# Patient Record
Sex: FEMALE | Race: WHITE | NOT HISPANIC OR LATINO | Employment: OTHER | URBAN - METROPOLITAN AREA
[De-identification: names, ages, dates, MRNs, and addresses within clinical notes are randomized per-mention and may not be internally consistent; named-entity substitution may affect disease eponyms.]

---

## 2017-08-03 ENCOUNTER — LAB REQUISITION (OUTPATIENT)
Dept: LAB | Facility: HOSPITAL | Age: 77
End: 2017-08-03
Payer: MEDICARE

## 2017-08-03 DIAGNOSIS — N39.0 URINARY TRACT INFECTION: ICD-10-CM

## 2017-08-03 PROCEDURE — 87086 URINE CULTURE/COLONY COUNT: CPT | Performed by: UROLOGY

## 2017-08-03 PROCEDURE — 87077 CULTURE AEROBIC IDENTIFY: CPT | Performed by: UROLOGY

## 2017-08-03 PROCEDURE — 87186 SC STD MICRODIL/AGAR DIL: CPT | Performed by: UROLOGY

## 2017-08-05 LAB — BACTERIA UR CULT: NORMAL

## 2019-03-07 ENCOUNTER — LAB REQUISITION (OUTPATIENT)
Dept: LAB | Facility: HOSPITAL | Age: 79
End: 2019-03-07
Payer: MEDICARE

## 2019-03-07 DIAGNOSIS — N39.0 URINARY TRACT INFECTION: ICD-10-CM

## 2019-03-07 PROCEDURE — 87086 URINE CULTURE/COLONY COUNT: CPT | Performed by: UROLOGY

## 2019-03-08 LAB — BACTERIA UR CULT: ABNORMAL

## 2020-11-05 ENCOUNTER — HOSPITAL ENCOUNTER (INPATIENT)
Facility: HOSPITAL | Age: 80
LOS: 3 days | Discharge: HOME | DRG: 291 | End: 2020-11-08
Attending: EMERGENCY MEDICINE | Admitting: INTERNAL MEDICINE
Payer: MEDICARE

## 2020-11-05 ENCOUNTER — APPOINTMENT (EMERGENCY)
Dept: RADIOLOGY | Facility: HOSPITAL | Age: 80
DRG: 291 | End: 2020-11-05
Payer: MEDICARE

## 2020-11-05 DIAGNOSIS — I10 ESSENTIAL HYPERTENSION: ICD-10-CM

## 2020-11-05 DIAGNOSIS — R07.9 CHEST PAIN: ICD-10-CM

## 2020-11-05 DIAGNOSIS — R60.9 SWELLING: ICD-10-CM

## 2020-11-05 DIAGNOSIS — R93.89 ABNORMAL CT SCAN: ICD-10-CM

## 2020-11-05 DIAGNOSIS — I50.9 ACUTE CHF (CONGESTIVE HEART FAILURE) (HCC): Primary | ICD-10-CM

## 2020-11-05 PROBLEM — M19.90 OA (OSTEOARTHRITIS): Status: ACTIVE | Noted: 2020-11-05

## 2020-11-05 PROBLEM — R06.02 SOB (SHORTNESS OF BREATH): Status: ACTIVE | Noted: 2020-11-05

## 2020-11-05 PROBLEM — E78.5 HLD (HYPERLIPIDEMIA): Status: ACTIVE | Noted: 2020-11-05

## 2020-11-05 PROBLEM — N18.31 TYPE 2 DIABETES MELLITUS WITH STAGE 3A CHRONIC KIDNEY DISEASE, WITH LONG-TERM CURRENT USE OF INSULIN (HCC): Status: ACTIVE | Noted: 2020-11-05

## 2020-11-05 PROBLEM — E11.22 TYPE 2 DIABETES MELLITUS WITH STAGE 3A CHRONIC KIDNEY DISEASE, WITH LONG-TERM CURRENT USE OF INSULIN (HCC): Status: ACTIVE | Noted: 2020-11-05

## 2020-11-05 PROBLEM — I50.22 CHRONIC SYSTOLIC HEART FAILURE (HCC): Status: ACTIVE | Noted: 2020-11-05

## 2020-11-05 PROBLEM — Z79.4 TYPE 2 DIABETES MELLITUS WITH STAGE 3A CHRONIC KIDNEY DISEASE, WITH LONG-TERM CURRENT USE OF INSULIN (HCC): Status: ACTIVE | Noted: 2020-11-05

## 2020-11-05 PROBLEM — N18.30 CKD (CHRONIC KIDNEY DISEASE) STAGE 3, GFR 30-59 ML/MIN (HCC): Status: ACTIVE | Noted: 2020-11-05

## 2020-11-05 LAB
ALBUMIN SERPL BCP-MCNC: 3.7 G/DL (ref 3.5–5)
ALP SERPL-CCNC: 126 U/L (ref 46–116)
ALT SERPL W P-5'-P-CCNC: 136 U/L (ref 12–78)
ANION GAP SERPL CALCULATED.3IONS-SCNC: 5 MMOL/L (ref 4–13)
AST SERPL W P-5'-P-CCNC: 148 U/L (ref 5–45)
BACTERIA UR QL AUTO: NORMAL /HPF
BASOPHILS # BLD AUTO: 0.06 THOUSANDS/ΜL (ref 0–0.1)
BASOPHILS NFR BLD AUTO: 1 % (ref 0–1)
BILIRUB SERPL-MCNC: 0.91 MG/DL (ref 0.2–1)
BILIRUB UR QL STRIP: NEGATIVE
BUN SERPL-MCNC: 29 MG/DL (ref 5–25)
CALCIUM SERPL-MCNC: 9.7 MG/DL (ref 8.3–10.1)
CHLORIDE SERPL-SCNC: 112 MMOL/L (ref 100–108)
CLARITY UR: CLEAR
CO2 SERPL-SCNC: 25 MMOL/L (ref 21–32)
COLOR UR: YELLOW
CREAT SERPL-MCNC: 1.24 MG/DL (ref 0.6–1.3)
D DIMER PPP FEU-MCNC: 0.8 UG/ML FEU
EOSINOPHIL # BLD AUTO: 0.16 THOUSAND/ΜL (ref 0–0.61)
EOSINOPHIL NFR BLD AUTO: 2 % (ref 0–6)
ERYTHROCYTE [DISTWIDTH] IN BLOOD BY AUTOMATED COUNT: 17.2 % (ref 11.6–15.1)
GFR SERPL CREATININE-BSD FRML MDRD: 41 ML/MIN/1.73SQ M
GLUCOSE SERPL-MCNC: 123 MG/DL (ref 65–140)
GLUCOSE UR STRIP-MCNC: ABNORMAL MG/DL
HCT VFR BLD AUTO: 40.7 % (ref 34.8–46.1)
HGB BLD-MCNC: 12.2 G/DL (ref 11.5–15.4)
HGB UR QL STRIP.AUTO: ABNORMAL
HYALINE CASTS #/AREA URNS LPF: NORMAL /LPF
IMM GRANULOCYTES # BLD AUTO: 0.03 THOUSAND/UL (ref 0–0.2)
IMM GRANULOCYTES NFR BLD AUTO: 0 % (ref 0–2)
KETONES UR STRIP-MCNC: NEGATIVE MG/DL
LEUKOCYTE ESTERASE UR QL STRIP: NEGATIVE
LYMPHOCYTES # BLD AUTO: 2.71 THOUSANDS/ΜL (ref 0.6–4.47)
LYMPHOCYTES NFR BLD AUTO: 26 % (ref 14–44)
MCH RBC QN AUTO: 30.7 PG (ref 26.8–34.3)
MCHC RBC AUTO-ENTMCNC: 30 G/DL (ref 31.4–37.4)
MCV RBC AUTO: 102 FL (ref 82–98)
MONOCYTES # BLD AUTO: 0.43 THOUSAND/ΜL (ref 0.17–1.22)
MONOCYTES NFR BLD AUTO: 4 % (ref 4–12)
NEUTROPHILS # BLD AUTO: 7.05 THOUSANDS/ΜL (ref 1.85–7.62)
NEUTS SEG NFR BLD AUTO: 67 % (ref 43–75)
NITRITE UR QL STRIP: NEGATIVE
NON-SQ EPI CELLS URNS QL MICRO: NORMAL /HPF
NRBC BLD AUTO-RTO: 0 /100 WBCS
NT-PROBNP SERPL-MCNC: ABNORMAL PG/ML
PH UR STRIP.AUTO: 5 [PH] (ref 4.5–8)
PLATELET # BLD AUTO: 234 THOUSANDS/UL (ref 149–390)
PMV BLD AUTO: 11.7 FL (ref 8.9–12.7)
POTASSIUM SERPL-SCNC: 5.1 MMOL/L (ref 3.5–5.3)
PROT SERPL-MCNC: 7.1 G/DL (ref 6.4–8.2)
PROT UR STRIP-MCNC: ABNORMAL MG/DL
RBC # BLD AUTO: 3.98 MILLION/UL (ref 3.81–5.12)
RBC #/AREA URNS AUTO: NORMAL /HPF
SARS-COV-2 RNA RESP QL NAA+PROBE: NEGATIVE
SODIUM SERPL-SCNC: 142 MMOL/L (ref 136–145)
SP GR UR STRIP.AUTO: 1.01 (ref 1–1.03)
TROPONIN I SERPL-MCNC: <0.02 NG/ML
TSH SERPL DL<=0.05 MIU/L-ACNC: 1.94 UIU/ML (ref 0.36–3.74)
UROBILINOGEN UR QL STRIP.AUTO: 0.2 E.U./DL
WBC # BLD AUTO: 10.44 THOUSAND/UL (ref 4.31–10.16)
WBC #/AREA URNS AUTO: NORMAL /HPF

## 2020-11-05 PROCEDURE — 83880 ASSAY OF NATRIURETIC PEPTIDE: CPT | Performed by: EMERGENCY MEDICINE

## 2020-11-05 PROCEDURE — 99285 EMERGENCY DEPT VISIT HI MDM: CPT

## 2020-11-05 PROCEDURE — 84484 ASSAY OF TROPONIN QUANT: CPT | Performed by: EMERGENCY MEDICINE

## 2020-11-05 PROCEDURE — G1004 CDSM NDSC: HCPCS

## 2020-11-05 PROCEDURE — 1124F ACP DISCUSS-NO DSCNMKR DOCD: CPT | Performed by: INTERNAL MEDICINE

## 2020-11-05 PROCEDURE — U0003 INFECTIOUS AGENT DETECTION BY NUCLEIC ACID (DNA OR RNA); SEVERE ACUTE RESPIRATORY SYNDROME CORONAVIRUS 2 (SARS-COV-2) (CORONAVIRUS DISEASE [COVID-19]), AMPLIFIED PROBE TECHNIQUE, MAKING USE OF HIGH THROUGHPUT TECHNOLOGIES AS DESCRIBED BY CMS-2020-01-R: HCPCS | Performed by: EMERGENCY MEDICINE

## 2020-11-05 PROCEDURE — 96374 THER/PROPH/DIAG INJ IV PUSH: CPT

## 2020-11-05 PROCEDURE — 74176 CT ABD & PELVIS W/O CONTRAST: CPT

## 2020-11-05 PROCEDURE — NC001 PR NO CHARGE: Performed by: INTERNAL MEDICINE

## 2020-11-05 PROCEDURE — 84443 ASSAY THYROID STIM HORMONE: CPT | Performed by: EMERGENCY MEDICINE

## 2020-11-05 PROCEDURE — 71250 CT THORAX DX C-: CPT

## 2020-11-05 PROCEDURE — 81001 URINALYSIS AUTO W/SCOPE: CPT

## 2020-11-05 PROCEDURE — 80053 COMPREHEN METABOLIC PANEL: CPT | Performed by: EMERGENCY MEDICINE

## 2020-11-05 PROCEDURE — 93005 ELECTROCARDIOGRAM TRACING: CPT

## 2020-11-05 PROCEDURE — 36415 COLL VENOUS BLD VENIPUNCTURE: CPT | Performed by: EMERGENCY MEDICINE

## 2020-11-05 PROCEDURE — 99285 EMERGENCY DEPT VISIT HI MDM: CPT | Performed by: EMERGENCY MEDICINE

## 2020-11-05 PROCEDURE — 85379 FIBRIN DEGRADATION QUANT: CPT | Performed by: EMERGENCY MEDICINE

## 2020-11-05 PROCEDURE — 71045 X-RAY EXAM CHEST 1 VIEW: CPT

## 2020-11-05 PROCEDURE — 85025 COMPLETE CBC W/AUTO DIFF WBC: CPT | Performed by: EMERGENCY MEDICINE

## 2020-11-05 RX ORDER — ATORVASTATIN CALCIUM 20 MG/1
20 TABLET, FILM COATED ORAL DAILY
COMMUNITY

## 2020-11-05 RX ORDER — CALCIUM CARBONATE 500(1250)
1 TABLET ORAL
Status: DISCONTINUED | OUTPATIENT
Start: 2020-11-06 | End: 2020-11-08 | Stop reason: HOSPADM

## 2020-11-05 RX ORDER — ACETAMINOPHEN 325 MG/1
650 TABLET ORAL EVERY 6 HOURS PRN
Status: DISCONTINUED | OUTPATIENT
Start: 2020-11-05 | End: 2020-11-08 | Stop reason: HOSPADM

## 2020-11-05 RX ORDER — POTASSIUM CHLORIDE 20 MEQ/1
20 TABLET, EXTENDED RELEASE ORAL 2 TIMES DAILY
COMMUNITY

## 2020-11-05 RX ORDER — FUROSEMIDE 10 MG/ML
20 INJECTION INTRAMUSCULAR; INTRAVENOUS ONCE
Status: COMPLETED | OUTPATIENT
Start: 2020-11-05 | End: 2020-11-05

## 2020-11-05 RX ORDER — FLUTICASONE PROPIONATE 50 MCG
1 SPRAY, SUSPENSION (ML) NASAL DAILY
COMMUNITY

## 2020-11-05 RX ORDER — ATORVASTATIN CALCIUM 20 MG/1
20 TABLET, FILM COATED ORAL DAILY
Status: DISCONTINUED | OUTPATIENT
Start: 2020-11-06 | End: 2020-11-06

## 2020-11-05 RX ORDER — FUROSEMIDE 10 MG/ML
20 INJECTION INTRAMUSCULAR; INTRAVENOUS DAILY
Status: DISCONTINUED | OUTPATIENT
Start: 2020-11-06 | End: 2020-11-08 | Stop reason: HOSPADM

## 2020-11-05 RX ORDER — MELATONIN
1000 DAILY
Status: DISCONTINUED | OUTPATIENT
Start: 2020-11-06 | End: 2020-11-08 | Stop reason: HOSPADM

## 2020-11-05 RX ORDER — ATENOLOL 25 MG/1
25 TABLET ORAL DAILY
COMMUNITY
End: 2020-11-08 | Stop reason: HOSPADM

## 2020-11-05 RX ORDER — BLOOD-GLUCOSE METER
1 KIT MISCELLANEOUS AS NEEDED
COMMUNITY

## 2020-11-05 RX ORDER — PHENOL 1.4 %
600 AEROSOL, SPRAY (ML) MUCOUS MEMBRANE 2 TIMES DAILY WITH MEALS
COMMUNITY

## 2020-11-05 RX ORDER — HEPARIN SODIUM 5000 [USP'U]/ML
5000 INJECTION, SOLUTION INTRAVENOUS; SUBCUTANEOUS EVERY 8 HOURS SCHEDULED
Status: DISCONTINUED | OUTPATIENT
Start: 2020-11-06 | End: 2020-11-06

## 2020-11-05 RX ORDER — ATENOLOL 25 MG/1
25 TABLET ORAL DAILY
Status: DISCONTINUED | OUTPATIENT
Start: 2020-11-06 | End: 2020-11-06

## 2020-11-05 RX ORDER — LANOLIN ALCOHOL/MO/W.PET/CERES
CREAM (GRAM) TOPICAL DAILY
COMMUNITY

## 2020-11-05 RX ORDER — ASPIRIN 81 MG/1
81 TABLET ORAL DAILY
Status: DISCONTINUED | OUTPATIENT
Start: 2020-11-06 | End: 2020-11-08 | Stop reason: HOSPADM

## 2020-11-05 RX ORDER — ASPIRIN 81 MG/1
81 TABLET ORAL DAILY
COMMUNITY

## 2020-11-05 RX ORDER — MELATONIN
1000 DAILY
COMMUNITY

## 2020-11-05 RX ORDER — POTASSIUM CHLORIDE 20 MEQ/1
20 TABLET, EXTENDED RELEASE ORAL 2 TIMES DAILY
Status: DISCONTINUED | OUTPATIENT
Start: 2020-11-06 | End: 2020-11-06

## 2020-11-05 RX ADMIN — FUROSEMIDE 20 MG: 10 INJECTION, SOLUTION INTRAMUSCULAR; INTRAVENOUS at 20:24

## 2020-11-06 ENCOUNTER — APPOINTMENT (INPATIENT)
Dept: RADIOLOGY | Facility: HOSPITAL | Age: 80
DRG: 291 | End: 2020-11-06
Payer: MEDICARE

## 2020-11-06 ENCOUNTER — APPOINTMENT (INPATIENT)
Dept: NON INVASIVE DIAGNOSTICS | Facility: HOSPITAL | Age: 80
DRG: 291 | End: 2020-11-06
Payer: MEDICARE

## 2020-11-06 PROBLEM — R59.1 LYMPHADENOPATHY: Status: ACTIVE | Noted: 2020-11-06

## 2020-11-06 PROBLEM — R74.8 ELEVATED LIVER ENZYMES: Status: ACTIVE | Noted: 2020-11-06

## 2020-11-06 LAB
ALBUMIN SERPL BCP-MCNC: 3.8 G/DL (ref 3.5–5)
ALP SERPL-CCNC: 123 U/L (ref 46–116)
ALT SERPL W P-5'-P-CCNC: 122 U/L (ref 12–78)
ANION GAP SERPL CALCULATED.3IONS-SCNC: 4 MMOL/L (ref 4–13)
AST SERPL W P-5'-P-CCNC: 69 U/L (ref 5–45)
ATRIAL RATE: 76 BPM
BACTERIA UR QL AUTO: ABNORMAL /HPF
BASOPHILS # BLD AUTO: 0.05 THOUSANDS/ΜL (ref 0–0.1)
BASOPHILS NFR BLD AUTO: 1 % (ref 0–1)
BILIRUB SERPL-MCNC: 1.19 MG/DL (ref 0.2–1)
BILIRUB UR QL STRIP: NEGATIVE
BUN SERPL-MCNC: 23 MG/DL (ref 5–25)
CALCIUM SERPL-MCNC: 9.6 MG/DL (ref 8.3–10.1)
CHLORIDE SERPL-SCNC: 108 MMOL/L (ref 100–108)
CLARITY UR: CLEAR
CO2 SERPL-SCNC: 29 MMOL/L (ref 21–32)
COLOR UR: YELLOW
COLOR, POC: NORMAL
CREAT SERPL-MCNC: 0.99 MG/DL (ref 0.6–1.3)
EOSINOPHIL # BLD AUTO: 0.18 THOUSAND/ΜL (ref 0–0.61)
EOSINOPHIL NFR BLD AUTO: 2 % (ref 0–6)
ERYTHROCYTE [DISTWIDTH] IN BLOOD BY AUTOMATED COUNT: 17.2 % (ref 11.6–15.1)
GFR SERPL CREATININE-BSD FRML MDRD: 54 ML/MIN/1.73SQ M
GLUCOSE SERPL-MCNC: 101 MG/DL (ref 65–140)
GLUCOSE SERPL-MCNC: 104 MG/DL (ref 65–140)
GLUCOSE SERPL-MCNC: 110 MG/DL (ref 65–140)
GLUCOSE SERPL-MCNC: 154 MG/DL (ref 65–140)
GLUCOSE SERPL-MCNC: 172 MG/DL (ref 65–140)
GLUCOSE SERPL-MCNC: 287 MG/DL (ref 65–140)
GLUCOSE UR STRIP-MCNC: NEGATIVE MG/DL
HBV CORE AB SER QL: NORMAL
HBV CORE IGM SER QL: NORMAL
HBV SURFACE AG SER QL: NORMAL
HCT VFR BLD AUTO: 39.2 % (ref 34.8–46.1)
HCV AB SER QL: NORMAL
HGB BLD-MCNC: 12.2 G/DL (ref 11.5–15.4)
HGB UR QL STRIP.AUTO: ABNORMAL
HYALINE CASTS #/AREA URNS LPF: ABNORMAL /LPF
IMM GRANULOCYTES # BLD AUTO: 0.02 THOUSAND/UL (ref 0–0.2)
IMM GRANULOCYTES NFR BLD AUTO: 0 % (ref 0–2)
KETONES UR STRIP-MCNC: NEGATIVE MG/DL
LEUKOCYTE ESTERASE UR QL STRIP: NEGATIVE
LYMPHOCYTES # BLD AUTO: 3.7 THOUSANDS/ΜL (ref 0.6–4.47)
LYMPHOCYTES NFR BLD AUTO: 37 % (ref 14–44)
MAGNESIUM SERPL-MCNC: 2.2 MG/DL (ref 1.6–2.6)
MCH RBC QN AUTO: 31.2 PG (ref 26.8–34.3)
MCHC RBC AUTO-ENTMCNC: 31.1 G/DL (ref 31.4–37.4)
MCV RBC AUTO: 100 FL (ref 82–98)
MONOCYTES # BLD AUTO: 0.39 THOUSAND/ΜL (ref 0.17–1.22)
MONOCYTES NFR BLD AUTO: 4 % (ref 4–12)
NEUTROPHILS # BLD AUTO: 5.6 THOUSANDS/ΜL (ref 1.85–7.62)
NEUTS SEG NFR BLD AUTO: 56 % (ref 43–75)
NITRITE UR QL STRIP: NEGATIVE
NON-SQ EPI CELLS URNS QL MICRO: ABNORMAL /HPF
NRBC BLD AUTO-RTO: 0 /100 WBCS
P AXIS: 82 DEGREES
PH UR STRIP.AUTO: 5.5 [PH] (ref 4.5–8)
PLATELET # BLD AUTO: 220 THOUSANDS/UL (ref 149–390)
PMV BLD AUTO: 11.4 FL (ref 8.9–12.7)
POTASSIUM SERPL-SCNC: 3.5 MMOL/L (ref 3.5–5.3)
PR INTERVAL: 202 MS
PROT SERPL-MCNC: 7.1 G/DL (ref 6.4–8.2)
PROT UR STRIP-MCNC: ABNORMAL MG/DL
QRS AXIS: 89 DEGREES
QRSD INTERVAL: 72 MS
QT INTERVAL: 376 MS
QTC INTERVAL: 423 MS
RBC # BLD AUTO: 3.91 MILLION/UL (ref 3.81–5.12)
RBC #/AREA URNS AUTO: ABNORMAL /HPF
SODIUM SERPL-SCNC: 141 MMOL/L (ref 136–145)
SP GR UR STRIP.AUTO: 1.02 (ref 1–1.03)
T WAVE AXIS: 114 DEGREES
UROBILINOGEN UR QL STRIP.AUTO: 0.2 E.U./DL
VENTRICULAR RATE: 76 BPM
WBC # BLD AUTO: 9.94 THOUSAND/UL (ref 4.31–10.16)
WBC #/AREA URNS AUTO: ABNORMAL /HPF

## 2020-11-06 PROCEDURE — 93970 EXTREMITY STUDY: CPT | Performed by: SURGERY

## 2020-11-06 PROCEDURE — 80053 COMPREHEN METABOLIC PANEL: CPT | Performed by: INTERNAL MEDICINE

## 2020-11-06 PROCEDURE — 99223 1ST HOSP IP/OBS HIGH 75: CPT | Performed by: INTERNAL MEDICINE

## 2020-11-06 PROCEDURE — 93010 ELECTROCARDIOGRAM REPORT: CPT | Performed by: INTERNAL MEDICINE

## 2020-11-06 PROCEDURE — 81001 URINALYSIS AUTO W/SCOPE: CPT

## 2020-11-06 PROCEDURE — 86705 HEP B CORE ANTIBODY IGM: CPT | Performed by: INTERNAL MEDICINE

## 2020-11-06 PROCEDURE — 82948 REAGENT STRIP/BLOOD GLUCOSE: CPT

## 2020-11-06 PROCEDURE — 83735 ASSAY OF MAGNESIUM: CPT | Performed by: STUDENT IN AN ORGANIZED HEALTH CARE EDUCATION/TRAINING PROGRAM

## 2020-11-06 PROCEDURE — 85025 COMPLETE CBC W/AUTO DIFF WBC: CPT | Performed by: STUDENT IN AN ORGANIZED HEALTH CARE EDUCATION/TRAINING PROGRAM

## 2020-11-06 PROCEDURE — 36415 COLL VENOUS BLD VENIPUNCTURE: CPT | Performed by: INTERNAL MEDICINE

## 2020-11-06 PROCEDURE — 86803 HEPATITIS C AB TEST: CPT | Performed by: INTERNAL MEDICINE

## 2020-11-06 PROCEDURE — 93306 TTE W/DOPPLER COMPLETE: CPT

## 2020-11-06 PROCEDURE — 87340 HEPATITIS B SURFACE AG IA: CPT | Performed by: INTERNAL MEDICINE

## 2020-11-06 PROCEDURE — 86704 HEP B CORE ANTIBODY TOTAL: CPT | Performed by: INTERNAL MEDICINE

## 2020-11-06 PROCEDURE — 93306 TTE W/DOPPLER COMPLETE: CPT | Performed by: INTERNAL MEDICINE

## 2020-11-06 PROCEDURE — 73030 X-RAY EXAM OF SHOULDER: CPT

## 2020-11-06 PROCEDURE — 93970 EXTREMITY STUDY: CPT

## 2020-11-06 PROCEDURE — NC001 PR NO CHARGE: Performed by: INTERNAL MEDICINE

## 2020-11-06 RX ORDER — POTASSIUM CHLORIDE 20 MEQ/1
40 TABLET, EXTENDED RELEASE ORAL ONCE
Status: COMPLETED | OUTPATIENT
Start: 2020-11-06 | End: 2020-11-06

## 2020-11-06 RX ORDER — ATORVASTATIN CALCIUM 40 MG/1
40 TABLET, FILM COATED ORAL
Status: DISCONTINUED | OUTPATIENT
Start: 2020-11-06 | End: 2020-11-08 | Stop reason: HOSPADM

## 2020-11-06 RX ORDER — HEPARIN SODIUM 5000 [USP'U]/ML
5000 INJECTION, SOLUTION INTRAVENOUS; SUBCUTANEOUS EVERY 8 HOURS SCHEDULED
Status: DISCONTINUED | OUTPATIENT
Start: 2020-11-06 | End: 2020-11-08 | Stop reason: HOSPADM

## 2020-11-06 RX ORDER — LISINOPRIL 5 MG/1
5 TABLET ORAL DAILY
Status: DISCONTINUED | OUTPATIENT
Start: 2020-11-06 | End: 2020-11-08 | Stop reason: HOSPADM

## 2020-11-06 RX ADMIN — ACETAMINOPHEN 650 MG: 325 TABLET, FILM COATED ORAL at 08:20

## 2020-11-06 RX ADMIN — CALCIUM 1 TABLET: 500 TABLET ORAL at 08:27

## 2020-11-06 RX ADMIN — ACETAMINOPHEN 650 MG: 325 TABLET, FILM COATED ORAL at 23:09

## 2020-11-06 RX ADMIN — Medication 1000 UNITS: at 08:27

## 2020-11-06 RX ADMIN — LISINOPRIL 5 MG: 5 TABLET ORAL at 15:31

## 2020-11-06 RX ADMIN — HEPARIN SODIUM 5000 UNITS: 5000 INJECTION INTRAVENOUS; SUBCUTANEOUS at 21:58

## 2020-11-06 RX ADMIN — POTASSIUM CHLORIDE 40 MEQ: 1500 TABLET, EXTENDED RELEASE ORAL at 08:28

## 2020-11-06 RX ADMIN — HEPARIN SODIUM 5000 UNITS: 5000 INJECTION INTRAVENOUS; SUBCUTANEOUS at 04:40

## 2020-11-06 RX ADMIN — CYANOCOBALAMIN TAB 500 MCG 1000 MCG: 500 TAB at 08:27

## 2020-11-06 RX ADMIN — ASPIRIN 81 MG: 81 TABLET ORAL at 08:20

## 2020-11-06 RX ADMIN — INSULIN LISPRO 3 UNITS: 100 INJECTION, SOLUTION INTRAVENOUS; SUBCUTANEOUS at 21:59

## 2020-11-06 RX ADMIN — HEPARIN SODIUM 5000 UNITS: 5000 INJECTION INTRAVENOUS; SUBCUTANEOUS at 14:36

## 2020-11-06 RX ADMIN — FUROSEMIDE 20 MG: 10 INJECTION, SOLUTION INTRAMUSCULAR; INTRAVENOUS at 08:23

## 2020-11-06 RX ADMIN — INSULIN LISPRO 1 UNITS: 100 INJECTION, SOLUTION INTRAVENOUS; SUBCUTANEOUS at 14:35

## 2020-11-06 RX ADMIN — ATORVASTATIN CALCIUM 40 MG: 40 TABLET, FILM COATED ORAL at 17:23

## 2020-11-07 LAB
ALBUMIN SERPL BCP-MCNC: 3.5 G/DL (ref 3.5–5)
ALP SERPL-CCNC: 108 U/L (ref 46–116)
ALT SERPL W P-5'-P-CCNC: 84 U/L (ref 12–78)
ANION GAP SERPL CALCULATED.3IONS-SCNC: 4 MMOL/L (ref 4–13)
AST SERPL W P-5'-P-CCNC: 30 U/L (ref 5–45)
BILIRUB SERPL-MCNC: 1.57 MG/DL (ref 0.2–1)
BUN SERPL-MCNC: 17 MG/DL (ref 5–25)
CALCIUM SERPL-MCNC: 9.3 MG/DL (ref 8.3–10.1)
CHLORIDE SERPL-SCNC: 107 MMOL/L (ref 100–108)
CO2 SERPL-SCNC: 29 MMOL/L (ref 21–32)
CREAT SERPL-MCNC: 0.99 MG/DL (ref 0.6–1.3)
GFR SERPL CREATININE-BSD FRML MDRD: 54 ML/MIN/1.73SQ M
GLUCOSE SERPL-MCNC: 184 MG/DL (ref 65–140)
GLUCOSE SERPL-MCNC: 185 MG/DL (ref 65–140)
GLUCOSE SERPL-MCNC: 203 MG/DL (ref 65–140)
GLUCOSE SERPL-MCNC: 251 MG/DL (ref 65–140)
GLUCOSE SERPL-MCNC: 274 MG/DL (ref 65–140)
GLUCOSE SERPL-MCNC: 310 MG/DL (ref 65–140)
MAGNESIUM SERPL-MCNC: 2.2 MG/DL (ref 1.6–2.6)
POTASSIUM SERPL-SCNC: 3.6 MMOL/L (ref 3.5–5.3)
PROT SERPL-MCNC: 6.6 G/DL (ref 6.4–8.2)
SODIUM SERPL-SCNC: 140 MMOL/L (ref 136–145)

## 2020-11-07 PROCEDURE — 82948 REAGENT STRIP/BLOOD GLUCOSE: CPT

## 2020-11-07 PROCEDURE — 83735 ASSAY OF MAGNESIUM: CPT | Performed by: INTERNAL MEDICINE

## 2020-11-07 PROCEDURE — 99233 SBSQ HOSP IP/OBS HIGH 50: CPT | Performed by: INTERNAL MEDICINE

## 2020-11-07 PROCEDURE — 80053 COMPREHEN METABOLIC PANEL: CPT | Performed by: STUDENT IN AN ORGANIZED HEALTH CARE EDUCATION/TRAINING PROGRAM

## 2020-11-07 RX ORDER — FUROSEMIDE 10 MG/ML
20 INJECTION INTRAMUSCULAR; INTRAVENOUS ONCE
Status: COMPLETED | OUTPATIENT
Start: 2020-11-07 | End: 2020-11-07

## 2020-11-07 RX ORDER — POTASSIUM CHLORIDE 20 MEQ/1
40 TABLET, EXTENDED RELEASE ORAL 2 TIMES DAILY
Status: DISCONTINUED | OUTPATIENT
Start: 2020-11-07 | End: 2020-11-08 | Stop reason: HOSPADM

## 2020-11-07 RX ORDER — MAGNESIUM SULFATE HEPTAHYDRATE 40 MG/ML
2 INJECTION, SOLUTION INTRAVENOUS ONCE
Status: COMPLETED | OUTPATIENT
Start: 2020-11-07 | End: 2020-11-08

## 2020-11-07 RX ORDER — DOXYCYCLINE HYCLATE 50 MG/1
324 CAPSULE, GELATIN COATED ORAL
Status: DISCONTINUED | OUTPATIENT
Start: 2020-11-07 | End: 2020-11-08 | Stop reason: HOSPADM

## 2020-11-07 RX ADMIN — INSULIN LISPRO 2 UNITS: 100 INJECTION, SOLUTION INTRAVENOUS; SUBCUTANEOUS at 09:02

## 2020-11-07 RX ADMIN — Medication 1000 UNITS: at 08:58

## 2020-11-07 RX ADMIN — METOPROLOL TARTRATE 25 MG: 25 TABLET, FILM COATED ORAL at 08:56

## 2020-11-07 RX ADMIN — ATORVASTATIN CALCIUM 40 MG: 40 TABLET, FILM COATED ORAL at 18:10

## 2020-11-07 RX ADMIN — MAGNESIUM SULFATE IN WATER 2 G: 40 INJECTION, SOLUTION INTRAVENOUS at 12:24

## 2020-11-07 RX ADMIN — CYANOCOBALAMIN TAB 500 MCG 1000 MCG: 500 TAB at 08:58

## 2020-11-07 RX ADMIN — FERROUS GLUCONATE 324 MG: 324 TABLET ORAL at 18:11

## 2020-11-07 RX ADMIN — INSULIN LISPRO 3 UNITS: 100 INJECTION, SOLUTION INTRAVENOUS; SUBCUTANEOUS at 21:41

## 2020-11-07 RX ADMIN — INSULIN LISPRO 4 UNITS: 100 INJECTION, SOLUTION INTRAVENOUS; SUBCUTANEOUS at 18:56

## 2020-11-07 RX ADMIN — FUROSEMIDE 20 MG: 10 INJECTION, SOLUTION INTRAMUSCULAR; INTRAVENOUS at 08:59

## 2020-11-07 RX ADMIN — FUROSEMIDE 20 MG: 10 INJECTION, SOLUTION INTRAMUSCULAR; INTRAVENOUS at 12:25

## 2020-11-07 RX ADMIN — INSULIN LISPRO 1 UNITS: 100 INJECTION, SOLUTION INTRAVENOUS; SUBCUTANEOUS at 13:46

## 2020-11-07 RX ADMIN — ASPIRIN 81 MG: 81 TABLET ORAL at 08:59

## 2020-11-07 RX ADMIN — HEPARIN SODIUM 5000 UNITS: 5000 INJECTION INTRAVENOUS; SUBCUTANEOUS at 05:30

## 2020-11-07 RX ADMIN — CALCIUM 1 TABLET: 500 TABLET ORAL at 08:59

## 2020-11-07 RX ADMIN — LISINOPRIL 5 MG: 5 TABLET ORAL at 08:59

## 2020-11-07 RX ADMIN — POTASSIUM CHLORIDE 40 MEQ: 1500 TABLET, EXTENDED RELEASE ORAL at 12:24

## 2020-11-07 RX ADMIN — ACETAMINOPHEN 650 MG: 325 TABLET, FILM COATED ORAL at 08:59

## 2020-11-07 RX ADMIN — HEPARIN SODIUM 5000 UNITS: 5000 INJECTION INTRAVENOUS; SUBCUTANEOUS at 21:42

## 2020-11-07 RX ADMIN — METOPROLOL TARTRATE 25 MG: 25 TABLET, FILM COATED ORAL at 21:40

## 2020-11-07 RX ADMIN — HEPARIN SODIUM 5000 UNITS: 5000 INJECTION INTRAVENOUS; SUBCUTANEOUS at 13:46

## 2020-11-07 RX ADMIN — FERROUS GLUCONATE 324 MG: 324 TABLET ORAL at 12:25

## 2020-11-07 RX ADMIN — INSULIN LISPRO 3 UNITS: 100 INJECTION, SOLUTION INTRAVENOUS; SUBCUTANEOUS at 10:06

## 2020-11-07 RX ADMIN — POTASSIUM CHLORIDE 40 MEQ: 1500 TABLET, EXTENDED RELEASE ORAL at 18:11

## 2020-11-08 VITALS
DIASTOLIC BLOOD PRESSURE: 62 MMHG | BODY MASS INDEX: 26.29 KG/M2 | TEMPERATURE: 97.8 F | WEIGHT: 163.58 LBS | SYSTOLIC BLOOD PRESSURE: 143 MMHG | HEIGHT: 66 IN | OXYGEN SATURATION: 93 % | HEART RATE: 73 BPM | RESPIRATION RATE: 18 BRPM

## 2020-11-08 PROBLEM — R06.02 SOB (SHORTNESS OF BREATH): Status: RESOLVED | Noted: 2020-11-05 | Resolved: 2020-11-08

## 2020-11-08 LAB
ALBUMIN SERPL BCP-MCNC: 3.7 G/DL (ref 3.5–5)
ALP SERPL-CCNC: 113 U/L (ref 46–116)
ALT SERPL W P-5'-P-CCNC: 70 U/L (ref 12–78)
ANION GAP SERPL CALCULATED.3IONS-SCNC: 7 MMOL/L (ref 4–13)
AST SERPL W P-5'-P-CCNC: 17 U/L (ref 5–45)
BILIRUB SERPL-MCNC: 1.58 MG/DL (ref 0.2–1)
BUN SERPL-MCNC: 20 MG/DL (ref 5–25)
CALCIUM SERPL-MCNC: 9.4 MG/DL (ref 8.3–10.1)
CHLORIDE SERPL-SCNC: 107 MMOL/L (ref 100–108)
CO2 SERPL-SCNC: 24 MMOL/L (ref 21–32)
CREAT SERPL-MCNC: 1.08 MG/DL (ref 0.6–1.3)
ERYTHROCYTE [DISTWIDTH] IN BLOOD BY AUTOMATED COUNT: 17.2 % (ref 11.6–15.1)
GFR SERPL CREATININE-BSD FRML MDRD: 48 ML/MIN/1.73SQ M
GLUCOSE SERPL-MCNC: 195 MG/DL (ref 65–140)
GLUCOSE SERPL-MCNC: 213 MG/DL (ref 65–140)
GLUCOSE SERPL-MCNC: 218 MG/DL (ref 65–140)
GLUCOSE SERPL-MCNC: 291 MG/DL (ref 65–140)
GLUCOSE SERPL-MCNC: 346 MG/DL (ref 65–140)
HCT VFR BLD AUTO: 37.7 % (ref 34.8–46.1)
HGB BLD-MCNC: 12 G/DL (ref 11.5–15.4)
MCH RBC QN AUTO: 31.7 PG (ref 26.8–34.3)
MCHC RBC AUTO-ENTMCNC: 31.8 G/DL (ref 31.4–37.4)
MCV RBC AUTO: 100 FL (ref 82–98)
PLATELET # BLD AUTO: 210 THOUSANDS/UL (ref 149–390)
PMV BLD AUTO: 11.6 FL (ref 8.9–12.7)
POTASSIUM SERPL-SCNC: 4.1 MMOL/L (ref 3.5–5.3)
PROT SERPL-MCNC: 6.7 G/DL (ref 6.4–8.2)
RBC # BLD AUTO: 3.79 MILLION/UL (ref 3.81–5.12)
SODIUM SERPL-SCNC: 138 MMOL/L (ref 136–145)
WBC # BLD AUTO: 10.24 THOUSAND/UL (ref 4.31–10.16)

## 2020-11-08 PROCEDURE — 80053 COMPREHEN METABOLIC PANEL: CPT | Performed by: INTERNAL MEDICINE

## 2020-11-08 PROCEDURE — 82948 REAGENT STRIP/BLOOD GLUCOSE: CPT

## 2020-11-08 PROCEDURE — 99233 SBSQ HOSP IP/OBS HIGH 50: CPT | Performed by: INTERNAL MEDICINE

## 2020-11-08 PROCEDURE — 85027 COMPLETE CBC AUTOMATED: CPT | Performed by: INTERNAL MEDICINE

## 2020-11-08 RX ORDER — LISINOPRIL 5 MG/1
5 TABLET ORAL DAILY
Qty: 30 TABLET | Refills: 0 | Status: SHIPPED | OUTPATIENT
Start: 2020-11-09 | End: 2021-08-13 | Stop reason: HOSPADM

## 2020-11-08 RX ORDER — FUROSEMIDE 40 MG/1
40 TABLET ORAL DAILY
Qty: 30 TABLET | Refills: 0 | Status: SHIPPED | OUTPATIENT
Start: 2020-11-08 | End: 2020-12-08

## 2020-11-08 RX ADMIN — POTASSIUM CHLORIDE 40 MEQ: 1500 TABLET, EXTENDED RELEASE ORAL at 08:05

## 2020-11-08 RX ADMIN — ACETAMINOPHEN 650 MG: 325 TABLET, FILM COATED ORAL at 08:05

## 2020-11-08 RX ADMIN — INSULIN LISPRO 5 UNITS: 100 INJECTION, SOLUTION INTRAVENOUS; SUBCUTANEOUS at 18:04

## 2020-11-08 RX ADMIN — METOPROLOL TARTRATE 25 MG: 25 TABLET, FILM COATED ORAL at 08:04

## 2020-11-08 RX ADMIN — HEPARIN SODIUM 5000 UNITS: 5000 INJECTION INTRAVENOUS; SUBCUTANEOUS at 05:55

## 2020-11-08 RX ADMIN — INSULIN LISPRO 4 UNITS: 100 INJECTION, SOLUTION INTRAVENOUS; SUBCUTANEOUS at 14:59

## 2020-11-08 RX ADMIN — POTASSIUM CHLORIDE 40 MEQ: 1500 TABLET, EXTENDED RELEASE ORAL at 18:03

## 2020-11-08 RX ADMIN — FUROSEMIDE 20 MG: 10 INJECTION, SOLUTION INTRAMUSCULAR; INTRAVENOUS at 08:05

## 2020-11-08 RX ADMIN — ATORVASTATIN CALCIUM 40 MG: 40 TABLET, FILM COATED ORAL at 17:19

## 2020-11-08 RX ADMIN — CYANOCOBALAMIN TAB 500 MCG 1000 MCG: 500 TAB at 08:04

## 2020-11-08 RX ADMIN — FERROUS GLUCONATE 324 MG: 324 TABLET ORAL at 05:54

## 2020-11-08 RX ADMIN — INSULIN LISPRO 2 UNITS: 100 INJECTION, SOLUTION INTRAVENOUS; SUBCUTANEOUS at 08:02

## 2020-11-08 RX ADMIN — Medication 1000 UNITS: at 08:04

## 2020-11-08 RX ADMIN — LISINOPRIL 5 MG: 5 TABLET ORAL at 08:04

## 2020-11-08 RX ADMIN — ASPIRIN 81 MG: 81 TABLET ORAL at 08:05

## 2020-11-08 RX ADMIN — FERROUS GLUCONATE 324 MG: 324 TABLET ORAL at 11:59

## 2020-11-08 RX ADMIN — INSULIN LISPRO 2 UNITS: 100 INJECTION, SOLUTION INTRAVENOUS; SUBCUTANEOUS at 09:54

## 2020-11-08 RX ADMIN — FERROUS GLUCONATE 324 MG: 324 TABLET ORAL at 17:19

## 2020-11-08 RX ADMIN — CALCIUM 1 TABLET: 500 TABLET ORAL at 08:05

## 2020-11-10 ENCOUNTER — PATIENT OUTREACH (OUTPATIENT)
Dept: CASE MANAGEMENT | Facility: OTHER | Age: 80
End: 2020-11-10

## 2020-11-11 ENCOUNTER — PATIENT OUTREACH (OUTPATIENT)
Dept: CASE MANAGEMENT | Facility: OTHER | Age: 80
End: 2020-11-11

## 2020-11-25 ENCOUNTER — PATIENT OUTREACH (OUTPATIENT)
Dept: CASE MANAGEMENT | Facility: OTHER | Age: 80
End: 2020-11-25

## 2020-12-10 ENCOUNTER — PATIENT OUTREACH (OUTPATIENT)
Dept: CASE MANAGEMENT | Facility: OTHER | Age: 80
End: 2020-12-10

## 2020-12-30 ENCOUNTER — PATIENT OUTREACH (OUTPATIENT)
Dept: CASE MANAGEMENT | Facility: OTHER | Age: 80
End: 2020-12-30

## 2021-01-27 ENCOUNTER — PATIENT OUTREACH (OUTPATIENT)
Dept: CASE MANAGEMENT | Facility: OTHER | Age: 81
End: 2021-01-27

## 2021-01-27 NOTE — PROGRESS NOTES
I spoke with patient who is feeling well she reports  She denies shortness of breath or weight gain  Her blood sugars are "up and down" since hospital discharge  She did talk to her PCP and has adjusted her dose  She did not do a blood sugar today because she slept in  She reports her nose is sore and crusted since having her "virus test"  I advised her to report this issue to her PCP since the test was done in November  She understands  She is wearing compression stockings and denies swelling in the legs  She is seeing her podiatrist today  She has a follow up appointment with urology tomorrow  Her niece will drive her

## 2021-02-05 ENCOUNTER — EPISODE CHANGES (OUTPATIENT)
Dept: CASE MANAGEMENT | Facility: OTHER | Age: 81
End: 2021-02-05

## 2021-03-08 ENCOUNTER — TRANSCRIBE ORDERS (OUTPATIENT)
Dept: ADMINISTRATIVE | Facility: HOSPITAL | Age: 81
End: 2021-03-08

## 2021-03-08 DIAGNOSIS — I34.0 NONRHEUMATIC MITRAL (VALVE) INSUFFICIENCY: Primary | ICD-10-CM

## 2021-05-07 ENCOUNTER — TRANSCRIBE ORDERS (OUTPATIENT)
Dept: ADMINISTRATIVE | Facility: HOSPITAL | Age: 81
End: 2021-05-07

## 2021-05-10 ENCOUNTER — HOSPITAL ENCOUNTER (OUTPATIENT)
Dept: NON INVASIVE DIAGNOSTICS | Facility: HOSPITAL | Age: 81
Discharge: HOME | End: 2021-05-10
Attending: INTERNAL MEDICINE
Payer: MEDICARE

## 2021-05-10 DIAGNOSIS — I34.0 NONRHEUMATIC MITRAL (VALVE) INSUFFICIENCY: ICD-10-CM

## 2021-05-10 PROCEDURE — 93306 TTE W/DOPPLER COMPLETE: CPT

## 2021-05-11 PROCEDURE — 93306 TTE W/DOPPLER COMPLETE: CPT | Performed by: INTERNAL MEDICINE

## 2021-08-05 ENCOUNTER — APPOINTMENT (EMERGENCY)
Dept: RADIOLOGY | Facility: HOSPITAL | Age: 81
DRG: 602 | End: 2021-08-05
Payer: MEDICARE

## 2021-08-05 ENCOUNTER — HOSPITAL ENCOUNTER (INPATIENT)
Facility: HOSPITAL | Age: 81
LOS: 8 days | DRG: 602 | End: 2021-08-13
Attending: EMERGENCY MEDICINE | Admitting: INTERNAL MEDICINE
Payer: MEDICARE

## 2021-08-05 DIAGNOSIS — E11.22 TYPE 2 DIABETES MELLITUS WITH STAGE 3A CHRONIC KIDNEY DISEASE, WITH LONG-TERM CURRENT USE OF INSULIN (HCC): ICD-10-CM

## 2021-08-05 DIAGNOSIS — E16.2 HYPOGLYCEMIA: ICD-10-CM

## 2021-08-05 DIAGNOSIS — N18.31 TYPE 2 DIABETES MELLITUS WITH STAGE 3A CHRONIC KIDNEY DISEASE, WITH LONG-TERM CURRENT USE OF INSULIN (HCC): ICD-10-CM

## 2021-08-05 DIAGNOSIS — I50.22 CHRONIC SYSTOLIC HEART FAILURE (HCC): ICD-10-CM

## 2021-08-05 DIAGNOSIS — R26.2 AMBULATORY DYSFUNCTION: ICD-10-CM

## 2021-08-05 DIAGNOSIS — E16.2 ACUTE METABOLIC ENCEPHALOPATHY DUE TO HYPOGLYCEMIA: ICD-10-CM

## 2021-08-05 DIAGNOSIS — R94.31 ST SEGMENT DEPRESSION: Primary | ICD-10-CM

## 2021-08-05 DIAGNOSIS — N18.30 CKD (CHRONIC KIDNEY DISEASE) STAGE 3, GFR 30-59 ML/MIN (HCC): ICD-10-CM

## 2021-08-05 DIAGNOSIS — G93.41 ACUTE METABOLIC ENCEPHALOPATHY DUE TO HYPOGLYCEMIA: ICD-10-CM

## 2021-08-05 DIAGNOSIS — I10 HYPERTENSION, UNSPECIFIED TYPE: ICD-10-CM

## 2021-08-05 DIAGNOSIS — Z79.4 TYPE 2 DIABETES MELLITUS WITH STAGE 3A CHRONIC KIDNEY DISEASE, WITH LONG-TERM CURRENT USE OF INSULIN (HCC): ICD-10-CM

## 2021-08-05 DIAGNOSIS — Z91.81 AT RISK FOR FALLS: ICD-10-CM

## 2021-08-05 DIAGNOSIS — R41.82 ALTERED MENTAL STATUS: ICD-10-CM

## 2021-08-05 PROBLEM — S80.819A: Status: ACTIVE | Noted: 2021-08-05

## 2021-08-05 PROBLEM — L08.9: Status: ACTIVE | Noted: 2021-08-05

## 2021-08-05 PROBLEM — G31.84 MILD COGNITIVE IMPAIRMENT: Status: ACTIVE | Noted: 2021-08-05

## 2021-08-05 LAB
ALBUMIN SERPL BCP-MCNC: 3.7 G/DL (ref 3.5–5)
ALP SERPL-CCNC: 123 U/L (ref 46–116)
ALT SERPL W P-5'-P-CCNC: 42 U/L (ref 12–78)
ANION GAP SERPL CALCULATED.3IONS-SCNC: 9 MMOL/L (ref 4–13)
AST SERPL W P-5'-P-CCNC: 33 U/L (ref 5–45)
BACTERIA UR QL AUTO: NORMAL /HPF
BASOPHILS # BLD MANUAL: 0 THOUSAND/UL (ref 0–0.1)
BASOPHILS NFR MAR MANUAL: 0 % (ref 0–1)
BILIRUB SERPL-MCNC: 0.51 MG/DL (ref 0.2–1)
BILIRUB UR QL STRIP: NEGATIVE
BUN SERPL-MCNC: 26 MG/DL (ref 5–25)
CALCIUM SERPL-MCNC: 10 MG/DL (ref 8.3–10.1)
CHLORIDE SERPL-SCNC: 105 MMOL/L (ref 100–108)
CLARITY UR: CLEAR
CO2 SERPL-SCNC: 27 MMOL/L (ref 21–32)
COLOR UR: YELLOW
CREAT SERPL-MCNC: 1.08 MG/DL (ref 0.6–1.3)
EOSINOPHIL # BLD MANUAL: 0.15 THOUSAND/UL (ref 0–0.4)
EOSINOPHIL NFR BLD MANUAL: 1 % (ref 0–6)
ERYTHROCYTE [DISTWIDTH] IN BLOOD BY AUTOMATED COUNT: 13.9 % (ref 11.6–15.1)
GFR SERPL CREATININE-BSD FRML MDRD: 48 ML/MIN/1.73SQ M
GIANT PLATELETS BLD QL SMEAR: PRESENT
GLUCOSE SERPL-MCNC: 170 MG/DL (ref 65–140)
GLUCOSE SERPL-MCNC: 40 MG/DL (ref 65–140)
GLUCOSE UR STRIP-MCNC: NEGATIVE MG/DL
HCT VFR BLD AUTO: 41.1 % (ref 34.8–46.1)
HGB BLD-MCNC: 13.7 G/DL (ref 11.5–15.4)
HGB UR QL STRIP.AUTO: ABNORMAL
HYALINE CASTS #/AREA URNS LPF: NORMAL /LPF
KETONES UR STRIP-MCNC: ABNORMAL MG/DL
LEUKOCYTE ESTERASE UR QL STRIP: ABNORMAL
LYMPHOCYTES # BLD AUTO: 1.93 THOUSAND/UL (ref 0.6–4.47)
LYMPHOCYTES # BLD AUTO: 13 % (ref 14–44)
MCH RBC QN AUTO: 33.1 PG (ref 26.8–34.3)
MCHC RBC AUTO-ENTMCNC: 33.3 G/DL (ref 31.4–37.4)
MCV RBC AUTO: 99 FL (ref 82–98)
MONOCYTES # BLD AUTO: 0.74 THOUSAND/UL (ref 0–1.22)
MONOCYTES NFR BLD: 5 % (ref 4–12)
NEUTROPHILS # BLD MANUAL: 8.63 THOUSAND/UL (ref 1.85–7.62)
NEUTS BAND NFR BLD MANUAL: 1 % (ref 0–8)
NEUTS SEG NFR BLD AUTO: 57 % (ref 43–75)
NITRITE UR QL STRIP: NEGATIVE
NON-SQ EPI CELLS URNS QL MICRO: NORMAL /HPF
NRBC BLD AUTO-RTO: 0 /100 WBCS
PH UR STRIP.AUTO: 5 [PH] (ref 4.5–8)
PLATELET # BLD AUTO: 232 THOUSANDS/UL (ref 149–390)
PLATELET BLD QL SMEAR: ADEQUATE
PMV BLD AUTO: 11.6 FL (ref 8.9–12.7)
POLYCHROMASIA BLD QL SMEAR: PRESENT
POTASSIUM SERPL-SCNC: 3.3 MMOL/L (ref 3.5–5.3)
PROT SERPL-MCNC: 7.5 G/DL (ref 6.4–8.2)
PROT UR STRIP-MCNC: NEGATIVE MG/DL
RBC # BLD AUTO: 4.14 MILLION/UL (ref 3.81–5.12)
RBC #/AREA URNS AUTO: NORMAL /HPF
RBC MORPH BLD: PRESENT
SARS-COV-2 RNA RESP QL NAA+PROBE: NEGATIVE
SMUDGE CELLS BLD QL SMEAR: PRESENT
SODIUM SERPL-SCNC: 141 MMOL/L (ref 136–145)
SP GR UR STRIP.AUTO: 1.02 (ref 1–1.03)
TROPONIN I SERPL-MCNC: <0.02 NG/ML
TSH SERPL DL<=0.05 MIU/L-ACNC: 0.67 UIU/ML (ref 0.36–3.74)
UROBILINOGEN UR QL STRIP.AUTO: 0.2 E.U./DL
VARIANT LYMPHS # BLD AUTO: 23 %
WBC # BLD AUTO: 14.88 THOUSAND/UL (ref 4.31–10.16)
WBC #/AREA URNS AUTO: NORMAL /HPF

## 2021-08-05 PROCEDURE — 84443 ASSAY THYROID STIM HORMONE: CPT | Performed by: STUDENT IN AN ORGANIZED HEALTH CARE EDUCATION/TRAINING PROGRAM

## 2021-08-05 PROCEDURE — 99285 EMERGENCY DEPT VISIT HI MDM: CPT | Performed by: EMERGENCY MEDICINE

## 2021-08-05 PROCEDURE — 1124F ACP DISCUSS-NO DSCNMKR DOCD: CPT | Performed by: EMERGENCY MEDICINE

## 2021-08-05 PROCEDURE — 70450 CT HEAD/BRAIN W/O DYE: CPT

## 2021-08-05 PROCEDURE — 71045 X-RAY EXAM CHEST 1 VIEW: CPT

## 2021-08-05 PROCEDURE — 36415 COLL VENOUS BLD VENIPUNCTURE: CPT

## 2021-08-05 PROCEDURE — 99285 EMERGENCY DEPT VISIT HI MDM: CPT

## 2021-08-05 PROCEDURE — 85007 BL SMEAR W/DIFF WBC COUNT: CPT

## 2021-08-05 PROCEDURE — 81001 URINALYSIS AUTO W/SCOPE: CPT

## 2021-08-05 PROCEDURE — G1004 CDSM NDSC: HCPCS

## 2021-08-05 PROCEDURE — U0005 INFEC AGEN DETEC AMPLI PROBE: HCPCS | Performed by: EMERGENCY MEDICINE

## 2021-08-05 PROCEDURE — 85027 COMPLETE CBC AUTOMATED: CPT

## 2021-08-05 PROCEDURE — 82948 REAGENT STRIP/BLOOD GLUCOSE: CPT

## 2021-08-05 PROCEDURE — U0003 INFECTIOUS AGENT DETECTION BY NUCLEIC ACID (DNA OR RNA); SEVERE ACUTE RESPIRATORY SYNDROME CORONAVIRUS 2 (SARS-COV-2) (CORONAVIRUS DISEASE [COVID-19]), AMPLIFIED PROBE TECHNIQUE, MAKING USE OF HIGH THROUGHPUT TECHNOLOGIES AS DESCRIBED BY CMS-2020-01-R: HCPCS | Performed by: EMERGENCY MEDICINE

## 2021-08-05 PROCEDURE — 73610 X-RAY EXAM OF ANKLE: CPT

## 2021-08-05 PROCEDURE — 80053 COMPREHEN METABOLIC PANEL: CPT

## 2021-08-05 PROCEDURE — 84484 ASSAY OF TROPONIN QUANT: CPT | Performed by: EMERGENCY MEDICINE

## 2021-08-05 PROCEDURE — 93005 ELECTROCARDIOGRAM TRACING: CPT

## 2021-08-05 RX ORDER — ASPIRIN 81 MG/1
81 TABLET ORAL DAILY
Status: DISCONTINUED | OUTPATIENT
Start: 2021-08-06 | End: 2021-08-13 | Stop reason: HOSPADM

## 2021-08-05 RX ORDER — ATORVASTATIN CALCIUM 20 MG/1
20 TABLET, FILM COATED ORAL DAILY
Status: DISCONTINUED | OUTPATIENT
Start: 2021-08-06 | End: 2021-08-13 | Stop reason: HOSPADM

## 2021-08-05 RX ORDER — ASPIRIN 325 MG
325 TABLET ORAL ONCE
Status: COMPLETED | OUTPATIENT
Start: 2021-08-05 | End: 2021-08-05

## 2021-08-05 RX ORDER — POTASSIUM CHLORIDE 20 MEQ/1
40 TABLET, EXTENDED RELEASE ORAL ONCE
Status: COMPLETED | OUTPATIENT
Start: 2021-08-05 | End: 2021-08-05

## 2021-08-05 RX ORDER — CEPHALEXIN 250 MG/1
500 CAPSULE ORAL EVERY 6 HOURS SCHEDULED
Status: DISCONTINUED | OUTPATIENT
Start: 2021-08-06 | End: 2021-08-06

## 2021-08-05 RX ORDER — HEPARIN SODIUM 5000 [USP'U]/ML
5000 INJECTION, SOLUTION INTRAVENOUS; SUBCUTANEOUS EVERY 8 HOURS SCHEDULED
Status: DISCONTINUED | OUTPATIENT
Start: 2021-08-05 | End: 2021-08-13 | Stop reason: HOSPADM

## 2021-08-05 RX ORDER — CALCIUM CARBONATE 500(1250)
1 TABLET ORAL
Status: DISCONTINUED | OUTPATIENT
Start: 2021-08-06 | End: 2021-08-13 | Stop reason: HOSPADM

## 2021-08-05 RX ORDER — MELATONIN
1000 DAILY
Status: DISCONTINUED | OUTPATIENT
Start: 2021-08-06 | End: 2021-08-13 | Stop reason: HOSPADM

## 2021-08-05 RX ADMIN — ASPIRIN 325 MG ORAL TABLET 325 MG: 325 PILL ORAL at 21:12

## 2021-08-05 RX ADMIN — POTASSIUM CHLORIDE 40 MEQ: 1500 TABLET, EXTENDED RELEASE ORAL at 20:23

## 2021-08-05 NOTE — ED ATTENDING ATTESTATION
8/5/2021  IHarrison DO, saw and evaluated the patient  I have discussed the patient with the resident/non-physician practitioner and agree with the resident's/non-physician practitioner's findings, Plan of Care, and MDM as documented in the resident's/non-physician practitioner's note, except where noted  All available labs and Radiology studies were reviewed  I was present for key portions of any procedure(s) performed by the resident/non-physician practitioner and I was immediately available to provide assistance  At this point I agree with the current assessment done in the Emergency Department  I have conducted an independent evaluation of this patient a history and physical is as follows:    69-year-old female presents for wound check  Patient has an open wound on right anterior shin that she is unsure how she got it  Noted yesterday  Niece at bedside is concerned because she received a call from another family member yesterday who told her that patient seemed confused  Patient was apparently talking about seeing her dog in the house when the dog passed away  Also believes that there is a group of people at her house last night having a dinner party but niece denies this  He is also reports patient had a fall about a week ago when she hit her head, no loss of consciousness and was not evaluated for it  Patient denies fever, chills, chest pain, shortness of breath  No headache  Patient does live alone  On exam-no acute distress, heart regular, no respiratory distress, abrasion noted on right pretibial area with surrounding ecchymosis, trace pitting edema bilateral lower extremities, slightly worse around wound on right lower extremity  Plan-x-ray right tibia, CT head, check cardiac labs, check urine  Concern regarding confusion at home and since patient lives alone concern for safety    Will plan for admission    ED Course         Critical Care Time  Procedures

## 2021-08-05 NOTE — ED PROVIDER NOTES
History  Chief Complaint   Patient presents with    Wound Check     Pt states noticed a wound on right lower leg  Pt unsure how it happened and not sure how it happened     Patient is an 79year old female with hx of CHF, DM, HTN, and hyperlipidemia, presenting to the ED today with niece at bedside for evaluation after patient noticed that she had an open wound of her R anterior shin, with pain and swelling to the R ankle  At first, patient states she noticed this today, but then notes she saw it yesterday  Patient cannot recall any trauma yesterday or today that may have caused the wound  Patient does state that she takes Lasix and consequently, is usually up using the bathroom several times per night  Niece at bedside is concerned because she received a call from another family member yesterday, who told her that the patient seemed confused  Patient was apparently talking about seeing her dog in the house, when actually her dog had passed away several years ago  Patient also believes that there was a group of people at her house last night having a dinner party, but niece denies this  Niece states that the patient's apparent confusion is very transient  Patient does not feel that she is confused at this time; she is able to recall the month, year, and the name of the current president  Patient is mostly concerned about her ankle  However, patient does add that she lives at home alone and ambulates with a walker  Last week, patient states that she was doing some gardening in the yard and sustained a mechanical fall with her walker, noting that she hit her head on the walker  There was no LOC and niece states that witnesses helped the patient ambulate back inside without issue  Otherwise patient denies fever, chills, chest pain, shortness of breath, visual changes, dizziness, lightheadedness, headache, congestion, cough, urinary issues, focal weakness, numbness and tingling   Niece is concerned about patient's discharge as she lives at home alone  Prior to Admission Medications   Prescriptions Last Dose Informant Patient Reported?  Taking?   aspirin (ECOTRIN LOW STRENGTH) 81 mg EC tablet 2021 at Unknown time Self Yes Yes   Sig: Take 81 mg by mouth daily   atorvastatin (LIPITOR) 20 mg tablet Unknown at Unknown time Self Yes No   Sig: Take 20 mg by mouth daily   calcium carbonate (OS-JUAN RAMON) 600 MG tablet 2021 at Unknown time Self Yes Yes   Sig: Take 600 mg by mouth 2 (two) times a day with meals   cholecalciferol (VITAMIN D3) 1,000 units tablet 2021 at Unknown time  Yes Yes   Sig: Take 1,000 Units by mouth daily   ferrous fumarate-iron polysaccharide complex (TANDEM) 162-115 2 MG CAPS 2021 at Unknown time Self Yes Yes   Sig: Take 1 capsule by mouth daily with breakfast   fluticasone (FLONASE) 50 mcg/act nasal spray  Self Yes No   Si spray into each nostril daily   furosemide (LASIX) 40 mg tablet   No No   Sig: Take 1 tablet (40 mg total) by mouth daily   glucose blood test strip  Self Yes No   Si each by Other route daily as needed Use as instructed   glucose monitoring kit (FREESTYLE) monitoring kit  Self Yes No   Si each by Does not apply route as needed   insulin aspart (NovoLOG) 100 units/mL injection 2021 at Unknown time Self Yes Yes   Sig: Inject under the skin 3 (three) times a day before meals 10U with breakfast and lunch, 5 units with dinnner   insulin detemir (LEVEMIR) 100 units/mL subcutaneous injection 2021 at Unknown time Self Yes Yes   Sig: Inject 20 Units under the skin daily at bedtime   lisinopril (ZESTRIL) 5 mg tablet   No No   Sig: Take 1 tablet (5 mg total) by mouth daily   metoprolol tartrate (LOPRESSOR) 25 mg tablet   No No   Sig: Take 1 tablet (25 mg total) by mouth every 12 (twelve) hours   potassium chloride (K-DUR,KLOR-CON) 20 mEq tablet  Self Yes No   Sig: Take 20 mEq by mouth 2 (two) times a day   vitamin B-12 (VITAMIN B-12) 1,000 mcg tablet 2021 at Unknown time Self Yes Yes   Sig: Take by mouth daily      Facility-Administered Medications: None       Past Medical History:   Diagnosis Date    Benign adenomatous polyp of large intestine     Diabetes mellitus ([address] )     Hyperlipemia     Hypertension     Osteoarthritis     TIA (transient ischemic attack)        Past Surgical History:   Procedure Laterality Date    APPENDECTOMY      CARPAL TUNNEL RELEASE Right     HYSTERECTOMY W/ SALPINGO-OOPHERECTOMY         Family History   Problem Relation Age of Onset    Heart disease Mother     Heart disease Father     Hypertension Father     Stomach cancer Sister     Ovarian cancer Sister     Hypertension Sister      I have reviewed and agree with the history as documented  E-Cigarette/Vaping    E-Cigarette Use Never User      E-Cigarette/Vaping Substances     Social History     Tobacco Use    Smoking status: Never Smoker    Smokeless tobacco: Never Used   Vaping Use    Vaping Use: Never used   Substance Use Topics    Alcohol use: Not Currently     Comment: very seldom    Drug use: Never        Review of Systems   Constitutional: Negative for appetite change, chills, fever and unexpected weight change  HENT: Negative for congestion, ear pain, postnasal drip, rhinorrhea, sore throat and trouble swallowing  Eyes: Negative for pain and visual disturbance  Respiratory: Negative for cough, chest tightness and shortness of breath  Cardiovascular: Negative for chest pain, palpitations and leg swelling  Gastrointestinal: Positive for diarrhea (chronic according to patient)  Negative for abdominal pain, nausea and vomiting  Genitourinary: Negative for difficulty urinating, dysuria, frequency, hematuria and urgency  Musculoskeletal: Positive for arthralgias and gait problem (patient ambulates with walker; did sustain mechanical fall last week)  Negative for back pain, joint swelling and neck pain     Skin: Positive for wound (wound of the R anterior shin without evidence of cellulitis; it is partially scabbed over but does have bruising around it)  Negative for color change, pallor and rash  Neurological: Negative for dizziness, seizures, syncope, facial asymmetry, speech difficulty, weakness, light-headedness, numbness and headaches  All other systems reviewed and are negative  Physical Exam  ED Triage Vitals [08/05/21 1758]   Temperature Pulse Respirations Blood Pressure SpO2   98 2 °F (36 8 °C) 79 17 (!) 159/109 96 %      Temp Source Heart Rate Source Patient Position - Orthostatic VS BP Location FiO2 (%)   Oral -- Sitting Right arm --      Pain Score       7             Orthostatic Vital Signs  Vitals:    08/05/21 1758 08/05/21 2200   BP: (!) 159/109 138/65   Pulse: 79 76   Patient Position - Orthostatic VS: Sitting        Physical Exam  Vitals and nursing note reviewed  Constitutional:       General: She is not in acute distress  Appearance: Normal appearance  She is well-developed and normal weight  She is not toxic-appearing  HENT:      Head: Normocephalic and atraumatic  Right Ear: External ear normal       Left Ear: External ear normal       Nose: Nose normal  No congestion  Mouth/Throat:      Mouth: Mucous membranes are moist       Pharynx: No posterior oropharyngeal erythema  Eyes:      Extraocular Movements: Extraocular movements intact  Conjunctiva/sclera: Conjunctivae normal       Pupils: Pupils are equal, round, and reactive to light  Cardiovascular:      Rate and Rhythm: Normal rate and regular rhythm  Pulses: Normal pulses  Heart sounds: Normal heart sounds  No murmur heard  Pulmonary:      Effort: Pulmonary effort is normal  No respiratory distress  Breath sounds: Normal breath sounds  Abdominal:      General: Bowel sounds are normal       Palpations: Abdomen is soft  Tenderness: There is no abdominal tenderness     Musculoskeletal:         General: Signs of injury (There is a scabbed skin tear of the RLE at the distal anterior shin  There is no purulent drainage from the wound, no cellulitic changes  There is tenderness of the distal R ankle anteriorly and laterally and there is an area of ecchymosis laterally) present  No swelling or tenderness  Normal range of motion  Cervical back: Normal range of motion and neck supple  No tenderness  Left lower leg: No edema  Skin:     General: Skin is warm and dry  Capillary Refill: Capillary refill takes less than 2 seconds  Coloration: Skin is not jaundiced or pale  Findings: No erythema  Neurological:      General: No focal deficit present  Mental Status: She is alert and oriented to person, place, and time  Cranial Nerves: No cranial nerve deficit  Sensory: No sensory deficit  Motor: No weakness     Psychiatric:         Mood and Affect: Mood normal          ED Medications  Medications   potassium chloride (K-DUR,KLOR-CON) CR tablet 40 mEq (40 mEq Oral Given 8/5/21 2023)   aspirin tablet 325 mg (325 mg Oral Given 8/5/21 2112)       Diagnostic Studies  Results Reviewed     Procedure Component Value Units Date/Time    Novel Coronavirus (Covid-19),PCR [facility] - 2 Hour Stat [[id number]]  (Normal) Collected: 08/05/21 1924    Lab Status: Final result Specimen: Nares from Nose Updated: 08/05/21 2031     SARS-CoV-2 Negative    Narrative:           Urine Microscopic [[id number]]  (Normal) Collected: 08/05/21 2010    Lab Status: Final result Specimen: Urine, Other Updated: 08/05/21 2025     RBC, UA None Seen /hpf      WBC, UA 2-4 /hpf      Epithelial Cells None Seen /hpf      Bacteria, UA None Seen /hpf      Hyaline Casts, UA None Seen /lpf     Urine Macroscopic, POC [[id number]]  (Abnormal) Collected: 08/05/21 2010    Lab Status: Final result Specimen: Urine Updated: 08/05/21 2011     Color, UA Yellow     Clarity, UA Clear     pH, UA 5 0     Leukocytes, UA Trace     Nitrite, UA Negative     Protein, UA Negative mg/dl      Glucose, UA Negative mg/dl      Ketones, UA Trace mg/dl      Urobilinogen, UA 0 2 E U /dl      Bilirubin, UA Negative     Blood, UA Trace     Specific [place], UA 1 020    Narrative:      CLINITEK RESULT    Troponin I [[id number]]  (Normal) Collected: 08/05/21 1924    Lab Status: Final result Specimen: Blood from Arm, Right Updated: 08/05/21 1950     Troponin I <0 02 ng/mL     CBC and differential [[id number]]  (Abnormal) Collected: 08/05/21 1900    Lab Status: Final result Specimen: Blood from Arm, Right Updated: 08/05/21 1947     WBC 14 88 Thousand/uL      RBC 4 14 Million/uL      Hemoglobin 13 7 g/dL      Hematocrit 41 1 %      MCV 99 fL      MCH 33 1 pg      MCHC 33 3 g/dL      RDW 13 9 %      MPV 11 6 fL      Platelets 870 Thousands/uL      nRBC 0 /100 WBCs     Narrative: This is an appended report  These results have been appended to a previously verified report      Manual Differential(PHLEBS Do Not Order) [[id number]]  (Abnormal) Collected: 08/05/21 1900    Lab Status: Final result Specimen: Blood from Arm, Right Updated: 08/05/21 1947     Segmented % 57 %      Bands % 1 %      Lymphocytes % 13 %      Monocytes % 5 %      Eosinophils, % 1 %      Basophils % 0 %      Atypical Lymphocytes % 23 %      Absolute Neutrophils 8 63 Thousand/uL      Lymphocytes Absolute 1 93 Thousand/uL      Monocytes Absolute 0 74 Thousand/uL      Eosinophils Absolute 0 15 Thousand/uL      Basophils Absolute 0 00 Thousand/uL      Total Counted --     Smudge Cells Present     RBC Morphology Present     Polychromasia Present     Platelet Estimate Adequate     Giant PLTs Present    Comprehensive metabolic panel [[id number]]  (Abnormal) Collected: 08/05/21 1900    Lab Status: Final result Specimen: Blood from Arm, Right Updated: 08/05/21 1935     Sodium 141 mmol/L      Potassium 3 3 mmol/L      Chloride 105 mmol/L      CO2 27 mmol/L      ANION GAP 9 mmol/L      BUN 26 mg/dL      Creatinine 1 08 mg/dL      Glucose 40 mg/dL      Calcium 10 0 mg/dL      AST 33 U/L      ALT 42 U/L      Alkaline Phosphatase 123 U/L      Total Protein 7 5 g/dL      Albumin 3 7 g/dL      Total Bilirubin 0 51 mg/dL      eGFR 48 ml/min/1 73sq m     Narrative:      Kassandra guidelines for Chronic Kidney Disease (CKD):     Stage 1 with normal or high GFR (GFR > 90 mL/min/1 73 square meters)    Stage 2 Mild CKD (GFR = 60-89 mL/min/1 73 square meters)    Stage 3A Moderate CKD (GFR = 45-59 mL/min/1 73 square meters)    Stage 3B Moderate CKD (GFR = 30-44 mL/min/1 73 square meters)    Stage 4 Severe CKD (GFR = 15-29 mL/min/1 73 square meters)    Stage 5 End Stage CKD (GFR <15 mL/min/1 73 square meters)  Note: GFR calculation is accurate only with a steady state creatinine                 XR ankle 3+ views RIGHT   ED Interpretation by Lori Miranda DO (08/05 2110)   No fracture, no dislocation as read by me      XR chest 1 view portable   ED Interpretation by Lori Miranda DO (08/05 2110)   Increased lung markings, no significant change from prior CXR      CT head without contrast   Final Result by Hedy Kyle DO (08/05 2007)      No acute intracranial abnormality  Microangiopathic changes  Workstation performed: [id number]               Procedures  Procedures      ED Course  ED Course as of Aug 05 2212   Thu Aug 05, 2021   5167 Patient's niece at bedside is concerned about her recent mental status change  Because patient lives at home alone in the setting of recent fall from walker, will draw labs and urine to detect any acute abnormality  Because patient sustained a fall last week, will order CT head to r/o intracranial pathology  1915 On re-evaluation patient noticed to be hypoxic in the high 80s  No respiratory distress noted and patient does not feel dyspneic  Patient denies chest pain as well  CXR and COVID19 test ordered       EKG interpretation: NSR at 68 bpm, normal axis, normal intervals, ST depressions in II, III, aVF, V5, V6 not seen on previous EKG 11/5/20 as interpreted by me  ASA deferred at this time; patient is not symptomatic  2010 CT findings reviewed  No evidence of intracranial pathology  Patient given oral potassium 40 mg for mild hypokalemia  Patient does have leukocytosis  Urine pending  2107 Patient given PBJ sandwich for hypoglycemia noted on CMP         2112 On re-evaluation, patient is alert, awake, conversant, in no acute distress at this time  She denies complaints of chest pain  Patient is agreeable with plan for admission  Case discussed with SOD service; admitted to Dr Viry Sandra  Identification of Seniors at Risk      Most Recent Value   (ISAR) Identification of Seniors at Risk   Before the illness or injury that brought you to the Emergency, did you need someone to help you on a regular basis? 0 Filed at: 08/05/2021 1801   In the last 24 hours, have you needed more help than usual?  0 Filed at: 08/05/2021 1801   Have you been hospitalized for one or more nights during the past 6 months? 0 Filed at: 08/05/2021 1801   In general, do you see well?  0 Filed at: 08/05/2021 1801   In general, do you have serious problems with your memory? 1 Filed at: 08/05/2021 1801   Do you take more than three different medications every day?   1 Filed at: 08/05/2021 1801   ISAR Score  2 Filed at: 08/05/2021 1801                              MDM    Disposition  Final diagnoses:   ST segment depression   Hypoglycemia   At risk for falls     Time reflects when diagnosis was documented in both MDM as applicable and the Disposition within this note     Time User Action Codes Description Comment    8/5/2021  9:06 PM Carter Reese [Z91 81] At standard risk for fall     8/5/2021  9:06 PM Millie Merritt [Z91 81] At standard risk for fall     8/5/2021  9:06 PM Mac Aguilar Add [R94 31] ST segment depression     8/5/2021  9:06 PM Mac Aguilar Add [E16 2] Hypoglycemia 8/5/2021  9:06 PM Tin Lombardo Add [Z91 81] At risk for falls       ED Disposition     ED Disposition Condition Date/Time Comment    Admit Stable Thu Aug 5, 2021  9:11 PM Case discussed with Dr Trevor Maier and arrangements made for inpatient admission under the service of Dr Noe Payment  Follow-up Information    None         Patient's Medications   Discharge Prescriptions    No medications on file     No discharge procedures on file  PDMP Review     None           ED Provider  Attending physically available and evaluated Chayo Llamas I managed the patient along with the ED Attending      Electronically Signed by         Yumiko Palomino DO  08/05/21 1915

## 2021-08-06 ENCOUNTER — APPOINTMENT (INPATIENT)
Dept: RADIOLOGY | Facility: HOSPITAL | Age: 81
DRG: 602 | End: 2021-08-06
Payer: MEDICARE

## 2021-08-06 PROBLEM — E16.2 ACUTE METABOLIC ENCEPHALOPATHY DUE TO HYPOGLYCEMIA: Status: ACTIVE | Noted: 2021-08-06

## 2021-08-06 PROBLEM — G93.41 ACUTE METABOLIC ENCEPHALOPATHY DUE TO HYPOGLYCEMIA: Status: ACTIVE | Noted: 2021-08-06

## 2021-08-06 LAB
ANION GAP SERPL CALCULATED.3IONS-SCNC: 7 MMOL/L (ref 4–13)
ATRIAL RATE: 68 BPM
BUN SERPL-MCNC: 26 MG/DL (ref 5–25)
CALCIUM SERPL-MCNC: 9.5 MG/DL (ref 8.3–10.1)
CHLORIDE SERPL-SCNC: 107 MMOL/L (ref 100–108)
CO2 SERPL-SCNC: 27 MMOL/L (ref 21–32)
CREAT SERPL-MCNC: 1.05 MG/DL (ref 0.6–1.3)
ERYTHROCYTE [DISTWIDTH] IN BLOOD BY AUTOMATED COUNT: 13.7 % (ref 11.6–15.1)
GFR SERPL CREATININE-BSD FRML MDRD: 50 ML/MIN/1.73SQ M
GLUCOSE SERPL-MCNC: 204 MG/DL (ref 65–140)
GLUCOSE SERPL-MCNC: 209 MG/DL (ref 65–140)
GLUCOSE SERPL-MCNC: 250 MG/DL (ref 65–140)
GLUCOSE SERPL-MCNC: 285 MG/DL (ref 65–140)
GLUCOSE SERPL-MCNC: 376 MG/DL (ref 65–140)
HCT VFR BLD AUTO: 40.8 % (ref 34.8–46.1)
HGB BLD-MCNC: 13.4 G/DL (ref 11.5–15.4)
MCH RBC QN AUTO: 32.9 PG (ref 26.8–34.3)
MCHC RBC AUTO-ENTMCNC: 32.8 G/DL (ref 31.4–37.4)
MCV RBC AUTO: 100 FL (ref 82–98)
P AXIS: 84 DEGREES
PLATELET # BLD AUTO: 217 THOUSANDS/UL (ref 149–390)
PLATELET # BLD AUTO: 228 THOUSANDS/UL (ref 149–390)
PMV BLD AUTO: 11.7 FL (ref 8.9–12.7)
PMV BLD AUTO: 11.8 FL (ref 8.9–12.7)
POTASSIUM SERPL-SCNC: 3.8 MMOL/L (ref 3.5–5.3)
PR INTERVAL: 172 MS
QRS AXIS: 81 DEGREES
QRSD INTERVAL: 82 MS
QT INTERVAL: 414 MS
QTC INTERVAL: 440 MS
RBC # BLD AUTO: 4.07 MILLION/UL (ref 3.81–5.12)
SODIUM SERPL-SCNC: 141 MMOL/L (ref 136–145)
T WAVE AXIS: 108 DEGREES
TROPONIN I SERPL-MCNC: <0.02 NG/ML
VENTRICULAR RATE: 68 BPM
WBC # BLD AUTO: 13.6 THOUSAND/UL (ref 4.31–10.16)

## 2021-08-06 PROCEDURE — 36415 COLL VENOUS BLD VENIPUNCTURE: CPT | Performed by: STUDENT IN AN ORGANIZED HEALTH CARE EDUCATION/TRAINING PROGRAM

## 2021-08-06 PROCEDURE — 80048 BASIC METABOLIC PNL TOTAL CA: CPT | Performed by: STUDENT IN AN ORGANIZED HEALTH CARE EDUCATION/TRAINING PROGRAM

## 2021-08-06 PROCEDURE — 99223 1ST HOSP IP/OBS HIGH 75: CPT | Performed by: INTERNAL MEDICINE

## 2021-08-06 PROCEDURE — 93010 ELECTROCARDIOGRAM REPORT: CPT | Performed by: INTERNAL MEDICINE

## 2021-08-06 PROCEDURE — 99222 1ST HOSP IP/OBS MODERATE 55: CPT | Performed by: INTERNAL MEDICINE

## 2021-08-06 PROCEDURE — 84484 ASSAY OF TROPONIN QUANT: CPT | Performed by: STUDENT IN AN ORGANIZED HEALTH CARE EDUCATION/TRAINING PROGRAM

## 2021-08-06 PROCEDURE — 82948 REAGENT STRIP/BLOOD GLUCOSE: CPT

## 2021-08-06 PROCEDURE — 85027 COMPLETE CBC AUTOMATED: CPT | Performed by: STUDENT IN AN ORGANIZED HEALTH CARE EDUCATION/TRAINING PROGRAM

## 2021-08-06 PROCEDURE — 73502 X-RAY EXAM HIP UNI 2-3 VIEWS: CPT

## 2021-08-06 PROCEDURE — 85049 AUTOMATED PLATELET COUNT: CPT | Performed by: STUDENT IN AN ORGANIZED HEALTH CARE EDUCATION/TRAINING PROGRAM

## 2021-08-06 RX ORDER — POTASSIUM CHLORIDE 20 MEQ/1
40 TABLET, EXTENDED RELEASE ORAL ONCE
Status: COMPLETED | OUTPATIENT
Start: 2021-08-06 | End: 2021-08-06

## 2021-08-06 RX ORDER — SODIUM CHLORIDE 9 MG/ML
125 INJECTION, SOLUTION INTRAVENOUS CONTINUOUS
Status: DISPENSED | OUTPATIENT
Start: 2021-08-06 | End: 2021-08-07

## 2021-08-06 RX ORDER — CEFAZOLIN SODIUM 2 G/50ML
2000 SOLUTION INTRAVENOUS EVERY 8 HOURS
Status: DISCONTINUED | OUTPATIENT
Start: 2021-08-06 | End: 2021-08-08

## 2021-08-06 RX ORDER — LISINOPRIL 5 MG/1
5 TABLET ORAL DAILY
Status: DISCONTINUED | OUTPATIENT
Start: 2021-08-06 | End: 2021-08-08

## 2021-08-06 RX ADMIN — LISINOPRIL 5 MG: 5 TABLET ORAL at 10:55

## 2021-08-06 RX ADMIN — INSULIN LISPRO 3 UNITS: 100 INJECTION, SOLUTION INTRAVENOUS; SUBCUTANEOUS at 23:25

## 2021-08-06 RX ADMIN — CALCIUM 1 TABLET: 500 TABLET ORAL at 07:47

## 2021-08-06 RX ADMIN — INSULIN DETEMIR 10 UNITS: 100 INJECTION, SOLUTION SUBCUTANEOUS at 23:25

## 2021-08-06 RX ADMIN — CEPHALEXIN 500 MG: 250 CAPSULE ORAL at 07:49

## 2021-08-06 RX ADMIN — Medication 12.5 MG: at 23:31

## 2021-08-06 RX ADMIN — Medication 12.5 MG: at 10:55

## 2021-08-06 RX ADMIN — INSULIN LISPRO 1 UNITS: 100 INJECTION, SOLUTION INTRAVENOUS; SUBCUTANEOUS at 07:45

## 2021-08-06 RX ADMIN — CEFAZOLIN SODIUM 2000 MG: 2 SOLUTION INTRAVENOUS at 23:16

## 2021-08-06 RX ADMIN — INSULIN LISPRO 2 UNITS: 100 INJECTION, SOLUTION INTRAVENOUS; SUBCUTANEOUS at 11:36

## 2021-08-06 RX ADMIN — CEPHALEXIN 500 MG: 250 CAPSULE ORAL at 02:10

## 2021-08-06 RX ADMIN — CEFAZOLIN SODIUM 2000 MG: 2 SOLUTION INTRAVENOUS at 13:24

## 2021-08-06 RX ADMIN — CYANOCOBALAMIN TAB 500 MCG 1000 MCG: 500 TAB at 10:55

## 2021-08-06 RX ADMIN — HEPARIN SODIUM 5000 UNITS: 5000 INJECTION INTRAVENOUS; SUBCUTANEOUS at 02:09

## 2021-08-06 RX ADMIN — HEPARIN SODIUM 5000 UNITS: 5000 INJECTION INTRAVENOUS; SUBCUTANEOUS at 10:55

## 2021-08-06 RX ADMIN — INSULIN DETEMIR 15 UNITS: 100 INJECTION, SOLUTION SUBCUTANEOUS at 02:15

## 2021-08-06 RX ADMIN — INSULIN LISPRO 7 UNITS: 100 INJECTION, SOLUTION INTRAVENOUS; SUBCUTANEOUS at 07:45

## 2021-08-06 RX ADMIN — ASPIRIN 81 MG: 81 TABLET, COATED ORAL at 10:55

## 2021-08-06 RX ADMIN — POTASSIUM CHLORIDE 40 MEQ: 1500 TABLET, EXTENDED RELEASE ORAL at 13:24

## 2021-08-06 RX ADMIN — INSULIN LISPRO 7 UNITS: 100 INJECTION, SOLUTION INTRAVENOUS; SUBCUTANEOUS at 11:35

## 2021-08-06 RX ADMIN — INSULIN LISPRO 5 UNITS: 100 INJECTION, SOLUTION INTRAVENOUS; SUBCUTANEOUS at 18:34

## 2021-08-06 RX ADMIN — INSULIN LISPRO 3 UNITS: 100 INJECTION, SOLUTION INTRAVENOUS; SUBCUTANEOUS at 18:34

## 2021-08-06 RX ADMIN — ATORVASTATIN CALCIUM 20 MG: 20 TABLET, FILM COATED ORAL at 10:55

## 2021-08-06 RX ADMIN — SODIUM CHLORIDE 125 ML/HR: 0.9 INJECTION, SOLUTION INTRAVENOUS at 13:21

## 2021-08-06 RX ADMIN — Medication 1000 UNITS: at 10:55

## 2021-08-06 RX ADMIN — HEPARIN SODIUM 5000 UNITS: 5000 INJECTION INTRAVENOUS; SUBCUTANEOUS at 18:34

## 2021-08-06 RX ADMIN — INSULIN LISPRO 6 UNITS: 100 INJECTION, SOLUTION INTRAVENOUS; SUBCUTANEOUS at 02:12
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
## 2021-08-06 NOTE — ASSESSMENT & PLAN NOTE
Noted on arrival to ED  Resolved by time of encounter  Suspect this was related to hypoglycemia as it resolve after she ingested crackers and peanut butter  Noted be alert oriented x4, however suspect underlying mild cognitive impairment  · at risk of in-hospital delirium, monitor and orient daily  · Zyprexa 2 5 mg q h s

## 2021-08-06 NOTE — WOUND OSTOMY CARE
Consult Note - Wound   Abraham Brantingham 79 y o  female MRN: [id number]  [facility] Encounter: [id number]      History and Present Illness:  Patient is an 79year old female who presented to ED with her niece who brought her in with altered mental status as well as wound check on her R shin  Assessment Findings:   1-R anterior shin/leg with present on admission venous partial thickness wound  Wound not seen by wound care nurse however photographed by ED on arrival  When wound care nurse arrived to see patient on P8, patient was off the floor getting x-ray done  Spoke with admitting primary RN on P8, per RN she assessed patient's skin once she arrived to unit and no other skin issues were seen  All patient has is the wound to her leg which was noted dry, non draining  Discussed with primary RN wound care recommendations, we are placing our recommendations as nursing orders  Skin care plans:  1-Hydraguard to bilateral sacrum, buttock and heels BID and PRN  2-Elevate heels to offload pressure  3-Ehob cushion in chair when out of bed  4-Moisturize skin daily with skin nourishing cream   5-Turn/reposition q2h or when medically stable for pressure re-distribution on skin  6-Paint R shin with betadine daily  Vitals: Blood pressure 145/73, pulse 90, temperature 98 2 °F (36 8 °C), temperature source Oral, resp  rate 16, height 5' 6" (1 676 m), weight 63 5 kg (140 lb), SpO2 96 %  ,Body mass index is 22 6 kg/m²  Wound 08/06/21 Venous Ulcer Leg Right; Anterior (Active)   Wound Image   08/06/21 1500   Wound Description Dry;Black 08/06/21 1500   Pressure Injury Stage O 08/06/21 1500   Nicole-wound Assessment Dry;Erythema 08/06/21 1500   Wound Length (cm) 3 cm 08/06/21 1500   Wound Width (cm) 3 2 cm 08/06/21 1500   Wound Depth (cm) 0 1 cm 08/06/21 1500   Wound Surface Area (cm^2) 9 6 cm^2 08/06/21 1500   Wound Volume (cm^3) 0 96 cm^3 08/06/21 1500   Calculated Wound Volume (cm^3) 0 96 cm^3 08/06/21 1500   Drainage Amount None 08/06/21 1500   Non-staged Wound Description Partial thickness 08/06/21 1500           Our wound/skin care recommendations are placed as nursing orders, wound care will evaluate patient in person next rounding day          Darek Carlos, RN, BSN, [facility]

## 2021-08-06 NOTE — H&P
INTERNAL MEDICINE RESIDENCY ADMISSION H&P     Name: Brandon Toth   Age & Sex: 79 y o  female   MRN: [id number]  Unit/Bed#: QCF   Encounter: [id number]  Primary Care Provider: Arian Roa MD    Code Status: Level 3 - DNAR and DNI  Admission Status: INPATIENT   Disposition: Patient requires Med/Surg    ASSESSMENT/PLAN     Principal Problem:    Ambulatory dysfunction  Active Problems:    Altered mental status    Infected abrasion of leg    Essential hypertension    Type 2 diabetes mellitus with stage 3a chronic kidney disease, with long-term current use of insulin ([facility])    CKD (chronic kidney disease) stage 3, GFR 30-59 ml/min ([facility])    HLD (hyperlipidemia)    OA (osteoarthritis)    Chronic systolic heart failure ([facility] [address] )    Suspected Mild cognitive impairment      Ambulatory dysfunction  Reported multiple falls at home in the last 2 weeks  One fall with positive head strike but no loss of consciousness  Not formally evaluated at that time   - OT PT evaluation for short-term rehab    Altered mental status  Noted on arrival to ED  Resolved by time of encounter  Suspect this was related to hypoglycemia as it resolve after she ingested crackers and peanut butter  Noted be alert oriented x4, however suspect underlying mild cognitive impairment  - at risk of in-hospital delirium  - monitor and orient daily    Suspected Mild cognitive impairment  Noted decreased memory and concentration during encounter  - consider evaluation by Geriatrics    Infected abrasion of leg  Noted on admission on anterior aspect of right lower leg with surrounding erythema, edema, without calor  - Will start keflex 500 mg q6h (CrCl 41) x 5 days   - Monitor     Essential hypertension  Blood pressure initially noted to be elevated however normotensive at time of evaluation   - med rec shows lisinopril and metoprolol however patient denies taking this  Pharmacy contacted but close  Needs to be verified tomorrow    - monitor blood pressure and consider p r n  meds for SBP >180      Type 2 diabetes mellitus with stage 3a chronic kidney disease, with long-term current use of insulin ([facility])  Lab Results   Component Value Date    HGBA1C 8 7 (H) 05/08/2021   Blood glucose 40 on arrival   H  - Hypoglycemia resolved with crackers and peanut butter  Repeat POC blood glucose 170  - home regimen with Levemir 20 units HS and NovoLog 10 units with breakfast and lunch and 5 units with dinner  - will restart insulin at reduce doses: Levemir at 15 units HS and NovoLog 7 units with breakfast and lunch and 3 units with dinner  - will start correctional scale      CKD (chronic kidney disease) stage 3, GFR 30-59 ml/min ([facility])  Creatinine within normal limits on arrival  Creatinine clearance calculated at 41 with CG equation  Avoid nephrotoxin medications    HLD (hyperlipidemia)  Continue home atorvastatin 40 mg     Chronic systolic heart failure (HCC)  No evidence of exacerbation on admission, appears euvolemic  Med rec shows Lasix 40 mg p r n , patient states she does not take this  Needs to be verified with pharmacy      VTE Pharmacologic Prophylaxis: Heparin  VTE Mechanical Prophylaxis: sequential compression device    CHIEF COMPLAINT     Chief Complaint   Patient presents with    Wound Check     Pt states noticed a wound on right lower leg  Pt unsure how it happened and not sure how it happened      HISTORY OF PRESENT ILLNESS     Patient is a 26-year-old female past medical history significant for type 2 diabetes, hypertension, CKD, hyperlipidemia, systolic heart failure EF of 45% on 05/10/2021, CKD 3  Patient presented to ED accompanied by her niece to evaluation of altered mental status and for wound check of an open wound on the right anterior lower leg of unknown etiology, but suspect to be related to falls that she experienced about 2 weeks ago  Reportedly 1 with head strike but no loss of consciousness  No evaluation was performed at that time    Niece reports patient lives alone and has been managing to leave independent however she receive a call from a neighbor was concerned about the well-being of the patient  Knees did notice that patient was confused and describing a group of people that have been staying in her house the left 2 days ago  To the best of the knowledge of the knees, no such grew people have state in the patient's home, has she believed this is a hallucination/delusion  On arrival to ED, patient was noted to be encephalopathic and hypoglycemic in the 40s both of which resolved after she ingested some crackers and peanut butter  Blood pressure initially noted to be elevated 150s/109, however normotensive at time of encounter  Afebrile  At room air  Laboratory evaluation revealed mild leukocytosis of 14 8, hypokalemia 3 3 replenish with 40 mEq of K Dur p o , elevated BUN of 26 and normal creatinine of 1 08  GFR 48  At time my encounter, patient was noted be alert oriented x4  REVIEW OF SYSTEMS     Review of Systems   Constitutional: Negative for activity change, chills, diaphoresis, fatigue and fever  HENT: Negative  Negative for rhinorrhea, sinus pain, sneezing and sore throat  Eyes: Negative  Negative for visual disturbance  Respiratory: Negative for cough, choking, chest tightness, shortness of breath and wheezing  Cardiovascular: Negative for chest pain, palpitations and leg swelling  Gastrointestinal: Negative for abdominal distention, abdominal pain, constipation, diarrhea, nausea and vomiting  Endocrine: Negative  Genitourinary: Negative  Negative for difficulty urinating, dysuria and urgency  Musculoskeletal: Negative  Skin: Positive for wound (Anterior aspect of right lower leg)  Negative for rash  Neurological: Negative for dizziness, tremors, weakness, light-headedness, numbness and headaches  Psychiatric/Behavioral: Negative for confusion and sleep disturbance       OBJECTIVE     Vitals: 21   BP: (!) 159/109   BP Location: Right arm   Pulse: 79   Resp: 17   Temp: 98 2 °F (36 8 °C)   TempSrc: Oral   SpO2: 96%   Weight: 63 5 kg (140 lb)   Height: 5' 6" (1 676 m)      Temperature:   Temp (24hrs), Av 2 °F (36 8 °C), Min:98 2 °F (36 8 °C), Max:98 2 °F (36 8 °C)    Temperature: 98 2 °F (36 8 °C)  Intake & Output:  I/O     None        Weights:   IBW (Ideal Body Weight): 59 3 kg    Body mass index is 22 6 kg/m²  Weight (last 2 days)     Date/Time   Weight    21 175   63 5 (140)            Physical Exam   Physical Exam:   GENERAL: NAD, Normal appearance  Non diaphoretic, non-toxic, not ill-appearing, well-developed, well-nourished  Thin, pleasant  NEUROLOGIC:  Alert/oriented x3  No motor or sensory deficits  HEENT:  NC/AT, PERRL, EOMI, MMM, no scleral icterus  CARDIAC:  RRR, +S1/S2, no S3/S4 heard, no m/g/r  PULMONARY:  non-labored breathing, CTA B/L, no wheezing/rales/rhonci appreciated at time of encounter  ABDOMEN:  Soft, NT/ND, +BS, no rebound/guarding/rigidity  Extremities:  2+ Pulses in DP/PT  Warm skin on palpation  No edema, cyanosis, or clubbing  SKIN:  Wound of anterior aspect of right lower leg with surrounding erythema and edema but without calor      PAST MEDICAL HISTORY     Past Medical History:   Diagnosis Date    Benign adenomatous polyp of large intestine     Diabetes mellitus ([facility] [address] )     Hyperlipemia     Hypertension     Osteoarthritis     TIA (transient ischemic attack)      PAST SURGICAL HISTORY     Past Surgical History:   Procedure Laterality Date    APPENDECTOMY      CARPAL TUNNEL RELEASE Right     HYSTERECTOMY W/ SALPINGO-OOPHERECTOMY       SOCIAL & FAMILY HISTORY     Social History     Substance and Sexual Activity   Alcohol Use Not Currently    Comment: very seldom     Social History     Substance and Sexual Activity   Drug Use Never     Social History     Tobacco Use   Smoking Status Never Smoker   Smokeless Tobacco Never Used     Family History Problem Relation Age of Onset    Heart disease Mother     Heart disease Father     Hypertension Father     Stomach cancer Sister     Ovarian cancer Sister     Hypertension Sister      LABORATORY DATA     Labs: I have personally reviewed pertinent reports  Results from last 7 days   Lab Units 08/05/21  1900   WBC Thousand/uL 14 88*   HEMOGLOBIN g/dL 13 7   HEMATOCRIT % 41 1   PLATELETS Thousands/uL 232   MONO PCT % 5      Results from last 7 days   Lab Units 08/05/21  1900   POTASSIUM mmol/L 3 3*   CHLORIDE mmol/L 105   CO2 mmol/L 27   BUN mg/dL 26*   CREATININE mg/dL 1 08   CALCIUM mg/dL 10 0   ALK PHOS U/L 123*   ALT U/L 42   AST U/L 33                      Results from last 7 days   Lab Units 08/05/21  1924   TROPONIN I ng/mL <0 02     Micro:  Lab Results   Component Value Date    URINECX 8648-2964 cfu/ml Gram Positive Cocci (A) 03/07/2019    URINECX >100,000 cfu/ml Escherichia coli 08/03/2017     IMAGING & DIAGNOSTIC TESTS     Imaging: I have personally reviewed pertinent reports  CT head without contrast    Result Date: 8/5/2021  Impression: No acute intracranial abnormality  Microangiopathic changes  Workstation performed: [id number]     EKG, Pathology, and Other Studies: I have personally reviewed pertinent reports  ALLERGIES     Allergies   Allergen Reactions    Amlodipine     Ceftin [Cefuroxime]     Ciprofloxacin     Citalopram     Dye [Iodinated Diagnostic Agents]     Glucophage [Metformin]     Januvia [Sitagliptin]     Neomycin-Polymyxin-Dexameth     Ondansetron     Prilosec [Omeprazole]     Vibramycin [Doxycycline]      MEDICATIONS PRIOR TO ARRIVAL     Prior to Admission medications    Medication Sig Start Date End Date Taking?  Authorizing Provider   aspirin (ECOTRIN LOW STRENGTH) 81 mg EC tablet Take 81 mg by mouth daily    Historical Provider, MD   atorvastatin (LIPITOR) 20 mg tablet Take 20 mg by mouth daily    Historical Provider, MD   calcium carbonate (OS-JUAN RAMON) 600 MG tablet Take 600 mg by mouth 2 (two) times a day with meals    Historical Provider, MD   cholecalciferol (VITAMIN D3) 1,000 units tablet Take 1,000 Units by mouth daily    Historical Provider, MD   ferrous fumarate-iron polysaccharide complex (TANDEM) 162-115 2 MG CAPS Take 1 capsule by mouth daily with breakfast    Historical Provider, MD   fluticasone (FLONASE) 50 mcg/act nasal spray 1 spray into each nostril daily    Historical Provider, MD   furosemide (LASIX) 40 mg tablet Take 1 tablet (40 mg total) by mouth daily 11/8/20 12/8/20  Vania Deal MD   glucose blood test strip 1 each by Other route daily as needed Use as instructed    Historical Provider, MD   glucose monitoring kit (FREESTYLE) monitoring kit 1 each by Does not apply route as needed    Historical Provider, MD   insulin aspart (NovoLOG) 100 units/mL injection Inject under the skin 3 (three) times a day before meals 10U with breakfast and lunch, 5 units with dinnner    Historical Provider, MD   insulin detemir (LEVEMIR) 100 units/mL subcutaneous injection Inject 20 Units under the skin daily at bedtime    Historical Provider, MD   lisinopril (ZESTRIL) 5 mg tablet Take 1 tablet (5 mg total) by mouth daily 11/9/20 12/9/20  Vania Deal MD   metoprolol tartrate (LOPRESSOR) 25 mg tablet Take 1 tablet (25 mg total) by mouth every 12 (twelve) hours 11/8/20 12/8/20  Vania Deal MD   potassium chloride (K-DUR,KLOR-CON) 20 mEq tablet Take 20 mEq by mouth 2 (two) times a day    Historical Provider, MD   vitamin B-12 (VITAMIN B-12) 1,000 mcg tablet Take by mouth daily    Historical Provider, MD     MEDICATIONS ADMINISTERED IN LAST 24 HOURS     Medication Administration - last 24 hours from 08/04/2021 2144 to 08/05/2021 2144       Date/Time Order Dose Route Action Action by     08/05/2021 2023 potassium chloride (K-DUR,KLOR-CON) CR tablet 40 mEq 40 mEq Oral Given Kayla Boston     08/05/2021 2112 aspirin tablet 325 mg 325 mg Oral Given Leni Avilez Roberto Novak RN        CURRENT MEDICATIONS     No current facility-administered medications for this encounter  Admission Time  I spent 1 hour admitting the patient    This involved direct patient contact where I performed a full history and physical, reviewing previous records, and reviewing laboratory and other diagnostic studies     ==  Osmani De Jesus MD  Internal Medicine Residency, PGY-3  [address] [facility]

## 2021-08-06 NOTE — ED NOTES
Pt in hallway with personal walker - requesting to speak to her , Bruce Puckett  Pt states she does not know his phone number - no number listed in demographics  Pt updated that there is no number on file for requested contact and pt states she will try to remember the number  Pt returned to bed at this time, instructed to call using call light for assistance, verbalized understanding       Adeline Duncan RN  08/06/21 [id number]

## 2021-08-06 NOTE — ED NOTES
Pt sitting on her walker in the doorway  Pt wanting to be at home with her , unable to reach on the phone  Pt states she does not want to be here, she needs to be at home with her  and dog  Requested pt go back to bed and have heart monitor placed back on, pt declined and continued to sit at doorway on walker       Alli Rivera RN  08/06/21 [id number]

## 2021-08-07 LAB
ANION GAP SERPL CALCULATED.3IONS-SCNC: 7 MMOL/L (ref 4–13)
BUN SERPL-MCNC: 13 MG/DL (ref 5–25)
CALCIUM SERPL-MCNC: 9 MG/DL (ref 8.3–10.1)
CHLORIDE SERPL-SCNC: 108 MMOL/L (ref 100–108)
CO2 SERPL-SCNC: 24 MMOL/L (ref 21–32)
CREAT SERPL-MCNC: 0.95 MG/DL (ref 0.6–1.3)
ERYTHROCYTE [DISTWIDTH] IN BLOOD BY AUTOMATED COUNT: 13.7 % (ref 11.6–15.1)
GFR SERPL CREATININE-BSD FRML MDRD: 56 ML/MIN/1.73SQ M
GLUCOSE SERPL-MCNC: 106 MG/DL (ref 65–140)
GLUCOSE SERPL-MCNC: 107 MG/DL (ref 65–140)
GLUCOSE SERPL-MCNC: 181 MG/DL (ref 65–140)
GLUCOSE SERPL-MCNC: 228 MG/DL (ref 65–140)
GLUCOSE SERPL-MCNC: 268 MG/DL (ref 65–140)
HCT VFR BLD AUTO: 41.1 % (ref 34.8–46.1)
HGB BLD-MCNC: 13.2 G/DL (ref 11.5–15.4)
MCH RBC QN AUTO: 32.9 PG (ref 26.8–34.3)
MCHC RBC AUTO-ENTMCNC: 32.1 G/DL (ref 31.4–37.4)
MCV RBC AUTO: 103 FL (ref 82–98)
NRBC BLD AUTO-RTO: 0 /100 WBCS
PLATELET # BLD AUTO: 211 THOUSANDS/UL (ref 149–390)
PMV BLD AUTO: 12.1 FL (ref 8.9–12.7)
POTASSIUM SERPL-SCNC: 3.9 MMOL/L (ref 3.5–5.3)
RBC # BLD AUTO: 4.01 MILLION/UL (ref 3.81–5.12)
SODIUM SERPL-SCNC: 139 MMOL/L (ref 136–145)
WBC # BLD AUTO: 16.1 THOUSAND/UL (ref 4.31–10.16)

## 2021-08-07 PROCEDURE — 97167 OT EVAL HIGH COMPLEX 60 MIN: CPT

## 2021-08-07 PROCEDURE — 97163 PT EVAL HIGH COMPLEX 45 MIN: CPT

## 2021-08-07 PROCEDURE — 99232 SBSQ HOSP IP/OBS MODERATE 35: CPT | Performed by: INTERNAL MEDICINE

## 2021-08-07 PROCEDURE — 82948 REAGENT STRIP/BLOOD GLUCOSE: CPT

## 2021-08-07 PROCEDURE — 85027 COMPLETE CBC AUTOMATED: CPT | Performed by: STUDENT IN AN ORGANIZED HEALTH CARE EDUCATION/TRAINING PROGRAM

## 2021-08-07 PROCEDURE — 80048 BASIC METABOLIC PNL TOTAL CA: CPT | Performed by: STUDENT IN AN ORGANIZED HEALTH CARE EDUCATION/TRAINING PROGRAM

## 2021-08-07 RX ORDER — OLANZAPINE 5 MG/1
5 TABLET ORAL ONCE
Status: COMPLETED | OUTPATIENT
Start: 2021-08-07 | End: 2021-08-07

## 2021-08-07 RX ORDER — OLANZAPINE 2.5 MG/1
2.5 TABLET ORAL
Status: DISCONTINUED | OUTPATIENT
Start: 2021-08-07 | End: 2021-08-11

## 2021-08-07 RX ADMIN — HEPARIN SODIUM 5000 UNITS: 5000 INJECTION INTRAVENOUS; SUBCUTANEOUS at 23:28

## 2021-08-07 RX ADMIN — LISINOPRIL 5 MG: 5 TABLET ORAL at 09:23

## 2021-08-07 RX ADMIN — ATORVASTATIN CALCIUM 20 MG: 20 TABLET, FILM COATED ORAL at 09:23

## 2021-08-07 RX ADMIN — ASPIRIN 81 MG: 81 TABLET, COATED ORAL at 09:23

## 2021-08-07 RX ADMIN — INSULIN LISPRO 5 UNITS: 100 INJECTION, SOLUTION INTRAVENOUS; SUBCUTANEOUS at 09:19

## 2021-08-07 RX ADMIN — CEFAZOLIN SODIUM 2000 MG: 2 SOLUTION INTRAVENOUS at 14:27

## 2021-08-07 RX ADMIN — INSULIN LISPRO 1 UNITS: 100 INJECTION, SOLUTION INTRAVENOUS; SUBCUTANEOUS at 12:12

## 2021-08-07 RX ADMIN — INSULIN LISPRO 2 UNITS: 100 INJECTION, SOLUTION INTRAVENOUS; SUBCUTANEOUS at 23:29

## 2021-08-07 RX ADMIN — Medication 12.5 MG: at 23:28

## 2021-08-07 RX ADMIN — INSULIN LISPRO 5 UNITS: 100 INJECTION, SOLUTION INTRAVENOUS; SUBCUTANEOUS at 17:43

## 2021-08-07 RX ADMIN — CALCIUM 1 TABLET: 500 TABLET ORAL at 09:23

## 2021-08-07 RX ADMIN — OLANZAPINE 5 MG: 5 TABLET, FILM COATED ORAL at 02:57

## 2021-08-07 RX ADMIN — CEFAZOLIN SODIUM 2000 MG: 2 SOLUTION INTRAVENOUS at 05:34

## 2021-08-07 RX ADMIN — INSULIN LISPRO 2 UNITS: 100 INJECTION, SOLUTION INTRAVENOUS; SUBCUTANEOUS at 17:43

## 2021-08-07 RX ADMIN — Medication 1000 UNITS: at 09:23

## 2021-08-07 RX ADMIN — CEFAZOLIN SODIUM 2000 MG: 2 SOLUTION INTRAVENOUS at 23:29

## 2021-08-07 RX ADMIN — OLANZAPINE 2.5 MG: 2.5 TABLET, FILM COATED ORAL at 23:30

## 2021-08-07 RX ADMIN — HEPARIN SODIUM 5000 UNITS: 5000 INJECTION INTRAVENOUS; SUBCUTANEOUS at 09:20

## 2021-08-07 RX ADMIN — INSULIN DETEMIR 10 UNITS: 100 INJECTION, SOLUTION SUBCUTANEOUS at 23:28

## 2021-08-07 RX ADMIN — Medication 12.5 MG: at 09:23

## 2021-08-07 RX ADMIN — CYANOCOBALAMIN TAB 500 MCG 1000 MCG: 500 TAB at 09:22

## 2021-08-07 RX ADMIN — HEPARIN SODIUM 5000 UNITS: 5000 INJECTION INTRAVENOUS; SUBCUTANEOUS at 17:43

## 2021-08-07 RX ADMIN — HEPARIN SODIUM 5000 UNITS: 5000 INJECTION INTRAVENOUS; SUBCUTANEOUS at 05:38

## 2021-08-07 RX ADMIN — INSULIN LISPRO 5 UNITS: 100 INJECTION, SOLUTION INTRAVENOUS; SUBCUTANEOUS at 12:12

## 2021-08-07 NOTE — PROGRESS NOTES
INTERNAL MEDICINE RESIDENCY PROGRESS NOTE     Name: Savanna Navarro   Age & Sex: 79 y o  female   MRN: [id number]  [facility]   Encounter: [id number]  Team: SOD Team C     PATIENT INFORMATION     Name: Savanna Navarro   Age & Sex: 79 y o  female   MRN: [id number]  Hospital Stay Days: 2    ASSESSMENT/PLAN     Principal Problem:    Acute metabolic encephalopathy due to hypoglycemia  Active Problems:    Essential hypertension    Type 2 diabetes mellitus with stage 3a chronic kidney disease, with long-term current use of insulin ([facility])    CKD (chronic kidney disease) stage 3, GFR 30-59 ml/min ([facility])    HLD (hyperlipidemia)    OA (osteoarthritis)    Chronic systolic heart failure ([facility])    Altered mental status    Infected abrasion of leg    Ambulatory dysfunction    Suspected Mild cognitive impairment      Suspected Mild cognitive impairment  Assessment & Plan  Noted decreased memory and concentration during encounter  · geriatric consult appreciate recommendation  · Per note patient is likely cognitive peritoneal require higher level of care upon discharge    Ambulatory dysfunction  Assessment & Plan  Reported multiple falls at home in the last 2 weeks  One fall with positive head strike but no loss of consciousness  Not formally evaluated at that time  · OT PT evaluation for short-term rehab       Infected abrasion of leg  Assessment & Plan  Noted on admission on anterior aspect of right lower leg with surrounding erythema, edema, without calor  Erythema improving  Image in media tab  · Started on keflex 500 mg q6h (CrCl 41) x 5 days transition to IV cefazolin while inpatient  · Monitor fever curve and WBC (WBC climbing slightly 16 from 30 on 8/7)   · Continue monitor cellulitis    Altered mental status  Assessment & Plan  Noted on arrival to ED  Resolved by time of encounter  Suspect this was related to hypoglycemia as it resolve after she ingested crackers and peanut butter   Noted be alert oriented x4, however suspect underlying mild cognitive impairment as she continues to discuss her  who per chart review is no longer alive  · at risk of in-hospital delirium will schedule Zyprexa 2 5 mg q h s   · monitor and orient daily    Chronic systolic heart failure (HCC)  Assessment & Plan  Wt Readings from Last 3 Encounters:   08/05/21 63 5 kg (140 lb)   11/08/20 74 2 kg (163 lb 9 3 oz)     No evidence of exacerbation on admission, appears euvolemic  Med rec shows Lasix 40 mg p r n , patient states she does not take this  Needs to be verified with pharmacy      HLD (hyperlipidemia)  Assessment & Plan  Continue home atorvastatin 40 mg     CKD (chronic kidney disease) stage 3, GFR 30-59 ml/min [facility])  Assessment & Plan  Lab Results   Component Value Date    EGFR 50 08/06/2021    EGFR 48 08/05/2021    EGFR 48 11/08/2020    CREATININE 1 05 08/06/2021    CREATININE 1 08 08/05/2021    CREATININE 1 08 11/08/2020     Creatinine within normal limits on arrival  Creatinine clearance calculated at 41 with CG equation  Avoid nephrotoxin medications  Type 2 diabetes mellitus with stage 3a chronic kidney disease, with long-term current use of insulin [facility])  Assessment & Plan  Lab Results   Component Value Date    HGBA1C 8 7 (H) 05/08/2021       Recent Labs     08/06/21  0207 08/06/21  0712 08/06/21  1604 08/06/21  2101   POCGLU 376* 209* 285* 250*       Blood Sugar Average: Last 72 hrs:  (P) 258     Blood glucose 40 on arrival  Hypoglycemia resolved with crackers and peanut butter  Repeat POC blood glucose 170  Home regimen with Levemir 20 units HS and NovoLog 10 units with breakfast and lunch and 5 units with dinner  · Restart insulin at reduce doses: Levemir at 15 units HS and NovoLog 5 units with breakfast and lunch and 5 units with dinner  · Correction scale     Essential hypertension  Assessment & Plan  Reported multiple falls at home in the last 2 weeks    One fall with positive head strike but no loss of consciousness  Not formally evaluated at that time  · OT PT evaluation for short-term rehab    * Acute metabolic encephalopathy due to hypoglycemia  Assessment & Plan  Noted on arrival to ED  Resolved by time of encounter  Suspect this was related to hypoglycemia as it resolve after she ingested crackers and peanut butter  Noted be alert oriented x4, however suspect underlying mild cognitive impairment  · at risk of in-hospital delirium, monitor and orient daily  · Consider scheduled Zyprexa 5 mg q h s  Disposition:  Inpatient for a glucose optimization and IV antibiotics     SUBJECTIVE     Patient seen and examined  Patient episode of agitation overnight where she was attempting to pull out IVs   She was given 5 mg of Zyprexa and her agitation resolved  This morning she says she feels well  Her right leg is a little sore but otherwise no concerns or complaints  OBJECTIVE     Vitals:    21 1903 21 2229 21 2331 21 0714   BP: 123/69 149/68 (!) 175/75 (!) 175/83   Pulse:  85     Resp: 18 18  20   Temp: 98 1 °F (36 7 °C) 98 6 °F (37 °C)  98 2 °F (36 8 °C)   TempSrc:       SpO2:       Weight:       Height:          Temperature:   Temp (24hrs), Av 3 °F (36 8 °C), Min:98 1 °F (36 7 °C), Max:98 6 °F (37 °C)    Temperature: 98 2 °F (36 8 °C)  Intake & Output:  I/O        07 -  0700  07 -  0700  07 -  0700    P  O   180     Total Intake(mL/kg)  180 (2 8)     Urine (mL/kg/hr)  750 (0 5)     Total Output  750     Net  -570            Unmeasured Urine Occurrence  3 x         Weights:   IBW (Ideal Body Weight): 59 3 kg    Body mass index is 22 6 kg/m²  Weight (last 2 days)     Date/Time   Weight    21 1758   63 5 (140)            Physical Exam  HENT:      Head: Normocephalic and atraumatic  Eyes:      Conjunctiva/sclera: Conjunctivae normal    Cardiovascular:      Rate and Rhythm: Normal rate     Pulmonary:      Effort: Pulmonary effort is normal    Abdominal:      Palpations: Abdomen is soft  Skin:     Findings: Erythema, lesion and rash present  Comments: Right lower extremity lesion with surrounding erythema  Image in media tab of chart   Neurological:      General: No focal deficit present  Mental Status: She is alert and oriented to person, place, and time  Comments: A and O x3, but does appear to still be hallucinating or confused       LABORATORY DATA     Labs: I have personally reviewed pertinent reports  Results from last 7 days   Lab Units 08/07/21  0716 08/06/21 0455 08/05/21  1900   WBC Thousand/uL 16 10* 13 60* 14 88*   HEMOGLOBIN g/dL 13 2 13 4 13 7   HEMATOCRIT % 41 1 40 8 41 1   PLATELETS Thousands/uL 211 228  217 232   MONO PCT %  --   --  5      Results from last 7 days   Lab Units 08/07/21  0716 08/06/21 0455 08/05/21  1900   POTASSIUM mmol/L 3 9 3 8 3 3*   CHLORIDE mmol/L 108 107 105   CO2 mmol/L 24 27 27   BUN mg/dL 13 26* 26*   CREATININE mg/dL 0 95 1 05 1 08   CALCIUM mg/dL 9 0 9 5 10 0   ALK PHOS U/L  --   --  123*   ALT U/L  --   --  42   AST U/L  --   --  33                      Results from last 7 days   Lab Units 08/06/21 0455 08/05/21  1924   TROPONIN I ng/mL <0 02 <0 02       IMAGING & DIAGNOSTIC TESTING     Radiology Results: I have personally reviewed pertinent reports  XR chest 1 view portable    Result Date: 8/6/2021  Impression: No acute cardiopulmonary disease  Workstation performed: [id number]     XR ankle 3+ views RIGHT    Result Date: 8/6/2021  Impression: No acute osseous abnormality  Mild soft tissue swelling distal lower extremity  Workstation performed: [id number]     CT head without contrast    Result Date: 8/5/2021  Impression: No acute intracranial abnormality  Microangiopathic changes  Workstation performed: [id number]     Other Diagnostic Testing: I have personally reviewed pertinent reports      ACTIVE MEDICATIONS     Current Facility-Administered Medications   Medication Dose Route Frequency    aspirin (ECOTRIN LOW STRENGTH) EC tablet 81 mg  81 mg Oral Daily    atorvastatin (LIPITOR) tablet 20 mg  20 mg Oral Daily    calcium carbonate (OYSTER SHELL,OSCAL) 500 mg tablet 1 tablet  1 tablet Oral Daily With Breakfast    ceFAZolin (ANCEF) IVPB (premix in dextrose) 2,000 mg 50 mL  2,000 mg Intravenous Q8H    cholecalciferol (VITAMIN D3) tablet 1,000 Units  1,000 Units Oral Daily    cyanocobalamin (VITAMIN B-12) tablet 1,000 mcg  1,000 mcg Oral Daily    heparin (porcine) subcutaneous injection 5,000 Units  5,000 Units Subcutaneous Q8H [facility]    insulin detemir (LEVEMIR) subcutaneous injection 10 Units  10 Units Subcutaneous HS    insulin lispro (HumaLOG) 100 units/mL subcutaneous injection 1-5 Units  1-5 Units Subcutaneous TID AC    insulin lispro (HumaLOG) 100 units/mL subcutaneous injection 1-6 Units  1-6 Units Subcutaneous HS    insulin lispro (HumaLOG) 100 units/mL subcutaneous injection 5 Units  5 Units Subcutaneous Daily With Dinner    insulin lispro (HumaLOG) 100 units/mL subcutaneous injection 5 Units  5 Units Subcutaneous BID before breakfast/lunch    lisinopril (ZESTRIL) tablet 5 mg  5 mg Oral Daily    metoprolol tartrate (LOPRESSOR) partial tablet 12 5 mg  12 5 mg Oral Q12H [facility]       VTE Pharmacologic Prophylaxis: Heparin  VTE Mechanical Prophylaxis: sequential compression device    Portions of the record may have been created with voice recognition software  Occasional wrong word or "sound a like" substitutions may have occurred due to the inherent limitations of voice recognition software    Read the chart carefully and recognize, using context, where substitutions have occurred   ==  Barrett Campbell MD  [address]  Internal Medicine Residency PGY-1

## 2021-08-07 NOTE — ASSESSMENT & PLAN NOTE
Wt Readings from Last 3 Encounters:   08/05/21 63 5 kg (140 lb)   11/08/20 74 2 kg (163 lb 9 3 oz)     · No evidence of exacerbation on admission, appears euvolemic  · Med rec shows Lasix 40 mg p r n , patient states she does not take this    Needs to be verified with pharmacy  · Not in an exercebation today

## 2021-08-07 NOTE — ASSESSMENT & PLAN NOTE
Family reports multiple falls at home in the last 2 weeks  One fall with positive head strike but no loss of consciousness  Not formally evaluated at that time  Patient reports bruising her right lower extremity at that time, likely source for infection     · CT head on 08/05 unremarkable  · Right hip x-ray 8/5 unremarkable  · OT / PT consulted recommendations appreciated  · Recommend acute rehab

## 2021-08-07 NOTE — PLAN OF CARE
Problem: PHYSICAL THERAPY ADULT  Goal: Performs mobility at highest level of function for planned discharge setting  See evaluation for individualized goals  Description: Treatment/Interventions: Functional transfer training, LE strengthening/ROM, Therapeutic exercise, Endurance training, Patient/family training, Equipment eval/education, Bed mobility, Gait training, Spoke to nursing, Spoke to case management  Equipment Recommended: Harpreet Mu       See flowsheet documentation for full assessment, interventions and recommendations  Outcome: Progressing  Note: Prognosis: Fair  Problem List: Decreased strength, Decreased range of motion, Decreased endurance, Impaired balance, Decreased mobility, Decreased safety awareness, Pain  Assessment: Pt  is an 78 yo F who presents with acute metabolic encephalopathy due to hypoglycemia  order placed for PT eval and tx, w/ activity order of up and OOB as tolerated  pt presents w/ comorbidities of CKD3, DM2, Chronic Heart Failure, OA, Altered mental status, ambulatory dysfunction, and personal factors of living in 2 story house, mobilizing w/ assistive device, inability to ambulate household distances, inability to navigate community distances, inability to navigate level surfaces w/o external assistance, unable to perform dynamic tasks in community, decreased cognition, limited home support, positive fall history, impulsivity, inability to perform IADLs, inability to perform ADLs and inability to live alone  pt presents w/ weakness, decreased ROM, decreased endurance, impaired cognition, impaired balance, gait deviations, impaired coordination, decreased safety awareness, impaired judgment, fall risk, LE edema and impaired skin integrity   these impairments are evident in findings from physical examination (weakness, decreased ROM, impaired coordination, impaired skin integrity and edema of extremities), mobility assessment (need for Min assist w/ all phases of mobility when usually mobilizing independently, tolerance to only 50 feet of ambulation and need for cueing for mobility technique), and AM-PAC 6-Clicks: 94/37  pt needed input for task focus and mobility technique  pt is at risk for falls due to physical and safety awareness deficits  pt's clinical presentation is unstable/unpredictable (evident in need for assist w/ all phases of mobility when usually mobilizing independently, tolerance to only 50 feet of ambulation, need for input for mobility technique, need for input for task focus and mobility technique and need for input for mobility technique/safety)  pt needs inpatient PT tx to improve mobility deficits  discharge recommendation is for inpatient rehab to reduce fall risk and maximize level of functional independence  PT Discharge Recommendation: Post acute rehabilitation services     PT - OK to Discharge: Yes    See flowsheet documentation for full assessment

## 2021-08-07 NOTE — PHYSICAL THERAPY NOTE
PHYSICAL THERAPY Evaluation NOTE    Patient Name: Vaishali GARVEY Date: 8/7/2021     AGE:   79 y o  Mrn:   [id number]  ADMIT DX:  Altered mental status [R41 82]  Hypoglycemia [E16 2]  Infected abrasion of leg [S80 819A, L08 9]  At risk for falls [Z91 81]  Ambulatory dysfunction [R26 2]  ST segment depression [K88 19]  Acute metabolic encephalopathy due to hypoglycemia [G93 41, E16 2]    Past Medical History:   Diagnosis Date    Benign adenomatous polyp of large intestine     Diabetes mellitus ([facility] [address] )     Hyperlipemia     Hypertension     Osteoarthritis     TIA (transient ischemic attack)      Length Of Stay: 2  PHYSICAL THERAPY EVALUATION :    08/07/21 0905   PT Last Visit   PT Visit Date 08/07/21   Note Type   Note type Evaluation   Pain Assessment   Pain Assessment Tool 0-10   Pain Score 8   Pain Location/Orientation Orientation: Right;Location: Leg   Hospital Pain Intervention(s) Repositioned; Ambulation/increased activity; Environmental changes   Home Living   Type of Home House  (2 SH, with FFSU, OSTE)   Home Layout Two level; Able to live on main level with bedroom/bathroom   Home Equipment Walker  (rollator)   Additional Comments Pt  lives alone in a 2 SH, 0STE, First floor setup   Prior Function   Level of [place] Independent with ADLs and functional mobility   Lives With Alone   ADL Assistance Independent   IADLs Independent   Falls in the last 6 months 5 to 10   Comments Poor historian however reports INDEP with all ADLs, IADLs and functional mobility with Rollator   Restrictions/Precautions   Other Precautions Cognitive; Chair Alarm; Bed Alarm; Fall Risk;Pain   General   Additional Pertinent History Pt  is an 80 yo F who presents with acute metabolic encephalopathy due to hypoglycemia      Family/Caregiver Present No   Cognition   Overall Cognitive Status Impaired   Arousal/Participation Cooperative   Orientation Level Oriented to time;Disoriented to place; Disoriented to situation;Oriented to person   Memory Within functional limits   Following Commands Follows multistep commands with increased time or repetition   Comments Pt  was identified with full name and birthdate   RLE Assessment   RLE Assessment   (grossly 4-/5)   LLE Assessment   LLE Assessment   (grossly 4-/5)   Coordination   Movements are Fluid and Coordinated 1   Bed Mobility   Sit to Supine 5  Supervision   Additional items Increased time required;Verbal cues;HOB elevated; Bedrails   Transfers   Sit to Stand 4  Minimal assistance   Additional items Assist x 1   Stand to Sit 4  Minimal assistance   Additional items Assist x 1   Ambulation/Elevation   Gait pattern Improper Weight shift;Narrow WOOD; Forward Flexion;Decreased foot clearance;Shuffling; Inconsistent marina; Redundant gait at times; Short stride; Step to;Excessively slow   Gait Assistance 4  Minimal assist   Additional items Assist x 2  (chair follow for safety )   Assistive Device Rolling walker   Distance 50'x1   Balance   Static Sitting Good   Dynamic Sitting Fair +   Static Standing Fair   Dynamic Standing Fair -   Ambulatory Poor +   Endurance Deficit   Endurance Deficit Yes   Endurance Deficit Description Postural and gait degradation noted with fatigue   Activity Tolerance   Activity Tolerance Patient limited by fatigue   Medical Staff Made Aware Spoke to Jevon Macias OT   Nurse Made Aware Spoke to RN, Cleared for PT evaluation   Assessment   Prognosis Fair   Problem List Decreased strength;Decreased range of motion;Decreased endurance; Impaired balance;Decreased mobility; Decreased safety awareness;Pain   Assessment Pt  is an 80 yo F who presents with acute metabolic encephalopathy due to hypoglycemia  order placed for PT eval and tx, w/ activity order of up and OOB as tolerated   pt presents w/ comorbidities of CKD3, DM2, Chronic Heart Failure, OA, Altered mental status, ambulatory dysfunction, and personal factors of living in 2 story house, mobilizing w/ assistive device, inability to ambulate household distances, inability to navigate community distances, inability to navigate level surfaces w/o external assistance, unable to perform dynamic tasks in community, decreased cognition, limited home support, positive fall history, impulsivity, inability to perform IADLs, inability to perform ADLs and inability to live alone  pt presents w/ weakness, decreased ROM, decreased endurance, impaired cognition, impaired balance, gait deviations, impaired coordination, decreased safety awareness, impaired judgment, fall risk, LE edema and impaired skin integrity  these impairments are evident in findings from physical examination (weakness, decreased ROM, impaired coordination, impaired skin integrity and edema of extremities), mobility assessment (need for Min assist w/ all phases of mobility when usually mobilizing independently, tolerance to only 50 feet of ambulation and need for cueing for mobility technique), and AM-PAC 6-Clicks: 30/47  pt needed input for task focus and mobility technique  pt is at risk for falls due to physical and safety awareness deficits  pt's clinical presentation is unstable/unpredictable (evident in need for assist w/ all phases of mobility when usually mobilizing independently, tolerance to only 50 feet of ambulation, need for input for mobility technique, need for input for task focus and mobility technique and need for input for mobility technique/safety)  pt needs inpatient PT tx to improve mobility deficits  discharge recommendation is for inpatient rehab to reduce fall risk and maximize level of functional independence  Goals   Patient Goals to get home   STG Expiration Date 08/17/21   Short Term Goal #1 pt will:  Increase bilateral LE strength 1/2 grade to facilitate independent mobility, Perform all bed mobility tasks independently to decrease fall risk factors, Perform all transfers w/ supervision to improve independence, Ambulate 250 ft  with least restrictive assistive device w/ supervision w/o LOB, Navigate 5 stairs w/ supervision with unilateral handrail to facilitate return to previous living environment and Increase all balance 1/2 grade to decrease risk for falls   PT Treatment Day 0   Plan   Treatment/Interventions Functional transfer training;LE strengthening/ROM; Therapeutic exercise; Endurance training;Patient/family training;Equipment eval/education; Bed mobility;Gait training;Spoke to nursing;Spoke to case management   PT Frequency   (3-5x/wk)   Recommendation   PT Discharge Recommendation Post acute rehabilitation services   Equipment Recommended Walker   PT - OK to Discharge Yes   Additional Comments to rehab when medically appropriate   AM-PAC Basic Mobility Inpatient   Turning in Bed Without Bedrails 3   Lying on Back to Sitting on Edge of Flat Bed 3   Moving Bed to Chair 3   Standing Up From Chair 3   Walk in Room 3   Climb 3-5 Stairs 2   Basic Mobility Inpatient Raw Score 17   Basic Mobility Standardized Score 39 67     Skilled PT recommended while in hospital and upon DC to progress pt toward treatment goals  The patient's AM-PAC Basic Mobility Inpatient Short Form Raw Score is 17, Standardized Score is 39 67  A standardized score less than 42 9 suggests the patient may benefit from discharge to post-acute rehabilitation services  Please also refer to the recommendation of the Physical Therapist for safe discharge planning      Clara Strickland PT, DPT 8/7/2021

## 2021-08-07 NOTE — PLAN OF CARE
Problem: OCCUPATIONAL THERAPY ADULT  Goal: Performs self-care activities at highest level of function for planned discharge setting  See evaluation for individualized goals  Description: Treatment Interventions: ADL retraining, Functional transfer training, UE strengthening/ROM, Endurance training, Cognitive reorientation, Patient/family training, Compensatory technique education, Equipment evaluation/education, Continued evaluation, Energy conservation, Activityengagement          See flowsheet documentation for full assessment, interventions and recommendations  Note: Limitation: Decreased ADL status, Decreased UE strength, Decreased Safe judgement during ADL, Decreased cognition, Decreased endurance, Decreased self-care trans, Decreased high-level ADLs  Prognosis: Fair  Assessment: Pt is a 79 y o  female admitted to [facility] on 1/4/3315 w/ Acute metabolic encephalopathy due to hypoglycemia  has a past medical history of Benign adenomatous polyp of large intestine, Diabetes mellitus ([facility] [address] ), Hyperlipemia, Hypertension, Osteoarthritis, and TIA (transient ischemic attack)  Pt with active OT orders and up and OOB as tolerated orders  Pt seen as a co-evaluation with PT due to the patient's co-morbidities, clinically unstable presentation/clinical complexity, and present impairments  Pt is poor historian, information obtained from chart  PTA, resides in a 1ST, 1STE, alone  Pt was I w/  ADLS and IADLS  Upon evaluation, pt currently requires MIN A with RW for transfers and MIN A x 1 + SBA of another for mobility  Exhibits decreased strength to B/L UE's limiting performance in ADLs/transfers  Pt currently requires S eating and grooming, MIN A UB ADLs, MIN A LB ADLs, and MIN A toileting   Pt is limited at this time 2*: pain, endurance, activity tolerance, functional mobility, balance, functional standing tolerance, unsupportive home environment, decreased I w/ ADLS/IADLS, strength, cognitive impairments, decreased safety awareness and decreased insight into deficits  The following Occupational Performance Areas to address include: eating, grooming, bathing/shower, toilet hygiene, dressing, health maintenance, functional mobility, community mobility and clothing management  Pt to benefit from immediate acute skilled OT to address above deficits, improve overall functional independence, maximize fnxl mobility and reduce caregiver burden  From OT standpoint, recommendation at time of d/c would be STR  Pt was left in bed with alarm on after session with all current needs met  The patient's raw score on the AM-PAC Daily Activity inpatient short form is 18, standardized score is 38 66, less than 39 4  Patients at this level are likely to benefit from discharge to post-acute rehabilitation services  Please refer to the recommendation of the Occupational Therapist for safe discharge planning       OT Discharge Recommendation: Post acute rehabilitation services  OT - OK to Discharge: Yes (to rehab when medically stable )

## 2021-08-07 NOTE — ASSESSMENT & PLAN NOTE
Noted on admission on anterior aspect of right lower leg with surrounding erythema, edema, without calor  Erythema improving  Image in media tab  WBC elevated at 14, but stable since admission  · Patient transitioned from Ancef day 3 to Keflex p o  500 mg t i d   Patient will complete a total of 7 days antibiotics today 8/12   · Monitor fever curve and WBC

## 2021-08-07 NOTE — ASSESSMENT & PLAN NOTE
Noted on arrival to ED  Resolved by time of encounter  Suspect this was related to hypoglycemia as it resolve after she ingested crackers and peanut butter  Noted be alert oriented x4, however suspect underlying mild cognitive impairment as she continues to discuss her  who per discussions with family is no longer life  CT head unremarkable  · At risk of in-hospital delirium will schedule Zyprexa 2 5 mg q h s    · Geriatric consult, appreciate recommendation  · monitor and orient daily

## 2021-08-07 NOTE — OCCUPATIONAL THERAPY NOTE
Occupational Therapy Evaluation     Patient Name: Savanna Navarro  PTWEV'S Date: 8/7/2021  Problem List  Principal Problem:    Acute metabolic encephalopathy due to hypoglycemia  Active Problems:    Essential hypertension    Type 2 diabetes mellitus with stage 3a chronic kidney disease, with long-term current use of insulin ([facility])    CKD (chronic kidney disease) stage 3, GFR 30-59 ml/min (HCC)    HLD (hyperlipidemia)    OA (osteoarthritis)    Chronic systolic heart failure (HCC)    Altered mental status    Infected abrasion of leg    Ambulatory dysfunction    Suspected Mild cognitive impairment    Past Medical History  Past Medical History:   Diagnosis Date    Benign adenomatous polyp of large intestine     Diabetes mellitus ([facility] [address] )     Hyperlipemia     Hypertension     Osteoarthritis     TIA (transient ischemic attack)      Past Surgical History  Past Surgical History:   Procedure Laterality Date    APPENDECTOMY      CARPAL TUNNEL RELEASE Right     HYSTERECTOMY W/ SALPINGO-OOPHERECTOMY             08/07/21 0904   OT Last Visit   OT Visit Date 08/07/21   Note Type   Note type Evaluation   Restrictions/Precautions   Weight Bearing Precautions Per Order No   Other Precautions Cognitive; Chair Alarm; Bed Alarm; Fall Risk;Pain   Pain Assessment   Pain Assessment Tool 0-10   Pain Score 8   Pain Location/Orientation Location: Back;Orientation: Right;Location: Leg   Hospital Pain Intervention(s) Repositioned; Ambulation/increased activity   Home Living   Type of [address] Two level  (FFSU, 0STE)   Home Equipment   (rollator)   Additional Comments Pt poort historian  Information obtained from chart    Prior Function   Level of [place] Independent with ADLs and functional mobility   Lives With Alone   Receives Help From Family   ADL Assistance Independent   IADLs Independent   Vocational Retired   Comments Pt poort historian   Information obtained from chart    Lifestyle   Autonomy Per chart, PTA, pt was I with ADLs/IADLs   Reciprocal Relationships Neice, friends   Service to Others Retired - worked as a     Intrinsic Gratification Playing games   Psychosocial   Psychosocial ([address]) [address] "This is a big house"   ADL   Where [address] 5  [address] [facility] [address]  [address]  4  E[address] to Supine 5  Supervision   Additional items Assist x 1; Increased time required   Transfers   Sit to Stand 4  Minimal assistance   Additional items Assist x 1   Stand to Sit 4  Minimal assistance   Additional items Assist x 1; Increased time required   Additional Comments Transfers with RW    Functional Mobility   Functional Mobility 4  Minimal assistance   Additional Comments Ax1+ SBA of another for safety and chair follow   Additional items Rolling walker   Balance   Static Sitting Fair +   Dynamic Sitting Fair   Static Standing Fair   Dynamic Standing Fair -   Ambulatory Poor +   Activity Tolerance   Activity Tolerance Patient limited by fatigue;Patient limited by pain  (+ cognition)   Medical Staff Made Aware PT Gloria Segal    Nurse Made Aware RN clearance for session    RUE Assessment   RUE Assessment   (3+/5)   LUE Assessment   LUE Assessment   (3+/5)   Hand Function   Gross Motor Coordination Functional   Fine Motor Coordination Functional   Sensation   Light Touch No apparent deficits   Cognition   Overall Cognitive Status Impaired   Arousal/Participation Responsive; Cooperative   Attention Attends with cues to redirect   Orientation Level Oriented to time;Oriented to person;Disoriented to place; Disoriented to situation   Memory Decreased recall of recent events   Following Commands Follows one step commands with increased time or repetition   Comments Pt pleasant and cooperative t/o session  Confused/disoriented, pt often states that she is at her house  Poor safety awareness   Assessment   Limitation Decreased ADL status; Decreased UE strength;Decreased Safe judgement during ADL;Decreased cognition;Decreased endurance;Decreased self-care trans;Decreased high-level ADLs   Prognosis Fair   Assessment Pt is a 79 y o  female admitted to \[facility]"" on 9/7/4696 w/ Acute metabolic encephalopathy due to hypoglycemia  has a past medical history of Benign adenomatous polyp of large intestine, Diabetes mellitus ([facility] [address] ), Hyperlipemia, Hypertension, Osteoarthritis, and TIA (transient ischemic attack)  Pt with active OT orders and up and OOB as tolerated orders  Pt seen as a co-evaluation with PT due to the patient's co-morbidities, clinically unstable presentation/clinical complexity, and present impairments  Pt is poor historian, information obtained from chart  PTA, resides in a [facility], 1STE, alone  Pt was I w/  ADLS and IADLS  Upon evaluation, pt currently requires MIN A with RW for transfers and MIN A x 1 + SBA of another for mobility  Exhibits decreased strength to B/L UE's limiting performance in ADLs/transfers  Pt currently requires S eating and grooming, MIN A UB ADLs, MIN A LB ADLs, and MIN A toileting  Pt is limited at this time 2*: pain, endurance, activity tolerance, functional mobility, balance, functional standing tolerance, unsupportive home environment, decreased I w/ ADLS/IADLS, strength, cognitive impairments, decreased safety awareness and decreased insight into deficits  The following Occupational Performance Areas to address include: eating, grooming, bathing/shower, toilet hygiene, dressing, health maintenance, functional mobility, community mobility and clothing management   Pt to benefit from immediate acute skilled OT to address above deficits, improve overall functional independence, maximize fnxl mobility and reduce caregiver burden  From OT standpoint, recommendation at time of d/c would be STR  Pt was left in bed with alarm on after session with all current needs met  The patient's raw score on the AM-PAC Daily Activity inpatient short form is 18, standardized score is 38 66, less than 39 4  Patients at this level are likely to benefit from discharge to post-acute rehabilitation services  Please refer to the recommendation of the Occupational Therapist for safe discharge planning  Goals   LTG Time Frame 10-14   Plan   Treatment Interventions ADL retraining;Functional transfer training;UE strengthening/ROM; Endurance training;Cognitive reorientation;Patient/family training; Compensatory technique education;Equipment evaluation/education;Continued evaluation; Energy conservation; Activityengagement   Goal Expiration Date 08/21/21   OT Frequency 2-3x/wk   Recommendation   OT Discharge Recommendation Post acute rehabilitation services   OT - OK to Discharge Yes  (to rehab when medically stable )   [facility] Daily Activity Inpatient   Lower Body Dressing 3   Bathing 3   Toileting 3   Upper Body Dressing 3   Grooming 3   Eating 3   Daily Activity Raw Score 18   Daily Activity Standardized Score (Calc for Raw Score >=11) 38 66   [facility] Applied Cognition Inpatient   Following a Speech/Presentation 2   Understanding Ordinary Conversation 4   Taking Medications 3   Remembering Where Things Are Placed or Put Away 2   Remembering List of 4-5 Errands 2   Taking Care of Complicated Tasks 2   Applied Cognition Raw Score 15   Applied Cognition Standardized Score 33 54        GOALS    1) Pt will increase activity tolerance to G for 30 min txment sessions    2) Pt will complete UB dressing/self care/bathing w/ mod I using adaptive device and DME as needed    3) Pt will complete LB dressing/self care/bathing w/ mod I using adaptive device and DME as needed    4) Pt will complete toileting w/ mod I w/ G hygiene/thoroughness using DME as needed    5) Pt will improve functional transfers to Mod I on/off all surfaces using DME as needed w/ G balance/safety     6) Pt will improve functional mobility during ADL/IADL/leisure tasks to Mod I using DME as needed w/ G balance/safety     7) Pt will demonstrate G carryover of pt/caregiver education and training as appropriate      8) Pt will demonstrate 100% carryover of energy conservation techniques t/o functional I/ADL/leisure tasks w/o cues s/p skilled education    9) Pt will engage in ongoing cognitive assessment w/ G participation to assist w/ safe d/c planning/recommendations    10) Pt will increase static and dynamic standing/sitting balance to F+ using AD/DME as needed to increase safety and I during fnxl transfers and ADLs    11) Pt will maintain upright sitting position for at least 20 min during dynamic fnxl activity with G balance and endurance to improve ADL performance and independence, as well as reduce risk of falls    12) Pt will participate in simulated IADL management task with DME as needed to increase independence to  w/ G safety and endurance    Dominga Wing MS, OTR/L

## 2021-08-07 NOTE — ASSESSMENT & PLAN NOTE
Reported multiple falls at home in the last 2 weeks  One fall with positive head strike but no loss of consciousness  Not formally evaluated at that time    · OT PT consulted recommendation appreciated  · PT recommend post-acute Rehab Services, will discuss with case management as patient will likely require higher level of care upon discharge

## 2021-08-07 NOTE — ASSESSMENT & PLAN NOTE
Lab Results   Component Value Date    EGFR 63 08/09/2021    EGFR 56 08/07/2021    EGFR 50 08/06/2021    CREATININE 0 87 08/09/2021    CREATININE 0 95 08/07/2021    CREATININE 1 05 08/06/2021     Stable  Avoid nephrotoxin medications

## 2021-08-07 NOTE — ASSESSMENT & PLAN NOTE
She lives at home alone  Per family, patient had acute change in mental status with increased confusion  Since hospital, patient is alert and oriented x3 but continues to having endorsing her      · Geriatric consult appreciate recommendation  · Per discussion with nephew who is patient's POA, family would prefer to keep the patient home with home nursing assistance after acute rehab, [facility] is accepted [facility]  11am

## 2021-08-07 NOTE — CASE MANAGEMENT
CM obtained all the following info about the pt  HOME: Pt resides in a 1-story house w/ 13 steps to attic and no steps to enter  LIVES W/: Herself only  : Pt's niece Darlene Ramos (c: [phone])  INDEPENDENCE: Pt uses a rollator at baseline to ambulate far distances, but is independent at baseline w/ performing her ADLS  DME: Rollator  HHC: No hx of services reported  I/P REHAB: No hx of placements reported  MENTAL HEALTH: No hx of issues reported  D&A: No hx of issues reported  PCP: Dr Felice Douglass, an independent practitioner located in [facility]  PHARMACY: [facility] located on [address] ([address]) in [facility]  INCOME SOURCE: Pension and Social Security benefits  INSURANCE: Medicare for primary healthcare coverage, [employer] supplemental plan for secondary healthcare coverage, and [employer] plan for Rx coverage  MEDICAL POA: No one is pre-designated  TRANSPORTATION AT D/C: Pt's family will provide transport  CM reviewed d/c planning process including the following: identifying help at home, patient preference for d/c planning needs, Discharge Lounge, [facility] to [facility] program, availability of treatment team to discuss questions or concerns patient and/or family may have regarding understanding medications and recognizing signs and symptoms once discharged  CM also encouraged patient to follow up with all recommended appointments after discharge  Patient advised of importance for patient and family to participate in managing patients medical well being  Patient/caregiver received discharge checklist  Content reviewed  Patient/caregiver encouraged to participate in discharge plan of care prior to discharge home

## 2021-08-07 NOTE — ASSESSMENT & PLAN NOTE
Lab Results   Component Value Date    HGBA1C 8 7 (H) 05/08/2021       Recent Labs     08/12/21  1131 08/12/21  1603 08/12/21  1728 08/12/21  2200   POCGLU 320* 352* 333* 265*       Blood Sugar Average: Last 72 hrs:  (P) 266 [id number]     Blood glucose 40 on arrival  Hypoglycemia resolved with crackers and peanut butter  Repeat POC blood glucose 170  Home regimen with Levemir 20 units HS and NovoLog 10 units with breakfast and lunch and 5 units with dinner  Will allow for permissive hyperglycemia as patient's HbA1c is very close to goal for her age      · Will continue current dose of Levemir 10 units q h s   · Increase lispro to 10 units daily with dinner, conitnue 5 units with breakfast/lunch  · Continue to monitor blood sugar and adjust insulin as needed

## 2021-08-08 PROBLEM — E16.2 ACUTE METABOLIC ENCEPHALOPATHY DUE TO HYPOGLYCEMIA: Status: RESOLVED | Noted: 2021-08-06 | Resolved: 2021-08-08

## 2021-08-08 PROBLEM — G93.41 ACUTE METABOLIC ENCEPHALOPATHY DUE TO HYPOGLYCEMIA: Status: RESOLVED | Noted: 2021-08-06 | Resolved: 2021-08-08

## 2021-08-08 PROBLEM — I10 HYPERTENSION: Status: ACTIVE | Noted: 2021-08-08
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
## 2021-08-08 LAB
ERYTHROCYTE [DISTWIDTH] IN BLOOD BY AUTOMATED COUNT: 13.9 % (ref 11.6–15.1)
GLUCOSE SERPL-MCNC: 143 MG/DL (ref 65–140)
GLUCOSE SERPL-MCNC: 172 MG/DL (ref 65–140)
GLUCOSE SERPL-MCNC: 248 MG/DL (ref 65–140)
GLUCOSE SERPL-MCNC: 318 MG/DL (ref 65–140)
HCT VFR BLD AUTO: 36.4 % (ref 34.8–46.1)
HGB BLD-MCNC: 12 G/DL (ref 11.5–15.4)
MCH RBC QN AUTO: 33.3 PG (ref 26.8–34.3)
MCHC RBC AUTO-ENTMCNC: 33 G/DL (ref 31.4–37.4)
MCV RBC AUTO: 101 FL (ref 82–98)
NRBC BLD AUTO-RTO: 0 /100 WBCS
PLATELET # BLD AUTO: 193 THOUSANDS/UL (ref 149–390)
PMV BLD AUTO: 12.3 FL (ref 8.9–12.7)
RBC # BLD AUTO: 3.6 MILLION/UL (ref 3.81–5.12)
WBC # BLD AUTO: 13.63 THOUSAND/UL (ref 4.31–10.16)

## 2021-08-08 PROCEDURE — 85027 COMPLETE CBC AUTOMATED: CPT | Performed by: STUDENT IN AN ORGANIZED HEALTH CARE EDUCATION/TRAINING PROGRAM

## 2021-08-08 PROCEDURE — 82948 REAGENT STRIP/BLOOD GLUCOSE: CPT

## 2021-08-08 PROCEDURE — 99232 SBSQ HOSP IP/OBS MODERATE 35: CPT | Performed by: INTERNAL MEDICINE

## 2021-08-08 RX ORDER — CEPHALEXIN 500 MG/1
500 CAPSULE ORAL EVERY 8 HOURS SCHEDULED
Status: COMPLETED | OUTPATIENT
Start: 2021-08-08 | End: 2021-08-13

## 2021-08-08 RX ORDER — LISINOPRIL 10 MG/1
10 TABLET ORAL DAILY
Status: DISCONTINUED | OUTPATIENT
Start: 2021-08-09 | End: 2021-08-09

## 2021-08-08 RX ADMIN — INSULIN DETEMIR 10 UNITS: 100 INJECTION, SOLUTION SUBCUTANEOUS at 21:42

## 2021-08-08 RX ADMIN — HEPARIN SODIUM 5000 UNITS: 5000 INJECTION INTRAVENOUS; SUBCUTANEOUS at 21:43

## 2021-08-08 RX ADMIN — ATORVASTATIN CALCIUM 20 MG: 20 TABLET, FILM COATED ORAL at 08:27

## 2021-08-08 RX ADMIN — CEFAZOLIN SODIUM 2000 MG: 2 SOLUTION INTRAVENOUS at 06:54

## 2021-08-08 RX ADMIN — HEPARIN SODIUM 5000 UNITS: 5000 INJECTION INTRAVENOUS; SUBCUTANEOUS at 06:54

## 2021-08-08 RX ADMIN — INSULIN LISPRO 5 UNITS: 100 INJECTION, SOLUTION INTRAVENOUS; SUBCUTANEOUS at 08:28

## 2021-08-08 RX ADMIN — INSULIN LISPRO 3 UNITS: 100 INJECTION, SOLUTION INTRAVENOUS; SUBCUTANEOUS at 21:43

## 2021-08-08 RX ADMIN — LISINOPRIL 5 MG: 5 TABLET ORAL at 08:27

## 2021-08-08 RX ADMIN — CEFAZOLIN SODIUM 2000 MG: 2 SOLUTION INTRAVENOUS at 13:26

## 2021-08-08 RX ADMIN — INSULIN LISPRO 3 UNITS: 100 INJECTION, SOLUTION INTRAVENOUS; SUBCUTANEOUS at 12:06

## 2021-08-08 RX ADMIN — CYANOCOBALAMIN TAB 500 MCG 1000 MCG: 500 TAB at 08:27

## 2021-08-08 RX ADMIN — Medication 12.5 MG: at 21:43

## 2021-08-08 RX ADMIN — CEPHALEXIN 500 MG: 500 CAPSULE ORAL at 21:43

## 2021-08-08 RX ADMIN — ASPIRIN 81 MG: 81 TABLET, COATED ORAL at 08:27

## 2021-08-08 RX ADMIN — Medication 1000 UNITS: at 08:27

## 2021-08-08 RX ADMIN — OLANZAPINE 2.5 MG: 2.5 TABLET, FILM COATED ORAL at 21:47

## 2021-08-08 RX ADMIN — INSULIN LISPRO 5 UNITS: 100 INJECTION, SOLUTION INTRAVENOUS; SUBCUTANEOUS at 12:06

## 2021-08-08 RX ADMIN — INSULIN LISPRO 1 UNITS: 100 INJECTION, SOLUTION INTRAVENOUS; SUBCUTANEOUS at 17:41

## 2021-08-08 RX ADMIN — Medication 12.5 MG: at 08:27

## 2021-08-08 RX ADMIN — CALCIUM 1 TABLET: 500 TABLET ORAL at 08:27

## 2021-08-08 RX ADMIN — Medication 12.5 MG: at 21:48

## 2021-08-08 RX ADMIN — HEPARIN SODIUM 5000 UNITS: 5000 INJECTION INTRAVENOUS; SUBCUTANEOUS at 13:29

## 2021-08-08 RX ADMIN — CEPHALEXIN 500 MG: 500 CAPSULE ORAL at 17:43

## 2021-08-08 NOTE — PROGRESS NOTES
INTERNAL MEDICINE RESIDENCY PROGRESS NOTE     Name: Mark Sanchez   Age & Sex: 79 y o  female   MRN: [id number]  Unit/Bed#: [facility] 834-01   Encounter: [id number]  Team: SOD Team C     PATIENT INFORMATION     Name: Mark Sanchez   Age & Sex: 79 y o  female   MRN: [id number]  Hospital Stay Days: 3    ASSESSMENT/PLAN     Active Problems:    Infected abrasion of leg    Fall    Type 2 diabetes mellitus with stage 3a chronic kidney disease, with long-term current use of insulin ([facility])    CKD (chronic kidney disease) stage 3, GFR 30-59 ml/min ([facility])    HLD (hyperlipidemia)    OA (osteoarthritis)    Chronic systolic heart failure ([facility])    Altered mental status    Ambulatory dysfunction    Suspected Mild cognitive impairment    Hypertension      Infected abrasion of leg  Assessment & Plan  Noted on admission on anterior aspect of right lower leg with surrounding erythema, edema, without calor  Erythema improving  Image in media tab  · Patient transitioned from Ancef day 3 to Keflex p o  500 mg t i d  Patient will complete a total of 7 days antibiotics  · Monitor fever curve and WBC       * Acute metabolic encephalopathy due to hypoglycemia-resolved as of 8/8/2021  Assessment & Plan  Noted on arrival to ED  Resolved by time of encounter  Suspect this was related to hypoglycemia as it resolve after she ingested crackers and peanut butter  Noted be alert oriented x4, however suspect underlying mild cognitive impairment  · at risk of in-hospital delirium, monitor and orient daily  · Consider scheduled Zyprexa 5 mg q h s      Hypertension  Assessment & Plan  Blood pressure persistently elevated with most recent 153/67    · Will increase lisinopril from 5 mg to 10 mg daily  · Continue metoprolol 12 5 mg q 12      Suspected Mild cognitive impairment  Assessment & Plan  Noted decreased memory and concentration during encounter  · geriatric consult appreciate recommendation    Ambulatory dysfunction  Assessment & Plan  Reported multiple falls at home in the last 2 weeks  One fall with positive head strike but no loss of consciousness  Not formally evaluated at that time  · OT PT consulted recommendation appreciated       Altered mental status  Assessment & Plan  Noted on arrival to ED  Resolved by time of encounter  Suspect this was related to hypoglycemia as it resolve after she ingested crackers and peanut butter  Noted be alert oriented x4, however suspect underlying mild cognitive impairment as she continues to discuss her  who per chart review is no longer alive  · at risk of in-hospital delirium will schedule Zyprexa 2 5 mg q h s  · CT head unremarkable  · monitor and orient daily    Chronic systolic heart failure (HCC)  Assessment & Plan  Wt Readings from Last 3 Encounters:   08/05/21 63 5 kg (140 lb)   11/08/20 74 2 kg (163 lb 9 3 oz)     · No evidence of exacerbation on admission, appears euvolemic  · Med rec shows Lasix 40 mg p r n , patient states she does not take this  Needs to be verified with pharmacy  · Not in an exercebation today      HLD (hyperlipidemia)  Assessment & Plan  Continue home atorvastatin 40 mg     CKD (chronic kidney disease) stage 3, GFR 30-59 ml/min [facility])  Assessment & Plan  Lab Results   Component Value Date    EGFR 56 08/07/2021    EGFR 50 08/06/2021    EGFR 48 08/05/2021    CREATININE 0 95 08/07/2021    CREATININE 1 05 08/06/2021    CREATININE 1 08 08/05/2021     Stable  Avoid nephrotoxin medications  Type 2 diabetes mellitus with stage 3a chronic kidney disease, with long-term current use of insulin [facility])  Assessment & Plan  Lab Results   Component Value Date    HGBA1C 8 7 (H) 05/08/2021       Recent Labs     08/07/21  2113 08/08/21  0801 08/08/21  1123 08/08/21  1615   POCGLU 228* 143* 318* 172*       Blood Sugar Average: Last 72 hrs:  (P) 185 [id number]     Blood glucose 40 on arrival  Hypoglycemia resolved with crackers and peanut butter    Repeat POC blood glucose 170  Home regimen with Levemir 20 units HS and NovoLog 10 units with breakfast and lunch and 5 units with dinner      · Will continue current dose of Levemir 10 units and will increase  lispro to 10 units from 5 units, continue Correction scale   · Continue to monitor blood sugar and adjust insulin as needed    Fall  Assessment & Plan  Reported multiple falls at home in the last 2 weeks  One fall with positive head strike but no loss of consciousness  Not formally evaluated at that time  · CT head on  unremarkable  · OT / PT consulted recommendations appreciated      Disposition: Pt  Transitioned to Keflex 500 mg t i d  Today  Possible discharge tomorrow    SUBJECTIVE     Patient seen and examined by bedside, she is on room air not in any obvious distress  AAO x3  No acute events overnight  OBJECTIVE     Vitals:    21 1459 21 2310 21 0719 21 1414   BP: 117/57 113/80 153/67 152/66   BP Location:   Right arm    Pulse: 74  77 65   Resp: 20  18 18   Temp: 98 2 °F (36 8 °C) 99 1 °F (37 3 °C) 98 6 °F (37 °C) 98 1 °F (36 7 °C)   TempSrc: Oral  Oral    SpO2: 97%  91% 94%   Weight:       Height:          Temperature:   Temp (24hrs), Av 6 °F (37 °C), Min:98 1 °F (36 7 °C), Max:99 1 °F (37 3 °C)    Temperature: 98 1 °F (36 7 °C)  Intake & Output:  I/O       701 -  07 -  07 -  07    P  O  180 480 900    Total Intake(mL/kg) 180 (2 8) 480 (7 6) 900 (14 2)    Urine (mL/kg/hr) 750 (0 5) 950 (0 6) 500 (0 8)    Total Output 750 950 500    Net -570 -470 +400           Unmeasured Urine Occurrence 3 x 1 x         Weights:   IBW (Ideal Body Weight): 59 3 kg    Body mass index is 22 6 kg/m²  Weight (last 2 days)     None        Physical Exam  Vitals and nursing note reviewed  Constitutional:       General: She is not in acute distress  Appearance: She is well-developed  HENT:      Head: Normocephalic and atraumatic  Mouth/Throat:      Mouth: Mucous membranes are moist    Eyes:      Extraocular Movements: Extraocular movements intact  Conjunctiva/sclera: Conjunctivae normal    Cardiovascular:      Rate and Rhythm: Normal rate and regular rhythm  Heart sounds: No murmur heard  Pulmonary:      Effort: Pulmonary effort is normal  No respiratory distress  Breath sounds: Normal breath sounds  Abdominal:      General: Bowel sounds are normal       Palpations: Abdomen is soft  Tenderness: There is no abdominal tenderness  Musculoskeletal:      Cervical back: Neck supple  Comments: Lesion in right shin  No erethema noted  Improved from prior   Skin:     General: Skin is warm and dry  Capillary Refill: Capillary refill takes less than 2 seconds  Neurological:      Mental Status: She is alert and oriented to person, place, and time  LABORATORY DATA     Labs: I have personally reviewed pertinent reports  Results from last 7 days   Lab Units 08/08/21  0629 08/07/21  0716 08/06/21 0455 08/05/21  1900   WBC Thousand/uL 13 63* 16 10* 13 60* 14 88*   HEMOGLOBIN g/dL 12 0 13 2 13 4 13 7   HEMATOCRIT % 36 4 41 1 40 8 41 1   PLATELETS Thousands/uL 193 211 228  217 232   MONO PCT %  --   --   --  5      Results from last 7 days   Lab Units 08/07/21  0716 08/06/21  0455 08/05/21  1900   POTASSIUM mmol/L 3 9 3 8 3 3*   CHLORIDE mmol/L 108 107 105   CO2 mmol/L 24 27 27   BUN mg/dL 13 26* 26*   CREATININE mg/dL 0 95 1 05 1 08   CALCIUM mg/dL 9 0 9 5 10 0   ALK PHOS U/L  --   --  123*   ALT U/L  --   --  42   AST U/L  --   --  33                      Results from last 7 days   Lab Units 08/06/21 0455 08/05/21  1924   TROPONIN I ng/mL <0 02 <0 02       IMAGING & DIAGNOSTIC TESTING     Radiology Results: I have personally reviewed pertinent reports  XR chest 1 view portable    Result Date: 8/6/2021  Impression: No acute cardiopulmonary disease   Workstation performed: [id number]     XR hip/pelv 2-3 vws right if performed    Result Date: 8/7/2021  Impression: No acute osseous abnormality  Degenerative changes as described  Workstation performed: [id number]     XR ankle 3+ views RIGHT    Result Date: 8/6/2021  Impression: No acute osseous abnormality  Mild soft tissue swelling distal lower extremity  Workstation performed: [id number]     CT head without contrast    Result Date: 8/5/2021  Impression: No acute intracranial abnormality  Microangiopathic changes  Workstation performed: [id number]     Other Diagnostic Testing: I have personally reviewed pertinent reports      ACTIVE MEDICATIONS     Current Facility-Administered Medications   Medication Dose Route Frequency    aspirin (ECOTRIN LOW STRENGTH) EC tablet 81 mg  81 mg Oral Daily    atorvastatin (LIPITOR) tablet 20 mg  20 mg Oral Daily    calcium carbonate (OYSTER SHELL,OSCAL) 500 mg tablet 1 tablet  1 tablet Oral Daily With Breakfast    cephalexin (KEFLEX) capsule 500 mg  500 mg Oral Q8H [facility]    cholecalciferol (VITAMIN D3) tablet 1,000 Units  1,000 Units Oral Daily    cyanocobalamin (VITAMIN B-12) tablet 1,000 mcg  1,000 mcg Oral Daily    heparin (porcine) subcutaneous injection 5,000 Units  5,000 Units Subcutaneous Q8H [facility]    insulin detemir (LEVEMIR) subcutaneous injection 10 Units  10 Units Subcutaneous HS    insulin lispro (HumaLOG) 100 units/mL subcutaneous injection 1-5 Units  1-5 Units Subcutaneous TID AC    insulin lispro (HumaLOG) 100 units/mL subcutaneous injection 1-6 Units  1-6 Units Subcutaneous HS    [START ON 8/9/2021] insulin lispro (HumaLOG) 100 units/mL subcutaneous injection 10 Units  10 Units Subcutaneous Daily With Dinner    insulin lispro (HumaLOG) 100 units/mL subcutaneous injection 5 Units  5 Units Subcutaneous BID before breakfast/lunch    [START ON 8/9/2021] lisinopril (ZESTRIL) tablet 10 mg  10 mg Oral Daily    metoprolol tartrate (LOPRESSOR) partial tablet 12 5 mg  12 5 mg Oral Q12H [facility]    OLANZapine (ZyPREXA) tablet 2 5 mg  2 5 mg Oral HS       VTE Pharmacologic Prophylaxis: Heparin  VTE Mechanical Prophylaxis: sequential compression device    Portions of the record may have been created with voice recognition software  Occasional wrong word or "sound a like" substitutions may have occurred due to the inherent limitations of voice recognition software    Read the chart carefully and recognize, using context, where substitutions have occurred   ==  Rita, [address] [facility]  Internal Medicine Residency

## 2021-08-08 NOTE — ASSESSMENT & PLAN NOTE
Blood pressure is elevated, overnight on 08/09 SBP greater than 196  Since then, BP stable his -130  · Continue Hyzaar (losartan/hydrochlorothiazide 50/12  5)  · Continue metoprolol 12 5 mg q 12

## 2021-08-09 LAB
ANION GAP SERPL CALCULATED.3IONS-SCNC: 6 MMOL/L (ref 4–13)
ANISOCYTOSIS BLD QL SMEAR: PRESENT
BASOPHILS # BLD MANUAL: 0 THOUSAND/UL (ref 0–0.1)
BASOPHILS NFR MAR MANUAL: 0 % (ref 0–1)
BUN SERPL-MCNC: 16 MG/DL (ref 5–25)
CALCIUM SERPL-MCNC: 9 MG/DL (ref 8.3–10.1)
CHLORIDE SERPL-SCNC: 110 MMOL/L (ref 100–108)
CO2 SERPL-SCNC: 26 MMOL/L (ref 21–32)
CREAT SERPL-MCNC: 0.87 MG/DL (ref 0.6–1.3)
EOSINOPHIL # BLD MANUAL: 0.15 THOUSAND/UL (ref 0–0.4)
EOSINOPHIL NFR BLD MANUAL: 1 % (ref 0–6)
ERYTHROCYTE [DISTWIDTH] IN BLOOD BY AUTOMATED COUNT: 14 % (ref 11.6–15.1)
FOLATE SERPL-MCNC: 16.9 NG/ML (ref 3.1–17.5)
GFR SERPL CREATININE-BSD FRML MDRD: 63 ML/MIN/1.73SQ M
GLUCOSE SERPL-MCNC: 145 MG/DL (ref 65–140)
GLUCOSE SERPL-MCNC: 150 MG/DL (ref 65–140)
GLUCOSE SERPL-MCNC: 230 MG/DL (ref 65–140)
GLUCOSE SERPL-MCNC: 258 MG/DL (ref 65–140)
HCT VFR BLD AUTO: 37.3 % (ref 34.8–46.1)
HGB BLD-MCNC: 11.9 G/DL (ref 11.5–15.4)
LYMPHOCYTES # BLD AUTO: 51 % (ref 14–44)
LYMPHOCYTES # BLD AUTO: 7.44 THOUSAND/UL (ref 0.6–4.47)
MACROCYTES BLD QL AUTO: PRESENT
MCH RBC QN AUTO: 32.7 PG (ref 26.8–34.3)
MCHC RBC AUTO-ENTMCNC: 31.9 G/DL (ref 31.4–37.4)
MCV RBC AUTO: 103 FL (ref 82–98)
MONOCYTES # BLD AUTO: 0.73 THOUSAND/UL (ref 0–1.22)
MONOCYTES NFR BLD: 5 % (ref 4–12)
NEUTROPHILS # BLD MANUAL: 6.27 THOUSAND/UL (ref 1.85–7.62)
NEUTS SEG NFR BLD AUTO: 43 % (ref 43–75)
NRBC BLD AUTO-RTO: 0 /100 WBCS
PLATELET # BLD AUTO: 177 THOUSANDS/UL (ref 149–390)
PLATELET BLD QL SMEAR: ADEQUATE
PMV BLD AUTO: 12 FL (ref 8.9–12.7)
POTASSIUM SERPL-SCNC: 3.6 MMOL/L (ref 3.5–5.3)
RBC # BLD AUTO: 3.64 MILLION/UL (ref 3.81–5.12)
SODIUM SERPL-SCNC: 142 MMOL/L (ref 136–145)
TOTAL CELLS COUNTED SPEC: 100
WBC # BLD AUTO: 14.58 THOUSAND/UL (ref 4.31–10.16)

## 2021-08-09 PROCEDURE — 80048 BASIC METABOLIC PNL TOTAL CA: CPT | Performed by: STUDENT IN AN ORGANIZED HEALTH CARE EDUCATION/TRAINING PROGRAM

## 2021-08-09 PROCEDURE — 99232 SBSQ HOSP IP/OBS MODERATE 35: CPT | Performed by: INTERNAL MEDICINE

## 2021-08-09 PROCEDURE — 82948 REAGENT STRIP/BLOOD GLUCOSE: CPT

## 2021-08-09 PROCEDURE — 85027 COMPLETE CBC AUTOMATED: CPT | Performed by: STUDENT IN AN ORGANIZED HEALTH CARE EDUCATION/TRAINING PROGRAM

## 2021-08-09 PROCEDURE — 85007 BL SMEAR W/DIFF WBC COUNT: CPT | Performed by: STUDENT IN AN ORGANIZED HEALTH CARE EDUCATION/TRAINING PROGRAM

## 2021-08-09 PROCEDURE — 82746 ASSAY OF FOLIC ACID SERUM: CPT | Performed by: STUDENT IN AN ORGANIZED HEALTH CARE EDUCATION/TRAINING PROGRAM

## 2021-08-09 RX ORDER — LABETALOL 20 MG/4 ML (5 MG/ML) INTRAVENOUS SYRINGE
10 EVERY 6 HOURS PRN
Status: DISCONTINUED | OUTPATIENT
Start: 2021-08-09 | End: 2021-08-13 | Stop reason: HOSPADM

## 2021-08-09 RX ORDER — POTASSIUM CHLORIDE 20 MEQ/1
20 TABLET, EXTENDED RELEASE ORAL ONCE
Status: COMPLETED | OUTPATIENT
Start: 2021-08-09 | End: 2021-08-09

## 2021-08-09 RX ORDER — THIAMINE HYDROCHLORIDE 100 MG/ML
100 INJECTION, SOLUTION INTRAMUSCULAR; INTRAVENOUS DAILY
Status: CANCELLED | OUTPATIENT
Start: 2021-08-09

## 2021-08-09 RX ORDER — ACETAMINOPHEN 325 MG/1
650 TABLET ORAL EVERY 6 HOURS PRN
Status: DISCONTINUED | OUTPATIENT
Start: 2021-08-09 | End: 2021-08-13 | Stop reason: HOSPADM

## 2021-08-09 RX ORDER — HYDRALAZINE HYDROCHLORIDE 20 MG/ML
5 INJECTION INTRAMUSCULAR; INTRAVENOUS EVERY 6 HOURS PRN
Status: DISCONTINUED | OUTPATIENT
Start: 2021-08-09 | End: 2021-08-13 | Stop reason: HOSPADM

## 2021-08-09 RX ORDER — LANOLIN ALCOHOL/MO/W.PET/CERES
100 CREAM (GRAM) TOPICAL DAILY
Status: CANCELLED | OUTPATIENT
Start: 2021-08-09

## 2021-08-09 RX ADMIN — HEPARIN SODIUM 5000 UNITS: 5000 INJECTION INTRAVENOUS; SUBCUTANEOUS at 13:12

## 2021-08-09 RX ADMIN — INSULIN DETEMIR 10 UNITS: 100 INJECTION, SOLUTION SUBCUTANEOUS at 22:16

## 2021-08-09 RX ADMIN — INSULIN LISPRO 2 UNITS: 100 INJECTION, SOLUTION INTRAVENOUS; SUBCUTANEOUS at 17:23

## 2021-08-09 RX ADMIN — INSULIN LISPRO 5 UNITS: 100 INJECTION, SOLUTION INTRAVENOUS; SUBCUTANEOUS at 07:52

## 2021-08-09 RX ADMIN — HEPARIN SODIUM 5000 UNITS: 5000 INJECTION INTRAVENOUS; SUBCUTANEOUS at 06:43

## 2021-08-09 RX ADMIN — CEPHALEXIN 500 MG: 500 CAPSULE ORAL at 06:43

## 2021-08-09 RX ADMIN — HEPARIN SODIUM 5000 UNITS: 5000 INJECTION INTRAVENOUS; SUBCUTANEOUS at 22:16

## 2021-08-09 RX ADMIN — Medication 12.5 MG: at 08:00

## 2021-08-09 RX ADMIN — Medication 12.5 MG: at 22:15

## 2021-08-09 RX ADMIN — INSULIN LISPRO 5 UNITS: 100 INJECTION, SOLUTION INTRAVENOUS; SUBCUTANEOUS at 11:31

## 2021-08-09 RX ADMIN — CEPHALEXIN 500 MG: 500 CAPSULE ORAL at 13:11

## 2021-08-09 RX ADMIN — CEPHALEXIN 500 MG: 500 CAPSULE ORAL at 22:15

## 2021-08-09 RX ADMIN — OLANZAPINE 2.5 MG: 2.5 TABLET, FILM COATED ORAL at 22:15

## 2021-08-09 RX ADMIN — CALCIUM 1 TABLET: 500 TABLET ORAL at 08:00

## 2021-08-09 RX ADMIN — CYANOCOBALAMIN TAB 500 MCG 1000 MCG: 500 TAB at 08:00

## 2021-08-09 RX ADMIN — ATORVASTATIN CALCIUM 20 MG: 20 TABLET, FILM COATED ORAL at 08:00

## 2021-08-09 RX ADMIN — Medication 1000 UNITS: at 08:00

## 2021-08-09 RX ADMIN — INSULIN LISPRO 3 UNITS: 100 INJECTION, SOLUTION INTRAVENOUS; SUBCUTANEOUS at 22:16

## 2021-08-09 RX ADMIN — POTASSIUM CHLORIDE 20 MEQ: 1500 TABLET, EXTENDED RELEASE ORAL at 08:00

## 2021-08-09 RX ADMIN — LOSARTAN POTASSIUM: 50 TABLET, FILM COATED ORAL at 08:00

## 2021-08-09 RX ADMIN — INSULIN LISPRO 2 UNITS: 100 INJECTION, SOLUTION INTRAVENOUS; SUBCUTANEOUS at 11:31

## 2021-08-09 RX ADMIN — ASPIRIN 81 MG: 81 TABLET, COATED ORAL at 08:00

## 2021-08-09 NOTE — QUICK NOTE
Patient's niece Primitivo Rust, and Sunita Huitron all called and updated on the status with the patient  Discussed if versus home rehab options  Sunita Huitron is patient's POA and would prefer acute rehab with home nursing help per patient's wishes  All questions and concerns answered to family's satisfaction

## 2021-08-09 NOTE — PROGRESS NOTES
INTERNAL MEDICINE RESIDENCY PROGRESS NOTE     Name: Randi Gongora   Age & Sex: 79 y o  female   MRN: [id number]  Unit/Bed#: [facility] 834-01   Encounter: [id number]  Team: SOD Team C     PATIENT INFORMATION     Name: Randi Gongora   Age & Sex: 79 y o  female   MRN: [id number]  Hospital Stay Days: 4    ASSESSMENT/PLAN     Active Problems:    Fall    Type 2 diabetes mellitus with stage 3a chronic kidney disease, with long-term current use of insulin ([facility])    CKD (chronic kidney disease) stage 3, GFR 30-59 ml/min ([facility])    HLD (hyperlipidemia)    OA (osteoarthritis)    Chronic systolic heart failure ([facility])    Altered mental status    Infected abrasion of leg    Ambulatory dysfunction    Suspected Mild cognitive impairment    Hypertension      Hypertension  Assessment & Plan  Blood pressure persistently elevated with most recent 153/67    · Switch to Hyzaar (losartan/hydrochlorothiazide 50/12  5)  · Continue metoprolol 12 5 mg q 12      Suspected Mild cognitive impairment  Assessment & Plan  Noted decreased memory and concentration during encounter  · geriatric consult appreciate recommendation  · Suspect patient will need higher level of care upon discharge    Ambulatory dysfunction  Assessment & Plan  Reported multiple falls at home in the last 2 weeks  One fall with positive head strike but no loss of consciousness  Not formally evaluated at that time  · OT PT consulted recommendation appreciated  · PT recommend post-acute Rehab Services, will discuss with case management as patient will likely require higher level of care upon discharge       Infected abrasion of leg  Assessment & Plan  Noted on admission on anterior aspect of right lower leg with surrounding erythema, edema, without calor  Erythema improving  Image in media tab  · Patient transitioned from Ancef day 3 to Keflex p o  500 mg t i d   Patient will complete a total of 7 days antibiotics through 8/12  · Monitor fever curve and WBC       Altered mental status  Assessment & Plan  Noted on arrival to ED  Resolved by time of encounter  Suspect this was related to hypoglycemia as it resolve after she ingested crackers and peanut butter  Noted be alert oriented x4, however suspect underlying mild cognitive impairment as she continues to discuss her  who per chart review is no longer alive  · at risk of in-hospital delirium will schedule Zyprexa 2 5 mg q h s  · CT head unremarkable  · monitor and orient daily    Chronic systolic heart failure (HCC)  Assessment & Plan  Wt Readings from Last 3 Encounters:   08/05/21 63 5 kg (140 lb)   11/08/20 74 2 kg (163 lb 9 3 oz)     · No evidence of exacerbation on admission, appears euvolemic  · Med rec shows Lasix 40 mg p r n , patient states she does not take this  Needs to be verified with pharmacy  · Not in an exercebation today      HLD (hyperlipidemia)  Assessment & Plan  Continue home atorvastatin 40 mg     CKD (chronic kidney disease) stage 3, GFR 30-59 ml/min [facility])  Assessment & Plan  Lab Results   Component Value Date    EGFR 56 08/07/2021    EGFR 50 08/06/2021    EGFR 48 08/05/2021    CREATININE 0 95 08/07/2021    CREATININE 1 05 08/06/2021    CREATININE 1 08 08/05/2021     Stable  Avoid nephrotoxin medications  Type 2 diabetes mellitus with stage 3a chronic kidney disease, with long-term current use of insulin [facility])  Assessment & Plan  Lab Results   Component Value Date    HGBA1C 8 7 (H) 05/08/2021       Recent Labs     08/08/21  0801 08/08/21  1123 08/08/21  1615 08/08/21 2019   POCGLU 143* 318* 172* 248*       Blood Sugar Average: Last 72 hrs:  (P) 750 [id number]     Blood glucose 40 on arrival  Hypoglycemia resolved with crackers and peanut butter  Repeat POC blood glucose 170   Home regimen with Levemir 20 units HS and NovoLog 10 units with breakfast and lunch and 5 units with dinner      · Will continue current dose of Levemir 10 units q h s   · Increase lispro to 10 units daily with dinner, conitnue 5 units with breakfast/lunch  · Continue to monitor blood sugar and adjust insulin as needed    [address]  Reported multiple falls at home in the last 2 weeks  One fall with positive head strike but no loss of consciousness  Not formally evaluated at that time  · CT head on  unremarkable  · OT / PT consulted recommendations appreciated    * Acute metabolic encephalopathy due to hypoglycemia-resolved as of 2021  Assessment & Plan  Noted on arrival to ED  Resolved by time of encounter  Suspect this was related to hypoglycemia as it resolve after she ingested crackers and peanut butter  Noted be alert oriented x4, however suspect underlying mild cognitive impairment  · at risk of in-hospital delirium, monitor and orient daily  · Zyprexa 2 5 mg q h s  Disposition:  Inpatient, pending rehab placement, glucose immunization, blood pressure management     SUBJECTIVE     Patient seen and examined  Patient had few episodes of asymptomatic hypertension overnight  Says that she feels tired but otherwise feels well  OBJECTIVE     Vitals:    21 1414 21 2147 21 2149 21 0756   BP: 152/66 (!) 196/83 (!) 196/83 (!) 189/85   BP Location:       Pulse: 65   63   Resp: 18   20   Temp: 98 1 °F (36 7 °C)  98 5 °F (36 9 °C) 98 5 °F (36 9 °C)   TempSrc:       SpO2: 94%   93%   Weight:       Height:          Temperature:   Temp (24hrs), Av 4 °F (36 9 °C), Min:98 1 °F (36 7 °C), Max:98 5 °F (36 9 °C)    Temperature: 98 5 °F (36 9 °C)  Intake & Output:  I/O        07 -  07 -  0700    P  O  480 900    Total Intake(mL/kg) 480 (7 6) 900 (14 2)    Urine (mL/kg/hr) 950 (0 6) 900 (0 6)    Total Output 950 900    Net -470 0          Unmeasured Urine Occurrence 1 x 1 x        Weights:   IBW (Ideal Body Weight): 59 3 kg    Body mass index is 22 6 kg/m²    Weight (last 2 days)     None        Physical Exam  HENT:      Head: Normocephalic and atraumatic  Mouth/Throat:      Mouth: Mucous membranes are moist    Eyes:      Conjunctiva/sclera: Conjunctivae normal    Cardiovascular:      Rate and Rhythm: Normal rate  Pulmonary:      Effort: Pulmonary effort is normal    Abdominal:      Palpations: Abdomen is soft  Musculoskeletal:      Right lower leg: No edema  Left lower leg: No edema  Skin:     Comments: Right lower extremity erythema, scarred over wound, nontender, non purulent   Neurological:      Mental Status: She is alert and oriented to person, place, and time  LABORATORY DATA     Labs: I have personally reviewed pertinent reports  Results from last 7 days   Lab Units 08/09/21  0626 08/08/21  0629 08/07/21  0716 08/05/21  1900   WBC Thousand/uL 14 58* 13 63* 16 10* 14 88*   HEMOGLOBIN g/dL 11 9 12 0 13 2 13 7   HEMATOCRIT % 37 3 36 4 41 1 41 1   PLATELETS Thousands/uL 177 193 211 232   MONO PCT % 5  --   --  5      Results from last 7 days   Lab Units 08/09/21  0626 08/07/21  0716 08/06/21  0455 08/05/21  1900   POTASSIUM mmol/L 3 6 3 9 3 8 3 3*   CHLORIDE mmol/L 110* 108 107 105   CO2 mmol/L 26 24 27 27   BUN mg/dL 16 13 26* 26*   CREATININE mg/dL 0 87 0 95 1 05 1 08   CALCIUM mg/dL 9 0 9 0 9 5 10 0   ALK PHOS U/L  --   --   --  123*   ALT U/L  --   --   --  42   AST U/L  --   --   --  33                      Results from last 7 days   Lab Units 08/06/21 0455 08/05/21  1924   TROPONIN I ng/mL <0 02 <0 02       IMAGING & DIAGNOSTIC TESTING     Radiology Results: I have personally reviewed pertinent reports  XR chest 1 view portable    Result Date: 8/6/2021  Impression: No acute cardiopulmonary disease  Workstation performed: [id number]     XR hip/pelv 2-3 vws right if performed    Result Date: 8/7/2021  Impression: No acute osseous abnormality  Degenerative changes as described  Workstation performed: [id number]     XR ankle 3+ views RIGHT    Result Date: 8/6/2021  Impression: No acute osseous abnormality   Mild soft tissue swelling distal lower extremity  Workstation performed: [id number]     CT head without contrast    Result Date: 8/5/2021  Impression: No acute intracranial abnormality  Microangiopathic changes  Workstation performed: [id number]     Other Diagnostic Testing: I have personally reviewed pertinent reports  ACTIVE MEDICATIONS     Current Facility-Administered Medications   Medication Dose Route Frequency    aspirin (ECOTRIN LOW STRENGTH) EC tablet 81 mg  81 mg Oral Daily    atorvastatin (LIPITOR) tablet 20 mg  20 mg Oral Daily    calcium carbonate (OYSTER SHELL,OSCAL) 500 mg tablet 1 tablet  1 tablet Oral Daily With Breakfast    cephalexin (KEFLEX) capsule 500 mg  500 mg Oral Q8H [address]    cholecalciferol (VITAMIN D3) tablet 1,000 Units  1,000 Units Oral Daily    cyanocobalamin (VITAMIN B-12) tablet 1,000 mcg  1,000 mcg Oral Daily    heparin (porcine) subcutaneous injection 5,000 Units  5,000 Units Subcutaneous Q8H [address]    hydrALAZINE (APRESOLINE) injection 5 mg  5 mg Intravenous Q6H PRN    insulin detemir (LEVEMIR) subcutaneous injection 10 Units  10 Units Subcutaneous HS    insulin lispro (HumaLOG) 100 units/mL subcutaneous injection 1-5 Units  1-5 Units Subcutaneous TID AC    insulin lispro (HumaLOG) 100 units/mL subcutaneous injection 1-6 Units  1-6 Units Subcutaneous HS    insulin lispro (HumaLOG) 100 units/mL subcutaneous injection 10 Units  10 Units Subcutaneous Daily With Dinner    insulin lispro (HumaLOG) 100 units/mL subcutaneous injection 5 Units  5 Units Subcutaneous BID before breakfast/lunch    Labetalol HCl (NORMODYNE) injection 10 mg  10 mg Intravenous Q6H PRN    losartan potassium-hydrochlorothiazide (HYZAAR 50/12  5) combination   Oral Daily    metoprolol tartrate (LOPRESSOR) partial tablet 12 5 mg  12 5 mg Oral Q12H TESSA    OLANZapine (ZyPREXA) tablet 2 5 mg  2 5 mg Oral HS       VTE Pharmacologic Prophylaxis: Heparin  VTE Mechanical Prophylaxis: sequential compression device    Portions of the record may have been created with voice recognition software  Occasional wrong word or "sound a like" substitutions may have occurred due to the inherent limitations of voice recognition software    Read the chart carefully and recognize, using context, where substitutions have occurred   ==  Colby Baker MD  [address]  Internal Medicine Residency PGY-1

## 2021-08-09 NOTE — PROGRESS NOTES
Progress Note - Geriatric Medicine   Samantha Wilfred 79 y o  female MRN: [id number]  Unit/Bed#: [facility] 834-01 Encounter: [id number]      Assessment/Plan:    Acute metabolic encephalopathy  -mentation continues to wax and wane, patient more confused overnight and very irritable overnight which is not baseline  -suspect multifactorial including acute right lower extremity cellulitis and hospitalization in frail elderly individual with likely baseline cognitive impairment  -goal glucose 140-180 during hospitalization to reduce risk hyper/hypoglycemia   -maintain normal circadian rhythm  -monitor for fecal and urinary retention  -given age,co-morbidities and side effect profile minimize use of/avoid use if possible of antipsychotics such as zyprexa  -monitor electrolytes (especially potassium given is borderline at 3 6 and is on insulin) monitor QTc   -continue to monitor for additional metabolic derangements and correct as arise     Right lower extremity cellulitis   -RLE lesion appears to be worsening from initial presentation with expanded year of erythema and swelling as well as slightly increased WBC count from yesterday at 14 58 from 13 63  -currently on Keflex, dependent on clinical response may require broadening of antibiotic regimen, consider ID consult if not improving     DM-II with long term use of insulin  -home regimen includes Levemir 20 mg daily without short-acting insulin per outpatient pharmacy  -given hyperglycemia in current IP setting continue titration, goal glu during hospitalization 140-180 to reduce risk hypoglycemia    Cognitive screening  -no prior cognitive testing on record for review however suspect at least mild cognitive impairment baseline  -may benefit from comprehensive geriatric assessment/cognitive testing following resolution of acute metabolic derangements/acute illness  -given home safety concerns raised by outpatient pharmacy strongly encourage consideration of Neuropsych evaluation prior to discharge if returning home and not care facility    Ambulatory dysfunction with fall  -requires use of walker for ambulation at baseline, encourage use of appropriate times  -continue fall precautions assist with transfers  -maintain environment free of fall hazards  -agree with PT and OT recommendation of short-term rehab on discharge    Impaired vision  -continue to encourage use of corrective lenses at all appropriate times  -consider large font for any printed materials provided to patient    Frailty in geriatric patient  -continue treatment acute infectious etiology and metabolic derangements  -continue optimization nutritional intake and chronic medical conditions   -underlying frailty may make patient more susceptible to even mild metabolic derangements and thus close monitoring remains appropriate    Care coordination:  Rounded with Landen Diaz (RN) at bedside     Subjective:   Patient seen examined bedside where she is lying resting she is sleeping entry to room but awakens to voice  Patient reports that she is tired this morning but denies additional complaints, she is pleasantly confused stating that we are home in her house, when reoriented to place she is unable to state why she is here  Nursing reports that she was confused and agitated overnight yelling out frequently irritating other confused patients  Review of Systems   Constitutional: Positive for appetite change (Not hungry) and fatigue  Negative for chills and fever  HENT: Negative  Eyes: Negative  Respiratory: Negative  Cardiovascular: Negative  Gastrointestinal: Negative  Endocrine: Negative  Genitourinary: Negative  Musculoskeletal: Negative  Skin: Negative  Allergic/Immunologic: Negative  Neurological: Negative  Hematological: Negative  Psychiatric/Behavioral: Negative  All other systems reviewed and are negative      Objective:     Vitals: Blood pressure (!) 196/83, pulse 65, temperature 98 5 °F (36 9 °C), resp  rate 18, height 5' 6" (1 676 m), weight 63 5 kg (140 lb), SpO2 94 %  ,Body mass index is 22 6 kg/m²  Intake/Output Summary (Last 24 hours) at 8/9/2021 0713  Last data filed at 8/8/2021 2352  Gross per 24 hour   Intake 900 ml   Output 900 ml   Net 0 ml     Current Medications: Reviewed    Physical Exam:   Physical Exam  Vitals and nursing note reviewed  Constitutional:       General: She is not in acute distress  Comments: Sleeping but awakens to voice   HENT:      Head: Normocephalic and atraumatic  Nose: Nose normal       Mouth/Throat:      Mouth: Mucous membranes are dry  Eyes:      General:         Right eye: No discharge  Left eye: No discharge  Conjunctiva/sclera: Conjunctivae normal       Comments: Glasses not in place at time of evaluation, were found wrapped in sheets next to patient, placed on bedside table    Neck:      Comments: Phonation normal  Cardiovascular:      Rate and Rhythm: Normal rate  Pulses: Normal pulses  Pulmonary:      Effort: No respiratory distress  Breath sounds: No wheezing  Abdominal:      Palpations: Abdomen is soft  Tenderness: There is no abdominal tenderness  Musculoskeletal:      Cervical back: Neck supple  Right lower leg: No edema  Left lower leg: No edema  Skin:     General: Skin is warm and dry  Comments: Lower extremity shin wound swelling and erythema worsened from initial evaluation, skin marker now noted on skin around site of lesion   Neurological:      Comments: Oriented to self   Psychiatric:      Comments: Pleasant and cooperative        Invasive Devices     Peripheral Intravenous Line            Peripheral IV 08/07/21 Right Forearm 2 days              Lab, Imaging and other studies: I have personally reviewed pertinent reports

## 2021-08-09 NOTE — CASE MANAGEMENT
Called left  for [facility] [phone] to discuss STR options  CM received TT from SOD C resident that Abimael Argueta only wants STR not LTC

## 2021-08-09 NOTE — CASE MANAGEMENT
Emailed STR list to patients nephew  He wants to review and discuss with family   Cm will follow up in am

## 2021-08-10 LAB
ANION GAP SERPL CALCULATED.3IONS-SCNC: 4 MMOL/L (ref 4–13)
BASOPHILS # BLD AUTO: 0.06 THOUSANDS/ΜL (ref 0–0.1)
BASOPHILS NFR BLD AUTO: 0 % (ref 0–1)
BUN SERPL-MCNC: 17 MG/DL (ref 5–25)
CALCIUM SERPL-MCNC: 9.2 MG/DL (ref 8.3–10.1)
CHLORIDE SERPL-SCNC: 106 MMOL/L (ref 100–108)
CO2 SERPL-SCNC: 27 MMOL/L (ref 21–32)
CREAT SERPL-MCNC: 0.9 MG/DL (ref 0.6–1.3)
EOSINOPHIL # BLD AUTO: 0.17 THOUSAND/ΜL (ref 0–0.61)
EOSINOPHIL NFR BLD AUTO: 1 % (ref 0–6)
ERYTHROCYTE [DISTWIDTH] IN BLOOD BY AUTOMATED COUNT: 13.9 % (ref 11.6–15.1)
GFR SERPL CREATININE-BSD FRML MDRD: 60 ML/MIN/1.73SQ M
GLUCOSE SERPL-MCNC: 155 MG/DL (ref 65–140)
GLUCOSE SERPL-MCNC: 163 MG/DL (ref 65–140)
GLUCOSE SERPL-MCNC: 233 MG/DL (ref 65–140)
GLUCOSE SERPL-MCNC: 309 MG/DL (ref 65–140)
GLUCOSE SERPL-MCNC: 311 MG/DL (ref 65–140)
GLUCOSE SERPL-MCNC: 333 MG/DL (ref 65–140)
HCT VFR BLD AUTO: 39.8 % (ref 34.8–46.1)
HGB BLD-MCNC: 12.9 G/DL (ref 11.5–15.4)
IMM GRANULOCYTES # BLD AUTO: 0.03 THOUSAND/UL (ref 0–0.2)
IMM GRANULOCYTES NFR BLD AUTO: 0 % (ref 0–2)
LYMPHOCYTES # BLD AUTO: 7.15 THOUSANDS/ΜL (ref 0.6–4.47)
LYMPHOCYTES NFR BLD AUTO: 51 % (ref 14–44)
MCH RBC QN AUTO: 33.2 PG (ref 26.8–34.3)
MCHC RBC AUTO-ENTMCNC: 32.4 G/DL (ref 31.4–37.4)
MCV RBC AUTO: 103 FL (ref 82–98)
MONOCYTES # BLD AUTO: 0.66 THOUSAND/ΜL (ref 0.17–1.22)
MONOCYTES NFR BLD AUTO: 5 % (ref 4–12)
NEUTROPHILS # BLD AUTO: 5.96 THOUSANDS/ΜL (ref 1.85–7.62)
NEUTS SEG NFR BLD AUTO: 43 % (ref 43–75)
NRBC BLD AUTO-RTO: 0 /100 WBCS
PLATELET # BLD AUTO: 211 THOUSANDS/UL (ref 149–390)
PMV BLD AUTO: 13 FL (ref 8.9–12.7)
POTASSIUM SERPL-SCNC: 3.8 MMOL/L (ref 3.5–5.3)
RBC # BLD AUTO: 3.88 MILLION/UL (ref 3.81–5.12)
SODIUM SERPL-SCNC: 137 MMOL/L (ref 136–145)
WBC # BLD AUTO: 14.03 THOUSAND/UL (ref 4.31–10.16)

## 2021-08-10 PROCEDURE — 84425 ASSAY OF VITAMIN B-1: CPT | Performed by: STUDENT IN AN ORGANIZED HEALTH CARE EDUCATION/TRAINING PROGRAM

## 2021-08-10 PROCEDURE — 99232 SBSQ HOSP IP/OBS MODERATE 35: CPT | Performed by: INTERNAL MEDICINE

## 2021-08-10 PROCEDURE — 85025 COMPLETE CBC W/AUTO DIFF WBC: CPT | Performed by: STUDENT IN AN ORGANIZED HEALTH CARE EDUCATION/TRAINING PROGRAM

## 2021-08-10 PROCEDURE — 80048 BASIC METABOLIC PNL TOTAL CA: CPT | Performed by: STUDENT IN AN ORGANIZED HEALTH CARE EDUCATION/TRAINING PROGRAM

## 2021-08-10 PROCEDURE — 82948 REAGENT STRIP/BLOOD GLUCOSE: CPT

## 2021-08-10 RX ADMIN — INSULIN LISPRO 4 UNITS: 100 INJECTION, SOLUTION INTRAVENOUS; SUBCUTANEOUS at 17:29

## 2021-08-10 RX ADMIN — CEPHALEXIN 500 MG: 500 CAPSULE ORAL at 21:17

## 2021-08-10 RX ADMIN — CALCIUM 1 TABLET: 500 TABLET ORAL at 09:10

## 2021-08-10 RX ADMIN — INSULIN LISPRO 3 UNITS: 100 INJECTION, SOLUTION INTRAVENOUS; SUBCUTANEOUS at 11:30

## 2021-08-10 RX ADMIN — INSULIN LISPRO 4 UNITS: 100 INJECTION, SOLUTION INTRAVENOUS; SUBCUTANEOUS at 21:18

## 2021-08-10 RX ADMIN — HEPARIN SODIUM 5000 UNITS: 5000 INJECTION INTRAVENOUS; SUBCUTANEOUS at 21:18

## 2021-08-10 RX ADMIN — INSULIN LISPRO 5 UNITS: 100 INJECTION, SOLUTION INTRAVENOUS; SUBCUTANEOUS at 11:30

## 2021-08-10 RX ADMIN — Medication 12.5 MG: at 09:10

## 2021-08-10 RX ADMIN — INSULIN LISPRO 5 UNITS: 100 INJECTION, SOLUTION INTRAVENOUS; SUBCUTANEOUS at 09:11

## 2021-08-10 RX ADMIN — INSULIN DETEMIR 10 UNITS: 100 INJECTION, SOLUTION SUBCUTANEOUS at 21:18

## 2021-08-10 RX ADMIN — ACETAMINOPHEN 650 MG: 325 TABLET, FILM COATED ORAL at 23:31

## 2021-08-10 RX ADMIN — Medication 12.5 MG: at 21:18

## 2021-08-10 RX ADMIN — HEPARIN SODIUM 5000 UNITS: 5000 INJECTION INTRAVENOUS; SUBCUTANEOUS at 14:30

## 2021-08-10 RX ADMIN — HEPARIN SODIUM 5000 UNITS: 5000 INJECTION INTRAVENOUS; SUBCUTANEOUS at 05:43

## 2021-08-10 RX ADMIN — LOSARTAN POTASSIUM: 50 TABLET, FILM COATED ORAL at 09:10

## 2021-08-10 RX ADMIN — Medication 1000 UNITS: at 09:10

## 2021-08-10 RX ADMIN — OLANZAPINE 2.5 MG: 2.5 TABLET, FILM COATED ORAL at 21:18

## 2021-08-10 RX ADMIN — CEPHALEXIN 500 MG: 500 CAPSULE ORAL at 05:43

## 2021-08-10 RX ADMIN — ATORVASTATIN CALCIUM 20 MG: 20 TABLET, FILM COATED ORAL at 09:10

## 2021-08-10 RX ADMIN — INSULIN LISPRO 1 UNITS: 100 INJECTION, SOLUTION INTRAVENOUS; SUBCUTANEOUS at 09:11

## 2021-08-10 RX ADMIN — CEPHALEXIN 500 MG: 500 CAPSULE ORAL at 14:30

## 2021-08-10 RX ADMIN — CYANOCOBALAMIN TAB 500 MCG 1000 MCG: 500 TAB at 09:09

## 2021-08-10 RX ADMIN — ASPIRIN 81 MG: 81 TABLET, COATED ORAL at 09:10

## 2021-08-10 NOTE — PROGRESS NOTES
Progress Note - Geriatric Medicine   Chayo Llamas 79 y o  female MRN: [id number]  [facility] Encounter: [id number]      Assessment/Plan:    Acute metabolic encephalopathy  -mentation markedly improved from initial evaluation, continues to have some hallucinations/delusions however they are not distressing to her- continue to monitor at this time, likely related to underlying infection and encephalopathy superimposed frail elderly individual with underlying cognitive impairment  -would consider treatment of hallucinations if or distressing to patient or commanding her to act, however given risk versus benefit of medication regimen used in treatment of such symptoms monitoring off pharmacologic therapy is reasonable at this time, currently on zyprexa HS initiated by primary team, does not appear to be affecting hallucions and given risk of side effects consider d/c if no improvement of symptoms noted with use  -continue tx underlying cause including cellulitis, hyperglycemia, and unfamiliar environment  -continue monitor and correct metabolic derangements as arise  -monitor for fecal and urinary retention, last BM recorded 8/9/21  -consider scheduling low-dose melatonin at bedtime    Right lower extremity cellulitis  -WBC count not downtrending as rapidly as would expect with antibiotic coverage, although remains afebrile may be obscured due to daily aspirin use, consider expanding antibiotic spectrum if patient condition worsens or fails to improve  -continue local wound care    DM-II with long-term use of insulin  -currently on detemir 10U (ten) daily at bedtime, reduced from 20U reported as outpatient  -blood sugars continue to fluctuate however are more consistently in target range 140-180   -concern patient does not have insight to manage her own regimen as o/p and anticipate need for higher level of care on d/c especially given patient profoundly hypoglycemic with blood sugar 40 on initial presentation  -continue routine diabetic screenings and cares and close outpatient follow-up with PCP    Cognitive screening  -although no formal cognitive testing on record for review history obtained from chart review and pharmacy consistent with at least mild cognitive impairment  -at time of evaluation patient does not have insight to underlying illness/deficits and likely to require 24/7 supervision and care on discharge however to be re-evaluated following full recovery from acute illness    Ambulatory dysfunction with fall  -requires use of Rollator walker for ambulation at baseline, continue to encourage use at all appropriate times  -remains high fall risk due to impulsivity, impaired vision, history of falls and unfamiliar environment  -continue good body mechanics, assist with transfers, fall precautions and maintain environment free of fall hazards  -PT OT following, appreciate recommendations-anticipate STR on DC    Impaired vision  -continue to encourage use of corrective lenses appropriate times  -maintain well it environment free of fall hazards/wires/cords    Frailty in geriatric patient  -continue to encourage well-balanced nutrition  -continue optimization chronic medical conditions    Care coordination: Rounded at bedside with Hardy Hernandez (RN)    Subjective:     Patient seen and examined bedside where she sitting resting comfortably having finished her breakfast   She tells me she does sleep well overnight due to ladies arguing outside and states that instead of arguing they should have just the ladys stuff out the window  Although she is oriented she continues to be forgetful and has ongoing hallucinations/delusions, tells me that her  (who is ) has been here visiting with her at times  Nursing reports no acute events overnight  Review of Systems   Constitutional: Positive for fatigue (not good sleep last night )  Negative for chills and fever  HENT: Negative  Eyes: Negative  Respiratory: Negative  Cardiovascular: Negative  Gastrointestinal: Negative  Endocrine: Negative  Genitourinary: Negative  Musculoskeletal: Positive for gait problem (uses rollator walker)  Feel cold  Right foot and ant shin pain, worse with palpation    Skin: Negative  Neurological: Positive for dizziness (at times with rapid body position changes)  Negative for numbness  Psychiatric/Behavioral: Positive for sleep disturbance (pt reports people arguing overnight  kept her up (pt confused, cannot r/o as hallucination))  All other systems reviewed and are negative  Objective:     Vitals: Blood pressure 137/67, pulse 74, temperature 98 3 °F (36 8 °C), temperature source Oral, resp  rate 16, height 5' 6" (1 676 m), weight 63 5 kg (140 lb), SpO2 94 %  ,Body mass index is 22 6 kg/m²  Intake/Output Summary (Last 24 hours) at 8/10/2021 0908  Last data filed at 8/10/2021 1793  Gross per 24 hour   Intake 360 ml   Output 1400 ml   Net -1040 ml     Current Medications: Reviewed    Physical Exam:   Physical Exam  Vitals and nursing note reviewed  Constitutional:       General: She is not in acute distress  Comments: Thin, frail, elderly female in chair aside bed resting comfort   HENT:      Head: Normocephalic  Nose: Nose normal       Mouth/Throat:      Mouth: Mucous membranes are moist       Comments: Dentition in tact  Eyes:      General: No scleral icterus  Right eye: No discharge  Left eye: No discharge  Conjunctiva/sclera: Conjunctivae normal       Comments: Pupils appear equal and round   Neck:      Comments: Trachea appears midline, phonation normal  Cardiovascular:      Rate and Rhythm: Normal rate  Pulses: Normal pulses  Pulmonary:      Effort: Pulmonary effort is normal  No respiratory distress  Breath sounds: No wheezing  Abdominal:      Palpations: Abdomen is soft  Tenderness: There is no abdominal tenderness     Musculoskeletal: Cervical back: Neck supple  Right lower leg: No edema  Left lower leg: No edema  Comments: Right anterior shin erythema/bruising with scab over area of injury, no active bleeding or drainage noted  Difficult to discern ecchymosis from erythema at borders of discoloration    Skin:     General: Skin is dry  Coloration: Skin is pale  Comments: Skin cool and dry (shin injury POA)   Neurological:      Mental Status: She is alert  Comments: A/O to person, place, year, month, reason for admission not to day  Describes hallucinations/delusions overnight and this morning which patient does not have insight or recognize as hallucinations   Psychiatric:      Comments: Pleasant and cooperative        Invasive Devices     Peripheral Intravenous Line            Peripheral IV 08/07/21 Right Forearm 3 days              Lab, Imaging and other studies: I have personally reviewed pertinent reports

## 2021-08-10 NOTE — PLAN OF CARE
Problem: Potential for Falls  Goal: Patient will remain free of falls  Description: INTERVENTIONS:  - Educate patient/family on patient safety including physical limitations  - Instruct patient to call for assistance with activity   - Consult OT/PT to assist with strengthening/mobility   - Keep Call bell within reach  - Keep bed low and locked with side rails adjusted as appropriate  - Keep care items and personal belongings within reach  - Initiate and maintain comfort rounds  - Make Fall Risk Sign visible to staff  - Offer Toileting every 2 Hours, in advance of need  - Initiate/Maintain bed and chair alarms  - Apply yellow socks and bracelet for high fall risk patients  - Consider moving patient to room near nurses station  Outcome: Progressing     Problem: MOBILITY - ADULT  Goal: Maintain or return to baseline ADL function  Description: INTERVENTIONS:  -  Assess patient's ability to carry out ADLs; assess patient's baseline for ADL function and identify physical deficits which impact ability to perform ADLs (bathing, care of mouth/teeth, toileting, grooming, dressing, etc )  - Assess/evaluate cause of self-care deficits   - Assess range of motion  - Assess patient's mobility; develop plan if impaired  - Assess patient's need for assistive devices and provide as appropriate  - Encourage maximum independence but intervene and supervise when necessary  - Involve family in performance of ADLs  - Assess for home care needs following discharge   - Consider OT consult to assist with ADL evaluation and planning for discharge  - Provide patient education as appropriate  Outcome: Progressing  Goal: Maintains/Returns to pre admission functional level  Description: INTERVENTIONS:  - Perform BMAT or MOVE assessment daily    - Set and communicate daily mobility goal to care team and patient/family/caregiver     - Collaborate with rehabilitation services on mobility goals if consulted  - Perform Range of Motion 4 times a day   - Reposition patient every 2 hours    - Dangle patient 3 times a day  - Stand patient 3 times a day  - Ambulate patient 3 times a day  - Out of bed to chair 3 times a day   - Out of bed for meals 3 times a day  - Out of bed for toileting  - Record patient progress and toleration of activity level   Outcome: Progressing     Problem: Prexisting or High Potential for Compromised Skin Integrity  Goal: Skin integrity is maintained or improved  Description: INTERVENTIONS:  - Identify patients at risk for skin breakdown  - Assess and monitor skin integrity  - Assess and monitor nutrition and hydration status  - Monitor labs   - Assess for incontinence   - Turn and reposition patient  - Assist with mobility/ambulation  - Relieve pressure over bony prominences  - Avoid friction and shearing  - Provide appropriate hygiene as needed including keeping skin clean and dry  - Evaluate need for skin moisturizer/barrier cream  - Collaborate with interdisciplinary team   - Patient/family teaching  - Consider wound care consult   Outcome: Progressing

## 2021-08-10 NOTE — PROGRESS NOTES
INTERNAL MEDICINE RESIDENCY PROGRESS NOTE     Name: Randi Gongora   Age & Sex: 79 y o  female   MRN: [id number]  Unit/Bed#: [facility] 834-01   Encounter: [id number]  Team: SOD Team C     PATIENT INFORMATION     Name: Randi Gongora   Age & Sex: 79 y o  female   MRN: [id number]  Hospital Stay Days: 5    ASSESSMENT/PLAN     Active Problems:    Infected abrasion of leg    Suspected Mild cognitive impairment    Hypertension    Fall    Type 2 diabetes mellitus with stage 3a chronic kidney disease, with long-term current use of insulin ([facility])    CKD (chronic kidney disease) stage 3, GFR 30-59 ml/min ([facility])    HLD (hyperlipidemia)    Chronic systolic heart failure ([facility])    Altered mental status    Ambulatory dysfunction      Infected abrasion of leg  Assessment & Plan  Noted on admission on anterior aspect of right lower leg with surrounding erythema, edema, without calor  Erythema improving  Image in media tab  · Patient transitioned from Ancef day 3 to Keflex p o  500 mg t i d  Patient will complete a total of 7 days antibiotics through   · Monitor fever curve and WBC       Suspected Mild cognitive impairment  Assessment & Plan  She lives at home alone  Per family, patient had acute change in mental status with increased confusion  Since hospital, patient is alert and oriented x3 but continues to having endorsing her    · Geriatric consult appreciate recommendation  · Per discussion with nephew who is patient's POA, family would prefer to keep the patient home with home nursing assistance after acute rehab  · Case management aware    Hypertension  Assessment & Plan  Blood pressure is elevated, overnight on  SBP greater than 196  Since then, BP stable his -130  · Continue Hyzaar (losartan/hydrochlorothiazide 50/12  5)  · Continue metoprolol 12 5 mg q 12      Ambulatory dysfunction  Assessment & Plan  Reported multiple falls at home in the last 2 weeks    One fall with positive head strike but no loss of consciousness  Not formally evaluated at that time  · OT PT consulted recommendation appreciated  · PT recommend post-acute Rehab Services, will discuss with case management as patient will likely require higher level of care upon discharge       Altered mental status  Assessment & Plan  Noted on arrival to ED  Resolved by time of encounter  Suspect this was related to hypoglycemia as it resolve after she ingested crackers and peanut butter  Noted be alert oriented x4, however suspect underlying mild cognitive impairment as she continues to discuss her  who per chart review is no longer alive  · at risk of in-hospital delirium will schedule Zyprexa 2 5 mg q h s  · CT head unremarkable  · monitor and orient daily    Chronic systolic heart failure (HCC)  Assessment & Plan  Wt Readings from Last 3 Encounters:   08/05/21 63 5 kg (140 lb)   11/08/20 74 2 kg (163 lb 9 3 oz)     · No evidence of exacerbation on admission, appears euvolemic  · Med rec shows Lasix 40 mg p r n , patient states she does not take this  Needs to be verified with pharmacy  · Not in an exercebation today      HLD (hyperlipidemia)  Assessment & Plan  Continue home atorvastatin 40 mg     CKD (chronic kidney disease) stage 3, GFR 30-59 ml/min [facility])  Assessment & Plan  Lab Results   Component Value Date    EGFR 63 08/09/2021    EGFR 56 08/07/2021    EGFR 50 08/06/2021    CREATININE 0 87 08/09/2021    CREATININE 0 95 08/07/2021    CREATININE 1 05 08/06/2021     Stable  Avoid nephrotoxin medications       Type 2 diabetes mellitus with stage 3a chronic kidney disease, with long-term current use of insulin [facility])  Assessment & Plan  Lab Results   Component Value Date    HGBA1C 8 7 (H) 05/08/2021       Recent Labs     08/09/21  1131 08/09/21  1608 08/09/21  2055 08/10/21  0713   POCGLU 230* 258* 233* 163*       Blood Sugar Average: Last 72 hrs:  (P) 207 [id number]     Blood glucose 40 on arrival  Hypoglycemia resolved with crackers and peanut butter  Repeat POC blood glucose 170  Home regimen with Levemir 20 units HS and NovoLog 10 units with breakfast and lunch and 5 units with dinner    · Will continue current dose of Levemir 10 units q h s   · Increase lispro to 10 units daily with dinner, conitnue 5 units with breakfast/lunch  · Continue to monitor blood sugar and adjust insulin as needed    Fall  Assessment & Plan  Family reports multiple falls at home in the last 2 weeks  One fall with positive head strike but no loss of consciousness  Not formally evaluated at that time  · CT head on  unremarkable  · Right hip x-ray  unremarkable  · OT / PT consulted recommendations appreciated  · Recommend acute rehab    * Acute metabolic encephalopathy due to hypoglycemia-resolved as of 2021  Assessment & Plan  Noted on arrival to ED  Resolved by time of encounter  Suspect this was related to hypoglycemia as it resolve after she ingested crackers and peanut butter  Noted be alert oriented x4, however suspect underlying mild cognitive impairment  · at risk of in-hospital delirium, monitor and orient daily  · Zyprexa 2 5 mg q h s  Disposition:  Inpatient for UTI treatment, blood pressure management , but glucose control acute rehab placement     SUBJECTIVE     Patient seen and examined  No acute events overnight  Overall patient seems to be improving, she is still alert oriented x4 but continues to tell stories that do not entirely make sense    From medical standpoint she is improving    OBJECTIVE     Vitals:    21 1030 21 1408 21 2058 08/10/21 0709   BP: 119/63 97/64 133/69 137/67   BP Location: Right arm      Pulse: 75 98  74   Resp:  18 16 16   Temp:  98 4 °F (36 9 °C) 98 4 °F (36 9 °C) 98 3 °F (36 8 °C)   TempSrc:  Oral  Oral   SpO2:  94%     Weight:       Height:          Temperature:   Temp (24hrs), Av 4 °F (36 9 °C), Min:98 3 °F (36 8 °C), Max:98 4 °F (36 9 °C)    Temperature: 98 3 °F (36 8 °C)  Intake & Output:  I/O       08/08 0701 - 08/09 0700 08/09 0701 - 08/10 0700    P  O  900 360    Total Intake(mL/kg) 900 (14 2) 360 (5 7)    Urine (mL/kg/hr) 900 (0 6) 1700 (1 1)    Total Output 900 1700    Net 0 -1340          Unmeasured Urine Occurrence 1 x         Weights:   IBW (Ideal Body Weight): 59 3 kg    Body mass index is 22 6 kg/m²  Weight (last 2 days)     None        Physical Exam  HENT:      Head: Normocephalic and atraumatic  Mouth/Throat:      Mouth: Mucous membranes are moist    Cardiovascular:      Rate and Rhythm: Normal rate  Heart sounds: Normal heart sounds  Pulmonary:      Effort: Pulmonary effort is normal    Abdominal:      Palpations: Abdomen is soft  Musculoskeletal:      Right lower leg: No edema  Left lower leg: No edema  Comments: Lower extremity erythema, 3 cm crusted over wound  Tender to palpation   Neurological:      Mental Status: She is alert and oriented to person, place, and time  Psychiatric:         Mood and Affect: Mood normal        LABORATORY DATA     Labs: I have personally reviewed pertinent reports    Results from last 7 days   Lab Units 08/10/21  0449 08/09/21  0626 08/08/21  0629 08/05/21  1900   WBC Thousand/uL 14 03* 14 58* 13 63* 14 88*   HEMOGLOBIN g/dL 12 9 11 9 12 0 13 7   HEMATOCRIT % 39 8 37 3 36 4 41 1   PLATELETS Thousands/uL 211 177 193 232   NEUTROS PCT % 43  --   --   --    MONOS PCT % 5  --   --   --    MONO PCT %  --  5  --  5      Results from last 7 days   Lab Units 08/10/21  0449 08/09/21  0626 08/07/21  0716 08/05/21  1900   POTASSIUM mmol/L 3 8 3 6 3 9 3 3*   CHLORIDE mmol/L 106 110* 108 105   CO2 mmol/L 27 26 24 27   BUN mg/dL 17 16 13 26*   CREATININE mg/dL 0 90 0 87 0 95 1 08   CALCIUM mg/dL 9 2 9 0 9 0 10 0   ALK PHOS U/L  --   --   --  123*   ALT U/L  --   --   --  42   AST U/L  --   --   --  33                      Results from last 7 days   Lab Units 08/06/21  0455 08/05/21  1924   TROPONIN I ng/mL <0 02 <0 02       IMAGING & DIAGNOSTIC TESTING     Radiology Results: I have personally reviewed pertinent reports  XR chest 1 view portable    Result Date: 8/6/2021  Impression: No acute cardiopulmonary disease  Workstation performed: [id number]     XR hip/pelv 2-3 vws right if performed    Result Date: 8/7/2021  Impression: No acute osseous abnormality  Degenerative changes as described  Workstation performed: [id number]     XR ankle 3+ views RIGHT    Result Date: 8/6/2021  Impression: No acute osseous abnormality  Mild soft tissue swelling distal lower extremity  Workstation performed: [id number]     CT head without contrast    Result Date: 8/5/2021  Impression: No acute intracranial abnormality  Microangiopathic changes  Workstation performed: [id number]     Other Diagnostic Testing: I have personally reviewed pertinent reports      ACTIVE MEDICATIONS     Current Facility-Administered Medications   Medication Dose Route Frequency    acetaminophen (TYLENOL) tablet 650 mg  650 mg Oral Q6H PRN    aspirin (ECOTRIN LOW STRENGTH) EC tablet 81 mg  81 mg Oral Daily    atorvastatin (LIPITOR) tablet 20 mg  20 mg Oral Daily    calcium carbonate (OYSTER SHELL,OSCAL) 500 mg tablet 1 tablet  1 tablet Oral Daily With Breakfast    cephalexin (KEFLEX) capsule 500 mg  500 mg Oral Q8H [facility]    cholecalciferol (VITAMIN D3) tablet 1,000 Units  1,000 Units Oral Daily    cyanocobalamin (VITAMIN B-12) tablet 1,000 mcg  1,000 mcg Oral Daily    heparin (porcine) subcutaneous injection 5,000 Units  5,000 Units Subcutaneous Q8H [facility]    hydrALAZINE (APRESOLINE) injection 5 mg  5 mg Intravenous Q6H PRN    insulin detemir (LEVEMIR) subcutaneous injection 10 Units  10 Units Subcutaneous HS    insulin lispro (HumaLOG) 100 units/mL subcutaneous injection 1-5 Units  1-5 Units Subcutaneous TID AC    insulin lispro (HumaLOG) 100 units/mL subcutaneous injection 1-6 Units  1-6 Units Subcutaneous HS    insulin lispro (HumaLOG) 100 units/mL subcutaneous injection 10 Units  10 Units Subcutaneous Daily With Dinner    insulin lispro (HumaLOG) 100 units/mL subcutaneous injection 5 Units  5 Units Subcutaneous BID before breakfast/lunch    Labetalol HCl (NORMODYNE) injection 10 mg  10 mg Intravenous Q6H PRN    losartan potassium-hydrochlorothiazide (HYZAAR 50/12  5) combination   Oral Daily    metoprolol tartrate (LOPRESSOR) partial tablet 12 5 mg  12 5 mg Oral Q12H [address]    OLANZapine (ZyPREXA) tablet 2 5 mg  2 5 mg Oral HS       VTE Pharmacologic Prophylaxis: Heparin  VTE Mechanical Prophylaxis: sequential compression device    Portions of the record may have been created with voice recognition software  Occasional wrong word or "sound a like" substitutions may have occurred due to the inherent limitations of voice recognition software    Read the chart carefully and recognize, using context, where substitutions have occurred   ==  Yared Prather MD  [facility]  Internal Medicine Residency PGY-1

## 2021-08-11 LAB
ANION GAP SERPL CALCULATED.3IONS-SCNC: 3 MMOL/L (ref 4–13)
BASOPHILS # BLD AUTO: 0.05 THOUSANDS/ΜL (ref 0–0.1)
BASOPHILS NFR BLD AUTO: 0 % (ref 0–1)
BUN SERPL-MCNC: 20 MG/DL (ref 5–25)
CALCIUM SERPL-MCNC: 9.2 MG/DL (ref 8.3–10.1)
CHLORIDE SERPL-SCNC: 108 MMOL/L (ref 100–108)
CO2 SERPL-SCNC: 27 MMOL/L (ref 21–32)
CREAT SERPL-MCNC: 0.92 MG/DL (ref 0.6–1.3)
EOSINOPHIL # BLD AUTO: 0.18 THOUSAND/ΜL (ref 0–0.61)
EOSINOPHIL NFR BLD AUTO: 1 % (ref 0–6)
ERYTHROCYTE [DISTWIDTH] IN BLOOD BY AUTOMATED COUNT: 14.1 % (ref 11.6–15.1)
GFR SERPL CREATININE-BSD FRML MDRD: 59 ML/MIN/1.73SQ M
GLUCOSE SERPL-MCNC: 160 MG/DL (ref 65–140)
GLUCOSE SERPL-MCNC: 171 MG/DL (ref 65–140)
GLUCOSE SERPL-MCNC: 230 MG/DL (ref 65–140)
GLUCOSE SERPL-MCNC: 236 MG/DL (ref 65–140)
GLUCOSE SERPL-MCNC: 282 MG/DL (ref 65–140)
HCT VFR BLD AUTO: 38 % (ref 34.8–46.1)
HGB BLD-MCNC: 12.4 G/DL (ref 11.5–15.4)
IMM GRANULOCYTES # BLD AUTO: 0.05 THOUSAND/UL (ref 0–0.2)
IMM GRANULOCYTES NFR BLD AUTO: 0 % (ref 0–2)
LYMPHOCYTES # BLD AUTO: 7.53 THOUSANDS/ΜL (ref 0.6–4.47)
LYMPHOCYTES NFR BLD AUTO: 53 % (ref 14–44)
MCH RBC QN AUTO: 33.2 PG (ref 26.8–34.3)
MCHC RBC AUTO-ENTMCNC: 32.6 G/DL (ref 31.4–37.4)
MCV RBC AUTO: 102 FL (ref 82–98)
MONOCYTES # BLD AUTO: 1.36 THOUSAND/ΜL (ref 0.17–1.22)
MONOCYTES NFR BLD AUTO: 10 % (ref 4–12)
NEUTROPHILS # BLD AUTO: 5.19 THOUSANDS/ΜL (ref 1.85–7.62)
NEUTS SEG NFR BLD AUTO: 36 % (ref 43–75)
NRBC BLD AUTO-RTO: 0 /100 WBCS
PLATELET # BLD AUTO: 193 THOUSANDS/UL (ref 149–390)
PMV BLD AUTO: 12.4 FL (ref 8.9–12.7)
POTASSIUM SERPL-SCNC: 3.7 MMOL/L (ref 3.5–5.3)
RBC # BLD AUTO: 3.73 MILLION/UL (ref 3.81–5.12)
SODIUM SERPL-SCNC: 138 MMOL/L (ref 136–145)
WBC # BLD AUTO: 14.36 THOUSAND/UL (ref 4.31–10.16)

## 2021-08-11 PROCEDURE — 97110 THERAPEUTIC EXERCISES: CPT

## 2021-08-11 PROCEDURE — 82948 REAGENT STRIP/BLOOD GLUCOSE: CPT

## 2021-08-11 PROCEDURE — 99232 SBSQ HOSP IP/OBS MODERATE 35: CPT | Performed by: INTERNAL MEDICINE

## 2021-08-11 PROCEDURE — 80048 BASIC METABOLIC PNL TOTAL CA: CPT | Performed by: STUDENT IN AN ORGANIZED HEALTH CARE EDUCATION/TRAINING PROGRAM

## 2021-08-11 PROCEDURE — 97116 GAIT TRAINING THERAPY: CPT

## 2021-08-11 PROCEDURE — 85027 COMPLETE CBC AUTOMATED: CPT | Performed by: STUDENT IN AN ORGANIZED HEALTH CARE EDUCATION/TRAINING PROGRAM

## 2021-08-11 RX ORDER — OLANZAPINE 2.5 MG/1
2.5 TABLET ORAL
Status: DISCONTINUED | OUTPATIENT
Start: 2021-08-11 | End: 2021-08-13 | Stop reason: HOSPADM

## 2021-08-11 RX ORDER — LANOLIN ALCOHOL/MO/W.PET/CERES
3 CREAM (GRAM) TOPICAL
Status: DISCONTINUED | OUTPATIENT
Start: 2021-08-11 | End: 2021-08-13 | Stop reason: HOSPADM

## 2021-08-11 RX ADMIN — HEPARIN SODIUM 5000 UNITS: 5000 INJECTION INTRAVENOUS; SUBCUTANEOUS at 13:41

## 2021-08-11 RX ADMIN — CALCIUM 1 TABLET: 500 TABLET ORAL at 08:58

## 2021-08-11 RX ADMIN — CEPHALEXIN 500 MG: 500 CAPSULE ORAL at 13:41

## 2021-08-11 RX ADMIN — INSULIN LISPRO 5 UNITS: 100 INJECTION, SOLUTION INTRAVENOUS; SUBCUTANEOUS at 11:49

## 2021-08-11 RX ADMIN — LOSARTAN POTASSIUM: 50 TABLET, FILM COATED ORAL at 08:58

## 2021-08-11 RX ADMIN — ASPIRIN 81 MG: 81 TABLET, COATED ORAL at 08:58

## 2021-08-11 RX ADMIN — ACETAMINOPHEN 650 MG: 325 TABLET, FILM COATED ORAL at 23:25

## 2021-08-11 RX ADMIN — INSULIN LISPRO 5 UNITS: 100 INJECTION, SOLUTION INTRAVENOUS; SUBCUTANEOUS at 08:56

## 2021-08-11 RX ADMIN — CYANOCOBALAMIN TAB 500 MCG 1000 MCG: 500 TAB at 08:58

## 2021-08-11 RX ADMIN — Medication 1000 UNITS: at 08:58

## 2021-08-11 RX ADMIN — INSULIN LISPRO 3 UNITS: 100 INJECTION, SOLUTION INTRAVENOUS; SUBCUTANEOUS at 21:45

## 2021-08-11 RX ADMIN — HEPARIN SODIUM 5000 UNITS: 5000 INJECTION INTRAVENOUS; SUBCUTANEOUS at 06:03

## 2021-08-11 RX ADMIN — INSULIN LISPRO 3 UNITS: 100 INJECTION, SOLUTION INTRAVENOUS; SUBCUTANEOUS at 11:48

## 2021-08-11 RX ADMIN — CEPHALEXIN 500 MG: 500 CAPSULE ORAL at 21:45

## 2021-08-11 RX ADMIN — INSULIN LISPRO 2 UNITS: 100 INJECTION, SOLUTION INTRAVENOUS; SUBCUTANEOUS at 17:11

## 2021-08-11 RX ADMIN — HEPARIN SODIUM 5000 UNITS: 5000 INJECTION INTRAVENOUS; SUBCUTANEOUS at 21:44

## 2021-08-11 RX ADMIN — Medication 3 MG: at 21:45

## 2021-08-11 RX ADMIN — INSULIN LISPRO 1 UNITS: 100 INJECTION, SOLUTION INTRAVENOUS; SUBCUTANEOUS at 08:56

## 2021-08-11 RX ADMIN — ATORVASTATIN CALCIUM 20 MG: 20 TABLET, FILM COATED ORAL at 08:58

## 2021-08-11 RX ADMIN — Medication 12.5 MG: at 08:58

## 2021-08-11 RX ADMIN — CEPHALEXIN 500 MG: 500 CAPSULE ORAL at 06:03

## 2021-08-11 RX ADMIN — Medication 12.5 MG: at 21:46

## 2021-08-11 RX ADMIN — INSULIN DETEMIR 10 UNITS: 100 INJECTION, SOLUTION SUBCUTANEOUS at 21:44

## 2021-08-11 NOTE — CASE MANAGEMENT
Per judson [facility] is an acceptable rehab  Brain plans on hiring home health aides once the patient returns home after STR

## 2021-08-11 NOTE — PLAN OF CARE
Problem: PHYSICAL THERAPY ADULT  Goal: Performs mobility at highest level of function for planned discharge setting  See evaluation for individualized goals  Description: Treatment/Interventions: Functional transfer training, LE strengthening/ROM, Therapeutic exercise, Endurance training, Patient/family training, Equipment eval/education, Bed mobility, Gait training, Spoke to nursing, Spoke to case management  Equipment Recommended: Vi Babb       See flowsheet documentation for full assessment, interventions and recommendations  Outcome: Progressing  Note: Prognosis: Fair  Problem List: Decreased strength, Decreased endurance, Impaired balance, Decreased mobility, Decreased safety awareness, Decreased cognition, Pain  Assessment: Pt with improved activity tolerance this session, required Mary throughout ambulation due to poor balance  Pt with limited insight to impairments, requires frequent cues to attend to task  Pt motivated to perform LE exercise and engaged throughout  Pt will benefit from continued skilled PT intervention during course of hospital stay to improve strength, endurance and balance to improve safety with functional mobility  Recommend IP rehab upon hospital D/C  PT Discharge Recommendation: Post acute rehabilitation services     PT - OK to Discharge: Yes (to IP rehab)    See flowsheet documentation for full assessment

## 2021-08-11 NOTE — PROGRESS NOTES
Progress Note - Geriatric Medicine   Chayo Llamas 79 y o  female MRN: [id number]  Unit/Bed#: [facility] 834-01 Encounter: [id number]      Assessment/Plan:    Acute metabolic encephalopathy  -mentation continues to wax and wane notably worse in evenings and overnight  -continues to have hallucinations and delusions however they are not distressing to patient and she is easily redirected/reassured-  -currently on low-dose Zyprexa as initiated earlier in admission by medical team, continue to evaluate risk as well as relief of symptom burden, consider discontinuation if patient has paradoxic reaction and worsening irritability/confusion or if not helpful for symptoms  -given underlying cognitive impairment reserve antipsychotic medications for only when absolutely necessary-lowest dose for shortness duration of time with ongoing re-evaluations for possible deep prescribing/discontinuation  -continue to encourage calm quiet environment reduce risk overstimulation  -maintain normal circadian rhythm, start Melatonin nightly at 8pm to allow time to onset before attempting to initiate sleep  -reorient and redirect unwanted behaviors as first line    RLE cellulitis  -patient continues to appear clinically well with decreasing area of erythema and swelling of right shin around affected area  -continue abx as managed by primary medical team  -given lesion appears to continue to improve and leukocytosis stable/worsening, cellulitis/wound may not be major contributing factor to leukocytosis and recommend investigation for alternative source, CT chest abdomen pelvis November 2020 revealed multiple enlarged lymph nodes concerning for lymphoproliferative disease and close follow-up was recommended however on extensive chart review does not appear to have been completed, recommend repeating CT chest abdomen pelvis for further evaluation of these nodes    Ambulatory dysfunction with fall  -requires use of Rollator walker for ambulation to put in the park to donate to the workers there  She points to the closet in the corner of her room and tells me that the gentleman has been sleeping in there every night to keep an eye on her but that she is not bothered by anymore  Nursing reports confusion consistently worse overnight but patient has been pleasant and cooperative and is easily redirected, no agitation or outbursts  Review of Systems   Constitutional: Negative for appetite change, chills and fever  HENT: Negative  Eyes: Positive for visual disturbance (Wears glasses)  Respiratory: Negative for cough and shortness of breath  Cardiovascular: Negative for chest pain and palpitations  Gastrointestinal: Negative for abdominal pain, constipation, nausea and vomiting  Endocrine: Negative  Genitourinary: Negative for difficulty urinating  Musculoskeletal: Positive for gait problem ( Uses Rollator walker)  Skin:        Right shin wound   Allergic/Immunologic: Negative  Neurological: Negative for weakness  Hematological: Negative  Psychiatric/Behavioral: Positive for sleep disturbance ( patient states nobody gets any sleep in the hospital)  All other systems reviewed and are negative  Objective:     Vitals: Blood pressure 113/68, pulse 78, temperature 97 7 °F (36 5 °C), resp  rate 18, height 5' 6" (1 676 m), weight 63 5 kg (140 lb), SpO2 94 %  ,Body mass index is 22 6 kg/m²  Intake/Output Summary (Last 24 hours) at 8/11/2021 0956  Last data filed at 8/11/2021 0900  Gross per 24 hour   Intake 400 ml   Output 1100 ml   Net -700 ml     Current Medications: Reviewed    Physical Exam:   Physical Exam  Vitals and nursing note reviewed  Constitutional:       Comments: Thin, frail, elderly female sitting in chair aside bed resting comfortably   HENT:      Head: Normocephalic and atraumatic  Nose: Nose normal       Mouth/Throat:      Mouth: Mucous membranes are dry        Comments: Dentition is poor, with visible tooth decay  Eyes:      General:         Right eye: No discharge  Left eye: No discharge  Conjunctiva/sclera: Conjunctivae normal       Comments: Corrective lenses in place   Neck:      Comments: Trachea appears midline, phonation normal  Cardiovascular:      Rate and Rhythm: Normal rate  Pulses: Normal pulses  Pulmonary:      Effort: Pulmonary effort is normal  No respiratory distress  Breath sounds: No wheezing  Abdominal:      Palpations: Abdomen is soft  Tenderness: There is no abdominal tenderness  Musculoskeletal:      Cervical back: Neck supple  Right lower leg: No edema  Left lower leg: No edema  Comments: Right shin hematoma/wound/lesion, no active drainage, surrounding erythema improving   Skin:     General: Skin is warm and dry  Neurological:      Mental Status: She is alert  Comments: Awake and alert, oriented to self   Psychiatric:      Comments: Pleasantly confused, cooperative, hallucinations ongoing        Invasive Devices     Peripheral Intravenous Line            Peripheral IV 08/11/21 Left;Ventral (anterior) Forearm <1 day              Lab, Imaging and other studies: I have personally reviewed pertinent reports

## 2021-08-11 NOTE — PROGRESS NOTES
INTERNAL MEDICINE RESIDENCY PROGRESS NOTE     Name: Sea Johsnton   Age & Sex: 79 y o  female   MRN: [id number]  Unit/Bed#: [facility] 834-01   Encounter: [id number]  Team: SOD Team C     PATIENT INFORMATION     Name: Sea Johnston   Age & Sex: 79 y o  female   MRN: [id number]  Hospital Stay Days: 6    ASSESSMENT/PLAN     Active Problems:    Infected abrasion of leg    Suspected Mild cognitive impairment    Hypertension    Altered mental status    Fall    Type 2 diabetes mellitus with stage 3a chronic kidney disease, with long-term current use of insulin ([facility])    CKD (chronic kidney disease) stage 3, GFR 30-59 ml/min ([facility])    HLD (hyperlipidemia)    Chronic systolic heart failure ([facility])    Ambulatory dysfunction      Infected abrasion of leg  Assessment & Plan  Noted on admission on anterior aspect of right lower leg with surrounding erythema, edema, without calor  Erythema improving  Image in media tab  WBC elevated at 14, but stable since admission  · Patient transitioned from Ancef day 3 to Keflex p o  500 mg t i d  Patient will complete a total of 7 days antibiotics through   · Monitor fever curve and WBC       Suspected Mild cognitive impairment  Assessment & Plan  She lives at home alone  Per family, patient had acute change in mental status with increased confusion  Since hospital, patient is alert and oriented x3 but continues to having endorsing her    · Geriatric consult appreciate recommendation  · Per discussion with nephew who is patient's POA, family would prefer to keep the patient home with home nursing assistance after acute rehab, patient is pending placement per Case Management    Hypertension  Assessment & Plan  Blood pressure is elevated, overnight on  SBP greater than 196  Since then, BP stable his -130  · Continue Hyzaar (losartan/hydrochlorothiazide 50/12  5)  · Continue metoprolol 12 5 mg q 12      Altered mental status  Assessment & Plan  Noted on arrival to ED  Resolved by time of encounter  Suspect this was related to hypoglycemia as it resolve after she ingested crackers and peanut butter  Noted be alert oriented x4, however suspect underlying mild cognitive impairment as she continues to discuss her  who per discussions with family is no longer life  CT head unremarkable  · At risk of in-hospital delirium will schedule Zyprexa 2 5 mg q h s  · Geriatric consult, appreciate recommendation  · monitor and orient daily    Ambulatory dysfunction  Assessment & Plan  Reported multiple falls at home in the last 2 weeks  One fall with positive head strike but no loss of consciousness  Not formally evaluated at that time  · OT PT consulted recommendation appreciated  · PT recommend post-acute Rehab Services, will discuss with case management as patient will likely require higher level of care upon discharge       Chronic systolic heart failure ([address] )  Assessment & Plan  Wt Readings from Last 3 Encounters:   08/05/21 63 5 kg (140 lb)   11/08/20 74 2 kg (163 lb 9 3 oz)     · No evidence of exacerbation on admission, appears euvolemic  · Med rec shows Lasix 40 mg p r n , patient states she does not take this  Needs to be verified with pharmacy  · Not in an exercebation today      HLD (hyperlipidemia)  Assessment & Plan  Continue home atorvastatin 40 mg     CKD (chronic kidney disease) stage 3, GFR 30-59 ml/min [facility])  Assessment & Plan  Lab Results   Component Value Date    EGFR 63 08/09/2021    EGFR 56 08/07/2021    EGFR 50 08/06/2021    CREATININE 0 87 08/09/2021    CREATININE 0 95 08/07/2021    CREATININE 1 05 08/06/2021     Stable  Avoid nephrotoxin medications       Type 2 diabetes mellitus with stage 3a chronic kidney disease, with long-term current use of insulin [facility])  Assessment & Plan  Lab Results   Component Value Date    HGBA1C 8 7 (H) 05/08/2021       Recent Labs     08/10/21  0713 08/10/21  1111 08/10/21  1608 08/10/21  2048   POCGLU 163* 311* 333* 309*       Blood Sugar Average: Last 72 hrs:  (P) 186 [id number]     Blood glucose 40 on arrival  Hypoglycemia resolved with crackers and peanut butter  Repeat POC blood glucose 170  Home regimen with Levemir 20 units HS and NovoLog 10 units with breakfast and lunch and 5 units with dinner  Will allow for permissive hyperglycemia as patient's HbA1c is very close to goal for her age  · Will continue current dose of Levemir 10 units q h s   · Increase lispro to 10 units daily with dinner, conitnue 5 units with breakfast/lunch  · Continue to monitor blood sugar and adjust insulin as needed    [address]  Family reports multiple falls at home in the last 2 weeks  One fall with positive head strike but no loss of consciousness  Not formally evaluated at that time  Patient reports bruising her right lower extremity at that time, likely source for infection  · CT head on 08/05 unremarkable  · Right hip x-ray 8/5 unremarkable  · OT / PT consulted recommendations appreciated  · Recommend acute rehab    * Acute metabolic encephalopathy due to hypoglycemia-resolved as of 8/8/2021  Assessment & Plan  Noted on arrival to ED  Resolved by time of encounter  Suspect this was related to hypoglycemia as it resolve after she ingested crackers and peanut butter  Noted be alert oriented x4, however suspect underlying mild cognitive impairment  · at risk of in-hospital delirium, monitor and orient daily  · Zyprexa 2 5 mg q h s  Disposition:  Inpatient pending placement to acute rehab     SUBJECTIVE     Patient seen and examined  No acute events overnight  Patient says that she feels achy today, but otherwise has no questions or concerns  She is still alert oriented x3, but continues to talk about her  as if he is still alive  Otherwise feels well  Per Case Management she is awaiting placement      OBJECTIVE     Vitals:    08/10/21 2323 08/11/21 0750 08/11/21 0857 08/11/21 0858   BP: 152/70 110/73 113/68    BP Location: Right arm      Pulse:    78   Resp:  18     Temp:  97 7 °F (36 5 °C)     TempSrc:       SpO2:       Weight:       Height:          Temperature:   Temp (24hrs), Av 2 °F (36 8 °C), Min:97 7 °F (36 5 °C), Max:98 8 °F (37 1 °C)    Temperature: 97 7 °F (36 5 °C)  Intake & Output:  I/O       701 - 08/10 0700 08/10 07 -  0700    P  O  360 640    Total Intake(mL/kg) 360 (5 7) 640 (10 1)    Urine (mL/kg/hr) 1700 (1 1) 550 (0 4)    Stool  0    Total Output 1700 550    Net -1340 +90          Unmeasured Urine Occurrence  1 x    Unmeasured Stool Occurrence  1 x        Weights:   IBW (Ideal Body Weight): 59 3 kg    Body mass index is 22 6 kg/m²  Weight (last 2 days)     None        Physical Exam  HENT:      Head: Normocephalic  Mouth/Throat:      Mouth: Mucous membranes are dry  Eyes:      Conjunctiva/sclera: Conjunctivae normal    Cardiovascular:      Rate and Rhythm: Normal rate and regular rhythm  Pulmonary:      Effort: Pulmonary effort is normal       Breath sounds: Normal breath sounds  Abdominal:      Palpations: Abdomen is soft  Musculoskeletal:      Right lower leg: No edema  Left lower leg: No edema  Skin:     Comments: Right lower extremity erythema, bruising, crusted over scar   Neurological:      Mental Status: She is alert and oriented to person, place, and time  Psychiatric:         Mood and Affect: Mood normal        LABORATORY DATA     Labs: I have personally reviewed pertinent reports    Results from last 7 days   Lab Units 08/11/21  0559 08/10/21  0449 08/09/21  0626   WBC Thousand/uL 14 36* 14 03* 14 58*   HEMOGLOBIN g/dL 12 4 12 9 11 9   HEMATOCRIT % 38 0 39 8 37 3   PLATELETS Thousands/uL 193 211 177   NEUTROS PCT % 36* 43  --    MONOS PCT % 10 5  --    MONO PCT %  --   --  5      Results from last 7 days   Lab Units 21  0559 08/10/21  0449 21  0626 21  1900   POTASSIUM mmol/L 3 7 3 8 3 6 3 3*   CHLORIDE mmol/L 108 106 110* 105   CO2 mmol/L 27 27 26 27   BUN mg/dL 20 17 16 26*   CREATININE mg/dL 0 92 0 90 0 87 1 08   CALCIUM mg/dL 9 2 9 2 9 0 10 0   ALK PHOS U/L  --   --   --  123*   ALT U/L  --   --   --  42   AST U/L  --   --   --  33                      Results from last 7 days   Lab Units 08/06/21  0455 08/05/21  1924   TROPONIN I ng/mL <0 02 <0 02       IMAGING & DIAGNOSTIC TESTING     Radiology Results: I have personally reviewed pertinent reports  XR chest 1 view portable    Result Date: 8/6/2021  Impression: No acute cardiopulmonary disease  Workstation performed: [id number]     XR hip/pelv 2-3 vws right if performed    Result Date: 8/7/2021  Impression: No acute osseous abnormality  Degenerative changes as described  Workstation performed: [id number]     XR ankle 3+ views RIGHT    Result Date: 8/6/2021  Impression: No acute osseous abnormality  Mild soft tissue swelling distal lower extremity  Workstation performed: [id number]     CT head without contrast    Result Date: 8/5/2021  Impression: No acute intracranial abnormality  Microangiopathic changes  Workstation performed: [id number]     Other Diagnostic Testing: I have personally reviewed pertinent reports      ACTIVE MEDICATIONS     Current Facility-Administered Medications   Medication Dose Route Frequency    acetaminophen (TYLENOL) tablet 650 mg  650 mg Oral Q6H PRN    aspirin (ECOTRIN LOW STRENGTH) EC tablet 81 mg  81 mg Oral Daily    atorvastatin (LIPITOR) tablet 20 mg  20 mg Oral Daily    calcium carbonate (OYSTER SHELL,OSCAL) 500 mg tablet 1 tablet  1 tablet Oral Daily With Breakfast    cephalexin (KEFLEX) capsule 500 mg  500 mg Oral Q8H [facility]    cholecalciferol (VITAMIN D3) tablet 1,000 Units  1,000 Units Oral Daily    cyanocobalamin (VITAMIN B-12) tablet 1,000 mcg  1,000 mcg Oral Daily    heparin (porcine) subcutaneous injection 5,000 Units  5,000 Units Subcutaneous Q8H [facility]    hydrALAZINE (APRESOLINE) injection 5 mg  5 mg Intravenous Q6H PRN  insulin detemir (LEVEMIR) subcutaneous injection 10 Units  10 Units Subcutaneous HS    insulin lispro (HumaLOG) 100 units/mL subcutaneous injection 1-5 Units  1-5 Units Subcutaneous TID AC    insulin lispro (HumaLOG) 100 units/mL subcutaneous injection 1-6 Units  1-6 Units Subcutaneous HS    insulin lispro (HumaLOG) 100 units/mL subcutaneous injection 10 Units  10 Units Subcutaneous Daily With Dinner    insulin lispro (HumaLOG) 100 units/mL subcutaneous injection 5 Units  5 Units Subcutaneous BID before breakfast/lunch    Labetalol HCl (NORMODYNE) injection 10 mg  10 mg Intravenous Q6H PRN    losartan potassium-hydrochlorothiazide (HYZAAR 50/12  5) combination   Oral Daily    metoprolol tartrate (LOPRESSOR) partial tablet 12 5 mg  12 5 mg Oral Q12H [facility]    OLANZapine (ZyPREXA) tablet 2 5 mg  2 5 mg Oral HS PRN       VTE Pharmacologic Prophylaxis: Enoxaparin (Lovenox)  VTE Mechanical Prophylaxis: sequential compression device    Portions of the record may have been created with voice recognition software  Occasional wrong word or "sound a like" substitutions may have occurred due to the inherent limitations of voice recognition software    Read the chart carefully and recognize, using context, where substitutions have occurred   ==  Faye Simon MD  [facility]  Internal Medicine Residency PGY-1

## 2021-08-11 NOTE — CASE MANAGEMENT
Multiple conversations with [facility] today [phone]  [address] [place] [address] unable to accept  [facility] unable to accept patients with wounds  [facility] No beds  [facility] family has nothing good to say about it    [place] "NO"  [facility] and [facility] Too far away    [facility] awaiting if they can accept    [facility] unable to accept due to closed to admissions

## 2021-08-11 NOTE — PHYSICAL THERAPY NOTE
Physical Therapy Treatment Note    Patient's Name: Candance Brownie  : 1940       08/11/21 1108   PT Last Visit   PT Visit Date 21   Note Type   Note Type Treatment   Pain Assessment   Pain Assessment Tool 0-10   Pain Score 8   Pain Location/Orientation Orientation: Right;Location: Leg  (with ambulation only)   Hospital Pain Intervention(s) Repositioned; Ambulation/increased activity; Elevated   Restrictions/Precautions   Weight Bearing Precautions Per Order No   Other Precautions Cognitive; Chair Alarm; Fall Risk;Pain   General   Chart Reviewed Yes   Family/Caregiver Present No   Cognition   Overall Cognitive Status Impaired   Attention Attends with cues to redirect   Orientation Level Oriented to person;Disoriented to place; Disoriented to time;Disoriented to situation   Following Commands Follows one step commands with increased time or repetition   Comments Pt pleasant and cooperative, easily distracted requires simple cues to attend to tasks   Bed Mobility   Additional Comments Pt sitting OOB in chair upon PT arrival   Transfers   Sit to Stand 4  Minimal assistance   Additional items Assist x 1; Increased time required;Verbal cues   Stand to Sit 4  Minimal assistance   Additional items Assist x 1; Increased time required;Verbal cues   Additional Comments with RW, VC's for hand placement, positioning prior to sitting   Ambulation/Elevation   Gait pattern Improper Weight shift;Decreased R stance;Decreased foot clearance; Forward Flexion; Excessively slow   Gait Assistance 4  Minimal assist   Additional items Assist x 1   Assistive Device Rolling walker   Distance 50 ft x2 trials, seated rest break between trials    VC's for proximity to RW, forward eye gaze, safety with rotational turns   Balance   Static Sitting Good   Dynamic Sitting Fair   Static Standing Fair -   Dynamic Standing Poor +   Ambulatory Poor +   Activity Tolerance   Activity Tolerance Patient limited by fatigue;Patient limited by pain   Nurse Made Aware RN updated  Chair alarm engaged at end of session   Exercises   Hip Abduction Sitting;10 reps;AROM; Bilateral   Knee AROM Long Arc Quad Sitting;10 reps;AROM; Bilateral  (x2 sets)   Ankle Pumps Sitting;15 reps;AROM; Bilateral   Marching Sitting;10 reps;AROM; Bilateral   Assessment   Prognosis Fair   Problem List Decreased strength;Decreased endurance; Impaired balance;Decreased mobility; Decreased safety awareness;Decreased cognition;Pain   Assessment Pt with improved activity tolerance this session, required Mary throughout ambulation due to poor balance  Pt with limited insight to impairments, requires frequent cues to attend to task  Pt motivated to perform LE exercise and engaged throughout  Pt will benefit from continued skilled PT intervention during course of hospital stay to improve strength, endurance and balance to improve safety with functional mobility  Recommend IP rehab upon hospital D/C  Goals   Patient Goals to see my    STG Expiration Date 08/17/21   PT Treatment Day 1   Plan   Treatment/Interventions Functional transfer training;LE strengthening/ROM; Therapeutic exercise; Endurance training;Cognitive reorientation;Patient/family training;Equipment eval/education; Bed mobility;Gait training;Elevations   Progress Progressing toward goals   PT Frequency Other (Comment)  (3-5x/week)   Recommendation   PT Discharge Recommendation Post acute rehabilitation services   PT - OK to Discharge Yes  (to IP rehab)   [address] 8 in Bed Without Bedrails 3   Lying on Back to Sitting on Edge of Flat Bed 3   Moving Bed to Chair 3   Standing Up From Chair 3   Walk in Room 3   Climb 3-5 Stairs 2   Basic Mobility Inpatient Raw Score 17   Basic Mobility Standardized Score 39 67       Marisela Jensen, PT, DPT

## 2021-08-11 NOTE — DISCHARGE INSTRUCTIONS
Recommend outpatient follow-up with PCP for the following findings in the context of persistently elevated WBC with improving infection  CT chest abdomen pelvis performed in November 2020: Increased number of small and borderline sized mediastinal and retroperitoneal lymph nodes, nonspecific  Possibility of lymphoproliferative disorder cannot be excluded    Short interval follow-up CT with contrast would be helpful to assess stability

## 2021-08-11 NOTE — DISCHARGE SUMMARY
INTERNAL MEDICINE RESIDENCY DISCHARGE SUMMARY     Abraham Mane   79 y o  female  MRN: [id number]  Room/Bed: [place] [address]/[facility] [address] [place] [address]   Encounter: [id number]    Active Problems:    Suspected Mild cognitive impairment    Hypertension    Fall    Type 2 diabetes mellitus with stage 3a chronic kidney disease, with long-term current use of insulin (HCC)    CKD (chronic kidney disease) stage 3, GFR 30-59 ml/min ([facility])    HLD (hyperlipidemia)    Chronic systolic heart failure ([facility])    Ambulatory dysfunction      Infected abrasion of leg-resolved as of 2021  Assessment & Plan  Noted on admission on anterior aspect of right lower leg with surrounding erythema, edema, without calor  Erythema improving  Image in media tab  WBC elevated at 14, but stable since admission  · Patient transitioned from Ancef day 3 to Keflex p o  500 mg t i d  Patient will complete a total of 7 days antibiotics today    · Monitor fever curve and WBC       Suspected Mild cognitive impairment  Assessment & Plan  She lives at home alone  Per family, patient had acute change in mental status with increased confusion  Since hospital, patient is alert and oriented x3 but continues to having endorsing her    · Geriatric consult appreciate recommendation  · Per discussion with nephew who is patient's POA, family would prefer to keep the patient home with home nursing assistance after acute rehab, patient is accepted at [facility] up 11am     Hypertension  Assessment & Plan  Blood pressure is elevated, overnight on  SBP greater than 196  Since then, BP stable his -130  · Continue Hyzaar (losartan/hydrochlorothiazide 50/12  5)  · Continue metoprolol 12 5 mg q 12      Altered mental status-resolved as of 2021  Assessment & Plan  Noted on arrival to ED  Resolved by time of encounter   Suspect this was related to hypoglycemia as it resolve after she ingested crackers and peanut butter  Noted be alert oriented x4, however suspect underlying mild cognitive impairment as she continues to discuss her  who per discussions with family is no longer life  CT head unremarkable  · At risk of in-hospital delirium will schedule Zyprexa 2 5 mg q h s  · Geriatric consult, appreciate recommendation  · monitor and orient daily    Ambulatory dysfunction  Assessment & Plan  Reported multiple falls at home in the last 2 weeks  One fall with positive head strike but no loss of consciousness  Not formally evaluated at that time  · OT PT consulted recommendation appreciated  · PT recommend post-acute Rehab Services, will discuss with case management as patient will likely require higher level of care upon discharge       Chronic systolic heart failure ([facility] [address] )  Assessment & Plan  Wt Readings from Last 3 Encounters:   08/05/21 63 5 kg (140 lb)   11/08/20 74 2 kg (163 lb 9 3 oz)     · No evidence of exacerbation on admission, appears euvolemic  · Med rec shows Lasix 40 mg p r n , patient states she does not take this  Needs to be verified with pharmacy  · Not in an exercebation today      HLD (hyperlipidemia)  Assessment & Plan  Continue home atorvastatin 40 mg     CKD (chronic kidney disease) stage 3, GFR 30-59 ml/min [facility])  Assessment & Plan  Lab Results   Component Value Date    EGFR 63 08/09/2021    EGFR 56 08/07/2021    EGFR 50 08/06/2021    CREATININE 0 87 08/09/2021    CREATININE 0 95 08/07/2021    CREATININE 1 05 08/06/2021     Stable  Avoid nephrotoxin medications       Type 2 diabetes mellitus with stage 3a chronic kidney disease, with long-term current use of insulin [facility])  Assessment & Plan  Lab Results   Component Value Date    HGBA1C 8 7 (H) 05/08/2021       Recent Labs     08/12/21  1131 08/12/21  1603 08/12/21  1728 08/12/21  2200   POCGLU 320* 352* 333* 265*       Blood Sugar Average: Last 72 hrs:  (P) 266 [id number]     Blood glucose 40 on arrival  Hypoglycemia resolved with crackers and peanut butter  Repeat POC blood glucose 170  Home regimen with Levemir 20 units HS and NovoLog 10 units with breakfast and lunch and 5 units with dinner  Will allow for permissive hyperglycemia as patient's HbA1c is very close to goal for her age  · Will continue current dose of Levemir 10 units q h s   · Increase lispro to 10 units daily with dinner, conitnue 5 units with breakfast/lunch  · Continue to monitor blood sugar and adjust insulin as needed    [address]  Family reports multiple falls at home in the last 2 weeks  One fall with positive head strike but no loss of consciousness  Not formally evaluated at that time  Patient reports bruising her right lower extremity at that time, likely source for infection  · CT head on 08/05 unremarkable  · Right hip x-ray 8/5 unremarkable  · OT / PT consulted recommendations appreciated  · Recommend acute rehab    * Acute metabolic encephalopathy due to hypoglycemia-resolved as of 8/8/2021  Assessment & Plan  Noted on arrival to ED  Resolved by time of encounter  Suspect this was related to hypoglycemia as it resolve after she ingested crackers and peanut butter  Noted be alert oriented x4, however suspect underlying mild cognitive impairment  · at risk of in-hospital delirium, monitor and orient daily  · Zyprexa 2 5 mg q h s  Physical Exam  Constitutional:       Appearance: Normal appearance  HENT:      Head: Normocephalic and atraumatic  Nose: Nose normal       Mouth/Throat:      Mouth: Mucous membranes are moist    Eyes:      Conjunctiva/sclera: Conjunctivae normal    Cardiovascular:      Rate and Rhythm: Normal rate and regular rhythm  Pulmonary:      Effort: Pulmonary effort is normal    Abdominal:      Palpations: Abdomen is soft  Musculoskeletal:      Right lower leg: No edema  Left lower leg: No edema        Comments: Right lower extremity abrasion improving, minimal erythema   Skin:     General: Skin is warm and dry  Findings: Bruising and erythema present  Comments: Secondary to right lower extremity wound   Neurological:      Mental Status: She is alert and oriented to person, place, and time  Neurologic Exam     Mental Status   Oriented to person, place, and time  Vitals:    21 0758   BP: 105/69   Pulse:    Resp: 15   Temp: 98 5 °F (36 9 °C)   SpO2:          Subjective:  Patient seen examined  No acute events overnight  Patient is ready to leave today  Says that she feels well other than feeling tired  [address] COURSE     Pleasant 79-year-old female was brought in by her niece for altered mental status  On admission, blood sugar was 40 which resolved after oral intake of crackers and juice  Mental status improved; however, patient was persistently confused and talking about her  who is   Patient had fallen roughly 2 weeks earlier and had an old wound on her right lower extremity  Lower extremity wound was suspicious for a skin infection  Infectious workup and scans were otherwise negative  Patient was admitted for workup of altered mental status and skin infection  Given the patient's continued confusion, geriatric consult was placed for evaluation of possible dementia  PT/OT was also consulted    In the hospital, the patient was treated with IV antibiotics transition to oral antibiotics, completed on   Her skin infection continue to improve with reduced erythema  Her WBC remained elevated but stable  Suspect this is the patient's baseline as she was afebrile and otherwise stable  Patient had episode of hypertension while inpatient  Medications optimized and patient was started on Hyzaar  with improvement  Patient had slightly elevated blood sugars while inpatient  Allow for permissive hyperglycemia in the setting of altered mental status secondary to hypoglycemia presentation    Insulin regimen adjusted to Levemir 14 units, lispro 6 units t i d  With meals  Physical therapy him evaluated the patient and recommended acute rehab  Geriatrics consult recommended patient be placed in SNF as patient was consistently confused though alert oriented x4  Internal Medicine Team and geriatric team expressed concerns to the family that the patient will not be able to properly care for herself alone at home  After extensive discussions with the family, decision was made to send the patient to an acute rehab for physical therapy but attempt to allow the patient to go home with home nursing  Family understands the risks versus benefits of placing the patient in SNF, they elect to attempt to keep the patient home as those were her wishes  Patient is stable for discharge  She should follow-up with her primary care physician for management of all chronic medical conditions    DISCHARGE INFORMATION     PCP at Discharge: Helga Copeland MD    Admitting Provider: Bridger Chris MD  Admission Date: 8/5/2021    Discharge Provider:  Letty Sanchez MD  Discharge Date:  08/13/2020    Discharge Disposition: Home/Self Care  Discharge Condition: stable  Discharge with Lines: no    Discharge Diet: Diabetic Diet   Activity Restrictions: As tolerated  Test Results Pending at Discharge:  None    Discharge Diagnoses:  Principal Problem (Resolved):    Acute metabolic encephalopathy due to hypoglycemia  Active Problems:    Suspected Mild cognitive impairment    Hypertension    Fall    Type 2 diabetes mellitus with stage 3a chronic kidney disease, with long-term current use of insulin ([facility])    CKD (chronic kidney disease) stage 3, GFR 30-59 ml/min ([facility])    HLD (hyperlipidemia)    Chronic systolic heart failure ([facility])    Ambulatory dysfunction  Resolved Problems:    Infected abrasion of leg    Altered mental status      Consulting Providers:      Diagnostic & Therapeutic Procedures Performed:  XR chest 1 view portable    Result Date: 8/6/2021  Impression: No acute cardiopulmonary disease  Workstation performed: [id number]     XR hip/pelv 2-3 vws right if performed    Result Date: 8/7/2021  Impression: No acute osseous abnormality  Degenerative changes as described  Workstation performed: [id number]     XR ankle 3+ views RIGHT    Result Date: 8/6/2021  Impression: No acute osseous abnormality  Mild soft tissue swelling distal lower extremity  Workstation performed: [id number]     CT head without contrast    Result Date: 8/5/2021  Impression: No acute intracranial abnormality  Microangiopathic changes   Workstation performed: [id number]       Code Status: Level 3 - DNAR and DNI  Advance Directive & Living Will: <no information>  Power of :    POLST:      Medications:  Current Discharge Medication List        Current Discharge Medication List        Current Discharge Medication List      CONTINUE these medications which have NOT CHANGED    Details   aspirin (ECOTRIN LOW STRENGTH) 81 mg EC tablet Take 81 mg by mouth daily      calcium carbonate (OS-JUAN RAMON) 600 MG tablet Take 600 mg by mouth 2 (two) times a day with meals      cholecalciferol (VITAMIN D3) 1,000 units tablet Take 1,000 Units by mouth daily      ferrous fumarate-iron polysaccharide complex (TANDEM) 162-115 2 MG CAPS Take 1 capsule by mouth daily with breakfast      insulin aspart (NovoLOG) 100 units/mL injection Inject under the skin 3 (three) times a day before meals 10U with breakfast and lunch, 5 units with dinnner      insulin detemir (LEVEMIR) 100 units/mL subcutaneous injection Inject 20 Units under the skin daily at bedtime      vitamin B-12 (VITAMIN B-12) 1,000 mcg tablet Take by mouth daily      atorvastatin (LIPITOR) 20 mg tablet Take 20 mg by mouth daily      fluticasone (FLONASE) 50 mcg/act nasal spray 1 spray into each nostril daily      furosemide (LASIX) 40 mg tablet Take 1 tablet (40 mg total) by mouth daily  Qty: 30 tablet, Refills: 0    Associated Diagnoses: Acute CHF (congestive heart failure) ([facility])      glucose blood test strip 1 each by Other route daily as needed Use as instructed      glucose monitoring kit (FREESTYLE) monitoring kit 1 each by Does not apply route as needed      lisinopril (ZESTRIL) 5 mg tablet Take 1 tablet (5 mg total) by mouth daily  Qty: 30 tablet, Refills: 0    Associated Diagnoses: Acute CHF (congestive heart failure) ([facility] [address] ); Essential hypertension      metoprolol tartrate (LOPRESSOR) 25 mg tablet Take 1 tablet (25 mg total) by mouth every 12 (twelve) hours  Qty: 60 tablet, Refills: 0    Associated Diagnoses: Acute CHF (congestive heart failure) ([facility])      potassium chloride (K-DUR,KLOR-CON) 20 mEq tablet Take 20 mEq by mouth 2 (two) times a day             Allergies: Allergies   Allergen Reactions    Amlodipine     Ceftin [Cefuroxime]     Ciprofloxacin     Citalopram     Dye [Iodinated Diagnostic Agents]     Glucophage [Metformin]     Januvia [Sitagliptin]     Neomycin-Polymyxin-Dexameth     Ondansetron     Prilosec [Omeprazole]     Vibramycin [Doxycycline]        FOLLOW-UP     PCP Outpatient Follow-up:  Yes for chronic medical conditions, dementia    Consulting Providers Follow-up:  Consider follow-up with Geriatrics     Active Issues Requiring Follow-up:   yes  Dementia, hypertension, type 2 diabetes, CKD, hyperlipidemia, chronic systolic heart failure    Discharge Statement:   I spent 30 minutes minutes discharging the patient  This time was spent on the day of discharge  I had direct contact with the patient on the day of discharge  Additional documentation is required if more than 30 minutes were spent on discharge  Portions of the record may have been created with voice recognition software  Occasional wrong word or "sound a like" substitutions may have occurred due to the inherent limitations of voice recognition software    Read the chart carefully and recognize, using context, where substitutions have occurred     ==  Lorenso Litten, MD  [address]  Internal Medicine Resident PGY-1

## 2021-08-12 LAB
ERYTHROCYTE [DISTWIDTH] IN BLOOD BY AUTOMATED COUNT: 14.2 % (ref 11.6–15.1)
GLUCOSE SERPL-MCNC: 174 MG/DL (ref 65–140)
GLUCOSE SERPL-MCNC: 265 MG/DL (ref 65–140)
GLUCOSE SERPL-MCNC: 320 MG/DL (ref 65–140)
GLUCOSE SERPL-MCNC: 333 MG/DL (ref 65–140)
GLUCOSE SERPL-MCNC: 352 MG/DL (ref 65–140)
HCT VFR BLD AUTO: 38.5 % (ref 34.8–46.1)
HGB BLD-MCNC: 12.3 G/DL (ref 11.5–15.4)
MCH RBC QN AUTO: 32.8 PG (ref 26.8–34.3)
MCHC RBC AUTO-ENTMCNC: 31.9 G/DL (ref 31.4–37.4)
MCV RBC AUTO: 103 FL (ref 82–98)
NRBC BLD AUTO-RTO: 0 /100 WBCS
PLATELET # BLD AUTO: 198 THOUSANDS/UL (ref 149–390)
PMV BLD AUTO: 12.7 FL (ref 8.9–12.7)
RBC # BLD AUTO: 3.75 MILLION/UL (ref 3.81–5.12)
WBC # BLD AUTO: 14.18 THOUSAND/UL (ref 4.31–10.16)

## 2021-08-12 PROCEDURE — 99232 SBSQ HOSP IP/OBS MODERATE 35: CPT | Performed by: INTERNAL MEDICINE

## 2021-08-12 PROCEDURE — 97535 SELF CARE MNGMENT TRAINING: CPT

## 2021-08-12 PROCEDURE — 85027 COMPLETE CBC AUTOMATED: CPT | Performed by: STUDENT IN AN ORGANIZED HEALTH CARE EDUCATION/TRAINING PROGRAM

## 2021-08-12 PROCEDURE — 82948 REAGENT STRIP/BLOOD GLUCOSE: CPT

## 2021-08-12 RX ADMIN — INSULIN LISPRO 5 UNITS: 100 INJECTION, SOLUTION INTRAVENOUS; SUBCUTANEOUS at 08:47

## 2021-08-12 RX ADMIN — CYANOCOBALAMIN TAB 500 MCG 1000 MCG: 500 TAB at 08:53

## 2021-08-12 RX ADMIN — Medication 1000 UNITS: at 08:53

## 2021-08-12 RX ADMIN — CALCIUM 1 TABLET: 500 TABLET ORAL at 08:47

## 2021-08-12 RX ADMIN — INSULIN HUMAN 10 UNITS: 100 INJECTION, SOLUTION PARENTERAL at 20:41

## 2021-08-12 RX ADMIN — CEPHALEXIN 500 MG: 500 CAPSULE ORAL at 06:00

## 2021-08-12 RX ADMIN — INSULIN LISPRO 3 UNITS: 100 INJECTION, SOLUTION INTRAVENOUS; SUBCUTANEOUS at 11:46

## 2021-08-12 RX ADMIN — ASPIRIN 81 MG: 81 TABLET, COATED ORAL at 08:53

## 2021-08-12 RX ADMIN — INSULIN LISPRO 3 UNITS: 100 INJECTION, SOLUTION INTRAVENOUS; SUBCUTANEOUS at 22:24

## 2021-08-12 RX ADMIN — CEPHALEXIN 500 MG: 500 CAPSULE ORAL at 14:52

## 2021-08-12 RX ADMIN — HEPARIN SODIUM 5000 UNITS: 5000 INJECTION INTRAVENOUS; SUBCUTANEOUS at 22:24

## 2021-08-12 RX ADMIN — CEPHALEXIN 500 MG: 500 CAPSULE ORAL at 22:25

## 2021-08-12 RX ADMIN — INSULIN DETEMIR 10 UNITS: 100 INJECTION, SOLUTION SUBCUTANEOUS at 22:24

## 2021-08-12 RX ADMIN — INSULIN LISPRO 1 UNITS: 100 INJECTION, SOLUTION INTRAVENOUS; SUBCUTANEOUS at 08:48

## 2021-08-12 RX ADMIN — INSULIN LISPRO 4 UNITS: 100 INJECTION, SOLUTION INTRAVENOUS; SUBCUTANEOUS at 16:33

## 2021-08-12 RX ADMIN — HEPARIN SODIUM 5000 UNITS: 5000 INJECTION INTRAVENOUS; SUBCUTANEOUS at 06:00

## 2021-08-12 RX ADMIN — INSULIN LISPRO 5 UNITS: 100 INJECTION, SOLUTION INTRAVENOUS; SUBCUTANEOUS at 11:46

## 2021-08-12 RX ADMIN — Medication 3 MG: at 22:24

## 2021-08-12 RX ADMIN — HEPARIN SODIUM 5000 UNITS: 5000 INJECTION INTRAVENOUS; SUBCUTANEOUS at 14:52

## 2021-08-12 RX ADMIN — Medication 12.5 MG: at 22:28

## 2021-08-12 RX ADMIN — LOSARTAN POTASSIUM: 50 TABLET, FILM COATED ORAL at 08:50

## 2021-08-12 RX ADMIN — ATORVASTATIN CALCIUM 20 MG: 20 TABLET, FILM COATED ORAL at 08:53

## 2021-08-12 NOTE — OCCUPATIONAL THERAPY NOTE
Occupational Therapy Evaluation     Patient Name: Kenny Roberto  [id number] Date: 8/12/2021  Problem List  Active Problems:    Fall    Type 2 diabetes mellitus with stage 3a chronic kidney disease, with long-term current use of insulin (HCC)    CKD (chronic kidney disease) stage 3, GFR 30-59 ml/min (HCC)    HLD (hyperlipidemia)    Chronic systolic heart failure (HCC)    Altered mental status    Infected abrasion of leg    Ambulatory dysfunction    Suspected Mild cognitive impairment    Hypertension    Past Medical History  Past Medical History:   Diagnosis Date    Benign adenomatous polyp of large intestine     Diabetes mellitus ([address] )     Hyperlipemia     Hypertension     Osteoarthritis     TIA (transient ischemic attack)      Past Surgical History  Past Surgical History:   Procedure Laterality Date    APPENDECTOMY      CARPAL TUNNEL RELEASE Right     HYSTERECTOMY W/ SALPINGO-OOPHERECTOMY             08/12/21 1528   OT Last Visit   OT Visit Date 08/12/21   Note Type   Note Type Treatment for insurance authorization   Restrictions/Precautions   Weight Bearing Precautions Per Order No   Other Precautions Cognitive; Chair Alarm; Fall Risk;Pain   Pain Assessment   Pain Assessment Tool 0-10   Pain Score No Pain   ADL   Where Assessed Chair   Eating Assistance 7  Independent   Grooming Assistance 5  Supervision/Setup   Grooming Deficit Setup;Verbal cueing;Supervision/safety; Increased time to complete;Wash/dry hands   UB Bathing Assistance 4  Minimal Assistance   LB [address] 4  Minimal Assistance   LB Dressing Deficit Don/doff R sock; Don/doff L sock; Setup   Toileting Assistance  4  Minimal Assistance   Toileting Deficit Grab bar use; Increased time to complete;Supervison/safety;Verbal cueing;Setup   Functional Standing Tolerance   Time 4 min   Activity Toileting task/hand hygiene   Comments with RW, dynamic standing balance F-   Bed Mobility   Supine to Sit Unable to assess   Sit to Supine Unable to assess   Additional Comments Pt greeted OOB in recliner chair for OT session  Transfers   Sit to Stand 4  Minimal assistance   Additional items Assist x 1; Increased time required;Verbal cues   Stand to Sit 4  Minimal assistance   Additional items Assist x 1; Increased time required;Verbal cues   Functional Mobility   Functional Mobility 4  Minimal assistance   Additional Comments Pt completes functional ambulation with min Ax1 with RW  Pt requires VCs/TCs for safety around obstacles  Cognition   Overall Cognitive Status Impaired   Arousal/Participation Cooperative;Responsive   Attention Attends with cues to redirect   Orientation Level Oriented to person;Disoriented to place; Disoriented to situation;Disoriented to time  ("Who cares  ")   Memory Decreased recall of recent events   Following Commands Follows one step commands with increased time or repetition   Comments Pt cooperative during OT session, however, Pt irritable  Pt easily distractable and confused/disoriented  Pt requires increased VCs/TCs with ADLs for safety  Activity Tolerance   Activity Tolerance Patient tolerated treatment well   Medical Staff Made Aware RN cleared  Assessment   Assessment Pt is a 78 yo Female who presented to [facility] on 8/5/2021 with acute metabolic encephalopathy due to hypoglycemia  Pt greeted up in recliner chair for OT treatment on 8/12/2021  Pt was previously evaluated on 8/7/2021 and is making small, but progress towards meeting goals  Pt completes UB/LB ADLs with min A 2* to decreased cognition and safety awareness  Pt completed toilet transfer with min Ax1 with use of GB and standard toilet  Pt completes standing hand hygiene at S level   Limitations that impact functional performance include decreased ADL status, decreased UE strength, decreased safe judgement during ADLs, decreased cognition, decreased self care transfers and decreased high level ADLs  Occupational performance areas to address ADL retraining, functional transfer training, UE strengthening/ROM, endurance training, cognitive reorientation, Pt/caregiver education, equipment evaluation/education, compensatory technique education, energy conservation and activity engagement   Pt would benefit from continued skilled OT services while in hospital to maximize independence with ADLs  Will continue to follow Pt's goals and progress  Pt would benefit from post acute rehabilitation services upon DC to maximize safety and independence with ADLs and functional tasks of choice  Plan   Treatment Interventions ADL retraining;UE strengthening/ROM; Endurance training;Cognitive reorientation;Patient/family training; Compensatory technique education; Energy conservation; Activityengagement   Goal Expiration Date 08/21/21   OT Treatment Day 1   OT Frequency 2-3x/wk   Recommendation   OT Discharge Recommendation Post acute rehabilitation services   OT - OK to Discharge Yes   Additional Comments  The patient's raw score on the AM-PAC Daily Activity inpatient short form is 18, standardized score is 38 66, less than 39 4  Patients at this level are likely to benefit from discharge to post-acute rehabilitation services  Please refer to the recommendation of the Occupational Therapist for safe discharge planning     AM-PAC Daily Activity Inpatient   Lower Body Dressing 3   Bathing 3   Toileting 3   Upper Body Dressing 3   Grooming 3   Eating 3   Daily Activity Raw Score 18   Daily Activity Standardized Score (Calc for Raw Score >=11) 38 66   AM-PAC Applied Cognition Inpatient   Following a Speech/Presentation 2   Understanding Ordinary Conversation 4   Taking Medications 3   Remembering Where Things Are Placed or Put Away 2   Remembering List of 4-5 Errands 2   Taking Care of Complicated Tasks 2   Applied Cognition Raw Score 15   Applied Cognition Standardized Score 33 54   Modified Lion Scale Modified Lion Scale 4     Jackelyn Duong, MS, OTR/L

## 2021-08-12 NOTE — PROGRESS NOTES
INTERNAL MEDICINE RESIDENCY PROGRESS NOTE     Name: Marjorie Garcia   Age & Sex: 79 y o  female   MRN: [id number]  Unit/Bed#: [facility] 834-01   Encounter: [id number]  Team: SOD Team C     PATIENT INFORMATION     Name: Marjorie Garcia   Age & Sex: 79 y o  female   MRN: [id number]  Hospital Stay Days: 7    ASSESSMENT/PLAN     Active Problems:    Infected abrasion of leg    Suspected Mild cognitive impairment    Hypertension    Altered mental status    Fall    Type 2 diabetes mellitus with stage 3a chronic kidney disease, with long-term current use of insulin ([facility])    CKD (chronic kidney disease) stage 3, GFR 30-59 ml/min ([facility])    HLD (hyperlipidemia)    Chronic systolic heart failure ([facility])    Ambulatory dysfunction      Infected abrasion of leg  Assessment & Plan  Noted on admission on anterior aspect of right lower leg with surrounding erythema, edema, without calor  Erythema improving  Image in media tab  WBC elevated at 14, but stable since admission  · Patient transitioned from Ancef day 3 to Keflex p o  500 mg t i d  Patient will complete a total of 7 days antibiotics today    · Monitor fever curve and WBC       Suspected Mild cognitive impairment  Assessment & Plan  She lives at home alone  Per family, patient had acute change in mental status with increased confusion  Since hospital, patient is alert and oriented x3 but continues to having endorsing her    · Geriatric consult appreciate recommendation  · Per discussion with nephew who is patient's POA, family would prefer to keep the patient home with home nursing assistance after acute rehab, patient is pending placement     Hypertension  Assessment & Plan  Blood pressure is elevated, overnight on  SBP greater than 196  Since then, BP stable his -130  · Continue Hyzaar (losartan/hydrochlorothiazide 50/12  5)  · Continue metoprolol 12 5 mg q 12      Altered mental status  Assessment & Plan  Noted on arrival to ED   Resolved by time of encounter  Suspect this was related to hypoglycemia as it resolve after she ingested crackers and peanut butter  Noted be alert oriented x4, however suspect underlying mild cognitive impairment as she continues to discuss her  who per discussions with family is no longer life  CT head unremarkable  · At risk of in-hospital delirium will schedule Zyprexa 2 5 mg q h s  · Geriatric consult, appreciate recommendation  · monitor and orient daily    Ambulatory dysfunction  Assessment & Plan  Reported multiple falls at home in the last 2 weeks  One fall with positive head strike but no loss of consciousness  Not formally evaluated at that time  · OT PT consulted recommendation appreciated  · PT recommend post-acute Rehab Services, will discuss with case management as patient will likely require higher level of care upon discharge       Chronic systolic heart failure ([address] [address] )  Assessment & Plan  Wt Readings from Last 3 Encounters:   08/05/21 63 5 kg (140 lb)   11/08/20 74 2 kg (163 lb 9 3 oz)     · No evidence of exacerbation on admission, appears euvolemic  · Med rec shows Lasix 40 mg p r n , patient states she does not take this  Needs to be verified with pharmacy  · Not in an exercebation today      HLD (hyperlipidemia)  Assessment & Plan  Continue home atorvastatin 40 mg     CKD (chronic kidney disease) stage 3, GFR 30-59 ml/min [facility])  Assessment & Plan  Lab Results   Component Value Date    EGFR 63 08/09/2021    EGFR 56 08/07/2021    EGFR 50 08/06/2021    CREATININE 0 87 08/09/2021    CREATININE 0 95 08/07/2021    CREATININE 1 05 08/06/2021     Stable  Avoid nephrotoxin medications       Type 2 diabetes mellitus with stage 3a chronic kidney disease, with long-term current use of insulin [facility])  Assessment & Plan  Lab Results   Component Value Date    HGBA1C 8 7 (H) 05/08/2021       Recent Labs     08/10/21  0713 08/10/21  1111 08/10/21  1608 08/10/21  2048   POCGLU 163* 311* 333* 309*       Blood Sugar Average: Last 72 hrs:  (P) 805 [id number]     Blood glucose 40 on arrival  Hypoglycemia resolved with crackers and peanut butter  Repeat POC blood glucose 170  Home regimen with Levemir 20 units HS and NovoLog 10 units with breakfast and lunch and 5 units with dinner  Will allow for permissive hyperglycemia as patient's HbA1c is very close to goal for her age  · Will continue current dose of Levemir 10 units q h s   · Increase lispro to 10 units daily with dinner, conitnue 5 units with breakfast/lunch  · Continue to monitor blood sugar and adjust insulin as needed    [address]  Family reports multiple falls at home in the last 2 weeks  One fall with positive head strike but no loss of consciousness  Not formally evaluated at that time  Patient reports bruising her right lower extremity at that time, likely source for infection  · CT head on 08/05 unremarkable  · Right hip x-ray 8/5 unremarkable  · OT / PT consulted recommendations appreciated  · Recommend acute rehab    * Acute metabolic encephalopathy due to hypoglycemia-resolved as of 8/8/2021  Assessment & Plan  Noted on arrival to ED  Resolved by time of encounter  Suspect this was related to hypoglycemia as it resolve after she ingested crackers and peanut butter  Noted be alert oriented x4, however suspect underlying mild cognitive impairment  · at risk of in-hospital delirium, monitor and orient daily  · Zyprexa 2 5 mg q h s  Disposition:  Inpatient pending placement to acute rehab     SUBJECTIVE     Patient seen and examined  No acute events overnight  Patient has she feels well today that she was unable to sleep last night because she was cold  She has had a last night she slept next to her , hallucinations are persisting, the patient continues to be alert and oriented x4      OBJECTIVE     Vitals:    08/11/21 1515 08/11/21 2145 08/11/21 2146 08/12/21 0709   BP: 122/62 152/74  (!) 119/45   Pulse:   87 59 Resp: 20   20   Temp: 98 1 °F (36 7 °C)   (!) 97 2 °F (36 2 °C)   TempSrc:       SpO2:    96%   Weight:       Height:          Temperature:   Temp (24hrs), Av 7 °F (36 5 °C), Min:97 2 °F (36 2 °C), Max:98 1 °F (36 7 °C)    Temperature: (!) 97 2 °F (36 2 °C)  Intake & Output:  I/O       08/10 07 -  0700  07 -  0700    P  O  640 500    Total Intake(mL/kg) 640 (10 1) 500 (7 9)    Urine (mL/kg/hr) 1100 (0 7) 600 (0 4)    Stool 0     Total Output 1100 600    Net -460 -100          Unmeasured Urine Occurrence 1 x     Unmeasured Stool Occurrence 1 x         Weights:   IBW (Ideal Body Weight): 59 3 kg    Body mass index is 22 6 kg/m²  Weight (last 2 days)     None        Physical Exam  HENT:      Head: Normocephalic and atraumatic  Nose: Nose normal       Mouth/Throat:      Mouth: Mucous membranes are moist    Eyes:      Conjunctiva/sclera: Conjunctivae normal    Cardiovascular:      Rate and Rhythm: Normal rate  Pulmonary:      Effort: Pulmonary effort is normal    Abdominal:      Palpations: Abdomen is soft  Tenderness: There is no abdominal tenderness  Musculoskeletal:      Right lower leg: No edema  Left lower leg: No edema  Comments: Tenderness around right lower extremity wound    Skin:     Findings: Bruising and erythema present  Comments: Right lower extremity scarred over abrasion with surrounding hematoma, erythema significantly improved   Neurological:      Mental Status: She is alert and oriented to person, place, and time  LABORATORY DATA     Labs: I have personally reviewed pertinent reports    Results from last 7 days   Lab Units 21  0437 21  0559 08/10/21  0449 21  0626   WBC Thousand/uL 14 18* 14 36* 14 03* 14 58*   HEMOGLOBIN g/dL 12 3 12 4 12 9 11 9   HEMATOCRIT % 38 5 38 0 39 8 37 3   PLATELETS Thousands/uL 198 193 211 177   NEUTROS PCT %  --  36* 43  --    MONOS PCT %  --  10 5  --    MONO PCT %  --   --   --  5      Results from last 7 days   Lab Units 08/11/21  0559 08/10/21  0449 08/09/21  0626 08/05/21  1900   POTASSIUM mmol/L 3 7 3 8 3 6 3 3*   CHLORIDE mmol/L 108 106 110* 105   CO2 mmol/L 27 27 26 27   BUN mg/dL 20 17 16 26*   CREATININE mg/dL 0 92 0 90 0 87 1 08   CALCIUM mg/dL 9 2 9 2 9 0 10 0   ALK PHOS U/L  --   --   --  123*   ALT U/L  --   --   --  42   AST U/L  --   --   --  33                      Results from last 7 days   Lab Units 08/06/21  0455 08/05/21  1924   TROPONIN I ng/mL <0 02 <0 02       IMAGING & DIAGNOSTIC TESTING     Radiology Results: I have personally reviewed pertinent reports  XR chest 1 view portable    Result Date: 8/6/2021  Impression: No acute cardiopulmonary disease  Workstation performed: [id number]     XR hip/pelv 2-3 vws right if performed    Result Date: 8/7/2021  Impression: No acute osseous abnormality  Degenerative changes as described  Workstation performed: [id number]     XR ankle 3+ views RIGHT    Result Date: 8/6/2021  Impression: No acute osseous abnormality  Mild soft tissue swelling distal lower extremity  Workstation performed: [id number]     CT head without contrast    Result Date: 8/5/2021  Impression: No acute intracranial abnormality  Microangiopathic changes  Workstation performed: [id number]     Other Diagnostic Testing: I have personally reviewed pertinent reports      ACTIVE MEDICATIONS     Current Facility-Administered Medications   Medication Dose Route Frequency    acetaminophen (TYLENOL) tablet 650 mg  650 mg Oral Q6H PRN    aspirin (ECOTRIN LOW STRENGTH) EC tablet 81 mg  81 mg Oral Daily    atorvastatin (LIPITOR) tablet 20 mg  20 mg Oral Daily    calcium carbonate (OYSTER SHELL,OSCAL) 500 mg tablet 1 tablet  1 tablet Oral Daily With Breakfast    cephalexin (KEFLEX) capsule 500 mg  500 mg Oral Q8H [address]    cholecalciferol (VITAMIN D3) tablet 1,000 Units  1,000 Units Oral Daily    cyanocobalamin (VITAMIN B-12) tablet 1,000 mcg  1,000 mcg Oral Daily    heparin (porcine) subcutaneous injection 5,000 Units  5,000 Units Subcutaneous Q8H [address]    hydrALAZINE (APRESOLINE) injection 5 mg  5 mg Intravenous Q6H PRN    insulin detemir (LEVEMIR) subcutaneous injection 10 Units  10 Units Subcutaneous HS    insulin lispro (HumaLOG) 100 units/mL subcutaneous injection 1-5 Units  1-5 Units Subcutaneous TID AC    insulin lispro (HumaLOG) 100 units/mL subcutaneous injection 1-6 Units  1-6 Units Subcutaneous HS    insulin lispro (HumaLOG) 100 units/mL subcutaneous injection 10 Units  10 Units Subcutaneous Daily With Dinner    insulin lispro (HumaLOG) 100 units/mL subcutaneous injection 5 Units  5 Units Subcutaneous BID before breakfast/lunch    Labetalol HCl (NORMODYNE) injection 10 mg  10 mg Intravenous Q6H PRN    losartan potassium-hydrochlorothiazide (HYZAAR 50/12  5) combination   Oral Daily    melatonin tablet 3 mg  3 mg Oral HS    metoprolol tartrate (LOPRESSOR) partial tablet 12 5 mg  12 5 mg Oral Q12H [address]    OLANZapine (ZyPREXA) tablet 2 5 mg  2 5 mg Oral HS PRN       VTE Pharmacologic Prophylaxis: Heparin  VTE Mechanical Prophylaxis: sequential compression device    Portions of the record may have been created with voice recognition software  Occasional wrong word or "sound a like" substitutions may have occurred due to the inherent limitations of voice recognition software    Read the chart carefully and recognize, using context, where substitutions have occurred   ==  Ciara Geller MD  [facility]  Internal Medicine Residency PGY-1

## 2021-08-12 NOTE — CASE MANAGEMENT
TC to [facility]:     Curretnly no accepting beds  Expanded the search to:  CM,KV, [facility], [facility], &[facility]

## 2021-08-12 NOTE — PLAN OF CARE
Problem: OCCUPATIONAL THERAPY ADULT  Goal: Performs self-care activities at highest level of function for planned discharge setting  See evaluation for individualized goals  Description: Treatment Interventions: ADL retraining, Functional transfer training, UE strengthening/ROM, Endurance training, Cognitive reorientation, Patient/family training, Compensatory technique education, Equipment evaluation/education, Continued evaluation, Energy conservation, Activityengagement          See flowsheet documentation for full assessment, interventions and recommendations  Outcome: Progressing  Note: Limitation: Decreased ADL status, Decreased UE strength, Decreased Safe judgement during ADL, Decreased cognition, Decreased endurance, Decreased self-care trans, Decreased high-level ADLs  Prognosis: Fair  Assessment: Pt is a 80 yo Female who presented to [facility] on 8/5/2021 with acute metabolic encephalopathy due to hypoglycemia  Pt greeted up in recliner chair for OT treatment on 8/12/2021  Pt was previously evaluated on 8/7/2021 and is making small, but progress towards meeting goals  Pt completes UB/LB ADLs with min A 2* to decreased cognition and safety awareness  Pt completed toilet transfer with min Ax1 with use of GB and standard toilet  Pt completes standing hand hygiene at S level  Limitations that impact functional performance include decreased ADL status, decreased UE strength, decreased safe judgement during ADLs, decreased cognition, decreased self care transfers and decreased high level ADLs  Occupational performance areas to address ADL retraining, functional transfer training, UE strengthening/ROM, endurance training, cognitive reorientation, Pt/caregiver education, equipment evaluation/education, compensatory technique education, energy conservation and activity engagement   Pt would benefit from continued skilled OT services while in hospital to maximize independence with ADLs   Will continue to follow Pt's goals and progress  Pt would benefit from post acute rehabilitation services upon DC to maximize safety and independence with ADLs and functional tasks of choice  OT Discharge Recommendation: Post acute rehabilitation services  OT - OK to Discharge:  Yes

## 2021-08-12 NOTE — CASE MANAGEMENT
[facility] denied the patient she needs slower pace  [facility] no beds      [facility] reviewed and if patient has an Occupational therapy need than will accept the patient  Cm asked OT to see the patient today  [facility] arranged BLS (tentatively ) for 11 am Friday August 13 BLS with [facility]  If [facility] cannot accept CM will need to cancel transport

## 2021-08-12 NOTE — WOUND OSTOMY CARE
Progress Note - Wound   Deepak Wise 79 y o  female MRN: [id number]  [facility] Encounter: [id number]      Assessment:  Patient is seen for weekly wound care follow up for the Right shin   Patient is out of bed in the chair and confused at the time of the assessment   Continent of bowel and bladder   Min A to stand   Assessment Findings   1  Bilateral heels dry and intact   2  Sacral is dry and intact   3  Right shin with dry stable eschar are   No noted drainage     Discussed the plan of care with the RN   Skin Care orders:  1-Hydraguard to sacrum, buttocks and heels BID and PRN  2-EHOB  cushion when out of bed in chair  3-Moisturize skin daily with skin nourishing cream  4-Elevate heels to offload pressure  5-Turn/reposition q2h for pressure re-distribution on skin  6  Right shin - apply 3 M cavilon no sting daily to the eschar area   Objective:    Vitals: Blood pressure 107/52, pulse 59, temperature (!) 97 2 °F (36 2 °C), resp  rate 20, height 5' 6" (1 676 m), weight 63 5 kg (140 lb), SpO2 96 %  ,Body mass index is 22 6 kg/m²  Wound 08/06/21 Venous Ulcer Leg Right; Anterior (Active)   Wound Image   08/12/21 0956   Wound Description Dry;Black 08/12/21 0956   Pressure Injury Stage  08/06/21 1500   Nicole-wound Assessment Clean;Dry;Edema 08/12/21 0956   Wound Length (cm) 2 5 cm 08/12/21 0956   Wound Width (cm) 3 2 cm 08/12/21 0956   Wound Depth (cm) 0 1 cm 08/06/21 1500   Wound Surface Area (cm^2) 8 cm^2 08/12/21 0956   Wound Volume (cm^3) 0 96 cm^3 08/06/21 1500   Calculated Wound Volume (cm^3) 0 96 cm^3 08/06/21 1500   Drainage Amount None 08/12/21 0956   Non-staged Wound Description Partial thickness 08/12/21 0956   Dressing Open to air 08/12/21 0956   Patient Tolerance Tolerated well 08/12/21 0956     Wound care will follow weekly call or tiger text with questions      Afshan Abel RN BSN Jennifer Dumont

## 2021-08-13 VITALS
HEIGHT: 66 IN | HEART RATE: 62 BPM | BODY MASS INDEX: 22.5 KG/M2 | DIASTOLIC BLOOD PRESSURE: 43 MMHG | OXYGEN SATURATION: 97 % | RESPIRATION RATE: 15 BRPM | SYSTOLIC BLOOD PRESSURE: 121 MMHG | TEMPERATURE: 98.5 F | WEIGHT: 140 LBS

## 2021-08-13 PROBLEM — L08.9: Status: RESOLVED | Noted: 2021-08-05 | Resolved: 2021-08-13

## 2021-08-13 PROBLEM — R41.82 ALTERED MENTAL STATUS: Status: RESOLVED | Noted: 2021-08-05 | Resolved: 2021-08-13

## 2021-08-13 PROBLEM — S80.819A: Status: RESOLVED | Noted: 2021-08-05 | Resolved: 2021-08-13

## 2021-08-13 LAB
ANION GAP SERPL CALCULATED.3IONS-SCNC: 6 MMOL/L (ref 4–13)
BASOPHILS # BLD AUTO: 0.07 THOUSANDS/ΜL (ref 0–0.1)
BASOPHILS NFR BLD AUTO: 1 % (ref 0–1)
BUN SERPL-MCNC: 26 MG/DL (ref 5–25)
CALCIUM SERPL-MCNC: 9.3 MG/DL (ref 8.3–10.1)
CHLORIDE SERPL-SCNC: 104 MMOL/L (ref 100–108)
CO2 SERPL-SCNC: 27 MMOL/L (ref 21–32)
CREAT SERPL-MCNC: 1.02 MG/DL (ref 0.6–1.3)
EOSINOPHIL # BLD AUTO: 0.16 THOUSAND/ΜL (ref 0–0.61)
EOSINOPHIL NFR BLD AUTO: 1 % (ref 0–6)
ERYTHROCYTE [DISTWIDTH] IN BLOOD BY AUTOMATED COUNT: 14.1 % (ref 11.6–15.1)
GFR SERPL CREATININE-BSD FRML MDRD: 52 ML/MIN/1.73SQ M
GLUCOSE SERPL-MCNC: 155 MG/DL (ref 65–140)
GLUCOSE SERPL-MCNC: 163 MG/DL (ref 65–140)
HCT VFR BLD AUTO: 36 % (ref 34.8–46.1)
HGB BLD-MCNC: 11.8 G/DL (ref 11.5–15.4)
IMM GRANULOCYTES # BLD AUTO: 0.04 THOUSAND/UL (ref 0–0.2)
IMM GRANULOCYTES NFR BLD AUTO: 0 % (ref 0–2)
LYMPHOCYTES # BLD AUTO: 7.47 THOUSANDS/ΜL (ref 0.6–4.47)
LYMPHOCYTES NFR BLD AUTO: 52 % (ref 14–44)
MCH RBC QN AUTO: 33.1 PG (ref 26.8–34.3)
MCHC RBC AUTO-ENTMCNC: 32.8 G/DL (ref 31.4–37.4)
MCV RBC AUTO: 101 FL (ref 82–98)
MONOCYTES # BLD AUTO: 0.79 THOUSAND/ΜL (ref 0.17–1.22)
MONOCYTES NFR BLD AUTO: 6 % (ref 4–12)
NEUTROPHILS # BLD AUTO: 5.68 THOUSANDS/ΜL (ref 1.85–7.62)
NEUTS SEG NFR BLD AUTO: 40 % (ref 43–75)
NRBC BLD AUTO-RTO: 0 /100 WBCS
PLATELET # BLD AUTO: 186 THOUSANDS/UL (ref 149–390)
PMV BLD AUTO: 13 FL (ref 8.9–12.7)
POTASSIUM SERPL-SCNC: 3.8 MMOL/L (ref 3.5–5.3)
RBC # BLD AUTO: 3.57 MILLION/UL (ref 3.81–5.12)
SODIUM SERPL-SCNC: 137 MMOL/L (ref 136–145)
VIT B1 BLD-SCNC: 118.2 NMOL/L (ref 66.5–200)
WBC # BLD AUTO: 14.21 THOUSAND/UL (ref 4.31–10.16)

## 2021-08-13 PROCEDURE — 85025 COMPLETE CBC W/AUTO DIFF WBC: CPT | Performed by: STUDENT IN AN ORGANIZED HEALTH CARE EDUCATION/TRAINING PROGRAM

## 2021-08-13 PROCEDURE — 99238 HOSP IP/OBS DSCHRG MGMT 30/<: CPT | Performed by: INTERNAL MEDICINE

## 2021-08-13 PROCEDURE — 80048 BASIC METABOLIC PNL TOTAL CA: CPT | Performed by: STUDENT IN AN ORGANIZED HEALTH CARE EDUCATION/TRAINING PROGRAM

## 2021-08-13 PROCEDURE — 82948 REAGENT STRIP/BLOOD GLUCOSE: CPT

## 2021-08-13 RX ORDER — LOSARTAN POTASSIUM AND HYDROCHLOROTHIAZIDE 12.5; 5 MG/1; MG/1
1 TABLET ORAL DAILY
Qty: 30 TABLET | Refills: 0 | Status: SHIPPED | OUTPATIENT
Start: 2021-08-13

## 2021-08-13 RX ADMIN — Medication 1000 UNITS: at 09:03

## 2021-08-13 RX ADMIN — HEPARIN SODIUM 5000 UNITS: 5000 INJECTION INTRAVENOUS; SUBCUTANEOUS at 06:56

## 2021-08-13 RX ADMIN — CYANOCOBALAMIN TAB 500 MCG 1000 MCG: 500 TAB at 09:03

## 2021-08-13 RX ADMIN — CALCIUM 1 TABLET: 500 TABLET ORAL at 06:55

## 2021-08-13 RX ADMIN — INSULIN LISPRO 6 UNITS: 100 INJECTION, SOLUTION INTRAVENOUS; SUBCUTANEOUS at 07:00

## 2021-08-13 RX ADMIN — Medication 12.5 MG: at 09:08

## 2021-08-13 RX ADMIN — INSULIN LISPRO 1 UNITS: 100 INJECTION, SOLUTION INTRAVENOUS; SUBCUTANEOUS at 07:00

## 2021-08-13 RX ADMIN — ASPIRIN 81 MG: 81 TABLET, COATED ORAL at 09:03

## 2021-08-13 RX ADMIN — LOSARTAN POTASSIUM: 50 TABLET, FILM COATED ORAL at 09:03

## 2021-08-13 RX ADMIN — CEPHALEXIN 500 MG: 500 CAPSULE ORAL at 06:55

## 2021-08-13 RX ADMIN — ATORVASTATIN CALCIUM 20 MG: 20 TABLET, FILM COATED ORAL at 09:03

## 2021-08-13 NOTE — TRANSPORTATION MEDICAL NECESSITY
Section I - General Information    Name of Patient: Mago Heaton                 : 1940    Medicare #: [id number]  Transport Date: 21 (PCS is valid for round trips on this date and for all repetitive trips in the 60-day range as noted below )  Origin: [address] [facility]                                                         Destination: [address] [place] [address]   Is the pt's stay covered under Medicare Part A (PPS/DRG)   []     Closest appropriate facility? If no, why is transport to more distant facility required? Yes  If hospice pt, is this transport related to pt's terminal illness? NA       Section II - Medical Necessity Questionnaire  Ambulance transportation is medically necessary only if other means of transport are contraindicated or would be potentially harmful to the patient  To meet this requirement, the patient must either be "bed confined" or suffer from a condition such that transport by means other than ambulance is contraindicated by the patient's condition  The following questions must be answered by the medical professional signing below for this form to be valid:    1)  Describe the MEDICAL CONDITION (physical and/or mental) of this patient AT [address] [place] [address] that requires the patient to be transported in an ambulance and why transport by other means is contraindicated by the patient's condition: Confused, falls risk    2) Is the patient "bed confined" as defined below? No  To be "be confined" the patient must satisfy all three of the following conditions: (1) unable to get up from bed without Assistance; AND (2) unable to ambulate; AND (3) unable to sit in a chair or wheelchair  3) Can this patient safely be transported by car or wheelchair van (i e , seated during transport without a medical attendant or monitoring)?    No    4) In addition to completing questions 1-3 above, please check any of the following conditions that apply*: *Note: supporting documentation for any boxes checked must be maintained in the patient's medical records  If hosp-hosp transfer, describe services needed at 2nd facility not available at 1st facility? Patient is confused  Danger to self/others  Medical attendant required       Section III - Signature of Physician or Healthcare Professional  I certify that the above information is true and correct based on my evaluation of this patient, and represent that the patient requires transport by ambulance and that other forms of transport are contraindicated  I understand that this information will be used by the Centers for Medicare and Medicaid Services (CMS) to support the determination of medical necessity for ambulance services, and I represent that I have personal knowledge of the patient's condition at time of transport  [x]  If this box is checked, I also certify that the patient is physically or mentally incapable of signing the ambulance service's claim and that the institution with which I am affiliated has furnished care, services, or assistance to the patient  My signature below is made on behalf of the patient pursuant to 42 CFR §424 36(b)(4)  In accordance with 42 CFR §424 37, the specific reason(s) that the patient is physically or mentally incapable of signing the claim form is as follows: Confused  Signature of Physician* or Healthcare Professional______________________________________________________________  Signature Date 08/13/21 (For scheduled repetitive transports, this form is not valid for transports performed more than 60 days after this date)    Printed Name & Credentials of Physician or Healthcare Professional (MD, DO, RN, etc )_____Rajwinder Palumbo RN BSN  ___________________________  *Form must be signed by patient's attending physician for scheduled, repetitive transports   For non-repetitive, unscheduled ambulance transports, if unable to obtain the signature of the attending physician, any of the following may sign (choose appropriate option below)  [] Physician Assistant [x]  Clinical Nurse Specialist [x]  Registered Nurse  []  Nurse Practitioner  [] Discharge Planner

## 2021-08-13 NOTE — PROGRESS NOTES
Pt discharged to [place] [address] via ambulance drivers  All paperwork faxed and sent with drivers    D/trey saline lock with cath intact

## 2021-12-21 ENCOUNTER — HOSPITAL ENCOUNTER (OUTPATIENT)
Dept: INFUSION CENTER | Facility: HOSPITAL | Age: 81
Discharge: HOME | End: 2021-12-21
Payer: MEDICARE

## 2021-12-21 VITALS
HEART RATE: 74 BPM | OXYGEN SATURATION: 98 % | RESPIRATION RATE: 18 BRPM | TEMPERATURE: 97.7 F | SYSTOLIC BLOOD PRESSURE: 170 MMHG | DIASTOLIC BLOOD PRESSURE: 74 MMHG

## 2021-12-21 PROCEDURE — M0245 HB BAMLAN AND ETESEV INF ADMIN: HCPCS | Performed by: INTERNAL MEDICINE

## 2021-12-21 RX ORDER — ONDANSETRON 2 MG/ML
4 INJECTION INTRAMUSCULAR; INTRAVENOUS ONCE AS NEEDED
Status: DISCONTINUED | OUTPATIENT
Start: 2021-12-21 | End: 2021-12-24 | Stop reason: HOSPADM

## 2021-12-21 RX ORDER — ALBUTEROL SULFATE 90 UG/1
3 AEROSOL, METERED RESPIRATORY (INHALATION) ONCE AS NEEDED
Status: DISCONTINUED | OUTPATIENT
Start: 2021-12-21 | End: 2021-12-24 | Stop reason: HOSPADM

## 2021-12-21 RX ORDER — ACETAMINOPHEN 325 MG/1
650 TABLET ORAL ONCE AS NEEDED
Status: DISCONTINUED | OUTPATIENT
Start: 2021-12-21 | End: 2021-12-24 | Stop reason: HOSPADM

## 2021-12-21 RX ORDER — SODIUM CHLORIDE 9 MG/ML
20 INJECTION, SOLUTION INTRAVENOUS CONTINUOUS
Status: DISCONTINUED | OUTPATIENT
Start: 2021-12-21 | End: 2021-12-24 | Stop reason: HOSPADM

## 2021-12-21 RX ADMIN — SODIUM CHLORIDE 2100 MG COMBINED: 9 INJECTION, SOLUTION INTRAVENOUS at 18:05

## 2021-12-21 RX ADMIN — SODIUM CHLORIDE 20 ML/HR: 0.9 INJECTION, SOLUTION INTRAVENOUS at 18:03

## 2022-10-11 ENCOUNTER — APPOINTMENT (OUTPATIENT)
Dept: RADIOLOGY | Facility: HOSPITAL | Age: 82
DRG: 481 | End: 2022-10-11
Payer: MEDICARE

## 2022-10-11 ENCOUNTER — HOSPITAL ENCOUNTER (INPATIENT)
Facility: HOSPITAL | Age: 82
LOS: 6 days | DRG: 481 | End: 2022-10-17
Attending: EMERGENCY MEDICINE | Admitting: STUDENT IN AN ORGANIZED HEALTH CARE EDUCATION/TRAINING PROGRAM
Payer: MEDICARE
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
## 2022-10-11 DIAGNOSIS — S62.609A FINGER FRACTURE, LEFT: ICD-10-CM

## 2022-10-11 DIAGNOSIS — S72.143A INTERTROCHANTERIC FRACTURE (HCC): Primary | ICD-10-CM

## 2022-10-11 DIAGNOSIS — N18.31 TYPE 2 DIABETES MELLITUS WITH STAGE 3A CHRONIC KIDNEY DISEASE, WITH LONG-TERM CURRENT USE OF INSULIN (HCC): ICD-10-CM

## 2022-10-11 DIAGNOSIS — S62.625A DISPLACED FRACTURE OF MIDDLE PHALANX OF LEFT RING FINGER, INITIAL ENCOUNTER FOR CLOSED FRACTURE: ICD-10-CM

## 2022-10-11 DIAGNOSIS — Z79.4 TYPE 2 DIABETES MELLITUS WITH STAGE 3A CHRONIC KIDNEY DISEASE, WITH LONG-TERM CURRENT USE OF INSULIN (HCC): ICD-10-CM

## 2022-10-11 DIAGNOSIS — C91.10 CLL (CHRONIC LYMPHOCYTIC LEUKEMIA) (HCC): ICD-10-CM

## 2022-10-11 DIAGNOSIS — E11.22 TYPE 2 DIABETES MELLITUS WITH STAGE 3A CHRONIC KIDNEY DISEASE, WITH LONG-TERM CURRENT USE OF INSULIN (HCC): ICD-10-CM

## 2022-10-11 PROBLEM — S72.101D CLOSED FRACTURE OF TROCHANTER OF RIGHT FEMUR WITH ROUTINE HEALING: Status: ACTIVE | Noted: 2022-10-11

## 2022-10-11 LAB
2HR DELTA HS TROPONIN: 5 NG/L
ALBUMIN SERPL BCP-MCNC: 4.6 G/DL (ref 3.5–5)
ALP SERPL-CCNC: 86 U/L (ref 34–104)
ALT SERPL W P-5'-P-CCNC: 28 U/L (ref 7–52)
ANION GAP SERPL CALCULATED.3IONS-SCNC: 10 MMOL/L (ref 4–13)
APTT PPP: 31 SECONDS (ref 23–37)
AST SERPL W P-5'-P-CCNC: 21 U/L (ref 13–39)
BASOPHILS # BLD MANUAL: 0 THOUSAND/UL (ref 0–0.1)
BASOPHILS NFR MAR MANUAL: 0 % (ref 0–1)
BILIRUB SERPL-MCNC: 0.5 MG/DL (ref 0.2–1)
BUN SERPL-MCNC: 23 MG/DL (ref 5–25)
CALCIUM SERPL-MCNC: 10 MG/DL (ref 8.4–10.2)
CARDIAC TROPONIN I PNL SERPL HS: 14 NG/L
CARDIAC TROPONIN I PNL SERPL HS: 19 NG/L
CHLORIDE SERPL-SCNC: 100 MMOL/L (ref 96–108)
CO2 SERPL-SCNC: 28 MMOL/L (ref 21–32)
CREAT SERPL-MCNC: 1.07 MG/DL (ref 0.6–1.3)
EOSINOPHIL # BLD MANUAL: 0.22 THOUSAND/UL (ref 0–0.4)
EOSINOPHIL NFR BLD MANUAL: 1 % (ref 0–6)
ERYTHROCYTE [DISTWIDTH] IN BLOOD BY AUTOMATED COUNT: 13.5 % (ref 11.6–15.1)
FLUAV RNA RESP QL NAA+PROBE: NEGATIVE
FLUBV RNA RESP QL NAA+PROBE: NEGATIVE
GFR SERPL CREATININE-BSD FRML MDRD: 48 ML/MIN/1.73SQ M
GLUCOSE SERPL-MCNC: 181 MG/DL (ref 65–140)
HCT VFR BLD AUTO: 44.6 % (ref 34.8–46.1)
HGB BLD-MCNC: 14.4 G/DL (ref 11.5–15.4)
INR PPP: 0.89 (ref 0.84–1.19)
LYMPHOCYTES # BLD AUTO: 15.39 THOUSAND/UL (ref 0.6–4.47)
LYMPHOCYTES # BLD AUTO: 69 % (ref 14–44)
MCH RBC QN AUTO: 33.1 PG (ref 26.8–34.3)
MCHC RBC AUTO-ENTMCNC: 32.3 G/DL (ref 31.4–37.4)
MCV RBC AUTO: 103 FL (ref 82–98)
MONOCYTES # BLD AUTO: 0.67 THOUSAND/UL (ref 0–1.22)
MONOCYTES NFR BLD: 3 % (ref 4–12)
NEUTROPHILS # BLD MANUAL: 6.02 THOUSAND/UL (ref 1.85–7.62)
NEUTS SEG NFR BLD AUTO: 27 % (ref 43–75)
PLATELET # BLD AUTO: 210 THOUSANDS/UL (ref 149–390)
PLATELET # BLD AUTO: 237 THOUSANDS/UL (ref 149–390)
PLATELET BLD QL SMEAR: ADEQUATE
PMV BLD AUTO: 11.6 FL (ref 8.9–12.7)
PMV BLD AUTO: 11.8 FL (ref 8.9–12.7)
POTASSIUM SERPL-SCNC: 4 MMOL/L (ref 3.5–5.3)
PROT SERPL-MCNC: 7.7 G/DL (ref 6.4–8.4)
PROTHROMBIN TIME: 12.3 SECONDS (ref 11.6–14.5)
RBC # BLD AUTO: 4.35 MILLION/UL (ref 3.81–5.12)
RSV RNA RESP QL NAA+PROBE: NEGATIVE
SARS-COV-2 RNA RESP QL NAA+PROBE: NEGATIVE
SODIUM SERPL-SCNC: 138 MMOL/L (ref 135–147)
WBC # BLD AUTO: 22.3 THOUSAND/UL (ref 4.31–10.16)

## 2022-10-11 PROCEDURE — 86900 BLOOD TYPING SEROLOGIC ABO: CPT

## 2022-10-11 PROCEDURE — 73552 X-RAY EXAM OF FEMUR 2/>: CPT

## 2022-10-11 PROCEDURE — 99222 1ST HOSP IP/OBS MODERATE 55: CPT | Performed by: STUDENT IN AN ORGANIZED HEALTH CARE EDUCATION/TRAINING PROGRAM

## 2022-10-11 PROCEDURE — 96374 THER/PROPH/DIAG INJ IV PUSH: CPT

## 2022-10-11 PROCEDURE — 0241U HB NFCT DS VIR RESP RNA 4 TRGT: CPT | Performed by: EMERGENCY MEDICINE

## 2022-10-11 PROCEDURE — 85049 AUTOMATED PLATELET COUNT: CPT

## 2022-10-11 PROCEDURE — 72170 X-RAY EXAM OF PELVIS: CPT

## 2022-10-11 PROCEDURE — 85610 PROTHROMBIN TIME: CPT | Performed by: EMERGENCY MEDICINE

## 2022-10-11 PROCEDURE — 99282 EMERGENCY DEPT VISIT SF MDM: CPT | Performed by: EMERGENCY MEDICINE

## 2022-10-11 PROCEDURE — 99285 EMERGENCY DEPT VISIT HI MDM: CPT

## 2022-10-11 PROCEDURE — 80053 COMPREHEN METABOLIC PANEL: CPT | Performed by: EMERGENCY MEDICINE

## 2022-10-11 PROCEDURE — 85007 BL SMEAR W/DIFF WBC COUNT: CPT | Performed by: EMERGENCY MEDICINE

## 2022-10-11 PROCEDURE — 85730 THROMBOPLASTIN TIME PARTIAL: CPT | Performed by: EMERGENCY MEDICINE

## 2022-10-11 PROCEDURE — 36415 COLL VENOUS BLD VENIPUNCTURE: CPT | Performed by: EMERGENCY MEDICINE

## 2022-10-11 PROCEDURE — 86850 RBC ANTIBODY SCREEN: CPT

## 2022-10-11 PROCEDURE — 86901 BLOOD TYPING SEROLOGIC RH(D): CPT

## 2022-10-11 PROCEDURE — 84484 ASSAY OF TROPONIN QUANT: CPT | Performed by: EMERGENCY MEDICINE

## 2022-10-11 PROCEDURE — 85027 COMPLETE CBC AUTOMATED: CPT | Performed by: EMERGENCY MEDICINE

## 2022-10-11 PROCEDURE — 93005 ELECTROCARDIOGRAM TRACING: CPT

## 2022-10-11 PROCEDURE — 73130 X-RAY EXAM OF HAND: CPT

## 2022-10-11 RX ORDER — GABAPENTIN 100 MG/1
100 CAPSULE ORAL
Status: DISCONTINUED | OUTPATIENT
Start: 2022-10-11 | End: 2022-10-17 | Stop reason: HOSPADM

## 2022-10-11 RX ORDER — DOCUSATE SODIUM 100 MG/1
100 CAPSULE, LIQUID FILLED ORAL 2 TIMES DAILY
Status: DISCONTINUED | OUTPATIENT
Start: 2022-10-11 | End: 2022-10-17 | Stop reason: HOSPADM

## 2022-10-11 RX ORDER — OXYCODONE HYDROCHLORIDE 5 MG/1
5 TABLET ORAL EVERY 4 HOURS PRN
Status: DISCONTINUED | OUTPATIENT
Start: 2022-10-11 | End: 2022-10-17 | Stop reason: HOSPADM

## 2022-10-11 RX ORDER — ENOXAPARIN SODIUM 100 MG/ML
30 INJECTION SUBCUTANEOUS EVERY 12 HOURS
Status: DISCONTINUED | OUTPATIENT
Start: 2022-10-11 | End: 2022-10-17 | Stop reason: HOSPADM

## 2022-10-11 RX ORDER — OXYCODONE HYDROCHLORIDE 5 MG/1
2.5 TABLET ORAL EVERY 4 HOURS PRN
Status: DISCONTINUED | OUTPATIENT
Start: 2022-10-11 | End: 2022-10-17 | Stop reason: HOSPADM

## 2022-10-11 RX ORDER — ACETAMINOPHEN 325 MG/1
975 TABLET ORAL EVERY 8 HOURS SCHEDULED
Status: DISCONTINUED | OUTPATIENT
Start: 2022-10-11 | End: 2022-10-17 | Stop reason: HOSPADM

## 2022-10-11 RX ORDER — ACETAMINOPHEN 325 MG/1
975 TABLET ORAL ONCE
Status: COMPLETED | OUTPATIENT
Start: 2022-10-11 | End: 2022-10-11

## 2022-10-11 RX ORDER — LIDOCAINE 50 MG/G
1 PATCH TOPICAL DAILY
Status: DISCONTINUED | OUTPATIENT
Start: 2022-10-12 | End: 2022-10-17 | Stop reason: HOSPADM

## 2022-10-11 RX ORDER — AMOXICILLIN 250 MG
1 CAPSULE ORAL
Status: DISCONTINUED | OUTPATIENT
Start: 2022-10-11 | End: 2022-10-17 | Stop reason: HOSPADM

## 2022-10-11 RX ADMIN — SENNOSIDES AND DOCUSATE SODIUM 1 TABLET: 8.6; 5 TABLET ORAL at 22:22

## 2022-10-11 RX ADMIN — ACETAMINOPHEN 975 MG: 325 TABLET ORAL at 19:05

## 2022-10-11 RX ADMIN — ENOXAPARIN SODIUM 30 MG: 30 INJECTION SUBCUTANEOUS at 22:21

## 2022-10-11 RX ADMIN — GABAPENTIN 100 MG: 100 CAPSULE ORAL at 22:22

## 2022-10-11 RX ADMIN — OXYCODONE HYDROCHLORIDE 5 MG: 5 TABLET ORAL at 22:24

## 2022-10-11 RX ADMIN — MORPHINE SULFATE 2 MG: 2 INJECTION, SOLUTION INTRAMUSCULAR; INTRAVENOUS at 19:05

## 2022-10-11 RX ADMIN — DOCUSATE SODIUM 100 MG: 100 CAPSULE, LIQUID FILLED ORAL at 22:24

## 2022-10-11 NOTE — ED PROVIDER NOTES
History  Chief Complaint   Patient presents with   • Fall     Pt fell outside at home, twisted leg and fell her R hip  C/o R hip and femur pain  Unable to straighten R leg during triage & externally rotated     This is an 60-year-old female with a relevant past medical history of diabetes hypertension, presented to the ED today for complaint of a fall  Patient states that she had a mechanical fall, lost her balance and tripped over something at the house, she fell onto her right hip, and immediately had pain she was unable to get up  She denies losing consciousness, denies hitting her head, and states that she also has pain in her left 4th finger  She denies any fever, chills, sweats, chest pain, shortness of breath, dysuria frequency hesitancy, focal weakness, focal numbness or tingling, or any other significantly related symptoms  Her pain is a 4-5 in intensity, increasing to a 9/10 intensity, with any type of movement  She denies any alleviating factors  Prior to Admission Medications   Prescriptions Last Dose Informant Patient Reported?  Taking?   aspirin (ECOTRIN LOW STRENGTH) 81 mg EC tablet  Self Yes No   Sig: Take 81 mg by mouth daily   atorvastatin (LIPITOR) 20 mg tablet  Self Yes No   Sig: Take 20 mg by mouth daily   calcium carbonate (OS-JUAN RAMON) 600 MG tablet  Self Yes No   Sig: Take 600 mg by mouth 2 (two) times a day with meals   cholecalciferol (VITAMIN D3) 1,000 units tablet   Yes No   Sig: Take 1,000 Units by mouth daily   fluticasone (FLONASE) 50 mcg/act nasal spray  Self Yes No   Si spray into each nostril daily   furosemide (LASIX) 40 mg tablet   No No   Sig: Take 1 tablet (40 mg total) by mouth daily   glucose blood test strip  Self Yes No   Si each by Other route daily as needed Use as instructed   glucose monitoring kit (FREESTYLE) monitoring kit  Self Yes No   Si each by Does not apply route as needed   insulin detemir (LEVEMIR) 100 units/mL subcutaneous injection   No No Sig: Inject 14 Units under the skin daily at bedtime   insulin lispro (HumaLOG) 100 units/mL injection   No No   Sig: Inject 6 Units under the skin 3 (three) times a day with meals   losartan-hydrochlorothiazide (HYZAAR) 50-12 5 mg per tablet   No No   Sig: Take 1 tablet by mouth daily   potassium chloride (K-DUR,KLOR-CON) 20 mEq tablet  Self Yes No   Sig: Take 20 mEq by mouth 2 (two) times a day   vitamin B-12 (VITAMIN B-12) 1,000 mcg tablet  Self Yes No   Sig: Take by mouth daily      Facility-Administered Medications: None       Past Medical History:   Diagnosis Date   • Benign adenomatous polyp of large intestine    • Diabetes mellitus ([facility] [address] )    • Hyperlipemia    • Hypertension    • Osteoarthritis    • TIA (transient ischemic attack)        Past Surgical History:   Procedure Laterality Date   • APPENDECTOMY     • CARPAL TUNNEL RELEASE Right    • HYSTERECTOMY W/ SALPINGO-OOPHERECTOMY         Family History   Problem Relation Age of Onset   • Heart disease Mother    • Heart disease Father    • Hypertension Father    • Stomach cancer Sister    • Ovarian cancer Sister    • Hypertension Sister      I have reviewed and agree with the history as documented  E-Cigarette/Vaping   • E-Cigarette Use Never User      E-Cigarette/Vaping Substances     Social History     Tobacco Use   • Smoking status: Never Smoker   • Smokeless tobacco: Never Used   Vaping Use   • Vaping Use: Never used   Substance Use Topics   • Alcohol use: Not Currently     Comment: very seldom   • Drug use: Never       Review of Systems   Constitutional: Negative for activity change, chills and fever  HENT: Negative for congestion and rhinorrhea  Eyes: Negative for photophobia and visual disturbance  Respiratory: Negative for cough, chest tightness and shortness of breath  Cardiovascular: Negative for chest pain and leg swelling  Gastrointestinal: Negative for abdominal distention, nausea and vomiting     Genitourinary: Negative for dysuria and frequency  Musculoskeletal: Negative for back pain and neck stiffness  Skin: Negative for rash and wound  Neurological: Negative for dizziness and weakness  Psychiatric/Behavioral: Negative for agitation and suicidal ideas  Physical Exam  Physical Exam  Vitals and nursing note reviewed  Constitutional:       General: She is not in acute distress  Appearance: Normal appearance  She is obese  She is not ill-appearing  HENT:      Head: Normocephalic and atraumatic  Right Ear: External ear normal       Left Ear: External ear normal       Nose: Nose normal  No congestion or rhinorrhea  Mouth/Throat:      Mouth: Mucous membranes are moist       Pharynx: Oropharynx is clear  No oropharyngeal exudate  Eyes:      General: No scleral icterus  Conjunctiva/sclera: Conjunctivae normal    Cardiovascular:      Rate and Rhythm: Normal rate and regular rhythm  Pulses: Normal pulses  Heart sounds: Normal heart sounds  No murmur heard  No gallop  Pulmonary:      Effort: Pulmonary effort is normal  No respiratory distress  Breath sounds: Normal breath sounds  No stridor  No wheezing or rhonchi  Abdominal:      General: Abdomen is flat  Bowel sounds are normal  There is no distension  Palpations: Abdomen is soft  There is no mass  Tenderness: There is no abdominal tenderness  Musculoskeletal:         General: Tenderness (In the right hip, left 4th finger ) present  No swelling  Cervical back: Normal range of motion and neck supple  No tenderness  Right lower leg: No edema  Left lower leg: No edema  Comments: Right hip shortened, externally rotated  Significant tenderness to palpation in the right lateral hip  Tenderness to palpation, swelling of the left 4th proximal phalanx  Skin:     General: Skin is warm and dry  Capillary Refill: Capillary refill takes less than 2 seconds  Coloration: Skin is not jaundiced        Findings: No bruising  Neurological:      General: No focal deficit present  Mental Status: She is alert and oriented to person, place, and time  Mental status is at baseline  Sensory: No sensory deficit  Motor: No weakness  Psychiatric:         Mood and Affect: Mood normal          Behavior: Behavior normal          Thought Content: Thought content normal          Judgment: Judgment normal          Vital Signs  ED Triage Vitals   Temperature Pulse Respirations Blood Pressure SpO2   10/11/22 1808 10/11/22 1754 10/11/22 1754 10/11/22 1754 10/11/22 1754   98 2 °F (36 8 °C) 77 22 (!) 204/84 95 %      Temp Source Heart Rate Source Patient Position - Orthostatic VS BP Location FiO2 (%)   10/11/22 1808 10/11/22 1754 10/11/22 1754 10/11/22 1754 --   Oral Monitor Sitting Right arm       Pain Score       10/11/22 1808       10 - Worst Possible Pain           Vitals:    10/11/22 1754   BP: (!) 204/84   Pulse: 77   Patient Position - Orthostatic VS: Sitting         Visual Acuity      ED Medications  Medications - No data to display    Diagnostic Studies  Results Reviewed     None                 No orders to display              Procedures  Procedures         ED Course                               SBIRT 22yo+    Flowsheet Row Most Recent Value   SBIRT (23 yo +)    In order to provide better care to our patients, we are screening all of our patients for alcohol and drug use  Would it be okay to ask you these screening questions? Unable to answer at this time Filed at: 10/11/2022 1809                    MDM  Number of Diagnoses or Management Options  Fall, initial encounter  Finger fracture, left  Intertrochanteric fracture [facility])  Diagnosis management comments: This is an 79-year-old female presented to the ED for a fall  Patient fell onto her right hip, and was unable to get up  She did not lose consciousness, did not sustain any head trauma    She complains of significant pain in her right hip, and her right leg is shortened and externally rotated  She also has tenderness to palpation of the proximal 4th phalanx  The rest of her physical exam for the most part is unremarkable  Her differential diagnosis includes:  Hip fracture versus phalanx fracture versus contusion versus other  Patient had x-ray showing a right intertrochanteric fracture  She also had proximal 4th phalanx fracture  Patient was evaluated by Trauma, and was taken onto their service for further management  Disposition  Final diagnoses:   None     ED Disposition     None      Follow-up Information    None         Patient's Medications   Discharge Prescriptions    No medications on file       No discharge procedures on file      PDMP Review     None          ED Provider  Electronically Signed by           Jessica Briseno MD  10/12/22 3282

## 2022-10-12 ENCOUNTER — ANESTHESIA EVENT (INPATIENT)
Dept: PERIOP | Facility: HOSPITAL | Age: 82
DRG: 481 | End: 2022-10-12
Payer: MEDICARE

## 2022-10-12 ENCOUNTER — TELEPHONE (OUTPATIENT)
Dept: CT IMAGING | Facility: HOSPITAL | Age: 82
End: 2022-10-12

## 2022-10-12 ENCOUNTER — ANESTHESIA (INPATIENT)
Dept: PERIOP | Facility: HOSPITAL | Age: 82
DRG: 481 | End: 2022-10-12
Payer: MEDICARE

## 2022-10-12 ENCOUNTER — EPISODE CHANGES (OUTPATIENT)
Dept: CASE MANAGEMENT | Facility: OTHER | Age: 82
End: 2022-10-12

## 2022-10-12 ENCOUNTER — APPOINTMENT (INPATIENT)
Dept: CT IMAGING | Facility: HOSPITAL | Age: 82
DRG: 481 | End: 2022-10-12
Payer: MEDICARE

## 2022-10-12 ENCOUNTER — APPOINTMENT (OUTPATIENT)
Dept: RADIOLOGY | Facility: HOSPITAL | Age: 82
DRG: 481 | End: 2022-10-12
Payer: MEDICARE

## 2022-10-12 LAB
4HR DELTA HS TROPONIN: 4 NG/L
ABO GROUP BLD: NORMAL
ABO GROUP BLD: NORMAL
ANION GAP SERPL CALCULATED.3IONS-SCNC: 10 MMOL/L (ref 4–13)
BLD GP AB SCN SERPL QL: NEGATIVE
BUN SERPL-MCNC: 22 MG/DL (ref 5–25)
CALCIUM SERPL-MCNC: 9.2 MG/DL (ref 8.4–10.2)
CARDIAC TROPONIN I PNL SERPL HS: 18 NG/L
CHLORIDE SERPL-SCNC: 100 MMOL/L (ref 96–108)
CO2 SERPL-SCNC: 25 MMOL/L (ref 21–32)
CREAT SERPL-MCNC: 0.94 MG/DL (ref 0.6–1.3)
ERYTHROCYTE [DISTWIDTH] IN BLOOD BY AUTOMATED COUNT: 13.5 % (ref 11.6–15.1)
EST. AVERAGE GLUCOSE BLD GHB EST-MCNC: 171 MG/DL
GFR SERPL CREATININE-BSD FRML MDRD: 56 ML/MIN/1.73SQ M
GLUCOSE SERPL-MCNC: 274 MG/DL (ref 65–140)
GLUCOSE SERPL-MCNC: 276 MG/DL (ref 65–140)
GLUCOSE SERPL-MCNC: 299 MG/DL (ref 65–140)
GLUCOSE SERPL-MCNC: 303 MG/DL (ref 65–140)
GLUCOSE SERPL-MCNC: 322 MG/DL (ref 65–140)
GLUCOSE SERPL-MCNC: 338 MG/DL (ref 65–140)
GLUCOSE SERPL-MCNC: 354 MG/DL (ref 65–140)
HBA1C MFR BLD: 7.6 %
HCT VFR BLD AUTO: 39.1 % (ref 34.8–46.1)
HGB BLD-MCNC: 13 G/DL (ref 11.5–15.4)
MCH RBC QN AUTO: 33.2 PG (ref 26.8–34.3)
MCHC RBC AUTO-ENTMCNC: 33.2 G/DL (ref 31.4–37.4)
MCV RBC AUTO: 100 FL (ref 82–98)
PLATELET # BLD AUTO: 206 THOUSANDS/UL (ref 149–390)
PMV BLD AUTO: 11.3 FL (ref 8.9–12.7)
POTASSIUM SERPL-SCNC: 4.1 MMOL/L (ref 3.5–5.3)
RBC # BLD AUTO: 3.92 MILLION/UL (ref 3.81–5.12)
RH BLD: NEGATIVE
RH BLD: NEGATIVE
SODIUM SERPL-SCNC: 135 MMOL/L (ref 135–147)
SPECIMEN EXPIRATION DATE: NORMAL
WBC # BLD AUTO: 15.73 THOUSAND/UL (ref 4.31–10.16)

## 2022-10-12 PROCEDURE — 80048 BASIC METABOLIC PNL TOTAL CA: CPT | Performed by: PHYSICIAN ASSISTANT

## 2022-10-12 PROCEDURE — 99222 1ST HOSP IP/OBS MODERATE 55: CPT | Performed by: INTERNAL MEDICINE

## 2022-10-12 PROCEDURE — 83036 HEMOGLOBIN GLYCOSYLATED A1C: CPT | Performed by: STUDENT IN AN ORGANIZED HEALTH CARE EDUCATION/TRAINING PROGRAM

## 2022-10-12 PROCEDURE — G1004 CDSM NDSC: HCPCS

## 2022-10-12 PROCEDURE — 72192 CT PELVIS W/O DYE: CPT

## 2022-10-12 PROCEDURE — C1713 ANCHOR/SCREW BN/BN,TIS/BN: HCPCS | Performed by: STUDENT IN AN ORGANIZED HEALTH CARE EDUCATION/TRAINING PROGRAM

## 2022-10-12 PROCEDURE — 84484 ASSAY OF TROPONIN QUANT: CPT | Performed by: EMERGENCY MEDICINE

## 2022-10-12 PROCEDURE — 85027 COMPLETE CBC AUTOMATED: CPT | Performed by: PHYSICIAN ASSISTANT

## 2022-10-12 PROCEDURE — 36415 COLL VENOUS BLD VENIPUNCTURE: CPT | Performed by: EMERGENCY MEDICINE

## 2022-10-12 PROCEDURE — 99223 1ST HOSP IP/OBS HIGH 75: CPT | Performed by: STUDENT IN AN ORGANIZED HEALTH CARE EDUCATION/TRAINING PROGRAM

## 2022-10-12 PROCEDURE — 27245 TREAT THIGH FRACTURE: CPT | Performed by: PHYSICIAN ASSISTANT

## 2022-10-12 PROCEDURE — 27245 TREAT THIGH FRACTURE: CPT | Performed by: STUDENT IN AN ORGANIZED HEALTH CARE EDUCATION/TRAINING PROGRAM

## 2022-10-12 PROCEDURE — 0QS836Z REPOSITION RIGHT FEMORAL SHAFT WITH INTRAMEDULLARY INTERNAL FIXATION DEVICE, PERCUTANEOUS APPROACH: ICD-10-PCS | Performed by: STUDENT IN AN ORGANIZED HEALTH CARE EDUCATION/TRAINING PROGRAM

## 2022-10-12 PROCEDURE — 73552 X-RAY EXAM OF FEMUR 2/>: CPT

## 2022-10-12 PROCEDURE — C1769 GUIDE WIRE: HCPCS | Performed by: STUDENT IN AN ORGANIZED HEALTH CARE EDUCATION/TRAINING PROGRAM

## 2022-10-12 PROCEDURE — 99232 SBSQ HOSP IP/OBS MODERATE 35: CPT | Performed by: EMERGENCY MEDICINE

## 2022-10-12 PROCEDURE — 82948 REAGENT STRIP/BLOOD GLUCOSE: CPT

## 2022-10-12 PROCEDURE — NC001 PR NO CHARGE: Performed by: INTERNAL MEDICINE

## 2022-10-12 DEVICE — 10MM/130 DEG TI CANN TFNA 380MM/RIGHT - STERILE
Type: IMPLANTABLE DEVICE | Site: LEG | Status: FUNCTIONAL
Brand: TFN-ADVANCE

## 2022-10-12 DEVICE — 5.0MM TI LOCKING SCREW W/T25 STARDRIVE 42MM F/IM NAIL-STER: Type: IMPLANTABLE DEVICE | Site: LEG | Status: FUNCTIONAL

## 2022-10-12 DEVICE — TFNA FENESTRATED HELICAL BLADE 95MM - STERILE
Type: IMPLANTABLE DEVICE | Site: LEG | Status: FUNCTIONAL
Brand: TFN-ADVANCE

## 2022-10-12 DEVICE — 5.0MM TI LOCKING SCREW W/T25 STARDRIVE 38MM F/IM NAIL-STER: Type: IMPLANTABLE DEVICE | Site: LEG | Status: FUNCTIONAL

## 2022-10-12 RX ORDER — CEFAZOLIN SODIUM 2 G/50ML
2000 SOLUTION INTRAVENOUS
Status: DISCONTINUED | OUTPATIENT
Start: 2022-10-12 | End: 2022-10-13

## 2022-10-12 RX ORDER — SODIUM CHLORIDE 9 MG/ML
INJECTION, SOLUTION INTRAVENOUS CONTINUOUS PRN
Status: DISCONTINUED | OUTPATIENT
Start: 2022-10-12 | End: 2022-10-12

## 2022-10-12 RX ORDER — SODIUM CHLORIDE, SODIUM GLUCONATE, SODIUM ACETATE, POTASSIUM CHLORIDE, MAGNESIUM CHLORIDE, SODIUM PHOSPHATE, DIBASIC, AND POTASSIUM PHOSPHATE .53; .5; .37; .037; .03; .012; .00082 G/100ML; G/100ML; G/100ML; G/100ML; G/100ML; G/100ML; G/100ML
50 INJECTION, SOLUTION INTRAVENOUS CONTINUOUS
Status: DISPENSED | OUTPATIENT
Start: 2022-10-12 | End: 2022-10-13

## 2022-10-12 RX ORDER — FENTANYL CITRATE/PF 50 MCG/ML
25 SYRINGE (ML) INJECTION
Status: DISCONTINUED | OUTPATIENT
Start: 2022-10-12 | End: 2022-10-12 | Stop reason: HOSPADM

## 2022-10-12 RX ORDER — HYDROMORPHONE HCL/PF 1 MG/ML
0.5 SYRINGE (ML) INJECTION
Status: DISCONTINUED | OUTPATIENT
Start: 2022-10-12 | End: 2022-10-12

## 2022-10-12 RX ORDER — LABETALOL HYDROCHLORIDE 5 MG/ML
10 INJECTION, SOLUTION INTRAVENOUS ONCE
Status: COMPLETED | OUTPATIENT
Start: 2022-10-12 | End: 2022-10-12

## 2022-10-12 RX ORDER — FENTANYL CITRATE/PF 50 MCG/ML
50 SYRINGE (ML) INJECTION
Status: DISCONTINUED | OUTPATIENT
Start: 2022-10-12 | End: 2022-10-12

## 2022-10-12 RX ORDER — ROCURONIUM BROMIDE 10 MG/ML
INJECTION, SOLUTION INTRAVENOUS AS NEEDED
Status: DISCONTINUED | OUTPATIENT
Start: 2022-10-12 | End: 2022-10-12

## 2022-10-12 RX ORDER — HYDROMORPHONE HCL IN WATER/PF 6 MG/30 ML
0.2 PATIENT CONTROLLED ANALGESIA SYRINGE INTRAVENOUS
Status: DISCONTINUED | OUTPATIENT
Start: 2022-10-12 | End: 2022-10-12 | Stop reason: HOSPADM

## 2022-10-12 RX ORDER — PROPOFOL 10 MG/ML
INJECTION, EMULSION INTRAVENOUS AS NEEDED
Status: DISCONTINUED | OUTPATIENT
Start: 2022-10-12 | End: 2022-10-12

## 2022-10-12 RX ORDER — INSULIN LISPRO 100 [IU]/ML
1-5 INJECTION, SOLUTION INTRAVENOUS; SUBCUTANEOUS EVERY 6 HOURS SCHEDULED
Status: DISCONTINUED | OUTPATIENT
Start: 2022-10-12 | End: 2022-10-13

## 2022-10-12 RX ORDER — MAGNESIUM HYDROXIDE 1200 MG/15ML
LIQUID ORAL AS NEEDED
Status: DISCONTINUED | OUTPATIENT
Start: 2022-10-12 | End: 2022-10-12 | Stop reason: HOSPADM

## 2022-10-12 RX ORDER — LIDOCAINE HYDROCHLORIDE 10 MG/ML
INJECTION, SOLUTION EPIDURAL; INFILTRATION; INTRACAUDAL; PERINEURAL AS NEEDED
Status: DISCONTINUED | OUTPATIENT
Start: 2022-10-12 | End: 2022-10-12

## 2022-10-12 RX ORDER — HYDROMORPHONE HCL/PF 1 MG/ML
SYRINGE (ML) INJECTION AS NEEDED
Status: DISCONTINUED | OUTPATIENT
Start: 2022-10-12 | End: 2022-10-12

## 2022-10-12 RX ORDER — CEFAZOLIN SODIUM 1 G/50ML
1000 SOLUTION INTRAVENOUS EVERY 8 HOURS
Status: COMPLETED | OUTPATIENT
Start: 2022-10-12 | End: 2022-10-13

## 2022-10-12 RX ORDER — FENTANYL CITRATE 50 UG/ML
INJECTION, SOLUTION INTRAMUSCULAR; INTRAVENOUS AS NEEDED
Status: DISCONTINUED | OUTPATIENT
Start: 2022-10-12 | End: 2022-10-12

## 2022-10-12 RX ORDER — SODIUM CHLORIDE, SODIUM LACTATE, POTASSIUM CHLORIDE, CALCIUM CHLORIDE 600; 310; 30; 20 MG/100ML; MG/100ML; MG/100ML; MG/100ML
INJECTION, SOLUTION INTRAVENOUS CONTINUOUS PRN
Status: DISCONTINUED | OUTPATIENT
Start: 2022-10-12 | End: 2022-10-12

## 2022-10-12 RX ORDER — METOCLOPRAMIDE HYDROCHLORIDE 5 MG/ML
10 INJECTION INTRAMUSCULAR; INTRAVENOUS ONCE AS NEEDED
Status: COMPLETED | OUTPATIENT
Start: 2022-10-12 | End: 2022-10-12

## 2022-10-12 RX ADMIN — ROCURONIUM BROMIDE 30 MG: 10 INJECTION, SOLUTION INTRAVENOUS at 15:23

## 2022-10-12 RX ADMIN — INSULIN LISPRO 3 UNITS: 100 INJECTION, SOLUTION INTRAVENOUS; SUBCUTANEOUS at 08:42

## 2022-10-12 RX ADMIN — CEFAZOLIN SODIUM 2000 MG: 2 SOLUTION INTRAVENOUS at 15:30

## 2022-10-12 RX ADMIN — METOCLOPRAMIDE 10 MG: 5 INJECTION, SOLUTION INTRAMUSCULAR; INTRAVENOUS at 17:10

## 2022-10-12 RX ADMIN — CARBAMIDE PEROXIDE 6.5% 5 DROP: 6.5 LIQUID AURICULAR (OTIC) at 18:16

## 2022-10-12 RX ADMIN — LABETALOL HYDROCHLORIDE 10 MG: 5 INJECTION, SOLUTION INTRAVENOUS at 17:17

## 2022-10-12 RX ADMIN — LIDOCAINE HYDROCHLORIDE 50 MG: 10 INJECTION, SOLUTION EPIDURAL; INFILTRATION; INTRACAUDAL; PERINEURAL at 15:22

## 2022-10-12 RX ADMIN — GABAPENTIN 100 MG: 100 CAPSULE ORAL at 23:19

## 2022-10-12 RX ADMIN — PROPOFOL 100 MG: 10 INJECTION, EMULSION INTRAVENOUS at 15:23

## 2022-10-12 RX ADMIN — INSULIN LISPRO 3 UNITS: 100 INJECTION, SOLUTION INTRAVENOUS; SUBCUTANEOUS at 11:54

## 2022-10-12 RX ADMIN — SUGAMMADEX 200 MG: 100 INJECTION, SOLUTION INTRAVENOUS at 16:42

## 2022-10-12 RX ADMIN — INSULIN LISPRO 4 UNITS: 100 INJECTION, SOLUTION INTRAVENOUS; SUBCUTANEOUS at 17:04

## 2022-10-12 RX ADMIN — CEFAZOLIN SODIUM 1000 MG: 1 SOLUTION INTRAVENOUS at 23:23

## 2022-10-12 RX ADMIN — ACETAMINOPHEN 975 MG: 325 TABLET ORAL at 23:18

## 2022-10-12 RX ADMIN — OXYCODONE HYDROCHLORIDE 5 MG: 5 TABLET ORAL at 23:20

## 2022-10-12 RX ADMIN — INSULIN LISPRO 3 UNITS: 100 INJECTION, SOLUTION INTRAVENOUS; SUBCUTANEOUS at 18:21

## 2022-10-12 RX ADMIN — FENTANYL CITRATE 50 MCG: 50 INJECTION INTRAMUSCULAR; INTRAVENOUS at 15:22

## 2022-10-12 RX ADMIN — LIDOCAINE 5% 1 PATCH: 700 PATCH TOPICAL at 08:43

## 2022-10-12 RX ADMIN — PHENYLEPHRINE HYDROCHLORIDE 40 MCG/MIN: 10 INJECTION INTRAVENOUS at 15:50

## 2022-10-12 RX ADMIN — ENOXAPARIN SODIUM 30 MG: 30 INJECTION SUBCUTANEOUS at 23:18

## 2022-10-12 RX ADMIN — SODIUM CHLORIDE: 9 INJECTION, SOLUTION INTRAVENOUS at 15:30

## 2022-10-12 RX ADMIN — INSULIN LISPRO 3 UNITS: 100 INJECTION, SOLUTION INTRAVENOUS; SUBCUTANEOUS at 23:24

## 2022-10-12 RX ADMIN — DOCUSATE SODIUM 100 MG: 100 CAPSULE, LIQUID FILLED ORAL at 08:43

## 2022-10-12 RX ADMIN — ENOXAPARIN SODIUM 30 MG: 30 INJECTION SUBCUTANEOUS at 08:43

## 2022-10-12 RX ADMIN — SENNOSIDES AND DOCUSATE SODIUM 1 TABLET: 8.6; 5 TABLET ORAL at 23:19

## 2022-10-12 RX ADMIN — OXYCODONE HYDROCHLORIDE 5 MG: 5 TABLET ORAL at 08:43

## 2022-10-12 RX ADMIN — CARBAMIDE PEROXIDE 6.5% 5 DROP: 6.5 LIQUID AURICULAR (OTIC) at 10:18

## 2022-10-12 RX ADMIN — FENTANYL CITRATE 50 MCG: 50 INJECTION INTRAMUSCULAR; INTRAVENOUS at 15:50

## 2022-10-12 RX ADMIN — SODIUM CHLORIDE, SODIUM GLUCONATE, SODIUM ACETATE, POTASSIUM CHLORIDE, MAGNESIUM CHLORIDE, SODIUM PHOSPHATE, DIBASIC, AND POTASSIUM PHOSPHATE 50 ML/HR: .53; .5; .37; .037; .03; .012; .00082 INJECTION, SOLUTION INTRAVENOUS at 20:30

## 2022-10-12 RX ADMIN — SODIUM CHLORIDE, SODIUM LACTATE, POTASSIUM CHLORIDE, AND CALCIUM CHLORIDE: .6; .31; .03; .02 INJECTION, SOLUTION INTRAVENOUS at 15:15

## 2022-10-12 RX ADMIN — ACETAMINOPHEN 975 MG: 325 TABLET ORAL at 06:16

## 2022-10-12 RX ADMIN — HYDROMORPHONE HYDROCHLORIDE 0.25 MG: 1 INJECTION, SOLUTION INTRAMUSCULAR; INTRAVENOUS; SUBCUTANEOUS at 16:53

## 2022-10-12 NOTE — ASSESSMENT & PLAN NOTE
Takes home medication of Metoprolol tartrate 25 mg BID, Losartan-HCTZ 50-12 5 mg  - Continue home medication

## 2022-10-12 NOTE — TELEPHONE ENCOUNTER
Called both RN assigned to patient and Charge RN phone numbers with no answer  RN assigned to patients room number does not have a name  TT charge RN but have not gotten response back yet

## 2022-10-12 NOTE — ASSESSMENT & PLAN NOTE
Sustained in a fall from standing position  - Orthopedic consult  - Geriatric Pain Management  - Monitor blood pressure  - Monitor CBC  - PT/OT eval

## 2022-10-12 NOTE — ANESTHESIA POSTPROCEDURE EVALUATION
Post-Op Assessment Note    CV Status:  Stable  Pain Score: 0    Pain management: adequate     Mental Status:  Sleepy and arousable   Hydration Status:  Euvolemic   PONV Controlled:  Controlled   Airway Patency:  Patent      Post Op Vitals Reviewed: Yes      Staff: CRNA   Comments: rosa rm - BS= 692        No complications documented      BP   173/74   Temp   97 0   Pulse  81   Resp   13   SpO2   100% 4L

## 2022-10-12 NOTE — ASSESSMENT & PLAN NOTE
Alistair Chito from a standing position after tripping  - Orthopedics consulted for injuries mentioned above  - Geriatric Pain Management protocol in place  - Geriatric Consult placed  - Case Management on board  - PT/OT henok

## 2022-10-12 NOTE — CONSULTS
Consultation - Carlos A Hargrove 79 y o  female MRN: [id number]  Unit/Bed#: W -01 Encounter: [id number]        Consults      Assessment/Plan   1 -closed fracture of trochanter of right femur -patient has been evaluated by Orthopedics they plan to fixate on October 12th, would recommend pain management utilizing geriatric pain protocol   -continue pain control per Geriatric pain protocol:  Tylenol 975mg Q8H scheduled  Roxicodone 2 5mg Q4H PRN moderate pain  Roxicodone 5mg Q4H PRN severe pain  Dilaudid 0 2mg Q2H PRN  -continue adjuncts such as Gabapentin 100mg HS and lidocaine patch topically  -encourage addition of non-pharmacologic pain treatment including ice and frequent repositioning  -recommend  bowel regimen to prevent and treat constipation due to increased risk with acute pain and opiate pain medications    2  -displaced fracture of the middle phalanx of the left finger -patient's fracture has been splinted  3  -essential hypertension  -patient currently on metoprolol tartrate 25 mg twice daily and losartan hydrochlorothiazide 50-12 5 mg  Systolic blood pressure today is 136     4  -diabetes mellitus type 2 -currently insulin requiring will need to monitor hemoglobin A1c every 3 months  Her hemoglobin A1c has ranged from 6 7 back on September 1, 2020 to 8 7 on May 8, 2021 last 1 performed was December 2, 2021 that was 8 0  Should try to maintain blood sugars between 140-180 to reduce risk of hypoglycemia  5 -chronic renal insufficiency stage 3A  -on reviewing her records from [facility] her GFR usually runs in the 52 range  Will need to avoid NSAIDs or any other medication that can affect her renal clearance  6 -abnormal electrocardiogram -patient noted to have poor R-wave progression on the precordial leads  Suspect previous anterior wall MI  Would recommend echocardiogram and Cardiology clearance prior to surgery  7 -chronic lymphocytic leukemia  -apparently patient has recently been diagnosed with this disorder  I see that in the past the patient has had evidence of leukocytosis with a predominance of lymphocytes white counts as high as 22,000 in the past   This will need to be monitored  8 -cognitive impairment -on a previous admission on August 6, 2021 patient had developed acute metabolic encephalopathy had evidence of confusion and restlessness with active hallucinations  At that time precipitating factors were felt to be her hypoglycemia and her skin infection  Patient will need to be placed on delirium precautions per geriatric protocol  Patient had previous CT scan of the head on August 5, 2021 revealing moderate chronic microangiopathic changes  Delirium precautions  -Patient is high risk of delirium due to cognitive decline  -Initiate delirium precautions  -maintain normal sleep/wake cycle  -minimize overnight interruptions, group overnight vitals/labs/nursing checks as possible  -dim lights, close blinds and turn off tv to minimize stimulation and encourage sleep environment in evenings  -ensure that pain is well controlled  consider Tylenol 975mg Q8H scheduled if not already ordered   -monitor for fecal and urinary retention which may precipitate delirium  -encourage early mobilization and ambulation  -provide frequent reorientation and redirection  -encourage family and friends at the bedside to help help calm patient if anxious  -avoid medications which may precipitate or worsen delirium such as tramadol, benzodiazepine, anticholinergics, and benadryl  -encourage hydration and nutrition   -redirect unwanted behaviors as first line, avoid physical restraints, use chemical restraint only if all other attempts have been unsuccessful, would consider Zyprexa 2 5 mg, monitor for orthostatic hypotension and QTc prolongation with repeat dosing, recommend lowest dose possible for shortest duration possible     9 -impaired vision  -patient may use corrective lenses    10 -ambulatory dysfunction with fall -patient to get physical and occupational therapy postop  11 -medication review  -patient currently on Levemir insulin 14 units subQ daily, patient also takes lispro he 6 units under the skin 3 times a day with meals, patient on losartan hydrochlorothiazide 50/12 5 once daily, aspirin 81 mg orally daily, atorvastatin 20 mg orally daily, vitamin D3 1000 International Units daily, Lasix 40 mg daily, K-Dur 20 mEq orally daily, and vitamin B12 1000 mcg orally daily  12 -bilateral cerumen impaction  -will place patient on Debrox        Recommendations    1  -recommend cardiac clearance prior to surgery    2  -hemoglobin A1c    3  -echocardiogram    4  -will do [place] evaluation     5  -Debrox drops to both ears           History of Present Illness   Physician Requesting Consult: Ernesto Murrell DO  Reason for Consult / Principal Problem:  Fall with fracture  Hx and PE limited by:  No limitations  Additional history obtained from:  Patient was able to give me an excellent history      HPI: Sangeeta Soares is a 79y o  year old female who presents to [facility] emergency room after sustaining a mechanical fall  The patient was attempting to getting to her home on the day of admission and tripped falling forward on her walker  She injured her right hip, patient denied any head strike and currently is not on anticoagulation  Patient had increased discomfort with pain ranging 7/10 intermittent in nature and nonradiating  BMP revealed a blood sugar of 322 initial troponin level at 4:00 a m  Was 18 without delta level of 4  At 2:00 a m  Her HS troponin level was 19 with a delta level of 5  X-ray imaging revealed a displaced fracture of the middle phalanx of the left ring finger, also closed fracture of trochanter of the right femur with routine healing    Twelve lead electrocardiogram revealed a normal sinus rhythm suspect first-degree heart block evidence of poor R-wave progression or the precordial leads consistent with possible anterior wall myocardial infarction age undetermined  There is a difference when compared to previous electrocardiogram performed on August 5, 2021 with a was evidence of ST depression in inferior leads with also evidence of T-wave inversion in inferior leads and also in the lateral leads  There was also evidence of LVH with repolarization abnormalities  Patient's other comorbidities include a history of hypertension, hyperlipidemia, diabetes mellitus type 2, chronic renal insufficiency stage IIIA  Patient with history of ambulatory dysfunction requiring assistive devices to ambulate  Patient's last hemoglobin A1c was performed on December 2, 2021 revealing a value of 8  Patient lives by herself in a 2 floor home but she only uses the bottom floor  The patient is  and uses her 's 4 wheeled walker to get around  Memory/Cognitive screening:  Patient gets forgetful her nephew does her shopping and is going to be helping her pay her bills  She does have a good set up to take her medications  Mobility:  Has  ambulatory dysfunction uses a walker to navigate  Falls:  No history of recurrent falls  Assistive Devices:  Utilizes a 4 wheeled walker  Fraility:  No evidence of frailty  Nutrition/weight loss/grocery shopping/meal preparation:  Good nutritional status  Vision impairment:  Some visual impairment uses glasses has decreased vision in her left eye that is currently being followed  Hearing impairment:  No evidence of hearing impairment  Incontinence:  Patient with history of stress incontinence wears pads  Delirium:  Patient with episode of delirium on a previous admission to [place]  Polypharmacy:   No current facility-administered medications on file prior to encounter       Current Outpatient Medications on File Prior to Encounter   Medication Sig Dispense Refill   • calcium carbonate (OS-JUAN RAMON) 600 MG tablet Take 600 mg by mouth 2 (two) times a day with meals     • fluticasone (FLONASE) 50 mcg/act nasal spray 1 spray into each nostril daily     • glucose blood test strip 1 each by Other route daily as needed Use as instructed     • glucose monitoring kit (FREESTYLE) monitoring kit 1 each by Does not apply route as needed     • insulin detemir (LEVEMIR) 100 units/mL subcutaneous injection Inject 14 Units under the skin daily at bedtime (Patient taking differently: Inject 20 Units under the skin daily at bedtime) 10 mL 0   • insulin lispro (HumaLOG) 100 units/mL injection Inject 6 Units under the skin 3 (three) times a day with meals 10 mL 2   • losartan-hydrochlorothiazide (HYZAAR) 50-12 5 mg per tablet Take 1 tablet by mouth daily 30 tablet 0   • metoprolol tartrate (LOPRESSOR) 25 mg tablet Take 10 mg by mouth every 12 (twelve) hours     • aspirin (ECOTRIN LOW STRENGTH) 81 mg EC tablet Take 81 mg by mouth daily     • atorvastatin (LIPITOR) 20 mg tablet Take 20 mg by mouth daily     • cholecalciferol (VITAMIN D3) 1,000 units tablet Take 1,000 Units by mouth daily     • furosemide (LASIX) 40 mg tablet Take 1 tablet (40 mg total) by mouth daily 30 tablet 0   • potassium chloride (K-DUR,KLOR-CON) 20 mEq tablet Take 20 mEq by mouth 2 (two) times a day     • vitamin B-12 (VITAMIN B-12) 1,000 mcg tablet Take by mouth daily       Patients primary residence:  Lives in her own 2 floor home mainly utilizes the bottom floor Lives with:  Patient lives by herself  iADL's:  Patient no longer drives she had been paying her bills  Her nephew will be taken over paying her bills    ADL's:  Patient enjoys her basic activities of daily living    Historical Information   Past medical history:   Past Medical History:   Diagnosis Date   • Benign adenomatous polyp of large intestine    • CHF (congestive heart failure) ([facility] [address] )    • Diabetes mellitus ([facility] )    • Hyperlipemia    • Hypertension    • Osteoarthritis    • TIA (transient ischemic attack)      Past surgical history:   Past Surgical History:   Procedure Laterality Date   • APPENDECTOMY     • CARPAL TUNNEL RELEASE Right    • HYSTERECTOMY W/ SALPINGO-OOPHERECTOMY       Social history:  Social History     Socioeconomic History   • Marital status:      Spouse name: Not on file   • Number of children: Not on file   • Years of education: Not on file   • Highest education level: Not on file   Occupational History   • Not on file   Tobacco Use   • Smoking status: Never Smoker   • Smokeless tobacco: Never Used   Vaping Use   • Vaping Use: Never used   Substance and Sexual Activity   • Alcohol use: Not Currently     Comment: very seldom   • Drug use: Never   • Sexual activity: Not on file   Other Topics Concern   • Not on file   Social History Narrative   • Not on file     Social Determinants of Health     Financial Resource Strain: Not on file   Food Insecurity: Not on file   Transportation Needs: No Transportation Needs   • Lack of Transportation (Medical): No   • Lack of Transportation (Non-Medical): No   Physical Activity: Not on file   Stress: Not on file   Social Connections: Not on file   Intimate Partner Violence: Not on file   Housing Stability: Not on file     Family history:   Family History   Problem Relation Age of Onset   • Heart disease Mother    • Heart disease Father    • Hypertension Father    • Stomach cancer Sister    • Ovarian cancer Sister    • Hypertension Sister        Meds/Allergies   All current active meds have been reviewed       Allergies   Allergen Reactions   • Amlodipine    • Ceftin [Cefuroxime]    • Ciprofloxacin    • Citalopram    • Dye [Iodinated Diagnostic Agents]    • Glucophage [Metformin]    • Januvia [Sitagliptin]    • Neomycin-Polymyxin-Dexameth    • Ondansetron    • Prilosec [Omeprazole]    • Vibramycin [Doxycycline]        Objective   Vitals:    10/12/22 0727   BP: 136/62   Pulse: 95   Resp: 19   Temp: 98 4 °F (36 9 °C)   SpO2: 95%       Intake/Output Summary (Last 24 hours) at 10/12/2022 0802  Last data filed at 10/12/2022 0747  Gross per 24 hour   Intake 0 ml   Output 1150 ml   Net -1150 ml     Invasive Devices  Report    Peripheral Intravenous Line  Duration           Peripheral IV 10/11/22 Right Antecubital <1 day                Physical Exam    Lab Results:   I have personally reviewed pertinent lab and imaging results  VTE Prophylaxis:  Patient currently on Lovenox 30 mg subQ every 12 hours    Code Status: Level 1 - Full Code  Advance Directive and Living Will:      Power of :    POLST:      Family and Social Support:  Patient's nephew Mahin Beard phone #[phone] is her main caregiver  Living Arrangements: Lives Alone  Support Systems: Friends/neighbors;  Family members  Assistance Needed: complete at this time  Type of Current Residence: Private residence  [address]: No

## 2022-10-12 NOTE — PHYSICAL THERAPY NOTE
PHYSICAL THERAPY CANCELLATION NOTE    Patient Name: Moustapha Hudson  RHYUW'U Date: 10/12/2022       10/12/22 0802   PT Last Visit   PT Visit Date 10/12/22   Note Type   Note type Cancelled Session   Cancel Reasons Patient to operating room   Additional Comments PT orders received, chart review performed   Pt tentative to OR today for operative fixation of R femur fracture, will f/u post-op to perform PT henok Chan, PT, DPT

## 2022-10-12 NOTE — CONSULTS
Consultation - Padmini Kyle 79 y o  female MRN: [id number]  Unit/Bed#: W -01 Encounter: [id number]        Inpatient consult to Gerontology  Consult performed by: Josy Tan MD  Consult ordered by: Josy Tan MD            Assessment/Plan   1 -closed fracture of trochanter of right femur -patient has been evaluated by Orthopedics they plan to fixate on October 12th, would recommend pain management utilizing geriatric pain protocol   -continue pain control per Geriatric pain protocol:  Tylenol 975mg Q8H scheduled  Roxicodone 2 5mg Q4H PRN moderate pain  Roxicodone 5mg Q4H PRN severe pain  Dilaudid 0 2mg Q2H PRN  -continue adjuncts such as Gabapentin 100mg HS and lidocaine patch topically  -encourage addition of non-pharmacologic pain treatment including ice and frequent repositioning  -recommend  bowel regimen to prevent and treat constipation due to increased risk with acute pain and opiate pain medications     2  -displaced fracture of the middle phalanx of the left finger -patient's fracture has been splinted      3  -essential hypertension  -patient currently on metoprolol tartrate 25 mg twice daily and losartan hydrochlorothiazide 50-12 5 mg  Systolic blood pressure today is 136      4  -diabetes mellitus type 2 -currently insulin requiring will need to monitor hemoglobin A1c every 3 months  Her hemoglobin A1c has ranged from 6 7 back on September 1, 2020 to 8 7 on May 8, 2021 last 1 performed was December 2, 2021 that was 8 0  Should try to maintain blood sugars between 140-180 to reduce risk of hypoglycemia      5 -chronic renal insufficiency stage 3A  -on reviewing her records from [facility] her GFR usually runs in the 52 range  Will need to avoid NSAIDs or any other medication that can affect her renal clearance        6 -abnormal electrocardiogram -patient noted to have poor R-wave progression on the precordial leads  Suspect previous anterior wall MI    Would recommend echocardiogram and Cardiology clearance prior to surgery      7 -chronic lymphocytic leukemia  -apparently patient has recently been diagnosed with this disorder  I see that in the past the patient has had evidence of leukocytosis with a predominance of lymphocytes white counts as high as 22,000 in the past   This will need to be monitored      8 -cognitive impairment -on a previous admission on August 6, 2021 patient had developed acute metabolic encephalopathy had evidence of confusion and restlessness with active hallucinations  At that time precipitating factors were felt to be her hypoglycemia and her skin infection  Patient will need to be placed on delirium precautions per geriatric protocol  Patient had previous CT scan of the head on August 5, 2021 revealing moderate chronic microangiopathic changes     Delirium precautions  -Patient is high risk of delirium due to cognitive decline  -Initiate delirium precautions  -maintain normal sleep/wake cycle  -minimize overnight interruptions, group overnight vitals/labs/nursing checks as possible  -dim lights, close blinds and turn off tv to minimize stimulation and encourage sleep environment in evenings  -ensure that pain is well controlled  consider Tylenol 975mg Q8H scheduled if not already ordered   -monitor for fecal and urinary retention which may precipitate delirium  -encourage early mobilization and ambulation  -provide frequent reorientation and redirection  -encourage family and friends at the bedside to help help calm patient if anxious  -avoid medications which may precipitate or worsen delirium such as tramadol, benzodiazepine, anticholinergics, and benadryl  -encourage hydration and nutrition   -redirect unwanted behaviors as first line, avoid physical restraints, use chemical restraint only if all other attempts have been unsuccessful, would consider Zyprexa 2 5 mg, monitor for orthostatic hypotension and QTc prolongation with repeat dosing, recommend lowest dose possible for shortest duration possible      9 -impaired vision  -patient may use corrective lenses     10 -ambulatory dysfunction with fall -patient to get physical and occupational therapy postop      11 -medication review  -patient currently on Levemir insulin 14 units subQ daily, patient also takes lispro he 6 units under the skin 3 times a day with meals, patient on losartan hydrochlorothiazide 50/12 5 once daily, aspirin 81 mg orally daily, atorvastatin 20 mg orally daily, vitamin D3 1000 International Units daily, Lasix 40 mg daily, K-Dur 20 mEq orally daily, and vitamin B12 1000 mcg orally daily  Patient does use the [employer] monitor      12 -bilateral cerumen impaction  -will place patient on Debrox    13 -onychomycosis  -patient does follow with Podiatry on a regular basis    14 -decreased visual acuity -specially with her left eye apparently she has follow with Ophthalmology regularly for a dark spot that she has  She does wear glasses    15 -stress incontinence -patient wears pads regularly           Recommendations     1  -recommend cardiac clearance prior to surgery     2  -hemoglobin A1c     3  -echocardiogram     4  -will do [place] evaluation      5  -Debrox drops to both ears             History of Present Illness   Physician Requesting Consult: Adrián Hsu DO  Reason for Consult / Principal Problem:  Fall with fracture  Hx and PE limited by:  No limitations  Additional history obtained from:  Patient was able to give me an excellent history      HPI: Pedro Dyson is a 79y o  year old female who presents to [address] [place] [address] emergency room after sustaining a mechanical fall  The patient was attempting to getting to her home on the day of admission and tripped falling forward on her walker  She injured her right hip, patient denied any head strike and currently is not on anticoagulation    Patient had increased discomfort with pain ranging 7/10 intermittent in nature and nonradiating  BMP revealed a blood sugar of 322 initial troponin level at 4:00 a m  Was 18 without delta level of 4  At 2:00 a m  Her HS troponin level was 19 with a delta level of 5  X-ray imaging revealed a displaced fracture of the middle phalanx of the left ring finger, also closed fracture of trochanter of the right femur with routine healing  Twelve lead electrocardiogram revealed a normal sinus rhythm suspect first-degree heart block evidence of poor R-wave progression or the precordial leads consistent with possible anterior wall myocardial infarction age undetermined  There is a difference when compared to previous electrocardiogram performed on August 5, 2021 with a was evidence of ST depression in inferior leads with also evidence of T-wave inversion in inferior leads and also in the lateral leads  There was also evidence of LVH with repolarization abnormalities  Patient's other comorbidities include a history of hypertension, hyperlipidemia, diabetes mellitus type 2, chronic renal insufficiency stage IIIA  Patient with history of ambulatory dysfunction requiring assistive devices to ambulate  Patient's last hemoglobin A1c was performed on December 2, 2021 revealing a value of 8  Patient lives by herself in a 2 floor home but she only uses the bottom floor  The patient is  and uses her 's 4 wheeled walker to get around  Review of Systems   Constitutional: Negative  HENT:        Patient has partial dentures up and down   Eyes:        Patient wears glasses has decreased visual acuity in her left eye   Respiratory:        No respiratory problems at present   Cardiovascular: Negative  Gastrointestinal: Negative  Endocrine:        Patient's family would like her to follow with endocrinologist she is a brittle diabetic her sugars go up and down  Genitourinary:        Patient with history of stress incontinence   Musculoskeletal: Positive for gait problem     Skin: Negative  Allergic/Immunologic: Negative  Psychiatric/Behavioral: Negative  Memory/Cognitive screening:  Patient gets forgetful her nephew does her shopping and is going to be helping her pay her bills  She does have a good set up to take her medications  Mobility:  Has  ambulatory dysfunction uses a walker to navigate  Falls:  No history of recurrent falls  Assistive Devices:  Utilizes a 4 wheeled walker  Fraility:  No evidence of frailty  Nutrition/weight loss/grocery shopping/meal preparation:  Good nutritional status  Vision impairment:  Some visual impairment uses glasses has decreased vision in her left eye that is currently being followed  Hearing impairment:  No evidence of hearing impairment  Incontinence:  Patient with history of stress incontinence wears pads  Delirium:  Patient with episode of delirium on a previous admission to [place]  Polypharmacy:   No current facility-administered medications on file prior to encounter  No current facility-administered medications on file prior to encounter       Current Outpatient Medications on File Prior to Encounter   Medication Sig Dispense Refill   • calcium carbonate (OS-JUAN RAMON) 600 MG tablet Take 600 mg by mouth 2 (two) times a day with meals     • fluticasone (FLONASE) 50 mcg/act nasal spray 1 spray into each nostril daily     • glucose blood test strip 1 each by Other route daily as needed Use as instructed     • glucose monitoring kit (FREESTYLE) monitoring kit 1 each by Does not apply route as needed     • insulin detemir (LEVEMIR) 100 units/mL subcutaneous injection Inject 14 Units under the skin daily at bedtime (Patient taking differently: Inject 20 Units under the skin daily at bedtime) 10 mL 0   • insulin lispro (HumaLOG) 100 units/mL injection Inject 6 Units under the skin 3 (three) times a day with meals 10 mL 2   • losartan-hydrochlorothiazide (HYZAAR) 50-12 5 mg per tablet Take 1 tablet by mouth daily 30 tablet 0   • metoprolol tartrate (LOPRESSOR) 25 mg tablet Take 10 mg by mouth every 12 (twelve) hours     • aspirin (ECOTRIN LOW STRENGTH) 81 mg EC tablet Take 81 mg by mouth daily     • atorvastatin (LIPITOR) 20 mg tablet Take 20 mg by mouth daily     • cholecalciferol (VITAMIN D3) 1,000 units tablet Take 1,000 Units by mouth daily     • furosemide (LASIX) 40 mg tablet Take 1 tablet (40 mg total) by mouth daily 30 tablet 0   • potassium chloride (K-DUR,KLOR-CON) 20 mEq tablet Take 20 mEq by mouth 2 (two) times a day     • vitamin B-12 (VITAMIN B-12) 1,000 mcg tablet Take by mouth daily       Patients primary residence:  Lives in her own 2 floor home mainly utilizes the bottom floor    Lives with:  Patient lives by herself  iADL's:  Patient no longer drives she had been paying her bills  Her nephew will be taken over paying her bills  ADL's:  Patient enjoys her basic activities of daily living    Historical Information   Past medical history:   Past Medical History:   Diagnosis Date   • Benign adenomatous polyp of large intestine    • CHF (congestive heart failure) ([facility] )    • Diabetes mellitus ([facility] )    • Hyperlipemia    • Hypertension    • Osteoarthritis    • TIA (transient ischemic attack)      Past surgical history:   Past Surgical History:   Procedure Laterality Date   • APPENDECTOMY     • CARPAL TUNNEL RELEASE Right    • HYSTERECTOMY W/ SALPINGO-OOPHERECTOMY       Social history:  Social History     Socioeconomic History   • Marital status:       Spouse name: Not on file   • Number of children: Not on file   • Years of education: Not on file   • Highest education level: Not on file   Occupational History   • Not on file   Tobacco Use   • Smoking status: Never Smoker   • Smokeless tobacco: Never Used   Vaping Use   • Vaping Use: Never used   Substance and Sexual Activity   • Alcohol use: Not Currently     Comment: very seldom   • Drug use: Never   • Sexual activity: Not on file   Other Topics Concern   • Not on file Social History Narrative   • Not on file     Social Determinants of Health     Financial Resource Strain: Not on file   Food Insecurity: Not on file   Transportation Needs: No Transportation Needs   • Lack of Transportation (Medical): No   • Lack of Transportation (Non-Medical): No   Physical Activity: Not on file   Stress: Not on file   Social Connections: Not on file   Intimate Partner Violence: Not on file   Housing Stability: Not on file      Family history:   Family History   Problem Relation Age of Onset   • Heart disease Mother    • Heart disease Father    • Hypertension Father    • Stomach cancer Sister    • Ovarian cancer Sister    • Hypertension Sister        Meds/Allergies   All current active meds have been reviewed  Allergies   Allergen Reactions   • Amlodipine    • Ceftin [Cefuroxime]    • Ciprofloxacin    • Citalopram    • Dye [Iodinated Diagnostic Agents]    • Glucophage [Metformin]    • Januvia [Sitagliptin]    • Neomycin-Polymyxin-Dexameth    • Ondansetron    • Prilosec [Omeprazole]    • Vibramycin [Doxycycline]        Objective   Vitals:    10/12/22 0727   BP: 136/62   Pulse: 95   Resp: 19   Temp: 98 4 °F (36 9 °C)   SpO2: 95%       Intake/Output Summary (Last 24 hours) at 10/12/2022 0932  Last data filed at 10/12/2022 0747  Gross per 24 hour   Intake 0 ml   Output 1150 ml   Net -1150 ml     Invasive Devices  Report    Peripheral Intravenous Line  Duration           Peripheral IV 10/11/22 Right Antecubital <1 day                Physical Exam  Constitutional:       Appearance: Normal appearance  HENT:      Head: Normocephalic        Right Ear: Ear canal and external ear normal       Left Ear: Ear canal and external ear normal       Ears:      Comments: Patient with evidence of cerumen impaction bilaterally     Mouth/Throat:      Mouth: Mucous membranes are moist       Comments: Has partial dentures up and down  Eyes:      Conjunctiva/sclera: Conjunctivae normal       Pupils: Pupils are equal, round, and reactive to light  Cardiovascular:      Rate and Rhythm: Normal rate and regular rhythm  Pulses: Normal pulses  Heart sounds: Normal heart sounds  Pulmonary:      Effort: Pulmonary effort is normal       Breath sounds: Normal breath sounds  Abdominal:      General: Bowel sounds are normal       Palpations: Abdomen is soft  Musculoskeletal:      Cervical back: Normal range of motion  Skin:     General: Skin is warm  Neurological:      Mental Status: She is alert and oriented to person, place, and time  Mental status is at baseline  Psychiatric:         Behavior: Behavior normal          Thought Content: Thought content normal          Lab Results:   I have personally reviewed pertinent lab and imaging results  VTE Prophylaxis:     Code Status: Level 1 - Full Code  Advance Directive and Living Will:      Power of :    POLST:      Family and Social Support:  Patient's nephew Paul Haney is his power of   Living Arrangements: Lives Alone  Support Systems: Friends/neighbors;  Family members  Assistance Needed: complete at this time  Type of Current Residence: Private residence  [address]: No

## 2022-10-12 NOTE — H&P
Jcarlos Mendez trauma   Trauma Alert: Other Trauma consult for ED patient   Model of Arrival: Ambulance    Trauma Team: Attending Dr My Garzon and Residents Dr Ashok Landau  Consultants: Orthopedics, Geriatrics, Case Management    History of Present Illness     Chief Complaint: Right Intertrochanteric Fracture  Mechanism:Fall     HPI:    Talita Ness is a 79 y o  female who presents with a complaint of right-sided hip pain  Emergency department team worked her up and x-rays indicated an intertrochanteric fracture on the right as well as a fracture of the 4th phalanx on the left  The patient reports that she was attempting to go into her house earlier today while using her walker  She tripped and fell forward landing on her right hip  She denies any head strike, loss of consciousness or anticoagulation therapy  She currently complains of right-sided thigh pain that she rates a 7/10 and intermittent that is nonradiating  The patient reports that she is unable to walk due to pain  Review of Systems   Constitutional: Negative for chills, fatigue and fever  HENT: Negative for congestion, rhinorrhea and sore throat  Eyes: Negative for pain, redness and visual disturbance  Respiratory: Negative for cough, shortness of breath, wheezing and stridor  Cardiovascular: Negative for chest pain, palpitations and leg swelling  Gastrointestinal: Negative for abdominal distention, abdominal pain, diarrhea, nausea and vomiting  Endocrine: Negative  Genitourinary: Negative for dysuria and hematuria  Musculoskeletal: Positive for arthralgias and back pain  Negative for neck pain and neck stiffness  Right sided hip pain     Skin: Negative for color change and wound  Allergic/Immunologic: Negative  Neurological: Negative for dizziness, seizures, syncope, weakness, light-headedness, numbness and headaches  Hematological: Negative  Psychiatric/Behavioral: Negative        12-point, complete review of systems was reviewed and negative except as stated above  Historical Information     Past Medical History:   Diagnosis Date   • Benign adenomatous polyp of large intestine    • CHF (congestive heart failure) ([facility] )    • Diabetes mellitus ([facility] )    • Hyperlipemia    • Hypertension    • Osteoarthritis    • TIA (transient ischemic attack)      Past Surgical History:   Procedure Laterality Date   • APPENDECTOMY     • CARPAL TUNNEL RELEASE Right    • HYSTERECTOMY W/ SALPINGO-OOPHERECTOMY          Social History     Tobacco Use   • Smoking status: Never Smoker   • Smokeless tobacco: Never Used   Vaping Use   • Vaping Use: Never used   Substance Use Topics   • Alcohol use: Not Currently     Comment: very seldom   • Drug use: Never     Immunization History   Administered Date(s) Administered   • COVID-19 PFIZER VACCINE 0 3 ML IM 05/06/2021, 05/27/2021     Last Tetanus: Unknown  Family History: Non-contributory    1  Before the illness or injury that brought you to the Emergency, did you need someone to help you on a regular basis? 0=No   2  Since the illness or injury that brought you to the Emergency, have you needed more help than usual to take care of yourself? 1=Yes   3  Have you been hospitalized for one or more nights during the past 6 months (excluding a stay in the Emergency Department)? 0=No   4  In general, do you see well? 0=Yes   5  In general, do you have serious problems with your memory? 0=No   6  Do you take more than three different medications everyday?  1=Yes   TOTAL   2     Did you order a geriatric consult if the score was 2 or greater?: yes     Meds/Allergies   all current active meds have been reviewed     Allergies   Allergen Reactions   • Amlodipine    • Ceftin [Cefuroxime]    • Ciprofloxacin    • Citalopram    • Dye [Iodinated Diagnostic Agents]    • Glucophage [Metformin]    • Januvia [Sitagliptin]    • Neomycin-Polymyxin-Dexameth    • Ondansetron    • Prilosec [Omeprazole]    • Vibramycin [Doxycycline] Objective   Initial Vitals:   Temperature: 98 2 °F (36 8 °C) (10/11/22 1808)  Pulse: 77 (10/11/22 1754)  Respirations: 22 (10/11/22 1754)  Blood Pressure: (!) 204/84 (10/11/22 1754)    Primary Survey:   Airway:        Status: patent;        Pre-hospital Interventions: none        Hospital Interventions: none  Breathing:        Pre-hospital Interventions: none       Effort: normal       Right breath sounds: normal       Left breath sounds: normal  Circulation:        Rhythm: regular       Rate: regular   Right Pulses Left Pulses    R radial: 2+    R pedal: 2+     L radial: 2+    L pedal: 2+       Disability:        GCS: Eye: 4; Verbal: 5 Motor: 6 Total: 15       Right Pupil: 4 mm;  round;  reactive         Left Pupil:  4 mm;  round;  reactive      R Motor Strength L Motor Strength    R : 5/5  R dorsiflex: 5/5  R plantarflex: 5/5 L : 5/5  L dorsiflex: 5/5  L plantarflex: 5/5        Sensory:  No sensory deficit  Exposure:       Completed: Yes      Secondary Survey:  Physical Exam  Constitutional:       General: She is not in acute distress  Appearance: Normal appearance  She is normal weight  She is not ill-appearing  HENT:      Head: Normocephalic and atraumatic  Nose: Nose normal       Mouth/Throat:      Mouth: Mucous membranes are moist       Pharynx: Oropharynx is clear  Eyes:      Extraocular Movements: Extraocular movements intact  Conjunctiva/sclera: Conjunctivae normal       Pupils: Pupils are equal, round, and reactive to light  Cardiovascular:      Rate and Rhythm: Normal rate and regular rhythm  Pulses: Normal pulses  Heart sounds: Normal heart sounds  No murmur heard  No friction rub  Pulmonary:      Effort: Pulmonary effort is normal  No respiratory distress  Breath sounds: Normal breath sounds  No stridor  No wheezing, rhonchi or rales  Abdominal:      General: Abdomen is flat  Bowel sounds are normal  There is no distension        Palpations: Abdomen is soft  Tenderness: There is no abdominal tenderness  There is no guarding or rebound  Musculoskeletal:         General: Swelling, tenderness and signs of injury present  Cervical back: Normal range of motion and neck supple  No rigidity or tenderness  Comments: Tenderness to palpation of the right lateral and anterior thigh  Mild tissue edema without ecchymosis  No deformity  Skin:     General: Skin is warm and dry  Capillary Refill: Capillary refill takes less than 2 seconds  Findings: No bruising, erythema or rash  Neurological:      General: No focal deficit present  Mental Status: She is alert and oriented to person, place, and time  Mental status is at baseline  Motor: Weakness present  Comments: 0/5 weakness in the right lower extremity secondary to pain  Invasive Devices  Report    Peripheral Intravenous Line  Duration           Peripheral IV 10/11/22 Right Antecubital <1 day              Lab Results:   BMP/CMP:   Lab Results   Component Value Date    SODIUM 138 10/11/2022    K 4 0 10/11/2022     10/11/2022    CO2 28 10/11/2022    BUN 23 10/11/2022    CREATININE 1 07 10/11/2022    CALCIUM 10 0 10/11/2022    AST 21 10/11/2022    ALT 28 10/11/2022    ALKPHOS 86 10/11/2022    EGFR 48 10/11/2022   , CBC:   Lab Results   Component Value Date    WBC 22 30 (H) 10/11/2022    HGB 14 4 10/11/2022    HCT 44 6 10/11/2022     (H) 10/11/2022     10/11/2022    MCH 33 1 10/11/2022    MCHC 32 3 10/11/2022    RDW 13 5 10/11/2022    MPV 11 8 10/11/2022   , Coagulation:   Lab Results   Component Value Date    INR 0 89 10/11/2022   , Urinalysis: No results found for: Verta Ramirez, SPECGRAV, PHUR, LEUKOCYTESUR, NITRITE, PROTEINUA, GLUCOSEU, KETONESU, BILIRUBINUR, BLOODU and Troponin: No results found for: TROPONINI    Imaging Results: I have personally reviewed pertinent reports      Chest Xray(s): N/A   FAST exam(s): N/A   CT Scan(s): N/A Additional Xray(s): positive for acute findings: Right Intertrochanteric Fracture on the right, 4th phalanx fracture on the left hand  Other Studies: None    Code Status: Prior  Advance Directive and Living Will:      Power of :    POLST:    I have spent 30 minutes with Patient and family today in which greater than 50% of this time was spent in counseling/coordination of care regarding Diagnostic results, Prognosis, Patient and family education, Importance of tx compliance and Impressions

## 2022-10-12 NOTE — ASSESSMENT & PLAN NOTE
Wt Readings from Last 3 Encounters:   08/05/21 63 5 kg (140 lb)   11/08/20 74 2 kg (163 lb 9 3 oz)       - Patient takes 40 mg Lasix at home  - Continue taking home medication   - Monitor I/O

## 2022-10-12 NOTE — ASSESSMENT & PLAN NOTE
Wt Readings from Last 3 Encounters:   10/11/22 65 8 kg (145 lb)   08/05/21 63 5 kg (140 lb)   11/08/20 74 2 kg (163 lb 9 3 oz)       - Patient takes 40 mg Lasix at home  - Continue taking home medication   - Monitor I/O

## 2022-10-12 NOTE — H&P (VIEW-ONLY)
Orthopedics   Pierre Suarez 79 y o  female MRN: [id number]  Unit/Bed#: W -01      Chief Complaint:   Right hip and left ring finger pain    HPI:  79 y o  female presents S/p fall at home yesterday evening  She states that she was walking into her house with her walker and tripped, falling directly onto her right side  Denies LOC, syncope, head injury  She reports immediate pain and inability to bear weight on her right side and left ring finger pain  She was transported to the ED via EMS  Xray in ED demonstrated right intertrochanteric fracture and fracture of middle phalanx of left ring finger  She was placed in a finger splint and instructed to be NWB RLE  She reports 10/10 pain when turning her body in bed or moving her right leg  Denies pain at rest this AM  She takes baby aspirin at home  Denies numbness, tingling, no open wounds noted  No other complaints at this time  Review Of Systems:   · Skin: Normal  · Neuro: See HPI  · Musculoskeletal: See HPI  · 14 point review of systems negative except as stated above     Past Medical History:   Past Medical History:   Diagnosis Date   • Benign adenomatous polyp of large intestine    • CHF (congestive heart failure) ([facility] [address] )    • Diabetes mellitus ([facility] [address] )    • Hyperlipemia    • Hypertension    • Osteoarthritis    • TIA (transient ischemic attack)        Past Surgical History:   Past Surgical History:   Procedure Laterality Date   • APPENDECTOMY     • CARPAL TUNNEL RELEASE Right    • HYSTERECTOMY W/ SALPINGO-OOPHERECTOMY         Family History:  Family history reviewed and non-contributory  Family History   Problem Relation Age of Onset   • Heart disease Mother    • Heart disease Father    • Hypertension Father    • Stomach cancer Sister    • Ovarian cancer Sister    • Hypertension Sister        Social History:  Social History     Socioeconomic History   • Marital status:       Spouse name: None   • Number of children: None   • Years of education: None   • Highest education level: None   Occupational History   • None   Tobacco Use   • Smoking status: Never Smoker   • Smokeless tobacco: Never Used   Vaping Use   • Vaping Use: Never used   Substance and Sexual Activity   • Alcohol use: Not Currently     Comment: very seldom   • Drug use: Never   • Sexual activity: None   Other Topics Concern   • None   Social History Narrative   • None     Social Determinants of Health     Financial Resource Strain: Not on file   Food Insecurity: Not on file   Transportation Needs: No Transportation Needs   • Lack of Transportation (Medical): No   • Lack of Transportation (Non-Medical): No   Physical Activity: Not on file   Stress: Not on file   Social Connections: Not on file   Intimate Partner Violence: Not on file   Housing Stability: Not on file       Allergies:    Allergies   Allergen Reactions   • Amlodipine    • Ceftin [Cefuroxime]    • Ciprofloxacin    • Citalopram    • Dye [Iodinated Diagnostic Agents]    • Glucophage [Metformin]    • Januvia [Sitagliptin]    • Neomycin-Polymyxin-Dexameth    • Ondansetron    • Prilosec [Omeprazole]    • Vibramycin [Doxycycline]            Labs:  0   Lab Value Date/Time    HCT 44 6 10/11/2022 1900    HCT 36 0 08/13/2021 0530    HCT 38 5 08/12/2021 0437    HGB 14 4 10/11/2022 1900    HGB 11 8 08/13/2021 0530    HGB 12 3 08/12/2021 0437    INR 0 89 10/11/2022 1900    WBC 22 30 (H) 10/11/2022 1900    WBC 14 21 (H) 08/13/2021 0530    WBC 14 18 (H) 08/12/2021 0437       Meds:    Current Facility-Administered Medications:   •  acetaminophen (TYLENOL) tablet 975 mg, 975 mg, Oral, Q8H [facility], Leonides DO Kate, 975 mg at 10/12/22 5445  •  ceFAZolin (ANCEF) IVPB (premix in dextrose) 2,000 mg 50 mL, 2,000 mg, Intravenous, 60 Min Pre-Op, Carlos Alberto Mack PA-C  •  docusate sodium (COLACE) capsule 100 mg, 100 mg, Oral, BID, Leonides Love DO, 100 mg at 10/11/22 8632  •  enoxaparin (LOVENOX) subcutaneous injection 30 mg, 30 mg, Subcutaneous, Q12H, Eliazar Sanchez DO, 30 mg at 10/11/22 2221  •  gabapentin (NEURONTIN) capsule 100 mg, 100 mg, Oral, HS, Eliazar Sanchez DO, 100 mg at 10/11/22 2222  •  insulin lispro (HumaLOG) 100 units/mL subcutaneous injection 1-5 Units, 1-5 Units, Subcutaneous, Q6H [facility] **AND** Fingerstick Glucose (POCT), , , Q6H, Pasha Putnam PA-C  •  lidocaine (LIDODERM) 5 % patch 1 patch, 1 patch, Topical, Daily, Eliazar Sanchez DO  •  naloxone (NARCAN) 0 04 mg/mL syringe 0 04 mg, 0 04 mg, Intravenous, Q1MIN PRN, Eliazar Sanchez DO  •  oxyCODONE (ROXICODONE) IR tablet 2 5 mg, 2 5 mg, Oral, Q4H PRN, Eliazar Sanchez DO  •  oxyCODONE (ROXICODONE) IR tablet 5 mg, 5 mg, Oral, Q4H PRN, Eliazar Sanchez DO, 5 mg at 10/11/22 2224  •  pneumococcal 13-valent conjugate vaccine (PREVNAR-13) IM injection 0 5 mL, 0 5 mL, Intramuscular, Prior to discharge, Dhaval Sosa DO  •  senna-docusate sodium (SENOKOT S) 8 6-50 mg per tablet 1 tablet, 1 tablet, Oral, HS, Eliazar Sanchez DO, 1 tablet at 10/11/22 2222    Blood Culture:   No results found for: BLOODCX    Wound Culture:   No results found for: WOUNDCULT    Ins and Outs:  I/O last 24 hours: In: 0   Out: 1150 [Urine:1150]          Physical Exam:   /62   Pulse 95   Temp 98 4 °F (36 9 °C)   Resp 19   Ht 5' 6" (1 676 m)   Wt 65 8 kg (145 lb)   SpO2 95%   BMI 23 40 kg/m²   Gen: No acute distress, resting comfortably in bed  HEENT: Eyes clear, moist mucus membranes, hearing intact  Respiratory: No audible wheezing or stridor  Cardiovascular: Well Perfused peripherally, 2+ distal pulse  Abdomen: nondistended, no peritoneal signs  Musculoskeletal: left ring finger  · Skin: mild swelling, ecchymosis  No erythema, drainage  · TTP: along middle phalanx  · ROM: full finger flexion, extension   Finger splint in place   · SILT m/r/u    · Motor intact ain/pin/m/r/u  · 2+ radial and ulnar pulse  · Musculature is soft and compressible, no pain with passive stretch    Musculoskeletal: right lower extremity  · Skin: No swelling, ecchymosis, erythema  · TTP: at fx site and lateral thigh  · SILT s/s/sp/dp/t  · Motor intact 5/5 strength with hip flexion/extension, knee flexion/extension, ankle dorsi/plantar flexion, EHL/FHL  · 2+ DP/PT pulse  · Musculature is soft and compressible, no pain with passive stretch  · Leg lengths equal  Right leg with pillow underneath knee    Tertiary: no tenderness over all other joints/long bones as except already stated  Radiology:   I personally reviewed the films  Demonstrates right intertrochanteric fracture of right hip  Left 4th phalanx fracture of left hand     _*_*_*_*_*_*_*_*_*_*_*_*_*_*_*_*_*_*_*_*_*_*_*_*_*_*_*_*_*_*_*_*_*_*_*_*_*_*_*_*_*    Assessment:  79 y  o female presents S/p fall with right intertrochanteric hip fracture and left 4th phalanx fracture of left hand    Plan:   · NWB RLE  · OR for today with Dr Sharon Bingham for Right IM nail  Informed consent obtained  · CT pelvis pending  · NPO   · PreOp clearance per trauma  · Discuss possible cardiology eval for abnormal EKG finding with trauma  · Stat cbc, bmp, pt/inr, aptt, cxr, ekg, type and screen in results  · Ancef on hold for OR   · Post op PT/OT eval   · Pain control per primary team  · L ring finger fx: keep In finger splint  NWB to left hand  · Body mass index is 23 4 kg/m²  mildly obese  Recommend behavior modifications    · Dispo: Ortho will follow      Richy Armando PA-C

## 2022-10-12 NOTE — CONSULTS
Orthopedics   Zack Lewis 79 y o  female MRN: [id number]  Unit/Bed#: W -01      Chief Complaint:   Right hip and left ring finger pain    HPI:  79 y o  female presents S/p fall at home yesterday evening  She states that she was walking into her house with her walker and tripped, falling directly onto her right side  Denies LOC, syncope, head injury  She reports immediate pain and inability to bear weight on her right side and left ring finger pain  She was transported to the ED via EMS  Xray in ED demonstrated right intertrochanteric fracture and fracture of middle phalanx of left ring finger  She was placed in a finger splint and instructed to be NWB RLE  She reports 10/10 pain when turning her body in bed or moving her right leg  Denies pain at rest this AM  She takes baby aspirin at home  Denies numbness, tingling, no open wounds noted  No other complaints at this time  Review Of Systems:   · Skin: Normal  · Neuro: See HPI  · Musculoskeletal: See HPI  · 14 point review of systems negative except as stated above     Past Medical History:   Past Medical History:   Diagnosis Date   • Benign adenomatous polyp of large intestine    • CHF (congestive heart failure) ([facility] [address] )    • Diabetes mellitus ([facility] [address] )    • Hyperlipemia    • Hypertension    • Osteoarthritis    • TIA (transient ischemic attack)        Past Surgical History:   Past Surgical History:   Procedure Laterality Date   • APPENDECTOMY     • CARPAL TUNNEL RELEASE Right    • HYSTERECTOMY W/ SALPINGO-OOPHERECTOMY         Family History:  Family history reviewed and non-contributory  Family History   Problem Relation Age of Onset   • Heart disease Mother    • Heart disease Father    • Hypertension Father    • Stomach cancer Sister    • Ovarian cancer Sister    • Hypertension Sister        Social History:  Social History     Socioeconomic History   • Marital status:       Spouse name: None   • Number of children: None   • Years of education: None   • Highest education level: None   Occupational History   • None   Tobacco Use   • Smoking status: Never Smoker   • Smokeless tobacco: Never Used   Vaping Use   • Vaping Use: Never used   Substance and Sexual Activity   • Alcohol use: Not Currently     Comment: very seldom   • Drug use: Never   • Sexual activity: None   Other Topics Concern   • None   Social History Narrative   • None     Social Determinants of Health     Financial Resource Strain: Not on file   Food Insecurity: Not on file   Transportation Needs: No Transportation Needs   • Lack of Transportation (Medical): No   • Lack of Transportation (Non-Medical): No   Physical Activity: Not on file   Stress: Not on file   Social Connections: Not on file   Intimate Partner Violence: Not on file   Housing Stability: Not on file       Allergies:    Allergies   Allergen Reactions   • Amlodipine    • Ceftin [Cefuroxime]    • Ciprofloxacin    • Citalopram    • Dye [Iodinated Diagnostic Agents]    • Glucophage [Metformin]    • Januvia [Sitagliptin]    • Neomycin-Polymyxin-Dexameth    • Ondansetron    • Prilosec [Omeprazole]    • Vibramycin [Doxycycline]            Labs:  0   Lab Value Date/Time    HCT 44 6 10/11/2022 1900    HCT 36 0 08/13/2021 0530    HCT 38 5 08/12/2021 0437    HGB 14 4 10/11/2022 1900    HGB 11 8 08/13/2021 0530    HGB 12 3 08/12/2021 0437    INR 0 89 10/11/2022 1900    WBC 22 30 (H) 10/11/2022 1900    WBC 14 21 (H) 08/13/2021 0530    WBC 14 18 (H) 08/12/2021 0437       Meds:    Current Facility-Administered Medications:   •  acetaminophen (TYLENOL) tablet 975 mg, 975 mg, Oral, Q8H [facility], Karen Slaughter DO, 975 mg at 10/12/22 2120  •  ceFAZolin (ANCEF) IVPB (premix in dextrose) 2,000 mg 50 mL, 2,000 mg, Intravenous, 60 Min Pre-Op, Ngoc Kim PA-C  •  docusate sodium (COLACE) capsule 100 mg, 100 mg, Oral, BID, Karen Slaughter DO, 100 mg at 10/11/22 5014  •  enoxaparin (LOVENOX) subcutaneous injection 30 mg, 30 mg, Subcutaneous, Q12H, Kacy Hauser DO, 30 mg at 10/11/22 2221  •  gabapentin (NEURONTIN) capsule 100 mg, 100 mg, Oral, HS, Kacy Hauser, DO, 100 mg at 10/11/22 2222  •  insulin lispro (HumaLOG) 100 units/mL subcutaneous injection 1-5 Units, 1-5 Units, Subcutaneous, Q6H [facility] **AND** Fingerstick Glucose (POCT), , , Q6H, Pasha Putnam PA-C  •  lidocaine (LIDODERM) 5 % patch 1 patch, 1 patch, Topical, Daily, Kacy Hauser DO  •  naloxone (NARCAN) 0 04 mg/mL syringe 0 04 mg, 0 04 mg, Intravenous, Q1MIN PRN, Kacy Hauser DO  •  oxyCODONE (ROXICODONE) IR tablet 2 5 mg, 2 5 mg, Oral, Q4H PRN, Kacy Hauser DO  •  oxyCODONE (ROXICODONE) IR tablet 5 mg, 5 mg, Oral, Q4H PRN, Kacy Hauser, , 5 mg at 10/11/22 2224  •  pneumococcal 13-valent conjugate vaccine (PREVNAR-13) IM injection 0 5 mL, 0 5 mL, Intramuscular, Prior to discharge, Krista Tompkins DO  •  senna-docusate sodium (SENOKOT S) 8 6-50 mg per tablet 1 tablet, 1 tablet, Oral, HS, Kacy Hauser, DO, 1 tablet at 10/11/22 2222    Blood Culture:   No results found for: BLOODCX    Wound Culture:   No results found for: WOUNDCULT    Ins and Outs:  I/O last 24 hours: In: 0   Out: 1150 [Urine:1150]          Physical Exam:   /62   Pulse 95   Temp 98 4 °F (36 9 °C)   Resp 19   Ht 5' 6" (1 676 m)   Wt 65 8 kg (145 lb)   SpO2 95%   BMI 23 40 kg/m²   Gen: No acute distress, resting comfortably in bed  HEENT: Eyes clear, moist mucus membranes, hearing intact  Respiratory: No audible wheezing or stridor  Cardiovascular: Well Perfused peripherally, 2+ distal pulse  Abdomen: nondistended, no peritoneal signs  Musculoskeletal: left ring finger  · Skin: mild swelling, ecchymosis  No erythema, drainage  · TTP: along middle phalanx  · ROM: full finger flexion, extension   Finger splint in place   · SILT m/r/u    · Motor intact ain/pin/m/r/u  · 2+ radial and ulnar pulse  · Musculature is soft and compressible, no pain with passive stretch    Musculoskeletal: right lower extremity  · Skin: No swelling, ecchymosis, erythema  · TTP: at fx site and lateral thigh  · SILT s/s/sp/dp/t  · Motor intact 5/5 strength with hip flexion/extension, knee flexion/extension, ankle dorsi/plantar flexion, EHL/FHL  · 2+ DP/PT pulse  · Musculature is soft and compressible, no pain with passive stretch  · Leg lengths equal  Right leg with pillow underneath knee    Tertiary: no tenderness over all other joints/long bones as except already stated  Radiology:   I personally reviewed the films  Demonstrates right intertrochanteric fracture of right hip  Left 4th phalanx fracture of left hand     _*_*_*_*_*_*_*_*_*_*_*_*_*_*_*_*_*_*_*_*_*_*_*_*_*_*_*_*_*_*_*_*_*_*_*_*_*_*_*_*_*    Assessment:  79 y  o female presents S/p fall with right intertrochanteric hip fracture and left 4th phalanx fracture of left hand    Plan:   · NWB RLE  · OR for today with Dr Sharon Bingham for Right IM nail  Informed consent obtained  · CT pelvis pending  · NPO   · PreOp clearance per trauma  · Discuss possible cardiology eval for abnormal EKG finding with trauma  · Stat cbc, bmp, pt/inr, aptt, cxr, ekg, type and screen in results  · Ancef on hold for OR   · Post op PT/OT eval   · Pain control per primary team  · L ring finger fx: keep In finger splint  NWB to left hand  · Body mass index is 23 4 kg/m²  mildly obese  Recommend behavior modifications    · Dispo: Ortho will follow      Richy Armando PA-C

## 2022-10-12 NOTE — OP NOTE
OPERATIVE REPORT  PATIENT NAME: Bora Engel    :  1940  MRN: [id number]  Pt Location: AN OR ROOM 01    SURGERY DATE:  10/12/2022    Surgeon(s) and Role:     * Nikos Hickman DO - Primary     * Carlos Larios PA-C - Assisting   A qualified resident physician was not available and A physician assistant was required during the procedure for retraction tissue handling,dissection and suturing    Preop Diagnosis:  1  Right pertrochanteric femur fracture with extension into the femoral neck and subtrochanteric region    Post-Op Diagnosis:    1  Right peritrochanteric femur fracture with extension into the femoral neck and subtrochanteric region    Procedures:  1  Surgical fixation of right pertrochanteric femur fracture with long intramedullary nail      Specimen(s):  None    Estimated Blood Loss:   200 cc    Drains:  None    Anesthesia Type:   General endotracheal    Operative Indications:  Patient is a 79-year-old female who normally ambulates with a walker that had a fall while at home onto her right side  She sustained right pertrochanteric femur fracture  In order to regain weight-bearing status and mobilization patient was consented for the above procedure Risks, benefits, alternatives were discussed, risks including but not limited to deep vein thrombosis, pulmonary embolism, malunion, nonunion, damage to neurovascular structures both near and distant, infection both deep and superficial, symptomatic hardware, suad implant fracture, hardware failure, metal sensitivity, chronic pain, postoperative stiffness, avascular necrosis, extremity weakness, decreased range of motion, need for subsequent repeat procedures, as well as the risks associated with general endotracheal anesthesia which include but not limited to death  Patient agreed informed consent was obtained          Implants:   Synthes 10 mm x 380 mm TFNA, 95 mm helical blade    Tourniquet time:   None      Complications:   No acute complications were encountered  Patient was transferred to PACU in stable condition    Operative findings:  Right pertrochanteric femur fracture with extension to the femoral neck and subtrochanteric region  Patient was able to be reduced using the fracture top table without incident  Stable fixation was obtained    Procedure and Technique:  Patient is an 70-year-old female that sustained a fall and a right pertrochanteric femur fracture as described above  Patient was seen in the preoperative holding area where the extremity was marked  Past patient's questions were answered  Patient was then transferred the operating room where general endotracheal anesthesia was administered by Department of Anesthesia  Patient was then transferred over the operating table using CTLS spine precautions  All bony prominences well padded  Patient was put UE placed into the fracture top  Time-out performed to confirm correct site, correct patient, correct procedure  All were in agreement the procedure was started using the fracture top table closed reduction of the proximal femur was obtained  This was locked in the place and confirmed under biplanar fluoroscopic imaging  Patient was then prepped and draped in a standard sterile orthopedic fashion  Once again a hard stop was performed to confirm correct site, correct patient, correct procedure  All in agreement the procedure was started an approximately 3 cm incision was made proximal to the greater trochanter in line with the femoral shaft  Blunt dissection was carried down to the level of the trochanter  A 3 2 mm guidewire was then inserted into the appropriate entry angle and starting point in the proximal femur to gain access to the medullary canal   A trephined Reamer was then used to gain access to the proximal femur  A 3 2 mm guide wire was then inserted center center down the length of the femoral shaft to the superior aspect of the patella    Sequential reaming was then performed from 8 5 mm to 11 5 mm to obtain good chatter  A Synthes 10 mm x 380 mm TFNA was selected attached to the jig and slid into the femoral canal to the appropriate depth  It was then secured using a 95 mm helical blade  It was then secured with 2 distal interlocking screws 1 in the proximal static hole and 1 into the proximal aspect of the dynamic hole  The guide was then removed and all implant position confirmed  Reduction was confirmed under biplanar fluoroscopic imaging  The wounds were then copiously irrigated with sterile normal saline  The deep tissues were repaired using 0 PDS suture  The subcutaneous tissues repaired with a 2-0 Monocryl suture  The skin was then closed with staples and dressed with Mepilex  Patient was then transferred off the operating table using CTLS spine precautions extubated and transferred to PACU in stable condition        Postoperative plan:  Patient will be weight-bearing as tolerated to the right lower extremity  She should ambulate with physical therapy postop day 1  Patient will receive 24 hours of perioperative antibiotics for infection prophylaxis  Patient will need DVT prophylaxis for 4 weeks in the form of Lovenox    Patient will need to return to clinic in 2 weeks time for removal of staples and repeat x-ray evaluation    SIGNATURE: Danilo Campos, DO  DATE: October 12, 2022  TIME: 5:07 PM

## 2022-10-12 NOTE — ASSESSMENT & PLAN NOTE
Lab Results   Component Value Date    HGBA1C 8 0 (H) 12/02/2021       No results for input(s): POCGLU in the last 72 hours      Blood Sugar Average: Last 72 hrs:     - Hold home medication of Humalog  - Sliding Scale insulin  - Glucose checks before breakfast and 2 hours after meals

## 2022-10-12 NOTE — OCCUPATIONAL THERAPY NOTE
Occupational Therapy Cancelled Evaluation       10/12/22 0807   OT Last Visit   OT Visit Date 10/12/22   Note Type   Note type Evaluation; Cancelled Session   Cancel Reasons Patient to operating room   Additional Comments OT orders received and chart review performed  Pt tentative to OR today for operative fixation of R femur fracture  Will hold OT eval at this time and f/u post-op for OT eval  Appreciate WBS and activity orders       Belgica Aviles

## 2022-10-12 NOTE — ASSESSMENT & PLAN NOTE
Celia Rubi from a standing position after tripping  - Orthopedics consulted for injuries mentioned above  - Geriatric Pain Management protocol in place  - Geriatric Consult placed  - Case Management on board  - PT/OT henok

## 2022-10-12 NOTE — PLAN OF CARE
Problem: Potential for Falls  Goal: Patient will remain free of falls  Description: INTERVENTIONS:  - Educate patient/family on patient safety including physical limitations  - Instruct patient to call for assistance with activity   - Consult OT/PT to assist with strengthening/mobility   - Keep Call bell within reach  - Keep bed low and locked with side rails adjusted as appropriate  - Keep care items and personal belongings within reach  - Initiate and maintain comfort rounds  - Make Fall Risk Sign visible to staff  - Apply yellow socks and bracelet for high fall risk patients  - Consider moving patient to room near nurses station  Outcome: Progressing     Problem: PAIN - ADULT  Goal: Verbalizes/displays adequate comfort level or baseline comfort level  Description: Interventions:  - Encourage patient to monitor pain and request assistance  - Assess pain using appropriate pain scale  - Administer analgesics based on type and severity of pain and evaluate response  - Implement non-pharmacological measures as appropriate and evaluate response  - Consider cultural and social influences on pain and pain management  - Notify physician/advanced practitioner if interventions unsuccessful or patient reports new pain  Outcome: Progressing     Problem: SAFETY ADULT  Goal: Maintain or return to baseline ADL function  Description: INTERVENTIONS:  -  Assess patient's ability to carry out ADLs; assess patient's baseline for ADL function and identify physical deficits which impact ability to perform ADLs (bathing, care of mouth/teeth, toileting, grooming, dressing, etc )  - Assess/evaluate cause of self-care deficits   - Assess range of motion  - Assess patient's mobility; develop plan if impaired  - Assess patient's need for assistive devices and provide as appropriate  - Encourage maximum independence but intervene and supervise when necessary  - Involve family in performance of ADLs  - Assess for home care needs following discharge   - Consider OT consult to assist with ADL evaluation and planning for discharge  - Provide patient education as appropriate  Outcome: Not Progressing  Goal: Maintains/Returns to pre admission functional level  Description: INTERVENTIONS:  - Perform BMAT or MOVE assessment daily    - Set and communicate daily mobility goal to care team and patient/family/caregiver  - Collaborate with rehabilitation services on mobility goals if consulted  - Out of bed for toileting  - Record patient progress and toleration of activity level   Outcome: Not Progressing     Problem: Knowledge Deficit  Goal: Patient/family/caregiver demonstrates understanding of disease process, treatment plan, medications, and discharge instructions  Description: Complete learning assessment and assess knowledge base    Interventions:  - Provide teaching at level of understanding  - Provide teaching via preferred learning methods  Outcome: Progressing     Problem: MUSCULOSKELETAL - ADULT  Goal: Maintain or return mobility to safest level of function  Description: INTERVENTIONS:  - Assess patient's ability to carry out ADLs; assess patient's baseline for ADL function and identify physical deficits which impact ability to perform ADLs (bathing, care of mouth/teeth, toileting, grooming, dressing, etc )  - Assess/evaluate cause of self-care deficits   - Assess range of motion  - Assess patient's mobility  - Assess patient's need for assistive devices and provide as appropriate  - Encourage maximum independence but intervene and supervise when necessary  - Involve family in performance of ADLs  - Assess for home care needs following discharge   - Consider OT consult to assist with ADL evaluation and planning for discharge  - Provide patient education as appropriate  Outcome: Progressing  Goal: Maintain proper alignment of affected body part  Description: INTERVENTIONS:  - Support, maintain and protect limb and body alignment  - Provide patient/ family with appropriate education  Outcome: Progressing     Problem: MOBILITY - ADULT  Goal: Maintain or return to baseline ADL function  Description: INTERVENTIONS:  -  Assess patient's ability to carry out ADLs; assess patient's baseline for ADL function and identify physical deficits which impact ability to perform ADLs (bathing, care of mouth/teeth, toileting, grooming, dressing, etc )  - Assess/evaluate cause of self-care deficits   - Assess range of motion  - Assess patient's mobility; develop plan if impaired  - Assess patient's need for assistive devices and provide as appropriate  - Encourage maximum independence but intervene and supervise when necessary  - Involve family in performance of ADLs  - Assess for home care needs following discharge   - Consider OT consult to assist with ADL evaluation and planning for discharge  - Provide patient education as appropriate  Outcome: Not Progressing  Goal: Maintains/Returns to pre admission functional level  Description: INTERVENTIONS:  - Perform BMAT or MOVE assessment daily    - Set and communicate daily mobility goal to care team and patient/family/caregiver     - Collaborate with rehabilitation services on mobility goals if consulted  - Out of bed for toileting  - Record patient progress and toleration of activity level   Outcome: Not Progressing     Problem: Prexisting or High Potential for Compromised Skin Integrity  Goal: Skin integrity is maintained or improved  Description: INTERVENTIONS:  - Identify patients at risk for skin breakdown  - Assess and monitor skin integrity  - Assess and monitor nutrition and hydration status  - Monitor labs   - Assess for incontinence   - Turn and reposition patient  - Assist with mobility/ambulation  - Relieve pressure over bony prominences  - Avoid friction and shearing  - Provide appropriate hygiene as needed including keeping skin clean and dry  - Evaluate need for skin moisturizer/barrier cream  - Collaborate with interdisciplinary team   - Patient/family teaching  - Consider wound care consult   Outcome: Progressing

## 2022-10-12 NOTE — DISCHARGE INSTRUCTIONS
Discharge Instructions - Clarissa Stephen 79 y o  female MRN: [id number]  Unit/Bed#: PACU 3    Weight Bearing Status:                                           Weight-bearing as tolerated right lower extremity    DVT prophylaxis  Recommend Lovenox for 30 days postop    Pain:  Continue analgesics as directed    Dressing Instructions:   Please keep clean dry dressing over the wounds until follow-up may change daily as needed after 1st 5-7 days postoperatively  May shower if no drainage from incision please clean incisions rubbing alcohol after showers    Appt Instructions: If you do not have your appointment, please call the clinic at [phone] t  Otherwise followup as scheduled     Contact the office sooner if you experience any increased numbness/tingling in the extremities

## 2022-10-12 NOTE — ANESTHESIA PREPROCEDURE EVALUATION
Procedure:  INSERTION NAIL IM FEMUR ANTEGRADE (TROCHANTERIC) (Right Leg Upper)    Relevant Problems   CARDIO   (+) HLD (hyperlipidemia)   (+) Hypertension      ENDO   (+) Type 2 diabetes mellitus with stage 3a chronic kidney disease, with long-term current use of insulin (HCC)      /RENAL   (+) CKD (chronic kidney disease) stage 3, GFR 30-59 ml/min (HCC)      MUSCULOSKELETAL   (+) OA (osteoarthritis)        Physical Exam    Airway    Mallampati score: I  TM Distance: >3 FB  Neck ROM: full     Dental   upper dentures and lower dentures,     Cardiovascular      Pulmonary      Other Findings        Anesthesia Plan  ASA Score- 3     Anesthesia Type- general with ASA Monitors  Additional Monitors:   Airway Plan: LMA  Plan Factors-Exercise tolerance (METS): >4 METS  Chart reviewed  EKG reviewed  Existing labs reviewed  Patient summary reviewed  Patient is not a current smoker  There is medical exclusion for perioperative obstructive sleep apnea risk education  Induction- intravenous  Postoperative Plan- Plan for postoperative opioid use  Informed Consent- Anesthetic plan and risks discussed with patient  I personally reviewed this patient with the CRNA  Discussed and agreed on the Anesthesia Plan with the CRNA  Carlos Mishra

## 2022-10-12 NOTE — ANESTHESIA PREPROCEDURE EVALUATION
Procedure:  INSERTION NAIL IM FEMUR ANTEGRADE (TROCHANTERIC) (Right Leg Upper)    Relevant Problems   CARDIO   (+) HLD (hyperlipidemia)   (+) Hypertension      ENDO   (+) Type 2 diabetes mellitus with stage 3a chronic kidney disease, with long-term current use of insulin ([facility])      /RENAL   (+) CKD (chronic kidney disease) stage 3, GFR 30-59 ml/min ([facility])      MUSCULOSKELETAL   (+) OA (osteoarthritis)       7/73/28  Systolic function was mildly reduced  Ejection fraction was estimated to be 45 %  There was mild diffuse hypokinesis  Features were consistent with a pseudonormal left ventricular filling pattern, with concomitant abnormal relaxation and increased filling pressure (grade 2 diastolic dysfunction)  Doppler parameters were consistent with high ventricular filling pressure  Anesthesia Plan  ASA Score- 3     Anesthesia Type- general with ASA Monitors  Additional Monitors:   Airway Plan: ETT            Plan Factors-    Induction-     Postoperative Plan-     Informed Consent-

## 2022-10-12 NOTE — PROGRESS NOTES
[facility]  Progress Note - Florentin Ro 1940, 79 y o  female MRN: [id number]  Unit/Bed#: W -01 Encounter: [id number]  Primary Care Provider: Darrion Caldwell MD   Date and time admitted to hospital: 10/11/2022  5:47 PM    Displaced fracture of middle phalanx of left ring finger, initial encounter for closed fracture  Assessment & Plan  Sustained during a fall from standing position  - Orthopedic consult  - Geriatric Pain Management  - Splint in place      Closed fracture of trochanter of right femur with routine healing  Assessment & Plan  Sustained in a fall from standing position  - Orthopedic consult - Plan for OR on 10/12 for fixation  - NPO after midnight  - Geriatric Pain Management  - Monitor blood pressure  - Monitor CBC  - PT/OT eval    Hypertension  Assessment & Plan  Takes home medication of Metoprolol tartrate 25 mg BID, Losartan-HCTZ 50-12 5 mg  - Continue home medication    Chronic systolic heart failure (HCC)  Assessment & Plan  Wt Readings from Last 3 Encounters:   10/11/22 65 8 kg (145 lb)   08/05/21 63 5 kg (140 lb)   11/08/20 74 2 kg (163 lb 9 3 oz)       - Patient takes 40 mg Lasix at home  - Continue taking home medication   - Monitor I/O      HLD (hyperlipidemia)  Assessment & Plan  Atorvastatin 20 mg home medication   - Continue with home medication    Type 2 diabetes mellitus with stage 3a chronic kidney disease, with long-term current use of insulin (HCC)  Assessment & Plan  Lab Results   Component Value Date    HGBA1C 8 0 (H) 12/02/2021       No results for input(s): POCGLU in the last 72 hours      Blood Sugar Average: Last 72 hrs:     - Hold home medication of Humalog  - Sliding Scale insulin  - Glucose checks before breakfast and 2 hours after meals        [facility] from a standing position after tripping  - Orthopedics consulted for injuries mentioned above  - Geriatric Pain Management protocol in place  - Geriatric Consult placed  - Case Management on board  - PT/OT eval        TERTIARY TRAUMA SURVEY NOTE    Prophylaxis: Sequential compression device (Venodyne)  and Enoxaparin (Lovenox)    Disposition: Plan for OR to repair fracture today  Continue with level of care  Code status:  Level 1 - Full Code    Consultants: Orthopedics, Geriatrics      SUBJECTIVE:     Transfer from: ED  Mechanism of Injury:Fall    Chief Complaint: right hip pain and left ring finger pain    HPI/Last 24 hour events: presents with a complaint of right-sided hip pain  Emergency department team worked her up and x-rays indicated an intertrochanteric fracture on the right as well as a fracture of the 4th phalanx on the left  The patient reports that she was attempting to go into her house earlier today while using her walker  Will go to OR for repair  Patient denies pain at this time       Active medications:           Current Facility-Administered Medications:   •  acetaminophen (TYLENOL) tablet 975 mg, 975 mg, Oral, Q8H [address]  •  docusate sodium (COLACE) capsule 100 mg, 100 mg, Oral, BID, 100 mg at 10/11/22 2224  •  enoxaparin (LOVENOX) subcutaneous injection 30 mg, 30 mg, Subcutaneous, Q12H, 30 mg at 10/11/22 2221  •  gabapentin (NEURONTIN) capsule 100 mg, 100 mg, Oral, HS, 100 mg at 10/11/22 2222  •  lidocaine (LIDODERM) 5 % patch 1 patch, 1 patch, Topical, Daily  •  naloxone (NARCAN) 0 04 mg/mL syringe 0 04 mg, 0 04 mg, Intravenous, Q1MIN PRN  •  oxyCODONE (ROXICODONE) IR tablet 2 5 mg, 2 5 mg, Oral, Q4H PRN  •  oxyCODONE (ROXICODONE) IR tablet 5 mg, 5 mg, Oral, Q4H PRN, 5 mg at 10/11/22 2224  •  pneumococcal 13-valent conjugate vaccine (PREVNAR-13) IM injection 0 5 mL, 0 5 mL, Intramuscular, Prior to discharge  •  senna-docusate sodium (SENOKOT S) 8 6-50 mg per tablet 1 tablet, 1 tablet, Oral, HS, 1 tablet at 10/11/22 2222      OBJECTIVE:     Vitals:   Vitals:    10/11/22 2209   BP: 149/53   Pulse: 84   Resp: 18   Temp: 98 2 °F (36 8 °C)   SpO2: 98% Physical Exam:   GENERAL APPEARANCE: No acute distress, resting comfortably  NEURO: GCS 15, CN 2-12 grossly intact, no focal or sensational deficits  HEENT: PERRLA, EOMI, NCAT  CV: RRR, no murmurs or rubs appreciated  LUNGS: Clear to auscultation with normal effort and no wheezing  GI: soft, nontender, nondistended  : voiding indpendently  MSK: 5/5 strength, decreased range of motion in the right lower extremity, nonedematous  SKIN: warm, pink and dry      1  Before the illness or injury that brought you to the Emergency, did you need someone to help you on a regular basis? 0=No   2  Since the illness or injury that brought you to the Emergency, have you needed more help than usual to take care of yourself? 1=Yes   3  Have you been hospitalized for one or more nights during the past 6 months (excluding a stay in the Emergency Department)? 0=No   4  In general, do you see well? 0=Yes   5  In general, do you have serious problems with your memory? 0=No   6  Do you take more than three different medications everyday? 1=Yes   TOTAL   2     Did you order a geriatric consult if the score was 2 or greater?: yes                I/O:   I/O       10/10 0701  10/11 0700 10/11 0701  10/12 0700    Urine (mL/kg/hr)  300    Total Output  300    Net  -300                Invasive Devices: Invasive Devices  Report    Peripheral Intravenous Line  Duration           Peripheral IV 10/11/22 Right Antecubital <1 day                  Imaging:   No results found      Labs:   CBC:   Lab Results   Component Value Date    WBC 22 30 (H) 10/11/2022    HGB 14 4 10/11/2022    HCT 44 6 10/11/2022     (H) 10/11/2022     10/11/2022    MCH 33 1 10/11/2022    MCHC 32 3 10/11/2022    RDW 13 5 10/11/2022    MPV 11 6 10/11/2022     CMP:   Lab Results   Component Value Date     10/11/2022    CO2 28 10/11/2022    BUN 23 10/11/2022    CREATININE 1 07 10/11/2022    CALCIUM 10 0 10/11/2022    AST 21 10/11/2022    ALT 28 10/11/2022 ALKPHOS 86 10/11/2022    EGFR 48 10/11/2022     Coagulation:   Lab Results   Component Value Date    INR 0 89 10/11/2022

## 2022-10-12 NOTE — H&P
[address] 1940, 79 y o  female MRN: [id number]  Unit/Bed#: W -01 Encounter: [id number]  Primary Care Provider: Torres Cuevas MD   Date and time admitted to hospital: 10/11/2022  5:47 PM    Displaced fracture of middle phalanx of left ring finger, initial encounter for closed fracture  Assessment & Plan  Sustained during a fall from standing position  - Orthopedic consult  - Geriatric Pain Management  - Splint in place      Closed fracture of trochanter of right femur with routine healing  Assessment & Plan  Sustained in a fall from standing position  - Orthopedic consult  - Geriatric Pain Management  - Monitor blood pressure  - Monitor CBC  - PT/OT eval    Hypertension  Assessment & Plan  Takes home medication of Metoprolol tartrate 25 mg BID, Losartan-HCTZ 50-12 5 mg  - Continue home medication    Chronic systolic heart failure (HCC)  Assessment & Plan  Wt Readings from Last 3 Encounters:   08/05/21 63 5 kg (140 lb)   11/08/20 74 2 kg (163 lb 9 3 oz)       - Patient takes 40 mg Lasix at home  - Continue taking home medication   - Monitor I/O      HLD (hyperlipidemia)  Assessment & Plan  Atorvastatin 20 mg home medication   - Continue with home medication    Type 2 diabetes mellitus with stage 3a chronic kidney disease, with long-term current use of insulin (HCC)  Assessment & Plan  Lab Results   Component Value Date    HGBA1C 8 0 (H) 12/02/2021       No results for input(s): POCGLU in the last 72 hours      Blood Sugar Average: Last 72 hrs:     - Hold home medication of Humalog  - Sliding Scale insulin  - Glucose checks before breakfast and 2 hours after meals        [address] [facility] [address] from a standing position after tripping  - Orthopedics consulted for injuries mentioned above  - Geriatric Pain Management protocol in place  - Geriatric Consult placed  - Case Management on board  - PT/OT eval        Trauma Alert:Trauma consult for ED patient Model of Arrival: Ambulance    Trauma Team: Attending Dr Sotero Abel and Residents Dr Jeanna Huerta  Consultants: Orthopedics, Geriatrics, Case Management    History of Present Illness     Chief Complaint: Right Intertrochanteric Fracture  Mechanism:Fall     HPI:    Sangeeta Soares is a 79 y o  female who presents with a complaint of right-sided hip pain  Emergency department team worked her up and x-rays indicated an intertrochanteric fracture on the right as well as a fracture of the 4th phalanx on the left  The patient reports that she was attempting to go into her house earlier today while using her walker  She tripped and fell forward landing on her right hip  She denies any head strike, loss of consciousness or anticoagulation therapy  She currently complains of right-sided thigh pain that she rates a 7/10 and intermittent that is nonradiating  The patient reports that she is unable to walk due to pain  Review of Systems   Constitutional: Negative for chills, fatigue and fever  HENT: Negative for congestion, rhinorrhea and sore throat  Eyes: Negative for pain, redness and visual disturbance  Respiratory: Negative for cough, shortness of breath, wheezing and stridor  Cardiovascular: Negative for chest pain, palpitations and leg swelling  Gastrointestinal: Negative for abdominal distention, abdominal pain, diarrhea, nausea and vomiting  Endocrine: Negative  Genitourinary: Negative for dysuria and hematuria  Musculoskeletal: Positive for arthralgias and back pain  Negative for neck pain and neck stiffness  Right sided hip pain     Skin: Negative for color change and wound  Allergic/Immunologic: Negative  Neurological: Negative for dizziness, seizures, syncope, weakness, light-headedness, numbness and headaches  Hematological: Negative  Psychiatric/Behavioral: Negative  12-point, complete review of systems was reviewed and negative except as stated above       Historical Information     Past Medical History:   Diagnosis Date   • Benign adenomatous polyp of large intestine    • CHF (congestive heart failure) ([facility] [address] )    • Diabetes mellitus [facility] )    • Hyperlipemia    • Hypertension    • Osteoarthritis    • TIA (transient ischemic attack)      Past Surgical History:   Procedure Laterality Date   • APPENDECTOMY     • CARPAL TUNNEL RELEASE Right    • HYSTERECTOMY W/ SALPINGO-OOPHERECTOMY          Social History     Tobacco Use   • Smoking status: Never Smoker   • Smokeless tobacco: Never Used   Vaping Use   • Vaping Use: Never used   Substance Use Topics   • Alcohol use: Not Currently     Comment: very seldom   • Drug use: Never     Immunization History   Administered Date(s) Administered   • COVID-19 PFIZER VACCINE 0 3 ML IM 05/06/2021, 05/27/2021     Last Tetanus: Unknown  Family History: Non-contributory    1  Before the illness or injury that brought you to the Emergency, did you need someone to help you on a regular basis? 0=No   2  Since the illness or injury that brought you to the Emergency, have you needed more help than usual to take care of yourself? 1=Yes   3  Have you been hospitalized for one or more nights during the past 6 months (excluding a stay in the Emergency Department)? 0=No   4  In general, do you see well? 0=Yes   5  In general, do you have serious problems with your memory? 0=No   6  Do you take more than three different medications everyday?  1=Yes   TOTAL   2     Did you order a geriatric consult if the score was 2 or greater?: yes     Meds/Allergies   all current active meds have been reviewed     Allergies   Allergen Reactions   • Amlodipine    • Ceftin [Cefuroxime]    • Ciprofloxacin    • Citalopram    • Dye [Iodinated Diagnostic Agents]    • Glucophage [Metformin]    • Januvia [Sitagliptin]    • Neomycin-Polymyxin-Dexameth    • Ondansetron    • Prilosec [Omeprazole]    • Vibramycin [Doxycycline]        Objective   Initial Vitals:   Temperature: 98 2 °F (36 8 °C) (10/11/22 1808)  Pulse: 77 (10/11/22 1754)  Respirations: 22 (10/11/22 1754)  Blood Pressure: (!) 204/84 (10/11/22 1754)    Primary Survey:   Airway:        Status: patent;        Pre-hospital Interventions: none        Hospital Interventions: none  Breathing:        Pre-hospital Interventions: none       Effort: normal       Right breath sounds: normal       Left breath sounds: normal  Circulation:        Rhythm: regular       Rate: regular   Right Pulses Left Pulses    R radial: 2+    R pedal: 2+     L radial: 2+    L pedal: 2+       Disability:        GCS: Eye: 4; Verbal: 5 Motor: 6 Total: 15       Right Pupil: 4 mm;  round;  reactive         Left Pupil:  4 mm;  round;  reactive      R Motor Strength L Motor Strength    R : 5/5  R dorsiflex: 5/5  R plantarflex: 5/5 L : 5/5  L dorsiflex: 5/5  L plantarflex: 5/5        Sensory:  No sensory deficit  Exposure:       Completed: Yes      Secondary Survey:  Physical Exam  Constitutional:       General: She is not in acute distress  Appearance: Normal appearance  She is normal weight  She is not ill-appearing  HENT:      Head: Normocephalic and atraumatic  Nose: Nose normal       Mouth/Throat:      Mouth: Mucous membranes are moist       Pharynx: Oropharynx is clear  Eyes:      Extraocular Movements: Extraocular movements intact  Conjunctiva/sclera: Conjunctivae normal       Pupils: Pupils are equal, round, and reactive to light  Cardiovascular:      Rate and Rhythm: Normal rate and regular rhythm  Pulses: Normal pulses  Heart sounds: Normal heart sounds  No murmur heard  No friction rub  Pulmonary:      Effort: Pulmonary effort is normal  No respiratory distress  Breath sounds: Normal breath sounds  No stridor  No wheezing, rhonchi or rales  Abdominal:      General: Abdomen is flat  Bowel sounds are normal  There is no distension  Palpations: Abdomen is soft  Tenderness: There is no abdominal tenderness  There is no guarding or rebound  Musculoskeletal:         General: Swelling, tenderness and signs of injury present  Cervical back: Normal range of motion and neck supple  No rigidity or tenderness  Comments: Tenderness to palpation of the right lateral and anterior thigh  Mild tissue edema without ecchymosis  No deformity  Skin:     General: Skin is warm and dry  Capillary Refill: Capillary refill takes less than 2 seconds  Findings: No bruising, erythema or rash  Neurological:      General: No focal deficit present  Mental Status: She is alert and oriented to person, place, and time  Mental status is at baseline  Motor: Weakness present  Comments: 0/5 weakness in the right lower extremity secondary to pain  Invasive Devices  Report    Peripheral Intravenous Line  Duration           Peripheral IV 10/11/22 Right Antecubital <1 day              Lab Results:   BMP/CMP:   Lab Results   Component Value Date    SODIUM 138 10/11/2022    K 4 0 10/11/2022     10/11/2022    CO2 28 10/11/2022    BUN 23 10/11/2022    CREATININE 1 07 10/11/2022    CALCIUM 10 0 10/11/2022    AST 21 10/11/2022    ALT 28 10/11/2022    ALKPHOS 86 10/11/2022    EGFR 48 10/11/2022   , CBC:   Lab Results   Component Value Date    WBC 22 30 (H) 10/11/2022    HGB 14 4 10/11/2022    HCT 44 6 10/11/2022     (H) 10/11/2022     10/11/2022    MCH 33 1 10/11/2022    MCHC 32 3 10/11/2022    RDW 13 5 10/11/2022    MPV 11 8 10/11/2022   , Coagulation:   Lab Results   Component Value Date    INR 0 89 10/11/2022   , Urinalysis: No results found for: Jennifer Moulder, SPECGRAV, PHUR, LEUKOCYTESUR, NITRITE, PROTEINUA, GLUCOSEU, KETONESU, BILIRUBINUR, BLOODU and Troponin: No results found for: TROPONINI    Imaging Results: I have personally reviewed pertinent reports      Chest Xray(s): N/A   FAST exam(s): N/A   CT Scan(s): N/A   Additional Xray(s): positive for acute findings: Right Intertrochanteric Fracture on the right, 4th phalanx fracture on the left hand  Other Studies: None    Code Status: Prior  Advance Directive and Living Will:      Power of :    POLST:    I have spent 30 minutes with Patient and family today in which greater than 50% of this time was spent in counseling/coordination of care regarding Diagnostic results, Prognosis, Patient and family education, Importance of tx compliance and Impressions

## 2022-10-12 NOTE — ASSESSMENT & PLAN NOTE
Sustained in a fall from standing position  - Orthopedic consult - Plan for OR on 10/12 for fixation  - NPO after midnight  - Geriatric Pain Management  - Monitor blood pressure  - Monitor CBC  - PT/OT eval

## 2022-10-12 NOTE — QUICK NOTE
I was contacted by Gia Ash's PCP, who informed me that she has been diagnosed with CLL  We appreciate him bringing this to our attention

## 2022-10-12 NOTE — INTERVAL H&P NOTE
H&P reviewed  After examining the patient I find no changes in the patients condition since the H&P had been written      Vitals:    10/12/22 1329   BP: 151/65   Pulse: 99   Resp: 18   Temp:    SpO2: 99%

## 2022-10-12 NOTE — ASSESSMENT & PLAN NOTE
Sustained during a fall from standing position  - Orthopedic consult  - Geriatric Pain Management  - Splint in place

## 2022-10-12 NOTE — QUICK NOTE
Orthopedics   Moustapha Hudson 79 y o  female MRN: [id number]  Unit/Bed#: W -01      Diagnosis: Right intertrochanteric hip fracture    Procedure: Right Intramedullary nail     Labs:  0   Lab Value Date/Time    HCT 44 6 10/11/2022 1900    HCT 36 0 08/13/2021 0530    HCT 38 5 08/12/2021 0437    HGB 14 4 10/11/2022 1900    HGB 11 8 08/13/2021 0530    HGB 12 3 08/12/2021 0437    INR 0 89 10/11/2022 1900    WBC 22 30 (H) 10/11/2022 1900    WBC 14 21 (H) 08/13/2021 0530    WBC 14 18 (H) 08/12/2021 0437       Meds:    Current Facility-Administered Medications:   •  acetaminophen (TYLENOL) tablet 975 mg, 975 mg, Oral, Q8H [address], Hussein Carlson, DO  •  docusate sodium (COLACE) capsule 100 mg, 100 mg, Oral, BID, Hussein Carlson, DO  •  enoxaparin (LOVENOX) subcutaneous injection 30 mg, 30 mg, Subcutaneous, Q12H, Hussein Carlson, DO  •  gabapentin (NEURONTIN) capsule 100 mg, 100 mg, Oral, HS, Hussein Carlson, DO  •  [START ON 10/12/2022] lidocaine (LIDODERM) 5 % patch 1 patch, 1 patch, Topical, Daily, Hussein Carlson, DO  •  naloxone (NARCAN) 0 04 mg/mL syringe 0 04 mg, 0 04 mg, Intravenous, Q1MIN PRN, Hussein Carlson, DO  •  oxyCODONE (ROXICODONE) IR tablet 2 5 mg, 2 5 mg, Oral, Q4H PRN, Hussein Carlson, DO  •  oxyCODONE (ROXICODONE) IR tablet 5 mg, 5 mg, Oral, Q4H PRN, Hussein Carlson, DO  •  senna-docusate sodium (SENOKOT S) 8 6-50 mg per tablet 1 tablet, 1 tablet, Oral, HS, Hussein Carlson, DO    Blood Culture:   No results found for: BLOODCX    Wound Culture:   No results found for: WOUNDCULT    Ins and Outs:  No intake/output data recorded            CXR: on chart    EKG: on chart    Blood: type & screen results pending    Abx: will order in AM to be on call for OR    NPO: at midnight    Anticoag: Lovenox per trauma    Consent: pending    Clearance: pending

## 2022-10-13 ENCOUNTER — TELEPHONE (OUTPATIENT)
Dept: HEMATOLOGY ONCOLOGY | Facility: CLINIC | Age: 82
End: 2022-10-13

## 2022-10-13 PROBLEM — C91.10 CLL (CHRONIC LYMPHOCYTIC LEUKEMIA) (HCC): Status: ACTIVE | Noted: 2022-10-13

## 2022-10-13 LAB
ANION GAP SERPL CALCULATED.3IONS-SCNC: 10 MMOL/L (ref 4–13)
ANION GAP SERPL CALCULATED.3IONS-SCNC: 8 MMOL/L (ref 4–13)
BETA-HYDROXYBUTYRATE: 0 MMOL/L
BUN SERPL-MCNC: 26 MG/DL (ref 5–25)
BUN SERPL-MCNC: 37 MG/DL (ref 5–25)
CALCIUM SERPL-MCNC: 8.6 MG/DL (ref 8.4–10.2)
CALCIUM SERPL-MCNC: 8.9 MG/DL (ref 8.4–10.2)
CHLORIDE SERPL-SCNC: 102 MMOL/L (ref 96–108)
CHLORIDE SERPL-SCNC: 98 MMOL/L (ref 96–108)
CO2 SERPL-SCNC: 25 MMOL/L (ref 21–32)
CO2 SERPL-SCNC: 26 MMOL/L (ref 21–32)
CREAT SERPL-MCNC: 0.94 MG/DL (ref 0.6–1.3)
CREAT SERPL-MCNC: 1.23 MG/DL (ref 0.6–1.3)
ERYTHROCYTE [DISTWIDTH] IN BLOOD BY AUTOMATED COUNT: 14 % (ref 11.6–15.1)
GFR SERPL CREATININE-BSD FRML MDRD: 40 ML/MIN/1.73SQ M
GFR SERPL CREATININE-BSD FRML MDRD: 56 ML/MIN/1.73SQ M
GLUCOSE SERPL-MCNC: 166 MG/DL (ref 65–140)
GLUCOSE SERPL-MCNC: 262 MG/DL (ref 65–140)
GLUCOSE SERPL-MCNC: 305 MG/DL (ref 65–140)
GLUCOSE SERPL-MCNC: 309 MG/DL (ref 65–140)
GLUCOSE SERPL-MCNC: 324 MG/DL (ref 65–140)
GLUCOSE SERPL-MCNC: 338 MG/DL (ref 65–140)
GLUCOSE SERPL-MCNC: 360 MG/DL (ref 65–140)
GLUCOSE SERPL-MCNC: 403 MG/DL (ref 65–140)
GLUCOSE SERPL-MCNC: 467 MG/DL (ref 65–140)
GLUCOSE SERPL-MCNC: 482 MG/DL (ref 65–140)
GLUCOSE SERPL-MCNC: >500 MG/DL (ref 65–140)
HCT VFR BLD AUTO: 37.4 % (ref 34.8–46.1)
HGB BLD-MCNC: 11.7 G/DL (ref 11.5–15.4)
MCH RBC QN AUTO: 33 PG (ref 26.8–34.3)
MCHC RBC AUTO-ENTMCNC: 31.3 G/DL (ref 31.4–37.4)
MCV RBC AUTO: 105 FL (ref 82–98)
PLATELET # BLD AUTO: 182 THOUSANDS/UL (ref 149–390)
PMV BLD AUTO: 12.4 FL (ref 8.9–12.7)
POTASSIUM SERPL-SCNC: 3.9 MMOL/L (ref 3.5–5.3)
POTASSIUM SERPL-SCNC: 4.1 MMOL/L (ref 3.5–5.3)
RBC # BLD AUTO: 3.55 MILLION/UL (ref 3.81–5.12)
SODIUM SERPL-SCNC: 132 MMOL/L (ref 135–147)
SODIUM SERPL-SCNC: 137 MMOL/L (ref 135–147)
WBC # BLD AUTO: 16.25 THOUSAND/UL (ref 4.31–10.16)

## 2022-10-13 PROCEDURE — 97167 OT EVAL HIGH COMPLEX 60 MIN: CPT

## 2022-10-13 PROCEDURE — 85027 COMPLETE CBC AUTOMATED: CPT | Performed by: PHYSICIAN ASSISTANT

## 2022-10-13 PROCEDURE — 99232 SBSQ HOSP IP/OBS MODERATE 35: CPT | Performed by: PHYSICIAN ASSISTANT

## 2022-10-13 PROCEDURE — 82010 KETONE BODYS QUAN: CPT

## 2022-10-13 PROCEDURE — 97163 PT EVAL HIGH COMPLEX 45 MIN: CPT

## 2022-10-13 PROCEDURE — 80048 BASIC METABOLIC PNL TOTAL CA: CPT | Performed by: PHYSICIAN ASSISTANT

## 2022-10-13 PROCEDURE — 99024 POSTOP FOLLOW-UP VISIT: CPT | Performed by: PHYSICIAN ASSISTANT

## 2022-10-13 PROCEDURE — 80048 BASIC METABOLIC PNL TOTAL CA: CPT

## 2022-10-13 PROCEDURE — 97110 THERAPEUTIC EXERCISES: CPT

## 2022-10-13 PROCEDURE — 82948 REAGENT STRIP/BLOOD GLUCOSE: CPT

## 2022-10-13 RX ORDER — INSULIN LISPRO 100 [IU]/ML
1-5 INJECTION, SOLUTION INTRAVENOUS; SUBCUTANEOUS
Status: DISCONTINUED | OUTPATIENT
Start: 2022-10-13 | End: 2022-10-13

## 2022-10-13 RX ORDER — DOCUSATE SODIUM 100 MG/1
100 CAPSULE, LIQUID FILLED ORAL 2 TIMES DAILY
Refills: 0 | Status: ON HOLD
Start: 2022-10-13

## 2022-10-13 RX ORDER — AMOXICILLIN 250 MG
1 CAPSULE ORAL
Refills: 0 | Status: ON HOLD
Start: 2022-10-13

## 2022-10-13 RX ORDER — GABAPENTIN 100 MG/1
100 CAPSULE ORAL
Refills: 0 | Status: ON HOLD
Start: 2022-10-13

## 2022-10-13 RX ORDER — ACETAMINOPHEN 325 MG/1
650 TABLET ORAL EVERY 4 HOURS PRN
Refills: 0 | Status: ON HOLD
Start: 2022-10-13

## 2022-10-13 RX ORDER — INSULIN LISPRO 100 [IU]/ML
6 INJECTION, SOLUTION INTRAVENOUS; SUBCUTANEOUS
Status: DISCONTINUED | OUTPATIENT
Start: 2022-10-14 | End: 2022-10-13

## 2022-10-13 RX ORDER — INSULIN LISPRO 100 [IU]/ML
4 INJECTION, SOLUTION INTRAVENOUS; SUBCUTANEOUS
Status: DISCONTINUED | OUTPATIENT
Start: 2022-10-13 | End: 2022-10-13

## 2022-10-13 RX ORDER — INSULIN LISPRO 100 [IU]/ML
6 INJECTION, SOLUTION INTRAVENOUS; SUBCUTANEOUS
Status: DISCONTINUED | OUTPATIENT
Start: 2022-10-13 | End: 2022-10-13

## 2022-10-13 RX ORDER — LIDOCAINE 50 MG/G
1 PATCH TOPICAL DAILY
Refills: 0
Start: 2022-10-14 | End: 2022-10-16 | Stop reason: SDUPTHER

## 2022-10-13 RX ORDER — INSULIN GLARGINE 100 [IU]/ML
12 INJECTION, SOLUTION SUBCUTANEOUS
Status: DISCONTINUED | OUTPATIENT
Start: 2022-10-13 | End: 2022-10-13

## 2022-10-13 RX ORDER — OXYCODONE HYDROCHLORIDE 5 MG/1
2.5-5 TABLET ORAL EVERY 4 HOURS PRN
Qty: 18 TABLET | Refills: 0 | Status: ON HOLD | OUTPATIENT
Start: 2022-10-13 | End: 2022-10-16

## 2022-10-13 RX ADMIN — INSULIN LISPRO 4 UNITS: 100 INJECTION, SOLUTION INTRAVENOUS; SUBCUTANEOUS at 16:28

## 2022-10-13 RX ADMIN — ACETAMINOPHEN 975 MG: 325 TABLET ORAL at 05:45

## 2022-10-13 RX ADMIN — SENNOSIDES AND DOCUSATE SODIUM 1 TABLET: 8.6; 5 TABLET ORAL at 21:12

## 2022-10-13 RX ADMIN — INSULIN LISPRO 3 UNITS: 100 INJECTION, SOLUTION INTRAVENOUS; SUBCUTANEOUS at 05:17

## 2022-10-13 RX ADMIN — ENOXAPARIN SODIUM 30 MG: 30 INJECTION SUBCUTANEOUS at 21:11

## 2022-10-13 RX ADMIN — OXYCODONE HYDROCHLORIDE 5 MG: 5 TABLET ORAL at 14:53

## 2022-10-13 RX ADMIN — CARBAMIDE PEROXIDE 6.5% 5 DROP: 6.5 LIQUID AURICULAR (OTIC) at 17:46

## 2022-10-13 RX ADMIN — INSULIN LISPRO 4 UNITS: 100 INJECTION, SOLUTION INTRAVENOUS; SUBCUTANEOUS at 08:43

## 2022-10-13 RX ADMIN — CARBAMIDE PEROXIDE 6.5% 5 DROP: 6.5 LIQUID AURICULAR (OTIC) at 08:45

## 2022-10-13 RX ADMIN — SODIUM CHLORIDE 9 UNITS/HR: 9 INJECTION, SOLUTION INTRAVENOUS at 19:46

## 2022-10-13 RX ADMIN — DOCUSATE SODIUM 100 MG: 100 CAPSULE, LIQUID FILLED ORAL at 17:46

## 2022-10-13 RX ADMIN — SODIUM CHLORIDE 1 UNITS/HR: 9 INJECTION, SOLUTION INTRAVENOUS at 23:53

## 2022-10-13 RX ADMIN — CEFAZOLIN SODIUM 1000 MG: 1 SOLUTION INTRAVENOUS at 07:38

## 2022-10-13 RX ADMIN — LIDOCAINE 5% 1 PATCH: 700 PATCH TOPICAL at 08:45

## 2022-10-13 RX ADMIN — INSULIN LISPRO 4 UNITS: 100 INJECTION, SOLUTION INTRAVENOUS; SUBCUTANEOUS at 11:28

## 2022-10-13 RX ADMIN — GABAPENTIN 100 MG: 100 CAPSULE ORAL at 21:12

## 2022-10-13 RX ADMIN — ACETAMINOPHEN 975 MG: 325 TABLET ORAL at 14:42

## 2022-10-13 RX ADMIN — DOCUSATE SODIUM 100 MG: 100 CAPSULE, LIQUID FILLED ORAL at 08:45

## 2022-10-13 RX ADMIN — ENOXAPARIN SODIUM 30 MG: 30 INJECTION SUBCUTANEOUS at 08:45

## 2022-10-13 RX ADMIN — INSULIN LISPRO 2 UNITS: 100 INJECTION, SOLUTION INTRAVENOUS; SUBCUTANEOUS at 16:47

## 2022-10-13 RX ADMIN — INSULIN LISPRO 3 UNITS: 100 INJECTION, SOLUTION INTRAVENOUS; SUBCUTANEOUS at 08:43

## 2022-10-13 RX ADMIN — ACETAMINOPHEN 975 MG: 325 TABLET ORAL at 21:11

## 2022-10-13 RX ADMIN — INSULIN LISPRO 5 UNITS: 100 INJECTION, SOLUTION INTRAVENOUS; SUBCUTANEOUS at 16:27

## 2022-10-13 NOTE — QUICK NOTE
I contacted Hematology/Oncology via tiger connect regarding the patient's recent diagnosis of CLL to determine if inpatient consultation was necessary  After chart review and further discussion, patient will have arrangements made for outpatient follow-up and no inpatient consult or intervention necessary at this time  PT and OT have evaluated the patient and her recommending post acute care facility placement for rehab  I spoke with the patient's nephew, Giorgio Lopez, via the phone to provide him an update and discuss potential disposition when appropriate placement is available      Barbara Benson PA-C  10/13/2022 11:13 AM

## 2022-10-13 NOTE — CASE MANAGEMENT
Case Management Assessment & Discharge Planning Note    Patient name Lexii Silva  Location W /W -64 MRN [id number]  : 1940 Date 10/13/2022       Current Admission Date: 10/11/2022  Current Admission Diagnosis:Closed fracture of trochanter of right femur with routine healing   Patient Active Problem List    Diagnosis Date Noted   • CLL (chronic lymphocytic leukemia) ([facility] [address] ) 10/13/2022   • Closed fracture of trochanter of right femur with routine healing 10/11/2022   • Displaced fracture of middle phalanx of left ring finger, initial encounter for closed fracture 10/11/2022   • Hypertension 2021   • Ambulatory dysfunction 2021   • Suspected Mild cognitive impairment 2021   • Lymphadenopathy 2020   • Elevated liver enzymes 2020   • Fall 2020   • Type 2 diabetes mellitus with stage 3a chronic kidney disease, with long-term current use of insulin ([facility] [address] ) 2020   • CKD (chronic kidney disease) stage 3, GFR 30-59 ml/min ([facility] [address] ) 2020   • HLD (hyperlipidemia) 2020   • OA (osteoarthritis) 2020   • Chronic systolic heart failure ([facility] [address] ) 2020      LOS (days): 2  Geometric Mean LOS (GMLOS) (days): 2 70  Days to GMLOS:0 9     OBJECTIVE:    Risk of Unplanned Readmission Score: 15 31         Current admission status: Inpatient       Preferred Pharmacy:   Georgi Jacome TO E-PRESCRIBE  No address on file      [facility] [id number]  Phone: [phone] Fax: [phone]    Primary Care Provider: Jia Flores MD    Primary Insurance: MEDICARE  Secondary Insurance: [address]:  [place] [address] Proxies    There are no active Health Care Proxies on file         Advance Directives  Does patient have a Health Care POA?: Yes  Does patient have Advance Directives?: Yes  Advance Directives: Living will  Primary Contact: Asad Owusu Horace              Patient Information  Admitted from[de-identified] Home  Mental Status: Alert  During Assessment patient was accompanied by: Not accompanied during assessment  Assessment information provided by[de-identified] Patient  Primary Caregiver: Self  Support Systems: Friends/neighbors, Family members  [place] of Residence: [address] [place] [address] do you live in?: [address] [facility] entry access options   Select all that apply : No steps to enter home  Type of Current Residence: 2 story home  Upon entering residence, is there a bedroom on the main floor (no further steps)?: Yes  Upon entering residence, is there a bathroom on the main floor (no further steps)?: Yes  In the last 12 months, was there a time when you were not able to pay the mortgage or rent on time?: No  Living Arrangements: Lives Alone    Activities of Daily Living Prior to Admission  Functional Status: Independent  Completes ADLs independently?: Yes  Ambulates independently?: Yes  Does patient use assisted devices?: Yes  Assisted Devices (DME) used: Rollator  Does patient currently own DME?: Yes  What DME does the patient currently own?: Rollator  Does patient have a history of Outpatient Therapy (PT/OT)?: No  Does the patient have a history of Short-Term Rehab?: No  Does patient have a history of HHC?: No  Does patient currently have [address]?: No         Patient Information Continued  Income Source: SSI/SSD  Within the past 12 months, you worried that your food would run out before you got the money to buy more : Never true  Within the past 12 months, the food you bought just didn't last and you didn't have money to get more : Never true         Means of Transportation  Means of Transport to Appts[de-identified] Family transport  In the past 12 months, has lack of transportation kept you from medical appointments or from getting medications?: No  In the past 12 months, has lack of transportation kept you from meetings, work, or from getting things needed for daily living?: No        DISCHARGE DETAILS:    Discharge planning discussed with[de-identified] Pt  Freedom of Choice: Yes  Comments - Freedom of Choice: Pt wishes to go to a rehab facility close to her and her nephew that lives in [place]  CM contacted family/caregiver?: Yes  Were Treatment Team discharge recommendations reviewed with patient/caregiver?: Yes  Did patient/caregiver verbalize understanding of patient care needs?: N/A- going to facility  Were patient/caregiver advised of the risks associated with not following Treatment Team discharge recommendations?: Yes    Contacts  Patient Contacts: Hugo Corral  Relationship to Patient[de-identified] Family  Contact Method: Phone  Phone Number: [phone]  Reason/Outcome: Discharge Planning, Referral, Emergency Contact, Continuity of [address]         Is the patient interested in [facility] at discharge?: No    DME Referral Provided  Referral made for DME?: No (Pt will need a RW, she only has a rollator at home)    Other Referral/Resources/Interventions Provided:  Interventions: Short Term Rehab  Referral Comments: CM spoke with pt at bedside and discussed the treatment team's recommendations for short-term rehabilitation  Pt has never been to a facility and agrees she may need to go to a facility prior to returning to her prior living situation, as she lives alone  The pt's nephew is her POA, and pt relayed he is a constable, [place] , Religious leader, and takes care of the pt's sister (his mom) and her  Pt relayed her nephew has been asking her to sell her house recently and move in with him      Would you like to participate in our [address] service program?  : Yes

## 2022-10-13 NOTE — PLAN OF CARE
Problem: OCCUPATIONAL THERAPY ADULT  Goal: Performs self-care activities at highest level of function for planned discharge setting  See evaluation for individualized goals  Description: Treatment Interventions: ADL retraining, Functional transfer training, Endurance training, Patient/family training, Equipment evaluation/education, Compensatory technique education, Activityengagement          See flowsheet documentation for full assessment, interventions and recommendations  Note: Limitation: Decreased ADL status, Decreased UE strength, Decreased Safe judgement during ADL, Decreased endurance, Decreased self-care trans, Decreased high-level ADLs  Prognosis: Good  Assessment: Pt is a 79 y o  female seen for OT evaluation s/p admission to [address] [place] [address] on 10/11/2022 due to fall at home  Pt diagnosed with Closed fracture of trochanter of right femur with routine healing  Pt has a significant PMH impacting occupational performance including: DM II, HLD, HTN, displaced fx of middle phalanx of L ring finger  Pt with no recent admissions in the last 2 months  Pt with active OT evaluation and treatment orders and activity orders for Activity as tolerated  PTA pt living alone in [facility] with [address], pt (I) with ADLs and IADLs, (-)falls, (-)drives, use of rollator at baseline  Pt is motivated to return to home  Personal and environmental factors supporting pt at time of IE include attitude towards recovery and accessible home environment  Personal and environmental factors inhibiting engagement in occupations include advanced age, limited social support, difficulty completing ADLs and difficulty completing IADLs  During evaluation pt performed as is outlined above in flowsheet  Pt required VC for safety and VC for attention to task  Standardized assessments used to assist in identifying performance deficits include AMPAC 6-Clicks and Barthel ADL Index   Performance deficits that affect the pt’s occupational performance during the initial evaluation include impaired balance, functional mobility, endurance, activity tolerance, functional standing tolerance and overall strength, direction following, safety awareness and insight into deficits  Based on pt’s functional performance and deficits the following occupations will be addressed in OT treatments in order to maximize pt’s independence and overall occupational performance: grooming, bathing/showering, toileting and toilet hygiene, dressing and functional mobility  Goals are listed below  Upon discharge from acute care setting recommend d/c to [address] [place] [address]  This evaluation required an extensive review of medical and/or therapy records and additional review of physical, cognitive and psychosocial history related to functional performance  Based upon functional performance deficits and assessments, this evaluation has been identified as a high complexity evaluation       OT Discharge Recommendation: Post acute rehabilitation services

## 2022-10-13 NOTE — PLAN OF CARE
Problem: PHYSICAL THERAPY ADULT  Goal: Performs mobility at highest level of function for planned discharge setting  See evaluation for individualized goals  Description: Treatment/Interventions: Functional transfer training, LE strengthening/ROM, Therapeutic exercise, Endurance training, Cognitive reorientation, Patient/family training, Equipment eval/education, Bed mobility, Gait training  Equipment Recommended: Jordy Smart       See flowsheet documentation for full assessment, interventions and recommendations  10/13/2022 1612 by Ashli Monet PT  Note:    Problem List: Decreased strength, Decreased endurance, Impaired balance, Decreased mobility, Decreased safety awareness, Orthopedic restrictions, Pain  Assessment: Florentin Ro is a 79 y o  Female who [facility] on 10/11/2022 from home w/ c/o fall outside home and diagnosis of closed fracture of trochanter of R femur  Pt is POD1 s/p R femur IM nail  Orders for PT eval and treat received, w/ activity orders of WBAT R LE and fall precautions  Pt presents w/ comorbidities of DMII, HLD, CHF, CLL, OA  At baseline, pt mobilizes independently w/ RW, and reports 1 falls in the last 6 months  Upon evaluation, pt presents w/ the following deficits: weakness, decreased ROM, impaired balance, decreased endurance and pain limiting functional mobility  Upon eval, pt requires max A x 1 for bed mobility, max A x 2 for transfers  Pt's clinical presentation is unstable/unpredictable due to need for assist w/ all phases of mobility when usually mobilizing independently, pain impacting overall mobility status, need for input for task focus and mobility technique, recent drastic decline in mobility compared to baseline and recent history of falls  Patient is at an increased risk of falls due to physical deficits  Given the above findings, discharge recommendation is for Post-acute inpatient rehabilitation   During this admission, pt would benefit from continued skilled inpatient PT in the acute care setting in order to address the abovementioned deficits to maximize function and mobility before DC from acute care  PT Discharge Recommendation: Post acute rehabilitation services    See flowsheet documentation for full assessment

## 2022-10-13 NOTE — PROGRESS NOTES
[facility]  Progress Note - Cresencio Harrison 1940, 79 y o  female MRN: [id number]  Unit/Bed#: W -01 Encounter: [id number]  Primary Care Provider: Rubina Singh MD   Date and time admitted to hospital: 10/11/2022  5:47 PM    Fall  Assessment & Plan  - Status post fall from standing position after tripping with the below noted injuries  - Fall precautions  - Geriatric Medicine consultation for evaluation, medication review and recommendations   - PT and OT evaluation and treatment as indicated  - Case Management consultation for disposition planning  * Closed fracture of trochanter of right femur with routine healing  Assessment & Plan  - Right femur fracture, present on admission   - Status post ORIF of right femur fracture with long IM nail on 10/12/2022  - Appreciate Orthopedic surgery evaluation, recommendations and interventions as noted  - May be weightbearing as tolerated on the right lower extremity postoperatively per Orthopedic surgery  - Monitor right lower extremity neurovascular exam   - Continue multimodal analgesic regimen   - Continue DVT prophylaxis  - PT and OT evaluation and treatment as indicated  - Outpatient follow up with Orthopedic surgery for re-evaluation  Displaced fracture of middle phalanx of left ring finger, initial encounter for closed fracture  Assessment & Plan  - Fracture of the middle phalanx of the left ring finger/4th finger, present on presentation   - Appreciate Orthopedic surgery evaluation and recommendations  Non operative management recommended  - Maintain splint and avoid weight-bearing through finger   - Continue multimodal analgesic regimen   - PT and OT evaluation and treatment as indicated  - Outpatient follow-up with Orthopedic surgery      Chronic systolic heart failure [facility])  Assessment & Plan  Wt Readings from Last 3 Encounters:   10/11/22 65 8 kg (145 lb)   08/05/21 63 5 kg (140 lb)   11/08/20 74 2 kg (163 lb 9 3 oz)     - Patient with history of chronic systolic CHF without evidence of acute exacerbation   - Continue current medication regimen  Plan to resume Lasix 40 mg daily when appropriate  - Monitor volume status   - Outpatient follow-up per routine  Type 2 diabetes mellitus with stage 3a chronic kidney disease, with long-term current use of insulin [facility])  Assessment & Plan  Lab Results   Component Value Date    HGBA1C 7 6 (H) 10/12/2022       Recent Labs     10/12/22  1700 10/12/22  1820 10/12/22  2311 10/13/22  0517   POCGLU 354* 276* 303* 324*       Blood Sugar Average: Last 72 hrs:     - Patient with history of type 2 diabetes mellitus with associated CKD stage 3   - Patient with hyperglycemia perioperatively  Goal blood glucose level between 140 and 180   - Adjust subcutaneous insulin regimen as follows: Initiate basal insulin as well as mealtime insulin with adjustment in sliding scale insulin coverage   - Resume home medication therapy on discharge  - Outpatient follow-up per routine  Hypertension  Assessment & Plan  - Patient with chronic history of hypertension   - Continue current medication regimen and resume home medication therapy as appropriate  - Outpatient follow-up per routine  HLD (hyperlipidemia)  Assessment & Plan  - Patient with chronic history of hyperlipidemia   - Continue current medication regimen and resume home medication therapy as appropriate  - Outpatient follow-up per routine  CLL (chronic lymphocytic leukemia) ([facility] [address] )  Assessment & Plan  - Patient with reported recent diagnosis of CLL with elevated white blood cell count this presentation   - Continue to monitor CBC   - Consider oncology consult  Will discuss with team today  - Recommend short-term outpatient follow-up with PCP for further workup and management  Disposition:  Continue current level of care  Await therapy evaluation recommendations postoperatively    Case Management following for disposition planning  SUBJECTIVE:  Chief Complaint:  “I feel pretty good this morning ”    Subjective:  Patient is overall doing well this morning  She notes she slept well overnight  At this time, she denies having any pain  She notes minimal pain in her right hip with movement  She denies any shortness of breath or difficulty breathing  She is tolerating her diet without nausea or vomiting  OBJECTIVE:   Vitals:   Temp:  [97 °F (36 1 °C)-98 5 °F (36 9 °C)] 98 1 °F (36 7 °C)  HR:  [] 107  Resp:  [14-20] 18  BP: (111-188)/(42-82) 130/69    Intake/Output:  I/O       10/11 0701  10/12 0700 10/12 0701  10/13 0700 10/13 0701  10/14 0700    P  O   240     I V  (mL/kg)  1231 7 (18 7)     IV Piggyback  100     Total Intake(mL/kg)  1571 7 (23 9)     Urine (mL/kg/hr) 700 1375 (0 9)     Stool  0     Blood  150     Total Output 700 1525     Net -700 +46 7            Unmeasured Stool Occurrence  0 x          Nutrition: Diet Forrest/CHO Controlled; Consistent Carbohydrate Diet Level 2 (5 carb servings/75 grams CHO/meal)  GI Proph/Bowel Reg:  On Colace and Senokot S for bowel regimen  VTE Prophylaxis:Sequential compression device (Venodyne)  and Enoxaparin (Lovenox)     Physical Exam:   GENERAL APPEARANCE: Patient in no acute distress  HEENT: NCAT; EOMs intact; Mucous membranes moist  CV: Regular rate and rhythm; no murmur/gallops/rubs appreciated  CHEST / LUNGS: Clear to auscultation; no wheezes/rales/rhonci  ABD: NABS; soft; non-distended; non-tender  :  Urinary catheter in place draining clear yellow urine  EXT: +2 pulses bilaterally upper & lower extremities  Mild tenderness around the right hip and extending down the right lateral thigh with associated swelling; right upper leg lateral incisions with clean/dry/intact overlying dressings  Right lower extremity neurovascular exam intact  NEURO: GCS 15; no focal neurologic deficits; neurovascularly intact    SKIN: Warm, dry and well perfused; no rash; no jaundice  Invasive Devices  Report    Peripheral Intravenous Line  Duration           Peripheral IV 10/11/22 Right Antecubital 1 day    Peripheral IV 10/12/22 Left Forearm <1 day          Drain  Duration           Urethral Catheter Double-lumen; Latex 16 Fr  <1 day                      Lab Results:   Results: I have personally reviewed all pertinent laboratory/tests results, BMP/CMP:   Lab Results   Component Value Date    SODIUM 137 10/13/2022    K 4 1 10/13/2022     10/13/2022    CO2 25 10/13/2022    BUN 26 (H) 10/13/2022    CREATININE 0 94 10/13/2022    CALCIUM 8 9 10/13/2022    EGFR 56 10/13/2022    and CBC:   Lab Results   Component Value Date    WBC 16 25 (H) 10/13/2022    HGB 11 7 10/13/2022    HCT 37 4 10/13/2022     (H) 10/13/2022     10/13/2022    MCH 33 0 10/13/2022    MCHC 31 3 (L) 10/13/2022    RDW 14 0 10/13/2022    MPV 12 4 10/13/2022     Imaging/EKG Studies: I have personally reviewed pertinent reports       Other Studies: N/A    Osiel De Oliveira PA-C  10/13/2022 07:37 AM

## 2022-10-13 NOTE — ASSESSMENT & PLAN NOTE
- Patient with chronic history of hypertension   - Continue current medication regimen and resume home medication therapy as appropriate  - Outpatient follow-up per routine

## 2022-10-13 NOTE — ASSESSMENT & PLAN NOTE
Lab Results   Component Value Date    HGBA1C 7 6 (H) 10/12/2022       Recent Labs     10/12/22  1700 10/12/22  1820 10/12/22  2311 10/13/22  0517   POCGLU 354* 276* 303* 324*       Blood Sugar Average: Last 72 hrs:     - Patient with history of type 2 diabetes mellitus with associated CKD stage 3   - Patient with hyperglycemia perioperatively  Goal blood glucose level between 140 and 180   - Adjust subcutaneous insulin regimen as follows: Initiate basal insulin as well as mealtime insulin with adjustment in sliding scale insulin coverage   - Resume home medication therapy on discharge  - Outpatient follow-up per routine

## 2022-10-13 NOTE — PHYSICAL THERAPY NOTE
PHYSICAL THERAPY EVALUATION NOTE    Patient Name: Katja Skinner  [id number] Date: 10/13/2022    AGE:   79 y o  Mrn:   [id number]  ADMIT DX:  Multiple injuries [T07  XXXA]  Intertrochanteric fracture ([facility] [address] ) Dahlia Quivers  Finger fracture, left [E95 [id number]]    Past Medical History:   Diagnosis Date    Benign adenomatous polyp of large intestine     CHF (congestive heart failure) (HCC)     Diabetes mellitus ([facility] )     Hyperlipemia     Hypertension     Osteoarthritis     TIA (transient ischemic attack)      Length Of Stay: 2  PHYSICAL THERAPY EVALUATION :   Patient's identity confirmed via 2 patient identifiers (full name and ) at start of session       10/13/22 0805   PT Last Visit   PT Visit Date 10/13/22   Note Type   Note type Evaluation   Pain Assessment   Pain Assessment Tool FLACC   Pain Score No Pain  (at rest)   Pain Location/Orientation Orientation: Right;Location: Leg   Pain Rating: FLACC (Rest) - Face 0   Pain Rating: FLACC (Rest) - Legs 0   Pain Rating: FLACC (Rest) - Activity 0   Pain Rating: FLACC (Rest) - Cry 0   Pain Rating: FLACC (Rest) - Consolability 0   Score: FLACC (Rest) 0   Pain Rating: FLACC (Activity) - Face 1   Pain Rating: FLACC (Activity) - Legs 1   Pain Rating: FLACC (Activity) - Activity 1   Pain Rating: FLACC (Activity) - Cry 1   Pain Rating: FLACC (Activity) - Consolability 1   Score: FLACC (Activity) 5   Restrictions/Precautions   Weight Bearing Precautions Per Order Yes   RLE Weight Bearing Per Order WBAT   Other Precautions Chair Alarm; Bed Alarm;WBS;Fall Risk;Pain   Home Living   Type of [address] [place] [address] Two level; Able to live on main level with bedroom/bathroom  (0 FEI, FFSU)   Bathroom Toilet Standard   Home Equipment Walker;Cane   Additional Comments Pt reports using rollator PTA   Prior Function   Level of [place] Independent with ADLs; Independent with functional mobility   Lives With Alone Receives Help From Family   IADLs Independent with medication management; Independent with meal prep; Family/Friend/Other provides transportation   Falls in the last 6 months 1 to 4   Vocational Retired   [employer]   Additional Pertinent History POD1 s/p R long IM nail   Family/Caregiver Present No   Cognition   Overall Cognitive Status WFL   Arousal/Participation Alert   Attention Attends with cues to redirect   Orientation Level Oriented X4   Memory Decreased recall of recent events   Following Commands Follows one step commands with increased time or repetition   Comments Pt pleasant and cooperative, agreeable to participate in PT eval   RLE Assessment   RLE Assessment WFL   Strength RLE   RLE Overall Strength 3/5   LLE Assessment   LLE Assessment WFL   Strength LLE   LLE Overall Strength 3+/5   Light Touch   RLE Light Touch Grossly intact   LLE Light Touch Grossly intact   Bed Mobility   Supine to Sit 2  Maximal assistance   Additional items Assist x 1; Increased time required   Sit to Supine Unable to assess   Transfers   Sit to Stand 2  Maximal assistance   Additional items Assist x 2; Increased time required   Stand to Sit 2  Maximal assistance   Additional items Assist x 2; Increased time required   Ambulation/Elevation   Gait pattern Decreased foot clearance; Short stride; Excessively slow   Gait Assistance 2  Maximal assist   Additional items Assist x 2;Verbal cues   Assistive Device Rolling walker   Distance 2 ft  (additional deferred due to patient reporting dizziness)   Balance   Static Sitting Fair   Static Standing Poor -   Ambulatory Zero   Activity Tolerance   Activity Tolerance Patient limited by fatigue   Medical Staff Made Aware VIANEY terry   Nurse Made Aware Spoke to ALIYA Bravo   Assessment   Problem List Decreased strength;Decreased endurance; Impaired balance;Decreased mobility; Decreased safety awareness;Orthopedic restrictions;Pain   Assessment Zack Lewis is a 79 y o   Female who [facility] on 10/11/2022 from home w/ c/o fall outside home and diagnosis of closed fracture of trochanter of R femur  Pt is POD1 s/p R femur IM nail  Orders for PT eval and treat received, w/ activity orders of WBAT R LE and fall precautions  Pt presents w/ comorbidities of DMII, HLD, CHF, CLL, OA  At baseline, pt mobilizes independently w/ RW, and reports 1 falls in the last 6 months  Upon evaluation, pt presents w/ the following deficits: weakness, decreased ROM, impaired balance, decreased endurance and pain limiting functional mobility  Upon eval, pt requires max A x 1 for bed mobility, max A x 2 for transfers  Pt's clinical presentation is unstable/unpredictable due to need for assist w/ all phases of mobility when usually mobilizing independently, pain impacting overall mobility status, need for input for task focus and mobility technique, recent drastic decline in mobility compared to baseline and recent history of falls  Patient is at an increased risk of falls due to physical deficits  Given the above findings, discharge recommendation is for Post-acute inpatient rehabilitation  During this admission, pt would benefit from continued skilled inpatient PT in the acute care setting in order to address the abovementioned deficits to maximize function and mobility before DC from acute care  Goals   Patient Goals to go home   STG Expiration Date 10/23/22   Short Term Goal #1 Patient will: Increase bilateral LE strength 1/2 grade to facilitate independent mobility, Perform all bed mobility tasks w/ mod A x1 to decrease fall risk factors, Perform all transfers w/ mod A x1 to improve independence, Ambulate at least 50 ft  with roller walker w/ mod A x1 w/o LOB, Increase all balance 1/2 grade to decrease risk for falls, Complete exercise program independently and Tolerate 3 hr OOB to faciliate upright tolerance   PT Treatment Day 0   Plan   Treatment/Interventions Functional transfer training;LE strengthening/ROM; Therapeutic exercise; Endurance training;Cognitive reorientation;Patient/family training;Equipment eval/education; Bed mobility;Gait training   PT Frequency 3-5x/wk   Recommendation   PT Discharge Recommendation Post acute rehabilitation services   Equipment Recommended Walker   AM-PAC Basic Mobility Inpatient   Turning in Bed Without Bedrails 2   Lying on Back to Sitting on Edge of Flat Bed 1   Moving Bed to Chair 1   Standing Up From Chair 1   Walk in Room 1   Climb 3-5 Stairs 1   Basic Mobility Inpatient Raw Score 7   Turning Head Towards Sound 4   Follow Simple Instructions 4   Low Function Basic Mobility Raw Score 15   Low Function Basic Mobility Standardized Score 23 9   Highest Level Of Mobility   [facility] Goal 2: Bed activities/Dependent transfer   Barthel Index   Feeding 5   Bathing 0   Grooming Score 5   Dressing Score 5   Bladder Score 0   Bowels Score 10   Toilet Use Score 5   Transfers (Bed/Chair) Score 5   Mobility (Level Surface) Score 0   Stairs Score 0   Barthel Index Score 35   Additional Treatment Session   Start Time 3088   End Time 0830   Treatment Assessment Pt seated OOB in recliner chair  Focus of intervention spent on HEP/exercises to assist w/ pt's c/o RLE tightness and soreness  Pt performed glute sets, educated on performing knee AROM, ankle pumps, and hip abd/adduction to assist w/ pain/soreness management to improve tolerance in WB for standing positions in upright sessions  Pt verbalizes understanding, is agreeable   Additional Treatment Day 1   End of Consult   Patient Position at End of Consult Bedside chair;Bed/Chair alarm activated; All needs within reach       The patient's AM-PAC Basic Mobility Inpatient Short Form Raw Score is 7, Standardized Score is    A standardized score less than 38 32 (raw score of 16) suggests the patient may benefit from discharge to post-acute rehabilitation services which may not coincide with above PT recommendations   However please refer to therapist recommendation for discharge planning given other factors that may influence destination      Pt would benefit from skilled inpatient PT during this admission in order to facilitate progress towards goals to maximize functional independence    Sabiha Butcher, PT

## 2022-10-13 NOTE — ASSESSMENT & PLAN NOTE
- Right femur fracture, present on admission   - Status post ORIF of right femur fracture with long IM nail on 10/12/2022  - Appreciate Orthopedic surgery evaluation, recommendations and interventions as noted  - May be weightbearing as tolerated on the right lower extremity postoperatively per Orthopedic surgery  - Monitor right lower extremity neurovascular exam   - Continue multimodal analgesic regimen   - Continue DVT prophylaxis  - PT and OT evaluation and treatment as indicated  - Outpatient follow up with Orthopedic surgery for re-evaluation

## 2022-10-13 NOTE — CONSULTS
Consultation - Yovana Louise 79 y o  female MRN: [id number]  Unit/Bed#: W -01 Encounter: [id number]        Inpatient consult to Gerontology  Consult performed by: Yamini Adair MD  Consult ordered by: BRINDA Solorzano          Assessment/Plan   1 -closed fracture of trochanter of right femur -patient has been evaluated by Orthopedics they plan to fixate on October 12th, would recommend pain management utilizing geriatric pain protocol   -continue pain control per Geriatric pain protocol:  Tylenol 975mg Q8H scheduled  Roxicodone 2 5mg Q4H PRN moderate pain  Roxicodone 5mg Q4H PRN severe pain  Dilaudid 0 2mg Q2H PRN  -continue adjuncts such as Gabapentin 100mg HS and lidocaine patch topically  -encourage addition of non-pharmacologic pain treatment including ice and frequent repositioning  -recommend  bowel regimen to prevent and treat constipation due to increased risk with acute pain and opiate pain medications     2  -displaced fracture of the middle phalanx of the left finger -patient's fracture has been splinted      3  -essential hypertension  -patient currently on metoprolol tartrate 25 mg twice daily and losartan hydrochlorothiazide 50-12 5 mg  Systolic blood pressure today is 136      4  -diabetes mellitus type 2 -currently insulin requiring will need to monitor hemoglobin A1c every 3 months   Her hemoglobin A1c has ranged from 6 7 back on September 1, 2020 to 8 7 on May 8, 2021 last 1 performed was December 2, 2021 that was 8 0   Should try to maintain blood sugars between 140-180 to reduce risk of hypoglycemia      5 -chronic renal insufficiency stage 3A  -on reviewing her records from [facility] her GFR usually runs in the 52 range   Will need to avoid NSAIDs or any other medication that can affect her renal clearance        6 -abnormal electrocardiogram -patient noted to have poor R-wave progression on the precordial leads   Suspect previous anterior wall MI   Would recommend echocardiogram and Cardiology clearance prior to surgery      7 -chronic lymphocytic leukemia  -apparently patient has recently been diagnosed with this disorder   I see that in the past the patient has had evidence of leukocytosis with a predominance of lymphocytes white counts as high as 22,000 in the past   This will need to be monitored      8 -cognitive impairment -on a previous admission on August 6, 2021 patient had developed acute metabolic encephalopathy had evidence of confusion and restlessness with active hallucinations   At that time precipitating factors were felt to be her hypoglycemia and her skin infection   Patient will need to be placed on delirium precautions per geriatric protocol   Patient had previous CT scan of the head on August 5, 2021 revealing moderate chronic microangiopathic changes    Delirium precautions  -Patient is high risk of delirium due to cognitive decline  -Initiate delirium precautions  -maintain normal sleep/wake cycle  -minimize overnight interruptions, group overnight vitals/labs/nursing checks as possible  -dim lights, close blinds and turn off tv to minimize stimulation and encourage sleep environment in evenings  -ensure that pain is well controlled  consider Tylenol 975mg Q8H scheduled if not already ordered   -monitor for fecal and urinary retention which may precipitate delirium  -encourage early mobilization and ambulation  -provide frequent reorientation and redirection  -encourage family and friends at the bedside to help help calm patient if anxious  -avoid medications which may precipitate or worsen delirium such as tramadol, benzodiazepine, anticholinergics, and benadryl  -encourage hydration and nutrition   -redirect unwanted behaviors as first line, avoid physical restraints, use chemical restraint only if all other attempts have been unsuccessful, would consider Zyprexa 2 5 mg, monitor for orthostatic hypotension and QTc prolongation with repeat dosing, recommend lowest dose possible for shortest duration possible      9 -impaired vision  -patient may use corrective lenses     10 -ambulatory dysfunction with fall -patient to get physical and occupational therapy postop      11 -medication review  -patient currently on Levemir insulin 14 units subQ daily, patient also takes lispro he 6 units under the skin 3 times a day with meals, patient on losartan hydrochlorothiazide 50/12 5 once daily, aspirin 81 mg orally daily, atorvastatin 20 mg orally daily, vitamin D3 1000 International Units daily, Lasix 40 mg daily, K-Dur 20 mEq orally daily, and vitamin B12 1000 mcg orally daily  [facility] monitor      12 -bilateral cerumen impaction  -will place patient on Debrox     13 -onychomycosis  -patient does follow with Podiatry on a regular basis     14  -decreased visual acuity -specially with her left eye apparently she has follow with Ophthalmology regularly for a dark spot that she has  She does wear glasses     15 -stress incontinence -patient wears pads regularly           Recommendations     1  -recommend cardiac clearance prior to surgery     2  -hemoglobin A1c     3  -echocardiogram     4  -will do Christian evaluation      5  -Debrox drops to both ears        History of Present Illness  Physician Requesting Jeannie oCy DO  Reason for Consult / Principal Problem:  Fall with fracture  Hx and PE limited by:  No limitations  Additional history obtained from:  Patient was able to give me an excellent history      HPI:Gia Vaughan is a 79 y  o  year old female who presents to [facility] emergency room after sustaining a mechanical fall   The patient was attempting to getting to her home on the day of admission and tripped falling forward on her walker   She injured her right hip, patient denied any head strike and currently is not on anticoagulation   Patient had increased discomfort with pain ranging 7/10 intermittent in nature and nonradiating   BMP revealed a blood sugar of 322 initial troponin level at 4:00 a m  Was 18 without delta level of 4  At 2:00 a m  Her HS troponin level was 19 with a delta level of 5  X-ray imaging revealed a displaced fracture of the middle phalanx of the left ring finger, also closed fracture of trochanter of the right femur with routine healing   Twelve lead electrocardiogram revealed a normal sinus rhythm suspect first-degree heart block evidence of poor R-wave progression or the precordial leads consistent with possible anterior wall myocardial infarction age undetermined  Evaristo Cheek is a difference when compared to previous electrocardiogram performed on August 5, 2021 with a was evidence of ST depression in inferior leads with also evidence of T-wave inversion in inferior leads and also in the lateral leads  Evaristo Cheek was also evidence of LVH with repolarization abnormalities   Patient's other comorbidities include a history of hypertension, hyperlipidemia, diabetes mellitus type 2, chronic renal insufficiency stage IIIA   Patient with history of ambulatory dysfunction requiring assistive devices to ambulate   Patient's last hemoglobin A1c was performed on December 2, 2021 revealing a value of 8  Patient lives by herself in a 2 floor home but she only uses the bottom floor   The patient is  and uses her 's 4 wheeled walker to get around  Review of Systems   Constitutional: Negative  HENT:        Dentures top and bottom   Eyes:        Wears glasses at present; has decreased visual acuity in her left eye   Respiratory: Negative  Cardiovascular: Negative  Gastrointestinal: Negative  Endocrine:        Patient's family would like her to follow with endocrinologist she is a brittle diabetic her sugars go up and down  Genitourinary:        History of stress incontinence   Musculoskeletal: Positive for gait problem  Allergic/Immunologic: Negative      Neurological: Negative for dizziness and headaches  Hematological: Negative  Psychiatric/Behavioral: Negative  Memory/Cognitive screening:  Patient gets forgetful her nephew does her shopping and is going to be helping her pay her bills  Lynne Sharma does have a good set up to take her medications  Mobility:  Has  ambulatory dysfunction uses a walker to navigate  Falls:  No history of recurrent falls  Assistive Devices:  Utilizes a 4 wheeled walker  Fraility:  No evidence of frailty  Nutrition/weight loss/grocery shopping/meal preparation:  Good nutritional status  Vision impairment:  Some visual impairment uses glasses has decreased vision in her left eye that is currently being followed  Hearing impairment:  No evidence of hearing impairment  Incontinence:  Patient with history of stress incontinence wears pads  Delirium:  Patient with episode of delirium on a previous admission to [facility]  Polypharmacy:   No current facility-administered medications on file prior to encounter  No current facility-administered medications on file prior to encounter       Current Outpatient Medications on File Prior to Encounter   Medication Sig Dispense Refill   • calcium carbonate (OS-JUAN RAMON) 600 MG tablet Take 600 mg by mouth 2 (two) times a day with meals     • fluticasone (FLONASE) 50 mcg/act nasal spray 1 spray into each nostril daily     • glucose blood test strip 1 each by Other route daily as needed Use as instructed     • glucose monitoring kit (FREESTYLE) monitoring kit 1 each by Does not apply route as needed     • insulin detemir (LEVEMIR) 100 units/mL subcutaneous injection Inject 14 Units under the skin daily at bedtime (Patient taking differently: Inject 20 Units under the skin daily at bedtime) 10 mL 0   • insulin lispro (HumaLOG) 100 units/mL injection Inject 6 Units under the skin 3 (three) times a day with meals 10 mL 2   • losartan-hydrochlorothiazide (HYZAAR) 50-12 5 mg per tablet Take 1 tablet by mouth daily 30 tablet 0   • metoprolol tartrate (LOPRESSOR) 25 mg tablet Take 10 mg by mouth every 12 (twelve) hours     • aspirin (ECOTRIN LOW STRENGTH) 81 mg EC tablet Take 81 mg by mouth daily     • atorvastatin (LIPITOR) 20 mg tablet Take 20 mg by mouth daily     • cholecalciferol (VITAMIN D3) 1,000 units tablet Take 1,000 Units by mouth daily     • furosemide (LASIX) 40 mg tablet Take 1 tablet (40 mg total) by mouth daily 30 tablet 0   • potassium chloride (K-DUR,KLOR-CON) 20 mEq tablet Take 20 mEq by mouth 2 (two) times a day     • vitamin B-12 (VITAMIN B-12) 1,000 mcg tablet Take by mouth daily       Patients primary residence:  Lives in her own 2 floor home mainly utilizes the bottom floor    Lives with:  Patient lives by herself  iADL's:  Patient no longer drives she had been paying her bills  Elio Azevedo nephew will be taken over paying her bills  ADL's:  Patient enjoys her basic activities of daily living     Historical Information   Past medical history:   Past Medical History:   Diagnosis Date   • Benign adenomatous polyp of large intestine    • CHF (congestive heart failure) ([facility] [address] )    • [facility]    • Hyperlipemia    • Hypertension    • Osteoarthritis    • TIA (transient ischemic attack)      Past surgical history:   Past Surgical History:   Procedure Laterality Date   • APPENDECTOMY     • CARPAL TUNNEL RELEASE Right    • HYSTERECTOMY W/ SALPINGO-OOPHERECTOMY       Social history:   Social History     Socioeconomic History   • Marital status:       Spouse name: Not on file   • Number of children: Not on file   • Years of education: Not on file   • Highest education level: Not on file   Occupational History   • Not on file   Tobacco Use   • Smoking status: Never Smoker   • Smokeless tobacco: Never Used   Vaping Use   • Vaping Use: Never used   Substance and Sexual Activity   • Alcohol use: Not Currently     Comment: very seldom   • Drug use: Never   • Sexual activity: Not on file   Other Topics Concern   • Not on file Social History Narrative   • Not on file     Social Determinants of Health     Financial Resource Strain: Not on file   Food Insecurity: Not on file   Transportation Needs: No Transportation Needs   • Lack of Transportation (Medical): No   • Lack of Transportation (Non-Medical): No   Physical Activity: Not on file   Stress: Not on file   Social Connections: Not on file   Intimate Partner Violence: Not on file   Housing Stability: Not on file     Family history:   Family History   Problem Relation Age of Onset   • Heart disease Mother    • Heart disease Father    • Hypertension Father    • Stomach cancer Sister    • Ovarian cancer Sister    • Hypertension Sister        Meds/Allergies   All current active meds have been reviewed  Allergies   Allergen Reactions   • Amlodipine    • Ceftin [Cefuroxime]    • Ciprofloxacin    • Citalopram    • Dye [Iodinated Diagnostic Agents]    • Glucophage [Metformin]    • Januvia [Sitagliptin]    • Neomycin-Polymyxin-Dexameth    • Ondansetron    • Prilosec [Omeprazole]    • Vibramycin [Doxycycline]        Objective   Vitals:    10/13/22 0829   BP: 133/54   Pulse: 99   Resp:    Temp:    SpO2: 95%       Intake/Output Summary (Last 24 hours) at 10/13/2022 0834  Last data filed at 10/13/2022 3367  Gross per 24 hour   Intake 1571 67 ml   Output 1075 ml   Net 496 67 ml     Invasive Devices  Report    Peripheral Intravenous Line  Duration           Peripheral IV 10/11/22 Right Antecubital 1 day    Peripheral IV 10/12/22 Left Forearm <1 day          Drain  Duration           Urethral Catheter Double-lumen; Latex 16 Fr  <1 day                Physical Exam  Constitutional:       General: She is not in acute distress  Appearance: Normal appearance  HENT:      Head:      Comments: Partial dentures top and bottom     Right Ear: External ear normal  There is impacted cerumen  Left Ear: External ear normal  There is impacted cerumen        Nose: Nose normal       Mouth/Throat: Mouth: Mucous membranes are moist    Eyes:      Conjunctiva/sclera: Conjunctivae normal       Pupils: Pupils are equal, round, and reactive to light  Cardiovascular:      Rate and Rhythm: Normal rate and regular rhythm  Heart sounds: Normal heart sounds  No murmur heard  No friction rub  No gallop  Pulmonary:      Effort: Pulmonary effort is normal    Abdominal:      General: Abdomen is flat  Bowel sounds are normal  There is no distension  Palpations: Abdomen is soft  There is no mass  Tenderness: There is no abdominal tenderness  Musculoskeletal:         General: Normal range of motion  Cervical back: Normal range of motion and neck supple  Skin:     General: Skin is warm and dry  Neurological:      General: No focal deficit present  Mental Status: She is alert and oriented to person, place, and time  Psychiatric:         Mood and Affect: Mood normal          Behavior: Behavior normal          Thought Content: Thought content normal          Lab Results:   I have personally reviewed pertinent lab and imaging results  VTE Prophylaxis: Patient currently on Lovenox 30mg subQ every 12 hours    Code Status: Level 1 - Full Code  Advance Directive and Living Will:      Power of :    POLST:      Family and Social Support:  Patient's nephew Myrtle Riggins is his power of   Living Arrangements: Lives Alone  Support Systems: Friends/neighbors;  Family members  Assistance Needed: complete at this time  Type of Current Residence: Private residence  [address]: No

## 2022-10-13 NOTE — QUICK NOTE
Postop check    Subjective:  “I hurt”  Patient complains of pain all over  More particularly she notes her right hip  ROS is limited due to current mental status  Objective:  General:  Sleeping with her eyes closed  No acute distress noted  Neuro:  GCS is 13 (E 3, V 4, M 6)  Moving all extremities  Equal strength in  and dorsi/plantar flexion  HEENT:  Normocephalic, atraumatic  Neck supple  Cardiac:  Regular rate and rhythm  +2 radial dorsalis pedis pulses, bilaterally  Lungs:  Clear to auscultation, bilaterally  Patient is on 2 L nasal cannula  No orthopnea  No tachypnea  Lungs are clear bilaterally  Abdomen:  Soft, nontender  Pelvis:  Stable  MSK:  Right hip displays some tenderness on palpation of lateral aspect  Mild swelling lateral right hip  All other extremities display no deformities or tenderness on palpation  Skin:  Right hip surgical dressing is clean, dry, intact  Otherwise skin is warm and dry, capillary refills less than 2 seconds in all extremities  Assessment    This is an 44-year-old female who suffered a fall resulting in a right femoral fracture that extends into the femoral neck s/p IM nail placement  Patient appears to be recovering appropriately though continues to have a depressed GCS pressumably associated with general anesthesia    Plan  · Due to patient being arousable and vital signs being stable will continue to monitor on Med/surge floor  Discussed with nursing staff that providers to be notified if patient becomes difficult to arouse were vital signs decline  · Once patient is more alert can trial oral intake  Until patient is tolerating adequate oral intake that time will continue IVF  · Wean oxygen as able to maintain oxygen saturation greater than 93%    · Multimodal pain regimen  · Weight-bearing status per Orthopedics  · DVT prophylaxis:  Lovenox  · PT/OT

## 2022-10-13 NOTE — ASSESSMENT & PLAN NOTE
- Status post fall from standing position after tripping with the below noted injuries  - Fall precautions  - Geriatric Medicine consultation for evaluation, medication review and recommendations   - PT and OT evaluation and treatment as indicated  - Case Management consultation for disposition planning

## 2022-10-13 NOTE — ASSESSMENT & PLAN NOTE
- Fracture of the middle phalanx of the left ring finger/4th finger, present on presentation   - Appreciate Orthopedic surgery evaluation and recommendations  Non operative management recommended  - Maintain splint and avoid weight-bearing through finger   - Continue multimodal analgesic regimen   - PT and OT evaluation and treatment as indicated  - Outpatient follow-up with Orthopedic surgery

## 2022-10-13 NOTE — ASSESSMENT & PLAN NOTE
- Patient with chronic history of hyperlipidemia   - Continue current medication regimen and resume home medication therapy as appropriate  - Outpatient follow-up per routine

## 2022-10-13 NOTE — OCCUPATIONAL THERAPY NOTE
Occupational Therapy Evaluation     Patient Name: Alicia Cavazos  MRDLU'U Date: 10/13/2022  Problem List  Principal Problem:    Closed fracture of trochanter of right femur with routine healing  Active Problems:    Fall    Type 2 diabetes mellitus with stage 3a chronic kidney disease, with long-term current use of insulin (HCC)    HLD (hyperlipidemia)    Chronic systolic heart failure ([facility] )    Hypertension    Displaced fracture of middle phalanx of left ring finger, initial encounter for closed fracture    CLL (chronic lymphocytic leukemia) ([facility] )    Past Medical History  Past Medical History:   Diagnosis Date    Benign adenomatous polyp of large intestine     CHF (congestive heart failure) (HCC)     Diabetes mellitus ([facility] )     Hyperlipemia     Hypertension     Osteoarthritis     TIA (transient ischemic attack)      Past Surgical History  Past Surgical History:   Procedure Laterality Date    APPENDECTOMY      CARPAL TUNNEL RELEASE Right     HYSTERECTOMY W/ SALPINGO-OOPHERECTOMY      OK OPEN RX FEMUR FX+INTRAMED CARIDAD Right 10/12/2022    Procedure: INSERTION NAIL IM FEMUR ANTEGRADE (TROCHANTERIC); Surgeon: Betsy Escalante DO;  Location: [facility];  Service: Orthopedics             10/13/22 0804   OT Last Visit   OT Visit Date 10/13/22   Note Type   Note type Evaluation   Additional Comments Pt is currently POD #1 s/p IM nail of R femur   Pain Assessment   Pain Assessment Tool 0-10   Pain Score No Pain   Pain Location/Orientation Orientation: Right;Location: Leg   Restrictions/Precautions   Weight Bearing Precautions Per Order Yes   RLE Weight Bearing Per Order WBAT   Other Precautions Chair Alarm; Bed Alarm; Fall Risk;Pain   Home Living   Type of [address] [place] [address] Two level; Able to live on main level with bedroom/bathroom  (0 FEI)   Bathroom Toilet Standard   Bathroom Equipment Grab bars around toilet   Home Equipment Walker;Cane   Additional Comments use of rollator at baseline   Prior Function   Level of [place] Independent with ADLs; Independent with functional mobility   Lives With Alone   Receives Help From Family   IADLs Independent with meal prep; Independent with medication management; Family/Friend/Other provides transportation  (nephew drives)   Falls in the last 6 months 1 to 4   Vocational Retired   Lifestyle   Autonomy PTA pt living alone in [facility] with [address], pt (I) with ADLs and IADLs, (-)falls, (-)drives, use of rollator at baseline   Reciprocal Relationships supportive nephew   Service to Others retired   Intrinsic Gratification enjoys sewing   Subjective   Subjective "I like to pile my juan pablo stuff on the stairs so I don't go up there"   ADL   Eating Assistance 5  Supervision/Setup   Eating Deficit Setup   Grooming Assistance 5  Supervision/Setup   UB [address] 4  Minimal Assistance   LB [address] 2  Maximal Assistance   [address] 1  Total Assistance   LB Dressing Deficit Don/doff R sock; Don/doff L sock   Toileting Assistance  2  Maximal Assistance   Bed Mobility   Supine to Sit 2  Maximal assistance   Additional items Assist x 1; Increased time required;Verbal cues;LE management   Transfers   Sit to Stand 2  Maximal assistance   Additional items Assist x 2; Increased time required;Verbal cues   Stand to Sit 2  Maximal assistance   Additional items Assist x 2; Increased time required;Verbal cues   Additional Comments use of RW   Functional Mobility   Functional Mobility 2  Maximal assistance   Additional Comments Ax2, limited distance bed > recliner, A with steering RW   Additional items Rolling walker   Balance   Static Sitting Fair   Dynamic Sitting Fair -   Static Standing Poor -   Ambulatory Zero   Activity Tolerance   Activity Tolerance Patient limited by fatigue;Treatment limited secondary to medical complications (Comment)  (pt with reports of dizziness, RN aware)   Medical Staff Made Aware PT Shelly COLEMAN Assessment RUE Assessment WFL   LUE Assessment   LUE Assessment WFL   Hand Function   Gross Motor Coordination Functional   Fine Motor Coordination Functional   Cognition   Overall Cognitive Status WFL   Arousal/Participation Alert; Cooperative   Attention Attends with cues to redirect   Orientation Level Oriented X4  (grossly to month and year, not to date)   Memory Decreased recall of recent events   Following Commands Follows one step commands with increased time or repetition   Comments pleasant and cooeprative, whifty at times   Assessment   Limitation Decreased ADL status; Decreased UE strength;Decreased Safe judgement during ADL;Decreased endurance;Decreased self-care trans;Decreased high-level ADLs   Prognosis Good   Assessment Pt is a 79 y o  female seen for OT evaluation s/p admission to [address] [place] [address] on 10/11/2022 due to fall at home  Pt diagnosed with Closed fracture of trochanter of right femur with routine healing  Pt has a significant PMH impacting occupational performance including: DM II, HLD, HTN, displaced fx of middle phalanx of L ring finger  Pt with no recent admissions in the last 2 months  Pt with active OT evaluation and treatment orders and activity orders for Activity as tolerated  PTA pt living alone in [facility] with [address], pt (I) with ADLs and IADLs, (-)falls, (-)drives, use of rollator at baseline  Pt is motivated to return to home  Personal and environmental factors supporting pt at time of IE include attitude towards recovery and accessible home environment  Personal and environmental factors inhibiting engagement in occupations include advanced age, limited social support, difficulty completing ADLs and difficulty completing IADLs  During evaluation pt performed as is outlined above in flowsheet  Pt required VC for safety and VC for attention to task  Standardized assessments used to assist in identifying performance deficits include AMPAC 6-Clicks and Barthel ADL Index   Performance deficits that affect the pt’s occupational performance during the initial evaluation include impaired balance, functional mobility, endurance, activity tolerance, functional standing tolerance and overall strength, direction following, safety awareness and insight into deficits  Based on pt’s functional performance and deficits the following occupations will be addressed in OT treatments in order to maximize pt’s independence and overall occupational performance: grooming, bathing/showering, toileting and toilet hygiene, dressing and functional mobility  Goals are listed below  Upon discharge from acute care setting recommend d/c to [address] [place] [address]  This evaluation required an extensive review of medical and/or therapy records and additional review of physical, cognitive and psychosocial history related to functional performance  Based upon functional performance deficits and assessments, this evaluation has been identified as a high complexity evaluation  Goals   Patient Goals to go home   LTG Time Frame 10-14   Long Term Goal see goals listed below   Plan   Treatment Interventions ADL retraining;Functional transfer training; Endurance training;Patient/family training;Equipment evaluation/education; Compensatory technique education; Activityengagement   Goal Expiration Date 10/23/22   OT Treatment Day 0   OT Frequency 3-5x/wk   Recommendation   OT Discharge Recommendation Post acute rehabilitation services   AM-PAC Daily Activity Inpatient   Lower Body Dressing 2   Bathing 2   Toileting 2   Upper Body Dressing 3   Grooming 3   Eating 3   Daily Activity Raw Score 15   Daily Activity Standardized Score (Calc for Raw Score >=11) 34 69   AM-PAC Applied Cognition Inpatient   Following a Speech/Presentation 3   Understanding Ordinary Conversation 4   Taking Medications 3   Remembering Where Things Are Placed or Put Away 3   Remembering List of 4-5 Errands 3   Taking Care of Complicated Tasks 3   Applied Cognition Raw Score 19   Applied Cognition Standardized Score 39 77   Barthel Index   Feeding 5   Bathing 0   Grooming Score 5   Dressing Score 5   Bladder Score 0   Bowels Score 10   Toilet Use Score 5   Transfers (Bed/Chair) Score 5   Mobility (Level Surface) Score 0   Stairs Score 0   Barthel Index Score 35   End of Consult   Patient Position at End of Consult Bedside chair;Bed/Chair alarm activated; All needs within reach        GOALS:   Goals established in order to promote pt's established goal of going home     -Patient will perform grooming tasks standing at sink with overall Mod I in order to increase overall independence    -Patient will be Mod I with UB ADLs using AE and AD as needed in order to increase (I) with ADLs    -Patient will be Mod A  with LB ADLs with use of AE and AD as needed in order to increase (I) with ADLs    -Patient will complete toileting w/ Mod A  w/ G hygiene/thoroughness in order to reduce caregiver burden    -Patient will demonstrate Mod A x 1 with bed mobility for ability to manage own comfort and initiate OOB tasks      -Patient will perform functional transfers with Mod A x 1 to/from all surfaces using DME as needed in order to increase (I) with functional tasks    -Patient will be Mod A x 1 with functional mobility to/from bathroom for increased independence with toileting tasks    -Patient will tolerate therapeutic activities for greater than 30 min, in order to increase tolerance for functional activities      -Patient will verbalize 3 safety awareness/ principles to prevent falls in the home setting      -Patient will demonstrate standing for 3 min with Min A  in order to increase active participation in functional activities      The patient's raw score on the -PAC Daily Activity inpatient short form is 15, standardized score is 34 69, less than 39 4  Patients at this level are likely to benefit from discharge to post-acute rehabilitation services   Please refer to the recommendation of the Occupational Therapist for safe discharge planning  This session, pt required and most appropriately benefited from skilled OT/PT co-eval due to extensive physical assistance of SKILLED therapists, significant regression from functional baseline, and decreased activity tolerance  OT and PT goals were addressed separately as seen in documentation           [facility], OTR/L

## 2022-10-13 NOTE — ASSESSMENT & PLAN NOTE
- Patient with reported recent diagnosis of CLL with elevated white blood cell count this presentation   - Continue to monitor CBC   - Consider oncology consult  Will discuss with team today  - Recommend short-term outpatient follow-up with PCP for further workup and management

## 2022-10-13 NOTE — TELEPHONE ENCOUNTER
Soft Intake Form   Patient Details   Lindsay Tim     1940     Reason For Appointment   Who is Calling? Iram Shelton   If not patient, Name? NA    DID YOU CONFIRM INSURANCE WITH PATIENT? Routed to finance   Who is the Referring Doctor? Pedro Lombardo PA-C   What is the diagnosis? CLL   Has this diagnosis been confirmed by a biopsy/surgery? If yes, what is the date it was done? NA   Biopsy done at [place] [address]? If not, where was it done? NA   Was imaging done, and was it done at [facility]? If not, where was it done? Yes-[facility]   Have you been seen by another Oncologist?  If so, who and where (name of facility, city and state) The patient's PCP Dr Abdullahi Abbott informed trauma resident of CLL dx   For 2nd Opinions Only: Are you currently undergoing treatment, or are you scheduled to start treatment? If yes, name of facility, city and state Unknown    For "History Of" only: Have you completed treatment? Unknown   Have you had Genetic Testing done in the past?  If so, advise to bring test results to their visit Unknown   Record Gathering Information   Did you advise to have records faxed to [phone]? NA-patient is inpatient   Did you advise to have disks sent to the proper address with imaging? ("History of" Patients)  5 years of imaging for breast patients-Mammos, US etc NA   Scheduling Information   Did you send new patient paperwork? Email or mail? NA   What is the best call back number? (If the [facility] is calling, please use their number) NA   Miscellaneous Information      The patient has been scheduled for a consult with Dr Jessica Nolan in [facility] on 10/26 at [address] [place] [address]

## 2022-10-13 NOTE — ASSESSMENT & PLAN NOTE
Wt Readings from Last 3 Encounters:   10/11/22 65 8 kg (145 lb)   08/05/21 63 5 kg (140 lb)   11/08/20 74 2 kg (163 lb 9 3 oz)     - Patient with history of chronic systolic CHF without evidence of acute exacerbation   - Continue current medication regimen  Plan to resume Lasix 40 mg daily when appropriate  - Monitor volume status   - Outpatient follow-up per routine

## 2022-10-14 LAB
ATRIAL RATE: 73 BPM
GLUCOSE SERPL-MCNC: 134 MG/DL (ref 65–140)
GLUCOSE SERPL-MCNC: 135 MG/DL (ref 65–140)
GLUCOSE SERPL-MCNC: 136 MG/DL (ref 65–140)
GLUCOSE SERPL-MCNC: 149 MG/DL (ref 65–140)
GLUCOSE SERPL-MCNC: 165 MG/DL (ref 65–140)
GLUCOSE SERPL-MCNC: 168 MG/DL (ref 65–140)
GLUCOSE SERPL-MCNC: 173 MG/DL (ref 65–140)
GLUCOSE SERPL-MCNC: 181 MG/DL (ref 65–140)
GLUCOSE SERPL-MCNC: 185 MG/DL (ref 65–140)
GLUCOSE SERPL-MCNC: 198 MG/DL (ref 65–140)
GLUCOSE SERPL-MCNC: 246 MG/DL (ref 65–140)
GLUCOSE SERPL-MCNC: 258 MG/DL (ref 65–140)
GLUCOSE SERPL-MCNC: 285 MG/DL (ref 65–140)
P AXIS: 73 DEGREES
PR INTERVAL: 196 MS
QRS AXIS: 36 DEGREES
QRSD INTERVAL: 78 MS
QT INTERVAL: 416 MS
QTC INTERVAL: 458 MS
T WAVE AXIS: 57 DEGREES
VENTRICULAR RATE: 73 BPM

## 2022-10-14 PROCEDURE — 99232 SBSQ HOSP IP/OBS MODERATE 35: CPT | Performed by: NURSE PRACTITIONER

## 2022-10-14 PROCEDURE — 93010 ELECTROCARDIOGRAM REPORT: CPT | Performed by: INTERNAL MEDICINE

## 2022-10-14 PROCEDURE — 99024 POSTOP FOLLOW-UP VISIT: CPT

## 2022-10-14 PROCEDURE — 99222 1ST HOSP IP/OBS MODERATE 55: CPT | Performed by: INTERNAL MEDICINE

## 2022-10-14 PROCEDURE — 99232 SBSQ HOSP IP/OBS MODERATE 35: CPT

## 2022-10-14 PROCEDURE — 82948 REAGENT STRIP/BLOOD GLUCOSE: CPT

## 2022-10-14 RX ORDER — INSULIN LISPRO 100 [IU]/ML
1-5 INJECTION, SOLUTION INTRAVENOUS; SUBCUTANEOUS
Status: DISCONTINUED | OUTPATIENT
Start: 2022-10-15 | End: 2022-10-17 | Stop reason: HOSPADM

## 2022-10-14 RX ORDER — INSULIN LISPRO 100 [IU]/ML
1-6 INJECTION, SOLUTION INTRAVENOUS; SUBCUTANEOUS
Status: DISCONTINUED | OUTPATIENT
Start: 2022-10-15 | End: 2022-10-17 | Stop reason: HOSPADM

## 2022-10-14 RX ORDER — INSULIN GLARGINE 100 [IU]/ML
25 INJECTION, SOLUTION SUBCUTANEOUS
Status: DISCONTINUED | OUTPATIENT
Start: 2022-10-14 | End: 2022-10-15

## 2022-10-14 RX ORDER — INSULIN LISPRO 100 [IU]/ML
10 INJECTION, SOLUTION INTRAVENOUS; SUBCUTANEOUS
Status: DISCONTINUED | OUTPATIENT
Start: 2022-10-15 | End: 2022-10-15

## 2022-10-14 RX ADMIN — INSULIN GLARGINE 25 UNITS: 100 INJECTION, SOLUTION SUBCUTANEOUS at 21:15

## 2022-10-14 RX ADMIN — OXYCODONE HYDROCHLORIDE 5 MG: 5 TABLET ORAL at 09:52

## 2022-10-14 RX ADMIN — ACETAMINOPHEN 975 MG: 325 TABLET ORAL at 21:15

## 2022-10-14 RX ADMIN — ACETAMINOPHEN 975 MG: 325 TABLET ORAL at 05:23

## 2022-10-14 RX ADMIN — OXYCODONE HYDROCHLORIDE 5 MG: 5 TABLET ORAL at 22:51

## 2022-10-14 RX ADMIN — LIDOCAINE 5% 1 PATCH: 700 PATCH TOPICAL at 08:56

## 2022-10-14 RX ADMIN — DOCUSATE SODIUM 100 MG: 100 CAPSULE, LIQUID FILLED ORAL at 08:56

## 2022-10-14 RX ADMIN — ENOXAPARIN SODIUM 30 MG: 30 INJECTION SUBCUTANEOUS at 21:15

## 2022-10-14 RX ADMIN — ENOXAPARIN SODIUM 30 MG: 30 INJECTION SUBCUTANEOUS at 08:56

## 2022-10-14 RX ADMIN — SODIUM CHLORIDE 4 UNITS/HR: 9 INJECTION, SOLUTION INTRAVENOUS at 18:32

## 2022-10-14 RX ADMIN — CARBAMIDE PEROXIDE 6.5% 5 DROP: 6.5 LIQUID AURICULAR (OTIC) at 18:25

## 2022-10-14 RX ADMIN — SODIUM CHLORIDE 2 UNITS/HR: 9 INJECTION, SOLUTION INTRAVENOUS at 04:13

## 2022-10-14 RX ADMIN — GABAPENTIN 100 MG: 100 CAPSULE ORAL at 21:15

## 2022-10-14 RX ADMIN — SODIUM CHLORIDE 2 UNITS/HR: 9 INJECTION, SOLUTION INTRAVENOUS at 02:10

## 2022-10-14 RX ADMIN — SENNOSIDES AND DOCUSATE SODIUM 1 TABLET: 8.6; 5 TABLET ORAL at 21:15

## 2022-10-14 RX ADMIN — CARBAMIDE PEROXIDE 6.5% 5 DROP: 6.5 LIQUID AURICULAR (OTIC) at 09:06

## 2022-10-14 RX ADMIN — SODIUM CHLORIDE 4 UNITS/HR: 9 INJECTION, SOLUTION INTRAVENOUS at 05:55

## 2022-10-14 RX ADMIN — DOCUSATE SODIUM 100 MG: 100 CAPSULE, LIQUID FILLED ORAL at 18:23

## 2022-10-14 RX ADMIN — ACETAMINOPHEN 975 MG: 325 TABLET ORAL at 13:38

## 2022-10-14 NOTE — PROGRESS NOTES
Progress Note - Geriatric Medicine   Katerine Goodson 79 y o  female MRN: [id number]  Unit/Bed#:  W -01 Encounter: [id number]      Assessment/Plan:    Cognitive Impairment  · Patient admitted to the hospital on 8/6/2021 with acute metabolic encephalopathy   · Was experiencing confusion, restlessness, and hallucinations  · Noted to be forgetful at baseline  · Most recent TSH on 8/25/2021 noted to be 0 666  · No vitamin B 12 level charted in epic  · Recommend checking these levels  · CT of the head on 8/5/2021 revealed moderate microangiopathic changes  · No MOCA noted in epic  · Recommend OT perform MOCA when patient is medically stable   · Keep mentally, physically, and socially active     Delirium  Currently alert and oriented x 3 on exam today but is forgetful  Patient is at high risk of delirium secondary to age, fall, traumatic injuries, anesthesia, acute pain, piror history of delirium, and hospitalization  Maintain delirium precautions   Provide redirection, reorientation, and distraction techniques  Maintain fall and safety precautions   Assist with ADLs/IADLs  Avoid deliriogenic medications such as tramadol, benzodiazepines, anticholinergics, benadryl  Treat pain using geriatric pain protocol   Encourage oral hydration and nutrition   Monitor for constipation and urinary retention   Implement sleep hygiene and limit night time interuptions   Maintain sleep-wake cycle   Encourage early and frequent mobilization   Encourage participation in group activities    Deconditioning  • Baseline function: independent with ADLs and IADLs  · Notes that her nephew does her shopping   • Patient is at increased risk for deconditioning secondary to fall, right hip fracture, weakness, and ambulatory dysfunction   • Continue to optimize diet, hydration, and mobility for healing   · Most recent GFR on 10/13 noted to be 40  · Avoid nephrotoxic medication and renal dose medication as needed  · Congestive Heart Failure  · Monitor weights and I&O  · Maintain cardiac diet   · Diabetes   · Monitor blood sugars   · Adjust insulin as needed   · Encourage diabetic diet   · Monitor for signs and symptoms of infection, dehydration, DVT, and skin breakdown    Frailty   Clinical Frail Scale: 6- Moderately Frail (borderline 7 secondary to injuries)  · Need help with all outside activities  · Need help with stairs and bathing  · May need assistance with dressing  · Most recent albumin on 10/11 noted to be 4 6  · Encourage protein supplementation     Fall  · Patient presented status post fall   · Sustained right femur fracture   · Underwent ORIF with orthopedics on 10/12  · WBAT to the RLE as per orthopedics   · Remains on Lovenox for DVT prophylaxis   · Pain control   · PT and OT      Acute Pain Due to Trauma  · Patient status post fall with right hip fracture  · Underwent ORIF of the right femur on 10/12  · Recommend treating pain using geriatric pain protocol   · Scheduled acetaminophen 975 mg po Q8 hours ATC   · Oxycodone 2 5 mg po Q 4 prn moderate pain  · Oxycodone 5 mg po Q 4 prn severe pain   · Lidocaine patch  · Patient notes adequate pain control on current regimen   · Nursing notes patients pain level this morning went from a 10 to a 5 after receiving oxycodone   · Continue current medication regimen   · Monitor for constipation     Congestive Heart Failure  · Patient with known history   · Most recent echo on 5/10/2021 revealed EF of 45%  · Patient not on daily weights   · Appears to be clinically euvolemic   · Not currently on a fluid restriction   · Continue with cardiac diet   · Remains on Lasix  · Monitor I&O  · Management per primary team    Diabetes   · Patient noted to be hyperglycemic post-operatively   · Currently on an insulin drip   · Most recent hemoglobin A1C noted to be 7 6 which is acceptable for the patients age  · Avoid tight control of blood sugar secondary to age and co-morbidities   · Avoid hypoglycemia · Awaiting endocrine consult     Chronic Lymphocytic Anemia   · Patient with new diagnosis with elevated WBC count this admission   · Trauma discussed with hematology/oncology over the phone and outpatient follow-up is acceptable at this time   · Recommend outpatient follow-up     Cerumen Impaction   · Patient with bilateral cerumen impaction on 10/12  · Debrox ordered x 4 days  · Patient notes improvement in her hearing on exam today   · Continue course of Debrox    Elimination   · Patient has history of stress incontinence   · Has been voiding without difficulty   · Currently on a purwick  · Monitor for urinary retention   · Last documented bowel movement on 10/13  · Monitor for constipation     Insomnia  · Patient notes that she is sleeping well when she is not being woken up by staff   · Avoid sedative hypnotics including benzodiazepines and benadryl  · Encourage staying awake during the day   · Encourage daytime activities and morning exercise   · Decrease or eliminate daytime naps   · Avoid caffeine especially during late afternoon and evening hours  · Establish a nighttime routine  · Implement sleep hygiene and limit nighttime interruptions    Ambulatory Dysfunction   • PT/OT following  • Maintain fall and safety precautions   • Encourage adequate oral hydration and nutrition   • Out of bed as tolerated       Subjective: The patient was seen and evaluated today at the bedside for geriatric follow-up  She is noted to be lying comfortably in bed in no acute distress  She is alert and oriented x 3 but is forgetful  She is able to make her needs known  She is currently rating her pain a 7/10  Nursing is at the bedside administering Tylenol  She denies numbness or tingling of her extremities  She notes some dizziness and headaches with positional changes  She denies vision changes  She notes improvement in her hearing since being started on the Debrox ear drops  She notes no issues with her breathing   She denies chest pain, shortness of breath, and cough  She is noted to be on 1 L of supplemental oxygen on exam  It is unclear if she wears this at home  She denies nausea, vomiting, constipation, and diarrhea  She notes no issues with her bowels or bladder  Her last documented bowel movement was on 10/13  She states that she has a good appetite and has been staying hydrated  She states that she has been sleeping well when she is not being woken up by staff  Review of Systems   Constitutional: Positive for activity change  Negative for appetite change, chills, fatigue and fever  Respiratory: Negative for cough, chest tightness and shortness of breath  Currently on 1 L of supplemental oxygen   Cardiovascular: Negative for chest pain and palpitations  Gastrointestinal: Negative for abdominal distention, abdominal pain, constipation, diarrhea, nausea and vomiting  Genitourinary: Negative for difficulty urinating and dysuria  Musculoskeletal: Positive for arthralgias (right hip) and gait problem  Negative for myalgias  Skin: Negative for color change and pallor  Right hip surgical incision  Splint noted on left 4th digit   Neurological: Positive for dizziness (with positional changes), weakness and headaches (occasionally with positional changes)  Negative for light-headedness and numbness  Psychiatric/Behavioral: Positive for confusion (at times)  Negative for behavioral problems and sleep disturbance  The patient is not nervous/anxious  Objective:     Vitals: Blood pressure 122/62, pulse 89, temperature 98 °F (36 7 °C), temperature source Oral, resp  rate 16, height 5' 6" (1 676 m), weight 65 8 kg (145 lb), SpO2 93 %  ,Body mass index is 23 4 kg/m²        Intake/Output Summary (Last 24 hours) at 10/14/2022 1158  Last data filed at 10/14/2022 0873  Gross per 24 hour   Intake 477 ml   Output 800 ml   Net -323 ml       Current Medications: Reviewed    Physical Exam:   Physical Exam  Constitutional:       General: She is not in acute distress  Appearance: Normal appearance  She is not ill-appearing  HENT:      Head: Normocephalic  Mouth/Throat:      Mouth: Mucous membranes are dry  Eyes:      General: No scleral icterus  Conjunctiva/sclera: Conjunctivae normal    Cardiovascular:      Rate and Rhythm: Regular rhythm  Tachycardia present  Heart sounds: No murmur heard  Pulmonary:      Effort: Pulmonary effort is normal  No respiratory distress  Breath sounds: Normal breath sounds  No wheezing, rhonchi or rales  Abdominal:      General: Bowel sounds are normal  There is no distension  Palpations: Abdomen is soft  Tenderness: There is no abdominal tenderness  Skin:     General: Skin is warm and dry  Neurological:      General: No focal deficit present  Mental Status: She is alert and oriented to person, place, and time  Mental status is at baseline  Motor: Weakness present  Gait: Gait abnormal    Psychiatric:         Mood and Affect: Mood normal          Behavior: Behavior normal  Behavior is cooperative  Invasive Devices  Report    Peripheral Intravenous Line  Duration           Peripheral IV 10/11/22 Right Antecubital 2 days    Peripheral IV 10/12/22 Left Forearm 1 day                Lab, Imaging and other studies: I have personally reviewed pertinent reports

## 2022-10-14 NOTE — ASSESSMENT & PLAN NOTE
Lab Results   Component Value Date    HGBA1C 7 6 (H) 10/12/2022       Recent Labs     10/14/22  1605 10/14/22  1811 10/14/22  1946 10/14/22  2029   POCGLU 258* 168* 165* 149*     Awaiting Endocrine consult to be done  Currently on Insulin drip secondary to helevated blood sugars  Patient a GCS - 15    Blood Sugar Average: Last 72 hrs:     - Patient with history of type 2 diabetes mellitus with associated CKD stage 3   - Patient with hyperglycemia perioperatively  Goal blood glucose level between 140 and 180   - Adjust subcutaneous insulin regimen as follows: Initiate basal insulin as well as mealtime insulin with adjustment in sliding scale insulin coverage   - Resume home medication therapy on discharge  - Outpatient follow-up per routine  - calorie restrictive diet level two  - DC Lantus insulin drip after IV Lantus p m   Dose per Endocrinology; 10/14/2022

## 2022-10-14 NOTE — ASSESSMENT & PLAN NOTE
Lab Results   Component Value Date    HGBA1C 7 6 (H) 10/12/2022       Recent Labs     10/14/22  0552 10/14/22  0747 10/14/22  0939 10/14/22  1104   POCGLU 185* 136 134 173*     Awaiting Endocrine consult to be done  Currently on Insulin drip secondary to helevated blood sugars  Patient a GCS - 15    Blood Sugar Average: Last 72 hrs:     - Patient with history of type 2 diabetes mellitus with associated CKD stage 3   - Patient with hyperglycemia perioperatively  Goal blood glucose level between 140 and 180   - Adjust subcutaneous insulin regimen as follows: Initiate basal insulin as well as mealtime insulin with adjustment in sliding scale insulin coverage   - Resume home medication therapy on discharge  - Outpatient follow-up per routine

## 2022-10-14 NOTE — PROGRESS NOTES
[facility]  Progress Note - Katja Span 1940, 79 y o  female MRN: [id number]  Unit/Bed#: W -01 Encounter: [id number]  Primary Care Provider: Tammy Augustin MD   Date and time admitted to hospital: 10/11/2022  5:47 PM    CLL (chronic lymphocytic leukemia) ([facility] [address] )  Assessment & Plan  - Patient with reported recent diagnosis of CLL with elevated white blood cell count this presentation   - Continue to monitor CBC   - Consider oncology consult  Will discuss with team today  - Recommend short-term outpatient follow-up with PCP for further workup and management  Displaced fracture of middle phalanx of left ring finger, initial encounter for closed fracture  Assessment & Plan  - Fracture of the middle phalanx of the left ring finger/4th finger, present on presentation   - Appreciate Orthopedic surgery evaluation and recommendations  Non operative management recommended  - Maintain splint and avoid weight-bearing through finger   - Continue multimodal analgesic regimen   - PT and OT evaluation and treatment as indicated  - Outpatient follow-up with Orthopedic surgery  Hypertension  Assessment & Plan  - Patient with chronic history of hypertension   - Continue current medication regimen and resume home medication therapy as appropriate  - Outpatient follow-up per routine  Chronic systolic heart failure (HCC)  Assessment & Plan  Wt Readings from Last 3 Encounters:   10/11/22 65 8 kg (145 lb)   08/05/21 63 5 kg (140 lb)   11/08/20 74 2 kg (163 lb 9 3 oz)     - Patient with history of chronic systolic CHF without evidence of acute exacerbation   - Continue current medication regimen  Plan to resume Lasix 40 mg daily when appropriate  - Monitor volume status   - Outpatient follow-up per routine      HLD (hyperlipidemia)  Assessment & Plan  - Patient with chronic history of hyperlipidemia   - Continue current medication regimen and resume home medication therapy as appropriate  - Outpatient follow-up per routine  Type 2 diabetes mellitus with stage 3a chronic kidney disease, with long-term current use of insulin [facility])  Assessment & Plan  Lab Results   Component Value Date    HGBA1C 7 6 (H) 10/12/2022       Recent Labs     10/14/22  0552 10/14/22  0747 10/14/22  0939 10/14/22  1104   POCGLU 185* 136 134 173*     Awaiting Endocrine consult to be done  Currently on Insulin drip secondary to helevated blood sugars  Patient a GCS - 15    Blood Sugar Average: Last 72 hrs:     - Patient with history of type 2 diabetes mellitus with associated CKD stage 3   - Patient with hyperglycemia perioperatively  Goal blood glucose level between 140 and 180   - Adjust subcutaneous insulin regimen as follows: Initiate basal insulin as well as mealtime insulin with adjustment in sliding scale insulin coverage   - Resume home medication therapy on discharge  - Outpatient follow-up per routine  Fall  Assessment & Plan  - Status post fall from standing position after tripping with the below noted injuries  - Fall precautions  - Geriatric Medicine consultation for evaluation, medication review and recommendations   - PT and OT evaluation and treatment as indicated  - Case Management consultation for disposition planning  * Closed fracture of trochanter of right femur with routine healing  Assessment & Plan  - Right femur fracture, present on admission   - Status post ORIF of right femur fracture with long IM nail on 10/12/2022  - Appreciate Orthopedic surgery evaluation, recommendations and interventions as noted  - May be weightbearing as tolerated on the right lower extremity postoperatively per Orthopedic surgery  - Monitor right lower extremity neurovascular exam   - Continue multimodal analgesic regimen   - Continue DVT prophylaxis  - PT and OT evaluation and treatment as indicated  - Outpatient follow up with Orthopedic surgery for re-evaluation            Disposition: Rehab    SUBJECTIVE:  Chief Complaint: Feels very tired    Subjective: " I'm sleepy"    OBJECTIVE:   Vitals:   Temp:  [98 °F (36 7 °C)-98 9 °F (37 2 °C)] 98 °F (36 7 °C)  HR:  [] 89  Resp:  [16] 16  BP: (110-149)/(56-62) 122/62    Intake/Output:  I/O       10/12 0701  10/13 0700 10/13 0701  10/14 0700 10/14 0701  10/15 0700    P  O  240 340 360    I V  (mL/kg) 1231 7 (18 7) 17 (0 3)     IV Piggyback 100      Total Intake(mL/kg) 1571 7 (23 9) 357 (5 4) 360 (5 5)    Urine (mL/kg/hr) 1375 (0 9) 800 (0 5)     Stool 0      Blood 150      Total Output 1525 800     Net +46 7 -443 +360           Unmeasured Stool Occurrence 0 x           Nutrition: Diet Forrest/CHO Controlled; Consistent Carbohydrate Diet Level 2 (5 carb servings/75 grams CHO/meal)  GI Proph/Bowel Reg: Colace and Senna  VTE Prophylaxis:Sequential compression device (Venodyne)  and Enoxaparin (Lovenox)     Physical Exam:   GENERAL APPEARANCE: very comfortable  NEURO: intact, GCS - 15  HEENT: EOm's intact  CV: RRR< no complaint of chest pain  LUNGS: CTA bilaterally, no shortness of breath  GI: tolerating a diet  : voiding  MSK: able to move extremities, neuro intact  SKIN: warm and dry    Invasive Devices  Report    Peripheral Intravenous Line  Duration           Peripheral IV 10/11/22 Right Antecubital 2 days    Peripheral IV 10/12/22 Left Forearm 1 day                      Lab Results: GLE - 173  Imaging/EKG Studies: none  Other Studies: none

## 2022-10-14 NOTE — PROGRESS NOTES
[facility]  Progress Note - Marlen Patton 1940, 79 y o  female MRN: [id number]  Unit/Bed#: W -01 Encounter: [id number]  Primary Care Provider: Shamir Miranda MD   Date and time admitted to hospital: 10/11/2022  5:47 PM    CLL (chronic lymphocytic leukemia) ([facility] [address] )  Assessment & Plan  - Patient with reported recent diagnosis of CLL with elevated white blood cell count this presentation   - Continue to monitor CBC   - Consider oncology consult  Will discuss with team today  - Recommend short-term outpatient follow-up with PCP for further workup and management  Displaced fracture of middle phalanx of left ring finger, initial encounter for closed fracture  Assessment & Plan  - Fracture of the middle phalanx of the left ring finger/4th finger, present on presentation   - Appreciate Orthopedic surgery evaluation and recommendations  Non operative management recommended  - Maintain splint and avoid weight-bearing through finger   - Continue multimodal analgesic regimen   - PT and OT evaluation and treatment as indicated  - Outpatient follow-up with Orthopedic surgery  Hypertension  Assessment & Plan  - Patient with chronic history of hypertension   - Continue current medication regimen and resume home medication therapy as appropriate  - Outpatient follow-up per routine  Chronic systolic heart failure (HCC)  Assessment & Plan  Wt Readings from Last 3 Encounters:   10/11/22 65 8 kg (145 lb)   08/05/21 63 5 kg (140 lb)   11/08/20 74 2 kg (163 lb 9 3 oz)     - Patient with history of chronic systolic CHF without evidence of acute exacerbation   - Continue current medication regimen  Plan to resume Lasix 40 mg daily when appropriate  - Monitor volume status   - Outpatient follow-up per routine      HLD (hyperlipidemia)  Assessment & Plan  - Patient with chronic history of hyperlipidemia   - Continue current medication regimen and resume home medication therapy as appropriate  - Outpatient follow-up per routine  Type 2 diabetes mellitus with stage 3a chronic kidney disease, with long-term current use of insulin [facility])  Assessment & Plan  Lab Results   Component Value Date    HGBA1C 7 6 (H) 10/12/2022       Recent Labs     10/14/22  1605 10/14/22  1811 10/14/22  1946 10/14/22  2029   POCGLU 258* 168* 165* 149*     Awaiting Endocrine consult to be done  Currently on Insulin drip secondary to helevated blood sugars  Patient a GCS - 15    Blood Sugar Average: Last 72 hrs:     - Patient with history of type 2 diabetes mellitus with associated CKD stage 3   - Patient with hyperglycemia perioperatively  Goal blood glucose level between 140 and 180   - Adjust subcutaneous insulin regimen as follows: Initiate basal insulin as well as mealtime insulin with adjustment in sliding scale insulin coverage   - Resume home medication therapy on discharge  - Outpatient follow-up per routine  - calorie restrictive diet level two  - DC Lantus insulin drip after IV Lantus p m  Dose per Endocrinology; 10/14/2022    Fall  Assessment & Plan  - Status post fall from standing position after tripping with the below noted injuries  - Fall precautions  - Geriatric Medicine consultation for evaluation, medication review and recommendations   - PT and OT evaluation and treatment as indicated  - Case Management consultation for disposition planning  * Closed fracture of trochanter of right femur with routine healing  Assessment & Plan  - Right femur fracture, present on admission   - Status post ORIF of right femur fracture with long IM nail on 10/12/2022  - Appreciate Orthopedic surgery evaluation, recommendations and interventions as noted  - May be weightbearing as tolerated on the right lower extremity postoperatively per Orthopedic surgery  - Monitor right lower extremity neurovascular exam   - Continue multimodal analgesic regimen   - Continue DVT prophylaxis    - PT and OT evaluation and treatment as indicated  - Outpatient follow up with Orthopedic surgery for re-evaluation  Disposition: stable    SUBJECTIVE:  Chief Complaint: right sided hip pain    Subjective: "I can't hear you "    OBJECTIVE:   Vitals:   Temp:  [97 9 °F (36 6 °C)-98 5 °F (36 9 °C)] 97 9 °F (36 6 °C)  HR:  [] 104  Resp:  [16-18] 18  BP: (122-174)/(62-84) 148/84    Intake/Output:  I/O       10/13 0701  10/14 0700 10/14 0701  10/15 0700    P  O  340 540    I V  (mL/kg) 17 (0 3)     IV Piggyback      Total Intake(mL/kg) 357 (5 4) 540 (8 2)    Urine (mL/kg/hr) 800 (0 5) 800 (0 8)    Stool      Blood      Total Output 800 800    Net -786 -562               Nutrition: Diet Forrest/CHO Controlled; Consistent Carbohydrate Diet Level 2 (5 carb servings/75 grams CHO/meal)  GI Proph/Bowel Reg: colase  VTE Prophylaxis:Enoxaparin (Lovenox)     Physical Exam:   GENERAL APPEARANCE:  Comfortable  NEURO:  No new focal deficits  HEENT:  EOM intact no pain on EOM, oropharynx clear and patent  CV:  Regular rate and rhythm  LUNGS:  Clear to auscultation bilaterally  GI:  Soft, nontender, no distension, normal bowel sounds  :  Voiding  MSK:  Moves all extremities, neurovascularly intact  SKIN:  Warm well perfused    Invasive Devices  Report    Peripheral Intravenous Line  Duration           Peripheral IV 10/11/22 Right Antecubital 3 days    Peripheral IV 10/12/22 Left Forearm 2 days                 Lab Results: no labs in 24 hours  Imaging/EKG Studies: I have personally reviewed pertinent reports       Other Studies: none

## 2022-10-14 NOTE — CASE MANAGEMENT
Case Management Assessment & Discharge Planning Note    Patient name Marlen Patton  Location W /W -31 MRN [id number]  : 1940 Date 10/14/2022       Current Admission Date: 10/11/2022  Current Admission Diagnosis:Closed fracture of trochanter of right femur with routine healing   Patient Active Problem List    Diagnosis Date Noted   • CLL (chronic lymphocytic leukemia) ([facility] [address] ) 10/13/2022   • Closed fracture of trochanter of right femur with routine healing 10/11/2022   • Displaced fracture of middle phalanx of left ring finger, initial encounter for closed fracture 10/11/2022   • Hypertension 2021   • Ambulatory dysfunction 2021   • Suspected Mild cognitive impairment 2021   • Lymphadenopathy 2020   • Elevated liver enzymes 2020   • Fall 2020   • Type 2 diabetes mellitus with stage 3a chronic kidney disease, with long-term current use of insulin [facility] 2020   • CKD (chronic kidney disease) stage 3, GFR 30-59 ml/min ([facility] [address] ) 2020   • HLD (hyperlipidemia) 2020   • OA (osteoarthritis) 2020   • Chronic systolic heart failure ([facility] [address] ) 2020      LOS (days): 3  Geometric Mean LOS (GMLOS) (days): 4 40  Days to GMLOS:1 8     OBJECTIVE:    Risk of Unplanned Readmission Score: 14 08         Current admission status: Inpatient       Preferred Pharmacy:   [facility]i [facility]ome TO E-PRESCRIBE  No address on file      [facility] [id number] [place] [address] [address]  Phone: [phone] Fax: [phone]    Primary Care Provider: Shamir Miranda MD    Primary Insurance: MEDICARE  Secondary Insurance: [address]:  [place] [address] Proxies    There are no active Health Care Proxies on file                                                          DISCHARGE DETAILS:                                          Other Referral/Resources/Interventions Provided:  Interventions: Acute Rehab, Short Term Rehab  Referral Comments: CM spoke with SARTHAK Dykes by phone to discuss the pt's requested facilities and if there are any other facilities the CM can place for the referral to rehab  Maria Teresa Dykes would like for CM to send for [facility] and  referrals  Guieron Dykes has not had good experiences with SNF rehabs and would prefer if his aunt could to go an acute rehab instead  He understands they are further away, but he feels this would give his aunt the best chance of returning to her baseline  Maria Teresa Dykes did relay to CM that he will continue to discuss having the pt move into the [address] to be closer to him and have more available assistance

## 2022-10-14 NOTE — CONSULTS
Consultation - aMrie Cruz 79 y o  female MRN: [id number]    Unit/Bed#: W -01 Encounter: [id number]    Assessment/Plan     Assessment: This is a 79y o -year-old female with type 2 DM admitted with R femur fracture  Plan:  Type 2 DM  Most recent a1c 7 6  Home regimen: levemir 20u at bedtime, 10 units novolog with meals, uses Дмитрий CGM at home  Inpt: insulin infusion  Transition to 25 units lantus at bedtime and 10 units humalog with meals and correctional coverage  Discontinue insulin drip 1-2 hours after lantus is administered  Osteoporosis  As suspected by fragility fracture  She reports taking calcium as outpatient  Will order vitamin D with next set of labs tomorrow AM   Recommend outpatient DXA scan and followup with PCP or endocrinology to discuss osteoporosis medications  CC: Diabetes Consult    History of Present Illness     HPI: Marie Cruz is a 79y o  year old female with type 2 diabetes for 20 years admitted with R femur trochanteric fracture s/p ORIF with orthopedic surgery  Endocrinology is consulted for type 2 DM  DM is complicated by CKD3, she denies other complications of DM like CAD, CVA, neuropathy, stroke, retinopathy  She reports that she lives alone and takes care of herself, administering all of her own medications  She does report she can be forgetful, for example taking bedtime basal insulin and then realizing she still had another meal to eat  She does report hypoglycemia about 2-3x per week  She thinks that this has occurred in the setting of not eating enough for a meal but is not able to give a good history of the other episodes of hypoglycemia  She reports using a дмитрий CGM at home  She reports good appetite today but reports continued R hip pain  She denies prior DXA scan, per chart review there are no prior DXA scans on file      Inpatient consult to Endocrinology  Consult performed by: Anisa Harvey DO  Consult ordered by: Petty Osuna MD          Review of Systems   Constitutional: Negative for chills, fatigue and fever  HENT: Negative for rhinorrhea and sore throat  Respiratory: Negative for choking, chest tightness, shortness of breath, wheezing and stridor  Cardiovascular: Negative for chest pain, palpitations and leg swelling  Gastrointestinal: Negative for abdominal pain, blood in stool, constipation, diarrhea, nausea and vomiting  Genitourinary: Negative for hematuria  Neurological: Negative for dizziness and light-headedness  All other systems reviewed and are negative  Historical Information   Past Medical History:   Diagnosis Date   • Benign adenomatous polyp of large intestine    • CHF (congestive heart failure) ([facility] [address] )    • [facility]    • Hyperlipemia    • Hypertension    • Osteoarthritis    • TIA (transient ischemic attack)      Past Surgical History:   Procedure Laterality Date   • APPENDECTOMY     • CARPAL TUNNEL RELEASE Right    • HYSTERECTOMY W/ SALPINGO-OOPHERECTOMY     • DC OPEN RX FEMUR FX+INTRAMED CARIDAD Right 10/12/2022    Procedure: INSERTION NAIL IM FEMUR ANTEGRADE (TROCHANTERIC);   Surgeon: Mike Encinas DO;  Location: [facility];  Service: Orthopedics     Social History   Social History     Substance and Sexual Activity   Alcohol Use Not Currently    Comment: very seldom     Social History     Substance and Sexual Activity   Drug Use Never     Social History     Tobacco Use   Smoking Status Never Smoker   Smokeless Tobacco Never Used     Family History:   Family History   Problem Relation Age of Onset   • Heart disease Mother    • Heart disease Father    • Hypertension Father    • Stomach cancer Sister    • Ovarian cancer Sister    • Hypertension Sister        Meds/Allergies   Current Facility-Administered Medications   Medication Dose Route Frequency Provider Last Rate Last Admin   • acetaminophen (TYLENOL) tablet 975 mg  975 mg Oral Q8H [address] Javier Galvan PA-C   975 mg at 10/14/22 1338 • carbamide peroxide (DEBROX) 6 5 % otic solution 5 drop  5 drop Both Ears BID Danelle Mcclelland PA-C   5 drop at 10/14/22 7836   • docusate sodium (COLACE) capsule 100 mg  100 mg Oral BID Danelle Mcclelland PA-C   100 mg at 10/14/22 9848   • enoxaparin (LOVENOX) subcutaneous injection 30 mg  30 mg Subcutaneous Q12H Danelle Mcclelland PA-C   30 mg at 10/14/22 1275   • gabapentin (NEURONTIN) capsule 100 mg  100 mg Oral HS Danelle Mcclelland PA-C   100 mg at 10/13/22 2112   • insulin regular (HumuLIN R,NovoLIN R) 1 Units/mL in sodium chloride 0 9 % 100 mL infusion  0 3-21 Units/hr Intravenous Titrated Becky Parr MD 6 mL/hr at 10/14/22 1350 6 Units/hr at 10/14/22 1350   • lidocaine (LIDODERM) 5 % patch 1 patch  1 patch Topical Daily Danelle Mcclelland PA-C   1 patch at 10/14/22 0330   • naloxone (NARCAN) 0 04 mg/mL syringe 0 04 mg  0 04 mg Intravenous Q1MIN PRN Danelle Mcclelland PA-C       • oxyCODONE (ROXICODONE) IR tablet 2 5 mg  2 5 mg Oral Q4H PRN Danelle Mcclelland PA-C       • oxyCODONE (ROXICODONE) IR tablet 5 mg  5 mg Oral Q4H PRN Danelle Mcclelland PA-C   5 mg at 10/14/22 6196   • pneumococcal 13-valent conjugate vaccine (PREVNAR-13) IM injection 0 5 mL  0 5 mL Intramuscular Prior to discharge Danelle Mcclelland PA-C       • senna-docusate sodium (SENOKOT S) 8 6-50 mg per tablet 1 tablet  1 tablet Oral HS Danelle Mcclelland PA-C   1 tablet at 10/13/22 2112     Allergies   Allergen Reactions   • Amlodipine    • Ceftin [Cefuroxime]    • Ciprofloxacin    • Citalopram    • Dye [Iodinated Diagnostic Agents]    • Glucophage [Metformin]    • Januvia [Sitagliptin]    • Neomycin-Polymyxin-Dexameth    • Ondansetron    • Prilosec [Omeprazole]    • Vibramycin [Doxycycline]        Objective   Vitals: Blood pressure 122/62, pulse 89, temperature 98 °F (36 7 °C), temperature source Oral, resp  rate 16, height 5' 6" (1 676 m), weight 65 8 kg (145 lb), SpO2 93 %      Intake/Output Summary (Last 24 hours) at 10/14/2022 1356  Last data filed at 10/14/2022 7281  Gross per 24 hour   Intake 477 ml   Output 800 ml   Net -323 ml     Invasive Devices  Report    Peripheral Intravenous Line  Duration           Peripheral IV 10/11/22 Right Antecubital 2 days    Peripheral IV 10/12/22 Left Forearm 1 day                Physical Exam  Vitals reviewed  Constitutional:       Appearance: She is well-developed  HENT:      Head: Normocephalic and atraumatic  Eyes:      General:         Right eye: No discharge  Left eye: No discharge  Cardiovascular:      Rate and Rhythm: Normal rate and regular rhythm  Heart sounds: Normal heart sounds  No murmur heard  No friction rub  No gallop  Pulmonary:      Effort: Pulmonary effort is normal  No respiratory distress  Breath sounds: Normal breath sounds  No wheezing or rales  Chest:      Chest wall: No tenderness  Abdominal:      General: Bowel sounds are normal  There is no distension  Palpations: Abdomen is soft  There is no mass  Tenderness: There is no abdominal tenderness  Musculoskeletal:         General: Normal range of motion  Cervical back: Normal range of motion and neck supple  Skin:     General: Skin is warm and dry  Neurological:      Mental Status: She is alert and oriented to person, place, and time  Psychiatric:         Behavior: Behavior normal        The history was obtained from the review of the chart, patient      Lab Results:   Results from last 7 days   Lab Units 10/12/22  0820   HEMOGLOBIN A1C % 7 6*     Lab Results   Component Value Date    WBC 16 25 (H) 10/13/2022    HGB 11 7 10/13/2022    HCT 37 4 10/13/2022     (H) 10/13/2022     10/13/2022     Lab Results   Component Value Date/Time    BUN 37 (H) 10/13/2022 07:29 PM    K 3 9 10/13/2022 07:29 PM    CL 98 10/13/2022 07:29 PM    CO2 26 10/13/2022 07:29 PM    CREATININE 1 23 10/13/2022 07:29 PM    AST 21 10/11/2022 07:00 PM    ALT 28 10/11/2022 07:00 PM ALB 4 6 10/11/2022 07:00 PM     No results for input(s): CHOL, HDL, LDL, TRIG, VLDL in the last 72 hours  No results found for: Tom Kaiser  POC Glucose (mg/dl)   Date Value   10/14/2022 246 (H)   10/14/2022 173 (H)   10/14/2022 134   10/14/2022 136   10/14/2022 185 (H)   10/14/2022 198 (H)   10/14/2022 181 (H)   10/13/2022 166 (H)   10/13/2022 262 (H)   10/13/2022 403 (H)       Imaging Studies: I have personally reviewed pertinent reports  Portions of the record may have been created with voice recognition software

## 2022-10-14 NOTE — CASE MANAGEMENT
Case Management Assessment & Discharge Planning Note    Patient name Sis Pagan  Location W /W -98 MRN [id number]  : 1940 Date 10/14/2022       Current Admission Date: 10/11/2022  Current Admission Diagnosis:Closed fracture of trochanter of right femur with routine healing   Patient Active Problem List    Diagnosis Date Noted   • CLL (chronic lymphocytic leukemia) ([facility] ) 10/13/2022   • Closed fracture of trochanter of right femur with routine healing 10/11/2022   • Displaced fracture of middle phalanx of left ring finger, initial encounter for closed fracture 10/11/2022   • Hypertension 2021   • Ambulatory dysfunction 2021   • Suspected Mild cognitive impairment 2021   • Lymphadenopathy 2020   • Elevated liver enzymes 2020   • Fall 2020   • Type 2 diabetes mellitus with stage 3a chronic kidney disease, with long-term current use of insulin ([facility] ) 2020   • CKD (chronic kidney disease) stage 3, GFR 30-59 ml/min ([facility] ) 2020   • HLD (hyperlipidemia) 2020   • OA (osteoarthritis) 2020   • Chronic systolic heart failure ([facility] ) 2020      LOS (days): 3  Geometric Mean LOS (GMLOS) (days): 4 40  Days to GMLOS:1 8     OBJECTIVE:    Risk of Unplanned Readmission Score: 14 08         Current admission status: Inpatient       Preferred Pharmacy:   Georgi Jacome TO E-PRESCRIBE  No address on file      [facility] [address]  Phone: [phone] Fax: [phone]    Primary Care Provider: Claudy Mays MD    Primary Insurance: MEDICARE  Secondary Insurance: [address]:  [place] [address] Proxies    There are no active Health Care Proxies on file                                                          DISCHARGE DETAILS:    Discharge planning discussed with[de-identified] [address] [place], [place] [address]way of Choice: Yes  Comments - [place] of Choice: Kenyatta Oropeza wishes for his aunt to go to acute rehabilitation at [facility]  CM contacted family/caregiver?: Yes  Were Treatment Team discharge recommendations reviewed with patient/caregiver?: Yes  Did patient/caregiver verbalize understanding of patient care needs?: N/A- going to facility  Were patient/caregiver advised of the risks associated with not following Treatment Team discharge recommendations?: Yes    Contacts  Patient Contacts: Kenyatta Oropeza  Relationship to Patient[de-identified] Family  Contact Method: Phone  Phone Number: [phone]  Reason/Outcome: Discharge Planning              Other Referral/Resources/Interventions Provided:  Interventions: Acute Rehab  Referral Comments: CM accepted bed availability for ARC via [place]  Pending pt's d/c insulin gtt, endocrinology consult, stability, and no IV pain medication  Pt will need to have updated PT notes over the weekend and OT either during the weekend or early Monday

## 2022-10-14 NOTE — ARC ADMISSION
ARC admissions team  received referral on patient for possible ARC placement  Upon review of patient’s case with [facility] physician , patient is deemed SLB / [address] [facility] appropriate at this time pending medical readiness ( insulin IV must be D/C'd and patient's BS stable for 24 hrs ) / bed availability  CM made aware

## 2022-10-14 NOTE — PROGRESS NOTES
Progress Note - Orthopedics   [facility] 79 y o  female MRN: [id number]  Unit/Bed#: W -01      Subjective:    79 y  o female seen and examined at bedside on postop day 2 from N  No complaints  Pain well controlled  Otherwise feeling well       Labs:  0   Lab Value Date/Time    HCT 37 4 10/13/2022 0549    HCT 39 1 10/12/2022 0820    HCT 44 6 10/11/2022 1900    HGB 11 7 10/13/2022 0549    HGB 13 0 10/12/2022 0820    HGB 14 4 10/11/2022 1900    INR 0 89 10/11/2022 1900    WBC 16 25 (H) 10/13/2022 0549    WBC 15 73 (H) 10/12/2022 0820    WBC 22 30 (H) 10/11/2022 1900       Meds:    Current Facility-Administered Medications:   •  acetaminophen (TYLENOL) tablet 975 mg, 975 mg, Oral, Q8H [facility], Sandra Haynes PA-C, 975 mg at 10/14/22 0523  •  carbamide peroxide (DEBROX) 6 5 % otic solution 5 drop, 5 drop, Both Ears, BID, Sandra Haynes PA-C, 5 drop at 10/14/22 0798  •  docusate sodium (COLACE) capsule 100 mg, 100 mg, Oral, BID, Sandra Haynes PA-C, 100 mg at 10/14/22 5688  •  enoxaparin (LOVENOX) subcutaneous injection 30 mg, 30 mg, Subcutaneous, Q12H, Sandra Haynes PA-C, 30 mg at 10/14/22 4682  •  gabapentin (NEURONTIN) capsule 100 mg, 100 mg, Oral, HS, KATE Franco-C, 100 mg at 10/13/22 2112  •  insulin regular (HumuLIN R,NovoLIN R) 1 Units/mL in sodium chloride 0 9 % 100 mL infusion, 0 3-21 Units/hr, Intravenous, Titrated, Trent MD Carlotta, Last Rate: 1 5 mL/hr at 10/14/22 0939, 1 5 Units/hr at 10/14/22 0939  •  lidocaine (LIDODERM) 5 % patch 1 patch, 1 patch, Topical, Daily, Sandra Haynes PA-C, 1 patch at 10/14/22 0856  •  naloxone (NARCAN) 0 04 mg/mL syringe 0 04 mg, 0 04 mg, Intravenous, Q1MIN PRN, Sandra Haynes PA-C  •  oxyCODONE (ROXICODONE) IR tablet 2 5 mg, 2 5 mg, Oral, Q4H PRN, Sandra Haynes PA-C  •  oxyCODONE (ROXICODONE) IR tablet 5 mg, 5 mg, Oral, Q4H PRN, Sandra Haynes PA-C, 5 mg at 10/13/22 1453  •  pneumococcal 13-valent conjugate vaccine (PREVNAR-13) IM injection 0 5 mL, 0 5 mL, Intramuscular, Prior to discharge, Juliane Vicente PA-C  •  senna-docusate sodium (SENOKOT S) 8 6-50 mg per tablet 1 tablet, 1 tablet, Oral, HS, Juliane Vicente PA-C, 1 tablet at 10/13/22 2112    Blood Culture:   No results found for: BLOODCX    Wound Culture:   No results found for: WOUNDCULT    Ins and Outs:  I/O last 24 hours: In: 200 [P O :700; I V :17]  Out: 800 [Urine:800]          Physical:  Vitals:    10/14/22 0400   BP: 122/62   Pulse: 89   Resp: 16   Temp: 98 °F (36 7 °C)   SpO2: 93%     Musculoskeletal: right Lower Extremity  · Surgical dressings C/D/I, without strike through  · Tender to palpation at the surgical incision sites  · Thigh and calf soft and compressible  · Sensation intact to saphenous, sural, tibial, superficial peroneal nerve, and deep peroneal  · Motor intact to +FHL/EHL, +ankle dorsi/plantar flexion  · 2+ DP pulse, symmetric bilaterally  · Digits warm and well perfused  · Capillary refill < 2 seconds    Assessment:    79 y  o female POD 2 surgical fixation of right peritrochanteric femur fracture with long intramedullary nail  Patient doing well, pain well managed  Plan:  · WBAT to the right lower extremity  · Hgb 11 7 yesterday  No new lab results today  · PT/OT  · Pain control per primary team    · DVT ppx: lovenox 30 days post operatively  · Medical management per primary team    · Dispo: Ortho will follow   · Will need follow up with Dr Chuyita Moore post operatively       Fabián Monae PA-C

## 2022-10-15 LAB
25(OH)D3 SERPL-MCNC: 30.3 NG/ML (ref 30–100)
ANION GAP SERPL CALCULATED.3IONS-SCNC: 5 MMOL/L (ref 4–13)
BASOPHILS # BLD MANUAL: 0 THOUSAND/UL (ref 0–0.1)
BASOPHILS NFR MAR MANUAL: 0 % (ref 0–1)
BUN SERPL-MCNC: 28 MG/DL (ref 5–25)
CALCIUM SERPL-MCNC: 8.9 MG/DL (ref 8.4–10.2)
CHLORIDE SERPL-SCNC: 101 MMOL/L (ref 96–108)
CO2 SERPL-SCNC: 29 MMOL/L (ref 21–32)
CREAT SERPL-MCNC: 0.86 MG/DL (ref 0.6–1.3)
EOSINOPHIL # BLD MANUAL: 0.19 THOUSAND/UL (ref 0–0.4)
EOSINOPHIL NFR BLD MANUAL: 1 % (ref 0–6)
ERYTHROCYTE [DISTWIDTH] IN BLOOD BY AUTOMATED COUNT: 13.9 % (ref 11.6–15.1)
GFR SERPL CREATININE-BSD FRML MDRD: 63 ML/MIN/1.73SQ M
GLUCOSE SERPL-MCNC: 201 MG/DL (ref 65–140)
GLUCOSE SERPL-MCNC: 223 MG/DL (ref 65–140)
GLUCOSE SERPL-MCNC: 240 MG/DL (ref 65–140)
GLUCOSE SERPL-MCNC: 245 MG/DL (ref 65–140)
GLUCOSE SERPL-MCNC: 279 MG/DL (ref 65–140)
HCT VFR BLD AUTO: 32.4 % (ref 34.8–46.1)
HGB BLD-MCNC: 10.3 G/DL (ref 11.5–15.4)
LYMPHOCYTES # BLD AUTO: 13.01 THOUSAND/UL (ref 0.6–4.47)
LYMPHOCYTES # BLD AUTO: 67 % (ref 14–44)
MACROCYTES BLD QL AUTO: PRESENT
MCH RBC QN AUTO: 33.1 PG (ref 26.8–34.3)
MCHC RBC AUTO-ENTMCNC: 31.8 G/DL (ref 31.4–37.4)
MCV RBC AUTO: 104 FL (ref 82–98)
MONOCYTES # BLD AUTO: 0.58 THOUSAND/UL (ref 0–1.22)
MONOCYTES NFR BLD: 3 % (ref 4–12)
NEUTROPHILS # BLD MANUAL: 5.63 THOUSAND/UL (ref 1.85–7.62)
NEUTS SEG NFR BLD AUTO: 29 % (ref 43–75)
PLATELET # BLD AUTO: 150 THOUSANDS/UL (ref 149–390)
PLATELET BLD QL SMEAR: ABNORMAL
PMV BLD AUTO: 11.7 FL (ref 8.9–12.7)
POLYCHROMASIA BLD QL SMEAR: PRESENT
POTASSIUM SERPL-SCNC: 3.9 MMOL/L (ref 3.5–5.3)
RBC # BLD AUTO: 3.11 MILLION/UL (ref 3.81–5.12)
SODIUM SERPL-SCNC: 135 MMOL/L (ref 135–147)
WBC # BLD AUTO: 19.42 THOUSAND/UL (ref 4.31–10.16)

## 2022-10-15 PROCEDURE — 82948 REAGENT STRIP/BLOOD GLUCOSE: CPT

## 2022-10-15 PROCEDURE — 85027 COMPLETE CBC AUTOMATED: CPT | Performed by: NURSE PRACTITIONER

## 2022-10-15 PROCEDURE — 85007 BL SMEAR W/DIFF WBC COUNT: CPT | Performed by: NURSE PRACTITIONER

## 2022-10-15 PROCEDURE — 80048 BASIC METABOLIC PNL TOTAL CA: CPT | Performed by: NURSE PRACTITIONER

## 2022-10-15 PROCEDURE — 82306 VITAMIN D 25 HYDROXY: CPT

## 2022-10-15 PROCEDURE — 99024 POSTOP FOLLOW-UP VISIT: CPT | Performed by: PHYSICIAN ASSISTANT

## 2022-10-15 PROCEDURE — 99232 SBSQ HOSP IP/OBS MODERATE 35: CPT | Performed by: SURGERY

## 2022-10-15 RX ORDER — INSULIN LISPRO 100 [IU]/ML
11 INJECTION, SOLUTION INTRAVENOUS; SUBCUTANEOUS
Status: DISCONTINUED | OUTPATIENT
Start: 2022-10-16 | End: 2022-10-16

## 2022-10-15 RX ORDER — INSULIN GLARGINE 100 [IU]/ML
28 INJECTION, SOLUTION SUBCUTANEOUS
Status: DISCONTINUED | OUTPATIENT
Start: 2022-10-15 | End: 2022-10-17 | Stop reason: HOSPADM

## 2022-10-15 RX ADMIN — ENOXAPARIN SODIUM 30 MG: 30 INJECTION SUBCUTANEOUS at 21:12

## 2022-10-15 RX ADMIN — INSULIN LISPRO 2 UNITS: 100 INJECTION, SOLUTION INTRAVENOUS; SUBCUTANEOUS at 21:15

## 2022-10-15 RX ADMIN — ACETAMINOPHEN 975 MG: 325 TABLET ORAL at 13:46

## 2022-10-15 RX ADMIN — SENNOSIDES AND DOCUSATE SODIUM 1 TABLET: 8.6; 5 TABLET ORAL at 21:12

## 2022-10-15 RX ADMIN — CARBAMIDE PEROXIDE 6.5% 5 DROP: 6.5 LIQUID AURICULAR (OTIC) at 08:45

## 2022-10-15 RX ADMIN — DOCUSATE SODIUM 100 MG: 100 CAPSULE, LIQUID FILLED ORAL at 08:42

## 2022-10-15 RX ADMIN — INSULIN LISPRO 10 UNITS: 100 INJECTION, SOLUTION INTRAVENOUS; SUBCUTANEOUS at 17:41

## 2022-10-15 RX ADMIN — INSULIN LISPRO 3 UNITS: 100 INJECTION, SOLUTION INTRAVENOUS; SUBCUTANEOUS at 17:41

## 2022-10-15 RX ADMIN — ACETAMINOPHEN 975 MG: 325 TABLET ORAL at 05:13

## 2022-10-15 RX ADMIN — INSULIN LISPRO 10 UNITS: 100 INJECTION, SOLUTION INTRAVENOUS; SUBCUTANEOUS at 12:16

## 2022-10-15 RX ADMIN — INSULIN LISPRO 4 UNITS: 100 INJECTION, SOLUTION INTRAVENOUS; SUBCUTANEOUS at 12:16

## 2022-10-15 RX ADMIN — INSULIN GLARGINE 28 UNITS: 100 INJECTION, SOLUTION SUBCUTANEOUS at 21:15

## 2022-10-15 RX ADMIN — INSULIN LISPRO 2 UNITS: 100 INJECTION, SOLUTION INTRAVENOUS; SUBCUTANEOUS at 08:34

## 2022-10-15 RX ADMIN — CARBAMIDE PEROXIDE 6.5% 5 DROP: 6.5 LIQUID AURICULAR (OTIC) at 17:42

## 2022-10-15 RX ADMIN — OXYCODONE HYDROCHLORIDE 5 MG: 5 TABLET ORAL at 21:12

## 2022-10-15 RX ADMIN — LIDOCAINE 5% 1 PATCH: 700 PATCH TOPICAL at 08:44

## 2022-10-15 RX ADMIN — ACETAMINOPHEN 975 MG: 325 TABLET ORAL at 21:12

## 2022-10-15 RX ADMIN — ENOXAPARIN SODIUM 30 MG: 30 INJECTION SUBCUTANEOUS at 08:43

## 2022-10-15 RX ADMIN — INSULIN LISPRO 10 UNITS: 100 INJECTION, SOLUTION INTRAVENOUS; SUBCUTANEOUS at 08:35

## 2022-10-15 RX ADMIN — GABAPENTIN 100 MG: 100 CAPSULE ORAL at 21:12

## 2022-10-15 NOTE — ARC ADMISSION
Patient is deemed SLB / [address] [facility] appropriate at this time pending medical readiness   ARC continues to follow for medical stability with BS and we will need updated PT/OT notes   CM made aware through [address]

## 2022-10-15 NOTE — QUICK NOTE
Reviewed blood sugars since transition off of insulin drip  Glucose high this AM in BMP  Will increase Lantus to 28 units qhs  Assess mealtime regimen later today  Continue scale for correction  Endocrinology will continue to follow      POC Glucose (mg/dl)   Date Value   10/14/2022 135   10/14/2022 149 (H)   10/14/2022 165 (H)   10/14/2022 168 (H)   10/14/2022 258 (H)   10/14/2022 285 (H)   10/14/2022 246 (H)   10/14/2022 173 (H)   10/14/2022 134   10/14/2022 136     Component      Latest Ref Rng & Units 10/15/2022   Sodium      135 - 147 mmol/L 135   Potassium      3 5 - 5 3 mmol/L 3 9   Chloride      96 - 108 mmol/L 101   CO2      21 - 32 mmol/L 29   Anion Gap      4 - 13 mmol/L 5   BUN      5 - 25 mg/dL 28 (H)   Creatinine      0 60 - 1 30 mg/dL 0 86   Glucose, Random      65 - 140 mg/dL 201 (H)   Calcium      8 4 - 10 2 mg/dL 8 9   eGFR      ml/min/1 73sq m 63

## 2022-10-15 NOTE — PROGRESS NOTES
Orthopedics   Maia Gallagher 79 y o  female MRN: [id number]  Unit/Bed#: W -01      Subjective:  79 y  o female seen and evaluated this morning  She notes soreness in the right lower extremity worse with being moved in bed  Denies any numbness or tingling in the right foot      Labs:  0   Lab Value Date/Time    HCT 32 4 (L) 10/15/2022 0515    HCT 37 4 10/13/2022 0549    HCT 39 1 10/12/2022 0820    HGB 10 3 (L) 10/15/2022 0515    HGB 11 7 10/13/2022 0549    HGB 13 0 10/12/2022 0820    INR 0 89 10/11/2022 1900    WBC 19 42 (H) 10/15/2022 0515    WBC 16 25 (H) 10/13/2022 0549    WBC 15 73 (H) 10/12/2022 0820       Meds:    Current Facility-Administered Medications:   •  acetaminophen (TYLENOL) tablet 975 mg, 975 mg, Oral, Q8H [address], Caroline Almanzar, PA-C, 975 mg at 10/15/22 0513  •  carbamide peroxide (DEBROX) 6 5 % otic solution 5 drop, 5 drop, Both Ears, BID, Caroline Almanzar, PA-C, 5 drop at 10/15/22 0845  •  docusate sodium (COLACE) capsule 100 mg, 100 mg, Oral, BID, Caroline Almanzar, PA-C, 100 mg at 10/15/22 8427  •  enoxaparin (LOVENOX) subcutaneous injection 30 mg, 30 mg, Subcutaneous, Q12H, Caroline Almanzar, PA-C, 30 mg at 10/15/22 3853  •  gabapentin (NEURONTIN) capsule 100 mg, 100 mg, Oral, HS, Caroline Almanzar, PA-C, 100 mg at 10/14/22 2115  •  insulin glargine (LANTUS) subcutaneous injection 28 Units 0 28 mL, 28 Units, Subcutaneous, HS, Kandi Hughes DO  •  insulin lispro (HumaLOG) 100 units/mL subcutaneous injection 1-5 Units, 1-5 Units, Subcutaneous, HS, Jen Hubbard MD  •  insulin lispro (HumaLOG) 100 units/mL subcutaneous injection 1-6 Units, 1-6 Units, Subcutaneous, TID AC, Jen Hubbard MD, 2 Units at 10/15/22 0834  •  insulin lispro (HumaLOG) 100 units/mL subcutaneous injection 10 Units, 10 Units, Subcutaneous, TID With Meals, Jen Hubbard MD, 10 Units at 10/15/22 0835  •  lidocaine (LIDODERM) 5 % patch 1 patch, 1 patch, Topical, Daily, Caroline Almanzar PA-C, 1 patch at 10/15/22 0510  •  naloxone (NARCAN) 0 04 mg/mL syringe 0 04 mg, 0 04 mg, Intravenous, Q1MIN PRN, Harrison Peters PA-C  •  oxyCODONE (ROXICODONE) IR tablet 2 5 mg, 2 5 mg, Oral, Q4H PRN, Harrison Peters PA-C  •  oxyCODONE (ROXICODONE) IR tablet 5 mg, 5 mg, Oral, Q4H PRN, Harrison Peters PA-C, 5 mg at 10/14/22 2251  •  pneumococcal 13-valent conjugate vaccine (PREVNAR-13) IM injection 0 5 mL, 0 5 mL, Intramuscular, Prior to discharge, Harrison Peters PA-C  •  senna-docusate sodium (SENOKOT S) 8 6-50 mg per tablet 1 tablet, 1 tablet, Oral, HS, Harrison Peters PA-C, 1 tablet at 10/14/22 2115    Blood Culture:   No results found for: BLOODCX    Wound Culture:   No results found for: WOUNDCULT    Ins and Outs:  I/O last 24 hours: In: 540 [P O :540]  Out: 800 [Urine:800]          Physical exam:  Vitals:    10/15/22 0730   BP: 159/60   Pulse: 97   Resp: 16   Temp: 97 8 °F (36 6 °C)   SpO2: 98%     right lower extremity  · All 3 Mepilex Dressing clean dry intact  · No excessive soft tissue swelling in the lateral thigh or hip, no ecchymosis or erythema  · Sensation intact to light touch L2-S1  · Motor intact to knee flexion/extension, EHL/FHL  · 2+ DP pulse    Assessment: 79 y  o female post operative day 3 right femur long TFN for suad trochanteric fracture     Plan:  · Up and out of bed  · Weightbearing as tolerated right lower extremity  · PT/OT  · DVT prophylaxis Lovenox recommended 30 days postop  · Analgesics per primary team  · Dispo: [id number] for discharge from ortho perspective  · Patient noted to have acute blood loss anemia due to a drop in Hbg of > 2 0g from preop levels, will monitor vital signs and resuscitate with IV fluids as needed   · Follow up 2 weeks post op with Dr Sera Boogie PA-C

## 2022-10-16 LAB
ANION GAP SERPL CALCULATED.3IONS-SCNC: 6 MMOL/L (ref 4–13)
BASOPHILS # BLD MANUAL: 0 THOUSAND/UL (ref 0–0.1)
BASOPHILS NFR MAR MANUAL: 0 % (ref 0–1)
BUN SERPL-MCNC: 26 MG/DL (ref 5–25)
CALCIUM SERPL-MCNC: 8.6 MG/DL (ref 8.4–10.2)
CHLORIDE SERPL-SCNC: 102 MMOL/L (ref 96–108)
CO2 SERPL-SCNC: 27 MMOL/L (ref 21–32)
CREAT SERPL-MCNC: 0.89 MG/DL (ref 0.6–1.3)
EOSINOPHIL # BLD MANUAL: 0 THOUSAND/UL (ref 0–0.4)
EOSINOPHIL NFR BLD MANUAL: 0 % (ref 0–6)
ERYTHROCYTE [DISTWIDTH] IN BLOOD BY AUTOMATED COUNT: 13.9 % (ref 11.6–15.1)
FLUAV RNA RESP QL NAA+PROBE: NEGATIVE
FLUBV RNA RESP QL NAA+PROBE: NEGATIVE
GFR SERPL CREATININE-BSD FRML MDRD: 60 ML/MIN/1.73SQ M
GLUCOSE SERPL-MCNC: 110 MG/DL (ref 65–140)
GLUCOSE SERPL-MCNC: 173 MG/DL (ref 65–140)
GLUCOSE SERPL-MCNC: 178 MG/DL (ref 65–140)
GLUCOSE SERPL-MCNC: 186 MG/DL (ref 65–140)
GLUCOSE SERPL-MCNC: 203 MG/DL (ref 65–140)
HCT VFR BLD AUTO: 27.3 % (ref 34.8–46.1)
HGB BLD-MCNC: 8.7 G/DL (ref 11.5–15.4)
LYMPHOCYTES # BLD AUTO: 16 % (ref 14–44)
LYMPHOCYTES # BLD AUTO: 3.87 THOUSAND/UL (ref 0.6–4.47)
MACROCYTES BLD QL AUTO: PRESENT
MCH RBC QN AUTO: 33.3 PG (ref 26.8–34.3)
MCHC RBC AUTO-ENTMCNC: 31.9 G/DL (ref 31.4–37.4)
MCV RBC AUTO: 105 FL (ref 82–98)
MONOCYTES # BLD AUTO: 0.73 THOUSAND/UL (ref 0–1.22)
MONOCYTES NFR BLD: 3 % (ref 4–12)
NEUTROPHILS # BLD MANUAL: 5.57 THOUSAND/UL (ref 1.85–7.62)
NEUTS SEG NFR BLD AUTO: 23 % (ref 43–75)
PLATELET # BLD AUTO: 181 THOUSANDS/UL (ref 149–390)
PLATELET BLD QL SMEAR: ABNORMAL
PMV BLD AUTO: 11.7 FL (ref 8.9–12.7)
POLYCHROMASIA BLD QL SMEAR: PRESENT
POTASSIUM SERPL-SCNC: 4.3 MMOL/L (ref 3.5–5.3)
RBC # BLD AUTO: 2.61 MILLION/UL (ref 3.81–5.12)
RBC MORPH BLD: PRESENT
RSV RNA RESP QL NAA+PROBE: NEGATIVE
SARS-COV-2 RNA RESP QL NAA+PROBE: NEGATIVE
SODIUM SERPL-SCNC: 135 MMOL/L (ref 135–147)
VARIANT LYMPHS # BLD AUTO: 58 %
WBC # BLD AUTO: 24.2 THOUSAND/UL (ref 4.31–10.16)

## 2022-10-16 PROCEDURE — 80048 BASIC METABOLIC PNL TOTAL CA: CPT | Performed by: STUDENT IN AN ORGANIZED HEALTH CARE EDUCATION/TRAINING PROGRAM

## 2022-10-16 PROCEDURE — 0241U HB NFCT DS VIR RESP RNA 4 TRGT: CPT | Performed by: PHYSICIAN ASSISTANT

## 2022-10-16 PROCEDURE — 99232 SBSQ HOSP IP/OBS MODERATE 35: CPT | Performed by: SURGERY

## 2022-10-16 PROCEDURE — 85027 COMPLETE CBC AUTOMATED: CPT | Performed by: STUDENT IN AN ORGANIZED HEALTH CARE EDUCATION/TRAINING PROGRAM

## 2022-10-16 PROCEDURE — 85007 BL SMEAR W/DIFF WBC COUNT: CPT | Performed by: STUDENT IN AN ORGANIZED HEALTH CARE EDUCATION/TRAINING PROGRAM

## 2022-10-16 PROCEDURE — 82948 REAGENT STRIP/BLOOD GLUCOSE: CPT

## 2022-10-16 RX ORDER — INSULIN LISPRO 100 [IU]/ML
12 INJECTION, SOLUTION INTRAVENOUS; SUBCUTANEOUS
Status: DISCONTINUED | OUTPATIENT
Start: 2022-10-16 | End: 2022-10-16

## 2022-10-16 RX ORDER — MECLIZINE HCL 12.5 MG/1
12.5 TABLET ORAL EVERY 8 HOURS PRN
Status: DISCONTINUED | OUTPATIENT
Start: 2022-10-16 | End: 2022-10-17 | Stop reason: HOSPADM

## 2022-10-16 RX ORDER — INSULIN LISPRO 100 [IU]/ML
12 INJECTION, SOLUTION INTRAVENOUS; SUBCUTANEOUS
Status: DISCONTINUED | OUTPATIENT
Start: 2022-10-16 | End: 2022-10-17 | Stop reason: HOSPADM

## 2022-10-16 RX ADMIN — OXYCODONE HYDROCHLORIDE 5 MG: 5 TABLET ORAL at 23:27

## 2022-10-16 RX ADMIN — INSULIN LISPRO 12 UNITS: 100 INJECTION, SOLUTION INTRAVENOUS; SUBCUTANEOUS at 08:06

## 2022-10-16 RX ADMIN — ACETAMINOPHEN 975 MG: 325 TABLET ORAL at 05:27

## 2022-10-16 RX ADMIN — INSULIN LISPRO 1 UNITS: 100 INJECTION, SOLUTION INTRAVENOUS; SUBCUTANEOUS at 11:50

## 2022-10-16 RX ADMIN — GABAPENTIN 100 MG: 100 CAPSULE ORAL at 21:03

## 2022-10-16 RX ADMIN — DOCUSATE SODIUM 100 MG: 100 CAPSULE, LIQUID FILLED ORAL at 17:29

## 2022-10-16 RX ADMIN — INSULIN LISPRO 1 UNITS: 100 INJECTION, SOLUTION INTRAVENOUS; SUBCUTANEOUS at 17:28

## 2022-10-16 RX ADMIN — ACETAMINOPHEN 975 MG: 325 TABLET ORAL at 13:20

## 2022-10-16 RX ADMIN — OXYCODONE HYDROCHLORIDE 2.5 MG: 5 TABLET ORAL at 11:46

## 2022-10-16 RX ADMIN — ACETAMINOPHEN 975 MG: 325 TABLET ORAL at 21:03

## 2022-10-16 RX ADMIN — SENNOSIDES AND DOCUSATE SODIUM 1 TABLET: 8.6; 5 TABLET ORAL at 21:03

## 2022-10-16 RX ADMIN — INSULIN LISPRO 12 UNITS: 100 INJECTION, SOLUTION INTRAVENOUS; SUBCUTANEOUS at 11:50

## 2022-10-16 RX ADMIN — INSULIN GLARGINE 28 UNITS: 100 INJECTION, SOLUTION SUBCUTANEOUS at 21:03

## 2022-10-16 RX ADMIN — ENOXAPARIN SODIUM 30 MG: 30 INJECTION SUBCUTANEOUS at 08:30

## 2022-10-16 RX ADMIN — INSULIN LISPRO 2 UNITS: 100 INJECTION, SOLUTION INTRAVENOUS; SUBCUTANEOUS at 08:05

## 2022-10-16 RX ADMIN — ENOXAPARIN SODIUM 30 MG: 30 INJECTION SUBCUTANEOUS at 21:03

## 2022-10-16 RX ADMIN — DOCUSATE SODIUM 100 MG: 100 CAPSULE, LIQUID FILLED ORAL at 08:26

## 2022-10-16 RX ADMIN — LIDOCAINE 5% 1 PATCH: 700 PATCH TOPICAL at 08:26

## 2022-10-16 RX ADMIN — INSULIN LISPRO 12 UNITS: 100 INJECTION, SOLUTION INTRAVENOUS; SUBCUTANEOUS at 17:28

## 2022-10-16 NOTE — PLAN OF CARE
Problem: Potential for Falls  Goal: Patient will remain free of falls  Description: INTERVENTIONS:  - Educate patient/family on patient safety including physical limitations  - Instruct patient to call for assistance with activity   - Consult OT/PT to assist with strengthening/mobility   - Keep Call bell within reach  - Keep bed low and locked with side rails adjusted as appropriate  - Keep care items and personal belongings within reach  - Initiate and maintain comfort rounds  - Make Fall Risk Sign visible to staff  - Offer Toileting every  Hours, in advance of need  - Initiate/Maintain alarm  - Obtain necessary fall risk management equipment:   - Apply yellow socks and bracelet for high fall risk patients  - Consider moving patient to room near nurses station  Outcome: Progressing     Problem: PAIN - ADULT  Goal: Verbalizes/displays adequate comfort level or baseline comfort level  Description: Interventions:  - Encourage patient to monitor pain and request assistance  - Assess pain using appropriate pain scale  - Administer analgesics based on type and severity of pain and evaluate response  - Implement non-pharmacological measures as appropriate and evaluate response  - Consider cultural and social influences on pain and pain management  - Notify physician/advanced practitioner if interventions unsuccessful or patient reports new pain  Outcome: Progressing     Problem: INFECTION - ADULT  Goal: Absence or prevention of progression during hospitalization  Description: INTERVENTIONS:  - Assess and monitor for signs and symptoms of infection  - Monitor lab/diagnostic results  - Monitor all insertion sites, i e  indwelling lines, tubes, and drains  - Monitor endotracheal if appropriate and nasal secretions for changes in amount and color  - [place] appropriate cooling/warming therapies per order  - Administer medications as ordered  - Instruct and encourage patient and family to use good hand hygiene technique  - Identify and instruct in appropriate isolation precautions for identified infection/condition  Outcome: Progressing  Goal: Absence of fever/infection during neutropenic period  Description: INTERVENTIONS:  - Monitor WBC    Outcome: Progressing     Problem: SAFETY ADULT  Goal: Maintain or return to baseline ADL function  Description: INTERVENTIONS:  -  Assess patient's ability to carry out ADLs; assess patient's baseline for ADL function and identify physical deficits which impact ability to perform ADLs (bathing, care of mouth/teeth, toileting, grooming, dressing, etc )  - Assess/evaluate cause of self-care deficits   - Assess range of motion  - Assess patient's mobility; develop plan if impaired  - Assess patient's need for assistive devices and provide as appropriate  - Encourage maximum independence but intervene and supervise when necessary  - Involve family in performance of ADLs  - Assess for home care needs following discharge   - Consider OT consult to assist with ADL evaluation and planning for discharge  - Provide patient education as appropriate  Outcome: Progressing  Goal: Maintains/Returns to pre admission functional level  Description: INTERVENTIONS:  - Perform BMAT or MOVE assessment daily    - Set and communicate daily mobility goal to care team and patient/family/caregiver  - Collaborate with rehabilitation services on mobility goals if consulted  - Perform Range of Motion  times a day  - Reposition patient every  hours    - Dangle patient  times a day  - Stand patient  times a day  - Ambulate patient  times a day  - Out of bed to chair  times a day   - Out of bed for meals  times a day  - Out of bed for toileting  - Record patient progress and toleration of activity level   Outcome: Progressing     Problem: DISCHARGE PLANNING  Goal: Discharge to home or other facility with appropriate resources  Description: INTERVENTIONS:  - Identify barriers to discharge w/patient and caregiver  - Arrange for needed discharge resources and transportation as appropriate  - Identify discharge learning needs (meds, wound care, etc )  - Arrange for interpretive services to assist at discharge as needed  - Refer to Case Management Department for coordinating discharge planning if the patient needs post-hospital services based on physician/advanced practitioner order or complex needs related to functional status, cognitive ability, or social support system  Outcome: Progressing     Problem: Knowledge Deficit  Goal: Patient/family/caregiver demonstrates understanding of disease process, treatment plan, medications, and discharge instructions  Description: Complete learning assessment and assess knowledge base    Interventions:  - Provide teaching at level of understanding  - Provide teaching via preferred learning methods  Outcome: Progressing     Problem: MUSCULOSKELETAL - ADULT  Goal: Maintain or return mobility to safest level of function  Description: INTERVENTIONS:  - Assess patient's ability to carry out ADLs; assess patient's baseline for ADL function and identify physical deficits which impact ability to perform ADLs (bathing, care of mouth/teeth, toileting, grooming, dressing, etc )  - Assess/evaluate cause of self-care deficits   - Assess range of motion  - Assess patient's mobility  - Assess patient's need for assistive devices and provide as appropriate  - Encourage maximum independence but intervene and supervise when necessary  - Involve family in performance of ADLs  - Assess for home care needs following discharge   - Consider OT consult to assist with ADL evaluation and planning for discharge  - Provide patient education as appropriate  Outcome: Progressing  Goal: Maintain proper alignment of affected body part  Description: INTERVENTIONS:  - Support, maintain and protect limb and body alignment  - Provide patient/ family with appropriate education  Outcome: Progressing     Problem: MOBILITY - ADULT  Goal: Maintain or return to baseline ADL function  Description: INTERVENTIONS:  -  Assess patient's ability to carry out ADLs; assess patient's baseline for ADL function and identify physical deficits which impact ability to perform ADLs (bathing, care of mouth/teeth, toileting, grooming, dressing, etc )  - Assess/evaluate cause of self-care deficits   - Assess range of motion  - Assess patient's mobility; develop plan if impaired  - Assess patient's need for assistive devices and provide as appropriate  - Encourage maximum independence but intervene and supervise when necessary  - Involve family in performance of ADLs  - Assess for home care needs following discharge   - Consider OT consult to assist with ADL evaluation and planning for discharge  - Provide patient education as appropriate  Outcome: Progressing  Goal: Maintains/Returns to pre admission functional level  Description: INTERVENTIONS:  - Perform BMAT or MOVE assessment daily    - Set and communicate daily mobility goal to care team and patient/family/caregiver  - Collaborate with rehabilitation services on mobility goals if consulted  - Perform Range of Motion  times a day  - Reposition patient every  hours    - Dangle patient  times a day  - Stand patient  times a day  - Ambulate patient  times a day  - Out of bed to chair  times a day   - Out of bed for meal times a day  - Out of bed for toileting  - Record patient progress and toleration of activity level   Outcome: Progressing     Problem: Prexisting or High Potential for Compromised Skin Integrity  Goal: Skin integrity is maintained or improved  Description: INTERVENTIONS:  - Identify patients at risk for skin breakdown  - Assess and monitor skin integrity  - Assess and monitor nutrition and hydration status  - Monitor labs   - Assess for incontinence   - Turn and reposition patient  - Assist with mobility/ambulation  - Relieve pressure over bony prominences  - Avoid friction and shearing  - Provide appropriate hygiene as needed including keeping skin clean and dry  - Evaluate need for skin moisturizer/barrier cream  - Collaborate with interdisciplinary team   - Patient/family teaching  - Consider wound care consult   Outcome: Progressing     Problem: Nutrition/Hydration-ADULT  Goal: Nutrient/Hydration intake appropriate for improving, restoring or maintaining nutritional needs  Description: Monitor and assess patient's nutrition/hydration status for malnutrition  Collaborate with interdisciplinary team and initiate plan and interventions as ordered  Monitor patient's weight and dietary intake as ordered or per policy  Utilize nutrition screening tool and intervene as necessary  Determine patient's food preferences and provide high-protein, high-caloric foods as appropriate       INTERVENTIONS:  - Monitor oral intake, urinary output, labs, and treatment plans  - Assess nutrition and hydration status and recommend course of action  - Evaluate amount of meals eaten  - Assist patient with eating if necessary   - Allow adequate time for meals  - Recommend/ encourage appropriate diets, oral nutritional supplements, and vitamin/mineral supplements  - Order, calculate, and assess calorie counts as needed  - Assess need for intravenous fluids  - Provide nutrition/hydration education as appropriate  - Include patient/family/caregiver in decisions related to nutrition  Outcome: Progressing

## 2022-10-16 NOTE — DISCHARGE SUMMARY
[facility]  Discharge- Pierre Suarez 1940, 79 y o  female MRN: [id number]  Unit/Bed#: W -01 Encounter: [id number]  Primary Care Provider: Torres Cuevas MD   Date and time admitted to hospital: 10/11/2022  5:47 PM    Fall  Assessment & Plan  - Status post fall from standing position after tripping with the below noted injuries  - Fall precautions  - Geriatric Medicine consultation for evaluation, medication review and recommendations   - PT and OT evaluation and treatment as indicated  - Case Management consultation for disposition planning  * Closed fracture of trochanter of right femur with routine healing  Assessment & Plan  - Right femur fracture, present on admission   - Status post ORIF of right femur fracture with long IM nail on 10/12/2022  - Appreciate Orthopedic surgery evaluation, recommendations and interventions as noted  - May be weightbearing as tolerated on the right lower extremity postoperatively per Orthopedic surgery  - Monitor right lower extremity neurovascular exam   - Continue multimodal analgesic regimen   - Continue DVT prophylaxis  - PT and OT evaluation and treatment as indicated  - Outpatient follow up with Orthopedic surgery for re-evaluation  Displaced fracture of middle phalanx of left ring finger, initial encounter for closed fracture  Assessment & Plan  - Fracture of the middle phalanx of the left ring finger/4th finger, present on presentation   - Appreciate Orthopedic surgery evaluation and recommendations  Non operative management recommended  - Maintain splint and avoid weight-bearing through finger   - Continue multimodal analgesic regimen   - PT and OT evaluation and treatment as indicated  - Outpatient follow-up with Orthopedic surgery      Chronic systolic heart failure [facility])  Assessment & Plan  Wt Readings from Last 3 Encounters:   10/11/22 65 8 kg (145 lb)   08/05/21 63 5 kg (140 lb)   11/08/20 74 2 kg (163 lb 9 3 oz) - Patient with history of chronic systolic CHF without evidence of acute exacerbation   - Continue current medication regimen  Plan to resume Lasix 40 mg daily when appropriate  - Monitor volume status   - Outpatient follow-up per routine  Type 2 diabetes mellitus with stage 3a chronic kidney disease, with long-term current use of insulin [facility])  Assessment & Plan  Lab Results   Component Value Date    HGBA1C 7 6 (H) 10/12/2022       Recent Labs     10/15/22  1636 10/15/22  2041 10/16/22  0753 10/16/22  1142   POCGLU 240* 245* 203* 173*     Awaiting Endocrine consult to be done  Currently on Insulin drip secondary to helevated blood sugars  Patient a GCS - 15    Blood Sugar Average: Last 72 hrs:     - Patient with history of type 2 diabetes mellitus with associated CKD stage 3   - Patient with hyperglycemia perioperatively  Goal blood glucose level between 140 and 180  - Appreciate Endocrinology evaluation and recommendations as well as assistance with management during hospital encounter   - Continue current subcutaneous insulin regimen on discharge  - Continue a level 2 constant carbohydrate restricted diet  - Outpatient follow-up with PCP and Endocrinology  Hypertension  Assessment & Plan  - Patient with chronic history of hypertension   - Continue current medication regimen and resume home medication therapy as appropriate  - Outpatient follow-up per routine  HLD (hyperlipidemia)  Assessment & Plan  - Patient with chronic history of hyperlipidemia   - Continue current medication regimen and resume home medication therapy as appropriate  - Outpatient follow-up per routine  CLL (chronic lymphocytic leukemia) ([facility] [address] )  Assessment & Plan  - Patient with reported recent diagnosis of CLL with elevated white blood cell count this presentation   - Continue to monitor CBC    - The patient's case was discussed with Hematology / Medical Oncology during this hospital encounter and no inpatient consult was indicated at this time  Patient is instructed to follow up with Hematology / Medical Oncology as an outpatient for further evaluation and discussion regarding treatment options   - Recommend short-term outpatient follow-up with PCP for further workup and management  CKD (chronic kidney disease) stage 3, GFR 30-59 ml/min [facility])  Assessment & Plan  Lab Results   Component Value Date    EGFR 60 10/16/2022    EGFR 63 10/15/2022    EGFR 40 10/13/2022    CREATININE 0 89 10/16/2022    CREATININE 0 86 10/15/2022    CREATININE 1 23 10/13/2022     - Patient with chronic history of CKD stage 3 without evidence of acute kidney injury  - Avoid nephrotoxic medications  - Continue to monitor serum creatinine levels and electrolytes while admitted  - Outpatient follow-up per routine  Discharge Summary - Trauma Service   Andrea Reynoso 79 y o  female MRN: [id number]  Unit/Bed#: W -01 Encounter: [id number]    Admission Date: 10/11/2022     Discharge Date: 10/17/2022    Admitting Diagnosis: Multiple injuries [T07  XXXA]  Intertrochanteric fracture ([address] ) Eluterio Kinds  Finger fracture, left [S61 404F]    Discharge Diagnosis: See above  Attending and Service: Dr Tiffany Castrejon, Acute Care Surgical Services  Consulting Physician(s):     1  Orthopedic Surgery  2  Geriatric Medicine  3  Endocrinology  Imaging and Procedures Performed:     XR hand 3+ views LEFT    Result Date: 10/12/2022  Impression: Severe arthritic changes  Healing fracture proximal phalanx of 4th finger  Age-indeterminate fracture of proximal phalanx of 4th finger in alignment Workstation performed: [id number]     XR femur 2 vw right    Result Date: 10/12/2022  Impression: Fluoroscopic guidance provided for procedure guidance  Please refer to the separate procedure notes for additional details  Workstation performed: [id number]     XR femur 2 views RIGHT    Result Date: 10/12/2022  Impression: Right proximal femoral fracture as above   Findings concur with the referring clinician's preliminary interpretation already in the patient's electronic health record  Workstation performed: [id number]     CT pelvis wo contrast    Result Date: 10/12/2022  Impression: Impacted, comminuted right femoral neck fracture into the intertrochanteric region as above  The acetabulum appears intact  Erosive changes of the endplates of N7-R0  This may be related to severe degenerative change however inflammatory process is difficult to exclude  Correlation with erythrocyte sedimentation rate is advised  MRI follow-up could also be considered especially if there is any significant back pain  Mesenteric adenopathy is present and consistent with patient's history of CLL  Results were discussed with Dr Shonda Moreland on 10/12/2022 Follow-up was marked in the epic system  Workstation performed: [id number]     XR pelvis ap only 1 or 2 vw    Result Date: 10/12/2022  Impression: Fracture of right proximal femur  Findings concur with the referring clinician's preliminary interpretation already in the patient's electronic health record  Workstation performed: [id number]     ORIF of right femur fracture with long IM nail on 10/12/2022  Hospital Course: Katerine Goodson is a 58-year-old female who presented to the emergency department complaining of right hip pain following a fall while attempting to ambulate into her house using her roller walker earlier in the day  She denied head strike or loss of consciousness  She is unable to walk due to her pain  On her initial trauma evaluation, her primary survey was unremarkable  On secondary survey, she was hypertensive with normal vital signs otherwise; she was noted to have swelling, tenderness and pain with range of motion in her right hip without deformity; she had weakness in her right lower extremity secondary to pain; the remainder of her exam was unremarkable    Her initial workup included labs in the above-noted imaging studies    She was admitted to the trauma service following fall with a right hip femur fracture, a displaced fracture of the middle phalanx of the left ring finger and chronic medical comorbidities including hypertension, hyperlipidemia, CHF and type 2 diabetes mellitus with stage III CKD  Orthopedic surgery evaluated the patient and recommended operative intervention for the right femur with non operative/conservative management for her ring finger in a splint  The patient agreed to surgery and underwent ORIF of right femur fracture with long IM nail on 10/12/2022  A splint was placed for the left ring finger injury  Patient was placed on a multimodal analgesic regimen and this was adjusted as indicated during her hospital encounter  She did have significant hyperglycemia with poor control of her type 2 diabetes during her hospital encounter requiring initiation of insulin drip during her hospitalization postoperatively with endocrinology consult  She was able to be transition back to a subcutaneous insulin regimen  PT and OT evaluated the patient and recommended rehab  Case Management assisted with disposition planning, and the patient was deemed stable for discharge to an appropriate post acute care facility for rehab on 10/17/2022  For further details of her hospital encounter, please see her complete medical records  On discharge, the patient is instructed to follow-up with the patient's primary care provider to review the events of the patient's recent hospitalization  The patient is instructed to follow-up in the Trauma Clinic as needed  The patient is instructed to follow up with Orthopedic surgery in 2 weeks for postoperative re-evaluation  The patient is instructed to follow up with Hematology/Oncology on 10/26/2022 at 9:40 AM for further evaluation of her recently diagnosed CLL  The patient should follow the provided discharge instructions      Condition at Discharge: stable     Discharge instructions/Information to patient and family:   See after visit summary for information provided to patient and family  Provisions for Follow-Up Care:  See after visit summary for information related to follow-up care and any pertinent home health orders  Disposition: See After Visit Summary for discharge disposition information  Planned Readmission: No    Discharge Statement   I spent 30 minutes discharging the patient  This time was spent on the day of discharge  I had direct contact with the patient on the day of discharge  Additional documentation is required if more than 30 minutes were spent on discharge  Discharge Medications:  See after visit summary for reconciled discharge medications provided to patient and family        Cory Cedillo PA-C  10/17/2022   10:19 AM

## 2022-10-16 NOTE — PROGRESS NOTES
[facility]  Progress Note - Navarro Goodman 1940, 79 y o  female MRN: [id number]  Unit/Bed#: W -01 Encounter: [id number]  Primary Care Provider: Sandeep Ramon MD   Date and time admitted to hospital: 10/11/2022  5:47 PM    CLL (chronic lymphocytic leukemia) ([facility] [address] )  Assessment & Plan  - Patient with reported recent diagnosis of CLL with elevated white blood cell count this presentation   - Continue to monitor CBC   - Consider oncology consult  Will discuss with team today  - Recommend short-term outpatient follow-up with PCP for further workup and management  Displaced fracture of middle phalanx of left ring finger, initial encounter for closed fracture  Assessment & Plan  - Fracture of the middle phalanx of the left ring finger/4th finger, present on presentation   - Appreciate Orthopedic surgery evaluation and recommendations  Non operative management recommended  - Maintain splint and avoid weight-bearing through finger   - Continue multimodal analgesic regimen   - PT and OT evaluation and treatment as indicated  - Outpatient follow-up with Orthopedic surgery  Hypertension  Assessment & Plan  - Patient with chronic history of hypertension   - Continue current medication regimen and resume home medication therapy as appropriate  - Outpatient follow-up per routine  Chronic systolic heart failure (HCC)  Assessment & Plan  Wt Readings from Last 3 Encounters:   10/11/22 65 8 kg (145 lb)   08/05/21 63 5 kg (140 lb)   11/08/20 74 2 kg (163 lb 9 3 oz)     - Patient with history of chronic systolic CHF without evidence of acute exacerbation   - Continue current medication regimen  Plan to resume Lasix 40 mg daily when appropriate  - Monitor volume status   - Outpatient follow-up per routine      HLD (hyperlipidemia)  Assessment & Plan  - Patient with chronic history of hyperlipidemia   - Continue current medication regimen and resume home medication therapy as appropriate  - Outpatient follow-up per routine  CKD (chronic kidney disease) stage 3, GFR 30-59 ml/min [facility])  Assessment & Plan  Lab Results   Component Value Date    EGFR 63 10/15/2022    EGFR 40 10/13/2022    EGFR 56 10/13/2022    CREATININE 0 86 10/15/2022    CREATININE 1 23 10/13/2022    CREATININE 0 94 10/13/2022   Avoid nephrotoxic medications  Continue to monitor serum creatinine levels and electrolytes  Continue to monitor input output as appropriate    Type 2 diabetes mellitus with stage 3a chronic kidney disease, with long-term current use of insulin [facility])  Assessment & Plan  Lab Results   Component Value Date    HGBA1C 7 6 (H) 10/12/2022       Recent Labs     10/15/22  0732 10/15/22  1113 10/15/22  1636 10/15/22  2041   POCGLU 223* 279* 240* 245*     Awaiting Endocrine consult to be done  Currently on Insulin drip secondary to helevated blood sugars  Patient a GCS - 15    Blood Sugar Average: Last 72 hrs:     - Patient with history of type 2 diabetes mellitus with associated CKD stage 3   - Patient with hyperglycemia perioperatively  Goal blood glucose level between 140 and 180   - Adjust subcutaneous insulin regimen as follows: Initiate basal insulin as well as mealtime insulin with adjustment in sliding scale insulin coverage   - Resume home medication therapy on discharge  - Outpatient follow-up per routine  - calorie restrictive diet level two  - DC Lantus insulin drip after IV Lantus p m  Dose per Endocrinology; 10/14/2022    Fall  Assessment & Plan  - Status post fall from standing position after tripping with the below noted injuries  - Fall precautions  - Geriatric Medicine consultation for evaluation, medication review and recommendations   - PT and OT evaluation and treatment as indicated  - Case Management consultation for disposition planning      * Closed fracture of trochanter of right femur with routine healing  Assessment & Plan  - Right femur fracture, present on admission   - Status post ORIF of right femur fracture with long IM nail on 10/12/2022  - Appreciate Orthopedic surgery evaluation, recommendations and interventions as noted  - May be weightbearing as tolerated on the right lower extremity postoperatively per Orthopedic surgery  - Monitor right lower extremity neurovascular exam   - Continue multimodal analgesic regimen   - Continue DVT prophylaxis  - PT and OT evaluation and treatment as indicated  - Outpatient follow up with Orthopedic surgery for re-evaluation  Disposition:  Stable    SUBJECTIVE:  Chief Complaint:  Right leg pain    Subjective: "I am ready to go home "    OBJECTIVE:   Vitals:   Temp:  [97 8 °F (36 6 °C)-99 °F (37 2 °C)] 99 °F (37 2 °C)  HR:  [] 117  Resp:  [14-18] 14  BP: (142-159)/(57-78) 151/78    Intake/Output:  I/O       10/14 0701  10/15 0700 10/15 0701  10/16 0700    P  O  540 200    I V  (mL/kg)      Total Intake(mL/kg) 540 (8 2) 200 (3)    Urine (mL/kg/hr) 800 (0 5) 1100 (1)    Total Output 800 1100    Net -260 -900               Nutrition: Diet Forrest/CHO Controlled; Consistent Carbohydrate Diet Level 2 (5 carb servings/75 grams CHO/meal)  GI Proph/Bowel Reg:  Colace  VTE Prophylaxis:Enoxaparin (Lovenox)     Physical Exam:   GENERAL APPEARANCE:  Comfortable  NEURO:  No new focal deficits  HEENT:  EOM intact no pain on EOM, oropharynx clear and patent  CV:  Regular rate and rhythm  LUNGS:  Clear to auscultation bilaterally  GI:  Soft, no distention, normal bowel sounds, nontender  :  Voiding  MSK:  Moves all extremities; neurovascularly intact  SKIN:  Warm well perfused; right lower extremity bandages dry and intact, no strike through    Invasive Devices  Report    Peripheral Intravenous Line  Duration           Peripheral IV 10/12/22 Left Forearm 3 days               Lab Results:   BMP/CMP:   Lab Results   Component Value Date    SODIUM 135 10/15/2022    K 3 9 10/15/2022     10/15/2022    CO2 29 10/15/2022    BUN 28 (H) 10/15/2022 CREATININE 0 86 10/15/2022    CALCIUM 8 9 10/15/2022    EGFR 63 10/15/2022    and CBC:   Lab Results   Component Value Date    WBC 19 42 (H) 10/15/2022    HGB 10 3 (L) 10/15/2022    HCT 32 4 (L) 10/15/2022     (H) 10/15/2022     10/15/2022    MCH 33 1 10/15/2022    MCHC 31 8 10/15/2022    RDW 13 9 10/15/2022    MPV 11 7 10/15/2022     Imaging/EKG Studies: I have personally reviewed pertinent reports       Other Studies: none

## 2022-10-16 NOTE — ASSESSMENT & PLAN NOTE
Lab Results   Component Value Date    EGFR 60 10/16/2022    EGFR 63 10/15/2022    EGFR 40 10/13/2022    CREATININE 0 89 10/16/2022    CREATININE 0 86 10/15/2022    CREATININE 1 23 10/13/2022     - Patient with chronic history of CKD stage 3 without evidence of acute kidney injury  - Avoid nephrotoxic medications  - Continue to monitor serum creatinine levels and electrolytes while admitted  - Outpatient follow-up per routine

## 2022-10-16 NOTE — CASE MANAGEMENT
Case Management Discharge Planning Note    Patient name Andrea Reynoso  Location W /W -69 MRN [id number]  : 1940 Date 10/16/2022       Current Admission Date: 10/11/2022  Current Admission Diagnosis:Closed fracture of trochanter of right femur with routine healing   Patient Active Problem List    Diagnosis Date Noted   • CLL (chronic lymphocytic leukemia) ([facility] [address] ) 10/13/2022   • Closed fracture of trochanter of right femur with routine healing 10/11/2022   • Displaced fracture of middle phalanx of left ring finger, initial encounter for closed fracture 10/11/2022   • Hypertension 2021   • Ambulatory dysfunction 2021   • Suspected Mild cognitive impairment 2021   • Lymphadenopathy 2020   • Elevated liver enzymes 2020   • Fall 2020   • Type 2 diabetes mellitus with stage 3a chronic kidney disease, with long-term current use of insulin [facility] ) 2020   • CKD (chronic kidney disease) stage 3, GFR 30-59 ml/min ([facility] [address] ) 2020   • HLD (hyperlipidemia) 2020   • OA (osteoarthritis) 2020   • Chronic systolic heart failure ([facility] [address] ) 2020      LOS (days): 5  Geometric Mean LOS (GMLOS) (days): 4 40  Days to GMLOS:-0 3     OBJECTIVE:  Risk of Unplanned Readmission Score: 16 46      Current admission status: Inpatient   Preferred Pharmacy:   [facility] TO E-PRESCRIBE  No address on file      [facility] [address] [place] [address]  Phone: [phone] Fax: [phone]    Primary Care Provider: Mike Batres MD    Primary Insurance: MEDICARE  Secondary Insurance: [employer]    DISCHARGE DETAILS:  CM received call from patient's nephew Mary Perez  CM introduced self and role  Patient's nephew updated tentative d/c tomorrow to [facility] pending PT/OT updated notes, Covid swab and blood sugar control  All questions/concerns answered at this time

## 2022-10-16 NOTE — ASSESSMENT & PLAN NOTE
Lab Results   Component Value Date    HGBA1C 7 6 (H) 10/12/2022       Recent Labs     10/15/22  1636 10/15/22  2041 10/16/22  0753 10/16/22  1142   POCGLU 240* 245* 203* 173*     Awaiting Endocrine consult to be done  Currently on Insulin drip secondary to helevated blood sugars  Patient a GCS - 15    Blood Sugar Average: Last 72 hrs:     - Patient with history of type 2 diabetes mellitus with associated CKD stage 3   - Patient with hyperglycemia perioperatively  Goal blood glucose level between 140 and 180  Patient with improved blood glucose control over the last 24 hours  - Appreciate Endocrinology evaluation and recommendations as well as assistance with management during hospital encounter   - Continue current subcutaneous insulin regimen on discharge  - Continue a level 2 constant carbohydrate restricted diet  - Outpatient follow-up with PCP and Endocrinology

## 2022-10-16 NOTE — CASE MANAGEMENT
Case Management Discharge Planning Note    Patient name Katja Skinner  Location W /W -30 MRN [id number]  : 1940 Date 10/16/2022       Current Admission Date: 10/11/2022  Current Admission Diagnosis:Closed fracture of trochanter of right femur with routine healing   Patient Active Problem List    Diagnosis Date Noted   • CLL (chronic lymphocytic leukemia) ([facility] [address] ) 10/13/2022   • Closed fracture of trochanter of right femur with routine healing 10/11/2022   • Displaced fracture of middle phalanx of left ring finger, initial encounter for closed fracture 10/11/2022   • Hypertension 2021   • Ambulatory dysfunction 2021   • Suspected Mild cognitive impairment 2021   • Lymphadenopathy 2020   • Elevated liver enzymes 2020   • Fall 2020   • Type 2 diabetes mellitus with stage 3a chronic kidney disease, with long-term current use of insulin [facility] 2020   • CKD (chronic kidney disease) stage 3, GFR 30-59 ml/min ([facility] [address] ) 2020   • HLD (hyperlipidemia) 2020   • OA (osteoarthritis) 2020   • Chronic systolic heart failure ([facility] [address] ) 2020      LOS (days): 5  Geometric Mean LOS (GMLOS) (days): 4 40  Days to GMLOS:-0 2     OBJECTIVE:  Risk of Unplanned Readmission Score: 16 25      Current admission status: Inpatient   Preferred Pharmacy:   [facility] TO E-PRESCRIBE  No address on file      [facility] [id number]  Phone: [phone] Fax: [phone]    Primary Care Provider: Tammy Augustin MD    Primary Insurance: MEDICARE  Secondary Insurance: [id number] DETAILS:    Discharge planning discussed with[de-identified] patient, left voice message for Favian Morales of Choice: Yes     CM contacted family/caregiver?: Yes  Were Treatment Team discharge recommendations reviewed with patient/caregiver?: Yes  Did patient/caregiver verbalize understanding of patient care needs?: N/A- going to facility  Were patient/caregiver advised of the risks associated with not following Treatment Team discharge recommendations?: Yes    Contacts  Patient Contacts: Gianfranco CAMPBELL  Relationship to Patient[de-identified] Family  Contact Method: Phone  Phone Number: [phone]  Reason/Outcome: Continuity of Care, Emergency Contact, Discharge Planning, Referral    Requested [address]         [facility] the [facility] No    DME Referral Provided  Referral made for DME?: No    Other Referral/Resources/Interventions Provided:  Interventions: Acute Rehab  Referral Comments: Patient approved [address] pending updated PT/OT notes and BS control/stable    Treatment Team Recommendation: Acute Rehab  Discharge Destination Plan[de-identified] Acute Rehab  Transport at Discharge : BLS Ambulance      CM reached out to [address] admission liaison  Patient needs updated PT/OT notes, Blood sugar controlled and Covid swab prior to discharge to facility  Trauma and PT updated  CM spoke with patient at the bedside  CM introduced self and role  Patient updated she is approved for [address] pending updated PT/OT notes, Covid swab and BS controlled  Patient requesting CM update her nephew/POA Asad Raw at [phone]  Patient updated tentative discharge tomorrow to rehab facility  CM left voice message for Jsohuany Raw at [phone]  Waiting for callback to discuss plan of care

## 2022-10-16 NOTE — QUICK NOTE
Sugar in BMP earlier this morning improved  Still with postprandial hyperglycemia  Increase Humalog to 12 units ac  Continue Lantus 28 units qhs  Continue scale for correction      POC Glucose (mg/dl)   Date Value   10/15/2022 245 (H)   10/15/2022 240 (H)   10/15/2022 279 (H)   10/15/2022 223 (H)   10/14/2022 135   10/14/2022 149 (H)   10/14/2022 165 (H)   10/14/2022 168 (H)   10/14/2022 258 (H)   10/14/2022 285 (H)

## 2022-10-16 NOTE — ASSESSMENT & PLAN NOTE
Lab Results   Component Value Date    EGFR 63 10/15/2022    EGFR 40 10/13/2022    EGFR 56 10/13/2022    CREATININE 0 86 10/15/2022    CREATININE 1 23 10/13/2022    CREATININE 0 94 10/13/2022   Avoid nephrotoxic medications  Continue to monitor serum creatinine levels and electrolytes  Continue to monitor input output as appropriate

## 2022-10-16 NOTE — ASSESSMENT & PLAN NOTE
Lab Results   Component Value Date    HGBA1C 7 6 (H) 10/12/2022       Recent Labs     10/15/22  0732 10/15/22  1113 10/15/22  1636 10/15/22  2041   POCGLU 223* 279* 240* 245*     Awaiting Endocrine consult to be done  Currently on Insulin drip secondary to helevated blood sugars  Patient a GCS - 15    Blood Sugar Average: Last 72 hrs:     - Patient with history of type 2 diabetes mellitus with associated CKD stage 3   - Patient with hyperglycemia perioperatively  Goal blood glucose level between 140 and 180   - Adjust subcutaneous insulin regimen as follows: Initiate basal insulin as well as mealtime insulin with adjustment in sliding scale insulin coverage   - Resume home medication therapy on discharge  - Outpatient follow-up per routine  - calorie restrictive diet level two  - DC Lantus insulin drip after IV Lantus p m   Dose per Endocrinology; 10/14/2022

## 2022-10-16 NOTE — ASSESSMENT & PLAN NOTE
- Patient with reported recent diagnosis of CLL with elevated white blood cell count this presentation   - Continue to monitor CBC  - The patient's case was discussed with Hematology / Medical Oncology during this hospital encounter and no inpatient consult was indicated at this time  Patient is instructed to follow up with Hematology / Medical Oncology as an outpatient for further evaluation and discussion regarding treatment options   - Recommend short-term outpatient follow-up with PCP for further workup and management

## 2022-10-17 ENCOUNTER — PATIENT OUTREACH (OUTPATIENT)
Dept: CASE MANAGEMENT | Facility: OTHER | Age: 82
End: 2022-10-17

## 2022-10-17 ENCOUNTER — HOSPITAL ENCOUNTER (INPATIENT)
Facility: HOSPITAL | Age: 82
LOS: 22 days | Discharge: NURSING, REHAB OR LONG-TERM CARE | End: 2022-11-08
Attending: PHYSICAL MEDICINE & REHABILITATION | Admitting: PHYSICAL MEDICINE & REHABILITATION

## 2022-10-17 VITALS
WEIGHT: 145 LBS | DIASTOLIC BLOOD PRESSURE: 66 MMHG | HEIGHT: 66 IN | TEMPERATURE: 97.9 F | OXYGEN SATURATION: 96 % | BODY MASS INDEX: 23.3 KG/M2 | SYSTOLIC BLOOD PRESSURE: 120 MMHG | RESPIRATION RATE: 16 BRPM | HEART RATE: 113 BPM

## 2022-10-17 DIAGNOSIS — N18.31 TYPE 2 DIABETES MELLITUS WITH STAGE 3A CHRONIC KIDNEY DISEASE, WITH LONG-TERM CURRENT USE OF INSULIN (HCC): ICD-10-CM

## 2022-10-17 DIAGNOSIS — E11.22 TYPE 2 DIABETES MELLITUS WITH STAGE 3A CHRONIC KIDNEY DISEASE, WITH LONG-TERM CURRENT USE OF INSULIN (HCC): ICD-10-CM

## 2022-10-17 DIAGNOSIS — S72.141D CLOSED DISPLACED INTERTROCHANTERIC FRACTURE OF RIGHT FEMUR WITH ROUTINE HEALING, SUBSEQUENT ENCOUNTER: Primary | ICD-10-CM

## 2022-10-17 DIAGNOSIS — L60.9 NAIL ABNORMALITIES: ICD-10-CM

## 2022-10-17 DIAGNOSIS — G93.40 ENCEPHALOPATHY: ICD-10-CM

## 2022-10-17 DIAGNOSIS — G89.18 ACUTE POSTOPERATIVE PAIN OF RIGHT KNEE: ICD-10-CM

## 2022-10-17 DIAGNOSIS — Z91.89 AT RISK FOR VENOUS THROMBOEMBOLISM (VTE): ICD-10-CM

## 2022-10-17 DIAGNOSIS — S62.625A DISPLACED FRACTURE OF MIDDLE PHALANX OF LEFT RING FINGER, INITIAL ENCOUNTER FOR CLOSED FRACTURE: ICD-10-CM

## 2022-10-17 DIAGNOSIS — M25.561 ACUTE POSTOPERATIVE PAIN OF RIGHT KNEE: ICD-10-CM

## 2022-10-17 DIAGNOSIS — N18.30 CKD (CHRONIC KIDNEY DISEASE) STAGE 3, GFR 30-59 ML/MIN (HCC): ICD-10-CM

## 2022-10-17 DIAGNOSIS — I50.22 CHRONIC SYSTOLIC HEART FAILURE (HCC): ICD-10-CM

## 2022-10-17 DIAGNOSIS — Z79.4 TYPE 2 DIABETES MELLITUS WITH STAGE 3A CHRONIC KIDNEY DISEASE, WITH LONG-TERM CURRENT USE OF INSULIN (HCC): ICD-10-CM

## 2022-10-17 DIAGNOSIS — I10 HYPERTENSION: ICD-10-CM

## 2022-10-17 DIAGNOSIS — L60.2 OVERGROWN TOENAILS: ICD-10-CM

## 2022-10-17 PROBLEM — E11.9 TYPE 2 DIABETES MELLITUS (HCC): Status: ACTIVE | Noted: 2020-11-05

## 2022-10-17 PROBLEM — S72.141A INTERTROCHANTERIC FRACTURE OF RIGHT FEMUR (HCC): Status: ACTIVE | Noted: 2022-10-11

## 2022-10-17 PROBLEM — R52 ACUTE PAIN: Status: ACTIVE | Noted: 2022-10-17

## 2022-10-17 LAB
GLUCOSE SERPL-MCNC: 159 MG/DL (ref 65–140)
GLUCOSE SERPL-MCNC: 160 MG/DL (ref 65–140)
GLUCOSE SERPL-MCNC: 243 MG/DL (ref 65–140)
GLUCOSE SERPL-MCNC: 77 MG/DL (ref 65–140)

## 2022-10-17 PROCEDURE — 97530 THERAPEUTIC ACTIVITIES: CPT

## 2022-10-17 PROCEDURE — 82948 REAGENT STRIP/BLOOD GLUCOSE: CPT

## 2022-10-17 PROCEDURE — NC001 PR NO CHARGE: Performed by: SURGERY

## 2022-10-17 PROCEDURE — 99024 POSTOP FOLLOW-UP VISIT: CPT | Performed by: PHYSICIAN ASSISTANT

## 2022-10-17 PROCEDURE — NC001 PR NO CHARGE

## 2022-10-17 PROCEDURE — 90670 PCV13 VACCINE IM: CPT | Performed by: PHYSICIAN ASSISTANT

## 2022-10-17 PROCEDURE — 97535 SELF CARE MNGMENT TRAINING: CPT

## 2022-10-17 PROCEDURE — 97110 THERAPEUTIC EXERCISES: CPT

## 2022-10-17 PROCEDURE — 99238 HOSP IP/OBS DSCHRG MGMT 30/<: CPT | Performed by: SURGERY

## 2022-10-17 PROCEDURE — G0009 ADMIN PNEUMOCOCCAL VACCINE: HCPCS | Performed by: PHYSICIAN ASSISTANT

## 2022-10-17 RX ORDER — LIDOCAINE 50 MG/G
1 PATCH TOPICAL DAILY
Status: CANCELLED | OUTPATIENT
Start: 2022-10-18

## 2022-10-17 RX ORDER — INSULIN LISPRO 100 [IU]/ML
12 INJECTION, SOLUTION INTRAVENOUS; SUBCUTANEOUS
Refills: 0 | Status: ON HOLD
Start: 2022-10-17

## 2022-10-17 RX ORDER — OXYCODONE HYDROCHLORIDE 5 MG/1
5 TABLET ORAL EVERY 4 HOURS PRN
Status: DISCONTINUED | OUTPATIENT
Start: 2022-10-17 | End: 2022-10-18

## 2022-10-17 RX ORDER — MECLIZINE HCL 12.5 MG/1
12.5 TABLET ORAL EVERY 8 HOURS PRN
Status: DISCONTINUED | OUTPATIENT
Start: 2022-10-17 | End: 2022-11-08 | Stop reason: HOSPADM

## 2022-10-17 RX ORDER — AMOXICILLIN 250 MG
1 CAPSULE ORAL
Status: CANCELLED | OUTPATIENT
Start: 2022-10-17

## 2022-10-17 RX ORDER — OXYCODONE HYDROCHLORIDE 5 MG/1
2.5 TABLET ORAL EVERY 4 HOURS PRN
Status: DISCONTINUED | OUTPATIENT
Start: 2022-10-17 | End: 2022-10-18

## 2022-10-17 RX ORDER — ENOXAPARIN SODIUM 100 MG/ML
30 INJECTION SUBCUTANEOUS EVERY 12 HOURS
Status: CANCELLED | OUTPATIENT
Start: 2022-10-17

## 2022-10-17 RX ORDER — ATORVASTATIN CALCIUM 20 MG/1
20 TABLET, FILM COATED ORAL
Status: DISCONTINUED | OUTPATIENT
Start: 2022-10-17 | End: 2022-11-08 | Stop reason: HOSPADM

## 2022-10-17 RX ORDER — INSULIN LISPRO 100 [IU]/ML
1-5 INJECTION, SOLUTION INTRAVENOUS; SUBCUTANEOUS
Refills: 0 | Status: ON HOLD
Start: 2022-10-17

## 2022-10-17 RX ORDER — BISACODYL 10 MG
10 SUPPOSITORY, RECTAL RECTAL DAILY PRN
Status: DISCONTINUED | OUTPATIENT
Start: 2022-10-17 | End: 2022-10-18

## 2022-10-17 RX ORDER — LIDOCAINE 50 MG/G
1 PATCH TOPICAL DAILY
Refills: 0 | Status: ON HOLD
Start: 2022-10-17

## 2022-10-17 RX ORDER — ENOXAPARIN SODIUM 100 MG/ML
40 INJECTION SUBCUTANEOUS
Status: DISCONTINUED | OUTPATIENT
Start: 2022-10-18 | End: 2022-11-08 | Stop reason: HOSPADM

## 2022-10-17 RX ORDER — INSULIN LISPRO 100 [IU]/ML
1-5 INJECTION, SOLUTION INTRAVENOUS; SUBCUTANEOUS
Status: CANCELLED | OUTPATIENT
Start: 2022-10-17

## 2022-10-17 RX ORDER — ACETAMINOPHEN 325 MG/1
975 TABLET ORAL EVERY 8 HOURS SCHEDULED
Status: CANCELLED | OUTPATIENT
Start: 2022-10-17

## 2022-10-17 RX ORDER — OXYCODONE HYDROCHLORIDE 5 MG/1
2.5 TABLET ORAL EVERY 4 HOURS PRN
Status: CANCELLED | OUTPATIENT
Start: 2022-10-17

## 2022-10-17 RX ORDER — MECLIZINE HCL 12.5 MG/1
12.5 TABLET ORAL EVERY 8 HOURS PRN
Qty: 30 TABLET | Refills: 0 | Status: ON HOLD
Start: 2022-10-17

## 2022-10-17 RX ORDER — GABAPENTIN 100 MG/1
100 CAPSULE ORAL
Status: CANCELLED | OUTPATIENT
Start: 2022-10-17

## 2022-10-17 RX ORDER — ACETAMINOPHEN 325 MG/1
325 TABLET ORAL EVERY 6 HOURS PRN
Status: DISCONTINUED | OUTPATIENT
Start: 2022-10-17 | End: 2022-10-18

## 2022-10-17 RX ORDER — INSULIN LISPRO 100 [IU]/ML
12 INJECTION, SOLUTION INTRAVENOUS; SUBCUTANEOUS
Status: CANCELLED | OUTPATIENT
Start: 2022-10-17

## 2022-10-17 RX ORDER — DOCUSATE SODIUM 100 MG/1
100 CAPSULE, LIQUID FILLED ORAL 2 TIMES DAILY
Status: CANCELLED | OUTPATIENT
Start: 2022-10-17

## 2022-10-17 RX ORDER — GABAPENTIN 100 MG/1
100 CAPSULE ORAL
Status: DISCONTINUED | OUTPATIENT
Start: 2022-10-17 | End: 2022-10-20

## 2022-10-17 RX ORDER — DOCUSATE SODIUM 100 MG/1
100 CAPSULE, LIQUID FILLED ORAL 2 TIMES DAILY
Status: DISCONTINUED | OUTPATIENT
Start: 2022-10-17 | End: 2022-11-08 | Stop reason: HOSPADM

## 2022-10-17 RX ORDER — INSULIN LISPRO 100 [IU]/ML
1-6 INJECTION, SOLUTION INTRAVENOUS; SUBCUTANEOUS
Refills: 0 | Status: ON HOLD
Start: 2022-10-17

## 2022-10-17 RX ORDER — ENOXAPARIN SODIUM 100 MG/ML
30 INJECTION SUBCUTANEOUS EVERY 12 HOURS
Status: DISCONTINUED | OUTPATIENT
Start: 2022-10-17 | End: 2022-10-17

## 2022-10-17 RX ORDER — INSULIN GLARGINE 100 [IU]/ML
28 INJECTION, SOLUTION SUBCUTANEOUS
Qty: 10 ML | Refills: 0 | Status: ON HOLD
Start: 2022-10-17

## 2022-10-17 RX ORDER — ACETAMINOPHEN 325 MG/1
975 TABLET ORAL EVERY 8 HOURS SCHEDULED
Status: DISCONTINUED | OUTPATIENT
Start: 2022-10-17 | End: 2022-10-18

## 2022-10-17 RX ORDER — LIDOCAINE 50 MG/G
1 PATCH TOPICAL DAILY
Status: DISCONTINUED | OUTPATIENT
Start: 2022-10-18 | End: 2022-10-18

## 2022-10-17 RX ORDER — INSULIN GLARGINE 100 [IU]/ML
28 INJECTION, SOLUTION SUBCUTANEOUS
Status: DISCONTINUED | OUTPATIENT
Start: 2022-10-17 | End: 2022-10-18

## 2022-10-17 RX ORDER — INSULIN LISPRO 100 [IU]/ML
1-6 INJECTION, SOLUTION INTRAVENOUS; SUBCUTANEOUS
Status: CANCELLED | OUTPATIENT
Start: 2022-10-17

## 2022-10-17 RX ORDER — OXYCODONE HYDROCHLORIDE 5 MG/1
5 TABLET ORAL EVERY 4 HOURS PRN
Status: CANCELLED | OUTPATIENT
Start: 2022-10-17

## 2022-10-17 RX ORDER — INSULIN LISPRO 100 [IU]/ML
1-5 INJECTION, SOLUTION INTRAVENOUS; SUBCUTANEOUS
Status: DISCONTINUED | OUTPATIENT
Start: 2022-10-17 | End: 2022-11-08 | Stop reason: HOSPADM

## 2022-10-17 RX ORDER — AMOXICILLIN 250 MG
1 CAPSULE ORAL
Status: DISCONTINUED | OUTPATIENT
Start: 2022-10-17 | End: 2022-10-29

## 2022-10-17 RX ORDER — INSULIN LISPRO 100 [IU]/ML
12 INJECTION, SOLUTION INTRAVENOUS; SUBCUTANEOUS
Status: DISCONTINUED | OUTPATIENT
Start: 2022-10-17 | End: 2022-10-21

## 2022-10-17 RX ORDER — INSULIN GLARGINE 100 [IU]/ML
28 INJECTION, SOLUTION SUBCUTANEOUS
Status: CANCELLED | OUTPATIENT
Start: 2022-10-17

## 2022-10-17 RX ORDER — MECLIZINE HCL 12.5 MG/1
12.5 TABLET ORAL EVERY 8 HOURS PRN
Status: CANCELLED | OUTPATIENT
Start: 2022-10-17

## 2022-10-17 RX ORDER — INSULIN LISPRO 100 [IU]/ML
1-6 INJECTION, SOLUTION INTRAVENOUS; SUBCUTANEOUS
Status: DISCONTINUED | OUTPATIENT
Start: 2022-10-17 | End: 2022-11-08 | Stop reason: HOSPADM

## 2022-10-17 RX ORDER — POLYETHYLENE GLYCOL 3350 17 G/17G
17 POWDER, FOR SOLUTION ORAL DAILY PRN
Status: DISCONTINUED | OUTPATIENT
Start: 2022-10-17 | End: 2022-11-08 | Stop reason: HOSPADM

## 2022-10-17 RX ADMIN — ACETAMINOPHEN 975 MG: 325 TABLET, FILM COATED ORAL at 22:11

## 2022-10-17 RX ADMIN — SENNOSIDES AND DOCUSATE SODIUM 1 TABLET: 8.6; 5 TABLET ORAL at 22:11

## 2022-10-17 RX ADMIN — DOCUSATE SODIUM 100 MG: 100 CAPSULE, LIQUID FILLED ORAL at 17:53

## 2022-10-17 RX ADMIN — ACETAMINOPHEN 975 MG: 325 TABLET ORAL at 13:17

## 2022-10-17 RX ADMIN — OXYCODONE HYDROCHLORIDE 5 MG: 5 TABLET ORAL at 04:11

## 2022-10-17 RX ADMIN — GABAPENTIN 100 MG: 100 CAPSULE ORAL at 22:11

## 2022-10-17 RX ADMIN — Medication 12.5 MG: at 16:52

## 2022-10-17 RX ADMIN — OXYCODONE HYDROCHLORIDE 5 MG: 5 TABLET ORAL at 22:14

## 2022-10-17 RX ADMIN — ENOXAPARIN SODIUM 30 MG: 30 INJECTION SUBCUTANEOUS at 08:44

## 2022-10-17 RX ADMIN — INSULIN LISPRO 12 UNITS: 100 INJECTION, SOLUTION INTRAVENOUS; SUBCUTANEOUS at 12:29

## 2022-10-17 RX ADMIN — LIDOCAINE 5% 1 PATCH: 700 PATCH TOPICAL at 08:44

## 2022-10-17 RX ADMIN — INSULIN LISPRO 12 UNITS: 100 INJECTION, SOLUTION INTRAVENOUS; SUBCUTANEOUS at 08:43

## 2022-10-17 RX ADMIN — ATORVASTATIN CALCIUM 20 MG: 20 TABLET, FILM COATED ORAL at 17:53

## 2022-10-17 RX ADMIN — INSULIN GLARGINE 28 UNITS: 100 INJECTION, SOLUTION SUBCUTANEOUS at 22:10

## 2022-10-17 RX ADMIN — INSULIN LISPRO 1 UNITS: 100 INJECTION, SOLUTION INTRAVENOUS; SUBCUTANEOUS at 08:43

## 2022-10-17 RX ADMIN — PNEUMOCOCCAL 13-VALENT CONJUGATE VACCINE 0.5 ML: 2.2; 2.2; 2.2; 2.2; 2.2; 4.4; 2.2; 2.2; 2.2; 2.2; 2.2; 2.2; 2.2 INJECTION, SUSPENSION INTRAMUSCULAR at 13:03

## 2022-10-17 RX ADMIN — INSULIN LISPRO 12 UNITS: 100 INJECTION, SOLUTION INTRAVENOUS; SUBCUTANEOUS at 18:06

## 2022-10-17 RX ADMIN — DOCUSATE SODIUM 100 MG: 100 CAPSULE, LIQUID FILLED ORAL at 08:44

## 2022-10-17 RX ADMIN — INSULIN LISPRO 1 UNITS: 100 INJECTION, SOLUTION INTRAVENOUS; SUBCUTANEOUS at 18:05

## 2022-10-17 RX ADMIN — ACETAMINOPHEN 325 MG: 325 TABLET, FILM COATED ORAL at 23:41

## 2022-10-17 RX ADMIN — ACETAMINOPHEN 325 MG: 325 TABLET, FILM COATED ORAL at 18:18

## 2022-10-17 RX ADMIN — ACETAMINOPHEN 975 MG: 325 TABLET ORAL at 05:30

## 2022-10-17 RX ADMIN — MECLIZINE 12.5 MG: 12.5 TABLET ORAL at 11:05

## 2022-10-17 RX ADMIN — INSULIN LISPRO 3 UNITS: 100 INJECTION, SOLUTION INTRAVENOUS; SUBCUTANEOUS at 12:28

## 2022-10-17 NOTE — CASE MANAGEMENT
Case Management Assessment & Discharge Planning Note    Patient name Cresencio Harrison  Location W /W -01 MRN [id number]  : 1940 Date 10/17/2022       Current Admission Date: 10/11/2022  Current Admission Diagnosis:Closed fracture of trochanter of right femur with routine healing   Patient Active Problem List    Diagnosis Date Noted   • CLL (chronic lymphocytic leukemia) ([facility] [address] ) 10/13/2022   • Closed fracture of trochanter of right femur with routine healing 10/11/2022   • Displaced fracture of middle phalanx of left ring finger, initial encounter for closed fracture 10/11/2022   • Hypertension 2021   • Ambulatory dysfunction 2021   • Suspected Mild cognitive impairment 2021   • Lymphadenopathy 2020   • Elevated liver enzymes 2020   • Fall 2020   • Type 2 diabetes mellitus with stage 3a chronic kidney disease, with long-term current use of insulin [facility] 2020   • CKD (chronic kidney disease) stage 3, GFR 30-59 ml/min ([facility] [address] [facility] ) 2020   • HLD (hyperlipidemia) 2020   • OA (osteoarthritis) 2020   • Chronic systolic heart failure ([facility] [address] ) 2020      LOS (days): 6  Geometric Mean LOS (GMLOS) (days): 4 40  Days to GMLOS:-1 3     OBJECTIVE:    Risk of Unplanned Readmission Score: 17 23      BPCI Notification Given?: Yes  Current admission status: Inpatient       Preferred Pharmacy:   Georgi Jacome TO E-PRESCRIBE  No address on file      [facility] [id number]  Phone: [phone] Fax: [phone]    Primary Care Provider: Rubina Singh MD    Primary Insurance: MEDICARE  Secondary Insurance: [address] [place] [address] Drive:  [place] [address] Proxies    There are no active Health Care Proxies on file                                                          DISCHARGE DETAILS: IMM Given (Date):: 10/17/22  IMM Given to[de-identified] Family  Family notified[de-identified] CM reviewed IMM notice with nephherson Elliott, and the discharge plan, as well as the Bundle Notice for Medicare being given to the pt  Sara Elliott agrees with discharge plan and has no questions or concerns at this time

## 2022-10-17 NOTE — ASSESSMENT & PLAN NOTE
Traumatic fracture  4th left finger   Treated non-operatively by Orthopedics  Continue supportive splint - she is not always compliant despite cuing     ICE for swelling  NWB L hand

## 2022-10-17 NOTE — PROGRESS NOTES
[facility]  Progress Note - Doraine Meigs 1940, 79 y o  female MRN: [id number]  Unit/Bed#: W -01 Encounter: [id number]  Primary Care Provider: Jillian Escobar MD   Date and time admitted to hospital: 10/11/2022  5:47 PM    Fall  Assessment & Plan  - Status post fall from standing position after tripping with the below noted injuries  - Fall precautions  - Geriatric Medicine consultation for evaluation, medication review and recommendations   - PT and OT evaluation and treatment as indicated  - Case Management consultation for disposition planning  * Closed fracture of trochanter of right femur with routine healing  Assessment & Plan  - Right femur fracture, present on admission   - Status post ORIF of right femur fracture with long IM nail on 10/12/2022  - Appreciate Orthopedic surgery evaluation, recommendations and interventions as noted  - May be weightbearing as tolerated on the right lower extremity postoperatively per Orthopedic surgery  - Monitor right lower extremity neurovascular exam   - Continue multimodal analgesic regimen   - Continue DVT prophylaxis  - PT and OT evaluation and treatment as indicated  - Outpatient follow up with Orthopedic surgery for re-evaluation  Displaced fracture of middle phalanx of left ring finger, initial encounter for closed fracture  Assessment & Plan  - Fracture of the middle phalanx of the left ring finger/4th finger, present on presentation   - Appreciate Orthopedic surgery evaluation and recommendations  Non operative management recommended  - Maintain splint and avoid weight-bearing through finger   - Continue multimodal analgesic regimen   - PT and OT evaluation and treatment as indicated  - Outpatient follow-up with Orthopedic surgery      Chronic systolic heart failure [facility])  Assessment & Plan  Wt Readings from Last 3 Encounters:   10/11/22 65 8 kg (145 lb)   08/05/21 63 5 kg (140 lb)   11/08/20 74 2 kg (163 lb 9 3 oz)     - Patient with history of chronic systolic CHF without evidence of acute exacerbation   - Continue current medication regimen  Plan to resume Lasix 40 mg daily when appropriate  - Monitor volume status   - Outpatient follow-up per routine  Type 2 diabetes mellitus with stage 3a chronic kidney disease, with long-term current use of insulin [facility])  Assessment & Plan  Lab Results   Component Value Date    HGBA1C 7 6 (H) 10/12/2022       Recent Labs     10/15/22  1636 10/15/22  2041 10/16/22  0753 10/16/22  1142   POCGLU 240* 245* 203* 173*     Awaiting Endocrine consult to be done  Currently on Insulin drip secondary to helevated blood sugars  Patient a GCS - 15    Blood Sugar Average: Last 72 hrs:     - Patient with history of type 2 diabetes mellitus with associated CKD stage 3   - Patient with hyperglycemia perioperatively  Goal blood glucose level between 140 and 180  Patient with improved blood glucose control over the last 24 hours  - Appreciate Endocrinology evaluation and recommendations as well as assistance with management during hospital encounter   - Continue current subcutaneous insulin regimen on discharge  - Continue a level 2 constant carbohydrate restricted diet  - Outpatient follow-up with PCP and Endocrinology  Hypertension  Assessment & Plan  - Patient with chronic history of hypertension   - Continue current medication regimen and resume home medication therapy as appropriate  - Outpatient follow-up per routine  HLD (hyperlipidemia)  Assessment & Plan  - Patient with chronic history of hyperlipidemia   - Continue current medication regimen and resume home medication therapy as appropriate  - Outpatient follow-up per routine  CLL (chronic lymphocytic leukemia) ([facility] [address] )  Assessment & Plan  - Patient with reported recent diagnosis of CLL with elevated white blood cell count this presentation   - Continue to monitor CBC    - The patient's case was discussed with Hematology / Medical Oncology during this hospital encounter and no inpatient consult was indicated at this time  Patient is instructed to follow up with Hematology / Medical Oncology as an outpatient for further evaluation and discussion regarding treatment options   - Recommend short-term outpatient follow-up with PCP for further workup and management  CKD (chronic kidney disease) stage 3, GFR 30-59 ml/min [facility])  Assessment & Plan  Lab Results   Component Value Date    EGFR 60 10/16/2022    EGFR 63 10/15/2022    EGFR 40 10/13/2022    CREATININE 0 89 10/16/2022    CREATININE 0 86 10/15/2022    CREATININE 1 23 10/13/2022     - Patient with chronic history of CKD stage 3 without evidence of acute kidney injury  - Avoid nephrotoxic medications  - Continue to monitor serum creatinine levels and electrolytes while admitted  - Outpatient follow-up per routine  Disposition:  Continue current level of care while awaiting disposition to post acute care facility  Patient medically appropriate for discharge to [facility] today  Case management working on disposition planning  SUBJECTIVE:  Chief Complaint: "I'm okay "    Subjective:  Patient reports she is doing okay this morning she was mildly confused when initially waking up, but this is improved  There were no significant events overnight per nursing staff  She notes that her dizziness is better this morning so far  She notes mild pain in her right hip and thigh, but it is controlled with current medication regimen  She is tolerating her diet without nausea or vomiting  She has no new complaints at this time  OBJECTIVE:   Vitals:   Temp:  [97 5 °F (36 4 °C)-100 5 °F (38 1 °C)] 100 5 °F (38 1 °C)  HR:  [] 118  Resp:  [16-19] 16  BP: (160-176)/(76-84) 176/84    Intake/Output:  I/O       10/15 0701  10/16 0700 10/16 0701  10/17 0700 10/17 0701  10/18 0700    P  O  200 900     I V  (mL/kg)  10 (0 2)     Total Intake(mL/kg) 200 (3) 910 (13 8)     Urine (mL/kg/hr) 1650 (1) 3000 (1 9)     Stool  0     Total Output 1650 3000     Net -1450 -2090            Unmeasured Stool Occurrence  0 x          Nutrition: Diet Forrest/CHO Controlled; Consistent Carbohydrate Diet Level 2 (5 carb servings/75 grams CHO/meal)  GI Proph/Bowel Reg:  On Colace and Senokot S for bowel regimen  VTE Prophylaxis:Sequential compression device (Venodyne)  and Enoxaparin (Lovenox)     Physical Exam:   GENERAL APPEARANCE: Patient in no acute distress  HEENT: NCAT; EOMs intact; Mucous membranes moist  CV: Regular rate and rhythm; no murmur/gallops/rubs appreciated  CHEST / LUNGS: Clear to auscultation; no wheezes/rales/rhonci  ABD: NABS; soft; non-distended; non-tender  :  Voiding spontaneously  EXT: +2 pulses bilaterally upper & lower extremities  Mild right hip and thigh tenderness with clean/dry/intact postoperative dressings, mild swelling in the right thigh and intact neurovascular exam throughout the right lower extremity  NEURO: GCS 15 (patient was mildly confused at the beginning of my evaluation, but this improved as she became more awake), patient was alert oriented x4; no focal neurologic deficits; neurovascularly intact  SKIN: Warm, dry and well perfused; no rash; no jaundice  Invasive Devices  Report    Peripheral Intravenous Line  Duration           Peripheral IV 10/16/22 Proximal;Right;Ventral (anterior) Forearm 1 day                      Lab Results: Results: I have personally reviewed all pertinent laboratory/tests results, BMP/CMP: No results found for: SODIUM, K, CL, CO2, ANIONGAP, BUN, CREATININE, GLUCOSE, CALCIUM, AST, ALT, ALKPHOS, PROT, BILITOT, EGFR and CBC: No results found for: WBC, HGB, HCT, MCV, PLT, ADJUSTEDWBC, MCH, MCHC, RDW, MPV, NRBC  Imaging/EKG Studies: I have personally reviewed pertinent reports       Other Studies: N/A    Yohannes Mazariegos PA-C  10/17/2022 07:54 AM

## 2022-10-17 NOTE — PLAN OF CARE
Problem: OCCUPATIONAL THERAPY ADULT  Goal: Performs self-care activities at highest level of function for planned discharge setting  See evaluation for individualized goals  Description: Treatment Interventions: ADL retraining, Functional transfer training, Endurance training, Patient/family training, Equipment evaluation/education, Compensatory technique education, Activityengagement          See flowsheet documentation for full assessment, interventions and recommendations  Outcome: Progressing  Note: Limitation: Decreased ADL status, Decreased UE strength, Decreased Safe judgement during ADL, Decreased endurance, Decreased self-care trans, Decreased high-level ADLs  Prognosis: Good  Assessment: Pt seen on this date for skilled OT treatment session  At start of session pt supine in bed  Pt agreeable and motivated to participate in session stating " I need to use the bathroom so badly"  Pt required max A x1 for bed mobility, pt requiring assist with RLE and A with hips to come towards EOB  Pt educated on B feet on the ground prior to standing  Pt impulsive with RW and requiring VC for safety with transfer and functional mobility to commode  Pt completing ADL tasks sitting on commode  Pt requiring VC and edu on importance of hygiene  Pt edu on LB dressing techniques, limited by impaired functional reach, would benefit from [facility] AE education  Pt completing UB ADLs with assist with buttons due to limited finger movement on LUE  Edu on stabilizing with LUE with improved performance  Pt limited overall by pain in RLE and dizziness  Pt with (+) orthostatic hypotension during session, RN aware  Pt positioned in recliner chair with BLE elevated  Pt would continue to benefit from skilled OT treatment sessions in order to address remaining deficits and continue to recommend d/c to rehab when medically stable       OT Discharge Recommendation: Post acute rehabilitation services

## 2022-10-17 NOTE — ASSESSMENT & PLAN NOTE
Chronic leukocytosis (17K) - stable  Trauma discussed with heme-onc  Ok to f/u as outpatient    Monitor   Follow-up with heme onc as outpatient

## 2022-10-17 NOTE — PROGRESS NOTES
Progress Note - Orthopedics   Florentin Ro 79 y o  female MRN: [id number]  Unit/Bed#: W -01      Subjective:    79 y  o female seen and examined  Feels forgetful this morning  No complaints of pain  Asks when she is being discharged  No acute events overnight  Eating breakfast at time of consultation       Labs:  0   Lab Value Date/Time    HCT 27 3 (L) 10/16/2022 0349    HCT 32 4 (L) 10/15/2022 0515    HCT 37 4 10/13/2022 0549    HGB 8 7 (L) 10/16/2022 0349    HGB 10 3 (L) 10/15/2022 0515    HGB 11 7 10/13/2022 0549    INR 0 89 10/11/2022 1900    WBC 24 20 (H) 10/16/2022 0349    WBC 19 42 (H) 10/15/2022 0515    WBC 16 25 (H) 10/13/2022 0549       Meds:    Current Facility-Administered Medications:   •  acetaminophen (TYLENOL) tablet 975 mg, 975 mg, Oral, Q8H [address], [facility], PA-C, 975 mg at 10/17/22 0530  •  docusate sodium (COLACE) capsule 100 mg, 100 mg, Oral, BID, [facility], 100 mg at 10/17/22 0844  •  enoxaparin (LOVENOX) subcutaneous injection 30 mg, 30 mg, Subcutaneous, Q12H, [facility], 30 mg at 10/17/22 3378  •  gabapentin (NEURONTIN) capsule 100 mg, 100 mg, Oral, HS, [employer], PA-C, 100 mg at 10/16/22 2103  •  insulin glargine (LANTUS) subcutaneous injection 28 Units 0 28 mL, 28 Units, Subcutaneous, HSJimena DO, 28 Units at 10/16/22 2103  •  insulin lispro (HumaLOG) 100 units/mL subcutaneous injection 1-5 Units, 1-5 Units, Subcutaneous, HSMaurilio MD, 2 Units at 10/15/22 2115  •  insulin lispro (HumaLOG) 100 units/mL subcutaneous injection 1-6 Units, 1-6 Units, Subcutaneous, TID ACMaurilio MD, 1 Units at 10/17/22 0843  •  insulin lispro (HumaLOG) 100 units/mL subcutaneous injection 12 Units, 12 Units, Subcutaneous, TID With Meals, Jimena Goodwin DO, 12 Units at 10/17/22 0843  •  lidocaine (LIDODERM) 5 % patch 1 patch, 1 patch, Topical, Daily, Lesly Tucker PA-C, 1 patch at 10/17/22 0844  •  meclizine (ANTIVERT) tablet 12 5 mg, 12 5 mg, Oral, Q8H PRN, [facility], SARAH  •  naloxone (NARCAN) 0 04 mg/mL syringe 0 04 mg, 0 04 mg, Intravenous, Q1MIN PRN, Pierce Johnson PA-C  •  oxyCODONE (ROXICODONE) IR tablet 2 5 mg, 2 5 mg, Oral, Q4H PRN, Pierce Johnson PA-C, 2 5 mg at 10/16/22 1146  •  oxyCODONE (ROXICODONE) IR tablet 5 mg, 5 mg, Oral, Q4H PRN, Pierce Johnson PA-C, 5 mg at 10/17/22 0411  •  pneumococcal 13-valent conjugate vaccine (PREVNAR-13) IM injection 0 5 mL, 0 5 mL, Intramuscular, Prior to discharge, Pierce Johnson PA-C  •  senna-docusate sodium (SENOKOT S) 8 6-50 mg per tablet 1 tablet, 1 tablet, Oral, HS, Pierce Johnson PA-C, 1 tablet at 10/16/22 2103    Blood Culture:   No results found for: BLOODCX    Wound Culture:   No results found for: WOUNDCULT    Ins and Outs:  I/O last 24 hours: In: 1280 [P O :1260; I V :20]  Out: 3000 [Urine:3000]          Physical:  Vitals:    10/17/22 0921   BP: 120/66   Pulse: (!) 109   Resp:    Temp:    SpO2: 95%     Musculoskeletal: right Lower Extremity  · Dressings C/D/I, without strike through  · Mild suad incisional ttp  · Sensation intact to saphenous, sural, tibial, superficial peroneal nerve, and deep peroneal  · Motor intact to +FHL/EHL, +ankle dorsi/plantar flexion  · 2+ DP pulse, symmetric bilaterally  · Digits warm and well perfused  · Capillary refill < 2 seconds    Assessment:    79 y  o female s/p right femur long TFN for suad trochanteric fracture   Patient doing well  Plan:  · WBAT to the right lower extremity  · Up and out of bed  · Hgb 8 7 yesterday  Patient noted to have acute blood loss anemia due to a drop in Hbg of > 2 0g from preop levels, will monitor vital signs and resuscitate with IV fluids as needed   · PT/OT  · Pain control per primary team    · DVT ppx x 30 days  · Medical management per primary team    · Dispo: [id number] [address]john for discharge from ortho perspective   · Patient will need outpatient follow up with Dr David Messer Rettaliata, PA-C

## 2022-10-17 NOTE — TREATMENT PLAN
Individualized Plan of Edy[address] [age] y o  female MRN: [id number]  Unit/Bed#: -01 Encounter: [id number]     PATIENT INFORMATION  ADMISSION DATE: 10/17/2022  2:18 PM DILLAN CATEGORY:R femur fracture   ADMISSION DIAGNOSIS: Closed fracture of greater trochanter of right femur ([facility] ) [S72 111A]  EXPECTED LOS: 3 weeks     MEDICAL/FUNCTIONAL PROGNOSIS  Based on my assessment of the patient's medical conditions and current functional status, the prognosis for attaining medical and functional goals or the IRF stay is:  Fair    Medical Goals: Patient will be medically stable [facility] completion of [facility]hab program and Patient will be able to manage medical conditions and comorbid conditions with medications and follow up upon completion of rehab program    [address]: Home - alone    ANTICIPATED FOLLOW-UP SERVICE:   Home Health Services: PT, OT and Nursing      DISCIPLINE SPECIFIC PLANS:  Required Disciplines & Services: Rehabillitation Nursing, Case Management and Diabetes Education    REQUIRED THERAPY:  Therapy Hours per Day Days per Week Total Days   Physical Therapy 1 5 5-6 7   Occupational Therapy 1 5 5-6 7   Speech/Language Therapy 0 0 0       ANTICIPATED FUNCTIONAL OUTCOMES:  ADL:  Supervision - Mod I with LRAD   Bladder/Bowel: Mod I with LRAD   Transfers:    Mod I with LRAD   Locomotion:   Mod I with LRAD   Cognitive:       DISCHARGE PLANNING NEEDS  Equipment needs: Discharge needs to be reviewed with team      REHAB ANTICIPATED PARTICIPATION RESTRICTIONS:  None

## 2022-10-17 NOTE — CASE MANAGEMENT
Case Management Assessment & Discharge Planning Note    Patient name Tamica Shape  Location W /W -39 MRN [id number]  : 1940 Date 10/17/2022       Current Admission Date: 10/11/2022  Current Admission Diagnosis:Closed fracture of trochanter of right femur with routine healing   Patient Active Problem List    Diagnosis Date Noted   • CLL (chronic lymphocytic leukemia) ([facility] ) 10/13/2022   • Closed fracture of trochanter of right femur with routine healing 10/11/2022   • Displaced fracture of middle phalanx of left ring finger, initial encounter for closed fracture 10/11/2022   • Hypertension 2021   • Ambulatory dysfunction 2021   • Suspected Mild cognitive impairment 2021   • Lymphadenopathy 2020   • Elevated liver enzymes 2020   • Fall 2020   • Type 2 diabetes mellitus with stage 3a chronic kidney disease, with long-term current use of insulin [facility] 2020   • CKD (chronic kidney disease) stage 3, GFR 30-59 ml/min ([facility] ) 2020   • HLD (hyperlipidemia) 2020   • OA (osteoarthritis) 2020   • Chronic systolic heart failure ([facility] [address] ) 2020      LOS (days): 6  Geometric Mean LOS (GMLOS) (days): 4 40  Days to GMLOS:-1 2     OBJECTIVE:    Risk of Unplanned Readmission Score: 16 32         Current admission status: Inpatient       Preferred Pharmacy:   [facility]i [facility] TO E-PRESCRIBE  No address on file      [facility] [id number] [place] [address]  Phone: [phone] Fax: [phone]    Primary Care Provider: Shayna Rowe MD    Primary Insurance: MEDICARE  Secondary Insurance: [address]:  [place] [address] Proxies    There are no active Health Care Proxies on file                                                          DISCHARGE DETAILS:                                                          Transport at Discharge [facility]  Dispatcher Contacted: Yes  Number/Name of Dispatcher: SCOT     ETA of Transport (Date): 10/17/22  ETA of Transport (Time): [address] Name, [address] : [address], [place]  Receiving Facility/Agency Phone Number: [phone]            RN, MD, and facility aware of pt's transportation time  CM called M for nephew with transportation time

## 2022-10-17 NOTE — ASSESSMENT & PLAN NOTE
Lab Results   Component Value Date    HGBA1C 7 6 (H) 10/12/2022     At home: Levemir 20 units QHS, Humalog 10 units with meals  Here: Levemir 20 units, Lispro 5 units TID with meals, continue sliding scale insulin  In acute care required an insulin drip transiently  Accuchecks  Diabetic Diet  Education  IM followed and assisted with management during stay

## 2022-10-17 NOTE — PROGRESS NOTES
PHYSICAL MEDICINE AND REHABILITATION   PREADMISSION ASSESSMENT     Projected [facility] Diagnoses:  Impairment of mobility, safety and Activities of Daily Living (ADLs) due to Orthopedic Disorders:  08 11  Unilateral Hip Fracture  Etiologic: s/p fall; Closed fracture of trochanter of right femur   Date of Onset: 10/11/22     Date of surgery: 10/12/22 > Surgical fixation of right pertrochanteric femur fracture with long intramedullary nail    PATIENT INFORMATION  Name: Little Bustillo Phone #: [phone] (home)   Address: [address] [place] [address]  YOB: 1940 Age: 79 y o  SS#   Marital Status:   Ethnicity: White ; Not  or    Employment Status: retired  Extended Emergency Contact Information  Primary Emergency Contact: JimSnehla  Mobile Phone: [phone]  Relation: Oscar  Secondary Emergency Contact: Bette Benitez Phone: [phone]  Relation: Unknown  Advance Directive: Level 1 - Full Code ( no advance directive on file )    INSURANCE/COVERAGE:     Primary Payor: MEDICARE / Plan: MEDICARE A AND B / Product Type: Medicare A & B Fee for Service /   Secondary Payer:SELF PAY    Payer Contact: n/a Payer Contact:n/a   Contact Phone: n/a Contact Phone:n/a        MEDICARE #:[id number]   Medicare Days: 60/30/60  Medical Record #: [id number]    REFERRAL SOURCE:   Referring provider: Alisa Campa DO  Referring facility: [address] [place] [address]   Room:  [place] [address]/[place] [address]  PCP: Deedee Moody MD PCP phone number: [phone] is a 65-year-old female with a medical history including but not limited to -  hypertension, hyperlipidemia, CHF and type 2 diabetes mellitus with stage III CKD, chronic lymphocytic leukemia - who presented to [address] [place] [address] ED complaining of right hip pain following a fall while attempting to ambulate into her house using her roller walker earlier in the day   She denied head strike or loss of consciousness  She was unable to walk due to her pain  On her initial trauma evaluation, her primary survey was unremarkable  On secondary survey, she was hypertensive with normal vital signs otherwise; she was noted to have swelling, tenderness and pain with range of motion in her right hip without deformity; she had weakness in her right lower extremity secondary to pain; the remainder of her exam was unremarkable  Imagery showed - Impacted, comminuted right femoral neck fracture into the intertrochanteric region ; Severe arthritic changes  Healing fracture proximal phalanx of 4th finger  Age-indeterminate fracture of proximal phalanx of 4th finger in alignment; She was admitted to the trauma service for further eval / assessment  Orthopedic surgery evaluated the patient and recommended operative intervention for the right femur with non operative/conservative management for her ring finger in a splint  She underwent ORIF of right femur fracture with long IM nail on 10/12/2022  A splint was placed for the left ring finger injury  - She is WBAT to RLE / L 4th finger  Patient was placed on a multimodal analgesic regimen and this was adjusted as indicated during her hospital encounter  She did have significant hyperglycemia with poor control of her type 2 diabetes during her hospital encounter requiring initiation of insulin drip during her hospitalization postoperatively with endocrinology consult  She was able to be transition back to a subcutaneous insulin regimen  Her pain has been managed with IV PRN analgesics transitioned to PO PRN analgesics at this time  Vidya Lott is deemed hemodynamically stable at this time  PT/OT therapies were consulted and continue to follow patient at this time and are recommending inpatient acute rehab when medically stable  All involved medical disciplines feel/agree patient is medically ready for discharge at this time   Inpatient acute rehabilitation physician was consulted  Upon review of patient’s case and correspondence with PT/OT therapy services, [facility] physician feels Carolyn Braga will benefit and is a good candidate / appropriate for inpatient acute rehab at this time  She has demonstrated the willingness / desire and tolerance to participate in the required 3 hours or more of therapies per day  COVID(-) 10/11/22 & 10/16/22 ; vaccinated Berry Oglesby 5/6/21 & 5/27/21   Past Medical History:   Past Surgical History: Allergies:     Past Medical History:   Diagnosis Date   • Benign adenomatous polyp of large intestine    • CHF (congestive heart failure) ([facility] [address] )    • Diabetes mellitus ([facility] [address] )    • Hyperlipemia    • Hypertension    • Osteoarthritis    • TIA (transient ischemic attack)     Past Surgical History:   Procedure Laterality Date   • APPENDECTOMY     • CARPAL TUNNEL RELEASE Right    • HYSTERECTOMY W/ SALPINGO-OOPHERECTOMY     • LA OPEN RX FEMUR FX+INTRAMED CARIDAD Right 10/12/2022    Procedure: INSERTION NAIL IM FEMUR ANTEGRADE (TROCHANTERIC);   Surgeon: Beba Crenshaw DO;  Location: [facility];  Service: Orthopedics     Allergies   Allergen Reactions   • Amlodipine    • Ceftin [Cefuroxime]    • Ciprofloxacin    • Citalopram    • Dye [Iodinated Diagnostic Agents]    • Glucophage [Metformin]    • Januvia [Sitagliptin]    • Neomycin-Polymyxin-Dexameth    • Ondansetron    • Prilosec [Omeprazole]    • Vibramycin [Doxycycline]          Medical/functional conditions requiring inpatient rehabilitation: impaired mobility / self care ; impaired balance / ambulation     Risk for medical/clinical complications: Risk for falls ; risk for pain ; risk for skin breakdown ; risk for infection     Comorbidities/Surgeries in the last 100 days:   Right Hip Fx with repair   Displaced fracture of middle phalanx of left ring finger  Chronic CHF   DM(type2)  HTN  HLD  CKD  CLL (chronic lymphocytic leukemia)    CURRENT VITAL SIGNS:   Temp:  [97 8 °F (36 6 °C)-100 5 °F (38 1 °C)] 97 8 °F (36 6 °C)  HR:  [100-118] 109  Resp:  [16] 16  BP: ()/(51-84) 120/66   Intake/Output Summary (Last 24 hours) at 10/17/2022 1254  Last data filed at 10/17/2022 0853  Gross per 24 hour   Intake 790 ml   Output 2350 ml   Net -1560 ml        LABORATORY RESULTS:      Lab Results   Component Value Date    HGB 8 7 (L) 10/16/2022    HCT 27 3 (L) 10/16/2022    WBC 24 20 (H) 10/16/2022     Lab Results   Component Value Date    BUN 26 (H) 10/16/2022    K 4 3 10/16/2022     10/16/2022    CREATININE 0 89 10/16/2022     Lab Results   Component Value Date    PROTIME 12 3 10/11/2022    INR 0 89 10/11/2022        DIAGNOSTIC STUDIES:  XR hand 3+ views LEFT    Result Date: 10/12/2022  Impression: Severe arthritic changes  Healing fracture proximal phalanx of 4th finger  Age-indeterminate fracture of proximal phalanx of 4th finger in alignment Workstation performed: [id number]     XR femur 2 vw right    Result Date: 10/12/2022  Impression: Fluoroscopic guidance provided for procedure guidance  Please refer to the separate procedure notes for additional details  Workstation performed: [id number]     XR femur 2 views RIGHT    Result Date: 10/12/2022  Impression: Right proximal femoral fracture as above  Findings concur with the referring clinician's preliminary interpretation already in the patient's electronic health record  Workstation performed: [id number]     CT pelvis wo contrast    Result Date: 10/12/2022  Impression: Impacted, comminuted right femoral neck fracture into the intertrochanteric region as above  The acetabulum appears intact  Erosive changes of the endplates of X4-N9  This may be related to severe degenerative change however inflammatory process is difficult to exclude  Correlation with erythrocyte sedimentation rate is advised  MRI follow-up could also be considered especially if there is any significant back pain   Mesenteric adenopathy is present and consistent with patient's history of CLL  Results were discussed with Dr Azeem Shelley on 10/12/2022 Follow-up was marked in the epic system  Workstation performed: [id number]     XR pelvis ap only 1 or 2 vw    Result Date: 10/12/2022  Impression: Fracture of right proximal femur  Findings concur with the referring clinician's preliminary interpretation already in the patient's electronic health record    Workstation performed: [id number]       PRECAUTIONS/SPECIAL NEEDS:  Tobacco:   Social History     Tobacco Use   Smoking Status Never Smoker   Smokeless Tobacco Never Used   , Alcohol:    Social History     Substance and Sexual Activity   Alcohol Use Not Currently    Comment: very seldom   , Splints/Braces: Left 4th finger splint, Anticoagulation: Lovenox SQ as ordered by MD, Blood Sugar Management:as ordered by MD, Edema Management, Safety Concerns, Pain Management, Dietary Restrictions: consistent carb, Language Preference: English  and Fall precautions    MEDICATIONS:     Current Facility-Administered Medications:   •  acetaminophen (TYLENOL) tablet 975 mg, 975 mg, Oral, Q8H [address], Caroline Almanzar, PA-C, 975 mg at 10/17/22 0530  •  docusate sodium (COLACE) capsule 100 mg, 100 mg, Oral, BID, Caroline Almanzar, PA-C, 100 mg at 10/17/22 0844  •  enoxaparin (LOVENOX) subcutaneous injection 30 mg, 30 mg, Subcutaneous, Q12H, Caroline Almanzar, PA-C, 30 mg at 10/17/22 9718  •  gabapentin (NEURONTIN) capsule 100 mg, 100 mg, Oral, HS, Caroline Almanzar, PA-C, 100 mg at 10/16/22 2103  •  insulin glargine (LANTUS) subcutaneous injection 28 Units 0 28 mL, 28 Units, Subcutaneous, HS, Kandi Hughes DO, 28 Units at 10/16/22 2103  •  insulin lispro (HumaLOG) 100 units/mL subcutaneous injection 1-5 Units, 1-5 Units, Subcutaneous, HS, Jen Hubbard MD, 2 Units at 10/15/22 2115  •  insulin lispro (HumaLOG) 100 units/mL subcutaneous injection 1-6 Units, 1-6 Units, Subcutaneous, TID ACJen MD, 3 Units at 10/17/22 1228  •  insulin lispro (HumaLOG) 100 units/mL subcutaneous injection 12 Units, 12 Units, Subcutaneous, TID With Meals, Steven Gaitan, , 12 Units at 10/17/22 1229  •  lidocaine (LIDODERM) 5 % patch 1 patch, 1 patch, Topical, Daily, Carlos Larios PA-C, 1 patch at 10/17/22 8639  •  meclizine (ANTIVERT) tablet 12 5 mg, 12 5 mg, Oral, Q8H PRN, Suhas Dunk, PA-C, 12 5 mg at 10/17/22 1105  •  naloxone (NARCAN) 0 04 mg/mL syringe 0 04 mg, 0 04 mg, Intravenous, Q1MIN PRN, Carlos Larios, PA-C  •  oxyCODONE (ROXICODONE) IR tablet 2 5 mg, 2 5 mg, Oral, Q4H PRN, Carlos Ode, PA-C, 2 5 mg at 10/16/22 1146  •  oxyCODONE (ROXICODONE) IR tablet 5 mg, 5 mg, Oral, Q4H PRN, Gaynelle Ode, PA-C, 5 mg at 10/17/22 0411  •  pneumococcal 13-valent conjugate vaccine (PREVNAR-13) IM injection 0 5 mL, 0 5 mL, Intramuscular, Prior to discharge, KATE Torres-C  •  senna-docusate sodium (SENOKOT S) 8 6-50 mg per tablet 1 tablet, 1 tablet, Oral, HS, Calistae Ode, PA-C, 1 tablet at 10/16/22 2103    SKIN INTEGRITY:   RLE s/p surgical site/ incision line - treatment as ordered    PRIOR LEVEL OF FUNCTION:  She lives in a(n) single family home  Bora Engel is not  and lives alone  Self Care: Independent, Indoor Mobility: Independent, Stairs (in/outdoor): Independent and Cognition: Independent    FALLS IN THE LAST 6 MONTHS: 1    HOME ENVIRONMENT:  The living area: can live on one level  There are No steps to enter the home  The patient will not have 24 hour supervision/physical assistance available upon discharge      PREVIOUS DME:  Equipment in home (previous DME): Rolling Walker and Single [employer]    FUNCTIONAL STATUS:  Physical Therapy Occupational Therapy Speech Therapy   As per PT:     PT Visit Date 10/17/22   Note Type   Note Type Treatment   Pain Assessment   Pain Assessment Tool 0-10   Pain Score 9   Pain Location/Orientation Orientation: Right;Location: Hip   Pain Onset/Description Onset: Ongoing   Patient's Stated Pain Goal No pain   Hospital Pain Intervention(s) Repositioned; Emotional support;Rest;Ambulation/increased activity   Multiple Pain Sites No   Pain Rating: FLACC (Rest) - Face 0   Pain Rating: FLACC (Rest) - Legs 0   Pain Rating: FLACC (Rest) - Activity 0   Pain Rating: FLACC (Rest) - Cry 0   Pain Rating: FLACC (Rest) - Consolability 0   Score: FLACC (Rest) 0   Pain Rating: FLACC (Activity) - Face 1   Pain Rating: FLACC (Activity) - Legs 1   Pain Rating: FLACC (Activity) - Activity 1   Pain Rating: FLACC (Activity) - Cry 1   Pain Rating: FLACC (Activity) - Consolability 1   Score: FLACC (Activity) 5   Restrictions/Precautions   Weight Bearing Precautions Per Order Yes   RLE Weight Bearing Per Order WBAT   Other Precautions Cognitive; Chair Alarm; Bed Alarm; Fall Risk;Pain   General   Chart Reviewed Yes   Response to Previous Treatment Patient with no complaints from previous session  Family/Caregiver Present No   Cognition   Overall Cognitive Status Impaired   Arousal/Participation Alert; Responsive; Cooperative   Attention Attends with cues to redirect   Orientation Level Oriented X4   Memory Decreased short term memory;Decreased recall of recent events   Following Commands Follows one step commands with increased time or repetition   Comments pt required several VC's for hand placement in order to complete STS anmd SPT from commode to recliner   Subjective   Subjective pt was agreeable to participate in PT intervention   Bed Mobility   Rolling R Unable to assess   Rolling L Unable to assess   Supine to Sit Unable to assess   Sit to Supine Unable to assess   Additional Comments Upon arrival to pt room pt on commode with VIANEY Kwok present   Transfers   Sit to Stand 3  Moderate assistance   Additional items Assist x 2;Armrests; Increased time required;Verbal cues   Stand to Sit 3  Moderate assistance   Additional items Assist x 2;Armrests; Increased time required;Verbal cues   Stand pivot 2  Maximal assistance Additional items Assist x 2;Armrests; Increased time required;Verbal cues   Toilet transfer 3  Moderate assistance   Additional items Assist x 2;Armrests; Increased time required;Commode   Additional Comments pt require RW, VC's and mod Ax2 to complete STS fucntional transfers and max Ax2 to complete a SPT from commode to recliner with RW managenment   Ambulation/Elevation   Gait pattern Not appropriate   Ambulation/Elevation Additional Comments pt required max Ax2 to complete a SPT as pt was limited with standing tolerance and funcitonal mobility  pt also was orthostatic and required to be leaned back in recliner   Balance   Static Sitting Fair -   Dynamic Sitting Poor +   Static Standing Poor +   Ambulatory Zero   Activity Tolerance   Activity Tolerance Patient limited by fatigue; Other (Comment)  (dizziness)   Nurse Made Aware Spoke to RN and VIANEY Kwok   Exercises   Heelslides Sitting;10 reps;AROM; Bilateral  (long sit position)   Knee AROM Long Arc Quad Sitting;10 reps;AROM; Bilateral   Ankle Pumps Sitting;10 reps;AROM; Bilateral   Assessment   Problem List Decreased strength;Decreased endurance; Impaired balance;Decreased mobility; Decreased safety awareness;Orthopedic restrictions;Pain   Assessment pt lenora tx session seated on commode and was agreeable to participate in PT intervention  Care coordination with OT Rissa due to pt anticipated level of assistance required to complete functional transfers  pt continues to require a significant amount of assistance in order to compklete fucntional transfers such as STS and SDPT as pt required mod Ax2 for all STS to and from RW and max Ax2 to complete a SPT with RW and pt required VC's and RW management in order to complete a SPT  pt was limited with standing tolerance as pt required min to mod Ax1 in order to maintain standing balance and tolerance while OT assisted with pulling up pants as pt was unable to complete task   Once in recliner pt was able to participate in TE activities w/o increase pain and good form throughout TE activies  pt would benefit from continues skilled PT intervention as pt continues to be l;imited with functional mobility, activity tolerance and ambulation distance  Continue to recommend post acute rehab services at the time of D/C in order to [facility] pt functional independence and safety with all OOB mobility when appropriate  post tx pt in recliner with call bell and all pt needs met      As per OT:    OT Visit Date 10/17/22   Note Type   Note Type Treatment for insurance authorization   Pain Assessment   Pain Assessment Tool 0-10   Pain Score 9   Pain Location/Orientation Orientation: Right;Location: Hip   Restrictions/Precautions   Weight Bearing Precautions Per Order Yes   RLE Weight Bearing Per Order WBAT   Other Precautions Cognitive; Bed Alarm; Chair Alarm; Fall Risk;Pain   ADL   Eating Assistance 5  Supervision/Setup   Eating Deficit Increased time to complete;Supervision/safety   Eating Comments eating breakfast upon arrival   Grooming Assistance 5  Supervision/Setup   Grooming Deficit Increased time to complete;Supervision/safety;Verbal cueing   Grooming Comments washing face and hands   UB Bathing Assistance 4  Minimal Assistance   UB Bathing Deficit Increased time to complete;Supervision/safety;Verbal cueing   UB Bathing Comments washing chest and abdomen  A with R arm   LB Bathing Assistance 3  Moderate Assistance   LB Bathing Deficit Increased time to complete;Supervision/safety;Verbal cueing;Buttocks;Right lower leg including foot; Left lower leg including foot   LB Bathing Comments able to wash upper thighs and suad-area, A with LE and buttocks   UB Dressing Assistance 3  Moderate Assistance   UB Dressing Deficit Increased time to complete;Supervision/safety;Verbal cueing   UB Dressing Comments A with bringing shirt around back, A with initiating buttons   LB Dressing Assistance 2  Maximal Assistance   LB Dressing Deficit Increased time to complete;Supervision/safety;Verbal cueing; Thread LLE into underwear; Thread RLE into underwear; Thread LLE into pants; Thread RLE into pants;Pull up over hips   LB Dressing Comments donning underwear and pants, A with threading and pulling over hips, able to attempt to pull up in standing with mod A x1 for standing balance, unable to achieve pulling up   Toileting Assistance  2  Maximal Assistance   Toileting Deficit Increased time to complete;Clothing management down;Perineal hygiene;Clothing management up; Bedside commode   Functional Standing Tolerance   Time 2 min   Activity suad-hygiene and clothing management   Comments pt with reports of dizziness and pain and requesting to return to sitting   Bed Mobility   Supine to Sit 3  Moderate assistance   Additional items Assist x 1; Increased time required;Verbal cues;LE management   Transfers   Sit to Stand 3  Moderate assistance   Additional items Assist x 2; Increased time required;Verbal cues   Stand to Sit 3  Moderate assistance   Additional items Assist x 2; Increased time required;Verbal cues   Toilet transfer 3  Moderate assistance   Additional items Assist x 2; Increased time required;Standard toilet   Functional Mobility   Functional Mobility 2  Maximal assistance   Additional Comments Ax2, limited bed > commode and commode > recliner chair   Additional items Rolling walker   Subjective   Subjective "I am so dizzy, I can't do anything"   Cognition   Overall Cognitive Status Impaired   Arousal/Participation Alert; Cooperative   Attention Attends with cues to redirect   Orientation Level Oriented to person;Oriented to place;Oriented to time;Oriented to situation   Memory Decreased short term memory;Decreased recall of recent events   Following Commands Follows one step commands with increased time or repetition   Comments Pt with poor STM and impaired safety awareness, may benefit from formal cognitive evaluation   Activity Tolerance   Activity Tolerance Patient limited by fatigue;Patient limited by pain   Medical Staff Made Aware LEONOR Jimenez, [facility]   Assessment   Assessment Pt seen on this date for skilled OT treatment session  At start of session pt supine in bed  Pt agreeable and motivated to participate in session stating " I need to use the bathroom so badly"  Pt required max A x1 for bed mobility, pt requiring assist with RLE and A with hips to come towards EOB  Pt educated on B feet on the ground prior to standing  Pt impulsive with RW and requiring VC for safety with transfer and functional mobility to commode  Pt completing ADL tasks sitting on commode  Pt requiring VC and edu on importance of hygiene  Pt edu on LB dressing techniques, limited by impaired functional reach, would benefit from [facility] AE education  Pt completing UB ADLs with assist with buttons due to limited finger movement on LUE  Edu on stabilizing with LUE with improved performance  Pt limited overall by pain in RLE and dizziness  Pt with (+) orthostatic hypotension during session, RN aware  Pt positioned in recliner chair with BLE elevated  Pt would continue to benefit from skilled OT treatment sessions in order to address remaining deficits and continue to recommend d/c to rehab when medically stable  n/a     CARE SCORES:  Self Care:  Eatin: Setup or clean-up assistance  Oral hygiene: 04: Supervision or touching  assistance  Toilet hygiene: 03: Partial/moderate assistance  Shower/bathing self: 02: Substantial/maximal assistance  Upper body dressin: Partial/moderate assistance  Lower body dressin: Substantial/maximal assistance  Putting on/taking off footwear: 02: Substantial/maximal assistance  Transfers:  Roll left and right: 03: Partial/moderate assistance  Sit to lyin: Partial/moderate assistance  Lying to sitting on side of bed: 02: Substantial/maximal assistance  Sit to stand: 02:  Substantial/maximal assistance  Chair/bed to chair transfer: 02: Substantial/maximal assistance  Toilet transfer: 02: Substantial/maximal assistance  Mobility:  Walk 10 ft: 09: Not applicable  Walk 50 ft with two turns: 09: Not applicable  Walk 272AH: 09: Not applicable    CURRENT GAP IN FUNCTION  Prior to Admission: Functional Status: Patient was independent with mobility/ambulation, transfers, ADL's, IADL's  Estimated length of stay: 10 to 14 days    Anticipated Post-Discharge Disposition/Treatment  Disposition: Return to previous home/apartment  Outpatient Services: Physical Therapy (PT) and Occupational Therapy (OT)    BARRIERS TO DISCHARGE  Lovenox, Weakness, Pain, Balance Difficulty, Fatigue, Home Accessibility, Caregiver Accessibility, Equipment Needs and Lives Alone    INTERVENTIONS FOR DISCHARGE  Patient/Family/Caregiver Education, Arrange DME needs, Medication Changes per MD, Therapy exercises and Energy conservation education     REQUIRED THERAPY:  Patient will require PT and OT 90 minutes each per day, five days per week to achieve rehab goals  REQUIRED FUNCTIONAL AND MEDICAL MANAGEMENT FOR INPATIENT REHABILITATION:  Pain Management: Overall pain is well controlled, Deep Vein Thrombosis (DVT) Prophylaxis:  low molecular weight heparin, SCD's while in bed and Nursing education and bowel/bladder management, internal medicine to manage/monitor medical conditions, PM&R to maximize function and provide medical oversight, PT/OT intervention, patient/family education/training, and any consults as needed     RECOMMENDED LEVEL OF CARE:   Roberto Medellin is a 26-year-old female with a medical history including but not limited to -  hypertension, hyperlipidemia, CHF and type 2 diabetes mellitus with stage III CKD, chronic lymphocytic leukemia - who presented to [address] [place] [address] 4 ED complaining of right hip pain following a fall while attempting to ambulate into her house using her roller walker earlier in the day  She denied head strike or loss of consciousness    She was unable to walk due to her pain  On her initial trauma evaluation, her primary survey was unremarkable  On secondary survey, she was hypertensive with normal vital signs otherwise; she was noted to have swelling, tenderness and pain with range of motion in her right hip without deformity; she had weakness in her right lower extremity secondary to pain; the remainder of her exam was unremarkable  Imagery showed - Impacted, comminuted right femoral neck fracture into the intertrochanteric region ; Severe arthritic changes  Healing fracture proximal phalanx of 4th finger  Age-indeterminate fracture of proximal phalanx of 4th finger in alignment; She was admitted to the trauma service for further eval / assessment  Orthopedic surgery evaluated the patient and recommended operative intervention for the right femur with non operative/conservative management for her ring finger in a splint  She underwent ORIF of right femur fracture with long IM nail on 10/12/2022  A splint was placed for the left ring finger injury  - She is WBAT to RLE / L 4th finger  Patient was placed on a multimodal analgesic regimen and this was adjusted as indicated during her hospital encounter  She did have significant hyperglycemia with poor control of her type 2 diabetes during her hospital encounter requiring initiation of insulin drip during her hospitalization postoperatively with endocrinology consult  She was able to be transition back to a subcutaneous insulin regimen  Her pain has been managed with IV PRN analgesics transitioned to PO PRN analgesics at this time  Lilian Myers is deemed hemodynamically stable at this time  PT/OT therapies were consulted and continue to follow patient at this time and are recommending inpatient acute rehab when medically stable  All involved medical disciplines feel/agree patient is medically ready for discharge at this time  Inpatient acute rehabilitation physician was consulted   Upon review of patient’s case and correspondence with PT/OT therapy services, [facility] physician feels [facility] will benefit and is a good candidate / appropriate for inpatient acute rehab at this time  She has demonstrated the willingness / desire and tolerance to participate in the required 3 hours or more of therapies per day  Prior to admission / hospital stay [facility] reports she was independent with all ADLs /functional mobility / iADLs  Currently with PT therapies : bed mobility -Mod A ; transfers - ModAx2-Max A ; ambulation - Max AX2 with RW   Currently with OT therapies : upper body ADLs - supervision-mod assist ; lower body ADLs - mod-max A Nursing is being recommended for medication distribution / management , bowel / bladder management and educational purposes , internal medicine to continue to monitor and manage medical conditions ,PM&R to maximize  function and provide medical oversight, and inpatient rehab to maximize self care ,mobility ,strength , and endurance upon discharge to home

## 2022-10-17 NOTE — ASSESSMENT & PLAN NOTE
Grade 2 DD  At home: Lasix 40 mg daily, BB  Here:  HOLD Lasix  Encourage fluids by mouth  Continue BB, ARB    Restart Lasix when able with [place] supplement  IM consulted to assist with management

## 2022-10-17 NOTE — PLAN OF CARE
Problem: Potential for Falls  Goal: Patient will remain free of falls  Description: INTERVENTIONS:  - Educate patient/family on patient safety including physical limitations  - Instruct patient to call for assistance with activity   - Consult OT/PT to assist with strengthening/mobility   - Keep Call bell within reach  - Keep bed low and locked with side rails adjusted as appropriate  - Keep care items and personal belongings within reach  - Initiate and maintain comfort rounds  - Make Fall Risk Sign visible to staff  - Offer Toileting every 2 Hours, in advance of need  - Initiate/Maintain alarm  - Obtain necessary fall risk management equipment:   - Apply yellow socks and bracelet for high fall risk patients  - Consider moving patient to room near nurses station  Outcome: Progressing     Problem: PAIN - ADULT  Goal: Verbalizes/displays adequate comfort level or baseline comfort level  Description: Interventions:  - Encourage patient to monitor pain and request assistance  - Assess pain using appropriate pain scale  - Administer analgesics based on type and severity of pain and evaluate response  - Implement non-pharmacological measures as appropriate and evaluate response  - Consider cultural and social influences on pain and pain management  - Notify physician/advanced practitioner if interventions unsuccessful or patient reports new pain  Outcome: Progressing     Problem: INFECTION - ADULT  Goal: Absence or prevention of progression during hospitalization  Description: INTERVENTIONS:  - Assess and monitor for signs and symptoms of infection  - Monitor lab/diagnostic results  - Monitor all insertion sites, i e  indwelling lines, tubes, and drains  - Monitor endotracheal if appropriate and nasal secretions for changes in amount and color  - [place] appropriate cooling/warming therapies per order  - Administer medications as ordered  - Instruct and encourage patient and family to use good hand hygiene technique  - Identify and instruct in appropriate isolation precautions for identified infection/condition  Outcome: Progressing  Goal: Absence of fever/infection during neutropenic period  Description: INTERVENTIONS:  - Monitor WBC    Outcome: Progressing     Problem: SAFETY ADULT  Goal: Maintain or return to baseline ADL function  Description: INTERVENTIONS:  -  Assess patient's ability to carry out ADLs; assess patient's baseline for ADL function and identify physical deficits which impact ability to perform ADLs (bathing, care of mouth/teeth, toileting, grooming, dressing, etc )  - Assess/evaluate cause of self-care deficits   - Assess range of motion  - Assess patient's mobility; develop plan if impaired  - Assess patient's need for assistive devices and provide as appropriate  - Encourage maximum independence but intervene and supervise when necessary  - Involve family in performance of ADLs  - Assess for home care needs following discharge   - Consider OT consult to assist with ADL evaluation and planning for discharge  - Provide patient education as appropriate  Outcome: Progressing  Goal: Maintains/Returns to pre admission functional level  Description: INTERVENTIONS:  - Perform BMAT or MOVE assessment daily    - Set and communicate daily mobility goal to care team and patient/family/caregiver  - Collaborate with rehabilitation services on mobility goals if consulted  - Perform Range of Motion 3 times a day  - Reposition patient every 2 hours    - Dangle patient 3 times a day  - Stand patient 3 times a day  - Ambulate patient 3 times a day  - Out of bed to chair 3 times a day   - Out of bed for meals 3 times a day  - Out of bed for toileting  - Record patient progress and toleration of activity level   Outcome: Progressing     Problem: DISCHARGE PLANNING  Goal: Discharge to home or other facility with appropriate resources  Description: INTERVENTIONS:  - Identify barriers to discharge w/patient and caregiver  - Arrange for needed discharge resources and transportation as appropriate  - Identify discharge learning needs (meds, wound care, etc )  - Arrange for interpretive services to assist at discharge as needed  - Refer to Case Management Department for coordinating discharge planning if the patient needs post-hospital services based on physician/advanced practitioner order or complex needs related to functional status, cognitive ability, or social support system  Outcome: Progressing     Problem: Knowledge Deficit  Goal: Patient/family/caregiver demonstrates understanding of disease process, treatment plan, medications, and discharge instructions  Description: Complete learning assessment and assess knowledge base    Interventions:  - Provide teaching at level of understanding  - Provide teaching via preferred learning methods  Outcome: Progressing     Problem: MUSCULOSKELETAL - ADULT  Goal: Maintain or return mobility to safest level of function  Description: INTERVENTIONS:  - Assess patient's ability to carry out ADLs; assess patient's baseline for ADL function and identify physical deficits which impact ability to perform ADLs (bathing, care of mouth/teeth, toileting, grooming, dressing, etc )  - Assess/evaluate cause of self-care deficits   - Assess range of motion  - Assess patient's mobility  - Assess patient's need for assistive devices and provide as appropriate  - Encourage maximum independence but intervene and supervise when necessary  - Involve family in performance of ADLs  - Assess for home care needs following discharge   - Consider OT consult to assist with ADL evaluation and planning for discharge  - Provide patient education as appropriate  Outcome: Progressing  Goal: Maintain proper alignment of affected body part  Description: INTERVENTIONS:  - Support, maintain and protect limb and body alignment  - Provide patient/ family with appropriate education  Outcome: Progressing     Problem: MOBILITY - ADULT  Goal: Maintain or return to baseline ADL function  Description: INTERVENTIONS:  -  Assess patient's ability to carry out ADLs; assess patient's baseline for ADL function and identify physical deficits which impact ability to perform ADLs (bathing, care of mouth/teeth, toileting, grooming, dressing, etc )  - Assess/evaluate cause of self-care deficits   - Assess range of motion  - Assess patient's mobility; develop plan if impaired  - Assess patient's need for assistive devices and provide as appropriate  - Encourage maximum independence but intervene and supervise when necessary  - Involve family in performance of ADLs  - Assess for home care needs following discharge   - Consider OT consult to assist with ADL evaluation and planning for discharge  - Provide patient education as appropriate  Outcome: Progressing  Goal: Maintains/Returns to pre admission functional level  Description: INTERVENTIONS:  - Perform BMAT or MOVE assessment daily    - Set and communicate daily mobility goal to care team and patient/family/caregiver  - Collaborate with rehabilitation services on mobility goals if consulted  - Perform Range of Motion 3 times a day  - Reposition patient every 2 hours    - Dangle patient 3 times a day  - Stand patient 3 times a day  - Ambulate patient 3 times a day  - Out of bed to chair 3 times a day   - Out of bed for meals 3 times a day  - Out of bed for toileting  - Record patient progress and toleration of activity level   Outcome: Progressing     Problem: Prexisting or High Potential for Compromised Skin Integrity  Goal: Skin integrity is maintained or improved  Description: INTERVENTIONS:  - Identify patients at risk for skin breakdown  - Assess and monitor skin integrity  - Assess and monitor nutrition and hydration status  - Monitor labs   - Assess for incontinence   - Turn and reposition patient  - Assist with mobility/ambulation  - Relieve pressure over bony prominences  - Avoid friction and shearing  - Provide appropriate hygiene as needed including keeping skin clean and dry  - Evaluate need for skin moisturizer/barrier cream  - Collaborate with interdisciplinary team   - Patient/family teaching  - Consider wound care consult   Outcome: Progressing     Problem: Nutrition/Hydration-ADULT  Goal: Nutrient/Hydration intake appropriate for improving, restoring or maintaining nutritional needs  Description: Monitor and assess patient's nutrition/hydration status for malnutrition  Collaborate with interdisciplinary team and initiate plan and interventions as ordered  Monitor patient's weight and dietary intake as ordered or per policy  Utilize nutrition screening tool and intervene as necessary  Determine patient's food preferences and provide high-protein, high-caloric foods as appropriate       INTERVENTIONS:  - Monitor oral intake, urinary output, labs, and treatment plans  - Assess nutrition and hydration status and recommend course of action  - Evaluate amount of meals eaten  - Assist patient with eating if necessary   - Allow adequate time for meals  - Recommend/ encourage appropriate diets, oral nutritional supplements, and vitamin/mineral supplements  - Order, calculate, and assess calorie counts as needed  - Assess need for intravenous fluids  - Provide nutrition/hydration education as appropriate  - Include patient/family/caregiver in decisions related to nutrition  Outcome: Progressing

## 2022-10-17 NOTE — PLAN OF CARE
Problem: PHYSICAL THERAPY ADULT  Goal: Performs mobility at highest level of function for planned discharge setting  See evaluation for individualized goals  Description: Treatment/Interventions: Functional transfer training, LE strengthening/ROM, Therapeutic exercise, Endurance training, Cognitive reorientation, Patient/family training, Equipment eval/education, Bed mobility, Gait training  Equipment Recommended: Ruth Ann Socks       See flowsheet documentation for full assessment, interventions and recommendations  Outcome: Not Progressing  Note:    Problem List: Decreased strength, Decreased endurance, Impaired balance, Decreased mobility, Decreased safety awareness, Orthopedic restrictions, Pain  Assessment: pt lenora tx session seated on commode and was agreeable to participate in PT intervention  Care coordination with OT Rissa due to pt anticipated level of assistance required to complete functional transfers  pt continues to require a significant amount of assistance in order to compklete fucntional transfers such as STS and SDPT as pt required mod Ax2 for all STS to and from RW and max Ax2 to complete a SPT with RW and pt required VC's and RW management in order to complete a SPT  pt was limited with standing tolerance as pt required min to mod Ax1 in order to maintain standing balance and tolerance while OT assisted with pulling up pants as pt was unable to complete task  Once in recliner pt was able to participate in TE activities w/o increase pain and good form throughout TE activies  pt would benefit from continues skilled PT intervention as pt continues to be l;imited with functional mobility, activity tolerance and ambulation distance  Continue to recommend post acute rehab services at the time of D/C in order to [facility] pt functional independence and safety with all OOB mobility when appropriate   post tx pt in recliner with call bell and all pt needs met        PT Discharge Recommendation: Post acute rehabilitation services    See flowsheet documentation for full assessment

## 2022-10-17 NOTE — ASSESSMENT & PLAN NOTE
At home: Lopressor 25 mg Q 12 hours, Hyzaar (Losartan/HCTZ) 50mg/12 5mg daily  Here: Lopressor 25 mg Q12hr, Losartan 100mg daily  Monitor BP closely  Adjust medications accordingly

## 2022-10-17 NOTE — ASSESSMENT & PLAN NOTE
Nociceptive/neuropathic post op pain in RLE managed on multimodal regimen:   · Patient is intolerant of oxycodone and Norco   Patient excessively drowsy on oxycodone and with mild hallucinations on Norco   · Schedule Tylenol 650 mg t i d  and 325 PRN  · Scheduled gabapentin 100 mg bid  · Topical Lidoderm patches, aqua K pad, Diclofenac gel specifically for knee  Ice for knee  · Robaxin 250 mg q 6 hours p r n   For muscle spasm

## 2022-10-17 NOTE — PHYSICAL THERAPY NOTE
PHYSICAL THERAPY NOTE          Patient Name: Zack Lewis  JDKHG'T Date: 10/17/2022           10/17/22 0929   PT Last Visit   PT Visit Date 10/17/22   Note Type   Note Type Treatment   Pain Assessment   Pain Assessment Tool 0-10   Pain Score 9   Pain Location/Orientation Orientation: Right;Location: Hip   Pain Onset/Description Onset: Ongoing   Patient's Stated Pain Goal No pain   Hospital Pain Intervention(s) Repositioned; Emotional support;Rest;Ambulation/increased activity   Multiple Pain Sites No   Pain Rating: FLACC (Rest) - Face 0   Pain Rating: FLACC (Rest) - Legs 0   Pain Rating: FLACC (Rest) - Activity 0   Pain Rating: FLACC (Rest) - Cry 0   Pain Rating: FLACC (Rest) - Consolability 0   Score: FLACC (Rest) 0   Pain Rating: FLACC (Activity) - Face 1   Pain Rating: FLACC (Activity) - Legs 1   Pain Rating: FLACC (Activity) - Activity 1   Pain Rating: FLACC (Activity) - Cry 1   Pain Rating: FLACC (Activity) - Consolability 1   Score: FLACC (Activity) 5   Restrictions/Precautions   Weight Bearing Precautions Per Order Yes   RLE Weight Bearing Per Order WBAT   Other Precautions Cognitive; Chair Alarm; Bed Alarm; Fall Risk;Pain   General   Chart Reviewed Yes   Response to Previous Treatment Patient with no complaints from previous session  Family/Caregiver Present No   Cognition   Overall Cognitive Status Impaired   Arousal/Participation Alert; Responsive; Cooperative   Attention Attends with cues to redirect   Orientation Level Oriented X4   Memory Decreased short term memory;Decreased recall of recent events   Following Commands Follows one step commands with increased time or repetition   Comments pt required several VC's for hand placement in order to complete STS anmd SPT from commode to recliner   Subjective   Subjective pt was agreeable to participate in PT intervention   Bed Mobility   Rolling R Unable to assess   Rolling L Unable to assess   Supine to Sit Unable to assess   Sit to Supine Unable to assess   Additional Comments Upon arrival to pt room pt on commode with VIANEY Kwok present   Transfers   Sit to Stand 3  Moderate assistance   Additional items Assist x 2;Armrests; Increased time required;Verbal cues   Stand to Sit 3  Moderate assistance   Additional items Assist x 2;Armrests; Increased time required;Verbal cues   Stand pivot 2  Maximal assistance   Additional items Assist x 2;Armrests; Increased time required;Verbal cues   Toilet transfer 3  Moderate assistance   Additional items Assist x 2;Armrests; Increased time required;Commode   Additional Comments pt require RW, VC's and mod Ax2 to complete STS fucntional transfers and max Ax2 to complete a SPT from commode to recliner with RW managenment   Ambulation/Elevation   Gait pattern Not appropriate   Ambulation/Elevation Additional Comments pt required max Ax2 to complete a SPT as pt was limited with standing tolerance and funcitonal mobility  pt also was orthostatic and required to be leaned back in recliner   Balance   Static Sitting Fair -   Dynamic Sitting Poor +   Static Standing Poor +   Ambulatory Zero   Activity Tolerance   Activity Tolerance Patient limited by fatigue; Other (Comment)  (dizziness)   Nurse Made Aware Spoke to RN and VIANEY Kwok   Exercises   Heelslides Sitting;10 reps;AROM; Bilateral  (long sit position)   Knee AROM Long Arc Quad Sitting;10 reps;AROM; Bilateral   Ankle Pumps Sitting;10 reps;AROM; Bilateral   Assessment   Problem List Decreased strength;Decreased endurance; Impaired balance;Decreased mobility; Decreased safety awareness;Orthopedic restrictions;Pain   Assessment pt lenora tx session seated on commode and was agreeable to participate in PT intervention  Care coordination with VIANEY Kwok due to pt anticipated level of assistance required to complete functional transfers   pt continues to require a significant amount of assistance in order to compklete fucntional transfers such as STS and SDPT as pt required mod Ax2 for all STS to and from RW and max Ax2 to complete a SPT with RW and pt required VC's and RW management in order to complete a SPT  pt was limited with standing tolerance as pt required min to mod Ax1 in order to maintain standing balance and tolerance while OT assisted with pulling up pants as pt was unable to complete task  Once in recliner pt was able to participate in TE activities w/o increase pain and good form throughout TE activies  pt would benefit from continues skilled PT intervention as pt continues to be l;imited with functional mobility, activity tolerance and ambulation distance  Continue to recommend post acute rehab services at the time of D/C in order to [facility] pt functional independence and safety with all OOB mobility when appropriate  post tx pt in recliner with call bell and all pt needs met   Goals   Patient Goals to relax in recliner and drink her milk   STG Expiration Date 10/23/22   PT Treatment Day 1   Plan   Treatment/Interventions Functional transfer training;LE strengthening/ROM; Therapeutic exercise; Endurance training;Cognitive reorientation;Patient/family training;Equipment eval/education; Bed mobility;Gait training;Spoke to nursing   Progress Slow progress, decreased activity tolerance   PT Frequency 3-5x/wk   Recommendation   PT Discharge Recommendation Post acute rehabilitation services   Equipment Recommended [address] Mobility Inpatient   Turning in Bed Without Bedrails 2   Lying on Back to Sitting on Edge of Flat Bed 1   Moving Bed to Chair 1   Standing Up From Chair 2   Walk in Room 1   Climb 3-5 Stairs 1   Basic Mobility Inpatient Raw Score 8   Turning Head Towards Sound 4   Follow Simple Instructions 4   Low Function Basic Mobility Raw Score 16   Low Function Basic Mobility Standardized Score 25 72   Highest Level Of Mobility   JH-HLM Goal 3: Sit at edge of bed   JH-HLM Achieved 4: Move to chair/commode   Education   Education Provided   (functional transfers)   Patient Reinforcement needed   End of Consult   Patient Position at End of Consult Bedside chair;Bed/Chair alarm activated; All needs within reach   The patient's AM-PAC Basic Mobility Inpatient Short Form Raw Score is 8  A Raw score of less than or equal to 16 suggests the patient may benefit from discharge to post-acute rehabilitation services  Please also refer to the recommendation of the Physical Therapist for safe discharge planning        Vitals:    10/17/22 0742 10/17/22 0914 10/17/22 0918 10/17/22 0921   BP: (!) 177/82 (!) 89/51 117/67 120/66   Pulse: (!) 109 (!) 110 (!) 117 (!) 109   Patient Position - Orthostatic VS:   Sitting Sitting       Jad Bond

## 2022-10-17 NOTE — OCCUPATIONAL THERAPY NOTE
Occupational Therapy Progress Note     Patient Name: Marlen Patton  [id number] Date: 10/17/2022  Problem List  Principal Problem:    Closed fracture of trochanter of right femur with routine healing  Active Problems:    Fall    Type 2 diabetes mellitus with stage 3a chronic kidney disease, with long-term current use of insulin ([facility])    CKD (chronic kidney disease) stage 3, GFR 30-59 ml/min ([facility])    HLD (hyperlipidemia)    Chronic systolic heart failure ([facility] )    Hypertension    Displaced fracture of middle phalanx of left ring finger, initial encounter for closed fracture    CLL (chronic lymphocytic leukemia) ([facility] )        10/17/22 0914 10/17/22 0918 10/17/22 0921   Vital Signs   Pulse (!) 110 (!) 117 (!) 109   Blood Pressure (!) 89/51 117/67 120/66   MAP (mmHg) (!) 64 84 84   BP Location Left arm Left arm Left arm   BP Method Automatic Automatic Automatic   Patient Position - Orthostatic VS   (sitting on commode s/p standing) Sitting  (in chair s/p transfer) Sitting  (s/p sitting with legs elevated x3 min)          10/17/22 0937   OT Last Visit   OT Visit Date 10/17/22   Note Type   Note Type Treatment for insurance authorization   Pain Assessment   Pain Assessment Tool 0-10   Pain Score 9   Pain Location/Orientation Orientation: Right;Location: Hip   Restrictions/Precautions   Weight Bearing Precautions Per Order Yes   RLE Weight Bearing Per Order WBAT   Other Precautions Cognitive; Bed Alarm; Chair Alarm; Fall Risk;Pain   ADL   Eating Assistance 5  Supervision/Setup   Eating Deficit Increased time to complete;Supervision/safety   Eating Comments eating breakfast upon arrival   Grooming Assistance 5  Supervision/Setup   Grooming Deficit Increased time to complete;Supervision/safety;Verbal cueing   Grooming Comments washing face and hands   UB Bathing Assistance 4  Minimal Assistance   UB Bathing Deficit Increased time to complete;Supervision/safety;Verbal cueing   UB Bathing Comments washing chest and abdomen   A with R arm   LB Bathing Assistance 3  Moderate Assistance   LB Bathing Deficit Increased time to complete;Supervision/safety;Verbal cueing;Buttocks;Right lower leg including foot; Left lower leg including foot   LB Bathing Comments able to wash upper thighs and suad-area, A with LE and buttocks   UB Dressing Assistance 3  Moderate Assistance   UB Dressing Deficit Increased time to complete;Supervision/safety;Verbal cueing   UB Dressing Comments A with bringing shirt around back, A with initiating buttons   LB Dressing Assistance 2  Maximal Assistance   LB Dressing Deficit Increased time to complete;Supervision/safety;Verbal cueing; Thread LLE into underwear; Thread RLE into underwear; Thread LLE into pants; Thread RLE into pants;Pull up over hips   LB Dressing Comments donning underwear and pants, A with threading and pulling over hips, able to attempt to pull up in standing with mod A x1 for standing balance, unable to achieve pulling up   Toileting Assistance  2  Maximal Assistance   Toileting Deficit Increased time to complete;Clothing management down;Perineal hygiene;Clothing management up; Bedside commode   Functional Standing Tolerance   Time 2 min   Activity suad-hygiene and clothing management   Comments pt with reports of dizziness and pain and requesting to return to sitting   Bed Mobility   Supine to Sit 3  Moderate assistance   Additional items Assist x 1; Increased time required;Verbal cues;LE management   Transfers   Sit to Stand 3  Moderate assistance   Additional items Assist x 2; Increased time required;Verbal cues   Stand to Sit 3  Moderate assistance   Additional items Assist x 2; Increased time required;Verbal cues   Toilet transfer 3  Moderate assistance   Additional items Assist x 2; Increased time required;Standard toilet   Functional Mobility   Functional Mobility 2  Maximal assistance   Additional Comments Ax2, limited bed > commode and commode > recliner chair   Additional items Rolling walker   Subjective Subjective "I am so dizzy, I can't do anything"   Cognition   Overall Cognitive Status Impaired   Arousal/Participation Alert; Cooperative   Attention Attends with cues to redirect   Orientation Level Oriented to person;Oriented to place;Oriented to time;Oriented to situation   Memory Decreased short term memory;Decreased recall of recent events   Following Commands Follows one step commands with increased time or repetition   Comments Pt with poor STM and impaired safety awareness, may benefit from formal cognitive evaluation   Activity Tolerance   Activity Tolerance Patient limited by fatigue;Patient limited by pain   Medical Staff Made Aware LIANE Armando   Assessment   Assessment Pt seen on this date for skilled OT treatment session  At start of session pt supine in bed  Pt agreeable and motivated to participate in session stating " I need to use the bathroom so badly"  Pt required max A x1 for bed mobility, pt requiring assist with RLE and A with hips to come towards EOB  Pt educated on B feet on the ground prior to standing  Pt impulsive with RW and requiring VC for safety with transfer and functional mobility to commode  Pt completing ADL tasks sitting on commode  Pt requiring VC and edu on importance of hygiene  Pt edu on LB dressing techniques, limited by impaired functional reach, would benefit from [facility] AE education  Pt completing UB ADLs with assist with buttons due to limited finger movement on LUE  Edu on stabilizing with LUE with improved performance  Pt limited overall by pain in RLE and dizziness  Pt with (+) orthostatic hypotension during session, RN aware  Pt positioned in recliner chair with BLE elevated  Pt would continue to benefit from skilled OT treatment sessions in order to address remaining deficits and continue to recommend d/c to rehab when medically stable     Plan   Goal Expiration Date 10/23/22   OT Treatment Day 1   OT Frequency 3-5x/wk   Recommendation   OT Discharge Recommendation Post acute rehabilitation services   -[facility] Daily Activity Inpatient   Lower Body Dressing 2   Bathing 2   Toileting 2   Upper Body Dressing 2   Grooming 3   Eating 3   Daily Activity Raw Score 14   Daily Activity Standardized Score (Calc for Raw Score >=11) 33 39   AM-[facility] Applied Cognition Inpatient   Following a Speech/Presentation 3   Understanding Ordinary Conversation 4   Taking Medications 3   Remembering Where Things Are Placed or Put Away 2   Remembering List of 4-5 Errands 2   Taking Care of Complicated Tasks 2   Applied Cognition Raw Score 16   Applied Cognition Standardized Score 35 03   End of Consult   Patient Position at End of Consult Bedside chair;Bed/Chair alarm activated; All needs within reach  (LE elevated)         The patient's raw score on the AM-P[facility] Daily Activity inpatient short form is 15, standardized score is 34 69, less than 39 4  Patients at this level are likely to benefit from discharge to post-acute rehabilitation services  Please refer to the recommendation of the Occupational Therapist for safe discharge planning  This session, pt required and most appropriately benefited from skilled OT/PT co-treat due to extensive physical assistance of SKILLED therapists, significant regression from functional baseline, and decreased activity tolerance  OT and PT goals were addressed separately as seen in documentation         Marino Squires OTR/L

## 2022-10-17 NOTE — CONSULTS
Internal Medicine Consultation Note    Patient: Darlene Santos  Age/sex: 79 y o  female  Medical Record #: [id number]      ASSESSMENT PLAN    S/p ORIF with IM nailing of the Rt femur  · Pain control and therapy per primary service  · Monitor incision  · Renal dosed Lovenox for DVT prophylaxis  · Follow-up with Ortho 2 weeks post-op  Left 4th phalanx fracture  · Splint and NWB   · Pain control per PMR  HTN  · Home medications: Hyzaar 50/12 5mg daily/Lopressor 25mg every 12 hours/Lasix 40mg daily - for heart failure  · Here: None  · BP has been widely variable during her acute hospital stay  Pt currently mildly tachycardic and   Will resume lopressor but at 12 5mg every 12 hours  · Will monitor and adjust medications as needed  DM type 2  · Home: Levemir 20U at bedtime/Humalog 10U with meals  · Here: Lantus 28U at bedtime/Humalog 12U with meals  · Continue accuchecks and SSI  · Adjust insulin as needed  Chronic heart failure  · Pt has been off diuretics  · Currently appear euvolemic  · Will resume when necessary  · ECHO with an EF of 45%  Grade 2 diastolic dysfunction  CLL  · Recent diagnosis  · Outpt follow-up with Hematology/Oncology  · Baseline WBCs 14-16K      Subjective/ HPI: Patient seen and examined  She is an 80yo female, with HTN, DM type 2, hyperlipidemia, CLL, CKD stage 3a and chronic systolic heart failure, who presented 10/11/22 after a fall from standing  She had Rt hip pain after the falls  She was found to have a Rt intertrochanteric femur fx and Lt 4th phalanx fracture  The phalanx fracture was splinted  She underwent ORIF with IM nailing of the Rt femur fx by Dr Magdalena Starr 10/12/22  She did have acute blood loss anemia post-op but did not require transfusion  She was followed by Endocrinology post op when on an insulin drip  Insulin was changed to SQ  She was determined to be stable for transfer to the [facility] 10/17 22  IM consulted for medical management      ROS:   A 10 point ROS was performed; negative except as noted above       Social History:    Substance Use History:   Social History     Substance and Sexual Activity   Alcohol Use Not Currently    Comment: very seldom     Social History     Tobacco Use   Smoking Status Never Smoker   Smokeless Tobacco Never Used     Social History     Substance and Sexual Activity   Drug Use Never       Family History:    Family History   Problem Relation Age of Onset   • Heart disease Mother    • Heart disease Father    • Hypertension Father    • Stomach cancer Sister    • Ovarian cancer Sister    • Hypertension Sister          Review of Scheduled Meds:      Labs:     Results from last 7 days   Lab Units 10/16/22  0349 10/15/22  0515   WBC Thousand/uL 24 20* 19 42*   HEMOGLOBIN g/dL 8 7* 10 3*   HEMATOCRIT % 27 3* 32 4*   PLATELETS Thousands/uL 181 150     Results from last 7 days   Lab Units 10/16/22  0349 10/15/22  0515   SODIUM mmol/L 135 135   POTASSIUM mmol/L 4 3 3 9   CHLORIDE mmol/L 102 101   CO2 mmol/L 27 29   BUN mg/dL 26* 28*   CREATININE mg/dL 0 89 0 86   CALCIUM mg/dL 8 6 8 9     Results from last 7 days   Lab Units 10/12/22  0820   HEMOGLOBIN A1C % 7 6*     Results from last 7 days   Lab Units 10/11/22  1900   INR  0 89        Results from last 7 days   Lab Units 10/17/22  1100 10/17/22  0739 10/16/22  2103   POC GLUCOSE mg/dl 243* 159* 110       Lab Results   Component Value Date    URINECX 4111-2237 cfu/ml Gram Positive Cocci (A) 2019    URINECX >100,000 cfu/ml Escherichia coli 2017       Input and Output Summary (last 24 hours):     No intake or output data in the 24 hours ending 10/17/22 1438    Imaging:     No orders to display       *Labs /Radiology studiesLabs reviewed  *Medications reviewed and reconciled as needed  *Please refer to order section for additional ordered labs studies  *Case discussed with primary attending during morning huddle case rounds    Vitals:   Temp (24hrs), Av 1 °F (37 3 °C), Min:97 8 °F (36 6 °C), Max:100 5 °F (38 1 °C)    Temp:  [97 8 °F (36 6 °C)-100 5 °F (38 1 °C)] 97 9 °F (36 6 °C)  HR:  [100-118] 113  Resp:  [16] 16  BP: ()/(51-84) 120/66  SpO2:  [91 %-98 %] 96 %  There is no height or weight on file to calculate BMI  Physical Exam:   HEENT:  Head: Normocephalic, no lesions, without obvious abnormality  CARDIAC:  Regular but tachycardic  LUNGS:  normal air entry, lungs clear to auscultation  ABDOMEN:  soft, non-tender  Bowel sounds normal  No masses, no organomegaly  EXTREMITIES:  extremities normal, warm and well-perfused; no cyanosis, clubbing, or edema  NEURO:   mental status, speech normal, alert and oriented x3  PSYCH:  Alert and oriented, appropriate affect  INCISION:  dressings present, staples and serosanguinous drainage      Invasive Devices  Report    None                  VTE Pharmacologic Prophylaxis: Enoxaparin (Lovenox)  Code Status: Prior  Current Length of Stay: 0 day(s)    Total floor / unit time spent today 1 hour with more than 50% spent counseling/coordinating care  Counseling includes discussion with patient re: progress  and discussion with patient of his/her current medical state/information  Coordination of patient's care was performed in conjunction with primary service  Time invested included review of patient's labs, vitals, and management of their comorbidities with continued monitoring  In addition, this patient was discussed with medical team including physician and advanced extenders  The care of the patient was extensively discussed and appropriate treatment plan was formulated unique for this patient  ** Please Note: Fluency Direct voice to text software may have been used in the creation of this document   Audio transcription errors may occur**

## 2022-10-17 NOTE — H&P
PHYSICAL MEDICINE AND REHABILITATION H&P/ADMISSION NOTE  Mishel Baumann 79 y o  female MRN: [id number]  Unit/Bed#: [facility] 459-01 Encounter: [id number]     Rehab Diagnosis: Impairment of mobility, safety and Activities of Daily Living (ADLs) due to Orthopedic Disorders:  08 11  Unilateral Hip Fracture    History of Present Illness:   Mishel Baumann is a 79 y o  female with CLL, CHF, HLD, HTN, DM2, CKD 3, history of cognitive impairment, osteoporosis, dizziness who presented to the [facility] [address] [address] on 10/11/22 with fall in her driveway while using her rollator walker  Imaging revealed a right intertrochanteric femur fracture with extension towards the femoral neck  Imaging also revealed a left displaced 4th phalanx  Patient seen by Orthopedics and deemed an operative candidate  Patient taken to the OR on 10/12/22 by Dr Wanda Calderon for a ORIF with IM nailing  Deemed WBAT RLE  Placed on Lovenox for DVT ppx  Medically, patient required to be followed closely by Endocrinology for diabetes, initially was on a insulin drip then transitioned back to her home insulin regimen equivalents  Post-operatively developed acute blood loss anemia  Started on pain regimen for acute pain  After medical stabilization, patient was found to have acute functional deficits from baseline in mobility and self care, therefore admitted to [facility] for acute inpatient rehabilitation  Plan:     Rehabilitation  • Functional deficits: impaired mobility, self care, WBAT RLE, NWB L HAND  • Begin PT/OT  Rehabilitation goals are to achieve a Mod I level with mobility and self care  Prognosis is fair  ELOS is 2-3 weeks  Estimated discharge is home       DVT prophylaxis  • Managed on SQ Lovenox 40 mg daily - will need education for home    Bladder plan  • Continent    Bowel plan  • Continent, +BM today    Code Status  • FULL CODE      * Intertrochanteric fracture of right femur [facility])  Assessment & Plan  Traumatic fall  Sustained a right intertrochanteric fracture  Taken to OR by Dr Yuan Roberts on 10/12/22 for ORIF and IM nailing  WBAT RLE  DVT ppx with SQ Lovenox  Monitor 3 incisions  Continue acute rehabilitation program  POD 14 = 10/26/22 - x-rays and ortho follow-up due  Follow-up with Dr Yuan Roberts as outpatient       Acute pain  Assessment & Plan  Nociceptive/neuropathic post op pain in RLE managed on multimodal regimen:   · Tylenol 975 mg Q8 hours scheduled  · Gabapentin 100 mg QHS  · Topical Lidoderm patch  · Topical ICE  · Oxycodone IR 2 5-5 mg [id number] PRN for moderate - severe pain    CLL (chronic lymphocytic leukemia) ([facility])  Assessment & Plan  Chronic leukocytosis  Monitor   Follow-up with heme onc as outpatient     Displaced fracture of middle phalanx of left ring finger, initial encounter for closed fracture  Assessment & Plan  Traumatic fracture  4th left finger   Treated non-operatively by Orthopedics  Continue supportive splint  ICE for swelling  NWB L hand    Hypertension  Assessment & Plan  At home: Lopressor 25 mg Q 12 hours, Hyzaar (Losartan/HCTZ) 50mg/12 5mg daily  Here: Lopressor 12 5 mg daily  Will monitor BP closely  Check orthostatics  Adjust medications accordingly  TEDs daily /Abdominal binder PRN    Chronic systolic heart failure (HCC)  Assessment & Plan  Grade 2 DD  At home: Lasix 40 mg daily  Here: Slightly hypovolemic - HOLD Lasix  Encourage fluids by mouth    Restart Lasix when able with K Dur supplement        HLD (hyperlipidemia)  Assessment & Plan  Restarted on home Lipitor 20 mg QPM    CKD (chronic kidney disease) stage 3, GFR 30-59 ml/min ([facility])  Assessment & Plan  Stable  Avoid nephrotoxic agents   Monitor 2x/week    Type 2 diabetes mellitus (HCC)  Assessment & Plan  Lab Results   Component Value Date    HGBA1C 7 6 (H) 10/12/2022     At home: Levemir 20 units QHS, Humalog 10 units with meals  Here: Lantus 28 units QHS, Humalog 12 units with meals  In acute care required an insulin drip transiently  Continue CCD      Subjective:   Patient seen face to face upon arrival   Reports she is tired, but her pain is much better at rest and with ice on her right leg  +BM today  Urinating and starting to gain an appetite again, states the food at [facility] was not to her liking  Mood is stable  Recalls events of her fall and hospitalization  Her finger is stable in splint  Review of Systems   Constitutional: Negative  HENT: Negative  Eyes: Negative  Respiratory: Negative  Cardiovascular: Negative  Gastrointestinal: Negative  Endocrine: Negative  Genitourinary: Negative  Musculoskeletal: Negative  Skin: Negative  Allergic/Immunologic: Negative  Neurological: Negative  Hematological: Negative  Psychiatric/Behavioral: Negative  Function:  Prior level of function and living situation:  Patient resides in [place] by herself in a home with 0 FEI  Her nieces and nephew can assist sometimes, but not consistently  PLOF: Mod I level with her rollator    Current level of function:  Physical Therapy: Mod-Max A with transfers, Max A for toilet transfers, Total assist for   Occupational Therapy: Mod A - Max A     Physical Exam:  /62 (BP Location: Right arm)   Pulse 98   Temp 98 4 °F (36 9 °C) (Oral)   Resp 18   Ht 5' 6" (1 676 m)   BMI 23 40 kg/m²      No intake or output data in the 24 hours ending 10/17/22 1735    Body mass index is 23 4 kg/m²  Physical Exam  Vitals and nursing note reviewed  Constitutional:       General: She is not in acute distress  Comments: Thin appearing female   HENT:      Head: Normocephalic and atraumatic  Nose: Nose normal       Mouth/Throat:      Mouth: Mucous membranes are moist    Eyes:      Conjunctiva/sclera: Conjunctivae normal    Cardiovascular:      Rate and Rhythm: Normal rate and regular rhythm  Pulses: Normal pulses     Pulmonary:      Effort: Pulmonary effort is normal       Breath sounds: Normal breath sounds  No wheezing or rales  Abdominal:      General: Bowel sounds are normal  There is no distension  Palpations: Abdomen is soft  Tenderness: There is no abdominal tenderness  Musculoskeletal:         General: No swelling  Cervical back: Neck supple  Skin:     General: Skin is warm  Comments: 3 incisions with staples and scant drainage   Neurological:      Mental Status: She is alert and oriented to person, place, and time  Sensory: No sensory deficit  Motor: Weakness (Left HF 1/5, KE 2/5, KF 2-/5, DF, EHL, PF 4/5 ) present  Comments: Motor:   BUE: 4/5  RLE: 4-/5 HF, otherwise 4/5 throughout   Psychiatric:         Mood and Affect: Mood normal             Labs, medications, and imaging personally reviewed      Laboratory:    Lab Results   Component Value Date    SODIUM 135 10/16/2022    K 4 3 10/16/2022     10/16/2022    CO2 27 10/16/2022    BUN 26 (H) 10/16/2022    CREATININE 0 89 10/16/2022    GLUC 186 (H) 10/16/2022    CALCIUM 8 6 10/16/2022     Lab Results   Component Value Date    WBC 24 20 (H) 10/16/2022    HGB 8 7 (L) 10/16/2022    HCT 27 3 (L) 10/16/2022     (H) 10/16/2022     10/16/2022     Lab Results   Component Value Date    INR 0 89 10/11/2022    PROTIME 12 3 10/11/2022         Current Facility-Administered Medications:   •  acetaminophen (TYLENOL) tablet 325 mg, 325 mg, Oral, Q6H PRN, Agustina Nelson MD  •  acetaminophen (TYLENOL) tablet 975 mg, 975 mg, Oral, Q8H [facility], Yanely Holden MD  •  atorvastatin (LIPITOR) tablet 20 mg, 20 mg, Oral, Daily With Tiffanie Hidalgo MD  •  bisacodyl (DULCOLAX) rectal suppository 10 mg, 10 mg, Rectal, Daily PRN, Agustina Nelson MD  •  docusate sodium (COLACE) capsule 100 mg, 100 mg, Oral, BID, Agustina Nelson MD  •  [START ON 10/18/2022] enoxaparin (LOVENOX) subcutaneous injection 40 mg, 40 mg, Subcutaneous, Q24H [facility], Yanelyjordy Holden MD  •  gabapentin (NEURONTIN) capsule 100 mg, 100 mg, Oral, HS, Claudell Erb, MD  •  insulin glargine (LANTUS) subcutaneous injection 28 Units 0 28 mL, 28 Units, Subcutaneous, HS, Yanely Gamez MD  •  insulin lispro (HumaLOG) 100 units/mL subcutaneous injection 1-5 Units, 1-5 Units, Subcutaneous, HS, Yanely Gamez MD  •  insulin lispro (HumaLOG) 100 units/mL subcutaneous injection 1-6 Units, 1-6 Units, Subcutaneous, TID AC, Yanely Gamez MD  •  insulin lispro (HumaLOG) 100 units/mL subcutaneous injection 12 Units, 12 Units, Subcutaneous, TID With Meals, Claudell Erb, MD  •  [START ON 10/18/2022] lidocaine (LIDODERM) 5 % patch 1 patch, 1 patch, Topical, Daily, Claudell Erb, MD  •  meclizine (ANTIVERT) tablet 12 5 mg, 12 5 mg, Oral, Q8H PRN, Claudell Erb, MD  •  metoprolol tartrate (LOPRESSOR) partial tablet 12 5 mg, 12 5 mg, Oral, Q12H [address], Claudell Erb, MD, 12 5 mg at 10/17/22 1652  •  naloxone (NARCAN) 0 04 mg/mL syringe 0 04 mg, 0 04 mg, Intravenous, Q1MIN PRN, Claudell Erb, MD  •  oxyCODONE (ROXICODONE) IR tablet 2 5 mg, 2 5 mg, Oral, Q4H PRN, Claudell Erb, MD  •  oxyCODONE (ROXICODONE) IR tablet 5 mg, 5 mg, Oral, Q4H PRN, Claudell Erb, MD  •  polyethylene glycol (MIRALAX) packet 17 g, 17 g, Oral, Daily PRN, Claudell Erb, MD  •  senna-docusate sodium (SENOKOT S) 8 6-50 mg per tablet 1 tablet, 1 tablet, Oral, HS, Claudell Erb, MD     Allergies   Allergen Reactions   • Amlodipine    • Ceftin [Cefuroxime]    • Ciprofloxacin    • Citalopram    • Dye [Iodinated Diagnostic Agents]    • Glucophage [Metformin]    • Januvia [Sitagliptin]    • Neomycin-Polymyxin-Dexameth    • Ondansetron    • Prilosec [Omeprazole]    • Vibramycin [Doxycycline]       Patient Active Problem List    Diagnosis Date Noted   • Intertrochanteric fracture of right femur ([facility] ) 10/11/2022   • Acute pain 10/17/2022   • CLL (chronic lymphocytic leukemia) ([facility] ) 10/13/2022   • Displaced fracture of middle phalanx of left ring finger, initial encounter for closed fracture 10/11/2022   • Hypertension 08/08/2021   • Ambulatory dysfunction 08/05/2021   • Suspected Mild cognitive impairment 08/05/2021   • Lymphadenopathy 11/06/2020   • Elevated liver enzymes 11/06/2020   • Fall 11/05/2020   • Type 2 diabetes mellitus ([facility] ) 11/05/2020   • CKD (chronic kidney disease) stage 3, GFR 30-59 ml/min ([facility] ) 11/05/2020   • HLD (hyperlipidemia) 11/05/2020   • OA (osteoarthritis) 11/05/2020   • Chronic systolic heart failure ([facility] ) 11/05/2020     Past Medical History:   Diagnosis Date   • Benign adenomatous polyp of large intestine    • CHF (congestive heart failure) ([facility] )    • Diabetes mellitus ([facility] )    • Hyperlipemia    • Hypertension    • Osteoarthritis    • TIA (transient ischemic attack)      Past Surgical History:   Procedure Laterality Date   • APPENDECTOMY     • CARPAL TUNNEL RELEASE Right    • HYSTERECTOMY W/ SALPINGO-OOPHERECTOMY     • FL OPEN RX FEMUR FX+INTRAMED CARIDAD Right 10/12/2022    Procedure: INSERTION NAIL IM FEMUR ANTEGRADE (TROCHANTERIC); Surgeon: Babita Kumar DO;  Location: [facility];  Service: Orthopedics     Social History     Socioeconomic History   • Marital status:       Spouse name: Not on file   • Number of children: Not on file   • Years of education: Not on file   • Highest education level: Not on file   Occupational History   • Not on file   Tobacco Use   • Smoking status: Never Smoker   • Smokeless tobacco: Never Used   Vaping Use   • Vaping Use: Never used   Substance and Sexual Activity   • Alcohol use: Not Currently     Comment: very seldom   • Drug use: Never   • Sexual activity: Not on file   Other Topics Concern   • Not on file   Social History Narrative   • Not on file     Social Determinants of Health     Financial Resource Strain: Not on file   Food Insecurity: No Food Insecurity   • Worried About Running Out of Food in the Last Year: Never true   • [address] in the Last Year: Never true   Transportation Needs: No Transportation Needs   • Lack of Transportation (Medical): No   • Lack of Transportation (Non-Medical): No   Physical Activity: Not on file   Stress: Not on file   Social Connections: Not on file   Intimate Partner Violence: Not on file   Housing Stability: Unknown   • Unable to Pay for Housing in the Last Year: No   • Number of Places Lived in the Last Year: Not on file   • Unstable Housing in the Last Year: Not on file     Social History     Tobacco Use   Smoking Status Never Smoker   Smokeless Tobacco Never Used     Social History     Substance and Sexual Activity   Alcohol Use Not Currently    Comment: very seldom     Family History   Problem Relation Age of Onset   • Heart disease Mother    • Heart disease Father    • Hypertension Father    • Stomach cancer Sister    • Ovarian cancer Sister    • Hypertension Sister          Medical Necessity Criteria for [facility] Admission: Acute Kidney Injury, Anemia, with the following plan: monitor H/H, Hypertension, Bowel/Bladder Management, Diabetes requiring close blood glucose monitoring, Incision/Wound care, Leukocystosis and acute pain, anxiety  In addition, the preadmission screen, post-admission physical evaluation, overall plan of care and admissions order demonstrate a reasonable expectation that the following criteria were met at the time of admission to the [facility]  1  The patient requires active and ongoing therapeutic intervention of multiple therapy disciplines (physical therapy, occupational therapy, speech-language pathology, or prosthetics/orthotics), one of which is physical or occupational therapy      2  Patient requires an intensive rehabilitation therapy program, as defined in Chapter 1, section 110 2 2 of the CMS Medicare Policy Manual  This intensive rehabilitation therapy program will consist of at least 3 hours of therapy per day at least 5 days per week or at least 15 hours of intensive rehabilitation therapy within a 7 consecutive day period, beginning with the date of admission to the [facility]  3  The patient is reasonably expected to actively participate in, and benefit significantly from, the intensive rehabilitation therapy program as defined in Chapter 1, section 110 2 2 of the CMS Medicare Policy Manual at this time of admission to the [facility]  She can reasonably be expected to make measurable improvement (that will be of practical value to improve the patient’s functional capacity or adaptation to impairments) as a result of the rehabilitation treatment, as defined in section 110 3, and such improvement can be expected to be made within the prescribed period of time  As noted in the CMS Medicare Policy Manual, the patient need not be expected to achieve complete independence in the domain of self-care nor be expected to return to his or her prior level of functioning in order to meet this standard  4  The patient must require physician supervision by a rehabilitation physician  As such, a rehabilitation physician will conduct face-to-face visits with the patient at least 3 days per week throughout the patient’s stay in the [facility] to assess the patient both medically and functionally, as well as to modify the course of treatment as needed to maximize the patient’s capacity to benefit from the rehabilitation process  5  The patient requires an intensive and coordinated interdisciplinary approach to providing rehabilitation, as defined in Chapter 1, section 110 2 5 of the CMS Medicare Policy Manual  This will be achieved through periodic team conferences, conducted at least once in a 7-day period, and comprising of an interdisciplinary team of medical professionals consisting of: a rehabilitation physician, registered nurse,  and/or , and a licensed/certified therapist from each therapy discipline involved in treating the patient       Changes Since Pre-admission Assessment: None -This patient's participation in rehab continues to be reasonable, necessary and appropriate  CMS Required Post-Admission Physician Evaluation Elements  History and Physical, including medical history, functional history and active comorbidities as in above text  Post-Admission Physician Evaluation:  The patient has the potential to make improvement and is in need of physical, occupational, and/or therapy services  The patient may also need nutritional services  Given the patient's complex medical condition and risk of further medical complications, rehabilitative services cannot be safely provided at a lower level of care, such as a skilled nursing facility  I have reviewed the patient's functional and medical status at the time of the preadmission screening and they are the same as on the day of this admission  I acknowledge that I have personally performed a full physical examination on this patient within 24 hours of admission  The patient and/or family demonstrated understanding the rehabilitation program and the discharge process after we discussed them  Agree in entirety: yes  Minor adaptions: none    Major changes: none    Yanely Simpson    ** Please Note: Fluency Direct voice to text software may have been used in the creation of this document  **      Total time spent:  90 minutes with more than 50% spent counseling/coordinating care  Counseling includes extended discussion with patient (+/- family/relevant historian)  re: history, symptoms, medications, functional issues, mood, medical and rehabilitation management plan, and disposition  Additional time spent with thorough chart review in EMR, reviewing recent medications, labs, imaging, and management plan  This was followed by formulating a comprehensive inpatient medical/rehabilitation management plan, and coordinating with pertinent specialists as indicated

## 2022-10-17 NOTE — ASSESSMENT & PLAN NOTE
Traumatic fall  Sustained a right intertrochanteric fracture  10/15 Vitamin D level 30 3  Can restart supplementation  Taken to OR by Dr Florence Yang on 10/12/22 for ORIF and IM nailing  WBAT RLE  DVT ppx with SQ Lovenox for 4 weeks - last dose 11/9  Monitor 3 incisions - well healed  Compressive knee brace only for comfort  Can transition to lower profile brace if available  Continue acute rehabilitation program  POD 14 = 10/26/22 - appreciate ortho seeing patient and removing staples today 10/27/22    Xray completed 10/22 and stable  Follow-up with Dr Florence Yang as outpatient in 4 weeks (~11/23)

## 2022-10-18 LAB
GLUCOSE SERPL-MCNC: 113 MG/DL (ref 65–140)
GLUCOSE SERPL-MCNC: 137 MG/DL (ref 65–140)
GLUCOSE SERPL-MCNC: 195 MG/DL (ref 65–140)
GLUCOSE SERPL-MCNC: 262 MG/DL (ref 65–140)
GLUCOSE SERPL-MCNC: 284 MG/DL (ref 65–140)

## 2022-10-18 RX ORDER — BISACODYL 10 MG
10 SUPPOSITORY, RECTAL RECTAL DAILY PRN
Status: DISCONTINUED | OUTPATIENT
Start: 2022-10-18 | End: 2022-11-08 | Stop reason: HOSPADM

## 2022-10-18 RX ORDER — INSULIN GLARGINE 100 [IU]/ML
30 INJECTION, SOLUTION SUBCUTANEOUS
Status: DISCONTINUED | OUTPATIENT
Start: 2022-10-18 | End: 2022-10-20

## 2022-10-18 RX ORDER — ACETAMINOPHEN 325 MG/1
650 TABLET ORAL EVERY 6 HOURS PRN
Status: DISCONTINUED | OUTPATIENT
Start: 2022-10-18 | End: 2022-10-20

## 2022-10-18 RX ORDER — HYDROCODONE BITARTRATE AND ACETAMINOPHEN 5; 325 MG/1; MG/1
1 TABLET ORAL
Status: DISCONTINUED | OUTPATIENT
Start: 2022-10-18 | End: 2022-10-20

## 2022-10-18 RX ORDER — LIDOCAINE 50 MG/G
2 PATCH TOPICAL DAILY
Status: DISCONTINUED | OUTPATIENT
Start: 2022-10-18 | End: 2022-11-08 | Stop reason: HOSPADM

## 2022-10-18 RX ORDER — HYDROCODONE BITARTRATE AND ACETAMINOPHEN 5; 325 MG/1; MG/1
1 TABLET ORAL EVERY 4 HOURS PRN
Status: DISCONTINUED | OUTPATIENT
Start: 2022-10-18 | End: 2022-10-20

## 2022-10-18 RX ADMIN — GABAPENTIN 100 MG: 100 CAPSULE ORAL at 21:23

## 2022-10-18 RX ADMIN — OXYCODONE HYDROCHLORIDE 5 MG: 5 TABLET ORAL at 05:57

## 2022-10-18 RX ADMIN — ENOXAPARIN SODIUM 40 MG: 40 INJECTION SUBCUTANEOUS at 08:14

## 2022-10-18 RX ADMIN — LIDOCAINE 5% 1 PATCH: 700 PATCH TOPICAL at 08:14

## 2022-10-18 RX ADMIN — DOCUSATE SODIUM 100 MG: 100 CAPSULE, LIQUID FILLED ORAL at 08:14

## 2022-10-18 RX ADMIN — Medication 12.5 MG: at 08:13

## 2022-10-18 RX ADMIN — HYDROCODONE BITARTRATE AND ACETAMINOPHEN 1 TABLET: 5; 325 TABLET ORAL at 16:22

## 2022-10-18 RX ADMIN — DOCUSATE SODIUM 100 MG: 100 CAPSULE, LIQUID FILLED ORAL at 17:24

## 2022-10-18 RX ADMIN — INSULIN LISPRO 12 UNITS: 100 INJECTION, SOLUTION INTRAVENOUS; SUBCUTANEOUS at 08:59

## 2022-10-18 RX ADMIN — LIDOCAINE 5% 2 PATCH: 700 PATCH TOPICAL at 16:25

## 2022-10-18 RX ADMIN — ACETAMINOPHEN 975 MG: 325 TABLET, FILM COATED ORAL at 05:57

## 2022-10-18 RX ADMIN — INSULIN GLARGINE 30 UNITS: 100 INJECTION, SOLUTION SUBCUTANEOUS at 21:24

## 2022-10-18 RX ADMIN — ATORVASTATIN CALCIUM 20 MG: 20 TABLET, FILM COATED ORAL at 16:18

## 2022-10-18 RX ADMIN — METOPROLOL TARTRATE 25 MG: 25 TABLET, FILM COATED ORAL at 21:23

## 2022-10-18 RX ADMIN — HYDROCODONE BITARTRATE AND ACETAMINOPHEN 1 TABLET: 5; 325 TABLET ORAL at 21:23

## 2022-10-18 RX ADMIN — INSULIN LISPRO 12 UNITS: 100 INJECTION, SOLUTION INTRAVENOUS; SUBCUTANEOUS at 17:25

## 2022-10-18 RX ADMIN — INSULIN LISPRO 3 UNITS: 100 INJECTION, SOLUTION INTRAVENOUS; SUBCUTANEOUS at 12:47

## 2022-10-18 NOTE — TEAM CONFERENCE
Acute RehabilitationTeam Conference Note  Date: 10/19/2022   Time: 8:54 AM       Patient Name:  Sarah Beth Merlos       Medical Record Number: [id number]   YOB: 1940   Sex: Female          Room/Bed:  /[facility] 459-01  Payor Info:  Payor: Ruben Ruiz / Plan: MEDICARE A AND B / Product Type: Medicare A & B Fee for Service /      Admitting Diagnosis: Closed fracture of greater trochanter of right femur ([facility] ) [S72 111A]   Admit Date/Time:  10/17/2022  2:18 PM  Admission Comments: No comment available     Primary Diagnosis:  Intertrochanteric fracture of right femur ([facility] )  Principal Problem: Intertrochanteric fracture of right femur [facility])    Patient Active Problem List    Diagnosis Date Noted   • Acute pain 10/17/2022   • CLL (chronic lymphocytic leukemia) ([facility] ) 10/13/2022   • Intertrochanteric fracture of right femur ([facility] ) 10/11/2022   • Displaced fracture of middle phalanx of left ring finger, initial encounter for closed fracture 10/11/2022   • Hypertension 08/08/2021   • Ambulatory dysfunction 08/05/2021   • Suspected Mild cognitive impairment 08/05/2021   • Lymphadenopathy 11/06/2020   • Elevated liver enzymes 11/06/2020   • Fall 11/05/2020   • Type 2 diabetes mellitus ([facility] ) 11/05/2020   • CKD (chronic kidney disease) stage 3, GFR 30-59 ml/min ([facility] ) 11/05/2020   • HLD (hyperlipidemia) 11/05/2020   • OA (osteoarthritis) 11/05/2020   • Chronic systolic heart failure ([facility] ) 11/05/2020       Physical Therapy:    Weight Bearing Status: Non-weight bearing (L hand, WBAT R LE)  Transfers: Assist of 2  Bed Mobility: Assist of 2  Amulation Distance (ft): 15 feet  Ambulation: Assist of 2  Assistive Device for Ambulation: Roller Walker (L PFRW)  Assistive Device for Stairs:  (unsafe to perform)  Discharge Recommendations:  (TBD pending progress)    10/18/22  Pt is a 79year old female s/p Surgical fixation of right intertrochanteric femur fracture with long intramedullary nail due a fall at home while ambulating with rollator walker  Pt is seen for moderate complexity eval with medical co-morbidities affecting functional progress include chronic CHF, DM type 2, HTN, HLD, CKD, CLL  Please see medical chart for more comprehensive list  Personal factors affecting pt at time of IE include limited family availability to provide physical assistance at d/c, inability to complete functional household and community distance mobilities, Hx of personal falls  Pt lives at home alone and was fully indep at baseline with AD on even and uneven surfaces including negotiating FEI  On eval pt presentation impacting functional performance include R LE pain with WBAT precaution, non painful displaced fracture of middle phalanx of left ring finger with dec compliance of NWB precaution even using sling, impaired strength, impaired standing balance/tolerance, poor righting reaction impaired coordination, impaired cognition, partial impaired R LE sensation,  dec endurance, gait dysfunction, dec cognition with STM impairment, and ortho BP with reported lightheadedness so encourage fluids this session  Due to above mentioned medical co-morbidities and impairments/deficits pt is at high risk for falls, inc risk for hypo/hypertensive episode, at risk for dec skin integrity, at risk for infection, at risk for PE & DVT and increase caregiver burden so unsafe to return home at this time  Pt requires 2 person assist to complete functional activities safely requiring use of AD, see above info for details of PT evaluation  Pt is a good rehab candidate with anticipated mod indep-S goals using Least restrictive AD with ELOS of 2 weeks  Skilled PT will work on therapeutic exercises, therapeutic activities, NMR and gait training to improve overall functional indep in order for pt to return home safely with reduce risk for falls  Occupational Therapy:  Eating: Independent  Grooming: Minimal Assistance  Bathing:  Total Assistance, Assist of 2  Bathing: Total Assistance, Assist of 2  Upper Body Dressing: Minimal Assistance  Lower Body Dressing: Total Assistance, Assist of 2  Toileting: Total Assistance, Assist of 2  Toilet Transfer: Total Assistance, Assist of 2  Cognition: Exceptions to WNL  Cognition: Decreased Memory, Decreased Executive Functions, Decreased Attention, Decreased Safety  Orientation: Person, Place, Time, Situation  Discharge Recommendations: Home with:  [address] with[de-identified] First Floor Setup       OT eval completed on 10/18, functioning at overall Ax2 for ADLs and fxnl xfers  Limited by significant RLE pain, impaired balance, endurance, fatigue, L hand NWB status, impaired STM, dec attention to task, impaired insight, impaired safety awareness, limited social support, unsafe home set-up, and ADL dysfunction  Pt lives in [facility] w/ 0E, first-floor set-up  Local family able to A w/ light IADL management and transportation  Will benefit from skilled OT services to maximize fxnl indep to achieve set-up to independent ADL goals, 3 week ELOS  Speech Therapy:           No notes on file    Nursing Notes:  Appetite: Good  Diet Type: Diabetic                      Diet Patient/Family Education Complete: Yes                         Type of Wound Patient/Family Education: Yes  Bladder: Incontinent     Bladder Patient/Family Education: Yes  Bowel: Continent     Bowel Patient/Family Education: Yes  Pain Location/Orientation: Orientation: Right, Location: Hip  Pain Score: 8                       Hospital Pain Intervention(s): Medication (See MAR), Cold applied, Repositioned, Rest  Pain Patient/Family Education: Yes  Medication Management/Safety  Injectable: Lovenox (insulin)  Safe Administration: Yes (by staff)  Medication Patient/Family Education Complete: No (on going)    S/p ORIF with IM nailing of the Rt femur - Pain control and therapy per primary service, Monitor incision, Renal dosed Lovenox for DVT prophylaxis, Follow-up with Ortho 2 weeks post-op   Left 4th phalanx fracture - Splint and NWB, Pain control per PMR  HTN - Home medications: Hyzaar 50/12 5mg daily/Lopressor 25mg every 12 hours/Lasix 40mg daily - for heart failure, Here: None , BP has been widely variable during her acute hospital stay  Pt currently mildly tachycardic and   Will resume lopressor but at 12 5mg every 12 hours, Will monitor and adjust medications as needed  DM type 2 - Home: Levemir 20U at bedtime/Humalog 10U with meals, Here: Lantus 28U at bedtime/Humalog 12U with meals, Continue accuchecks and SSI, Adjust insulin as needed  Chronic heart failure - Pt has been off diuretics, Currently appear euvolemic, Will resume when necessary, ECHO with an EF of 45%  Grade 2 diastolic dysfunction  CLL - Recent diagnosis, Outpt follow-up with Hematology/Oncology, Baseline WBCs 14-16K  Pt requires alarms for safety  This week we will monitor vital signs and lab values  We will educate on the importance of turning and offloading in order to prevent skin breakdown and preform routine skin checks  We will encourage independence with ADLs  We will monitor for constipation and medicate per bowel protocol  We will preform safe transfers and keep the patient free from falls  Case Management:     Discharge Planning  Living Arrangements: Lives Alone  Support Systems: Friends/neighbors, Family members  Assistance Needed: unknown  Type of Current Residence: Other (Comment) (Two story home)  Current Home Care Services: No  10/19/2022  Met with the patient 10/18/2022 to complete CM open  Patient lives alone in a 2 story home, she reports she has a first floor set up and does not utilize 2nd floor much  Patient reports prior to admission she was independent with ADL/IADLs, she drove to get groceries, to doctor's appointments etc  Patient reports having r/w at home  Patient  Reports no hx of HHC or STR but does report she has used OP PT in this past but does not recall where that was   Patient reports she has very supportive neighbors that assist her when she needs it  Preferred pharmacy is [facility] in [place], PCP is Dr Rocky Marvin  The patient was educated on the rehabilitation process including therapy program, the interdisciplinary team, and weekly team meeting schedule  Estimated length of stay of 10-14 days was reviewed with the patient as well as expectations of discussions of discharge planning  The role of the  was reviewed including providing care coordination, discharge planning and discharge facilitation  IMM was reviewed with the patient and a copy was provided for their reference  The patient verbalized understanding of the information provided and denied any further questions at this time  CM will continue to follow and assist the patient throughout their rehabilitation stay  Is the patient actively participating in therapies? yes  List any modifications to the treatment plan: None    Barriers Interventions   Pain in RLE Adjusting medications   Limited support at home Will maximize functional independence, provide education to available family members on recommended assistance needed at home   Blood pressures fluctuating, tachycardic TEDs, binder, will assess labs and need for fluids   Diabetes Monitoring blood sugars, adjusting meds as needed, providing education, nutrition consult   NWB on L hand Assess best adaptive equipment options and training, compensatory strategies   Decreased LE strength and ROM There-ex, manual therapies   Decreased memory (baseline) High repetition education, written instructions   FEI Stair training with patient and family     Is the patient making expected progress toward goals?  yes  List any update or changes to goals: None    Medical Goals: Patient will be medically stable for discharge to [facility] upon completion of rehab program and Patient will be able to manage medical conditions and comorbid conditions with medications and follow up upon completion of rehab program    Weekly Team Goals:   Rehab Team Goals  ADL Team Goal: Patient will be independent with ADLs with least restrictive device upon completion of rehab program  Transfer Team Goal: Patient will be independent with transfers with least restrictive device upon completion of rehab program  Locomotion Team Goal: Patient will be independent with locomotion with least restrictive device upon completion of rehab program    Discussion: Patient is new to the rehab program, with initial evaluations and assessments completed  She is currently functioning at a total assistance level for mobility and self care tasks  The team is expecting to be able to progress her to an independent level for most of her mobility and self care tasks and supervision for some  Overall the team anticipates she will need about 3 weeks to reach goals  The team will be reaching out to family members to begin providing recommendations over the next week  Anticipated Discharge Date:  [facility] Team Members Present: The following team members are supervising care for this patient and were present during this Weekly Team Conference      Physician: Dr Etheleen Holter, MD  : BATSHEVA Hernandez and Collins Francois DPT  Registered Nurse: Leni Briones RN  Physical Therapist: Lala Strickland DPT  Occupational Therapist: Amol Cesar MS, OTR/L

## 2022-10-18 NOTE — PROGRESS NOTES
Occupational Therapy Initial Evaluation     10/18/22 0700   Patient Data   Rehab Impairment Impairment of mobility, safety and Activities of Daily Living (ADLs) due to Orthopedic Disorders:  08 11  Unilateral Hip Fracture   Etiologic Diagnosis Closed fracture of trochanter of right femur   Date of Onset 10/11/22   Support System   Name Pt w/ local supportive nephew, Maritza Bro, who A w/ partial IADLs and transportation  Add'lly w/ supportive niece, Nik Ibrahim, who lives in [place]  Able to provide 24 hour supervision No   Able to provide physical help? No   Home Setup   Type of Home Multi Level   Number of Stairs 0   Number of Stairs in Home 12   In Home Hand Rail Bilateral   First Floor Bathroom Full;Tub;Combo;Grab Bars  (does not use shower, sponge bathes at baseline)   First Floor Bathroom Accessibility Grab bars by toilet;Grab bars in tub/shower; Shower chair   Second Floor Bathroom Full  (does not access)   First Floor Setup Available Yes   Home Modifications Necessary?   (TBD)   Available Equipment Roller Walker;Rollator;Single Point [place]; Shower Chair   Baseline Information   Transportation Family/friends drive  (nephew)   Prior Device(s) Used Rollator   Prior IADL Participation   Money Management Identify Money;Estimate Costs;Estimate Change;Combine Bills   Meal Preparation Full Participation   Laundry Full Participation   Home Cleaning Full Participation   Prior Level of Function   Self-Care 3  Independent - Patient completed the activities by him/herself, with or without an assistive device, with no assistance from a helper  Indoor-Mobility (Ambulation) 3  Independent - Patient completed the activities by him/herself, with or without an assistive device, with no assistance from a helper  Functional Cognition 3  Independent - Patient completed the activities by him/herself, with or without an assistive device, with no assistance from a helper  Prior Assistance Needed for Driving; Shopping   Prior Device Used Z   None of the above   Falls in the Last Year   Number of falls in the past 12 months 2   Type of Injury Associated with Fall Major injury  (causing admission)   Psychosocial   Psychosocial (WDL) X   Patient Behaviors/Mood Anxious; Appropriate for situation;Brightens with approach;Calm; Cooperative;Pleasant   Restrictions/Precautions   Precautions Bed/chair alarms;Cognitive; Fall Risk;Pain;Supervision on toilet/commode;Visual deficit   Weight Bearing Restrictions Yes   LUE Weight Bearing Per Order NWB  (L hand, okay through elbow)   RLE Weight Bearing Per Order WBAT   Braces or Orthoses Splint  (L 4th digit)   Pain Assessment   Pain Assessment Tool 0-10   Pain Score 9   Pain Location/Orientation Orientation: Right;Location: Leg;Location: Hip   Eating Assessment   Type of Assistance Needed Set-up / clean-up   Physical Assistance Level No physical assistance   Eating CARE Score 5   Oral Hygiene   Type of Assistance Needed Physical assistance   Physical Assistance Level 25% or less   Comment bed level, chair position  A to open containers   Oral Hygiene CARE Score 3   Tub/Shower Transfer   Reason Not Assessed Sponge Bath;Medical;Safety; Endurance;Balance   Shower/Bathe Self   Type of Assistance Needed Physical assistance   Physical Assistance Level Total assistance   Comment LB at bed level w/ pt able to bathe upper legs and suad, Mod Ax1 to roll and 2nd person to bathe buttocks  A to bathe lower legs  While seated EOB, Min A to steady trunk while pt able to bathe BUE and chest    Shower/Bathe Self CARE Score 1   Dressing/Undressing Clothing   Type of Assistance Needed Physical assistance   Physical Assistance Level 26%-50%   Comment Min A for trunk management, A for fasteners/buttons  Upper Body Dressing CARE Score 3   Type of Assistance Needed Physical assistance;Verbal cues   Physical Assistance Level Total assistance   Comment bed level  A to thread BLE, Mod A to roll w/ 2nd person for clothing management     Lower Body Dressing CARE Score 1   Putting On/Taking Off Footwear   Type of Assistance Needed Physical assistance   Physical Assistance Level Total assistance   Comment A to don/doff socks  Obtained knee high TEDS, however did not don 2* inc SBP  Putting On/Taking Off Footwear CARE Score 1   Toileting Hygiene   Type of Assistance Needed Physical assistance   Physical Assistance Level Total assistance   Comment bed level, Mod Ax1 for rolling w/ 2nd person for hygiene and clothing management  Toileting Hygiene CARE Score 1   Toilet Transfer   Comment unable to assess 2* safety and time constraints, plan to assess next session  Reason if not Attempted Safety concerns   Toilet Transfer CARE Score 88   Transfer Bed/Chair/Wheelchair   Comment Plan to assess next session   Reason if not Attempted Safety concerns   Chair/Bed-to-Chair Transfer CARE Score 88   Sit to Lying   Type of Assistance Needed Physical assistance   Physical Assistance Level Total assistance   Comment Ax1 trunk, Ax1 RLE management  Mod cues for technique  w/ bed rail and HOB slightly elevated   Sit to Lying CARE Score 1   Lying to Sitting on Side of Bed   Type of Assistance Needed Physical assistance   Physical Assistance Level Total assistance   Comment Ax1 trunk, Ax1 RLE management  Mod cues for technique  Lying to Sitting on Side of Bed CARE Score 1   Sit to Stand   Reason if not Attempted Safety concerns   Sit to Stand CARE Score 88   Comprehension   QI: Comprehension 3  Usually Understands: Understands most conversations, but misses some part/intent of message  Requires cues at times to understand  Expression   QI: Expression 4   Express complex messages without difficulty and with speech that is clear and easy to [place]   RUE Assessment   RUE Assessment WFL  (R-hand dominant)   Strength - RUE   R Gross Arm Strength 4/5   LUE Assessment   LUE Assessment X  (NWB to L hand, splint to 4th digit)   Strength - LUE   L Gross Arm Strength 4/5   Sensation Light Touch Partial deficits in the RLE;Partial deficits in the LLE   Additional Comments Reports dec sensation to b/l feet  Cognition   Overall Cognitive Status Impaired   Arousal/Participation Alert; Cooperative   Attention Attends with cues to redirect   Orientation Level Oriented to person;Oriented to situation;Oriented to time;Disoriented to place  ((+) month and year, (-) TIM, date)   Memory Decreased short term memory;Decreased recall of recent events;Decreased recall of precautions   Following Commands Follows one step commands with increased time or repetition   Comments Pt aware of STM impairment PTA  During eval noted w/ impaired STM, impaired insight, impaired safety awareness, and impaired problem-solving  Vision   Vision Comments Pt reports wearing glasses at baseline  L central vision loss, denies diplopia, blurriness, or field cut of R eye  Would benefit from enlarged therapy schedule as pt has difficulty w/ fine print  Objective Measure   OT Measure(s) SBP ranged 149-158, HR ranged 101-113 w/ light act  Pt reporting feelings of dizziness, add'lly limited by fatigue and significant RLE pain  RN present during session to provide medication and aware of elevated SBP  BP in ARC vitals  Discharge Information   Vocational Plan Retired/not working   Patient's Discharge Plan home w/ family support for IADL management   Patient's Rehab Expectations "To get rid of some of this pain so I can get better "   Barriers to Discharge Home Limited Family Support; Unsafe Home Setup; Decreased Cognitive Function;Decreased Strength;Decreased Endurance;Pain; Safety Considerations; Other  (ortho restrictions)   Impressions Pt is an 80 y/o female [facility] s/p mechanical fall sustaining R femur fx, req ORIF w/ IMN on 10/12 and now WBAT  Add'lly found to have L displaced 4th phalanx, now splinted and NWB to L hand   PMH includes CLL, CHF, HLD, HTN, DM-2, CKD-3, history of cognitive impairment, and osteoporosis  Pt reports completing ADLs, partial IADLs, and fxnl mobility at Independently w/ rollator  Pt's local nephew A w/ partial IADLs and transportation  Pt is currently functioning at overall TA for ADLs and TA for fxnl xfers, unable to fully assess xfer  Pt is limited by dec strength, dec endurance, impaired balance, significant RLE pain, impaired safety awareness, dec problem-solving, L hand NWB status, limited social support, unsafe home set-up, impaired STM, and significant ADL dysfunction  Pt will benefit from skilled OT services w/ focus on above barriers, assess need for DME, provide pt/family edu, and maximize fxnl indep to return to PLOF in 3 week ELOS to meet primarily set-up to independent ADL goals     OT Therapy Minutes   OT Time In 0700   OT Time Out 0840   OT Total Time (minutes) 100   OT Mode of treatment - Individual (minutes) 100   OT Mode of treatment - Concurrent (minutes) 0   OT Mode of treatment - Group (minutes) 0   OT Mode of treatment - Co-treat (minutes) 0   OT Mode of Treatment - Total time(minutes) 100 minutes   OT Cumulative Minutes 100   Cumulative Minutes   Cumulative therapy minutes 100     Tigist Starr MS, OTR/L

## 2022-10-18 NOTE — CASE MANAGEMENT
Met with the patient today to complete CM open  Patient lives alone in a 2 story home, she reports she has a first floor set up and does not utilize 2nd floor much  Patient reports prior to admission she was independent with ADL/IADLs, she drove to get groceries, to doctor's appointments etc  Patient reports having r/w at home  Patient  Reports no hx of HHC or STR but does report she has used OP PT in this past but does not recall where that was  Patient reports she has very supportive neighbors that assist her when she needs it  Preferred pharmacy is [facility] in [place], PCP is Dr Frnak Matias  The patient was educated on the rehabilitation process including therapy program, the interdisciplinary team, and weekly team meeting schedule  Estimated length of stay of 10-14 days was reviewed with the patient as well as expectations of discussions of discharge planning  The role of the  was reviewed including providing care coordination, discharge planning and discharge facilitation  IMM was reviewed with the patient and a copy was provided for their reference  The patient verbalized understanding of the information provided and denied any further questions at this time  CM will continue to follow and assist the patient throughout their rehabilitation stay

## 2022-10-18 NOTE — PROGRESS NOTES
Internal Medicine Progress Note  Patient: Valeria Espinoza  Age/sex: 79 y o  female  Medical Record #: [id number]      ASSESSMENT/PLAN: (Interval History)  Valeria Espinoza is seen and examined and management for following issues:    S/p ORIF with IM nailing of the Rt femur  · Pain control and therapy per primary service  · Monitor incision  · Renal dosed Lovenox for DVT prophylaxis  · Follow-up with Ortho 2 weeks post-op      Left 4th phalanx fracture  · Splint and NWB   · Pain control per PMR      HTN  · Home medications: Hyzaar 50/12 5mg daily/Lopressor 25mg every 12 hours/Lasix 40mg daily - for heart failure  · Here: metoprolol 25mg bid (increased 10/18)  · Will monitor and adjust medications as needed    Anemia  · Secondary to trauma  · Repeat cbc thursday     DM type 2  · Home: Levemir 20U at bedtime/Humalog 10U with meals  · Here: Lantus 30U at bedtime/Humalog 12U with meals  · Continue accuchecks and SSI  · Adjust insulin as needed      Chronic heart failure  · Pt has been off diuretics  · Currently appear euvolemic  · Will resume when necessary  · ECHO with an EF of 45%  Grade 2 diastolic dysfunction      CLL  · Recent diagnosis  · Outpt follow-up with Hematology/Oncology  · Baseline WBCs 14-16K  · Had mild bump likely reactive secondary to surgery  · Repeat cbc Thursday am     DC planning: TBD  The above assessment and plan was reviewed and updated as determined by my evaluation of the patient on 10/18/2022      Labs:   Results from last 7 days   Lab Units 10/16/22  0349 10/15/22  0515   WBC Thousand/uL 24 20* 19 42*   HEMOGLOBIN g/dL 8 7* 10 3*   HEMATOCRIT % 27 3* 32 4*   PLATELETS Thousands/uL 181 150     Results from last 7 days   Lab Units 10/16/22  0349 10/15/22  0515   SODIUM mmol/L 135 135   POTASSIUM mmol/L 4 3 3 9   CHLORIDE mmol/L 102 101   CO2 mmol/L 27 29   BUN mg/dL 26* 28*   CREATININE mg/dL 0 89 0 86   CALCIUM mg/dL 8 6 8 9     Results from last 7 days   Lab Units 10/12/22  0820 HEMOGLOBIN A1C % 7 6*     Results from last 7 days   Lab Units 10/11/22  1900   INR  0 89     Results from last 7 days   Lab Units 10/18/22  0856 10/18/22  0657 10/17/22  2050   POC GLUCOSE mg/dl 284* 195* 77       Review of Scheduled Meds:  Current Facility-Administered Medications   Medication Dose Route Frequency Provider Last Rate   • acetaminophen  325 mg Oral Q6H PRN Agustina Nelson MD     • acetaminophen  975 mg Oral [facility] Agustina Nelson MD     • atorvastatin  20 mg Oral Daily With Genia Juarez MD     • bisacodyl  10 mg Rectal Daily PRN Agustina Nelson MD     • docusate sodium  100 mg Oral BID Agustina Nelson MD     • enoxaparin  40 mg Subcutaneous Q24H [facility] Yanely Holden MD     • gabapentin  100 mg Oral HS Agustina Nelson MD     • insulin glargine  28 Units Subcutaneous HS Agustina Nelson MD     • insulin lispro  1-5 Units Subcutaneous HS Agustina Nelson MD     • insulin lispro  1-6 Units Subcutaneous TID AC Yanely Holden MD     • insulin lispro  12 Units Subcutaneous TID With Meals Agustina Nelson MD     • lidocaine  1 patch Topical Daily Agustina Nelson MD     • meclizine  12 5 mg Oral Q8H PRN Agustina Nelson MD     • metoprolol tartrate  12 5 mg Oral Q12H [facility] Yanely Holden MD     • naloxone  0 04 mg Intravenous Q1MIN PRN Agustina Nelson MD     • oxyCODONE  2 5 mg Oral Q4H PRN Agustina Nelson MD     • oxyCODONE  5 mg Oral Q4H PRN Agustina Nelson MD     • polyethylene glycol  17 g Oral Daily PRN Agustina Nelson MD     • senna-docusate sodium  1 tablet Oral HS Agustina Nelson MD         Subjective/ HPI: Patient seen and examined  Patients overnight issues or events were reviewed with nursing or staff during rounds or morning huddle session  New or overnight issues include the following:     Pt seen in her room   Somewhat sedated this am however nursing states she did not sleep well and had some oxycodone for pain this am      ROS:   A 10 point ROS was performed; negative except as noted above  Imaging:     No orders to display       *Labs /Radiology studies Reviewed  *Medications  reviewed and reconciled as needed  *Please refer to order section for additional ordered labs studies  *Case discussed with primary attending during morning huddle case rounds    Physical Examination:  Vitals:   Vitals:    10/18/22 0720 10/18/22 0730 10/18/22 0733 10/18/22 0829   BP: 149/65 154/67 158/74 139/61   BP Location: Left arm Left arm Left arm    Pulse: 101 (!) 113 (!) 109 (!) 109   Resp:       Temp:       TempSrc:       SpO2:       Weight:       Height:           GEN: No apparent distress, interactive  NEURO: Alert and oriented x3  HEENT: Pupils are equal and reactive, EOMI, mucous membranes are moist, face symmetrical  CV: S1 S2 regular, no MRG, no peripheral edema noted  RESP: Lungs are clear bilaterally, no wheezes, rales or rhonchi noted, on room air, respirations easy and non labored  GI: Flat, soft non tender, non distended; +BS x4  : Voiding without difficulty  MUSC: Moves all extremities; except RLE; L hand with splint in place  SKIN: pink, warm and dry, normal turgor, no rashes, lesions      The above physical exam was reviewed and updated as determined by my evaluation of the patient on 10/18/2022  Invasive Devices  Report    None                    VTE Pharmacologic Prophylaxis: Enoxaparin  Code Status: Level 1 - Full Code  Current Length of Stay: 1 day(s)      Total time spent:  30 minutes with more than 50% spent counseling/coordinating care  Counseling includes discussion with patient re: progress  and discussion with patient of his/her current medical state/information  Coordination of patient's care was performed in conjunction with primary service  Time invested included review of patient's labs, vitals, and management of their comorbidities with continued monitoring   In addition, this patient was discussed with medical team including physician and advanced extenders  The care of the patient was extensively discussed and appropriate treatment plan was formulated unique for this patient  ** Please Note:  voice to text software may have been used in the creation of this document   Although proof errors in transcription or interpretation are a potential of such software**

## 2022-10-18 NOTE — PROGRESS NOTES
OT LTGs     10/18/22 0700   Rehab Team Goals   ADL Team Goal Patient will be independent with ADLs with least restrictive device upon completion of rehab program   Rehab Team Interventions   OT Interventions Self Care;Home Management; Therapeutic Exercise;Cognitive Retraining;Energy Conservation;Patient/Family Education   Eating Goal   Eating Goal 06  Independent - Patient completes the activity by him/herself with no assistance from a helper  Assist Device Partial Dentures   Meal Complete All meals   Status Target goal - three weeks   Interventions Optimal Position   Grooming Goal   Oral Hygiene Goal 06  Independent - Patient completes the activity by him/herself with no assistance from a helper  Task Wash/Dry Face;Wash/Dry Hands;Brush Teeth;Comb Hair;Initiate Task;Complete Groom   Environment Stand at [employer] sit; Seated in Chair   Status Target goal - three weeks   Intervention Assistive Device; Neuromuscular Education;Balance Work; Therapeutic Exercise; Tolerance Work   Tub/Shower Transfer Goal   Method Shower Stall  (REHAB PURPOSES ONLY as pt sponge bathes at baseline  GOAL: CGA)   Assist Device Seat with Back;Tub Bench;Grab Bar;Hand Held Shower   Status Target goal - three weeks   Interventions Assistive Device; Neuromuscular Education;ADL Training   Bathing Goal   Shower/bathe self Goal 05  Setup or clean-up assistance - [place] SETS UP or CLEANS UP, patient completes activity  [place] assists only prior to or following the activity  Environment Seated;Standing;Sponge Bath; Shower   Adaptive Equipment Reacher;Long Handle Sponge;Seat with back;Grab Bar;Transfer bench;Hand Held [employer]   Status Target goal - three weeks   Intervention ADL Training;Assistive Device; Neuromuscular Education; Therapeutic Exercise   Upper Body Dressing Goal   Upper body dressing Goal 06  Independent - Patient completes the activity by him/herself with no assistance from a helper  Task Upper Body;Arms in/out; Over Head;Button Down; Fasteners   Environment Seated   Status Target goal - three weeks   Intervention Assistive Device;Balance Work;Neuromuscular Education; Therapeutic Exercise; Tolerance Work   Lower Body Dressing Goal   Lower body dressing Goal 06  Independent - Patient completes the activity by him/herself with no assistance from a helper  Putting on/taking off footwear Goal 06  Independent - Patient completes the activity by him/herself with no assistance from a helper  Task Lower Body; Anti-Embolic;Shoe/Slipper;Socks;Pants; Undergarment; Fasteners   Adaptive Equipment Reacher;Sock Aide; Shoe Horn;Elastic Laces;Dressing Stick   Environment Seated;Standing   Status Target goal - three weeks   Intervention Assistive Device;Balance Work;Neuromuscular Education; Therapeutic Exercise; Tolerance Work   Toileting Transfer Goal   Toilet transfer Goal 06  Independent - Patient completes the activity by him/herself with no assistance from a helper  Assistive Device Shruthi Hanna Mckeon; Wheelchair;Bedside Commode;Raised Toilet Seat   Status Target goal - three weeks   Intervention ADL Training;Balance Work;Assistive Device   Toileting Goal   Toileting hygiene Goal 06  Independent - Patient completes the activity by him/herself with no assistance from a helper     Task Pants Up;Hygiene;Pants Down   Safety Balance;Use a Bedside Commode at Night;Use a Bedside Commode during day   Status Target goal - three weeks   Intervention ADL Training;Balance Work;Assistive Device   Meal Prep and Kitchen Mobility   Assist Level Independent  (light meal prep)   Status Target goal - three weeks   Medication Management   Assist Level Independent   Status Target goal - three weeks     Tigist Starr MS, OTR/L

## 2022-10-18 NOTE — PCC PHYSICAL THERAPY
10/18/22  Pt is a 79year old female s/p Surgical fixation of right intertrochanteric femur fracture with long intramedullary nail due a fall at home  Barriers to functional progress and overall safety continues to be pain with dec standing tolerance and balance, dec compliance with NWB precaution on L hand, dec cognition with hallucination, gait dysfunction, impaired strength and ortho BPs so pt is not able to initiate and complete bed/chair/toilet/car transfers, household distance amb task without 1-2 person assist  Due to aforementioned impairments pt still not safe to initiate stair training so still at high risk for falls and inc caregiver burden  Pt will benefit from continued skilled therapy intervention to improve overall functions and facilitate a safe d/c pending medical stability  10-31-22  Pt making functional gains past few days but has been limited and inconsistent due to blood pressure issues/ pain/ extreme fatigue, and hallucinations last week  Pt currently only walking 30 feet with needing chair follow for safety (ambulation currently is non functional)  Pt leans very heavy on L elbow with poor WB on RLE  Pain and weakness limits pts safety and distances  Pt needs assistance for all mobility aspects and has very limited help at home  Concerned on pt reaching goals in allotted time  Pt also presents with much decrease initiation and cognition so questionable if pt would be safe alone at [facility] level as well  11-7-22   Pt making functional progress this past week, improving transfers and ambulation distance  Pt still remains fall risk with right knee buckling intermittently, narrow WOOD and scissoring with turns especially to her left  Pt now using knee brace for right knee for ambulation, walking up to 75ft with PFRW  Pt still requires cueing for reminders for safety with transfers, CGA to MiN A for mobility for balance   Continued skilled therapy recommended to maximize her safety and function

## 2022-10-18 NOTE — PCC NURSING
S/p ORIF with IM nailing of the Rt femur - Pain control and therapy per primary service, PMR adjusted pain meds d/t sedation, Renal dosed Lovenox for DVT prophylaxis, Follow-up with Ortho as scheduled  Left 4th phalanx fracture - splint and NWB, Keeps removing splint  HTN - Home: Hyzaar 50/12 5mg qd/Lopressor 25mg q12 hours/Lasix 40mg qd (for heart failure), Here: Lopressor 25mg BID/losartan 50mg daily, improving  Anemia - Secondary to trauma, Stable  DM type 2 - Home: Levemir 20U at bedtime/Humalog 10U with meals, Here: Levemir 20U at bedtime/Humalog 5U tid, BS improving  Chronic systolic/diastolic heart failure - Pt has been off diuretics, ECHO with an EF of 45%  Grade 2 diastolic dysfunction, euvolemic  CLL - Recent diagnosis, Outpt follow-up with Hematology/Oncology, Baseline WBCs 14-16K, Had mild bump likely reactive secondary to surgery/UTI, Stable  Suspected UTI - Had positive UA  Culture with >100,000 Klebsiella, s/p Bactrim, Urinary retention improved with tx of UTI  Delirium - Intermittent confusion and hallucinations  Pt requires alarms for safety  This week we will continue to monitor vital signs and lab results  Pt will continue to work on increase balance and strength, maintain skin integrity by turning/reoposition, and offload pressure  We will continue to increase safety awareness and keep pt free form falls  We will continue to monitor for constipation and medicate per bowel protocol  We will continue to encourage independence with ADL's

## 2022-10-18 NOTE — PLAN OF CARE
Problem: Prexisting or High Potential for Compromised Skin Integrity  Goal: Skin integrity is maintained or improved  Description: INTERVENTIONS:  - Identify patients at risk for skin breakdown  - Assess and monitor skin integrity  - Assess and monitor nutrition and hydration status  - Monitor labs   - Assess for incontinence   - Turn and reposition patient  - Assist with mobility/ambulation  - Relieve pressure over bony prominences  - Avoid friction and shearing  - Provide appropriate hygiene as needed including keeping skin clean and dry  - Evaluate need for skin moisturizer/barrier cream  - Collaborate with interdisciplinary team   - Patient/family teaching  - Consider wound care consult   Outcome: Progressing     Problem: PAIN - ADULT  Goal: Verbalizes/displays adequate comfort level or baseline comfort level  Description: Interventions:  - Encourage patient to monitor pain and request assistance  - Assess pain using appropriate pain scale  - Administer analgesics based on type and severity of pain and evaluate response  - Implement non-pharmacological measures as appropriate and evaluate response  - Consider cultural and social influences on pain and pain management  - Notify physician/advanced practitioner if interventions unsuccessful or patient reports new pain  Outcome: Progressing     Problem: INFECTION - ADULT  Goal: Absence or prevention of progression during hospitalization  Description: INTERVENTIONS:  - Assess and monitor for signs and symptoms of infection  - Monitor lab/diagnostic results  - Monitor all insertion sites, i e  indwelling lines, tubes, and drains  - Monitor endotracheal if appropriate and nasal secretions for changes in amount and color  - [place] appropriate cooling/warming therapies per order  - Administer medications as ordered  - Instruct and encourage patient and family to use good hand hygiene technique  - Identify and instruct in appropriate isolation precautions for identified infection/condition  Outcome: Progressing  Goal: Absence of fever/infection during neutropenic period  Description: INTERVENTIONS:  - Monitor WBC    Outcome: Progressing     Problem: SAFETY ADULT  Goal: Patient will remain free of falls  Description: INTERVENTIONS:  - Educate patient/family on patient safety including physical limitations  - Instruct patient to call for assistance with activity   - Consult OT/PT to assist with strengthening/mobility   - Keep Call bell within reach  - Keep bed low and locked with side rails adjusted as appropriate  - Keep care items and personal belongings within reach  - Initiate and maintain comfort rounds  - Make Fall Risk Sign visible to staff  - Offer Toileting everyHours, in advance of need  - Initiate/Maintain arm  - Obtain necessary fall risk management equipment:  - Apply yellow socks and bracelet for high fall risk patients  - Consider moving patient to room near nurses station  Outcome: Progressing  Goal: Maintain or return to baseline ADL function  Description: INTERVENTIONS:  -  Assess patient's ability to carry out ADLs; assess patient's baseline for ADL function and identify physical deficits which impact ability to perform ADLs (bathing, care of mouth/teeth, toileting, grooming, dressing, etc )  - Assess/evaluate cause of self-care deficits   - Assess range of motion  - Assess patient's mobility; develop plan if impaired  - Assess patient's need for assistive devices and provide as appropriate  - Encourage maximum independence but intervene and supervise when necessary  - Involve family in performance of ADLs  - Assess for home care needs following discharge   - Consider OT consult to assist with ADL evaluation and planning for discharge  - Provide patient education as appropriate  Outcome: Progressing  Goal: Maintains/Returns to pre admission functional level  Description: INTERVENTIONS:  - Perform BMAT or MOVE assessment daily    - Set and communicate daily mobility goal to care team and patient/family/caregiver     - Collaborate with rehabilitation services on mobility goals if consulted  - Perform Range of Rajat  - Out of bed for toileting  - Record patient progress and toleration of activity level   Outcome: Progressing     Problem: DISCHARGE PLANNING  Goal: Discharge to home or other facility with appropriate resources  Description: INTERVENTIONS:  - Identify barriers to discharge w/patient and caregiver  - Arrange for needed discharge resources and transportation as appropriate  - Identify discharge learning needs (meds, wound care, etc )  - Arrange for interpretive services to assist at discharge as needed  - Refer to Case Management Department for coordinating discharge planning if the patient needs post-hospital services based on physician/advanced practitioner order or complex needs related to functional status, cognitive ability, or social support system  Outcome: Progressing     Problem: Potential for Falls  Goal: Patient will remain free of falls  Description: INTERVENTIONS:  - Educate patient/family on patient safety including physical limitations  - Instruct patient to call for assistance with activity   - Consult OT/PT to assist with strengthening/mobility   - Keep Call bell within reach  - Keep bed low and locked with side rails adjusted as appropriate  - Keep care items and personal belongings within reach  - Initiate and maintain comfort rounds  - Make Fall Risk Sign visible to staff  - Offer Toileting everyHours, in advance of need  - Initiate/Maintain larm  - Obtain necessary fall risk management equipment:  - Apply yellow socks and bracelet for high fall risk patients  - Consider moving patient to room near nurses station  Outcome: Progressing

## 2022-10-18 NOTE — PROGRESS NOTES
PM&R PROGRESS NOTE:  Oscar Arndt 79 y o  female MRN: [id number]  Unit/Bed#: -26 Encounter: [id number]        Rehab Diagnosis: Impairment of mobility, safety and Activities of Daily Living (ADLs) due to Orthopedic Disorders:  08 11  Unilateral Hip Fracture    SUBJECTIVE: Patient seen face to face  Still having pain  Very sleepy on Oxycodone today  Participation limited due to drowsiness  ASSESSMENT: Stable, progressing      PLAN:    Rehabilitation  · Functional deficits: impaired mobility, self care, WBAT RLE, NWB L HAND  • Continue current rehabilitation plan of care to maximize function      • Functional update: evals in progress  • Estimated Discharge: TBD    DVT prophylaxis  · Managed on SQ Lovenox 40 mg daily - will need education for home    Bladder plan  • Continent    Bowel plan  • Continent      * Intertrochanteric fracture of right femur [facility])  Assessment & Plan  Traumatic fall  Sustained a right intertrochanteric fracture  Taken to OR by Dr Andrew Bowie on 10/12/22 for ORIF and IM nailing  WBAT RLE  DVT ppx with SQ Lovenox  Monitor 3 incisions  Continue acute rehabilitation program  POD 14 = 10/26/22 - x-rays and ortho follow-up due  Follow-up with Dr Andrew Bowie as outpatient       Acute pain  Assessment & Plan  Nociceptive/neuropathic post op pain in RLE managed on multimodal regimen:   · Norco 1 tab scheduled TID with meals  · Tylenol PRN for mild pain, Norco for severe pain  · Gabapentin 100 mg QHS  · Topical Lidoderm patches  · Topical ICE    CLL (chronic lymphocytic leukemia) (HCC)  Assessment & Plan  Chronic leukocytosis  Monitor   Follow-up with heme onc as outpatient     Displaced fracture of middle phalanx of left ring finger, initial encounter for closed fracture  Assessment & Plan  Traumatic fracture  4th left finger   Treated non-operatively by Orthopedics  Continue supportive splint  ICE for swelling  NWB L hand    Hypertension  Assessment & Plan  At home: Lopressor 25 mg Q 12 hours, Hyzaar (Losartan/HCTZ) 50mg/12 5mg daily  Here: Lopressor 25 mg daily (increased 10/18)  Will monitor BP closely  Check orthostatics  Adjust medications accordingly  TEDs daily /Abdominal binder PRN    Chronic systolic heart failure (HCC)  Assessment & Plan  Grade 2 DD  At home: Lasix 40 mg daily  Here: Slightly hypovolemic - HOLD Lasix  Encourage fluids by mouth  Restart Lasix when able with K Dur supplement        HLD (hyperlipidemia)  Assessment & Plan  Restarted on home Lipitor 20 mg QPM    CKD (chronic kidney disease) stage 3, GFR 30-59 ml/min (HCC)  Assessment & Plan  Stable  Avoid nephrotoxic agents   Monitor 2x/week    Type 2 diabetes mellitus (HCC)  Assessment & Plan  Lab Results   Component Value Date    HGBA1C 7 6 (H) 10/12/2022     At home: Levemir 20 units QHS, Humalog 10 units with meals  Here: Lantus 28 units QHS, Humalog 12 units with meals  In acute care required an insulin drip transiently  Continue CCD        Appreciate IM consultants medical co-management  Labs, medications, and imaging personally reviewed  ROS:  A ten point review of systems was completed on 10/18/22 and pertinent positives are listed in subjective section  All other systems reviewed were negative  OBJECTIVE:   /58 (BP Location: Left arm)   Pulse (!) 109   Temp 98 3 °F (36 8 °C) (Oral)   Resp 18   Ht 5' 6" (1 676 m)   Wt 68 4 kg (150 lb 11 2 oz)   SpO2 95%   BMI 24 32 kg/m²       Physical Exam  Vitals and nursing note reviewed  Constitutional:       General: She is not in acute distress  HENT:      Head: Normocephalic and atraumatic  Nose: Nose normal       Mouth/Throat:      Mouth: Mucous membranes are moist    Eyes:      Conjunctiva/sclera: Conjunctivae normal    Cardiovascular:      Rate and Rhythm: Normal rate and regular rhythm  Pulses: Normal pulses  Pulmonary:      Effort: Pulmonary effort is normal       Breath sounds: Normal breath sounds  No wheezing or rales  Abdominal:      General: Bowel sounds are normal  There is no distension  Palpations: Abdomen is soft  Tenderness: There is no abdominal tenderness  Musculoskeletal:         General: Swelling (perincisional edema only) present  Cervical back: Neck supple  Skin:     General: Skin is warm  Comments: Incision visualized with staples x 3   Neurological:      Mental Status: She is alert and oriented to person, place, and time     Psychiatric:         Mood and Affect: Mood normal           Lab Results   Component Value Date    WBC 24 20 (H) 10/16/2022    HGB 8 7 (L) 10/16/2022    HCT 27 3 (L) 10/16/2022     (H) 10/16/2022     10/16/2022     Lab Results   Component Value Date    SODIUM 135 10/16/2022    K 4 3 10/16/2022     10/16/2022    CO2 27 10/16/2022    BUN 26 (H) 10/16/2022    CREATININE 0 89 10/16/2022    GLUC 186 (H) 10/16/2022    CALCIUM 8 6 10/16/2022     Lab Results   Component Value Date    INR 0 89 10/11/2022    PROTIME 12 3 10/11/2022           Current Facility-Administered Medications:   •  acetaminophen (TYLENOL) tablet 650 mg, 650 mg, Oral, Q6H PRN, Eric Lovell MD  •  atorvastatin (LIPITOR) tablet 20 mg, 20 mg, Oral, Daily With Zak Waldrop MD, 20 mg at 10/17/22 1753  •  bisacodyl (DULCOLAX) rectal suppository 10 mg, 10 mg, Rectal, Daily PRN, Eric Lovell MD  •  docusate sodium (COLACE) capsule 100 mg, 100 mg, Oral, BID, Eric Lovell MD, 100 mg at 10/18/22 0814  •  enoxaparin (LOVENOX) subcutaneous injection 40 mg, 40 mg, Subcutaneous, Q24H [facility], Eric Lovell MD, 40 mg at 10/18/22 0027  •  gabapentin (NEURONTIN) capsule 100 mg, 100 mg, Oral, HS, Eric Lovell MD, 100 mg at 10/17/22 2211  •  HYDROcodone-acetaminophen (NORCO) 5-325 mg per tablet 1 tablet, 1 tablet, Oral, TID With Meals, Eric Lovell MD  •  HYDROcodone-acetaminophen (NORCO) 5-325 mg per tablet 1 tablet, 1 tablet, Oral, Q4H PRN, Yanna Kothari Coretta Jimenez MD  •  insulin glargine (LANTUS) subcutaneous injection 30 Units 0 3 mL, 30 Units, Subcutaneous, HS, BRINDA Kramer  •  insulin lispro (HumaLOG) 100 units/mL subcutaneous injection 1-5 Units, 1-5 Units, Subcutaneous, HS, Yanely Zuñiga MD  •  insulin lispro (HumaLOG) 100 units/mL subcutaneous injection 1-6 Units, 1-6 Units, Subcutaneous, TID AC, Eric Lovell MD, 3 Units at 10/18/22 1247  •  insulin lispro (HumaLOG) 100 units/mL subcutaneous injection 12 Units, 12 Units, Subcutaneous, TID With Meals, Eric Lovell MD, 12 Units at 10/18/22 0859  •  lidocaine (LIDODERM) 5 % patch 2 patch, 2 patch, Topical, Daily, Eric Lovell MD  •  meclizine (ANTIVERT) tablet 12 5 mg, 12 5 mg, Oral, Q8H PRN, Eric Lovell MD  •  metoprolol tartrate (LOPRESSOR) tablet 25 mg, 25 mg, Oral, Q12H T[place]SA, BRINDA Kramer  •  naloxone (NARCAN) 0 04 mg/mL syringe 0 04 mg, 0 04 mg, Intravenous, Q1MIN PRN, Eric Lovell MD  •  polyethylene glycol (MIRALAX) packet 17 g, 17 g, Oral, Daily PRN, Eric Lovell MD  •  senna-docusate sodium (SENOKOT S) 8 6-50 mg per tablet 1 tablet, 1 tablet, Oral, HS, Eric Lovell MD, 1 tablet at 10/17/22 2211    Past Medical History:   Diagnosis Date   • Benign adenomatous polyp of large intestine    • CHF (congestive heart failure) ([facility] )    • Diabetes mellitus ([facility] )    • Hyperlipemia    • Hypertension    • Osteoarthritis    • TIA (transient ischemic attack)        Patient Active Problem List    Diagnosis Date Noted   • Intertrochanteric fracture of right femur ([facility] ) 10/11/2022   • Acute pain 10/17/2022   • CLL (chronic lymphocytic leukemia) ([facility] ) 10/13/2022   • Displaced fracture of middle phalanx of left ring finger, initial encounter for closed fracture 10/11/2022   • Hypertension 08/08/2021   • Ambulatory dysfunction 08/05/2021   • Suspected Mild cognitive impairment 08/05/2021   • Lymphadenopathy 11/06/2020   • Elevated liver enzymes 11/06/2020   • Fall 11/05/2020   • Type 2 diabetes mellitus ([facility] ) 11/05/2020   • CKD (chronic kidney disease) stage 3, GFR 30-59 ml/min ([facility]) 11/05/2020   • HLD (hyperlipidemia) 11/05/2020   • OA (osteoarthritis) 11/05/2020   • Chronic systolic heart failure ([facility] ) 11/05/2020          Yanely Simpson    Total time spent:  35 min with more than 50% spent counseling/coordinating care  Counseling includes discussion with patient re: progress and discussion with patient of his/her current medical/functional state/information  Coordination of patient's care was performed in conjunction with consulting services  Time invested included review of patient's labs, vitals, and management of their comorbidities with continued monitoring  The care of the patient was extensively discussed and appropriate treatment plan was formulated unique for this patient  ** Please Note:  voice to text software may have been used in the creation of this document   Although proof errors in transcription or interpretation are a potential of such software**

## 2022-10-18 NOTE — PROGRESS NOTES
[facility] PT Initial Evaluation   10/18/22 1230   Patient Data   Rehab Impairment Impairment of mobility, safety and Activities of Daily Living (ADLs) due to Orthopedic Disorders:  08 11  Unilateral Hip Fracture   Etiologic Diagnosis Closed fracture of trochanter of right femur   Date of Onset 10/11/22   Support System   Able to provide 24 hour supervision No   Able to provide physical help? No   Home Setup   Type of Home Multi Level   Method of Entry Curb  (2" doorway step front entrance, 4" doorway step back entrance)   Number of Stairs 1   Number of Stairs in Home 12   In Home Hand Rail Left  (to the attic but does not have to access)   First Floor Bathroom Full;Tub;Combo   First Floor Bathroom Accessibility Grab bars by toilet;Grab bars in tub/shower   Second Floor Bathroom Full   First Floor Setup Available Yes   Available Equipment Rollator; Shower Chair;Single [employer] [address];    Prior Device(s) Used Rollator   Prior Level of Function   Indoor-Mobility (Ambulation) 3  Independent - Patient completed the activities by him/herself, with or without an assistive device, with no assistance from a helper  Stairs 3  Independent - Patient completed the activities by him/herself, with or without an assistive device, with no assistance from a helper  Falls in the Last Year   Number of falls in the past 12 months 2   Type of Injury Associated with Fall Major injury   Patient Preference   Nickname (Patient Preference) Luis Alfredo Neely   Psychosocial   Psychosocial (WDL) X   Patient Behaviors/Mood Anxious;Pleasant; Cooperative   Restrictions/Precautions   Precautions Cognitive; Fall Risk;Bed/chair alarms;Pain;Supervision on toilet/commode;Visual deficit   Weight Bearing Restrictions Yes   LUE Weight Bearing Per Order NWB  (L hand)   RLE Weight Bearing Per Order WBAT   Braces or Orthoses Splint  (L 4th finger)   Pain Assessment   Pain Assessment Tool 0-10   Pain Score 10 - Worst Possible Pain  (3/10 at rest, 10/10 with WB)   Pain Location/Orientation Orientation: Right;Location: Hip   Pain Radiating Towards knee and ankle   Pain Onset/Description Onset: Ongoing;Frequency: Constant/Continuous; Descriptor: Aching  (aching with sudden jolt)   Patient's Stated Pain Goal No pain   Hospital Pain Intervention(s) Medication (See MAR); Repositioned;Rest;Relaxation technique; Emotional support   Putting On/Taking Off Footwear   Type of Assistance Needed Physical assistance;Verbal cues   Physical Assistance Level Total assistance   Putting On/Taking Off Footwear CARE Score 1   Toileting Hygiene   Type of Assistance Needed Physical assistance;Verbal cues; Adaptive equipment   Physical Assistance Level Total assistance   Comment 2 person assist bed level   [address] Score 1   Toilet Transfer   Comment unable to use BSC and lightheadness due to pain so used bedpan   Toileting   Able to Pull Clothing down no, up no  Bowel/Bladder Management   QI: Bladder Continence 0  Always continent (no documented incontinence)   Transfer Bed/Chair/Wheelchair   Positioning Concerns Cognition   Limitations Noted In Balance;Confidence; Endurance;Pain Management;Problem Solving;LE Strength;UE Strength   Adaptive Equipment Transfer Board   Findings max of 1 and min of 2nd w/c to bed, mod bed to w/c with SBA of 2nd person for safety slide board  Demos poor compliance of NWB precaution on L hand even when using sling and slide board   Type of Assistance Needed Physical assistance;Verbal cues; Adaptive equipment   Physical Assistance Level Total assistance   Chair/Bed-to-Chair Transfer CARE Score 1   Roll Left and Right   Type of Assistance Needed Physical assistance;Verbal cues; Adaptive equipment   Physical Assistance Level Total assistance   Comment unable to complete without bedrail, max of 1 with bedrail   Roll Left and Right CARE Score 1   Sit to Lying   Type of Assistance Needed Physical assistance;Verbal cues; Adaptive equipment   Physical Assistance Level Total assistance   Comment unable without bedrail, 2 person with bedrail & assist on trunk and LE on initial transfer then progress to max of 1 on 2nd trial   Sit to Lying CARE Score 1   Lying to Sitting on Side of Bed   Type of Assistance Needed Physical assistance;Verbal cues; Adaptive equipment   Physical Assistance Level Total assistance   Comment unable without bedrail, assist x 2 with HOB flat , max of 1 with rail and HOB elevated   Lying to Sitting on Side of Bed CARE Score 1   Sit to Stand   Type of Assistance Needed Physical assistance;Verbal cues; Adaptive equipment   Physical Assistance Level Total assistance   Comment max of 1 and min of 2nd person to prevent pt using L UE due to NWB precaution   Sit to Stand CARE Score 1   Picking Up Object   Reason if not Attempted Safety concerns   Picking Up Object CARE Score 88   Car Transfer   Reason if not Attempted Safety concerns   Car Transfer CARE Score 88   Walk 10 Feet   Type of Assistance Needed Physical assistance;Verbal cues; Adaptive equipment   Physical Assistance Level Total assistance   Comment mod of 1 with min of 2nd person to ensure pt compliance with NWB precaution on L hand using L PFRW  x 15'  (VC)   Walk 10 Feet CARE Score 1   Walk 50 Feet with Two Turns   Reason if not Attempted Safety concerns   Walk 50 Feet with Two Turns CARE Score 88   Walk 150 Feet   Reason if not Attempted Safety concerns   Walk 150 Feet CARE Score 88   Walking 10 Feet on Uneven Surfaces   Reason if not Attempted Safety concerns   Walking 10 Feet on Uneven Surfaces CARE Score 88   Wheelchair mobility   Findings used w/c mobility this session as strengthening and flexibility exercise using bilat LE and R UE x 50' but anticipate pt will progress to be amb at d/c   Wheel 50 Feet with Two Turns   Reason if not Attempted Activity not applicable   Wheel 50 Feet with Two Turns CARE Score 9   Wheel 150 Feet Reason if not Attempted Activity not applicable   Wheel 389 Feet CARE Score 9   Curb or Single Stair   Reason if not Attempted Safety concerns   1 Step (Curb) CARE Score 88   4 Steps   Reason if not Attempted Safety concerns   4 Steps CARE Score 88   12 Steps   Reason if not Attempted Safety concerns   12 Steps CARE Score 88   Comprehension   QI: Comprehension 3  Usually Understands: Understands most conversations, but misses some part/intent of message  Requires cues at times to understand  Expression   QI: Expression 4  Express complex messages without difficulty and with speech that is clear and easy to [place]   RLE Assessment   RLE Assessment X  (pain and weakness)   Strength RLE   RLE Overall Strength 3-/5  (hip and knee mm, 3+/5 ankle mm)   LLE Assessment   LLE Assessment WFL   Strength LLE   LLE Overall Strength 4-/5   Sensation   Light Touch Partial deficits in the RLE   Propioception No apparent deficits   Cognition   Overall Cognitive Status Impaired   Arousal/Participation Alert; Cooperative   Attention Attends with cues to redirect   Orientation Level Oriented X4   Memory Decreased short term memory;Decreased recall of recent events;Decreased recall of precautions   Following Commands Follows one step commands with increased time or repetition   Objective Measure   PT Measure(s) see vitals for manual BPs  pt reported lightheadedness with positional change supine to sit to standing but unable to get standing manual BP but was able to tolerate short distance amb task performed after pt finished 16 oz cup of water   PT Findings gluteal sets x 10 reps x 3 sets   Gentle passive to active assist R LE ROM include SLR, hip abduction/adduction, knee flexion/extension x 10 reps x 2 sets prior to getting out of the bed to alleviate pain and stiffness   Discharge Information   Vocational Plan Retired/not working   Impressions Pt is a 79year old female s/p Surgical fixation of right intertrochanteric femur fracture with long intramedullary nail due a fall at home while ambulating with rollator walker  Pt is seen for moderate complexity eval with medical co-morbidities affecting functional progress include chronic CHF, DM type 2, HTN, HLD, CKD, CLL  Please see medical chart for more comprehensive list  Personal factors affecting pt at time of IE include limited family availability to provide physical assistance at d/c, inability to complete functional household and community distance mobilities, Hx of personal falls  Pt lives at home alone and was fully indep at baseline with AD on even and uneven surfaces including negotiating FEI  On eval pt presentation impacting functional performance include R LE pain with WBAT precaution, non painful displaced fracture of middle phalanx of left ring finger with dec compliance of NWB precaution even using sling, impaired strength, impaired standing balance/tolerance, poor righting reaction, impaired cognition, partial impaired R LE sensation,  dec endurance, gait dysfunction, dec cognition with STM impairment, dec endurance and ortho BP with reported lightheadedness so encourage fluids this session  Due to above mentioned medical co-morbidities and impairments/deficits pt is at high risk for falls, inc risk for hypo/hypertensive episode, at risk for dec skin integrity, at risk for infection, at risk for PE & DVT and increase caregiver burden so unsafe to return home at this time  Pt requires 2 person assist to complete functional activities safely requiring use of AD, see above info for details of PT evaluation  Pt is a good rehab candidate with anticipated mod indep-S goals using Least restrictive AD with ELOS of 2 weeks  Skilled PT will work on therapeutic exercises, therapeutic activities, w/c mobility, NMR and gait training to improve overall functional indep in order for pt to return home safely with reduce risk for falls and dec caregiver burden     PT Therapy Minutes   PT Time In 1230   PT Time Out 1400   PT Total Time (minutes) 90   PT Mode of treatment - Individual (minutes) 90   PT Mode of treatment - Concurrent (minutes) 0   PT Mode of treatment - Group (minutes) 0   PT Mode of treatment - Co-treat (minutes) 0   PT Mode of Treatment - Total time(minutes) 90 minutes   PT Cumulative Minutes 90   Cumulative Minutes   Cumulative therapy minutes 190

## 2022-10-19 PROBLEM — Z87.898 H/O DIZZINESS: Status: ACTIVE | Noted: 2022-10-19

## 2022-10-19 PROBLEM — I95.1 ORTHOSTASIS: Status: ACTIVE | Noted: 2022-10-19

## 2022-10-19 LAB
ANION GAP SERPL CALCULATED.3IONS-SCNC: 6 MMOL/L (ref 4–13)
ANISOCYTOSIS BLD QL SMEAR: PRESENT
BASOPHILS # BLD MANUAL: 0.22 THOUSAND/UL (ref 0–0.1)
BASOPHILS NFR MAR MANUAL: 1 % (ref 0–1)
BUN SERPL-MCNC: 25 MG/DL (ref 5–25)
CALCIUM SERPL-MCNC: 8.7 MG/DL (ref 8.3–10.1)
CHLORIDE SERPL-SCNC: 102 MMOL/L (ref 96–108)
CO2 SERPL-SCNC: 26 MMOL/L (ref 21–32)
CREAT SERPL-MCNC: 1.03 MG/DL (ref 0.6–1.3)
EOSINOPHIL # BLD MANUAL: 0.67 THOUSAND/UL (ref 0–0.4)
EOSINOPHIL NFR BLD MANUAL: 3 % (ref 0–6)
ERYTHROCYTE [DISTWIDTH] IN BLOOD BY AUTOMATED COUNT: 14.6 % (ref 11.6–15.1)
GFR SERPL CREATININE-BSD FRML MDRD: 50 ML/MIN/1.73SQ M
GLUCOSE SERPL-MCNC: 131 MG/DL (ref 65–140)
GLUCOSE SERPL-MCNC: 222 MG/DL (ref 65–140)
GLUCOSE SERPL-MCNC: 222 MG/DL (ref 65–140)
GLUCOSE SERPL-MCNC: 230 MG/DL (ref 65–140)
GLUCOSE SERPL-MCNC: 253 MG/DL (ref 65–140)
HCT VFR BLD AUTO: 29.8 % (ref 34.8–46.1)
HGB BLD-MCNC: 9.3 G/DL (ref 11.5–15.4)
LG PLATELETS BLD QL SMEAR: PRESENT
LYMPHOCYTES # BLD AUTO: 14.57 THOUSAND/UL (ref 0.6–4.47)
LYMPHOCYTES # BLD AUTO: 65 % (ref 14–44)
MACROCYTES BLD QL AUTO: PRESENT
MCH RBC QN AUTO: 33.3 PG (ref 26.8–34.3)
MCHC RBC AUTO-ENTMCNC: 31.2 G/DL (ref 31.4–37.4)
MCV RBC AUTO: 107 FL (ref 82–98)
MONOCYTES # BLD AUTO: 0.45 THOUSAND/UL (ref 0–1.22)
MONOCYTES NFR BLD: 2 % (ref 4–12)
NEUTROPHILS # BLD MANUAL: 6.27 THOUSAND/UL (ref 1.85–7.62)
NEUTS SEG NFR BLD AUTO: 28 % (ref 43–75)
PLATELET # BLD AUTO: 308 THOUSANDS/UL (ref 149–390)
PLATELET BLD QL SMEAR: ADEQUATE
PMV BLD AUTO: 11.5 FL (ref 8.9–12.7)
POLYCHROMASIA BLD QL SMEAR: PRESENT
POTASSIUM SERPL-SCNC: 3.7 MMOL/L (ref 3.5–5.3)
RBC # BLD AUTO: 2.79 MILLION/UL (ref 3.81–5.12)
RBC MORPH BLD: PRESENT
SODIUM SERPL-SCNC: 134 MMOL/L (ref 135–147)
VARIANT LYMPHS # BLD AUTO: 1 %
WBC # BLD AUTO: 22.41 THOUSAND/UL (ref 4.31–10.16)

## 2022-10-19 RX ORDER — SODIUM CHLORIDE 9 MG/ML
75 INJECTION, SOLUTION INTRAVENOUS ONCE
Status: COMPLETED | OUTPATIENT
Start: 2022-10-19 | End: 2022-10-19

## 2022-10-19 RX ORDER — SODIUM CHLORIDE 9 MG/ML
75 INJECTION, SOLUTION INTRAVENOUS CONTINUOUS
Status: DISCONTINUED | OUTPATIENT
Start: 2022-10-19 | End: 2022-10-19

## 2022-10-19 RX ORDER — MECLIZINE HCL 12.5 MG/1
12.5 TABLET ORAL
Status: DISCONTINUED | OUTPATIENT
Start: 2022-10-20 | End: 2022-10-31

## 2022-10-19 RX ADMIN — METOPROLOL TARTRATE 25 MG: 25 TABLET, FILM COATED ORAL at 20:44

## 2022-10-19 RX ADMIN — INSULIN GLARGINE 30 UNITS: 100 INJECTION, SOLUTION SUBCUTANEOUS at 21:46

## 2022-10-19 RX ADMIN — METOPROLOL TARTRATE 25 MG: 25 TABLET, FILM COATED ORAL at 08:08

## 2022-10-19 RX ADMIN — DOCUSATE SODIUM 100 MG: 100 CAPSULE, LIQUID FILLED ORAL at 16:41

## 2022-10-19 RX ADMIN — GABAPENTIN 100 MG: 100 CAPSULE ORAL at 21:46

## 2022-10-19 RX ADMIN — ACETAMINOPHEN 650 MG: 325 TABLET ORAL at 13:53

## 2022-10-19 RX ADMIN — MECLIZINE HCL 12.5 MG 12.5 MG: 12.5 TABLET ORAL at 13:53

## 2022-10-19 RX ADMIN — INSULIN LISPRO 12 UNITS: 100 INJECTION, SOLUTION INTRAVENOUS; SUBCUTANEOUS at 16:41

## 2022-10-19 RX ADMIN — INSULIN LISPRO 2 UNITS: 100 INJECTION, SOLUTION INTRAVENOUS; SUBCUTANEOUS at 11:53

## 2022-10-19 RX ADMIN — LIDOCAINE 5% 2 PATCH: 700 PATCH TOPICAL at 08:09

## 2022-10-19 RX ADMIN — INSULIN LISPRO 3 UNITS: 100 INJECTION, SOLUTION INTRAVENOUS; SUBCUTANEOUS at 08:09

## 2022-10-19 RX ADMIN — INSULIN LISPRO 12 UNITS: 100 INJECTION, SOLUTION INTRAVENOUS; SUBCUTANEOUS at 08:10

## 2022-10-19 RX ADMIN — SODIUM CHLORIDE 75 ML/HR: 0.9 INJECTION, SOLUTION INTRAVENOUS at 09:52

## 2022-10-19 RX ADMIN — HYDROCODONE BITARTRATE AND ACETAMINOPHEN 1 TABLET: 5; 325 TABLET ORAL at 08:08

## 2022-10-19 RX ADMIN — HYDROCODONE BITARTRATE AND ACETAMINOPHEN 1 TABLET: 5; 325 TABLET ORAL at 16:41

## 2022-10-19 RX ADMIN — INSULIN LISPRO 12 UNITS: 100 INJECTION, SOLUTION INTRAVENOUS; SUBCUTANEOUS at 11:53

## 2022-10-19 RX ADMIN — SENNOSIDES AND DOCUSATE SODIUM 1 TABLET: 8.6; 5 TABLET ORAL at 21:46

## 2022-10-19 RX ADMIN — INSULIN LISPRO 2 UNITS: 100 INJECTION, SOLUTION INTRAVENOUS; SUBCUTANEOUS at 16:41

## 2022-10-19 RX ADMIN — ATORVASTATIN CALCIUM 20 MG: 20 TABLET, FILM COATED ORAL at 16:41

## 2022-10-19 RX ADMIN — HYDROCODONE BITARTRATE AND ACETAMINOPHEN 1 TABLET: 5; 325 TABLET ORAL at 23:44

## 2022-10-19 RX ADMIN — HYDROCODONE BITARTRATE AND ACETAMINOPHEN 1 TABLET: 5; 325 TABLET ORAL at 04:05

## 2022-10-19 RX ADMIN — HYDROCODONE BITARTRATE AND ACETAMINOPHEN 1 TABLET: 5; 325 TABLET ORAL at 11:55

## 2022-10-19 RX ADMIN — ENOXAPARIN SODIUM 40 MG: 40 INJECTION SUBCUTANEOUS at 08:08

## 2022-10-19 RX ADMIN — DOCUSATE SODIUM 100 MG: 100 CAPSULE, LIQUID FILLED ORAL at 08:08

## 2022-10-19 NOTE — PCC OCCUPATIONAL THERAPY
Pt is functioning at overall Min-Mod A for ADLs and Min A for stand pivot xfer w/ PFRW  Limited by RLE pain, impaired balance, dec endurance, L hand NWB status, impaired STM, dec attention to task, impaired insight, impaired safety awareness, limited social support, unsafe home set-up, and ADL dysfunction  Scored 11/30 on MoCA  version 8 1 indicating moderate cognitive impairment  Pt lives in [facility] w/ 1STE, first-floor set-up  Local family able to A w/ light IADL management and transportation, however unable to provide A daily and without A pt remains high fall and readmission risk  Plan for d/c to sub-acute rehab 11/9

## 2022-10-19 NOTE — PROGRESS NOTES
Internal Medicine Progress Note  Patient: Felice Noriega  Age/sex: 79 y o  female  Medical Record #: [id number]      ASSESSMENT/PLAN: (Interval History)  Felice Noriega is seen and examined and management for following issues:    S/p ORIF with IM nailing of the Rt femur  · Pain control and therapy per primary service  · Pain meds adjusted d/t sedation  · Renal dosed Lovenox for DVT prophylaxis  · Follow-up with Ortho 2 weeks post-op      Left 4th phalanx fracture  · Splint and NWB   · Pain control per PMR      HTN  · Home medications: Hyzaar 50/12 5mg daily/Lopressor 25mg every 12 hours/Lasix 40mg daily - for heart failure  · Here: metoprolol 25mg bid (increased 10/18)  · Will monitor and adjust medications as needed    Anemia  · Secondary to trauma  · Cbc today     DM type 2  · Home: Levemir 20U at bedtime/Humalog 10U with meals  · Here: Lantus 30U at bedtime/Humalog 12U with meals  · Continue accuchecks and SSI  · Adjust insulin as needed  · Cont same through today     Chronic heart failure  · Pt has been off diuretics  · Currently appears dry and having orthostasis  · Will give 1 liter IVF  · ECHO with an EF of 45%  Grade 2 diastolic dysfunction  · Monitor for overload  · Bmp today     CLL  · Recent diagnosis  · Outpt follow-up with Hematology/Oncology  · Baseline WBCs 14-16K  · Had mild bump likely reactive secondary to surgery  · Repeat cbc today     DC planning: TBD  The above assessment and plan was reviewed and updated as determined by my evaluation of the patient on 10/19/2022      Labs:   Results from last 7 days   Lab Units 10/16/22  0349 10/15/22  0515   WBC Thousand/uL 24 20* 19 42*   HEMOGLOBIN g/dL 8 7* 10 3*   HEMATOCRIT % 27 3* 32 4*   PLATELETS Thousands/uL 181 150     Results from last 7 days   Lab Units 10/16/22  0349 10/15/22  0515   SODIUM mmol/L 135 135   POTASSIUM mmol/L 4 3 3 9   CHLORIDE mmol/L 102 101   CO2 mmol/L 27 29   BUN mg/dL 26* 28*   CREATININE mg/dL 0 89 0 86   CALCIUM mg/dL 8 6 8 9             Results from last 7 days   Lab Units 10/19/22  0636 10/18/22  2107 10/18/22  1633   POC GLUCOSE mg/dl 230* 113 137       Review of Scheduled Meds:  Current Facility-Administered Medications   Medication Dose Route Frequency Provider Last Rate   • acetaminophen  650 mg Oral Q6H PRN Alba Kim MD     • atorvastatin  20 mg Oral Daily With Melissa Cota MD     • bisacodyl  10 mg Rectal Daily PRN Alba Kim MD     • docusate sodium  100 mg Oral BID Alba Kim MD     • enoxaparin  40 mg Subcutaneous Q24H [address] Yanely Shaffer MD     • gabapentin  100 mg Oral HS Alba Kim MD     • HYDROcodone-acetaminophen  1 tablet Oral TID With Meals Alba Kim MD     • HYDROcodone-acetaminophen  1 tablet Oral Q4H PRN Alba Kim MD     • insulin glargine  30 Units Subcutaneous HS BRINDA Kramer     • insulin lispro  1-5 Units Subcutaneous HS Alba Kim MD     • insulin lispro  1-6 Units Subcutaneous TID AC Yanely Shaffer MD     • insulin lispro  12 Units Subcutaneous TID With Meals Alba Kim MD     • lidocaine  2 patch Topical Daily Alba Kim MD     • meclizine  12 5 mg Oral Q8H PRN Alba Kim MD     • metoprolol tartrate  25 mg Oral Q12H [address] BRIDNA Kramer     • naloxone  0 04 mg Intravenous Q1MIN PRN Alba Kim MD     • polyethylene glycol  17 g Oral Daily PRN Alba Kim MD     • senna-docusate sodium  1 tablet Oral HS Alba Kim MD         Subjective/ HPI: Patient seen and examined  Patients overnight issues or events were reviewed with nursing or staff during rounds or morning huddle session  New or overnight issues include the following:     Pt seen in her room  She is more awake today, I discussed giving her IVF today and she agrees  No other complaints, pain is somewhat controlled       ROS:   A 10 point ROS was performed; negative except as noted above  Imaging:     No orders to display       *Labs /Radiology studies Reviewed  *Medications  reviewed and reconciled as needed  *Please refer to order section for additional ordered labs studies  *Case discussed with primary attending during morning huddle case rounds    Physical Examination:  Vitals:   Vitals:    10/18/22 1250 10/18/22 1530 10/18/22 2123 10/19/22 0700   BP: 124/58 153/84 125/56 132/62   BP Location: Left arm  Left arm Left arm   Pulse:  (!) 106 (!) 127 96   Resp:  16 18 18   Temp:  98 2 °F (36 8 °C) 98 3 °F (36 8 °C) 97 5 °F (36 4 °C)   TempSrc:  Oral Oral Oral   SpO2:  97% 97% 95%   Weight:    66 8 kg (147 lb 4 8 oz)   Height:           GEN: No apparent distress, interactive  NEURO: Alert and oriented x3  HEENT: Pupils are equal and reactive, EOMI, mucous membranes are moist, face symmetrical  CV: S1 S2 regular, no MRG, no peripheral edema noted  RESP: Lungs are clear bilaterally, no wheezes, rales or rhonchi noted, on room air, respirations easy and non labored  GI: Flat, soft non tender, non distended; +BS x4  : Voiding without difficulty  MUSC: Moves all extremities; except RLE and NWB LUE  SKIN: pink, warm and dry, normal turgor, dressing in place to RLE incisions with staples all are intact without evidence of infection; left 4th finger with splint      The above physical exam was reviewed and updated as determined by my evaluation of the patient on 10/19/2022  Invasive Devices  Report    None                    VTE Pharmacologic Prophylaxis: Enoxaparin  Code Status: Level 1 - Full Code  Current Length of Stay: 2 day(s)      Total time spent:  30 minutes with more than 50% spent counseling/coordinating care  Counseling includes discussion with patient re: progress  and discussion with patient of his/her current medical state/information  Coordination of patient's care was performed in conjunction with primary service   Time invested included review of patient's labs, vitals, and management of their comorbidities with continued monitoring  In addition, this patient was discussed with medical team including physician and advanced extenders  The care of the patient was extensively discussed and appropriate treatment plan was formulated unique for this patient  ** Please Note:  voice to text software may have been used in the creation of this document   Although proof errors in transcription or interpretation are a potential of such software**

## 2022-10-19 NOTE — ASSESSMENT & PLAN NOTE
Continue compression stockings/Abd binder during all therapies  Recurrent 10/28/22: Gentle IVF given for orthostasis x 1L   - Orthostasis improved

## 2022-10-19 NOTE — PLAN OF CARE
Problem: Prexisting or High Potential for Compromised Skin Integrity  Goal: Skin integrity is maintained or improved  Description: INTERVENTIONS:  - Identify patients at risk for skin breakdown  - Assess and monitor skin integrity  - Assess and monitor nutrition and hydration status  - Monitor labs   - Assess for incontinence   - Turn and reposition patient  - Assist with mobility/ambulation  - Relieve pressure over bony prominences  - Avoid friction and shearing  - Provide appropriate hygiene as needed including keeping skin clean and dry  - Evaluate need for skin moisturizer/barrier cream  - Collaborate with interdisciplinary team   - Patient/family teaching  - Consider wound care consult   Outcome: Progressing     Problem: PAIN - ADULT  Goal: Verbalizes/displays adequate comfort level or baseline comfort level  Description: Interventions:  - Encourage patient to monitor pain and request assistance  - Assess pain using appropriate pain scale  - Administer analgesics based on type and severity of pain and evaluate response  - Implement non-pharmacological measures as appropriate and evaluate response  - Consider cultural and social influences on pain and pain management  - Notify physician/advanced practitioner if interventions unsuccessful or patient reports new pain  Outcome: Progressing     Problem: INFECTION - ADULT  Goal: Absence or prevention of progression during hospitalization  Description: INTERVENTIONS:  - Assess and monitor for signs and symptoms of infection  - Monitor lab/diagnostic results  - Monitor all insertion sites, i e  indwelling lines, tubes, and drains  - Monitor endotracheal if appropriate and nasal secretions for changes in amount and color  - [place] appropriate cooling/warming therapies per order  - Administer medications as ordered  - Instruct and encourage patient and family to use good hand hygiene technique  - Identify and instruct in appropriate isolation precautions for identified infection/condition  Outcome: Progressing  Goal: Absence of fever/infection during neutropenic period  Description: INTERVENTIONS:  - Monitor WBC    Outcome: Progressing     Problem: SAFETY ADULT  Goal: Patient will remain free of falls  Description: INTERVENTIONS:  - Educate patient/family on patient safety including physical limitations  - Instruct patient to call for assistance with activity   - Consult OT/PT to assist with strengthening/mobility   - Keep Call bell within reach  - Keep bed low and locked with side rails adjusted as appropriate  - Keep care items and personal belongings within reach  - Initiate and maintain comfort rounds  - Make Fall Risk Sign visible to staff  - Offer Toileting every Hours, in advance of need  - Initiate/Maintain alarm  - Obtain necessary fall risk management equipment:   - Apply yellow socks and bracelet for high fall risk patients  - Consider moving patient to room near nurses station  Outcome: Progressing  Goal: Maintain or return to baseline ADL function  Description: INTERVENTIONS:  -  Assess patient's ability to carry out ADLs; assess patient's baseline for ADL function and identify physical deficits which impact ability to perform ADLs (bathing, care of mouth/teeth, toileting, grooming, dressing, etc )  - Assess/evaluate cause of self-care deficits   - Assess range of motion  - Assess patient's mobility; develop plan if impaired  - Assess patient's need for assistive devices and provide as appropriate  - Encourage maximum independence but intervene and supervise when necessary  - Involve family in performance of ADLs  - Assess for home care needs following discharge   - Consider OT consult to assist with ADL evaluation and planning for discharge  - Provide patient education as appropriate  Outcome: Progressing  Goal: Maintains/Returns to pre admission functional level  Description: INTERVENTIONS:  - Perform BMAT or MOVE assessment daily    - Set and communicate daily mobility goal to care team and patient/family/caregiver  - Collaborate with rehabilitation services on mobility goals if consulted  - Perform Range of Motion  times a day  - Reposition patient every  hours    - Dangle patient  times a day  - Stand patient  times a day  - Ambulate patient  times a day  - Out of bed to chair  times a day   - Out of bed for meals  times a day  - Out of bed for toileting  - Record patient progress and toleration of activity level   Outcome: Progressing     Problem: DISCHARGE PLANNING  Goal: Discharge to home or other facility with appropriate resources  Description: INTERVENTIONS:  - Identify barriers to discharge w/patient and caregiver  - Arrange for needed discharge resources and transportation as appropriate  - Identify discharge learning needs (meds, wound care, etc )  - Arrange for interpretive services to assist at discharge as needed  - Refer to Case Management Department for coordinating discharge planning if the patient needs post-hospital services based on physician/advanced practitioner order or complex needs related to functional status, cognitive ability, or social support system  Outcome: Progressing     Problem: Potential for Falls  Goal: Patient will remain free of falls  Description: INTERVENTIONS:  - Educate patient/family on patient safety including physical limitations  - Instruct patient to call for assistance with activity   - Consult OT/PT to assist with strengthening/mobility   - Keep Call bell within reach  - Keep bed low and locked with side rails adjusted as appropriate  - Keep care items and personal belongings within reach  - Initiate and maintain comfort rounds  - Make Fall Risk Sign visible to staff  - Offer Toileting every  Hours, in advance of need  - Initiate/Maintain alarm  - Obtain necessary fall risk management equipment:   - Apply yellow socks and bracelet for high fall risk patients  - Consider moving patient to room near nurses station  Outcome: Progressing

## 2022-10-19 NOTE — PCC CARE MANAGEMENT
10/19/2022  Met with the patient 10/18/2022 to complete CM open  Patient lives alone in a 2 story home, she reports she has a first floor set up and does not utilize 2nd floor much  Patient reports prior to admission she was independent with ADL/IADLs, she drove to get groceries, to doctor's appointments etc  Patient reports having r/w at home  Patient  Reports no hx of HHC or STR but does report she has used OP PT in this past but does not recall where that was  Patient reports she has very supportive neighbors that assist her when she needs it  Preferred pharmacy is [facility] in [place], PCP is Dr Manolo Steiner  The patient was educated on the rehabilitation process including therapy program, the interdisciplinary team, and weekly team meeting schedule  Estimated length of stay of 10-14 days was reviewed with the patient as well as expectations of discussions of discharge planning  The role of the  was reviewed including providing care coordination, discharge planning and discharge facilitation  IMM was reviewed with the patient and a copy was provided for their reference  The patient verbalized understanding of the information provided and denied any further questions at this time  CM will continue to follow and assist the patient throughout their rehabilitation stay  10/31- Pt participating in therapy and making progress  Pt has been hallucinating and is alert x3  Team recommends subacute transition due to lack of caregiver support  Pt has a nephew that assists as needed but will not be able to provide 24/7 supervision for pt  Cm to follow up with nephew in [place] as per other team members, he is interested in placement in [place] area  Cm to assist with subacute transition  11/7- Pt making progress in therapies, however team recommending subacute transition due to lack of caregiver support  Cm sent referrals to [facility] and they are accepting  Pt to d/c 11/9

## 2022-10-19 NOTE — PROGRESS NOTES
10/19/22 0900   Pain Assessment   Pain Assessment Tool 0-10   Pain Score 8   Pain Location/Orientation Orientation: Right;Location: Hip;Location: Leg   Restrictions/Precautions   Precautions Bed/chair alarms;Cognitive; Fall Risk;Pain;Multiple lines;Supervision on toilet/commode;Visual deficit   Weight Bearing Restrictions Yes   LUE Weight Bearing Per Order NWB  (L hand, okay through elbow)   RLE Weight Bearing Per Order WBAT   Braces or Orthoses Splint  (L 4th digit)   Lifestyle   Autonomy "I feel so dizzy and my eyes feel hollow "   Oral Hygiene   Type of Assistance Needed Supervision;Set-up / clean-up   Physical Assistance Level No physical assistance   Comment chair mode in bed  Oral Hygiene CARE Score 4   Shower/Bathe Self   Type of Assistance Needed Physical assistance   Physical Assistance Level Total assistance   Comment Pt able to bathe upper legs and suad  Mod A to roll w/ A to adhere to L hand NWB, A to bathe buttocks while sidelying  Pt able to bathe BUE and chest in chair mode w/ set-up  Shower/Bathe Self CARE Score 1   Tub/Shower Transfer   Reason Not Assessed Medical;Sponge Bath;Safety; Endurance;Balance   Upper Body Dressing   Type of Assistance Needed Physical assistance   Physical Assistance Level 51%-75%   Comment chair mode, pt able to thread BUE w/ cues to inc attention to RUE IV site, A to pull around back and for buttons  Upper Body Dressing CARE Score 2   Lower Body Dressing   Type of Assistance Needed Physical assistance   Physical Assistance Level Total assistance   Comment Ax2 to don brief at bed level  A to thread BLE, alernating rolling for clothing management  Hydraguard applied to buttocks  Lower Body Dressing CARE Score 1   Putting On/Taking Off Footwear   Type of Assistance Needed Physical assistance   Physical Assistance Level Total assistance   Comment TA to don knee high TEDs and socks     Putting On/Taking Off Footwear CARE Score 1   Toilet Transfer   Type of Assistance Needed Physical assistance   Physical Assistance Level Total assistance   Comment obtained drop-arm platform BSC  Toilet Transfer CARE Score 1   Cognition   Overall Cognitive Status Impaired   Activity Tolerance   Medical Staff Made Aware RN present to place IV, aware of SBP 90 w/ HOB ~45*  Did not xfer OOB 2* time constraints and soft SBP  Assessment   Treatment Assessment Pt seen for skilled OT session focusing on self-care management  Details on sponge bath ADL noted above, completed at bed level 2* low SBP 90 w/ HOB 45*  MD and RN present at times during session, plan to start IVF  Pt c/o dizziness t/o session, therefore all positioning when inc HOB and w/ logrolling req extended time, pt req cues to maximize deep breathing  Encouragement req for fluid intake t/o session  Cont w/ fatigue and drowsiness, slight improvement noted since yesterday  A pt w/ calling nephew from room phone w/ request from pt to ask for family to bring in clothing, family's phone numbers enlarged and left on pt's tray table  Cont OT POC: fxnl xfer training, endurance work, repetitive safety training, ADL retraining, and RUE strengthening  Provided w/ soft-care cushion for recliner and green foam wedge to A w/ positioning  Pt was left resting in bed w/ all needs within reach and alarm activated  Prognosis Good   Problem List Decreased strength;Decreased endurance; Impaired balance;Decreased mobility; Decreased safety awareness;Orthopedic restrictions;Pain;Decreased cognition; Impaired vision   Plan   Treatment/Interventions ADL retraining;Functional transfer training; Therapeutic exercise; Endurance training;Cognitive reorientation;Patient/family training;Equipment eval/education; Bed mobility   Progress Slow progress, decreased activity tolerance   Recommendation   OT Discharge Recommendation   (pending progress)   OT Therapy Minutes   OT Time In 0900   OT Time Out 1030   OT Total Time (minutes) 90   OT Mode of treatment - Individual (minutes) 90   OT Mode of treatment - Concurrent (minutes) 0   OT Mode of treatment - Group (minutes) 0   OT Mode of treatment - Co-treat (minutes) 0   OT Mode of Treatment - Total time(minutes) 90 minutes   OT Cumulative Minutes 190   Therapy Time missed   Time missed?  No

## 2022-10-19 NOTE — ASSESSMENT & PLAN NOTE
Dizziness related to motion  Found to have orthostasis  Chronic issue per patient  Made meclizine PRN for polypharmacy  Monitor closely

## 2022-10-19 NOTE — PROGRESS NOTES
PM&R PROGRESS NOTE:  Felice Noriega 79 y o  female MRN: [id number]  Unit/Bed#: -56 Encounter: [id number]        Rehab Diagnosis: Impairment of mobility, safety and Activities of Daily Living (ADLs) due to Orthopedic Disorders:  08 11  Unilateral Hip Fracture - Right    SUBJECTIVE: Patient seen with OT this morning  Pain is moderate in RLE  Patient reports feeling dizzy and thirsty  States she has motion induced dizziness at times as well  ASSESSMENT: Stable, progressing      PLAN:    Rehabilitation  · Functional deficits: impaired mobility, self care, WBAT RLE, NWB L HAND  • Continue current rehabilitation plan of care to maximize function  I personally attended, reviewed, and discussed medical and functional updates in team conference today  Please refer to advance care planning note for details  • Expected Discharge: XU COLON 3 weeks  Physical Therapy Occupational Therapy Speech Therapy   Weight Bearing Status: Non-weight bearing (L hand, WBAT R LE)  Transfers: Assist of 2  Bed Mobility: Assist of 2  Amulation Distance (ft): 15 feet  Ambulation: Assist of 2  Assistive Device for Ambulation: Roller Walker (L PFRW)  Assistive Device for Stairs:  (unsafe to perform)  Discharge Recommendations:  (TBD pending progress)   Eating: Independent  Grooming: Minimal Assistance  Bathing: Total Assistance, Assist of 2  Bathing: Total Assistance, Assist of 2  Upper Body Dressing: Minimal Assistance  Lower Body Dressing: Total Assistance, Assist of 2  Toileting: Total Assistance, Assist of 2  Toilet Transfer:  Total Assistance, Assist of 2  Cognition: Exceptions to WNL  Cognition: Decreased Memory, Decreased Executive Functions, Decreased Attention, Decreased Safety  Orientation: Person, Place, Time, Situation                   DVT prophylaxis  · Managed on SQ Lovenox 40 mg daily - will need education for home    Bladder plan  • Continent    Bowel plan  • Continent      * Intertrochanteric fracture of right femur [facility])  Assessment & Plan  Traumatic fall  Sustained a right intertrochanteric fracture  Taken to OR by Dr Tigist Berg on 10/12/22 for ORIF and IM nailing  WBAT RLE  DVT ppx with SQ Lovenox  Monitor 3 incisions  Continue acute rehabilitation program  POD 14 = 10/26/22 - x-rays and ortho follow-up due  Follow-up with Dr Tigist Berg as outpatient       Acute pain  Assessment & Plan  Nociceptive/neuropathic post op pain in RLE managed on multimodal regimen:   · Norco 1 tab scheduled TID with meals  · Tylenol PRN for mild pain, Norco for severe pain  · Gabapentin 100 mg QHS  · Topical Lidoderm patches  · Topical ICE    CLL (chronic lymphocytic leukemia) (HCC)  Assessment & Plan  Chronic leukocytosis  Monitor   Follow-up with heme onc as outpatient     Displaced fracture of middle phalanx of left ring finger, initial encounter for closed fracture  Assessment & Plan  Traumatic fracture  4th left finger   Treated non-operatively by Orthopedics  Continue supportive splint  ICE for swelling  NWB L hand    Hypertension  Assessment & Plan  At home: Lopressor 25 mg Q 12 hours, Hyzaar (Losartan/HCTZ) 50mg/12 5mg daily  Here: Lopressor 25 mg daily (increased 10/18)  Will monitor BP closely  Check orthostatics  Adjust medications accordingly  TEDs daily /Abdominal binder PRN    Chronic systolic heart failure (HCC)  Assessment & Plan  Grade 2 DD  At home: Lasix 40 mg daily  Here: Slightly hypovolemic - HOLD Lasix  Encourage fluids by mouth    Restart Lasix when able with K Dur supplement        HLD (hyperlipidemia)  Assessment & Plan  Restarted on home Lipitor 20 mg QPM    CKD (chronic kidney disease) stage 3, GFR 30-59 ml/min (HCC)  Assessment & Plan  Stable  Avoid nephrotoxic agents   Monitor 2x/week    Type 2 diabetes mellitus (HCC)  Assessment & Plan  Lab Results   Component Value Date    HGBA1C 7 6 (H) 10/12/2022     At home: Levemir 20 units QHS, Humalog 10 units with meals  Here: Lantus 28 units QHS, Humalog 12 units with meals  In acute care required an insulin drip transiently  Continue CCD        Appreciate IM consultants medical co-management  Labs, medications, and imaging personally reviewed  ROS:  A ten point review of systems was completed on 10/19/22 and pertinent positives are listed in subjective section  All other systems reviewed were negative  OBJECTIVE:   /62 (BP Location: Left arm)   Pulse 96   Temp 97 5 °F (36 4 °C) (Oral)   Resp 18   Ht 5' 6" (1 676 m)   Wt 66 8 kg (147 lb 4 8 oz) Comment: two extra pillows on the bed; pt requested them not to be removed  SpO2 95%   BMI 23 77 kg/m²       Physical Exam  Vitals and nursing note reviewed  Constitutional:       General: She is not in acute distress  HENT:      Head: Normocephalic and atraumatic  Nose: Nose normal       Mouth/Throat:      Mouth: Mucous membranes are moist    Eyes:      Conjunctiva/sclera: Conjunctivae normal    Cardiovascular:      Rate and Rhythm: Normal rate and regular rhythm  Pulses: Normal pulses  Pulmonary:      Effort: Pulmonary effort is normal       Breath sounds: Normal breath sounds  No wheezing or rales  Abdominal:      General: Bowel sounds are normal  There is no distension  Palpations: Abdomen is soft  Tenderness: There is no abdominal tenderness  Musculoskeletal:         General: Swelling (periincisional) present  Cervical back: Neck supple  Skin:     General: Skin is warm  Comments: Incisions clean and dry   Neurological:      Mental Status: She is alert and oriented to person, place, and time  Motor: Weakness (RLE) present        Gait: Gait abnormal    Psychiatric:         Mood and Affect: Mood normal           Lab Results   Component Value Date    WBC 22 41 (H) 10/19/2022    HGB 9 3 (L) 10/19/2022    HCT 29 8 (L) 10/19/2022     (H) 10/19/2022     10/19/2022     Lab Results   Component Value Date    SODIUM 134 (L) 10/19/2022    K 3 7 10/19/2022  10/19/2022    CO2 26 10/19/2022    BUN 25 10/19/2022    CREATININE 1 03 10/19/2022    GLUC 253 (H) 10/19/2022    CALCIUM 8 7 10/19/2022     Lab Results   Component Value Date    INR 0 89 10/11/2022    PROTIME 12 3 10/11/2022           Current Facility-Administered Medications:   •  acetaminophen (TYLENOL) tablet 650 mg, 650 mg, Oral, Q6H PRN, Scot Phillip MD  •  atorvastatin (LIPITOR) tablet 20 mg, 20 mg, Oral, Daily With Dayan Hernandez MD, 20 mg at 10/18/22 1618  •  bisacodyl (DULCOLAX) rectal suppository 10 mg, 10 mg, Rectal, Daily PRN, Scot Phillip MD  •  docusate sodium (COLACE) capsule 100 mg, 100 mg, Oral, BID, Scot Phillip MD, 100 mg at 10/19/22 0808  •  enoxaparin (LOVENOX) subcutaneous injection 40 mg, 40 mg, Subcutaneous, Q24H [address], Scot Phillip MD, 40 mg at 10/19/22 0808  •  gabapentin (NEURONTIN) capsule 100 mg, 100 mg, Oral, HS, Scot Phillip MD, 100 mg at 10/18/22 2123  •  HYDROcodone-acetaminophen (NORCO) 5-325 mg per tablet 1 tablet, 1 tablet, Oral, TID With Meals, Scot Phillip MD, 1 tablet at 10/19/22 1155  •  HYDROcodone-acetaminophen (NORCO) 5-325 mg per tablet 1 tablet, 1 tablet, Oral, Q4H PRN, Scot Phillip MD, 1 tablet at 10/19/22 0405  •  insulin glargine (LANTUS) subcutaneous injection 30 Units 0 3 mL, 30 Units, Subcutaneous, HS, BRINDA Kramer, 30 Units at 10/18/22 2124  •  insulin lispro (HumaLOG) 100 units/mL subcutaneous injection 1-5 Units, 1-5 Units, Subcutaneous, HS, Scot Phillip MD  •  insulin lispro (HumaLOG) 100 units/mL subcutaneous injection 1-6 Units, 1-6 Units, Subcutaneous, TID AC, Scot Phillip MD, 2 Units at 10/19/22 1153  •  insulin lispro (HumaLOG) 100 units/mL subcutaneous injection 12 Units, 12 Units, Subcutaneous, TID With Meals, Scot Phillip MD, 12 Units at 10/19/22 1153  •  lidocaine (LIDODERM) 5 % patch 2 patch, 2 patch, Topical, Daily, Scot Phillip MD, 2 patch at 10/19/22 0809  •  meclizine (ANTIVERT) tablet 12 5 mg, 12 5 mg, Oral, Q8H PRN, Nicolás Moreau MD  •  metoprolol tartrate (LOPRESSOR) tablet 25 mg, 25 mg, Oral, Q12H [place], BRINDA Kramer, 25 mg at 10/19/22 0808  •  naloxone (NARCAN) 0 04 mg/mL syringe 0 04 mg, 0 04 mg, Intravenous, Q1MIN PRN, Nicolás Moreau MD  •  polyethylene glycol (MIRALAX) packet 17 g, 17 g, Oral, Daily PRN, Nicolás Moreau MD  •  senna-docusate sodium (SENOKOT S) 8 6-50 mg per tablet 1 tablet, 1 tablet, Oral, HS, Nicolás Moreau MD, 1 tablet at 10/17/22 2211    Past Medical History:   Diagnosis Date   • Benign adenomatous polyp of large intestine    • CHF (congestive heart failure) (HCC)    • Diabetes mellitus ([place] [address] )    • Hyperlipemia    • Hypertension    • Osteoarthritis    • TIA (transient ischemic attack)        Patient Active Problem List    Diagnosis Date Noted   • Intertrochanteric fracture of right femur ([facility] ) 10/11/2022   • Acute pain 10/17/2022   • CLL (chronic lymphocytic leukemia) ([place] [address] ) 10/13/2022   • Displaced fracture of middle phalanx of left ring finger, initial encounter for closed fracture 10/11/2022   • Hypertension 08/08/2021   • Ambulatory dysfunction 08/05/2021   • Suspected Mild cognitive impairment 08/05/2021   • Lymphadenopathy 11/06/2020   • Elevated liver enzymes 11/06/2020   • Fall 11/05/2020   • Type 2 diabetes mellitus ([facility] ) 11/05/2020   • CKD (chronic kidney disease) stage 3, GFR 30-59 ml/min ([facility] ) 11/05/2020   • HLD (hyperlipidemia) 11/05/2020   • OA (osteoarthritis) 11/05/2020   • Chronic systolic heart failure ([facility] ) 11/05/2020          Yanely Simpson    Total time spent:  45 minutes with more than 50% spent counseling/coordinating care  Counseling includes discussion with patient re: progress and discussion with patient of his/her current medical/functional state/information  Coordination of patient's care was performed in conjunction with consulting services   Time invested included review of patient's labs, vitals, and management of their comorbidities with continued monitoring  The care of the patient was extensively discussed and appropriate treatment plan was formulated unique for this patient  ** Please Note:  voice to text software may have been used in the creation of this document   Although proof errors in transcription or interpretation are a potential of such software**

## 2022-10-19 NOTE — PROGRESS NOTES
10/19/22 1230   Pain Assessment   Pain Assessment Tool 0-10   Pain Score 8   Pain Location/Orientation Orientation: Right;Location: Hip   Pain Onset/Description Onset: Ongoing;Frequency: Constant/Continuous   Patient's Stated Pain Goal No pain   Hospital Pain Intervention(s) Repositioned;Rest;Emotional support   Restrictions/Precautions   Precautions Bed/chair alarms;Cognitive; Fall Risk;Pain;Supervision on toilet/commode;Visual deficit   LUE Weight Bearing Per Order NWB  (L hand, okay through elbow)   RLE Weight Bearing Per Order WBAT   Braces or Orthoses Splint; Other (Comment)  (L 4th digit, ABD binder OOB and BLE TEDS )   Cognition   Overall Cognitive Status Impaired   Arousal/Participation Alert; Cooperative   Attention Attends with cues to redirect   Orientation Level Oriented X4   Memory Decreased recall of recent events;Decreased short term memory;Decreased recall of precautions   Following Commands Follows one step commands with increased time or repetition   Sit to Lying   Type of Assistance Needed Physical assistance;Verbal cues; Adaptive equipment   Physical Assistance Level 51%-75%   Comment A to RLE, MAX VC's to not use L hand   Sit to Lying CARE Score 2   Lying to Sitting on Side of Bed   Type of Assistance Needed Physical assistance;Verbal cues; Adaptive equipment   Physical Assistance Level 51%-75%   Comment A to RLE, MAX VC's to not use L hand   Lying to Sitting on Side of Bed CARE Score 2   Bed-Chair Transfer   Type of Assistance Needed Physical assistance;Verbal cues; Adaptive equipment   Physical Assistance Level Total assistance   Comment MAXA x1 with SBA/CGA of second person for safety, did well with slow movements and MAX VC's  Not safe to trial standing this session 2/2 ortho BP's     Chair/Bed-to-Chair Transfer CARE Score 1   Transfer Bed/Chair/Wheelchair   Adaptive Equipment Transfer Board   Car Transfer   Reason if not Attempted Safety concerns   Car Transfer CARE Score 88   Walk 10 Feet Comment ortho BP's   Reason if not Attempted Safety concerns   Walk 10 Feet CARE Score 88   Walk 50 Feet with Two Turns   Reason if not Attempted Safety concerns   Walk 50 Feet with Two Turns CARE Score 88   Walk 150 Feet   Reason if not Attempted Safety concerns   Walk 150 Feet CARE Score 88   Walking 10 Feet on Uneven Surfaces   Reason if not Attempted Safety concerns   Walking 10 Feet on Uneven Surfaces CARE Score 88   Wheel 50 Feet with Two Turns   Reason if not Attempted Activity not applicable   Wheel 50 Feet with Two Turns CARE Score 9   Wheel 150 Feet   Reason if not Attempted Activity not applicable   Wheel 983 Feet CARE Score 9   Curb or Single Stair   Reason if not Attempted Safety concerns   1 Step (Curb) CARE Score 88   4 Steps   Reason if not Attempted Safety concerns   4 Steps CARE Score 88   12 Steps   Reason if not Attempted Safety concerns   12 Steps CARE Score 88   Picking Up Object   Reason if not Attempted Safety concerns   Picking Up Object CARE Score 88   Therapeutic Interventions   Strengthening supine AAROM LAQ, heel slides, ankle DF/PF and hip ABD to BLE 3 x10 reps  Seated :AQ BLE AAROM to RLE 3 x10 reps, hip ADD vs ball x30 reps  Balance EOB initially pt c/o increased dizziness and unable to tolerate  Pt placed back into bed and performed supine TE's with HOB elevated, once stable able to tolerate EOB with CS vs JOÃO initially  Modalities CP to R hip with TE's   Other Pt required increased time in upright position prior to pt being able to tolerate getting OOB via SB transfer  Pt BP initially supine 125/50, once /70, ABD binder placed and /48, once in WC BP maintained around 120/51  Pt cont to feal "woozy" thoughout session but states hx of motion sickness  Assessment   Treatment Assessment Pt participated in skilled PT session with increased focus on LE strengthening, functional transfers and increased act tolerance   Pt cont to be limited by increased pain to RLE, poor carryover of safety awareness and poor act tolerance  Pt cont to reach out with LUE throughout session as she is unable to carryover NWB 2/2 poor cog  Pt often spoke incoherently throughout session and made off topic comments  Pt did however show improved SB transfer when given increased time and MAX VC's with slow movements  Pt will cont to benefit from increased act tolerance, increased gait, increased LE ROM and increased safety awareness  Problem List Decreased strength;Decreased endurance; Impaired balance;Decreased mobility; Decreased safety awareness;Orthopedic restrictions;Pain;Decreased cognition; Impaired vision   Barriers to Discharge Inaccessible home environment;Decreased caregiver support   PT Barriers   Functional Limitation Car transfers;Stair negotiation;Standing;Transfers; Walking   Plan   Treatment/Interventions Functional transfer training;LE strengthening/ROM; Therapeutic exercise; Endurance training;Patient/family training;Bed mobility;Gait training   Progress Slow progress, decreased activity tolerance   Recommendation   PT Discharge Recommendation   (TBD)   PT Therapy Minutes   PT Time In 1230   PT Time Out 1400   PT Total Time (minutes) 90   PT Mode of treatment - Individual (minutes) 90   PT Mode of treatment - Concurrent (minutes) 0   PT Mode of treatment - Group (minutes) 0   PT Mode of treatment - Co-treat (minutes) 0   PT Mode of Treatment - Total time(minutes) 90 minutes   PT Cumulative Minutes 180   Therapy Time missed   Time missed?  No

## 2022-10-20 ENCOUNTER — TELEPHONE (OUTPATIENT)
Dept: OBGYN CLINIC | Facility: HOSPITAL | Age: 82
End: 2022-10-20

## 2022-10-20 LAB
BACTERIA UR QL AUTO: ABNORMAL /HPF
BILIRUB UR QL STRIP: NEGATIVE
CLARITY UR: ABNORMAL
COLOR UR: COLORLESS
GLUCOSE SERPL-MCNC: 140 MG/DL (ref 65–140)
GLUCOSE SERPL-MCNC: 170 MG/DL (ref 65–140)
GLUCOSE SERPL-MCNC: 194 MG/DL (ref 65–140)
GLUCOSE SERPL-MCNC: 44 MG/DL (ref 65–140)
GLUCOSE SERPL-MCNC: 45 MG/DL (ref 65–140)
GLUCOSE SERPL-MCNC: 97 MG/DL (ref 65–140)
GLUCOSE UR STRIP-MCNC: NEGATIVE MG/DL
HGB UR QL STRIP.AUTO: NEGATIVE
KETONES UR STRIP-MCNC: NEGATIVE MG/DL
LEUKOCYTE ESTERASE UR QL STRIP: ABNORMAL
NITRITE UR QL STRIP: NEGATIVE
NON-SQ EPI CELLS URNS QL MICRO: ABNORMAL /HPF
PH UR STRIP.AUTO: 6.5 [PH]
PROT UR STRIP-MCNC: NEGATIVE MG/DL
RBC #/AREA URNS AUTO: ABNORMAL /HPF
SP GR UR STRIP.AUTO: 1 (ref 1–1.03)
UROBILINOGEN UR STRIP-ACNC: <2 MG/DL
WBC #/AREA URNS AUTO: ABNORMAL /HPF
WBC CLUMPS # UR AUTO: PRESENT /UL

## 2022-10-20 RX ORDER — GABAPENTIN 100 MG/1
100 CAPSULE ORAL 3 TIMES DAILY
Status: DISCONTINUED | OUTPATIENT
Start: 2022-10-20 | End: 2022-10-22

## 2022-10-20 RX ORDER — ACETAMINOPHEN 325 MG/1
975 TABLET ORAL
Status: DISCONTINUED | OUTPATIENT
Start: 2022-10-20 | End: 2022-10-28

## 2022-10-20 RX ORDER — INSULIN GLARGINE 100 [IU]/ML
30 INJECTION, SOLUTION SUBCUTANEOUS
Status: DISCONTINUED | OUTPATIENT
Start: 2022-10-21 | End: 2022-10-26

## 2022-10-20 RX ORDER — ACETAMINOPHEN 325 MG/1
325 TABLET ORAL EVERY 6 HOURS PRN
Status: DISCONTINUED | OUTPATIENT
Start: 2022-10-20 | End: 2022-11-08 | Stop reason: HOSPADM

## 2022-10-20 RX ORDER — DEXTROSE MONOHYDRATE 25 G/50ML
25 INJECTION, SOLUTION INTRAVENOUS ONCE
Status: COMPLETED | OUTPATIENT
Start: 2022-10-20 | End: 2022-10-20

## 2022-10-20 RX ADMIN — HYDROCODONE BITARTRATE AND ACETAMINOPHEN 1 TABLET: 5; 325 TABLET ORAL at 08:31

## 2022-10-20 RX ADMIN — DEXTROSE MONOHYDRATE 25 ML: 25 INJECTION, SOLUTION INTRAVENOUS at 17:31

## 2022-10-20 RX ADMIN — SENNOSIDES AND DOCUSATE SODIUM 1 TABLET: 8.6; 5 TABLET ORAL at 20:47

## 2022-10-20 RX ADMIN — METOPROLOL TARTRATE 25 MG: 25 TABLET, FILM COATED ORAL at 21:40

## 2022-10-20 RX ADMIN — GABAPENTIN 100 MG: 100 CAPSULE ORAL at 17:27

## 2022-10-20 RX ADMIN — LIDOCAINE 5% 2 PATCH: 700 PATCH TOPICAL at 08:27

## 2022-10-20 RX ADMIN — INSULIN LISPRO 12 UNITS: 100 INJECTION, SOLUTION INTRAVENOUS; SUBCUTANEOUS at 11:14

## 2022-10-20 RX ADMIN — DOCUSATE SODIUM 100 MG: 100 CAPSULE, LIQUID FILLED ORAL at 08:31

## 2022-10-20 RX ADMIN — INSULIN LISPRO 12 UNITS: 100 INJECTION, SOLUTION INTRAVENOUS; SUBCUTANEOUS at 08:26

## 2022-10-20 RX ADMIN — DOCUSATE SODIUM 100 MG: 100 CAPSULE, LIQUID FILLED ORAL at 17:27

## 2022-10-20 RX ADMIN — METOPROLOL TARTRATE 25 MG: 25 TABLET, FILM COATED ORAL at 08:31

## 2022-10-20 RX ADMIN — ACETAMINOPHEN 975 MG: 325 TABLET, FILM COATED ORAL at 13:16

## 2022-10-20 RX ADMIN — ENOXAPARIN SODIUM 40 MG: 40 INJECTION SUBCUTANEOUS at 08:31

## 2022-10-20 RX ADMIN — ACETAMINOPHEN 975 MG: 325 TABLET, FILM COATED ORAL at 20:47

## 2022-10-20 RX ADMIN — MECLIZINE HCL 12.5 MG 12.5 MG: 12.5 TABLET ORAL at 11:13

## 2022-10-20 RX ADMIN — GABAPENTIN 100 MG: 100 CAPSULE ORAL at 20:47

## 2022-10-20 RX ADMIN — ATORVASTATIN CALCIUM 20 MG: 20 TABLET, FILM COATED ORAL at 17:27

## 2022-10-20 RX ADMIN — MECLIZINE HCL 12.5 MG 12.5 MG: 12.5 TABLET ORAL at 06:29

## 2022-10-20 RX ADMIN — INSULIN LISPRO 2 UNITS: 100 INJECTION, SOLUTION INTRAVENOUS; SUBCUTANEOUS at 11:13

## 2022-10-20 NOTE — PROGRESS NOTES
10/20/22 1245   Pain Assessment   Pain Assessment Tool 0-10   Pain Score 9   Pain Location/Orientation Orientation: Right;Location: Hip   Restrictions/Precautions   Precautions Bed/chair alarms;Cognitive; Fall Risk;Pain;Supervision on toilet/commode;Visual deficit   Weight Bearing Restrictions Yes   LUE Weight Bearing Per Order NWB  (hand, okay to WB through elbow)   RLE Weight Bearing Per Order WBAT   Braces or Orthoses Splint  (L 4th digit)   Lifestyle   Autonomy "Do you see all of that red coming from the ceiling?"   Lower Body Dressing   Type of Assistance Needed Physical assistance   Physical Assistance Level Total assistance   Comment A to thread BLE, Mod Ax2 to stand at [facility], once in stance Mod-Max A w/ 2nd person for clothing management  Lower Body Dressing CARE Score 1   Putting On/Taking Off Footwear   Type of Assistance Needed Physical assistance   Physical Assistance Level Total assistance   Comment TA to don/doff socks  Putting On/Taking Off Footwear CARE Score 1   Sit to Lying   Type of Assistance Needed Physical assistance;Verbal cues; Adaptive equipment   Physical Assistance Level Total assistance   Comment Mod Ax1, SBA of 2nd person  Limited by RLE pain  Sit to Lying CARE Score 1   Lying to Sitting on Side of Bed   Type of Assistance Needed Physical assistance;Verbal cues; Adaptive equipment   Physical Assistance Level 76% or more   Comment extended time, Max vc's for sequencing  Lying to Sitting on Side of Bed CARE Score 2   Sit to Stand   Type of Assistance Needed Physical assistance;Verbal cues; Adaptive equipment   Physical Assistance Level Total assistance   Comment Mod Ax2 w/ gait belt, Max cues for sequencing to stand at [facility]  Sit to Stand CARE Score 1   Toileting Hygiene   Type of Assistance Needed Physical assistance   Physical Assistance Level Total assistance   Comment alternating lateral weightshifting for clothing management down  Mod-Max Ax2 to stand at [facility]   Once in stance req Mod-Max Ax1 w/ 2nd person for bowel hygiene and clothing management  Req several seated rest breaks between stand trials  Toileting Hygiene CARE Score 1   Toilet Transfer   Type of Assistance Needed Physical assistance;Verbal cues; Adaptive equipment;Set-up / clean-up   Physical Assistance Level Total assistance   Comment Mod Ax1, SBA of 2nd person slideboard xfer to drop-arm platform BSC  Utilized dycem  Cues and tactile cues to adhere to L hand NWB  Max cues for sequencing  Req extended time 2* RLE pain, cognitive deficits, and feelings of dizziness  Toilet Transfer CARE Score 1   Cognition   Overall Cognitive Status Impaired   Arousal/Participation Arousable;Lethargic   Attention Difficulty attending to directions   Orientation Level Oriented to person;Oriented to place; Disoriented to time;Oriented to situation   Memory Decreased short term memory;Decreased recall of recent events;Decreased recall of precautions   Following Commands Follows one step commands with increased time or repetition   Comments Pt w/ inc STM, impaired insight, difficulty following 1-step directions  When asked, unable to recall toileting completed this session  Add'lly noted w/ visual hallucinations midway through session and at end of session reporting to see red things on the ceiling  RN and MD made aware  Assessment   Treatment Assessment Pt seen for skilled OT session focusing on bed mobility, fxnl xfers, and toileting  Pt's nephew's S O  dropped off pt's clothing  Limited by significant RLE pain, cont w/ intermittent dizziness req extended time for all tasks w/ frequent rest breaks and cues to maximize deep breathing  Completed slideboard xfer to drop-arm platform BSC, req Ax2 for xfer and to stand at [facility] for hygiene/clothing management  Would benefit from xfer to [facility] without urgency to toilet   Pt add'lly limited by visual hallucinations which are new, req frequent redirection and edu; inc difficulty redirecting compared to yesterday and RN made aware  Cont OT POC: inc time OOB, endurance work, RUE strengthening, fxnl slideboard xfers, [facility]xnl standing [facility]  [address], ADL retraining, and repetitive safety training  Pt left resting in bed, provided w/ ice for RLE, alarm activated and needs within reach  Prognosis Good   Problem List Decreased strength;Decreased endurance; Impaired balance;Decreased mobility; Decreased safety awareness;Orthopedic restrictions;Pain;Decreased cognition; Impaired vision   Plan   Treatment/Interventions ADL retraining;Functional transfer training; Therapeutic exercise;Cognitive reorientation; Endurance training;Equipment eval/education;Patient/family training   Progress Slow progress, cognitive deficits   Recommendation   OT Discharge Recommendation   (pending progress)   OT Therapy Minutes   OT Time In 1245   OT Time Out 1415   OT Total Time (minutes) 90   OT Mode of treatment - Individual (minutes) 90   OT Mode of treatment - Concurrent (minutes) 0   OT Mode of treatment - Group (minutes) 0   OT Mode of treatment - Co-treat (minutes) 0   OT Mode of Treatment - Total time(minutes) 90 minutes   OT Cumulative Minutes 280   Therapy Time missed   Time missed?  No

## 2022-10-20 NOTE — PROGRESS NOTES
10/20/22 0900   Pain Assessment   Pain Assessment Tool 0-10   Pain Score 10 - Worst Possible Pain  (8-1/0 at rest, 10/10 during initial R LE ROM in bed ex & w/c propulsion, standing/walking, 6/10 after initial transfer to [facility])   Pain Location/Orientation Orientation: Right   Pain Radiating Towards R LB and and ankle   Pain Onset/Description Onset: Ongoing;Frequency: Constant/Continuous; Descriptor: Stabbing   Patient's Stated Pain Goal No pain   Hospital Pain Intervention(s) Cold applied;Repositioned;Medication (See MAR)  (cold pack on R medial thigh and R LB)   Restrictions/Precautions   Precautions Bed/chair alarms;Cognitive; Fall Risk;Supervision on toilet/commode;Pain;Visual deficit   Weight Bearing Restrictions Yes   LUE Weight Bearing Per Order NWB  (L hand, ok to use PFRW)   RLE Weight Bearing Per Order WBAT   Braces or Orthoses Splint  (L 4th finger so sling during SB transfers, abdominal binder /TEDS for OH)   Cognition   Overall Cognitive Status Impaired   Arousal/Participation Lethargic;Arousable   Attention Attends with cues to redirect   Orientation Level Oriented to person;Disoriented to situation;Disoriented to place; Disoriented to time  (requires)   Memory Decreased short term memory;Decreased recall of recent events;Decreased recall of precautions   Following Commands Follows one step commands with increased time or repetition   Subjective   Subjective pt reported lack of sleep last night from constant turning and tossing due to pain  pt also reported seeing big red ants on her L forearm and by her room doorway when we returned back to her room  RN notified  Roll Left and Right   Type of Assistance Needed Physical assistance;Verbal cues; Adaptive equipment   Physical Assistance Level Total assistance   Comment unable without assist or bedrail   mod A with bedrail and extra time to complete to doffed pants at end of tx   Roll Left and Right CARE Score 1   Sit to Lying   Type of Assistance Needed Physical assistance;Verbal cues; Adaptive equipment   Physical Assistance Level Total assistance   Comment unable to complete on her own but mod of 1 with bedrail and assist on R LE   Sit to Lying CARE Score 1   Lying to Sitting on Side of Bed   Type of Assistance Needed Physical assistance;Verbal cues; Adaptive equipment   Physical Assistance Level Total assistance   Comment mod assist with R LE and maintain NWB on L hand with bedrail and HOB elevated  unable to complete without bedrail or HOB adjustment   Lying to Sitting on Side of Bed CARE Score 1   Sit to Stand   Type of Assistance Needed Physical assistance;Verbal cues; Adaptive equipment   Physical Assistance Level Total assistance   Comment mod-max of 2 with  L PFRW   Sit to Stand CARE Score 1   Bed-Chair Transfer   Type of Assistance Needed Physical assistance;Verbal cues; Adaptive equipment   Physical Assistance Level Total assistance   Comment mod of 1 SB and L UE sling with SBA of 2nd person for safety to mod A x 2 at end of tx back to bed with slide board  Chair/Bed-to-Chair Transfer CARE Score 1   Walk 10 Feet   Type of Assistance Needed Physical assistance; Adaptive equipment   Physical Assistance Level Total assistance   Comment assist x 2 with L PFRW x 10' with CFA   Walk 10 Feet CARE Score 1   Wheelchair mobility   Findings w/c mobility for ROM/strengthening exercise using R UE and bilat LE initially but due to pain needed R hand to advance R LE while PT provided assist to push w/c to advance x 50'   Therapeutic Interventions   Strengthening supine Active AROM for R LE at start and end of tx include knee flexion/extension, SLR, hip abduction/adduction, ankle pumps x 10 reps x 4 sets   Equipment Use   NuStep L1 x 10 mins Active assist to passive as tolerated using bilat LE and R UE only for ROM and strengthening exercise to facilitate transfers   Assessment   Treatment Assessment pt able to participate in therapy but required constant arousal for has tendency to fall asleep and seems confused as well  cont to report of lightheadednes in standing even with TEDS and abdominal binder on but denied worsening of symptoms with prolong standing so was able to engaged in gait training but activity tolerance limited by ongoing pain  see vitals section for manual ortho BPs  skilled PT focused on ROM/strengthening exercises, repeated slide board transfer training and short distance amb with PFRW  She cont to demo poor carry over of NWB precaution on L hand especially by end of tx due to fatigue with 2 person assist to complete OOB task but 1 person assist with bed mobility using rails and extra time to complete  Cont PT POC with focus on improving OOB activity tolerance and overall functional indep using AD  Family/Caregiver Present no   Barriers to Discharge Inaccessible home environment;Decreased caregiver support   PT Barriers   Functional Limitation Car transfers; Ramp negotiation;Stair negotiation;Standing;Transfers; Walking; Wheelchair management   Plan   Treatment/Interventions Functional transfer training;LE strengthening/ROM; Therapeutic exercise;Gait training;Bed mobility;Spoke to nursing;OT;Spoke to MD   Progress Slow progress, decreased activity tolerance   Recommendation   PT Discharge Recommendation   (TBD pending progress)   PT Therapy Minutes   PT Time In 0900   PT Time Out 1030   PT Total Time (minutes) 90   PT Mode of treatment - Individual (minutes) 90   PT Mode of treatment - Concurrent (minutes) 0   PT Mode of treatment - Group (minutes) 0   PT Mode of treatment - Co-treat (minutes) 0   PT Mode of Treatment - Total time(minutes) 90 minutes   PT Cumulative Minutes 270

## 2022-10-20 NOTE — PROGRESS NOTES
PM&R PROGRESS NOTE:  Jamie Angela 79 y o  female MRN: [id number]  Unit/Bed#: -48 Encounter: [id number]        Rehab Diagnosis: Impairment of mobility, safety and Activities of Daily Living (ADLs) due to Orthopedic Disorders:  08 11  Unilateral Hip Fracture - Right    SUBJECTIVE:  Patient seen this morning with nursing  Patient today with seeing ants on her arm and people in her room  She is oriented to situation, person, but not time  Patient states she has moderate pain in her right leg  Provided a medical and functional update to her nephew Bj Mcmahon today  Bj Mcmahon states the opioids are likely causing her to be confused  He also states in our discussion regarding disposition planning that she would have to be at a modified independent level with all mobility and self-care including many IADLs in order to return home as his level of assistance is minimal as he is unable to provide assistance and patient does not have the finances to hire assistance without the home  He states this is been tried in the past   He is understanding that patient may but at require a subacute rehabilitation referral pending her functional progress here     ASSESSMENT: Stable, progressing      PLAN:    Rehabilitation  · Functional deficits: impaired mobility, self care, WBAT RLE, NWB L HAND  • Continue current rehabilitation plan of care to maximize function  • Expected Discharge: XU [facility] 3 weeks  Physical Therapy Occupational Therapy Speech Therapy   Weight Bearing Status: Non-weight bearing (L hand, WBAT R LE)  Transfers: Assist of 2  Bed Mobility: Assist of 2  Amulation Distance (ft): 15 feet  Ambulation: Assist of 2  Assistive Device for Ambulation: Roller Walker (L PFRW)  Assistive Device for Stairs:  (unsafe to perform)  Discharge Recommendations:  (TBD pending progress)   Eating: Independent  Grooming: Minimal Assistance  Bathing: Total Assistance, Assist of 2  Bathing:  Total Assistance, Assist of 2  Upper Body Dressing: Minimal Assistance  Lower Body Dressing: Total Assistance, Assist of 2  Toileting: Total Assistance, Assist of 2  Toilet Transfer: Total Assistance, Assist of 2  Cognition: Exceptions to WNL  Cognition: Decreased Memory, Decreased Executive Functions, Decreased Attention, Decreased Safety  Orientation: Person, Place, Time, Situation                   DVT prophylaxis  · Managed on SQ Lovenox 40 mg daily - will need education for home    Bladder plan  • Continent    Bowel plan  • Continent      * Intertrochanteric fracture of right femur [facility])  Assessment & Plan  Traumatic fall  Sustained a right intertrochanteric fracture  Taken to OR by Dr Kota Joshi on 10/12/22 for ORIF and IM nailing  WBAT RLE  DVT ppx with SQ Lovenox  Monitor 3 incisions  Continue acute rehabilitation program  POD 14 = 10/26/22 - x-rays and ortho follow-up due  Follow-up with Dr Kota Joshi as outpatient       H/O dizziness  Assessment & Plan  Dizziness related to motion  Chronic issue per patient  Meclizine 12 5 mg ordered BID with improvement today    Orthostasis  Assessment & Plan  Mild orthstasis  Clinically hypovolemic   IVF maintenance x1 10/19/22  Improved BP and was able to participate in therapies today ambulating 10 ft      Acute pain  Assessment & Plan  Nociceptive/neuropathic post op pain in RLE managed on multimodal regimen:   · Patient is intolerant of oxycodone and Norco   Patient excessively drowsy on oxycodone and with mild hallucinations on Norco   · Discontinue Norco  · Schedule Tylenol 975 mg t i d   · Scheduled gabapentin 100 mg t i d   · Topical Lidoderm patches  · Scheduled topical ice every 2 hours while awake  · Personally discussed plan with nursing, patient, as well as her nephew    CLL (chronic lymphocytic leukemia) ([facility] [address] )  Assessment & Plan  Chronic leukocytosis  Monitor   Follow-up with heme onc as outpatient     Displaced fracture of middle phalanx of left ring finger, initial encounter for closed fracture  Assessment & Plan  Traumatic fracture  4th left finger   Treated non-operatively by Orthopedics  Continue supportive splint  ICE for swelling  NWB L hand    Hypertension  Assessment & Plan  At home: Lopressor 25 mg Q 12 hours, Hyzaar (Losartan/HCTZ) 50mg/12 5mg daily  Here: Lopressor 25 mg daily (increased 10/18)  Will monitor BP closely  Check orthostatics  Adjust medications accordingly  TEDs daily /Abdominal binder PRN    Chronic systolic heart failure (HCC)  Assessment & Plan  Grade 2 DD  At home: Lasix 40 mg daily  Here: Slightly hypovolemic - HOLD Lasix  Encourage fluids by mouth  Restart Lasix when able with K Dur supplement        HLD (hyperlipidemia)  Assessment & Plan  Restarted on home Lipitor 20 mg QPM    CKD (chronic kidney disease) stage 3, GFR 30-59 ml/min (HCC)  Assessment & Plan  Stable  Avoid nephrotoxic agents   Monitor 2x/week    Type 2 diabetes mellitus (HCC)  Assessment & Plan  Lab Results   Component Value Date    HGBA1C 7 6 (H) 10/12/2022     At home: Levemir 20 units QHS, Humalog 10 units with meals  Here: Lantus 28 units QHS, Humalog 12 units with meals  In acute care required an insulin drip transiently  Continue CCD        Appreciate IM consultants medical co-management  Labs, medications, and imaging personally reviewed  ROS:  A ten point review of systems was completed on 10/20/22 and pertinent positives are listed in subjective section  All other systems reviewed were negative  OBJECTIVE:   /50 (BP Location: Left arm)   Pulse 97   Temp 97 8 °F (36 6 °C) (Oral)   Resp 18   Ht 5' 6" (1 676 m)   Wt 67 7 kg (149 lb 4 8 oz)   SpO2 92%   BMI 24 10 kg/m²       Physical Exam  Vitals and nursing note reviewed  Constitutional:       General: She is not in acute distress  Appearance: She is well-developed  HENT:      Head: Normocephalic and atraumatic        Nose: Nose normal       Mouth/Throat:      Mouth: Mucous membranes are moist    Eyes: Conjunctiva/sclera: Conjunctivae normal       Pupils: Pupils are equal, round, and reactive to light  Cardiovascular:      Rate and Rhythm: Normal rate and regular rhythm  Pulses: Normal pulses  Pulmonary:      Effort: Pulmonary effort is normal       Breath sounds: Normal breath sounds  No wheezing or rales  Abdominal:      General: Bowel sounds are normal  There is no distension  Palpations: Abdomen is soft  Tenderness: There is no abdominal tenderness  Musculoskeletal:         General: Swelling ( suad-incisional) present  Cervical back: Neck supple  Skin:     General: Skin is warm  Comments: Incision clean and dry with staples x3 locations   Neurological:      Mental Status: She is alert and oriented to person, place, and time  Motor: Weakness ( right lower extremity graded as a 2-/5 proximally,) present     Psychiatric:         Mood and Affect: Mood normal           Lab Results   Component Value Date    WBC 22 41 (H) 10/19/2022    HGB 9 3 (L) 10/19/2022    HCT 29 8 (L) 10/19/2022     (H) 10/19/2022     10/19/2022     Lab Results   Component Value Date    SODIUM 134 (L) 10/19/2022    K 3 7 10/19/2022     10/19/2022    CO2 26 10/19/2022    BUN 25 10/19/2022    CREATININE 1 03 10/19/2022    GLUC 253 (H) 10/19/2022    CALCIUM 8 7 10/19/2022     Lab Results   Component Value Date    INR 0 89 10/11/2022    PROTIME 12 3 10/11/2022           Current Facility-Administered Medications:   •  acetaminophen (TYLENOL) tablet 325 mg, 325 mg, Oral, Q6H PRN, Sander Nelson MD  •  acetaminophen (TYLENOL) tablet 975 mg, 975 mg, Oral, TID AC, Sander Nelson MD  •  atorvastatin (LIPITOR) tablet 20 mg, 20 mg, Oral, Daily With Eloise Hudson MD, 20 mg at 10/19/22 1641  •  bisacodyl (DULCOLAX) rectal suppository 10 mg, 10 mg, Rectal, Daily PRN, Sander Nelson MD  •  docusate sodium (COLACE) capsule 100 mg, 100 mg, Oral, BID, Sander Nelson MD, 100 mg at 10/20/22 0831  •  enoxaparin (LOVENOX) subcutaneous injection 40 mg, 40 mg, Subcutaneous, Q24H [address], Eric Lovell MD, 40 mg at 10/20/22 0831  •  gabapentin (NEURONTIN) capsule 100 mg, 100 mg, Oral, TID, Eric Lovell MD  •  insulin glargine (LANTUS) subcutaneous injection 30 Units 0 3 mL, 30 Units, Subcutaneous, HS, BRINDA Parra, 30 Units at 10/19/22 2146  •  insulin lispro (HumaLOG) 100 units/mL subcutaneous injection 1-5 Units, 1-5 Units, Subcutaneous, HS, Eric Lovell MD  •  insulin lispro (HumaLOG) 100 units/mL subcutaneous injection 1-6 Units, 1-6 Units, Subcutaneous, TID AC, Eric Lovell MD, 2 Units at 10/20/22 1113  •  insulin lispro (HumaLOG) 100 units/mL subcutaneous injection 12 Units, 12 Units, Subcutaneous, TID With Meals, Eric Lovell MD, 12 Units at 10/20/22 1114  •  lidocaine (LIDODERM) 5 % patch 2 patch, 2 patch, Topical, Daily, Eric Lovell MD, 2 patch at 10/20/22 0827  •  meclizine (ANTIVERT) tablet 12 5 mg, 12 5 mg, Oral, Q8H PRN, Eric Lovell MD, 12 5 mg at 10/19/22 1353  •  meclizine (ANTIVERT) tablet 12 5 mg, 12 5 mg, Oral, BID before breakfast/lunch, Eric Lovell MD, 12 5 mg at 10/20/22 1113  •  metoprolol tartrate (LOPRESSOR) tablet 25 mg, 25 mg, Oral, Q12H [place], BRINDA Kramer, 25 mg at 10/20/22 0831  •  naloxone (NARCAN) 0 04 mg/mL syringe 0 04 mg, 0 04 mg, Intravenous, Q1MIN PRN, Eric Lovell MD  •  polyethylene glycol (MIRALAX) packet 17 g, 17 g, Oral, Daily PRN, Eric Lovell MD  •  senna-docusate sodium (SENOKOT S) 8 6-50 mg per tablet 1 tablet, 1 tablet, Oral, HS, Eric Lovell MD, 1 tablet at 10/19/22 6221    Past Medical History:   Diagnosis Date   • Benign adenomatous polyp of large intestine    • [facility]    • Diabetes mellitus [facility] )    • Hyperlipemia    • Hypertension    • Osteoarthritis    • TIA (transient ischemic attack)        Patient Active Problem List Diagnosis Date Noted   • Intertrochanteric fracture of right femur ([place] [address] ) 10/11/2022   • Orthostasis 10/19/2022   • H/O dizziness 10/19/2022   • Acute pain 10/17/2022   • CLL (chronic lymphocytic leukemia) ([place] [address] ) 10/13/2022   • Displaced fracture of middle phalanx of left ring finger, initial encounter for closed fracture 10/11/2022   • Hypertension 08/08/2021   • Ambulatory dysfunction 08/05/2021   • Suspected Mild cognitive impairment 08/05/2021   • Lymphadenopathy 11/06/2020   • Elevated liver enzymes 11/06/2020   • Fall 11/05/2020   • Type 2 diabetes mellitus ([place] [address] ) 11/05/2020   • CKD (chronic kidney disease) stage 3, GFR 30-59 ml/min ([place] [address] ) 11/05/2020   • HLD (hyperlipidemia) 11/05/2020   • OA (osteoarthritis) 11/05/2020   • Chronic systolic heart failure ([place] [address] ) 11/05/2020          Yanely Simpson    Total time spent:  1 hour with more than 50% spent counseling/coordinating care  Counseling includes discussion with patient re: progress and discussion with patient of his/her current medical/functional state/information  Coordination of patient's care was performed in conjunction with consulting services  Time invested included review of patient's labs, vitals, and management of their comorbidities with continued monitoring  The care of the patient was extensively discussed and appropriate treatment plan was formulated unique for this patient  Personally updated her nephew Frank Lemos over the phone today (see conversation detailed above)    ** Please Note:  voice to text software may have been used in the creation of this document   Although proof errors in transcription or interpretation are a potential of such software**

## 2022-10-20 NOTE — TELEPHONE ENCOUNTER
Patient had surgery for R Femoral Nailing 10/12  Patient is currently admitted in [address] [address] and will be do for PO 10/26  Please advise if okay for Residents to see patient for PO if still inpatient? stated

## 2022-10-20 NOTE — PROGRESS NOTES
Internal Medicine Progress Note  Patient: Chelsea Ahmadi  Age/sex: 79 y o  female  Medical Record #: [id number]      ASSESSMENT/PLAN: (Interval History)  Chelsea Ahmadi is seen and examined and management for following issues:    S/p ORIF with IM nailing of the Rt femur  · Pain control and therapy per primary service  · Pain meds adjusted d/t sedation  · Renal dosed Lovenox for DVT prophylaxis  · Follow-up with Ortho 2 weeks post-op      Left 4th phalanx fracture  · Splint and NWB   · Pain control per PMR      HTN  · Home medications: Hyzaar 50/12 5mg daily/Lopressor 25mg every 12 hours/Lasix 40mg daily - for heart failure  · Here: metoprolol 25mg bid (increased 10/18)  · Will monitor and adjust medications as needed    Anemia  · Secondary to trauma  · Cbc today     DM type 2  · Home: Levemir 20U at bedtime/Humalog 10U with meals  · Here: Lantus 30U at bedtime/Humalog 12U with meals  · Continue accuchecks and SSI  · Adjust insulin as needed  · Cont same through today     Chronic heart failure  · Pt has been off diuretics  · Given 1L NSS 10/19/22 due to orthostasis and appearing dry  · ECHO with an EF of 45%  Grade 2 diastolic dysfunction  · Observe in therapy today       CLL  · Recent diagnosis  · Outpt follow-up with Hematology/Oncology  · Baseline WBCs 14-16K  · Had mild bump likely reactive secondary to surgery  · Stable      DC planning: TBD  The above assessment and plan was reviewed and updated as determined by my evaluation of the patient on 10/20/2022      Labs:   Results from last 7 days   Lab Units 10/19/22  0950 10/16/22  0349   WBC Thousand/uL 22 41* 24 20*   HEMOGLOBIN g/dL 9 3* 8 7*   HEMATOCRIT % 29 8* 27 3*   PLATELETS Thousands/uL 308 181     Results from last 7 days   Lab Units 10/19/22  0950 10/16/22  0349   SODIUM mmol/L 134* 135   POTASSIUM mmol/L 3 7 4 3   CHLORIDE mmol/L 102 102   CO2 mmol/L 26 27   BUN mg/dL 25 26*   CREATININE mg/dL 1 03 0 89   CALCIUM mg/dL 8 7 8 6 Results from last 7 days   Lab Units 10/20/22  0632 10/19/22  2126 10/19/22  1606   POC GLUCOSE mg/dl 97 131 222*       Review of Scheduled Meds:  Current Facility-Administered Medications   Medication Dose Route Frequency Provider Last Rate   • acetaminophen  650 mg Oral Q6H PRN Sirisha Muñoz MD     • atorvastatin  20 mg Oral Daily With Edith Cardenas MD     • bisacodyl  10 mg Rectal Daily PRN Sirisha Muñoz MD     • docusate sodium  100 mg Oral BID Sirisha Muñoz MD     • enoxaparin  40 mg Subcutaneous Q24H [address] Yanely Aiken MD     • gabapentin  100 mg Oral HS Sirisha Muñoz MD     • HYDROcodone-acetaminophen  1 tablet Oral TID With Meals Sirisha Muñoz MD     • HYDROcodone-acetaminophen  1 tablet Oral Q4H PRN Sirisha Muñoz MD     • insulin glargine  30 Units Subcutaneous HS BRINDA Kramer     • insulin lispro  1-5 Units Subcutaneous HS Sirisha Muñoz MD     • insulin lispro  1-6 Units Subcutaneous TID AC Yanely Aiken MD     • insulin lispro  12 Units Subcutaneous TID With Meals Sirisha Muñoz MD     • lidocaine  2 patch Topical Daily Sirisha Muñoz MD     • meclizine  12 5 mg Oral Q8H PRN Sirisha Muñoz MD     • meclizine  12 5 mg Oral BID before breakfast/lunch Sirisha Muñoz MD     • metoprolol tartrate  25 mg Oral Q12H [address] BRINDA Kramer     • naloxone  0 04 mg Intravenous Q1MIN PRN Sirisha Muñoz MD     • polyethylene glycol  17 g Oral Daily PRN Sirisha Muñoz MD     • senna-docusate sodium  1 tablet Oral HS Sirisha Muñoz MD         Subjective/ HPI: Patient seen and examined  Patients overnight issues or events were reviewed with nursing or staff during rounds or morning huddle session  New or overnight issues include the following:     Pt seen in therapy  She was restless overnight which she reports was due to pain  She is very tired today  She denies any other complaints         ROS:   A 10 point ROS was performed; negative except as noted above  Imaging:     No orders to display       *Labs /Radiology studies Reviewed  *Medications  reviewed and reconciled as needed  *Please refer to order section for additional ordered labs studies  *Case discussed with primary attending during morning huddle case rounds    Physical Examination:  Vitals:   Vitals:    10/19/22 2042 10/19/22 2128 10/20/22 0600 10/20/22 0805   BP: 124/58 122/66  159/66   BP Location: Left arm Left arm  Left arm   Pulse: 85 78  97   Resp: 16 18  18   Temp:  98 2 °F (36 8 °C)  97 8 °F (36 6 °C)   TempSrc:  Oral  Oral   SpO2:  97%  92%   Weight:   67 7 kg (149 lb 4 8 oz)    Height:         GEN: No apparent distress, interactive  NEURO: Alert and oriented x3  HEENT: Pupils are equal and reactive, EOMI, mucous membranes are moist, face symmetrical  CV: S1 S2 regular, no MRG, no peripheral edema noted  RESP: Lungs are clear bilaterally, no wheezes, rales or rhonchi noted, on room air, respirations easy and non labored  GI: Flat, soft non tender, non distended; +BS x4  : Voiding without difficulty  MUSC: Moves all extremities; except RLE and NWB LUE  SKIN: pink, warm and dry, normal turgor, dressing in place to RLE incisions with staples all are intact without evidence of infection; left 4th finger with splint    The above physical exam was reviewed and updated as determined by my evaluation of the patient on 10/20/2022  Invasive Devices  Report    Peripheral Intravenous Line  Duration           Peripheral IV 10/19/22 Right;Ventral (anterior) Forearm <1 day                   VTE Pharmacologic Prophylaxis: Enoxaparin  Code Status: Level 1 - Full Code  Current Length of Stay: 3 day(s)      Total time spent:  30 minutes with more than 50% spent counseling/coordinating care  Counseling includes discussion with patient re: progress  and discussion with patient of his/her current medical state/information   Coordination of patient's care was performed in conjunction with primary service  Time invested included review of patient's labs, vitals, and management of their comorbidities with continued monitoring  In addition, this patient was discussed with medical team including physician and advanced extenders  The care of the patient was extensively discussed and appropriate treatment plan was formulated unique for this patient  ** Please Note:  voice to text software may have been used in the creation of this document   Although proof errors in transcription or interpretation are a potential of such software**

## 2022-10-21 PROBLEM — N39.0 UTI (URINARY TRACT INFECTION): Status: ACTIVE | Noted: 2022-10-21

## 2022-10-21 PROBLEM — T14.8XXA DEEP TISSUE INJURY: Status: ACTIVE | Noted: 2022-10-21

## 2022-10-21 LAB
GLUCOSE SERPL-MCNC: 179 MG/DL (ref 65–140)
GLUCOSE SERPL-MCNC: 183 MG/DL (ref 65–140)
GLUCOSE SERPL-MCNC: 208 MG/DL (ref 65–140)
GLUCOSE SERPL-MCNC: 289 MG/DL (ref 65–140)

## 2022-10-21 RX ORDER — INSULIN LISPRO 100 [IU]/ML
8 INJECTION, SOLUTION INTRAVENOUS; SUBCUTANEOUS
Status: DISCONTINUED | OUTPATIENT
Start: 2022-10-21 | End: 2022-10-23

## 2022-10-21 RX ORDER — SULFAMETHOXAZOLE AND TRIMETHOPRIM 400; 80 MG/1; MG/1
2 TABLET ORAL EVERY 12 HOURS SCHEDULED
Status: COMPLETED | OUTPATIENT
Start: 2022-10-21 | End: 2022-10-25

## 2022-10-21 RX ADMIN — INSULIN LISPRO 8 UNITS: 100 INJECTION, SOLUTION INTRAVENOUS; SUBCUTANEOUS at 12:21

## 2022-10-21 RX ADMIN — METOPROLOL TARTRATE 25 MG: 25 TABLET, FILM COATED ORAL at 09:05

## 2022-10-21 RX ADMIN — INSULIN LISPRO 12 UNITS: 100 INJECTION, SOLUTION INTRAVENOUS; SUBCUTANEOUS at 08:45

## 2022-10-21 RX ADMIN — DOCUSATE SODIUM 100 MG: 100 CAPSULE, LIQUID FILLED ORAL at 17:42

## 2022-10-21 RX ADMIN — GABAPENTIN 100 MG: 100 CAPSULE ORAL at 21:31

## 2022-10-21 RX ADMIN — DOCUSATE SODIUM 100 MG: 100 CAPSULE, LIQUID FILLED ORAL at 09:05

## 2022-10-21 RX ADMIN — LIDOCAINE 5% 2 PATCH: 700 PATCH TOPICAL at 09:07

## 2022-10-21 RX ADMIN — INSULIN LISPRO 8 UNITS: 100 INJECTION, SOLUTION INTRAVENOUS; SUBCUTANEOUS at 17:44

## 2022-10-21 RX ADMIN — INSULIN LISPRO 1 UNITS: 100 INJECTION, SOLUTION INTRAVENOUS; SUBCUTANEOUS at 17:43

## 2022-10-21 RX ADMIN — INSULIN LISPRO 1 UNITS: 100 INJECTION, SOLUTION INTRAVENOUS; SUBCUTANEOUS at 21:32

## 2022-10-21 RX ADMIN — ACETAMINOPHEN 975 MG: 325 TABLET, FILM COATED ORAL at 21:00

## 2022-10-21 RX ADMIN — ACETAMINOPHEN 975 MG: 325 TABLET, FILM COATED ORAL at 12:57

## 2022-10-21 RX ADMIN — SULFAMETHOXAZOLE AND TRIMETHOPRIM 2 TABLET: 400; 80 TABLET ORAL at 12:16

## 2022-10-21 RX ADMIN — SENNOSIDES AND DOCUSATE SODIUM 1 TABLET: 8.6; 5 TABLET ORAL at 21:31

## 2022-10-21 RX ADMIN — ACETAMINOPHEN 975 MG: 325 TABLET, FILM COATED ORAL at 06:41

## 2022-10-21 RX ADMIN — INSULIN LISPRO 2 UNITS: 100 INJECTION, SOLUTION INTRAVENOUS; SUBCUTANEOUS at 06:54

## 2022-10-21 RX ADMIN — ACETAMINOPHEN 325 MG: 325 TABLET ORAL at 17:42

## 2022-10-21 RX ADMIN — GABAPENTIN 100 MG: 100 CAPSULE ORAL at 17:42

## 2022-10-21 RX ADMIN — ATORVASTATIN CALCIUM 20 MG: 20 TABLET, FILM COATED ORAL at 17:42

## 2022-10-21 RX ADMIN — INSULIN GLARGINE 30 UNITS: 100 INJECTION, SOLUTION SUBCUTANEOUS at 21:32

## 2022-10-21 RX ADMIN — SULFAMETHOXAZOLE AND TRIMETHOPRIM 2 TABLET: 400; 80 TABLET ORAL at 21:31

## 2022-10-21 RX ADMIN — ENOXAPARIN SODIUM 40 MG: 40 INJECTION SUBCUTANEOUS at 09:06

## 2022-10-21 RX ADMIN — METOPROLOL TARTRATE 25 MG: 25 TABLET, FILM COATED ORAL at 21:38

## 2022-10-21 RX ADMIN — MECLIZINE HCL 12.5 MG 12.5 MG: 12.5 TABLET ORAL at 06:41

## 2022-10-21 RX ADMIN — MECLIZINE HCL 12.5 MG 12.5 MG: 12.5 TABLET ORAL at 12:59

## 2022-10-21 RX ADMIN — GABAPENTIN 100 MG: 100 CAPSULE ORAL at 09:06

## 2022-10-21 NOTE — PROGRESS NOTES
Pastoral Care Progress Note    10/21/2022  Patient: Pau Kathleen : 1940  Admission Date & Time: 10/17/2022 1418  MRN: [id number] CSN: [id number]                      intro'd self and spiritual care services to patient  She was eating and did not want to be bothered at present, but said she was glad for the drop in visit and knowing there was a  serving the floor

## 2022-10-21 NOTE — PROGRESS NOTES
10/21/22 5216   Pain Assessment   Pain Assessment Tool 0-10   Pain Score 8   Pain Location/Orientation Orientation: Right;Location: Leg   Effect of Pain on Daily Activities limits ability to transfer, WB   Restrictions/Precautions   Precautions Bed/chair alarms;Cognitive; Fall Risk;Pain;Supervision on toilet/commode   LUE Weight Bearing Per Order NWB  (sling with transfers to avoid using L hand)   RLE Weight Bearing Per Order WBAT   ROM Restrictions No   Braces or Orthoses Splint  (L 4th digit)   Lifestyle   Autonomy "He came in with his head in his hat"   Oral Hygiene   Comment declined to complete requesting to complete after lunch   Shower/Bathe Self   Type of Assistance Needed Physical assistance;Verbal cues   Physical Assistance Level 76% or more   Shower/Bathe Self CARE Score 2   Upper Body Dressing   Type of Assistance Needed Physical assistance   Physical Assistance Level 76% or more   Comment seated EOB to don bra and shirt; pt requires up to min assist for unsupported sitting balance as well as assist to sequence/problem solve how to don bra     Upper Body Dressing CARE Score 2   Lower Body Dressing   Type of Assistance Needed Physical assistance;Verbal cues   Physical Assistance Level 76% or more   Comment assist to thread pants over feet; rolls using bed rails with assist to complete CM; VCs to not use L hand; assist to don brief   Lower Body Dressing CARE Score 2   Putting On/Taking Off Footwear   Type of Assistance Needed Physical assistance   Physical Assistance Level Total assistance   Comment to don socks/shoes and TEDs   Putting On/Taking Off Footwear CARE Score 1   Sit to Stand   Type of Assistance Needed Physical assistance;Verbal cues   Physical Assistance Level 76% or more   Comment STS from recliner to partial stance (bear hug tech)   Sit to Stand CARE Score 2   Bed-Chair Transfer   Type of Assistance Needed Physical assistance   Physical Assistance Level Total assistance   Comment SBT bed to drop arm recliner chair with assist to place SB; mod A x2 for SBT  use of sling on L UE to prevent WBing on L hand   Chair/Bed-to-Chair Transfer CARE Score 1   Cognition   Overall Cognitive Status Impaired   Arousal/Participation Arousable;Lethargic   Attention Difficulty attending to directions   Orientation Level Disoriented to time   Memory Decreased short term memory;Decreased recall of recent events;Decreased recall of precautions   Following Commands Follows one step commands with increased time or repetition   Activity Tolerance   Activity Tolerance Patient tolerated treatment well   Medical Staff Made Aware RN Jose Salgado present to assess sacrum, heels in which she took picture of red area and place allevyns bilaterally; placed pain patches as well for pain management  Assessment   Treatment Assessment pt engages in 90 minute skilled OT Session focusing on sponge bathing, SBT  see above for full func details  pt noted with verbalizing non sensical information majority of OT Session so much so that pt requires re-direction to task at hand, pt noted to be talking despite this BRIGGS not being present in room (standing at doorway with pt carrying on fully conversation)  RN reporting pt is positive for UTI and so may be contributing to visual hallucinations and confusion  pt continues to require inc assist however completed sponge bathing partially bed level Ax1, continues to require Ax2 for SBT with use of sling on LUE to prevent WBing  of note, upon entering pt room, pt splint resting on breakfast tray and when inquired about it pt states, "Oh, I don't even know what that's for"  re-applied L 4th digit splint   recommend continued skilled care to focus on ADL retraining, func transfers, standing luis/bal, sitting luis/bal, func cog, safety, in order to decrease burden of care at d/c  of note, provided pt with 18x18 ROHO cushion for when OOB in w/c or recliner in order to prevent skin breakdown as pt with limited mobility and decreased ability to re-position self especially due to impaired cognition  Prognosis Good   Problem List Decreased strength;Decreased endurance; Impaired balance;Decreased mobility; Decreased safety awareness;Orthopedic restrictions;Pain;Decreased cognition; Impaired vision   Plan   Treatment/Interventions ADL retraining;Functional transfer training; Therapeutic exercise; Endurance training;Cognitive reorientation;Patient/family training;Equipment eval/education;Gait training; Compensatory technique education   OT Therapy Minutes   OT Time In 0830   OT Time Out 1000   OT Total Time (minutes) 90   OT Mode of treatment - Individual (minutes) 90   OT Mode of treatment - Concurrent (minutes) 0   OT Mode of treatment - Group (minutes) 0   OT Mode of treatment - Co-treat (minutes) 0   OT Mode of Treatment - Total time(minutes) 90 minutes   OT Cumulative Minutes 370   Therapy Time missed   Time missed?  No

## 2022-10-21 NOTE — PROGRESS NOTES
Internal Medicine Progress Note  Patient: Durga Little  Age/sex: 79 y o  female  Medical Record #: [id number]      ASSESSMENT/PLAN: (Interval History)  Durga Little is seen and examined and management for following issues:    S/p ORIF with IM nailing of the Rt femur  · Pain control and therapy per primary service  · Pain meds adjusted d/t sedation  · Renal dosed Lovenox for DVT prophylaxis  · Follow-up with Ortho 2 weeks post-op      Left 4th phalanx fracture  · Splint and NWB   · Pain control per PMR      HTN  · Home medications: Hyzaar 50/12 5mg daily/Lopressor 25mg every 12 hours/Lasix 40mg daily - for heart failure  · Here: metoprolol 25mg bid (increased 10/18)  · Will monitor and adjust medications as needed    Anemia  · Secondary to trauma  · Cbc today     DM type 2  · Home: Levemir 20U at bedtime/Humalog 10U with meals  · Here: Lantus 30U at bedtime/Humalog 8U with meals  · Bs were low last 24 hours will decrease meal time insulin to 8 units starting 10/21     Chronic heart failure  · Pt has been off diuretics  · Given 1L NSS 10/19/22 due to orthostasis and appearing dry  · ECHO with an EF of 45%  Grade 2 diastolic dysfunction  · euvolemic     CLL  · Recent diagnosis  · Outpt follow-up with Hematology/Oncology  · Baseline WBCs 14-16K  · Had mild bump likely reactive secondary to surgery  · Stable      Suspected UTI  · positive UA await cultures  · Given slightly change in mental status would initiate treatment    DC planning: TBD  The above assessment and plan was reviewed and updated as determined by my evaluation of the patient on 10/21/2022      Labs:   Results from last 7 days   Lab Units 10/19/22  0950 10/16/22  0349   WBC Thousand/uL 22 41* 24 20*   HEMOGLOBIN g/dL 9 3* 8 7*   HEMATOCRIT % 29 8* 27 3*   PLATELETS Thousands/uL 308 181     Results from last 7 days   Lab Units 10/19/22  0950 10/16/22  0349   SODIUM mmol/L 134* 135   POTASSIUM mmol/L 3 7 4 3   CHLORIDE mmol/L 102 102   CO2 mmol/L 26 27   BUN mg/dL 25 26*   CREATININE mg/dL 1 03 0 89   CALCIUM mg/dL 8 7 8 6             Results from last 7 days   Lab Units 10/21/22  0636 10/20/22  2048 10/20/22  1807   POC GLUCOSE mg/dl 208* 170* 140       Review of Scheduled Meds:  Current Facility-Administered Medications   Medication Dose Route Frequency Provider Last Rate   • acetaminophen  325 mg Oral Q6H PRN Berlin Stover MD     • acetaminophen  975 mg Oral TID AC Berlin Stover MD     • atorvastatin  20 mg Oral Daily With Zach Fuentes MD     • bisacodyl  10 mg Rectal Daily PRN Berlin Stover MD     • docusate sodium  100 mg Oral BID Berlin Stover MD     • enoxaparin  40 mg Subcutaneous Q24H [address] Berlin Stover MD     • gabapentin  100 mg Oral TID Berlin Stover MD     • insulin glargine  30 Units Subcutaneous HS Adeola Jennings PA-C     • insulin lispro  1-5 Units Subcutaneous HS Berlin Stover MD     • insulin lispro  1-6 Units Subcutaneous TID AC Yanely Dominguez MD     • insulin lispro  12 Units Subcutaneous TID With Meals Berlin Stover MD     • lidocaine  2 patch Topical Daily Berlin Stover MD     • meclizine  12 5 mg Oral Q8H PRN Berlin Stover MD     • meclizine  12 5 mg Oral BID before breakfast/lunch Berlin Stover MD     • metoprolol tartrate  25 mg Oral Q12H [address] BRINDA Kramer     • naloxone  0 04 mg Intravenous Q1MIN PRN Berlin Stover MD     • polyethylene glycol  17 g Oral Daily PRN Berlin Stover MD     • senna-docusate sodium  1 tablet Oral HS Berlin Stover MD         Subjective/ HPI: Patient seen and examined  Patients overnight issues or events were reviewed with nursing or staff during rounds or morning huddle session  New or overnight issues include the following:     Low bs this AM  Will adjust meal insulin as above  Had some hallucinations over night  UA was done and positive for bacteria and WBC          ROS:   A 10 point ROS was performed; negative except as noted above  Imaging:     No orders to display       *Labs /Radiology studies Reviewed  *Medications  reviewed and reconciled as needed  *Please refer to order section for additional ordered labs studies  *Case discussed with primary attending during morning huddle case rounds    Physical Examination:  Vitals:   Vitals:    10/20/22 0905 10/20/22 0908 10/20/22 2148 10/21/22 0654   BP: (!) 130/48 138/50 145/83 (!) 177/79   BP Location: Left arm Left arm Left arm Left arm   Pulse:   86 94   Resp:   18 18   Temp:   97 9 °F (36 6 °C) 98 3 °F (36 8 °C)   TempSrc:   Oral Oral   SpO2:   96% 96%   Weight:    62 8 kg (138 lb 7 2 oz)   Height:         GEN: No apparent distress, interactive  NEURO: Alert and oriented x3  HEENT: Pupils are equal and reactive, EOMI, mucous membranes are moist, face symmetrical  CV: S1 S2 regular, no MRG, no peripheral edema noted  RESP: Lungs are clear bilaterally, no wheezes, rales or rhonchi noted, on room air, respirations easy and non labored  GI: Flat, soft non tender, non distended; +BS x4  : Voiding without difficulty  MUSC: Moves all extremities; except RLE and NWB LUE  SKIN: pink, warm and dry, normal turgor, dressing in place to RLE incisions with staples all are intact without evidence of infection; left 4th finger with splint    The above physical exam was reviewed and updated as determined by my evaluation of the patient on 10/21/2022  Invasive Devices  Report    Peripheral Intravenous Line  Duration           Peripheral IV 10/19/22 Right;Ventral (anterior) Forearm 1 day          Drain  Duration           External Urinary Catheter <1 day                   VTE Pharmacologic Prophylaxis: Enoxaparin  Code Status: Level 1 - Full Code  Current Length of Stay: 4 day(s)      Total time spent:  30 minutes with more than 50% spent counseling/coordinating care   Counseling includes discussion with patient re: progress  and discussion with patient of his/her current medical state/information  Coordination of patient's care was performed in conjunction with primary service  Time invested included review of patient's labs, vitals, and management of their comorbidities with continued monitoring  In addition, this patient was discussed with medical team including physician and advanced extenders  The care of the patient was extensively discussed and appropriate treatment plan was formulated unique for this patient  ** Please Note:  voice to text software may have been used in the creation of this document   Although proof errors in transcription or interpretation are a potential of such software**

## 2022-10-21 NOTE — PROGRESS NOTES
PM&R PROGRESS NOTE:  Valeria Espinoza 79 y o  female MRN: [id number]  Unit/Bed#: -42 Encounter: [id number]        Rehab Diagnosis: Impairment of mobility, safety and Activities of Daily Living (ADLs) due to Orthopedic Disorders:  08 11  Unilateral Hip Fracture - Right    SUBJECTIVE:  Patient seen face to face  Last evening, patient had hypoglycemia post eating dinner  Patient received amp D50  Patient this morning continues to hallucinate seeing people in children in her room  UA is significant for a UTI, patient states she has had UTIs in the past   She denies any dysuria or pelvic pain however is having urinary frequency  ASSESSMENT: Stable, progressing      PLAN:    Rehabilitation  · Functional deficits: impaired mobility, self care, WBAT RLE, NWB L HAND  • Continue current rehabilitation plan of care to maximize function  • Expected Discharge: XU COLON 3 weeks  Physical Therapy Occupational Therapy Speech Therapy   Weight Bearing Status: Non-weight bearing (L hand, WBAT R LE)  Transfers: Assist of 2  Bed Mobility: Assist of 2  Amulation Distance (ft): 15 feet  Ambulation: Assist of 2  Assistive Device for Ambulation: Roller Walker (L PFRW)  Assistive Device for Stairs:  (unsafe to perform)  Discharge Recommendations:  (TBD pending progress)   Eating: Independent  Grooming: Minimal Assistance  Bathing: Total Assistance, Assist of 2  Bathing: Total Assistance, Assist of 2  Upper Body Dressing: Minimal Assistance  Lower Body Dressing: Total Assistance, Assist of 2  Toileting: Total Assistance, Assist of 2  Toilet Transfer:  Total Assistance, Assist of 2  Cognition: Exceptions to WNL  Cognition: Decreased Memory, Decreased Executive Functions, Decreased Attention, Decreased Safety  Orientation: Person, Place, Time, Situation                   DVT prophylaxis  · Managed on SQ Lovenox 40 mg daily - will need education for home    Bladder plan  • Continent    Bowel plan  • Continent      * Intertrochanteric fracture of right femur [facility])  Assessment & Plan  Traumatic fall  Sustained a right intertrochanteric fracture  Taken to OR by Dr Galen Pierre on 10/12/22 for ORIF and IM nailing  WBAT RLE  DVT ppx with SQ Lovenox  Monitor 3 incisions  Continue acute rehabilitation program  POD 14 = 10/26/22 - x-rays and ortho follow-up due  Follow-up with Dr Galen Pierre as outpatient       UTI (urinary tract infection)  Assessment & Plan  + UA  Urine culture and sensitivities pending  Symptomatic with urinary frequency, new encephalopathy and hallucinations  · Started on empiric Bactrim after discussion with internal medicine consultants  · Monitor ins and outs  · Urinary retention protocol in place    Deep tissue injury  Assessment & Plan  Left heel DTI  Offload and elevate  Topical Allevyn  Consult and wound care to evaluate further for any recommendation    H/O dizziness  Assessment & Plan  Dizziness related to motion  Chronic issue per patient  Meclizine 12 5 mg ordered BID     Orthostasis  Assessment & Plan  Mild orthstasis earlier this week   Clinically hypovolemic   IVF maintenance x1 10/19/22  Encouraged oral fluids      Acute pain  Assessment & Plan  Nociceptive/neuropathic post op pain in RLE managed on multimodal regimen:   · Patient is intolerant of oxycodone and Norco   Patient excessively drowsy on oxycodone and with mild hallucinations on Norco   · Discontinue Norco  · Schedule Tylenol 975 mg t i d   · Scheduled gabapentin 100 mg t i d   · Topical Lidoderm patches  · Scheduled topical ice every 2 hours while awake  · Personally discussed plan with nursing, patient, as well as her nephew    CLL (chronic lymphocytic leukemia) ([facility] [address] )  Assessment & Plan  Chronic leukocytosis  Monitor   Follow-up with heme onc as outpatient     Displaced fracture of middle phalanx of left ring finger, initial encounter for closed fracture  Assessment & Plan  Traumatic fracture  4th left finger   Treated non-operatively by Orthopedics  Continue supportive splint  ICE for swelling  NWB L hand    Hypertension  Assessment & Plan  At home: Lopressor 25 mg Q 12 hours, Hyzaar (Losartan/HCTZ) 50mg/12 5mg daily  Here: Lopressor 25 mg daily (increased 10/18)  Will monitor BP closely  Check orthostatics  Adjust medications accordingly  TEDs daily /Abdominal binder PRN    Chronic systolic heart failure (HCC)  Assessment & Plan  Grade 2 DD  At home: Lasix 40 mg daily  Here: Slightly hypovolemic - HOLD Lasix  Encourage fluids by mouth  Restart Lasix when able with K Dur supplement        HLD (hyperlipidemia)  Assessment & Plan  Restarted on home Lipitor 20 mg QPM    CKD (chronic kidney disease) stage 3, GFR 30-59 ml/min (HCC)  Assessment & Plan  Stable  Avoid nephrotoxic agents   Monitor 2x/week    Type 2 diabetes mellitus (HCC)  Assessment & Plan  Lab Results   Component Value Date    HGBA1C 7 6 (H) 10/12/2022     At home: Levemir 20 units QHS, Humalog 10 units with meals  Here: Lantus 30 units QHS, Humalog 8 units with meals  In acute care required an insulin drip transiently  Continue CCD        Appreciate IM consultants medical co-management  Labs, medications, and imaging personally reviewed  ROS:  A ten point review of systems was completed on 10/22/22 and pertinent positives are listed in subjective section  All other systems reviewed were negative  OBJECTIVE:   /56 (BP Location: Left arm)   Pulse 94   Temp 98 1 °F (36 7 °C) (Oral)   Resp 18   Ht 5' 6" (1 676 m)   Wt 61 3 kg (135 lb 2 3 oz)   SpO2 94%   BMI 21 81 kg/m²       Physical Exam  Vitals and nursing note reviewed  Constitutional:       General: She is not in acute distress  Appearance: She is well-developed  HENT:      Head: Normocephalic and atraumatic        Nose: Nose normal       Mouth/Throat:      Mouth: Mucous membranes are moist    Eyes:      Conjunctiva/sclera: Conjunctivae normal       Pupils: Pupils are equal, round, and reactive to light  Cardiovascular:      Rate and Rhythm: Normal rate and regular rhythm  Pulses: Normal pulses  Pulmonary:      Effort: Pulmonary effort is normal       Breath sounds: Normal breath sounds  No wheezing or rales  Abdominal:      General: Bowel sounds are normal  There is no distension  Palpations: Abdomen is soft  Tenderness: There is no abdominal tenderness  Musculoskeletal:         General: Swelling ( suad-incisional) present  Cervical back: Neck supple  Skin:     General: Skin is warm  Comments: Incision clean and dry with staples x3 locations   Neurological:      Mental Status: She is alert and oriented to person, place, and time  Motor: Weakness ( right lower extremity graded as a 2-/5 proximally,) present     Psychiatric:      Comments: Hallucinating today seeing people in children in her room          Lab Results   Component Value Date    WBC 22 41 (H) 10/19/2022    HGB 9 3 (L) 10/19/2022    HCT 29 8 (L) 10/19/2022     (H) 10/19/2022     10/19/2022     Lab Results   Component Value Date    SODIUM 134 (L) 10/19/2022    K 3 7 10/19/2022     10/19/2022    CO2 26 10/19/2022    BUN 25 10/19/2022    CREATININE 1 03 10/19/2022    GLUC 253 (H) 10/19/2022    CALCIUM 8 7 10/19/2022     Lab Results   Component Value Date    INR 0 89 10/11/2022    PROTIME 12 3 10/11/2022           Current Facility-Administered Medications:   •  acetaminophen (TYLENOL) tablet 325 mg, 325 mg, Oral, Q6H PRN, Ileana Voss MD, 325 mg at 10/22/22 5717  •  acetaminophen (TYLENOL) tablet 975 mg, 975 mg, Oral, TID AC, Ileana Voss MD, 975 mg at 10/21/22 2100  •  atorvastatin (LIPITOR) tablet 20 mg, 20 mg, Oral, Daily With Tyronne Pallas, MD, 20 mg at 10/21/22 1742  •  bisacodyl (DULCOLAX) rectal suppository 10 mg, 10 mg, Rectal, Daily PRN, Ileana Voss MD  •  docusate sodium (COLACE) capsule 100 mg, 100 mg, Oral, BID, Ileana Voss MD, 100 mg at 10/22/22 0921  •  enoxaparin (LOVENOX) subcutaneous injection 40 mg, 40 mg, Subcutaneous, Q24H [facility] [place] [facility], Lauren Alarcon MD, 40 mg at 10/22/22 2754  •  gabapentin (NEURONTIN) capsule 100 mg, 100 mg, Oral, TID, Lauren Alarcon MD, 100 mg at 10/22/22 8690  •  insulin glargine (LANTUS) subcutaneous injection 30 Units 0 3 mL, 30 Units, Subcutaneous, HS, Adeola Jennings PA-C, 30 Units at 10/21/22 2132  •  insulin lispro (HumaLOG) 100 units/mL subcutaneous injection 1-5 Units, 1-5 Units, Subcutaneous, HS, Lauren Alarcon MD, 1 Units at 10/21/22 2132  •  insulin lispro (HumaLOG) 100 units/mL subcutaneous injection 1-6 Units, 1-6 Units, Subcutaneous, TID AC, Lauren Alarcon MD, 2 Units at 10/22/22 0906  •  insulin lispro (HumaLOG) 100 units/mL subcutaneous injection 8 Units, 8 Units, Subcutaneous, TID With Meals, Dakota Huitron DO, 8 Units at 10/22/22 0906  •  lidocaine (LIDODERM) 5 % patch 2 patch, 2 patch, Topical, Daily, Lauren Alarcon MD, 2 patch at 10/22/22 6481  •  meclizine (ANTIVERT) tablet 12 5 mg, 12 5 mg, Oral, Q8H PRN, Lauren Alarcon MD, 12 5 mg at 10/19/22 1353  •  meclizine (ANTIVERT) tablet 12 5 mg, 12 5 mg, Oral, BID before breakfast/lunch, Lauren Alarcon MD, 12 5 mg at 10/22/22 0610  •  methocarbamol (ROBAXIN) tablet 500 mg, 500 mg, Oral, Q6H PRN, Lauern Alarcon MD, 500 mg at 10/22/22 0812  •  metoprolol tartrate (LOPRESSOR) tablet 25 mg, 25 mg, Oral, Q12H TESSA, Alexandria Severino, YAMILEXNP, 25 mg at 10/22/22 3564  •  naloxone (NARCAN) 0 04 mg/mL syringe 0 04 mg, 0 04 mg, Intravenous, Q1MIN PRN, Lauren Alarcon MD  •  polyethylene glycol (MIRALAX) packet 17 g, 17 g, Oral, Daily PRN, Lauren Alarcon MD  •  senna-docusate sodium (SENOKOT S) 8 6-50 mg per tablet 1 tablet, 1 tablet, Oral, HS, Lauren Alarcon MD, 1 tablet at 10/21/22 2131  •  sulfamethoxazole-trimethoprim (BACTRIM) 400-80 mg per tablet 2 tablet, 2 tablet, Oral, Q12H [facility], Cora Wray DO, 2 tablet at 10/22/22 9668    Past Medical History:   Diagnosis Date   • Benign adenomatous polyp of large intestine    • CHF (congestive heart failure) (HCC)    • Diabetes mellitus ([place] [address] )    • Hyperlipemia    • Hypertension    • Osteoarthritis    • TIA (transient ischemic attack)        Patient Active Problem List    Diagnosis Date Noted   • Intertrochanteric fracture of right femur ([place] [address] ) 10/11/2022   • Deep tissue injury 10/21/2022   • UTI (urinary tract infection) 10/21/2022   • Orthostasis 10/19/2022   • H/O dizziness 10/19/2022   • Acute pain 10/17/2022   • CLL (chronic lymphocytic leukemia) ([place] [address] ) 10/13/2022   • Displaced fracture of middle phalanx of left ring finger, initial encounter for closed fracture 10/11/2022   • Hypertension 08/08/2021   • Ambulatory dysfunction 08/05/2021   • Suspected Mild cognitive impairment 08/05/2021   • Lymphadenopathy 11/06/2020   • Elevated liver enzymes 11/06/2020   • Fall 11/05/2020   • Type 2 diabetes mellitus ([place] [address] ) 11/05/2020   • CKD (chronic kidney disease) stage 3, GFR 30-59 ml/min ([place] [address] ) 11/05/2020   • HLD (hyperlipidemia) 11/05/2020   • OA (osteoarthritis) 11/05/2020   • Chronic systolic heart failure ([place] [address] ) 11/05/2020          Yanely Simpson    Total time spent:  35 minutes with more than 50% spent counseling/coordinating care  Counseling includes discussion with patient re: progress and discussion with patient of his/her current medical/functional state/information  Coordination of patient's care was performed in conjunction with consulting services  Time invested included review of patient's labs, vitals, and management of their comorbidities with continued monitoring  The care of the patient was extensively discussed and appropriate treatment plan was formulated unique for this patient  ** Please Note:  voice to text software may have been used in the creation of this document   Although proof errors in transcription or interpretation are a potential of such software**

## 2022-10-21 NOTE — ASSESSMENT & PLAN NOTE
Left heel DTI - improving  Offload and elevate  Appreciate wound care recommendations as below  Skin Care Plan:  1-Cleanse sacro-buttocks with soap and water  Apply Preventative Hydraguard BID and PRN  2-Turn/reposition q2h or when medically stable for pressure re-distribution on skin   3-Elevate heels to offload pressure  4-Moisturize skin daily with skin nourishing cream  5-Ehob cushion in chair when out of bed  6-B/L Heel Allevyn Foam Dressings  Samuel Left Heel with T for Treatment and Samuel Right Heel with P for Prevention  Change every other day or Prn  Peel back, inspect skin Q-shift, and reapply

## 2022-10-21 NOTE — NURSING NOTE
Pt reports that her "clothing is disappearing" and the "floor is moving" and that she didn't know what was going on with all the people at the "'s office"  Pt reoriented and cleaned from incontinence  Reports that the floor moves all the time and she does not like it   Also reports she was walking around and went in the other room and talked to "the other lady "  Pt also yelling at little girl to "get down" from the ceiling with her "doll"

## 2022-10-21 NOTE — PLAN OF CARE
Problem: Prexisting or High Potential for Compromised Skin Integrity  Goal: Skin integrity is maintained or improved  Description: INTERVENTIONS:  - Identify patients at risk for skin breakdown  - Assess and monitor skin integrity  - Assess and monitor nutrition and hydration status  - Monitor labs   - Assess for incontinence   - Turn and reposition patient  - Assist with mobility/ambulation  - Relieve pressure over bony prominences  - Avoid friction and shearing  - Provide appropriate hygiene as needed including keeping skin clean and dry  - Evaluate need for skin moisturizer/barrier cream  - Collaborate with interdisciplinary team   - Patient/family teaching  - Consider wound care consult   Outcome: Progressing     Problem: PAIN - ADULT  Goal: Verbalizes/displays adequate comfort level or baseline comfort level  Description: Interventions:  - Encourage patient to monitor pain and request assistance  - Assess pain using appropriate pain scale  - Administer analgesics based on type and severity of pain and evaluate response  - Implement non-pharmacological measures as appropriate and evaluate response  - Consider cultural and social influences on pain and pain management  - Notify physician/advanced practitioner if interventions unsuccessful or patient reports new pain  Outcome: Progressing     Problem: INFECTION - ADULT  Goal: Absence or prevention of progression during hospitalization  Description: INTERVENTIONS:  - Assess and monitor for signs and symptoms of infection  - Monitor lab/diagnostic results  - Monitor all insertion sites, i e  indwelling lines, tubes, and drains  - Monitor endotracheal if appropriate and nasal secretions for changes in amount and color  - [place] appropriate cooling/warming therapies per order  - Administer medications as ordered  - Instruct and encourage patient and family to use good hand hygiene technique  - Identify and instruct in appropriate isolation precautions for identified infection/condition  Outcome: Progressing  Goal: Absence of fever/infection during neutropenic period  Description: INTERVENTIONS:  - Monitor WBC    Outcome: Progressing     Problem: SAFETY ADULT  Goal: Patient will remain free of falls  Description: INTERVENTIONS:  - Educate patient/family on patient safety including physical limitations  - Instruct patient to call for assistance with activity   - Consult OT/PT to assist with strengthening/mobility   - Keep Call bell within reach  - Keep bed low and locked with side rails adjusted as appropriate  - Keep care items and personal belongings within reach  - Initiate and maintain comfort rounds  - Make Fall Risk Sign visible to staff  - Offer Toileting every Hours, in advance of need  - Initiate/Maintainarm  - Obtain necessary fall risk management equipment:   - Apply yellow socks and bracelet for high fall risk patients  - Consider moving patient to room near nurses station  Outcome: Progressing  Goal: Maintain or return to baseline ADL function  Description: INTERVENTIONS:  -  Assess patient's ability to carry out ADLs; assess patient's baseline for ADL function and identify physical deficits which impact ability to perform ADLs (bathing, care of mouth/teeth, toileting, grooming, dressing, etc )  - Assess/evaluate cause of self-care deficits   - Assess range of motion  - Assess patient's mobility; develop plan if impaired  - Assess patient's need for assistive devices and provide as appropriate  - Encourage maximum independence but intervene and supervise when necessary  - Involve family in performance of ADLs  - Assess for home care needs following discharge   - Consider OT consult to assist with ADL evaluation and planning for discharge  - Provide patient education as appropriate  Outcome: Progressing  Goal: Maintains/Returns to pre admission functional level  Description: INTERVENTIONS:  - Perform BMAT or MOVE assessment daily    - Set and communicate daily mobility goal to care team and patient/family/caregiver     - Collaborate with rehabilitation services on mobility goals if consulted  - Perform Range o  - Out of bed for toileting  - Record patient progress and toleration of activity level   Outcome: Progressing     Problem: DISCHARGE PLANNING  Goal: Discharge to home or other facility with appropriate resources  Description: INTERVENTIONS:  - Identify barriers to discharge w/patient and caregiver  - Arrange for needed discharge resources and transportation as appropriate  - Identify discharge learning needs (meds, wound care, etc )  - Arrange for interpretive services to assist at discharge as needed  - Refer to Case Management Department for coordinating discharge planning if the patient needs post-hospital services based on physician/advanced practitioner order or complex needs related to functional status, cognitive ability, or social support system  Outcome: Progressing     Problem: Potential for Falls  Goal: Patient will remain free of falls  Description: INTERVENTIONS:  - Educate patient/family on patient safety including physical limitations  - Instruct patient to call for assistance with activity   - Consult OT/PT to assist with strengthening/mobility   - Keep Call bell within reach  - Keep bed low and locked with side rails adjusted as appropriate  - Keep care items and personal belongings within reach  - Initiate and maintain comfort rounds  - Make Fall Risk Sign visible to staff  - Offer Toileting every Hours, in advance of need  - Initiate/Maintain larm  - Obtain necessary fall risk management equipment:   - Apply yellow socks and bracelet for high fall risk patients  - Consider moving patient to room near nurses station  Outcome: Progressing

## 2022-10-21 NOTE — PROGRESS NOTES
10/21/22 4715   Pain Assessment   Pain Assessment Tool 0-10   Pain Score 10 - Worst Possible Pain  (even at rest)   Pain Location/Orientation Orientation: Right;Location: Hip   Pain Radiating Towards Low back and knee   Pain Onset/Description Onset: Ongoing;Frequency: Constant/Continuous   Patient's Stated Pain Goal No pain   Hospital Pain Intervention(s) Medication (See MAR); Repositioned; Emotional support;Rest;Relaxation technique   Pain Rating: FLACC (Rest) - Face 0   Pain Rating: FLACC (Rest) - Legs 0   Pain Rating: FLACC (Rest) - Activity 0   Pain Rating: FLACC (Rest) - Cry 0   Pain Rating: FLACC (Rest) - Consolability 0   Score: FLACC (Rest) 0   Pain Rating: FLACC (Activity) - Face 1   Pain Rating: FLACC (Activity) - Legs 1   Pain Rating: FLACC (Activity) - Activity 1   Pain Rating: FLACC (Activity) - Cry 1   Pain Rating: FLACC (Activity) - Consolability 1   Score: FLACC (Activity) 5   Restrictions/Precautions   Precautions Cognitive; Fall Risk;Bed/chair alarms;Supervision on toilet/commode;Pain   Weight Bearing Restrictions Yes   LUE Weight Bearing Per Order NWB  (L hand with 4th finger splint, ok to use L PFRW with elbow/forearm WB)   RLE Weight Bearing Per Order WBAT   Braces or Orthoses Splint  (sling on L UE during slide board to maintain compliance with NWB) TEDS and abdominal binder for [place]   Cognition   Overall Cognitive Status Impaired   Arousal/Participation Alert; Cooperative   Attention Difficulty attending to directions   Orientation Level Oriented to person;Disoriented to place; Disoriented to time;Disoriented to situation   Following Commands Follows one step commands with increased time or repetition   Subjective   Subjective pt reported pain above but was agreeable to have PT   Sit to Lying   Type of Assistance Needed Physical assistance;Verbal cues; Adaptive equipment   Physical Assistance Level 76% or more   Comment VC/assist with L UE on a sling to prevent pt from using and breaking NWB precaution on L hand   Sit to Lying CARE Score 2   Sit to Stand   Type of Assistance Needed Physical assistance;Verbal cues; Adaptive equipment   Physical Assistance Level Total assistance   Comment max of 1 with SBA of 2nd person using bilat L PFRW   Sit to Stand CARE Score 1   Bed-Chair Transfer   Type of Assistance Needed Physical assistance;Verbal cues; Adaptive equipment   Physical Assistance Level Total assistance   Comment SPT with L PFRW, because of inc reliance on RW and dec cognition she required assist to maneuver  Mod of 1 with SBA of 2nd person for Slide board transfer with sling on L UE   Chair/Bed-to-Chair Transfer CARE Score 1   Walk 10 Feet   Type of Assistance Needed Physical assistance;Verbal cues; Adaptive equipment   Physical Assistance Level Total assistance   Comment 20' assist x 2 using L PFRW with 3rd person CFA, tolerance limited by ongoing pain although did report lightheadedness/dizziness   Walk 10 Feet CARE Score 1   Ambulation   Gait Pattern Antalgic;Decreased R stance; Improper weight shift; Step to;Decreased foot clearance; Inconsistant Mandi; Slow Mandi   Toilet Transfer   Type of Assistance Needed Physical assistance;Verbal cues; Adaptive equipment   Physical Assistance Level Total assistance   Comment mod A of 2 SPT with L PFRW to[place]from [facility]  Assist x 2 for hygiene/CM   Toilet Transfer CARE Score 1   Equipment Use   NuStep L1 x 10 mins, SPM>30 using bilat LE and R UE although required DIRECT supervision for will still attempt to use L UE and requires VC to continue with task  Also did bilat hamstring stretch x 3 mins, bilat gastroc stretch x 20 SH x 3 reps as tolerated    Assessment   Treatment Assessment Pt able to engaged in skilled PT although cont to demo hallucination and confusion  pt reported seeing a jacket hanging on the cork board above the telephone in the therapy gym but was redirectable  Meclizine and tylenol were given by ALIYA Hua at start of tx   PT was unable to get standing BP reading although pt reported ongoing dizziness/lightheadedness but was still able to engaged in standing activities including short distance amb with notable inc pain  Encouraged inc fluid intake during this session as well  Pt will benefit from continued skilled PT to work on strengthening/flexibility exercises and functional mobility training including gait training with L PFRW as tolerated for at this time pt unable to tolerate stronger pain meds  Family/Caregiver Present no   Barriers to Discharge Inaccessible home environment;Decreased caregiver support   PT Barriers   Functional Limitation Car transfers; Ramp negotiation;Stair negotiation;Standing;Transfers; Walking; Wheelchair management   Plan   Treatment/Interventions Functional transfer training;LE strengthening/ROM; Therapeutic exercise; Bed mobility;Spoke to nursing;Gait training;OT   Progress Slow progress, cognitive deficits   PT Therapy Minutes   PT Time In 1245   PT Time Out 1400   PT Total Time (minutes) 75   PT Mode of treatment - Individual (minutes) 75   PT Mode of treatment - Concurrent (minutes) 0   PT Mode of treatment - Group (minutes) 0   PT Mode of treatment - Co-treat (minutes) 0   PT Mode of Treatment - Total time(minutes) 75 minutes   PT Cumulative Minutes 360

## 2022-10-21 NOTE — PROGRESS NOTES
10/21/22 1135   Pain Assessment   Pain Assessment Tool 0-10   Restrictions/Precautions   Precautions Bed/chair alarms;Supervision on toilet/commode; Fall Risk;Cognitive   LUE Weight Bearing Per Order NWB  (sling with transfers to avoid using L hand)   RLE Weight Bearing Per Order WBAT   Braces or Orthoses Splint  (pt re-ed to wear splint at all times after she took it off)   Cognition   Overall Cognitive Status Impaired   Arousal/Participation Alert   Following Commands Follows one step commands with increased time or repetition   Subjective   Subjective pt ask to use BSC   Sit to Stand   Type of Assistance Needed Physical assistance;Verbal cues   Physical Assistance Level Total assistance   Comment assist x 2   Sit to Stand CARE Score 1   Toilet Transfer   Type of Assistance Needed Verbal cues; Adaptive equipment;Physical assistance   Physical Assistance Level Total assistance   Comment recliner to/from [facility] slide board with 2 person assist  sling and assist/VC to prevent pt from using L hand  1 person infront standing pt and 2nd person assist behind for hygiene and CM   Toilet Transfer CARE Score 1   Assessment   Treatment Assessment skilled PT focused on toilet transfer training with slide board and BSC  due to impaired cognition and dec safety awareness pt cont to require assist x 2 person to complete toilet transfer and hygiene/CM with poor carry over of NWB precaution on L UE and re-ed to keep splint at all times     Plan   Treatment/Interventions Functional transfer training;Spoke to nursing;OT;Equipment eval/education   Progress Slow progress, cognitive deficits   PT Therapy Minutes   PT Time In 1135   PT Time Out 1150   PT Total Time (minutes) 15   PT Mode of treatment - Individual (minutes) 15   PT Mode of treatment - Concurrent (minutes) 0   PT Mode of treatment - Group (minutes) 0   PT Mode of treatment - Co-treat (minutes) 0   PT Mode of Treatment - Total time(minutes) 15 minutes   PT Cumulative Minutes [id number] [facility]

## 2022-10-22 ENCOUNTER — APPOINTMENT (OUTPATIENT)
Dept: RADIOLOGY | Facility: HOSPITAL | Age: 82
End: 2022-10-22

## 2022-10-22 LAB
AMMONIA PLAS-SCNC: 18 UMOL/L (ref 11–35)
ANION GAP SERPL CALCULATED.3IONS-SCNC: 3 MMOL/L (ref 4–13)
BASOPHILS # BLD MANUAL: 0.21 THOUSAND/UL (ref 0–0.1)
BASOPHILS NFR MAR MANUAL: 1 % (ref 0–1)
BUN SERPL-MCNC: 20 MG/DL (ref 5–25)
CALCIUM SERPL-MCNC: 9.1 MG/DL (ref 8.3–10.1)
CHLORIDE SERPL-SCNC: 106 MMOL/L (ref 96–108)
CO2 SERPL-SCNC: 27 MMOL/L (ref 21–32)
CREAT SERPL-MCNC: 0.99 MG/DL (ref 0.6–1.3)
EOSINOPHIL # BLD MANUAL: 0.21 THOUSAND/UL (ref 0–0.4)
EOSINOPHIL NFR BLD MANUAL: 1 % (ref 0–6)
ERYTHROCYTE [DISTWIDTH] IN BLOOD BY AUTOMATED COUNT: 15.2 % (ref 11.6–15.1)
GFR SERPL CREATININE-BSD FRML MDRD: 53 ML/MIN/1.73SQ M
GLUCOSE SERPL-MCNC: 196 MG/DL (ref 65–140)
GLUCOSE SERPL-MCNC: 224 MG/DL (ref 65–140)
GLUCOSE SERPL-MCNC: 274 MG/DL (ref 65–140)
GLUCOSE SERPL-MCNC: 73 MG/DL (ref 65–140)
GLUCOSE SERPL-MCNC: 81 MG/DL (ref 65–140)
HCT VFR BLD AUTO: 31.3 % (ref 34.8–46.1)
HGB BLD-MCNC: 9.7 G/DL (ref 11.5–15.4)
LYMPHOCYTES # BLD AUTO: 42 % (ref 14–44)
LYMPHOCYTES # BLD AUTO: 8.93 THOUSAND/UL (ref 0.6–4.47)
MCH RBC QN AUTO: 33 PG (ref 26.8–34.3)
MCHC RBC AUTO-ENTMCNC: 31 G/DL (ref 31.4–37.4)
MCV RBC AUTO: 107 FL (ref 82–98)
MONOCYTES # BLD AUTO: 0.64 THOUSAND/UL (ref 0–1.22)
MONOCYTES NFR BLD: 3 % (ref 4–12)
NEUTROPHILS # BLD MANUAL: 8.93 THOUSAND/UL (ref 1.85–7.62)
NEUTS SEG NFR BLD AUTO: 42 % (ref 43–75)
PLATELET # BLD AUTO: 464 THOUSANDS/UL (ref 149–390)
PLATELET BLD QL SMEAR: ABNORMAL
PMV BLD AUTO: 10.9 FL (ref 8.9–12.7)
POLYCHROMASIA BLD QL SMEAR: PRESENT
POTASSIUM SERPL-SCNC: 4.2 MMOL/L (ref 3.5–5.3)
RBC # BLD AUTO: 2.94 MILLION/UL (ref 3.81–5.12)
RBC MORPH BLD: PRESENT
SODIUM SERPL-SCNC: 136 MMOL/L (ref 135–147)
VARIANT LYMPHS # BLD AUTO: 11 %
WBC # BLD AUTO: 21.27 THOUSAND/UL (ref 4.31–10.16)

## 2022-10-22 RX ORDER — GABAPENTIN 100 MG/1
100 CAPSULE ORAL 2 TIMES DAILY
Status: DISCONTINUED | OUTPATIENT
Start: 2022-10-23 | End: 2022-11-02

## 2022-10-22 RX ORDER — HYDROMORPHONE HYDROCHLORIDE 2 MG/1
1 TABLET ORAL 3 TIMES DAILY PRN
Status: DISCONTINUED | OUTPATIENT
Start: 2022-10-22 | End: 2022-10-28

## 2022-10-22 RX ORDER — GABAPENTIN 300 MG/1
300 CAPSULE ORAL
Status: DISCONTINUED | OUTPATIENT
Start: 2022-10-22 | End: 2022-11-02

## 2022-10-22 RX ORDER — GABAPENTIN 300 MG/1
300 CAPSULE ORAL ONCE
Status: COMPLETED | OUTPATIENT
Start: 2022-10-22 | End: 2022-10-22

## 2022-10-22 RX ORDER — METHOCARBAMOL 500 MG/1
500 TABLET, FILM COATED ORAL EVERY 6 HOURS PRN
Status: DISCONTINUED | OUTPATIENT
Start: 2022-10-22 | End: 2022-10-31

## 2022-10-22 RX ADMIN — SULFAMETHOXAZOLE AND TRIMETHOPRIM 2 TABLET: 400; 80 TABLET ORAL at 21:55

## 2022-10-22 RX ADMIN — GABAPENTIN 300 MG: 300 CAPSULE ORAL at 21:54

## 2022-10-22 RX ADMIN — ACETAMINOPHEN 975 MG: 325 TABLET, FILM COATED ORAL at 21:53

## 2022-10-22 RX ADMIN — INSULIN LISPRO 2 UNITS: 100 INJECTION, SOLUTION INTRAVENOUS; SUBCUTANEOUS at 09:06

## 2022-10-22 RX ADMIN — ACETAMINOPHEN 325 MG: 325 TABLET ORAL at 09:21

## 2022-10-22 RX ADMIN — DOCUSATE SODIUM 100 MG: 100 CAPSULE, LIQUID FILLED ORAL at 09:21

## 2022-10-22 RX ADMIN — METOPROLOL TARTRATE 25 MG: 25 TABLET, FILM COATED ORAL at 09:21

## 2022-10-22 RX ADMIN — ATORVASTATIN CALCIUM 20 MG: 20 TABLET, FILM COATED ORAL at 17:19

## 2022-10-22 RX ADMIN — METHOCARBAMOL 500 MG: 500 TABLET ORAL at 06:10

## 2022-10-22 RX ADMIN — INSULIN LISPRO 4 UNITS: 100 INJECTION, SOLUTION INTRAVENOUS; SUBCUTANEOUS at 13:57

## 2022-10-22 RX ADMIN — GABAPENTIN 300 MG: 300 CAPSULE ORAL at 02:28

## 2022-10-22 RX ADMIN — INSULIN LISPRO 8 UNITS: 100 INJECTION, SOLUTION INTRAVENOUS; SUBCUTANEOUS at 18:02

## 2022-10-22 RX ADMIN — GABAPENTIN 100 MG: 100 CAPSULE ORAL at 17:19

## 2022-10-22 RX ADMIN — INSULIN LISPRO 8 UNITS: 100 INJECTION, SOLUTION INTRAVENOUS; SUBCUTANEOUS at 09:06

## 2022-10-22 RX ADMIN — ENOXAPARIN SODIUM 40 MG: 40 INJECTION SUBCUTANEOUS at 09:22

## 2022-10-22 RX ADMIN — METOPROLOL TARTRATE 25 MG: 25 TABLET, FILM COATED ORAL at 21:55

## 2022-10-22 RX ADMIN — MECLIZINE HCL 12.5 MG 12.5 MG: 12.5 TABLET ORAL at 06:10

## 2022-10-22 RX ADMIN — LIDOCAINE 5% 2 PATCH: 700 PATCH TOPICAL at 09:22

## 2022-10-22 RX ADMIN — MECLIZINE HCL 12.5 MG 12.5 MG: 12.5 TABLET ORAL at 12:06

## 2022-10-22 RX ADMIN — SULFAMETHOXAZOLE AND TRIMETHOPRIM 2 TABLET: 400; 80 TABLET ORAL at 09:28

## 2022-10-22 RX ADMIN — INSULIN LISPRO 8 UNITS: 100 INJECTION, SOLUTION INTRAVENOUS; SUBCUTANEOUS at 13:57

## 2022-10-22 RX ADMIN — ACETAMINOPHEN 975 MG: 325 TABLET, FILM COATED ORAL at 14:02

## 2022-10-22 RX ADMIN — GABAPENTIN 100 MG: 100 CAPSULE ORAL at 09:21

## 2022-10-22 NOTE — PLAN OF CARE
Reviewed    Problem: Prexisting or High Potential for Compromised Skin Integrity  Goal: Skin integrity is maintained or improved  Description: INTERVENTIONS:  - Identify patients at risk for skin breakdown  - Assess and monitor skin integrity  - Assess and monitor nutrition and hydration status  - Monitor labs   - Assess for incontinence   - Turn and reposition patient  - Assist with mobility/ambulation  - Relieve pressure over bony prominences  - Avoid friction and shearing  - Provide appropriate hygiene as needed including keeping skin clean and dry  - Evaluate need for skin moisturizer/barrier cream  - Collaborate with interdisciplinary team   - Patient/family teaching  - Consider wound care consult   Outcome: Progressing     Problem: PAIN - ADULT  Goal: Verbalizes/displays adequate comfort level or baseline comfort level  Description: Interventions:  - Encourage patient to monitor pain and request assistance  - Assess pain using appropriate pain scale  - Administer analgesics based on type and severity of pain and evaluate response  - Implement non-pharmacological measures as appropriate and evaluate response  - Consider cultural and social influences on pain and pain management  - Notify physician/advanced practitioner if interventions unsuccessful or patient reports new pain  Outcome: Progressing     Problem: INFECTION - ADULT  Goal: Absence or prevention of progression during hospitalization  Description: INTERVENTIONS:  - Assess and monitor for signs and symptoms of infection  - Monitor lab/diagnostic results  - Monitor all insertion sites, i e  indwelling lines, tubes, and drains  - Monitor endotracheal if appropriate and nasal secretions for changes in amount and color  - [place] appropriate cooling/warming therapies per order  - Administer medications as ordered  - Instruct and encourage patient and family to use good hand hygiene technique  - Identify and instruct in appropriate isolation precautions for identified infection/condition  Outcome: Progressing  Goal: Absence of fever/infection during neutropenic period  Description: INTERVENTIONS:  - Monitor WBC    Outcome: Progressing     Problem: SAFETY ADULT  Goal: Patient will remain free of falls  Description: INTERVENTIONS:  - Educate patient/family on patient safety including physical limitations  - Instruct patient to call for assistance with activity   - Consult OT/PT to assist with strengthening/mobility   - Keep Call bell within reach  - Keep bed low and locked with side rails adjusted as appropriate  - Keep care items and personal belongings within reach  - Initiate and maintain comfort rounds  - Make Fall Risk Sign visible to staff  - Offer Toileting every 4 Hours, in advance of need  - Initiate/Maintain bed and chair alarm  - Apply yellow socks and bracelet for high fall risk patients  - Consider moving patient to room near nurses station  Outcome: Progressing  Goal: Maintain or return to baseline ADL function  Description: INTERVENTIONS:  -  Assess patient's ability to carry out ADLs; assess patient's baseline for ADL function and identify physical deficits which impact ability to perform ADLs (bathing, care of mouth/teeth, toileting, grooming, dressing, etc )  - Assess/evaluate cause of self-care deficits   - Assess range of motion  - Assess patient's mobility; develop plan if impaired  - Assess patient's need for assistive devices and provide as appropriate  - Encourage maximum independence but intervene and supervise when necessary  - Involve family in performance of ADLs  - Assess for home care needs following discharge   - Consider OT consult to assist with ADL evaluation and planning for discharge  - Provide patient education as appropriate  Outcome: Progressing  Goal: Maintains/Returns to pre admission functional level  Description: INTERVENTIONS:  - Set and communicate daily mobility goal to care team and patient/family/caregiver     - Collaborate with rehabilitation services on mobility goals if consulted  - Out of bed for toileting  - Record patient progress and toleration of activity level   Outcome: Progressing     Problem: DISCHARGE PLANNING  Goal: Discharge to home or other facility with appropriate resources  Description: INTERVENTIONS:  - Identify barriers to discharge w/patient and caregiver  - Arrange for needed discharge resources and transportation as appropriate  - Identify discharge learning needs (meds, wound care, etc )  - Arrange for interpretive services to assist at discharge as needed  - Refer to Case Management Department for coordinating discharge planning if the patient needs post-hospital services based on physician/advanced practitioner order or complex needs related to functional status, cognitive ability, or social support system  Outcome: Progressing     Problem: Potential for Falls  Goal: Patient will remain free of falls  Description: INTERVENTIONS:  - Educate patient/family on patient safety including physical limitations  - Instruct patient to call for assistance with activity   - Consult OT/PT to assist with strengthening/mobility   - Keep Call bell within reach  - Keep bed low and locked with side rails adjusted as appropriate  - Keep care items and personal belongings within reach  - Initiate and maintain comfort rounds  - Make Fall Risk Sign visible to staff  - Offer Toileting every 4 Hours, in advance of need  - Initiate/Maintain bed and chair alarm  - Apply yellow socks and bracelet for high fall risk patients  - Consider moving patient to room near nurses station  Outcome: Progressing

## 2022-10-22 NOTE — PROGRESS NOTES
10/22/22 1000   Assessment   Treatment Assessment Will cont to benefit from skilled OT services as medically appropriate  Therapy Time missed   Time missed?  Yes   Amount of time missed 90   Reason for time missed Medical hold  (hallucinations and pain)     Lura Kehr, MS, OTR/L

## 2022-10-22 NOTE — PROGRESS NOTES
10/22/22 5940   Assessment   Treatment Assessment PT session held due to medical reasons , check chart  to proceed tomorrow with therapy   Therapy Time missed   Time missed?  Yes   Amount of time missed 90   Reason for time missed Medical hold

## 2022-10-22 NOTE — PROGRESS NOTES
PM&R PROGRESS NOTE:  Teddy Conner 79 y o  female MRN: [id number]  Unit/Bed#: -13 Encounter: [id number]        Rehab Diagnosis: Impairment of mobility, safety and Activities of Daily Living (ADLs) due to Orthopedic Disorders:  08 11  Unilateral Hip Fracture - Right    SUBJECTIVE:  Patient seen face to face today  Did not sleep well last night due to pain  Very sleepy this morning  Continues to have pleasant hallucinations seeing people and dogs today  Is able to answer questions and follow directions appropriately  Oriented to person and place but not time  Patient is aware of her situation and her recent fracture  Patient does state dysuria with urination today  Placed on a urinary retention protocol as output has been low  ASSESSMENT: Stable, progressing      PLAN:    Rehabilitation  · Functional deficits: impaired mobility, self care, WBAT RLE, NWB L HAND  • Continue current rehabilitation plan of care to maximize function  • Expected Discharge: XU COLON 3 weeks  Patient likely will require SNF referrals as her family does not believe they can care for her at home needing assistance    Physical Therapy Occupational Therapy Speech Therapy   Weight Bearing Status: Non-weight bearing (L hand, WBAT R LE)  Transfers: Assist of 2  Bed Mobility: Assist of 2  Amulation Distance (ft): 15 feet  Ambulation: Assist of 2  Assistive Device for Ambulation: Roller Walker (L PFRW)  Assistive Device for Stairs:  (unsafe to perform)  Discharge Recommendations:  (TBD pending progress)   Eating: Independent  Grooming: Minimal Assistance  Bathing: Total Assistance, Assist of 2  Bathing: Total Assistance, Assist of 2  Upper Body Dressing: Minimal Assistance  Lower Body Dressing: Total Assistance, Assist of 2  Toileting: Total Assistance, Assist of 2  Toilet Transfer:  Total Assistance, Assist of 2  Cognition: Exceptions to WNL  Cognition: Decreased Memory, Decreased Executive Functions, Decreased Attention, Decreased Safety  Orientation: Person, Place, Time, Situation                   DVT prophylaxis  · Managed on SQ Lovenox 40 mg daily - will need education for home    Bladder plan  • Continent, but may be retaining some needed straight catheterization x1  Urinary retention protocol in place  Treatment for UTI initiated yesterday    Bowel plan  · Had an incontinent episode of bowel today    * Intertrochanteric fracture of right femur [facility])  Assessment & Plan  Traumatic fall  Sustained a right intertrochanteric fracture  Taken to OR by Dr Kurtz Comment on 10/12/22 for ORIF and IM nailing  WBAT RLE  DVT ppx with SQ Lovenox  Monitor 3 incisions  Continue acute rehabilitation program  POD 14 = 10/26/22 - x-rays and ortho follow-up due  Follow-up with Dr Kurtz Comment as outpatient       UTI (urinary tract infection)  Assessment & Plan  + UA  Urine culture and sensitivities pending  Thus far showing > 100K gram-negative enteric alida  Symptomatic with urinary frequency, new encephalopathy and hallucinations, and now dysuria  · Started on empiric Bactrim on 10/21/22 after discussion with internal medicine consultants  · Monitor ins and outs  · Urinary retention protocol in place    Deep tissue injury  Assessment & Plan  Left heel DTI  Offload and elevate  Topical Allevyn  Consult and wound care to evaluate further for any recommendation    H/O dizziness  Assessment & Plan  Dizziness related to motion  Chronic issue per patient  Meclizine 12 5 mg ordered BID     Orthostasis  Assessment & Plan  Mild orthstasis earlier this week   Clinically hypovolemic   IVF maintenance x1 10/19/22  Encouraged oral fluids      Acute pain  Assessment & Plan  Nociceptive/neuropathic post op pain in RLE managed on multimodal regimen:   · Patient is intolerant of oxycodone and Norco   Patient excessively drowsy on oxycodone and with mild hallucinations on Norco   · Discontinue Norco  · Schedule Tylenol 975 mg t i d   · Scheduled gabapentin 100 mg bid  Gabapentin 300 mg q h s  · Topical Lidoderm patches  · Scheduled topical ice every 2 hours while awake  · Robaxin 500 mg q 6 hours p r n  For muscle spasm  · Dilaudid 1 mg t i d  P r n  added today for severe pain - as intolerant of Norco or oxycodone  · As pain was severe last night - x-ray obtained to make sure alignment is intact  · Personally discussed plan with nursing, patient, as well as her nephew    CLL (chronic lymphocytic leukemia) ([facility] [address] )  Assessment & Plan  Chronic leukocytosis  Monitor   Follow-up with heme onc as outpatient     Displaced fracture of middle phalanx of left ring finger, initial encounter for closed fracture  Assessment & Plan  Traumatic fracture  4th left finger   Treated non-operatively by Orthopedics  Continue supportive splint  ICE for swelling  NWB L hand    Hypertension  Assessment & Plan  At home: Lopressor 25 mg Q 12 hours, Hyzaar (Losartan/HCTZ) 50mg/12 5mg daily  Here: Lopressor 25 mg daily (increased 10/18)  Will monitor BP closely  Check orthostatics  Adjust medications accordingly  TEDs daily /Abdominal binder PRN    Chronic systolic heart failure (HCC)  Assessment & Plan  Grade 2 DD  At home: Lasix 40 mg daily  Here: Slightly hypovolemic - HOLD Lasix  Encourage fluids by mouth  Restart Lasix when able with K Dur supplement        HLD (hyperlipidemia)  Assessment & Plan  Restarted on home Lipitor 20 mg QPM    CKD (chronic kidney disease) stage 3, GFR 30-59 ml/min (HCC)  Assessment & Plan  Stable  Avoid nephrotoxic agents   Monitor 2x/week    Type 2 diabetes mellitus (HCC)  Assessment & Plan  Lab Results   Component Value Date    HGBA1C 7 6 (H) 10/12/2022     At home: Levemir 20 units QHS, Humalog 10 units with meals  Here: Lantus 30 units QHS, Humalog 8 units with meals  In acute care required an insulin drip transiently  Continue CCD        Appreciate IM consultants medical co-management  Labs, medications, and imaging personally reviewed        ROS:  A ten point review of systems was completed on 10/22/22 and pertinent positives are listed in subjective section  All other systems reviewed were negative  OBJECTIVE:   /57 (BP Location: Left arm)   Pulse 66   Temp 98 2 °F (36 8 °C) (Oral)   Resp 17   Ht 5' 6" (1 676 m)   Wt 61 3 kg (135 lb 2 3 oz)   SpO2 100%   BMI 21 81 kg/m²       Physical Exam  Vitals and nursing note reviewed  Constitutional:       General: She is not in acute distress  Appearance: She is well-developed  HENT:      Head: Normocephalic  Nose: Nose normal    Eyes:      Conjunctiva/sclera: Conjunctivae normal    Cardiovascular:      Rate and Rhythm: Normal rate  Pulmonary:      Effort: Pulmonary effort is normal       Breath sounds: No wheezing  Abdominal:      General: There is no distension  Palpations: Abdomen is soft  Musculoskeletal:         General: Swelling (Nicole-incisional) present  Cervical back: Neck supple  Skin:     General: Skin is warm  Comments: All incisions clean dry and intact with staples and no drainage   Neurological:      Mental Status: She is alert and oriented to person, place, and time  Motor: Weakness present        Comments: Pleasantly hallucinating   Psychiatric:         Mood and Affect: Mood normal           Lab Results   Component Value Date    WBC 21 27 (H) 10/22/2022    HGB 9 7 (L) 10/22/2022    HCT 31 3 (L) 10/22/2022     (H) 10/22/2022     (H) 10/22/2022     Lab Results   Component Value Date    SODIUM 136 10/22/2022    K 4 2 10/22/2022     10/22/2022    CO2 27 10/22/2022    BUN 20 10/22/2022    CREATININE 0 99 10/22/2022    GLUC 196 (H) 10/22/2022    CALCIUM 9 1 10/22/2022     Lab Results   Component Value Date    INR 0 89 10/11/2022    PROTIME 12 3 10/11/2022           Current Facility-Administered Medications:   •  acetaminophen (TYLENOL) tablet 325 mg, 325 mg, Oral, Q6H PRN, Michelle Van MD, 325 mg at 10/22/22 2146  •  acetaminophen (TYLENOL) tablet 975 mg, 975 mg, Oral, TID AC, Nicolás Moreau MD, 975 mg at 10/22/22 1402  •  atorvastatin (LIPITOR) tablet 20 mg, 20 mg, Oral, Daily With Maximo Gutiérrez MD, 20 mg at 10/22/22 1719  •  bisacodyl (DULCOLAX) rectal suppository 10 mg, 10 mg, Rectal, Daily PRN, Nicolás Moreau MD  •  docusate sodium (COLACE) capsule 100 mg, 100 mg, Oral, BID, Nicolás Moreau MD, 100 mg at 10/22/22 3242  •  enoxaparin (LOVENOX) subcutaneous injection 40 mg, 40 mg, Subcutaneous, Q24H [address], Nicolás Moreau MD, 40 mg at 10/22/22 7983  •  [START ON 10/23/2022] gabapentin (NEURONTIN) capsule 100 mg, 100 mg, Oral, BID, Nicolás Moreau MD  •  gabapentin (NEURONTIN) capsule 300 mg, 300 mg, Oral, HS, Nicolás Moreau MD  •  HYDROmorphone (DILAUDID) tablet 1 mg, 1 mg, Oral, TID PRN, Nicolás Moreau MD  •  insulin glargine (LANTUS) subcutaneous injection 30 Units 0 3 mL, 30 Units, Subcutaneous, HS, Adeola Jennings PA-C, 30 Units at 10/21/22 2132  •  insulin lispro (HumaLOG) 100 units/mL subcutaneous injection 1-5 Units, 1-5 Units, Subcutaneous, HS, Nicolás Moreau MD, 1 Units at 10/21/22 2132  •  insulin lispro (HumaLOG) 100 units/mL subcutaneous injection 1-6 Units, 1-6 Units, Subcutaneous, TID AC, Nicolás Moreau MD, 4 Units at 10/22/22 1357  •  insulin lispro (HumaLOG) 100 units/mL subcutaneous injection 8 Units, 8 Units, Subcutaneous, TID With Meals, Beth Cruz DO, 8 Units at 10/22/22 1802  •  lidocaine (LIDODERM) 5 % patch 2 patch, 2 patch, Topical, Daily, Nicolás Moreau MD, 2 patch at 10/22/22 7167  •  meclizine (ANTIVERT) tablet 12 5 mg, 12 5 mg, Oral, Q8H PRN, Nicolás Moreau MD, 12 5 mg at 10/19/22 1353  •  meclizine (ANTIVERT) tablet 12 5 mg, 12 5 mg, Oral, BID before breakfast/lunch, Nicolás Moreau MD, 12 5 mg at 10/22/22 1206  •  methocarbamol (ROBAXIN) tablet 500 mg, 500 mg, Oral, Q6H PRN, Nicolás Moreau MD, 500 mg at 10/22/22 0610  •  metoprolol tartrate (LOPRESSOR) tablet 25 mg, 25 mg, Oral, Q12H TESSA, Alexandria Severino, BRINDA, 25 mg at 10/22/22 3366  •  naloxone (NARCAN) 0 04 mg/mL syringe 0 04 mg, 0 04 mg, Intravenous, Q1MIN PRN, Ratna Melendez MD  •  polyethylene glycol (MIRALAX) packet 17 g, 17 g, Oral, Daily PRN, Ratna Melendez MD  •  senna-docusate sodium (SENOKOT S) 8 6-50 mg per tablet 1 tablet, 1 tablet, Oral, HS, Ratna Melendez MD, 1 tablet at 10/21/22 2131  •  sulfamethoxazole-trimethoprim (BACTRIM) 400-80 mg per tablet 2 tablet, 2 tablet, Oral, Q12H [address], Doralee Patsy Traceystad, DO, 2 tablet at 10/22/22 2314    Past Medical History:   Diagnosis Date   • Benign adenomatous polyp of large intestine    • CHF (congestive heart failure) (HCC)    • Diabetes mellitus ([place] [address] )    • Hyperlipemia    • Hypertension    • Osteoarthritis    • TIA (transient ischemic attack)        Patient Active Problem List    Diagnosis Date Noted   • Intertrochanteric fracture of right femur ([facility] ) 10/11/2022   • Deep tissue injury 10/21/2022   • UTI (urinary tract infection) 10/21/2022   • Orthostasis 10/19/2022   • H/O dizziness 10/19/2022   • Acute pain 10/17/2022   • CLL (chronic lymphocytic leukemia) ([place] [address] ) 10/13/2022   • Displaced fracture of middle phalanx of left ring finger, initial encounter for closed fracture 10/11/2022   • Hypertension 08/08/2021   • Ambulatory dysfunction 08/05/2021   • Suspected Mild cognitive impairment 08/05/2021   • Lymphadenopathy 11/06/2020   • Elevated liver enzymes 11/06/2020   • Fall 11/05/2020   • Type 2 diabetes mellitus ([facility] ) 11/05/2020   • CKD (chronic kidney disease) stage 3, GFR 30-59 ml/min ([facility] ) 11/05/2020   • HLD (hyperlipidemia) 11/05/2020   • OA (osteoarthritis) 11/05/2020   • Chronic systolic heart failure ([facility] ) 11/05/2020          Yanely Simpson    Total time spent:  45 minutes with more than 50% spent counseling/coordinating care   Counseling includes discussion with patient re: progress and discussion with patient of his/her current medical/functional state/information  Coordination of patient's care was performed in conjunction with consulting services  Time invested included review of patient's labs, vitals, and management of their comorbidities with continued monitoring  The care of the patient was extensively discussed and appropriate treatment plan was formulated unique for this patient  ** Please Note:  voice to text software may have been used in the creation of this document   Although proof errors in transcription or interpretation are a potential of such software**

## 2022-10-22 NOTE — PROGRESS NOTES
Internal Medicine Progress Note  Patient: Lucero Reis  Age/sex: 79 y o  female  Medical Record #: [id number]      ASSESSMENT/PLAN: (Interval History)  Lucero Reis is seen and examined and management for following issues:    S/p ORIF with IM nailing of the Rt femur  · Pain control and therapy per primary service  · Pain meds adjusted d/t sedation  · Renal dosed Lovenox for DVT prophylaxis  · Follow-up with Ortho 2 weeks post-op      Left 4th phalanx fracture  · Splint and NWB   · Pain control per PMR      HTN  · Home medications: Hyzaar 50/12 5mg daily/Lopressor 25mg every 12 hours/Lasix 40mg daily - for heart failure  · Here: metoprolol 25mg bid (increased 10/18)  · Will monitor and adjust medications as needed    Anemia  · Secondary to trauma  · Stable      DM type 2  · Home: Levemir 20U at bedtime/Humalog 10U with meals  · Here: Lantus 30U at bedtime/Humalog 8U with meals  · Pt was hypoglycemic 10/20/22 in the evening Lantus held 10/20 and mealtime insulin reduced  · Will monitor trend  Blood sugars currently elevated  · Continue accuchecks and SSI      Chronic heart failure  · Pt has been off diuretics  · Given 1L NSS 10/19/22 due to orthostasis and appearing dry  · ECHO with an EF of 45%  Grade 2 diastolic dysfunction  · euvolemic     CLL  · Recent diagnosis  · Outpt follow-up with Hematology/Oncology  · Baseline WBCs 14-16K  · Had mild bump likely reactive secondary to surgery  · Stable      Suspected UTI  · positive UA await cultures  · Bactrim started  · Now with urinary retention overnight  DC planning: TBD  The above assessment and plan was reviewed and updated as determined by my evaluation of the patient on 10/22/2022      Labs:   Results from last 7 days   Lab Units 10/19/22  0950 10/16/22  0349   WBC Thousand/uL 22 41* 24 20*   HEMOGLOBIN g/dL 9 3* 8 7*   HEMATOCRIT % 29 8* 27 3*   PLATELETS Thousands/uL 308 181     Results from last 7 days   Lab Units 10/19/22  0950 10/16/22  0349 SODIUM mmol/L 134* 135   POTASSIUM mmol/L 3 7 4 3   CHLORIDE mmol/L 102 102   CO2 mmol/L 26 27   BUN mg/dL 25 26*   CREATININE mg/dL 1 03 0 89   CALCIUM mg/dL 8 7 8 6             Results from last 7 days   Lab Units 10/22/22  0613 10/21/22  2116 10/21/22  1618   POC GLUCOSE mg/dl 224* 179* 183*       Review of Scheduled Meds:  Current Facility-Administered Medications   Medication Dose Route Frequency Provider Last Rate   • acetaminophen  325 mg Oral Q6H PRN Thao Perkins MD     • acetaminophen  975 mg Oral TID AC Thao Perkins MD     • atorvastatin  20 mg Oral Daily With Sada Beavers MD     • bisacodyl  10 mg Rectal Daily PRN Thao Perkins MD     • docusate sodium  100 mg Oral BID Thao Perkins MD     • enoxaparin  40 mg Subcutaneous Q24H Carlin Cannon MD     • gabapentin  100 mg Oral TID Thao Perkins MD     • insulin glargine  30 Units Subcutaneous HS Adeola Jennings PA-C     • insulin lispro  1-5 Units Subcutaneous HS Thao Perkins MD     • insulin lispro  1-6 Units Subcutaneous TID AC Thao Perkins MD     • insulin lispro  8 Units Subcutaneous TID With Meals [facility] [place] [facility] [place],      • lidocaine  2 patch Topical Daily Thao Perkins MD     • meclizine  12 5 mg Oral Q8H PRN Thao Perkins MD     • meclizine  12 5 mg Oral BID before breakfast/lunch Thao Perkins MD     • methocarbamol  500 mg Oral Q6H PRN Thao Perkins MD     • metoprolol tartrate  25 mg Oral Q12H [facility] BRINDA Kramer     • naloxone  0 04 mg Intravenous Q1MIN PRN Thao Perkins MD     • polyethylene glycol  17 g Oral Daily PRN Thao Perkins MD     • senna-docusate sodium  1 tablet Oral HS Thao Perkins MD     • sulfamethoxazole-trimethoprim  2 tablet Oral Q12H [place], [place]         Subjective/ HPI: Patient seen and examined   Patients overnight issues or events were reviewed with nursing or staff during rounds or morning huddle session  New or overnight issues include the following:     Pt seen in her room  She did not sleep well last night due to pain and urinary retention  She continues to have visual hallucinations  ROS:   A 10 point ROS was performed; negative except as noted above  Imaging:     No orders to display       *Labs /Radiology studies Reviewed  *Medications  reviewed and reconciled as needed  *Please refer to order section for additional ordered labs studies  *Case discussed with primary attending during morning huddle case rounds    Physical Examination:  Vitals:   Vitals:    10/21/22 2100 10/21/22 2300 10/22/22 0500 10/22/22 0600   BP: 139/61 135/61 128/57    BP Location:  Left arm Left arm    Pulse: 92 75 81    Resp:  18 18    Temp:  98 1 °F (36 7 °C) 98 1 °F (36 7 °C)    TempSrc:  Oral Oral    SpO2:  95% 94%    Weight:    61 3 kg (135 lb 2 3 oz)   Height:         GEN: No apparent distress, interactive  NEURO: Alert  + visual hallucinations  HEENT: Pupils are equal and reactive, EOMI, mucous membranes are moist, face symmetrical  CV: S1 S2 regular, no MRG, no peripheral edema noted  RESP: Lungs are clear bilaterally, no wheezes, rales or rhonchi noted, on room air, respirations easy and non labored  GI: Flat, soft non tender, non distended; +BS x4  : Voiding without difficulty  MUSC: Moves all extremities; except RLE and NWB LUE  SKIN: pink, warm and dry, normal turgor, dressing in place to RLE incisions with staples all are intact without evidence of infection; left 4th finger with splint    The above physical exam was reviewed and updated as determined by my evaluation of the patient on 10/22/2022      Invasive Devices  Report    Peripheral Intravenous Line  Duration           Peripheral IV 10/19/22 Right;Ventral (anterior) Forearm 2 days          Drain  Duration           External Urinary Catheter 1 day                   VTE Pharmacologic Prophylaxis: Enoxaparin  Code Status: Level 1 - Full Code  Current Length of Stay: 5 day(s)      Total time spent:  30 minutes with more than 50% spent counseling/coordinating care  Counseling includes discussion with patient re: progress  and discussion with patient of his/her current medical state/information  Coordination of patient's care was performed in conjunction with primary service  Time invested included review of patient's labs, vitals, and management of their comorbidities with continued monitoring  In addition, this patient was discussed with medical team including physician and advanced extenders  The care of the patient was extensively discussed and appropriate treatment plan was formulated unique for this patient  ** Please Note:  voice to text software may have been used in the creation of this document   Although proof errors in transcription or interpretation are a potential of such software**

## 2022-10-22 NOTE — NURSING NOTE
Informed Dr Regine Ryan of pt's urine retention, burning with urination, pain to right leg/hip, lethargy, c/o dizziness, and hallucinations this am  Pt can carry on a conversation and take pills, and was able to eat a small amount of her breakfast, but then falls right back asleep  Orders to be placed by physician

## 2022-10-22 NOTE — ASSESSMENT & PLAN NOTE
Resolved  + UA  Urine culture: >100K Klebsiella  Symptomatic with urinary frequency, new encephalopathy and hallucinations, dysuria  · Completed treatment with Bactrim which was sensitive

## 2022-10-23 LAB
BACTERIA UR CULT: ABNORMAL
GLUCOSE SERPL-MCNC: 166 MG/DL (ref 65–140)
GLUCOSE SERPL-MCNC: 168 MG/DL (ref 65–140)
GLUCOSE SERPL-MCNC: 192 MG/DL (ref 65–140)
GLUCOSE SERPL-MCNC: 213 MG/DL (ref 65–140)
GLUCOSE SERPL-MCNC: 300 MG/DL (ref 65–140)

## 2022-10-23 RX ORDER — INSULIN LISPRO 100 [IU]/ML
8 INJECTION, SOLUTION INTRAVENOUS; SUBCUTANEOUS
Status: DISCONTINUED | OUTPATIENT
Start: 2022-10-23 | End: 2022-10-28

## 2022-10-23 RX ORDER — INSULIN LISPRO 100 [IU]/ML
8 INJECTION, SOLUTION INTRAVENOUS; SUBCUTANEOUS
Status: DISCONTINUED | OUTPATIENT
Start: 2022-10-24 | End: 2022-10-28

## 2022-10-23 RX ORDER — INSULIN LISPRO 100 [IU]/ML
4 INJECTION, SOLUTION INTRAVENOUS; SUBCUTANEOUS
Status: DISCONTINUED | OUTPATIENT
Start: 2022-10-23 | End: 2022-10-28

## 2022-10-23 RX ADMIN — GABAPENTIN 300 MG: 300 CAPSULE ORAL at 21:53

## 2022-10-23 RX ADMIN — SULFAMETHOXAZOLE AND TRIMETHOPRIM 2 TABLET: 400; 80 TABLET ORAL at 08:50

## 2022-10-23 RX ADMIN — ACETAMINOPHEN 975 MG: 325 TABLET, FILM COATED ORAL at 12:22

## 2022-10-23 RX ADMIN — INSULIN GLARGINE 30 UNITS: 100 INJECTION, SOLUTION SUBCUTANEOUS at 21:54

## 2022-10-23 RX ADMIN — METOPROLOL TARTRATE 25 MG: 25 TABLET, FILM COATED ORAL at 08:51

## 2022-10-23 RX ADMIN — HYDROMORPHONE HYDROCHLORIDE 1 MG: 2 TABLET ORAL at 22:20

## 2022-10-23 RX ADMIN — ACETAMINOPHEN 975 MG: 325 TABLET, FILM COATED ORAL at 19:55

## 2022-10-23 RX ADMIN — SENNOSIDES AND DOCUSATE SODIUM 1 TABLET: 8.6; 5 TABLET ORAL at 21:54

## 2022-10-23 RX ADMIN — GABAPENTIN 100 MG: 100 CAPSULE ORAL at 13:52

## 2022-10-23 RX ADMIN — MECLIZINE HCL 12.5 MG 12.5 MG: 12.5 TABLET ORAL at 11:51

## 2022-10-23 RX ADMIN — ACETAMINOPHEN 975 MG: 325 TABLET, FILM COATED ORAL at 06:21

## 2022-10-23 RX ADMIN — MECLIZINE HCL 12.5 MG 12.5 MG: 12.5 TABLET ORAL at 06:21

## 2022-10-23 RX ADMIN — METOPROLOL TARTRATE 25 MG: 25 TABLET, FILM COATED ORAL at 20:04

## 2022-10-23 RX ADMIN — METHOCARBAMOL 500 MG: 500 TABLET ORAL at 00:29

## 2022-10-23 RX ADMIN — INSULIN LISPRO 1 UNITS: 100 INJECTION, SOLUTION INTRAVENOUS; SUBCUTANEOUS at 17:56

## 2022-10-23 RX ADMIN — GABAPENTIN 100 MG: 100 CAPSULE ORAL at 06:21

## 2022-10-23 RX ADMIN — METHOCARBAMOL 500 MG: 500 TABLET ORAL at 18:59

## 2022-10-23 RX ADMIN — INSULIN LISPRO 4 UNITS: 100 INJECTION, SOLUTION INTRAVENOUS; SUBCUTANEOUS at 17:56

## 2022-10-23 RX ADMIN — ENOXAPARIN SODIUM 40 MG: 40 INJECTION SUBCUTANEOUS at 08:51

## 2022-10-23 RX ADMIN — INSULIN LISPRO 2 UNITS: 100 INJECTION, SOLUTION INTRAVENOUS; SUBCUTANEOUS at 08:42

## 2022-10-23 RX ADMIN — ATORVASTATIN CALCIUM 20 MG: 20 TABLET, FILM COATED ORAL at 18:05

## 2022-10-23 RX ADMIN — SULFAMETHOXAZOLE AND TRIMETHOPRIM 2 TABLET: 400; 80 TABLET ORAL at 21:57

## 2022-10-23 RX ADMIN — INSULIN LISPRO 8 UNITS: 100 INJECTION, SOLUTION INTRAVENOUS; SUBCUTANEOUS at 12:20

## 2022-10-23 RX ADMIN — LIDOCAINE 5% 2 PATCH: 700 PATCH TOPICAL at 08:51

## 2022-10-23 RX ADMIN — INSULIN LISPRO 1 UNITS: 100 INJECTION, SOLUTION INTRAVENOUS; SUBCUTANEOUS at 21:55

## 2022-10-23 RX ADMIN — INSULIN LISPRO 4 UNITS: 100 INJECTION, SOLUTION INTRAVENOUS; SUBCUTANEOUS at 12:19

## 2022-10-23 RX ADMIN — INSULIN LISPRO 8 UNITS: 100 INJECTION, SOLUTION INTRAVENOUS; SUBCUTANEOUS at 08:43

## 2022-10-23 NOTE — PROGRESS NOTES
10/23/22 1300   Pain Assessment   Pain Location/Orientation Orientation: Right;Location: Leg   Pain Rating: FLACC (Rest) - Face 0   Pain Rating: FLACC (Rest) - Legs 0   Pain Rating: FLACC (Rest) - Activity 0   Pain Rating: FLACC (Rest) - Cry 0   Pain Rating: FLACC (Rest) - Consolability 0   Score: FLACC (Rest) 0   Pain Rating: FLACC (Activity) - Face 1   Pain Rating: FLACC (Activity) - Legs 0   Pain Rating: FLACC (Activity) - Activity 0   Pain Rating: FLACC (Activity) - Cry 1   Pain Rating: FLACC (Activity) - Consolability 1   Score: FLACC (Activity) 3   Restrictions/Precautions   Precautions Bed/chair alarms;Cognitive; Fall Risk;Pain;Supervision on toilet/commode;Visual deficit   Weight Bearing Restrictions Yes   LUE Weight Bearing Per Order NWB  (through hand, okay through elbow)   RLE Weight Bearing Per Order WBAT   ROM Restrictions No   Braces or Orthoses Splint  (TEDs)   Lifestyle   Autonomy "See how easy I forget things "   Sit to Lying   Type of Assistance Needed Physical assistance   Physical Assistance Level 51%-75%   Comment RLE management, use of bed rail   Sit to Lying CARE Score 2   Sit to Stand   Type of Assistance Needed Physical assistance; Adaptive equipment;Verbal cues   Physical Assistance Level 51%-75%   Comment cues for proper hand placement and body mechanics  repetitive sit<>stand from w/c x4 w/ focus on carryover of proper hand placement  Stand tolerance ~15-25 seconds prior to requesting to rest and extended rest breaks provide between stand trials  Sit to Stand CARE Score 2   Bed-Chair Transfer   Type of Assistance Needed Physical assistance   Physical Assistance Level Total assistance   Comment Min-Mod A slideboard xfer recliner>w/c, cues for hand placement  Max A stand pivot w/ PFRW, SBA of 2nd person for safety  Max cues for sequencing and proper body mechanics     Chair/Bed-to-Chair Transfer CARE Score 1   Toileting Hygiene   Type of Assistance Needed Physical assistance   Physical Assistance Level Total assistance   Comment able to manage suad hygiene w/ set-up, Max A to stand w/ 2nd person for clothing management  Toileting Hygiene CARE Score 1   Toilet Transfer   Type of Assistance Needed Physical assistance; Adaptive equipment;Verbal cues   Physical Assistance Level Total assistance   Comment Max Ax1 stand pivot w/ PFRW to [facility] over toilet, SBA of 2nd person for safety and PFRW management  Toilet Transfer CARE Score 1   Exercise Tools   Other Exercise Tool 1 RUE ther ex to maximize strength and endurance for fxnl xfers and ADLs  Completed w/ 2# DB 3x10 bicep curls, chest press, and OH shoulder press  Tolerated well w/ Min vc's and demo, rest breaks provided between sets to manage fatigue  Cognition   Overall Cognitive Status Impaired   Arousal/Participation Alert; Cooperative   Attention Attends with cues to redirect   Orientation Level Oriented to person;Oriented to place;Oriented to situation;Disoriented to time   Memory Decreased short term memory;Decreased recall of recent events;Decreased recall of precautions   Following Commands Follows one step commands with increased time or repetition   Comments fatigue   Activity Tolerance   Medical Staff Made Aware BP while seated 114/56, HR 67  RN provided medication midway through session  PCA bladder scanned at end of session  Assessment   Treatment Assessment Pt seen for skilled OT session focusing on fxnl xfers, fxnl standing tolerance, toileting, and bed mobility  Pt cont to be limited by cognitive deficits, however no longer hallucinating, able to be easily redirected and follows 1-2 step commands w/ repetition  Limited by RLE pain/fatigue req inc time for all fxnl tasks w/ extended rest breaks to manage  Cont OT POC: toilet xfer, fxnl standing tolerance, endurance work, repetitive safety training, [employer] training, and Ctra  Sharif Donald 33 management   Pt requested to rest in bed, all needs within reach, BLE elevated and alarm activated; provided w/ ice for RLE  Prognosis Good   Problem List Decreased strength;Decreased endurance; Impaired balance;Decreased mobility; Decreased safety awareness;Orthopedic restrictions;Pain;Decreased cognition; Impaired vision   Plan   Treatment/Interventions ADL retraining;Functional transfer training; Therapeutic exercise;Patient/family training; Endurance training;Cognitive reorientation;Equipment eval/education; Bed mobility   Progress Slow progress, cognitive deficits   Recommendation   OT Discharge Recommendation   (pending progress)   OT Therapy Minutes   OT Time In 1300   OT Time Out 1430   OT Total Time (minutes) 90   OT Mode of treatment - Individual (minutes) 90   OT Mode of treatment - Concurrent (minutes) 0   OT Mode of treatment - Group (minutes) 0   OT Mode of treatment - Co-treat (minutes) 0   OT Mode of Treatment - Total time(minutes) 90 minutes   OT Cumulative Minutes 460   Therapy Time missed   Time missed?  No

## 2022-10-23 NOTE — PROGRESS NOTES
10/23/22 1000   Pain Assessment   Pain Assessment Tool Mccray-Baker FACES   Pain Score 5   Mccray-Baker FACES Pain Rating   (with WB)   Restrictions/Precautions   Precautions Fall Risk;Cognitive;Bed/chair alarms;Pain;Visual deficit   RLE Weight Bearing Per Order WBAT   Braces or Orthoses   (use TEDs and abd binder)   Lying to Sitting on Side of Bed   Type of Assistance Needed Physical assistance   Physical Assistance Level 26%-50%   Comment Mod A for RLE and trunk   Lying to Sitting on Side of Bed CARE Score 3   Sit to Stand   Type of Assistance Needed Physical assistance   Physical Assistance Level 26%-50%   Comment Mod A to PRW- needed cues to not grab walker with L hand, had pt place elbow in pad - needed Lift assist and balance assist once standing   Sit to Stand CARE Score 3   Bed-Chair Transfer   Type of Assistance Needed Physical assistance   Physical Assistance Level 26%-50%   Comment min/ Mod with slide board - opted to use slide board due to inc dizziness t/o session and blood pressures were decreasing   Chair/Bed-to-Chair Transfer CARE Score 3   Walk 10 Feet   Reason if not Attempted Safety concerns   Walk 10 Feet CARE Score 88   Walk 50 Feet with Two Turns   Reason if not Attempted Safety concerns   Walk 50 Feet with Two Turns CARE Score 88   Walk 150 Feet   Reason if not Attempted Safety concerns   Walk 150 Feet CARE Score 88   Walking 10 Feet on Uneven Surfaces   Reason if not Attempted Safety concerns   Walking 10 Feet on Uneven Surfaces CARE Score 88   Ambulation   Does the patient walk? 2  Yes   Primary Mode of Locomotion Prior to Admission Walk   Distance Walked (feet) 5 ft   Walk Assist Level Chair Follow; Moderate Assist   Findings Very antalgic, poor advancement of walker - therapist needed to advance walker and pt leaning very heavy on it   Curb or Single Stair   Reason if not Attempted Safety concerns   1 Step (Curb) CARE Score 88   4 Steps   Reason if not Attempted Safety concerns   4 Steps CARE Score 88   Therapeutic Interventions   Strengthening Seated LAQ 3# on L, needed aarom on R,  Hip flex same wt,  Hip add isometric, 10x3,  Repeated sit-stands  about 8   Other Comments   Comments (S)  Monitor BPs   Assessment   Treatment Assessment 90 min skilled PT session focused on bed mobility,  sit-stand transfers, attempted to amb, LE strengthening/ ROM, and transfered to commode to urinate due to pt retaining from using bed pan  Pt overall made some progress today she was able to attend therapy  Pts BPs dropped t/o session so used slide board for commode transfer and transfer to recliner at end of session  PT Barriers   Physical Impairment Decreased strength;Decreased range of motion;Decreased endurance; Impaired balance;Decreased mobility; Decreased cognition;Pain;Orthopedic restrictions   PT Therapy Minutes   PT Time In 1000   PT Time Out 1130   PT Total Time (minutes) 90   PT Mode of treatment - Individual (minutes) 90   PT Mode of treatment - Concurrent (minutes) 0   PT Mode of treatment - Group (minutes) 0   PT Mode of treatment - Co-treat (minutes) 0   PT Mode of Treatment - Total time(minutes) 90 minutes   PT Cumulative Minutes 450   Therapy Time missed   Time missed?  No

## 2022-10-23 NOTE — PLAN OF CARE
Reviewed    Problem: Prexisting or High Potential for Compromised Skin Integrity  Goal: Skin integrity is maintained or improved  Description: INTERVENTIONS:  - Identify patients at risk for skin breakdown  - Assess and monitor skin integrity  - Assess and monitor nutrition and hydration status  - Monitor labs   - Assess for incontinence   - Turn and reposition patient  - Assist with mobility/ambulation  - Relieve pressure over bony prominences  - Avoid friction and shearing  - Provide appropriate hygiene as needed including keeping skin clean and dry  - Evaluate need for skin moisturizer/barrier cream  - Collaborate with interdisciplinary team   - Patient/family teaching  - Consider wound care consult   Outcome: Progressing     Problem: PAIN - ADULT  Goal: Verbalizes/displays adequate comfort level or baseline comfort level  Description: Interventions:  - Encourage patient to monitor pain and request assistance  - Assess pain using appropriate pain scale  - Administer analgesics based on type and severity of pain and evaluate response  - Implement non-pharmacological measures as appropriate and evaluate response  - Consider cultural and social influences on pain and pain management  - Notify physician/advanced practitioner if interventions unsuccessful or patient reports new pain  Outcome: Progressing     Problem: INFECTION - ADULT  Goal: Absence or prevention of progression during hospitalization  Description: INTERVENTIONS:  - Assess and monitor for signs and symptoms of infection  - Monitor lab/diagnostic results  - Monitor all insertion sites, i e  indwelling lines, tubes, and drains  - Monitor endotracheal if appropriate and nasal secretions for changes in amount and color  - [place] appropriate cooling/warming therapies per order  - Administer medications as ordered  - Instruct and encourage patient and family to use good hand hygiene technique  - Identify and instruct in appropriate isolation precautions for identified infection/condition  Outcome: Progressing  Goal: Absence of fever/infection during neutropenic period  Description: INTERVENTIONS:  - Monitor WBC    Outcome: Progressing     Problem: SAFETY ADULT  Goal: Patient will remain free of falls  Description: INTERVENTIONS:  - Educate patient/family on patient safety including physical limitations  - Instruct patient to call for assistance with activity   - Consult OT/PT to assist with strengthening/mobility   - Keep Call bell within reach  - Keep bed low and locked with side rails adjusted as appropriate  - Keep care items and personal belongings within reach  - Initiate and maintain comfort rounds  - Make Fall Risk Sign visible to staff  - Offer Toileting every 4 Hours, in advance of need  - Initiate/Maintain bed and chair alarm  - Apply yellow socks and bracelet for high fall risk patients  - Consider moving patient to room near nurses station  Outcome: Progressing  Goal: Maintain or return to baseline ADL function  Description: INTERVENTIONS:  -  Assess patient's ability to carry out ADLs; assess patient's baseline for ADL function and identify physical deficits which impact ability to perform ADLs (bathing, care of mouth/teeth, toileting, grooming, dressing, etc )  - Assess/evaluate cause of self-care deficits   - Assess range of motion  - Assess patient's mobility; develop plan if impaired  - Assess patient's need for assistive devices and provide as appropriate  - Encourage maximum independence but intervene and supervise when necessary  - Involve family in performance of ADLs  - Assess for home care needs following discharge   - Consider OT consult to assist with ADL evaluation and planning for discharge  - Provide patient education as appropriate  Outcome: Progressing  Goal: Maintains/Returns to pre admission functional level  Description: INTERVENTIONS:  - Set and communicate daily mobility goal to care team and patient/family/caregiver     - Collaborate with rehabilitation services on mobility goals if consulted  - Out of bed for toileting  - Record patient progress and toleration of activity level   Outcome: Progressing     Problem: DISCHARGE PLANNING  Goal: Discharge to home or other facility with appropriate resources  Description: INTERVENTIONS:  - Identify barriers to discharge w/patient and caregiver  - Arrange for needed discharge resources and transportation as appropriate  - Identify discharge learning needs (meds, wound care, etc )  - Arrange for interpretive services to assist at discharge as needed  - Refer to Case Management Department for coordinating discharge planning if the patient needs post-hospital services based on physician/advanced practitioner order or complex needs related to functional status, cognitive ability, or social support system  Outcome: Progressing     Problem: Potential for Falls  Goal: Patient will remain free of falls  Description: INTERVENTIONS:  - Educate patient/family on patient safety including physical limitations  - Instruct patient to call for assistance with activity   - Consult OT/PT to assist with strengthening/mobility   - Keep Call bell within reach  - Keep bed low and locked with side rails adjusted as appropriate  - Keep care items and personal belongings within reach  - Initiate and maintain comfort rounds  - Make Fall Risk Sign visible to staff  - Offer Toileting every 4 Hours, in advance of need  - Initiate/Maintain bed and chair alarm  - Apply yellow socks and bracelet for high fall risk patients  - Consider moving patient to room near nurses station  Outcome: Progressing     Problem: Nutrition/Hydration-ADULT  Goal: Nutrient/Hydration intake appropriate for improving, restoring or maintaining nutritional needs  Description: Monitor and assess patient's nutrition/hydration status for malnutrition  Collaborate with interdisciplinary team and initiate plan and interventions as ordered    Monitor patient's weight and dietary intake as ordered or per policy  Utilize nutrition screening tool and intervene as necessary  Determine patient's food preferences and provide high-protein, high-caloric foods as appropriate       INTERVENTIONS:  - Monitor oral intake, urinary output, labs, and treatment plans  - Assess nutrition and hydration status and recommend course of action  - Evaluate amount of meals eaten  - Assist patient with eating if necessary   - Allow adequate time for meals  - Recommend/ encourage appropriate diets, oral nutritional supplements, and vitamin/mineral supplements  - Order, calculate, and assess calorie counts as needed  - Recommend, monitor, and adjust tube feedings and TPN/PPN based on assessed needs  - Assess need for intravenous fluids  - Provide specific nutrition/hydration education as appropriate  - Include patient/family/caregiver in decisions related to nutrition  Outcome: Progressing

## 2022-10-23 NOTE — PROGRESS NOTES
Internal Medicine Progress Note  Patient: Mishel Baumann  Age/sex: 79 y o  female  Medical Record #: [id number]      ASSESSMENT/PLAN: (Interval History)  Mishel Baumann is seen and examined and management for following issues:    S/p ORIF with IM nailing of the Rt femur  · Pain control and therapy per primary service  · Pain meds adjusted d/t sedation  · Renal dosed Lovenox for DVT prophylaxis  · Follow-up with Ortho 2 weeks post-op      Left 4th phalanx fracture  · Splint and NWB   · Pain control per PMR      HTN  · Home medications: Hyzaar 50/12 5mg daily/Lopressor 25mg every 12 hours/Lasix 40mg daily - for heart failure  · Here: metoprolol 25mg bid (increased 10/18)  · Will monitor and adjust medications as needed    Anemia  · Secondary to trauma  · Stable      DM type 2  · Home: Levemir 20U at bedtime/Humalog 10U with meals  · Here: Lantus 30U at bedtime/Humalog 8U with meals  · Pt was hypoglycemic 10/20/22 in the evening Lantus held 10/20 and mealtime insulin reduced  Pt again hypoglycemic at bedtime 10/22/22 and Lantus held  Will reduce dinner insulin to 4U  PO intake not optimal yesterday due to lethargy  · Will monitor trend  Blood sugars currently elevated  · Continue accuchecks and SSI      Chronic heart failure  · Pt has been off diuretics  · Given 1L NSS 10/19/22 due to orthostasis and appearing dry  · ECHO with an EF of 45%  Grade 2 diastolic dysfunction  · euvolemic     CLL  · Recent diagnosis  · Outpt follow-up with Hematology/Oncology  · Baseline WBCs 14-16K  · Had mild bump likely reactive secondary to surgery  · Stable      Suspected UTI  · positive UA  Culture with >100,000 gram negative alida enteric like  Await sensitivities  · Bactrim started  · Now with urinary retention requiring straight cath  DC planning: TBD  The above assessment and plan was reviewed and updated as determined by my evaluation of the patient on 10/23/2022      Labs:   Results from last 7 days   Lab Units 10/22/22  1207 10/19/22  0950   WBC Thousand/uL 21 27* 22 41*   HEMOGLOBIN g/dL 9 7* 9 3*   HEMATOCRIT % 31 3* 29 8*   PLATELETS Thousands/uL 464* 308     Results from last 7 days   Lab Units 10/22/22  1207 10/19/22  0950   SODIUM mmol/L 136 134*   POTASSIUM mmol/L 4 2 3 7   CHLORIDE mmol/L 106 102   CO2 mmol/L 27 26   BUN mg/dL 20 25   CREATININE mg/dL 0 99 1 03   CALCIUM mg/dL 9 1 8 7             Results from last 7 days   Lab Units 10/23/22  0638 10/23/22  0207 10/22/22  2011   POC GLUCOSE mg/dl 213* 168* 73       Review of Scheduled Meds:  Current Facility-Administered Medications   Medication Dose Route Frequency Provider Last Rate   • acetaminophen  325 mg Oral Q6H PRN Vanessa Gonzalez MD     • acetaminophen  975 mg Oral TID AC Vanessa Gonzalez MD     • atorvastatin  20 mg Oral Daily With Hayes Alonzo MD     • bisacodyl  10 mg Rectal Daily PRN Vanessa Gonzalez MD     • docusate sodium  100 mg Oral BID Vanessa Gonzalez MD     • enoxaparin  40 mg Subcutaneous Q24H [facility] Yanely Augustine MD     • gabapentin  100 mg Oral BID Vanessa Gonzalez MD     • gabapentin  300 mg Oral HS Vanessa Gonzalez MD     • HYDROmorphone  1 mg Oral TID PRN Vanessa Gonzalez MD     • insulin glargine  30 Units Subcutaneous HS Adeola Jennings PA-C     • insulin lispro  1-5 Units Subcutaneous HS Vanessa Gonzalez MD     • insulin lispro  1-6 Units Subcutaneous TID AC Vanessa Gonzalez MD     • insulin lispro  8 Units Subcutaneous TID With Meals [facility] Karis Bolanos DO     • lidocaine  2 patch Topical Daily Vanessa Gonzalez MD     • meclizine  12 5 mg Oral Q8H PRN Vanessa Gonzalez MD     • meclizine  12 5 mg Oral BID before breakfast/lunch Vanessa Gonzalez MD     • methocarbamol  500 mg Oral Q6H PRN Vanessa Gonzalez MD     • metoprolol tartrate  25 mg Oral Q12H [facility] BRINDA Kramer     • naloxone  0 04 mg Intravenous Q1MIN PRN Vanessa Gonzalez MD     • polyethylene glycol  17 g Oral Daily PRN Danielle Staley MD     • senna-docusate sodium  1 tablet Oral HS Danielle Staley MD     • sulfamethoxazole-trimethoprim  2 tablet Oral Q12H Yoni Wray [place]         Subjective/ HPI: Patient seen and examined  Patients overnight issues or events were reviewed with nursing or staff during rounds or morning huddle session  New or overnight issues include the following:     Pt seen in PT  She reported dizziness during therapy but was not hypotensive  She slept better last night  She denies any other complaints  ROS:   A 10 point ROS was performed; negative except as noted above  Imaging:     XR femur 2 vw right    (Results Pending)       *Labs /Radiology studies Reviewed  *Medications  reviewed and reconciled as needed  *Please refer to order section for additional ordered labs studies  *Case discussed with primary attending during morning huddle case rounds    Physical Examination:  Vitals:   Vitals:    10/22/22 2349 10/23/22 0532 10/23/22 0621 10/23/22 0847   BP: 119/51 122/56  136/52   BP Location:    Left arm   Pulse: 67 77  71   Resp: 18 20     Temp: 97 6 °F (36 4 °C) 98 7 °F (37 1 °C)     TempSrc: Oral Oral     SpO2: 93% 95%     Weight:   61 5 kg (135 lb 9 3 oz)    Height:         GEN: No apparent distress, interactive  NEURO: Alert  + visual hallucinations    HEENT: Pupils are equal and reactive, EOMI, mucous membranes are moist, face symmetrical  CV: S1 S2 regular, no MRG, no peripheral edema noted  RESP: Lungs are clear bilaterally, no wheezes, rales or rhonchi noted, on room air, respirations easy and non labored  GI: Flat, soft non tender, non distended; +BS x4  : Voiding without difficulty  MUSC: Moves all extremities; except RLE and NWB LUE  SKIN: pink, warm and dry, normal turgor, dressing in place to RLE incisions with staples all are intact without evidence of infection; left 4th finger with splint    The above physical exam was reviewed and updated as determined by my evaluation of the patient on 10/23/2022  Invasive Devices  Report    Peripheral Intravenous Line  Duration           Peripheral IV 10/19/22 Right;Ventral (anterior) Forearm 4 days          Drain  Duration           External Urinary Catheter 2 days                   VTE Pharmacologic Prophylaxis: Enoxaparin  Code Status: Level 1 - Full Code  Current Length of Stay: 6 day(s)      Total time spent:  30 minutes with more than 50% spent counseling/coordinating care  Counseling includes discussion with patient re: progress  and discussion with patient of his/her current medical state/information  Coordination of patient's care was performed in conjunction with primary service  Time invested included review of patient's labs, vitals, and management of their comorbidities with continued monitoring  In addition, this patient was discussed with medical team including physician and advanced extenders  The care of the patient was extensively discussed and appropriate treatment plan was formulated unique for this patient  ** Please Note:  voice to text software may have been used in the creation of this document   Although proof errors in transcription or interpretation are a potential of such software**

## 2022-10-24 PROBLEM — G93.40 ENCEPHALOPATHY: Status: ACTIVE | Noted: 2022-10-24

## 2022-10-24 LAB
ANION GAP SERPL CALCULATED.3IONS-SCNC: 5 MMOL/L (ref 4–13)
BUN SERPL-MCNC: 26 MG/DL (ref 5–25)
CALCIUM SERPL-MCNC: 9.2 MG/DL (ref 8.3–10.1)
CHLORIDE SERPL-SCNC: 108 MMOL/L (ref 96–108)
CO2 SERPL-SCNC: 25 MMOL/L (ref 21–32)
CREAT SERPL-MCNC: 1.19 MG/DL (ref 0.6–1.3)
ERYTHROCYTE [DISTWIDTH] IN BLOOD BY AUTOMATED COUNT: 15.4 % (ref 11.6–15.1)
GFR SERPL CREATININE-BSD FRML MDRD: 42 ML/MIN/1.73SQ M
GLUCOSE SERPL-MCNC: 127 MG/DL (ref 65–140)
GLUCOSE SERPL-MCNC: 137 MG/DL (ref 65–140)
GLUCOSE SERPL-MCNC: 149 MG/DL (ref 65–140)
GLUCOSE SERPL-MCNC: 154 MG/DL (ref 65–140)
GLUCOSE SERPL-MCNC: 229 MG/DL (ref 65–140)
HCT VFR BLD AUTO: 32.8 % (ref 34.8–46.1)
HGB BLD-MCNC: 10.1 G/DL (ref 11.5–15.4)
MCH RBC QN AUTO: 32.8 PG (ref 26.8–34.3)
MCHC RBC AUTO-ENTMCNC: 30.8 G/DL (ref 31.4–37.4)
MCV RBC AUTO: 107 FL (ref 82–98)
PLATELET # BLD AUTO: 490 THOUSANDS/UL (ref 149–390)
PMV BLD AUTO: 11 FL (ref 8.9–12.7)
POTASSIUM SERPL-SCNC: 4 MMOL/L (ref 3.5–5.3)
RBC # BLD AUTO: 3.08 MILLION/UL (ref 3.81–5.12)
SODIUM SERPL-SCNC: 138 MMOL/L (ref 135–147)
WBC # BLD AUTO: 19.25 THOUSAND/UL (ref 4.31–10.16)

## 2022-10-24 PROCEDURE — 0HBRXZZ EXCISION OF TOE NAIL, EXTERNAL APPROACH: ICD-10-PCS | Performed by: PODIATRIST

## 2022-10-24 RX ADMIN — ATORVASTATIN CALCIUM 20 MG: 20 TABLET, FILM COATED ORAL at 16:44

## 2022-10-24 RX ADMIN — INSULIN LISPRO 2 UNITS: 100 INJECTION, SOLUTION INTRAVENOUS; SUBCUTANEOUS at 11:38

## 2022-10-24 RX ADMIN — DOCUSATE SODIUM 100 MG: 100 CAPSULE, LIQUID FILLED ORAL at 17:39

## 2022-10-24 RX ADMIN — ACETAMINOPHEN 975 MG: 325 TABLET, FILM COATED ORAL at 20:57

## 2022-10-24 RX ADMIN — GABAPENTIN 100 MG: 100 CAPSULE ORAL at 07:32

## 2022-10-24 RX ADMIN — ACETAMINOPHEN 975 MG: 325 TABLET, FILM COATED ORAL at 13:36

## 2022-10-24 RX ADMIN — METOPROLOL TARTRATE 25 MG: 25 TABLET, FILM COATED ORAL at 20:57

## 2022-10-24 RX ADMIN — DOCUSATE SODIUM 100 MG: 100 CAPSULE, LIQUID FILLED ORAL at 08:27

## 2022-10-24 RX ADMIN — ACETAMINOPHEN 975 MG: 325 TABLET, FILM COATED ORAL at 07:33

## 2022-10-24 RX ADMIN — MECLIZINE HCL 12.5 MG 12.5 MG: 12.5 TABLET ORAL at 07:33

## 2022-10-24 RX ADMIN — ENOXAPARIN SODIUM 40 MG: 40 INJECTION SUBCUTANEOUS at 08:27

## 2022-10-24 RX ADMIN — INSULIN GLARGINE 30 UNITS: 100 INJECTION, SOLUTION SUBCUTANEOUS at 21:25

## 2022-10-24 RX ADMIN — LIDOCAINE 5% 2 PATCH: 700 PATCH TOPICAL at 08:28

## 2022-10-24 RX ADMIN — INSULIN LISPRO 4 UNITS: 100 INJECTION, SOLUTION INTRAVENOUS; SUBCUTANEOUS at 16:45

## 2022-10-24 RX ADMIN — SULFAMETHOXAZOLE AND TRIMETHOPRIM 2 TABLET: 400; 80 TABLET ORAL at 08:29

## 2022-10-24 RX ADMIN — GABAPENTIN 100 MG: 100 CAPSULE ORAL at 13:36

## 2022-10-24 RX ADMIN — GABAPENTIN 300 MG: 300 CAPSULE ORAL at 21:00

## 2022-10-24 RX ADMIN — INSULIN LISPRO 8 UNITS: 100 INJECTION, SOLUTION INTRAVENOUS; SUBCUTANEOUS at 11:40

## 2022-10-24 RX ADMIN — METOPROLOL TARTRATE 25 MG: 25 TABLET, FILM COATED ORAL at 08:29

## 2022-10-24 RX ADMIN — INSULIN LISPRO 1 UNITS: 100 INJECTION, SOLUTION INTRAVENOUS; SUBCUTANEOUS at 07:35

## 2022-10-24 RX ADMIN — SULFAMETHOXAZOLE AND TRIMETHOPRIM 2 TABLET: 400; 80 TABLET ORAL at 20:58

## 2022-10-24 RX ADMIN — MECLIZINE HCL 12.5 MG 12.5 MG: 12.5 TABLET ORAL at 11:38

## 2022-10-24 RX ADMIN — SENNOSIDES AND DOCUSATE SODIUM 1 TABLET: 8.6; 5 TABLET ORAL at 21:00

## 2022-10-24 RX ADMIN — INSULIN LISPRO 8 UNITS: 100 INJECTION, SOLUTION INTRAVENOUS; SUBCUTANEOUS at 08:25

## 2022-10-24 NOTE — PROGRESS NOTES
10/24/22 1300   Pain Assessment   Pain Assessment Tool 0-10   Pain Score 6   Pain Location/Orientation Orientation: Right;Location: Hip;Location: Leg   Restrictions/Precautions   Precautions Bed/chair alarms;Cognitive; Fall Risk;Pain;Supervision on toilet/commode;Visual deficit   Weight Bearing Restrictions Yes   LUE Weight Bearing Per Order NWB   RLE Weight Bearing Per Order WBAT   ROM Restrictions No   Braces or Orthoses Splint  (L hand, R prevalon boot)   Lifestyle   Autonomy "I think my  had all that stuff (Shelbi Ode) at home "   Lower Body Dressing   Comment practiced thread BLE w/ LHAE, able to complete at CS level w/ Mod vc's and demo provided  Would benefit from ongoing training  Putting On/Taking Off Footwear   Type of Assistance Needed Supervision;Set-up / Vincenzo Cloud; Adaptive equipment   Physical Assistance Level No physical assistance   Comment able to don/doff L sock w/ 4" foot block and dynamic fxnl reach to feet  Able to don/doff R sock w/ LHAE, Mod vc's and demo provided  Will benefit from ongoing training  Putting On/Taking Off Footwear CARE Score 4   Sit to Lying   Type of Assistance Needed Physical assistance   Physical Assistance Level 26%-50%   Comment RLE management, cues for sequencing  Sit to Lying CARE Score 3   Sit to Stand   Type of Assistance Needed Physical assistance;Verbal cues; Adaptive equipment   Physical Assistance Level 51%-75%   Comment cues for proper hand placement, improvement w/ repetition  x3 reps at [facility] 34 w/ stand tolerance ~20-30 seconds  Sit to Stand CARE Score 2   Bed-Chair Transfer   Type of Assistance Needed Physical assistance; Adaptive equipment;Verbal cues   Physical Assistance Level 51%-75%   Comment stand pivot w/ PFRW x3 t/o session, cues for proper body mechanics  Extended time 2* RLE pain     Chair/Bed-to-Chair Transfer CARE Score 2   Toileting Hygiene   Type of Assistance Needed Physical assistance   Physical Assistance Level 76% or more   Comment pt primarily managed bowel hygiene while seated w/ cues to initiate, A for thoroughness  Mod-Max A to steady while in stance w/ A for clothing management  Hydraguard applied to sacrum per pt request  Cont to recommend Ax2 for toileting w/ nursing staff  Toileting Hygiene CARE Score 2   Toilet Transfer   Type of Assistance Needed Physical assistance; Adaptive equipment;Verbal cues   Physical Assistance Level 76% or more   Comment stand pivot w/ PFRW to platform [facility]  Toilet Transfer CARE Score 2   Cognition   Overall Cognitive Status Impaired   Assessment   Treatment Assessment Pt seen for skilled OT session focusing on toileting, fxnl stand pivot xfers w/ PFRW, and LHAE training to inc LB ADLs  Cont to be limited by RLE pain and fatigue req extended time for all fxnl activity and frequent seated rest breaks  (-) hallucinations this session, cont w/ cognitive deficit however demo ability to following commands w/ repetition  Updated whiteboard to Ax2 stand pivot w/ PFRW, Ax2 for toileting  Cont OT POC: endurance work, repetitive safety training, stand pivot xfer, fxnl standing tolerance, and LHAE training  Pt requesting to rest in bed, all needs within reach and alarm activated; provided w/ ice for RLE  Prognosis Good   Problem List Decreased strength;Decreased endurance; Impaired balance;Decreased mobility; Decreased safety awareness;Orthopedic restrictions;Pain;Decreased cognition; Impaired vision   Plan   Treatment/Interventions ADL retraining;Functional transfer training; Therapeutic exercise; Endurance training;Cognitive reorientation;Patient/family training;Equipment eval/education; Bed mobility   Progress Progressing toward goals   Recommendation   OT Discharge Recommendation   (pending progress)   OT Therapy Minutes   OT Time In 1300   OT Time Out 1430   OT Total Time (minutes) 90   OT Mode of treatment - Individual (minutes) 90   OT Mode of treatment - Concurrent (minutes) 0   OT Mode of treatment - Group (minutes) 0   OT Mode of treatment - Co-treat (minutes) 0   OT Mode of Treatment - Total time(minutes) 90 minutes   OT Cumulative Minutes 550   Therapy Time missed   Time missed?  No

## 2022-10-24 NOTE — PROGRESS NOTES
Internal Medicine Progress Note  Patient: Iggy Gardner  Age/sex: 79 y o  female  Medical Record #: [id number]      ASSESSMENT/PLAN: (Interval History)  Iggy Gardner is seen and examined and management for following issues:    S/p ORIF with IM nailing of the Rt femur  · Pain control and therapy per primary service  · Pain meds adjusted d/t sedation  · Renal dosed Lovenox for DVT prophylaxis  · Follow-up with Ortho 2 weeks post-op      Left 4th phalanx fracture  · Splint and NWB   · Pain control per PMR      HTN  · Home medications: Hyzaar 50/12 5mg daily/Lopressor 25mg every 12 hours/Lasix 40mg daily - for heart failure  · Here: metoprolol 25mg bid (increased 10/18)  · Will monitor and adjust medications as needed    Anemia  · Secondary to trauma  · Stable      DM type 2  · Home: Levemir 20U at bedtime/Humalog 10U with meals  · Here: Lantus 30U at bedtime/Humalog 8U with breakfast and lunch, 4U with dinner  · Pt was hypoglycemic 10/20/22 and 10/22 in the evening Lantus held 10/20 and 10/22  Adjustments made to insulin  · Will monitor trend  · Continue accuchecks and SSI      Chronic heart failure  · Pt has been off diuretics  · Given 1L NSS 10/19/22 due to orthostasis and appearing dry  · ECHO with an EF of 45%  Grade 2 diastolic dysfunction  · euvolemic     CLL  · Recent diagnosis  · Outpt follow-up with Hematology/Oncology  · Baseline WBCs 14-16K  · Had mild bump likely reactive secondary to surgery  · Stable      Suspected UTI  · positive UA  Culture with >100,000 klebsiella  · Susceptible to Bactrim  · Urinary retention improving  DC planning: TBD  The above assessment and plan was reviewed and updated as determined by my evaluation of the patient on 10/24/2022      Labs:   Results from last 7 days   Lab Units 10/24/22  0507 10/22/22  1207   WBC Thousand/uL 19 25* 21 27*   HEMOGLOBIN g/dL 10 1* 9 7*   HEMATOCRIT % 32 8* 31 3*   PLATELETS Thousands/uL 490* 464*     Results from last 7 days   Lab Units 10/24/22  0507 10/22/22  1207   SODIUM mmol/L 138 136   POTASSIUM mmol/L 4 0 4 2   CHLORIDE mmol/L 108 106   CO2 mmol/L 25 27   BUN mg/dL 26* 20   CREATININE mg/dL 1 19 0 99   CALCIUM mg/dL 9 2 9 1             Results from last 7 days   Lab Units 10/24/22  0714 10/23/22  2028 10/23/22  1537   POC GLUCOSE mg/dl 154* 192* 166*       Review of Scheduled Meds:  Current Facility-Administered Medications   Medication Dose Route Frequency Provider Last Rate   • acetaminophen  325 mg Oral Q6H PRN Sandi Meehan MD     • acetaminophen  975 mg Oral TID AC Sandi Meehan MD     • atorvastatin  20 mg Oral Daily With Óscar Carver MD     • bisacodyl  10 mg Rectal Daily PRN Sandi Meehan MD     • docusate sodium  100 mg Oral BID Sandi Meehan MD     • enoxaparin  40 mg Subcutaneous Q24H Carlin Cannon MD     • gabapentin  100 mg Oral BID Sandi Meehan MD     • gabapentin  300 mg Oral HS Sandi Meehan MD     • HYDROmorphone  1 mg Oral TID PRN Sandi Meehan MD     • insulin glargine  30 Units Subcutaneous HS Adeola Jennings PA-C     • insulin lispro  1-5 Units Subcutaneous HS Sadni Meehan MD     • insulin lispro  1-6 Units Subcutaneous TID AC Sandi Meehan MD     • insulin lispro  4 Units Subcutaneous Daily With Dinner Adeola Jennings PA-C     • insulin lispro  8 Units Subcutaneous Daily With Breakfast Adeola Jennings PA-C     • insulin lispro  8 Units Subcutaneous Daily With Lunch Adeola Jennings PA-C     • lidocaine  2 patch Topical Daily Sandi Meehan MD     • meclizine  12 5 mg Oral Q8H PRN Sandi Meehan MD     • meclizine  12 5 mg Oral BID before breakfast/lunch Sandi Meehan MD     • methocarbamol  500 mg Oral Q6H PRN Sandi Meehan MD     • metoprolol tartrate  25 mg Oral Q12H [address] BRINDA Kramer     • naloxone  0 04 mg Intravenous Q1MIN PRN Sandi Meehan MD     • polyethylene glycol  17 g Oral Daily PRN Darya Portillo MD     • senna-docusate sodium  1 tablet Oral HS Darya Portillo MD     • sulfamethoxazole-trimethoprim  2 tablet Oral Q12H [facility] [place]         Subjective/ HPI: Patient seen and examined  Patients overnight issues or events were reviewed with nursing or staff during rounds or morning huddle session  New or overnight issues include the following:     Pt seen in her room  She is inconsistent with her answers to questions  She continues to be confused with hallucinations  She denies any other complaints  ROS:   A 10 point ROS was performed; negative except as noted above  Imaging:     XR femur 2 vw right    (Results Pending)       *Labs /Radiology studies Reviewed  *Medications  reviewed and reconciled as needed  *Please refer to order section for additional ordered labs studies  *Case discussed with primary attending during morning huddle case rounds    Physical Examination:  Vitals:   Vitals:    10/23/22 2024 10/23/22 2300 10/24/22 0459 10/24/22 0600   BP: 119/53 131/88 149/64    BP Location: Left arm  Left arm    Pulse: 84 67 71    Resp: 19 19 18    Temp: 98 7 °F (37 1 °C) 99 3 °F (37 4 °C) 98 °F (36 7 °C)    TempSrc: Oral Oral Oral    SpO2: 99% 94% 97%    Weight:    63 9 kg (140 lb 14 4 oz)   Height:         GEN: No apparent distress, interactive  NEURO: Alert  + visual hallucinations    HEENT: Pupils are equal and reactive, EOMI, mucous membranes are moist, face symmetrical  CV: S1 S2 regular, no MRG, no peripheral edema noted  RESP: Lungs are clear bilaterally, no wheezes, rales or rhonchi noted, on room air, respirations easy and non labored  GI: Flat, soft non tender, non distended; +BS x4  : Voiding without difficulty  MUSC: Moves all extremities; except RLE and NWB LUE  SKIN: pink, warm and dry, normal turgor, dressing in place to RLE incisions with staples all are intact without evidence of infection; left 4th finger with splint    The above physical exam was reviewed and updated as determined by my evaluation of the patient on 10/24/2022  Invasive Devices  Report    Drain  Duration           External Urinary Catheter 3 days                   VTE Pharmacologic Prophylaxis: Enoxaparin  Code Status: Level 1 - Full Code  Current Length of Stay: 7 day(s)      Total time spent:  30 minutes with more than 50% spent counseling/coordinating care  Counseling includes discussion with patient re: progress  and discussion with patient of his/her current medical state/information  Coordination of patient's care was performed in conjunction with primary service  Time invested included review of patient's labs, vitals, and management of their comorbidities with continued monitoring  In addition, this patient was discussed with medical team including physician and advanced extenders  The care of the patient was extensively discussed and appropriate treatment plan was formulated unique for this patient  ** Please Note:  voice to text software may have been used in the creation of this document   Although proof errors in transcription or interpretation are a potential of such software**

## 2022-10-24 NOTE — ASSESSMENT & PLAN NOTE
Markedly improved  Suspect some sundowning  MOCA 11/30  Recurrent 10/28/22 with delirium  - Moving below meds to earlier in the evening  Continue melatonin to 6 mg  Seroquel 12 5 mg QHS

## 2022-10-24 NOTE — PROGRESS NOTES
10/24/22 1000   Pain Assessment   Pain Assessment Tool 0-10   Pain Score 10 - Worst Possible Pain  (0/10 at rest, 8-10/10 with WBing activities)   Mccray-Baker FACES Pain Rating 6   Pain Location/Orientation Orientation: Right;Location: Back   Pain Radiating Towards shoots down to hip/knee   Pain Onset/Description Onset: Sudden;Frequency: Intermittent  (with movement only)   Hospital Pain Intervention(s) Medication (See MAR); Repositioned; Rest   Restrictions/Precautions   Precautions Bed/chair alarms;Cognitive; Fall Risk;Supervision on toilet/commode;Pain;Pressure Ulcer  (orthostatic hypotension, DTI left heel)   LUE Weight Bearing Per Order NWB  (L hand)   RLE Weight Bearing Per Order WBAT   Braces or Orthoses Splint  (Abdominal binder and TEDS for OH, prevalon boot L heel)   General   Change In Medical/Functional Status see vitals for ortho BPs results   Cognition   Overall Cognitive Status Impaired   Arousal/Participation Alert; Cooperative  (although reports of being sleepy - had pt drink and finished coffee + 16oz water during tx)   Orientation Level Oriented X4   Subjective   Subjective pt reported pain as above and lightheadedness/dizziness sitting and OOB, see vitals for ortho BPs, abdominal binder and TEDS on   Lying to Sitting on Side of Bed   Type of Assistance Needed Physical assistance;Verbal cues; Adaptive equipment   Physical Assistance Level 51%-75%   Comment HOB elevated with R rail   Lying to Sitting on Side of Bed CARE Score 2   Sit to Stand   Type of Assistance Needed Physical assistance;Verbal cues; Adaptive equipment   Physical Assistance Level 76% or more   Comment max of 1 L PFRW   Sit to Stand CARE Score 2   Bed-Chair Transfer   Type of Assistance Needed Physical assistance;Verbal cues; Adaptive equipment   Physical Assistance Level 76% or more   Comment mod slide board with L UE sling   max A SPT with L PFRW assist needed to steer walker   Chair/Bed-to-Chair Transfer CARE Score 2   Walk 10 Feet Type of Assistance Needed Physical assistance;Verbal cues; Adaptive equipment   Physical Assistance Level Total assistance   Comment 12', 20'  L PFRW max of 1 with CFA - no reported OH symptoms during 2nd walk  but limited by 10/10 pain   Walk 10 Feet CARE Score 1   Picking Up Object   Type of Assistance Needed Physical assistance;Verbal cues; Adaptive equipment   Physical Assistance Level 51%-75%   Comment mod with reacher and L PFRW - pen   Picking Up Object CARE Score 2   Toilet Transfer   Type of Assistance Needed Physical assistance;Verbal cues; Adaptive equipment   Physical Assistance Level 76% or more   Comment max of 1 SPT with L PFRW, max of 1 for CM, pt perform hygiene in sitting after bladder movement  RN notified for post void bladder scanning   Toilet Transfer CARE Score 2   Equipment Use   NuStep L1 x 15 mins full supervision to maintain compliance with L hand, SPM> 30 without reported exacerbation of pain   Assessment   Treatment Assessment pt engaged and tolerated skilled PT for 90 mins without reported hallucination with noted improved orientation compared to previous days  ongoing pain continues to be primary limiting factor with standing act and amb but able to complete task with 1 person assist except for CFA during gait training  cont PT as tolerated with  inc focus on strengthening/ROM exercise and OOB activities using LRAD to improve overall functional indep and safety  PT will assess car transfer tomorrow if appropriate  PT Barriers   Functional Limitation Car transfers; Ramp negotiation;Stair negotiation;Standing;Transfers; Walking   Plan   Treatment/Interventions Therapeutic exercise;LE strengthening/ROM; Functional transfer training; Endurance training;Bed mobility;Gait training;Spoke to nursing;OT;Equipment eval/education;Cognitive reorientation   Progress Slow progress, cognitive deficits   PT Therapy Minutes   PT Time In 1000   PT Time Out 1130   PT Total Time (minutes) 90   PT Mode of treatment - Individual (minutes) 90   PT Mode of treatment - Concurrent (minutes) 0   PT Mode of treatment - Group (minutes) 0   PT Mode of treatment - Co-treat (minutes) 0   PT Mode of Treatment - Total time(minutes) 90 minutes   PT Cumulative Minutes 540

## 2022-10-24 NOTE — PROGRESS NOTES
PM&R PROGRESS NOTE:  Lucero Reis 79 y o  female MRN: [id number]  Unit/Bed#: [facility] 291-22 Encounter: [id number]        Rehab Diagnosis: Impairment of mobility, safety and Activities of Daily Living (ADLs) due to Orthopedic Disorders:  08 11  Unilateral Hip Fracture - Right    SUBJECTIVE:  Patient seen face to face today in occupational therapy  Only having mild hallucinations in the morning today much improved from yesterday  Participating well in therapies and complaining of much last pain in the hip  Patient is oriented to person and situation as well as place  She is disoriented to time only  Continent of urine denies any dysuria today  ASSESSMENT: Stable, progressing      PLAN:    Rehabilitation  · Functional deficits: impaired mobility, self care, WBAT RLE, NWB L HAND  • Continue current rehabilitation plan of care to maximize function  • Expected Discharge: XU COLON 3 weeks    Patient likely will require SNF referrals as her family does not believe they can care for her at home needing assistance      DVT prophylaxis  · Managed on SQ Lovenox 40 mg daily - will need education for home    Bladder plan  • Continent  • This weekend required straight catheterization x1  • Bladder scans and PVRs initiated - PVRs have been ranging from , voiding well    Bowel plan  · +BM 10/22/22      * Intertrochanteric fracture of right femur [facility])  Assessment & Plan  Traumatic fall  Sustained a right intertrochanteric fracture  Taken to OR by Dr Yolande Trotter on 10/12/22 for ORIF and IM nailing  WBAT RLE  DVT ppx with SQ Lovenox  Monitor 3 incisions  Continue acute rehabilitation program  POD 14 = 10/26/22 - x-rays and ortho follow-up due  Follow-up with Dr Yolande Trotter as outpatient       Encephalopathy  Assessment & Plan  Associated with mild visual hallucinations seeing people - [facility]  Etiology likely related to combination of acute hospital-acquired delirium and UTI  Will continue to follow    UTI (urinary tract infection)  Assessment & Plan  + UA  Urine culture: >100K Klebsiella  Symptomatic with urinary frequency, new encephalopathy and hallucinations, dysuria  · Started on empiric Bactrim on 10/21/22 after discussion with internal medicine consultants, which is sensitive  · Monitor ins and outs  · Urinary retention protocol in place    Deep tissue injury  Assessment & Plan  Left heel DTI  Offload and elevate  Topical Allevyn  Consult and wound care to evaluate further for any recommendation    H/O dizziness  Assessment & Plan  Dizziness related to motion  Chronic issue per patient  Meclizine 12 5 mg ordered BID     Orthostasis  Assessment & Plan  Mild orthstasis last week   Clinically was hypovolemic   IVF maintenance x1 10/19/22  Encouraged oral fluids  Continue compression stockings      Acute pain  Assessment & Plan  Nociceptive/neuropathic post op pain in RLE managed on multimodal regimen:   · Patient is intolerant of oxycodone and Norco   Patient excessively drowsy on oxycodone and with mild hallucinations on Norco   · Discontinue Norco  · Schedule Tylenol 975 mg t i d   · Scheduled gabapentin 100 mg bid  Gabapentin 300 mg q h s  · Topical Lidoderm patches  · Scheduled topical ice every 2 hours while awake  · Robaxin 500 mg q 6 hours p r n   For muscle spasm  · Dilaudid 1 mg t i d  P r n  added today for severe pain - as intolerant of Norco or oxycodone  · As pain was severe last night - x-ray obtained to make sure alignment is intact  · Personally discussed plan with nursing, patient, as well as her nephew    CLL (chronic lymphocytic leukemia) ([facility] [address] )  Assessment & Plan  Chronic leukocytosis (19K)  Monitor   Follow-up with heme onc as outpatient     Displaced fracture of middle phalanx of left ring finger, initial encounter for closed fracture  Assessment & Plan  Traumatic fracture  4th left finger   Treated non-operatively by Orthopedics  Continue supportive splint  ICE for swelling  NWB L hand    Hypertension  Assessment & Plan  At home: Lopressor 25 mg Q 12 hours, Hyzaar (Losartan/HCTZ) 50mg/12 5mg daily  Here: Lopressor 25 mg daily (increased 10/18)  Will monitor BP closely  Check orthostatics  Adjust medications accordingly  TEDs daily /Abdominal binder PRN    Chronic systolic heart failure (HCC)  Assessment & Plan  Grade 2 DD  At home: Lasix 40 mg daily  Here: Slightly hypovolemic - HOLD Lasix  Encourage fluids by mouth  Restart Lasix when able with K Dur supplement        HLD (hyperlipidemia)  Assessment & Plan  Restarted on home Lipitor 20 mg QPM    CKD (chronic kidney disease) stage 3, GFR 30-59 ml/min ([facility])  Assessment & Plan  Stable  Avoid nephrotoxic agents   Monitor 2x/week    Type 2 diabetes mellitus (HCC)  Assessment & Plan  Lab Results   Component Value Date    HGBA1C 7 6 (H) 10/12/2022     At home: Levemir 20 units QHS, Humalog 10 units with meals  Here: Lantus 30 units QHS, Humalog 8 units with breakfast and lunch, Humalog 4 units with dinner, continue sliding scale insulin  In acute care required an insulin drip transiently  Continue CCD        Appreciate IM consultants medical co-management  Labs, medications, and imaging personally reviewed  ROS:  A ten point review of systems was completed on 10/24/22 and pertinent positives are listed in subjective section  All other systems reviewed were negative  OBJECTIVE:   /56 (BP Location: Left arm)   Pulse 81   Temp 98 °F (36 7 °C) (Oral)   Resp 18   Ht 5' 6" (1 676 m)   Wt 63 9 kg (140 lb 14 4 oz)   SpO2 97%   BMI 22 74 kg/m²       Physical Exam  Vitals and nursing note reviewed  Constitutional:       General: She is not in acute distress  HENT:      Head: Normocephalic and atraumatic  Nose: Nose normal       Mouth/Throat:      Mouth: Mucous membranes are moist    Eyes:      Conjunctiva/sclera: Conjunctivae normal    Cardiovascular:      Rate and Rhythm: Normal rate and regular rhythm        Pulses: Normal pulses  Pulmonary:      Effort: Pulmonary effort is normal       Breath sounds: Normal breath sounds  No wheezing or rales  Abdominal:      General: Bowel sounds are normal  There is no distension  Palpations: Abdomen is soft  Tenderness: There is no abdominal tenderness  Musculoskeletal:         General: No swelling  Cervical back: Neck supple  Comments: Nicole-incisional edema right hip   Skin:     General: Skin is warm  Comments: Incisions clean dry and intact with staples   Neurological:      Mental Status: She is alert and oriented to person, place, and time  Motor: Weakness (Right lower extremity) present        Comments: Left hip flexion is 3/5, knee extension 3/5, 4/5 dorsiflexion, plantar flexion   Psychiatric:         Mood and Affect: Mood normal           Lab Results   Component Value Date    WBC 19 25 (H) 10/24/2022    HGB 10 1 (L) 10/24/2022    HCT 32 8 (L) 10/24/2022     (H) 10/24/2022     (H) 10/24/2022     Lab Results   Component Value Date    SODIUM 138 10/24/2022    K 4 0 10/24/2022     10/24/2022    CO2 25 10/24/2022    BUN 26 (H) 10/24/2022    CREATININE 1 19 10/24/2022    GLUC 149 (H) 10/24/2022    CALCIUM 9 2 10/24/2022     Lab Results   Component Value Date    INR 0 89 10/11/2022    PROTIME 12 3 10/11/2022           Current Facility-Administered Medications:   •  acetaminophen (TYLENOL) tablet 325 mg, 325 mg, Oral, Q6H PRN, Steven Marroquin MD, 325 mg at 10/22/22 3314  •  acetaminophen (TYLENOL) tablet 975 mg, 975 mg, Oral, TID AC, Steven Marroquin MD, 975 mg at 10/24/22 1336  •  atorvastatin (LIPITOR) tablet 20 mg, 20 mg, Oral, Daily With Garrison Mccain MD, 20 mg at 10/23/22 1805  •  bisacodyl (DULCOLAX) rectal suppository 10 mg, 10 mg, Rectal, Daily PRN, Steven Marroquin MD  •  docusate sodium (COLACE) capsule 100 mg, 100 mg, Oral, BID, Steven Marroquin MD, 100 mg at 10/24/22 0827  •  enoxaparin (LOVENOX) subcutaneous injection 40 mg, 40 mg, Subcutaneous, Q24H [address], Rhiannon Brownlee MD, 40 mg at 10/24/22 0827  •  gabapentin (NEURONTIN) capsule 100 mg, 100 mg, Oral, BID, Rhiannon Brownlee MD, 100 mg at 10/24/22 1336  •  gabapentin (NEURONTIN) capsule 300 mg, 300 mg, Oral, HS, Rhiannon Brownlee MD, 300 mg at 10/23/22 2153  •  HYDROmorphone (DILAUDID) tablet 1 mg, 1 mg, Oral, TID PRN, Rhiannon Brownlee MD, 1 mg at 10/23/22 2220  •  insulin glargine (LANTUS) subcutaneous injection 30 Units 0 3 mL, 30 Units, Subcutaneous, HS, Adeola Jennings PA-C, 30 Units at 10/23/22 2154  •  insulin lispro (HumaLOG) 100 units/mL subcutaneous injection 1-5 Units, 1-5 Units, Subcutaneous, HS, Rhiannon Brownlee MD, 1 Units at 10/23/22 2155  •  insulin lispro (HumaLOG) 100 units/mL subcutaneous injection 1-6 Units, 1-6 Units, Subcutaneous, TID AC, Rhiannon Brownlee MD, 2 Units at 10/24/22 1138  •  insulin lispro (HumaLOG) 100 units/mL subcutaneous injection 4 Units, 4 Units, Subcutaneous, Daily With Renea Hdz PA-C, 4 Units at 10/23/22 1756  •  insulin lispro (HumaLOG) 100 units/mL subcutaneous injection 8 Units, 8 Units, Subcutaneous, Daily With Breakfast, Adeola Jennings PA-C, 8 Units at 10/24/22 0825  •  insulin lispro (HumaLOG) 100 units/mL subcutaneous injection 8 Units, 8 Units, Subcutaneous, Daily With Lunch, Adeola Jennings PA-C, 8 Units at 10/24/22 1140  •  lidocaine (LIDODERM) 5 % patch 2 patch, 2 patch, Topical, Daily, Rhiannon Brownlee MD, 2 patch at 10/24/22 1892  •  meclizine (ANTIVERT) tablet 12 5 mg, 12 5 mg, Oral, Q8H PRN, Rhiannon Brownlee MD, 12 5 mg at 10/19/22 1353  •  meclizine (ANTIVERT) tablet 12 5 mg, 12 5 mg, Oral, BID before breakfast/lunch, Rhiannon Brownlee MD, 12 5 mg at 10/24/22 1138  •  methocarbamol (ROBAXIN) tablet 500 mg, 500 mg, Oral, Q6H PRN, Rhiannon Brownlee MD, 500 mg at 10/23/22 1851  •  metoprolol tartrate (LOPRESSOR) tablet 25 mg, 25 mg, Oral, Q12H [address], BRINDA Parra, 25 mg at 10/24/22 1061  •  naloxone (NARCAN) 0 04 mg/mL syringe 0 04 mg, 0 04 mg, Intravenous, Q1MIN PRN, Eric Lovell MD  •  polyethylene glycol (MIRALAX) packet 17 g, 17 g, Oral, Daily PRN, Eric Lovell MD  •  senna-docusate sodium (SENOKOT S) 8 6-50 mg per tablet 1 tablet, 1 tablet, Oral, HS, Eric Lovell MD, 1 tablet at 10/23/22 2154  •  sulfamethoxazole-trimethoprim (BACTRIM) 400-80 mg per tablet 2 tablet, 2 tablet, Oral, Q12H [address], Pablo Rosado Jennifer Gamboa, [address] [place] [address], 2 tablet at 10/24/22 5611    Past Medical History:   Diagnosis Date   • Benign adenomatous polyp of large intestine    • CHF (congestive heart failure) (HCC)    • Diabetes mellitus ([place] [id number] )    • Hyperlipemia    • Hypertension    • Osteoarthritis    • TIA (transient ischemic attack)        Patient Active Problem List    Diagnosis Date Noted   • Intertrochanteric fracture of right femur ([place] [address] ) 10/11/2022   • Encephalopathy 10/24/2022   • Deep tissue injury 10/21/2022   • UTI (urinary tract infection) 10/21/2022   • Orthostasis 10/19/2022   • H/O dizziness 10/19/2022   • Acute pain 10/17/2022   • CLL (chronic lymphocytic leukemia) ([place] [address] ) 10/13/2022   • Displaced fracture of middle phalanx of left ring finger, initial encounter for closed fracture 10/11/2022   • Hypertension 08/08/2021   • Ambulatory dysfunction 08/05/2021   • Suspected Mild cognitive impairment 08/05/2021   • Lymphadenopathy 11/06/2020   • Elevated liver enzymes 11/06/2020   • Fall 11/05/2020   • Type 2 diabetes mellitus ([facility] ) 11/05/2020   • CKD (chronic kidney disease) stage 3, GFR 30-59 ml/min ([facility] ) 11/05/2020   • HLD (hyperlipidemia) 11/05/2020   • OA (osteoarthritis) 11/05/2020   • Chronic systolic heart failure ([facility] ) 11/05/2020          Yanely Simpson    Total time spent:  35 min with more than 50% spent counseling/coordinating care   Counseling includes discussion with patient re: progress and discussion with patient of his/her current medical/functional state/information  Coordination of patient's care was performed in conjunction with consulting services  Time invested included review of patient's labs, vitals, and management of their comorbidities with continued monitoring  The care of the patient was extensively discussed and appropriate treatment plan was formulated unique for this patient  ** Please Note:  voice to text software may have been used in the creation of this document   Although proof errors in transcription or interpretation are a potential of such software**

## 2022-10-24 NOTE — DISCHARGE INSTR - OTHER ORDERS
Plan:   1-Cleanse sacro-buttocks with soap and water  Apply Preventative Hydraguard BID and PRN  2-Turn/reposition q2h or when medically stable for pressure re-distribution on skin   3-Elevate heels to offload pressure  4-Moisturize skin daily with skin nourishing cream  5-Ehob cushion in chair when out of bed  6-B/L Preventative Heel Allevyn Foam Dressings  Samuel with P for Prevention  Change Q3 days  Peel back, inspect skin Q-shift, and reapply

## 2022-10-24 NOTE — PLAN OF CARE
Problem: Prexisting or High Potential for Compromised Skin Integrity  Goal: Skin integrity is maintained or improved  Description: INTERVENTIONS:  - Identify patients at risk for skin breakdown  - Assess and monitor skin integrity  - Assess and monitor nutrition and hydration status  - Monitor labs   - Assess for incontinence   - Turn and reposition patient  - Assist with mobility/ambulation  - Relieve pressure over bony prominences  - Avoid friction and shearing  - Provide appropriate hygiene as needed including keeping skin clean and dry  - Evaluate need for skin moisturizer/barrier cream  - Collaborate with interdisciplinary team   - Patient/family teaching  - Consider wound care consult   Outcome: Progressing     Problem: PAIN - ADULT  Goal: Verbalizes/displays adequate comfort level or baseline comfort level  Description: Interventions:  - Encourage patient to monitor pain and request assistance  - Assess pain using appropriate pain scale  - Administer analgesics based on type and severity of pain and evaluate response  - Implement non-pharmacological measures as appropriate and evaluate response  - Consider cultural and social influences on pain and pain management  - Notify physician/advanced practitioner if interventions unsuccessful or patient reports new pain  Outcome: Progressing     Problem: INFECTION - ADULT  Goal: Absence or prevention of progression during hospitalization  Description: INTERVENTIONS:  - Assess and monitor for signs and symptoms of infection  - Monitor lab/diagnostic results  - Monitor all insertion sites, i e  indwelling lines, tubes, and drains  - Monitor endotracheal if appropriate and nasal secretions for changes in amount and color  - [place] appropriate cooling/warming therapies per order  - Administer medications as ordered  - Instruct and encourage patient and family to use good hand hygiene technique  - Identify and instruct in appropriate isolation precautions for identified infection/condition  Outcome: Progressing  Goal: Absence of fever/infection during neutropenic period  Description: INTERVENTIONS:  - Monitor WBC    Outcome: Progressing    Goal: Maintain or return to baseline ADL function  Description: INTERVENTIONS:  -  Assess patient's ability to carry out ADLs; assess patient's baseline for ADL function and identify physical deficits which impact ability to perform ADLs (bathing, care of mouth/teeth, toileting, grooming, dressing, etc )  - Assess/evaluate cause of self-care deficits   - Assess range of motion  - Assess patient's mobility; develop plan if impaired  - Assess patient's need for assistive devices and provide as appropriate  - Encourage maximum independence but intervene and supervise when necessary  - Involve family in performance of ADLs  - Assess for home care needs following discharge   - Consider OT consult to assist with ADL evaluation and planning for discharge  - Provide patient education as appropriate  Outcome: Progressing  Goal: Maintains/Returns to pre admission functional level  Description: INTERVENTIONS:  - Perform BMAT or MOVE assessment daily    - Set and communicate daily mobility goal to care team and patient/family/caregiver  - Collaborate with rehabilitation services on mobility goals if consulted  - Perform Range of Motion 3 times a day  - Reposition patient every 2 hours    - Dangle patient 3 times a day  - Stand patient 3 times a day  - Ambulate patient 3 times a day  - Out of bed to chair 3 times a day   - Out of bed for meals 3 times a day  - Out of bed for toileting  - Record patient progress and toleration of activity level   Outcome: Progressing     Problem: DISCHARGE PLANNING  Goal: Discharge to home or other facility with appropriate resources  Description: INTERVENTIONS:  - Identify barriers to discharge w/patient and caregiver  - Arrange for needed discharge resources and transportation as appropriate  - Identify discharge learning needs (meds, wound care, etc )  - Arrange for interpretive services to assist at discharge as needed  - Refer to Case Management Department for coordinating discharge planning if the patient needs post-hospital services based on physician/advanced practitioner order or complex needs related to functional status, cognitive ability, or social support system  Outcome: Progressing     Problem: Potential for Falls  Goal: Patient will remain free of falls  Description: INTERVENTIONS:  - Educate patient/family on patient safety including physical limitations  - Instruct patient to call for assistance with activity   - Consult OT/PT to assist with strengthening/mobility   - Keep Call bell within reach  - Keep bed low and locked with side rails adjusted as appropriate  - Keep care items and personal belongings within reach  - Initiate and maintain comfort rounds  - Make Fall Risk Sign visible to staff  - Offer Toileting every 2 Hours, in advance of need  - Initiate/Maintain bed/chair alarm  - Obtain necessary fall risk management equipment: alarms  - Apply yellow socks and bracelet for high fall risk patients  - Consider moving patient to room near nurses station  Outcome: Progressing     Problem: Nutrition/Hydration-ADULT  Goal: Nutrient/Hydration intake appropriate for improving, restoring or maintaining nutritional needs  Description: Monitor and assess patient's nutrition/hydration status for malnutrition  Collaborate with interdisciplinary team and initiate plan and interventions as ordered  Monitor patient's weight and dietary intake as ordered or per policy  Utilize nutrition screening tool and intervene as necessary  Determine patient's food preferences and provide high-protein, high-caloric foods as appropriate       INTERVENTIONS:  - Monitor oral intake, urinary output, labs, and treatment plans  - Assess nutrition and hydration status and recommend course of action  - Evaluate amount of meals eaten  - Assist patient with eating if necessary   - Allow adequate time for meals  - Recommend/ encourage appropriate diets, oral nutritional supplements, and vitamin/mineral supplements  - Order, calculate, and assess calorie counts as needed  - Recommend, monitor, and adjust tube feedings and TPN/PPN based on assessed needs  - Assess need for intravenous fluids  - Provide specific nutrition/hydration education as appropriate  - Include patient/family/caregiver in decisions related to nutrition  Outcome: Progressing

## 2022-10-24 NOTE — CONSULTS
Podiatry - Consultation    Patient Information:   Mackenzie Nair 79 y o  female MRN: [id number]  Unit/Bed#: -84 Encounter: [id number]  PCP: Deirdre Rivera MD  Date of Admission:  10/17/2022  Date of Consultation: 10/24/22  Requesting Physician: Lauren Alarcon MD      ASSESSMENT:    Mackenzie Nair is a 79 y o  female with:    1  Onychomycosis  2  T2DM    PLAN:    • Toenails x10 were excisionally debrided using a large nipper to normal length and thickness without incident  • Weight bearing as tolerated from Podiatry standpoint  • Rest of Medical care per primary team   • Podiatry signing off, thank you for the consult! Please contact with any questions or concerns  SUBJECTIVE:    History of Present Illness:    Mackenzie Nair is a 79 y o  female who is originally admitted 10/17/2022 for rehab following ORIF right femur with an extensive past medical history including CLL, CHF, HLD, T2DM, CKD3  We are consulted for painful, elongated toenails x 10  They state that they have difficulty applying their socks and shoes due to the elongation of the nails  They report pain with palpation and pressure within their shoe gear  They have been unable to cut their nails adequately  Patient regularly follows with a podiatrist Dr Bulmaro Cabrera in [facility] outpatient  Patient has no further pedal complaints at this time  Review of Systems:    Constitutional: Negative  HENT: Negative  Eyes: Negative  Respiratory: Negative  Cardiovascular: Negative  Gastrointestinal: Negative  Musculoskeletal: negative  Skin: Thickened, elongated toenails  Neurological: Negative  Psych: Negative       Past Medical and Surgical History:     Past Medical History:   Diagnosis Date   • Benign adenomatous polyp of large intestine    • CHF (congestive heart failure) ([facility] )    • Diabetes mellitus ([facility] )    • Hyperlipemia    • Hypertension    • Osteoarthritis    • TIA (transient ischemic attack)        Past Surgical History: Procedure Laterality Date   • APPENDECTOMY     • CARPAL TUNNEL RELEASE Right    • HYSTERECTOMY W/ SALPINGO-OOPHERECTOMY     • CA OPEN RX FEMUR FX+INTRAMED CARIDAD Right 10/12/2022    Procedure: INSERTION NAIL IM FEMUR ANTEGRADE (TROCHANTERIC); Surgeon: Mike Encinas DO;  Location: [facility];  Service: Orthopedics       Meds/Allergies:    Medications Prior to Admission   Medication   • acetaminophen (TYLENOL) 325 mg tablet   • aspirin (ECOTRIN LOW STRENGTH) 81 mg EC tablet   • atorvastatin (LIPITOR) 20 mg tablet   • calcium carbonate (OS-JUAN RAMON) 600 MG tablet   • carbamide peroxide (DEBROX) 6 5 % otic solution   • cholecalciferol (VITAMIN D3) 1,000 units tablet   • docusate sodium (COLACE) 100 mg capsule   • fluticasone (FLONASE) 50 mcg/act nasal spray   • gabapentin (NEURONTIN) 100 mg capsule   • glucose blood test strip   • glucose monitoring kit (FREESTYLE) monitoring kit   • insulin glargine (LANTUS) 100 units/mL subcutaneous injection   • insulin lispro (HumaLOG) 100 units/mL injection   • insulin lispro (HumaLOG) 100 units/mL injection   • insulin lispro (HumaLOG) 100 units/mL injection   • lidocaine (LIDODERM) 5 %   • losartan-hydrochlorothiazide (HYZAAR) 50-12 5 mg per tablet   • meclizine (ANTIVERT) 12 5 MG tablet   • metoprolol tartrate (LOPRESSOR) 25 mg tablet   • [] oxyCODONE (ROXICODONE) 5 immediate release tablet   • potassium chloride (K-DUR,KLOR-CON) 20 mEq tablet   • senna-docusate sodium (SENOKOT S) 8 6-50 mg per tablet   • vitamin B-12 (VITAMIN B-12) 1,000 mcg tablet       Allergies   Allergen Reactions   • Amlodipine    • Ceftin [Cefuroxime]    • Ciprofloxacin    • Citalopram    • Dye [Iodinated Diagnostic Agents]    • Glucophage [Metformin]    • Januvia [Sitagliptin]    • Neomycin-Polymyxin-Dexameth    • Ondansetron    • Prilosec [Omeprazole]    • Vibramycin [Doxycycline]        Social History:     Marital Status:      Substance Use History:   Social History     Substance and Sexual Activity   Alcohol Use Not Currently    Comment: very seldom     Social History     Tobacco Use   Smoking Status Never Smoker   Smokeless Tobacco Never Used     Social History     Substance and Sexual Activity   Drug Use Never       Family History:    Family History   Problem Relation Age of Onset   • Heart disease Mother    • Heart disease Father    • Hypertension Father    • Stomach cancer Sister    • Ovarian cancer Sister    • Hypertension Sister          OBJECTIVE:    Vitals:   Blood Pressure: 134/60 (10/24/22 1500)  Pulse: 62 (10/24/22 1500)  Temperature: 98 6 °F (37 °C) (10/24/22 1500)  Temp Source: Oral (10/24/22 1500)  Respirations: 17 (10/24/22 1500)  Height: 5' 6" (167 6 cm) (10/22/22 1334)  Weight - Scale: 63 9 kg (140 lb 14 4 oz) (10/24/22 0600)  SpO2: 93 % (10/24/22 1500)    Physical Exam:    General Appearance: Alert, cooperative, no distress  HEENT: Head normocephalic, atraumatic, without obvious abnormality  Heart: Normal rate and rhythm  Lungs: Non-labored breathing  No respiratory distress  Abdomen: Without distension  Psychiatric: AAOx3  Lower Extremity:  Vascular:    R PT palpable 2/4, DP palpable 1/4  L PT palpable 1/4, DP palpable 2/4  Capillary refill time <3 seconds B/L  Skin temperature WNL B/L  Musculoskeletal:  MMT is 4+/5 in all muscle compartments bilaterally  Ankle equinus is not present B/L  No gross deformities noted  Dermatological:  Elongated, discolored, dystrophic nails x 10 that are positive for subungual debris  No open lesions noted B/L  Neurological:  Gross sensation is intact  Protective sensation is diminished  Patient Denies numbness and/or paresthesias        Additional data:     Lab Results: I have personally reviewed pertinent labs including:    Results from last 7 days   Lab Units 10/24/22  0507 10/22/22  1207   WBC Thousand/uL 19 25* 21 27*   HEMOGLOBIN g/dL 10 1* 9 7*   HEMATOCRIT % 32 8* 31 3*   PLATELETS Thousands/uL 490* 464*   LYMPHO PCT %  --  42 MONO PCT %  --  3*   EOS PCT %  --  1     Results from last 7 days   Lab Units 10/24/22  0507   POTASSIUM mmol/L 4 0   CHLORIDE mmol/L 108   CO2 mmol/L 25   BUN mg/dL 26*   CREATININE mg/dL 1 19   CALCIUM mg/dL 9 2           Cultures: I have personally reviewed pertinent cultures including:    Results from last 7 days   Lab Units 10/20/22  1557   URINE CULTURE  >100,000 cfu/ml Klebsiella pneumoniae*           Imaging: I have personally reviewed pertinent reports in PACS  EKG, Pathology, and Other Studies: I have personally reviewed pertinent reports  ** Please Note: Portions of the record may have been created with voice recognition software  Occasional wrong word or "sound a like" substitutions may have occurred due to the inherent limitations of voice recognition software  Read the chart carefully and recognize, using context, where substitutions have occurred   **

## 2022-10-24 NOTE — WOUND OSTOMY CARE
Consult Note - Wound   Mishel Baumann 79 y o  female MRN: [id number]  Unit/Bed#: [facility] 167-93 Encounter: [id number]        History and Present Illness:  Patient is a  that was admitted to [facility] for treatment post right femur fracture  Patient has a PMH of CLL, CHF, HLD, HTN, DM2, CKD 3, history of cognitive impairment, osteoporosis, dizziness  Per primary RN, patient is continent  Wound Care was consulted for left heel DTI  Patient unavailable at this time due to therapy  Primary RN placed pictures and treatments  Assessment Findings:     B/L Buttocks is clean, dry, and intact  Primary RN reports it blanches  Hydraguard applied  1  CORTES Left Heel DTI: Area with no skin loss  Intact light purple, nonblanchable erythema  No drainage noted  Allevyn foam applied for treatment  Deep Tissue Pressure Injury wounds have the potential to evolve into unstageable, stage III or stage IV wounds  Primary RN states that preventative allevyn was placed on right heel- no wounds or skin loss noted  No induration, fluctuance, odor, warmth/temperature differences, or purulence noted to the above noted wounds and skin areas assessed  New dressings applied per orders listed below  Patient tolerated well- no s/s of non-verbal pain or discomfort observed during the encounter  Bedside nurse aware of plan of care  See flow sheets for more detailed assessment findings  Orders listed below and wound care will continue to follow, call or tiger text with questions  Skin Care Plan:  1-Cleanse sacro-buttocks with soap and water  Apply Preventative Hydraguard BID and PRN  2-Turn/reposition q2h or when medically stable for pressure re-distribution on skin   3-Elevate heels to offload pressure  4-Moisturize skin daily with skin nourishing cream  5-Ehob cushion in chair when out of bed  6-B/L Heel Allevyn Foam Dressings  Samuel Left Heel with T for Treatment and Samuel Right Heel with P for Prevention   Change every other day or Prn  Peel back, inspect skin Q-shift, and reapply  Wounds:  Wound 10/21/22 Pressure Injury Heel Left (Active)   Wound Image   10/24/22 1400   Wound Description Light purple;Non-blanchable erythema 10/24/22 1400   Pressure Injury Stage DTPI 10/24/22 1400   Nicole-wound Assessment Intact 10/24/22 1400   Wound Site Closure ALESSIO 10/24/22 1400   Drainage Amount None 10/24/22 1400   Treatments Elevated 10/24/22 1400   Dressing Foam, Silicon (eg  Allevyn, etc) 10/24/22 1400   Dressing Changed Reinforced 10/24/22 1400   Patient Tolerance Tolerated well 10/24/22 1400   Dressing Status Clean;Dry; Intact 10/24/22 KANDY Salmon

## 2022-10-25 ENCOUNTER — PATIENT OUTREACH (OUTPATIENT)
Dept: CASE MANAGEMENT | Facility: OTHER | Age: 82
End: 2022-10-25

## 2022-10-25 LAB
GLUCOSE SERPL-MCNC: 178 MG/DL (ref 65–140)
GLUCOSE SERPL-MCNC: 195 MG/DL (ref 65–140)
GLUCOSE SERPL-MCNC: 295 MG/DL (ref 65–140)
GLUCOSE SERPL-MCNC: 316 MG/DL (ref 65–140)

## 2022-10-25 RX ORDER — DIAPER,BRIEF,INFANT-TODD,DISP
EACH MISCELLANEOUS 4 TIMES DAILY PRN
Status: DISCONTINUED | OUTPATIENT
Start: 2022-10-25 | End: 2022-11-08 | Stop reason: HOSPADM

## 2022-10-25 RX ORDER — LANOLIN ALCOHOL/MO/W.PET/CERES
3 CREAM (GRAM) TOPICAL
Status: DISCONTINUED | OUTPATIENT
Start: 2022-10-25 | End: 2022-10-28

## 2022-10-25 RX ADMIN — SULFAMETHOXAZOLE AND TRIMETHOPRIM 2 TABLET: 400; 80 TABLET ORAL at 08:19

## 2022-10-25 RX ADMIN — SENNOSIDES AND DOCUSATE SODIUM 1 TABLET: 8.6; 5 TABLET ORAL at 21:19

## 2022-10-25 RX ADMIN — INSULIN LISPRO 1 UNITS: 100 INJECTION, SOLUTION INTRAVENOUS; SUBCUTANEOUS at 16:55

## 2022-10-25 RX ADMIN — INSULIN LISPRO 8 UNITS: 100 INJECTION, SOLUTION INTRAVENOUS; SUBCUTANEOUS at 08:17

## 2022-10-25 RX ADMIN — INSULIN LISPRO 4 UNITS: 100 INJECTION, SOLUTION INTRAVENOUS; SUBCUTANEOUS at 16:55

## 2022-10-25 RX ADMIN — DOCUSATE SODIUM 100 MG: 100 CAPSULE, LIQUID FILLED ORAL at 18:35

## 2022-10-25 RX ADMIN — ACETAMINOPHEN 975 MG: 325 TABLET, FILM COATED ORAL at 12:27

## 2022-10-25 RX ADMIN — ACETAMINOPHEN 975 MG: 325 TABLET, FILM COATED ORAL at 06:13

## 2022-10-25 RX ADMIN — GABAPENTIN 100 MG: 100 CAPSULE ORAL at 06:13

## 2022-10-25 RX ADMIN — ATORVASTATIN CALCIUM 20 MG: 20 TABLET, FILM COATED ORAL at 16:56

## 2022-10-25 RX ADMIN — METOPROLOL TARTRATE 25 MG: 25 TABLET, FILM COATED ORAL at 09:37

## 2022-10-25 RX ADMIN — GABAPENTIN 100 MG: 100 CAPSULE ORAL at 13:16

## 2022-10-25 RX ADMIN — MECLIZINE HCL 12.5 MG 12.5 MG: 12.5 TABLET ORAL at 12:29

## 2022-10-25 RX ADMIN — SULFAMETHOXAZOLE AND TRIMETHOPRIM 2 TABLET: 400; 80 TABLET ORAL at 21:21

## 2022-10-25 RX ADMIN — Medication 3 MG: at 21:19

## 2022-10-25 RX ADMIN — GABAPENTIN 300 MG: 300 CAPSULE ORAL at 21:19

## 2022-10-25 RX ADMIN — ENOXAPARIN SODIUM 40 MG: 40 INJECTION SUBCUTANEOUS at 08:19

## 2022-10-25 RX ADMIN — INSULIN LISPRO 4 UNITS: 100 INJECTION, SOLUTION INTRAVENOUS; SUBCUTANEOUS at 12:19

## 2022-10-25 RX ADMIN — INSULIN GLARGINE 30 UNITS: 100 INJECTION, SOLUTION SUBCUTANEOUS at 21:20

## 2022-10-25 RX ADMIN — METOPROLOL TARTRATE 25 MG: 25 TABLET, FILM COATED ORAL at 20:35

## 2022-10-25 RX ADMIN — INSULIN LISPRO 2 UNITS: 100 INJECTION, SOLUTION INTRAVENOUS; SUBCUTANEOUS at 08:17

## 2022-10-25 RX ADMIN — ACETAMINOPHEN 975 MG: 325 TABLET, FILM COATED ORAL at 20:34

## 2022-10-25 RX ADMIN — DOCUSATE SODIUM 100 MG: 100 CAPSULE, LIQUID FILLED ORAL at 08:19

## 2022-10-25 RX ADMIN — INSULIN LISPRO 8 UNITS: 100 INJECTION, SOLUTION INTRAVENOUS; SUBCUTANEOUS at 12:20

## 2022-10-25 RX ADMIN — LIDOCAINE 5% 2 PATCH: 700 PATCH TOPICAL at 08:23

## 2022-10-25 RX ADMIN — MECLIZINE HCL 12.5 MG 12.5 MG: 12.5 TABLET ORAL at 06:13

## 2022-10-25 RX ADMIN — INSULIN LISPRO 3 UNITS: 100 INJECTION, SOLUTION INTRAVENOUS; SUBCUTANEOUS at 21:20

## 2022-10-25 NOTE — PROGRESS NOTES
PM&R PROGRESS NOTE:  Valeria Espinoza 79 y o  female MRN: [id number]  Unit/Bed#: -64 Encounter: [id number]        Rehab Diagnosis: Impairment of mobility, safety and Activities of Daily Living (ADLs) due to Orthopedic Disorders:  08 11  Unilateral Hip Fracture - Right    SUBJECTIVE:  Patient seen face to face  States she did not sleep very well last night  Interested in melatonin as well as a heating pad for her back  Today no reports of any hallucinations  Patient is clear, participating well, and hopeful she will get better soon  ASSESSMENT: Stable, progressing      PLAN:    Rehabilitation  · Functional deficits: impaired mobility, self care, WBAT RLE, NWB L HAND  • Continue current rehabilitation plan of care to maximize function  I personally attended, reviewed, and discussed medical and functional updates in team conference today  Please refer to advance care planning note for details  Currently requiring moderate-max assist with PT and OT  • Expected Discharge:  Probable SNF referrals will be required, as patient's family will be unable to provide assistance for home discharge in 10 days (anticipate she will require assistance with bathing, IADLs)    Patient does require complete modified independent status to return home      DVT prophylaxis  · Managed on SQ Lovenox 40 mg daily - will need education for home    Bladder plan  • Continent    Bowel plan  · Continent      * Intertrochanteric fracture of right femur [facility])  Assessment & Plan  Traumatic fall  Sustained a right intertrochanteric fracture  Taken to OR by Dr Yuan Roberts on 10/12/22 for ORIF and IM nailing  WBAT RLE  DVT ppx with SQ Lovenox  Monitor 3 incisions  Continue acute rehabilitation program  POD 14 = 10/26/22 - x-rays and ortho follow-up due  Follow-up with Dr Yuan Roberts as outpatient       Encephalopathy  Assessment & Plan  Resolved  Caused by UTI    UTI (urinary tract infection)  Assessment & Plan  + UA  Urine culture: >100K Klebsiella  Symptomatic with urinary frequency, new encephalopathy and hallucinations, dysuria  · Started on empiric Bactrim on 10/21/22 after discussion with internal medicine consultants, which is sensitive  · Much improved with Bactrim treatment  · Almost resolved      Deep tissue injury  Assessment & Plan  Left heel DTI  Offload and elevate  Appreciate wound care recommendations as below  Skin Care Plan:  1-Cleanse sacro-buttocks with soap and water  Apply Preventative Hydraguard BID and PRN  2-Turn/reposition q2h or when medically stable for pressure re-distribution on skin   3-Elevate heels to offload pressure  4-Moisturize skin daily with skin nourishing cream  5-Ehob cushion in chair when out of bed  6-B/L Heel Allevyn Foam Dressings  Samuel Left Heel with T for Treatment and Samuel Right Heel with P for Prevention  Change every other day or Prn  Peel back, inspect skin Q-shift, and reapply  H/O dizziness  Assessment & Plan  Dizziness related to motion  Chronic issue per patient  Meclizine 12 5 mg ordered BID     Orthostasis  Assessment & Plan  Mild orthstasis last week   Clinically was hypovolemic   IVF maintenance x1 10/19/22  Encouraged oral fluids  Continue compression stockings      Acute pain  Assessment & Plan  Nociceptive/neuropathic post op pain in RLE managed on multimodal regimen:   · Patient is intolerant of oxycodone and Norco   Patient excessively drowsy on oxycodone and with mild hallucinations on Norco   · Discontinue Norco  · Schedule Tylenol 975 mg t i d   · Scheduled gabapentin 100 mg bid  Gabapentin 300 mg q h s  · Topical Lidoderm patches  · Scheduled topical ice every 2 hours while awake  · Robaxin 500 mg q 6 hours p r n   For muscle spasm  · Dilaudid 1 mg t i d  P r n  added today for severe pain - as intolerant of Norco or oxycodone  · As pain was severe last night - x-ray obtained to make sure alignment is intact  · Personally discussed plan with nursing, patient, as well as her nephew    CLL (chronic lymphocytic leukemia) ([facility] )  Assessment & Plan  Chronic leukocytosis (19K)  Monitor   Follow-up with heme onc as outpatient     Displaced fracture of middle phalanx of left ring finger, initial encounter for closed fracture  Assessment & Plan  Traumatic fracture  4th left finger   Treated non-operatively by Orthopedics  Continue supportive splint  ICE for swelling  NWB L hand    Hypertension  Assessment & Plan  At home: Lopressor 25 mg Q 12 hours, Hyzaar (Losartan/HCTZ) 50mg/12 5mg daily  Here: Lopressor 25 mg daily (increased 10/18)  Will monitor BP closely  Check orthostatics  Adjust medications accordingly  TEDs daily /Abdominal binder PRN    Chronic systolic heart failure (HCC)  Assessment & Plan  Grade 2 DD  At home: Lasix 40 mg daily  Here: Slightly hypovolemic - HOLD Lasix  Encourage fluids by mouth  Restart Lasix when able with K Dur supplement        HLD (hyperlipidemia)  Assessment & Plan  Restarted on home Lipitor 20 mg QPM    CKD (chronic kidney disease) stage 3, GFR 30-59 ml/min (HCC)  Assessment & Plan  Stable  Avoid nephrotoxic agents   Monitor 2x/week    Type 2 diabetes mellitus (HCC)  Assessment & Plan  Lab Results   Component Value Date    HGBA1C 7 6 (H) 10/12/2022     At home: Levemir 20 units QHS, Humalog 10 units with meals  Here: Lantus 30 units QHS, Humalog 8 units with breakfast and lunch, Humalog 4 units with dinner, continue sliding scale insulin  In acute care required an insulin drip transiently  Continue CCD        Appreciate IM consultants medical co-management  Labs, medications, and imaging personally reviewed  ROS:  A ten point review of systems was completed on 10/25/22 and pertinent positives are listed in subjective section  All other systems reviewed were negative         OBJECTIVE:   /65 (BP Location: Right arm)   Pulse 59   Temp 98 1 °F (36 7 °C) (Oral)   Resp 18   Ht 5' 6" (1 676 m)   Wt 63 9 kg (140 lb 14 4 oz)   SpO2 96%   BMI 22 74 kg/m²       Physical Exam  Vitals and nursing note reviewed  Constitutional:       General: She is not in acute distress  HENT:      Head: Normocephalic and atraumatic  Nose: Nose normal       Mouth/Throat:      Mouth: Mucous membranes are moist    Eyes:      Conjunctiva/sclera: Conjunctivae normal    Cardiovascular:      Rate and Rhythm: Normal rate and regular rhythm  Pulses: Normal pulses  Pulmonary:      Effort: Pulmonary effort is normal       Breath sounds: Normal breath sounds  No wheezing or rales  Abdominal:      General: Bowel sounds are normal  There is no distension  Palpations: Abdomen is soft  Tenderness: There is no abdominal tenderness  Musculoskeletal:         General: No swelling  Cervical back: Neck supple  Comments: Midline lower back pain palpable near paraspinal musculature   Skin:     General: Skin is warm  Findings: Rash (on back) present  Neurological:      Mental Status: She is alert and oriented to person, place, and time  Motor: Weakness ( right lower extremity weakness) present        Gait: Gait abnormal    Psychiatric:         Mood and Affect: Mood normal           Lab Results   Component Value Date    WBC 19 25 (H) 10/24/2022    HGB 10 1 (L) 10/24/2022    HCT 32 8 (L) 10/24/2022     (H) 10/24/2022     (H) 10/24/2022     Lab Results   Component Value Date    SODIUM 138 10/24/2022    K 4 0 10/24/2022     10/24/2022    CO2 25 10/24/2022    BUN 26 (H) 10/24/2022    CREATININE 1 19 10/24/2022    GLUC 149 (H) 10/24/2022    CALCIUM 9 2 10/24/2022     Lab Results   Component Value Date    INR 0 89 10/11/2022    PROTIME 12 3 10/11/2022           Current Facility-Administered Medications:   •  acetaminophen (TYLENOL) tablet 325 mg, 325 mg, Oral, Q6H PRN, Fay Parra MD, 325 mg at 10/22/22 9385  •  acetaminophen (TYLENOL) tablet 975 mg, 975 mg, Oral, TID AC, Fay Parra MD, 975 mg at 10/25/22 1227  • atorvastatin (LIPITOR) tablet 20 mg, 20 mg, Oral, Daily With Maxmio Gutiérrez MD, 20 mg at 10/25/22 1656  •  bisacodyl (DULCOLAX) rectal suppository 10 mg, 10 mg, Rectal, Daily PRN, Nicolás Moreau MD  •  docusate sodium (COLACE) capsule 100 mg, 100 mg, Oral, BID, Nicolás Moreau MD, 100 mg at 10/25/22 0819  •  enoxaparin (LOVENOX) subcutaneous injection 40 mg, 40 mg, Subcutaneous, Q24H [address], Nicolás Moreau MD, 40 mg at 10/25/22 7508  •  gabapentin (NEURONTIN) capsule 100 mg, 100 mg, Oral, BID, Nicolás Moreau MD, 100 mg at 10/25/22 1316  •  gabapentin (NEURONTIN) capsule 300 mg, 300 mg, Oral, HS, Nicolás Moreau MD, 300 mg at 10/24/22 2100  •  hydrocortisone 1 % cream, , Topical, 4x Daily PRN, Nicolás Moreau MD  •  HYDROmorphone (DILAUDID) tablet 1 mg, 1 mg, Oral, TID PRN, Nicolás Moreau MD, 1 mg at 10/23/22 2220  •  insulin glargine (LANTUS) subcutaneous injection 30 Units 0 3 mL, 30 Units, Subcutaneous, HS, Adeola Jennings PA-C, 30 Units at 10/24/22 2125  •  insulin lispro (HumaLOG) 100 units/mL subcutaneous injection 1-5 Units, 1-5 Units, Subcutaneous, HS, Nicolás Moreau MD, 1 Units at 10/23/22 2155  •  insulin lispro (HumaLOG) 100 units/mL subcutaneous injection 1-6 Units, 1-6 Units, Subcutaneous, TID AC, Nicolás Moreau MD, 1 Units at 10/25/22 1655  •  insulin lispro (HumaLOG) 100 units/mL subcutaneous injection 4 Units, 4 Units, Subcutaneous, Daily With Heber Rueda PA-C, 4 Units at 10/25/22 1655  •  insulin lispro (HumaLOG) 100 units/mL subcutaneous injection 8 Units, 8 Units, Subcutaneous, Daily With Breakfast, Adeola Jennings PA-C, 8 Units at 10/25/22 0817  •  insulin lispro (HumaLOG) 100 units/mL subcutaneous injection 8 Units, 8 Units, Subcutaneous, Daily With Lunch, Adeola Jennings PA-C, 8 Units at 10/25/22 1220  •  lidocaine (LIDODERM) 5 % patch 2 patch, 2 patch, Topical, Daily, Nicolás Moreau MD, 2 patch at 10/25/22 5053  •  meclizine (ANTIVERT) tablet 12 5 mg, 12 5 mg, Oral, Q8H PRN, Berlin Stover MD, 12 5 mg at 10/19/22 1353  •  meclizine (ANTIVERT) tablet 12 5 mg, 12 5 mg, Oral, BID before breakfast/lunch, Berlin Stover MD, 12 5 mg at 10/25/22 1229  •  melatonin tablet 3 mg, 3 mg, Oral, HS, Berlin Stover MD  •  methocarbamol (ROBAXIN) tablet 500 mg, 500 mg, Oral, Q6H PRN, Berlin Stover MD, 500 mg at 10/23/22 1859  •  metoprolol tartrate (LOPRESSOR) tablet 25 mg, 25 mg, Oral, Q12H TESSA, BRINDA Kramer, 25 mg at 10/25/22 9801  •  naloxone (NARCAN) 0 04 mg/mL syringe 0 04 mg, 0 04 mg, Intravenous, Q1MIN PRN, Berlin Stover MD  •  polyethylene glycol (MIRALAX) packet 17 g, 17 g, Oral, Daily PRN, Berlin Stover MD  •  senna-docusate sodium (SENOKOT S) 8 6-50 mg per tablet 1 tablet, 1 tablet, Oral, HS, Berlin Stover MD, 1 tablet at 10/24/22 2100  •  sulfamethoxazole-trimethoprim (BACTRIM) 400-80 mg per tablet 2 tablet, 2 tablet, Oral, Q12H [address], Cee Coad Denver Brink, DO, 2 tablet at 10/25/22 2711    Past Medical History:   Diagnosis Date   • Benign adenomatous polyp of large intestine    • CHF (congestive heart failure) ([facility] )    • Diabetes mellitus ([facility] )    • Hyperlipemia    • Hypertension    • Osteoarthritis    • TIA (transient ischemic attack)        Patient Active Problem List    Diagnosis Date Noted   • Intertrochanteric fracture of right femur ([facility] ) 10/11/2022   • Encephalopathy 10/24/2022   • Deep tissue injury 10/21/2022   • UTI (urinary tract infection) 10/21/2022   • Orthostasis 10/19/2022   • H/O dizziness 10/19/2022   • Acute pain 10/17/2022   • CLL (chronic lymphocytic leukemia) ([facility] ) 10/13/2022   • Displaced fracture of middle phalanx of left ring finger, initial encounter for closed fracture 10/11/2022   • Hypertension 08/08/2021   • Ambulatory dysfunction 08/05/2021   • Suspected Mild cognitive impairment 08/05/2021   • Lymphadenopathy 11/06/2020   • Elevated liver enzymes 11/06/2020   • Fall 11/05/2020   • Type 2 diabetes mellitus ([facility] ) 11/05/2020   • CKD (chronic kidney disease) stage 3, GFR 30-59 ml/min ([facility]) 11/05/2020   • HLD (hyperlipidemia) 11/05/2020   • OA (osteoarthritis) 11/05/2020   • Chronic systolic heart failure ([facility] ) 11/05/2020          Yanely Simpson    Total time spent:  45 minutes with more than 50% spent counseling/coordinating care  Counseling includes discussion with patient re: progress and discussion with patient of his/her current medical/functional state/information  Coordination of patient's care was performed in conjunction with consulting services  Time invested included review of patient's labs, vitals, and management of their comorbidities with continued monitoring  The care of the patient was extensively discussed and appropriate treatment plan was formulated unique for this patient  ** Please Note:  voice to text software may have been used in the creation of this document   Although proof errors in transcription or interpretation are a potential of such software**

## 2022-10-25 NOTE — PROGRESS NOTES
10/25/22 1230   Pain Assessment   Pain Assessment Tool 0-10   Pain Location/Orientation Orientation: Right;Location: Hip;Location: Leg   Restrictions/Precautions   Precautions Bed/chair alarms;Cognitive; Fall Risk;Pain;Supervision on toilet/commode;Visual deficit   Weight Bearing Restrictions Yes   LUE Weight Bearing Per Order NWB  (hand)   RLE Weight Bearing Per Order WBAT   ROM Restrictions No   Braces or Orthoses Splint  (L finger splint, L prevalon boot)   Lifestyle   Autonomy "I am so tired today, I didn't sleep well at all last night "   Sit to Stand   Type of Assistance Needed Physical assistance; Adaptive equipment;Verbal cues   Physical Assistance Level 51%-75%   Comment Mod-Max A w/ inc time, cues for proper hand placement L on PFRW and R pushing from chair  Sit to Stand CARE Score 2   Bed-Chair Transfer   Type of Assistance Needed Physical assistance; Adaptive equipment;Verbal cues   Physical Assistance Level 51%-75%   Comment stand pivot w/ PFRW  Chair/Bed-to-Chair Transfer CARE Score 2   Toileting Hygiene   Type of Assistance Needed Physical assistance   Physical Assistance Level 76% or more   Comment able to manage suad hygiene w/ set-up while seated, Mod A to steady while in stance w/ A for clothing management  Attempted R hand release from RW, however inc retropulsion noted  Toileting Hygiene CARE Score 2   Toilet Transfer   Type of Assistance Needed Adaptive equipment;Verbal cues; Physical assistance   Physical Assistance Level 51%-75%   Comment obtained standard BSC, stand pivot w/ PFRW to standard BSC  Toilet Transfer CARE Score 2   Functional Standing Tolerance   Time ~1 minute x4 trials   [facility] table req Mod-Max A to stand and once in stance Mod A w/ cues for proper body mechanics, R unilateral release w/ forward fxnl reach to tap target to focus on anterior weightshift  Stand tolerance limited by RLE pain and fatugue req extended seated rest breaks  Cognition   Overall Cognitive Status Impaired   Arousal/Participation Alert; Cooperative   Attention Attends with cues to redirect   Orientation Level Oriented X4;Oriented to person;Oriented to place;Oriented to time;Oriented to situation  ((-) date stating 23rd and (-) DOW, (+) month, year)   Memory Decreased short term memory;Decreased recall of precautions   Following Commands Follows one step commands with increased time or repetition   Assessment   Treatment Assessment Pt seen for skilled OT session focusing on toileting and repetitive sit<>stand at [facility] 34 w/ focus on carryover of proper hand placement  Pt reporting poor night sleep, therefore limited by fatigue and req frequent rest breaks  MD present during session and requesting pt rest in recliner and limiting sleep in bed until evening to improve sleep at night and inc participation during the day  RN provided medication t/o session  Add'lly limited by RLE pain and chronic lower back pain, req inc time for all fxnl tasks and frequent rest breaks to manage  Cues req for skill carryover 2* cognitive deficit, improvement noted w/ repetitive training however will benefit from ongoing training  Cont OT POC: endurance work, fxnl standing tolerance, stand pivot fxnl xfers, LHAE training, RUE strengthening, and repetitive safety training  Pt was left resting in recliner w/ all needs within reach and alarm activated, provided w/ ice for RLE  Prognosis Good   Problem List Decreased strength;Decreased endurance; Impaired balance;Decreased mobility; Decreased safety awareness;Orthopedic restrictions;Pain;Decreased cognition; Impaired vision   Plan   Treatment/Interventions ADL retraining;Functional transfer training; Endurance training; Therapeutic exercise;Patient/family training;Cognitive reorientation;Equipment eval/education   Progress Progressing toward goals   Recommendation   OT Discharge Recommendation   (pending progress)   OT Therapy Minutes   OT Time In [phone] OT Time Out 1400   OT Total Time (minutes) 90   OT Mode of treatment - Individual (minutes) 90   OT Mode of treatment - Concurrent (minutes) 0   OT Mode of treatment - Group (minutes) 0   OT Mode of treatment - Co-treat (minutes) 0   OT Mode of Treatment - Total time(minutes) 90 minutes   OT Cumulative Minutes 640   Therapy Time missed   Time missed?  No

## 2022-10-25 NOTE — PLAN OF CARE
Problem: Prexisting or High Potential for Compromised Skin Integrity  Goal: Skin integrity is maintained or improved  Description: INTERVENTIONS:  - Identify patients at risk for skin breakdown  - Assess and monitor skin integrity  - Assess and monitor nutrition and hydration status  - Monitor labs   - Assess for incontinence   - Turn and reposition patient  - Assist with mobility/ambulation  - Relieve pressure over bony prominences  - Avoid friction and shearing  - Provide appropriate hygiene as needed including keeping skin clean and dry  - Evaluate need for skin moisturizer/barrier cream  - Collaborate with interdisciplinary team   - Patient/family teaching  - Consider wound care consult   Outcome: Progressing     Problem: PAIN - ADULT  Goal: Verbalizes/displays adequate comfort level or baseline comfort level  Description: Interventions:  - Encourage patient to monitor pain and request assistance  - Assess pain using appropriate pain scale  - Administer analgesics based on type and severity of pain and evaluate response  - Implement non-pharmacological measures as appropriate and evaluate response  - Consider cultural and social influences on pain and pain management  - Notify physician/advanced practitioner if interventions unsuccessful or patient reports new pain  Outcome: Progressing     Problem: INFECTION - ADULT  Goal: Absence or prevention of progression during hospitalization  Description: INTERVENTIONS:  - Assess and monitor for signs and symptoms of infection  - Monitor lab/diagnostic results  - Monitor all insertion sites, i e  indwelling lines, tubes, and drains  - Monitor endotracheal if appropriate and nasal secretions for changes in amount and color  - [place] appropriate cooling/warming therapies per order  - Administer medications as ordered  - Instruct and encourage patient and family to use good hand hygiene technique  - Identify and instruct in appropriate isolation precautions for identified infection/condition  Outcome: Progressing  Goal: Absence of fever/infection during neutropenic period  Description: INTERVENTIONS:  - Monitor WBC    Outcome: Progressing     Problem: SAFETY ADULT  Goal: Patient will remain free of falls  Description: INTERVENTIONS:  - Educate patient/family on patient safety including physical limitations  - Instruct patient to call for assistance with activity   - Consult OT/PT to assist with strengthening/mobility   - Keep Call bell within reach  - Keep bed low and locked with side rails adjusted as appropriate  - Keep care items and personal belongings within reach  - Initiate and maintain comfort rounds  - Make Fall Risk Sign visible to staff  - Offer Toileting everyHours, in advance of need  - Initiate/Maintainm  - Obtain necessary fall risk management equipment  - Apply yellow socks and bracelet for high fall risk patients  - Consider moving patient to room near nurses station  Outcome: Progressing  Goal: Maintain or return to baseline ADL function  Description: INTERVENTIONS:  -  Assess patient's ability to carry out ADLs; assess patient's baseline for ADL function and identify physical deficits which impact ability to perform ADLs (bathing, care of mouth/teeth, toileting, grooming, dressing, etc )  - Assess/evaluate cause of self-care deficits   - Assess range of motion  - Assess patient's mobility; develop plan if impaired  - Assess patient's need for assistive devices and provide as appropriate  - Encourage maximum independence but intervene and supervise when necessary  - Involve family in performance of ADLs  - Assess for home care needs following discharge   - Consider OT consult to assist with ADL evaluation and planning for discharge  - Provide patient education as appropriate  Outcome: Progressing  Goal: Maintains/Returns to pre admission functional level  Description: INTERVENTIONS:  - Perform BMAT or MOVE assessment daily    - Set and communicate daily mobility goal to care team and patient/family/caregiver     - Collaborate with rehabilitation services on mobility goals if consulted  - Perform Range of Rajat  - Out of bed for toileting  - Record patient progress and toleration of activity level   Outcome: Progressing     Problem: DISCHARGE PLANNING  Goal: Discharge to home or other facility with appropriate resources  Description: INTERVENTIONS:  - Identify barriers to discharge w/patient and caregiver  - Arrange for needed discharge resources and transportation as appropriate  - Identify discharge learning needs (meds, wound care, etc )  - Arrange for interpretive services to assist at discharge as needed  - Refer to Case Management Department for coordinating discharge planning if the patient needs post-hospital services based on physician/advanced practitioner order or complex needs related to functional status, cognitive ability, or social support system  Outcome: Progressing     Problem: Potential for Falls  Goal: Patient will remain free of falls  Description: INTERVENTIONS  - Educate patient/family on patient safety including physical limitations  - Instruct patient to call for assistance with activity   - Consult OT/PT to assist with strengthening/mobility   - Keep Call bell within reach  - Keep bed low and locked with side rails adjusted as appropriate  - Keep care items and personal belongings within reach  - Initiate and maintain comfort rounds  - Make Fall Risk Sign visible to staff  - Offer Toileting everyHours, in advance of need  - Initiate/Maintainlarm  - Obtain necessary fall risk management equipment:  - Apply yellow socks and bracelet for high fall risk patients  - Consider moving patient to room near nurses station  Outcome: Progressing     Problem: Nutrition/Hydration-ADULT  Goal: Nutrient/Hydration intake appropriate for improving, restoring or maintaining nutritional needs  Description: Monitor and assess patient's nutrition/hydration status for malnutrition  Collaborate with interdisciplinary team and initiate plan and interventions as ordered  Monitor patient's weight and dietary intake as ordered or per policy  Utilize nutrition screening tool and intervene as necessary  Determine patient's food preferences and provide high-protein, high-caloric foods as appropriate       INTERVENTIONS:  - Monitor oral intake, urinary output, labs, and treatment plans  - Assess nutrition and hydration status and recommend course of action  - Evaluate amount of meals eaten  - Assist patient with eating if necessary   - Allow adequate time for meals  - Recommend/ encourage appropriate diets, oral nutritional supplements, and vitamin/mineral supplements  - Order, calculate, and assess calorie counts as needed  - Recommend, monitor, and adjust tube feedings and TPN/PPN based on assessed needs  - Assess need for intravenous fluids  - Provide specific nutrition/hydration education as appropriate  - Include patient/family/caregiver in decisions related to nutrition  Outcome: Progressing

## 2022-10-25 NOTE — PROGRESS NOTES
10/25/22 0900   Pain Assessment   Pain Assessment Tool 0-10   Pain Score 9  (4/10 at rest in bed, 7-9/10 in WBing)   Pain Location/Orientation Orientation: Right   Pain Onset/Description Onset: Ongoing; Onset: Sudden;Frequency: Constant/Continuous   Hospital Pain Intervention(s) Medication (See MAR); Repositioned;Cold applied; Emotional support;Relaxation technique; Rest  (R knee)   Restrictions/Precautions   Precautions Fall Risk;Cognitive;Bed/chair alarms;Supervision on toilet/commode;Pain;Visual deficit   LUE Weight Bearing Per Order NWB   RLE Weight Bearing Per Order WBAT   Braces or Orthoses Splint  (no Abdominal binder and TEDS today due to BP on the higher side  prevalon boot due to DTI on L heel)   Cognition   Overall Cognitive Status Impaired   Arousal/Participation Alert; Cooperative   Subjective   Subjective pt had no new c/o and was agreeable to have PT   Roll Left and Right   Type of Assistance Needed Physical assistance;Verbal cues   Physical Assistance Level 51%-75%   Comment max to R, mod to L without rail, HOB flat   Roll Left and Right CARE Score 2   Lying to Sitting on Side of Bed   Type of Assistance Needed Physical assistance;Verbal cues   Physical Assistance Level 51%-75%   Comment HOB flat without rail   Lying to Sitting on Side of Bed CARE Score 2   Sit to Stand   Type of Assistance Needed Physical assistance;Verbal cues; Adaptive equipment   Physical Assistance Level 51%-75%   Comment mod-max with L PFRW, VC for hand/foot placement   Sit to Stand CARE Score 2   Bed-Chair Transfer   Type of Assistance Needed Physical assistance;Verbal cues; Adaptive equipment   Physical Assistance Level 51%-75%   Comment SPT with L PFRW   Chair/Bed-to-Chair Transfer CARE Score 2   Walk 10 Feet   Type of Assistance Needed Physical assistance;Verbal cues; Adaptive equipment   Physical Assistance Level Total assistance   Comment 10, 15'with L PFRW, CFA with VC to promote step through pattern, VC/tactile cues to engaged Connie Kohury in stance to prevent knee buckling   Walk 10 Feet CARE Score 1   Wheelchair mobility   Findings PT added w/c goals at least for propulsion but anticipate will need assist to manage w/c brake but will trial brake extender and cross over technique to improve indep/safety in locking/unlocking brake   Therapeutic Interventions   Strengthening R LAQ x 3 SH with reps as tolerated and pt counting out loud to prevent holding breath, A/A R SLR and hip abduction/adduction x 10 reps x 2 sets  gluteal and quad sets x 5 SH x 10 reps which will be patients' HEP while in bed or recliner  S-min A with w/c propulsion (ROM/strengthening purpose) using R UE and bilat LE x 150' - demo improve use of R LE and VC only to negotiate around obstacles in hallway  required assist to lock/unlock w/c brake   Flexibility bilat hamstring stretch x 1 min, bilat gastroc stretch x 30 SH x 3 reps   Other Comments   Comments BPs taken, see vitals section, RN and CRNP notified  (again encouraged inc fluid intake during therapy (drank 8 oz coffee and cup of water))   Assessment   Treatment Assessment Pt able to engaged in skilled PT but required constant arousal to stay awake for pt more sleepy today than yesterday  Again ongoing pain limits pt gait tolerance to 15' at best this session but still able to complete bed/chair/toilet transfers with mod-max A of 1 using L PFRW  Due to impaired cognition with STM deficit requires constant VC to maintain NWB precaution on left hand as well as VC for sequencing to complete functional activities  Cont with strengthening, standing balance/tolerance, transfer and gait training using L PFRW  Family/Caregiver Present no   PT Barriers   Physical Impairment Decreased strength;Decreased endurance; Impaired balance;Decreased mobility;Orthopedic restrictions;Decreased skin integrity; Decreased range of motion;Decreased cognition; Impaired judgement;Decreased safety awareness;Pain   Plan Treatment/Interventions Functional transfer training   Progress Progressing toward goals   Recommendation   PT Discharge Recommendation   (SNF for additional rehab)   PT Therapy Minutes   PT Time In 0850   PT Time Out 1030   PT Total Time (minutes) 100   PT Mode of treatment - Individual (minutes) 100   PT Mode of treatment - Concurrent (minutes) 0   PT Mode of treatment - Group (minutes) 0   PT Mode of treatment - Co-treat (minutes) 0   PT Mode of Treatment - Total time(minutes) 100 minutes   PT Cumulative Minutes 640   Therapy Time missed   Time missed?  No

## 2022-10-25 NOTE — TEAM CONFERENCE
Acute RehabilitationTeam Conference Note  Date: 10/25/2022   Time: 11:21 AM       Patient Name:  Teddy Conner       Medical Record Number: [id number]   YOB: 1940  Sex: Female          Room/Bed:  /[facility] 459-01  Payor Info:  Payor: Carleen Haver / Plan: MEDICARE A AND B / Product Type: Medicare A & B Fee for Service /      Admitting Diagnosis: Closed fracture of greater trochanter of right femur ([facility] ) [S72 111A]   Admit Date/Time:  10/17/2022  2:18 PM  Admission Comments: No comment available     Primary Diagnosis:  Intertrochanteric fracture of right femur ([place] [address] )  Principal Problem: Intertrochanteric fracture of right femur [facility])    Patient Active Problem List    Diagnosis Date Noted   • Encephalopathy 10/24/2022   • Deep tissue injury 10/21/2022   • UTI (urinary tract infection) 10/21/2022   • Orthostasis 10/19/2022   • H/O dizziness 10/19/2022   • Acute pain 10/17/2022   • CLL (chronic lymphocytic leukemia) ([facility] ) 10/13/2022   • Intertrochanteric fracture of right femur ([place] [address] ) 10/11/2022   • Displaced fracture of middle phalanx of left ring finger, initial encounter for closed fracture 10/11/2022   • Hypertension 08/08/2021   • Ambulatory dysfunction 08/05/2021   • Suspected Mild cognitive impairment 08/05/2021   • Lymphadenopathy 11/06/2020   • Elevated liver enzymes 11/06/2020   • Fall 11/05/2020   • Type 2 diabetes mellitus ([facility] ) 11/05/2020   • CKD (chronic kidney disease) stage 3, GFR 30-59 ml/min ([place] [address] ) 11/05/2020   • HLD (hyperlipidemia) 11/05/2020   • OA (osteoarthritis) 11/05/2020   • Chronic systolic heart failure ([facility] ) 11/05/2020       Physical Therapy:    Weight Bearing Status: Non-weight bearing (WBAT R LE, NWB L hand)  Transfers: Assist of 2  Bed Mobility: Moderate Assistance, Maximum Assistance  Amulation Distance (ft): 5 feet (to 20')  Ambulation: Assist of 2  Assistive Device for Ambulation: Roller Walker  Roller Walker Attachments:  With platform on left  Assistive Device for Stairs: (unsafe to perform)  Stair Assistance:  (unsafe to assess at this time)  Discharge Recommendations: Home with: (home vs SNF pending progress and family availability to provide assist at d/c)  [address] with[de-identified] 24 Hour Assisteance, 24 Hour Supervision    10/18/22  Pt is a 79year old female s/p Surgical fixation of right intertrochanteric femur fracture with long intramedullary nail due a fall at home  Barriers to functional progress and overall safety continues to be pain with dec standing tolerance and balance, dec compliance with NWB precaution on L hand, dec cognition with hallucination, gait dysfunction, impaired strength and ortho BPs so pt is not able to initiate and complete bed/chair/toilet/car transfers, household distance amb task without 1-2 person assist  Due to aforementioned impairments pt still not safe to initiate stair training so still at high risk for falls and inc caregiver burden  Pt will benefit from continued skilled therapy intervention to improve overall functions and facilitate a safe d/c pending medical stability  Occupational Therapy:  Eating: Independent  Grooming: Supervision  Bathing: Total Assistance, Assist of 2  Bathing: Total Assistance, Assist of 2  Upper Body Dressing: Minimal Assistance  Lower Body Dressing: Maximum Assistance  Toileting: Total Assistance, Assist of 2  Toilet Transfer: Maximum Assistance  Cognition: Exceptions to WNL  Cognition: Decreased Memory, Decreased Executive Functions, Decreased Attention, Decreased Safety  Orientation: Person, Situation, Place, Time  Discharge Recommendations: Home with:  [address] with[de-identified] First Floor Setup       Pt is functioning at overall Max to TA for ADLs and Max A for stand pivot xfer w/ PFRW   Limited by RLE pain, impaired balance, endurance, fatigue, L hand NWB status, impaired STM, dec attention to task, impaired insight, impaired safety awareness, hallucinations, limited social support, unsafe home set-up, and ADL dysfunction  Pt lives in [facility] w/ 1STE, first-floor set-up  Local family able to A w/ light IADL management and transportation, however unable to provide A daily  Will benefit from skilled OT services to maximize fxnl indep to achieve set-up to independent ADL goals, ELOS 2 more weeks  Plan for formalized cognitive assessment  Speech Therapy:           No notes on file    Nursing Notes:  Appetite: Good  Diet Type: Diabetic                      Diet Patient/Family Education Complete: Yes    Type of Wound (LDA): Wound                    Type of Wound Patient/Family Education: Yes  Bladder: Incontinent     Bladder Patient/Family Education: Yes  Bowel: Continent     Bowel Patient/Family Education: Yes  Pain Location/Orientation: Orientation: Right  Pain Score: 5                       Hospital Pain Intervention(s): Medication (See MAR), Repositioned, Cold applied, Emotional support, Relaxation technique, Rest (R knee)  Pain Patient/Family Education: Yes  Medication Management/Safety  Injectable: Lovenox  Safe Administration: Yes  Medication Patient/Family Education Complete: Yes    Pt admitted with S/p ORIF with IM nailing of the Rt femur - Pain control and therapy per primary service, Monitor incision, Renal dosed Lovenox for DVT prophylaxis, Follow-up with Ortho 2 weeks post-op  Left 4th phalanx fracture - Splint and NWB, Pain control per PMR  HTN - Home medications: Hyzaar 50/12 5mg daily/Lopressor 25mg every 12 hours/Lasix 40mg daily - for heart failure, Here: None , BP has been widely variable during her acute hospital stay  Pt currently mildly tachycardic and   Will resume lopressor but at 12 5mg every 12 hours, Will monitor and adjust medications as needed  DM type 2 - Home: Levemir 20U at bedtime/Humalog 10U with meals, Here: Lantus 28U at bedtime/Humalog 12U with meals, Continue accuchecks and SSI, Adjust insulin as needed   Chronic heart failure - Pt has been off diuretics, Currently appear euvolemic, Will resume when necessary, ECHO with an EF of 45%  Grade 2 diastolic dysfunction  CLL - Recent diagnosis, Outpt follow-up with Hematology/Oncology, Baseline WBCs 14-16K  Pt requires alarms for safety  10/20 + UA, Urine culture: >100K Klebsiella,Symptomatic with urinary frequency, new encephalopathy and hallucinations, dysuria  Started on empiric Bactrim on 10/21/22 after discussion with internal medicine consultants, which is sensitive  T  his week we will monitor vital signs and lab values  We will educate on the importance of turning and offloading in order to prevent skin breakdown and preform routine skin checks  We will encourage independence with ADLs  We will monitor for constipation and medicate per bowel protocol  We will preform safe transfers and keep the patient free from falls  Case Management:     Discharge Planning  Living Arrangements: Lives Alone  Support Systems: Friends/neighbors, Family members  Assistance Needed: unknown  Type of Current Residence: Other (Comment) (Two story home)  Current Home Care Services: No  10/19/2022  Met with the patient 10/18/2022 to complete CM open  Patient lives alone in a 2 story home, she reports she has a first floor set up and does not utilize 2nd floor much  Patient reports prior to admission she was independent with ADL/IADLs, she drove to get groceries, to doctor's appointments etc  Patient reports having r/w at home  Patient  Reports no hx of HHC or STR but does report she has used OP PT in this past but does not recall where that was  Patient reports she has very supportive neighbors that assist her when she needs it  Preferred pharmacy is [facility] in [place], PCP is Dr Kemi Luna  The patient was educated on the rehabilitation process including therapy program, the interdisciplinary team, and weekly team meeting schedule   Estimated length of stay of 10-14 days was reviewed with the patient as well as expectations of discussions of discharge planning  The role of the  was reviewed including providing care coordination, discharge planning and discharge facilitation  IMM was reviewed with the patient and a copy was provided for their reference  The patient verbalized understanding of the information provided and denied any further questions at this time  CM will continue to follow and assist the patient throughout their rehabilitation stay  10/25- Pt participating in therapy and making progress  Pt has been hallucinating and is alert x3  Team recommends subacute transition due to lack of caregiver support  Pt has a nephew that assists as needed but will not be able to provide 24/7 supervision for pt  Cm to follow up with nephew in [place] as per other team members, he is interested in placement in [facility]  Cm to assist with subacute transition  Is the patient actively participating in therapies? yes  List any modifications to the treatment plan: none    Barriers Interventions   UTI Almost resolved, has been treated and hallucinatons have greatly decreased but team is still monitoring for symptoms    R LE pain Compensatory techniques; medication management   L hand NWB Compensatory techniques, repetitive training ADL routine   Decreased caregiver support Family education on need for more assistance at home    Standing balance/tolerance Equipment training - platform RW   Impaired cognition - memory, attention Repetitive retraining on new learning     Is the patient making expected progress toward goals?  Slow progress   List any update or changes to goals: none    Medical Goals: Patient will be medically stable for discharge to [facility] upon completion of rehab program and Patient will be able to manage medical conditions and comorbid conditions with medications and follow up upon completion of rehab program    Weekly Team Goals:   Rehab Team Goals  ADL Team Goal: Patient will be independent with ADLs with least restrictive device upon completion of rehab program  Transfer Team Goal: Patient will be independent with transfers with least restrictive device upon completion of rehab program  Locomotion Team Goal: Patient will be independent with locomotion with least restrictive device upon completion of rehab program    Discussion: Pt is currently at mod/max assist x1 for transfers but assist x2 for mobility; assist x2 for toileting and LB ADLS  Goals are for independent level  Due to pt's baseline cognitive deficits and slow progress, team is recommending continued subacute rehab services  CM will assist in transition to subacute rehab  From medical standpoint, pt would be medically appropriate for d/c to subacute rehab towards end of this week  Anticipated Discharge Date:  Pending subacute rehab plan  [facility] Team Members Present: The following team members are supervising care for this patient and were present during this Weekly Team Conference      Physician: Dr Francesca Peña MD  : Veronica Ochoa MS, OTR/L  Registered Nurse: Gregg Heath RN  Physical Therapist: Jeromy Haines DPT  Occupational Therapist: Merlene Oscar MS, OTR/L

## 2022-10-25 NOTE — PROGRESS NOTES
Internal Medicine Progress Note  Patient: Sheng Cheema  Age/sex: 79 y o  female  Medical Record #: [id number]      ASSESSMENT/PLAN: (Interval History)  Sheng Cheema is seen and examined and management for following issues:    S/p ORIF with IM nailing of the Rt femur  · Pain control and therapy per primary service  · Pain meds adjusted d/t sedation  · Renal dosed Lovenox for DVT prophylaxis  · Follow-up with Ortho 2 weeks post-op  · Better controlled     Left 4th phalanx fracture  · Splint and NWB   · Pain control per PMR      HTN  · Home medications: Hyzaar 50/12 5mg daily/Lopressor 25mg every 12 hours/Lasix 40mg daily - for heart failure  · Here: metoprolol 25mg bid (increased 10/18)  · Will monitor and adjust medications as needed  · stable    Anemia  · Secondary to trauma  · Stable      DM type 2  · Home: Levemir 20U at bedtime/Humalog 10U with meals  · Here: Lantus 30U at bedtime/Humalog 8U with breakfast and lunch, 4U with dinner  · Pt was hypoglycemic 10/20/22 and 10/22 in the evening Lantus held 10/20 and 10/22  Adjustments made to insulin  · Will monitor trend  · Continue accuchecks and SSI      Chronic heart failure  · Pt has been off diuretics  · Given 1L NSS 10/19/22 due to orthostasis and appearing dry  · ECHO with an EF of 45%  Grade 2 diastolic dysfunction  · euvolemic     CLL  · Recent diagnosis  · Outpt follow-up with Hematology/Oncology  · Baseline WBCs 14-16K  · Had mild bump likely reactive secondary to surgery/UTI  · Stable      Suspected UTI  · positive UA  Culture with >100,000 klebsiella  · Susceptible to Bactrim  · Urinary retention improving    DC planning: TBD  The above assessment and plan was reviewed and updated as determined by my evaluation of the patient on 10/25/2022      Labs:   Results from last 7 days   Lab Units 10/24/22  0507 10/22/22  1207   WBC Thousand/uL 19 25* 21 27*   HEMOGLOBIN g/dL 10 1* 9 7*   HEMATOCRIT % 32 8* 31 3*   PLATELETS Thousands/uL 490* 464* Results from last 7 days   Lab Units 10/24/22  0507 10/22/22  1207   SODIUM mmol/L 138 136   POTASSIUM mmol/L 4 0 4 2   CHLORIDE mmol/L 108 106   CO2 mmol/L 25 27   BUN mg/dL 26* 20   CREATININE mg/dL 1 19 0 99   CALCIUM mg/dL 9 2 9 1             Results from last 7 days   Lab Units 10/25/22  0701 10/24/22  2113 10/24/22  1625   POC GLUCOSE mg/dl 195* 137 127       Review of Scheduled Meds:  Current Facility-Administered Medications   Medication Dose Route Frequency Provider Last Rate   • acetaminophen  325 mg Oral Q6H PRN Veronika Romero MD     • acetaminophen  975 mg Oral TID AC Veronika Romero MD     • atorvastatin  20 mg Oral Daily With Cedrick Eric MD     • bisacodyl  10 mg Rectal Daily PRN Veronika Romero MD     • docusate sodium  100 mg Oral BID Veronika Romero MD     • enoxaparin  40 mg Subcutaneous Q24H [address] Veronika Romero MD     • gabapentin  100 mg Oral BID Veronika Romero MD     • gabapentin  300 mg Oral HS Veronika Romero MD     • HYDROmorphone  1 mg Oral TID PRN Veronika Romero MD     • insulin glargine  30 Units Subcutaneous HS Adeola Jennings PA-C     • insulin lispro  1-5 Units Subcutaneous HS Veronika Romero MD     • insulin lispro  1-6 Units Subcutaneous TID AC Veronika Romero MD     • insulin lispro  4 Units Subcutaneous Daily With Dinner Adeola Jennings PA-C     • insulin lispro  8 Units Subcutaneous Daily With Breakfast Adeola Jennings PA-C     • insulin lispro  8 Units Subcutaneous Daily With Lunch Aedola Jennings PA-C     • lidocaine  2 patch Topical Daily Veronika Romero MD     • meclizine  12 5 mg Oral Q8H PRN Veronika Romero MD     • meclizine  12 5 mg Oral BID before breakfast/lunch Veronika Romero MD     • methocarbamol  500 mg Oral Q6H PRN Veronika Romero MD     • metoprolol tartrate  25 mg Oral Q12H [address] BRINDA Kramer     • naloxone  0 04 mg Intravenous Q1MIN PRN Veronika Romero MD • polyethylene glycol  17 g Oral Daily PRN Lauren Alarcon MD     • senna-docusate sodium  1 tablet Oral HS Lauren Alarcon MD     • sulfamethoxazole-trimethoprim  2 tablet Oral Q12H Yoni Wray [place]         Subjective/ HPI: Patient seen and examined  Patients overnight issues or events were reviewed with nursing or staff during rounds or morning huddle session  New or overnight issues include the following:     Pt seen in therapy  Using w/c due to her being unable to avoid bearing weight through her left upper extremity d/t fractured finger  She states her leg pain is 7/10 however still able to participate in therapy       ROS:   A 10 point ROS was performed; negative except as noted above  Imaging:     XR femur 2 vw right    (Results Pending)       *Labs /Radiology studies Reviewed  *Medications  reviewed and reconciled as needed  *Please refer to order section for additional ordered labs studies  *Case discussed with primary attending during morning huddle case rounds    Physical Examination:  Vitals:   Vitals:    10/24/22 1038 10/24/22 1500 10/24/22 2042 10/25/22 0625   BP: 138/56 134/60 135/60 136/60   BP Location: Left arm Left arm Left arm Left arm   Pulse: 81 62 71 65   Resp:  17 20    Temp:  98 6 °F (37 °C) 98 4 °F (36 9 °C) 97 7 °F (36 5 °C)   TempSrc:  Oral Oral Oral   SpO2:  93% 93% 96%   Weight:       Height:         GEN: No apparent distress, interactive  NEURO: Alert  + visual hallucinations    HEENT: Pupils are equal and reactive, EOMI, mucous membranes are moist, face symmetrical  CV: S1 S2 regular, no MRG, no peripheral edema noted  RESP: Lungs are clear bilaterally, no wheezes, rales or rhonchi noted, on room air, respirations easy and non labored  GI: Flat, soft non tender, non distended; +BS x4  : Voiding without difficulty  MUSC: Moves all extremities; NWB LUE 4th finger; WBAT RLE  SKIN: pink, warm and dry, normal turgor, dressing in place to RLE incisions with staples all are intact without evidence of infection; left 4th finger with splint    The above physical exam was reviewed and updated as determined by my evaluation of the patient on 10/25/2022  Invasive Devices  Report    None                    VTE Pharmacologic Prophylaxis: Enoxaparin  Code Status: Level 1 - Full Code  Current Length of Stay: 8 day(s)      Total time spent:  30 minutes with more than 50% spent counseling/coordinating care  Counseling includes discussion with patient re: progress  and discussion with patient of his/her current medical state/information  Coordination of patient's care was performed in conjunction with primary service  Time invested included review of patient's labs, vitals, and management of their comorbidities with continued monitoring  In addition, this patient was discussed with medical team including physician and advanced extenders  The care of the patient was extensively discussed and appropriate treatment plan was formulated unique for this patient  ** Please Note:  voice to text software may have been used in the creation of this document   Although proof errors in transcription or interpretation are a potential of such software**

## 2022-10-25 NOTE — CASE MANAGEMENT
LIANE NOTE    CM contacted pt's nephew Laura Choe (who reports he is POA)  Informed him of team meeting update and pt's progress  Per Laura Choe, pt has no money to afford home health aide services and family is not able to provide pt assist at this level  In the future if pt sold her house and they had money, then family may be able to look into assisted living versus having pt stay with Laura Choe with more resources  Laura Choe reports in the past pt did not qualify for medicare waiver program, but he thinks she would now  CM can investigate making a referral through pt's [facility] to area of aging for future assessment of pt  Due to pt's slow progress and still need for assist, recommendation is for subacute rehab  Laura Choe is in agreement to this, although he has not had good experiences in the past with other families at subacute rehab level  He does not want pt to be rushed out of [facility], however LIANE did explain ELOS of 2 weeks  Laura Choe would like pt to be able to maximize the time she can spend at [facility], but then plan will be for subacute rehab services  CM did discuss with Laura Choe need to consider options in future including applying for services through [facility], and possibly looking at value of pt's home versus [facility]  Laura Choe reports he did speak with pt before this incident about selling her house and moving to a [place], but at the time she did not want to  CM will follow up with team regarding when subacute referrals will be placed  Per MD earlier, from a medical standpoint pt could be transferred towards end of this week

## 2022-10-25 NOTE — PROGRESS NOTES
Chart review complete per team conference note on 10/25/22 the patient is functioning at the current levels:    Physical Therapy:     Weight Bearing Status: Non-weight bearing (WBAT R LE, NWB L hand)  Transfers: Assist of 2  Bed Mobility: Moderate Assistance, Maximum Assistance  Amulation Distance (ft): 5 feet (to 20')  Ambulation: Assist of 2  Assistive Device for Ambulation: Roller Walker  Roller Walker Attachments: With platform on left  Assistive Device for Stairs:  (unsafe to perform)  Stair Assistance:  (unsafe to assess at this time)  Discharge Recommendations: Home with: (home vs SNF pending progress and family availability to provide assist at d/c)  [address] with[de-identified] 24 Hour Assisteance, 24 Hour Supervision    Occupational Therapy:  Eating: Independent  Grooming: Supervision  Bathing: Total Assistance, Assist of 2  Bathing: Total Assistance, Assist of 2  Upper Body Dressing: Minimal Assistance  Lower Body Dressing: Maximum Assistance  Toileting: Total Assistance, Assist of 2  Toilet Transfer: Maximum Assistance  Cognition: Exceptions to WNL  Cognition: Decreased Memory, Decreased Executive Functions, Decreased Attention, Decreased Safety  Orientation: Person, Situation, Place, Time  Discharge Recommendations: Home with:  [address] with[de-identified] First Floor Setup    Nursing Notes:  Appetite: Good  Diet Type: Diabetic  Diet Patient/Family Education Complete: Yes  Type of Wound (LDA): Wound  Type of Wound Patient/Family Education: Yes  Bladder: Incontinent  Bladder Patient/Family Education: Yes  Bowel: Continent  Bowel Patient/Family Education: Yes  Pain Location/Orientation: Orientation: Right  Pain Score: 5  Hospital Pain Intervention(s): Medication (See MAR), Repositioned, Cold applied, Emotional support, Relaxation technique, Rest (R knee)    This care manager assistant will continue to monitor via chart review throughout bundle episode

## 2022-10-26 LAB
GLUCOSE SERPL-MCNC: 120 MG/DL (ref 65–140)
GLUCOSE SERPL-MCNC: 186 MG/DL (ref 65–140)
GLUCOSE SERPL-MCNC: 222 MG/DL (ref 65–140)
GLUCOSE SERPL-MCNC: 282 MG/DL (ref 65–140)

## 2022-10-26 RX ORDER — INSULIN GLARGINE 100 [IU]/ML
32 INJECTION, SOLUTION SUBCUTANEOUS
Status: DISCONTINUED | OUTPATIENT
Start: 2022-10-26 | End: 2022-10-28

## 2022-10-26 RX ADMIN — DOCUSATE SODIUM 100 MG: 100 CAPSULE, LIQUID FILLED ORAL at 08:48

## 2022-10-26 RX ADMIN — GABAPENTIN 100 MG: 100 CAPSULE ORAL at 13:31

## 2022-10-26 RX ADMIN — ACETAMINOPHEN 975 MG: 325 TABLET, FILM COATED ORAL at 19:15

## 2022-10-26 RX ADMIN — ACETAMINOPHEN 975 MG: 325 TABLET, FILM COATED ORAL at 06:14

## 2022-10-26 RX ADMIN — INSULIN LISPRO 1 UNITS: 100 INJECTION, SOLUTION INTRAVENOUS; SUBCUTANEOUS at 08:49

## 2022-10-26 RX ADMIN — ATORVASTATIN CALCIUM 20 MG: 20 TABLET, FILM COATED ORAL at 16:24

## 2022-10-26 RX ADMIN — INSULIN LISPRO 4 UNITS: 100 INJECTION, SOLUTION INTRAVENOUS; SUBCUTANEOUS at 11:02

## 2022-10-26 RX ADMIN — INSULIN LISPRO 2 UNITS: 100 INJECTION, SOLUTION INTRAVENOUS; SUBCUTANEOUS at 22:02

## 2022-10-26 RX ADMIN — INSULIN LISPRO 8 UNITS: 100 INJECTION, SOLUTION INTRAVENOUS; SUBCUTANEOUS at 11:03

## 2022-10-26 RX ADMIN — HYDROMORPHONE HYDROCHLORIDE 1 MG: 2 TABLET ORAL at 19:16

## 2022-10-26 RX ADMIN — INSULIN GLARGINE 32 UNITS: 100 INJECTION, SOLUTION SUBCUTANEOUS at 22:01

## 2022-10-26 RX ADMIN — METOPROLOL TARTRATE 25 MG: 25 TABLET, FILM COATED ORAL at 22:01

## 2022-10-26 RX ADMIN — SENNOSIDES AND DOCUSATE SODIUM 1 TABLET: 8.6; 5 TABLET ORAL at 22:01

## 2022-10-26 RX ADMIN — ACETAMINOPHEN 325 MG: 325 TABLET ORAL at 16:33

## 2022-10-26 RX ADMIN — INSULIN LISPRO 8 UNITS: 100 INJECTION, SOLUTION INTRAVENOUS; SUBCUTANEOUS at 08:49

## 2022-10-26 RX ADMIN — DOCUSATE SODIUM 100 MG: 100 CAPSULE, LIQUID FILLED ORAL at 19:15

## 2022-10-26 RX ADMIN — MECLIZINE HCL 12.5 MG 12.5 MG: 12.5 TABLET ORAL at 11:01

## 2022-10-26 RX ADMIN — Medication 3 MG: at 22:01

## 2022-10-26 RX ADMIN — ENOXAPARIN SODIUM 40 MG: 40 INJECTION SUBCUTANEOUS at 08:48

## 2022-10-26 RX ADMIN — MECLIZINE HCL 12.5 MG 12.5 MG: 12.5 TABLET ORAL at 06:14

## 2022-10-26 RX ADMIN — LIDOCAINE 5% 2 PATCH: 700 PATCH TOPICAL at 08:48

## 2022-10-26 RX ADMIN — GABAPENTIN 300 MG: 300 CAPSULE ORAL at 22:01

## 2022-10-26 RX ADMIN — INSULIN LISPRO 4 UNITS: 100 INJECTION, SOLUTION INTRAVENOUS; SUBCUTANEOUS at 16:24

## 2022-10-26 RX ADMIN — ACETAMINOPHEN 975 MG: 325 TABLET, FILM COATED ORAL at 13:31

## 2022-10-26 RX ADMIN — METOPROLOL TARTRATE 25 MG: 25 TABLET, FILM COATED ORAL at 08:48

## 2022-10-26 RX ADMIN — METHOCARBAMOL 500 MG: 500 TABLET ORAL at 16:34

## 2022-10-26 RX ADMIN — GABAPENTIN 100 MG: 100 CAPSULE ORAL at 06:14

## 2022-10-26 NOTE — PROGRESS NOTES
10/26/22 1430   Pain Assessment   Pain Assessment Tool 0-10   Pain Score 5   Pain Location/Orientation Orientation: Lower; Location: Back;Orientation: Right;Location: Leg   Restrictions/Precautions   Precautions Bed/chair alarms;Cognitive; Fall Risk;Pain;Supervision on toilet/commode;Visual deficit   Weight Bearing Restrictions Yes   LUE Weight Bearing Per Order NWB   RLE Weight Bearing Per Order WBAT   Braces or Orthoses Splint  (L finger splint, L prevalon boot Flowsheet Note)   Subjective   Subjective pt agreeable to perform skilled PT   Sit to Stand   Type of Assistance Needed Physical assistance   Physical Assistance Level 51%-75%   Sit to Stand CARE Score 2   Bed-Chair Transfer   Type of Assistance Needed Physical assistance   Physical Assistance Level 51%-75%   Comment SPT PFRW   Chair/Bed-to-Chair Transfer CARE Score 2   Transfer Bed/Chair/Wheelchair   Adaptive Equipment Roller Walker;Platform   Car Transfer   Type of Assistance Needed Physical assistance   Physical Assistance Level 76% or more   Comment PFRW   Car Transfer CARE Score 2   Walk 10 Feet   Type of Assistance Needed Physical assistance   Physical Assistance Level Total assistance   Comment PFRW   Walk 10 Feet CARE Score 1   Walk 50 Feet with Two Turns   Reason if not Attempted Safety concerns   Walk 50 Feet with Two Turns CARE Score 88   Walk 150 Feet   Reason if not Attempted Safety concerns   Walk 150 Feet CARE Score 88   Walking 10 Feet on Uneven Surfaces   Reason if not Attempted Safety concerns   Walking 10 Feet on Uneven Surfaces CARE Score 88   Ambulation   Does the patient walk? 2   Yes   Primary Mode of Locomotion Prior to Admission Walk   Distance Walked (feet) 40 ft   Wheel 50 Feet with Two Turns   Reason if not Attempted Activity not applicable   Wheel 50 Feet with Two Turns CARE Score 9   Wheel 150 Feet   Reason if not Attempted Activity not applicable   Wheel 039 Feet CARE Score 9   Curb or Single Stair   Reason if not Attempted Safety concerns   1 Step (Curb) CARE Score 88   4 Steps   Reason if not Attempted Safety concerns   4 Steps CARE Score 88   12 Steps   Reason if not Attempted Safety concerns   12 Steps CARE Score 88   Therapeutic Interventions   Strengthening 2# RLE LAQ 20 reps LLE 0# LAQ 20 reps gluts x20 reps   Flexibility HS and calfs manual stretch   Equipment Use   NuStep L1 for 10 min BLE and RUE only not Letf hand NWB   Other Comments   Comments bp 145/65  /   134/60 both sitting   Assessment   Treatment Assessment pt focus on SPT with PFRW and STS with vc's for klein dand foot placement   kaya left hand is NWB   Pt also perfo curb steps with PFRW and car xfers SPT PFRW  MaxAx1   Pt c/o pain 5/10 leg and nsging aware  Pt inc gait walking distance with PFRW  with WC follow ModA to [address] [facility]   Pt will cont to benefit with skilled PT to meet DC goals   Barriers to Discharge Inaccessible home environment;Decreased caregiver support   Plan   Progress Progressing toward goals   PT Therapy Minutes   PT Time In 1430   PT Time Out 1600   PT Total Time (minutes) 90   PT Mode of treatment - Individual (minutes) 90   PT Mode of treatment - Concurrent (minutes) 0   PT Mode of treatment - Group (minutes) 0   PT Mode of treatment - Co-treat (minutes) 0   PT Mode of Treatment - Total time(minutes) 90 minutes   PT Cumulative Minutes 730   Therapy Time missed   Time missed?  No

## 2022-10-26 NOTE — TELEPHONE ENCOUNTER
Unable to reach residents  Ortho will be consulted to come to remove staples  Xrays were obtained yesterday

## 2022-10-26 NOTE — PLAN OF CARE
Problem: Prexisting or High Potential for Compromised Skin Integrity  Goal: Skin integrity is maintained or improved  Description: INTERVENTIONS:  - Identify patients at risk for skin breakdown  - Assess and monitor skin integrity  - Assess and monitor nutrition and hydration status  - Monitor labs   - Assess for incontinence   - Turn and reposition patient  - Assist with mobility/ambulation  - Relieve pressure over bony prominences  - Avoid friction and shearing  - Provide appropriate hygiene as needed including keeping skin clean and dry  - Evaluate need for skin moisturizer/barrier cream  - Collaborate with interdisciplinary team   - Patient/family teaching  - Consider wound care consult   Outcome: Progressing     Problem: PAIN - ADULT  Goal: Verbalizes/displays adequate comfort level or baseline comfort level  Description: Interventions:  - Encourage patient to monitor pain and request assistance  - Assess pain using appropriate pain scale  - Administer analgesics based on type and severity of pain and evaluate response  - Implement non-pharmacological measures as appropriate and evaluate response  - Consider cultural and social influences on pain and pain management  - Notify physician/advanced practitioner if interventions unsuccessful or patient reports new pain  Outcome: Progressing     Problem: INFECTION - ADULT  Goal: Absence or prevention of progression during hospitalization  Description: INTERVENTIONS:  - Assess and monitor for signs and symptoms of infection  - Monitor lab/diagnostic results  - Monitor all insertion sites, i e  indwelling lines, tubes, and drains  - Monitor endotracheal if appropriate and nasal secretions for changes in amount and color  - [place] appropriate cooling/warming therapies per order  - Administer medications as ordered  - Instruct and encourage patient and family to use good hand hygiene technique  - Identify and instruct in appropriate isolation precautions for identified infection/condition  Outcome: Progressing  Goal: Absence of fever/infection during neutropenic period  Description: INTERVENTIONS:  - Monitor WBC    Outcome: Progressing     Problem: SAFETY ADULT  Goal: Patient will remain free of falls  Description: INTERVENTIONS:  - Educate patient/family on patient safety including physical limitations  - Instruct patient to call for assistance with activity   - Consult OT/PT to assist with strengthening/mobility   - Keep Call bell within reach  - Keep bed low and locked with side rails adjusted as appropriate  - Keep care items and personal belongings within reach  - Initiate and maintain comfort rounds  - Make Fall Risk Sign visible to staff  - Offer Toileting every Hours, in advance of need  - Initiate/Maintain alarm  - Obtain necessary fall risk management equipment:   - Apply yellow socks and bracelet for high fall risk patients  - Consider moving patient to room near nurses station  Outcome: Progressing  Goal: Maintain or return to baseline ADL function  Description: INTERVENTIONS:  -  Assess patient's ability to carry out ADLs; assess patient's baseline for ADL function and identify physical deficits which impact ability to perform ADLs (bathing, care of mouth/teeth, toileting, grooming, dressing, etc )  - Assess/evaluate cause of self-care deficits   - Assess range of motion  - Assess patient's mobility; develop plan if impaired  - Assess patient's need for assistive devices and provide as appropriate  - Encourage maximum independence but intervene and supervise when necessary  - Involve family in performance of ADLs  - Assess for home care needs following discharge   - Consider OT consult to assist with ADL evaluation and planning for discharge  - Provide patient education as appropriate  Outcome: Progressing  Goal: Maintains/Returns to pre admission functional level  Description: INTERVENTIONS:  - Perform BMAT or MOVE assessment daily    - Set and communicate daily mobility goal to care team and patient/family/caregiver  - Collaborate with rehabilitation services on mobility goals if consulted  - Perform Range of Motion  times a day  - Reposition patient every hours    - Dangle patient  times a day  - Stand patient  times a day  - Ambulate patient  times a day  - Out of bed to chair times a day   - Out of bed for meals times a day  - Out of bed for toileting  - Record patient progress and toleration of activity level   Outcome: Progressing     Problem: DISCHARGE PLANNING  Goal: Discharge to home or other facility with appropriate resources  Description: INTERVENTIONS:  - Identify barriers to discharge w/patient and caregiver  - Arrange for needed discharge resources and transportation as appropriate  - Identify discharge learning needs (meds, wound care, etc )  - Arrange for interpretive services to assist at discharge as needed  - Refer to Case Management Department for coordinating discharge planning if the patient needs post-hospital services based on physician/advanced practitioner order or complex needs related to functional status, cognitive ability, or social support system  Outcome: Progressing     Problem: Potential for Falls  Goal: Patient will remain free of falls  Description: INTERVENTIONS:  - Educate patient/family on patient safety including physical limitations  - Instruct patient to call for assistance with activity   - Consult OT/PT to assist with strengthening/mobility   - Keep Call bell within reach  - Keep bed low and locked with side rails adjusted as appropriate  - Keep care items and personal belongings within reach  - Initiate and maintain comfort rounds  - Make Fall Risk Sign visible to staff  - Offer Toileting every  Hours, in advance of need  - Initiate/Maintain alarm  - Obtain necessary fall risk management equipment:   - Apply yellow socks and bracelet for high fall risk patients  - Consider moving patient to room near nurses station  Outcome: Progressing     Problem: Nutrition/Hydration-ADULT  Goal: Nutrient/Hydration intake appropriate for improving, restoring or maintaining nutritional needs  Description: Monitor and assess patient's nutrition/hydration status for malnutrition  Collaborate with interdisciplinary team and initiate plan and interventions as ordered  Monitor patient's weight and dietary intake as ordered or per policy  Utilize nutrition screening tool and intervene as necessary  Determine patient's food preferences and provide high-protein, high-caloric foods as appropriate       INTERVENTIONS:  - Monitor oral intake, urinary output, labs, and treatment plans  - Assess nutrition and hydration status and recommend course of action  - Evaluate amount of meals eaten  - Assist patient with eating if necessary   - Allow adequate time for meals  - Recommend/ encourage appropriate diets, oral nutritional supplements, and vitamin/mineral supplements  - Order, calculate, and assess calorie counts as needed  - Recommend, monitor, and adjust tube feedings and TPN/PPN based on assessed needs  - Assess need for intravenous fluids  - Provide specific nutrition/hydration education as appropriate  - Include patient/family/caregiver in decisions related to nutrition  Outcome: Progressing     Problem: MOBILITY - ADULT  Goal: Maintain or return to baseline ADL function  Description: INTERVENTIONS:  -  Assess patient's ability to carry out ADLs; assess patient's baseline for ADL function and identify physical deficits which impact ability to perform ADLs (bathing, care of mouth/teeth, toileting, grooming, dressing, etc )  - Assess/evaluate cause of self-care deficits   - Assess range of motion  - Assess patient's mobility; develop plan if impaired  - Assess patient's need for assistive devices and provide as appropriate  - Encourage maximum independence but intervene and supervise when necessary  - Involve family in performance of ADLs  - Assess for home care needs following discharge   - Consider OT consult to assist with ADL evaluation and planning for discharge  - Provide patient education as appropriate  Outcome: Progressing  Goal: Maintains/Returns to pre admission functional level  Description: INTERVENTIONS:  - Perform BMAT or MOVE assessment daily    - Set and communicate daily mobility goal to care team and patient/family/caregiver  - Collaborate with rehabilitation services on mobility goals if consulted  - Perform Range of Motion  times a day  - Reposition patient every  hours    - Dangle patient  times a day  - Stand patient  times a day  - Ambulate patient  times a day  - Out of bed to chair  times a day   - Out of bed for meals  times a day  - Out of bed for toileting  - Record patient progress and toleration of activity level   Outcome: Progressing

## 2022-10-26 NOTE — PROGRESS NOTES
Internal Medicine Progress Note  Patient: Jamie Angela  Age/sex: 79 y o  female  Medical Record #: [id number]      ASSESSMENT/PLAN: (Interval History)  Jamie Angela is seen and examined and management for following issues:    S/p ORIF with IM nailing of the Rt femur  · Pain control and therapy per primary service  · Pain meds adjusted d/t sedation  · Renal dosed Lovenox for DVT prophylaxis  · Follow-up with Ortho 2 weeks post-op      Left 4th phalanx fracture  · Splint and NWB   · Pain control per PMR      HTN  · Home medications: Hyzaar 50/12 5mg daily/Lopressor 25mg every 12 hours/Lasix 40mg daily - for heart failure  · Here: metoprolol 25mg bid (increased 10/18)  · Will monitor and adjust medications as needed  · stable    Anemia  · Secondary to trauma  · Stable      DM type 2  · Home: Levemir 20U at bedtime/Humalog 10U with meals  · Here: Lantus 32U at bedtime/Humalog 8U with breakfast and lunch, 4U with dinner  · Increase lantus as above  · BS trend elevated     Chronic heart failure  · Pt has been off diuretics  · ECHO with an EF of 45%  Grade 2 diastolic dysfunction  · euvolemic     CLL  · Recent diagnosis  · Outpt follow-up with Hematology/Oncology  · Baseline WBCs 14-16K  · Had mild bump likely reactive secondary to surgery/UTI  · Stable      Suspected UTI  · positive UA  Culture with >100,000 klebsiella  · Susceptible to Bactrim  · Urinary retention/hallucinations improved    DC planning: TBD  The above assessment and plan was reviewed and updated as determined by my evaluation of the patient on 10/26/2022      Labs:   Results from last 7 days   Lab Units 10/24/22  0507 10/22/22  1207   WBC Thousand/uL 19 25* 21 27*   HEMOGLOBIN g/dL 10 1* 9 7*   HEMATOCRIT % 32 8* 31 3*   PLATELETS Thousands/uL 490* 464*     Results from last 7 days   Lab Units 10/24/22  0507 10/22/22  1207   SODIUM mmol/L 138 136   POTASSIUM mmol/L 4 0 4 2   CHLORIDE mmol/L 108 106   CO2 mmol/L 25 27   BUN mg/dL 26* 20 CREATININE mg/dL 1 19 0 99   CALCIUM mg/dL 9 2 9 1             Results from last 7 days   Lab Units 10/26/22  0650 10/25/22  2047 10/25/22  1601   POC GLUCOSE mg/dl 186* 316* 178*       Review of Scheduled Meds:  Current Facility-Administered Medications   Medication Dose Route Frequency Provider Last Rate   • acetaminophen  325 mg Oral Q6H PRN Ileana Voss MD     • acetaminophen  975 mg Oral TID AC Ileana Voss MD     • atorvastatin  20 mg Oral Daily With Cintia Castellanos MD     • bisacodyl  10 mg Rectal Daily PRN Ileana Voss MD     • docusate sodium  100 mg Oral BID Ileana Voss MD     • enoxaparin  40 mg Subcutaneous Q24H Carlin Cannon MD     • gabapentin  100 mg Oral BID Ileana Voss MD     • gabapentin  300 mg Oral HS Ileana Voss MD     • hydrocortisone   Topical 4x Daily PRN Ileana Voss MD     • HYDROmorphone  1 mg Oral TID PRN Ileana Voss MD     • insulin glargine  30 Units Subcutaneous HS Adeola Jennings PA-C     • insulin lispro  1-5 Units Subcutaneous HS Ileana Voss MD     • insulin lispro  1-6 Units Subcutaneous TID AC Ileana Voss MD     • insulin lispro  4 Units Subcutaneous Daily With Dinner Adeola Jennings PA-C     • insulin lispro  8 Units Subcutaneous Daily With Breakfast Adeola Jennings PA-C     • insulin lispro  8 Units Subcutaneous Daily With Lunch Adeola Jennings PA-C     • lidocaine  2 patch Topical Daily Ileana Voss MD     • meclizine  12 5 mg Oral Q8H PRN Ileana Voss MD     • meclizine  12 5 mg Oral BID before breakfast/lunch Ileana Voss MD     • melatonin  3 mg Oral HS Ileana Voss MD     • methocarbamol  500 mg Oral Q6H PRN Ileana Voss MD     • metoprolol tartrate  25 mg Oral Q12H [address] BRINDA Kramer     • naloxone  0 04 mg Intravenous Q1MIN PRN Ileana Voss MD     • polyethylene glycol  17 g Oral Daily PRN Ileana Voss MD     • senna-docusate sodium  1 tablet Oral HS Janna Perea MD         Subjective/ HPI: Patient seen and examined  Patients overnight issues or events were reviewed with nursing or staff during rounds or morning huddle session  New or overnight issues include the following:     Pt seen in therapy  No overnight issues, c/w ongoing RLE pain otherwise stable  No further hallucinations or confusion noted this am         ROS:   A 10 point ROS was performed; negative except as noted above  Imaging:     XR femur 2 vw right   Final Result by Maria Isabel Elaine MD (10/25 1126)      Status post right hip ORIF  Satisfactory alignment  Workstation performed: [id number]             *Labs /Radiology studies Reviewed  *Medications  reviewed and reconciled as needed  *Please refer to order section for additional ordered labs studies  *Case discussed with primary attending during morning huddle case rounds    Physical Examination:  Vitals:   Vitals:    10/25/22 1446 10/25/22 2034 10/26/22 0600 10/26/22 0612   BP: 144/65 146/67  134/90   BP Location: Right arm Left arm  Left arm   Pulse: 59 79  74   Resp: 18 18  18   Temp: 98 1 °F (36 7 °C)   98 9 °F (37 2 °C)   TempSrc: Oral   Oral   SpO2: 96%   94%   Weight:   66 8 kg (147 lb 4 3 oz)    Height:         GEN: No apparent distress, interactive  NEURO: Alert and oriented x 3   No further hallucinations  HEENT: Pupils are equal and reactive, EOMI, mucous membranes are moist, face symmetrical  CV: S1 S2 regular, no MRG, no peripheral edema noted  RESP: Lungs are clear bilaterally, no wheezes, rales or rhonchi noted, on room air, respirations easy and non labored  GI: Flat, soft non tender, non distended; +BS x4  : Voiding without difficulty  MUSC: Moves all extremities; NWB LUE 4th finger; WBAT RLE  SKIN: pink, warm and dry, normal turgor, dressing in place to RLE incisions with staples all are intact without evidence of infection; left 4th finger with splint    The above physical exam was reviewed and updated as determined by my evaluation of the patient on 10/26/2022  Invasive Devices  Report    None                    VTE Pharmacologic Prophylaxis: Enoxaparin  Code Status: Level 1 - Full Code  Current Length of Stay: 9 day(s)      Total time spent:  30 minutes with more than 50% spent counseling/coordinating care  Counseling includes discussion with patient re: progress  and discussion with patient of his/her current medical state/information  Coordination of patient's care was performed in conjunction with primary service  Time invested included review of patient's labs, vitals, and management of their comorbidities with continued monitoring  In addition, this patient was discussed with medical team including physician and advanced extenders  The care of the patient was extensively discussed and appropriate treatment plan was formulated unique for this patient  ** Please Note:  voice to text software may have been used in the creation of this document   Although proof errors in transcription or interpretation are a potential of such software**

## 2022-10-26 NOTE — PROGRESS NOTES
PM&R PROGRESS NOTE:  Jamie Angela 79 y o  female MRN: [id number]  Unit/Bed#: -23 Encounter: [id number]        Rehab Diagnosis: Impairment of mobility, safety and Activities of Daily Living (ADLs) due to Orthopedic Disorders:  08 11  Unilateral Hip Fracture - Right    SUBJECTIVE:  Patient seen face to face  States she slept well last night  Nursing states patient was awake most of the night  Patient states she chronically is a night owl  Watches TV late at night at home  Pain is currently mild in a sitting position in her recliner  ASSESSMENT: Stable, progressing      PLAN:    Rehabilitation  · Functional deficits: impaired mobility, self care, WBAT RLE, NWB L HAND  • Continue current rehabilitation plan of care to maximize function  Currently requiring moderate-max assist with PT and OT  • Expected Discharge:  Probable SNF referrals will be required, as patient's family will be unable to provide assistance for home discharge in 10 days (anticipate she will require assistance with bathing, IADLs)  Patient does require complete modified independent status to return home    Function:   Physical Therapy Occupational Therapy Speech Therapy   Weight Bearing Status: Non-weight bearing (WBAT R LE, NWB L hand)  Transfers: Assist of 2  Bed Mobility: Moderate Assistance, Maximum Assistance  Amulation Distance (ft): 5 feet (to 20')  Ambulation: Assist of 2  Assistive Device for Ambulation: Roller Walker  Roller Walker Attachments: With platform on left  Assistive Device for Stairs:  (unsafe to perform)  Stair Assistance:  (unsafe to assess at this time)  Discharge Recommendations: Home with: (home vs SNF pending progress and family availability to provide assist at d/c)  [address] [facility] [address] with[de-identified] 24 Hour Assisteance, 24 Hour Supervision   Eating: Independent  Grooming: Supervision  Bathing: Total Assistance, Assist of 2  Bathing:  Total Assistance, Assist of 2  Upper Body Dressing: Minimal Assistance  Lower Body Dressing: Maximum Assistance  Toileting: Total Assistance, Assist of 2  Toilet Transfer: Maximum Assistance  Cognition: Exceptions to WNL  Cognition: Decreased Memory, Decreased Executive Functions, Decreased Attention, Decreased Safety  Orientation: Person, Situation, Place, Time                   DVT prophylaxis  · Managed on SQ Lovenox 40 mg daily - will need education for home    Bladder plan  • Continent    Bowel plan  · Continent      * Intertrochanteric fracture of right femur [facility])  Assessment & Plan  Traumatic fall  Sustained a right intertrochanteric fracture  Taken to OR by Dr Orin Boyd on 10/12/22 for ORIF and IM nailing  WBAT RLE  DVT ppx with SQ Lovenox  Monitor 3 incisions  Continue acute rehabilitation program  POD 14 = 10/26/22 - x-rays and ortho follow-up due  Follow-up with Dr Orin Boyd as outpatient       Encephalopathy  Assessment & Plan  Resolved  Caused by UTI    UTI (urinary tract infection)  Assessment & Plan  + UA  Urine culture: >100K Klebsiella  Symptomatic with urinary frequency, new encephalopathy and hallucinations, dysuria  · Started on empiric Bactrim on 10/21/22 after discussion with internal medicine consultants, which is sensitive  · Much improved with Bactrim treatment  · Almost resolved      Deep tissue injury  Assessment & Plan  Left heel DTI  Offload and elevate  Appreciate wound care recommendations as below  Skin Care Plan:  1-Cleanse sacro-buttocks with soap and water  Apply Preventative Hydraguard BID and PRN  2-Turn/reposition q2h or when medically stable for pressure re-distribution on skin   3-Elevate heels to offload pressure  4-Moisturize skin daily with skin nourishing cream  5-Ehob cushion in chair when out of bed  6-B/L Heel Allevyn Foam Dressings  Samuel Left Heel with T for Treatment and Samuel Right Heel with P for Prevention  Change every other day or Prn  Peel back, inspect skin Q-shift, and reapply       H/O dizziness  Assessment & Plan  Dizziness related to motion  Chronic issue per patient  Meclizine 12 5 mg ordered BID     Orthostasis  Assessment & Plan  Mild orthstasis last week   Clinically was hypovolemic   IVF maintenance x1 10/19/22  Encouraged oral fluids  Continue compression stockings      Acute pain  Assessment & Plan  Nociceptive/neuropathic post op pain in RLE managed on multimodal regimen:   · Patient is intolerant of oxycodone and Norco   Patient excessively drowsy on oxycodone and with mild hallucinations on Norco   · Discontinue Norco  · Schedule Tylenol 975 mg t i d   · Scheduled gabapentin 100 mg bid  Gabapentin 300 mg q h s  · Topical Lidoderm patches  · Scheduled topical ice every 2 hours while awake  · Robaxin 500 mg q 6 hours p r n  For muscle spasm  · Dilaudid 1 mg t i d  P r n  added today for severe pain - as intolerant of Norco or oxycodone  · As pain was severe 10/22/22 - x-ray obtained to make sure alignment is intact  · Personally discussed plan with nursing, patient, as well as her nephew    CLL (chronic lymphocytic leukemia) ([facility] [address] )  Assessment & Plan  Chronic leukocytosis (19K)  Monitor   Follow-up with heme onc as outpatient     Displaced fracture of middle phalanx of left ring finger, initial encounter for closed fracture  Assessment & Plan  Traumatic fracture  4th left finger   Treated non-operatively by Orthopedics  Continue supportive splint  ICE for swelling  NWB L hand    Hypertension  Assessment & Plan  At home: Lopressor 25 mg Q 12 hours, Hyzaar (Losartan/HCTZ) 50mg/12 5mg daily  Here: Lopressor 25 mg daily (increased 10/18)  Will monitor BP closely  Check orthostatics  Adjust medications accordingly  TEDs daily /Abdominal binder PRN    Chronic systolic heart failure (HCC)  Assessment & Plan  Grade 2 DD  At home: Lasix 40 mg daily  Here: Slightly hypovolemic - HOLD Lasix  Encourage fluids by mouth    Restart Lasix when able with K Dur supplement        HLD (hyperlipidemia)  Assessment & Plan  Restarted on home Lipitor 20 mg QPM    CKD (chronic kidney disease) stage 3, GFR 30-59 ml/min ([facility])  Assessment & Plan  Stable  Avoid nephrotoxic agents   Monitor 2x/week    Type 2 diabetes mellitus ([facility])  Assessment & Plan  Lab Results   Component Value Date    HGBA1C 7 6 (H) 10/12/2022     At home: Levemir 20 units QHS, Humalog 10 units with meals  Here: Lantus 30 units QHS, Humalog 8 units with breakfast and lunch, Humalog 4 units with dinner, continue sliding scale insulin  In acute care required an insulin drip transiently  Continue CCD        Appreciate IM consultants medical co-management  Labs, medications, and imaging personally reviewed  ROS:  A ten point review of systems was completed on 10/26/22 and pertinent positives are listed in subjective section  All other systems reviewed were negative  OBJECTIVE:   /90 (BP Location: Left arm)   Pulse 74   Temp 98 9 °F (37 2 °C) (Oral)   Resp 18   Ht 5' 6" (1 676 m)   Wt 66 8 kg (147 lb 4 3 oz)   SpO2 94%   BMI 23 77 kg/m²       Physical Exam  Vitals and nursing note reviewed  Constitutional:       General: She is not in acute distress  Comments: No acute distress   HENT:      Head: Normocephalic and atraumatic  Nose: Nose normal       Mouth/Throat:      Mouth: Mucous membranes are moist    Eyes:      Conjunctiva/sclera: Conjunctivae normal    Cardiovascular:      Rate and Rhythm: Normal rate and regular rhythm  Pulses: Normal pulses  Pulmonary:      Effort: Pulmonary effort is normal       Breath sounds: Normal breath sounds  No wheezing or rales  Abdominal:      General: Bowel sounds are normal  There is no distension  Palpations: Abdomen is soft  Tenderness: There is no abdominal tenderness  Musculoskeletal:         General: No swelling  Cervical back: Neck supple  Skin:     General: Skin is warm  Findings: Rash present  Neurological:      Mental Status: She is alert and oriented to person, place, and time        Motor: Weakness present        Gait: Gait abnormal       Comments: Right lower extremity weakness   Psychiatric:         Mood and Affect: Mood normal           Lab Results   Component Value Date    WBC 19 25 (H) 10/24/2022    HGB 10 1 (L) 10/24/2022    HCT 32 8 (L) 10/24/2022     (H) 10/24/2022     (H) 10/24/2022     Lab Results   Component Value Date    SODIUM 138 10/24/2022    K 4 0 10/24/2022     10/24/2022    CO2 25 10/24/2022    BUN 26 (H) 10/24/2022    CREATININE 1 19 10/24/2022    GLUC 149 (H) 10/24/2022    CALCIUM 9 2 10/24/2022     Lab Results   Component Value Date    INR 0 89 10/11/2022    PROTIME 12 3 10/11/2022           Current Facility-Administered Medications:   •  acetaminophen (TYLENOL) tablet 325 mg, 325 mg, Oral, Q6H PRN, Ileana Voss MD, 325 mg at 10/22/22 2601  •  acetaminophen (TYLENOL) tablet 975 mg, 975 mg, Oral, TID AC, Ileana Voss MD, 975 mg at 10/26/22 6991  •  atorvastatin (LIPITOR) tablet 20 mg, 20 mg, Oral, Daily With Tyronne Pallas, MD, 20 mg at 10/25/22 1656  •  bisacodyl (DULCOLAX) rectal suppository 10 mg, 10 mg, Rectal, Daily PRN, Ileana Voss MD  •  docusate sodium (COLACE) capsule 100 mg, 100 mg, Oral, BID, Ileana oVss MD, 100 mg at 10/26/22 0848  •  enoxaparin (LOVENOX) subcutaneous injection 40 mg, 40 mg, Subcutaneous, Q24H [facility], Ileana Voss MD, 40 mg at 10/26/22 0848  •  gabapentin (NEURONTIN) capsule 100 mg, 100 mg, Oral, BID, Ileana Voss MD, 100 mg at 10/26/22 6636  •  gabapentin (NEURONTIN) capsule 300 mg, 300 mg, Oral, HS, Ileana Voss MD, 300 mg at 10/25/22 2119  •  hydrocortisone 1 % cream, , Topical, 4x Daily PRN, Ileana Voss MD  •  HYDROmorphone (DILAUDID) tablet 1 mg, 1 mg, Oral, TID PRN, Ileana Voss MD, 1 mg at 10/23/22 2220  •  insulin glargine (LANTUS) subcutaneous injection 32 Units 0 32 mL, 32 Units, Subcutaneous, HS, BRINDA Kramer  •  insulin lispro (HumaLOG) 100 units/mL subcutaneous injection 1-5 Units, 1-5 Units, Subcutaneous, HS, Ilia Daley MD, 3 Units at 10/25/22 2120  •  insulin lispro (HumaLOG) 100 units/mL subcutaneous injection 1-6 Units, 1-6 Units, Subcutaneous, TID AC, Ilia Daley MD, 4 Units at 10/26/22 1102  •  insulin lispro (HumaLOG) 100 units/mL subcutaneous injection 4 Units, 4 Units, Subcutaneous, Daily With Mariia Augustine PA-C, 4 Units at 10/25/22 1655  •  insulin lispro (HumaLOG) 100 units/mL subcutaneous injection 8 Units, 8 Units, Subcutaneous, Daily With Breakfast, Adeola Jennings PA-C, 8 Units at 10/26/22 0849  •  insulin lispro (HumaLOG) 100 units/mL subcutaneous injection 8 Units, 8 Units, Subcutaneous, Daily With Lunch, Adeola Jennings PA-C, 8 Units at 10/26/22 1103  •  lidocaine (LIDODERM) 5 % patch 2 patch, 2 patch, Topical, Daily, Ilia Daley MD, 2 patch at 10/26/22 0848  •  meclizine (ANTIVERT) tablet 12 5 mg, 12 5 mg, Oral, Q8H PRN, Ilia Daley MD, 12 5 mg at 10/19/22 1353  •  meclizine (ANTIVERT) tablet 12 5 mg, 12 5 mg, Oral, BID before breakfast/lunch, Ilia Daley MD, 12 5 mg at 10/26/22 1101  •  melatonin tablet 3 mg, 3 mg, Oral, HS, Ilia Daley MD, 3 mg at 10/25/22 2119  •  methocarbamol (ROBAXIN) tablet 500 mg, 500 mg, Oral, Q6H PRN, lIia Daley MD, 500 mg at 10/23/22 1859  •  metoprolol tartrate (LOPRESSOR) tablet 25 mg, 25 mg, Oral, Q12H [place], BRINDA Kramer, 25 mg at 10/26/22 0848  •  naloxone (NARCAN) 0 04 mg/mL syringe 0 04 mg, 0 04 mg, Intravenous, Q1MIN PRN, Ilia Daley MD  •  polyethylene glycol (MIRALAX) packet 17 g, 17 g, Oral, Daily PRN, Ilia Daley MD  •  senna-docusate sodium (SENOKOT S) 8 6-50 mg per tablet 1 tablet, 1 tablet, Oral, HS, Ilia Daley MD, 1 tablet at 10/25/22 2119    Past Medical History:   Diagnosis Date   • Benign adenomatous polyp of large intestine    • CHF (congestive heart failure) ([facility] [address] )    • Diabetes mellitus ([place] [address] )    • Hyperlipemia    • Hypertension    • Osteoarthritis    • TIA (transient ischemic attack)        Patient Active Problem List    Diagnosis Date Noted   • Intertrochanteric fracture of right femur ([place] [address] ) 10/11/2022   • Encephalopathy 10/24/2022   • Deep tissue injury 10/21/2022   • UTI (urinary tract infection) 10/21/2022   • Orthostasis 10/19/2022   • H/O dizziness 10/19/2022   • Acute pain 10/17/2022   • CLL (chronic lymphocytic leukemia) ([place] [address] ) 10/13/2022   • Displaced fracture of middle phalanx of left ring finger, initial encounter for closed fracture 10/11/2022   • Hypertension 08/08/2021   • Ambulatory dysfunction 08/05/2021   • Suspected Mild cognitive impairment 08/05/2021   • Lymphadenopathy 11/06/2020   • Elevated liver enzymes 11/06/2020   • Fall 11/05/2020   • Type 2 diabetes mellitus ([place] [address] ) 11/05/2020   • CKD (chronic kidney disease) stage 3, GFR 30-59 ml/min ([place] [address] ) 11/05/2020   • HLD (hyperlipidemia) 11/05/2020   • OA (osteoarthritis) 11/05/2020   • Chronic systolic heart failure ([place] [address] ) 11/05/2020          Yanely Simpson    Total time spent:  30 minutes with more than 50% spent counseling/coordinating care  Counseling includes discussion with patient re: progress and discussion with patient of his/her current medical/functional state/information  Coordination of patient's care was performed in conjunction with consulting services  Time invested included review of patient's labs, vitals, and management of their comorbidities with continued monitoring  The care of the patient was extensively discussed and appropriate treatment plan was formulated unique for this patient  ** Please Note:  voice to text software may have been used in the creation of this document   Although proof errors in transcription or interpretation are a potential of such software**

## 2022-10-26 NOTE — PROGRESS NOTES
10/26/22 0900   Pain Assessment   Pain Assessment Tool 0-10   Pain Score 5   Pain Location/Orientation Orientation: Lower; Location: Back;Orientation: Right;Location: Leg   Restrictions/Precautions   Precautions Bed/chair alarms;Cognitive; Fall Risk;Pain;Supervision on toilet/commode;Visual deficit   Weight Bearing Restrictions Yes   LUE Weight Bearing Per Order NWB  (hand)   RLE Weight Bearing Per Order WBAT   ROM Restrictions No   Braces or Orthoses Splint  (L finger splint, L prevalon boot)   Oral Hygiene   Type of Assistance Needed Set-up / clean-up   Physical Assistance Level No physical assistance   Comment inc time   Oral Hygiene CARE Score 5   Shower/Bathe Self   Type of Assistance Needed Physical assistance   Physical Assistance Level 76% or more   Comment able to bathe BUE and upper legs while seated  A to bateh lower legs  Mod A to steady in stance w/ A to bathe suad and buttocks  Shower/Bathe Self CARE Score 2   Tub/Shower Transfer   Reason Not Assessed Sponge Bath   Upper Body Dressing   Type of Assistance Needed Physical assistance   Physical Assistance Level 25% or less   Comment seated, inc time for buttoning  Upper Body Dressing CARE Score 3   Lower Body Dressing   Type of Assistance Needed Physical assistance;Verbal cues; Adaptive equipment   Physical Assistance Level 51%-75%   Comment threaded brief without LHAE req LHAE to thread RLE into pants w/ cues for sequencing and inc time  Mod A to steady while in stance at [facility]  [address]  Pt able to partially manage pants over R hip w/ brief RUE release however req A to fully manage  Lower Body Dressing CARE Score 2   Putting On/Taking Off Footwear   Type of Assistance Needed Physical assistance;Verbal cues; Adaptive equipment   Physical Assistance Level 51%-75%   Comment TA to don knee high TEDs  LHAE to don/doff socks w/ Mod demo and vc's for technique     Putting On/Taking Off Footwear CARE Score 2   Sit to Stand   Type of Assistance Needed Physical assistance;Set-up / Magdy Grosser; Verbal cues; Adaptive equipment   Physical Assistance Level 51%-75%   Comment cues for hand placement, inc time   Sit to Stand CARE Score 2   Bed-Chair Transfer   Type of Assistance Needed Physical assistance   Physical Assistance Level 51%-75%   Comment stand pivot w/ PFRW  Chair/Bed-to-Chair Transfer CARE Score 2   Toileting Hygiene   Type of Assistance Needed Physical assistance;Verbal cues; Adaptive equipment;Set-up / clean-up   Physical Assistance Level 76% or more   Comment managed bowel hygiene while seated w/ set-up  Max A to steady while in stance w/ R unilateral release w/ pt able to partially manage clothing over R  w/ BUE support pt req Mod A to steady and A for full clothing management  Toileting Hygiene CARE Score 2   Toilet Transfer   Type of Assistance Needed Physical assistance; Adaptive equipment;Verbal cues   Physical Assistance Level 51%-75%   Comment stand pivot w/ PFRW to [facility]  Toilet Transfer CARE Score 2   Cognition   Overall Cognitive Status Impaired   Activity Tolerance   Medical Staff Made Aware BP seated 124/52, HR 81  Assessment   Treatment Assessment Pt seen for skilled OT session focusing on self-care management  Details on sponge bath noted, cont to req Mod A when in fxnl stance and Max A to steady w/ R unilateral release  Ongoing RLE and lower back pain limit standing tolerance and req frequent seated rest breaks  Add'lly req cues for proper hand placement and safety strategies 2* cognitive deficit, may benefit from enlarged memory aide for RW  Cont OT POC: ADL retraining, RUE strengthening, stand tolerance, repetitive safety training, LHAE training, and stand pivot xfers w/ PFRW  Pt was left resting in recliner w/ all needs within reach and alarm activated, provided w/ ice for RLE  Prognosis Good   Problem List Decreased strength;Decreased endurance; Impaired balance;Decreased mobility; Decreased safety awareness;Orthopedic restrictions;Pain;Decreased cognition; Impaired vision   Plan   Treatment/Interventions ADL retraining;Functional transfer training; Therapeutic exercise; Endurance training;Cognitive reorientation;Patient/family training;Equipment eval/education; Bed mobility   Progress Progressing toward goals   Recommendation   OT Discharge Recommendation   (pending progress)   OT Therapy Minutes   OT Time In 0900   OT Time Out 1030   OT Total Time (minutes) 90   OT Mode of treatment - Individual (minutes) 90   OT Mode of treatment - Concurrent (minutes) 0   OT Mode of treatment - Group (minutes) 0   OT Mode of treatment - Co-treat (minutes) 0   OT Mode of Treatment - Total time(minutes) 90 minutes   OT Cumulative Minutes 730   Therapy Time missed   Time missed?  No

## 2022-10-26 NOTE — TELEPHONE ENCOUNTER
Spoke with NILTON CELESTIN and advised above  Asked them to call if there are any questions or concerns

## 2022-10-27 ENCOUNTER — APPOINTMENT (OUTPATIENT)
Dept: RADIOLOGY | Facility: HOSPITAL | Age: 82
End: 2022-10-27

## 2022-10-27 LAB
GLUCOSE SERPL-MCNC: 114 MG/DL (ref 65–140)
GLUCOSE SERPL-MCNC: 154 MG/DL (ref 65–140)
GLUCOSE SERPL-MCNC: 175 MG/DL (ref 65–140)
GLUCOSE SERPL-MCNC: 177 MG/DL (ref 65–140)

## 2022-10-27 RX ADMIN — GABAPENTIN 300 MG: 300 CAPSULE ORAL at 22:02

## 2022-10-27 RX ADMIN — METOPROLOL TARTRATE 25 MG: 25 TABLET, FILM COATED ORAL at 22:02

## 2022-10-27 RX ADMIN — METOPROLOL TARTRATE 25 MG: 25 TABLET, FILM COATED ORAL at 08:47

## 2022-10-27 RX ADMIN — GABAPENTIN 100 MG: 100 CAPSULE ORAL at 05:48

## 2022-10-27 RX ADMIN — INSULIN LISPRO 8 UNITS: 100 INJECTION, SOLUTION INTRAVENOUS; SUBCUTANEOUS at 12:04

## 2022-10-27 RX ADMIN — INSULIN LISPRO 1 UNITS: 100 INJECTION, SOLUTION INTRAVENOUS; SUBCUTANEOUS at 12:04

## 2022-10-27 RX ADMIN — INSULIN LISPRO 8 UNITS: 100 INJECTION, SOLUTION INTRAVENOUS; SUBCUTANEOUS at 08:31

## 2022-10-27 RX ADMIN — ACETAMINOPHEN 975 MG: 325 TABLET, FILM COATED ORAL at 22:05

## 2022-10-27 RX ADMIN — GABAPENTIN 100 MG: 100 CAPSULE ORAL at 13:31

## 2022-10-27 RX ADMIN — Medication 3 MG: at 22:02

## 2022-10-27 RX ADMIN — INSULIN LISPRO 1 UNITS: 100 INJECTION, SOLUTION INTRAVENOUS; SUBCUTANEOUS at 17:05

## 2022-10-27 RX ADMIN — MECLIZINE HCL 12.5 MG 12.5 MG: 12.5 TABLET ORAL at 12:03

## 2022-10-27 RX ADMIN — DOCUSATE SODIUM 100 MG: 100 CAPSULE, LIQUID FILLED ORAL at 09:47

## 2022-10-27 RX ADMIN — METHOCARBAMOL 500 MG: 500 TABLET ORAL at 05:49

## 2022-10-27 RX ADMIN — INSULIN LISPRO 4 UNITS: 100 INJECTION, SOLUTION INTRAVENOUS; SUBCUTANEOUS at 17:05

## 2022-10-27 RX ADMIN — ATORVASTATIN CALCIUM 20 MG: 20 TABLET, FILM COATED ORAL at 17:07

## 2022-10-27 RX ADMIN — INSULIN LISPRO 1 UNITS: 100 INJECTION, SOLUTION INTRAVENOUS; SUBCUTANEOUS at 22:06

## 2022-10-27 RX ADMIN — MECLIZINE HCL 12.5 MG 12.5 MG: 12.5 TABLET ORAL at 05:49

## 2022-10-27 RX ADMIN — LIDOCAINE 5% 2 PATCH: 700 PATCH TOPICAL at 09:47

## 2022-10-27 RX ADMIN — SENNOSIDES AND DOCUSATE SODIUM 1 TABLET: 8.6; 5 TABLET ORAL at 22:02

## 2022-10-27 RX ADMIN — ACETAMINOPHEN 975 MG: 325 TABLET, FILM COATED ORAL at 12:08

## 2022-10-27 RX ADMIN — ACETAMINOPHEN 325 MG: 325 TABLET ORAL at 08:47

## 2022-10-27 RX ADMIN — ENOXAPARIN SODIUM 40 MG: 40 INJECTION SUBCUTANEOUS at 09:47

## 2022-10-27 RX ADMIN — INSULIN GLARGINE 32 UNITS: 100 INJECTION, SOLUTION SUBCUTANEOUS at 22:01

## 2022-10-27 NOTE — PROGRESS NOTES
10/27/22 1315   Pain Assessment   Pain Assessment Tool 0-10   Pain Score 6   Pain Location/Orientation Orientation: Right;Location: Leg   Multiple Pain Sites Yes   Pain 2   Pain Score 2 7   Pain Location/Orientation 2 Orientation: Lower; Location: Back   Restrictions/Precautions   Precautions Bed/chair alarms;Cognitive; Fall Risk;Pain;Pressure Ulcer;Supervision on toilet/commode;Visual deficit   Weight Bearing Restrictions Yes   LUE Weight Bearing Per Order NWB  (hand)   RLE Weight Bearing Per Order WBAT   ROM Restrictions No   Braces or Orthoses Sling  (L prevalon boot)   Lifestyle   Autonomy "I was supposed to be here for the game show I think, they just dropped me off here "   Putting On/Taking Off Footwear   Type of Assistance Needed Set-up / Ilegerald Cloud; Verbal cues; Physical assistance   Physical Assistance Level 25% or less   Comment req extended edu and demo despite inc time provided to maximize fxnl problem-solving w/ Shelbi Ode  Donned and doffed socks, req A to fully pull up R sock to inc comfort  Putting On/Taking Off Footwear CARE Score 3   Sit to Stand   Type of Assistance Needed Physical assistance   Physical Assistance Level 51%-75%   Comment cues for hand placement   Sit to Stand CARE Score 2   Bed-Chair Transfer   Type of Assistance Needed Physical assistance; Adaptive equipment;Verbal cues   Physical Assistance Level 51%-75%   Comment stand pivot w/ PFRW, cues for proper body mechanics  Chair/Bed-to-Chair Transfer CARE Score 2   Toileting Hygiene   Type of Assistance Needed Physical assistance   Physical Assistance Level Total assistance   Comment Mod A to steady at [facility]  [address], 2nd person for bowel hygiene and clothing management  Extended time req  Toileting Hygiene CARE Score 1   Toilet Transfer   Type of Assistance Needed Physical assistance; Adaptive equipment;Verbal cues   Physical Assistance Level 51%-75%   Comment stand pivot w/ PFRW to [facility]     Toilet Transfer CARE Score 2   Exercise Tools   Other Exercise Tool 1 RUE ther ex to maximize strength and endurance for fxnl xfers and ADLs  Completed w/ 1# DB 3x10 bicep curls, chest press, and OH shoulder press  Tolerated well w/ Min vc's and demo, rest breaks provided between sets to manage fatigue  Cognition   Overall Cognitive Status Impaired   Arousal/Participation Alert; Cooperative   Attention Attends with cues to redirect   Orientation Level Oriented to person;Disoriented to place; Disoriented to time;Disoriented to situation  (Oriented to person only at start of session  Oriented x4 midway through session, Oriented to person and place at end of session )   Memory Decreased short term memory;Decreased recall of recent events;Decreased recall of precautions   Following Commands Follows one step commands with increased time or repetition   Comments Impaired safety awareness, impaired insight into deficits, impaired problem solving, and dec cognitive flexibility  Assessment   Treatment Assessment Pt seen for skilled OT session focusing on toileting, RUE strengthening, and LHAE training  Upon OTR arrival, pt calling out "I need help, I need help " Pt found partially seated on recliner and partially seated on leg rest w/ RLE off of leg rest attempting to get out of chair 2* urgency to void, alarm was not activated as pt was not fully off of alarm pad at that time and pt was not utilizing call rosa  Add'lly found R 4th splint removed and on meal tray  RN present and A w/ set-up w/ OTR A w/ toilet xfer  Pt noted w/ inc confusion, oriented to person only despite prompts and cues provide  Req inc time for toileting w/ pt noted w/ difficulty following 1-step directions  Spoke to team regarding possibility of moving pt's room closer to nurse's station if cont to not utilize call bell appropriately  RN notified MD  Following toileting, some improvement w/ cognition noted and then oriented x4 midway through session, however questioned RLE pain several times req edu  Skill carryover limited by cognitive deficits  Add'lly limited by RLE and chronic lower back pain which limits stand/act tolerance, req frequent seated rest breaks to manage  Cont OT POC: endurance work, fxnl xfer training, toileting, repetitive safety training, [address] training, and ADL retraining  Of note, per RN ortho to assess R 4th digit and staple removal this evening  Pt was resting in recliner w/ all needs within reach and alarm activated, provided w/ ice for RLE  Prognosis Good   Problem List Decreased strength;Decreased endurance; Impaired balance;Decreased mobility; Decreased safety awareness;Orthopedic restrictions;Pain;Decreased cognition; Impaired vision   Plan   Treatment/Interventions ADL retraining;Functional transfer training; Therapeutic exercise; Endurance training;Cognitive reorientation;Patient/family training;Equipment eval/education   Progress Slow progress, decreased activity tolerance  (cognitive deficits)   Recommendation   OT Discharge Recommendation   (pending progress)   OT Therapy Minutes   OT Time In 1315   OT Time Out 1415   OT Total Time (minutes) 60   OT Mode of treatment - Individual (minutes) 60   OT Mode of treatment - Concurrent (minutes) 0   OT Mode of treatment - Group (minutes) 0   OT Mode of treatment - Co-treat (minutes) 0   OT Mode of Treatment - Total time(minutes) 60 minutes   OT Cumulative Minutes 790   Therapy Time missed   Time missed?  No

## 2022-10-27 NOTE — PLAN OF CARE
Reviewed    Problem: Prexisting or High Potential for Compromised Skin Integrity  Goal: Skin integrity is maintained or improved  Description: INTERVENTIONS:  - Identify patients at risk for skin breakdown  - Assess and monitor skin integrity  - Assess and monitor nutrition and hydration status  - Monitor labs   - Assess for incontinence   - Turn and reposition patient  - Assist with mobility/ambulation  - Relieve pressure over bony prominences  - Avoid friction and shearing  - Provide appropriate hygiene as needed including keeping skin clean and dry  - Evaluate need for skin moisturizer/barrier cream  - Collaborate with interdisciplinary team   - Patient/family teaching  - Consider wound care consult   Outcome: Progressing     Problem: PAIN - ADULT  Goal: Verbalizes/displays adequate comfort level or baseline comfort level  Description: Interventions:  - Encourage patient to monitor pain and request assistance  - Assess pain using appropriate pain scale  - Administer analgesics based on type and severity of pain and evaluate response  - Implement non-pharmacological measures as appropriate and evaluate response  - Consider cultural and social influences on pain and pain management  - Notify physician/advanced practitioner if interventions unsuccessful or patient reports new pain  Outcome: Progressing     Problem: INFECTION - ADULT  Goal: Absence or prevention of progression during hospitalization  Description: INTERVENTIONS:  - Assess and monitor for signs and symptoms of infection  - Monitor lab/diagnostic results  - Monitor all insertion sites, i e  indwelling lines, tubes, and drains  - Monitor endotracheal if appropriate and nasal secretions for changes in amount and color  - [place] appropriate cooling/warming therapies per order  - Administer medications as ordered  - Instruct and encourage patient and family to use good hand hygiene technique  - Identify and instruct in appropriate isolation precautions for identified infection/condition  Outcome: Progressing  Goal: Absence of fever/infection during neutropenic period  Description: INTERVENTIONS:  - Monitor WBC    Outcome: Progressing     Problem: SAFETY ADULT  Goal: Patient will remain free of falls  Description: INTERVENTIONS:  - Educate patient/family on patient safety including physical limitations  - Instruct patient to call for assistance with activity   - Consult OT/PT to assist with strengthening/mobility   - Keep Call bell within reach  - Keep bed low and locked with side rails adjusted as appropriate  - Keep care items and personal belongings within reach  - Initiate and maintain comfort rounds  - Make Fall Risk Sign visible to staff  - Offer Toileting every 4 Hours, in advance of need  - Initiate/Maintain bed and chair alarm  - Apply yellow socks and bracelet for high fall risk patients  - Consider moving patient to room near nurses station  Outcome: Progressing  Goal: Maintain or return to baseline ADL function  Description: INTERVENTIONS:  -  Assess patient's ability to carry out ADLs; assess patient's baseline for ADL function and identify physical deficits which impact ability to perform ADLs (bathing, care of mouth/teeth, toileting, grooming, dressing, etc )  - Assess/evaluate cause of self-care deficits   - Assess range of motion  - Assess patient's mobility; develop plan if impaired  - Assess patient's need for assistive devices and provide as appropriate  - Encourage maximum independence but intervene and supervise when necessary  - Involve family in performance of ADLs  - Assess for home care needs following discharge   - Consider OT consult to assist with ADL evaluation and planning for discharge  - Provide patient education as appropriate  Outcome: Progressing  Goal: Maintains/Returns to pre admission functional level  Description: INTERVENTIONS:  - Set and communicate daily mobility goal to care team and patient/family/caregiver     - Collaborate with rehabilitation services on mobility goals if consulted  - Out of bed for toileting  - Record patient progress and toleration of activity level   Outcome: Progressing     Problem: DISCHARGE PLANNING  Goal: Discharge to home or other facility with appropriate resources  Description: INTERVENTIONS:  - Identify barriers to discharge w/patient and caregiver  - Arrange for needed discharge resources and transportation as appropriate  - Identify discharge learning needs (meds, wound care, etc )  - Arrange for interpretive services to assist at discharge as needed  - Refer to Case Management Department for coordinating discharge planning if the patient needs post-hospital services based on physician/advanced practitioner order or complex needs related to functional status, cognitive ability, or social support system  Outcome: Progressing     Problem: Potential for Falls  Goal: Patient will remain free of falls  Description: INTERVENTIONS:  - Educate patient/family on patient safety including physical limitations  - Instruct patient to call for assistance with activity   - Consult OT/PT to assist with strengthening/mobility   - Keep Call bell within reach  - Keep bed low and locked with side rails adjusted as appropriate  - Keep care items and personal belongings within reach  - Initiate and maintain comfort rounds  - Make Fall Risk Sign visible to staff  - Offer Toileting every 4 Hours, in advance of need  - Initiate/Maintain bed and chair alarm  - Apply yellow socks and bracelet for high fall risk patients  - Consider moving patient to room near nurses station  Outcome: Progressing     Problem: Nutrition/Hydration-ADULT  Goal: Nutrient/Hydration intake appropriate for improving, restoring or maintaining nutritional needs  Description: Monitor and assess patient's nutrition/hydration status for malnutrition  Collaborate with interdisciplinary team and initiate plan and interventions as ordered    Monitor patient's weight and dietary intake as ordered or per policy  Utilize nutrition screening tool and intervene as necessary  Determine patient's food preferences and provide high-protein, high-caloric foods as appropriate       INTERVENTIONS:  - Monitor oral intake, urinary output, labs, and treatment plans  - Assess nutrition and hydration status and recommend course of action  - Evaluate amount of meals eaten  - Assist patient with eating if necessary   - Allow adequate time for meals  - Recommend/ encourage appropriate diets, oral nutritional supplements, and vitamin/mineral supplements  - Order, calculate, and assess calorie counts as needed  - Recommend, monitor, and adjust tube feedings and TPN/PPN based on assessed needs  - Assess need for intravenous fluids  - Provide specific nutrition/hydration education as appropriate  - Include patient/family/caregiver in decisions related to nutrition  Outcome: Progressing     Problem: MOBILITY - ADULT  Goal: Maintain or return to baseline ADL function  Description: INTERVENTIONS:  -  Assess patient's ability to carry out ADLs; assess patient's baseline for ADL function and identify physical deficits which impact ability to perform ADLs (bathing, care of mouth/teeth, toileting, grooming, dressing, etc )  - Assess/evaluate cause of self-care deficits   - Assess range of motion  - Assess patient's mobility; develop plan if impaired  - Assess patient's need for assistive devices and provide as appropriate  - Encourage maximum independence but intervene and supervise when necessary  - Involve family in performance of ADLs  - Assess for home care needs following discharge   - Consider OT consult to assist with ADL evaluation and planning for discharge  - Provide patient education as appropriate  Outcome: Progressing  Goal: Maintains/Returns to pre admission functional level  Description: INTERVENTIONS:  - Set and communicate daily mobility goal to care team and patient/family/caregiver     - Collaborate with rehabilitation services on mobility goals if consulted  - Out of bed for toileting  - Record patient progress and toleration of activity level   Outcome: Progressing

## 2022-10-27 NOTE — PROGRESS NOTES
10/27/22 1000   Pain Assessment   Pain Assessment Tool 0-10   Pain Score 8   Pain Location/Orientation Orientation: Right;Location: Hip;Location: Knee  (pain patches applied during morning session)   Restrictions/Precautions   Precautions Bed/chair alarms;Cognitive; Fall Risk;Pain;Supervision on toilet/commode   LUE Weight Bearing Per Order NWB   RLE Weight Bearing Per Order WBAT   Braces or Orthoses Splint  (ABD binder for BP)   Sit to Stand   Type of Assistance Needed Physical assistance   Physical Assistance Level 26%-50%   Comment Mod A for lift assist, also needs assist at times from keeping walker tipping-  Pt with dec WB through RLE   Sit to Stand CARE Score 3   Bed-Chair Transfer   Type of Assistance Needed Physical assistance   Physical Assistance Level 51%-75%   Comment Mod A / Max stand pivot with Platform RW-  needs balance A and cues for safety - times with poor walker management   Chair/Bed-to-Chair Transfer CARE Score 2   Walk 10 Feet   Type of Assistance Needed Physical assistance   Physical Assistance Level Total assistance   Comment Mod A with chair follow   Walk 10 Feet CARE Score 1   Walk 50 Feet with Two Turns   Reason if not Attempted Safety concerns   Walk 50 Feet with Two Turns CARE Score 88   Walk 150 Feet   Reason if not Attempted Safety concerns   Walk 150 Feet CARE Score 88   Walking 10 Feet on Uneven Surfaces   Reason if not Attempted Safety concerns   Walking 10 Feet on Uneven Surfaces CARE Score 88   Ambulation   Does the patient walk? 2  Yes   Primary Mode of Locomotion Prior to Admission Walk   Distance Walked (feet) 38 ft  (20)   Assist Device Roller Walker;Platform   Gait Pattern Antalgic; Inconsistant Mandi; Slow Mandi;Decreased foot clearance;R knee tawanda;Narrow WOOD; Decreased R stance; Improper weight shift   Provided Assistance with: Balance  (Times pt needs help with walker advancement)   Walk Assist Level Chair Follow; Moderate Assist   Findings Pt leans very heavy on walker and more on L platform,  dec WB on RLE and knee slightly tawanda with L step,  Cues for slightly smaller step and attempt small step through pattern which pt was able to perform with cues - also pt performed better stance phase with tactile cues at R knee   Wheel 50 Feet with Two Turns   Comment wasnt  focus this session   4 Steps   Comment Attempted to perform small steps with forearm on L rail and R HR-  Pt very flexed and unable to stand up straight and unload LLE to step up   Reason if not Attempted Safety concerns   4 Steps CARE Score 88   12 Steps   Reason if not Attempted Activity not applicable   12 Steps CARE Score 9   Stairs   Findings Attempted but unable to perform   Therapeutic Interventions   Strengthening Seated LAQ on R  AROM,  Hip flex march AAROM,  Hip abd yellow T band resisted, all 10x3   Other Pt stood begining of session 1min, then 4 mins during pain patch placement and skin check with NSG   Equipment Use   NuStep 5mins x2  with RUE and BLE for hip/ knee ROM and attempts to inc BP   Other Comments   Comments Bps checks t/o session fluctuated from 287U  to 513P systolic,  Pt continues to report dizziness   at this time unsure if BP related or due to pain   Assessment   Treatment Assessment 90 min skilled PT session started with BP checks and multiple standing trials at PRW- Pts BP dec slightly but not of major concern  Pt performed 2 amb bouts but takes much effort and energy then pt gets tired post walk  Pt is showing some progress with sit-stand xfers and ambulation distances but slow due to pain and cognition  Pt needs much safety cues and structure t/o transfers and mobility  Attempted steps but pt unable to perform and was unsafe at current status  Pt will benefit from cont LE strengthening,  Standing with WB focus on RLE,  transfers with walker, continue curb style step training pt has 4inch step at home for entry  Problem List Decreased strength;Decreased endurance; Impaired balance;Decreased mobility; Decreased safety awareness;Orthopedic restrictions;Pain;Decreased cognition; Impaired vision   Barriers to Discharge Inaccessible home environment;Decreased caregiver support   PT Barriers   Physical Impairment Decreased strength;Decreased endurance; Impaired balance;Decreased mobility;Orthopedic restrictions;Decreased skin integrity; Decreased range of motion;Decreased cognition; Impaired judgement;Decreased safety awareness;Pain   Functional Limitation Car transfers; Ramp negotiation;Stair negotiation;Transfers; Walking;Standing   PT Therapy Minutes   PT Time In 1000   PT Time Out 1130   PT Total Time (minutes) 90   PT Mode of treatment - Individual (minutes) 90   PT Mode of treatment - Concurrent (minutes) 0   PT Mode of treatment - Group (minutes) 0   PT Mode of treatment - Co-treat (minutes) 0   PT Mode of Treatment - Total time(minutes) 90 minutes   PT Cumulative Minutes 880   Therapy Time missed   Time missed?  No

## 2022-10-27 NOTE — PROGRESS NOTES
Progress Note - Orthopedics   Perlita Hampton 79 y o  female MRN: [id number]  Unit/Bed#: -01      Subjective:    79 y  o female w PMHx of CHF, DM, HLD, HTN, OA, and TIA who originally presented to the [facility] s/p mechanical fall while attempting to enter her home using her wheeled walker  Patient was identified to have sustained a right intertrochanteric femur fracture  Patient went to the OR on 10/12 with Dr Lucretia Corrales for insertion of a long cephalomedullary nail  Her post operative course was uncomplicated  The patient was recommended for rehab and brought to [facility]  She is now POD 14 and this progress note represents her 2 week post operative assessment  No acute events overnight, patient has no new complaints  Patient doing well overall, though mental status fluctuates throughout the day per nursing  Pain well controlled per patient  Denies fevers, chills, CP, SOB, N/V, numbness or tingling  Patient reports no issues with urination or bowel movements  Patient states that she would like to go home soon and does not know how long she has to remain here      Labs:  0   Lab Value Date/Time    HCT 32 8 (L) 10/24/2022 0507    HCT 31 3 (L) 10/22/2022 1207    HCT 29 8 (L) 10/19/2022 0950    HGB 10 1 (L) 10/24/2022 0507    HGB 9 7 (L) 10/22/2022 1207    HGB 9 3 (L) 10/19/2022 0950    INR 0 89 10/11/2022 1900    WBC 19 25 (H) 10/24/2022 0507    WBC 21 27 (H) 10/22/2022 1207    WBC 22 41 (H) 10/19/2022 0950       Meds:    Current Facility-Administered Medications:   •  acetaminophen (TYLENOL) tablet 325 mg, 325 mg, Oral, Q6H PRN, Haile Savage MD, 325 mg at 10/27/22 0847  •  acetaminophen (TYLENOL) tablet 975 mg, 975 mg, Oral, TID AC, Haile Savage MD, 975 mg at 10/27/22 1208  •  atorvastatin (LIPITOR) tablet 20 mg, 20 mg, Oral, Daily With Hansa Tomas MD, 20 mg at 10/26/22 1624  •  bisacodyl (DULCOLAX) rectal suppository 10 mg, 10 mg, Rectal, Daily PRN, Haile Savage MD  • docusate sodium (COLACE) capsule 100 mg, 100 mg, Oral, BID, Fay Parra MD, 100 mg at 10/27/22 0947  •  enoxaparin (LOVENOX) subcutaneous injection 40 mg, 40 mg, Subcutaneous, Q24H [address], Fay Parra MD, 40 mg at 10/27/22 0947  •  gabapentin (NEURONTIN) capsule 100 mg, 100 mg, Oral, BID, Fay Parra MD, 100 mg at 10/27/22 1331  •  gabapentin (NEURONTIN) capsule 300 mg, 300 mg, Oral, HS, Fay Parra MD, 300 mg at 10/26/22 2201  •  hydrocortisone 1 % cream, , Topical, 4x Daily PRN, Fay Parra MD  •  HYDROmorphone (DILAUDID) tablet 1 mg, 1 mg, Oral, TID PRN, Fay Parra MD, 1 mg at 10/26/22 1916  •  insulin glargine (LANTUS) subcutaneous injection 32 Units 0 32 mL, 32 Units, Subcutaneous, HS, BRINDA Kramer, 32 Units at 10/26/22 2201  •  insulin lispro (HumaLOG) 100 units/mL subcutaneous injection 1-5 Units, 1-5 Units, Subcutaneous, HS, Fay Parra MD, 2 Units at 10/26/22 2202  •  insulin lispro (HumaLOG) 100 units/mL subcutaneous injection 1-6 Units, 1-6 Units, Subcutaneous, TID AC, Fay Parra MD, 1 Units at 10/27/22 1204  •  insulin lispro (HumaLOG) 100 units/mL subcutaneous injection 4 Units, 4 Units, Subcutaneous, Daily With Maurilio Wilcox PA-C, 4 Units at 10/26/22 1624  •  insulin lispro (HumaLOG) 100 units/mL subcutaneous injection 8 Units, 8 Units, Subcutaneous, Daily With Breakfast, Adeola Jennings PA-C, 8 Units at 10/27/22 0831  •  insulin lispro (HumaLOG) 100 units/mL subcutaneous injection 8 Units, 8 Units, Subcutaneous, Daily With Lunch, Adeola Jennings PA-C, 8 Units at 10/27/22 1204  •  lidocaine (LIDODERM) 5 % patch 2 patch, 2 patch, Topical, Daily, Fay Parra MD, 2 patch at 10/27/22 0906  •  meclizine (ANTIVERT) tablet 12 5 mg, 12 5 mg, Oral, Q8H PRN, Fay Parra MD, 12 5 mg at 10/19/22 1353  •  meclizine (ANTIVERT) tablet 12 5 mg, 12 5 mg, Oral, BID before breakfast/lunch, Fay Parra MD, 12 5 mg at 10/27/22 1203  •  melatonin tablet 3 mg, 3 mg, Oral, HS, Rhiannon Brownlee MD, 3 mg at 10/26/22 2201  •  methocarbamol (ROBAXIN) tablet 500 mg, 500 mg, Oral, Q6H PRN, Rhiannon Brownlee MD, 500 mg at 10/27/22 0549  •  metoprolol tartrate (LOPRESSOR) tablet 25 mg, 25 mg, Oral, Q12H TESSA, Alexandria Severino, YAMILEXNP, 25 mg at 10/27/22 0847  •  naloxone (NARCAN) 0 04 mg/mL syringe 0 04 mg, 0 04 mg, Intravenous, Q1MIN PRN, Rhiannon Brownlee MD  •  polyethylene glycol (MIRALAX) packet 17 g, 17 g, Oral, Daily PRN, Rhiannon Brownlee MD  •  senna-docusate sodium (SENOKOT S) 8 6-50 mg per tablet 1 tablet, 1 tablet, Oral, HS, Rhiannon Brownlee MD, 1 tablet at 10/26/22 2201    Blood Culture:   No results found for: BLOODCX    Wound Culture:   No results found for: WOUNDCULT    Ins and Outs:  I/O last 24 hours: In: 640 [P O :640]  Out: 1050 [Urine:1050]          Physical:  Vitals:    10/27/22 1100   BP: 129/55   Pulse:    Resp:    Temp:    SpO2:      Musculoskeletal: right Lower Extremity  · Skin intact surrounding incision  Incision itself is well healed  No erythema or ecchymosis  · NTTP  · Sensation intact to saphenous, sural, tibial, superficial peroneal nerve, and deep peroneal  · Motor intact to +FHL/EHL, +ankle dorsi/plantar flexion  · 2+ DP pulse, symmetric bilaterally  · Digits warm and well perfused  · Capillary refill < 2 seconds  · Musculature soft and compressible, no pain with passive stretch    Assessment:    79 y  o female POD 14 from R long CMN  Patient doing well  Plan:  · WBAT RLE  · Greater than 2 gram drop which qualifies for diagnosis of acute blood loss anemia, will monitor and administer IVF/prbc as indicated    · PT/OT  · Pain control  · DVT ppx   · Medical co-morbidities include DM, HTN, HLD, TIA, OA CHF, which are being managed per primary team  · Dispo: Ortho signing off    Mario Alberto Portillo MD

## 2022-10-27 NOTE — PROGRESS NOTES
10/27/22 5641   Pain Assessment   Pain Assessment Tool 0-10   Pain Score 10 - Worst Possible Pain  (7/10 at rest to 10/10 with mobilities)   [place] FACES Pain Rating 10   Pain Location/Orientation Orientation: Right;Location: Hip;Location: Knee   Pain Radiating Towards R low back and knee   Pain Onset/Description Onset: Ongoing;Frequency: Constant/Continuous   Effect of Pain on Daily Activities limited act participation and tolerance   Patient's Stated Pain Goal No pain   Hospital Pain Intervention(s) Medication (See MAR); Repositioned;Cold applied; Rest;Relaxation technique; Emotional support   Restrictions/Precautions   Precautions Fall Risk;Bed/chair alarms;Cognitive;Supervision on toilet/commode;Pain;Visual deficit   LUE Weight Bearing Per Order NWB  (L hand)   RLE Weight Bearing Per Order WBAT   Braces or Orthoses Splint  (L 4th finger, abdominal binder due to OH, prevalon boot L LE in bed/recliner)   Cognition   Overall Cognitive Status Impaired   Arousal/Participation Alert; Cooperative   Attention Attends with cues to redirect   Orientation Level Oriented X4   Memory Decreased short term memory;Decreased recall of recent events;Decreased recall of precautions   Following Commands Follows one step commands with increased time or repetition   Subjective   Subjective pt reported pain as above and being sleepy but agreeable to have PT  Lying to Sitting on Side of Bed   Type of Assistance Needed Physical assistance;Verbal cues   Physical Assistance Level 76% or more   Comment HOB flat without rail  (dec compliance with L hand NWB)   Lying to Sitting on Side of Bed CARE Score 2   Sit to Stand   Comment due to ortho BP see vitals   Reason if not Attempted Medical concerns   Sit to Stand CARE Score 88   Bed-Chair Transfer   Type of Assistance Needed Physical assistance;Verbal cues; Adaptive equipment   Physical Assistance Level 76% or more   Comment Slide board transfer with max A with L UE sling Chair/Bed-to-Chair Transfer CARE Score 2   Therapeutic Interventions   Strengthening passive to A/A ROM on R LE pre OOB mobilities to help with stiffness/pain include SLR, hip abduction/adduction, heel slides, ankle pumps, LAQ with reps as tolerated due to pain   Modalities cold pack R hip in bed and after tx   Assessment   Treatment Assessment pt able to engaged in skilled PT intervention however participation/tolerance limited by pain and ortho BPs and utilized abdominal binder this session with BP at 127/58  RN Gilberto Smith notified and discuss POC including trialing pt with dilaudid, which was ordered by MD, during therapies instead of tylenol only  Due to [facility] used slide board to transfer pt to recliner with alarm, prevalon L LE on and all needs within reach  Family/Caregiver Present no   Plan   Treatment/Interventions Functional transfer training;LE strengthening/ROM; Therapeutic exercise; Endurance training;Bed mobility;Spoke to nursing;OT   Progress Progressing toward goals   PT Therapy Minutes   PT Time In 0830   PT Time Out 0930   PT Total Time (minutes) 60   PT Mode of treatment - Individual (minutes) 60   PT Mode of treatment - Concurrent (minutes) 0   PT Mode of treatment - Group (minutes) 0   PT Mode of treatment - Co-treat (minutes) 0   PT Mode of Treatment - Total time(minutes) 60 minutes   PT Cumulative Minutes 790

## 2022-10-27 NOTE — PROGRESS NOTES
Internal Medicine Progress Note  Patient: Deonte Frausto  Age/sex: 79 y o  female  Medical Record #: [id number]      ASSESSMENT/PLAN: (Interval History)  Deonte Frausto is seen and examined and management for following issues:    S/p ORIF with IM nailing of the Rt femur  · Pain control and therapy per primary service  · Pain meds adjusted d/t sedation  · Renal dosed Lovenox for DVT prophylaxis  · Follow-up with Ortho 2 weeks post-op      Left 4th phalanx fracture  · Splint and NWB   · Pain control per PMR      HTN  · Home medications: Hyzaar 50/12 5mg daily/Lopressor 25mg every 12 hours/Lasix 40mg daily - for heart failure  · Here: metoprolol 25mg bid (increased 10/18)  · Will monitor and adjust medications as needed  · stable    Anemia  · Secondary to trauma  · Stable      DM type 2  · Home: Levemir 20U at bedtime/Humalog 10U with meals  · Here: Lantus 32U at bedtime/Humalog 8U with breakfast and lunch, 4U with dinner  · No changes today      Chronic heart failure  · Pt has been off diuretics  · ECHO with an EF of 45%  Grade 2 diastolic dysfunction  · euvolemic     CLL  · Recent diagnosis  · Outpt follow-up with Hematology/Oncology  · Baseline WBCs 14-16K  · Had mild bump likely reactive secondary to surgery/UTI  · Stable      Suspected UTI  · positive UA  Culture with >100,000 klebsiella  · Susceptible to Bactrim  · Urinary retention/hallucinations improved    DC planning: TBD  The above assessment and plan was reviewed and updated as determined by my evaluation of the patient on 10/27/2022      Labs:   Results from last 7 days   Lab Units 10/24/22  0507 10/22/22  1207   WBC Thousand/uL 19 25* 21 27*   HEMOGLOBIN g/dL 10 1* 9 7*   HEMATOCRIT % 32 8* 31 3*   PLATELETS Thousands/uL 490* 464*     Results from last 7 days   Lab Units 10/24/22  0507 10/22/22  1207   SODIUM mmol/L 138 136   POTASSIUM mmol/L 4 0 4 2   CHLORIDE mmol/L 108 106   CO2 mmol/L 25 27   BUN mg/dL 26* 20   CREATININE mg/dL 1 19 0 99 CALCIUM mg/dL 9 2 9 1             Results from last 7 days   Lab Units 10/27/22  0705 10/26/22  2110 10/26/22  1559   POC GLUCOSE mg/dl 114 222* 120       Review of Scheduled Meds:  Current Facility-Administered Medications   Medication Dose Route Frequency Provider Last Rate   • acetaminophen  325 mg Oral Q6H PRN Al Preston MD     • acetaminophen  975 mg Oral TID AC Al Preston MD     • atorvastatin  20 mg Oral Daily With Bret Kebede MD     • bisacodyl  10 mg Rectal Daily PRN Al Preston MD     • docusate sodium  100 mg Oral BID Al Preston MD     • enoxaparin  40 mg Subcutaneous Q24H Carlin Cannon MD     • gabapentin  100 mg Oral BID Al Preston MD     • gabapentin  300 mg Oral HS Al Preston MD     • hydrocortisone   Topical 4x Daily PRN Al Preston MD     • HYDROmorphone  1 mg Oral TID PRN Al Preston MD     • insulin glargine  32 Units Subcutaneous HS BRINDA Kramer     • insulin lispro  1-5 Units Subcutaneous HS Al Preston MD     • insulin lispro  1-6 Units Subcutaneous TID AC Al Preston MD     • insulin lispro  4 Units Subcutaneous Daily With Dinner Adeola Jennings PA-C     • insulin lispro  8 Units Subcutaneous Daily With Breakfast Adeola Jennings PA-C     • insulin lispro  8 Units Subcutaneous Daily With Lunch Adeola Jennings PA-C     • lidocaine  2 patch Topical Daily Al Preston MD     • meclizine  12 5 mg Oral Q8H PRN Al Preston MD     • meclizine  12 5 mg Oral BID before breakfast/lunch Al Preston MD     • melatonin  3 mg Oral HS Al Preston MD     • methocarbamol  500 mg Oral Q6H PRN Al Preston MD     • metoprolol tartrate  25 mg Oral Q12H [address] BRINDA Kramer     • naloxone  0 04 mg Intravenous Q1MIN PRN Al Preston MD     • polyethylene glycol  17 g Oral Daily PRN Al Preston MD     • senna-docusate sodium  1 tablet Oral HS Curly Sandhoff, MD         Subjective/ HPI: Patient seen and examined  Patients overnight issues or events were reviewed with nursing or staff during rounds or morning huddle session  New or overnight issues include the following:     Pt seen in PT  She reports fatigue due to not sleeping well  She denies any other complaints  ROS:   A 10 point ROS was performed; negative except as noted above  Imaging:     XR femur 2 vw right   Final Result by Rod Lantigua MD (10/25 1126)      Status post right hip ORIF  Satisfactory alignment  Workstation performed: [id number]             *Labs /Radiology studies Reviewed  *Medications  reviewed and reconciled as needed  *Please refer to order section for additional ordered labs studies  *Case discussed with primary attending during morning huddle case rounds    Physical Examination:  Vitals:   Vitals:    10/26/22 1430 10/26/22 1500 10/26/22 2015 10/27/22 0600   BP: 146/65 134/60 156/71 121/58   BP Location: Right arm Right arm Right arm Right arm   Pulse:   69 70   Resp:   16 20   Temp:   97 6 °F (36 4 °C) 98 8 °F (37 1 °C)   TempSrc:   Oral Oral   SpO2:   93% 93%   Weight:    66 7 kg (147 lb 0 8 oz)   Height:         GEN: No apparent distress, interactive  NEURO: Alert and oriented x 3   No further hallucinations  HEENT: Pupils are equal and reactive, EOMI, mucous membranes are moist, face symmetrical  CV: S1 S2 regular, no MRG, no peripheral edema noted  RESP: Lungs are clear bilaterally, no wheezes, rales or rhonchi noted, on room air, respirations easy and non labored  GI: Flat, soft non tender, non distended; +BS x4  : Voiding without difficulty  MUSC: Moves all extremities; NWB LUE 4th finger; WBAT RLE  SKIN: pink, warm and dry, normal turgor, dressing in place to RLE incisions with staples all are intact without evidence of infection; left 4th finger with splint    The above physical exam was reviewed and updated as determined by my evaluation of the patient on 10/27/2022  Invasive Devices  Report    Drain  Duration           External Urinary Catheter <1 day                   VTE Pharmacologic Prophylaxis: Enoxaparin  Code Status: Level 1 - Full Code  Current Length of Stay: 10 day(s)      Total time spent:  30 minutes with more than 50% spent counseling/coordinating care  Counseling includes discussion with patient re: progress  and discussion with patient of his/her current medical state/information  Coordination of patient's care was performed in conjunction with primary service  Time invested included review of patient's labs, vitals, and management of their comorbidities with continued monitoring  In addition, this patient was discussed with medical team including physician and advanced extenders  The care of the patient was extensively discussed and appropriate treatment plan was formulated unique for this patient  ** Please Note:  voice to text software may have been used in the creation of this document   Although proof errors in transcription or interpretation are a potential of such software**

## 2022-10-27 NOTE — PROGRESS NOTES
PM&R PROGRESS NOTE:  Sina Campos 79 y o  female MRN: [id number]  Unit/Bed#: -04 Encounter: [id number]        Rehab Diagnosis: Impairment of mobility, safety and Activities of Daily Living (ADLs) due to Orthopedic Disorders:  08 11  Unilateral Hip Fracture - Right    SUBJECTIVE:  Patient seen today eating lunch  More clear today in her attention and answers  Pain varies but worsens with movement  +BM today  Continent of urine  ASSESSMENT: Stable, progressing      PLAN:    Rehabilitation  · Functional deficits: impaired mobility, self care, WBAT RLE, NWB L HAND  • Continue current rehabilitation plan of care to maximize function  Currently requiring moderate-max assist with PT and OT  • Expected Discharge:  Probable SNF referrals will be required, as patient's family will be unable to provide assistance for home discharge in 10 days (anticipate she will require assistance with bathing, IADLs)  Patient does require complete modified independent status to return home    Function:   Physical Therapy Occupational Therapy Speech Therapy   Weight Bearing Status: Non-weight bearing (WBAT R LE, NWB L hand)  Transfers: Assist of 2  Bed Mobility: Moderate Assistance, Maximum Assistance  Amulation Distance (ft): 5 feet (to 20')  Ambulation: Assist of 2  Assistive Device for Ambulation: Roller Walker  Roller Walker Attachments: With platform on left  Assistive Device for Stairs:  (unsafe to perform)  Stair Assistance:  (unsafe to assess at this time)  Discharge Recommendations: Home with: (home vs SNF pending progress and family availability to provide assist at d/c)   [facility] Rodolfo Oc with[de-identified] 24 Hour Assisteance, 24 Hour Supervision   Eating: Independent  Grooming: Supervision  Bathing: Total Assistance, Assist of 2  Bathing: Total Assistance, Assist of 2  Upper Body Dressing: Minimal Assistance  Lower Body Dressing: Maximum Assistance  Toileting:  Total Assistance, Assist of 2  Toilet Transfer: Maximum Assistance  Cognition: Exceptions to WNL  Cognition: Decreased Memory, Decreased Executive Functions, Decreased Attention, Decreased Safety  Orientation: Person, Situation, Place, Time                   DVT prophylaxis  · Managed on SQ Lovenox 40 mg daily - will need education for home    Bladder plan  • Continent    Bowel plan  · Continent      * Intertrochanteric fracture of right femur [facility])  Assessment & Plan  Traumatic fall  Sustained a right intertrochanteric fracture  Taken to OR by Dr Morales President on 10/12/22 for ORIF and IM nailing  WBAT RLE  DVT ppx with SQ Lovenox  Monitor 3 incisions  Continue acute rehabilitation program  POD 14 = 10/26/22 - appreciate ortho seeing patient and removing staples today 10/27/22  Xray completed 10/22 and stable  Follow-up with Dr Morales President as outpatient       Encephalopathy  Assessment & Plan  Resolved  Caused by UTI    UTI (urinary tract infection)  Assessment & Plan  + UA  Urine culture: >100K Klebsiella  Symptomatic with urinary frequency, new encephalopathy and hallucinations, dysuria - resolved  · Completed treatment with Bactrim which was sensitive      Deep tissue injury  Assessment & Plan  Left heel DTI  Offload and elevate  Appreciate wound care recommendations as below  Skin Care Plan:  1-Cleanse sacro-buttocks with soap and water  Apply Preventative Hydraguard BID and PRN  2-Turn/reposition q2h or when medically stable for pressure re-distribution on skin   3-Elevate heels to offload pressure  4-Moisturize skin daily with skin nourishing cream  5-Ehob cushion in chair when out of bed  6-B/L Heel Allevyn Foam Dressings  Samuel Left Heel with T for Treatment and Samuel Right Heel with P for Prevention  Change every other day or Prn  Peel back, inspect skin Q-shift, and reapply       H/O dizziness  Assessment & Plan  Dizziness related to motion  Chronic issue per patient  Meclizine 12 5 mg ordered BID     Orthostasis  Assessment & Plan  Mild orthstasis intermittently - much less this week  Continue compression stockings      Acute pain  Assessment & Plan  Nociceptive/neuropathic post op pain in RLE managed on multimodal regimen:   · Patient is intolerant of oxycodone and Norco   Patient excessively drowsy on oxycodone and with mild hallucinations on Norco   · Discontinue Norco  · Schedule Tylenol 975 mg t i d   · Scheduled gabapentin 100 mg bid  Gabapentin 300 mg q h s  · Topical Lidoderm patches  · Scheduled topical ice every 2 hours while awake  · Robaxin 500 mg q 6 hours p r n  For muscle spasm  · Dilaudid 1 mg t i d  P r n  added for severe pain - as intolerant of Norco or oxycodone    CLL (chronic lymphocytic leukemia) ([facility])  Assessment & Plan  Chronic leukocytosis (19K)  Monitor   Follow-up with heme onc as outpatient     Displaced fracture of middle phalanx of left ring finger, initial encounter for closed fracture  Assessment & Plan  Traumatic fracture  4th left finger   Treated non-operatively by Orthopedics  Continue supportive splint  ICE for swelling  NWB L hand    Hypertension  Assessment & Plan  At home: Lopressor 25 mg Q 12 hours, Hyzaar (Losartan/HCTZ) 50mg/12 5mg daily  Here: Lopressor 25 mg daily  Will monitor BP closely  Check orthostatics  Adjust medications accordingly  TEDs daily /Abdominal binder PRN    Chronic systolic heart failure (HCC)  Assessment & Plan  Grade 2 DD  At home: Lasix 40 mg daily  Here:  HOLD Lasix  Encourage fluids by mouth      Restart Lasix when able with K Dur supplement        HLD (hyperlipidemia)  Assessment & Plan  Restarted on home Lipitor 20 mg QPM    CKD (chronic kidney disease) stage 3, GFR 30-59 ml/min ([facility])  Assessment & Plan  Stable  Avoid nephrotoxic agents   Monitor 2x/week    Type 2 diabetes mellitus (HCC)  Assessment & Plan  Lab Results   Component Value Date    HGBA1C 7 6 (H) 10/12/2022     At home: Levemir 20 units QHS, Humalog 10 units with meals  Here: Lantus 32 units QHS, Humalog 8 units with breakfast and lunch, Humalog 4 units with dinner, continue sliding scale insulin  In acute care required an insulin drip transiently  Continue CCD        Appreciate IM consultants medical co-management  Labs, medications, and imaging personally reviewed  ROS:  A ten point review of systems was completed on 10/27/22 and pertinent positives are listed in subjective section  All other systems reviewed were negative  OBJECTIVE:   /55   Pulse 77   Temp 98 8 °F (37 1 °C) (Oral)   Resp 20   Ht 5' 6" (1 676 m)   Wt 66 7 kg (147 lb 0 8 oz)   SpO2 93%   BMI 23 73 kg/m²       Physical Exam  Vitals and nursing note reviewed  Constitutional:       General: She is not in acute distress  HENT:      Head: Normocephalic and atraumatic  Nose: Nose normal       Mouth/Throat:      Mouth: Mucous membranes are moist    Eyes:      Conjunctiva/sclera: Conjunctivae normal    Cardiovascular:      Rate and Rhythm: Normal rate and regular rhythm  Pulses: Normal pulses  Pulmonary:      Effort: Pulmonary effort is normal       Breath sounds: Normal breath sounds  No wheezing or rales  Abdominal:      General: Bowel sounds are normal  There is no distension  Palpations: Abdomen is soft  Tenderness: There is no abdominal tenderness  Musculoskeletal:         General: Swelling present  Cervical back: Neck supple  Right lower leg: Edema present  Skin:     General: Skin is warm  Comments: Staples removed   Neurological:      Mental Status: She is alert and oriented to person, place, and time     Psychiatric:         Mood and Affect: Mood normal           Lab Results   Component Value Date    WBC 19 25 (H) 10/24/2022    HGB 10 1 (L) 10/24/2022    HCT 32 8 (L) 10/24/2022     (H) 10/24/2022     (H) 10/24/2022     Lab Results   Component Value Date    SODIUM 138 10/24/2022    K 4 0 10/24/2022     10/24/2022    CO2 25 10/24/2022    BUN 26 (H) 10/24/2022    CREATININE 1 19 10/24/2022 GLUC 149 (H) 10/24/2022    CALCIUM 9 2 10/24/2022     Lab Results   Component Value Date    INR 0 89 10/11/2022    PROTIME 12 3 10/11/2022           Current Facility-Administered Medications:   •  acetaminophen (TYLENOL) tablet 325 mg, 325 mg, Oral, Q6H PRN, Andreas Palomino MD, 325 mg at 10/27/22 0847  •  acetaminophen (TYLENOL) tablet 975 mg, 975 mg, Oral, TID AC, Andreas Palomino MD, 975 mg at 10/27/22 1208  •  atorvastatin (LIPITOR) tablet 20 mg, 20 mg, Oral, Daily With Patricia Zepeda MD, 20 mg at 10/27/22 1707  •  bisacodyl (DULCOLAX) rectal suppository 10 mg, 10 mg, Rectal, Daily PRN, Andreas Palomino MD  •  docusate sodium (COLACE) capsule 100 mg, 100 mg, Oral, BID, Andreas Palomino MD, 100 mg at 10/27/22 0947  •  enoxaparin (LOVENOX) subcutaneous injection 40 mg, 40 mg, Subcutaneous, Q24H [address], Andreas Palomino MD, 40 mg at 10/27/22 0947  •  gabapentin (NEURONTIN) capsule 100 mg, 100 mg, Oral, BID, Andreas Palomino MD, 100 mg at 10/27/22 1331  •  gabapentin (NEURONTIN) capsule 300 mg, 300 mg, Oral, HS, Andreas Palomino MD, 300 mg at 10/26/22 2201  •  hydrocortisone 1 % cream, , Topical, 4x Daily PRN, Andreas Palomino MD  •  HYDROmorphone (DILAUDID) tablet 1 mg, 1 mg, Oral, TID PRN, Andreas Palomino MD, 1 mg at 10/26/22 1916  •  insulin glargine (LANTUS) subcutaneous injection 32 Units 0 32 mL, 32 Units, Subcutaneous, HS, BRINDA Kramer, 32 Units at 10/26/22 2201  •  insulin lispro (HumaLOG) 100 units/mL subcutaneous injection 1-5 Units, 1-5 Units, Subcutaneous, HS, Andreas Palomino MD, 2 Units at 10/26/22 2202  •  insulin lispro (HumaLOG) 100 units/mL subcutaneous injection 1-6 Units, 1-6 Units, Subcutaneous, TID AC, Andreas Palomino MD, 1 Units at 10/27/22 1705  •  insulin lispro (HumaLOG) 100 units/mL subcutaneous injection 4 Units, 4 Units, Subcutaneous, Daily With Kwasi Banks PA-C, 4 Units at 10/27/22 1705  •  insulin lispro (HumaLOG) 100 units/mL subcutaneous injection 8 Units, 8 Units, Subcutaneous, Daily With Breakfast, Adeola Jennings PA-C, 8 Units at 10/27/22 0831  •  insulin lispro (HumaLOG) 100 units/mL subcutaneous injection 8 Units, 8 Units, Subcutaneous, Daily With Lunch, Adeola Jennings PA-C, 8 Units at 10/27/22 1204  •  lidocaine (LIDODERM) 5 % patch 2 patch, 2 patch, Topical, Daily, Sandi Meehan MD, 2 patch at 10/27/22 0947  •  meclizine (ANTIVERT) tablet 12 5 mg, 12 5 mg, Oral, Q8H PRN, Sandi Meehan MD, 12 5 mg at 10/19/22 1353  •  meclizine (ANTIVERT) tablet 12 5 mg, 12 5 mg, Oral, BID before breakfast/lunch, Sandi Meehan MD, 12 5 mg at 10/27/22 1203  •  melatonin tablet 3 mg, 3 mg, Oral, HS, Sandi Meehan MD, 3 mg at 10/26/22 2201  •  methocarbamol (ROBAXIN) tablet 500 mg, 500 mg, Oral, Q6H PRN, Sandi Meehan MD, 500 mg at 10/27/22 0549  •  metoprolol tartrate (LOPRESSOR) tablet 25 mg, 25 mg, Oral, Q12H [place], BRINDA Kramer, 25 mg at 10/27/22 0847  •  naloxone (NARCAN) 0 04 mg/mL syringe 0 04 mg, 0 04 mg, Intravenous, Q1MIN PRN, Sandi Meehan MD  •  polyethylene glycol (MIRALAX) packet 17 g, 17 g, Oral, Daily PRN, Sandi Meehan MD  •  senna-docusate sodium (SENOKOT S) 8 6-50 mg per tablet 1 tablet, 1 tablet, Oral, HS, Sandi Meehan MD, 1 tablet at 10/26/22 2201    Past Medical History:   Diagnosis Date   • Benign adenomatous polyp of large intestine    • CHF (congestive heart failure) ([facility] )    • Diabetes mellitus ([facility] )    • Hyperlipemia    • Hypertension    • Osteoarthritis    • TIA (transient ischemic attack)        Patient Active Problem List    Diagnosis Date Noted   • Intertrochanteric fracture of right femur ([facility] ) 10/11/2022   • Encephalopathy 10/24/2022   • Deep tissue injury 10/21/2022   • UTI (urinary tract infection) 10/21/2022   • Orthostasis 10/19/2022   • H/O dizziness 10/19/2022   • Acute pain 10/17/2022   • CLL (chronic lymphocytic leukemia) ([facility] ) 10/13/2022 • Displaced fracture of middle phalanx of left ring finger, initial encounter for closed fracture 10/11/2022   • Hypertension 08/08/2021   • Ambulatory dysfunction 08/05/2021   • Suspected Mild cognitive impairment 08/05/2021   • Lymphadenopathy 11/06/2020   • Elevated liver enzymes 11/06/2020   • Fall 11/05/2020   • Type 2 diabetes mellitus ([facility] ) 11/05/2020   • CKD (chronic kidney disease) stage 3, GFR 30-59 ml/min ([place] [address] ) 11/05/2020   • HLD (hyperlipidemia) 11/05/2020   • OA (osteoarthritis) 11/05/2020   • Chronic systolic heart failure ([facility] ) 11/05/2020          Yanely Simpson    Total time spent:  30 minutes with more than 50% spent counseling/coordinating care  Counseling includes discussion with patient re: progress and discussion with patient of his/her current medical/functional state/information  Coordination of patient's care was performed in conjunction with consulting services  Time invested included review of patient's labs, vitals, and management of their comorbidities with continued monitoring  The care of the patient was extensively discussed and appropriate treatment plan was formulated unique for this patient  ** Please Note:  voice to text software may have been used in the creation of this document   Although proof errors in transcription or interpretation are a potential of such software**

## 2022-10-28 LAB
GLUCOSE SERPL-MCNC: 104 MG/DL (ref 65–140)
GLUCOSE SERPL-MCNC: 105 MG/DL (ref 65–140)
GLUCOSE SERPL-MCNC: 115 MG/DL (ref 65–140)
GLUCOSE SERPL-MCNC: 153 MG/DL (ref 65–140)
GLUCOSE SERPL-MCNC: 73 MG/DL (ref 65–140)
GLUCOSE SERPL-MCNC: 87 MG/DL (ref 65–140)

## 2022-10-28 RX ORDER — ACETAMINOPHEN 325 MG/1
650 TABLET ORAL
Status: DISCONTINUED | OUTPATIENT
Start: 2022-10-28 | End: 2022-11-08 | Stop reason: HOSPADM

## 2022-10-28 RX ORDER — QUETIAPINE FUMARATE 25 MG/1
12.5 TABLET, FILM COATED ORAL
Status: DISCONTINUED | OUTPATIENT
Start: 2022-10-28 | End: 2022-10-29

## 2022-10-28 RX ORDER — INSULIN LISPRO 100 [IU]/ML
3 INJECTION, SOLUTION INTRAVENOUS; SUBCUTANEOUS
Status: DISCONTINUED | OUTPATIENT
Start: 2022-10-29 | End: 2022-11-01

## 2022-10-28 RX ORDER — INSULIN LISPRO 100 [IU]/ML
3 INJECTION, SOLUTION INTRAVENOUS; SUBCUTANEOUS
Status: DISCONTINUED | OUTPATIENT
Start: 2022-10-29 | End: 2022-10-30

## 2022-10-28 RX ORDER — INSULIN GLARGINE 100 [IU]/ML
20 INJECTION, SOLUTION SUBCUTANEOUS
Status: DISCONTINUED | OUTPATIENT
Start: 2022-10-28 | End: 2022-10-29

## 2022-10-28 RX ORDER — LANOLIN ALCOHOL/MO/W.PET/CERES
6 CREAM (GRAM) TOPICAL
Status: DISCONTINUED | OUTPATIENT
Start: 2022-10-28 | End: 2022-10-29

## 2022-10-28 RX ORDER — SODIUM CHLORIDE 9 MG/ML
60 INJECTION, SOLUTION INTRAVENOUS ONCE
Status: COMPLETED | OUTPATIENT
Start: 2022-10-28 | End: 2022-10-28

## 2022-10-28 RX ADMIN — DOCUSATE SODIUM 100 MG: 100 CAPSULE, LIQUID FILLED ORAL at 18:39

## 2022-10-28 RX ADMIN — INSULIN LISPRO 8 UNITS: 100 INJECTION, SOLUTION INTRAVENOUS; SUBCUTANEOUS at 12:28

## 2022-10-28 RX ADMIN — MELATONIN 6 MG: at 21:36

## 2022-10-28 RX ADMIN — INSULIN LISPRO 8 UNITS: 100 INJECTION, SOLUTION INTRAVENOUS; SUBCUTANEOUS at 09:23

## 2022-10-28 RX ADMIN — METHOCARBAMOL 500 MG: 500 TABLET ORAL at 00:21

## 2022-10-28 RX ADMIN — GABAPENTIN 300 MG: 300 CAPSULE ORAL at 21:36

## 2022-10-28 RX ADMIN — GABAPENTIN 100 MG: 100 CAPSULE ORAL at 06:41

## 2022-10-28 RX ADMIN — ACETAMINOPHEN 975 MG: 325 TABLET, FILM COATED ORAL at 12:26

## 2022-10-28 RX ADMIN — ACETAMINOPHEN 975 MG: 325 TABLET, FILM COATED ORAL at 06:40

## 2022-10-28 RX ADMIN — HYDROMORPHONE HYDROCHLORIDE 1 MG: 2 TABLET ORAL at 00:21

## 2022-10-28 RX ADMIN — MECLIZINE HCL 12.5 MG 12.5 MG: 12.5 TABLET ORAL at 12:25

## 2022-10-28 RX ADMIN — GABAPENTIN 100 MG: 100 CAPSULE ORAL at 14:17

## 2022-10-28 RX ADMIN — QUETIAPINE FUMARATE 12.5 MG: 25 TABLET ORAL at 21:37

## 2022-10-28 RX ADMIN — METOPROLOL TARTRATE 25 MG: 25 TABLET, FILM COATED ORAL at 09:22

## 2022-10-28 RX ADMIN — SODIUM CHLORIDE 60 ML/HR: 0.9 INJECTION, SOLUTION INTRAVENOUS at 14:32

## 2022-10-28 RX ADMIN — INSULIN GLARGINE 20 UNITS: 100 INJECTION, SOLUTION SUBCUTANEOUS at 21:40

## 2022-10-28 RX ADMIN — MECLIZINE HCL 12.5 MG 12.5 MG: 12.5 TABLET ORAL at 06:40

## 2022-10-28 RX ADMIN — ATORVASTATIN CALCIUM 20 MG: 20 TABLET, FILM COATED ORAL at 18:39

## 2022-10-28 RX ADMIN — METOPROLOL TARTRATE 25 MG: 25 TABLET, FILM COATED ORAL at 21:34

## 2022-10-28 RX ADMIN — LIDOCAINE 5% 2 PATCH: 700 PATCH TOPICAL at 08:33

## 2022-10-28 RX ADMIN — DOCUSATE SODIUM 100 MG: 100 CAPSULE, LIQUID FILLED ORAL at 08:30

## 2022-10-28 RX ADMIN — ENOXAPARIN SODIUM 40 MG: 40 INJECTION SUBCUTANEOUS at 08:30

## 2022-10-28 NOTE — PROGRESS NOTES
10/28/22 1230   Pain Assessment   Pain Assessment Tool 0-10   Pain Score 8   Pain Location/Orientation Orientation: Right;Location: Hip   Restrictions/Precautions   Precautions Bed/chair alarms;Cognitive; Fall Risk;Supervision on toilet/commode;Visual deficit   LUE Weight Bearing Per Order NWB   RLE Weight Bearing Per Order WBAT   Braces or Orthoses   (TEDs and binder for Low BP)   Sit to Lying   Type of Assistance Needed Physical assistance   Physical Assistance Level 26%-50%   Sit to Lying CARE Score 3   Lying to Sitting on Side of Bed   Type of Assistance Needed Physical assistance   Physical Assistance Level 51%-75%   Comment Max A for RLE and trunk-  needs cues not to use L hand, also needed scooting to edge assistance   Lying to Sitting on Side of Bed CARE Score 2   Sit to Stand   Type of Assistance Needed Physical assistance   Physical Assistance Level 51%-75%   Comment Max A - needs lift assist and balance assistance to PRW   Sit to Stand CARE Score 2   Bed-Chair Transfer   Type of Assistance Needed Physical assistance   Physical Assistance Level Total assistance   Comment 2 person for safety - Mod A x2 with stand pivot,  during this session needed to use slide board due to BP so low   Chair/Bed-to-Chair Transfer CARE Score 1   Walk 10 Feet   Reason if not Attempted Safety concerns   Walk 10 Feet CARE Score 88   Walk 50 Feet with Two Turns   Reason if not Attempted Safety concerns   Walk 50 Feet with Two Turns CARE Score 88   Walk 150 Feet   Reason if not Attempted Safety concerns   Walk 150 Feet CARE Score 88   Walking 10 Feet on Uneven Surfaces   Reason if not Attempted Safety concerns   Walking 10 Feet on Uneven Surfaces CARE Score 88   Wheel 50 Feet with Two Turns   Comment Perform over the weekend   Wheel 150 Feet   Comment perform over the weekend   Curb or Single Stair   Reason if not Attempted Safety concerns   1 Step (Curb) CARE Score 88   4 Steps   Reason if not Attempted Safety concerns   4 Steps CARE Score 88   12 Steps   Reason if not Attempted Activity not applicable   12 Steps CARE Score 9   Picking Up Object   Reason if not Attempted Safety concerns   Picking Up Object CARE Score 88   Therapeutic Interventions   Strengthening Supine- bridges over bolster,  SAQ arom on R and 3# on L,  Heelslides aarom on R,  hip add isometric   Equipment Use   NuStep 6mins for ROM and general mobility - didnt use L hand   Other Comments   Comments (S)  Check BPS   Assessment   Treatment Assessment 80 min skilled PT session pt was in bed alert finishing lunch  Bp sitting up in bed was 110/60,  performed stand pivot with PRW to WC  Pt did report some dizziness but minimal which she does have usually t/o the day  Pt transfered to [facility] and while on Nustep pt states getting very sleepy and some inc dizziness  Checked BP and manualy got 84/46,  quickly used slide board to transfer back to [facility] and got pt back to bed with legs up  After 2 mins pts /60 and pts dizziness subsided but she felt very sleepy  Finished session with ROM/ strengthening  Pt making limited progress past few days and every day is inconsistant with mental status/ fatigue/ blood pressures/ and pain  Discussed status with MD and they are going to order IV fluids  Barriers to Discharge Inaccessible home environment;Decreased caregiver support   PT Barriers   Physical Impairment Decreased strength;Decreased endurance; Impaired balance;Decreased mobility;Orthopedic restrictions;Decreased skin integrity; Decreased range of motion;Decreased cognition; Impaired judgement;Decreased safety awareness;Pain   Functional Limitation Car transfers; Ramp negotiation;Stair negotiation;Transfers; Walking;Standing   Plan   Progress Slow progress, decreased activity tolerance   PT Therapy Minutes   PT Time In 1230   PT Time Out 1350   PT Total Time (minutes) 80   PT Mode of treatment - Individual (minutes) 80   PT Mode of treatment - Concurrent (minutes) 0   PT Mode of treatment - Group (minutes) 0   PT Mode of treatment - Co-treat (minutes) 0   PT Mode of Treatment - Total time(minutes) 80 minutes   PT Cumulative Minutes 975   Therapy Time missed   Time missed?  No

## 2022-10-28 NOTE — PROGRESS NOTES
10/28/22 1000   Assessment   Treatment Assessment 10mins spent with pt attempting to get pt alert and open eyes for therapy, pt talking but too tired to open eyes  Discussed with PA about pts statuse, checked /60 pt would not open eyes so need to attempt therapy later in day  PT Therapy Minutes   PT Time In 1000   PT Time Out 1015   PT Total Time (minutes) 15   PT Mode of treatment - Individual (minutes) 15   PT Mode of treatment - Concurrent (minutes) 0   PT Mode of treatment - Group (minutes) 0   PT Mode of treatment - Co-treat (minutes) 0   PT Mode of Treatment - Total time(minutes) 15 minutes   PT Cumulative Minutes 895   Therapy Time missed   Time missed?  Yes   Amount of time missed 80   Reason for time missed Extreme fatigue

## 2022-10-28 NOTE — PROGRESS NOTES
10/28/22 0700   Pain Assessment   Pain Assessment Tool 0-10   Pain Score 8   Pain Location/Orientation Orientation: Right;Location: Leg;Orientation: Bilateral;Orientation: Lower; Location: Back  (pt reports that pain present only during movement)   Pain Onset/Description Onset: Ongoing;Frequency: Intermittent   Hospital Pain Intervention(s) Repositioned; Rest   Restrictions/Precautions   Precautions Bed/chair alarms;Cognitive; Fall Risk;Pain;Pressure Ulcer;Supervision on toilet/commode;Visual deficit   Weight Bearing Restrictions Yes   LUE Weight Bearing Per Order NWB  (hand)   RLE Weight Bearing Per Order WBAT   ROM Restrictions No   Braces or Orthoses Sling  (L prevalon boot)   Eating   Type of Assistance Needed Supervision;Set-up / Marcie Crass; Verbal cues   Physical Assistance Level No physical assistance   Comment (S)  Pt initially S/U A for breakfast meal (to open small packages) during OT session (incorporated breakfast into OT session 2* pt blood sugar low (per nsg) at start of OT and pt fatigued  CUMMINS observed that pt frequently falling asleep during breakfast meal with food still in her mouth, requiring frequent verbal cues and conversation to stay alert t/o breakfast  Pt also observed with difficulty bringing banana to mouth, bringing to the outside of her cheek instead and requiring multiple attempts to reach mouth as target  When cummins questioned pt regarding this, pt stated "oh the banana peel was in my way " Notified nsg Mariah Russell who notified physician  CUMMINS recommending that pt has supervision with meals in hospital as pt seen falling asleep with food in her mouth, posing safety hazard     Eating CARE Score 4   Oral Hygiene   Type of Assistance Needed Set-up / clean-up   Physical Assistance Level No physical assistance   Comment seated EOB with increased time to complete 2* fatigue   Oral Hygiene CARE Score 5   Grooming   Able To Comb/Brush Hair;Wash/Dry Face;Brush/Clean Teeth   Limitation Noted In Coordination;Problem Solving; Safety; Sequencing;Timeliness   Findings S/U A seated EOB for grooming tasks, with pt requiring increased time 2* fatigue   Shower/Bathe Self   Type of Assistance Needed Physical assistance;Verbal cues   Physical Assistance Level 76% or more   Comment Pt engaged in sponge bath seated EOB (has shower orders but 2* low BP, safety concerns with showering)  Pt able to wash 6/10 parts, washing UB and B/L upper legs seated with cues for sequencing  In stance at [facility] with [address] for balance, pt dependent for washing suad/rear  A to wash B/L feet  Shower/Bathe Self CARE Score 2   Bathing   Assessed Bath Style Sponge Bath   Able to Gather/Transport No   Able to [employer] Temperature No   Able to Wash/Rinse/Dry (body part) Left Arm;Right Arm;L Upper Leg;R Upper Leg;Chest;Abdomen   Limitations Noted in Balance; Coordination; Endurance;Problem Solving;ROM;Safety; Sequencing;Strength;Timeliness   Positioning Seated;Standing   Tub/Shower Transfer   Reason Not Assessed Sponge Bath;Safety   Upper Body Dressing   Type of Assistance Needed Physical assistance   Physical Assistance Level 25% or less   Comment A to manage down back   Upper Body Dressing CARE Score 3   Lower Body Dressing   Type of Assistance Needed Physical assistance;Verbal cues   Physical Assistance Level Total assistance   Comment Due to low BP and pt c/o feeling dizzy after sponge bath EOB, pt required total A to thread LEs through brief and pants and complete CM up over hips by rolling to B/L sides in bed, using bed rails with vc's for hand placement and sequencing  Completed bed level for increased pt safety today  When pt BP has improved and WNL, continue having pt practice using LHAE for LB dressing as appropriate     Lower Body Dressing CARE Score 1   Putting On/Taking Off Footwear   Type of Assistance Needed Physical assistance   Physical Assistance Level Total assistance   Comment Due to low BP and c/o feeling dizzy after bathing, pt dependent at bed-level to don B/L knee-high TEDs and doff/don socks   Putting On/Taking Off Footwear CARE Score 1   Dressing/Undressing Clothing   Remove UB Clothes   (hospital gown)   Don UB Clothes Pullover Shirt;Bra   Remove LB Clothes Socks   Don LB Clothes Pants; Undergarment;Socks;TEDs   Limitations Noted In Balance; Coordination; Endurance;Problem Solving; Safety; Sequencing;Strength;ROM; Timeliness   Positioning Sit Edge Of Bed; In Bed   Roll Left and Right   Type of Assistance Needed Physical assistance;Verbal cues; Adaptive equipment   Physical Assistance Level 51%-75%   Comment modA with use of bed rails and vc's for body mechanics and hand placement   Roll Left and Right CARE Score 2   Sit to Lying   Type of Assistance Needed Physical assistance;Verbal cues   Physical Assistance Level 26%-50%   Comment A for RLE mgmt, cues for sequencing   Sit to Lying CARE Score 3   Lying to Sitting on Side of Bed   Type of Assistance Needed Physical assistance;Verbal cues   Physical Assistance Level 76% or more   Comment A for BLE mgmt, vc's to maintain LUE NWB, P carryover   Lying to Sitting on Side of Bed CARE Score 2   Sit to Stand   Type of Assistance Needed Physical assistance;Verbal cues; Adaptive equipment   Physical Assistance Level 51%-75%   Comment ModA at [facility]  Sharif Donald 33, with vc's for safe hand placement to maintain LUE NWB  2nd helper present for pt safety 2* c/o dizziness and low BP, but helper not needed   Sit to Stand CARE Score 2   Bed-Chair Transfer   Comment Due to c/o dizziness and low BP   Reason if not Attempted Safety concerns   Chair/Bed-to-Chair Transfer CARE Score 88   Cognition   Overall Cognitive Status Impaired   Arousal/Participation Alert; Cooperative   Attention Attends with cues to redirect   Orientation Level Oriented to person   Memory Decreased short term memory;Decreased recall of recent events;Decreased recall of precautions   Following Commands Follows one step commands with increased time or repetition   Activity Tolerance   Activity Tolerance Patient limited by fatigue   Medical Staff Made Aware notified [facility]   Assessment   Treatment Assessment Pt seen for 90min skilled OT session focused on ADL skills retraining (sponge bath), bed mobility, sit<>stands, and self-feeding, for increased independence w/ADLs and decreased caregiver burden  See detailed descriptions of fxl performance above  At start of session, pt c/o generalized fatigue, stating that she did not sleep well last night  Pt appeared confused, stating several times that she already got washed up this morning, when that wasn't the case  BRIGGS gently oriented patient and pt agreeable to complete sponge bath and dressing with BRIGGS  [facility] notified BRIGGS that pt blood sugar low at start of OT session, so self-feeding of breakfast meal was incorporated into OT session; recommending pt be upgraded to Supervision with meals, see safety concerns above  After sponge bath complete, pt c/o dizziness, with BP 92/48, so therapist completed LB dressing bed level for increased pt safety  Pt continues to present with increased confusion, and continues to be limited by cog deficits, RLE and lower back pain, decreased compliance with LUE NWB, decreased orientation, fxl problem solving skills, standing tolerance, and safety awareness  Pt would benefit from continued skilled OT focused on ADL retraining, endurance work, fxl transfer training, repetitive safety training, LHAE edu/carryover, and toileting/toilet transfers  Pt positioned in bed at end of OT session with RLE and LUE elevated on pillows  Prognosis Good   Problem List Decreased strength;Decreased endurance; Impaired balance;Decreased mobility; Decreased coordination;Decreased cognition; Impaired judgement;Decreased safety awareness;Orthopedic restrictions;Pain; Impaired vision   Barriers to Discharge Inaccessible home environment;Decreased caregiver support   Plan   Treatment/Interventions ADL retraining;Functional transfer training; Therapeutic exercise; Endurance training;Cognitive reorientation;Patient/family training;Equipment eval/education; Bed mobility; Compensatory technique education;Spoke to nursing   Progress Slow progress, decreased activity tolerance  (cog deficits)   Recommendation   OT Discharge Recommendation   (pending)   OT Therapy Minutes   OT Time In 0700   OT Time Out 0830   OT Total Time (minutes) 90   OT Mode of treatment - Individual (minutes) 90   OT Mode of treatment - Concurrent (minutes) 0   OT Mode of treatment - Group (minutes) 0   OT Mode of treatment - Co-treat (minutes) 0   OT Mode of Treatment - Total time(minutes) 90 minutes   OT Cumulative Minutes 880   Therapy Time missed   Time missed?  No

## 2022-10-28 NOTE — PROGRESS NOTES
PM&R PROGRESS NOTE:  Brennen Nava 79 y o  female MRN: [id number]  Unit/Bed#: -31 Encounter: [id number]        Rehab Diagnosis: Impairment of mobility, safety and Activities of Daily Living (ADLs) due to Orthopedic Disorders:  08 11  Unilateral Hip Fracture - Right    SUBJECTIVE:  Patient seen face to face  Feeling dizzy at times today  Pain is currently mild  No acute issues overnight, but now with increased delirium during the day  Poor sleep overnight continues, but patient a night owl at baseline at home  Orthostatic in therapies  ASSESSMENT: Stable, progressing      PLAN:    Rehabilitation  · Functional deficits: impaired mobility, self care, WBAT RLE, NWB L HAND  • Continue current rehabilitation plan of care to maximize function  Currently requiring moderate-max assist with PT and OT  • Expected Discharge:  Probable SNF referrals will be required, as patient's family will be unable to provide assistance for home discharge in 10 days (anticipate she will require assistance with bathing, IADLs)  Patient does require complete modified independent status to return home      DVT prophylaxis  · Managed on SQ Lovenox 40 mg daily - will need education for home    Bladder plan  • Continent    Bowel plan  · Continent      * Intertrochanteric fracture of right femur [facility])  Assessment & Plan  Traumatic fall  Sustained a right intertrochanteric fracture  Taken to OR by Dr Colin Guzman on 10/12/22 for ORIF and IM nailing  WBAT RLE  DVT ppx with SQ Lovenox  Monitor 3 incisions  Continue acute rehabilitation program  POD 14 = 10/26/22 - appreciate ortho seeing patient and removing staples today 10/27/22    Xray completed 10/22 and stable  Follow-up with Dr Colin Guzman as outpatient       Encephalopathy  Assessment & Plan  Recurrent 10/28/22  Now appears to be delirious  Increase melatonin to 6 mg  Seroquel 12 5 mg QHS      UTI (urinary tract infection)  Assessment & Plan  + UA  Urine culture: >100K Klebsiella  Symptomatic with urinary frequency, new encephalopathy and hallucinations, dysuria - resolved  · Completed treatment with Bactrim which was sensitive      Deep tissue injury  Assessment & Plan  Left heel DTI  Offload and elevate  Appreciate wound care recommendations as below  Skin Care Plan:  1-Cleanse sacro-buttocks with soap and water  Apply Preventative Hydraguard BID and PRN  2-Turn/reposition q2h or when medically stable for pressure re-distribution on skin   3-Elevate heels to offload pressure  4-Moisturize skin daily with skin nourishing cream  5-Ehob cushion in chair when out of bed  6-B/L Heel Allevyn Foam Dressings  Samuel Left Heel with T for Treatment and Samuel Right Heel with P for Prevention  Change every other day or Prn  Peel back, inspect skin Q-shift, and reapply  H/O dizziness  Assessment & Plan  Dizziness related to motion  Chronic issue per patient  Meclizine 12 5 mg ordered BID     Orthostasis  Assessment & Plan  Mild orthstasis intermittently - much less this week  Continue compression stockings/Abd binder during all therapies  Recurrent 10/28/22: Gentle IVF started today for orthostasis x 1L      Acute pain  Assessment & Plan  Nociceptive/neuropathic post op pain in RLE managed on multimodal regimen:   · Patient is intolerant of oxycodone and Norco   Patient excessively drowsy on oxycodone and with mild hallucinations on Norco   · Discontinue Norco  · Schedule Tylenol 650 mg t i d  and 325 PRN  · Scheduled gabapentin 100 mg bid  Gabapentin 300 mg q h s  · Topical Lidoderm patches  · Scheduled topical ice every 2 hours while awake  · Robaxin 500 mg q 6 hours p r n   For muscle spasm  · Discontinued Dilaudid - increased delirium today    CLL (chronic lymphocytic leukemia) (HCC)  Assessment & Plan  Chronic leukocytosis (19K)  Monitor   Follow-up with heme onc as outpatient     Displaced fracture of middle phalanx of left ring finger, initial encounter for closed fracture  Assessment & Plan  Traumatic fracture  4th left finger   Treated non-operatively by Orthopedics  Continue supportive splint  ICE for swelling  NWB L hand    Hypertension  Assessment & Plan  At home: Lopressor 25 mg Q 12 hours, Hyzaar (Losartan/HCTZ) 50mg/12 5mg daily  Here: Lopressor 25 mg daily  Will monitor BP closely  Check orthostatics  Adjust medications accordingly  TEDs daily /Abdominal binder PRN    Chronic systolic heart failure (HCC)  Assessment & Plan  Grade 2 DD  At home: Lasix 40 mg daily  Here:  HOLD Lasix  Encourage fluids by mouth  Restart Lasix when able with K Dur supplement    HLD (hyperlipidemia)  Assessment & Plan  Restarted on home Lipitor 20 mg QPM    CKD (chronic kidney disease) stage 3, GFR 30-59 ml/min (HCC)  Assessment & Plan  Stable  Avoid nephrotoxic agents   Monitor 2x/week    Type 2 diabetes mellitus (HCC)  Assessment & Plan  Lab Results   Component Value Date    HGBA1C 7 6 (H) 10/12/2022     At home: Levemir 20 units QHS, Humalog 10 units with meals  Here: Lantus 32 units QHS, Humalog 8 units with breakfast and lunch, Humalog 4 units with dinner, continue sliding scale insulin  In acute care required an insulin drip transiently  Continue CCD        Appreciate IM consultants medical co-management  Labs, medications, and imaging personally reviewed  ROS:  A ten point review of systems was completed on 10/28/22 and pertinent positives are listed in subjective section  All other systems reviewed were negative  OBJECTIVE:   /70 (BP Location: Left arm)   Pulse 71   Temp 98 9 °F (37 2 °C) (Oral)   Resp 16   Ht 5' 6" (1 676 m)   Wt 66 7 kg (147 lb 0 8 oz)   SpO2 95%   BMI 23 73 kg/m²       Physical Exam  Vitals and nursing note reviewed  Constitutional:       General: She is not in acute distress  Comments: Mild confusion   HENT:      Head: Normocephalic and atraumatic        Nose: Nose normal       Mouth/Throat:      Mouth: Mucous membranes are moist    Eyes:      Conjunctiva/sclera: Conjunctivae normal    Cardiovascular:      Rate and Rhythm: Normal rate and regular rhythm  Pulses: Normal pulses  Pulmonary:      Effort: Pulmonary effort is normal       Breath sounds: Normal breath sounds  No wheezing or rales  Abdominal:      General: Bowel sounds are normal  There is no distension  Palpations: Abdomen is soft  Tenderness: There is no abdominal tenderness  Musculoskeletal:         General: No swelling  Cervical back: Neck supple  Skin:     General: Skin is warm  Neurological:      Mental Status: She is alert and oriented to person, place, and time  Motor: Weakness present  Gait: Gait abnormal (antalgic gait)     Psychiatric:      Comments: confused          Lab Results   Component Value Date    WBC 19 25 (H) 10/24/2022    HGB 10 1 (L) 10/24/2022    HCT 32 8 (L) 10/24/2022     (H) 10/24/2022     (H) 10/24/2022     Lab Results   Component Value Date    SODIUM 138 10/24/2022    K 4 0 10/24/2022     10/24/2022    CO2 25 10/24/2022    BUN 26 (H) 10/24/2022    CREATININE 1 19 10/24/2022    GLUC 149 (H) 10/24/2022    CALCIUM 9 2 10/24/2022     Lab Results   Component Value Date    INR 0 89 10/11/2022    PROTIME 12 3 10/11/2022           Current Facility-Administered Medications:   •  acetaminophen (TYLENOL) tablet 325 mg, 325 mg, Oral, Q6H PRN, Alba Kim MD, 325 mg at 10/27/22 0847  •  acetaminophen (TYLENOL) tablet 650 mg, 650 mg, Oral, TID AC, Alba Kim MD  •  atorvastatin (LIPITOR) tablet 20 mg, 20 mg, Oral, Daily With Ed Crawford MD, 20 mg at 10/27/22 1707  •  bisacodyl (DULCOLAX) rectal suppository 10 mg, 10 mg, Rectal, Daily PRN, Alba Kim MD  •  docusate sodium (COLACE) capsule 100 mg, 100 mg, Oral, BID, Alba Kim MD, 100 mg at 10/28/22 0830  •  enoxaparin (LOVENOX) subcutaneous injection 40 mg, 40 mg, Subcutaneous, Q24H [address], Becky Bloodgood Janeth Oneill MD, 40 mg at 10/28/22 0830  •  gabapentin (NEURONTIN) capsule 100 mg, 100 mg, Oral, BID, Darya Portillo MD, 100 mg at 10/28/22 1417  •  gabapentin (NEURONTIN) capsule 300 mg, 300 mg, Oral, HS, Darya Portillo MD, 300 mg at 10/27/22 2202  •  hydrocortisone 1 % cream, , Topical, 4x Daily PRN, Darya Portillo MD  •  insulin glargine (LANTUS) subcutaneous injection 32 Units 0 32 mL, 32 Units, Subcutaneous, HS, BRINDA Kramer, 32 Units at 10/27/22 2201  •  insulin lispro (HumaLOG) 100 units/mL subcutaneous injection 1-5 Units, 1-5 Units, Subcutaneous, HS, Darya Portillo MD, 1 Units at 10/27/22 2206  •  insulin lispro (HumaLOG) 100 units/mL subcutaneous injection 1-6 Units, 1-6 Units, Subcutaneous, TID AC, Darya Portillo MD, 1 Units at 10/27/22 1705  •  insulin lispro (HumaLOG) 100 units/mL subcutaneous injection 4 Units, 4 Units, Subcutaneous, Daily With Gema Marcos PA-C, 4 Units at 10/27/22 1705  •  insulin lispro (HumaLOG) 100 units/mL subcutaneous injection 8 Units, 8 Units, Subcutaneous, Daily With Breakfast, Adeola Jennings PA-C, 8 Units at 10/28/22 8378  •  insulin lispro (HumaLOG) 100 units/mL subcutaneous injection 8 Units, 8 Units, Subcutaneous, Daily With Lunch, Adeola Jennings PA-C, 8 Units at 10/28/22 1228  •  lidocaine (LIDODERM) 5 % patch 2 patch, 2 patch, Topical, Daily, Darya Portillo MD, 2 patch at 10/28/22 2567  •  meclizine (ANTIVERT) tablet 12 5 mg, 12 5 mg, Oral, Q8H PRN, Darya Portillo MD, 12 5 mg at 10/19/22 1353  •  meclizine (ANTIVERT) tablet 12 5 mg, 12 5 mg, Oral, BID before breakfast/lunch, Darya Portillo MD, 12 5 mg at 10/28/22 1225  •  melatonin tablet 6 mg, 6 mg, Oral, HS, Darya Portillo MD  •  methocarbamol (ROBAXIN) tablet 500 mg, 500 mg, Oral, Q6H PRN, Darya Portillo MD, 500 mg at 10/28/22 0021  •  metoprolol tartrate (LOPRESSOR) tablet 25 mg, 25 mg, Oral, Q12H [facility], BRINDA Kramer, 25 mg at 10/28/22 1850  •  naloxone (NARCAN) 0 04 mg/mL syringe 0 04 mg, 0 04 mg, Intravenous, Q1MIN PRN, Haile Savage MD  •  polyethylene glycol (MIRALAX) packet 17 g, 17 g, Oral, Daily PRN, Haile Savage MD  •  QUEtiapine (SEROquel) tablet 12 5 mg, 12 5 mg, Oral, Haile MD  •  senna-docusate sodium (SENOKOT S) 8 6-50 mg per tablet 1 tablet, 1 tablet, Oral, HS, Haile Savage MD, 1 tablet at 10/27/22 2202    Past Medical History:   Diagnosis Date   • Benign adenomatous polyp of large intestine    • CHF (congestive heart failure) (HCC)    • Diabetes mellitus ([facility])    • Hyperlipemia    • Hypertension    • Osteoarthritis    • TIA (transient ischemic attack)        Patient Active Problem List    Diagnosis Date Noted   • Intertrochanteric fracture of right femur ([facility]) 10/11/2022   • Encephalopathy 10/24/2022   • Deep tissue injury 10/21/2022   • UTI (urinary tract infection) 10/21/2022   • Orthostasis 10/19/2022   • H/O dizziness 10/19/2022   • Acute pain 10/17/2022   • CLL (chronic lymphocytic leukemia) ([facility]) 10/13/2022   • Displaced fracture of middle phalanx of left ring finger, initial encounter for closed fracture 10/11/2022   • Hypertension 08/08/2021   • Ambulatory dysfunction 08/05/2021   • Suspected Mild cognitive impairment 08/05/2021   • Lymphadenopathy 11/06/2020   • Elevated liver enzymes 11/06/2020   • Fall 11/05/2020   • Type 2 diabetes mellitus ([facility]) 11/05/2020   • CKD (chronic kidney disease) stage 3, GFR 30-59 ml/min ([facility]) 11/05/2020   • HLD (hyperlipidemia) 11/05/2020   • OA (osteoarthritis) 11/05/2020   • Chronic systolic heart failure ([facility]) 11/05/2020          Yanely Simpson    Total time spent:  45 minutes with more than 50% spent counseling/coordinating care  Counseling includes discussion with patient re: progress and discussion with patient of his/her current medical/functional state/information   Coordination of patient's care was performed in conjunction with consulting services  Time invested included review of patient's labs, vitals, and management of their comorbidities with continued monitoring  The care of the patient was extensively discussed and appropriate treatment plan was formulated unique for this patient  ** Please Note:  voice to text software may have been used in the creation of this document   Although proof errors in transcription or interpretation are a potential of such software**

## 2022-10-28 NOTE — PROGRESS NOTES
Internal Medicine Progress Note  Patient: Jamie Angela  Age/sex: 79 y o  female  Medical Record #: [id number]      ASSESSMENT/PLAN: (Interval History)  Jamie Angela is seen and examined and management for following issues:    S/p ORIF with IM nailing of the Rt femur  · Pain control and therapy per primary service  · Pain meds adjusted d/t sedation  · Renal dosed Lovenox for DVT prophylaxis  · Follow-up with Ortho 2 weeks post-op      Left 4th phalanx fracture  · Splint and NWB   · Pain control per PMR      HTN  · Home medications: Hyzaar 50/12 5mg daily/Lopressor 25mg every 12 hours/Lasix 40mg daily - for heart failure  · Here: metoprolol 25mg bid (increased 10/18)  · Will monitor and adjust medications as needed  · stable    Anemia  · Secondary to trauma  · Stable      DM type 2  · Home: Levemir 20U at bedtime/Humalog 10U with meals  · Here: Lantus 32U at bedtime/Humalog 8U with breakfast and lunch, 4U with dinner  · No changes today      Chronic heart failure  · Pt has been off diuretics  · ECHO with an EF of 45%  Grade 2 diastolic dysfunction  · euvolemic     CLL  · Recent diagnosis  · Outpt follow-up with Hematology/Oncology  · Baseline WBCs 14-16K  · Had mild bump likely reactive secondary to surgery/UTI  · Stable      Suspected UTI  · positive UA  Culture with >100,000 klebsiella  · Susceptible to Bactrim  · Urinary retention improved  DC planning: TBD  The above assessment and plan was reviewed and updated as determined by my evaluation of the patient on 10/28/2022      Labs:   Results from last 7 days   Lab Units 10/24/22  0507 10/22/22  1207   WBC Thousand/uL 19 25* 21 27*   HEMOGLOBIN g/dL 10 1* 9 7*   HEMATOCRIT % 32 8* 31 3*   PLATELETS Thousands/uL 490* 464*     Results from last 7 days   Lab Units 10/24/22  0507 10/22/22  1207   SODIUM mmol/L 138 136   POTASSIUM mmol/L 4 0 4 2   CHLORIDE mmol/L 108 106   CO2 mmol/L 25 27   BUN mg/dL 26* 20   CREATININE mg/dL 1 19 0 99   CALCIUM mg/dL 9 2 9 1             Results from last 7 days   Lab Units 10/28/22  0649 10/27/22  2111 10/27/22  1603   POC GLUCOSE mg/dl 73 154* 175*       Review of Scheduled Meds:  Current Facility-Administered Medications   Medication Dose Route Frequency Provider Last Rate   • acetaminophen  325 mg Oral Q6H PRN Scot Phillip MD     • acetaminophen  975 mg Oral TID AC Scot Phillip MD     • atorvastatin  20 mg Oral Daily With Camron Upton MD     • bisacodyl  10 mg Rectal Daily PRN Scot Phillip MD     • docusate sodium  100 mg Oral BID Scot Phillip MD     • enoxaparin  40 mg Subcutaneous Q24H Carlin Cannon MD     • gabapentin  100 mg Oral BID Scot Phillip MD     • gabapentin  300 mg Oral HS Scot Phillip MD     • hydrocortisone   Topical 4x Daily PRN Scot Phillip MD     • HYDROmorphone  1 mg Oral TID PRN Scot Phillip MD     • insulin glargine  32 Units Subcutaneous HS BRINDA Kramer     • insulin lispro  1-5 Units Subcutaneous HS Scot Phillip MD     • insulin lispro  1-6 Units Subcutaneous TID AC Scot Phillip MD     • insulin lispro  4 Units Subcutaneous Daily With Dinner Adeola Jennings PA-C     • insulin lispro  8 Units Subcutaneous Daily With Breakfast Adeola Jennings PA-C     • insulin lispro  8 Units Subcutaneous Daily With Lunch Adeola Jennings PA-C     • lidocaine  2 patch Topical Daily Scot Phillip MD     • meclizine  12 5 mg Oral Q8H PRN Scot Phillip MD     • meclizine  12 5 mg Oral BID before breakfast/lunch Scot Phillip MD     • melatonin  3 mg Oral HS Scot Phillip MD     • methocarbamol  500 mg Oral Q6H PRN Scot Phillip MD     • metoprolol tartrate  25 mg Oral Q12H [facility] BRINDA Kramer     • naloxone  0 04 mg Intravenous Q1MIN PRN Scot Phillip MD     • polyethylene glycol  17 g Oral Daily PRN Scot Phillip MD     • senna-docusate sodium  1 tablet Oral HS [facility] Giovany Yañez MD         Subjective/ HPI: Patient seen and examined  Patients overnight issues or events were reviewed with nursing or staff during rounds or morning huddle session  New or overnight issues include the following:     Pt seen in her room  She reports not sleeping well overnight and just wants to sleep today  She denies any other complaints  ROS:   A 10 point ROS was performed; negative except as noted above  Imaging:     XR femur 2 vw right   Final Result by Jimmy Luna MD (10/27 1551)      Near-anatomic alignment of a fracture the proximal right femur following ORIF  No change since 10/22/2022  Workstation performed: [id number]         XR femur 2 vw right   Final Result by Rod Lantigua MD (10/25 1126)      Status post right hip ORIF  Satisfactory alignment  Workstation performed: [id number]             *Labs /Radiology studies Reviewed  *Medications  reviewed and reconciled as needed  *Please refer to order section for additional ordered labs studies  *Case discussed with primary attending during morning huddle case rounds    Physical Examination:  Vitals:   Vitals:    10/27/22 1100 10/27/22 1700 10/27/22 2127 10/28/22 0600   BP: 129/55 142/60 148/76    BP Location:  Right arm Right arm    Pulse:  66 76    Resp:  17 20    Temp:  98 °F (36 7 °C) 98 1 °F (36 7 °C)    TempSrc:  Oral Oral    SpO2:  100% 95%    Weight:    66 7 kg (147 lb 0 8 oz)   Height:         GEN: No apparent distress, interactive  NEURO: Alert and oriented x 3   No further hallucinations  HEENT: Pupils are equal and reactive, EOMI, mucous membranes are moist, face symmetrical  CV: S1 S2 regular, no MRG, no peripheral edema noted  RESP: Lungs are clear bilaterally, no wheezes, rales or rhonchi noted, on room air, respirations easy and non labored  GI: Flat, soft non tender, non distended; +BS x4  : Voiding without difficulty  MUSC: Moves all extremities; NWB LUE 4th finger; WBAT RLE  SKIN: pink, warm and dry, normal turgor, dressing in place to RLE incisions with staples all are intact without evidence of infection; Lt 4th finger with splint    The above physical exam was reviewed and updated as determined by my evaluation of the patient on 10/28/2022  Invasive Devices  Report    Drain  Duration           External Urinary Catheter 1 day                   VTE Pharmacologic Prophylaxis: Enoxaparin  Code Status: Level 1 - Full Code  Current Length of Stay: 11 day(s)      Total time spent:  30 minutes with more than 50% spent counseling/coordinating care  Counseling includes discussion with patient re: progress  and discussion with patient of his/her current medical state/information  Coordination of patient's care was performed in conjunction with primary service  Time invested included review of patient's labs, vitals, and management of their comorbidities with continued monitoring  In addition, this patient was discussed with medical team including physician and advanced extenders  The care of the patient was extensively discussed and appropriate treatment plan was formulated unique for this patient  ** Please Note:  voice to text software may have been used in the creation of this document   Although proof errors in transcription or interpretation are a potential of such software**

## 2022-10-29 LAB
GLUCOSE SERPL-MCNC: 106 MG/DL (ref 65–140)
GLUCOSE SERPL-MCNC: 213 MG/DL (ref 65–140)
GLUCOSE SERPL-MCNC: 223 MG/DL (ref 65–140)
GLUCOSE SERPL-MCNC: 231 MG/DL (ref 65–140)

## 2022-10-29 RX ORDER — LANOLIN ALCOHOL/MO/W.PET/CERES
6 CREAM (GRAM) TOPICAL EVERY EVENING
Status: DISCONTINUED | OUTPATIENT
Start: 2022-10-29 | End: 2022-11-08 | Stop reason: HOSPADM

## 2022-10-29 RX ADMIN — ENOXAPARIN SODIUM 40 MG: 40 INJECTION SUBCUTANEOUS at 08:04

## 2022-10-29 RX ADMIN — LIDOCAINE 5% 2 PATCH: 700 PATCH TOPICAL at 08:16

## 2022-10-29 RX ADMIN — DOCUSATE SODIUM 100 MG: 100 CAPSULE, LIQUID FILLED ORAL at 17:15

## 2022-10-29 RX ADMIN — GABAPENTIN 100 MG: 100 CAPSULE ORAL at 06:19

## 2022-10-29 RX ADMIN — GABAPENTIN 300 MG: 300 CAPSULE ORAL at 21:35

## 2022-10-29 RX ADMIN — INSULIN LISPRO 3 UNITS: 100 INJECTION, SOLUTION INTRAVENOUS; SUBCUTANEOUS at 11:16

## 2022-10-29 RX ADMIN — GABAPENTIN 100 MG: 100 CAPSULE ORAL at 13:28

## 2022-10-29 RX ADMIN — MECLIZINE HCL 12.5 MG 12.5 MG: 12.5 TABLET ORAL at 06:19

## 2022-10-29 RX ADMIN — ATORVASTATIN CALCIUM 20 MG: 20 TABLET, FILM COATED ORAL at 17:15

## 2022-10-29 RX ADMIN — ACETAMINOPHEN 650 MG: 325 TABLET ORAL at 21:42

## 2022-10-29 RX ADMIN — METOPROLOL TARTRATE 25 MG: 25 TABLET, FILM COATED ORAL at 08:04

## 2022-10-29 RX ADMIN — ACETAMINOPHEN 650 MG: 325 TABLET ORAL at 06:19

## 2022-10-29 RX ADMIN — INSULIN LISPRO 3 UNITS: 100 INJECTION, SOLUTION INTRAVENOUS; SUBCUTANEOUS at 08:15

## 2022-10-29 RX ADMIN — INSULIN LISPRO 3 UNITS: 100 INJECTION, SOLUTION INTRAVENOUS; SUBCUTANEOUS at 17:19

## 2022-10-29 RX ADMIN — MECLIZINE HCL 12.5 MG 12.5 MG: 12.5 TABLET ORAL at 11:17

## 2022-10-29 RX ADMIN — INSULIN LISPRO 2 UNITS: 100 INJECTION, SOLUTION INTRAVENOUS; SUBCUTANEOUS at 17:19

## 2022-10-29 RX ADMIN — ACETAMINOPHEN 650 MG: 325 TABLET ORAL at 13:26

## 2022-10-29 RX ADMIN — Medication 6 MG: at 21:48

## 2022-10-29 RX ADMIN — INSULIN DETEMIR 20 UNITS: 100 INJECTION, SOLUTION SUBCUTANEOUS at 21:32

## 2022-10-29 RX ADMIN — METOPROLOL TARTRATE 25 MG: 25 TABLET, FILM COATED ORAL at 21:35

## 2022-10-29 RX ADMIN — INSULIN LISPRO 2 UNITS: 100 INJECTION, SOLUTION INTRAVENOUS; SUBCUTANEOUS at 21:46

## 2022-10-29 RX ADMIN — INSULIN LISPRO 2 UNITS: 100 INJECTION, SOLUTION INTRAVENOUS; SUBCUTANEOUS at 11:16

## 2022-10-29 NOTE — PROGRESS NOTES
10/29/22 1230   Pain Assessment   Pain Assessment Tool 0-10  (6-8/10 at rest to 10/10 with Wbing activities)   Pain Score 10 - Worst Possible Pain   Pain Location/Orientation Orientation: Right;Location: Hip   Pain Onset/Description Onset: Ongoing;Frequency: Constant/Continuous   Hospital Pain Intervention(s) Repositioned;Rest;Medication (See MAR); Cold applied  (tylenol given midway through tx session after RN was notified, lidocaine patches on R hip and upper thigh)   Restrictions/Precautions   Precautions Fall Risk;Bed/chair alarms;Supervision on toilet/commode;Visual deficit;Pain;Cognitive   LUE Weight Bearing Per Order NWB  (hand)   RLE Weight Bearing Per Order WBAT   Braces or Orthoses   (TEDS and abdominal binder on)   Cognition   Overall Cognitive Status Impaired   Arousal/Participation Cooperative;Arousable  (reports of being sleepy after initial 30 mins on mobilities)   Attention Attends with cues to redirect   Orientation Level Oriented X4   Following Commands Follows one step commands with increased time or repetition   Subjective   Subjective pt reported pain as above and was agreeable to have PT   Roll Left and Right   Type of Assistance Needed Physical assistance;Verbal cues; Adaptive equipment   Physical Assistance Level 51%-75%   Comment bed rail and extra time to complete with mod A   Roll Left and Right CARE Score 2   Sit to Lying   Type of Assistance Needed Physical assistance;Verbal cues; Adaptive equipment   Physical Assistance Level 51%-75%   Comment assist on trunk and R LE due to inc R LE/LBP pain with pt groaning   Sit to Lying CARE Score 2   Sit to Stand   Type of Assistance Needed Physical assistance;Verbal cues; Adaptive equipment   Physical Assistance Level 26%-50%   Comment recliner to standing with PFRW  (VC to weightbear through L elbow pushing off from recliner or chair to stand up for not appropriate to push up with L hand due to ongoing orthopedic restriction )   Sit to Stand CARE Score 3   Bed-Chair Transfer   Type of Assistance Needed Physical assistance;Verbal cues; Adaptive equipment   Physical Assistance Level 26%-50%   Comment PFRW   Chair/Bed-to-Chair Transfer CARE Score 3   Walk 10 Feet   Type of Assistance Needed Physical assistance;Verbal cues; Adaptive equipment   Physical Assistance Level Total assistance   Comment mod-max A x 10' with L PFRW, CFA  again limited by inc pain   no exacerbation of baseline wozziness   Walk 10 Feet CARE Score 1   Wheel 50 Feet with Two Turns   Type of Assistance Needed Verbal cues; Physical assistance  (manual w/c with L brake extender cross over technique to lock/unlock it)   Comment min- CS 50' x 2 R UE and bilat LE only, dec utilization of R LE due to pain so distance limited along with fatigue onset   Wheel 50 Feet with Two Turns CARE Score -   Wheel 150 Feet   Comment limited by pain and fatigue   Reason if not Attempted Safety concerns   Wheel 150 Feet CARE Score 88   Therapeutic Interventions   Strengthening passive R knee flexion/extension in sitting, A/A R heel slides, hip abduction/adduction, SLR x 10 reps x 2 sets  active toe/heel raises with reps as tolerated  bilat knee flexion/extension with SB/dowels with reps as tolerated  Active L LAQ and Hip flexion in sitting with 2# ankle wts and A/A on R without wts x 10 reps x 2 sets   Flexibility hamstring stretch x 3 mins, gastroc 3 mins   Modalities cold pack on R knee and Low back   Assessment   Treatment Assessment Pt BP on the higher side this session, see vitals section, MD & RN notified  Pt encouraged inc fluid intake this session however continues to c/o wozziness but symptoms does not inc with standing activities while wearing TEDS/abdominal binder  Standing  & gait tolerance/participation severely limited by ongoing pain with use of L PFRW   Despite tylenol given by RN midway through tx pt continued to c/o pain by end of tx even in bed so was positioned using pillow and green wedge as best as PT could with prevalon boot on L, RN notified  PT will cont to monitor ortho BPs, work on flexibility/strengthening exercise and functional mobility training using LRAD as tolerated for unfortunately pt is unable to tolerate stronger pain meds at this time  Barriers to Discharge Decreased caregiver support   Barriers to Discharge Comments lives alone with FEI  family unable to provide physical assist/S at d/c   PT Barriers   Physical Impairment Decreased strength;Decreased range of motion;Decreased endurance; Impaired balance;Decreased cognition; Impaired judgement;Decreased safety awareness; Impaired vision;Orthopedic restrictions;Pain;Decreased skin integrity   Functional Limitation Car transfers; Ramp negotiation;Stair negotiation;Standing;Transfers; Wheelchair management; Walking   Plan   Treatment/Interventions LE strengthening/ROM; Functional transfer training; Therapeutic exercise;Gait training;Spoke to MD;Spoke to nursing;OT   Progress Slow progress, cognitive deficits  (dec activity tolerance due to pain/fatigue, medical status limitations due to [place])   Recommendation   PT Discharge Recommendation   (SNF for additional rehab)   PT Therapy Minutes   PT Time In 1230   PT Time Out 1400   PT Total Time (minutes) 90   PT Mode of treatment - Individual (minutes) 90   PT Mode of treatment - Concurrent (minutes) 0   PT Mode of treatment - Group (minutes) 0   PT Mode of treatment - Co-treat (minutes) 0   PT Mode of Treatment - Total time(minutes) 90 minutes   PT Cumulative Minutes 1065

## 2022-10-29 NOTE — PROGRESS NOTES
10/29/22 1230   Pain Assessment   Pain Assessment Tool 0-10  (6-8/10 at rest to 10/10 with Wbing activities)   Pain Score 10 - Worst Possible Pain   Pain Location/Orientation Orientation: Right;Location: Hip   Pain Onset/Description Onset: Ongoing;Frequency: Constant/Continuous   Hospital Pain Intervention(s) Repositioned;Rest;Medication (See MAR); Cold applied  (tylenol given midway through tx session after RN was notified, lidocaine patches on R hip and upper thigh)   Restrictions/Precautions   Precautions Fall Risk;Bed/chair alarms;Supervision on toilet/commode;Visual deficit;Pain;Cognitive   LUE Weight Bearing Per Order NWB  (hand)   RLE Weight Bearing Per Order WBAT   Braces or Orthoses   (TEDS and abdominal binder on)   Cognition   Overall Cognitive Status Impaired   Arousal/Participation Cooperative;Arousable  (reports of being sleepy after initial 30 mins on mobilities)   Attention Attends with cues to redirect   Orientation Level Oriented X4   Following Commands Follows one step commands with increased time or repetition   Subjective   Subjective pt reported pain as above and was agreeable to have PT   Roll Left and Right   Type of Assistance Needed Physical assistance;Verbal cues; Adaptive equipment   Physical Assistance Level 51%-75%   Comment bed rail and extra time to complete with mod A   Roll Left and Right CARE Score 2   Sit to Lying   Type of Assistance Needed Physical assistance;Verbal cues; Adaptive equipment   Physical Assistance Level 51%-75%   Comment assist on trunk and R LE due to inc R LE/LBP pain with pt groaning   Sit to Lying CARE Score 2   Sit to Stand   Type of Assistance Needed Physical assistance;Verbal cues; Adaptive equipment   Physical Assistance Level 26%-50%   Comment recliner to standing with PFRW   Sit to Stand CARE Score 3   Bed-Chair Transfer   Type of Assistance Needed Physical assistance;Verbal cues; Adaptive equipment   Physical Assistance Level 26%-50%   Comment PFRW Chair/Bed-to-Chair Transfer CARE Score 3   Walk 10 Feet   Type of Assistance Needed Physical assistance;Verbal cues; Adaptive equipment   Physical Assistance Level Total assistance   Comment mod-max A x 10' with RW, CFA  again limited by inc pain   no exacerbation of baseline wozziness   Walk 10 Feet CARE Score 1   Wheel 50 Feet with Two Turns   Type of Assistance Needed Verbal cues; Physical assistance  (manual w/c with L brake extender cross over technique to lock/unlock it)   Comment min- CS 50' x 2 R UE and bilat LE only, dec utilization of R LE due to pain so distance limited along with fatigue onset   Wheel 50 Feet with Two Turns CARE Score -   Wheel 150 Feet   Comment limited by pain and fatigue   Reason if not Attempted Safety concerns   Wheel 150 Feet CARE Score 88   Therapeutic Interventions   Strengthening passive R knee flexion/extension in sitting, A/A R heel slides, hip abduction/adduction, SLR x 10 reps x 2 sets  active toe/heel raises with reps as tolerated  bilat knee flexion/extension with SB/dowels with reps as tolerated  Active L LAQ and Hip flexion in sitting with 2# ankle wts and A/A on R without wts x 10 reps x 2 sets   Flexibility hamstring stretch x 3 mins, gastroc 3 mins   Modalities cold pack on R knee and Low back   Assessment   Treatment Assessment Pt BP on the higher side this session, see vitals section  Pt encouraged inc fluid intake this session however continues to c/o wozziness but symptoms does not inc with standing activities while wearing TEDS/abdominal binder  Standing  & gait tolerance/participation severely limited by ongoing pain with use of L PFRW  Despite tylenol given by RN midway through tx pt continued to c/o pain by end of tx even in bed so was positioned using pillow and green wedge as best as PT could with prevalon boot on L, RN notified   PT will cont to monitor ortho BPs, work on flexibility/strengthening exercise and functional mobility training using LRAD as tolerated for unfortunately pt is unable to tolerate stronger pain meds at this time  Barriers to Discharge Decreased caregiver support   Barriers to Discharge Comments lives alone with FEI  family unable to provide physical assist/S at d/c   PT Barriers   Functional Limitation Car transfers; Ramp negotiation;Stair negotiation;Standing;Transfers; Wheelchair management; Walking   Plan   Progress Slow progress, cognitive deficits  (dec activity tolerance due to pain/fatigue, medical status limitations due to [facility])   Recommendation   PT Discharge Recommendation   (SNF for additional rehab)   PT Therapy Minutes   PT Time In 1230   PT Time Out 1400   PT Total Time (minutes) 90   PT Mode of treatment - Individual (minutes) 90   PT Mode of treatment - Concurrent (minutes) 0   PT Mode of treatment - Group (minutes) 0   PT Mode of treatment - Co-treat (minutes) 0   PT Mode of Treatment - Total time(minutes) 90 minutes   PT Cumulative Minutes 1065

## 2022-10-29 NOTE — PLAN OF CARE
Problem: INFECTION - ADULT  Goal: Absence or prevention of progression during hospitalization  Description: INTERVENTIONS:  - Assess and monitor for signs and symptoms of infection  - Monitor lab/diagnostic results  - Monitor all insertion sites, i e  indwelling lines, tubes, and drains  - Monitor endotracheal if appropriate and nasal secretions for changes in amount and color  - [place] appropriate cooling/warming therapies per order  - Administer medications as ordered  - Instruct and encourage patient and family to use good hand hygiene technique  - Identify and instruct in appropriate isolation precautions for identified infection/condition  Outcome: Progressing

## 2022-10-29 NOTE — PROGRESS NOTES
Internal Medicine Progress Note  Patient: Makeda Graham  Age/sex: 79 y o  female  Medical Record #: [id number]      ASSESSMENT/PLAN: (Interval History)  Makeda Graham is seen and examined and management for following issues:    S/p ORIF with IM nailing of the Rt femur  · Pain control and therapy per primary service  · Pain meds adjusted d/t sedation  · Renal dosed Lovenox for DVT prophylaxis  · Follow-up with Ortho 2 weeks post-op      Left 4th phalanx fracture  · Splint and NWB   · Pain control per PMR      HTN  · Home: Hyzaar 50/12 5mg qd/Lopressor 25mg q12 hours/Lasix 40mg qd (for heart failure)  · Here: Lopressor 25mg BID  · Orthostatic 10/28 and given 1 L NSS  · Stable today     Anemia  · Secondary to trauma  · Stable      DM type 2  · Home: Levemir 20U at bedtime/Humalog 10U with meals  · Here: Lantus 20U at bedtime/Humalog 3U TID WM  · Will change Lantus to Levemir as she takes at home  · Yesterday, Lantus was dropped to 20U (from 32U) and Lispro to 3 U TID WM (from 8-8-4) since BSs too soft  · Will watch today     Chronic systolic/diastolic heart failure  · Pt has been off diuretics  · ECHO with an EF of 45%  Grade 2 diastolic dysfunction  · Euvolemic today     CLL  · Recent diagnosis  · Outpt follow-up with Hematology/Oncology  · Baseline WBCs 14-16K  · Had mild bump likely reactive secondary to surgery/UTI  · Stable      Suspected UTI  · Had positive UA  Culture with >100,000 klebsiella  · s/p Bactrim  · Urinary retention improved with tx of UTI     Delirium  · Yesterday inc confusion  · Dr Henok Harris to 6mg qd since not sleeping well and added Seroquel 12 5mg qhs  · Today much improved    DC planning: TBD    The above assessment and plan was reviewed and updated as determined by my evaluation of the patient on 10/29/2022      Labs:   Results from last 7 days   Lab Units 10/24/22  0507 10/22/22  1207   WBC Thousand/uL 19 25* 21 27*   HEMOGLOBIN g/dL 10 1* 9 7*   HEMATOCRIT % 32 8* 31 3* PLATELETS Thousands/uL 490* 464*     Results from last 7 days   Lab Units 10/24/22  0507 10/22/22  1207   SODIUM mmol/L 138 136   POTASSIUM mmol/L 4 0 4 2   CHLORIDE mmol/L 108 106   CO2 mmol/L 25 27   BUN mg/dL 26* 20   CREATININE mg/dL 1 19 0 99   CALCIUM mg/dL 9 2 9 1             Results from last 7 days   Lab Units 10/29/22  0648 10/28/22  2056 10/28/22  1804   POC GLUCOSE mg/dl 106 115 104       Review of Scheduled Meds:  Current Facility-Administered Medications   Medication Dose Route Frequency Provider Last Rate   • acetaminophen  325 mg Oral Q6H PRN Scot Phillip MD     • acetaminophen  650 mg Oral TID AC Scot Phillip MD     • atorvastatin  20 mg Oral Daily With Camron Upton MD     • bisacodyl  10 mg Rectal Daily PRN Scot Phillip MD     • docusate sodium  100 mg Oral BID Scot Phillip MD     • enoxaparin  40 mg Subcutaneous Q24H [facility] Yanely Cohen MD     • gabapentin  100 mg Oral BID Scot Phillip MD     • gabapentin  300 mg Oral HS Scot Phillip MD     • hydrocortisone   Topical 4x Daily PRN Scot Phillip MD     • insulin glargine  20 Units Subcutaneous HS GingerBRINDA Benjamin     • insulin lispro  1-5 Units Subcutaneous HS Scot Phillip MD     • insulin lispro  1-6 Units Subcutaneous TID AC Scot Phillip MD     • insulin lispro  3 Units Subcutaneous Daily With Breakfast BRINDA Greco     • insulin lispro  3 Units Subcutaneous Daily With Lunch BRINDA Greco     • insulin lispro  3 Units Subcutaneous Daily With Dinner BRINDA Greco     • lidocaine  2 patch Topical Daily Scot Phillip MD     • meclizine  12 5 mg Oral Q8H PRN Scot Phillip MD     • meclizine  12 5 mg Oral BID before breakfast/lunch Scot Phillip MD     • melatonin  6 mg Oral HS Scot Phillip MD     • methocarbamol  500 mg Oral Q6H PRN Scot Phillip MD     • metoprolol tartrate  25 mg Oral Q12H [facility] BRINDA Kramer     • naloxone  0 04 mg Intravenous Q1MIN PRN Gloria Villanueva MD     • polyethylene glycol  17 g Oral Daily PRN Gloria Villanueva MD     • QUEtiapine  12 5 mg Oral HS Gloria Villanueva MD     • senna-docusate sodium  1 tablet Oral HS Gloria Villanueva MD         Subjective/ HPI: Patient seen and examined  Patients overnight issues or events were reviewed with nursing or staff during rounds or morning huddle session  New or overnight issues include the following:     Orthostatic 10/28 and given 1 L NSS   +delerium yesterday as above  Was having lowish BSs as well and insulin adjusted  She offers no complaints today  ROS:   A 10 point ROS was performed; negative except as noted above  *Labs /Radiology studies reviewed  *Medications reviewed and reconciled as needed  *Please refer to order section for additional ordered labs studies  *Case discussed with primary attending during morning huddle case rounds    Physical Examination:  Vitals:   Vitals:    10/28/22 2134 10/29/22 0600 10/29/22 0605 10/29/22 0804   BP: 112/68  157/67 139/62   BP Location:   Left arm    Pulse:   65 78   Resp:   18    Temp:   98 1 °F (36 7 °C)    TempSrc:   Oral    SpO2:   93%    Weight:  64 2 kg (141 lb 8 6 oz)     Height:           General Appearance: no distress, conversive  HEENT: PERRLA, conjuctiva normal; oropharynx clear; mucous membranes moist   Neck:  Supple, normal ROM  Lungs: CTA, normal respiratory effort, no retractions, expiratory effort normal  CV: regular rate and rhythm; no rubs/murmurs/gallops, PMI normal   ABD: soft; ND/NT; +BS  EXT: no edema  Skin: normal turgor, normal texture, no rashes  Psych: affect normal, mood normal  Neuro: AAO      The above physical exam was reviewed and updated as determined by my evaluation of the patient on 10/29/2022      Invasive Devices  Report    Peripheral Intravenous Line  Duration           Peripheral IV 10/28/22 Distal;Right;Ventral (anterior) Forearm <1 day          Drain  Duration           External Urinary Catheter 2 days                   VTE Pharmacologic Prophylaxis: Enoxaparin  Code Status: Level 1 - Full Code  Current Length of Stay: 12 day(s)      Total time spent:  30 minutes with more than 50% spent counseling/coordinating care  Counseling includes discussion with patient re: progress  and discussion with patient of his/her current medical state/information  Coordination of patient's care was performed in conjunction with primary service  Time invested included review of patient's labs, vitals, and management of their comorbidities with continued monitoring  In addition, this patient was discussed with medical team including physician and advanced extenders  The care of the patient was extensively discussed and appropriate treatment plan was formulated unique for this patient  Medical decision making for the day was made by supervising physician unless otherwise noted in their attestation statement  ** Please Note:  voice to text software may have been used in the creation of this document   Although proof errors in transcription or interpretation are a potential of such software**

## 2022-10-29 NOTE — PROGRESS NOTES
10/29/22 08   Pain Assessment   Pain Assessment Tool 0-10   Pain Score No Pain   Restrictions/Precautions   Precautions Bed/chair alarms;Cognitive; Fall Risk;Pain;Supervision on toilet/commode;Visual deficit   Weight Bearing Restrictions Yes   LUE Weight Bearing Per Order NWB   RLE Weight Bearing Per Order WBAT   ROM Restrictions No   Braces or Orthoses Other (Comment)  (TEDs and binder for Low BP)   Lifestyle   Autonomy "You dropped it and now it's crawling up the pillow"   Oral Hygiene   Type of Assistance Needed Supervision;Verbal cues   Physical Assistance Level No physical assistance   Comment seated in recliner chair; VCs for sequencing of task   Oral Hygiene CARE Score 4   Shower/Bathe Self   Type of Assistance Needed Physical assistance   Physical Assistance Level 76% or more   Comment pt completes sponge bathing routine mostly bed level due to level of fatigue and difficulty with remaining awake/alert  pt bathes 7/10 parts with significantly inc time as pt would fall asleep in between bathing body parts  pt's arousal did improve over the session however required significantly inc time  Shower/Bathe Self CARE Score 2   Upper Body Dressing   Type of Assistance Needed Supervision   Physical Assistance Level No physical assistance   Comment seated to don bra and shirt   Upper Body Dressing CARE Score 4   Lower Body Dressing   Type of Assistance Needed Physical assistance   Physical Assistance Level 76% or more   Comment seated EOB pt able to thread LLE in pants, requires assist to thread RLE, requires total assist for CM as pt with tight fitting pants   Lower Body Dressing CARE Score 2   Putting On/Taking Off Footwear   Type of Assistance Needed Physical assistance; Adaptive equipment   Physical Assistance Level 76% or more   Comment seated in recliner, pt able to doff socks using reacher, dons socks using sock aid  requires VCs for recall of use of both  requires total assist for TEDs and sneakers  Putting On/Taking Off Footwear CARE Score 2   Sit to Stand   Type of Assistance Needed Physical assistance   Physical Assistance Level 51%-75%   Comment from EOB to PFRW   Sit to Stand CARE Score 2   Bed-Chair Transfer   Type of Assistance Needed Physical assistance   Physical Assistance Level 26%-50%   Comment PFRW SPT to recliner   Chair/Bed-to-Chair Transfer CARE Score 3   Cognition   Overall Cognitive Status Impaired   Arousal/Participation Alert; Cooperative   Attention Attends with cues to redirect   Orientation Level Oriented X4   Memory Decreased short term memory;Decreased recall of recent events;Decreased recall of precautions   Following Commands Follows one step commands with increased time or repetition   Activity Tolerance   Activity Tolerance Patient limited by fatigue   Medical Staff Made Aware RN made aware of sitting BP--123/56; HR 56; O2 93% on RA   Assessment   Treatment Assessment pt engages in 90 minute skilled OT Session focusing on sponge bathing routine, standing and SPT with PFRW  see above for full func details  pt soundly sleeping upon arrival requiring inc time to waken  pt reports feeling "very tired" however agreeable to OT session  pt requires significantly inc time to complete tasks due to falling asleep throughout ADL requiring VC and TCs to waken  with time, pt more alert and agreeable to get OOB and into recliner  pt uses PFRW and performs SPT min A  BP monitored in sitting position at 123/56, no c/o dizziness/lightheadedness  pt continues with non sensical conversation, not oriented to place/time--decreased recall of use of LHAE for LE dressing  recommend continued skilled care to focus on ADL retrainign, func transfers with PFRW, standing luis/bal, func cog, safety, in order to decrease burden of care at d/c  Prognosis Good   Problem List Decreased strength;Decreased endurance; Impaired balance;Decreased mobility; Decreased coordination;Decreased cognition; Impaired judgement;Decreased safety awareness;Orthopedic restrictions;Pain; Impaired vision   Barriers to Discharge Inaccessible home environment;Decreased caregiver support   Plan   Treatment/Interventions ADL retraining;Functional transfer training; Therapeutic exercise; Endurance training;Cognitive reorientation;Patient/family training;Equipment eval/education; Compensatory technique education   OT Therapy Minutes   OT Time In 0830   OT Time Out 1000   OT Total Time (minutes) 90   OT Mode of treatment - Individual (minutes) 90   OT Mode of treatment - Concurrent (minutes) 0   OT Mode of treatment - Group (minutes) 0   OT Mode of treatment - Co-treat (minutes) 0   OT Mode of Treatment - Total time(minutes) 90 minutes   OT Cumulative Minutes 970   Therapy Time missed   Time missed?  No

## 2022-10-29 NOTE — PROGRESS NOTES
Physical Medicine and Rehabilitation Progress Note  Valeria Espinoza 79 y o  female MRN: [id number]  Unit/Bed#: -41 Encounter: [id number]    HPI: Valeria Espinoza is a 79 y o  female with CLL, CHF, HLD, HTN, DM2, CKD 3, history of cognitive impairment, osteoporosis, dizziness who presented to the [facility] on 10/11/22 with fall in her driveway while using her rollator walker  Imaging revealed a right intertrochanteric femur fracture with extension towards the femoral neck  Imaging also revealed a left displaced 4th phalanx  Patient seen by Orthopedics and deemed an operative candidate  Patient taken to the OR on 10/12/22 by Dr Yuan Roberts for a ORIF with IM nailing  Deemed WBAT RLE  Placed on Lovenox for DVT ppx  Endocrinology consulted for bone health and diabetes, and course c/b acute pain and anemia  Ad[facility] to the [facility] on 10/17  Chief Complaint: "I really need to go"    Interval History/Subjective:  Yesterday, lethargic in the AM  This improved by the afternoon, and while somewhat somnolent earlier this AM (prior to 8:30), this improved over the course of her therapy session  On my eval, nursing feels she is quite awake and alert  Therapy did check orthostatics yesterday and she would drop from 110/60 to SBP 84 and was symptomatic  No new CP, SOB, fevers, chills, N/V, abdominal pain  Last BM was 10/29  She has been continent of bowel/bladder  ROS:  A 10 point review of systems was negative except for what is noted in the HPI  Today's Changes:  1  May move melatonin earlier in the evening  Stopping seroquel - too sleepy this AM, which improved and she is now oriented  2  Received gentle IVF yesterday  Would recheck orthostatics this weekend  3  Continue off dilaudid  4  Has been removing splint on occasion  Encouraging to keep on  Total visit time: 25 minutes, with more than 50% spent counseling/coordinating care   Counseling includes discussion with patient re: progress in therapies, functional issues observed by therapy staff, and discussion with patient regarding their current medical state and wellbeing  Coordination of patient's care was performed in conjunction with Internal Medicine service to monitor patient's labs, vitals, and management of their comorbidities  Assessment/Plan:    * Intertrochanteric fracture of right femur [facility])  Assessment & Plan  Traumatic fall  Sustained a right intertrochanteric fracture  Taken to OR by Dr Galen Pierre on 10/12/22 for ORIF and IM nailing  WBAT RLE  DVT ppx with SQ Lovenox  Monitor 3 incisions  Continue acute rehabilitation program  POD 14 = 10/26/22 - appreciate ortho seeing patient and removing staples today 10/27/22  Xray completed 10/22 and stable  Follow-up with Dr Galen Pierre as outpatient in 4 weeks (~11/23)      Encephalopathy  Assessment & Plan  Improved today as morning continued  Recurrent 10/28/22 with delirium  - Moving below meds to earlier in the evening  Increase melatonin to 6 mg  Seroquel 12 5 mg QHS      UTI (urinary tract infection)  Assessment & Plan  Resolved  + UA  Urine culture: >100K Klebsiella  Symptomatic with urinary frequency, new encephalopathy and hallucinations, dysuria  · Completed treatment with Bactrim which was sensitive      Deep tissue injury  Assessment & Plan  Left heel DTI  Offload and elevate  Appreciate wound care recommendations as below  Skin Care Plan:  1-Cleanse sacro-buttocks with soap and water  Apply Preventative Hydraguard BID and PRN  2-Turn/reposition q2h or when medically stable for pressure re-distribution on skin   3-Elevate heels to offload pressure  4-Moisturize skin daily with skin nourishing cream  5-Ehob cushion in chair when out of bed  6-B/L Heel Allevyn Foam Dressings  Samuel Left Heel with T for Treatment and Samuel Right Heel with P for Prevention  Change every other day or Prn  Peel back, inspect skin Q-shift, and reapply       H/O dizziness  Assessment & Plan  Dizziness related to motion  Chronic issue per patient  Meclizine 12 5 mg ordered BID     Orthostasis  Assessment & Plan  Mild orthstasis intermittently - much less this week  Continue compression stockings/Abd binder during all therapies  Recurrent 10/28/22: Gentle IVF given for orthostasis x 1L      Acute pain  Assessment & Plan  Nociceptive/neuropathic post op pain in RLE managed on multimodal regimen:   · Patient is intolerant of oxycodone and Norco   Patient excessively drowsy on oxycodone and with mild hallucinations on Norco   · Discontinue Norco  · Schedule Tylenol 650 mg t i d  and 325 PRN  · Scheduled gabapentin 100 mg bid  Gabapentin 300 mg q h s  · Topical Lidoderm patches  · Scheduled topical ice every 2 hours while awake  · Robaxin 500 mg q 6 hours p r n  For muscle spasm  · Discontinued Dilaudid    CLL (chronic lymphocytic leukemia) ([facility])  Assessment & Plan  Chronic leukocytosis (19K)  Monitor   Follow-up with heme onc as outpatient     Displaced fracture of middle phalanx of left ring finger, initial encounter for closed fracture  Assessment & Plan  Traumatic fracture  4th left finger   Treated non-operatively by Orthopedics  Continue supportive splint - she is not always compliant despite cuing  ICE for swelling  NWB L hand    Hypertension  Assessment & Plan  At home: Lopressor 25 mg Q 12 hours, Hyzaar (Losartan/HCTZ) 50mg/12 5mg daily  Here: Lopressor 25 mg Q12hr  Will monitor BP closely  Check orthostatics  Adjust medications accordingly  TEDs daily /Abdominal binder PRN    Chronic systolic heart failure (HCC)  Assessment & Plan  Grade 2 DD  At home: Lasix 40 mg daily  Here:  HOLD Lasix  Encourage fluids by mouth      Restart Lasix when able with K Dur supplement    HLD (hyperlipidemia)  Assessment & Plan  Restarted on home Lipitor 20 mg QPM    CKD (chronic kidney disease) stage 3, GFR 30-59 ml/min ([facility])  Assessment & Plan  Stable  Avoid nephrotoxic agents   Monitor 2x/week    Type 2 diabetes mellitus ([facility] [address] )  Assessment & Plan  Lab Results   Component Value Date    HGBA1C 7 6 (H) 10/12/2022     At home: Levemir 20 units QHS, Humalog 10 units with meals  Here: Levemir 20 units, Lispro 3 units TID with meals, continue sliding scale insulin  In acute care required an insulin drip transiently  Accuchecks  Diabetic Diet  Education  IM following      Health Maintenance  #Bowel: Last BM 10/29  On docusate BID  Will stop senokot-S  #Bladder: Voiding and better continence today  #Skin/Pressure Injury Prevention: Turn Q2hr in bed, with weight shifts [id number] in wheelchair  #DVT Prophylaxis: Lovenox, SCDs  #GI Prophylaxis: PO diet  #Code Status: Full Code  #FEN: Diabetic diet, with glucerna at lunch  Received 1L of normal saline 10/28  #Dispo: Likely SNF  Family unable to provide assistance  Needs to be modified Ind to return home  Objective:    Functional Update:  PT: mod-max  OT: mod-max    Allergies per EMR    Physical Exam:  Temp:  [98 1 °F (36 7 °C)-98 9 °F (37 2 °C)] 98 1 °F (36 7 °C)  HR:  [58-78] 58  Resp:  [16-20] 18  BP: ()/(50-70) 123/56  Oxygen Therapy  SpO2: 93 %    Gen: No acute distress, Well-nourished, well-appearing  HEENT: Moist mucus membranes  Heme/Extr: No edema  Pulmonary: Non-labored rbeathing  : No hernandez  GI: Soft, non-tender, non-distended  Integumentary: Skin is warm, dry  Neuro: AAOx3,  Speech is intact  Appropriate to questioning  Psych: Normal mood and affect  Diagnostic Studies: reviewed, no new imaging  XR femur 2 vw right    Result Date: 10/25/2022  Impression: Status post right hip ORIF  Satisfactory alignment   Workstation performed: [id number]       Laboratory:  Reviewed  Results from last 7 days   Lab Units 10/24/22  0507   HEMOGLOBIN g/dL 10 1*   HEMATOCRIT % 32 8*   WBC Thousand/uL 19 25*     Results from last 7 days   Lab Units 10/24/22  0507   BUN mg/dL 26*   POTASSIUM mmol/L 4 0   CHLORIDE mmol/L 108   CREATININE mg/dL 1 19 Patient Active Problem List   Diagnosis   • Fall   • Type 2 diabetes mellitus ([facility] [address] )   • CKD (chronic kidney disease) stage 3, GFR 30-59 ml/min ([facility])   • HLD (hyperlipidemia)   • OA (osteoarthritis)   • Chronic systolic heart failure ([facility])   • Lymphadenopathy   • Elevated liver enzymes   • Ambulatory dysfunction   • Suspected Mild cognitive impairment   • Hypertension   • Intertrochanteric fracture of right femur ([facility])   • Displaced fracture of middle phalanx of left ring finger, initial encounter for closed fracture   • CLL (chronic lymphocytic leukemia) ([facility])   • Acute pain   • Orthostasis   • H/O dizziness   • Deep tissue injury   • UTI (urinary tract infection)   • Encephalopathy         Medications  Current Facility-Administered Medications   Medication Dose Route Frequency Provider Last Rate   • acetaminophen  325 mg Oral Q6H PRN Eric Lovell MD     • acetaminophen  650 mg Oral TID AC Yanely Zuñiga MD     • atorvastatin  20 mg Oral Daily With Jose Dahl MD     • bisacodyl  10 mg Rectal Daily PRN Eric Lovell MD     • docusate sodium  100 mg Oral BID Eric Lovell MD     • enoxaparin  40 mg Subcutaneous Q24H Carlin Cannon MD     • gabapentin  100 mg Oral BID Eric Lovell MD     • gabapentin  300 mg Oral HS Eric Lovell MD     • hydrocortisone   Topical 4x Daily PRN Eric Lovell MD     • insulin detemir  20 Units Subcutaneous HS BRINDA Wynne     • insulin lispro  1-5 Units Subcutaneous HS Eric Lovell MD     • insulin lispro  1-6 Units Subcutaneous TID AC Eric Lovell MD     • insulin lispro  3 Units Subcutaneous Daily With Breakfast BRINDA Bhatti     • insulin lispro  3 Units Subcutaneous Daily With Lunch BRINDA Bhatti     • insulin lispro  3 Units Subcutaneous Daily With Dinner BRINDA Bhatti     • lidocaine  2 patch Topical Daily Eric Lovell MD     • meclizine  12 5 mg Oral Q8H PRN Berlin Stover MD     • meclizine  12 5 mg Oral BID before breakfast/lunch Berlin Stover MD     • melatonin  6 mg Oral HS Berlin Stover MD     • methocarbamol  500 mg Oral Q6H PRN Berlin Stover MD     • metoprolol tartrate  25 mg Oral Q12H [facility] BRINDA Kramer     • naloxone  0 04 mg Intravenous Q1MIN PRN Berlin Stover MD     • polyethylene glycol  17 g Oral Daily PRN Berlin Stover MD     • QUEtiapine  12 5 mg Oral HS Berlin Stover MD     • senna-docusate sodium  1 tablet Oral HS Berlin Stover MD            ** Please Note: Fluency Direct voice to text software may have been used in the creation of this document   **

## 2022-10-29 NOTE — PLAN OF CARE
Problem: INFECTION - ADULT  Goal: Absence or prevention of progression during hospitalization  Description: INTERVENTIONS:  - Assess and monitor for signs and symptoms of infection  - Monitor lab/diagnostic results  - Monitor all insertion sites, i e  indwelling lines, tubes, and drains  - Monitor endotracheal if appropriate and nasal secretions for changes in amount and color  - Dickinson appropriate cooling/warming therapies per order  - Administer medications as ordered  - Instruct and encourage patient and family to use good hand hygiene technique  - Identify and instruct in appropriate isolation precautions for identified infection/condition  Outcome: Progressing

## 2022-10-30 LAB
GLUCOSE SERPL-MCNC: 103 MG/DL (ref 65–140)
GLUCOSE SERPL-MCNC: 181 MG/DL (ref 65–140)
GLUCOSE SERPL-MCNC: 190 MG/DL (ref 65–140)
GLUCOSE SERPL-MCNC: 210 MG/DL (ref 65–140)
GLUCOSE SERPL-MCNC: 242 MG/DL (ref 65–140)
GLUCOSE SERPL-MCNC: 61 MG/DL (ref 65–140)

## 2022-10-30 RX ORDER — INSULIN LISPRO 100 [IU]/ML
5 INJECTION, SOLUTION INTRAVENOUS; SUBCUTANEOUS
Status: DISCONTINUED | OUTPATIENT
Start: 2022-10-30 | End: 2022-11-01

## 2022-10-30 RX ORDER — QUETIAPINE FUMARATE 25 MG/1
12.5 TABLET, FILM COATED ORAL EVERY EVENING
Status: DISCONTINUED | OUTPATIENT
Start: 2022-10-30 | End: 2022-11-08 | Stop reason: HOSPADM

## 2022-10-30 RX ADMIN — ACETAMINOPHEN 650 MG: 325 TABLET ORAL at 20:23

## 2022-10-30 RX ADMIN — METOPROLOL TARTRATE 25 MG: 25 TABLET, FILM COATED ORAL at 20:24

## 2022-10-30 RX ADMIN — Medication 6 MG: at 20:23

## 2022-10-30 RX ADMIN — INSULIN DETEMIR 8 UNITS: 100 INJECTION, SOLUTION SUBCUTANEOUS at 22:10

## 2022-10-30 RX ADMIN — LIDOCAINE 5% 2 PATCH: 700 PATCH TOPICAL at 08:18

## 2022-10-30 RX ADMIN — METHOCARBAMOL 500 MG: 500 TABLET ORAL at 10:10

## 2022-10-30 RX ADMIN — GABAPENTIN 100 MG: 100 CAPSULE ORAL at 06:09

## 2022-10-30 RX ADMIN — MECLIZINE HCL 12.5 MG 12.5 MG: 12.5 TABLET ORAL at 06:09

## 2022-10-30 RX ADMIN — INSULIN LISPRO 3 UNITS: 100 INJECTION, SOLUTION INTRAVENOUS; SUBCUTANEOUS at 12:00

## 2022-10-30 RX ADMIN — INSULIN LISPRO 3 UNITS: 100 INJECTION, SOLUTION INTRAVENOUS; SUBCUTANEOUS at 08:21

## 2022-10-30 RX ADMIN — MECLIZINE HCL 12.5 MG 12.5 MG: 12.5 TABLET ORAL at 11:59

## 2022-10-30 RX ADMIN — METOPROLOL TARTRATE 25 MG: 25 TABLET, FILM COATED ORAL at 08:18

## 2022-10-30 RX ADMIN — GABAPENTIN 300 MG: 300 CAPSULE ORAL at 22:09

## 2022-10-30 RX ADMIN — ATORVASTATIN CALCIUM 20 MG: 20 TABLET, FILM COATED ORAL at 16:40

## 2022-10-30 RX ADMIN — INSULIN LISPRO 1 UNITS: 100 INJECTION, SOLUTION INTRAVENOUS; SUBCUTANEOUS at 22:11

## 2022-10-30 RX ADMIN — INSULIN LISPRO 2 UNITS: 100 INJECTION, SOLUTION INTRAVENOUS; SUBCUTANEOUS at 16:40

## 2022-10-30 RX ADMIN — ENOXAPARIN SODIUM 40 MG: 40 INJECTION SUBCUTANEOUS at 08:18

## 2022-10-30 RX ADMIN — GABAPENTIN 100 MG: 100 CAPSULE ORAL at 14:34

## 2022-10-30 RX ADMIN — ACETAMINOPHEN 650 MG: 325 TABLET ORAL at 14:34

## 2022-10-30 RX ADMIN — ACETAMINOPHEN 650 MG: 325 TABLET ORAL at 06:09

## 2022-10-30 RX ADMIN — DOCUSATE SODIUM 100 MG: 100 CAPSULE, LIQUID FILLED ORAL at 08:18

## 2022-10-30 RX ADMIN — INSULIN LISPRO 5 UNITS: 100 INJECTION, SOLUTION INTRAVENOUS; SUBCUTANEOUS at 12:00

## 2022-10-30 RX ADMIN — QUETIAPINE FUMARATE 12.5 MG: 25 TABLET ORAL at 20:24

## 2022-10-30 RX ADMIN — INSULIN LISPRO 5 UNITS: 100 INJECTION, SOLUTION INTRAVENOUS; SUBCUTANEOUS at 16:40

## 2022-10-30 RX ADMIN — DOCUSATE SODIUM 100 MG: 100 CAPSULE, LIQUID FILLED ORAL at 18:46

## 2022-10-30 RX ADMIN — ACETAMINOPHEN 325 MG: 325 TABLET ORAL at 10:10

## 2022-10-30 NOTE — PLAN OF CARE
Problem: Prexisting or High Potential for Compromised Skin Integrity  Goal: Skin integrity is maintained or improved  Description: INTERVENTIONS:  - Identify patients at risk for skin breakdown  - Assess and monitor skin integrity  - Assess and monitor nutrition and hydration status  - Monitor labs   - Assess for incontinence   - Turn and reposition patient  - Assist with mobility/ambulation  - Relieve pressure over bony prominences  - Avoid friction and shearing  - Provide appropriate hygiene as needed including keeping skin clean and dry  - Evaluate need for skin moisturizer/barrier cream  - Collaborate with interdisciplinary team   - Patient/family teaching  - Consider wound care consult   Outcome: Progressing     Problem: PAIN - ADULT  Goal: Verbalizes/displays adequate comfort level or baseline comfort level  Description: Interventions:  - Encourage patient to monitor pain and request assistance  - Assess pain using appropriate pain scale  - Administer analgesics based on type and severity of pain and evaluate response  - Implement non-pharmacological measures as appropriate and evaluate response  - Consider cultural and social influences on pain and pain management  - Notify physician/advanced practitioner if interventions unsuccessful or patient reports new pain  Outcome: Progressing     Problem: INFECTION - ADULT  Goal: Absence or prevention of progression during hospitalization  Description: INTERVENTIONS:  - Assess and monitor for signs and symptoms of infection  - Monitor lab/diagnostic results  - Monitor all insertion sites, i e  indwelling lines, tubes, and drains  - Monitor endotracheal if appropriate and nasal secretions for changes in amount and color  - Lawrence appropriate cooling/warming therapies per order  - Administer medications as ordered  - Instruct and encourage patient and family to use good hand hygiene technique  - Identify and instruct in appropriate isolation precautions for identified infection/condition  Outcome: Progressing  Goal: Absence of fever/infection during neutropenic period  Description: INTERVENTIONS:  - Monitor WBC    Outcome: Progressing     Problem: SAFETY ADULT  Goal: Patient will remain free of falls  Description: INTERVENTIONS:  - Educate patient/family on patient safety including physical limitations  - Instruct patient to call for assistance with activity   - Consult OT/PT to assist with strengthening/mobility   - Keep Call bell within reach  - Keep bed low and locked with side rails adjusted as appropriate  - Keep care items and personal belongings within reach  - Initiate and maintain comfort rounds  - Make Fall Risk Sign visible to staff  - Offer Toileting  Hours, in advance of need  - Initiate/Maintain alarm  - Obtain necessary fall risk management equipment:   - Apply yellow socks and bracelet for high fall risk patients  - Consider moving patient to room near nurses station  Outcome: Progressing  Goal: Maintain or return to baseline ADL function  Description: INTERVENTIONS:  -  Assess patient's ability to carry out ADLs; assess patient's baseline for ADL function and identify physical deficits which impact ability to perform ADLs (bathing, care of mouth/teeth, toileting, grooming, dressing, etc )  - Assess/evaluate cause of self-care deficits   - Assess range of motion  - Assess patient's mobility; develop plan if impaired  - Assess patient's need for assistive devices and provide as appropriate  - Encourage maximum independence but intervene and supervise when necessary  - Involve family in performance of ADLs  - Assess for home care needs following discharge   - Consider OT consult to assist with ADL evaluation and planning for discharge  - Provide patient education as appropriate  Outcome: Progressing  Goal: Maintains/Returns to pre admission functional level  Description: INTERVENTIONS:  - Perform BMAT or MOVE assessment daily    - Set and communicate daily mobility goal to care team and patient/family/caregiver  - Collaborate with rehabilitation services on mobility goals if consulted  - Perform Range of Motion  times a day  - Reposition patient every  hours    - Dangle patient times a day  - Stand patient  times a day  - Ambulate patient times a day  - Out of bed to chair  times a day   - Out of bed for meals times a day  - Out of bed for toileting  - Record patient progress and toleration of activity level   Outcome: Progressing     Problem: DISCHARGE PLANNING  Goal: Discharge to home or other facility with appropriate resources  Description: INTERVENTIONS:  - Identify barriers to discharge w/patient and caregiver  - Arrange for needed discharge resources and transportation as appropriate  - Identify discharge learning needs (meds, wound care, etc )  - Arrange for interpretive services to assist at discharge as needed  - Refer to Case Management Department for coordinating discharge planning if the patient needs post-hospital services based on physician/advanced practitioner order or complex needs related to functional status, cognitive ability, or social support system  Outcome: Progressing     Problem: Potential for Falls  Goal: Patient will remain free of falls  Description: INTERVENTIONS:  - Educate patient/family on patient safety including physical limitations  - Instruct patient to call for assistance with activity   - Consult OT/PT to assist with strengthening/mobility   - Keep Call bell within reach  - Keep bed low and locked with side rails adjusted as appropriate  - Keep care items and personal belongings within reach  - Initiate and maintain comfort rounds  - Make Fall Risk Sign visible to staff  - Offer Toileting every  Hours, in advance of need  - Initiate/Maintain alarm  - Obtain necessary fall risk management equipment:   - Apply yellow socks and bracelet for high fall risk patients  - Consider moving patient to room near nurses station  Outcome: Progressing     Problem: Nutrition/Hydration-ADULT  Goal: Nutrient/Hydration intake appropriate for improving, restoring or maintaining nutritional needs  Description: Monitor and assess patient's nutrition/hydration status for malnutrition  Collaborate with interdisciplinary team and initiate plan and interventions as ordered  Monitor patient's weight and dietary intake as ordered or per policy  Utilize nutrition screening tool and intervene as necessary  Determine patient's food preferences and provide high-protein, high-caloric foods as appropriate       INTERVENTIONS:  - Monitor oral intake, urinary output, labs, and treatment plans  - Assess nutrition and hydration status and recommend course of action  - Evaluate amount of meals eaten  - Assist patient with eating if necessary   - Allow adequate time for meals  - Recommend/ encourage appropriate diets, oral nutritional supplements, and vitamin/mineral supplements  - Order, calculate, and assess calorie counts as needed  - Recommend, monitor, and adjust tube feedings and TPN/PPN based on assessed needs  - Assess need for intravenous fluids  - Provide specific nutrition/hydration education as appropriate  - Include patient/family/caregiver in decisions related to nutrition  Outcome: Progressing     Problem: MOBILITY - ADULT  Goal: Maintain or return to baseline ADL function  Description: INTERVENTIONS:  -  Assess patient's ability to carry out ADLs; assess patient's baseline for ADL function and identify physical deficits which impact ability to perform ADLs (bathing, care of mouth/teeth, toileting, grooming, dressing, etc )  - Assess/evaluate cause of self-care deficits   - Assess range of motion  - Assess patient's mobility; develop plan if impaired  - Assess patient's need for assistive devices and provide as appropriate  - Encourage maximum independence but intervene and supervise when necessary  - Involve family in performance of ADLs  - Assess for home care needs following discharge   - Consider OT consult to assist with ADL evaluation and planning for discharge  - Provide patient education as appropriate  Outcome: Progressing  Goal: Maintains/Returns to pre admission functional level  Description: INTERVENTIONS:  - Perform BMAT or MOVE assessment daily    - Set and communicate daily mobility goal to care team and patient/family/caregiver  - Collaborate with rehabilitation services on mobility goals if consulted  - Perform Range of Motion times a day  - Reposition patient every  hours    - Dangle patient  times a day  - Stand patient  times a day  - Ambulate patient  times a day  - Out of bed to chair  times a day   - Out of bed for meals  times a day  - Out of bed for toileting  - Record patient progress and toleration of activity level   Outcome: Progressing

## 2022-10-30 NOTE — NURSING NOTE
Pt's blood sugar 61 this morning  Pt asymptomatic  Orange juice and snacks given  Repeat blood sugar 103  Breakfast tray served

## 2022-10-30 NOTE — PROGRESS NOTES
Internal Medicine Progress Note  Patient: Iggy Gardner  Age/sex: 79 y o  female  Medical Record #: [id number]      ASSESSMENT/PLAN: (Interval History)  Iggy Gardner is seen and examined and management for following issues:    S/p ORIF with IM nailing of the Rt femur  · Pain control and therapy per primary service  · PMR adjusted pain meds d/t sedation  · Renal dosed Lovenox for DVT prophylaxis  · Follow-up with Ortho 2 weeks post-op      Left 4th phalanx fracture  · Splint and NWB   · Keeps removing splint     HTN  · Home: Hyzaar 50/12 5mg qd/Lopressor 25mg q12 hours/Lasix 40mg qd (for heart failure)  · Here: Lopressor 25mg BID  · Orthostatic 10/28 and given 1 L NSS  · Stable today     Anemia  · Secondary to trauma  · Stable      DM type 2  · Home: Levemir 20U at bedtime/Humalog 10U with meals  · Here: Levemir 20U at bedtime/Humalog 3U TID WM  · On 10/28, Lantus was dropped to 20U (from 32U) and Lispro to 3 U TID WM (from 8-8-4) since BSs too soft and changed to Levemir last night 10/29  · This AM, FBS was 61 so given juice and gr crackers  · Will drop Levemir to 8U, increase lunch/dinner Lispro to 5U  · Eating well     Chronic systolic/diastolic heart failure  · Pt has been off diuretics  · ECHO with an EF of 45%  Grade 2 diastolic dysfunction  · Euvolemic today     CLL  · Recent diagnosis  · Outpt follow-up with Hematology/Oncology  · Baseline WBCs 14-16K  · Had mild bump likely reactive secondary to surgery/UTI  · Stable      Suspected UTI  · Had positive UA  Culture with >100,000 Klebsiella  · s/p Bactrim    · Urinary retention improved with tx of UTI     Delirium  · Yesterday inc confusion  · Dr Andres Hassan to 6mg qd since not sleeping well and added Seroquel 12 5mg qhs but since too sleepy in therapy yesterday the Seroquel was stopped   · Not sleeping at night  · Visual hallucinations today and last night but very pleasant and it doesn't seem to bother her       DC planning: TBD    The above assessment and plan was reviewed and updated as determined by my evaluation of the patient on 10/30/2022      Labs:   Results from last 7 days   Lab Units 10/24/22  0507   WBC Thousand/uL 19 25*   HEMOGLOBIN g/dL 10 1*   HEMATOCRIT % 32 8*   PLATELETS Thousands/uL 490*     Results from last 7 days   Lab Units 10/24/22  0507   SODIUM mmol/L 138   POTASSIUM mmol/L 4 0   CHLORIDE mmol/L 108   CO2 mmol/L 25   BUN mg/dL 26*   CREATININE mg/dL 1 19   CALCIUM mg/dL 9 2             Results from last 7 days   Lab Units 10/30/22  0821 10/30/22  0651 10/30/22  0626   POC GLUCOSE mg/dl 210* 103 61*       Review of Scheduled Meds:  Current Facility-Administered Medications   Medication Dose Route Frequency Provider Last Rate   • acetaminophen  325 mg Oral Q6H PRN Gordon Hyde MD     • acetaminophen  650 mg Oral TID AC Gordon Hyde MD     • atorvastatin  20 mg Oral Daily With [address], MD     • bisacodyl  10 mg Rectal Daily PRN Gordon Hyde MD     • docusate sodium  100 mg Oral BID Gordon Hyde MD     • enoxaparin  40 mg Subcutaneous Q24H Carlin Cannon MD     • gabapentin  100 mg Oral BID Gordon Hyde MD     • gabapentin  300 mg Oral HS Gordon Hyde MD     • hydrocortisone   Topical 4x Daily PRN Gordon Hyde MD     • insulin detemir  20 Units Subcutaneous HS BRINDA Campo     • insulin lispro  1-5 Units Subcutaneous HS Gordon Hyde MD     • insulin lispro  1-6 Units Subcutaneous TID AC Gordon Hyde MD     • insulin lispro  3 Units Subcutaneous Daily With Breakfast BRINDA Call     • insulin lispro  3 Units Subcutaneous Daily With Lunch Velna RenatoBRINDA     • insulin lispro  3 Units Subcutaneous Daily With Dinner Adamsna RenatoBRINDA     • lidocaine  2 patch Topical Daily Gordon Hyde MD     • meclizine  12 5 mg Oral Q8H PRN Gordon Hyde MD     • meclizine  12 5 mg Oral BID before breakfast/lunch Ilia Daley MD     • melatonin  6 mg Oral QPM Gema Laurent MD     • methocarbamol  500 mg Oral Q6H PRN Ilia Daley MD     • metoprolol tartrate  25 mg Oral Q12H [address] BRINDA Kramer     • naloxone  0 04 mg Intravenous Q1MIN PRN Ilia Daley MD     • polyethylene glycol  17 g Oral Daily PRN Ilia Daley MD         Subjective/ HPI: Patient seen and examined  Patients overnight issues or events were reviewed with nursing or staff during rounds or morning huddle session  New or overnight issues include the following:     FBS this AM was 61  Offers no complaints    ROS:   A 10 point ROS was performed; negative except as noted above  *Labs /Radiology studies reviewed  *Medications reviewed and reconciled as needed  *Please refer to order section for additional ordered labs studies  *Case discussed with primary attending during morning huddle case rounds    Physical Examination:  Vitals:   Vitals:    10/30/22 1000 10/30/22 1003 10/30/22 1005 10/30/22 1010   BP: 149/67 166/70 166/69 148/62   BP Location: Left arm Left arm Left arm Left arm   Pulse: 56 70 64 71   Resp:       Temp:       TempSrc:       SpO2:       Weight:       Height:           General Appearance: no distress, conversive  HEENT: PERRLA, conjuctiva normal; oropharynx clear; mucous membranes moist   Neck:  Supple, normal ROM  Lungs: CTA, normal respiratory effort, no retractions, expiratory effort normal  CV: regular rate and rhythm; no rubs/murmurs/gallops, PMI normal   ABD: soft; ND/NT; +BS  EXT: no edema  Skin: normal turgor, normal texture, no rashes  Psych: affect normal, mood normal  Neuro: AA  Appropriate for me      The above physical exam was reviewed and updated as determined by my evaluation of the patient on 10/30/2022      Invasive Devices  Report    Peripheral Intravenous Line  Duration           Peripheral IV 10/28/22 Distal;Right;Ventral (anterior) Forearm 1 day          Drain  Duration External Urinary Catheter 3 days                   VTE Pharmacologic Prophylaxis: Enoxaparin  Code Status: Level 1 - Full Code  Current Length of Stay: 13 day(s)      Total time spent:  30 minutes with more than 50% spent counseling/coordinating care  Counseling includes discussion with patient re: progress  and discussion with patient of his/her current medical state/information  Coordination of patient's care was performed in conjunction with primary service  Time invested included review of patient's labs, vitals, and management of their comorbidities with continued monitoring  In addition, this patient was discussed with medical team including physician and advanced extenders  The care of the patient was extensively discussed and appropriate treatment plan was formulated unique for this patient  Medical decision making for the day was made by supervising physician unless otherwise noted in their attestation statement  ** Please Note:  voice to text software may have been used in the creation of this document   Although proof errors in transcription or interpretation are a potential of such software**

## 2022-10-30 NOTE — QUICK NOTE
PMR Quick Note    Reviewed chart and discussed case with IM  Noted to be Oriented x4 in therapies today, but IM stated she seemed pleasantly confused and notes report seeing children in her bedroom last night  She was restless and had poor sleep      Will start seroquel 12 5mg again, earlier in the evening to prevent her from being groggy in the Edenilson Gonzales MD  Physical Medicine and Rehabilitation

## 2022-10-30 NOTE — PROGRESS NOTES
10/30/22 1000   Pain Assessment   Pain Assessment Tool 0-10   Pain Score 10 - Worst Possible Pain  (8/10 initially in bed, 4-10 with activities)   Mccray-Baker FACES Pain Rating 10  (I was over 10 when I went to the hospital when PT re-ed pain rating scale)   Pain Location/Orientation Orientation: Right;Location: Back; Location: Hip;Location: Knee   Pain Onset/Description Onset: Ongoing;Frequency: Constant/Continuous   Effect of Pain on Daily Activities tolerated with encouragement, distraction and tylenol with robaxin given by RN at start of tx   Patient's Stated Pain Goal No pain   Hospital Pain Intervention(s) Cold applied;Relaxation technique; Rest;Emotional support  (cold pack on inner thighs away from lidocaine patch application site)   Restrictions/Precautions   Precautions Cognitive; Fall Risk;Bed/chair alarms;Pain;Visual deficit   LUE Weight Bearing Per Order NWB  (L hand)   RLE Weight Bearing Per Order WBAT   Braces or Orthoses Splint  (left 4th finger)   General   Change In Medical/Functional Status see vitals for ortho BPs  did not use TEDS and binder this session, ALIYA Camacho aware   Cognition   Overall Cognitive Status Impaired   Arousal/Participation Cooperative  (given 10 oz coffee at start of tx which made completed during tx)   Attention Attends with cues to redirect   Orientation Level Oriented X4   Memory Decreased short term memory;Decreased recall of precautions   Following Commands Follows one step commands with increased time or repetition   Comments oriented x 4 today but admitted to seeing ~ 10 children in room last night ages 11-16 which she talked with about school stuff  per ALIYA Nation pt did not sleep last night and was restless   Subjective   Subjective pt reported pain above and was agreeable to have PT  Lying to Sitting on Side of Bed   Type of Assistance Needed Physical assistance;Verbal cues; Adaptive equipment   Physical Assistance Level 51%-75%   Comment mod A this session with rail using R hand only and L elbow   Lying to Sitting on Side of Bed CARE Score 2   Sit to Stand   Type of Assistance Needed Physical assistance;Verbal cues; Adaptive equipment   Physical Assistance Level 76% or more   Comment max initial standing then progress to mod A, still requires VC about 50% of the time to maintain compliance with left hand   Sit to Stand CARE Score 2   Bed-Chair Transfer   Type of Assistance Needed Physical assistance;Verbal cues   Physical Assistance Level 51%-75%   Comment min with slide board, mod SPT with RW  requires assist to steer walker   Chair/Bed-to-Chair Transfer CARE Score 2   Walk 10 Feet   Type of Assistance Needed Verbal cues; Adaptive equipment;Physical assistance   Physical Assistance Level Total assistance   Comment mod  to max A last 10-20' of walk, CFA of 2nd person  50' x 2 reps, 30'x 2 reps using L PFRW   Walk 10 Feet CARE Score 1   Walk 50 Feet with Two Turns   Type of Assistance Needed Physical assistance;Verbal cues; Adaptive equipment   Physical Assistance Level Total assistance   Walk 50 Feet with Two Turns CARE Score 1   Therapeutic Interventions   Strengthening supine TE include A/A SLR, hip abduction/adduction , heel slides and active ankle pumps x 10 reps x 2 sets prior to [address] [place] [address] activities  seated A/A R hip flexion and LAQ x 10 reps x 2 sets,bilat toe/heel raises x 30 reps   Equipment Use   NuStep L2 x 3 mins, L1 x 8 mins using bilat LE and R UE only with direct supervision and conversation to distract from pain  seat distance at 10 , SPM>35 on level 2, >45 on level 1   Assessment   Treatment Assessment Pain wise pt was able to tolerate better today compared to yesterday since pt able to do 4 bouts of walks 30-50' at a time with pain at 8-10' but can be redirected with encouragement  as pt fatigue noted R knee buckling with cont inc reliance on L PFRW and inc physical assist provided to complete task safely   pt tolerated tx without abdominal binder and TEDS on although encouraged fluid intake during therapy as well with reported slight wozziness upon initial standing which did not get exacerbated with prolong standing or during gait training  pt left in w/c with alarm on and all needs within reach at end of tx, RN notified  Barriers to Discharge Inaccessible home environment;Decreased caregiver support   Plan   Treatment/Interventions Functional transfer training;LE strengthening/ROM; Therapeutic exercise; Endurance training;Bed mobility;Gait training;Spoke to nursing;OT;Cognitive reorientation;Equipment eval/education   Progress Slow progress, cognitive deficits  (dec act tolerance)   PT Therapy Minutes   PT Time In 1000   PT Time Out 1130   PT Total Time (minutes) 90   PT Mode of treatment - Individual (minutes) 90   PT Mode of treatment - Concurrent (minutes) 0   PT Mode of treatment - Group (minutes) 0   PT Mode of treatment - Co-treat (minutes) 0   PT Mode of Treatment - Total time(minutes) 90 minutes   PT Cumulative Minutes 1155   Therapy Time missed   Time missed?  No

## 2022-10-31 PROBLEM — N39.0 UTI (URINARY TRACT INFECTION): Status: RESOLVED | Noted: 2022-10-21 | Resolved: 2022-10-31

## 2022-10-31 LAB
ANION GAP SERPL CALCULATED.3IONS-SCNC: 3 MMOL/L (ref 4–13)
BUN SERPL-MCNC: 23 MG/DL (ref 5–25)
CALCIUM SERPL-MCNC: 9.6 MG/DL (ref 8.3–10.1)
CHLORIDE SERPL-SCNC: 108 MMOL/L (ref 96–108)
CO2 SERPL-SCNC: 27 MMOL/L (ref 21–32)
CREAT SERPL-MCNC: 0.84 MG/DL (ref 0.6–1.3)
ERYTHROCYTE [DISTWIDTH] IN BLOOD BY AUTOMATED COUNT: 15 % (ref 11.6–15.1)
GFR SERPL CREATININE-BSD FRML MDRD: 64 ML/MIN/1.73SQ M
GLUCOSE SERPL-MCNC: 103 MG/DL (ref 65–140)
GLUCOSE SERPL-MCNC: 147 MG/DL (ref 65–140)
GLUCOSE SERPL-MCNC: 188 MG/DL (ref 65–140)
GLUCOSE SERPL-MCNC: 189 MG/DL (ref 65–140)
GLUCOSE SERPL-MCNC: 221 MG/DL (ref 65–140)
GLUCOSE SERPL-MCNC: 288 MG/DL (ref 65–140)
HCT VFR BLD AUTO: 35.3 % (ref 34.8–46.1)
HGB BLD-MCNC: 10.8 G/DL (ref 11.5–15.4)
MCH RBC QN AUTO: 32.6 PG (ref 26.8–34.3)
MCHC RBC AUTO-ENTMCNC: 30.6 G/DL (ref 31.4–37.4)
MCV RBC AUTO: 107 FL (ref 82–98)
PLATELET # BLD AUTO: 347 THOUSANDS/UL (ref 149–390)
PMV BLD AUTO: 11 FL (ref 8.9–12.7)
POTASSIUM SERPL-SCNC: 4.2 MMOL/L (ref 3.5–5.3)
RBC # BLD AUTO: 3.31 MILLION/UL (ref 3.81–5.12)
SODIUM SERPL-SCNC: 138 MMOL/L (ref 135–147)
WBC # BLD AUTO: 17.92 THOUSAND/UL (ref 4.31–10.16)

## 2022-10-31 RX ORDER — METHOCARBAMOL 500 MG/1
250 TABLET, FILM COATED ORAL EVERY 6 HOURS PRN
Status: DISCONTINUED | OUTPATIENT
Start: 2022-10-31 | End: 2022-11-08 | Stop reason: HOSPADM

## 2022-10-31 RX ORDER — MECLIZINE HCL 12.5 MG/1
12.5 TABLET ORAL EVERY 12 HOURS PRN
Status: DISCONTINUED | OUTPATIENT
Start: 2022-10-31 | End: 2022-11-08 | Stop reason: HOSPADM

## 2022-10-31 RX ADMIN — ENOXAPARIN SODIUM 40 MG: 40 INJECTION SUBCUTANEOUS at 08:00

## 2022-10-31 RX ADMIN — INSULIN LISPRO 1 UNITS: 100 INJECTION, SOLUTION INTRAVENOUS; SUBCUTANEOUS at 08:01

## 2022-10-31 RX ADMIN — INSULIN LISPRO 3 UNITS: 100 INJECTION, SOLUTION INTRAVENOUS; SUBCUTANEOUS at 08:21

## 2022-10-31 RX ADMIN — GABAPENTIN 100 MG: 100 CAPSULE ORAL at 06:00

## 2022-10-31 RX ADMIN — GABAPENTIN 300 MG: 300 CAPSULE ORAL at 20:45

## 2022-10-31 RX ADMIN — DOCUSATE SODIUM 100 MG: 100 CAPSULE, LIQUID FILLED ORAL at 08:00

## 2022-10-31 RX ADMIN — ACETAMINOPHEN 650 MG: 325 TABLET ORAL at 13:03

## 2022-10-31 RX ADMIN — MECLIZINE HCL 12.5 MG 12.5 MG: 12.5 TABLET ORAL at 06:00

## 2022-10-31 RX ADMIN — INSULIN DETEMIR 13 UNITS: 100 INJECTION, SOLUTION SUBCUTANEOUS at 21:06

## 2022-10-31 RX ADMIN — QUETIAPINE FUMARATE 12.5 MG: 25 TABLET ORAL at 20:46

## 2022-10-31 RX ADMIN — METHOCARBAMOL 250 MG: 500 TABLET ORAL at 20:46

## 2022-10-31 RX ADMIN — INSULIN LISPRO 5 UNITS: 100 INJECTION, SOLUTION INTRAVENOUS; SUBCUTANEOUS at 17:21

## 2022-10-31 RX ADMIN — METOPROLOL TARTRATE 25 MG: 25 TABLET, FILM COATED ORAL at 20:46

## 2022-10-31 RX ADMIN — METOPROLOL TARTRATE 25 MG: 25 TABLET, FILM COATED ORAL at 07:59

## 2022-10-31 RX ADMIN — Medication 6 MG: at 20:45

## 2022-10-31 RX ADMIN — METHOCARBAMOL 500 MG: 500 TABLET ORAL at 10:38

## 2022-10-31 RX ADMIN — ACETAMINOPHEN 650 MG: 325 TABLET ORAL at 06:01

## 2022-10-31 RX ADMIN — INSULIN LISPRO 5 UNITS: 100 INJECTION, SOLUTION INTRAVENOUS; SUBCUTANEOUS at 12:39

## 2022-10-31 RX ADMIN — ATORVASTATIN CALCIUM 20 MG: 20 TABLET, FILM COATED ORAL at 17:21

## 2022-10-31 RX ADMIN — GABAPENTIN 100 MG: 100 CAPSULE ORAL at 13:03

## 2022-10-31 RX ADMIN — DOCUSATE SODIUM 100 MG: 100 CAPSULE, LIQUID FILLED ORAL at 17:21

## 2022-10-31 RX ADMIN — LIDOCAINE 5% 2 PATCH: 700 PATCH TOPICAL at 08:01

## 2022-10-31 RX ADMIN — INSULIN LISPRO 3 UNITS: 100 INJECTION, SOLUTION INTRAVENOUS; SUBCUTANEOUS at 21:23

## 2022-10-31 RX ADMIN — MECLIZINE HCL 12.5 MG 12.5 MG: 12.5 TABLET ORAL at 13:02

## 2022-10-31 RX ADMIN — ACETAMINOPHEN 650 MG: 325 TABLET ORAL at 20:44

## 2022-10-31 NOTE — PLAN OF CARE
Problem: Prexisting or High Potential for Compromised Skin Integrity  Goal: Skin integrity is maintained or improved  Description: INTERVENTIONS:  - Identify patients at risk for skin breakdown  - Assess and monitor skin integrity  - Assess and monitor nutrition and hydration status  - Monitor labs   - Assess for incontinence   - Turn and reposition patient  - Assist with mobility/ambulation  - Relieve pressure over bony prominences  - Avoid friction and shearing  - Provide appropriate hygiene as needed including keeping skin clean and dry  - Evaluate need for skin moisturizer/barrier cream  - Collaborate with interdisciplinary team   - Patient/family teaching  - Consider wound care consult   Outcome: Progressing     Problem: PAIN - ADULT  Goal: Verbalizes/displays adequate comfort level or baseline comfort level  Description: Interventions:  - Encourage patient to monitor pain and request assistance  - Assess pain using appropriate pain scale  - Administer analgesics based on type and severity of pain and evaluate response  - Implement non-pharmacological measures as appropriate and evaluate response  - Consider cultural and social influences on pain and pain management  - Notify physician/advanced practitioner if interventions unsuccessful or patient reports new pain  Outcome: Progressing     Problem: INFECTION - ADULT  Goal: Absence or prevention of progression during hospitalization  Description: INTERVENTIONS:  - Assess and monitor for signs and symptoms of infection  - Monitor lab/diagnostic results  - Monitor all insertion sites, i e  indwelling lines, tubes, and drains  - Monitor endotracheal if appropriate and nasal secretions for changes in amount and color  - Krebs appropriate cooling/warming therapies per order  - Administer medications as ordered  - Instruct and encourage patient and family to use good hand hygiene technique  - Identify and instruct in appropriate isolation precautions for identified infection/condition  Outcome: Progressing  Goal: Absence of fever/infection during neutropenic period  Description: INTERVENTIONS:  - Monitor WBC    Outcome: Progressing     Problem: SAFETY ADULT  Goal: Patient will remain free of falls  Description: INTERVENTIONS:  - Educate patient/family on patient safety including physical limitations  - Instruct patient to call for assistance with activity   - Consult OT/PT to assist with strengthening/mobility   - Keep Call bell within reach  - Keep bed low and locked with side rails adjusted as appropriate  - Keep care items and personal belongings within reach  - Initiate and maintain comfort rounds  - Make Fall Risk Sign visible to staff  - Offer Toileting everHours, in advance of need  - Initiate/Maintainarm  - Obtain necessary fall risk management equipment:  - Apply yellow socks and bracelet for high fall risk patients  - Consider moving patient to room near nurses station  Outcome: Progressing  Goal: Maintain or return to baseline ADL function  Description: INTERVENTIONS:  -  Assess patient's ability to carry out ADLs; assess patient's baseline for ADL function and identify physical deficits which impact ability to perform ADLs (bathing, care of mouth/teeth, toileting, grooming, dressing, etc )  - Assess/evaluate cause of self-care deficits   - Assess range of motion  - Assess patient's mobility; develop plan if impaired  - Assess patient's need for assistive devices and provide as appropriate  - Encourage maximum independence but intervene and supervise when necessary  - Involve family in performance of ADLs  - Assess for home care needs following discharge   - Consider OT consult to assist with ADL evaluation and planning for discharge  - Provide patient education as appropriate  Outcome: Progressing  Goal: Maintains/Returns to pre admission functional level  Description: INTERVENTIONS:  - Perform BMAT or MOVE assessment daily    - Set and communicate daily mobility goal to care team and patient/family/caregiver  - Collaborate with rehabilitation services on mobility goals   - Out of bed for toileting  - Record patient progress and toleration of activity level   Outcome: Progressing     Problem: DISCHARGE PLANNING  Goal: Discharge to home or other facility with appropriate resources  Description: INTERVENTIONS:  - Identify barriers to discharge w/patient and caregiver  - Arrange for needed discharge resources and transportation as appropriate  - Identify discharge learning needs (meds, wound care, etc )  - Arrange for interpretive services to assist at discharge as needed  - Refer to Case Management Department for coordinating discharge planning if the patient needs post-hospital services based on physician/advanced practitioner order or complex needs related to functional status, cognitive ability, or social support system  Outcome: Progressing     Problem: Potential for Falls  Goal: Patient will remain free of falls  Description: INTERVENTIONS:  - Educate patient/family on patient safety including physical limitations  - Instruct patient to call for assistance with activity   - Consult OT/PT to assist with strengthening/mobility   - Keep Call bell within reach  - Keep bed low and locked with side rails adjusted as appropriate  - Keep care items and personal belongings within reach  - Initiate and maintain comfort rounds  - Make Fall Risk Sign visible to staff  - Offer Toileting every Hours, in advance of need  - Initiate/Maintainalarm  - Obtain necessary fall risk management equipment:   - Apply yellow socks and bracelet for high fall risk patients  - Consider moving patient to room near nurses station  Outcome: Progressing     Problem: Nutrition/Hydration-ADULT  Goal: Nutrient/Hydration intake appropriate for improving, restoring or maintaining nutritional needs  Description: Monitor and assess patient's nutrition/hydration status for malnutrition   Collaborate with interdisciplinary team and initiate plan and interventions as ordered  Monitor patient's weight and dietary intake as ordered or per policy  Utilize nutrition screening tool and intervene as necessary  Determine patient's food preferences and provide high-protein, high-caloric foods as appropriate       INTERVENTIONS:  - Monitor oral intake, urinary output, labs, and treatment plans  - Assess nutrition and hydration status and recommend course of action  - Evaluate amount of meals eaten  - Assist patient with eating if necessary   - Allow adequate time for meals  - Recommend/ encourage appropriate diets, oral nutritional supplements, and vitamin/mineral supplements  - Order, calculate, and assess calorie counts as needed  - Recommend, monitor, and adjust tube feedings and TPN/PPN based on assessed needs  - Assess need for intravenous fluids  - Provide specific nutrition/hydration education as appropriate  - Include patient/family/caregiver in decisions related to nutrition  Outcome: Progressing     Problem: MOBILITY - ADULT  Goal: Maintain or return to baseline ADL function  Description: INTERVENTIONS:  -  Assess patient's ability to carry out ADLs; assess patient's baseline for ADL function and identify physical deficits which impact ability to perform ADLs (bathing, care of mouth/teeth, toileting, grooming, dressing, etc )  - Assess/evaluate cause of self-care deficits   - Assess range of motion  - Assess patient's mobility; develop plan if impaired  - Assess patient's need for assistive devices and provide as appropriate  - Encourage maximum independence but intervene and supervise when necessary  - Involve family in performance of ADLs  - Assess for home care needs following discharge   - Consider OT consult to assist with ADL evaluation and planning for discharge  - Provide patient education as appropriate  Outcome: Progressing  Goal: Maintains/Returns to pre admission functional level  Description: INTERVENTIONS:  - Perform BMAT or MOVE assessment daily    - Set and communicate daily mobility goal to care team and patient/family/caregiver     - Collaborate with rehabilitation services on mobility goals if consulted  - Perform Range o  - Out of bed for toileting  - Record patient progress and toleration of activity level   Outcome: Progressing

## 2022-10-31 NOTE — PROGRESS NOTES
10/31/22 1215   Pain Assessment   Pain Location/Orientation Orientation: Right;Location: Hip;Location: Leg   Pain Rating: FLACC (Rest) - Face 0   Pain Rating: FLACC (Rest) - Legs 0   Pain Rating: FLACC (Rest) - Activity 0   Pain Rating: FLACC (Rest) - Cry 0   Pain Rating: FLACC (Rest) - Consolability 0   Score: FLACC (Rest) 0   Pain Rating: FLACC (Activity) - Face 1   Pain Rating: FLACC (Activity) - Legs 0   Pain Rating: FLACC (Activity) - Activity 0   Pain Rating: FLACC (Activity) - Cry 1   Pain Rating: FLACC (Activity) - Consolability 1   Score: FLACC (Activity) 3   Restrictions/Precautions   Precautions Bed/chair alarms;Cognitive; Fall Risk;Pain;Supervision on toilet/commode;Visual deficit   Weight Bearing Restrictions Yes   LUE Weight Bearing Per Order NWB  (through hand)   RLE Weight Bearing Per Order WBAT   ROM Restrictions No   Braces or Orthoses Splint   Lifestyle   Autonomy "I am so tired "   Lower Body Dressing   Comment practiced threaded BLE into pants w/ LHAE, cues req to thread RLE first and able to thread at S level w/ extended time provided  Putting On/Taking Off Footwear   Type of Assistance Needed Physical assistance   Physical Assistance Level 26%-50%   Comment able to don/doff socks w/ LHAE when cues provided and extended time  Donned shoe w/ LHAE w/ A to tie  Putting On/Taking Off Footwear CARE Score 3   Sit to Lying   Type of Assistance Needed Physical assistance   Physical Assistance Level 26%-50%   Comment RLE management, cues for sequencing  Sit to Lying CARE Score 3   Sit to Stand   Type of Assistance Needed Physical assistance;Verbal cues; Artur Garibay / Dorothy Pipe; Adaptive equipment   Physical Assistance Level 51%-75%   Comment Mod-Max   Sit to Stand CARE Score 2   Bed-Chair Transfer   Type of Assistance Needed Physical assistance;Verbal cues; Artur Garibay / Dorothy Pipe; Adaptive equipment   Physical Assistance Level 26%-50%   Comment stand pivot w/ PFRW, cues for sequencing   Chair/Bed-to-Chair Transfer CARE Score 3   Toileting Hygiene   Type of Assistance Needed Physical assistance   Physical Assistance Level 76% or more   Comment bowel hygiene while seated  Mod A to steady in stance w/ A for clothing management  Mod cues for sequencing and safety  Toileting Hygiene CARE Score 2   Toilet Transfer   Type of Assistance Needed Physical assistance;Verbal cues; Adaptive equipment;Set-up / clean-up   Physical Assistance Level 51%-75%   Comment stand pivot w/ PFRW, cues for sequencing and safety  Toilet Transfer CARE Score 2   Exercise Tools   Other Exercise Tool 1 RUE ther ex to maximize strength and endurance for fxnl xfers and ADLs  Completed w/ 1# DB 3x10 bicep curls, chest press, and OH shoulder press  Tolerated well w/ Min vc's and demo, rest breaks provided between sets to manage fatigue  Cognition   Overall Cognitive Status Impaired   Arousal/Participation Alert; Cooperative   Attention Attends with cues to redirect   Orientation Level Oriented X4;Oriented to person;Oriented to time;Oriented to situation;Disoriented to place   Memory Decreased short term memory;Decreased recall of precautions   Following Commands Follows one step commands with increased time or repetition   Comments Administered MoCA version 8 1 w/ pt scoring 11/30 indicating a moderate cognitive deficit  Functionally presents w/ impaired STM, dec recall of precautions, impaired safety awareness, and dec insight/judgement  Will cont to benefit from repetitive safety training and may benefit from trial of memory aids  Pt remains high fall risk w/ cognitive deficit  Assessment   Treatment Assessment Pt seen for skilled OT session focusing on fxnl xfers, toileting, RUE strengthening, LHAE training, and formalized cognitive assessment  Pt cont to be limited by fatigue, RLE pain, and poor act tolerance req frequent seated rest breaks to manage   Pt add'lly req inc time for all fxnl tasks 2* cognitive deficits req vc's to inc safety and dec fall risk  Administered MoCA version 8 1 scoing 11/30 inidicating moderate cognitive impairment and details on score noted below  Cont OT POC: fxnl standing tolerance, PFRW management, repetitive safety training, LHAE training, and ADL retraining  Pt requested to rest in bed at end of session, all needs within reach and alarm activated  Prognosis Good   Problem List Decreased strength;Decreased endurance; Impaired balance;Decreased mobility; Decreased coordination;Decreased cognition; Impaired judgement;Decreased safety awareness;Orthopedic restrictions;Pain; Impaired vision   Plan   Treatment/Interventions ADL retraining;Functional transfer training; Therapeutic exercise; Endurance training;Cognitive reorientation;Patient/family training;Equipment eval/education; Bed mobility   Progress Slow progress, decreased activity tolerance   Recommendation   OT Discharge Recommendation   (pending progress)   OT Therapy Minutes   OT Time In 1215   OT Time Out 1355   OT Total Time (minutes) 100   OT Mode of treatment - Individual (minutes) 95   OT Mode of treatment - Concurrent (minutes) 0   OT Mode of treatment - Group (minutes) 0   OT Mode of treatment - Co-treat (minutes) 0   OT Mode of Treatment - Total time(minutes) 95 minutes   OT Cumulative Minutes 1065   Therapy Time missed   Time missed? No     Pt completed Nehemias Cognitive Assessment (MOCA) version 8 1  Pt scored overall 11/30  indicating a moderate cognitive deficit       Visuospatial/executive: 1/5 (point for clock contour)  Naming: 3/3   Memory:    (worth no points)   Attention:  1/6 (point for number repeat in forward order)  Language: 0/3   Abstraction: 2/2   Delayed recall: 1/5 MIS: 7/15 (3rd word without cue, 5th word w/ category cue, 1st and 2nd words w/ multiple choice cue)  Orientation: 2/6   +1 point for <12 year education    Certified [facility] clinician ID: [id number]

## 2022-10-31 NOTE — WOUND OSTOMY CARE
Progress Note - Wound   Tonny Hamilton 79 y o  female MRN: [id number]  Unit/Bed#: -34 Encounter: [id number]      Assessment:  Wound care to see patient for weekly follow-up visit  Patient admitted with intertrochanteric fracture of the right femur  History of - CLL, CHF, HLD, HTN, DM, CKD, osteoporosis, and TIA  Patient seen Jed Macias in wheelchair, alert and oriented x 4 with forgetfulness and fatigue, cooperative and agreeable for the assessment  Incontinent per nursing  Dependent for care  Nutrition is following along  Per primary RN, there no other wounds or skin alterations are present  1  L heel - DTI - HA  Appears to be resolving/reaborbing  Presents as round shaped intact non-blanchable red colored skin  Mild blistering of the skin with serous fluid  No drainage or open aspect  Nicole-wound is intact with blanchable pink colored skin  Wound is tender with palpation  No induration, fluctuance, odor, warmth/temperature differences, redness, or purulence noted to the above noted wounds and skin areas assessed  New dressings applied  Patient tolerated assessment well- tender with palpation  Primary nurse aware of plan of care  See flow sheets for more detailed assessment findings  Will follow along  Plan:   1-Cleanse sacro-buttocks with soap and water  Apply Preventative Hydraguard BID and PRN  2-Turn/reposition q2h or when medically stable for pressure re-distribution on skin   3-Elevate heels to offload pressure  4-Moisturize skin daily with skin nourishing cream  5-Ehob cushion in chair when out of bed  6-B/L Heel Allevyn Foam Dressings  Samuel Left Heel with T for Treatment and Samuel Right Heel with P for Prevention  Change every other day or Prn  Peel back, inspect skin Q-shift, and reapply  Objective:    Vitals: Blood pressure 153/67, pulse 56, temperature 98 1 °F (36 7 °C), temperature source Oral, resp  rate 16, height 5' 6" (1 676 m), weight 64 8 kg (142 lb 13 7 oz), SpO2 96 %  ,Body mass index is 23 06 kg/m²  Wound 10/21/22 Pressure Injury Heel Left (Active)   Wound Image   10/31/22 1223   Wound Description Non-blanchable erythema;Light purple; Intact;Fragile 10/31/22 1223   Pressure Injury Stage DTPI 10/31/22 1223   Nicole-wound Assessment Dry; Intact 10/31/22 1223   Wound Length (cm) 3 cm 10/31/22 1223   Wound Width (cm) 3 cm 10/31/22 1223   Wound Depth (cm) 0 cm 10/31/22 1223   Wound Surface Area (cm^2) 9 cm^2 10/31/22 1223   Wound Volume (cm^3) 0 cm^3 10/31/22 1223   Calculated Wound Volume (cm^3) 0 cm^3 10/31/22 1223   Tunneling 0 cm 10/31/22 1223   Tunneling in depth located at 0 10/31/22 1223   Undermining 0 10/31/22 1223   Undermining is depth extending from 0 10/31/22 1223   Drainage Amount None 10/31/22 1223   Non-staged Wound Description Not applicable 57/50/45 9904   Treatments Cleansed 10/31/22 1223   Dressing Foam, Silicon (eg  Allevyn, etc) 10/31/22 1223   Wound packed? No 10/31/22 1223   Packing- # removed 0 10/31/22 1223   Packing- # inserted 0 10/31/22 1223   Dressing Changed New 10/31/22 1223   Patient Tolerance Tolerated well 10/31/22 1223   Dressing Status Clean;Dry; Intact 10/31/22 1223         Tiger text with questions or concerns  Wound care will follow along weekly  AVS updated    Yunier Groves RN BSN CWON

## 2022-10-31 NOTE — PROGRESS NOTES
Internal Medicine Progress Note  Patient: Darlene Santos  Age/sex: 79 y o  female  Medical Record #: [id number]      ASSESSMENT/PLAN: (Interval History)  Darlene Santos is seen and examined and management for following issues:    S/p ORIF with IM nailing of the Rt femur  · Pain control and therapy per primary service  · PMR adjusted pain meds d/t sedation  · Renal dosed Lovenox for DVT prophylaxis  · Follow-up with Ortho 2 weeks post-op      Left 4th phalanx fracture  · Splint and NWB   · Keeps removing splint     HTN  · Home: Hyzaar 50/12 5mg qd/Lopressor 25mg q12 hours/Lasix 40mg qd (for heart failure)  · Here: Lopressor 25mg BID   · Orthostatic 10/28 and given 1 L NSS   · Stable today     Anemia  · Secondary to trauma  · Stable      DM type 2  · Home: Levemir 20U at bedtime/Humalog 10U with meals  · Here: Levemir 8U at bedtime/Humalog 3U breakfast and dinner/5U lunch  · Blood sugars elevated  Will increase Levemir to 13U at bedtime      Chronic systolic/diastolic heart failure  · Pt has been off diuretics  · ECHO with an EF of 45%  Grade 2 diastolic dysfunction  · Euvolemic today     CLL  · Recent diagnosis  · Outpt follow-up with Hematology/Oncology  · Baseline WBCs 14-16K  · Had mild bump likely reactive secondary to surgery/UTI  · Stable      Suspected UTI  · Had positive UA  Culture with >100,000 Klebsiella  · s/p Bactrim  · Urinary retention improved with tx of UTI     Delirium  · Intermittent confusion and hallucinations  · Seroquel caused increased daytime fatigue and was stopped but restarted due to hallucinations  · Pt currently pleasant, oriented and not hallucinating  DC planning: TBD    The above assessment and plan was reviewed and updated as determined by my evaluation of the patient on 10/31/2022      Labs:   Results from last 7 days   Lab Units 10/31/22  0615   WBC Thousand/uL 17 92*   HEMOGLOBIN g/dL 10 8*   HEMATOCRIT % 35 3   PLATELETS Thousands/uL 347     Results from last 7 days Lab Units 10/31/22  0615   SODIUM mmol/L 138   POTASSIUM mmol/L 4 2   CHLORIDE mmol/L 108   CO2 mmol/L 27   BUN mg/dL 23   CREATININE mg/dL 0 84   CALCIUM mg/dL 9 6             Results from last 7 days   Lab Units 10/31/22  0614 10/30/22  2102 10/30/22  1630   POC GLUCOSE mg/dl 188* 181* 190*       Review of Scheduled Meds:  Current Facility-Administered Medications   Medication Dose Route Frequency Provider Last Rate   • acetaminophen  325 mg Oral Q6H PRN Lauren Alarcon MD     • acetaminophen  650 mg Oral TID AC Lauren Alarcon MD     • atorvastatin  20 mg Oral Daily With Kaylin Wise MD     • bisacodyl  10 mg Rectal Daily PRN Lauren Alarcon MD     • docusate sodium  100 mg Oral BID Lauren Alarcon MD     • enoxaparin  40 mg Subcutaneous Q24H Carlin Cannon MD     • gabapentin  100 mg Oral BID Lauren Alarcon MD     • gabapentin  300 mg Oral HS Lauren Alarcon MD     • hydrocortisone   Topical 4x Daily PRN Lauren Alarcon MD     • insulin detemir  8 Units Subcutaneous HS BRINDA Dutta     • insulin lispro  1-5 Units Subcutaneous HS Lauren Alarcon MD     • insulin lispro  1-6 Units Subcutaneous TID AC Lauren Alarcon MD     • insulin lispro  3 Units Subcutaneous Daily With Breakfast BRINDA Gardner     • insulin lispro  5 Units Subcutaneous Daily With Lunch BRINDA Gardner     • insulin lispro  5 Units Subcutaneous Daily With Dinner BRINDA Gardner     • lidocaine  2 patch Topical Daily Lauren Alacron MD     • meclizine  12 5 mg Oral Q8H PRN Lauren Alarcon MD     • meclizine  12 5 mg Oral BID before breakfast/lunch Lauren Alarcon MD     • melatonin  6 mg Oral QPM Lanre Bonner MD     • methocarbamol  500 mg Oral Q6H PRN Lauren Alarcon MD     • metoprolol tartrate  25 mg Oral Q12H [address] BRINDA Kramer     • naloxone  0 04 mg Intravenous Q1MIN PRN Lauren Alarcon MD     • polyethylene glycol  17 g Oral Daily PRN Gordon Hyde MD     • QUEtiapine  12 5 mg Oral QPM Wesley Burns MD         Subjective/ HPI: Patient seen and examined  Patients overnight issues or events were reviewed with nursing or staff during rounds or morning huddle session  New or overnight issues include the following:     Pt seen in her room  She reports sleeping well last night  She denies any other complaints  ROS:   A 10 point ROS was performed; negative except as noted above  *Labs /Radiology studies reviewed  *Medications reviewed and reconciled as needed  *Please refer to order section for additional ordered labs studies  *Case discussed with primary attending during morning huddle case rounds    Physical Examination:  Vitals:   Vitals:    10/30/22 1350 10/30/22 2106 10/31/22 0600 10/31/22 0757   BP: 146/60 159/71 (!) 172/73 153/67   BP Location: Left arm  Left arm    Pulse: 71 66 71 56   Resp: 17 17 16    Temp: 98 °F (36 7 °C) 98 9 °F (37 2 °C) 98 1 °F (36 7 °C)    TempSrc: Oral Oral Oral    SpO2: 97% 93% 96%    Weight:   64 8 kg (142 lb 13 7 oz)    Height:           General Appearance: no distress, conversive  HEENT: PERRLA, conjuctiva normal; oropharynx clear; mucous membranes moist   Neck:  Supple, normal ROM  Lungs: CTA, normal respiratory effort, no retractions, expiratory effort normal  CV: regular rate and rhythm; no rubs/murmurs/gallops, PMI normal   ABD: soft; ND/NT; +BS  EXT: no edema  Skin: normal turgor, normal texture, no rashes  Psych: affect normal, mood normal  Neuro: AAOx3  Currently without hallucinations  The above physical exam was reviewed and updated as determined by my evaluation of the patient on 10/31/2022      Invasive Devices  Report    Peripheral Intravenous Line  Duration           Peripheral IV 10/28/22 Distal;Right;Ventral (anterior) Forearm 2 days          Drain  Duration           External Urinary Catheter 4 days                   VTE Pharmacologic Prophylaxis: Enoxaparin  Code Status: Level 1 - Full Code  Current Length of Stay: 14 day(s)      Total time spent:  30 minutes with more than 50% spent counseling/coordinating care  Counseling includes discussion with patient re: progress  and discussion with patient of his/her current medical state/information  Coordination of patient's care was performed in conjunction with primary service  Time invested included review of patient's labs, vitals, and management of their comorbidities with continued monitoring  In addition, this patient was discussed with medical team including physician and advanced extenders  The care of the patient was extensively discussed and appropriate treatment plan was formulated unique for this patient  Medical decision making for the day was made by supervising physician unless otherwise noted in their attestation statement  ** Please Note:  voice to text software may have been used in the creation of this document   Although proof errors in transcription or interpretation are a potential of such software**

## 2022-10-31 NOTE — PROGRESS NOTES
Physical Medicine and Rehabilitation Progress Note  Tonny Hamilton 79 y o  female MRN: [id number]  Unit/Bed#: -38 Encounter: [id number]    HPI: Tonny Hamilton is a 79 y o  female with CLL, CHF, HLD, HTN, DM2, CKD 3, history of cognitive impairment, osteoporosis, dizziness who presented to the [facility] on 10/11/22 with fall in her driveway while using her rollator walker  Imaging revealed a right intertrochanteric femur fracture with extension towards the femoral neck  Imaging also revealed a left displaced 4th phalanx  Patient seen by Orthopedics and deemed an operative candidate  Patient taken to the OR on 10/12/22 by Dr Vicky Loo for a ORIF with IM nailing  Deemed WBAT RLE  Placed on Lovenox for DVT ppx  Endocrinology consulted for bone health and diabetes, and course c/b acute pain and anemia  Admitted to the [facility] [place], [place] on 10/17  Chief Complaint: Slept well last night  Back pain  Interval History/Subjective:  No acute events overnight  Sleep log performed, and slept through the night  A bit groggy this AM, but improved  Her big complaint to day is sharp pain in her back, chronic issue  She occasionally has radiation down her R leg - chronic issue, but not on opioids right now, so more salient for her  Last BM was 10/30  No new N/V, headaches, lightheadedness, numbness/tinglingweakness, CP, SOB  No visual hallucinations  ROS:  A 10 point review of systems was negative except for what is noted in the HPI  Today's Changes:  1  Continue current regimen for sleep  2  Sharp cramp like pain in her back  Discussed with nursing to try and use robaxin for that  It did make her sleepy, so decreased dose to 250mg  She is very sensitive to meds  - Lidoderm patch does help   - Heat in therapy helps   - Emphasized with nursing use of aqua K as appropriate  3  Local wound care for L heel DTI  4  Has had orthostasis, improved with gentle fluids     - Will make meclizine PRN and monitor  Total time spent:  35 minutes, with more than 50% spent counseling/coordinating care  Counseling includes discussion with patient re: progress in therapies, functional issues observed by therapy staff, and discussion with patient his/her current medical state/wellbeing  Coordination of patient's care was performed in conjunction with Internal Medicine service to monitor patient's labs, vitals, and management of their comorbidities  Assessment/Plan:    * Intertrochanteric fracture of right femur [facility])  Assessment & Plan  Traumatic fall  Sustained a right intertrochanteric fracture  Taken to OR by Dr Namrata Mtz on 10/12/22 for ORIF and IM nailing  WBAT RLE  DVT ppx with SQ Lovenox  Monitor 3 incisions  Continue acute rehabilitation program  POD 14 = 10/26/22 - appreciate ortho seeing patient and removing staples today 10/27/22  Xray completed 10/22 and stable  Follow-up with Dr Namrata Mtz as outpatient in 4 weeks (~11/23)      Encephalopathy  Assessment & Plan  Improved today  Recurrent 10/28/22 with delirium  - Moving below meds to earlier in the evening  Increase melatonin to 6 mg  Seroquel 12 5 mg QHS      Deep tissue injury  Assessment & Plan  Left heel DTI  Offload and elevate  Appreciate wound care recommendations as below  Skin Care Plan:  1-Cleanse sacro-buttocks with soap and water  Apply Preventative Hydraguard BID and PRN  2-Turn/reposition q2h or when medically stable for pressure re-distribution on skin   3-Elevate heels to offload pressure  4-Moisturize skin daily with skin nourishing cream  5-Ehob cushion in chair when out of bed  6-B/L Heel Allevyn Foam Dressings  Samuel Left Heel with T for Treatment and Samuel Right Heel with P for Prevention  Change every other day or Prn  Peel back, inspect skin Q-shift, and reapply  H/O dizziness  Assessment & Plan  Dizziness related to motion  Found to have orthostasis  Chronic issue per patient  Made meclizine PRN for polypharmacy  Monitor closely  Orthostasis  Assessment & Plan  Continue compression stockings/Abd binder during all therapies  Recurrent 10/28/22: Gentle IVF given for orthostasis x 1L   - Orthostasis improved     Acute pain  Assessment & Plan  Nociceptive/neuropathic post op pain in RLE managed on multimodal regimen:   · Patient is intolerant of oxycodone and Norco   Patient excessively drowsy on oxycodone and with mild hallucinations on Norco   · Discontinue Norco  · Schedule Tylenol 650 mg t i d  and 325 PRN  · Scheduled gabapentin 100 mg bid  Gabapentin 300 mg q h s  · Topical Lidoderm patches, aqua K pad  · Robaxin 250 mg q 6 hours p r n  For muscle spasm  · Discontinued Dilaudid    CLL (chronic lymphocytic leukemia) (HCC)  Assessment & Plan  Chronic leukocytosis (17K)  Monitor   Follow-up with heme onc as outpatient     Displaced fracture of middle phalanx of left ring finger, initial encounter for closed fracture  Assessment & Plan  Traumatic fracture  4th left finger   Treated non-operatively by Orthopedics  Continue supportive splint - she is not always compliant despite cuing  ICE for swelling  NWB L hand    Hypertension  Assessment & Plan  At home: Lopressor 25 mg Q 12 hours, Hyzaar (Losartan/HCTZ) 50mg/12 5mg daily  Here: Lopressor 25 mg Q12hr  Will monitor BP closely  Adjust medications accordingly  TEDs daily /Abdominal binder PRN    Chronic systolic heart failure (HCC)  Assessment & Plan  Grade 2 DD  At home: Lasix 40 mg daily, BB  Here:  HOLD Lasix  Encourage fluids by mouth  Restart Lasix when able with K Dur supplement  IM consulted to assist with management       HLD (hyperlipidemia)  Assessment & Plan  Restarted on home Lipitor 20 mg QPM    CKD (chronic kidney disease) stage 3, GFR 30-59 ml/min (HCC)  Assessment & Plan  Stable  Avoid nephrotoxic agents   Monitor 2x/week    Type 2 diabetes mellitus [facility])  Assessment & Plan  Lab Results   Component Value Date    HGBA1C 7 6 (H) 10/12/2022     At home: Levemir 20 units QHS, Humalog 10 units with meals  Here: Levemir 13 units, Lispro 3-5-5 units TID with meals, continue sliding scale insulin  In acute care required an insulin drip transiently  Accuchecks  Diabetic Diet  Education  IM following    UTI (urinary tract infection)-resolved as of 10/31/2022  Assessment & Plan  Resolved  + UA  Urine culture: >100K Klebsiella  Symptomatic with urinary frequency, new encephalopathy and hallucinations, dysuria  · Completed treatment with Bactrim which was sensitive        Health Maintenance  #Bowel: Last BM 10/30  On docusate BID  #Bladder: Voiding and better continence   #Skin/Pressure Injury Prevention: Turn Q2hr in bed, with weight shifts [id number] in wheelchair  #DVT Prophylaxis: Lovenox, SCDs  #GI Prophylaxis: PO diet  #Code Status: Full Code  #FEN: Diabetic diet, with glucerna at lunch  Received 1L of normal saline 10/28  #Dispo: Likely SNF  Family unable to provide assistance  Needs to be modified Ind to return home  Objective:    Functional Update:  PT: mod-max  OT: mod-max    Allergies per EMR    Physical Exam:  Temp:  [98 1 °F (36 7 °C)-98 9 °F (37 2 °C)] 98 2 °F (36 8 °C)  HR:  [56-71] 65  Resp:  [16-17] 17  BP: (142-172)/(67-73) 142/70  Oxygen Therapy  SpO2: 95 %    Gen: No acute distress  HEENT: Moist mucus membranes, Normocephalic/Atraumatic  Cardiovascular: Regular rate, rhythm, S1/S2  Distal pulses palpable  Heme/Extr: No edema  Pulmonary: Non-labored breathing  Lungs CTAB  : No hernandez  GI: Soft, non-tender, non-distended  BS+  Integumentary: Skin is warm, dry  Neuro: AAOx3, Speech is intact  Appropriate to questioning  Psych: Normal mood and affect  No visual hallucinations  Diagnostic Studies: Reviewed, no new imaging      Laboratory: Reviewed   Results from last 7 days   Lab Units 10/31/22  0615   HEMOGLOBIN g/dL 10 8*   HEMATOCRIT % 35 3   WBC Thousand/uL 17 92*     Results from last 7 days   Lab Units 10/31/22  0615   BUN mg/dL 23 POTASSIUM mmol/L 4 2   CHLORIDE mmol/L 108   CREATININE mg/dL 0 84            Patient Active Problem List   Diagnosis   • Fall   • Type 2 diabetes mellitus (HCC)   • CKD (chronic kidney disease) stage 3, GFR 30-59 ml/min (HCC)   • HLD (hyperlipidemia)   • OA (osteoarthritis)   • Chronic systolic heart failure (HCC)   • Lymphadenopathy   • Elevated liver enzymes   • Ambulatory dysfunction   • Suspected Mild cognitive impairment   • Hypertension   • Intertrochanteric fracture of right femur (HCC)   • Displaced fracture of middle phalanx of left ring finger, initial encounter for closed fracture   • CLL (chronic lymphocytic leukemia) (HCC)   • Acute pain   • Orthostasis   • H/O dizziness   • Deep tissue injury   • UTI (urinary tract infection)   • Encephalopathy         Medications  Current Facility-Administered Medications   Medication Dose Route Frequency Provider Last Rate   • acetaminophen  325 mg Oral Q6H PRN Sander Nelson MD     • acetaminophen  650 mg Oral TID AC Yanely Jain MD     • atorvastatin  20 mg Oral Daily With Alisha Mahan MD     • bisacodyl  10 mg Rectal Daily PRN Sander Nelson MD     • docusate sodium  100 mg Oral BID Sander Nelson MD     • enoxaparin  40 mg Subcutaneous Q24H Carlin Cannon MD     • gabapentin  100 mg Oral BID Sander Nelson MD     • gabapentin  300 mg Oral HS Sander Nelson MD     • hydrocortisone   Topical 4x Daily PRN Sander Nelson MD     • insulin detemir  13 Units Subcutaneous HS Adeola Jennings PA-C     • insulin lispro  1-5 Units Subcutaneous HS Sander Nelson MD     • insulin lispro  1-6 Units Subcutaneous TID AC Sander Nelson MD     • insulin lispro  3 Units Subcutaneous Daily With Breakfast Cindra Rape, CRNP     • insulin lispro  5 Units Subcutaneous Daily With Lunch Cindra Rape, CRNP     • insulin lispro  5 Units Subcutaneous Daily With [address] Veralishae Jacey • lidocaine  2 patch Topical Daily Tiffanie Preciado MD     • meclizine  12 5 mg Oral Q8H PRN Tiffanie Preciado MD     • meclizine  12 5 mg Oral BID before breakfast/lunch Tiffanie Preciado MD     • melatonin  6 mg Oral QPM Juliane Rose MD     • methocarbamol  500 mg Oral Q6H PRN Tiffanie Preciado MD     • metoprolol tartrate  25 mg Oral Q12H [address] BRINDA Kramer     • naloxone  0 04 mg Intravenous Q1MIN PRN Tiffanie Preciado MD     • polyethylene glycol  17 g Oral Daily PRN Tiffanie Preciado MD     • QUEtiapine  12 5 mg Oral QPM Juliane Rose MD            ** Please Note: Fluency Direct voice to text software may have been used in the creation of this document   **

## 2022-10-31 NOTE — PROGRESS NOTES
10/31/22 0930   Pain Assessment   Pain Assessment Tool Mccray-Baker FACES   Mccray-Baker FACES Pain Rating 8   Pain Location/Orientation Orientation: Right;Location: Hip;Location: Buttocks; Location: Back   Pain Onset/Description Onset: Ongoing;Frequency: Intermittent   Patient's Stated Pain Goal No pain   Hospital Pain Intervention(s) Repositioned; Rest   Restrictions/Precautions   Precautions Cognitive; Fall Risk;Pain;Supervision on toilet/commode;Visual deficit   Weight Bearing Restrictions Yes   LUE Weight Bearing Per Order NWB   RLE Weight Bearing Per Order WBAT   ROM Restrictions No   Braces or Orthoses Splint   Cognition   Overall Cognitive Status Impaired   Arousal/Participation Alert; Cooperative   Attention Attends with cues to redirect   Orientation Level Oriented X4   Memory Decreased short term memory;Decreased recall of precautions   Following Commands Follows one step commands with increased time or repetition   Lying to Sitting on Side of Bed   Type of Assistance Needed Physical assistance;Verbal cues; Adaptive equipment   Physical Assistance Level 25% or less   Comment A to RLE only, MAX VC's for sequencing and no WB to L hand   Lying to Sitting on Side of Bed CARE Score 3   Sit to Stand   Type of Assistance Needed Physical assistance;Verbal cues; Adaptive equipment   Physical Assistance Level 51%-75%   Comment varied MIN to [address] as pt fatigues and experiences increased pain   Sit to Stand CARE Score 2   Bed-Chair Transfer   Type of Assistance Needed Physical assistance;Verbal cues; Adaptive equipment   Physical Assistance Level 26%-50%   Comment SPT with RW overall MODA   Chair/Bed-to-Chair Transfer CARE Score 3   Transfer Bed/Chair/Wheelchair   Adaptive Equipment Platform;Roller Walker   Walk 10 Feet   Type of Assistance Needed Physical assistance;Verbal cues; Adaptive equipment   Physical Assistance Level Total assistance   Comment MODA with WC follow for safety     Walk 10 Feet CARE Score 1   Walk 50 Feet with Two Turns   Reason if not Attempted Safety concerns   Walk 50 Feet with Two Turns CARE Score 88   Walk 150 Feet   Reason if not Attempted Safety concerns   Walk 150 Feet CARE Score 88   Walking 10 Feet on Uneven Surfaces   Reason if not Attempted Safety concerns   Walking 10 Feet on Uneven Surfaces CARE Score 88   Ambulation   Does the patient walk? 2  Yes   Primary Mode of Locomotion Prior to Admission Walk   Distance Walked (feet) 15 ft  (x2, 30')   Assist Device Roller Walker;Platform   Gait Pattern Antalgic; Inconsistant Mandi; Slow Mandi;Decreased foot clearance; Forward Flexion;R knee tawanda;Narrow WOOD;Scissoring; Improper weight shift;Decreased R stance   Limitations Noted In Balance; Coordination;Device Management; Endurance; Heel Strike;Midline Orientation;Posture; Safety;Speed;Strength;Swing   Provided Assistance with: Balance;Trunk Support   Walk Assist Level Moderate Assist;Chair Follow   Findings heavy lean to L with noted R knee buckling and poor posture 2/2 LBP with ext   Curb or Single Stair   Comment limited by pain   Reason if not Attempted Safety concerns   1 Step (Curb) CARE Score 88   4 Steps   Reason if not Attempted Safety concerns   4 Steps CARE Score 88   12 Steps   Reason if not Attempted Activity not applicable   12 Steps CARE Score 9   Therapeutic Interventions   Strengthening seated hip flex and LAQ with 2# to LLE 3 x10 reps, increased focus on full ext to R for increaed activation of VMO  Flexibility B HS and HC stretches, noted increased L sciatic pain with gentle L piriformis stretch  Equipment Use   NuStep L1 x10 mini BLE RUE and L palm used with 1:1 to ensure no WB to R finger  Other Comments   Comments supine BP at start of [id number], seated asymptomatic 120/58 both manual  Manual BP's again post NuStep seated 125/61 standing 120/64  Abdominal binder placed at start of session to help maintain BP's  Pt remained asymptomatic throughout session     Assessment   Treatment Assessment Pt participated in skilled PT session with cont focus on gait, increased functional transfers, improved safety awareness, increased act tolerance, improved bed mobility and LE strengthening  Pt cont to require MOD to [facility] with gait and STS transfers as pt has increased difficulty standing up straight 2* increased LBP in ext  Pt also stated hx of chronic back pain/sciatic pain  Pt had R knee buckling during gait 2* decreased R heal strike despite HS stretches  Pt did remain asymptomatic this session but was limited by increased R hip and buttock pain  Towards end of session nurse Berkley Alvares did provide pt medication for muscle relaxation  Pt will cont to benefit from increased I with all bed mobility, increased I with STS/SPT and increased I with gait to manage I at home as she was previously I and has limited support  Cont POC as tolerated  Problem List Decreased strength;Decreased endurance; Impaired balance;Decreased mobility; Decreased coordination;Decreased cognition; Impaired judgement;Decreased safety awareness;Orthopedic restrictions;Pain; Impaired vision   Barriers to Discharge Inaccessible home environment;Decreased caregiver support   PT Barriers   Functional Limitation Car transfers; Ramp negotiation;Stair negotiation;Standing;Transfers; Wheelchair management; Walking   Plan   Treatment/Interventions Functional transfer training;LE strengthening/ROM; Therapeutic exercise; Endurance training;Cognitive reorientation;Patient/family training;Bed mobility;Gait training   Progress Slow progress, decreased activity tolerance   Recommendation   PT Discharge Recommendation Post acute rehabilitation services   Equipment Recommended Walker   PT Therapy Minutes   PT Time In 0930   PT Time Out 1100   PT Total Time (minutes) 90   PT Mode of treatment - Individual (minutes) 90   PT Mode of treatment - Concurrent (minutes) 0   PT Mode of treatment - Group (minutes) 0   PT Mode of treatment - Co-treat (minutes) 0   PT Mode of Treatment - Total time(minutes) 90 minutes   PT Cumulative Minutes 1245   Therapy Time missed   Time missed?  No

## 2022-11-01 ENCOUNTER — PATIENT OUTREACH (OUTPATIENT)
Dept: CASE MANAGEMENT | Facility: OTHER | Age: 82
End: 2022-11-01

## 2022-11-01 LAB
GLUCOSE SERPL-MCNC: 146 MG/DL (ref 65–140)
GLUCOSE SERPL-MCNC: 157 MG/DL (ref 65–140)
GLUCOSE SERPL-MCNC: 178 MG/DL (ref 65–140)
GLUCOSE SERPL-MCNC: 245 MG/DL (ref 65–140)

## 2022-11-01 RX ORDER — INSULIN LISPRO 100 [IU]/ML
5 INJECTION, SOLUTION INTRAVENOUS; SUBCUTANEOUS
Status: DISCONTINUED | OUTPATIENT
Start: 2022-11-01 | End: 2022-11-08 | Stop reason: HOSPADM

## 2022-11-01 RX ADMIN — GABAPENTIN 100 MG: 100 CAPSULE ORAL at 15:51

## 2022-11-01 RX ADMIN — GABAPENTIN 100 MG: 100 CAPSULE ORAL at 05:52

## 2022-11-01 RX ADMIN — Medication 6 MG: at 21:15

## 2022-11-01 RX ADMIN — DOCUSATE SODIUM 100 MG: 100 CAPSULE, LIQUID FILLED ORAL at 08:35

## 2022-11-01 RX ADMIN — INSULIN DETEMIR 13 UNITS: 100 INJECTION, SOLUTION SUBCUTANEOUS at 22:34

## 2022-11-01 RX ADMIN — ATORVASTATIN CALCIUM 20 MG: 20 TABLET, FILM COATED ORAL at 17:57

## 2022-11-01 RX ADMIN — DICLOFENAC SODIUM 2 G: 10 GEL TOPICAL at 21:18

## 2022-11-01 RX ADMIN — ACETAMINOPHEN 650 MG: 325 TABLET ORAL at 12:11

## 2022-11-01 RX ADMIN — METHOCARBAMOL 250 MG: 500 TABLET ORAL at 21:23

## 2022-11-01 RX ADMIN — METHOCARBAMOL 250 MG: 500 TABLET ORAL at 08:35

## 2022-11-01 RX ADMIN — DOCUSATE SODIUM 100 MG: 100 CAPSULE, LIQUID FILLED ORAL at 17:58

## 2022-11-01 RX ADMIN — GABAPENTIN 300 MG: 300 CAPSULE ORAL at 21:14

## 2022-11-01 RX ADMIN — INSULIN LISPRO 5 UNITS: 100 INJECTION, SOLUTION INTRAVENOUS; SUBCUTANEOUS at 12:11

## 2022-11-01 RX ADMIN — ENOXAPARIN SODIUM 40 MG: 40 INJECTION SUBCUTANEOUS at 08:20

## 2022-11-01 RX ADMIN — QUETIAPINE FUMARATE 12.5 MG: 25 TABLET ORAL at 21:15

## 2022-11-01 RX ADMIN — INSULIN LISPRO 3 UNITS: 100 INJECTION, SOLUTION INTRAVENOUS; SUBCUTANEOUS at 08:11

## 2022-11-01 RX ADMIN — ACETAMINOPHEN 650 MG: 325 TABLET ORAL at 05:51

## 2022-11-01 RX ADMIN — DICLOFENAC SODIUM 2 G: 10 GEL TOPICAL at 15:52

## 2022-11-01 RX ADMIN — ACETAMINOPHEN 650 MG: 325 TABLET ORAL at 21:14

## 2022-11-01 RX ADMIN — INSULIN LISPRO 5 UNITS: 100 INJECTION, SOLUTION INTRAVENOUS; SUBCUTANEOUS at 17:57

## 2022-11-01 RX ADMIN — INSULIN LISPRO 1 UNITS: 100 INJECTION, SOLUTION INTRAVENOUS; SUBCUTANEOUS at 12:11

## 2022-11-01 RX ADMIN — INSULIN LISPRO 1 UNITS: 100 INJECTION, SOLUTION INTRAVENOUS; SUBCUTANEOUS at 22:35

## 2022-11-01 RX ADMIN — METOPROLOL TARTRATE 25 MG: 25 TABLET, FILM COATED ORAL at 08:35

## 2022-11-01 RX ADMIN — INSULIN LISPRO 3 UNITS: 100 INJECTION, SOLUTION INTRAVENOUS; SUBCUTANEOUS at 17:57

## 2022-11-01 RX ADMIN — METOPROLOL TARTRATE 25 MG: 25 TABLET, FILM COATED ORAL at 21:15

## 2022-11-01 RX ADMIN — LIDOCAINE 5% 2 PATCH: 700 PATCH TOPICAL at 08:18

## 2022-11-01 NOTE — PROGRESS NOTES
Chart review complete the patient has no LCD  Per team conference the patient is functioning at the current levels:    Physical Therapy:     Weight Bearing Status: Non-weight bearing (NWB on L hand)  Transfers: Moderate Assistance  Bed Mobility: Minimal Assistance (max VCs for safety)  Amulation Distance (ft): 30 feet  Ambulation: Assist of 2 (inconsistant and needs WC follow)  Assistive Device for Ambulation: Roller Walker  Roller Walker Attachments: With platform on left  Assistive Device for Stairs:  (unsafe to perform)  Stair Assistance:  (unsafe to assess at this time)  Discharge Recommendations: [place] (TBD pending progress)  DC Home with[de-identified] 24 Hour Assisteance, 24 Hour Supervision     Occupational Therapy:  Eating: Independent  Grooming: Supervision  Bathing: Moderate Assistance  Bathing: Moderate Assistance  Upper Body Dressing: Supervision  Lower Body Dressing: Moderate Assistance  Toileting: Maximum Assistance  Toilet Transfer: Moderate Assistance  Cognition: Exceptions to WNL  Cognition: Decreased Memory, Decreased Executive Functions, Decreased Attention, Decreased Safety  Orientation: Person, Time, Situation  Discharge Recommendations: Home with:  [address] with[de-identified] 24 Hour Supervision, Family Support, First Floor Setup    Nursing Notes:  Appetite: Good  Diet Type: Other (Specify) (CHO/Consistent carb distent supervison with meals)  Diet Patient/Family Education Complete: Yes  Type of Wound (LDA): Wound  Type of Wound Patient/Family Education: Yes  Bladder: Incontinent  Bladder Patient/Family Education: Yes  Bowel: Incontinent  Bowel Patient/Family Education: Yes  Pain Location/Orientation: Orientation: Right, Location: Hip    This care manager assistant will continue to monitor via chart review throughout bundle episode

## 2022-11-01 NOTE — PLAN OF CARE
Reviewed    Problem: Prexisting or High Potential for Compromised Skin Integrity  Goal: Skin integrity is maintained or improved  Description: INTERVENTIONS:  - Identify patients at risk for skin breakdown  - Assess and monitor skin integrity  - Assess and monitor nutrition and hydration status  - Monitor labs   - Assess for incontinence   - Turn and reposition patient  - Assist with mobility/ambulation  - Relieve pressure over bony prominences  - Avoid friction and shearing  - Provide appropriate hygiene as needed including keeping skin clean and dry  - Evaluate need for skin moisturizer/barrier cream  - Collaborate with interdisciplinary team   - Patient/family teaching  - Consider wound care consult   Outcome: Progressing     Problem: PAIN - ADULT  Goal: Verbalizes/displays adequate comfort level or baseline comfort level  Description: Interventions:  - Encourage patient to monitor pain and request assistance  - Assess pain using appropriate pain scale  - Administer analgesics based on type and severity of pain and evaluate response  - Implement non-pharmacological measures as appropriate and evaluate response  - Consider cultural and social influences on pain and pain management  - Notify physician/advanced practitioner if interventions unsuccessful or patient reports new pain  Outcome: Progressing     Problem: INFECTION - ADULT  Goal: Absence or prevention of progression during hospitalization  Description: INTERVENTIONS:  - Assess and monitor for signs and symptoms of infection  - Monitor lab/diagnostic results  - Monitor all insertion sites, i e  indwelling lines, tubes, and drains  - Monitor endotracheal if appropriate and nasal secretions for changes in amount and color  - Homeland appropriate cooling/warming therapies per order  - Administer medications as ordered  - Instruct and encourage patient and family to use good hand hygiene technique  - Identify and instruct in appropriate isolation precautions for identified infection/condition  Outcome: Progressing  Goal: Absence of fever/infection during neutropenic period  Description: INTERVENTIONS:  - Monitor WBC    Outcome: Progressing     Problem: SAFETY ADULT  Goal: Patient will remain free of falls  Description: INTERVENTIONS:  - Educate patient/family on patient safety including physical limitations  - Instruct patient to call for assistance with activity   - Consult OT/PT to assist with strengthening/mobility   - Keep Call bell within reach  - Keep bed low and locked with side rails adjusted as appropriate  - Keep care items and personal belongings within reach  - Initiate and maintain comfort rounds  - Make Fall Risk Sign visible to staff  - Offer Toileting every 4 Hours, in advance of need  - Initiate/Maintain bed and chair alarm  - Apply yellow socks and bracelet for high fall risk patients  - Consider moving patient to room near nurses station  Outcome: Progressing  Goal: Maintain or return to baseline ADL function  Description: INTERVENTIONS:  -  Assess patient's ability to carry out ADLs; assess patient's baseline for ADL function and identify physical deficits which impact ability to perform ADLs (bathing, care of mouth/teeth, toileting, grooming, dressing, etc )  - Assess/evaluate cause of self-care deficits   - Assess range of motion  - Assess patient's mobility; develop plan if impaired  - Assess patient's need for assistive devices and provide as appropriate  - Encourage maximum independence but intervene and supervise when necessary  - Involve family in performance of ADLs  - Assess for home care needs following discharge   - Consider OT consult to assist with ADL evaluation and planning for discharge  - Provide patient education as appropriate  Outcome: Progressing  Goal: Maintains/Returns to pre admission functional level  Description: INTERVENTIONS:  - Set and communicate daily mobility goal to care team and patient/family/caregiver     - Collaborate with rehabilitation services on mobility goals if consulted  - Out of bed for toileting  - Record patient progress and toleration of activity level   Outcome: Progressing     Problem: DISCHARGE PLANNING  Goal: Discharge to home or other facility with appropriate resources  Description: INTERVENTIONS:  - Identify barriers to discharge w/patient and caregiver  - Arrange for needed discharge resources and transportation as appropriate  - Identify discharge learning needs (meds, wound care, etc )  - Arrange for interpretive services to assist at discharge as needed  - Refer to Case Management Department for coordinating discharge planning if the patient needs post-hospital services based on physician/advanced practitioner order or complex needs related to functional status, cognitive ability, or social support system  Outcome: Progressing     Problem: Potential for Falls  Goal: Patient will remain free of falls  Description: INTERVENTIONS:  - Educate patient/family on patient safety including physical limitations  - Instruct patient to call for assistance with activity   - Consult OT/PT to assist with strengthening/mobility   - Keep Call bell within reach  - Keep bed low and locked with side rails adjusted as appropriate  - Keep care items and personal belongings within reach  - Initiate and maintain comfort rounds  - Make Fall Risk Sign visible to staff  - Offer Toileting every 4 Hours, in advance of need  - Initiate/Maintain bed and chair alarm  - Apply yellow socks and bracelet for high fall risk patients  - Consider moving patient to room near nurses station  Outcome: Progressing     Problem: Nutrition/Hydration-ADULT  Goal: Nutrient/Hydration intake appropriate for improving, restoring or maintaining nutritional needs  Description: Monitor and assess patient's nutrition/hydration status for malnutrition  Collaborate with interdisciplinary team and initiate plan and interventions as ordered    Monitor patient's weight and dietary intake as ordered or per policy  Utilize nutrition screening tool and intervene as necessary  Determine patient's food preferences and provide high-protein, high-caloric foods as appropriate       INTERVENTIONS:  - Monitor oral intake, urinary output, labs, and treatment plans  - Assess nutrition and hydration status and recommend course of action  - Evaluate amount of meals eaten  - Assist patient with eating if necessary   - Allow adequate time for meals  - Recommend/ encourage appropriate diets, oral nutritional supplements, and vitamin/mineral supplements  - Order, calculate, and assess calorie counts as needed  - Recommend, monitor, and adjust tube feedings and TPN/PPN based on assessed needs  - Assess need for intravenous fluids  - Provide specific nutrition/hydration education as appropriate  - Include patient/family/caregiver in decisions related to nutrition  Outcome: Progressing     Problem: MOBILITY - ADULT  Goal: Maintain or return to baseline ADL function  Description: INTERVENTIONS:  -  Assess patient's ability to carry out ADLs; assess patient's baseline for ADL function and identify physical deficits which impact ability to perform ADLs (bathing, care of mouth/teeth, toileting, grooming, dressing, etc )  - Assess/evaluate cause of self-care deficits   - Assess range of motion  - Assess patient's mobility; develop plan if impaired  - Assess patient's need for assistive devices and provide as appropriate  - Encourage maximum independence but intervene and supervise when necessary  - Involve family in performance of ADLs  - Assess for home care needs following discharge   - Consider OT consult to assist with ADL evaluation and planning for discharge  - Provide patient education as appropriate  Outcome: Progressing  Goal: Maintains/Returns to pre admission functional level  Description: INTERVENTIONS:  - Set and communicate daily mobility goal to care team and patient/family/caregiver     - Collaborate with rehabilitation services on mobility goals if consulted  - Out of bed for toileting  - Record patient progress and toleration of activity level   Outcome: Progressing

## 2022-11-01 NOTE — PROGRESS NOTES
11/01/22 3667   Pain Assessment   Pain Assessment Tool 0-10   Pain Score 10 - Worst Possible Pain  (8/10 at rest to 10/10 with Wbing)   Pain Location/Orientation Orientation: Right;Location: Hip   Pain Onset/Description Onset: Ongoing;Frequency: Constant/Continuous   Patient's Stated Pain Goal No pain   Hospital Pain Intervention(s) Cold applied; Rest;Relaxation technique  (cold pack on R knee)   Restrictions/Precautions   Precautions Fall Risk;Cognitive;Bed/chair alarms;Supervision on toilet/commode;Pain;Visual deficit   LUE Weight Bearing Per Order NWB  (L hand)   RLE Weight Bearing Per Order WBAT  (slightly hyperemia on R knee but no swelling - MD aware and will order voltaren cream)   ROM Restrictions No   Braces or Orthoses Splint  (L 4th finger, no TEDS/binder today)   General   Change In Medical/Functional Status vitals doc by ALIYA Brand   Cognition   Overall Cognitive Status Impaired   Arousal/Participation Alert; Cooperative   Attention Attends with cues to redirect   Subjective   Subjective pt reported pain as above and was ready for PT  Lying to Sitting on Side of Bed   Type of Assistance Needed Physical assistance;Verbal cues; Adaptive equipment   Physical Assistance Level 26%-50%   Comment assist on trunk to sit up using L elbow and R hand   Lying to Sitting on Side of Bed CARE Score 3   Sit to Stand   Type of Assistance Needed Physical assistance;Verbal cues   Physical Assistance Level 51%-75%   Comment min-max depending on pain, level of fatigue and command follow   Sit to Stand CARE Score 2   Bed-Chair Transfer   Type of Assistance Needed Physical assistance;Verbal cues; Adaptive equipment   Physical Assistance Level 26%-50%   Comment min-mod A with PFRW mostly to steer walker   Chair/Bed-to-Chair Transfer CARE Score 3   Walk 10 Feet   Type of Assistance Needed Physical assistance;Verbal cues; Adaptive equipment   Physical Assistance Level 51%-75%   Comment mod A with L PFRW, PT did chair follow x 10' x 2, 25 x 1   Walk 10 Feet CARE Score 2   Wheel 50 Feet with Two Turns   Type of Assistance Needed Supervision;Verbal cues   Wheel 50 Feet with Two Turns CARE Score 4   Wheel 150 Feet   Type of Assistance Needed Supervision;Verbal cues   Comment 150' tolerated task well without reported/noted pain /discomfort   VC to lock/unlock w/c brake with extender on L   Wheel 150 Feet CARE Score 4   Therapeutic Interventions   Flexibility bilat HS x 3 mins,bilat  gastroc x 1 min 30 secs   Modalities cold pack on R knee at start of tx  cold pack on low back and R inner thigh x 10 mins while doing nu step   Equipment Use   NuStep L2 x 15 mins bilat LE and R UE SPM>35   Assessment   Treatment Assessment Pt able to participate with PT for 90 mins but again activity tolerance significantly limited by ongoing pain and fatigue but was not sleepy this session  For this session pt able to complete chair/bed transfers and 10' amb using L PFRW with 1 person assist but still need 2 person for longer distances and max encouragement to inc gait distance  Due to impaired cognition with STM impairment resulting to dec carry over of new learning and compliance with L hand NWB precaution so pt continues to require VC /assist at times for proper hand placement during transfers  PT will cont to monitor for ortho BP, work on strengthening, improving standing balance/tolerance promoting inc R LE weightbearing and improve functional indep at least with household distance mobilities using LRAD  Family/Caregiver Present no   Barriers to Discharge Inaccessible home environment;Decreased caregiver support   PT Barriers   Functional Limitation Car transfers; Ramp negotiation;Stair negotiation;Standing;Transfers; Walking; Wheelchair management   Plan   Treatment/Interventions Functional transfer training;LE strengthening/ROM; Therapeutic exercise; Endurance training;Bed mobility;Gait training;Spoke to nursing;Spoke to MD;Spoke to advanced practitioner;OT Progress Slow progress, cognitive deficits   Recommendation   Equipment Recommended   (left platform RW)   PT Therapy Minutes   PT Time In 0830   PT Time Out 1000   PT Total Time (minutes) 90   PT Mode of treatment - Individual (minutes) 90   PT Mode of treatment - Concurrent (minutes) 0   PT Mode of treatment - Group (minutes) 0   PT Mode of treatment - Co-treat (minutes) 0   PT Mode of Treatment - Total time(minutes) 90 minutes   PT Cumulative Minutes 1335

## 2022-11-01 NOTE — PROGRESS NOTES
Internal Medicine Progress Note  Patient: Mackenzie Nair  Age/sex: 79 y o  female  Medical Record #: [id number]      ASSESSMENT/PLAN: (Interval History)  Mackenzie Nair is seen and examined and management for following issues:    S/p ORIF with IM nailing of the Rt femur  · Pain control and therapy per primary service  · PMR adjusted pain meds d/t sedation  · Renal dosed Lovenox for DVT prophylaxis  · Follow-up with Ortho as scheduled     Left 4th phalanx fracture  · Splint and NWB   · Keeps removing splint     HTN  · Home: Hyzaar 50/12 5mg qd/Lopressor 25mg q12 hours/Lasix 40mg qd (for heart failure)  · Here: Lopressor 25mg BID    · Stable starting to trend upward  · If continues will add losartan     Anemia  · Secondary to trauma  · Stable      DM type 2  · Home: Levemir 20U at bedtime/Humalog 10U with meals  · Here: Levemir 13U at bedtime/Humalog 5U tid  · Blood sugars elevated  · Changes as above     Chronic systolic/diastolic heart failure  · Pt has been off diuretics  · ECHO with an EF of 45%  Grade 2 diastolic dysfunction      CLL  · Recent diagnosis  · Outpt follow-up with Hematology/Oncology  · Baseline WBCs 14-16K  · Had mild bump likely reactive secondary to surgery/UTI  · Stable      Suspected UTI  · Had positive UA  Culture with >100,000 Klebsiella  · s/p Bactrim  · Urinary retention improved with tx of UTI     Delirium  · Intermittent confusion and hallucinations  · Seroquel caused increased daytime fatigue and was stopped but restarted due to hallucinations  DC planning: TBD    The above assessment and plan was reviewed and updated as determined by my evaluation of the patient on 11/1/2022      Labs:   Results from last 7 days   Lab Units 10/31/22  0615   WBC Thousand/uL 17 92*   HEMOGLOBIN g/dL 10 8*   HEMATOCRIT % 35 3   PLATELETS Thousands/uL 347     Results from last 7 days   Lab Units 10/31/22  0615   SODIUM mmol/L 138   POTASSIUM mmol/L 4 2   CHLORIDE mmol/L 108   CO2 mmol/L 27   BUN mg/dL 23   CREATININE mg/dL 0 84   CALCIUM mg/dL 9 6             Results from last 7 days   Lab Units 11/01/22  0647 10/31/22  2105 10/31/22  1546   POC GLUCOSE mg/dl 146* 288* 103       Review of Scheduled Meds:  Current Facility-Administered Medications   Medication Dose Route Frequency Provider Last Rate   • acetaminophen  325 mg Oral Q6H PRN Curly Sandhoff, MD     • acetaminophen  650 mg Oral TID AC Curly Sandhoff, MD     • atorvastatin  20 mg Oral Daily With Malena Morelos MD     • bisacodyl  10 mg Rectal Daily PRN Curly Sandhoff, MD     • Diclofenac Sodium  2 g Topical TID Martin Blum MD     • docusate sodium  100 mg Oral BID Curly Sandhoff, MD     • enoxaparin  40 mg Subcutaneous Q24H [address] Yanely Giovany Yañez MD     • gabapentin  100 mg Oral BID Curly Sandhoff, MD     • gabapentin  300 mg Oral HS Curly Sandhoff, MD     • hydrocortisone   Topical 4x Daily PRN Curly Sandhoff, MD     • insulin detemir  13 Units Subcutaneous HS Adeola Jennings PA-C     • insulin lispro  1-5 Units Subcutaneous HS Curly Sandhoff, MD     • insulin lispro  1-6 Units Subcutaneous TID AC Curly Sandhoff, MD     • insulin lispro  3 Units Subcutaneous Daily With Breakfast BRINDA Arteaga     • insulin lispro  5 Units Subcutaneous Daily With Lunch Marval BRINDA Ovalles     • insulin lispro  5 Units Subcutaneous Daily With Dinner BRINDA Arteaga     • lidocaine  2 patch Topical Daily Curly Sandhoff, MD     • meclizine  12 5 mg Oral Q8H PRN Curly Sandhoff, MD     • meclizine  12 5 mg Oral Q12H PRN Martin Blum MD     • melatonin  6 mg Oral QPM Martin Blum MD     • methocarbamol  250 mg Oral Q6H PRN Martin Bulm MD     • metoprolol tartrate  25 mg Oral Q12H [address] BRINDA Kramer     • naloxone  0 04 mg Intravenous Q1MIN PRN Curly Sandhoff, MD     • polyethylene glycol  17 g Oral Daily PRN Curly Sandhoff, MD     • QUEtiapine  12 5 mg Oral QPM Chandni Mota MD         Subjective/ HPI: Patient seen and examined  Patients overnight issues or events were reviewed with nursing or staff during rounds or morning huddle session  New or overnight issues include the following:     Pt seen in her room  She is trying to "clear her mind" she is alert and oriented x3  She knows she is having episodes of confusion  She slept well  Her pain is somewhat controlled      ROS:   A 10 point ROS was performed; negative except as noted above  *Labs /Radiology studies reviewed  *Medications reviewed and reconciled as needed  *Please refer to order section for additional ordered labs studies  *Case discussed with primary attending during morning huddle case rounds    Physical Examination:  Vitals:   Vitals:    10/31/22 2131 11/01/22 0551 11/01/22 0834 11/01/22 0840   BP: 126/57 138/70 137/64 164/69   BP Location: Left arm Left arm Left arm Left arm   Pulse: 60 70 67 64   Resp: 20 18     Temp: 98 °F (36 7 °C) 98 °F (36 7 °C)     TempSrc: Oral Oral     SpO2: 93% 94%     Weight:       Height:         GEN: No apparent distress, interactive; frail  NEURO: Alert and oriented x3  HEENT: Pupils are equal and reactive, EOMI, mucous membranes are moist, face symmetrical  CV: S1 S2 regular, no MRG, no peripheral edema noted  RESP: Lungs are clear bilaterally, no wheezes, rales or rhonchi noted, on room air, respirations easy and non labored  GI: Flat, soft non tender, non distended; +BS x4  : Voiding without difficulty  MUSC: Moves all extremities; except RLE  SKIN: pink, warm and dry, normal turgor, no rashes, lesions      The above physical exam was reviewed and updated as determined by my evaluation of the patient on 11/1/2022      Invasive Devices  Report    Peripheral Intravenous Line  Duration           Peripheral IV 10/28/22 Distal;Right;Ventral (anterior) Forearm 3 days                   VTE Pharmacologic Prophylaxis: Enoxaparin  Code Status: Level 1 - Full Code  Current Length of Stay: 15 day(s)      Total time spent:  30 minutes with more than 50% spent counseling/coordinating care  Counseling includes discussion with patient re: progress  and discussion with patient of his/her current medical state/information  Coordination of patient's care was performed in conjunction with primary service  Time invested included review of patient's labs, vitals, and management of their comorbidities with continued monitoring  In addition, this patient was discussed with medical team including physician and advanced extenders  The care of the patient was extensively discussed and appropriate treatment plan was formulated unique for this patient  Medical decision making for the day was made by supervising physician unless otherwise noted in their attestation statement  ** Please Note:  voice to text software may have been used in the creation of this document   Although proof errors in transcription or interpretation are a potential of such software**

## 2022-11-01 NOTE — PLAN OF CARE
Problem: Prexisting or High Potential for Compromised Skin Integrity  Goal: Skin integrity is maintained or improved  Description: INTERVENTIONS:  - Identify patients at risk for skin breakdown  - Assess and monitor skin integrity  - Assess and monitor nutrition and hydration status  - Monitor labs   - Assess for incontinence   - Turn and reposition patient  - Assist with mobility/ambulation  - Relieve pressure over bony prominences  - Avoid friction and shearing  - Provide appropriate hygiene as needed including keeping skin clean and dry  - Evaluate need for skin moisturizer/barrier cream  - Collaborate with interdisciplinary team   - Patient/family teaching  - Consider wound care consult   Outcome: Progressing     Problem: PAIN - ADULT  Goal: Verbalizes/displays adequate comfort level or baseline comfort level  Description: Interventions:  - Encourage patient to monitor pain and request assistance  - Assess pain using appropriate pain scale  - Administer analgesics based on type and severity of pain and evaluate response  - Implement non-pharmacological measures as appropriate and evaluate response  - Consider cultural and social influences on pain and pain management  - Notify physician/advanced practitioner if interventions unsuccessful or patient reports new pain  Outcome: Progressing     Problem: INFECTION - ADULT  Goal: Absence or prevention of progression during hospitalization  Description: INTERVENTIONS:  - Assess and monitor for signs and symptoms of infection  - Monitor lab/diagnostic results  - Monitor all insertion sites, i e  indwelling lines, tubes, and drains  - Monitor endotracheal if appropriate and nasal secretions for changes in amount and color  - [place] appropriate cooling/warming therapies per order  - Administer medications as ordered  - Instruct and encourage patient and family to use good hand hygiene technique  - Identify and instruct in appropriate isolation precautions for identified infection/condition  Outcome: Progressing  Goal: Absence of fever/infection during neutropenic period  Description: INTERVENTIONS:  - Monitor WBC    Outcome: Progressing     Problem: SAFETY ADULT  Goal: Patient will remain free of falls  Description: INTERVENTIONS:  - Educate patient/family on patient safety including physical limitations  - Instruct patient to call for assistance with activity   - Consult OT/PT to assist with strengthening/mobility   - Keep Call bell within reach  - Keep bed low and locked with side rails adjusted as appropriate  - Keep care items and personal belongings within reach  - Initiate and maintain comfort rounds  - Make Fall Risk Sign visible to staff  - Apply yellow socks and bracelet for high fall risk patients  - Consider moving patient to room near nurses station  Outcome: Progressing  Goal: Maintain or return to baseline ADL function  Description: INTERVENTIONS:  -  Assess patient's ability to carry out ADLs; assess patient's baseline for ADL function and identify physical deficits which impact ability to perform ADLs (bathing, care of mouth/teeth, toileting, grooming, dressing, etc )  - Assess/evaluate cause of self-care deficits   - Assess range of motion  - Assess patient's mobility; develop plan if impaired  - Assess patient's need for assistive devices and provide as appropriate  - Encourage maximum independence but intervene and supervise when necessary  - Involve family in performance of ADLs  - Assess for home care needs following discharge   - Consider OT consult to assist with ADL evaluation and planning for discharge  - Provide patient education as appropriate  Outcome: Progressing  Goal: Maintains/Returns to pre admission functional level  Description: INTERVENTIONS:  - Perform BMAT or MOVE assessment daily    - Set and communicate daily mobility goal to care team and patient/family/caregiver     - Collaborate with rehabilitation services on mobility goals if consulted  - Out of bed for toileting  - Record patient progress and toleration of activity level   Outcome: Progressing     Problem: DISCHARGE PLANNING  Goal: Discharge to home or other facility with appropriate resources  Description: INTERVENTIONS:  - Identify barriers to discharge w/patient and caregiver  - Arrange for needed discharge resources and transportation as appropriate  - Identify discharge learning needs (meds, wound care, etc )  - Arrange for interpretive services to assist at discharge as needed  - Refer to Case Management Department for coordinating discharge planning if the patient needs post-hospital services based on physician/advanced practitioner order or complex needs related to functional status, cognitive ability, or social support system  Outcome: Progressing     Problem: Potential for Falls  Goal: Patient will remain free of falls  Description: INTERVENTIONS:  - Educate patient/family on patient safety including physical limitations  - Instruct patient to call for assistance with activity   - Consult OT/PT to assist with strengthening/mobility   - Keep Call bell within reach  - Keep bed low and locked with side rails adjusted as appropriate  - Keep care items and personal belongings within reach  - Initiate and maintain comfort rounds  - Make Fall Risk Sign visible to staff  - Apply yellow socks and bracelet for high fall risk patients  - Consider moving patient to room near nurses station  Outcome: Progressing     Problem: Nutrition/Hydration-ADULT  Goal: Nutrient/Hydration intake appropriate for improving, restoring or maintaining nutritional needs  Description: Monitor and assess patient's nutrition/hydration status for malnutrition  Collaborate with interdisciplinary team and initiate plan and interventions as ordered  Monitor patient's weight and dietary intake as ordered or per policy  Utilize nutrition screening tool and intervene as necessary   Determine patient's food preferences and provide high-protein, high-caloric foods as appropriate  INTERVENTIONS:  - Monitor oral intake, urinary output, labs, and treatment plans  - Assess nutrition and hydration status and recommend course of action  - Evaluate amount of meals eaten  - Assist patient with eating if necessary   - Allow adequate time for meals  - Recommend/ encourage appropriate diets, oral nutritional supplements, and vitamin/mineral supplements  - Order, calculate, and assess calorie counts as needed  - Recommend, monitor, and adjust tube feedings and TPN/PPN based on assessed needs  - Assess need for intravenous fluids  - Provide specific nutrition/hydration education as appropriate  - Include patient/family/caregiver in decisions related to nutrition  Outcome: Progressing     Problem: MOBILITY - ADULT  Goal: Maintain or return to baseline ADL function  Description: INTERVENTIONS:  -  Assess patient's ability to carry out ADLs; assess patient's baseline for ADL function and identify physical deficits which impact ability to perform ADLs (bathing, care of mouth/teeth, toileting, grooming, dressing, etc )  - Assess/evaluate cause of self-care deficits   - Assess range of motion  - Assess patient's mobility; develop plan if impaired  - Assess patient's need for assistive devices and provide as appropriate  - Encourage maximum independence but intervene and supervise when necessary  - Involve family in performance of ADLs  - Assess for home care needs following discharge   - Consider OT consult to assist with ADL evaluation and planning for discharge  - Provide patient education as appropriate  Outcome: Progressing  Goal: Maintains/Returns to pre admission functional level  Description: INTERVENTIONS:  - Perform BMAT or MOVE assessment daily    - Set and communicate daily mobility goal to care team and patient/family/caregiver     - Collaborate with rehabilitation services on mobility goals if consulted  - Out of bed for toileting  - Record patient progress and toleration of activity level   Outcome: Progressing

## 2022-11-01 NOTE — TEAM CONFERENCE
Acute RehabilitationTeam Conference Note  Date: 11/1/2022   Time: 10:43 AM       Patient Name:  Bert Watkins       Medical Record Number: [id number]   YOB: 1940  Sex: Female          Room/Bed:  [facility] 459/[facility] 459-01  Payor Info:  Payor: Queen Aj / Plan: MEDICARE A AND B / Product Type: Medicare A & B Fee for Service /      Admitting Diagnosis: Closed fracture of greater trochanter of right femur ([facility] ) [S72 111A]   Admit Date/Time:  10/17/2022  2:18 PM  Admission Comments: No comment available     Primary Diagnosis:  Intertrochanteric fracture of right femur ([facility] )  Principal Problem: Intertrochanteric fracture of right femur [facility])    Patient Active Problem List    Diagnosis Date Noted   • Encephalopathy 10/24/2022   • Deep tissue injury 10/21/2022   • Orthostasis 10/19/2022   • H/O dizziness 10/19/2022   • Acute pain 10/17/2022   • CLL (chronic lymphocytic leukemia) ([facility] ) 10/13/2022   • Intertrochanteric fracture of right femur ([facility] ) 10/11/2022   • Displaced fracture of middle phalanx of left ring finger, initial encounter for closed fracture 10/11/2022   • Hypertension 08/08/2021   • Ambulatory dysfunction 08/05/2021   • Suspected Mild cognitive impairment 08/05/2021   • Lymphadenopathy 11/06/2020   • Elevated liver enzymes 11/06/2020   • Fall 11/05/2020   • Type 2 diabetes mellitus ([facility] ) 11/05/2020   • CKD (chronic kidney disease) stage 3, GFR 30-59 ml/min ([place] [address] ) 11/05/2020   • HLD (hyperlipidemia) 11/05/2020   • OA (osteoarthritis) 11/05/2020   • Chronic systolic heart failure ([facility] ) 11/05/2020       Physical Therapy:    Weight Bearing Status: Non-weight bearing (NWB on L hand)  Transfers: Moderate Assistance  Bed Mobility: Minimal Assistance (max VCs for safety)  Amulation Distance (ft): 30 feet  Ambulation: Assist of 2 (inconsistant and needs WC follow)  Assistive Device for Ambulation: Roller Walker  Roller Walker Attachments:  With platform on left  Assistive Device for Stairs:  (unsafe to perform)  Stair Assistance:  (unsafe to assess at this time)  Discharge Recommendations: [place] (TBD pending progress)  DC Home with[de-identified] 24 Hour Assisteance, 24 Hour Supervision    10/18/22  Pt is a 79year old female s/p Surgical fixation of right intertrochanteric femur fracture with long intramedullary nail due a fall at home  Barriers to functional progress and overall safety continues to be pain with dec standing tolerance and balance, dec compliance with NWB precaution on L hand, dec cognition with hallucination, gait dysfunction, impaired strength and ortho BPs so pt is not able to initiate and complete bed/chair/toilet/car transfers, household distance amb task without 1-2 person assist  Due to aforementioned impairments pt still not safe to initiate stair training so still at high risk for falls and inc caregiver burden  Pt will benefit from continued skilled therapy intervention to improve overall functions and facilitate a safe d/c pending medical stability  10-31-22  Pt making functional gains past few days but has been limited and inconsistent due to blood pressure issues/ pain/ extreme fatigue, and hallucinations last week  Pt currently only walking 30 feet with needing chair follow for safety (ambulation currently is non functional)  Pt leans very heavy on L elbow with poor WB on RLE  Pain and weakness limits pts safety and distances  Pt needs assistance for all mobility aspects and has very limited help at home  Concerned on pt reaching goals in allotted time  Pt also presents with much decrease initiation and cognition so questionable if pt would be safe alone at [facility] level as well  Occupational Therapy:  Eating: Independent  Grooming: Supervision  Bathing: Moderate Assistance  Bathing: Moderate Assistance  Upper Body Dressing: Supervision  Lower Body Dressing: Moderate Assistance  Toileting: Maximum Assistance  Toilet Transfer:  Moderate Assistance  Cognition: Exceptions to WNL  Cognition: Decreased Memory, Decreased Executive Functions, Decreased Attention, Decreased Safety  Orientation: Person, Time, Situation  Discharge Recommendations: Home with:  [address] with[de-identified] 24 Hour Supervision, Family Support, First Floor Setup       Pt is functioning at overall Mod A for ADLs and Mod A for stand pivot xfer w/ PFRW  Limited by RLE pain, impaired balance, endurance, fatigue, L hand NWB status, impaired STM, dec attention to task, impaired insight, impaired safety awareness, limited social support, unsafe home set-up, and ADL dysfunction  Scored 11/30 on MoCA  version 8 1 indicating moderate cognitive impairment  Pt lives in [facility] w/ 1STE, first-floor set-up  Local family able to A w/ light IADL management and transportation, however unable to provide A daily and without A pt remains high fall and readmission risk  Will benefit from skilled OT services to maximize fxnl indep to achieve set-up to independent ADL goals, anticipate d/c to sub-acute rehab  Speech Therapy:           No notes on file    Nursing Notes:  Appetite: Good  Diet Type: Other (Specify) (CHO/Consistent carb distent supervison with meals)                      Diet Patient/Family Education Complete: Yes    Type of Wound (LDA): Wound                    Type of Wound Patient/Family Education: Yes  Bladder: Incontinent     Bladder Patient/Family Education: Yes  Bowel:  Incontinent     Bowel Patient/Family Education: Yes  Pain Location/Orientation: Orientation: Right, Location: Hip  Pain Score: 0  Pain 2  Pain Score 2: 7  Pain Location/Orientation 2: Orientation: Lower, Location: Back                    Hospital Pain Intervention(s): Repositioned, Rest  Pain Patient/Family Education: Yes  Medication Management/Safety  Injectable: Insulin  Safe Administration: Yes (by staff)  Medication Patient/Family Education Complete: No (ongoing)    S/p ORIF with IM nailing of the Rt femur  · Pain control and therapy per primary service  · PMR adjusted pain meds d/t sedation  · Renal dosed Lovenox for DVT prophylaxis  · Follow-up with Ortho 2 weeks post-op  Left 4th phalanx fracture  · Splint and NWB   · Keeps removing splint     HTN  · Home: Hyzaar 50/12 5mg qd/Lopressor 25mg q12 hours/Lasix 40mg qd (for heart failure)  · Here: Lopressor 25mg BID   · Orthostatic 10/28 and given 1 L NSS   · Stable today     Anemia  · Secondary to trauma  · Stable  DM type 2  · Home: Levemir 20U at bedtime/Humalog 10U with meals  · Here: Levemir 8U at bedtime/Humalog 3U breakfast and dinner/5U lunch  · Blood sugars elevated  Will increase Levemir to 13U at bedtime  Chronic systolic/diastolic heart failure  · Pt has been off diuretics  · ECHO with an EF of 45%  Grade 2 diastolic dysfunction  · Euvolemic today     CLL  · Recent diagnosis  · Outpt follow-up with Hematology/Oncology  · Baseline WBCs 14-16K  · Had mild bump likely reactive secondary to surgery/UTI  · Stable  Suspected UTI  · Had positive UA  Culture with >100,000 Klebsiella  · s/p Bactrim  · Urinary retention improved with tx of UTI     Delirium  · Intermittent confusion and hallucinations  · Seroquel caused increased daytime fatigue and was stopped but restarted due to hallucinations  · Pt currently pleasant, oriented and not hallucinating  This week we will continue to monitor vital signs and lab results  Pt will continue to work on increase balance and strength, maintain skin integrity by turning/reoposition, and offload pressure  We will continue to increase safety awareness and keep pt free form falls  We will continue to monitor for constipation and medicate per bowel protocol  We will continue to encourage independence with ADL's        Case Management:     Discharge Planning  Living Arrangements: Lives Alone  Support Systems: Friends/neighbors, Family members  Assistance Needed: unknown  Type of Current Residence: Other (Comment) (Two story home)  Current Home Care Services: No  10/19/2022  Met with the patient 10/18/2022 to complete CM open  Patient lives alone in a 2 story home, she reports she has a first floor set up and does not utilize 2nd floor much  Patient reports prior to admission she was independent with ADL/IADLs, she drove to get groceries, to doctor's appointments etc  Patient reports having r/w at home  Patient  Reports no hx of HHC or STR but does report she has used OP PT in this past but does not recall where that was  Patient reports she has very supportive neighbors that assist her when she needs it  Preferred pharmacy is [facility] in [place], PCP is Dr Becca Israel  The patient was educated on the rehabilitation process including therapy program, the interdisciplinary team, and weekly team meeting schedule  Estimated length of stay of 10-14 days was reviewed with the patient as well as expectations of discussions of discharge planning  The role of the  was reviewed including providing care coordination, discharge planning and discharge facilitation  IMM was reviewed with the patient and a copy was provided for their reference  The patient verbalized understanding of the information provided and denied any further questions at this time  CM will continue to follow and assist the patient throughout their rehabilitation stay  10/31- Pt participating in therapy and making progress  Pt has been hallucinating and is alert x3  Team recommends subacute transition due to lack of caregiver support  Pt has a nephew that assists as needed but will not be able to provide 24/7 supervision for pt  Cm to follow up with nephew in [place] as per other team members, he is interested in placement in [place] area  Cm to assist with subacute transition  Is the patient actively participating in therapies?  yes  List any modifications to the treatment plan: None    Barriers Interventions   DTI Left heel/Stage 2 Improving   Hallucinations Medication management Back pain Medication management, lidopatch   Hypertensive Medication management    LE Pain Pain management, rest breaks   Mod cog impairment - 11/30 on [place] Repetitive training   Safety awareness/w/ toileting Repetitive training, cuing   Left hand precautions, NWB Compensatory techniques    Fatigue Frequent rest breaks   Decreased strength Compensatory techniques     Is the patient making expected progress toward goals? yes  List any update or changes to goals: None    Medical Goals: Patient will be medically stable for discharge to [facility] restrictive envrionment upon completion of rehab program and Patient will be able to manage medical conditions and comorbid conditions with medications and follow up upon completion of rehab program    Weekly Team Goals:   Rehab Team Goals  ADL Team Goal: Patient will be independent with ADLs with least restrictive device upon completion of rehab program  Transfer Team Goal: Patient will be independent with transfers with least restrictive device upon completion of rehab program  Locomotion Team Goal: Patient will be independent with locomotion with least restrictive device upon completion of rehab program    Discussion: Pts flucuates between min and max assists for ambulation  Pt also fluctuates between min and max  for ADLs  Due to pt's caregiver support, team recommending subacute recommendations  Cm sent referrals and is awaiting determination  Anticipated Discharge Date:  reteam pending/SNF placement  [facility] Team Members Present: The following team members are supervising care for this patient and were present during this Weekly Team Conference      Physician: Dr Vipul Stephens MD  : BATSHEVA Ramos  Registered Nurse: Alfonso Valladares, RN, BSN  Physical Therapist: SYDNEY MeadeT  Occupational Therapist: Butch Morgan MS, OTR/L

## 2022-11-01 NOTE — PROGRESS NOTES
11/01/22 1300   Pain Assessment   Pain Assessment Tool 0-10   Pain Score 6   Pain Location/Orientation Orientation: Right;Location: Leg   Restrictions/Precautions   Precautions Bed/chair alarms;Cognitive; Fall Risk;Supervision on toilet/commode;Visual deficit   Weight Bearing Restrictions Yes   LUE Weight Bearing Per Order NWB  (L hand, okay through elbow)   RLE Weight Bearing Per Order WBAT   ROM Restrictions No   Braces or Orthoses Splint  (L 4th finger, no TEDS/binder today)   Lifestyle   Autonomy "my glasses are falling down, my pants are falling down, oh boy"   Putting On/Taking Off Footwear   Comment good recall of use of reacher and sock aid when doffing/donning socks, inc time to complete with min VCs (mostly due to heel allevyns catching on sock aid)   Sit to Stand   Type of Assistance Needed Physical assistance   Physical Assistance Level 51%-75%   Comment with PFRW   Sit to Stand CARE Score 2   Bed-Chair Transfer   Type of Assistance Needed Physical assistance   Physical Assistance Level 26%-50%   Comment with PFRW; min assist at times to manage RW especially when in tight spaces ie in bathroom   Chair/Bed-to-Chair Transfer CARE Score 3   Toileting Hygiene   Type of Assistance Needed Physical assistance   Physical Assistance Level 51%-75%   Comment pt able to complete bladder hygiene while seated on commode over toilet; stands with min A for balance and assist to bring brief/pants from ankles to knees, inc time to complete CM over hips with assist to manage on L side   Toileting Hygiene CARE Score 2   Toilet Transfer   Type of Assistance Needed Physical assistance   Physical Assistance Level 51%-75%   Comment to [facility] over toilet with Lisaa  Sharif Donald 33   Toilet Transfer CARE Score 2   Exercise Tools   Other Exercise Tool 1 pt engages in RUE ther ex to maximize strength and endurance for ADLs/transfers  Completed w/ 2# DB 3x10 bicep curls, chest press, and OH shoulder press   Tolerated well w/ Min vc's and demo, rest breaks provided between sets to manage fatigue  Cognition   Overall Cognitive Status Impaired   Arousal/Participation Alert; Cooperative   Attention Attends with cues to redirect   Orientation Level Oriented to person;Oriented to place;Oriented to situation   Memory Decreased short term memory;Decreased recall of precautions   Following Commands Follows one step commands with increased time or repetition   Activity Tolerance   Activity Tolerance Patient limited by pain   Medical Staff Made Aware RN notified of pain in R LE, unable to provide pain medication at this time as pt already received meds earlier  Assessment   Treatment Assessment pt engages in 90 minute skilled OT session focusing on repetitive SPT with PFRW, toileting, LE dressing using LHAE and R UE strengthening  pt continues to demo slow progress toward OT goals, completing basic transfers with PFRW and min/mod A, continued VCs for hand placement and to ensure adherence to LUE NWBing  pt demo'ing ability to recall use of reacher and sock aid during LE dressing and would continue to benefit from use to inc IND with dressing tasks  pt remains limited by low back pain, R LE pain, decreased standing luis/bal, decreased func cog warranting continued skilled care in order to decrease burden of care at d/c  Prognosis Good   Problem List Decreased strength;Decreased endurance; Impaired balance;Decreased mobility; Decreased coordination;Decreased cognition; Impaired judgement;Decreased safety awareness;Orthopedic restrictions;Pain; Impaired vision   Barriers to Discharge Inaccessible home environment;Decreased caregiver support   Plan   Treatment/Interventions ADL retraining;Functional transfer training; Therapeutic exercise; Endurance training;Cognitive reorientation;Patient/family training;Equipment eval/education; Compensatory technique education   OT Therapy Minutes   OT Time In 1300   OT Time Out 1430   OT Total Time (minutes) 90   OT Mode of treatment - Individual (minutes) 90   OT Mode of treatment - Concurrent (minutes) 0   OT Mode of treatment - Group (minutes) 0   OT Mode of treatment - Co-treat (minutes) 0   OT Mode of Treatment - Total time(minutes) 90 minutes   OT Cumulative Minutes 1155   Therapy Time missed   Time missed?  No

## 2022-11-01 NOTE — PROGRESS NOTES
Physical Medicine and Rehabilitation Progress Note  Tonny Hamilton 79 y o  female MRN: [id number]  Unit/Bed#: -53 Encounter: [id number]    HPI: Tonny Hamilton is a 79 y o  female with CLL, CHF, HLD, HTN, DM2, CKD 3, history of cognitive impairment, osteoporosis, dizziness who presented to the [facility] on 10/11/22 with fall in her driveway while using her rollator walker  Imaging revealed a right intertrochanteric femur fracture with extension towards the femoral neck  Imaging also revealed a left displaced 4th phalanx  Patient seen by Orthopedics and deemed an operative candidate  Patient taken to the OR on 10/12/22 by Dr Vicky Loo for a ORIF with IM nailing  Deemed WBAT RLE  Placed on Lovenox for DVT ppx  Endocrinology consulted for bone health and diabetes, and course c/b acute pain and anemia  Admitted to the [facility] on 10/17  Chief Complaint: Sleep not great last night, back pain  Interval History/Subjective:  No hallucinations overnight  Did have poor sleep related to back pain  Was given robaxin, and went back to sleep, and slept the rest of the night based on sleep log  She is awake and alert this AM   Issues with what sounds like a mixed picture of bladder incontinence  No new CP, SOB, fevers, chills, N/V, abdominal pain  Last BM 10/31  MOCA performed today 11/30, and possibly had sundowning at baseline  Has not had any dizziness since stopping meclizine and giving IVF  ROS:  A 10 point review of systems was negative except for what is noted in the HPI  Today's Changes:  1  Continue current regimen for sleep  2  Robaxin 250mg made her sleep  Very sensitive to meds   - Encouraged nursing to use aqua K  Does not have in room  - Would be good to use at night when lido patch is off   3  Recommend outpatient f/u with geriatric comprehensive, possibly urogyn  4  Team Conference today  See separate note      Total time spent:  35 minutes, with more than 50% spent counseling/coordinating care  Counseling includes discussion with patient re: progress in therapies, functional issues observed by therapy staff, and discussion with patient his/her current medical state/wellbeing  Coordination of patient's care was performed in conjunction with Internal Medicine service to monitor patient's labs, vitals, and management of their comorbidities  In addition, this patient was discussed by the interdisciplinary team in weekly case conference today  The care of the patient was extensively discussed with all care providers and an appropriate rehabilitation plan was formulated unique for this patient  Barriers were identified preventing progression of therapy and appropriate interventions were discussed with each discipline  Please see the team note for input from all disciplines regarding barriers, intervention, and discharge planning  Assessment/Plan:    * Intertrochanteric fracture of right femur [facility])  Assessment & Plan  Traumatic fall  Sustained a right intertrochanteric fracture  Taken to OR by Dr Bee Ramos on 10/12/22 for ORIF and IM nailing  WBAT RLE  DVT ppx with SQ Lovenox  Monitor 3 incisions  Continue acute rehabilitation program  POD 14 = 10/26/22 - appreciate ortho seeing patient and removing staples today 10/27/22  Xray completed 10/22 and stable  Follow-up with Dr Bee Ramos as outpatient in 4 weeks (~11/23)      Encephalopathy  Assessment & Plan  Improved today  Suspect some sundowning  MOCA 11/30  Recurrent 10/28/22 with delirium  - Moving below meds to earlier in the evening  Increase melatonin to 6 mg  Seroquel 12 5 mg QHS      Deep tissue injury  Assessment & Plan  Left heel DTI - improving  Offload and elevate  Appreciate wound care recommendations as below  Skin Care Plan:  1-Cleanse sacro-buttocks with soap and water  Apply Preventative Hydraguard BID and PRN  2-Turn/reposition q2h or when medically stable for pressure re-distribution on skin     3-Elevate heels to offload pressure  4-Moisturize skin daily with skin nourishing cream  5-Ehob cushion in chair when out of bed  6-B/L Heel Allevyn Foam Dressings  Samuel Left Heel with T for Treatment and Samuel Right Heel with P for Prevention  Change every other day or Prn  Peel back, inspect skin Q-shift, and reapply  H/O dizziness  Assessment & Plan  Dizziness related to motion  Found to have orthostasis  Chronic issue per patient  Made meclizine PRN for polypharmacy  Monitor closely  Orthostasis  Assessment & Plan  Continue compression stockings/Abd binder during all therapies  Recurrent 10/28/22: Gentle IVF given for orthostasis x 1L   - Orthostasis improved     Acute pain  Assessment & Plan  Nociceptive/neuropathic post op pain in RLE managed on multimodal regimen:   · Patient is intolerant of oxycodone and Norco   Patient excessively drowsy on oxycodone and with mild hallucinations on Norco   · Discontinue Norco  · Schedule Tylenol 650 mg t i d  and 325 PRN  · Scheduled gabapentin 100 mg bid  Gabapentin 300 mg q h s  · Topical Lidoderm patches, aqua K pad  · Robaxin 250 mg q 6 hours p r n  For muscle spasm  · Discontinued Dilaudid    CLL (chronic lymphocytic leukemia) (HCC)  Assessment & Plan  Chronic leukocytosis (17K)  Monitor   Follow-up with heme onc as outpatient     Displaced fracture of middle phalanx of left ring finger, initial encounter for closed fracture  Assessment & Plan  Traumatic fracture  4th left finger   Treated non-operatively by Orthopedics  Continue supportive splint - she is not always compliant despite cuing     ICE for swelling  NWB L hand    Hypertension  Assessment & Plan  At home: Lopressor 25 mg Q 12 hours, Hyzaar (Losartan/HCTZ) 50mg/12 5mg daily  Here: Lopressor 25 mg Q12hr  Will monitor BP closely  Adjust medications accordingly  TEDs daily /Abdominal binder PRN    Chronic systolic heart failure (HCC)  Assessment & Plan  Grade 2 DD  At home: Lasix 40 mg daily, BB  Here:  HOLD Lasix  Encourage fluids by mouth  Restart Lasix when able with K Dur supplement  IM consulted to assist with management  HLD (hyperlipidemia)  Assessment & Plan  Restarted on home Lipitor 20 mg QPM    CKD (chronic kidney disease) stage 3, GFR 30-59 ml/min ([facility])  Assessment & Plan  Stable  Avoid nephrotoxic agents   Monitor 2x/week    Type 2 diabetes mellitus ([facility] [address] )  Assessment & Plan  Lab Results   Component Value Date    HGBA1C 7 6 (H) 10/12/2022     At home: Levemir 20 units QHS, Humalog 10 units with meals  Here: Levemir 13 units, Lispro 5 units TID with meals, continue sliding scale insulin  In acute care required an insulin drip transiently  Accuchecks  Diabetic Diet  Education  IM following    UTI (urinary tract infection)-resolved as of 10/31/2022  Assessment & Plan  Resolved  + UA  Urine culture: >100K Klebsiella  Symptomatic with urinary frequency, new encephalopathy and hallucinations, dysuria  · Completed treatment with Bactrim which was sensitive        Health Maintenance  #Bowel: Last BM 10/31  On docusate BID  #Bladder: Voiding and better continence   #Skin/Pressure Injury Prevention: Turn Q2hr in bed, with weight shifts A77-15gba in wheelchair  #DVT Prophylaxis: Lovenox, SCDs  #GI Prophylaxis: PO diet  #Code Status: Full Code  #FEN: Diabetic diet, with glucerna at lunch  Received 1L of normal saline 10/28  #Dispo: Team 11/1: Likely SNF  Family unable to provide assistance  Needs to be modified Ind to return home  Objective:    Functional Update:  PT: modA transfers, Mary bed mobility, Ax2 ambulation 30'  OT: Ind eating, Sup grooming, modA bathing, Sup UB dressing, modA LB dressing, maxA toileting, modA toilet transfers    Allergies per EMR    Physical Exam:  Temp:  [98 °F (36 7 °C)-98 2 °F (36 8 °C)] 98 °F (36 7 °C)  HR:  [60-70] 70  Resp:  [17-20] 18  BP: (126-142)/(57-70) 138/70  Oxygen Therapy  SpO2: 94 %      Gen: No acute distress, Well-nourished, well-appearing    HEENT: Moist mucus membranes, Normocephalic/Atraumatic  Cardiovascular: Regular rate, rhythm, S1/S2  Distal pulses palpable  Heme/Extr: No edema  Pulmonary: Non-labored breathing  Lungs CTAB  : No hernandez  GI: Soft, non-tender, non-distended  MSK: L 4th finger in splint  Integumentary: Skin is warm, dry  Neuro: AAOx3, Speech is intact  Appropriate to questioning  Tone is normal  Psych: Normal mood and affect  Diagnostic Studies: Reviewed, no new imaging      Laboratory: Reviewed  Results from last 7 days   Lab Units 10/31/22  0615   HEMOGLOBIN g/dL 10 8*   HEMATOCRIT % 35 3   WBC Thousand/uL 17 92*     Results from last 7 days   Lab Units 10/31/22  0615   BUN mg/dL 23   POTASSIUM mmol/L 4 2   CHLORIDE mmol/L 108   CREATININE mg/dL 0 84            Patient Active Problem List   Diagnosis   • Fall   • Type 2 diabetes mellitus ([facility] [address] )   • CKD (chronic kidney disease) stage 3, GFR 30-59 ml/min ([facility])   • HLD (hyperlipidemia)   • OA (osteoarthritis)   • Chronic systolic heart failure (HCC)   • Lymphadenopathy   • Elevated liver enzymes   • Ambulatory dysfunction   • Suspected Mild cognitive impairment   • Hypertension   • Intertrochanteric fracture of right femur ([facility])   • Displaced fracture of middle phalanx of left ring finger, initial encounter for closed fracture   • CLL (chronic lymphocytic leukemia) ([facility])   • Acute pain   • Orthostasis   • H/O dizziness   • Deep tissue injury   • Encephalopathy         Medications  Current Facility-Administered Medications   Medication Dose Route Frequency Provider Last Rate   • acetaminophen  325 mg Oral Q6H PRN Danielle Staley MD     • acetaminophen  650 mg Oral TID AC Yanely Treadwell MD     • atorvastatin  20 mg Oral Daily With Sandhya Mendez MD     • bisacodyl  10 mg Rectal Daily PRN Danielle Staley MD     • docusate sodium  100 mg Oral BID Danielle Staley MD     • enoxaparin  40 mg Subcutaneous Q24H Carlin Cannon MD     • gabapentin  100 mg Oral BID Scot Phillip MD     • gabapentin  300 mg Oral HS Scot Phillip MD     • hydrocortisone   Topical 4x Daily PRN Scot Phillpi MD     • insulin detemir  13 Units Subcutaneous HS Adeola Jennings PA-C     • insulin lispro  1-5 Units Subcutaneous HS Scot Phillip MD     • insulin lispro  1-6 Units Subcutaneous TID AC Scot Phillip MD     • insulin lispro  3 Units Subcutaneous Daily With Breakfast BRINDA Greco     • insulin lispro  5 Units Subcutaneous Daily With Lunch BRINDA Greco     • insulin lispro  5 Units Subcutaneous Daily With Dinner BRINDA Greco     • lidocaine  2 patch Topical Daily Scot Phillip MD     • meclizine  12 5 mg Oral Q8H PRN Scot Phillip MD     • meclizine  12 5 mg Oral Q12H PRN Bro Bower MD     • melatonin  6 mg Oral QPM Bro Bower MD     • methocarbamol  250 mg Oral Q6H PRN Bro Bower MD     • metoprolol tartrate  25 mg Oral Q12H [facility] BRINDA Kramer     • naloxone  0 04 mg Intravenous Q1MIN PRN Scot Phillip MD     • polyethylene glycol  17 g Oral Daily PRN Scot Phillip MD     • QUEtiapine  12 5 mg Oral QPM Bro Bower MD            ** Please Note: Fluency Direct voice to text software may have been used in the creation of this document   **

## 2022-11-02 ENCOUNTER — APPOINTMENT (OUTPATIENT)
Dept: RADIOLOGY | Facility: HOSPITAL | Age: 82
End: 2022-11-02

## 2022-11-02 PROBLEM — G89.18 ACUTE POSTOPERATIVE PAIN OF RIGHT KNEE: Status: ACTIVE | Noted: 2022-11-02

## 2022-11-02 PROBLEM — M25.561 ACUTE POSTOPERATIVE PAIN OF RIGHT KNEE: Status: ACTIVE | Noted: 2022-11-02

## 2022-11-02 LAB
GLUCOSE SERPL-MCNC: 135 MG/DL (ref 65–140)
GLUCOSE SERPL-MCNC: 174 MG/DL (ref 65–140)
GLUCOSE SERPL-MCNC: 186 MG/DL (ref 65–140)
GLUCOSE SERPL-MCNC: 98 MG/DL (ref 65–140)

## 2022-11-02 RX ORDER — GABAPENTIN 100 MG/1
100 CAPSULE ORAL EVERY 12 HOURS SCHEDULED
Status: DISCONTINUED | OUTPATIENT
Start: 2022-11-02 | End: 2022-11-04

## 2022-11-02 RX ORDER — LOSARTAN POTASSIUM 25 MG/1
25 TABLET ORAL DAILY
Status: DISCONTINUED | OUTPATIENT
Start: 2022-11-02 | End: 2022-11-04

## 2022-11-02 RX ORDER — GABAPENTIN 100 MG/1
100 CAPSULE ORAL EVERY 8 HOURS
Status: DISCONTINUED | OUTPATIENT
Start: 2022-11-02 | End: 2022-11-02

## 2022-11-02 RX ADMIN — ACETAMINOPHEN 650 MG: 325 TABLET ORAL at 13:59

## 2022-11-02 RX ADMIN — LIDOCAINE 5% 2 PATCH: 700 PATCH TOPICAL at 08:31

## 2022-11-02 RX ADMIN — INSULIN DETEMIR 13 UNITS: 100 INJECTION, SOLUTION SUBCUTANEOUS at 22:22

## 2022-11-02 RX ADMIN — DOCUSATE SODIUM 100 MG: 100 CAPSULE, LIQUID FILLED ORAL at 08:32

## 2022-11-02 RX ADMIN — ATORVASTATIN CALCIUM 20 MG: 20 TABLET, FILM COATED ORAL at 16:30

## 2022-11-02 RX ADMIN — METOPROLOL TARTRATE 25 MG: 25 TABLET, FILM COATED ORAL at 22:22

## 2022-11-02 RX ADMIN — LOSARTAN POTASSIUM 25 MG: 25 TABLET, FILM COATED ORAL at 11:37

## 2022-11-02 RX ADMIN — INSULIN LISPRO 5 UNITS: 100 INJECTION, SOLUTION INTRAVENOUS; SUBCUTANEOUS at 11:38

## 2022-11-02 RX ADMIN — DICLOFENAC SODIUM 2 G: 10 GEL TOPICAL at 16:30

## 2022-11-02 RX ADMIN — GABAPENTIN 100 MG: 100 CAPSULE ORAL at 06:51

## 2022-11-02 RX ADMIN — INSULIN LISPRO 1 UNITS: 100 INJECTION, SOLUTION INTRAVENOUS; SUBCUTANEOUS at 22:23

## 2022-11-02 RX ADMIN — METOPROLOL TARTRATE 25 MG: 25 TABLET, FILM COATED ORAL at 08:32

## 2022-11-02 RX ADMIN — GABAPENTIN 100 MG: 100 CAPSULE ORAL at 13:59

## 2022-11-02 RX ADMIN — INSULIN LISPRO 5 UNITS: 100 INJECTION, SOLUTION INTRAVENOUS; SUBCUTANEOUS at 16:30

## 2022-11-02 RX ADMIN — DICLOFENAC SODIUM 2 G: 10 GEL TOPICAL at 08:40

## 2022-11-02 RX ADMIN — ACETAMINOPHEN 650 MG: 325 TABLET ORAL at 20:51

## 2022-11-02 RX ADMIN — GABAPENTIN 100 MG: 100 CAPSULE ORAL at 22:22

## 2022-11-02 RX ADMIN — INSULIN LISPRO 5 UNITS: 100 INJECTION, SOLUTION INTRAVENOUS; SUBCUTANEOUS at 07:42

## 2022-11-02 RX ADMIN — ACETAMINOPHEN 650 MG: 325 TABLET ORAL at 06:50

## 2022-11-02 RX ADMIN — QUETIAPINE FUMARATE 12.5 MG: 25 TABLET ORAL at 20:51

## 2022-11-02 RX ADMIN — ACETAMINOPHEN 325 MG: 325 TABLET ORAL at 08:32

## 2022-11-02 RX ADMIN — INSULIN LISPRO 1 UNITS: 100 INJECTION, SOLUTION INTRAVENOUS; SUBCUTANEOUS at 16:30

## 2022-11-02 RX ADMIN — DICLOFENAC SODIUM 2 G: 10 GEL TOPICAL at 22:22

## 2022-11-02 RX ADMIN — METHOCARBAMOL 250 MG: 500 TABLET ORAL at 08:31

## 2022-11-02 RX ADMIN — DOCUSATE SODIUM 100 MG: 100 CAPSULE, LIQUID FILLED ORAL at 18:14

## 2022-11-02 RX ADMIN — ENOXAPARIN SODIUM 40 MG: 40 INJECTION SUBCUTANEOUS at 08:31

## 2022-11-02 RX ADMIN — Medication 6 MG: at 20:51

## 2022-11-02 NOTE — PROGRESS NOTES
11/02/22 1300   Pain Assessment   Pain Assessment Tool 0-10   Pain Score 4   Pain Location/Orientation Location: Knee; Location: Hip   Pain Onset/Description Onset: Ongoing; Onset: Gradual;Frequency: Intermittent  (with amb)   Patient's Stated Pain Goal No pain   Hospital Pain Intervention(s) Rest;Elevated;Cold applied   Restrictions/Precautions   Precautions Bed/chair alarms;Cognitive; Fall Risk;Pain;Supervision on toilet/commode;Visual deficit   LUE Weight Bearing Per Order NWB  (L hand, okay through elbow)   RLE Weight Bearing Per Order WBAT   ROM Restrictions No   Braces or Orthoses Splint  (L 4th finger; TEDs donned)   Cognition   Overall Cognitive Status Impaired   Arousal/Participation Alert; Cooperative   Attention Attends with cues to redirect   Orientation Level Oriented to person;Oriented to place;Oriented to situation   Memory Decreased short term memory;Decreased recall of precautions   Following Commands Follows one step commands with increased time or repetition   Sit to Lying   Type of Assistance Needed Physical assistance;Verbal cues; Adaptive equipment   Physical Assistance Level 25% or less   Comment A with RLE, bed rail   Sit to Lying CARE Score 3   Lying to Sitting on Side of Bed   Type of Assistance Needed Verbal cues; Incidental touching; Adaptive equipment   Comment CS/CGA with bed rails   Lying to Sitting on Side of Bed CARE Score 4   Sit to Stand   Type of Assistance Needed Physical assistance;Verbal cues; Adaptive equipment   Physical Assistance Level 51%-75%   Comment varied MIN to Blu 69 to Jeanine Donald 33   Sit to Stand CARE Score 2   Bed-Chair Transfer   Type of Assistance Needed Physical assistance;Verbal cues; Adaptive equipment   Physical Assistance Level 26%-50%   Comment with PFRW   Chair/Bed-to-Chair Transfer CARE Score 3   Transfer Bed/Chair/Wheelchair   Adaptive Equipment Roller Walker;Platform   Walk 10 Feet   Type of Assistance Needed Physical assistance;Verbal cues; Adaptive equipment Physical Assistance Level 26%-50%   Comment overall JOÃO   Walk 10 Feet CARE Score 3   Walk 150 Feet   Reason if not Attempted Safety concerns   Walk 150 Feet CARE Score 88   Walking 10 Feet on Uneven Surfaces   Reason if not Attempted Safety concerns   Walking 10 Feet on Uneven Surfaces CARE Score 88   Ambulation   Does the patient walk? 2  Yes   Primary Mode of Locomotion Prior to Admission Walk   Distance Walked (feet) 35 ft  (30 x3)   Assist Device Roller Walker;Platform   Gait Pattern Ataxic; Inconsistant Mandi;Decreased foot clearance; Slow Mandi; Forward Flexion;Narrow WOOD;Scissoring;Shuffle;Decreased R stance; Improper weight shift   Limitations Noted In Balance;Device Management; Endurance; Heel Strike;Midline Orientation;Posture; Safety;Speed;Strength;Swing   Provided Assistance with: Balance;Weight Shift   Walk Assist Level Moderate Assist;Chair Follow   Wheelchair mobility   Does the patient use a wheelchair? 1  Yes   Type of Wheelchair Used 1  Manual   Therapeutic Interventions   Strengthening seated LLE LAQ and hip flex with 2# 3 x10 reps while R knee was elevated and CP applied   Modalities CP to R knee   Equipment Use   NuStep L1 x10 min, slight rest at 6 min 2/2 pain to R knee  Assessment   Treatment Assessment Pt participated in skilled PT session with increased focus on gait, functional transfers, LE strengthening and act tolerance  Pt cont to be limited by increased pain to R knee and hip with WB  Pt utilized R knee brace this session to help with increased stability  Pt initially stated she felt no difference but when PT asked pt she reported she felt better with brace  Pt seemed to fatigue at or close to 30' this session as pain in R knee increased as well  Pt will cont to benefit from cont rehab services with increased focus on gait, functional transfers, increased act tolerance and improved LE strengthening  Cont POC as tolerated     Problem List Decreased strength;Decreased endurance; Impaired balance;Decreased mobility; Decreased coordination;Decreased cognition; Impaired judgement;Decreased safety awareness;Orthopedic restrictions;Pain; Impaired vision   Barriers to Discharge Inaccessible home environment;Decreased caregiver support   PT Barriers   Functional Limitation Car transfers; Ramp negotiation;Stair negotiation;Standing;Transfers; Walking; Wheelchair management   Plan   Treatment/Interventions Functional transfer training;LE strengthening/ROM; Therapeutic exercise; Endurance training;Cognitive reorientation;Patient/family training;Bed mobility;Gait training   Progress Slow progress, decreased activity tolerance   Recommendation   PT Discharge Recommendation Post acute rehabilitation services   Equipment Recommended   (TBD)   PT Therapy Minutes   PT Time In 1300   PT Time Out 1430   PT Total Time (minutes) 90   PT Mode of treatment - Individual (minutes) 90   PT Mode of treatment - Concurrent (minutes) 0   PT Mode of treatment - Group (minutes) 0   PT Mode of treatment - Co-treat (minutes) 0   PT Mode of Treatment - Total time(minutes) 90 minutes   PT Cumulative Minutes 1425   Therapy Time missed   Time missed?  No

## 2022-11-02 NOTE — PROGRESS NOTES
Internal Medicine Progress Note  Patient: Mackenzie Nair  Age/sex: 79 y o  female  Medical Record #: [id number]      ASSESSMENT/PLAN: (Interval History)  Mackenzie Nair is seen and examined and management for following issues:    S/p ORIF with IM nailing of the Rt femur  · Pain control and therapy per primary service  · PMR adjusted pain meds d/t sedation  · Renal dosed Lovenox for DVT prophylaxis  · Follow-up with Ortho as scheduled     Left 4th phalanx fracture  · Splint and NWB   · Keeps removing splint     HTN  · Home: Hyzaar 50/12 5mg qd/Lopressor 25mg q12 hours/Lasix 40mg qd (for heart failure)  · Here: Lopressor 25mg BID    · Trending upward will add losartan 25mg daily and titrate     Anemia  · Secondary to trauma  · Stable      DM type 2  · Home: Levemir 20U at bedtime/Humalog 10U with meals  · Here: Levemir 13U at bedtime/Humalog 5U tid  · Blood sugars improved       Chronic systolic/diastolic heart failure  · Pt has been off diuretics  · ECHO with an EF of 45%  Grade 2 diastolic dysfunction  · euvolemic     CLL  · Recent diagnosis  · Outpt follow-up with Hematology/Oncology  · Baseline WBCs 14-16K  · Had mild bump likely reactive secondary to surgery/UTI  · Stable      Suspected UTI  · Had positive UA  Culture with >100,000 Klebsiella  · s/p Bactrim  · Urinary retention improved with tx of UTI     Delirium  · Intermittent confusion and hallucinations  · Seroquel caused increased daytime fatigue and was stopped but restarted due to hallucinations  · D/w PMR they are going to dc melatonin and as well as gabapentin    DC planning: TBD    The above assessment and plan was reviewed and updated as determined by my evaluation of the patient on 11/2/2022      Labs:   Results from last 7 days   Lab Units 10/31/22  0615   WBC Thousand/uL 17 92*   HEMOGLOBIN g/dL 10 8*   HEMATOCRIT % 35 3   PLATELETS Thousands/uL 347     Results from last 7 days   Lab Units 10/31/22  0615   SODIUM mmol/L 138   POTASSIUM mmol/L 4 2   CHLORIDE mmol/L 108   CO2 mmol/L 27   BUN mg/dL 23   CREATININE mg/dL 0 84   CALCIUM mg/dL 9 6             Results from last 7 days   Lab Units 11/02/22  0650 11/01/22  2136 11/01/22  1559   POC GLUCOSE mg/dl 135 157* 245*       Review of Scheduled Meds:  Current Facility-Administered Medications   Medication Dose Route Frequency Provider Last Rate   • acetaminophen  325 mg Oral Q6H PRN Steven Marroquin MD     • acetaminophen  650 mg Oral TID AC Steven Marroquin MD     • atorvastatin  20 mg Oral Daily With Vandana Pink MD     • bisacodyl  10 mg Rectal Daily PRN Steven Marroquin MD     • Diclofenac Sodium  2 g Topical TID Kelly Dougherty MD     • docusate sodium  100 mg Oral BID Steven Marroquin MD     • enoxaparin  40 mg Subcutaneous Q24H [facility] Yanely Lion MD     • gabapentin  100 mg Oral BID Steven Marroquin MD     • gabapentin  300 mg Oral HS Steven Marroquin MD     • hydrocortisone   Topical 4x Daily PRN Steven Marroquin MD     • insulin detemir  13 Units Subcutaneous HS Adeola Jennings PA-C     • insulin lispro  1-5 Units Subcutaneous HS Steven Marroquin MD     • insulin lispro  1-6 Units Subcutaneous TID AC Steven Marroquin MD     • insulin lispro  5 Units Subcutaneous TID With Meals BRINDA Smith     • lidocaine  2 patch Topical Daily Steven Marroquin MD     • meclizine  12 5 mg Oral Q8H PRN Steven Marroquin MD     • meclizine  12 5 mg Oral Q12H PRN Kelly Dougherty MD     • melatonin  6 mg Oral QPM Kelly Dougherty MD     • methocarbamol  250 mg Oral Q6H PRN Kelly Dougherty MD     • metoprolol tartrate  25 mg Oral Q12H [facility] BRINDA Kramer     • naloxone  0 04 mg Intravenous Q1MIN PRN Steven Marroquin MD     • polyethylene glycol  17 g Oral Daily PRN Steven Marroquin MD     • QUEtiapine  12 5 mg Oral QPM Kelly Dougherty MD         Subjective/ HPI: Patient seen and examined   Patients overnight issues or events were reviewed with nursing or staff during rounds or morning huddle session  New or overnight issues include the following:     Pt seen in her room  She does not recall the overnight confusion  This am she is alert and oriented x3  She is quite upset regarding the confusion at night time  The pain in her right leg is improved  ROS:   A 10 point ROS was performed; negative except as noted above  *Labs /Radiology studies reviewed  *Medications reviewed and reconciled as needed  *Please refer to order section for additional ordered labs studies  *Case discussed with primary attending during morning huddle case rounds    Physical Examination:  Vitals:   Vitals:    11/01/22 1314 11/01/22 1400 11/01/22 2221 11/02/22 0652   BP:  149/67 165/70 155/65   BP Location:  Right arm Left arm Left arm   Pulse:  65 72 73   Resp:  17 17 17   Temp:  98 7 °F (37 1 °C) 98 6 °F (37 °C) 98 1 °F (36 7 °C)   TempSrc:  Oral Oral Oral   SpO2:  90% 93% 95%   Weight: 66 kg (145 lb 8 1 oz)   67 kg (147 lb 11 3 oz)   Height:         GEN: No apparent distress, interactive; frail  NEURO: Alert and oriented x3; confused overnight but clear this am, doesn't remember overnight confusion  HEENT: Pupils are equal and reactive, EOMI, mucous membranes are moist, face symmetrical  CV: S1 S2 regular, no MRG, no peripheral edema noted  RESP: Lungs are clear bilaterally, no wheezes, rales or rhonchi noted, on room air, respirations easy and non labored  GI: Flat, soft non tender, non distended; +BS x4  : Voiding without difficulty; incontinent overnight  MUSC: Moves all extremities; except RLE; splint to 4th finger on left hand  SKIN: pink, warm and dry, normal turgor, no rashes, lesions      The above physical exam was reviewed and updated as determined by my evaluation of the patient on 11/2/2022      Invasive Devices  Report    None                    VTE Pharmacologic Prophylaxis: Enoxaparin  Code Status: Level 1 - Full Code  Current Length of Stay: 16 day(s)      Total time spent:  30 minutes with more than 50% spent counseling/coordinating care  Counseling includes discussion with patient re: progress  and discussion with patient of his/her current medical state/information  Coordination of patient's care was performed in conjunction with primary service  Time invested included review of patient's labs, vitals, and management of their comorbidities with continued monitoring  In addition, this patient was discussed with medical team including physician and advanced extenders  The care of the patient was extensively discussed and appropriate treatment plan was formulated unique for this patient  Medical decision making for the day was made by supervising physician unless otherwise noted in their attestation statement  ** Please Note:  voice to text software may have been used in the creation of this document   Although proof errors in transcription or interpretation are a potential of such software**

## 2022-11-02 NOTE — PROGRESS NOTES
Pastoral Care Progress Note    2022  Patient: Durga Little : 1940  Admission Date & Time: 10/17/2022 1418  MRN: [id number] CSN: [id number]      Pt was glad to share her life details - how she broke her hip/leg, who helped her, that she lives alone as a , how her  , that she has a very helpful nephew (also a )  Pt talked about her grit/willingness to work hard through things, yet also her fear/concern that she's going to have major medical expenses, she lives alone, will she be able to keep her house/independence   reminded her of things in place that are helping her - [facility] care, her insurance, her good neighbors and nephew  Pt agreed that she does have hope and see that she has support around her   offered to pray, Pt welcomed it, I prayed and she was in tears at the end of prayer  I sense it was just emotional for her, yet I will visit again and check w her to see if there is more she wants to talk about                 Chaplaincy Interventions Utilized:   Empowerment: Clarified, confirmed, or reviewed information from treatment team     Exploration: Explored relational needs & resources, Explored spiritual needs & resources, Facilitated life review, and Facilitated story telling    Collaboration: Advocated for patient/family and Encouraged adherence to treatment plan    Relationship Building: Cultivated a relationship of care and support and Listened empathically    Ritual: Provided prayer    Chaplaincy Outcomes Achieved:  Debriefed/defused experience and Distress reduced    Spiritual Coping Strategies Utilized:   Spiritual comfort       22 1000   Clinical Encounter Type   Visited With Patient   Routine Visit Follow-up

## 2022-11-02 NOTE — PLAN OF CARE
Problem: Prexisting or High Potential for Compromised Skin Integrity  Goal: Skin integrity is maintained or improved  Description: INTERVENTIONS:  - Identify patients at risk for skin breakdown  - Assess and monitor skin integrity  - Assess and monitor nutrition and hydration status  - Monitor labs   - Assess for incontinence   - Turn and reposition patient  - Assist with mobility/ambulation  - Relieve pressure over bony prominences  - Avoid friction and shearing  - Provide appropriate hygiene as needed including keeping skin clean and dry  - Evaluate need for skin moisturizer/barrier cream  - Collaborate with interdisciplinary team   - Patient/family teaching  - Consider wound care consult   Outcome: Progressing     Problem: PAIN - ADULT  Goal: Verbalizes/displays adequate comfort level or baseline comfort level  Description: Interventions:  - Encourage patient to monitor pain and request assistance  - Assess pain using appropriate pain scale  - Administer analgesics based on type and severity of pain and evaluate response  - Implement non-pharmacological measures as appropriate and evaluate response  - Consider cultural and social influences on pain and pain management  - Notify physician/advanced practitioner if interventions unsuccessful or patient reports new pain  Outcome: Progressing     Problem: INFECTION - ADULT  Goal: Absence or prevention of progression during hospitalization  Description: INTERVENTIONS:  - Assess and monitor for signs and symptoms of infection  - Monitor lab/diagnostic results  - Monitor all insertion sites, i e  indwelling lines, tubes, and drains  - Monitor endotracheal if appropriate and nasal secretions for changes in amount and color  - [place] appropriate cooling/warming therapies per order  - Administer medications as ordered  - Instruct and encourage patient and family to use good hand hygiene technique  - Identify and instruct in appropriate isolation precautions for identified infection/condition  Outcome: Progressing  Goal: Absence of fever/infection during neutropenic period  Description: INTERVENTIONS:  - Monitor WBC    Outcome: Progressing     Problem: SAFETY ADULT  Goal: Patient will remain free of falls  Description: INTERVENTIONS:  - Educate patient/family on patient safety including physical limitations  - Instruct patient to call for assistance with activity   - Consult OT/PT to assist with strengthening/mobility   - Keep Call bell within reach  - Keep bed low and locked with side rails adjusted as appropriate  - Keep care items and personal belongings within reach  - Initiate and maintain comfort rounds  - Make Fall Risk Sign visible to staff  - Offer Toileting every  Hours, in advance of need  - Initiate/Maintain alarm  - Obtain necessary fall risk management equipment:   - Apply yellow socks and bracelet for high fall risk patients  - Consider moving patient to room near nurses station  Outcome: Progressing  Goal: Maintain or return to baseline ADL function  Description: INTERVENTIONS:  -  Assess patient's ability to carry out ADLs; assess patient's baseline for ADL function and identify physical deficits which impact ability to perform ADLs (bathing, care of mouth/teeth, toileting, grooming, dressing, etc )  - Assess/evaluate cause of self-care deficits   - Assess range of motion  - Assess patient's mobility; develop plan if impaired  - Assess patient's need for assistive devices and provide as appropriate  - Encourage maximum independence but intervene and supervise when necessary  - Involve family in performance of ADLs  - Assess for home care needs following discharge   - Consider OT consult to assist with ADL evaluation and planning for discharge  - Provide patient education as appropriate  Outcome: Progressing  Goal: Maintains/Returns to pre admission functional level  Description: INTERVENTIONS:  - Perform BMAT or MOVE assessment daily    - Set and communicate daily mobility goal to care team and patient/family/caregiver  - Collaborate with rehabilitation services on mobility goals if consulted  - Perform Range of Motion  times a day  - Reposition patient every  hours    - Dangle patient  times a day  - Stand patient  times a day  - Ambulate patient  times a day  - Out of bed to chair  times a day   - Out of bed for meals  times a day  - Out of bed for toileting  - Record patient progress and toleration of activity level   Outcome: Progressing     Problem: DISCHARGE PLANNING  Goal: Discharge to home or other facility with appropriate resources  Description: INTERVENTIONS:  - Identify barriers to discharge w/patient and caregiver  - Arrange for needed discharge resources and transportation as appropriate  - Identify discharge learning needs (meds, wound care, etc )  - Arrange for interpretive services to assist at discharge as needed  - Refer to Case Management Department for coordinating discharge planning if the patient needs post-hospital services based on physician/advanced practitioner order or complex needs related to functional status, cognitive ability, or social support system  Outcome: Progressing     Problem: Potential for Falls  Goal: Patient will remain free of falls  Description: INTERVENTIONS:  - Educate patient/family on patient safety including physical limitations  - Instruct patient to call for assistance with activity   - Consult OT/PT to assist with strengthening/mobility   - Keep Call bell within reach  - Keep bed low and locked with side rails adjusted as appropriate  - Keep care items and personal belongings within reach  - Initiate and maintain comfort rounds  - Make Fall Risk Sign visible to staff  - Offer Toileting every  Hours, in advance of need  - Initiate/Maintain alarm  - Obtain necessary fall risk management equipment:  - Apply yellow socks and bracelet for high fall risk patients  - Consider moving patient to room near nurses station  Outcome: Progressing     Problem: Nutrition/Hydration-ADULT  Goal: Nutrient/Hydration intake appropriate for improving, restoring or maintaining nutritional needs  Description: Monitor and assess patient's nutrition/hydration status for malnutrition  Collaborate with interdisciplinary team and initiate plan and interventions as ordered  Monitor patient's weight and dietary intake as ordered or per policy  Utilize nutrition screening tool and intervene as necessary  Determine patient's food preferences and provide high-protein, high-caloric foods as appropriate       INTERVENTIONS:  - Monitor oral intake, urinary output, labs, and treatment plans  - Assess nutrition and hydration status and recommend course of action  - Evaluate amount of meals eaten  - Assist patient with eating if necessary   - Allow adequate time for meals  - Recommend/ encourage appropriate diets, oral nutritional supplements, and vitamin/mineral supplements  - Order, calculate, and assess calorie counts as needed  - Recommend, monitor, and adjust tube feedings and TPN/PPN based on assessed needs  - Assess need for intravenous fluids  - Provide specific nutrition/hydration education as appropriate  - Include patient/family/caregiver in decisions related to nutrition  Outcome: Progressing     Problem: MOBILITY - ADULT  Goal: Maintain or return to baseline ADL function  Description: INTERVENTIONS:  -  Assess patient's ability to carry out ADLs; assess patient's baseline for ADL function and identify physical deficits which impact ability to perform ADLs (bathing, care of mouth/teeth, toileting, grooming, dressing, etc )  - Assess/evaluate cause of self-care deficits   - Assess range of motion  - Assess patient's mobility; develop plan if impaired  - Assess patient's need for assistive devices and provide as appropriate  - Encourage maximum independence but intervene and supervise when necessary  - Involve family in performance of ADLs  - Assess for home care needs following discharge   - Consider OT consult to assist with ADL evaluation and planning for discharge  - Provide patient education as appropriate  Outcome: Progressing  Goal: Maintains/Returns to pre admission functional level  Description: INTERVENTIONS:  - Perform BMAT or MOVE assessment daily    - Set and communicate daily mobility goal to care team and patient/family/caregiver  - Collaborate with rehabilitation services on mobility goals if consulted  - Perform Range of Motion times a day  - Reposition patient every  hours    - Dangle patient  times a day  - Stand patient  times a day  - Ambulate patient  times a day  - Out of bed to chair  times a day   - Out of bed for meals times a day  - Out of bed for toileting  - Record patient progress and toleration of activity level   Outcome: Progressing

## 2022-11-02 NOTE — PROGRESS NOTES
Physical Medicine and Rehabilitation Progress Note  Alondra Johnston 79 y o  female MRN: [id number]  Unit/Bed#: -43 Encounter: [id number]    HPI: Alondra Johnston is a 79 y o  female with CLL, CHF, HLD, HTN, DM2, CKD 3, history of cognitive impairment, osteoporosis, dizziness who presented to the [address] on 10/11/22 with fall in her driveway while using her rollator walker  Imaging revealed a right intertrochanteric femur fracture with extension towards the femoral neck  Imaging also revealed a left displaced 4th phalanx  Patient seen by Orthopedics and deemed an operative candidate  Patient taken to the OR on 10/12/22 by Dr Espino Duty for a ORIF with IM nailing  Deemed WBAT RLE  Placed on Lovenox for DVT ppx  Endocrinology consulted for bone health and diabetes, and course c/b acute pain and anemia  Admitted to the [facility] on 10/17  Chief Complaint: Pain again in R leg  Interval History/Subjective:  No acute events overnight  Last BM was 11/2  Last night it was pain again  Her R knee, radiating up her thigh  Denies burning/tingling, radiating from her back  Her R knee is very tender  She denies any new falls/injuries  Denies any CP, SOB, fevers, chills, N/V, hallucinations  Continence poor overnight - team has been using purewick  I asked therapy to trial a R knee brace with her with improved pain and gait  She thinks the cream helps a little bit  ROS:  A 10 point review of systems was negative except for what is noted in the HPI  Today's Changes:  1  I do not think the pain in her R leg is radicular/neuropathic  It radiates up from her knee, and her knee is tender to palpation    - Will check an XR  No palpable effusion but would r/o injury given fall with femur fracture  - Ordered R knee brace for her   - Continue diclofenac gel  - Wean down gabapentin to 100mg Q12hrs and will try to wean to off for polypharmacy  - Ice to right knee  2   IM added Losartan 25mg daily   3  Sleep issues more related to pain  Will back off on melatonin  Total visit time: 25 minutes, with more than 50% spent counseling/coordinating care  Counseling includes discussion with patient re: progress in therapies, functional issues observed by therapy staff, and discussion with patient regarding their current medical state and wellbeing  Coordination of patient's care was performed in conjunction with Internal Medicine service to monitor patient's labs, vitals, and management of their comorbidities  Assessment/Plan:    * Intertrochanteric fracture of right femur [facility])  Assessment & Plan  Traumatic fall  Sustained a right intertrochanteric fracture  Taken to OR by Dr Wanda Calderon on 10/12/22 for ORIF and IM nailing  WBAT RLE  DVT ppx with SQ Lovenox  Monitor 3 incisions  Continue acute rehabilitation program  POD 14 = 10/26/22 - appreciate ortho seeing patient and removing staples today 10/27/22  Xray completed 10/22 and stable  Follow-up with Dr Wanda Calderon as outpatient in 4 weeks (~11/23)      Acute postoperative pain of right knee  Assessment & Plan  R knee quite tender  No effusion, erythema, warmth, but tender to palpation at patella and medial/lateral joint lines  Pain radiates up thigh  11/2 - checking dedicated knee XR  Diclofenac, supportive knee brace, ice  Encephalopathy  Assessment & Plan  Improved today  Suspect some sundowning  MOCA 11/30  Recurrent 10/28/22 with delirium  - Moving below meds to earlier in the evening  Increase melatonin to 6 mg  Seroquel 12 5 mg QHS      Deep tissue injury  Assessment & Plan  Left heel DTI - improving  Offload and elevate  Appreciate wound care recommendations as below  Skin Care Plan:  1-Cleanse sacro-buttocks with soap and water  Apply Preventative Hydraguard BID and PRN  2-Turn/reposition q2h or when medically stable for pressure re-distribution on skin   3-Elevate heels to offload pressure    4-Moisturize skin daily with skin nourishing cream  5-Ehob cushion in chair when out of bed  6-B/L Heel Allevyn Foam Dressings  Samuel Left Heel with T for Treatment and Samuel Right Heel with P for Prevention  Change every other day or Prn  Peel back, inspect skin Q-shift, and reapply  H/O dizziness  Assessment & Plan  Dizziness related to motion  Found to have orthostasis  Chronic issue per patient  Made meclizine PRN for polypharmacy  Monitor closely  Orthostasis  Assessment & Plan  Continue compression stockings/Abd binder during all therapies  Recurrent 10/28/22: Gentle IVF given for orthostasis x 1L   - Orthostasis improved     Acute pain  Assessment & Plan  Nociceptive/neuropathic post op pain in RLE managed on multimodal regimen:   · Patient is intolerant of oxycodone and Norco   Patient excessively drowsy on oxycodone and with mild hallucinations on Norco   · Schedule Tylenol 650 mg t i d  and 325 PRN  · Scheduled gabapentin 100 mg bid  Gabapentin 300 mg q h s  · Topical Lidoderm patches, aqua K pad, Diclofenac gel specifically for knee  Ice for knee  · Robaxin 250 mg q 6 hours p r n  For muscle spasm    CLL (chronic lymphocytic leukemia) (HCC)  Assessment & Plan  Chronic leukocytosis (17K)  Trauma discussed with heme-onc  Ok to f/u as outpatient  Monitor   Follow-up with heme onc as outpatient     Displaced fracture of middle phalanx of left ring finger, initial encounter for closed fracture  Assessment & Plan  Traumatic fracture  4th left finger   Treated non-operatively by Orthopedics  Continue supportive splint - she is not always compliant despite cuing     ICE for swelling  NWB L hand    Hypertension  Assessment & Plan  At home: Lopressor 25 mg Q 12 hours, Hyzaar (Losartan/HCTZ) 50mg/12 5mg daily  Here: Lopressor 25 mg Q12hr, Losartan 25mg daily  Will monitor BP closely  Adjust medications accordingly  TEDs daily /Abdominal binder PRN    Chronic systolic heart failure (HCC)  Assessment & Plan  Grade 2 DD  At home: Lasix 40 mg daily, BB  Here:  HOLD Lasix  Encourage fluids by mouth  Restart Lasix when able with K Dur supplement  IM consulted to assist with management  HLD (hyperlipidemia)  Assessment & Plan  Restarted on home Lipitor 20 mg QPM    CKD (chronic kidney disease) stage 3, GFR 30-59 ml/min ([facility])  Assessment & Plan  Stable  Avoid nephrotoxic agents   Monitor 2x/week    Type 2 diabetes mellitus (HCC)  Assessment & Plan  Lab Results   Component Value Date    HGBA1C 7 6 (H) 10/12/2022     At home: Levemir 20 units QHS, Humalog 10 units with meals  Here: Levemir 13 units, Lispro 5 units TID with meals, continue sliding scale insulin  In acute care required an insulin drip transiently  Accuchecks  Diabetic Diet  Education  IM following    UTI (urinary tract infection)-resolved as of 10/31/2022  Assessment & Plan  Resolved  + UA  Urine culture: >100K Klebsiella  Symptomatic with urinary frequency, new encephalopathy and hallucinations, dysuria  · Completed treatment with Bactrim which was sensitive        Health Maintenance  #Bowel: Last BM 11/2  On docusate BID  #Bladder: Voiding and better continence   #Skin/Pressure Injury Prevention: Turn Q2hr in bed, with weight shifts [id number] in wheelchair  #DVT Prophylaxis: Lovenox, SCDs  #GI Prophylaxis: PO diet  #Code Status: Full Code  #FEN: Diabetic diet, with glucerna at lunch  Received 1L of normal saline 10/28  #Dispo: Team 11/1: Likely SNF  Family unable to provide assistance  Needs to be modified Ind to return home        Objective:    Functional Update:  PT: modA transfers, Mary bed mobility, Ax2 ambulation 30'  OT: Ind eating, Sup grooming, modA bathing, Sup UB dressing, modA LB dressing, maxA toileting, modA toilet transfers    Allergies per EMR    Physical Exam:  Temp:  [98 1 °F (36 7 °C)-98 6 °F (37 °C)] 98 1 °F (36 7 °C)  HR:  [72-73] 73  Resp:  [17] 17  BP: (110-165)/(58-70) 110/58  Oxygen Therapy  SpO2: 95 %    Gen: No acute distress, Well-nourished, well-appearing  HEENT: Moist mucus membranes, Normocephalic/Atraumatic  Cardiovascular: Regular rate, rhythm, S1/S2  Distal pulses palpable  Heme/Extr: No edema  Pulmonary: Non-labored breathing  Lungs CTAB  : No hernandez  GI: Soft, non-tender, non-distended  MSK: R knee without effusion, erythema  Very tender over patella and lateral/medial joint lines  No gross instability  Integumentary: Skin is warm, dry  Incision sites well healed  Neuro: AAOx3, Speech is intact  Appropriate to questioning  Psych: Normal mood and affect  Diagnostic Studies: Reviewed recent femur XR to review length of long IMN  Laboratory: Reviewed, no new labs     Results from last 7 days   Lab Units 10/31/22  0615   HEMOGLOBIN g/dL 10 8*   HEMATOCRIT % 35 3   WBC Thousand/uL 17 92*     Results from last 7 days   Lab Units 10/31/22  0615   BUN mg/dL 23   POTASSIUM mmol/L 4 2   CHLORIDE mmol/L 108   CREATININE mg/dL 0 84            Patient Active Problem List   Diagnosis   • Fall   • Type 2 diabetes mellitus ([facility] [address] )   • CKD (chronic kidney disease) stage 3, GFR 30-59 ml/min ([facility])   • HLD (hyperlipidemia)   • OA (osteoarthritis)   • Chronic systolic heart failure ([facility])   • Lymphadenopathy   • Elevated liver enzymes   • Ambulatory dysfunction   • Suspected Mild cognitive impairment   • Hypertension   • Intertrochanteric fracture of right femur ([facility])   • Displaced fracture of middle phalanx of left ring finger, initial encounter for closed fracture   • CLL (chronic lymphocytic leukemia) ([facility])   • Acute pain   • Orthostasis   • H/O dizziness   • Deep tissue injury   • Encephalopathy         Medications  Current Facility-Administered Medications   Medication Dose Route Frequency Provider Last Rate   • acetaminophen  325 mg Oral Q6H PRN Rhiannon Brownlee MD     • acetaminophen  650 mg Oral TID AC Rhiannon Brownlee MD     • atorvastatin  20 mg Oral Daily With Sejal Landis MD     • bisacodyl  10 mg Rectal Daily PRN Dani Dawn Jennifer Michaels MD     • Diclofenac Sodium  2 g Topical TID Easton Villanueva MD     • docusate sodium  100 mg Oral BID Sandi Meehan MD     • enoxaparin  40 mg Subcutaneous Q24H [address] Sandi Meehan MD     • gabapentin  100 mg Oral BID Sandi Meehan MD     • gabapentin  300 mg Oral HS Sandi Meehan MD     • hydrocortisone   Topical 4x Daily PRN Sandi Meehan MD     • insulin detemir  13 Units Subcutaneous HS Adeola Jennings PA-C     • insulin lispro  1-5 Units Subcutaneous HS Sandi Meehan MD     • insulin lispro  1-6 Units Subcutaneous TID AC Sandi Meehan MD     • insulin lispro  5 Units Subcutaneous TID With Meals BRINDA David     • lidocaine  2 patch Topical Daily Sandi Meehan MD     • losartan  25 mg Oral Daily BRINDA David     • meclizine  12 5 mg Oral Q8H PRN Sandi Meehan MD     • meclizine  12 5 mg Oral Q12H PRN Easton Villanueva MD     • melatonin  6 mg Oral QPM Easton Villanueva MD     • methocarbamol  250 mg Oral Q6H PRN Easton Villanueva MD     • metoprolol tartrate  25 mg Oral Q12H [address] BRINDA Kramer     • naloxone  0 04 mg Intravenous Q1MIN PRN Sandi Meehan MD     • polyethylene glycol  17 g Oral Daily PRN Sandi Meehan MD     • QUEtiapine  12 5 mg Oral QPM Easton Villanueva MD            ** Please Note: Fluency Direct voice to text software may have been used in the creation of this document   **

## 2022-11-02 NOTE — PROGRESS NOTES
11/02/22 1000   Pain Assessment   Pain Assessment Tool 0-10   Pain Score 3   Pain Location/Orientation Orientation: Right;Location: Leg   Restrictions/Precautions   Precautions Bed/chair alarms;Cognitive; Fall Risk;Pain;Supervision on toilet/commode;Visual deficit   Weight Bearing Restrictions Yes   LUE Weight Bearing Per Order NWB  (L hand, okay through elbow)   RLE Weight Bearing Per Order WBAT   ROM Restrictions No   Braces or Orthoses Splint  (L 4th finger; TEDs donned)   Lifestyle   Autonomy "I'm feeling dizzy right now"   Oral Hygiene   Type of Assistance Needed Supervision   Physical Assistance Level No physical assistance   Comment seated due to c/o feeling dizzy   Oral Hygiene CARE Score 4   Shower/Bathe Self   Type of Assistance Needed Physical assistance;Verbal cues   Physical Assistance Level 76% or more   Comment pt completes sponge bathing routine seated in w/c at sink; pt bathes UB with supervision, min VCs for sequencing body parts  bathes upper legs, requires assist for lower legs/feet and assist for suad/rear when in stance due to c/o dizziness  min/mod assist in stance with support at sink   Shower/Bathe Self CARE Score 2   Upper Body Dressing   Type of Assistance Needed Supervision   Physical Assistance Level No physical assistance   Comment to don shirt   Upper Body Dressing CARE Score 4   Lower Body Dressing   Type of Assistance Needed Physical assistance   Physical Assistance Level 76% or more   Comment pt able to thread pants over feet, requires assist to complete CM with min/mod assist in stance for balance, support at sink  assist to don tab brief   Lower Body Dressing CARE Score 2   Putting On/Taking Off Footwear   Type of Assistance Needed Physical assistance; Adaptive equipment   Physical Assistance Level 51%-75%   Comment D to don TEDS, sock aid to don socks with good recall/carryover  assist to don shoes     Putting On/Taking Off Footwear CARE Score 2   Sit to Stand   Type of Assistance Needed Physical assistance   Physical Assistance Level 51%-75%   Comment with PFRW   Sit to Stand CARE Score 2   Bed-Chair Transfer   Type of Assistance Needed Physical assistance   Physical Assistance Level 76% or more   Comment with PFRW; episode of posterior LOB requiring inc assist    Chair/Bed-to-Chair Transfer CARE Score 2   Toileting Hygiene   Type of Assistance Needed Physical assistance   Physical Assistance Level 76% or more   Comment pt completes bladder hygiene seated on commode; assist for CM with inc physical assist for balance as pt noted with posterior LOB requiring assist to self correct  Toileting Hygiene CARE Score 2   Toilet Transfer   Type of Assistance Needed Physical assistance   Physical Assistance Level 76% or more   Comment SPT with PFRW; inc assist due to posterior LOB requiring inc assist to self correct   Toilet Transfer CARE Score 2   Exercise Tools   Other Exercise Tool 1 pt engages in RUE ther ex to maximize strength and endurance for ADLs/transfers  Completed w/ 2# DB 3x10 bicep curls, chest press, and OH shoulder press  Tolerated well w/ Min vc's and demo, rest breaks provided between sets to manage fatigue  Cognition   Overall Cognitive Status Impaired   Arousal/Participation Alert; Cooperative   Attention Attends with cues to redirect   Orientation Level Oriented to person;Oriented to place;Oriented to situation   Memory Decreased short term memory;Decreased recall of precautions   Following Commands Follows one step commands with increased time or repetition   Activity Tolerance   Activity Tolerance Treatment limited secondary to medical complications (Comment)  (c/o dizziness)   Assessment   Treatment Assessment Pt engages in 90 minute skilled OT Session focusing on sponge bathing routine seated in w/c at sink, STS and SPT  see above for full func details   pt continues to demo slow progress toward OT goals specifically with LE dressing and recall/carryover of use of LHAE  pt noted with 3 episodes of posterior LOB with no righting reactions, requiring inc assist this date for SPT with PFRW  pt with c/o dizzines, however BP WFL and charted in vitals tab  recommend continued skilled care to focus on ADL retraining, func transfers, standing luis/bal, func cog, safety, in order to decrease burden of care at d/c  Prognosis Good   Problem List Decreased strength;Decreased endurance; Impaired balance;Decreased mobility; Decreased coordination;Decreased cognition; Impaired judgement;Decreased safety awareness;Orthopedic restrictions;Pain; Impaired vision   Barriers to Discharge Inaccessible home environment;Decreased caregiver support   Plan   Treatment/Interventions ADL retraining; Therapeutic exercise; Endurance training;Cognitive reorientation;Patient/family training;Equipment eval/education; Compensatory technique education   OT Therapy Minutes   OT Time In 1000   OT Time Out 1130   OT Total Time (minutes) 90   OT Mode of treatment - Individual (minutes) 90   OT Mode of treatment - Concurrent (minutes) 0   OT Mode of treatment - Group (minutes) 0   OT Mode of treatment - Co-treat (minutes) 0   OT Mode of Treatment - Total time(minutes) 90 minutes   OT Cumulative Minutes 8489   Therapy Time missed   Time missed?  No

## 2022-11-02 NOTE — PLAN OF CARE
Problem: Prexisting or High Potential for Compromised Skin Integrity  Goal: Skin integrity is maintained or improved  Description: INTERVENTIONS:  - Identify patients at risk for skin breakdown  - Assess and monitor skin integrity  - Assess and monitor nutrition and hydration status  - Monitor labs   - Assess for incontinence   - Turn and reposition patient  - Assist with mobility/ambulation  - Relieve pressure over bony prominences  - Avoid friction and shearing  - Provide appropriate hygiene as needed including keeping skin clean and dry  - Evaluate need for skin moisturizer/barrier cream  - Collaborate with interdisciplinary team   - Patient/family teaching  - Consider wound care consult   Outcome: Progressing     Problem: PAIN - ADULT  Goal: Verbalizes/displays adequate comfort level or baseline comfort level  Description: Interventions:  - Encourage patient to monitor pain and request assistance  - Assess pain using appropriate pain scale  - Administer analgesics based on type and severity of pain and evaluate response  - Implement non-pharmacological measures as appropriate and evaluate response  - Consider cultural and social influences on pain and pain management  - Notify physician/advanced practitioner if interventions unsuccessful or patient reports new pain  Outcome: Progressing     Problem: INFECTION - ADULT  Goal: Absence or prevention of progression during hospitalization  Description: INTERVENTIONS:  - Assess and monitor for signs and symptoms of infection  - Monitor lab/diagnostic results  - Monitor all insertion sites, i e  indwelling lines, tubes, and drains  - Monitor endotracheal if appropriate and nasal secretions for changes in amount and color  - [place] appropriate cooling/warming therapies per order  - Administer medications as ordered  - Instruct and encourage patient and family to use good hand hygiene technique  - Identify and instruct in appropriate isolation precautions for identified infection/condition  Outcome: Progressing  Goal: Absence of fever/infection during neutropenic period  Description: INTERVENTIONS:  - Monitor WBC    Outcome: Progressing     Problem: SAFETY ADULT  Goal: Patient will remain free of falls  Description: INTERVENTIONS:  - Educate patient/family on patient safety including physical limitations  - Instruct patient to call for assistance with activity   - Consult OT/PT to assist with strengthening/mobility   - Keep Call bell within reach  - Keep bed low and locked with side rails adjusted as appropriate  - Keep care items and personal belongings within reach  - Initiate and maintain comfort rounds  - Make Fall Risk Sign visible to staff  - Apply yellow socks and bracelet for high fall risk patients  - Consider moving patient to room near nurses station  Outcome: Progressing  Goal: Maintain or return to baseline ADL function  Description: INTERVENTIONS:  -  Assess patient's ability to carry out ADLs; assess patient's baseline for ADL function and identify physical deficits which impact ability to perform ADLs (bathing, care of mouth/teeth, toileting, grooming, dressing, etc )  - Assess/evaluate cause of self-care deficits   - Assess range of motion  - Assess patient's mobility; develop plan if impaired  - Assess patient's need for assistive devices and provide as appropriate  - Encourage maximum independence but intervene and supervise when necessary  - Involve family in performance of ADLs  - Assess for home care needs following discharge   - Consider OT consult to assist with ADL evaluation and planning for discharge  - Provide patient education as appropriate  Outcome: Progressing  Goal: Maintains/Returns to pre admission functional level  Description: INTERVENTIONS:  - Perform BMAT or MOVE assessment daily    - Set and communicate daily mobility goal to care team and patient/family/caregiver     - Collaborate with rehabilitation services on mobility goals if consulted  - Out of bed for toileting  - Record patient progress and toleration of activity level   Outcome: Progressing     Problem: DISCHARGE PLANNING  Goal: Discharge to home or other facility with appropriate resources  Description: INTERVENTIONS:  - Identify barriers to discharge w/patient and caregiver  - Arrange for needed discharge resources and transportation as appropriate  - Identify discharge learning needs (meds, wound care, etc )  - Arrange for interpretive services to assist at discharge as needed  - Refer to Case Management Department for coordinating discharge planning if the patient needs post-hospital services based on physician/advanced practitioner order or complex needs related to functional status, cognitive ability, or social support system  Outcome: Progressing     Problem: Potential for Falls  Goal: Patient will remain free of falls  Description: INTERVENTIONS:  - Educate patient/family on patient safety including physical limitations  - Instruct patient to call for assistance with activity   - Consult OT/PT to assist with strengthening/mobility   - Keep Call bell within reach  - Keep bed low and locked with side rails adjusted as appropriate  - Keep care items and personal belongings within reach  - Initiate and maintain comfort rounds  - Make Fall Risk Sign visible to staff  - Apply yellow socks and bracelet for high fall risk patients  - Consider moving patient to room near nurses station  Outcome: Progressing     Problem: Nutrition/Hydration-ADULT  Goal: Nutrient/Hydration intake appropriate for improving, restoring or maintaining nutritional needs  Description: Monitor and assess patient's nutrition/hydration status for malnutrition  Collaborate with interdisciplinary team and initiate plan and interventions as ordered  Monitor patient's weight and dietary intake as ordered or per policy  Utilize nutrition screening tool and intervene as necessary   Determine patient's food preferences and provide high-protein, high-caloric foods as appropriate  INTERVENTIONS:  - Monitor oral intake, urinary output, labs, and treatment plans  - Assess nutrition and hydration status and recommend course of action  - Evaluate amount of meals eaten  - Assist patient with eating if necessary   - Allow adequate time for meals  - Recommend/ encourage appropriate diets, oral nutritional supplements, and vitamin/mineral supplements  - Order, calculate, and assess calorie counts as needed  - Recommend, monitor, and adjust tube feedings and TPN/PPN based on assessed needs  - Assess need for intravenous fluids  - Provide specific nutrition/hydration education as appropriate  - Include patient/family/caregiver in decisions related to nutrition  Outcome: Progressing     Problem: MOBILITY - ADULT  Goal: Maintain or return to baseline ADL function  Description: INTERVENTIONS:  -  Assess patient's ability to carry out ADLs; assess patient's baseline for ADL function and identify physical deficits which impact ability to perform ADLs (bathing, care of mouth/teeth, toileting, grooming, dressing, etc )  - Assess/evaluate cause of self-care deficits   - Assess range of motion  - Assess patient's mobility; develop plan if impaired  - Assess patient's need for assistive devices and provide as appropriate  - Encourage maximum independence but intervene and supervise when necessary  - Involve family in performance of ADLs  - Assess for home care needs following discharge   - Consider OT consult to assist with ADL evaluation and planning for discharge  - Provide patient education as appropriate  Outcome: Progressing  Goal: Maintains/Returns to pre admission functional level  Description: INTERVENTIONS:  - Perform BMAT or MOVE assessment daily    - Set and communicate daily mobility goal to care team and patient/family/caregiver     - Collaborate with rehabilitation services on mobility goals if consulted  - Out of bed for toileting  - Record patient progress and toleration of activity level   Outcome: Progressing

## 2022-11-02 NOTE — ASSESSMENT & PLAN NOTE
Improving at discharge  R knee quite tender  No effusion, erythema, warmth, but tender to palpation at patella and medial/lateral joint lines  Pain radiates up thigh  11/2 - R knee XR hardware looks fine, minimal degenerative changes  Diclofenac, supportive knee brace for comfort, ice

## 2022-11-02 NOTE — PLAN OF CARE
Problem: Prexisting or High Potential for Compromised Skin Integrity  Goal: Skin integrity is maintained or improved  Description: INTERVENTIONS:  - Identify patients at risk for skin breakdown  - Assess and monitor skin integrity  - Assess and monitor nutrition and hydration status  - Monitor labs   - Assess for incontinence   - Turn and reposition patient  - Assist with mobility/ambulation  - Relieve pressure over bony prominences  - Avoid friction and shearing  - Provide appropriate hygiene as needed including keeping skin clean and dry  - Evaluate need for skin moisturizer/barrier cream  - Collaborate with interdisciplinary team   - Patient/family teaching  - Consider wound care consult   Outcome: Progressing     Problem: PAIN - ADULT  Goal: Verbalizes/displays adequate comfort level or baseline comfort level  Description: Interventions:  - Encourage patient to monitor pain and request assistance  - Assess pain using appropriate pain scale  - Administer analgesics based on type and severity of pain and evaluate response  - Implement non-pharmacological measures as appropriate and evaluate response  - Consider cultural and social influences on pain and pain management  - Notify physician/advanced practitioner if interventions unsuccessful or patient reports new pain  Outcome: Progressing     Problem: INFECTION - ADULT  Goal: Absence or prevention of progression during hospitalization  Description: INTERVENTIONS:  - Assess and monitor for signs and symptoms of infection  - Monitor lab/diagnostic results  - Monitor all insertion sites, i e  indwelling lines, tubes, and drains  - Monitor endotracheal if appropriate and nasal secretions for changes in amount and color  - [place] appropriate cooling/warming therapies per order  - Administer medications as ordered  - Instruct and encourage patient and family to use good hand hygiene technique  - Identify and instruct in appropriate isolation precautions for identified infection/condition  Outcome: Progressing  Goal: Absence of fever/infection during neutropenic period  Description: INTERVENTIONS:  - Monitor WBC    Outcome: Progressing     Problem: SAFETY ADULT  Goal: Patient will remain free of falls  Description: INTERVENTIONS:  - Educate patient/family on patient safety including physical limitations  - Instruct patient to call for assistance with activity   - Consult OT/PT to assist with strengthening/mobility   - Keep Call bell within reach  - Keep bed low and locked with side rails adjusted as appropriate  - Keep care items and personal belongings within reach  - Initiate and maintain comfort rounds  - Make Fall Risk Sign visible to staff  - Offer Toileting every  Hours, in advance of need  - Initiate/Maintain alarm  - Obtain necessary fall risk management equipment:   - Apply yellow socks and bracelet for high fall risk patients  - Consider moving patient to room near nurses station  Outcome: Progressing  Goal: Maintain or return to baseline ADL function  Description: INTERVENTIONS:  -  Assess patient's ability to carry out ADLs; assess patient's baseline for ADL function and identify physical deficits which impact ability to perform ADLs (bathing, care of mouth/teeth, toileting, grooming, dressing, etc )  - Assess/evaluate cause of self-care deficits   - Assess range of motion  - Assess patient's mobility; develop plan if impaired  - Assess patient's need for assistive devices and provide as appropriate  - Encourage maximum independence but intervene and supervise when necessary  - Involve family in performance of ADLs  - Assess for home care needs following discharge   - Consider OT consult to assist with ADL evaluation and planning for discharge  - Provide patient education as appropriate  Outcome: Progressing  Goal: Maintains/Returns to pre admission functional level  Description: INTERVENTIONS:  - Perform BMAT or MOVE assessment daily    - Set and communicate daily mobility goal to care team and patient/family/caregiver  - Collaborate with rehabilitation services on mobility goals if consulted  - Perform Range of Motion  times a day  - Reposition patient every hours    - Dangle patient  times a day  - Stand patient  times a day  - Ambulate patient  times a day  - Out of bed to chair  times a day   - Out of bed for meals  times a day  - Out of bed for toileting  - Record patient progress and toleration of activity level   Outcome: Progressing     Problem: DISCHARGE PLANNING  Goal: Discharge to home or other facility with appropriate resources  Description: INTERVENTIONS:  - Identify barriers to discharge w/patient and caregiver  - Arrange for needed discharge resources and transportation as appropriate  - Identify discharge learning needs (meds, wound care, etc )  - Arrange for interpretive services to assist at discharge as needed  - Refer to Case Management Department for coordinating discharge planning if the patient needs post-hospital services based on physician/advanced practitioner order or complex needs related to functional status, cognitive ability, or social support system  Outcome: Progressing     Problem: Potential for Falls  Goal: Patient will remain free of falls  Description: INTERVENTIONS:  - Educate patient/family on patient safety including physical limitations  - Instruct patient to call for assistance with activity   - Consult OT/PT to assist with strengthening/mobility   - Keep Call bell within reach  - Keep bed low and locked with side rails adjusted as appropriate  - Keep care items and personal belongings within reach  - Initiate and maintain comfort rounds  - Make Fall Risk Sign visible to staff  - Offer Toileting every  Hours, in advance of need  - Initiate/Maintain alarm  - Obtain necessary fall risk management equipment:   - Apply yellow socks and bracelet for high fall risk patients  - Consider moving patient to room near nurses station  Outcome: Progressing     Problem: Nutrition/Hydration-ADULT  Goal: Nutrient/Hydration intake appropriate for improving, restoring or maintaining nutritional needs  Description: Monitor and assess patient's nutrition/hydration status for malnutrition  Collaborate with interdisciplinary team and initiate plan and interventions as ordered  Monitor patient's weight and dietary intake as ordered or per policy  Utilize nutrition screening tool and intervene as necessary  Determine patient's food preferences and provide high-protein, high-caloric foods as appropriate       INTERVENTIONS:  - Monitor oral intake, urinary output, labs, and treatment plans  - Assess nutrition and hydration status and recommend course of action  - Evaluate amount of meals eaten  - Assist patient with eating if necessary   - Allow adequate time for meals  - Recommend/ encourage appropriate diets, oral nutritional supplements, and vitamin/mineral supplements  - Order, calculate, and assess calorie counts as needed  - Recommend, monitor, and adjust tube feedings and TPN/PPN based on assessed needs  - Assess need for intravenous fluids  - Provide specific nutrition/hydration education as appropriate  - Include patient/family/caregiver in decisions related to nutrition  Outcome: Progressing     Problem: MOBILITY - ADULT  Goal: Maintain or return to baseline ADL function  Description: INTERVENTIONS:  -  Assess patient's ability to carry out ADLs; assess patient's baseline for ADL function and identify physical deficits which impact ability to perform ADLs (bathing, care of mouth/teeth, toileting, grooming, dressing, etc )  - Assess/evaluate cause of self-care deficits   - Assess range of motion  - Assess patient's mobility; develop plan if impaired  - Assess patient's need for assistive devices and provide as appropriate  - Encourage maximum independence but intervene and supervise when necessary  - Involve family in performance of ADLs  - Assess for home care needs following discharge   - Consider OT consult to assist with ADL evaluation and planning for discharge  - Provide patient education as appropriate  Outcome: Progressing  Goal: Maintains/Returns to pre admission functional level  Description: INTERVENTIONS:  - Perform BMAT or MOVE assessment daily    - Set and communicate daily mobility goal to care team and patient/family/caregiver  - Collaborate with rehabilitation services on mobility goals if consulted  - Perform Range of Motion times a day  - Reposition patient every  hours    - Dangle patient times a day  - Stand patient  times a day  - Ambulate patient  times a day  - Out of bed to chair  times a day   - Out of bed for meals times a day  - Out of bed for toileting  - Record patient progress and toleration of activity level   Outcome: Progressing

## 2022-11-02 NOTE — NURSING NOTE
Attempted to utilize aqua k pad as ordered due to pt calling out in pain to right hip and knee  Pt took it off and stated "that heat makes the pain even worse"  Gave robaxin po  Will monitor

## 2022-11-03 LAB
GLUCOSE SERPL-MCNC: 123 MG/DL (ref 65–140)
GLUCOSE SERPL-MCNC: 148 MG/DL (ref 65–140)
GLUCOSE SERPL-MCNC: 173 MG/DL (ref 65–140)
GLUCOSE SERPL-MCNC: 176 MG/DL (ref 65–140)

## 2022-11-03 RX ADMIN — GABAPENTIN 100 MG: 100 CAPSULE ORAL at 20:52

## 2022-11-03 RX ADMIN — DICLOFENAC SODIUM 2 G: 10 GEL TOPICAL at 20:55

## 2022-11-03 RX ADMIN — DOCUSATE SODIUM 100 MG: 100 CAPSULE, LIQUID FILLED ORAL at 08:38

## 2022-11-03 RX ADMIN — DICLOFENAC SODIUM 2 G: 10 GEL TOPICAL at 16:18

## 2022-11-03 RX ADMIN — INSULIN DETEMIR 13 UNITS: 100 INJECTION, SOLUTION SUBCUTANEOUS at 20:53

## 2022-11-03 RX ADMIN — INSULIN LISPRO 1 UNITS: 100 INJECTION, SOLUTION INTRAVENOUS; SUBCUTANEOUS at 08:42

## 2022-11-03 RX ADMIN — ACETAMINOPHEN 650 MG: 325 TABLET ORAL at 05:23

## 2022-11-03 RX ADMIN — INSULIN LISPRO 5 UNITS: 100 INJECTION, SOLUTION INTRAVENOUS; SUBCUTANEOUS at 17:26

## 2022-11-03 RX ADMIN — LOSARTAN POTASSIUM 25 MG: 25 TABLET, FILM COATED ORAL at 08:38

## 2022-11-03 RX ADMIN — METHOCARBAMOL 250 MG: 500 TABLET ORAL at 20:57

## 2022-11-03 RX ADMIN — GABAPENTIN 100 MG: 100 CAPSULE ORAL at 08:38

## 2022-11-03 RX ADMIN — ACETAMINOPHEN 650 MG: 325 TABLET ORAL at 20:51

## 2022-11-03 RX ADMIN — ACETAMINOPHEN 650 MG: 325 TABLET ORAL at 13:05

## 2022-11-03 RX ADMIN — ACETAMINOPHEN 325 MG: 325 TABLET ORAL at 20:57

## 2022-11-03 RX ADMIN — QUETIAPINE FUMARATE 12.5 MG: 25 TABLET ORAL at 20:52

## 2022-11-03 RX ADMIN — INSULIN LISPRO 5 UNITS: 100 INJECTION, SOLUTION INTRAVENOUS; SUBCUTANEOUS at 13:05

## 2022-11-03 RX ADMIN — LIDOCAINE 5% 2 PATCH: 700 PATCH TOPICAL at 08:38

## 2022-11-03 RX ADMIN — METOPROLOL TARTRATE 25 MG: 25 TABLET, FILM COATED ORAL at 08:38

## 2022-11-03 RX ADMIN — INSULIN LISPRO 5 UNITS: 100 INJECTION, SOLUTION INTRAVENOUS; SUBCUTANEOUS at 08:44

## 2022-11-03 RX ADMIN — Medication 6 MG: at 20:52

## 2022-11-03 RX ADMIN — DICLOFENAC SODIUM 2 G: 10 GEL TOPICAL at 08:42

## 2022-11-03 RX ADMIN — ENOXAPARIN SODIUM 40 MG: 40 INJECTION SUBCUTANEOUS at 08:38

## 2022-11-03 RX ADMIN — METOPROLOL TARTRATE 25 MG: 25 TABLET, FILM COATED ORAL at 20:52

## 2022-11-03 RX ADMIN — INSULIN LISPRO 1 UNITS: 100 INJECTION, SOLUTION INTRAVENOUS; SUBCUTANEOUS at 20:54

## 2022-11-03 RX ADMIN — ATORVASTATIN CALCIUM 20 MG: 20 TABLET, FILM COATED ORAL at 16:26

## 2022-11-03 NOTE — PROGRESS NOTES
11/03/22 0886   Pain Assessment   Pain Assessment Tool 0-10   Pain Score 5   Pain Location/Orientation Orientation: Right;Location: Leg   Restrictions/Precautions   Precautions Bed/chair alarms;Cognitive; Fall Risk;Pain;Supervision on toilet/commode;Visual deficit   Weight Bearing Restrictions Yes   LUE Weight Bearing Per Order NWB  (L hand, okay through elbow)   RLE Weight Bearing Per Order WBAT   ROM Restrictions No   Lifestyle   Autonomy "I threw it all up this morning, I don't know what happened"   Oral Hygiene   Type of Assistance Needed Supervision   Physical Assistance Level No physical assistance   Comment seated in w/c at sink   Oral Hygiene CARE Score 4   Shower/Bathe Self   Type of Assistance Needed Physical assistance   Physical Assistance Level 51%-75%   Comment pt completes sponge bathing routine seated requiring assist for lower legs, feet and assist for standing balance  Shower/Bathe Self CARE Score 2   Upper Body Dressing   Type of Assistance Needed Supervision   Physical Assistance Level No physical assistance   Comment seated to don shirt   Upper Body Dressing CARE Score 4   Lower Body Dressing   Type of Assistance Needed Physical assistance   Physical Assistance Level 51%-75%   Comment seated to thread pants over feet, stands with mod assist to complete CM; assist to don tab brief   Lower Body Dressing CARE Score 2   Putting On/Taking Off Footwear   Type of Assistance Needed Physical assistance; Adaptive equipment   Physical Assistance Level 51%-75%   Comment assist to don TEDS, use of sock aid to don socks, assist to don shoes   Putting On/Taking Off Footwear CARE Score 2   Sit to Stand   Type of Assistance Needed Physical assistance   Physical Assistance Level 51%-75%   Comment with PFRW   Sit to Stand CARE Score 2   Bed-Chair Transfer   Type of Assistance Needed Physical assistance   Physical Assistance Level 26%-50%   Comment with PFRW   Chair/Bed-to-Chair Transfer CARE Score 3   Toileting Hygiene   Type of Assistance Needed Physical assistance   Physical Assistance Level 51%-75%   Comment pt complete bladder hygiene seated; assist for standing balance during CM and assist for CM on L side   Toileting Hygiene CARE Score 2   Toilet Transfer   Type of Assistance Needed Physical assistance   Physical Assistance Level 51%-75%   Comment with PFRW to [facility] over toilet   Toilet Transfer CARE Score 2   Cognition   Overall Cognitive Status Impaired   Arousal/Participation Alert; Cooperative   Attention Attends with cues to redirect   Orientation Level Oriented to person;Oriented to place   Memory Decreased short term memory;Decreased recall of precautions   Following Commands Follows one step commands with increased time or repetition   Activity Tolerance   Activity Tolerance Patient tolerated treatment well   Assessment   Treatment Assessment pt engages in 90 minute skilled OT Session focusing on sponge bathing routine, SPT with PFRW  see above for full func details  pt reporting throwing up her breakfast this morning, reporting feeling better and agreeable to OT Session--RN and Dr Afshan Lassiter notified  offered pt shower, with pt politely stating that she doesn't even take showers at home, just sponge bathes  pt continues to require ext assist due to pain in RLE/lower back, fair carryover of LHAE during LE dressing  recommend continued skilled care to focus on ADL retraining, func transfers, standing luis/bal, func cog, safety, in order to decrease burden of care at d/c  Prognosis Good   Problem List Decreased strength;Decreased endurance; Impaired balance;Decreased mobility; Decreased coordination;Decreased cognition; Impaired judgement;Decreased safety awareness;Orthopedic restrictions;Pain; Impaired vision   Barriers to Discharge Inaccessible home environment;Decreased caregiver support   Plan   Treatment/Interventions ADL retraining;Functional transfer training; Therapeutic exercise; Endurance training;Cognitive reorientation;Patient/family training;Equipment eval/education; Compensatory technique education   OT Therapy Minutes   OT Time In 0830   OT Time Out 1000   OT Total Time (minutes) 90   OT Mode of treatment - Individual (minutes) 90   OT Mode of treatment - Concurrent (minutes) 0   OT Mode of treatment - Group (minutes) 0   OT Mode of treatment - Co-treat (minutes) 0   OT Mode of Treatment - Total time(minutes) 90 minutes   OT Cumulative Minutes 1335   Therapy Time missed   Time missed?  No

## 2022-11-03 NOTE — RESTORATIVE TECHNICIAN NOTE
Restorative Technician Note      Patient Name: Jaime Mcpherson     Restorative Tech Visit Date: 11/3/2022  Note Type: Bracing, Initial consult  Brace Applied: Other (Comment) (Size medium Crossover knee brace for the right leg)  Additional Brace Ordered: No    Brace left with the PT as she will don on the patient during their PT session today  Pt has been using the ARC trial brace during her PT sessions  Please call Mobility Coordinator at ext  [phone] or on [employer] text " [facility]-PT-Restorative Tech" role in regards to bracing instruction and/or adjustment      [employer],

## 2022-11-03 NOTE — PROGRESS NOTES
Internal Medicine Progress Note  Patient: Chelsea Ahmadi  Age/sex: 79 y o  female  Medical Record #: [id number]      ASSESSMENT/PLAN: (Interval History)  Chelsea Ahmadi is seen and examined and management for following issues:    S/p ORIF with IM nailing of the Rt femur  · Pain control and therapy per primary service  · PMR adjusted pain meds d/t sedation  · Renal dosed Lovenox for DVT prophylaxis  · Follow-up with Ortho as scheduled     Left 4th phalanx fracture  · Splint and NWB   · Keeps removing splint     HTN  · Home: Hyzaar 50/12 5mg qd/Lopressor 25mg q12 hours/Lasix 40mg qd (for heart failure)  · Here: Lopressor 25mg BID/losartan 25mg daily  · Monitor BP every shift      Anemia  · Secondary to trauma  · Stable      DM type 2  · Home: Levemir 20U at bedtime/Humalog 10U with meals  · Here: Levemir 13U at bedtime/Humalog 5U tid  · Blood sugars improved       Chronic systolic/diastolic heart failure  · Pt has been off diuretics  · ECHO with an EF of 45%  Grade 2 diastolic dysfunction  · euvolemic     CLL  · Recent diagnosis  · Outpt follow-up with Hematology/Oncology  · Baseline WBCs 14-16K  · Had mild bump likely reactive secondary to surgery/UTI  · Stable      Suspected UTI  · Had positive UA  Culture with >100,000 Klebsiella  · s/p Bactrim  · Urinary retention improved with tx of UTI     Delirium  · Intermittent confusion and hallucinations  · Seroquel caused increased daytime fatigue and was stopped but restarted due to hallucinations  DC planning: TBD    The above assessment and plan was reviewed and updated as determined by my evaluation of the patient on 11/3/2022      Labs:   Results from last 7 days   Lab Units 10/31/22  0615   WBC Thousand/uL 17 92*   HEMOGLOBIN g/dL 10 8*   HEMATOCRIT % 35 3   PLATELETS Thousands/uL 347     Results from last 7 days   Lab Units 10/31/22  0615   SODIUM mmol/L 138   POTASSIUM mmol/L 4 2   CHLORIDE mmol/L 108   CO2 mmol/L 27   BUN mg/dL 23   CREATININE mg/dL 0  84   CALCIUM mg/dL 9 6             Results from last 7 days   Lab Units 11/03/22  0702 11/02/22  2048 11/02/22  1613   POC GLUCOSE mg/dl 173* 186* 174*       Review of Scheduled Meds:  Current Facility-Administered Medications   Medication Dose Route Frequency Provider Last Rate   • acetaminophen  325 mg Oral Q6H PRN Nicolás Moreau MD     • acetaminophen  650 mg Oral TID AC Nicolás Moreau MD     • atorvastatin  20 mg Oral Daily With Eli Ruelas MD     • bisacodyl  10 mg Rectal Daily PRN Nicolás Moreau MD     • Diclofenac Sodium  2 g Topical TID Porfirio Georges MD     • docusate sodium  100 mg Oral BID Nicolás Moreau MD     • enoxaparin  40 mg Subcutaneous Q24H [address] Nicolás Moreau MD     • gabapentin  100 mg Oral Q12H Seda Lovett MD     • hydrocortisone   Topical 4x Daily PRN Nicolás Moreau MD     • insulin detemir  13 Units Subcutaneous HS Adeola Jennings PA-C     • insulin lispro  1-5 Units Subcutaneous HS Nicolás Moreau MD     • insulin lispro  1-6 Units Subcutaneous TID AC Nicolás Moreau MD     • insulin lispro  5 Units Subcutaneous TID With Meals BRINDA Heller     • lidocaine  2 patch Topical Daily Nicolás Moreau MD     • losartan  25 mg Oral Daily BRINDA Heller     • meclizine  12 5 mg Oral Q8H PRN Nicolás Moreau MD     • meclizine  12 5 mg Oral Q12H PRN Porfirio Georges MD     • melatonin  6 mg Oral QPM Porfirio Georges MD     • methocarbamol  250 mg Oral Q6H PRN Porfirio Georges MD     • metoprolol tartrate  25 mg Oral Q12H [address] BRINDA Kramer     • naloxone  0 04 mg Intravenous Q1MIN PRN Nicolás Moreau MD     • polyethylene glycol  17 g Oral Daily PRN Nicolás Moreau MD     • QUEtiapine  12 5 mg Oral QPM Porfirio Georges MD         Subjective/ HPI: Patient seen and examined  Patients overnight issues or events were reviewed with nursing or staff during rounds or morning huddle session   New or overnight issues include the following:     Pt seen in her room  She states that she had her first night of good sleep last night  Pain is well controlled  She denies any other complaints  ROS:   A 10 point ROS was performed; negative except as noted above  *Labs /Radiology studies reviewed  *Medications reviewed and reconciled as needed  *Please refer to order section for additional ordered labs studies  *Case discussed with primary attending during morning huddle case rounds    Physical Examination:  Vitals:   Vitals:    11/02/22 1030 11/02/22 1447 11/02/22 2150 11/03/22 0626   BP: 110/58 145/60 146/65 161/69   BP Location: Left arm Right arm Right arm Left arm   Pulse:  71 70 73   Resp:  18 17 18   Temp:  98 °F (36 7 °C) 98 5 °F (36 9 °C) 98 1 °F (36 7 °C)   TempSrc:  Oral Oral Oral   SpO2:  97% 97% 96%   Weight:       Height:         GEN: No apparent distress, interactive; frail  NEURO: Alert and oriented x3; confused overnight but clear this am, doesn't remember overnight confusion  HEENT: Pupils are equal and reactive, EOMI, mucous membranes are moist, face symmetrical  CV: S1 S2 regular, no MRG, no peripheral edema noted  RESP: Lungs are clear bilaterally, no wheezes, rales or rhonchi noted, on room air, respirations easy and non labored  GI: Flat, soft non tender, non distended; +BS x4  : Voiding without difficulty; incontinent overnight  MUSC: Moves all extremities; except RLE; splint to 4th finger on left hand  SKIN: pink, warm and dry, normal turgor, no rashes, lesions      The above physical exam was reviewed and updated as determined by my evaluation of the patient on 11/3/2022  Invasive Devices  Report    Drain  Duration           External Urinary Catheter <1 day                   VTE Pharmacologic Prophylaxis: Enoxaparin  Code Status: Level 1 - Full Code  Current Length of Stay: 17 day(s)      Total time spent:  30 minutes with more than 50% spent counseling/coordinating care   Counseling includes discussion with patient re: progress  and discussion with patient of his/her current medical state/information  Coordination of patient's care was performed in conjunction with primary service  Time invested included review of patient's labs, vitals, and management of their comorbidities with continued monitoring  In addition, this patient was discussed with medical team including physician and advanced extenders  The care of the patient was extensively discussed and appropriate treatment plan was formulated unique for this patient  Medical decision making for the day was made by supervising physician unless otherwise noted in their attestation statement  ** Please Note:  voice to text software may have been used in the creation of this document   Although proof errors in transcription or interpretation are a potential of such software**

## 2022-11-03 NOTE — PROGRESS NOTES
11/03/22 1230   Pain Assessment   Pain Assessment Tool 0-10   Pain Score 6   Pain Location/Orientation Orientation: Right;Location: Leg   Restrictions/Precautions   Precautions Bed/chair alarms;Cognitive; Fall Risk;Pain;Supervision on toilet/commode;Visual deficit   LUE Weight Bearing Per Order NWB   Braces or Orthoses Splint  (knee brace)   Subjective   Subjective pt agreeable to perform skilled PT   Sit to Stand   Type of Assistance Needed Physical assistance   Physical Assistance Level 51%-75%   Sit to Stand CARE Score 2   Bed-Chair Transfer   Type of Assistance Needed Physical assistance   Physical Assistance Level 51%-75%   Chair/Bed-to-Chair Transfer CARE Score 2   Transfer Bed/Chair/Wheelchair   Adaptive Equipment Roller Walker;Platform   Walk 10 Feet   Type of Assistance Needed Incidental touching; Adaptive equipment   Comment (S)  PFRW walking with knee brace R   Walk 10 Feet CARE Score 4   Walk 50 Feet with Two Turns   Type of Assistance Needed Incidental touching; Adaptive equipment   Physical Assistance Level 26%-50%   Comment PFRW   Walk 50 Feet with Two Turns CARE Score 3   Walk 150 Feet   Reason if not Attempted Safety concerns   Walk 150 Feet CARE Score 88   Walking 10 Feet on Uneven Surfaces   Reason if not Attempted Safety concerns   Walking 10 Feet on Uneven Surfaces CARE Score 88   Ambulation   Does the patient walk? 2  Yes   Primary Mode of Locomotion Prior to Admission Walk   Distance Walked (feet) 50 ft   Assist Device Rollator;Platform   Wheel 50 Feet with Two Turns   Type of Assistance Needed Supervision;Verbal cues   Wheel 50 Feet with Two Turns CARE Score 4   Wheel 150 Feet   Type of Assistance Needed Supervision;Verbal cues   Wheel 150 Feet CARE Score 4   Wheelchair mobility   Does the patient use a wheelchair? 1  Yes   Type of Wheelchair Used 1   Manual   Curb or Single Stair   Style negotiated Curb   Type of Assistance Needed Physical assistance   Physical Assistance Level 76% or more Comment limited by pain with PFRW   1 Step (Curb) CARE Score 2   Toilet Transfer   Type of Assistance Needed Physical assistance   Physical Assistance Level 51%-75%   Comment grab bars   Toilet Transfer CARE Score 2   Therapeutic Interventions   Strengthening seated LLE LAQ AP marching short ROM x20 and gluts x20 reps   Flexibility HS and Calfs manual stretch   Equipment Use   NuStep L1 for 10 min   Assessment   Treatment Assessment pt focus on STS and SPT after walking to bathroom d/t having BM in her paints , pt call bell but assists not able at time, so pt had a BM in her paints   Pt on toitel and having BM post needs to be clean up for 30 min , pt perform STS with WBOS and dynamic balance for cleaning  Pt will cont to need skilled PT and cont with knee brace and all functional mobility goals   Barriers to Discharge Inaccessible home environment;Decreased caregiver support   Plan   Progress Slow progress, decreased activity tolerance   PT Therapy Minutes   PT Time In 1230   PT Time Out 1330   PT Total Time (minutes) 60   PT Mode of treatment - Individual (minutes) 60   PT Mode of treatment - Concurrent (minutes) 0   PT Mode of treatment - Group (minutes) 0   PT Mode of treatment - Co-treat (minutes) 0   PT Mode of Treatment - Total time(minutes) 60 minutes   PT Cumulative Minutes 1515   Therapy Time missed   Time missed?  No

## 2022-11-03 NOTE — PLAN OF CARE
Problem: Prexisting or High Potential for Compromised Skin Integrity  Goal: Skin integrity is maintained or improved  Description: INTERVENTIONS:  - Identify patients at risk for skin breakdown  - Assess and monitor skin integrity  - Assess and monitor nutrition and hydration status  - Monitor labs   - Assess for incontinence   - Turn and reposition patient  - Assist with mobility/ambulation  - Relieve pressure over bony prominences  - Avoid friction and shearing  - Provide appropriate hygiene as needed including keeping skin clean and dry  - Evaluate need for skin moisturizer/barrier cream  - Collaborate with interdisciplinary team   - Patient/family teaching  - Consider wound care consult   Outcome: Progressing     Problem: PAIN - ADULT  Goal: Verbalizes/displays adequate comfort level or baseline comfort level  Description: Interventions:  - Encourage patient to monitor pain and request assistance  - Assess pain using appropriate pain scale  - Administer analgesics based on type and severity of pain and evaluate response  - Implement non-pharmacological measures as appropriate and evaluate response  - Consider cultural and social influences on pain and pain management  - Notify physician/advanced practitioner if interventions unsuccessful or patient reports new pain  Outcome: Progressing     Problem: INFECTION - ADULT  Goal: Absence or prevention of progression during hospitalization  Description: INTERVENTIONS:  - Assess and monitor for signs and symptoms of infection  - Monitor lab/diagnostic results  - Monitor all insertion sites, i e  indwelling lines, tubes, and drains  - Monitor endotracheal if appropriate and nasal secretions for changes in amount and color  - [place] appropriate cooling/warming therapies per order  - Administer medications as ordered  - Instruct and encourage patient and family to use good hand hygiene technique  - Identify and instruct in appropriate isolation precautions for identified infection/condition  Outcome: Progressing  Goal: Absence of fever/infection during neutropenic period  Description: INTERVENTIONS:  - Monitor WBC    Outcome: Progressing     Problem: SAFETY ADULT  Goal: Patient will remain free of falls  Description: INTERVENTIONS:  - Educate patient/family on patient safety including physical limitations  - Instruct patient to call for assistance with activity   - Consult OT/PT to assist with strengthening/mobility   - Keep Call bell within reach  - Keep bed low and locked with side rails adjusted as appropriate  - Keep care items and personal belongings within reach  - Initiate and maintain comfort rounds  - Make Fall Risk Sign visible to staff  - Offer Toileting every 2 Hours, in advance of need  - Initiate/Maintain bed/chair alarm  - Obtain necessary fall risk management equipment: non skid footwear  - Apply yellow socks and bracelet for high fall risk patients  - Consider moving patient to room near nurses station  Outcome: Progressing  Goal: Maintain or return to baseline ADL function  Description: INTERVENTIONS:  -  Assess patient's ability to carry out ADLs; assess patient's baseline for ADL function and identify physical deficits which impact ability to perform ADLs (bathing, care of mouth/teeth, toileting, grooming, dressing, etc )  - Assess/evaluate cause of self-care deficits   - Assess range of motion  - Assess patient's mobility; develop plan if impaired  - Assess patient's need for assistive devices and provide as appropriate  - Encourage maximum independence but intervene and supervise when necessary  - Involve family in performance of ADLs  - Assess for home care needs following discharge   - Consider OT consult to assist with ADL evaluation and planning for discharge  - Provide patient education as appropriate  Outcome: Progressing  Goal: Maintains/Returns to pre admission functional level  Description: INTERVENTIONS:  - Perform BMAT or MOVE assessment daily    - Set and communicate daily mobility goal to care team and patient/family/caregiver  - Collaborate with rehabilitation services on mobility goals if consulted  - Perform Range of Motion 3 times a day  - Reposition patient every 2 hours    - Dangle patient 3 times a day  - Stand patient 3 times a day  - Ambulate patient 3 times a day  - Out of bed to chair 3 times a day   - Out of bed for meals 3 times a day  - Out of bed for toileting  - Record patient progress and toleration of activity level   Outcome: Progressing     Problem: DISCHARGE PLANNING  Goal: Discharge to home or other facility with appropriate resources  Description: INTERVENTIONS:  - Identify barriers to discharge w/patient and caregiver  - Arrange for needed discharge resources and transportation as appropriate  - Identify discharge learning needs (meds, wound care, etc )  - Arrange for interpretive services to assist at discharge as needed  - Refer to Case Management Department for coordinating discharge planning if the patient needs post-hospital services based on physician/advanced practitioner order or complex needs related to functional status, cognitive ability, or social support system  Outcome: Progressing     Problem: Potential for Falls  Goal: Patient will remain free of falls  Description: INTERVENTIONS:  - Educate patient/family on patient safety including physical limitations  - Instruct patient to call for assistance with activity   - Consult OT/PT to assist with strengthening/mobility   - Keep Call bell within reach  - Keep bed low and locked with side rails adjusted as appropriate  - Keep care items and personal belongings within reach  - Initiate and maintain comfort rounds  - Make Fall Risk Sign visible to staff  - Offer Toileting every 2 Hours, in advance of need  - Initiate/Maintain bed/chair alarm  - Obtain necessary fall risk management equipment: non skid footwear  - Apply yellow socks and bracelet for high fall risk patients  - Consider moving patient to room near nurses station  Outcome: Progressing     Problem: Nutrition/Hydration-ADULT  Goal: Nutrient/Hydration intake appropriate for improving, restoring or maintaining nutritional needs  Description: Monitor and assess patient's nutrition/hydration status for malnutrition  Collaborate with interdisciplinary team and initiate plan and interventions as ordered  Monitor patient's weight and dietary intake as ordered or per policy  Utilize nutrition screening tool and intervene as necessary  Determine patient's food preferences and provide high-protein, high-caloric foods as appropriate       INTERVENTIONS:  - Monitor oral intake, urinary output, labs, and treatment plans  - Assess nutrition and hydration status and recommend course of action  - Evaluate amount of meals eaten  - Assist patient with eating if necessary   - Allow adequate time for meals  - Recommend/ encourage appropriate diets, oral nutritional supplements, and vitamin/mineral supplements  - Order, calculate, and assess calorie counts as needed  - Recommend, monitor, and adjust tube feedings and TPN/PPN based on assessed needs  - Assess need for intravenous fluids  - Provide specific nutrition/hydration education as appropriate  - Include patient/family/caregiver in decisions related to nutrition  Outcome: Progressing     Problem: MOBILITY - ADULT  Goal: Maintain or return to baseline ADL function  Description: INTERVENTIONS:  -  Assess patient's ability to carry out ADLs; assess patient's baseline for ADL function and identify physical deficits which impact ability to perform ADLs (bathing, care of mouth/teeth, toileting, grooming, dressing, etc )  - Assess/evaluate cause of self-care deficits   - Assess range of motion  - Assess patient's mobility; develop plan if impaired  - Assess patient's need for assistive devices and provide as appropriate  - Encourage maximum independence but intervene and supervise when necessary  - Involve family in performance of ADLs  - Assess for home care needs following discharge   - Consider OT consult to assist with ADL evaluation and planning for discharge  - Provide patient education as appropriate  Outcome: Progressing  Goal: Maintains/Returns to pre admission functional level  Description: INTERVENTIONS:  - Perform BMAT or MOVE assessment daily    - Set and communicate daily mobility goal to care team and patient/family/caregiver  - Collaborate with rehabilitation services on mobility goals if consulted  - Perform Range of Motion 3 times a day  - Reposition patient every 2 hours    - Dangle patient 3 times a day  - Stand patient 3 times a day  - Ambulate patient 3 times a day  - Out of bed to chair 3 times a day   - Out of bed for meals 3 times a day  - Out of bed for toileting  - Record patient progress and toleration of activity level   Outcome: Progressing

## 2022-11-03 NOTE — PROGRESS NOTES
11/03/22 1100   Pain Assessment   Pain Assessment Tool 0-10   Pain Score 5   Pain Location/Orientation Orientation: Right;Location: Leg   Restrictions/Precautions   Precautions Bed/chair alarms;Cognitive; Fall Risk;Supervision on toilet/commode;Visual deficit   LUE Weight Bearing Per Order NWB   RLE Weight Bearing Per Order WBAT  (right knee brace)   Braces or Orthoses Splint  (L 4th finger; TEDs donned)   Subjective   Subjective Pt agreeable to perform skilled PT   Lying to Sitting on Side of Bed   Type of Assistance Needed Incidental touching   Lying to Sitting on Side of Bed CARE Score 4   Sit to Stand   Type of Assistance Needed Physical assistance   Physical Assistance Level 51%-75%   Comment w PFRW   Sit to Stand CARE Score 2   Bed-Chair Transfer   Type of Assistance Needed Physical assistance   Physical Assistance Level 51%-75%   Comment w PFRW   Chair/Bed-to-Chair Transfer CARE Score 2   Transfer Bed/Chair/Wheelchair   Adaptive Equipment Roller Walker;Platform   Walk 10 Feet   Type of Assistance Needed Incidental touching   Physical Assistance Level 25% or less   Comment PFRW   Walk 10 Feet CARE Score 3   Walk 50 Feet with Two Turns   Type of Assistance Needed Physical assistance   Physical Assistance Level 26%-50%   Comment PFRW   Walk 50 Feet with Two Turns CARE Score 3   Walk 150 Feet   Reason if not Attempted Safety concerns   Walk 150 Feet CARE Score 88   Walking 10 Feet on Uneven Surfaces   Reason if not Attempted Safety concerns   Walking 10 Feet on Uneven Surfaces CARE Score 88   Ambulation   Does the patient walk? 2  Yes   Primary Mode of Locomotion Prior to Admission Walk   Distance Walked (feet) 50 ft  (X2)   Assist Device Roller Walker;Platform   Assessment   Treatment Assessment Pt overall focus on OOB sitting and SPT with PFRW  Pt recv'd new right knee splint brace during walking or ambulation with PFRW   Pt reports knee brace feels better  Cont monitor knee brace and goals   Barriers to Discharge Inaccessible home environment;Decreased caregiver support   Plan   Progress Slow progress, decreased activity tolerance   PT Therapy Minutes   PT Time In 1100   PT Time Out 1130   PT Total Time (minutes) 30   PT Mode of treatment - Individual (minutes) 30   PT Mode of treatment - Concurrent (minutes) 0   PT Mode of treatment - Group (minutes) 0   PT Mode of treatment - Co-treat (minutes) 0   PT Mode of Treatment - Total time(minutes) 30 minutes   PT Cumulative Minutes 1455   Therapy Time missed   Time missed?  No

## 2022-11-03 NOTE — PROGRESS NOTES
Physical Medicine and Rehabilitation Progress Note  Ford Spann 79 y o  female MRN: [id number]  Unit/Bed#: -23 Encounter: [id number]    HPI: Ford Spann is a 79 y o  female with CLL, CHF, HLD, HTN, DM2, CKD 3, history of cognitive impairment, osteoporosis, dizziness who presented to the [address] on 10/11/22 with fall in her driveway while using her rollator walker  Imaging revealed a right intertrochanteric femur fracture with extension towards the femoral neck  Imaging also revealed a left displaced 4th phalanx  Patient seen by Orthopedics and deemed an operative candidate  Patient taken to the OR on 10/12/22 by Dr Jayson Alejandro for a ORIF with IM nailing  Deemed WBAT RLE  Placed on Lovenox for DVT ppx  Endocrinology consulted for bone health and diabetes, and course c/b acute pain and anemia  Admitted to the [facility] on 10/17  Chief Complaint: Had episode of emesis while eating eggs this morning  Interval History/Subjective:  No acute events overnight  Slept well, woke up early  Was eating eggs, and said she was feeling fine, had trouble swallowing one bite, tried to spit it out, but some went down and came immediately back up  She denies any nausea, vomiting, fevers, abdominal pain  She says this has happened to her in the past, but not recently  Last BM 11/2  She feels fine now  Denies any new pain - leg feels ok  No new CP, SOB  Had some dizziness in therapy yesterday, but BP WNL  ROS:  A 10 point review of systems was negative except for what is noted in the HPI  Today's Changes:  1  R Knee XR looks fine  No significant degenerative changes  Hardware looks fine  2  Continue brace for comfort  3  Continue current plan of care otherwise  4  Last day of lovenox 11/9  Total visit time: 25 minutes, with more than 50% spent counseling/coordinating care   Counseling includes discussion with patient re: progress in therapies, functional issues observed by therapy staff, and discussion with patient regarding their current medical state and wellbeing  Coordination of patient's care was performed in conjunction with Internal Medicine service to monitor patient's labs, vitals, and management of their comorbidities  Assessment/Plan:    * Intertrochanteric fracture of right femur [facility])  Assessment & Plan  Traumatic fall  Sustained a right intertrochanteric fracture  Taken to OR by Dr Colin Guzman on 10/12/22 for ORIF and IM nailing  WBAT RLE  DVT ppx with SQ Lovenox  Monitor 3 incisions  Continue acute rehabilitation program  POD 14 = 10/26/22 - appreciate ortho seeing patient and removing staples today 10/27/22  Xray completed 10/22 and stable  Follow-up with Dr Colin Guzman as outpatient in 4 weeks (~11/23)      Acute postoperative pain of right knee  Assessment & Plan  R knee quite tender  No effusion, erythema, warmth, but tender to palpation at patella and medial/lateral joint lines  Pain radiates up thigh  11/2 - R knee XR hardware looks fine, minimal degenerative changes  Diclofenac, supportive knee brace, ice  Encephalopathy  Assessment & Plan  Improved today  Suspect some sundowning  MOCA 11/30  Recurrent 10/28/22 with delirium  - Moving below meds to earlier in the evening  Increase melatonin to 6 mg  Seroquel 12 5 mg QHS      Deep tissue injury  Assessment & Plan  Left heel DTI - improving  Offload and elevate  Appreciate wound care recommendations as below  Skin Care Plan:  1-Cleanse sacro-buttocks with soap and water  Apply Preventative Hydraguard BID and PRN  2-Turn/reposition q2h or when medically stable for pressure re-distribution on skin   3-Elevate heels to offload pressure  4-Moisturize skin daily with skin nourishing cream  5-Ehob cushion in chair when out of bed  6-B/L Heel Allevyn Foam Dressings  Samuel Left Heel with T for Treatment and Samuel Right Heel with P for Prevention  Change every other day or Prn   Peel back, inspect skin Q-shift, and reapply  H/O dizziness  Assessment & Plan  Dizziness related to motion  Found to have orthostasis  Chronic issue per patient  Made meclizine PRN for polypharmacy  Monitor closely  Orthostasis  Assessment & Plan  Continue compression stockings/Abd binder during all therapies  Recurrent 10/28/22: Gentle IVF given for orthostasis x 1L   - Orthostasis improved     Acute pain  Assessment & Plan  Nociceptive/neuropathic post op pain in RLE managed on multimodal regimen:   · Patient is intolerant of oxycodone and Norco   Patient excessively drowsy on oxycodone and with mild hallucinations on Norco   · Schedule Tylenol 650 mg t i d  and 325 PRN  · Scheduled gabapentin 100 mg bid  · Topical Lidoderm patches, aqua K pad, Diclofenac gel specifically for knee  Ice for knee  · Robaxin 250 mg q 6 hours p r n  For muscle spasm    CLL (chronic lymphocytic leukemia) (HCC)  Assessment & Plan  Chronic leukocytosis (17K)  Trauma discussed with heme-onc  Ok to f/u as outpatient  Monitor   Follow-up with heme onc as outpatient     Displaced fracture of middle phalanx of left ring finger, initial encounter for closed fracture  Assessment & Plan  Traumatic fracture  4th left finger   Treated non-operatively by Orthopedics  Continue supportive splint - she is not always compliant despite cuing  ICE for swelling  NWB L hand    Hypertension  Assessment & Plan  At home: Lopressor 25 mg Q 12 hours, Hyzaar (Losartan/HCTZ) 50mg/12 5mg daily  Here: Lopressor 25 mg Q12hr, Losartan 25mg daily  Will monitor BP closely  Adjust medications accordingly  TEDs daily /Abdominal binder PRN    Chronic systolic heart failure (HCC)  Assessment & Plan  Grade 2 DD  At home: Lasix 40 mg daily, BB  Here:  HOLD Lasix  Encourage fluids by mouth  Restart Lasix when able with K Dur supplement  IM consulted to assist with management       HLD (hyperlipidemia)  Assessment & Plan  Restarted on home Lipitor 20 mg QPM    CKD (chronic kidney disease) stage 3, GFR 30-59 ml/min ([facility])  Assessment & Plan  Stable  Avoid nephrotoxic agents   Monitor 2x/week    Type 2 diabetes mellitus ([facility])  Assessment & Plan  Lab Results   Component Value Date    HGBA1C 7 6 (H) 10/12/2022     At home: Levemir 20 units QHS, Humalog 10 units with meals  Here: Levemir 13 units, Lispro 5 units TID with meals, continue sliding scale insulin  In acute care required an insulin drip transiently  Accuchecks  Diabetic Diet  Education  IM following    UTI (urinary tract infection)-resolved as of 10/31/2022  Assessment & Plan  Resolved  + UA  Urine culture: >100K Klebsiella  Symptomatic with urinary frequency, new encephalopathy and hallucinations, dysuria  · Completed treatment with Bactrim which was sensitive        Health Maintenance  #Bowel: Last BM 11/2  On docusate BID  #Bladder: Voiding and better continence   #Skin/Pressure Injury Prevention: Turn Q2hr in bed, with weight shifts [id number] in wheelchair  #DVT Prophylaxis: Lovenox, SCDs  #GI Prophylaxis: PO diet  #Code Status: Full Code  #FEN: Diabetic diet, with glucerna at lunch  Received 1L of normal saline 10/28  #Dispo: Team 11/1: Likely SNF  Family unable to provide assistance  Needs to be modified Ind to return home  Objective:    Functional Update:  PT: CS-GA bed mobility, min-modA transfers, Mary ambulation 35'   OT: mod-maxA ADLs - had an episode of dizziness  Allergies per EMR    Physical Exam:  Temp:  [98 °F (36 7 °C)-98 5 °F (36 9 °C)] 98 1 °F (36 7 °C)  HR:  [70-73] 73  Resp:  [17-18] 18  BP: (110-161)/(58-69) 161/69  Oxygen Therapy  SpO2: 96 %    Gen: No acute distress, Well-nourished, well-appearing  HEENT: Moist mucus membranes, Normocephalic/Atraumatic  Cardiovascular: Regular rate, rhythm, S1/S2  Distal pulses palpable  Heme/Extr: No edema  Pulmonary: Non-labored breathing  Lungs CTAB  : No hernandez  GI: Soft, non-tender, non-distended  BS+  MSK: R knee without effusion, erythema, warmth, gross abnormality  Integumentary: Skin is warm, dry  Neuro: AAOx3, Speech is intact  Appropriate to questioning  Psych: Normal mood and affect  Diagnostic Studies: Personally reviewed  R Knee XR: No acute issues, major degenerative changes, hardware looks fine  No effusion  Laboratory: Reviewed, no new labs    Results from last 7 days   Lab Units 10/31/22  0615   HEMOGLOBIN g/dL 10 8*   HEMATOCRIT % 35 3   WBC Thousand/uL 17 92*     Results from last 7 days   Lab Units 10/31/22  0615   BUN mg/dL 23   POTASSIUM mmol/L 4 2   CHLORIDE mmol/L 108   CREATININE mg/dL 0 84            Patient Active Problem List   Diagnosis   • Fall   • Type 2 diabetes mellitus ([facility] [address] )   • CKD (chronic kidney disease) stage 3, GFR 30-59 ml/min ([facility])   • HLD (hyperlipidemia)   • OA (osteoarthritis)   • Chronic systolic heart failure ([facility])   • Lymphadenopathy   • Elevated liver enzymes   • Ambulatory dysfunction   • Suspected Mild cognitive impairment   • Hypertension   • Intertrochanteric fracture of right femur ([facility])   • Displaced fracture of middle phalanx of left ring finger, initial encounter for closed fracture   • CLL (chronic lymphocytic leukemia) ([facility])   • Acute pain   • Orthostasis   • H/O dizziness   • Deep tissue injury   • Encephalopathy   • Acute postoperative pain of right knee         Medications  Current Facility-Administered Medications   Medication Dose Route Frequency Provider Last Rate   • acetaminophen  325 mg Oral Q6H PRN Ilia Daley MD     • acetaminophen  650 mg Oral TID AC Yanely Mayer MD     • atorvastatin  20 mg Oral Daily With Milton Stallworth MD     • bisacodyl  10 mg Rectal Daily PRN Ilia Daley MD     • Diclofenac Sodium  2 g Topical TID Gema Laurent MD     • docusate sodium  100 mg Oral BID Ilia Daley MD     • enoxaparin  40 mg Subcutaneous Q24H Harman Monreal MD     • gabapentin  100 mg Oral Q12H [facility] Gema Laurent MD     • hydrocortisone   Topical 4x Daily PRN Mariana Gee MD     • insulin detemir  13 Units Subcutaneous HS Adeola Jennings PA-C     • insulin lispro  1-5 Units Subcutaneous HS Mariana Gee MD     • insulin lispro  1-6 Units Subcutaneous TID AC Mariana Gee MD     • insulin lispro  5 Units Subcutaneous TID With Meals BRINDA Lowe     • lidocaine  2 patch Topical Daily Mariana Gee MD     • losartan  25 mg Oral Daily BRINDA Lowe     • meclizine  12 5 mg Oral Q8H PRN Mariana Gee MD     • meclizine  12 5 mg Oral Q12H PRN Ash Waldrop MD     • melatonin  6 mg Oral QPM Ash Waldrop MD     • methocarbamol  250 mg Oral Q6H PRN Ash Waldrop MD     • metoprolol tartrate  25 mg Oral Q12H [facility] BRINDA Kramer     • naloxone  0 04 mg Intravenous Q1MIN PRN Mariana Gee MD     • polyethylene glycol  17 g Oral Daily PRN Mariana Gee MD     • QUEtiapine  12 5 mg Oral QPM Ash Waldrop MD            ** Please Note: Fluency Direct voice to text software may have been used in the creation of this document   **

## 2022-11-04 LAB
GLUCOSE SERPL-MCNC: 118 MG/DL (ref 65–140)
GLUCOSE SERPL-MCNC: 175 MG/DL (ref 65–140)
GLUCOSE SERPL-MCNC: 239 MG/DL (ref 65–140)
GLUCOSE SERPL-MCNC: 251 MG/DL (ref 65–140)

## 2022-11-04 RX ORDER — LOSARTAN POTASSIUM 50 MG/1
50 TABLET ORAL DAILY
Status: DISCONTINUED | OUTPATIENT
Start: 2022-11-05 | End: 2022-11-08

## 2022-11-04 RX ORDER — GABAPENTIN 100 MG/1
100 CAPSULE ORAL
Status: DISCONTINUED | OUTPATIENT
Start: 2022-11-04 | End: 2022-11-07

## 2022-11-04 RX ADMIN — INSULIN LISPRO 2 UNITS: 100 INJECTION, SOLUTION INTRAVENOUS; SUBCUTANEOUS at 21:26

## 2022-11-04 RX ADMIN — GABAPENTIN 100 MG: 100 CAPSULE ORAL at 08:37

## 2022-11-04 RX ADMIN — LOSARTAN POTASSIUM 25 MG: 25 TABLET, FILM COATED ORAL at 08:37

## 2022-11-04 RX ADMIN — INSULIN LISPRO 2 UNITS: 100 INJECTION, SOLUTION INTRAVENOUS; SUBCUTANEOUS at 17:18

## 2022-11-04 RX ADMIN — ATORVASTATIN CALCIUM 20 MG: 20 TABLET, FILM COATED ORAL at 16:18

## 2022-11-04 RX ADMIN — DICLOFENAC SODIUM 2 G: 10 GEL TOPICAL at 08:36

## 2022-11-04 RX ADMIN — DOCUSATE SODIUM 100 MG: 100 CAPSULE, LIQUID FILLED ORAL at 08:37

## 2022-11-04 RX ADMIN — METOPROLOL TARTRATE 25 MG: 25 TABLET, FILM COATED ORAL at 20:19

## 2022-11-04 RX ADMIN — DICLOFENAC SODIUM 2 G: 10 GEL TOPICAL at 20:22

## 2022-11-04 RX ADMIN — INSULIN LISPRO 5 UNITS: 100 INJECTION, SOLUTION INTRAVENOUS; SUBCUTANEOUS at 08:37

## 2022-11-04 RX ADMIN — GABAPENTIN 100 MG: 100 CAPSULE ORAL at 21:27

## 2022-11-04 RX ADMIN — QUETIAPINE FUMARATE 12.5 MG: 25 TABLET ORAL at 20:19

## 2022-11-04 RX ADMIN — INSULIN LISPRO 1 UNITS: 100 INJECTION, SOLUTION INTRAVENOUS; SUBCUTANEOUS at 12:28

## 2022-11-04 RX ADMIN — ACETAMINOPHEN 650 MG: 325 TABLET ORAL at 12:31

## 2022-11-04 RX ADMIN — DOCUSATE SODIUM 100 MG: 100 CAPSULE, LIQUID FILLED ORAL at 17:18

## 2022-11-04 RX ADMIN — Medication 6 MG: at 20:18

## 2022-11-04 RX ADMIN — ENOXAPARIN SODIUM 40 MG: 40 INJECTION SUBCUTANEOUS at 08:36

## 2022-11-04 RX ADMIN — ACETAMINOPHEN 650 MG: 325 TABLET ORAL at 20:18

## 2022-11-04 RX ADMIN — METHOCARBAMOL 250 MG: 500 TABLET ORAL at 20:23

## 2022-11-04 RX ADMIN — INSULIN DETEMIR 13 UNITS: 100 INJECTION, SOLUTION SUBCUTANEOUS at 21:27

## 2022-11-04 RX ADMIN — LIDOCAINE 5% 2 PATCH: 700 PATCH TOPICAL at 08:37

## 2022-11-04 RX ADMIN — INSULIN LISPRO 5 UNITS: 100 INJECTION, SOLUTION INTRAVENOUS; SUBCUTANEOUS at 17:20

## 2022-11-04 RX ADMIN — METOPROLOL TARTRATE 25 MG: 25 TABLET, FILM COATED ORAL at 08:37

## 2022-11-04 RX ADMIN — ACETAMINOPHEN 650 MG: 325 TABLET ORAL at 06:37

## 2022-11-04 RX ADMIN — DICLOFENAC SODIUM 2 G: 10 GEL TOPICAL at 16:18

## 2022-11-04 RX ADMIN — INSULIN LISPRO 5 UNITS: 100 INJECTION, SOLUTION INTRAVENOUS; SUBCUTANEOUS at 12:29

## 2022-11-04 NOTE — PROGRESS NOTES
11/04/22 9898   Pain Assessment   Pain Assessment Tool 0-10   Pain Score 5   Pain Location/Orientation Orientation: Right;Location: Leg;Location: Hip   Restrictions/Precautions   Precautions Cognitive; Fall Risk;Bed/chair alarms;Supervision on toilet/commode;Visual deficit   LUE Weight Bearing Per Order NWB   RLE Weight Bearing Per Order WBAT   Braces or Orthoses Splint  (finger and left knee brace  )   Subjective   Subjective pt agreeable to perform skilled PT   Roll Left and Right   Type of Assistance Needed Incidental touching   Roll Left and Right CARE Score 4   Sit to Lying   Physical Assistance Level 25% or less   Sit to Lying CARE Score 3   Lying to Sitting on Side of Bed   Type of Assistance Needed Incidental touching   Physical Assistance Level 26%-50%   Lying to Sitting on Side of Bed CARE Score 3   Sit to Stand   Type of Assistance Needed Physical assistance   Physical Assistance Level 51%-75%   Sit to Stand CARE Score 2   Bed-Chair Transfer   Type of Assistance Needed Physical assistance   Physical Assistance Level 51%-75%   Comment PRFW and sit pivot   Chair/Bed-to-Chair Transfer CARE Score 2   Transfer Bed/Chair/Wheelchair   Adaptive Equipment Roller Walker   Car Transfer   Type of Assistance Needed Physical assistance   Physical Assistance Level 51%-75%   Comment sit pivot improving vc's for hand placement   Car Transfer CARE Score 2   Walk 10 Feet   Type of Assistance Needed Incidental touching; Adaptive equipment   Comment (S)  PFRW and right knee brace for walking   Walk 10 Feet CARE Score 4   Walk 50 Feet with Two Turns   Type of Assistance Needed Incidental touching; Adaptive equipment   Comment (S)  PFRW and right knee brace for walking   Walk 50 Feet with Two Turns CARE Score 4   Ambulation   Does the patient walk? 2   Yes   Primary Mode of Locomotion Prior to Admission Walk   Distance Walked (feet) 50 ft   Assist Device Roller Walker;Platform   Findings (S)  pt reports refusing right knee during walking d/t inc pain? Wheel 50 Feet with Two Turns   Type of Assistance Needed Supervision;Verbal cues   Wheel 50 Feet with Two Turns CARE Score 4   Wheel 150 Feet   Type of Assistance Needed Supervision;Verbal cues   Wheel 150 Feet CARE Score 4   Wheelchair mobility   Does the patient use a wheelchair? 1  Yes   Type of Wheelchair Used 1  Manual   Curb or Single Stair   Style negotiated Curb   Type of Assistance Needed Physical assistance   Physical Assistance Level 51%-75%   Comment PRFW   1 Step (Curb) CARE Score 2   Toilet Transfer   Type of Assistance Needed Physical assistance   Physical Assistance Level 26%-50%   Comment GRAB BARS   Toilet Transfer CARE Score 3   Equipment Use   NuStep L1 for 10 min   Assessment   Treatment Assessment pt focus on bed mobility , ambulation with PFRW and SPT and w/o AD for sit pivot  Pt cont to be limited with RLE pain and overall poor insight of her safety and deficits of fall risk  Pt refused to cont to ware knee brace d/t pain lvl and being very tight  Pt was encouraged to cont to ware d/t knee buckling and weakness   Pt will talk to the MD about her knee brace    Pt cont to have diffuclty inc WB to right LE  to advance with steps or curb steps  Cont VC to maintain NWD on left hand  Cont POC pt in bed resting with all needs in reach   Barriers to Discharge Inaccessible home environment;Decreased caregiver support   Plan   Progress Slow progress, decreased activity tolerance   Recommendation   PT Discharge Recommendation Post acute rehabilitation services   PT Therapy Minutes   PT Time In 0835   PT Time Out 1005   PT Total Time (minutes) 90   PT Mode of treatment - Individual (minutes) 90   PT Mode of treatment - Concurrent (minutes) 0   PT Mode of treatment - Group (minutes) 0   PT Mode of treatment - Co-treat (minutes) 0   PT Mode of Treatment - Total time(minutes) 90 minutes   PT Cumulative Minutes 1605   Therapy Time missed   Time missed?  No

## 2022-11-04 NOTE — PLAN OF CARE
Problem: Prexisting or High Potential for Compromised Skin Integrity  Goal: Skin integrity is maintained or improved  Description: INTERVENTIONS:  - Identify patients at risk for skin breakdown  - Assess and monitor skin integrity  - Assess and monitor nutrition and hydration status  - Monitor labs   - Assess for incontinence   - Turn and reposition patient  - Assist with mobility/ambulation  - Relieve pressure over bony prominences  - Avoid friction and shearing  - Provide appropriate hygiene as needed including keeping skin clean and dry  - Evaluate need for skin moisturizer/barrier cream  - Collaborate with interdisciplinary team   - Patient/family teaching  - Consider wound care consult   Outcome: Progressing     Problem: PAIN - ADULT  Goal: Verbalizes/displays adequate comfort level or baseline comfort level  Description: Interventions:  - Encourage patient to monitor pain and request assistance  - Assess pain using appropriate pain scale  - Administer analgesics based on type and severity of pain and evaluate response  - Implement non-pharmacological measures as appropriate and evaluate response  - Consider cultural and social influences on pain and pain management  - Notify physician/advanced practitioner if interventions unsuccessful or patient reports new pain  Outcome: Progressing     Problem: INFECTION - ADULT  Goal: Absence or prevention of progression during hospitalization  Description: INTERVENTIONS:  - Assess and monitor for signs and symptoms of infection  - Monitor lab/diagnostic results  - Monitor all insertion sites, i e  indwelling lines, tubes, and drains  - Monitor endotracheal if appropriate and nasal secretions for changes in amount and color  - [place] appropriate cooling/warming therapies per order  - Administer medications as ordered  - Instruct and encourage patient and family to use good hand hygiene technique  - Identify and instruct in appropriate isolation precautions for identified infection/condition  Outcome: Progressing  Goal: Absence of fever/infection during neutropenic period  Description: INTERVENTIONS:  - Monitor WBC    Outcome: Progressing     Problem: SAFETY ADULT  Goal: Patient will remain free of falls  Description: INTERVENTIONS:  - Educate patient/family on patient safety including physical limitations  - Instruct patient to call for assistance with activity   - Consult OT/PT to assist with strengthening/mobility   - Keep Call bell within reach  - Keep bed low and locked with side rails adjusted as appropriate  - Keep care items and personal belongings within reach  - Initiate and maintain comfort rounds  - Make Fall Risk Sign visible to staff  - Offer Toileting every 2 Hours, in advance of need  - Initiate/Maintain bed/chair alarm  - Obtain necessary fall risk management equipment: non skid footwear  - Apply yellow socks and bracelet for high fall risk patients  - Consider moving patient to room near nurses station  Outcome: Progressing  Goal: Maintain or return to baseline ADL function  Description: INTERVENTIONS:  -  Assess patient's ability to carry out ADLs; assess patient's baseline for ADL function and identify physical deficits which impact ability to perform ADLs (bathing, care of mouth/teeth, toileting, grooming, dressing, etc )  - Assess/evaluate cause of self-care deficits   - Assess range of motion  - Assess patient's mobility; develop plan if impaired  - Assess patient's need for assistive devices and provide as appropriate  - Encourage maximum independence but intervene and supervise when necessary  - Involve family in performance of ADLs  - Assess for home care needs following discharge   - Consider OT consult to assist with ADL evaluation and planning for discharge  - Provide patient education as appropriate  Outcome: Progressing  Goal: Maintains/Returns to pre admission functional level  Description: INTERVENTIONS:  - Perform BMAT or MOVE assessment daily    - Set and communicate daily mobility goal to care team and patient/family/caregiver  - Collaborate with rehabilitation services on mobility goals if consulted  - Perform Range of Motion 3 times a day  - Reposition patient every 2 hours    - Dangle patient 3 times a day  - Stand patient 3 times a day  - Ambulate patient 3 times a day  - Out of bed to chair 3 times a day   - Out of bed for meals 3 times a day  - Out of bed for toileting  - Record patient progress and toleration of activity level   Outcome: Progressing     Problem: DISCHARGE PLANNING  Goal: Discharge to home or other facility with appropriate resources  Description: INTERVENTIONS:  - Identify barriers to discharge w/patient and caregiver  - Arrange for needed discharge resources and transportation as appropriate  - Identify discharge learning needs (meds, wound care, etc )  - Arrange for interpretive services to assist at discharge as needed  - Refer to Case Management Department for coordinating discharge planning if the patient needs post-hospital services based on physician/advanced practitioner order or complex needs related to functional status, cognitive ability, or social support system  Outcome: Progressing     Problem: Potential for Falls  Goal: Patient will remain free of falls  Description: INTERVENTIONS:  - Educate patient/family on patient safety including physical limitations  - Instruct patient to call for assistance with activity   - Consult OT/PT to assist with strengthening/mobility   - Keep Call bell within reach  - Keep bed low and locked with side rails adjusted as appropriate  - Keep care items and personal belongings within reach  - Initiate and maintain comfort rounds  - Make Fall Risk Sign visible to staff  - Offer Toileting every 2 Hours, in advance of need  - Initiate/Maintain bed/chair alarm  - Obtain necessary fall risk management equipment: non skid footwear  - Apply yellow socks and bracelet for high fall risk patients  - Consider moving patient to room near nurses station  Outcome: Progressing     Problem: Nutrition/Hydration-ADULT  Goal: Nutrient/Hydration intake appropriate for improving, restoring or maintaining nutritional needs  Description: Monitor and assess patient's nutrition/hydration status for malnutrition  Collaborate with interdisciplinary team and initiate plan and interventions as ordered  Monitor patient's weight and dietary intake as ordered or per policy  Utilize nutrition screening tool and intervene as necessary  Determine patient's food preferences and provide high-protein, high-caloric foods as appropriate       INTERVENTIONS:  - Monitor oral intake, urinary output, labs, and treatment plans  - Assess nutrition and hydration status and recommend course of action  - Evaluate amount of meals eaten  - Assist patient with eating if necessary   - Allow adequate time for meals  - Recommend/ encourage appropriate diets, oral nutritional supplements, and vitamin/mineral supplements  - Order, calculate, and assess calorie counts as needed  - Recommend, monitor, and adjust tube feedings and TPN/PPN based on assessed needs  - Assess need for intravenous fluids  - Provide specific nutrition/hydration education as appropriate  - Include patient/family/caregiver in decisions related to nutrition  Outcome: Progressing     Problem: MOBILITY - ADULT  Goal: Maintain or return to baseline ADL function  Description: INTERVENTIONS:  -  Assess patient's ability to carry out ADLs; assess patient's baseline for ADL function and identify physical deficits which impact ability to perform ADLs (bathing, care of mouth/teeth, toileting, grooming, dressing, etc )  - Assess/evaluate cause of self-care deficits   - Assess range of motion  - Assess patient's mobility; develop plan if impaired  - Assess patient's need for assistive devices and provide as appropriate  - Encourage maximum independence but intervene and supervise when necessary  - Involve family in performance of ADLs  - Assess for home care needs following discharge   - Consider OT consult to assist with ADL evaluation and planning for discharge  - Provide patient education as appropriate  Outcome: Progressing  Goal: Maintains/Returns to pre admission functional level  Description: INTERVENTIONS:  - Perform BMAT or MOVE assessment daily    - Set and communicate daily mobility goal to care team and patient/family/caregiver  - Collaborate with rehabilitation services on mobility goals if consulted  - Perform Range of Motion 3 times a day  - Reposition patient every 2 hours    - Dangle patient 3 times a day  - Stand patient 3 times a day  - Ambulate patient 3 times a day  - Out of bed to chair 3 times a day   - Out of bed for meals 3 times a day  - Out of bed for toileting  - Record patient progress and toleration of activity level   Outcome: Progressing

## 2022-11-04 NOTE — PLAN OF CARE
Problem: Prexisting or High Potential for Compromised Skin Integrity  Goal: Skin integrity is maintained or improved  Description: INTERVENTIONS:  - Identify patients at risk for skin breakdown  - Assess and monitor skin integrity  - Assess and monitor nutrition and hydration status  - Monitor labs   - Assess for incontinence   - Turn and reposition patient  - Assist with mobility/ambulation  - Relieve pressure over bony prominences  - Avoid friction and shearing  - Provide appropriate hygiene as needed including keeping skin clean and dry  - Evaluate need for skin moisturizer/barrier cream  - Collaborate with interdisciplinary team   - Patient/family teaching  - Consider wound care consult   Outcome: Progressing     Problem: PAIN - ADULT  Goal: Verbalizes/displays adequate comfort level or baseline comfort level  Description: Interventions:  - Encourage patient to monitor pain and request assistance  - Assess pain using appropriate pain scale  - Administer analgesics based on type and severity of pain and evaluate response  - Implement non-pharmacological measures as appropriate and evaluate response  - Consider cultural and social influences on pain and pain management  - Notify physician/advanced practitioner if interventions unsuccessful or patient reports new pain  Outcome: Progressing     Problem: INFECTION - ADULT  Goal: Absence or prevention of progression during hospitalization  Description: INTERVENTIONS:  - Assess and monitor for signs and symptoms of infection  - Monitor lab/diagnostic results  - Monitor all insertion sites, i e  indwelling lines, tubes, and drains  - Monitor endotracheal if appropriate and nasal secretions for changes in amount and color  - [place] appropriate cooling/warming therapies per order  - Administer medications as ordered  - Instruct and encourage patient and family to use good hand hygiene technique  - Identify and instruct in appropriate isolation precautions for identified infection/condition  Outcome: Progressing  Goal: Absence of fever/infection during neutropenic period  Description: INTERVENTIONS:  - Monitor WBC    Outcome: Progressing     Problem: SAFETY ADULT  Goal: Patient will remain free of falls  Description: INTERVENTIONS:  - Educate patient/family on patient safety including physical limitations  - Instruct patient to call for assistance with activity   - Consult OT/PT to assist with strengthening/mobility   - Keep Call bell within reach  - Keep bed low and locked with side rails adjusted as appropriate  - Keep care items and personal belongings within reach  - Initiate and maintain comfort rounds  - Make Fall Risk Sign visible to staff  - Apply yellow socks and bracelet for high fall risk patients  - Consider moving patient to room near nurses station  Outcome: Progressing  Goal: Maintain or return to baseline ADL function  Description: INTERVENTIONS:  -  Assess patient's ability to carry out ADLs; assess patient's baseline for ADL function and identify physical deficits which impact ability to perform ADLs (bathing, care of mouth/teeth, toileting, grooming, dressing, etc )  - Assess/evaluate cause of self-care deficits   - Assess range of motion  - Assess patient's mobility; develop plan if impaired  - Assess patient's need for assistive devices and provide as appropriate  - Encourage maximum independence but intervene and supervise when necessary  - Involve family in performance of ADLs  - Assess for home care needs following discharge   - Consider OT consult to assist with ADL evaluation and planning for discharge  - Provide patient education as appropriate  Outcome: Progressing  Goal: Maintains/Returns to pre admission functional level  Description: INTERVENTIONS:  - Perform BMAT or MOVE assessment daily    - Set and communicate daily mobility goal to care team and patient/family/caregiver     - Collaborate with rehabilitation services on mobility goals if consulted  - Out of bed for toileting  - Record patient progress and toleration of activity level   Outcome: Progressing     Problem: DISCHARGE PLANNING  Goal: Discharge to home or other facility with appropriate resources  Description: INTERVENTIONS:  - Identify barriers to discharge w/patient and caregiver  - Arrange for needed discharge resources and transportation as appropriate  - Identify discharge learning needs (meds, wound care, etc )  - Arrange for interpretive services to assist at discharge as needed  - Refer to Case Management Department for coordinating discharge planning if the patient needs post-hospital services based on physician/advanced practitioner order or complex needs related to functional status, cognitive ability, or social support system  Outcome: Progressing     Problem: Potential for Falls  Goal: Patient will remain free of falls  Description: INTERVENTIONS:  - Educate patient/family on patient safety including physical limitations  - Instruct patient to call for assistance with activity   - Consult OT/PT to assist with strengthening/mobility   - Keep Call bell within reach  - Keep bed low and locked with side rails adjusted as appropriate  - Keep care items and personal belongings within reach  - Initiate and maintain comfort rounds  - Make Fall Risk Sign visible to staff  - Apply yellow socks and bracelet for high fall risk patients  - Consider moving patient to room near nurses station  Outcome: Progressing     Problem: Nutrition/Hydration-ADULT  Goal: Nutrient/Hydration intake appropriate for improving, restoring or maintaining nutritional needs  Description: Monitor and assess patient's nutrition/hydration status for malnutrition  Collaborate with interdisciplinary team and initiate plan and interventions as ordered  Monitor patient's weight and dietary intake as ordered or per policy  Utilize nutrition screening tool and intervene as necessary   Determine patient's food preferences and provide high-protein, high-caloric foods as appropriate  INTERVENTIONS:  - Monitor oral intake, urinary output, labs, and treatment plans  - Assess nutrition and hydration status and recommend course of action  - Evaluate amount of meals eaten  - Assist patient with eating if necessary   - Allow adequate time for meals  - Recommend/ encourage appropriate diets, oral nutritional supplements, and vitamin/mineral supplements  - Order, calculate, and assess calorie counts as needed  - Recommend, monitor, and adjust tube feedings and TPN/PPN based on assessed needs  - Assess need for intravenous fluids  - Provide specific nutrition/hydration education as appropriate  - Include patient/family/caregiver in decisions related to nutrition  Outcome: Progressing     Problem: MOBILITY - ADULT  Goal: Maintain or return to baseline ADL function  Description: INTERVENTIONS:  -  Assess patient's ability to carry out ADLs; assess patient's baseline for ADL function and identify physical deficits which impact ability to perform ADLs (bathing, care of mouth/teeth, toileting, grooming, dressing, etc )  - Assess/evaluate cause of self-care deficits   - Assess range of motion  - Assess patient's mobility; develop plan if impaired  - Assess patient's need for assistive devices and provide as appropriate  - Encourage maximum independence but intervene and supervise when necessary  - Involve family in performance of ADLs  - Assess for home care needs following discharge   - Consider OT consult to assist with ADL evaluation and planning for discharge  - Provide patient education as appropriate  Outcome: Progressing  Goal: Maintains/Returns to pre admission functional level  Description: INTERVENTIONS:  - Perform BMAT or MOVE assessment daily    - Set and communicate daily mobility goal to care team and patient/family/caregiver     - Collaborate with rehabilitation services on mobility goals if consulted  - Out of bed for toileting  - Record patient progress and toleration of activity level   Outcome: Progressing

## 2022-11-04 NOTE — PROGRESS NOTES
Internal Medicine Progress Note  Patient: Chelsea Ahmadi  Age/sex: 79 y o  female  Medical Record #: [id number]      ASSESSMENT/PLAN: (Interval History)  Chelsea Ahmadi is seen and examined and management for following issues:    S/p ORIF with IM nailing of the Rt femur  · Pain control and therapy per primary service  · PMR adjusted pain meds d/t sedation  · Renal dosed Lovenox for DVT prophylaxis  · Follow-up with Ortho as scheduled     Left 4th phalanx fracture  · Splint and NWB   · Keeps removing splint     HTN  · Home: Hyzaar 50/12 5mg qd/Lopressor 25mg q12 hours/Lasix 40mg qd (for heart failure)  · Here: Lopressor 25mg BID/losartan 50mg daily  · Trend elevated increase losartan to 50mg (11/4)     Anemia  · Secondary to trauma  · Stable      DM type 2  · Home: Levemir 20U at bedtime/Humalog 10U with meals  · Here: Levemir 13U at bedtime/Humalog 5U tid  · stable      Chronic systolic/diastolic heart failure  · Pt has been off diuretics  · ECHO with an EF of 45%  Grade 2 diastolic dysfunction  · euvolemic     CLL  · Recent diagnosis  · Outpt follow-up with Hematology/Oncology  · Baseline WBCs 14-16K  · Had mild bump likely reactive secondary to surgery/UTI  · Stable      Suspected UTI  · Had positive UA  Culture with >100,000 Klebsiella  · s/p Bactrim  · Urinary retention improved with tx of UTI     Delirium  · Intermittent confusion and hallucinations  · Seroquel caused increased daytime fatigue and was stopped but restarted due to hallucinations  DC planning: TBD    The above assessment and plan was reviewed and updated as determined by my evaluation of the patient on 11/4/2022      Labs:   Results from last 7 days   Lab Units 10/31/22  0615   WBC Thousand/uL 17 92*   HEMOGLOBIN g/dL 10 8*   HEMATOCRIT % 35 3   PLATELETS Thousands/uL 347     Results from last 7 days   Lab Units 10/31/22  0615   SODIUM mmol/L 138   POTASSIUM mmol/L 4 2   CHLORIDE mmol/L 108   CO2 mmol/L 27   BUN mg/dL 23   CREATININE mg/dL 0 84   CALCIUM mg/dL 9 6             Results from last 7 days   Lab Units 11/04/22  0634 11/03/22 2046 11/03/22  1555   POC GLUCOSE mg/dl 118 176* 123       Review of Scheduled Meds:  Current Facility-Administered Medications   Medication Dose Route Frequency Provider Last Rate   • acetaminophen  325 mg Oral Q6H PRN Tiffanie Preciado MD     • acetaminophen  650 mg Oral TID AC Tiffanie Preciado MD     • atorvastatin  20 mg Oral Daily With Juan Winn MD     • bisacodyl  10 mg Rectal Daily PRN Tiffanie Preciado MD     • Diclofenac Sodium  2 g Topical TID Juliane Rose MD     • docusate sodium  100 mg Oral BID Tiffanie Preciado MD     • enoxaparin  40 mg Subcutaneous Q24H [facility] HOME Tiffanie Preciado MD     • gabapentin  100 mg Oral Q12H [facility] Juliane Rose MD     • hydrocortisone   Topical 4x Daily PRN Tiffanie Preicado MD     • insulin detemir  13 Units Subcutaneous HS Adeola Jennings PA-C     • insulin lispro  1-5 Units Subcutaneous HS Tiffanie Preciado MD     • insulin lispro  1-6 Units Subcutaneous TID AC Tiffanie Preciado MD     • insulin lispro  5 Units Subcutaneous TID With Meals BRINDA Cruz     • lidocaine  2 patch Topical Daily Tiffanie Preciado MD     • [START ON 11/5/2022] losartan  50 mg Oral Daily BRINDA Cruz     • meclizine  12 5 mg Oral Q8H PRN Tiffanie Preciado MD     • meclizine  12 5 mg Oral Q12H PRN Juliane Rose MD     • melatonin  6 mg Oral QPM Juliane Rose MD     • methocarbamol  250 mg Oral Q6H PRN Juliane Rose MD     • metoprolol tartrate  25 mg Oral Q12H [facility] BRINDA Kramer     • naloxone  0 04 mg Intravenous Q1MIN PRN Tiffanie Preciado MD     • polyethylene glycol  17 g Oral Daily PRN Tiffanie Preciado MD     • QUEtiapine  12 5 mg Oral QPM Juliane Rose MD         Subjective/ HPI: Patient seen and examined  Patients overnight issues or events were reviewed with nursing or staff during rounds or morning huddle session  New or overnight issues include the following:     Pt seen in therapy  Had some incontinence overnight otherwise no issues  ROS:   A 10 point ROS was performed; negative except as noted above  *Labs /Radiology studies reviewed  *Medications reviewed and reconciled as needed  *Please refer to order section for additional ordered labs studies  *Case discussed with primary attending during morning huddle case rounds    Physical Examination:  Vitals:   Vitals:    11/03/22 0626 11/03/22 1512 11/03/22 2129 11/04/22 0630   BP: 161/69 152/67 (!) 172/78 169/70   BP Location: Left arm Left arm Left arm Left arm   Pulse: 73 66 74 65   Resp: 18 18 18 17   Temp: 98 1 °F (36 7 °C) 97 9 °F (36 6 °C) 97 9 °F (36 6 °C) 97 9 °F (36 6 °C)   TempSrc: Oral Oral Oral Oral   SpO2: 96% 95% 94% 96%   Weight:    65 1 kg (143 lb 8 3 oz)   Height:         GEN: No apparent distress, interactive; frail  NEURO: Alert and oriented x3; confused at times, mainly overnight, clear this am  HEENT: Pupils are equal and reactive, EOMI, mucous membranes are moist, face symmetrical  CV: S1 S2 regular, no MRG, no peripheral edema noted  RESP: Lungs are clear bilaterally, no wheezes, rales or rhonchi noted, on room air, respirations easy and non labored  GI: Flat, soft non tender, non distended; +BS x4  : Voiding without difficulty; incontinent overnight  MUSC: Moves all extremities; except RLE WBAT; splint to 4th finger on left hand NWB  SKIN: pink, warm and dry, normal turgor, no rashes, lesions; incision intact      The above physical exam was reviewed and updated as determined by my evaluation of the patient on 11/4/2022  Invasive Devices  Report    Drain  Duration           External Urinary Catheter 1 day                   VTE Pharmacologic Prophylaxis: Enoxaparin  Code Status: Level 1 - Full Code  Current Length of Stay: 18 day(s)      Total time spent:  30 minutes with more than 50% spent counseling/coordinating care   Counseling includes discussion with patient re: progress  and discussion with patient of his/her current medical state/information  Coordination of patient's care was performed in conjunction with primary service  Time invested included review of patient's labs, vitals, and management of their comorbidities with continued monitoring  In addition, this patient was discussed with medical team including physician and advanced extenders  The care of the patient was extensively discussed and appropriate treatment plan was formulated unique for this patient  Medical decision making for the day was made by supervising physician unless otherwise noted in their attestation statement  ** Please Note:  voice to text software may have been used in the creation of this document   Although proof errors in transcription or interpretation are a potential of such software**

## 2022-11-04 NOTE — CASE MANAGEMENT
Cm spoke with pt and pt's nephew Avtar Armenta to review dc date and plan  Pt's nephew reports he is agreeable to subacute around  area due to there not being a lot of options in [facility]  Pt and pt's nephew prefer [place] for subacute  [facility] reports [facility] do have bed availability via [facility]  Cm to arrange [facility] transport for 11/8  [facility] reporting they do not require COVID test prior to dc

## 2022-11-04 NOTE — PROGRESS NOTES
11/04/22 [facility]   Pain Assessment   Pain Assessment Tool 0-10  (Simultaneous filing  User may not have seen previous data )   Pain Location/Orientation Orientation: Right;Location: Leg   Pain 2   Pain Score 2 6   Pain Location/Orientation 2 Orientation: Lower; Location: Back   Restrictions/Precautions   Precautions Bed/chair alarms;Cognitive; Fall Risk;Pain;Supervision on toilet/commode;Visual deficit   Weight Bearing Restrictions Yes   LUE Weight Bearing Per Order NWB  (hand)   RLE Weight Bearing Per Order WBAT   ROM Restrictions No   Braces or Orthoses Splint  (L knee brace, R prevalon boot)   Lifestyle   Autonomy "I am so tired today, I didn't sleep well "   Putting On/Taking Off Footwear   Type of Assistance Needed Supervision;Set-up / [employer]; Adaptive equipment   Physical Assistance Level No physical assistance   Comment able to don/doff socks w/ dynamic fxnl reach to feet w/ inc time  Able to doff shoes, donned w/ [place] [address]  Edu provided on elastic laces  Putting On/Taking Off Footwear CARE Score 4   Sit to Lying   Type of Assistance Needed Physical assistance   Physical Assistance Level 25% or less   Sit to Lying CARE Score 3   Lying to Sitting on Side of Bed   Type of Assistance Needed Physical assistance   Physical Assistance Level 26%-50%   Lying to Sitting on Side of Bed CARE Score 3   Sit to Stand   Type of Assistance Needed Physical assistance;Verbal cues; Isabel Alexandra / Tao Sullivan; Adaptive equipment   Physical Assistance Level 26%-50%   Comment able to carryover proper hand placement w/ inc time  Sit to Stand CARE Score 3   Bed-Chair Transfer   Type of Assistance Needed Physical assistance;Verbal cues; Isabel Alexandra / Tao Sullivan; Adaptive equipment   Physical Assistance Level 51%-75%   Comment stand pivot w/ PFRW w/ inc time, x8 t/o session   Chair/Bed-to-Chair Transfer CARE Score 2   Toileting Hygiene   Type of Assistance Needed Physical assistance   Physical Assistance Level 26%-50%   Comment able to manage suad hygiene while seated  Mod A in stance for clothing management w/ pt able to manage over R hip w/ A over L hip, extended time  Toileting Hygiene CARE Score 3   Toilet Transfer   Type of Assistance Needed Physical assistance;Supervision; Adaptive equipment;Verbal cues   Physical Assistance Level 26%-50%   Comment stand pivot w/ PFRW to [facility] over toilet  Toilet Transfer CARE Score 3   Exercise Tools   Other Exercise Tool 1 Engaged in RUE ther ex to maximize strength and endurance for ADLs and fxnl mobility  Completed w/ 2# DB 3x10 bicep curls, chest press, and OH shoulder press  Tolerated well w/ Min vc's and demo, rest breaks provided between sets to manage fatigue  Cognition   Overall Cognitive Status Impaired   Orientation Level Oriented X4;Oriented to person;Oriented to place;Oriented to time;Oriented to situation   Additional Activities   Additional Activities Comments Slight padding added to L 4th digit splint to dec skin breakdown to palm  Pt reported inc comfort  Assessment   Treatment Assessment Pt seen for skilled OT session focusing on toileting, RUE strengthening, fxnl stand pivot xfer training, donning/doffing footwear, and bed mobility  Pt declined ADL this afternoon despite edu and encouragement provided  Pt cont to be limited by RLE and chronic back pain, req inc time for all tasks and frequent rest breaks to manage  Pt noted w/ redness and reported slight pain w/ palpation to L palm, likely due to 4th digit splint; OTR provided slight padding to dec risk of skin breakdown  Overall improvement w/ proper hand placement when provided w/ extended time, cont to req vc's for PFRW management during xfers to inc safety  Cont OT POC: repetitive safety training, fxnl standing tolerance, endurance work, and ADL retraining  Pt requesting to rest in bed, all needs within reach and alarm activated  Prognosis Good   Problem List Decreased strength;Decreased endurance; Impaired balance;Decreased mobility; Decreased coordination;Decreased cognition; Impaired judgement;Decreased safety awareness;Orthopedic restrictions;Pain; Impaired vision   Plan   Treatment/Interventions ADL retraining;Functional transfer training; Therapeutic exercise; Endurance training;Cognitive reorientation;Patient/family training;Equipment eval/education   Progress Progressing toward goals   Recommendation   OT Discharge Recommendation Post acute rehabilitation services  (pending progress)   OT Therapy Minutes   OT Time In 1300   OT Time Out 1430   OT Total Time (minutes) 90   OT Mode of treatment - Individual (minutes) 90   OT Mode of treatment - Concurrent (minutes) 0   OT Mode of treatment - Group (minutes) 0   OT Mode of treatment - Co-treat (minutes) 0   OT Mode of Treatment - Total time(minutes) 90 minutes   OT Cumulative Minutes 1425   Therapy Time missed   Time missed?  No

## 2022-11-04 NOTE — PROGRESS NOTES
Physical Medicine and Rehabilitation Progress Note  Darlene Santos 79 y o  female MRN: [id number]  Unit/Bed#: -80 Encounter: [id number]    HPI: Darlene Santos is a 79 y o  female with CLL, CHF, HLD, HTN, DM2, CKD 3, history of cognitive impairment, osteoporosis, dizziness who presented to the [facility] on 10/11/22 with fall in her driveway while using her rollator walker  Imaging revealed a right intertrochanteric femur fracture with extension towards the femoral neck  Imaging also revealed a left displaced 4th phalanx  Patient seen by Orthopedics and deemed an operative candidate  Patient taken to the OR on 10/12/22 by Dr Magdalena Starr for a ORIF with IM nailing  Deemed WBAT RLE  Placed on Lovenox for DVT ppx  Endocrinology consulted for bone health and diabetes, and course c/b acute pain and anemia  Ad[facility] to the [facility] on 10/17  Chief Complaint: No new issues    Interval History/Subjective:  No acute events overnight  Last BM 11/4  Slept poorly last night - said there were some issues with her purewick, but typically she likes it  She states at home she uses pads on her bed, and keeps the hopper right next to her bed so she can slide right out and onto it  She denies any new CP, SOB, fevers, chills, N/V, abdominal pain  ROS:  A 10 point review of systems was negative except for what is noted in the HPI  Today's Changes:  1  IM increasing Losartan today for increased BP  2  Discussed with SW, possible discharge to [facility] on 11/8  Patient and family aware  3  Gabapentin 100mg QHS  Wean off after the weekend    Total visit time: 25 minutes, with more than 50% spent counseling/coordinating care  Counseling includes discussion with patient re: progress in therapies, functional issues observed by therapy staff, and discussion with patient regarding their current medical state and wellbeing   Coordination of patient's care was performed in conjunction with Internal Medicine service to monitor patient's labs, vitals, and management of their comorbidities  Assessment/Plan:    * Intertrochanteric fracture of right femur [facility])  Assessment & Plan  Traumatic fall  Sustained a right intertrochanteric fracture  Taken to OR by Dr Kota Joshi on 10/12/22 for ORIF and IM nailing  WBAT RLE  DVT ppx with SQ Lovenox  Monitor 3 incisions  Continue acute rehabilitation program  POD 14 = 10/26/22 - appreciate ortho seeing patient and removing staples today 10/27/22  Xray completed 10/22 and stable  Follow-up with Dr Kota Joshi as outpatient in 4 weeks (~11/23)      Acute postoperative pain of right knee  Assessment & Plan  R knee quite tender  No effusion, erythema, warmth, but tender to palpation at patella and medial/lateral joint lines  Pain radiates up thigh  11/2 - R knee XR hardware looks fine, minimal degenerative changes  Diclofenac, supportive knee brace, ice  Encephalopathy  Assessment & Plan  Improved today  Suspect some sundowning  MOCA 11/30  Recurrent 10/28/22 with delirium  - Moving below meds to earlier in the evening  Increase melatonin to 6 mg  Seroquel 12 5 mg QHS      Deep tissue injury  Assessment & Plan  Left heel DTI - improving  Offload and elevate  Appreciate wound care recommendations as below  Skin Care Plan:  1-Cleanse sacro-buttocks with soap and water  Apply Preventative Hydraguard BID and PRN  2-Turn/reposition q2h or when medically stable for pressure re-distribution on skin   3-Elevate heels to offload pressure  4-Moisturize skin daily with skin nourishing cream  5-Ehob cushion in chair when out of bed  6-B/L Heel Allevyn Foam Dressings  Samuel Left Heel with T for Treatment and Samuel Right Heel with P for Prevention  Change every other day or Prn  Peel back, inspect skin Q-shift, and reapply  H/O dizziness  Assessment & Plan  Dizziness related to motion  Found to have orthostasis  Chronic issue per patient  Made meclizine PRN for polypharmacy  Monitor closely  Orthostasis  Assessment & Plan  Continue compression stockings/Abd binder during all therapies  Recurrent 10/28/22: Gentle IVF given for orthostasis x 1L   - Orthostasis improved     Acute pain  Assessment & Plan  Nociceptive/neuropathic post op pain in RLE managed on multimodal regimen:   · Patient is intolerant of oxycodone and Norco   Patient excessively drowsy on oxycodone and with mild hallucinations on Norco   · Schedule Tylenol 650 mg t i d  and 325 PRN  · Scheduled gabapentin 100 mg bid  · Topical Lidoderm patches, aqua K pad, Diclofenac gel specifically for knee  Ice for knee  · Robaxin 250 mg q 6 hours p r n  For muscle spasm    CLL (chronic lymphocytic leukemia) (HCC)  Assessment & Plan  Chronic leukocytosis (17K)  Trauma discussed with heme-onc  Ok to f/u as outpatient  Monitor   Follow-up with [facility] as outpatient     Displaced fracture of middle phalanx of left ring finger, initial encounter for closed fracture  Assessment & Plan  Traumatic fracture  4th left finger   Treated non-operatively by Orthopedics  Continue supportive splint - she is not always compliant despite cuing  ICE for swelling  NWB L hand    Hypertension  Assessment & Plan  At home: Lopressor 25 mg Q 12 hours, Hyzaar (Losartan/HCTZ) 50mg/12 5mg daily  Here: Lopressor 25 mg Q12hr, Losartan 50mg daily  Will monitor BP closely  Adjust medications accordingly  TEDs daily /Abdominal binder PRN    Chronic systolic heart failure (HCC)  Assessment & Plan  Grade 2 DD  At home: Lasix 40 mg daily, BB  Here:  HOLD Lasix  Encourage fluids by mouth  Restart Lasix when able with K Dur supplement  IM consulted to assist with management       HLD (hyperlipidemia)  Assessment & Plan  Restarted on home Lipitor 20 mg QPM    CKD (chronic kidney disease) stage 3, GFR 30-59 ml/min (HCC)  Assessment & Plan  Stable  Avoid nephrotoxic agents   Monitor 2x/week    Type 2 diabetes mellitus [facility])  Assessment & Plan  Lab Results Component Value Date    HGBA1C 7 6 (H) 10/12/2022     At home: Levemir 20 units QHS, Humalog 10 units with meals  Here: Levemir 13 units, Lispro 5 units TID with meals, continue sliding scale insulin  In acute care required an insulin drip transiently  Accuchecks  Diabetic Diet  Education  IM following    UTI (urinary tract infection)-resolved as of 10/31/2022  Assessment & Plan  Resolved  + UA  Urine culture: >100K Klebsiella  Symptomatic with urinary frequency, new encephalopathy and hallucinations, dysuria  · Completed treatment with Bactrim which was sensitive        Health Maintenance  #Bowel: Last BM 11/4  On docusate BID  #Bladder: Voiding and better continence   #Skin/Pressure Injury Prevention: Turn Q2hr in bed, with weight shifts [id number] in wheelchair  #DVT Prophylaxis: Lovenox, SCDs  #GI Prophylaxis: PO diet  #Code Status: Full Code  #FEN: Diabetic diet, with glucerna at lunch  Received 1L of normal saline 10/28  #Dispo: Team 11/1: Likely SNF  ADD 11/8  Family unable to provide assistance  Needs to be modified Ind to return home  Objective:    Functional Update:  PT: CS-GA bed mobility, min-modA transfers, Mary ambulation 35'   OT: mod-maxA ADLs - had an episode of dizziness  Allergies per EMR    Physical Exam:  Temp:  [97 9 °F (36 6 °C)] 97 9 °F (36 6 °C)  HR:  [65-74] 65  Resp:  [17-18] 17  BP: (152-172)/(67-78) 169/70  Oxygen Therapy  SpO2: 96 %    Gen: No acute distress, Well-nourished, well-appearing  HEENT: Moist mucus membranes, Normocephalic/Atraumatic  Cardiovascular: Regular rate, rhythm, S1/S2  Distal pulses palpable  Heme/Extr: No edema  Pulmonary: Non-labored breathing  Lungs CTAB  : No hernandez  GI: Soft, non-tender, non-distended  Integumentary: Skin is warm, dry  Neuro: AAOx3, Speech is intact  Appropriate to questioning  Tone is normal    Psych: Normal mood and affect  Diagnostic Studies: Reviewed, no new imaging  Laboratory: Reviewed     Results from last 7 days Lab Units 10/31/22  0615   HEMOGLOBIN g/dL 10 8*   HEMATOCRIT % 35 3   WBC Thousand/uL 17 92*     Results from last 7 days   Lab Units 10/31/22  0615   BUN mg/dL 23   POTASSIUM mmol/L 4 2   CHLORIDE mmol/L 108   CREATININE mg/dL 0 84            Patient Active Problem List   Diagnosis   • Fall   • Type 2 diabetes mellitus ([facility] [address] )   • CKD (chronic kidney disease) stage 3, GFR 30-59 ml/min ([facility])   • HLD (hyperlipidemia)   • OA (osteoarthritis)   • Chronic systolic heart failure ([facility])   • Lymphadenopathy   • Elevated liver enzymes   • Ambulatory dysfunction   • Suspected Mild cognitive impairment   • Hypertension   • Intertrochanteric fracture of right femur ([facility])   • Displaced fracture of middle phalanx of left ring finger, initial encounter for closed fracture   • CLL (chronic lymphocytic leukemia) ([facility])   • Acute pain   • Orthostasis   • H/O dizziness   • Deep tissue injury   • Encephalopathy   • Acute postoperative pain of right knee         Medications  Current Facility-Administered Medications   Medication Dose Route Frequency Provider Last Rate   • acetaminophen  325 mg Oral Q6H PRN Tiffanie Preciado MD     • acetaminophen  650 mg Oral TID AC Yanely Ramos MD     • atorvastatin  20 mg Oral Daily With Juan Winn MD     • bisacodyl  10 mg Rectal Daily PRN Tiffanie Preciado MD     • Diclofenac Sodium  2 g Topical TID Juliane Rose MD     • docusate sodium  100 mg Oral BID Tiffanie Preciado MD     • enoxaparin  40 mg Subcutaneous Q24H Davide Way MD     • gabapentin  100 mg Oral Q12H Shanel Pink MD     • hydrocortisone   Topical 4x Daily PRN Tiffanie Preciado MD     • insulin detemir  13 Units Subcutaneous HS Adeola Jennings PA-C     • insulin lispro  1-5 Units Subcutaneous HS Tiffanie Preciado MD     • insulin lispro  1-6 Units Subcutaneous TID AC Tiffanie Preciado MD     • insulin lispro  5 Units Subcutaneous TID With Meals BRINDA Cruz     • lidocaine  2 patch Topical Daily Fay Parra MD     • [START ON 11/5/2022] losartan  50 mg Oral Daily BRINDA Landin     • meclizine  12 5 mg Oral Q8H PRN Fay Parra MD     • meclizine  12 5 mg Oral Q12H PRN Regulo Carlson MD     • melatonin  6 mg Oral QPM Regulo Carlson MD     • methocarbamol  250 mg Oral Q6H PRN Regulo Carlson MD     • metoprolol tartrate  25 mg Oral Q12H [facility] BRINDA Kramer     • naloxone  0 04 mg Intravenous Q1MIN PRN Fay Parra MD     • polyethylene glycol  17 g Oral Daily PRN Fay Parra MD     • QUEtiapine  12 5 mg Oral QPM Regulo Carlson MD            ** Please Note: Fluency Direct voice to text software may have been used in the creation of this document   **

## 2022-11-05 LAB
GLUCOSE SERPL-MCNC: 114 MG/DL (ref 65–140)
GLUCOSE SERPL-MCNC: 159 MG/DL (ref 65–140)
GLUCOSE SERPL-MCNC: 231 MG/DL (ref 65–140)
GLUCOSE SERPL-MCNC: 257 MG/DL (ref 65–140)

## 2022-11-05 RX ADMIN — GABAPENTIN 100 MG: 100 CAPSULE ORAL at 21:32

## 2022-11-05 RX ADMIN — Medication 6 MG: at 21:31

## 2022-11-05 RX ADMIN — DOCUSATE SODIUM 100 MG: 100 CAPSULE, LIQUID FILLED ORAL at 08:04

## 2022-11-05 RX ADMIN — ACETAMINOPHEN 650 MG: 325 TABLET ORAL at 21:32

## 2022-11-05 RX ADMIN — METHOCARBAMOL 250 MG: 500 TABLET ORAL at 21:31

## 2022-11-05 RX ADMIN — INSULIN LISPRO 1 UNITS: 100 INJECTION, SOLUTION INTRAVENOUS; SUBCUTANEOUS at 11:21

## 2022-11-05 RX ADMIN — DICLOFENAC SODIUM 2 G: 10 GEL TOPICAL at 16:30

## 2022-11-05 RX ADMIN — INSULIN LISPRO 5 UNITS: 100 INJECTION, SOLUTION INTRAVENOUS; SUBCUTANEOUS at 07:16

## 2022-11-05 RX ADMIN — DICLOFENAC SODIUM 2 G: 10 GEL TOPICAL at 21:32

## 2022-11-05 RX ADMIN — QUETIAPINE FUMARATE 12.5 MG: 25 TABLET ORAL at 21:31

## 2022-11-05 RX ADMIN — INSULIN LISPRO 2 UNITS: 100 INJECTION, SOLUTION INTRAVENOUS; SUBCUTANEOUS at 21:33

## 2022-11-05 RX ADMIN — METOPROLOL TARTRATE 25 MG: 25 TABLET, FILM COATED ORAL at 08:04

## 2022-11-05 RX ADMIN — LOSARTAN POTASSIUM 50 MG: 50 TABLET, FILM COATED ORAL at 08:04

## 2022-11-05 RX ADMIN — ENOXAPARIN SODIUM 40 MG: 40 INJECTION SUBCUTANEOUS at 08:04

## 2022-11-05 RX ADMIN — ACETAMINOPHEN 650 MG: 325 TABLET ORAL at 06:00

## 2022-11-05 RX ADMIN — DOCUSATE SODIUM 100 MG: 100 CAPSULE, LIQUID FILLED ORAL at 17:25

## 2022-11-05 RX ADMIN — LIDOCAINE 5% 2 PATCH: 700 PATCH TOPICAL at 08:04

## 2022-11-05 RX ADMIN — INSULIN LISPRO 5 UNITS: 100 INJECTION, SOLUTION INTRAVENOUS; SUBCUTANEOUS at 11:20

## 2022-11-05 RX ADMIN — ATORVASTATIN CALCIUM 20 MG: 20 TABLET, FILM COATED ORAL at 16:29

## 2022-11-05 RX ADMIN — INSULIN LISPRO 3 UNITS: 100 INJECTION, SOLUTION INTRAVENOUS; SUBCUTANEOUS at 16:29

## 2022-11-05 RX ADMIN — ACETAMINOPHEN 650 MG: 325 TABLET ORAL at 14:12

## 2022-11-05 RX ADMIN — INSULIN DETEMIR 15 UNITS: 100 INJECTION, SOLUTION SUBCUTANEOUS at 21:31

## 2022-11-05 RX ADMIN — METOPROLOL TARTRATE 25 MG: 25 TABLET, FILM COATED ORAL at 21:32

## 2022-11-05 RX ADMIN — DICLOFENAC SODIUM 2 G: 10 GEL TOPICAL at 08:05

## 2022-11-05 RX ADMIN — INSULIN LISPRO 5 UNITS: 100 INJECTION, SOLUTION INTRAVENOUS; SUBCUTANEOUS at 16:29

## 2022-11-05 NOTE — PROGRESS NOTES
Internal Medicine Progress Note  Patient: Jaime Mcpherson  Age/sex: 79 y o  female  Medical Record #: [id number]      ASSESSMENT/PLAN: (Interval History)  Jaime Mcpherson is seen and examined and management for following issues:    S/p ORIF with IM nailing of the Rt femur  · Pain control and therapy per primary service  · PMR adjusted pain meds d/t sedation  · Renal dosed Lovenox for DVT prophylaxis  · Follow-up with Ortho as scheduled     Left 4th phalanx fracture  · Splint and NWB   · Keeps removing splint     HTN  · Home: Hyzaar 50/12 5mg qd/Lopressor 25mg q12 hours/Lasix 40mg qd (for heart failure)  · Here: Lopressor 25mg BID/losartan 50mg daily  · Trend elevated increase losartan to 50mg (11/4)  · Monitor through today with increase in dose if unchanged will titrate again tomorrow     Anemia  · Secondary to trauma  · Stable      DM type 2  · Home: Levemir 20U at bedtime/Humalog 10U with meals  · Here: Levemir 15U at bedtime/Humalog 5U tid  · BS trending upward increase levemir as above      Chronic systolic/diastolic heart failure  · Pt has been off diuretics  · ECHO with an EF of 45%  Grade 2 diastolic dysfunction  · euvolemic     CLL  · Recent diagnosis  · Outpt follow-up with Hematology/Oncology  · Baseline WBCs 14-16K  · Had mild bump likely reactive secondary to surgery/UTI  · Stable      Suspected UTI  · Had positive UA  Culture with >100,000 Klebsiella  · s/p Bactrim  · Urinary retention improved with tx of UTI     Delirium  · Intermittent confusion and hallucinations  · Seroquel caused increased daytime fatigue and was stopped but restarted due to hallucinations  DC planning: TBD    The above assessment and plan was reviewed and updated as determined by my evaluation of the patient on 11/5/2022      Labs:   Results from last 7 days   Lab Units 10/31/22  0615   WBC Thousand/uL 17 92*   HEMOGLOBIN g/dL 10 8*   HEMATOCRIT % 35 3   PLATELETS Thousands/uL 347     Results from last 7 days   Lab Units 10/31/22  0615   SODIUM mmol/L 138   POTASSIUM mmol/L 4 2   CHLORIDE mmol/L 108   CO2 mmol/L 27   BUN mg/dL 23   CREATININE mg/dL 0 84   CALCIUM mg/dL 9 6             Results from last 7 days   Lab Units 11/05/22  0603 11/04/22 2050 11/04/22  1604   POC GLUCOSE mg/dl 114 239* 251*       Review of Scheduled Meds:  Current Facility-Administered Medications   Medication Dose Route Frequency Provider Last Rate   • acetaminophen  325 mg Oral Q6H PRN Sander Nelson MD     • acetaminophen  650 mg Oral TID AC Sander Nelson MD     • atorvastatin  20 mg Oral Daily With Alisha Mahan MD     • bisacodyl  10 mg Rectal Daily PRN Sander Nelson MD     • Diclofenac Sodium  2 g Topical TID Amanda Moreno MD     • docusate sodium  100 mg Oral BID Sander Nelson MD     • enoxaparin  40 mg Subcutaneous Q24H [address] Sander Nelson MD     • gabapentin  100 mg Oral HS Amanda Moreno MD     • hydrocortisone   Topical 4x Daily PRN Sander Nelson MD     • insulin detemir  13 Units Subcutaneous HS Adeola Jennings PA-C     • insulin lispro  1-5 Units Subcutaneous HS Sander Nelson MD     • insulin lispro  1-6 Units Subcutaneous TID AC Sander Nelson MD     • insulin lispro  5 Units Subcutaneous TID With Meals BRINDA Elena     • lidocaine  2 patch Topical Daily Sander eNlson MD     • losartan  50 mg Oral Daily BRINDA Elena     • meclizine  12 5 mg Oral Q8H PRN Sander Nelson MD     • meclizine  12 5 mg Oral Q12H PRN Amanda Moreno MD     • melatonin  6 mg Oral QPM Amanda Moreno MD     • methocarbamol  250 mg Oral Q6H PRN Amanda Moreno MD     • metoprolol tartrate  25 mg Oral Q12H [address] BRINDA Kramer     • naloxone  0 04 mg Intravenous Q1MIN PRN Sander Nelson MD     • polyethylene glycol  17 g Oral Daily PRN Sander Nelson MD     • QUEtiapine  12 5 mg Oral QPM Amanda Moreno MD         Subjective/ HPI: Patient seen and examined   Patients overnight issues or events were reviewed with nursing or staff during rounds or morning huddle session  New or overnight issues include the following:     Pt seen in her room  Having some intermittent confusion this am, no complaints overnight  ROS:   A 10 point ROS was performed; negative except as noted above  *Labs /Radiology studies reviewed  *Medications reviewed and reconciled as needed  *Please refer to order section for additional ordered labs studies  *Case discussed with primary attending during morning huddle case rounds    Physical Examination:  Vitals:   Vitals:    11/04/22 2019 11/04/22 2117 11/05/22 0559 11/05/22 0600   BP: 166/73 144/62 166/70    BP Location: Left arm Left arm Left arm    Pulse: 72  66    Resp: 20  20    Temp: 98 8 °F (37 1 °C)  98 3 °F (36 8 °C)    TempSrc: Oral  Oral    SpO2: 95%  96%    Weight:    64 2 kg (141 lb 8 6 oz)   Height:         GEN: No apparent distress, interactive; frail  NEURO: Alert and oriented x2; confused this am  HEENT: Pupils are equal and reactive, EOMI, mucous membranes are moist, face symmetrical  CV: S1 S2 regular, no MRG, no peripheral edema noted  RESP: Lungs are clear bilaterally, no wheezes, rales or rhonchi noted, on room air, respirations easy and non labored  GI: Flat, soft non tender, non distended; +BS x4  : Voiding without difficulty; incontinent overnight  MUSC: Moves all extremities; except RLE WBAT; splint to 4th finger on left hand NWB  SKIN: pink, warm and dry, poor turgor, no rashes, lesions; incision intact      The above physical exam was reviewed and updated as determined by my evaluation of the patient on 11/5/2022  Invasive Devices  Report    Drain  Duration           External Urinary Catheter 2 days                   VTE Pharmacologic Prophylaxis: Enoxaparin  Code Status: Level 1 - Full Code  Current Length of Stay: 19 day(s)      Total time spent:  30 minutes with more than 50% spent counseling/coordinating care  Counseling includes discussion with patient re: progress  and discussion with patient of his/her current medical state/information  Coordination of patient's care was performed in conjunction with primary service  Time invested included review of patient's labs, vitals, and management of their comorbidities with continued monitoring  In addition, this patient was discussed with medical team including physician and advanced extenders  The care of the patient was extensively discussed and appropriate treatment plan was formulated unique for this patient  Medical decision making for the day was made by supervising physician unless otherwise noted in their attestation statement  ** Please Note:  voice to text software may have been used in the creation of this document   Although proof errors in transcription or interpretation are a potential of such software**

## 2022-11-05 NOTE — PLAN OF CARE
Problem: Prexisting or High Potential for Compromised Skin Integrity  Goal: Skin integrity is maintained or improved  Description: INTERVENTIONS:  - Identify patients at risk for skin breakdown  - Assess and monitor skin integrity  - Assess and monitor nutrition and hydration status  - Monitor labs   - Assess for incontinence   - Turn and reposition patient  - Assist with mobility/ambulation  - Relieve pressure over bony prominences  - Avoid friction and shearing  - Provide appropriate hygiene as needed including keeping skin clean and dry  - Evaluate need for skin moisturizer/barrier cream  - Collaborate with interdisciplinary team   - Patient/family teaching  - Consider wound care consult   Outcome: Progressing     Problem: PAIN - ADULT  Goal: Verbalizes/displays adequate comfort level or baseline comfort level  Description: Interventions:  - Encourage patient to monitor pain and request assistance  - Assess pain using appropriate pain scale  - Administer analgesics based on type and severity of pain and evaluate response  - Implement non-pharmacological measures as appropriate and evaluate response  - Consider cultural and social influences on pain and pain management  - Notify physician/advanced practitioner if interventions unsuccessful or patient reports new pain  Outcome: Progressing     Problem: INFECTION - ADULT  Goal: Absence or prevention of progression during hospitalization  Description: INTERVENTIONS:  - Assess and monitor for signs and symptoms of infection  - Monitor lab/diagnostic results  - Monitor all insertion sites, i e  indwelling lines, tubes, and drains  - Monitor endotracheal if appropriate and nasal secretions for changes in amount and color  - [place] appropriate cooling/warming therapies per order  - Administer medications as ordered  - Instruct and encourage patient and family to use good hand hygiene technique  - Identify and instruct in appropriate isolation precautions for identified infection/condition  Outcome: Progressing  Goal: Absence of fever/infection during neutropenic period  Description: INTERVENTIONS:  - Monitor WBC    Outcome: Progressing     Problem: SAFETY ADULT  Goal: Patient will remain free of falls  Description: INTERVENTIONS:  - Educate patient/family on patient safety including physical limitations  - Instruct patient to call for assistance with activity   - Consult OT/PT to assist with strengthening/mobility   - Keep Call bell within reach  - Keep bed low and locked with side rails adjusted as appropriate  - Keep care items and personal belongings within reach  - Initiate and maintain comfort rounds  - Make Fall Risk Sign visible to staff  - Offer Toileting every  Hours, in advance of need  - Initiate/Maintain alarm  - Obtain necessary fall risk management equipment:   - Apply yellow socks and bracelet for high fall risk patients  - Consider moving patient to room near nurses station  Outcome: Progressing  Goal: Maintain or return to baseline ADL function  Description: INTERVENTIONS:  -  Assess patient's ability to carry out ADLs; assess patient's baseline for ADL function and identify physical deficits which impact ability to perform ADLs (bathing, care of mouth/teeth, toileting, grooming, dressing, etc )  - Assess/evaluate cause of self-care deficits   - Assess range of motion  - Assess patient's mobility; develop plan if impaired  - Assess patient's need for assistive devices and provide as appropriate  - Encourage maximum independence but intervene and supervise when necessary  - Involve family in performance of ADLs  - Assess for home care needs following discharge   - Consider OT consult to assist with ADL evaluation and planning for discharge  - Provide patient education as appropriate  Outcome: Progressing  Goal: Maintains/Returns to pre admission functional level  Description: INTERVENTIONS:  - Perform BMAT or MOVE assessment daily    - Set and communicate daily mobility goal to care team and patient/family/caregiver  - Collaborate with rehabilitation services on mobility goals if consulted  - Perform Range of Motion  times a day  - Reposition patient every hours    - Dangle patient times a day  - Stand patient  times a day  - Ambulate patient times a day  - Out of bed to chair times a day   - Out of bed for meals  times a day  - Out of bed for toileting  - Record patient progress and toleration of activity level   Outcome: Progressing     Problem: DISCHARGE PLANNING  Goal: Discharge to home or other facility with appropriate resources  Description: INTERVENTIONS:  - Identify barriers to discharge w/patient and caregiver  - Arrange for needed discharge resources and transportation as appropriate  - Identify discharge learning needs (meds, wound care, etc )  - Arrange for interpretive services to assist at discharge as needed  - Refer to Case Management Department for coordinating discharge planning if the patient needs post-hospital services based on physician/advanced practitioner order or complex needs related to functional status, cognitive ability, or social support system  Outcome: Progressing     Problem: Potential for Falls  Goal: Patient will remain free of falls  Description: INTERVENTIONS:  - Educate patient/family on patient safety including physical limitations  - Instruct patient to call for assistance with activity   - Consult OT/PT to assist with strengthening/mobility   - Keep Call bell within reach  - Keep bed low and locked with side rails adjusted as appropriate  - Keep care items and personal belongings within reach  - Initiate and maintain comfort rounds  - Make Fall Risk Sign visible to staff  - Offer Toileting every  Hours, in advance of need  - Initiate/Maintain alarm  - Obtain necessary fall risk management equipment:   - Apply yellow socks and bracelet for high fall risk patients  - Consider moving patient to room near nurses station  Outcome: Progressing     Problem: Nutrition/Hydration-ADULT  Goal: Nutrient/Hydration intake appropriate for improving, restoring or maintaining nutritional needs  Description: Monitor and assess patient's nutrition/hydration status for malnutrition  Collaborate with interdisciplinary team and initiate plan and interventions as ordered  Monitor patient's weight and dietary intake as ordered or per policy  Utilize nutrition screening tool and intervene as necessary  Determine patient's food preferences and provide high-protein, high-caloric foods as appropriate       INTERVENTIONS:  - Monitor oral intake, urinary output, labs, and treatment plans  - Assess nutrition and hydration status and recommend course of action  - Evaluate amount of meals eaten  - Assist patient with eating if necessary   - Allow adequate time for meals  - Recommend/ encourage appropriate diets, oral nutritional supplements, and vitamin/mineral supplements  - Order, calculate, and assess calorie counts as needed  - Recommend, monitor, and adjust tube feedings and TPN/PPN based on assessed needs  - Assess need for intravenous fluids  - Provide specific nutrition/hydration education as appropriate  - Include patient/family/caregiver in decisions related to nutrition  Outcome: Progressing     Problem: MOBILITY - ADULT  Goal: Maintain or return to baseline ADL function  Description: INTERVENTIONS:  -  Assess patient's ability to carry out ADLs; assess patient's baseline for ADL function and identify physical deficits which impact ability to perform ADLs (bathing, care of mouth/teeth, toileting, grooming, dressing, etc )  - Assess/evaluate cause of self-care deficits   - Assess range of motion  - Assess patient's mobility; develop plan if impaired  - Assess patient's need for assistive devices and provide as appropriate  - Encourage maximum independence but intervene and supervise when necessary  - Involve family in performance of ADLs  - Assess for home care needs following discharge   - Consider OT consult to assist with ADL evaluation and planning for discharge  - Provide patient education as appropriate  Outcome: Progressing  Goal: Maintains/Returns to pre admission functional level  Description: INTERVENTIONS:  - Perform BMAT or MOVE assessment daily    - Set and communicate daily mobility goal to care team and patient/family/caregiver  - Collaborate with rehabilitation services on mobility goals if consulted  - Perform Range of Motion  times a day  - Reposition patient every  hours    - Dangle patient  times a day  - Stand patient  times a day  - Ambulate patient times a day  - Out of bed to chair  times a day   - Out of bed for meals  times a day  - Out of bed for toileting  - Record patient progress and toleration of activity level   Outcome: Progressing

## 2022-11-05 NOTE — PROGRESS NOTES
11/05/22 1230   Pain Assessment   Pain Assessment Tool 0-10   Pain Score No Pain   Pain Location/Orientation Orientation: Right;Location: Leg   Restrictions/Precautions   Precautions Bed/chair alarms;Cognitive; Fall Risk;Pain;Supervision on toilet/commode;Visual deficit   Weight Bearing Restrictions Yes   LUE Weight Bearing Per Order NWB  (L hand)   RLE Weight Bearing Per Order WBAT   ROM Restrictions No   Braces or Orthoses Splint  (L knee brace)   Cognition   Overall Cognitive Status Impaired   Arousal/Participation Alert; Cooperative   Orientation Level Oriented to person;Oriented to place;Oriented to situation   Subjective   Subjective Patient reported she is ready for skilled PT   Roll Left and Right   Type of Assistance Needed Physical assistance   Physical Assistance Level 25% or less   Comment Increased difficulty rolling to R side due to pain   Roll Left and Right CARE Score 3   Sit to Lying   Type of Assistance Needed Physical assistance   Physical Assistance Level 25% or less   Sit to Lying CARE Score 3   Lying to Sitting on Side of Bed   Type of Assistance Needed Physical assistance   Physical Assistance Level 26%-50%   Lying to Sitting on Side of Bed CARE Score 3   Sit to Stand   Type of Assistance Needed Physical assistance;Verbal cues; Stewart Pierre Part / Anell Carissa; Adaptive equipment   Physical Assistance Level 26%-50%   Comment VC's for hand placement   Sit to Stand CARE Score 3   Bed-Chair Transfer   Type of Assistance Needed Physical assistance   Physical Assistance Level 51%-75%   Comment Platform walker, Verbal cuing needed   Chair/Bed-to-Chair Transfer CARE Score 2   Walk 10 Feet   Type of Assistance Needed Physical assistance   Physical Assistance Level 26%-50%   Comment min to mod assistance with chair follow, increase in scissoring with increased fatigue   Walk 10 Feet CARE Score 3   Walk 50 Feet with Two Turns   Type of Assistance Needed Physical assistance   Physical Assistance Level 26%-50%   Comment min to mod assistance with chair follow, increase in scissoring with increased fatigue   Walk 50 Feet with Two Turns CARE Score 3   Walk 150 Feet   Reason if not Attempted Safety concerns   Walk 150 Feet CARE Score 88   Walking 10 Feet on Uneven Surfaces   Reason if not Attempted Safety concerns   Walking 10 Feet on Uneven Surfaces CARE Score 88   Ambulation   Does the patient walk? 2  Yes   Primary Mode of Locomotion Prior to Admission Walk   Distance Walked (feet) 70 ft  (25, 25, 20)   Assist Device Platform;Roller Walker   Gait Pattern Ataxic; Inconsistant Mandi;Decreased foot clearance; Slow Mandi; Forward Flexion;Narrow WOOD;Scissoring;Shuffle;Decreased R stance; Improper weight shift   Limitations Noted In Balance; Coordination;Device Management; Endurance; Heel Strike;Midline Orientation;Posture; Safety;Speed;Strength; Sequencing   Provided Assistance with: Balance;Direction;Trunk Support;Weight Shift   Walk Assist Level Moderate Assist;Chair Follow   Therapeutic Interventions   Strengthening Seated for all, completed bilaterally, 20 reps, 3 second holds each: Hip abduction with yellow band, Hip adduction, LAQ   Flexibility Seated bilateral LE stretching within precautions- bilateral hamstring and bilateral gastroc stretchng- 10 minutes bilaterally   Balance Standing at window sill abiding by precautions- 10'   Neuromuscular Re-Education Seated core rotations- flexion/extension, D2 flexion,   Other Transfers, mobility, ambulation, and functional activities per objective   Equipment Use   NuStep Level 1 for 10 minutes, used for endurance training, R UE and bilateral LE   Assessment   Treatment Assessment Patient tolerating session well, walking a combined total of 70 feet today per objective  Patient demonstrates challenges with ambulation with platform walker and negotiation of her environment, leans to the L side and changes her gait pattern to more of a scissoring gait pattern with fatigue   Session focused on strengthening and ambulation endurance/tolerance  Patient requires skilled PT to maximize function  Family/Caregiver Present No   PT Family training done with: No   Problem List Decreased strength;Decreased endurance; Impaired balance;Decreased mobility; Decreased coordination;Decreased cognition; Impaired judgement;Decreased safety awareness;Orthopedic restrictions;Pain; Impaired vision   Barriers to Discharge Inaccessible home environment;Decreased caregiver support   PT Barriers   Physical Impairment Decreased strength;Decreased range of motion;Decreased endurance; Impaired balance;Decreased cognition; Impaired judgement;Decreased safety awareness; Impaired vision;Orthopedic restrictions;Pain;Decreased skin integrity   Functional Limitation Car transfers; Ramp negotiation;Stair negotiation;Standing;Transfers; Walking; Wheelchair management   Plan   Treatment/Interventions ADL retraining;Functional transfer training; Therapeutic exercise; Endurance training;Cognitive reorientation;Patient/family training;Equipment eval/education   Progress Progressing toward goals   PT Therapy Minutes   PT Time In 1230   PT Time Out 1400   PT Total Time (minutes) 90   PT Mode of treatment - Individual (minutes) 90   PT Mode of treatment - Concurrent (minutes) 0   PT Mode of treatment - Group (minutes) 0   PT Mode of treatment - Co-treat (minutes) 0   PT Mode of Treatment - Total time(minutes) 90 minutes   PT Cumulative Minutes 1695   Therapy Time missed   Time missed?  No

## 2022-11-06 LAB
GLUCOSE SERPL-MCNC: 139 MG/DL (ref 65–140)
GLUCOSE SERPL-MCNC: 183 MG/DL (ref 65–140)
GLUCOSE SERPL-MCNC: 192 MG/DL (ref 65–140)
GLUCOSE SERPL-MCNC: 303 MG/DL (ref 65–140)

## 2022-11-06 RX ADMIN — LOSARTAN POTASSIUM 50 MG: 50 TABLET, FILM COATED ORAL at 09:17

## 2022-11-06 RX ADMIN — DICLOFENAC SODIUM 2 G: 10 GEL TOPICAL at 07:33

## 2022-11-06 RX ADMIN — Medication 6 MG: at 19:13

## 2022-11-06 RX ADMIN — INSULIN LISPRO 5 UNITS: 100 INJECTION, SOLUTION INTRAVENOUS; SUBCUTANEOUS at 07:33

## 2022-11-06 RX ADMIN — INSULIN LISPRO 2 UNITS: 100 INJECTION, SOLUTION INTRAVENOUS; SUBCUTANEOUS at 16:24

## 2022-11-06 RX ADMIN — QUETIAPINE FUMARATE 12.5 MG: 25 TABLET ORAL at 19:13

## 2022-11-06 RX ADMIN — LIDOCAINE 5% 2 PATCH: 700 PATCH TOPICAL at 09:17

## 2022-11-06 RX ADMIN — GABAPENTIN 100 MG: 100 CAPSULE ORAL at 21:09

## 2022-11-06 RX ADMIN — INSULIN LISPRO 5 UNITS: 100 INJECTION, SOLUTION INTRAVENOUS; SUBCUTANEOUS at 16:24

## 2022-11-06 RX ADMIN — DOCUSATE SODIUM 100 MG: 100 CAPSULE, LIQUID FILLED ORAL at 19:13

## 2022-11-06 RX ADMIN — ATORVASTATIN CALCIUM 20 MG: 20 TABLET, FILM COATED ORAL at 16:24

## 2022-11-06 RX ADMIN — METOPROLOL TARTRATE 25 MG: 25 TABLET, FILM COATED ORAL at 21:09

## 2022-11-06 RX ADMIN — INSULIN LISPRO 1 UNITS: 100 INJECTION, SOLUTION INTRAVENOUS; SUBCUTANEOUS at 12:02

## 2022-11-06 RX ADMIN — ACETAMINOPHEN 650 MG: 325 TABLET ORAL at 19:13

## 2022-11-06 RX ADMIN — INSULIN LISPRO 5 UNITS: 100 INJECTION, SOLUTION INTRAVENOUS; SUBCUTANEOUS at 12:02

## 2022-11-06 RX ADMIN — ACETAMINOPHEN 325 MG: 325 TABLET ORAL at 14:35

## 2022-11-06 RX ADMIN — ENOXAPARIN SODIUM 40 MG: 40 INJECTION SUBCUTANEOUS at 09:17

## 2022-11-06 RX ADMIN — INSULIN DETEMIR 20 UNITS: 100 INJECTION, SOLUTION SUBCUTANEOUS at 21:09

## 2022-11-06 RX ADMIN — DICLOFENAC SODIUM 2 G: 10 GEL TOPICAL at 16:24

## 2022-11-06 RX ADMIN — ACETAMINOPHEN 650 MG: 325 TABLET ORAL at 07:33

## 2022-11-06 RX ADMIN — DICLOFENAC SODIUM 2 G: 10 GEL TOPICAL at 21:09

## 2022-11-06 RX ADMIN — METOPROLOL TARTRATE 25 MG: 25 TABLET, FILM COATED ORAL at 09:17

## 2022-11-06 RX ADMIN — DOCUSATE SODIUM 100 MG: 100 CAPSULE, LIQUID FILLED ORAL at 09:17

## 2022-11-06 RX ADMIN — INSULIN LISPRO 3 UNITS: 100 INJECTION, SOLUTION INTRAVENOUS; SUBCUTANEOUS at 21:09

## 2022-11-06 RX ADMIN — ACETAMINOPHEN 650 MG: 325 TABLET ORAL at 13:28

## 2022-11-06 NOTE — PLAN OF CARE
Problem: Prexisting or High Potential for Compromised Skin Integrity  Goal: Skin integrity is maintained or improved  Description: INTERVENTIONS:  - Identify patients at risk for skin breakdown  - Assess and monitor skin integrity  - Assess and monitor nutrition and hydration status  - Monitor labs   - Assess for incontinence   - Turn and reposition patient  - Assist with mobility/ambulation  - Relieve pressure over bony prominences  - Avoid friction and shearing  - Provide appropriate hygiene as needed including keeping skin clean and dry  - Evaluate need for skin moisturizer/barrier cream  - Collaborate with interdisciplinary team   - Patient/family teaching  - Consider wound care consult   Outcome: Progressing     Problem: PAIN - ADULT  Goal: Verbalizes/displays adequate comfort level or baseline comfort level  Description: Interventions:  - Encourage patient to monitor pain and request assistance  - Assess pain using appropriate pain scale  - Administer analgesics based on type and severity of pain and evaluate response  - Implement non-pharmacological measures as appropriate and evaluate response  - Consider cultural and social influences on pain and pain management  - Notify physician/advanced practitioner if interventions unsuccessful or patient reports new pain  Outcome: Progressing     Problem: INFECTION - ADULT  Goal: Absence or prevention of progression during hospitalization  Description: INTERVENTIONS:  - Assess and monitor for signs and symptoms of infection  - Monitor lab/diagnostic results  - Monitor all insertion sites, i e  indwelling lines, tubes, and drains  - Monitor endotracheal if appropriate and nasal secretions for changes in amount and color  - [place]cl[place] appropriate cooling/warming therapies per order  - Administer medications as ordered  - Instruct and encourage patient and family to use good hand hygiene technique  - Identify and instruct in appropriate isolation precautions for identified infection/condition  Outcome: Progressing  Goal: Absence of fever/infection during neutropenic period  Description: INTERVENTIONS:  - Monitor WBC    Outcome: Progressing     Problem: SAFETY ADULT  Goal: Patient will remain free of falls  Description: INTERVENTIONS:  - Educate patient/family on patient safety including physical limitations  - Instruct patient to call for assistance with activity   - Consult OT/PT to assist with strengthening/mobility   - Keep Call bell within reach  - Keep bed low and locked with side rails adjusted as appropriate  - Keep care items and personal belongings within reach  - Initiate and maintain comfort rounds  - Make Fall Risk Sign visible to staff  - Offer Toileting every Hours, in advance of need  - Initiate/Maintain alarm  - Obtain necessary fall risk management equipment:   - Apply yellow socks and bracelet for high fall risk patients  - Consider moving patient to room near nurses station  Outcome: Progressing  Goal: Maintain or return to baseline ADL function  Description: INTERVENTIONS:  -  Assess patient's ability to carry out ADLs; assess patient's baseline for ADL function and identify physical deficits which impact ability to perform ADLs (bathing, care of mouth/teeth, toileting, grooming, dressing, etc )  - Assess/evaluate cause of self-care deficits   - Assess range of motion  - Assess patient's mobility; develop plan if impaired  - Assess patient's need for assistive devices and provide as appropriate  - Encourage maximum independence but intervene and supervise when necessary  - Involve family in performance of ADLs  - Assess for home care needs following discharge   - Consider OT consult to assist with ADL evaluation and planning for discharge  - Provide patient education as appropriate  Outcome: Progressing  Goal: Maintains/Returns to pre admission functional level  Description: INTERVENTIONS:  - Perform BMAT or MOVE assessment daily    - Set and communicate daily mobility goal to care team and patient/family/caregiver  - Collaborate with rehabilitation services on mobility goals if consulted  - Perform Range of Motion  times a day  - Reposition patient every  hours    - Dangle patient  times a day  - Stand patient  times a day  - Ambulate patient  times a day  - Out of bed to chair  times a day   - Out of bed for meals times a day  - Out of bed for toileting  - Record patient progress and toleration of activity level   Outcome: Progressing     Problem: DISCHARGE PLANNING  Goal: Discharge to home or other facility with appropriate resources  Description: INTERVENTIONS:  - Identify barriers to discharge w/patient and caregiver  - Arrange for needed discharge resources and transportation as appropriate  - Identify discharge learning needs (meds, wound care, etc )  - Arrange for interpretive services to assist at discharge as needed  - Refer to Case Management Department for coordinating discharge planning if the patient needs post-hospital services based on physician/advanced practitioner order or complex needs related to functional status, cognitive ability, or social support system  Outcome: Progressing     Problem: Potential for Falls  Goal: Patient will remain free of falls  Description: INTERVENTIONS:  - Educate patient/family on patient safety including physical limitations  - Instruct patient to call for assistance with activity   - Consult OT/PT to assist with strengthening/mobility   - Keep Call bell within reach  - Keep bed low and locked with side rails adjusted as appropriate  - Keep care items and personal belongings within reach  - Initiate and maintain comfort rounds  - Make Fall Risk Sign visible to staff  - Offer Toileting every  Hours, in advance of need  - Initiate/Maintain alarm  - Obtain necessary fall risk management equipment:   - Apply yellow socks and bracelet for high fall risk patients  - Consider moving patient to room near nurses station  Outcome: Progressing     Problem: Nutrition/Hydration-ADULT  Goal: Nutrient/Hydration intake appropriate for improving, restoring or maintaining nutritional needs  Description: Monitor and assess patient's nutrition/hydration status for malnutrition  Collaborate with interdisciplinary team and initiate plan and interventions as ordered  Monitor patient's weight and dietary intake as ordered or per policy  Utilize nutrition screening tool and intervene as necessary  Determine patient's food preferences and provide high-protein, high-caloric foods as appropriate       INTERVENTIONS:  - Monitor oral intake, urinary output, labs, and treatment plans  - Assess nutrition and hydration status and recommend course of action  - Evaluate amount of meals eaten  - Assist patient with eating if necessary   - Allow adequate time for meals  - Recommend/ encourage appropriate diets, oral nutritional supplements, and vitamin/mineral supplements  - Order, calculate, and assess calorie counts as needed  - Recommend, monitor, and adjust tube feedings and TPN/PPN based on assessed needs  - Assess need for intravenous fluids  - Provide specific nutrition/hydration education as appropriate  - Include patient/family/caregiver in decisions related to nutrition  Outcome: Progressing     Problem: MOBILITY - ADULT  Goal: Maintain or return to baseline ADL function  Description: INTERVENTIONS:  -  Assess patient's ability to carry out ADLs; assess patient's baseline for ADL function and identify physical deficits which impact ability to perform ADLs (bathing, care of mouth/teeth, toileting, grooming, dressing, etc )  - Assess/evaluate cause of self-care deficits   - Assess range of motion  - Assess patient's mobility; develop plan if impaired  - Assess patient's need for assistive devices and provide as appropriate  - Encourage maximum independence but intervene and supervise when necessary  - Involve family in performance of ADLs  - Assess for home care needs following discharge   - Consider OT consult to assist with ADL evaluation and planning for discharge  - Provide patient education as appropriate  Outcome: Progressing  Goal: Maintains/Returns to pre admission functional level  Description: INTERVENTIONS:  - Perform BMAT or MOVE assessment daily    - Set and communicate daily mobility goal to care team and patient/family/caregiver  - Collaborate with rehabilitation services on mobility goals if consulted  - Perform Range of Motion  times a day  - Reposition patient every  hours    - Dangle patient  times a day  - Stand patient  times a day  - Ambulate patient times a day  - Out of bed to chair times a day   - Out of bed for meals  times a day  - Out of bed for toileting  - Record patient progress and toleration of activity level   Outcome: Progressing

## 2022-11-06 NOTE — PROGRESS NOTES
Internal Medicine Progress Note  Patient: Kendy Kang  Age/sex: 79 y o  female  Medical Record #: [id number]      ASSESSMENT/PLAN: (Interval History)  Kendy Kang is seen and examined and management for following issues:    S/p ORIF with IM nailing of the Rt femur  · Pain control and therapy per primary service  · PMR adjusted pain meds d/t sedation  · Renal dosed Lovenox for DVT prophylaxis  · Follow-up with Ortho as scheduled     Left 4th phalanx fracture  · Splint and NWB   · Keeps removing splint     HTN  · Home: Hyzaar 50/12 5mg qd/Lopressor 25mg q12 hours/Lasix 40mg qd (for heart failure)  · Here: Lopressor 25mg BID/losartan 50mg daily  · Trend elevated increase losartan to 50mg (11/4)  · Monitor through today with increase in dose if unchanged will titrate again tomorrow     Anemia  · Secondary to trauma  · Stable      DM type 2  · Home: Levemir 20U at bedtime/Humalog 10U with meals  · Here: Levemir 20U at bedtime/Humalog 5U tid  · BS trending upward increase levemir as above      Chronic systolic/diastolic heart failure  · Pt has been off diuretics  · ECHO with an EF of 45%  Grade 2 diastolic dysfunction  · euvolemic     CLL  · Recent diagnosis  · Outpt follow-up with Hematology/Oncology  · Baseline WBCs 14-16K  · Had mild bump likely reactive secondary to surgery/UTI  · Stable      Suspected UTI  · Had positive UA  Culture with >100,000 Klebsiella  · s/p Bactrim  · Urinary retention improved with tx of UTI     Delirium  · Intermittent confusion and hallucinations  DC planning: TBD    The above assessment and plan was reviewed and updated as determined by my evaluation of the patient on 11/6/2022      Labs:   Results from last 7 days   Lab Units 10/31/22  0615   WBC Thousand/uL 17 92*   HEMOGLOBIN g/dL 10 8*   HEMATOCRIT % 35 3   PLATELETS Thousands/uL 347     Results from last 7 days   Lab Units 10/31/22  0615   SODIUM mmol/L 138   POTASSIUM mmol/L 4 2   CHLORIDE mmol/L 108   CO2 mmol/L 27   BUN mg/dL 23   CREATININE mg/dL 0 84   CALCIUM mg/dL 9 6             Results from last 7 days   Lab Units 11/05/22 2055 11/05/22  1607 11/05/22  1117   POC GLUCOSE mg/dl 257* 231* 159*       Review of Scheduled Meds:  Current Facility-Administered Medications   Medication Dose Route Frequency Provider Last Rate   • acetaminophen  325 mg Oral Q6H PRN Janna Perea MD     • acetaminophen  650 mg Oral TID AC Janna Perea MD     • atorvastatin  20 mg Oral Daily With [address], MD     • bisacodyl  10 mg Rectal Daily PRN Janna Perea MD     • Diclofenac Sodium  2 g Topical TID Chidi Christy MD     • docusate sodium  100 mg Oral BID Janna Perea MD     • enoxaparin  40 mg Subcutaneous Q24H [address] Janna Perea MD     • gabapentin  100 mg Oral HS Chidi Christy MD     • hydrocortisone   Topical 4x Daily PRN Janna Perea MD     • insulin detemir  15 Units Subcutaneous HS BRINDA Kramer     • insulin lispro  1-5 Units Subcutaneous HS Janna Perea MD     • insulin lispro  1-6 Units Subcutaneous TID AC Janna Perea MD     • insulin lispro  5 Units Subcutaneous TID With Meals BRINDA Raines     • lidocaine  2 patch Topical Daily Janna Perea MD     • losartan  50 mg Oral Daily BRINDA Raines     • meclizine  12 5 mg Oral Q8H PRN Janna Perea MD     • meclizine  12 5 mg Oral Q12H PRN Chidi Christy MD     • melatonin  6 mg Oral QPM Chidi Christy MD     • methocarbamol  250 mg Oral Q6H PRN Chidi Christy MD     • metoprolol tartrate  25 mg Oral Q12H [address] BRINDA Kramer     • naloxone  0 04 mg Intravenous Q1MIN PRN Janna Perea MD     • polyethylene glycol  17 g Oral Daily PRN Janna Perea MD     • QUEtiapine  12 5 mg Oral QPM Chidi Christy MD         Subjective/ HPI: Patient seen and examined  Patients overnight issues or events were reviewed with nursing or staff during rounds or morning huddle session   New or overnight issues include the following:     Pt seen in her room  Less confused this am compared to yesterday  No overnight complaints     ROS:   A 10 point ROS was performed; negative except as noted above  *Labs /Radiology studies reviewed  *Medications reviewed and reconciled as needed  *Please refer to order section for additional ordered labs studies  *Case discussed with primary attending during morning huddle case rounds    Physical Examination:  Vitals:   Vitals:    11/04/22 2117 11/05/22 0559 11/05/22 0600 11/05/22 2103   BP: 144/62 166/70  134/63   BP Location: Left arm Left arm  Left arm   Pulse:  66  65   Resp:  20  18   Temp:  98 3 °F (36 8 °C)  98 2 °F (36 8 °C)   TempSrc:  Oral  Oral   SpO2:  96%  95%   Weight:   64 2 kg (141 lb 8 6 oz)    Height:         GEN: No apparent distress, interactive; frail  NEURO: Alert and oriented x3; no confusion this am  HEENT: Pupils are equal and reactive, EOMI, mucous membranes are moist, face symmetrical  CV: S1 S2 regular, no MRG, no peripheral edema noted  RESP: Lungs are clear bilaterally, no wheezes, rales or rhonchi noted, on room air, respirations easy and non labored  GI: Flat, soft non tender, non distended; +BS x4  : Voiding without difficulty; incontinent overnight  MUSC: Moves all extremities; except RLE WBAT; splint to 4th finger on left hand NWB  SKIN: pink, warm and dry, poor turgor, no rashes, lesions; incision intact      The above physical exam was reviewed and updated as determined by my evaluation of the patient on 11/6/2022  Invasive Devices  Report    Drain  Duration           External Urinary Catheter 3 days                   VTE Pharmacologic Prophylaxis: Enoxaparin  Code Status: Level 1 - Full Code  Current Length of Stay: 20 day(s)      Total time spent:  30 minutes with more than 50% spent counseling/coordinating care   Counseling includes discussion with patient re: progress  and discussion with patient of his/her current medical state/information  Coordination of patient's care was performed in conjunction with primary service  Time invested included review of patient's labs, vitals, and management of their comorbidities with continued monitoring  In addition, this patient was discussed with medical team including physician and advanced extenders  The care of the patient was extensively discussed and appropriate treatment plan was formulated unique for this patient  Medical decision making for the day was made by supervising physician unless otherwise noted in their attestation statement  ** Please Note:  voice to text software may have been used in the creation of this document   Although proof errors in transcription or interpretation are a potential of such software**

## 2022-11-06 NOTE — PLAN OF CARE
Problem: Prexisting or High Potential for Compromised Skin Integrity  Goal: Skin integrity is maintained or improved  Description: INTERVENTIONS:  - Identify patients at risk for skin breakdown  - Assess and monitor skin integrity  - Assess and monitor nutrition and hydration status  - Monitor labs   - Assess for incontinence   - Turn and reposition patient  - Assist with mobility/ambulation  - Relieve pressure over bony prominences  - Avoid friction and shearing  - Provide appropriate hygiene as needed including keeping skin clean and dry  - Evaluate need for skin moisturizer/barrier cream  - Collaborate with interdisciplinary team   - Patient/family teaching  - Consider wound care consult   11/5/2022 2004 by Apryl Keen RN  Outcome: Progressing  11/5/2022 1101 by Apryl Keen RN  Outcome: Progressing     Problem: PAIN - ADULT  Goal: Verbalizes/displays adequate comfort level or baseline comfort level  Description: Interventions:  - Encourage patient to monitor pain and request assistance  - Assess pain using appropriate pain scale  - Administer analgesics based on type and severity of pain and evaluate response  - Implement non-pharmacological measures as appropriate and evaluate response  - Consider cultural and social influences on pain and pain management  - Notify physician/advanced practitioner if interventions unsuccessful or patient reports new pain  11/5/2022 2004 by Apryl Keen RN  Outcome: Progressing  11/5/2022 1101 by Apryl Keen RN  Outcome: Progressing     Problem: INFECTION - ADULT  Goal: Absence or prevention of progression during hospitalization  Description: INTERVENTIONS:  - Assess and monitor for signs and symptoms of infection  - Monitor lab/diagnostic results  - Monitor all insertion sites, i e  indwelling lines, tubes, and drains  - Monitor endotracheal if appropriate and nasal secretions for changes in amount and color  - [place] appropriate cooling/warming therapies per order  - Administer medications as ordered  - Instruct and encourage patient and family to use good hand hygiene technique  - Identify and instruct in appropriate isolation precautions for identified infection/condition  11/5/2022 2004 by Carl Cleveland RN  Outcome: Progressing  11/5/2022 1101 by Carl Cleveland RN  Outcome: Progressing  Goal: Absence of fever/infection during neutropenic period  Description: INTERVENTIONS:  - Monitor WBC    11/5/2022 2004 by Carl Cleveland RN  Outcome: Progressing  11/5/2022 1101 by Carl Cleveland RN  Outcome: Progressing     Problem: SAFETY ADULT  Goal: Patient will remain free of falls  Description: INTERVENTIONS:  - Educate patient/family on patient safety including physical limitations  - Instruct patient to call for assistance with activity   - Consult OT/PT to assist with strengthening/mobility   - Keep Call bell within reach  - Keep bed low and locked with side rails adjusted as appropriate  - Keep care items and personal belongings within reach  - Initiate and maintain comfort rounds  - Make Fall Risk Sign visible to staff  - Offer Toileting every  Hours, in advance of need  - Initiate/Maintain alarm  - Obtain necessary fall risk management equipment:   - Apply yellow socks and bracelet for high fall risk patients  - Consider moving patient to room near nurses station  11/5/2022 2004 by Carl Cleveland RN  Outcome: Progressing  11/5/2022 1101 by Carl Cleveland RN  Outcome: Progressing  Goal: Maintain or return to baseline ADL function  Description: INTERVENTIONS:  -  Assess patient's ability to carry out ADLs; assess patient's baseline for ADL function and identify physical deficits which impact ability to perform ADLs (bathing, care of mouth/teeth, toileting, grooming, dressing, etc )  - Assess/evaluate cause of self-care deficits   - Assess range of motion  - Assess patient's mobility; develop plan if impaired  - Assess patient's need for assistive devices and provide as appropriate  - Encourage maximum independence but intervene and supervise when necessary  - Involve family in performance of ADLs  - Assess for home care needs following discharge   - Consider OT consult to assist with ADL evaluation and planning for discharge  - Provide patient education as appropriate  11/5/2022 2004 by Sidney Rodriguez RN  Outcome: Progressing  11/5/2022 1101 by Sidney Rodriguez RN  Outcome: Progressing  Goal: Maintains/Returns to pre admission functional level  Description: INTERVENTIONS:  - Perform BMAT or MOVE assessment daily    - Set and communicate daily mobility goal to care team and patient/family/caregiver  - Collaborate with rehabilitation services on mobility goals if consulted  - Perform Range of Motion  times a day  - Reposition patient every  hours    - Dangle patient  times a day  - Stand patient  times a day  - Ambulate patient  times a day  - Out of bed to chair times a day   - Out of bed for meals  times a day  - Out of bed for toileting  - Record patient progress and toleration of activity level   11/5/2022 2004 by Sidney Rodriguez RN  Outcome: Progressing  11/5/2022 1101 by Sidney Rodriguez RN  Outcome: Progressing     Problem: DISCHARGE PLANNING  Goal: Discharge to home or other facility with appropriate resources  Description: INTERVENTIONS:  - Identify barriers to discharge w/patient and caregiver  - Arrange for needed discharge resources and transportation as appropriate  - Identify discharge learning needs (meds, wound care, etc )  - Arrange for interpretive services to assist at discharge as needed  - Refer to Case Management Department for coordinating discharge planning if the patient needs post-hospital services based on physician/advanced practitioner order or complex needs related to functional status, cognitive ability, or social support system  11/5/2022 2004 by Sidney Rodriguez RN  Outcome: Progressing  11/5/2022 1101 by Sidney Rodriguez RN  Outcome: Progressing Problem: Potential for Falls  Goal: Patient will remain free of falls  Description: INTERVENTIONS:  - Educate patient/family on patient safety including physical limitations  - Instruct patient to call for assistance with activity   - Consult OT/PT to assist with strengthening/mobility   - Keep Call bell within reach  - Keep bed low and locked with side rails adjusted as appropriate  - Keep care items and personal belongings within reach  - Initiate and maintain comfort rounds  - Make Fall Risk Sign visible to staff  - Offer Toileting every Hours, in advance of need  - Initiate/Maintain alarm  - Obtain necessary fall risk management equipment:   - Apply yellow socks and bracelet for high fall risk patients  - Consider moving patient to room near nurses station  11/5/2022 2004 by Jersey Georges RN  Outcome: Progressing  11/5/2022 1101 by Jersey Georges RN  Outcome: Progressing     Problem: Nutrition/Hydration-ADULT  Goal: Nutrient/Hydration intake appropriate for improving, restoring or maintaining nutritional needs  Description: Monitor and assess patient's nutrition/hydration status for malnutrition  Collaborate with interdisciplinary team and initiate plan and interventions as ordered  Monitor patient's weight and dietary intake as ordered or per policy  Utilize nutrition screening tool and intervene as necessary  Determine patient's food preferences and provide high-protein, high-caloric foods as appropriate       INTERVENTIONS:  - Monitor oral intake, urinary output, labs, and treatment plans  - Assess nutrition and hydration status and recommend course of action  - Evaluate amount of meals eaten  - Assist patient with eating if necessary   - Allow adequate time for meals  - Recommend/ encourage appropriate diets, oral nutritional supplements, and vitamin/mineral supplements  - Order, calculate, and assess calorie counts as needed  - Recommend, monitor, and adjust tube feedings and TPN/PPN based on assessed needs  - Assess need for intravenous fluids  - Provide specific nutrition/hydration education as appropriate  - Include patient/family/caregiver in decisions related to nutrition  11/5/2022 2004 by Donovan Gates RN  Outcome: Progressing  11/5/2022 1101 by Donvoan Gates RN  Outcome: Progressing     Problem: MOBILITY - ADULT  Goal: Maintain or return to baseline ADL function  Description: INTERVENTIONS:  -  Assess patient's ability to carry out ADLs; assess patient's baseline for ADL function and identify physical deficits which impact ability to perform ADLs (bathing, care of mouth/teeth, toileting, grooming, dressing, etc )  - Assess/evaluate cause of self-care deficits   - Assess range of motion  - Assess patient's mobility; develop plan if impaired  - Assess patient's need for assistive devices and provide as appropriate  - Encourage maximum independence but intervene and supervise when necessary  - Involve family in performance of ADLs  - Assess for home care needs following discharge   - Consider OT consult to assist with ADL evaluation and planning for discharge  - Provide patient education as appropriate  11/5/2022 2004 by Donovan Gates RN  Outcome: Progressing  11/5/2022 1101 by Donovan Gates RN  Outcome: Progressing  Goal: Maintains/Returns to pre admission functional level  Description: INTERVENTIONS:  - Perform BMAT or MOVE assessment daily    - Set and communicate daily mobility goal to care team and patient/family/caregiver  - Collaborate with rehabilitation services on mobility goals if consulted  - Perform Range of Motion  times a day  - Reposition patient every hours    - Dangle patient  times a day  - Stand patient  times a day  - Ambulate patient  times a day  - Out of bed to chair times a day   - Out of bed for meals  times a day  - Out of bed for toileting  - Record patient progress and toleration of activity level   11/5/2022 2004 by Donovan Gates RN  Outcome: Progressing  11/5/2022 1101 by Marj Hays RN  Outcome: Progressing

## 2022-11-06 NOTE — PROGRESS NOTES
11/06/22 0898   Pain Assessment   Pain Assessment Tool 0-10   Pain Score 5   Pain Location/Orientation Orientation: Right;Location: Hip   Restrictions/Precautions   Precautions Aspiration;Cognitive; Fall Risk;Pain;Supervision on toilet/commode;Visual deficit   Weight Bearing Restrictions Yes   LUE Weight Bearing Per Order NWB   RLE Weight Bearing Per Order WBAT   Lifestyle   Autonomy "I dont need to use that, I can reach down to my foot see"  Oral Hygiene   Type of Assistance Needed Set-up / clean-up   Physical Assistance Level No physical assistance   Comment seated sinkside   Oral Hygiene CARE Score 5   Grooming   Able To Comb/Brush Hair;Wash/Dry Face;Brush/Clean Teeth;Wash/Dry Hands   Limitation Noted In Coordination; Safety;Strength   Shower/Bathe Self   Type of Assistance Needed Physical assistance   Physical Assistance Level 25% or less   Comment Pt completed bathing task sinkside deated in wc requiring assist to bath buttocks w/min A for balance in stance  Pt utilized cross legged technique for bathe lower legs  Shower/Bathe Self CARE Score 3   Tub/Shower Transfer   Reason Not Assessed Sponge Bath   Upper Body Dressing   Type of Assistance Needed Set-up / Burger Moment; Physical assistance   Physical Assistance Level 25% or less   Comment seated in wc  pt able to sandra button down shirt requiring assist to fasten buttons  Pt stated she was going to wear pullover sweaters at home, this was the only clean shirt she had   Upper Body Dressing CARE Score 3   Lower Body Dressing   Type of Assistance Needed Physical assistance   Physical Assistance Level 25% or less   Comment Assist to hike pants over hips in stance  Pt able to thread BLE into undergarments and pants utlilizing cross legged technique     Lower Body Dressing CARE Score 3   Putting On/Taking Off Footwear   Type of Assistance Needed Set-up / clean-up   Physical Assistance Level No physical assistance   Comment Pt able to doff/sandra socks   Putting On/Taking Off Footwear CARE Score 5   Lying to Sitting on Side of Bed   Type of Assistance Needed Physical assistance   Physical Assistance Level 25% or less   Comment Min A to bring RLE to EOB  Lying to Sitting on Side of Bed CARE Score 3   Sit to Stand   Type of Assistance Needed Incidental touching   Physical Assistance Level 25% or less   Comment CGA using PFRW w/bed elevated   Sit to Stand CARE Score 3   Bed-Chair Transfer   Type of Assistance Needed Physical assistance   Physical Assistance Level 26%-50%   Comment Mod A using PFRW   Chair/Bed-to-Chair Transfer CARE Score 3   Toileting Hygiene   Type of Assistance Needed Physical assistance   Physical Assistance Level 25% or less   Comment Pt able to manage hygiene and hospital gown  Task vcompleted prior to ADL  Toileting Hygiene CARE Score 3   Toilet Transfer   Type of Assistance Needed Physical assistance   Physical Assistance Level 25% or less   Comment stand pivot using grab bars  Toilet Transfer CARE Score 3   Additional Activities   Additional Activities Comments Pt enagged in bed making task in stance for about 5 min stabilizing self using RW to make left side of bed, pt sat in wc self-propelling w/c with use of BLE to complete task and make right side of bed 2* report of fatigue  Pt w/o noted LOB in stance  Assessment   Treatment Assessment Pt engaged in skilled 90 min OT session focusing on ADL re-training, safety awareness, fxl mobility/transfers and overall activity endurance improving indep in ADL/IADL's to return to PLOF and safe d/c to home  See above for ADL session details  Pt declined use of AE this session  Pt primarily used right hand for ADL engagement to open containers and wring out was cloth, stabilizing with left hand as needed  Pt able to doff/sandra socks and LB garments using cross legged technique w/o report of pain to right hip      Recommend continued skilled OT focusing on POC in preparation for safe d/c    OT Therapy Minutes   OT Time In 0830   OT Time Out 1000   OT Total Time (minutes) 90   OT Mode of treatment - Individual (minutes) 90   OT Mode of treatment - Concurrent (minutes) 0   OT Mode of treatment - Group (minutes) 0   OT Mode of treatment - Co-treat (minutes) 0   OT Mode of Treatment - Total time(minutes) 90 minutes   OT Cumulative Minutes 1515   Therapy Time missed   Time missed?  No

## 2022-11-06 NOTE — PROGRESS NOTES
11/06/22 1230   Pain Assessment   Pain Assessment Tool 0-10   Pain Score 5   Pain Location/Orientation Orientation: Right;Location: Knee; Location: Hip   Pain Onset/Description Onset: Ongoing;Frequency: Constant/Continuous; Descriptor: Pressure   Patient's Stated Pain Goal No pain   Hospital Pain Intervention(s) Repositioned; Emotional support   Restrictions/Precautions   Precautions Aspiration;Bed/chair alarms;Cognitive; Fall Risk;Pain;Supervision on toilet/commode;Visual deficit   LUE Weight Bearing Per Order NWB  (L hand, okay through elbow)   RLE Weight Bearing Per Order WBAT   ROM Restrictions No   Braces or Orthoses Splint; Other (Comment)  (L 4th digit, R knee brace with amb)   Cognition   Overall Cognitive Status Impaired   Arousal/Participation Alert; Cooperative   Attention Attends with cues to redirect   Orientation Level Oriented to person;Oriented to place;Oriented to time;Oriented to situation   Memory Decreased short term memory;Decreased recall of precautions   Following Commands Follows one step commands with increased time or repetition   Sit to Lying   Type of Assistance Needed Supervision;Verbal cues   Comment with increased time, HOB flat no rails, MOD VC's   Sit to Lying CARE Score 4   Sit to Stand   Type of Assistance Needed Physical assistance;Verbal cues; Adaptive equipment   Physical Assistance Level 25% or less   Comment JOÃO/CGA with VC's for hand placement  Pt required incresased A with fatigue   Sit to Stand CARE Score 3   Bed-Chair Transfer   Type of Assistance Needed Physical assistance;Verbal cues; Adaptive equipment   Physical Assistance Level 25% or less   Comment JOÃO with PFRW, multiple trails   Chair/Bed-to-Chair Transfer CARE Score 3   Transfer Bed/Chair/Wheelchair   Adaptive Equipment Platform;Roller Walker   Walk 10 Feet   Type of Assistance Needed Physical assistance   Physical Assistance Level 26%-50%   Comment overall JOÃO but can vary to MODA with fatigue and R knee buckling Walk 10 Feet CARE Score 3   Walk 50 Feet with Two Turns   Type of Assistance Needed Physical assistance;Verbal cues; Adaptive equipment   Physical Assistance Level 26%-50%   Comment overall JOÃO but can vary to MODA with fatigue and R knee buckling  No chair follow this session up to 70'   Walk 50 Feet with Two Turns CARE Score 3   Walk 150 Feet   Reason if not Attempted Safety concerns   Walk 150 Feet CARE Score 88   Walking 10 Feet on Uneven Surfaces   Reason if not Attempted Safety concerns   Walking 10 Feet on Uneven Surfaces CARE Score 88   Ambulation   Does the patient walk? 2  Yes   Primary Mode of Locomotion Prior to Admission Walk   Distance Walked (feet) 70 ft  (x2, 30' x2)   Assist Device Platform;Roller Walker   Gait Pattern Antalgic; Inconsistant Mandi; Slow Mandi;Decreased foot clearance; Forward Flexion;Narrow WOOD;Scissoring;Shuffle;Step to; Improper weight shift;Decreased R stance   Limitations Noted In Balance; Coordination;Device Management; Endurance; Heel Strike;Midline Orientation;Posture; Safety;Speed;Strength;Swing   Provided Assistance with: Balance;Direction   Walk Assist Level Minimum Assist   Findings utilized R knee brace with gait to help decersae buckling   Wheelchair mobility   Does the patient use a wheelchair? 1  Yes   Type of Wheelchair Used 1  Manual   4 Steps   Reason if not Attempted Safety concerns   4 Steps CARE Score 88   12 Steps   Reason if not Attempted Activity not applicable   12 Steps CARE Score 9   Therapeutic Interventions   Strengthening seated LAQ, hip flex, ankle DF/PF 2# to BLE 3 x10 reps   Equipment Use   NuStep L1 x10 min, short rest required at 7 min, BLE, RUE   Assessment   Treatment Assessment Pt participated in skilled PT session with increased focus on gait, LE strengthening and increased act tolerance  Pt noted improved consistent gait this session with overall JOÃO   Pt cont to heavily rely on LUE on platform for support when amb with forward lean and scissor gait present when fatigued  Pt c/o increased LBP when asked to stand up straight which pt has stated she has hx of LB "problems"  Pt will cont to benefit from increased I with all functional transfers, increased I with gait and increased act tolerance  Cont POC as tolerated  Problem List Decreased strength;Decreased endurance; Impaired balance;Decreased mobility; Decreased coordination;Decreased cognition; Impaired judgement;Decreased safety awareness;Orthopedic restrictions;Pain; Impaired vision   Barriers to Discharge Inaccessible home environment;Decreased caregiver support   PT Barriers   Functional Limitation Car transfers; Ramp negotiation;Stair negotiation;Standing;Transfers; Walking; Wheelchair management   Plan   Treatment/Interventions Functional transfer training;LE strengthening/ROM; Therapeutic exercise; Endurance training;Cognitive reorientation;Patient/family training;Bed mobility   Progress Slow progress, cognitive deficits   Recommendation   PT Discharge Recommendation Post acute rehabilitation services   PT Therapy Minutes   PT Time In 1230   PT Time Out 1400   PT Total Time (minutes) 90   PT Mode of treatment - Individual (minutes) 90   PT Mode of treatment - Concurrent (minutes) 0   PT Mode of treatment - Group (minutes) 0   PT Mode of treatment - Co-treat (minutes) 0   PT Mode of Treatment - Total time(minutes) 90 minutes   PT Cumulative Minutes 1785   Therapy Time missed   Time missed?  No

## 2022-11-07 LAB
ANION GAP SERPL CALCULATED.3IONS-SCNC: 2 MMOL/L (ref 4–13)
BUN SERPL-MCNC: 20 MG/DL (ref 5–25)
CALCIUM SERPL-MCNC: 9.5 MG/DL (ref 8.3–10.1)
CHLORIDE SERPL-SCNC: 109 MMOL/L (ref 96–108)
CO2 SERPL-SCNC: 31 MMOL/L (ref 21–32)
CREAT SERPL-MCNC: 0.85 MG/DL (ref 0.6–1.3)
GFR SERPL CREATININE-BSD FRML MDRD: 63 ML/MIN/1.73SQ M
GLUCOSE P FAST SERPL-MCNC: 83 MG/DL (ref 65–99)
GLUCOSE SERPL-MCNC: 124 MG/DL (ref 65–140)
GLUCOSE SERPL-MCNC: 130 MG/DL (ref 65–140)
GLUCOSE SERPL-MCNC: 311 MG/DL (ref 65–140)
GLUCOSE SERPL-MCNC: 80 MG/DL (ref 65–140)
GLUCOSE SERPL-MCNC: 83 MG/DL (ref 65–140)
POTASSIUM SERPL-SCNC: 3.7 MMOL/L (ref 3.5–5.3)
SODIUM SERPL-SCNC: 142 MMOL/L (ref 135–147)

## 2022-11-07 RX ADMIN — DOCUSATE SODIUM 100 MG: 100 CAPSULE, LIQUID FILLED ORAL at 17:34

## 2022-11-07 RX ADMIN — METOPROLOL TARTRATE 25 MG: 25 TABLET, FILM COATED ORAL at 08:30

## 2022-11-07 RX ADMIN — LOSARTAN POTASSIUM 50 MG: 50 TABLET, FILM COATED ORAL at 08:29

## 2022-11-07 RX ADMIN — ACETAMINOPHEN 650 MG: 325 TABLET ORAL at 21:22

## 2022-11-07 RX ADMIN — INSULIN LISPRO 5 UNITS: 100 INJECTION, SOLUTION INTRAVENOUS; SUBCUTANEOUS at 12:08

## 2022-11-07 RX ADMIN — DICLOFENAC SODIUM 2 G: 10 GEL TOPICAL at 08:45

## 2022-11-07 RX ADMIN — ATORVASTATIN CALCIUM 20 MG: 20 TABLET, FILM COATED ORAL at 17:34

## 2022-11-07 RX ADMIN — QUETIAPINE FUMARATE 12.5 MG: 25 TABLET ORAL at 21:21

## 2022-11-07 RX ADMIN — INSULIN DETEMIR 20 UNITS: 100 INJECTION, SOLUTION SUBCUTANEOUS at 21:23

## 2022-11-07 RX ADMIN — METOPROLOL TARTRATE 25 MG: 25 TABLET, FILM COATED ORAL at 21:22

## 2022-11-07 RX ADMIN — DICLOFENAC SODIUM 2 G: 10 GEL TOPICAL at 21:24

## 2022-11-07 RX ADMIN — DOCUSATE SODIUM 100 MG: 100 CAPSULE, LIQUID FILLED ORAL at 08:29

## 2022-11-07 RX ADMIN — INSULIN LISPRO 3 UNITS: 100 INJECTION, SOLUTION INTRAVENOUS; SUBCUTANEOUS at 21:24

## 2022-11-07 RX ADMIN — INSULIN LISPRO 5 UNITS: 100 INJECTION, SOLUTION INTRAVENOUS; SUBCUTANEOUS at 08:20

## 2022-11-07 RX ADMIN — ENOXAPARIN SODIUM 40 MG: 40 INJECTION SUBCUTANEOUS at 08:30

## 2022-11-07 RX ADMIN — ACETAMINOPHEN 650 MG: 325 TABLET ORAL at 05:35

## 2022-11-07 RX ADMIN — Medication 6 MG: at 21:22

## 2022-11-07 RX ADMIN — ACETAMINOPHEN 650 MG: 325 TABLET ORAL at 13:54

## 2022-11-07 RX ADMIN — INSULIN LISPRO 5 UNITS: 100 INJECTION, SOLUTION INTRAVENOUS; SUBCUTANEOUS at 17:34

## 2022-11-07 NOTE — PROGRESS NOTES
Internal Medicine Progress Note  Patient: Anabel Turcios  Age/sex: 79 y o  female  Medical Record #: [id number]      ASSESSMENT/PLAN: (Interval History)  Anabel Turcios is seen and examined and management for following issues:    S/p ORIF with IM nailing of the Rt femur  · Pain control and therapy per primary service  · PMR adjusted pain meds d/t sedation  · Renal dosed Lovenox for DVT prophylaxis  · Follow-up with Ortho as scheduled     Left 4th phalanx fracture  · Splint and NWB   · Keeps removing splint     HTN  · Home: Hyzaar 50/12 5mg qd/Lopressor 25mg q12 hours/Lasix 40mg qd (for heart failure)  · Here: Lopressor 25mg BID/losartan 50mg daily  · improving     Anemia  · Secondary to trauma  · Stable      DM type 2  · Home: Levemir 20U at bedtime/Humalog 10U with meals  · Here: Levemir 20U at bedtime/Humalog 5U tid  · BS improving      Chronic systolic/diastolic heart failure  · Pt has been off diuretics  · ECHO with an EF of 45%  Grade 2 diastolic dysfunction  · euvolemic     CLL  · Recent diagnosis  · Outpt follow-up with Hematology/Oncology  · Baseline WBCs 14-16K  · Had mild bump likely reactive secondary to surgery/UTI  · Stable      Suspected UTI  · Had positive UA  Culture with >100,000 Klebsiella  · s/p Bactrim  · Urinary retention improved with tx of UTI     Delirium  · Intermittent confusion and hallucinations  DC planning: to SNF    The above assessment and plan was reviewed and updated as determined by my evaluation of the patient on 11/7/2022      Labs:   Results from last 7 days   Lab Units 11/07/22  0552   WBC Thousand/uL 17 03*   HEMOGLOBIN g/dL 11 3*   HEMATOCRIT % 36 2   PLATELETS Thousands/uL 192     Results from last 7 days   Lab Units 11/07/22  0552   SODIUM mmol/L 142   POTASSIUM mmol/L 3 7   CHLORIDE mmol/L 109*   CO2 mmol/L 31   BUN mg/dL 20   CREATININE mg/dL 0 85   CALCIUM mg/dL 9 5             Results from last 7 days   Lab Units 11/07/22  0644 11/06/22 2058 11/06/22  1606 POC GLUCOSE mg/dl 80 303* 192*       Review of Scheduled Meds:  Current Facility-Administered Medications   Medication Dose Route Frequency Provider Last Rate   • acetaminophen  325 mg Oral Q6H PRN Fay Parra MD     • acetaminophen  650 mg Oral TID AC Fay Parra MD     • atorvastatin  20 mg Oral Daily With Vahid Raygoza MD     • bisacodyl  10 mg Rectal Daily PRN Fay Parra MD     • Diclofenac Sodium  2 g Topical TID Regulo Carlson MD     • docusate sodium  100 mg Oral BID Fay Parra MD     • enoxaparin  40 mg Subcutaneous Q24H [address] Fay Parra MD     • gabapentin  100 mg Oral HS Regulo Carlson MD     • hydrocortisone   Topical 4x Daily PRN Fay Parra MD     • insulin detemir  20 Units Subcutaneous HS BRINDA Kramer     • insulin lispro  1-5 Units Subcutaneous HS Fay Parra MD     • insulin lispro  1-6 Units Subcutaneous TID AC Fay Parra MD     • insulin lispro  5 Units Subcutaneous TID With Meals BRINDA Landin     • lidocaine  2 patch Topical Daily Fay Parra MD     • losartan  50 mg Oral Daily BRINDA Landin     • meclizine  12 5 mg Oral Q8H PRN Fay Parra MD     • meclizine  12 5 mg Oral Q12H PRN Regulo Carlson MD     • melatonin  6 mg Oral QPM Regulo Carlson MD     • methocarbamol  250 mg Oral Q6H PRN Regulo Carlson MD     • metoprolol tartrate  25 mg Oral Q12H [address] BRINDA Kramer     • naloxone  0 04 mg Intravenous Q1MIN PRN Fay Parra MD     • polyethylene glycol  17 g Oral Daily PRN Fay Parra MD     • QUEtiapine  12 5 mg Oral QPM Regulo Carlson MD         Subjective/ HPI: Patient seen and examined  Patients overnight issues or events were reviewed with nursing or staff during rounds or morning huddle session  New or overnight issues include the following:     Pt seen in her chair at bedside  She is tired but otherwise alert and oriented and states that she slept well  Still has some pain on and off but currently without any     ROS:   A 10 point ROS was performed; negative except as noted above  *Labs /Radiology studies reviewed  *Medications reviewed and reconciled as needed  *Please refer to order section for additional ordered labs studies  *Case discussed with primary attending during morning huddle case rounds    Physical Examination:  Vitals:   Vitals:    11/06/22 2029 11/07/22 0534 11/07/22 0539 11/07/22 0700   BP: (!) 172/76  164/78 134/63   BP Location: Left arm  Left arm    Pulse: 63  69 67   Resp: 18  18    Temp: 98 1 °F (36 7 °C)  98 2 °F (36 8 °C)    TempSrc: Oral  Oral    SpO2: 95%  95%    Weight:  64 2 kg (141 lb 8 6 oz)     Height:         GEN: No apparent distress, interactive; frail  NEURO: Alert and oriented x3  HEENT: Pupils are equal and reactive, EOMI, mucous membranes are moist, face symmetrical  CV: S1 S2 regular, no MRG, no peripheral edema noted  RESP: Lungs are clear bilaterally, no wheezes, rales or rhonchi noted, on room air, respirations easy and non labored  GI: Flat, soft non tender, non distended; +BS x4  : Voiding without difficulty; incontinent overnight  MUSC: Moves all extremities; except RLE WBAT; splint to 4th finger on left hand NWB  SKIN: pink, warm and dry, poor turgor, no rashes, lesions; incision without any evidence of infection      The above physical exam was reviewed and updated as determined by my evaluation of the patient on 11/7/2022  Invasive Devices  Report    Drain  Duration           External Urinary Catheter 4 days                   VTE Pharmacologic Prophylaxis: Enoxaparin  Code Status: Level 1 - Full Code  Current Length of Stay: 21 day(s)      Total time spent:  30 minutes  with more than 50% spent counseling/coordinating care  Counseling includes discussion with patient re: progress  and discussion with patient of his/her current medical state/information   Coordination of patient's care was performed in conjunction with primary service  Time invested included review of patient's labs, vitals, and management of their comorbidities with continued monitoring  In addition, this patient was discussed with medical team including physician and advanced extenders  The care of the patient was extensively discussed and appropriate treatment plan was formulated unique for this patient  Medical decision making for the day was made by supervising physician unless otherwise noted in their attestation statement  ** Please Note:  voice to text software may have been used in the creation of this document   Although proof errors in transcription or interpretation are a potential of such software**

## 2022-11-07 NOTE — PLAN OF CARE
Problem: Prexisting or High Potential for Compromised Skin Integrity  Goal: Skin integrity is maintained or improved  Description: INTERVENTIONS:  - Identify patients at risk for skin breakdown  - Assess and monitor skin integrity  - Assess and monitor nutrition and hydration status  - Monitor labs   - Assess for incontinence   - Turn and reposition patient  - Assist with mobility/ambulation  - Relieve pressure over bony prominences  - Avoid friction and shearing  - Provide appropriate hygiene as needed including keeping skin clean and dry  - Evaluate need for skin moisturizer/barrier cream  - Collaborate with interdisciplinary team   - Patient/family teaching  - Consider wound care consult   Outcome: Progressing     Problem: PAIN - ADULT  Goal: Verbalizes/displays adequate comfort level or baseline comfort level  Description: Interventions:  - Encourage patient to monitor pain and request assistance  - Assess pain using appropriate pain scale  - Administer analgesics based on type and severity of pain and evaluate response  - Implement non-pharmacological measures as appropriate and evaluate response  - Consider cultural and social influences on pain and pain management  - Notify physician/advanced practitioner if interventions unsuccessful or patient reports new pain  Outcome: Progressing     Problem: INFECTION - ADULT  Goal: Absence or prevention of progression during hospitalization  Description: INTERVENTIONS:  - Assess and monitor for signs and symptoms of infection  - Monitor lab/diagnostic results  - Monitor all insertion sites, i e  indwelling lines, tubes, and drains  - Monitor endotracheal if appropriate and nasal secretions for changes in amount and color  - [place] appropriate cooling/warming therapies per order  - Administer medications as ordered  - Instruct and encourage patient and family to use good hand hygiene technique  - Identify and instruct in appropriate isolation precautions for identified infection/condition  Outcome: Progressing  Goal: Absence of fever/infection during neutropenic period  Description: INTERVENTIONS:  - Monitor WBC    Outcome: Progressing     Problem: SAFETY ADULT  Goal: Patient will remain free of falls  Description: INTERVENTIONS:  - Educate patient/family on patient safety including physical limitations  - Instruct patient to call for assistance with activity   - Consult OT/PT to assist with strengthening/mobility   - Keep Call bell within reach  - Keep bed low and locked with side rails adjusted as appropriate  - Keep care items and personal belongings within reach  - Initiate and maintain comfort rounds  - Make Fall Risk Sign visible to staff  - Offer Toileting every Hours, in advance of need  - Initiate/Maintainlarm  - Obtain necessary fall risk management equipment:  - Apply yellow socks and bracelet for high fall risk patients  - Consider moving patient to room near nurses station  Outcome: Progressing  Goal: Maintain or return to baseline ADL function  Description: INTERVENTIONS:  -  Assess patient's ability to carry out ADLs; assess patient's baseline for ADL function and identify physical deficits which impact ability to perform ADLs (bathing, care of mouth/teeth, toileting, grooming, dressing, etc )  - Assess/evaluate cause of self-care deficits   - Assess range of motion  - Assess patient's mobility; develop plan if impaired  - Assess patient's need for assistive devices and provide as appropriate  - Encourage maximum independence but intervene and supervise when necessary  - Involve family in performance of ADLs  - Assess for home care needs following discharge   - Consider OT consult to assist with ADL evaluation and planning for discharge  - Provide patient education as appropriate  Outcome: Progressing  Goal: Maintains/Returns to pre admission functional level  Description: INTERVENTIONS:  - Perform BMAT or MOVE assessment daily    - Set and communicate daily mobility goal to care team and patient/family/caregiver     - Collaborate with rehabilitation services on mobility goals if consulted  - Perform Range of  - Out of bed for toileting  - Record patient progress and toleration of activity level   Outcome: Progressing     Problem: DISCHARGE PLANNING  Goal: Discharge to home or other facility with appropriate resources  Description: INTERVENTIONS:  - Identify barriers to discharge w/patient and caregiver  - Arrange for needed discharge resources and transportation as appropriate  - Identify discharge learning needs (meds, wound care, etc )  - Arrange for interpretive services to assist at discharge as needed  - Refer to Case Management Department for coordinating discharge planning if the patient needs post-hospital services based on physician/advanced practitioner order or complex needs related to functional status, cognitive ability, or social support system  Outcome: Progressing     Problem: Potential for Falls  Goal: Patient will remain free of falls  Description: INTERVENTIONS:  - Educate patient/family on patient safety including physical limitations  - Instruct patient to call for assistance with activity   - Consult OT/PT to assist with strengthening/mobility   - Keep Call bell within reach  - Keep bed low and locked with side rails adjusted as appropriate  - Keep care items and personal belongings within reach  - Initiate and maintain comfort rounds  - Make Fall Risk Sign visible to staff  - Offer Toileting every Hours, in advance of need  - Initiate/Maintainalarm  - Obtain necessary fall risk management equipment:  - Apply yellow socks and bracelet for high fall risk patients  - Consider moving patient to room near nurses station  Outcome: Progressing     Problem: Nutrition/Hydration-ADULT  Goal: Nutrient/Hydration intake appropriate for improving, restoring or maintaining nutritional needs  Description: Monitor and assess patient's nutrition/hydration status for malnutrition  Collaborate with interdisciplinary team and initiate plan and interventions as ordered  Monitor patient's weight and dietary intake as ordered or per policy  Utilize nutrition screening tool and intervene as necessary  Determine patient's food preferences and provide high-protein, high-caloric foods as appropriate       INTERVENTIONS:  - Monitor oral intake, urinary output, labs, and treatment plans  - Assess nutrition and hydration status and recommend course of action  - Evaluate amount of meals eaten  - Assist patient with eating if necessary   - Allow adequate time for meals  - Recommend/ encourage appropriate diets, oral nutritional supplements, and vitamin/mineral supplements  - Order, calculate, and assess calorie counts as needed  - Recommend, monitor, and adjust tube feedings and TPN/PPN based on assessed needs  - Assess need for intravenous fluids  - Provide specific nutrition/hydration education as appropriate  - Include patient/family/caregiver in decisions related to nutrition  Outcome: Progressing     Problem: MOBILITY - ADULT  Goal: Maintain or return to baseline ADL function  Description: INTERVENTIONS:  -  Assess patient's ability to carry out ADLs; assess patient's baseline for ADL function and identify physical deficits which impact ability to perform ADLs (bathing, care of mouth/teeth, toileting, grooming, dressing, etc )  - Assess/evaluate cause of self-care deficits   - Assess range of motion  - Assess patient's mobility; develop plan if impaired  - Assess patient's need for assistive devices and provide as appropriate  - Encourage maximum independence but intervene and supervise when necessary  - Involve family in performance of ADLs  - Assess for home care needs following discharge   - Consider OT consult to assist with ADL evaluation and planning for discharge  - Provide patient education as appropriate  Outcome: Progressing  Goal: Maintains/Returns to pre admission functional level  Description: INTERVENTIONS:  - Perform BMAT or MOVE assessment daily    - Set and communicate daily mobility goal to care team and patient/family/caregiver     - Collaborate with rehabilitation services on mobility goals if consulted  - Perform Range of [place]  - Out of bed for toileting  - Record patient progress and toleration of activity level   Outcome: Progressing

## 2022-11-07 NOTE — PROGRESS NOTES
Pastoral Care Progress Note    2022  Patient: Mable Betancur : 1940  Admission Date & Time: 10/17/2022 1418  MRN: [id number] CSN: [id number]       made a catch up visit w Pt, hearing about her weekend, the OT/PT progress she has made, her supportive neighbors at home who are taking care of her yard/garden, and her potential Wednesday discharge  Listened, affirmed, encouraged, prayed for her                 Chaplaincy Interventions Utilized:   Empowerment: Clarified, confirmed, or reviewed information from treatment team     Exploration: Explored relational needs & resources    Collaboration: Advocated for patient/family    Relationship Building: Cultivated a relationship of care and support    Ritual: Provided prayer    Chaplaincy Outcomes Achieved:  Expressed gratitude, Expressed humor, and Expressed intermediate hope       22 1300   Clinical Encounter Type   Visited With Patient   Routine Visit Follow-up

## 2022-11-07 NOTE — PROGRESS NOTES
11/07/22 1230   Pain Assessment   Pain Assessment Tool 0-10   Pain Score 4   Pain Location/Orientation Orientation: Right;Location: Leg   Restrictions/Precautions   Precautions Bed/chair alarms;Cognitive; Fall Risk;Pain;Supervision on toilet/commode;Visual deficit   Weight Bearing Restrictions Yes   LUE Weight Bearing Per Order NWB  (hand)   RLE Weight Bearing Per Order WBAT   ROM Restrictions No   Braces or Orthoses   (R knee brace w/ mobility, L prevalon boot at rest)   Lifestyle   Autonomy "I am going to a facility closer to my nephew "   Lower Body Dressing   Comment time spent reviewing how to don/doff R knee brace  Pt req Mod A w/ ability to manage top 2 velcro straps w/ Max vc's  Putting On/Taking Off Footwear   Type of Assistance Needed Supervision;Set-up / clean-up   Physical Assistance Level No physical assistance   Comment seated, dynamic fxnl reach to feet to don/doff socks/shoes including tie  Putting On/Taking Off Footwear CARE Score 4   Sit to Stand   Type of Assistance Needed Physical assistance;Verbal cues   Physical Assistance Level 25% or less   Comment inc time   Sit to Stand CARE Score 3   Bed-Chair Transfer   Type of Assistance Needed Physical assistance;Verbal cues; Adaptive equipment   Physical Assistance Level 25% or less   Comment vc's for proper PFRW management  Chair/Bed-to-Chair Transfer CARE Score 3   Toileting Hygiene   Type of Assistance Needed Physical assistance; Adaptive equipment   Physical Assistance Level 25% or less   Comment seated for suad hygiene, Min A in stance at [facility] 34 for clothing management  Toileting Hygiene CARE Score 3   Toilet Transfer   Type of Assistance Needed Physical assistance; Adaptive equipment   Physical Assistance Level 25% or less   Comment stand pivot to PFRW, BSC over toilet     Toilet Transfer CARE Score 3   Functional Standing Tolerance   Time 1'28", 1'41", 2'35"   Activity Pt engaged in puzzle ther act while in stance at table w/ focus on fxnl reach crossing midline, unilateral release, standing endurance, and static standing balance  Tolerated well w/ Min A and Min vc's to adhere to L hand NWB status and extended seated rest breaks provided between stand trials to manage fatigue, chronic and RLE pain  Exercise Tools   Other Exercise Tool 1 Engaged in RUE ther ex to maximize strength and endurance for ADLs and fxnl mobility  Completed 2# DB 3x10 bicep curls, chest press, and OH shoulder press  Tolerated well w/ Min vc's, rest breaks provided between sets to manage fatigue  Cognition   Overall Cognitive Status Impaired   Assessment   Treatment Assessment Pt seen for skilled OT session focusing on RUE strengthening, fxnl standing tolerance, donning/doffing footwear, donning R knee brace, fxnl mobility w/ PFRW, and toileting  Cont to be limited by chronic back pain and RLE pain, req frequent rest breaks to manage  Overall improvement w/ fxnl standing tolerance and mobility, however skill carryover cont to be limited 2* cognitive deficits which impact safety  Plan for d/c ADL tomorrow  Pt to d/c to sub-acute rehab early to middle of this week  Prognosis Good   Problem List Decreased strength;Decreased endurance; Impaired balance;Decreased mobility; Decreased coordination;Decreased cognition; Impaired judgement;Decreased safety awareness;Orthopedic restrictions;Pain; Impaired vision   Plan   Treatment/Interventions ADL retraining;Functional transfer training; Therapeutic exercise; Endurance training;Cognitive reorientation;Patient/family training   Progress Progressing toward goals   Recommendation   OT Discharge Recommendation Post acute rehabilitation services  (sub-acute rehab)   OT Therapy Minutes   OT Time In 1230   OT Time Out 1400   OT Total Time (minutes) 90   OT Mode of treatment - Individual (minutes) 90   OT Mode of treatment - Concurrent (minutes) 0   OT Mode of treatment - Group (minutes) 0   OT Mode of treatment - Co-treat (minutes) 0   OT Mode of Treatment - Total time(minutes) 90 minutes   OT Cumulative Minutes 1605   Therapy Time missed   Time missed?  No

## 2022-11-07 NOTE — PROGRESS NOTES
Physical Medicine and Rehabilitation Progress Note   Call Coverage  Durga Little 79 y o  female MRN: [id number]  Unit/Bed#: -01 Encounter: [id number]      Assessment & Plan:     Decline in ADLs and mobility: Functional assessment  Significant assist Bathing, LBD  Mod assist To hygiene  Supervision UBD  Transfers mod-sign assist   Ambulation/Mobility 50 ft mod assist     PMR On-call focused problem list:  (Additional problems may be listed in primary providers weekly notes)     Acute postoperative pain of right knee  Assessment & Plan  Pain stable  No effusion, erythema, warmth, but tender to palpation at patella and medial/lateral joint lines  Pain radiates up thigh  11/2 - R knee XR hardware looks fine, minimal degenerative changes  Diclofenac, supportive knee brace, ice  Encephalopathy  Assessment & Plan  Monitor   [address] [place] [address] 11/30  Recurrent 10/28/22 with delirium  melatonin to 6 mg  Seroquel 12 5 mg QHS      Deep tissue injury  Assessment & Plan  Left heel DTI - improving - last seen by wound care specialist 10/31  Offload and elevate  Appreciate wound care recommendations as below  Skin Care Plan:  1-Cleanse sacro-buttocks with soap and water  Apply Preventative Hydraguard BID and PRN  2-Turn/reposition q2h or when medically stable for pressure re-distribution on skin   3-Elevate heels to offload pressure  4-Moisturize skin daily with skin nourishing cream  5-Ehob cushion in chair when out of bed  6-B/L Heel Allevyn Foam Dressings  Samuel Left Heel with T for Treatment and Samuel Right Heel with P for Prevention  Change every other day or Prn  Peel back, inspect skin Q-shift, and reapply       H/O dizziness  Assessment & Plan  Denies currently   Dizziness related to motion  Found to have orthostasis earlier   Chronic issue per patient  meclizine PRN     Orthostasis  Assessment & Plan  Continue compression stockings/Abd binder during all therapies  Recurrent 10/28/22: Gentle IVF given for orthostasis x 1L   - Orthostasis improved     Acute pain  Assessment & Plan  Nociceptive/neuropathic post op pain in RLE managed on multimodal regimen:   · Adequate control   · Patient is intolerant of oxycodone and Norco   Patient excessively drowsy on oxycodone and with mild hallucinations on Norco   · Scheduled Tylenol 650 mg t i d  and 325 PRN  · Scheduled gabapentin 100 mg hs   · Topical Lidoderm patches, aqua K pad, Diclofenac gel specifically for knee  Ice for knee  · Robaxin 250 mg q 6 hours p r n  For muscle spasm    CLL (chronic lymphocytic leukemia) (HCC)  Assessment & Plan  Chronic leukocytosis (17K)  Trauma discussed with heme-onc  Ok to f/u as outpatient  Monitor   Follow-up with heme onc as outpatient     Displaced fracture of middle phalanx of left ring finger, initial encounter for closed fracture  Assessment & Plan  Traumatic fracture  4th left finger   Treated non-operatively by Orthopedics  Continue supportive splint - she is not always compliant despite cuing  Follow-up with primary PMR attending this week  ICE for swelling  NWB L hand    Hypertension  Assessment & Plan  At home: Lopressor 25 mg Q 12 hours, Hyzaar (Losartan/HCTZ) 50mg/12 5mg daily  Here: Lopressor 25 mg Q12hr, Losartan 50mg daily  Will monitor BP closely  Adjust medications accordingly  TEDs daily /Abdominal binder PRN    Chronic systolic heart failure (HCC)  Assessment & Plan  Appears compensated   Grade 2 DD  At home: Lasix 40 mg daily, BB  Here:  HOLD Lasix  Encourage fluids by mouth  Restart Lasix when able with K Dur supplement  IM consulted to assist with management       HLD (hyperlipidemia)  Assessment & Plan  Restarted on home Lipitor 20 mg QPM    CKD (chronic kidney disease) stage 3, GFR 30-59 ml/min (HCC)  Assessment & Plan  Stable  Avoid nephrotoxic agents   Monitor 2x/week    Type 2 diabetes mellitus [facility])  Assessment & Plan  Lab Results   Component Value Date    HGBA1C 7 6 (H) 10/12/2022     At home: Levemir 20 units QHS, Humalog 10 units with meals  Here: Levemir 15 units, Lispro 5 units TID with meals, continue sliding scale insulin  In acute care required an insulin drip transiently  Accuchecks  Diabetic Diet  Education  IM following    * Intertrochanteric fracture of right femur [facility])  Assessment & Plan  Traumatic fall  Sustained a right intertrochanteric fracture  Taken to OR by Dr Vicky Loo on 10/12/22 for ORIF and IM nailing  WBAT RLE  DVT ppx with SQ Lovenox  Monitor 3 incisions  Continue acute rehabilitation program  POD 14 = 10/26/22 - appreciate ortho seeing patient and staples removed 10/27/22  Xray completed 10/22 and stable  Follow-up with Dr Vicky Loo as outpatient in 4 weeks (~11/23)      Other Medical Issues:  • VTE prophylaxis - lovenox       Objective: Allergies per EMR  Diagnostic Studies: Reviewed  XR knee 4+ vw right injury   Final Result by Mp Bartlett MD (11/03 0914)      Partially visualized hardware within the distal femur  No acute fractures within the knee  Workstation performed: [id number]         XR femur 2 vw right   Final Result by Robyn Escalona MD (10/27 2041)      Near-anatomic alignment of a fracture the proximal right femur following ORIF  No change since 10/22/2022  Workstation performed: [id number]         XR femur 2 vw right   Final Result by Aniket Augustine MD (10/25 1126)      Status post right hip ORIF  Satisfactory alignment              Workstation performed: [id number]           See above as well    Laboratory: Labs reviewed  Lab Units   HEMOGLOBIN g/dL   HEMATOCRIT %   WBC Thousand/uL     Lab Units   BUN mg/dL   SODIUM mmol/L   POTASSIUM mmol/L   CHLORIDE mmol/L   CREATININE mg/dL            Drug regimen reviewed, all potential adverse effects identified and addressed:    Scheduled Meds:  Current Facility-Administered Medications   Medication Dose Route Frequency Provider Last Rate   • acetaminophen  325 mg Oral Q6H PRN Sandi Meehan MD • acetaminophen  650 mg Oral TID AC Fay Parra MD     • atorvastatin  20 mg Oral Daily With Vahid Raygoza MD     • bisacodyl  10 mg Rectal Daily PRN Fay Parra MD     • Diclofenac Sodium  2 g Topical TID Regulo Carlson MD     • docusate sodium  100 mg Oral BID Fay Parra MD     • enoxaparin  40 mg Subcutaneous Q24H Robin Bates MD     • gabapentin  100 mg Oral HS Regulo Carlson MD     • hydrocortisone   Topical 4x Daily PRN Fay Parra MD     • insulin detemir  15 Units Subcutaneous HS BRINDA Kramer     • insulin lispro  1-5 Units Subcutaneous HS Fay Parra MD     • insulin lispro  1-6 Units Subcutaneous TID AC Fay Parra MD     • insulin lispro  5 Units Subcutaneous TID With Meals BRINDA Landin     • lidocaine  2 patch Topical Daily Fay Parra MD     • losartan  50 mg Oral Daily BRINDA Landin     • meclizine  12 5 mg Oral Q8H PRN aFy Parra MD     • meclizine  12 5 mg Oral Q12H PRN Regulo Carlson MD     • melatonin  6 mg Oral QPM Regulo Carlson MD     • methocarbamol  250 mg Oral Q6H PRN Regulo Carlson MD     • metoprolol tartrate  25 mg Oral Q12H [address] BRINDA Kramer     • naloxone  0 04 mg Intravenous Q1MIN PRN Fay Parra MD     • polyethylene glycol  17 g Oral Daily PRN Fay Parra MD     • QUEtiapine  12 5 mg Oral QPM Regulo Carlson MD         Chief Complaints:  Rehab follow-up      Subjective:     Patient denies lightheadedness, SOB, calf pain, increased leg/knee pain  She reports some frustration with L finger splint and wants to know how long it has to be on  She denies other complaints  ROS: A 10 point ROS was performed; negative except as noted above         Physical Exam:  11/05/22 0559 98 3 °F (36 8 °C) 66 20 166/70 96 % None (Room air)     Vitals above reviewed on date of encounter    GEN:  Sitting in NAD   HEENT/NECK: MMM  CARDIAC: Regular rate rhythm, no murmers, no rubs, no gallops  LUNGS:  clear to auscultation, no wheezes, rales, or rhonchi  ABDOMEN: Soft, non-tender, non-distended, normal active bowel sounds  EXTREMITIES/SKIN:  Mild RLE edema without calf TTP; L ring finger in splint grossly unremarkable  NEURO:   MENTAL STATUS: awake, oriented to person, place, time, and situation and MENTAL STATUS:  Appropriate wakefulness and interaction   PSYCH:  Affect:  Euthymic       ** Please Note: Fluency Direct voice to text software may have been used in the creation of this document  **    I personally performed the required components and examined the patient myself in person on 11-5-22

## 2022-11-07 NOTE — PROGRESS NOTES
Physical Medicine and Rehabilitation Progress Note  Teddy Conner 79 y o  female MRN: [id number]  Unit/Bed#: -46 Encounter: [id number]    HPI: Teddy Conner is a 79 y o  female with CLL, CHF, HLD, HTN, DM2, CKD 3, history of cognitive impairment, osteoporosis, dizziness who presented to the [address] on 10/11/22 with fall in her driveway while using her rollator walker  Imaging revealed a right intertrochanteric femur fracture with extension towards the femoral neck  Imaging also revealed a left displaced 4th phalanx  Patient seen by Orthopedics and deemed an operative candidate  Patient taken to the OR on 10/12/22 by Dr Melissa Robertson for a ORIF with IM nailing  Deemed WBAT RLE  Placed on Lovenox for DVT ppx  Endocrinology consulted for bone health and diabetes, and course c/b acute pain and anemia  Admitted to the [facility] on 10/17  Chief Complaint: No new issues  Interval History/Subjective:  No acute events over the weekend  R knee pain well controlled  Brace in therapy helps  Denies any new CP, SOB, fevers, chills, N/V  Tylenol last night helped with knee pain  Last BM 11/5  ROS:  A 10 point review of systems was negative except for what is noted in the HPI  Today's Changes:  1  Continue current HTN management as per IM  2  Discontinue gabapentin  3  [place] without bed tomorrow  Possible d/c 11/9  Total visit time: 25 minutes, with more than 50% spent counseling/coordinating care  Counseling includes discussion with patient re: progress in therapies, functional issues observed by therapy staff, and discussion with patient regarding their current medical state and wellbeing  Coordination of patient's care was performed in conjunction with Internal Medicine service to monitor patient's labs, vitals, and management of their comorbidities      Assessment/Plan:    * Intertrochanteric fracture of right femur [facility])  Assessment & Plan  Traumatic fall  Sustained a right intertrochanteric fracture  Taken to OR by Dr Espino Duty on 10/12/22 for ORIF and IM nailing  WBAT RLE  DVT ppx with SQ Lovenox  Monitor 3 incisions - well healed  Compressive knee brace only for comfort  Continue acute rehabilitation program  POD 14 = 10/26/22 - appreciate ortho seeing patient and removing staples today 10/27/22  Xray completed 10/22 and stable  Follow-up with Dr Espino Duty as outpatient in 4 weeks (~11/23)      Acute postoperative pain of right knee  Assessment & Plan  R knee quite tender  No effusion, erythema, warmth, but tender to palpation at patella and medial/lateral joint lines  Pain radiates up thigh  11/2 - R knee XR hardware looks fine, minimal degenerative changes  Diclofenac, supportive knee brace, ice  Encephalopathy  Assessment & Plan  Improved today  Suspect some sundowning  MOCA 11/30  Recurrent 10/28/22 with delirium  - Moving below meds to earlier in the evening  Continue melatonin to 6 mg  Seroquel 12 5 mg QHS      Deep tissue injury  Assessment & Plan  Left heel DTI - improving  Offload and elevate  Appreciate wound care recommendations as below  Skin Care Plan:  1-Cleanse sacro-buttocks with soap and water  Apply Preventative Hydraguard BID and PRN  2-Turn/reposition q2h or when medically stable for pressure re-distribution on skin   3-Elevate heels to offload pressure  4-Moisturize skin daily with skin nourishing cream  5-Ehob cushion in chair when out of bed  6-B/L Heel Allevyn Foam Dressings  Samuel Left Heel with T for Treatment and Samuel Right Heel with P for Prevention  Change every other day or Prn  Peel back, inspect skin Q-shift, and reapply  H/O dizziness  Assessment & Plan  Dizziness related to motion  Found to have orthostasis  Chronic issue per patient  Made meclizine PRN for polypharmacy  Monitor closely       Orthostasis  Assessment & Plan  Continue compression stockings/Abd binder during all therapies  Recurrent 10/28/22: Gentle IVF given for orthostasis x 1L   - Orthostasis improved     Acute pain  Assessment & Plan  Nociceptive/neuropathic post op pain in RLE managed on multimodal regimen:   · Patient is intolerant of oxycodone and Norco   Patient excessively drowsy on oxycodone and with mild hallucinations on Norco   · Schedule Tylenol 650 mg t i d  and 325 PRN  · Scheduled gabapentin 100 mg bid  · Topical Lidoderm patches, aqua K pad, Diclofenac gel specifically for knee  Ice for knee  · Robaxin 250 mg q 6 hours p r n  For muscle spasm    CLL (chronic lymphocytic leukemia) ([facility])  Assessment & Plan  Chronic leukocytosis (17K) - stable  Trauma discussed with heme-onc  Ok to f/u as outpatient  Monitor   Follow-up with heme onc as outpatient     Displaced fracture of middle phalanx of left ring finger, initial encounter for closed fracture  Assessment & Plan  Traumatic fracture  4th left finger   Treated non-operatively by Orthopedics  Continue supportive splint - she is not always compliant despite cuing  ICE for swelling  NWB L hand    Hypertension  Assessment & Plan  At home: Lopressor 25 mg Q 12 hours, Hyzaar (Losartan/HCTZ) 50mg/12 5mg daily  Here: Lopressor 25 mg Q12hr, Losartan 50mg daily  Will monitor BP closely  Adjust medications accordingly  TEDs daily /Abdominal binder PRN    Chronic systolic heart failure (HCC)  Assessment & Plan  Grade 2 DD  At home: Lasix 40 mg daily, BB  Here:  HOLD Lasix  Encourage fluids by mouth  Restart Lasix when able with K Dur supplement  IM consulted to assist with management       HLD (hyperlipidemia)  Assessment & Plan  Restarted on home Lipitor 20 mg QPM    CKD (chronic kidney disease) stage 3, GFR 30-59 ml/min ([facility])  Assessment & Plan  Stable  Avoid nephrotoxic agents   Monitor 2x/week    Type 2 diabetes mellitus (HCC)  Assessment & Plan  Lab Results   Component Value Date    HGBA1C 7 6 (H) 10/12/2022     At home: Levemir 20 units QHS, Humalog 10 units with meals  Here: Levemir 20 units, Lispro 5 units TID with meals, continue sliding scale insulin  In acute care required an insulin drip transiently  Accuchecks  Diabetic Diet  Education  IM following    UTI (urinary tract infection)-resolved as of 10/31/2022  Assessment & Plan  Resolved  + UA  Urine culture: >100K Klebsiella  Symptomatic with urinary frequency, new encephalopathy and hallucinations, dysuria  · Completed treatment with Bactrim which was sensitive        Health Maintenance  #Bowel: Last BM 11/5  On docusate BID  #Bladder: Voiding and better continence   #Skin/Pressure Injury Prevention: Turn Q2hr in bed, with weight shifts [id number] in wheelchair  #DVT Prophylaxis: Lovenox, SCDs  #GI Prophylaxis: PO diet  #Code Status: Full Code  #FEN: Diabetic diet, with glucerna at lunch  Received 1L of normal saline 10/28  #Dispo: Team 11/1: Likely SNF  ADD 11/9 - [place] doesn't have bed tomorrow  Family unable to provide assistance  Needs to be modified Ind to return home  Objective:    Functional Update:  PT: CGA-Mary with ambulation 79' with RW    OT: Progressing to Set-up to CGA with ADLs    Allergies per EMR    Physical Exam:  Temp:  [97 7 °F (36 5 °C)-98 2 °F (36 8 °C)] 98 2 °F (36 8 °C)  HR:  [63-69] 67  Resp:  [18] 18  BP: (134-173)/(63-78) 134/63  Oxygen Therapy  SpO2: 95 %    Gen: No acute distress, Well-nourished, well-appearing  HEENT: Moist mucus membranes, Normocephalic/Atraumatic  Cardiovascular: Regular rate, rhythm, S1/S2  Distal pulses palpable  Heme/Extr: No edema  Pulmonary: Non-labored breathing  : No hernandez  GI: Soft, non-tender, non-distended  Integumentary: Skin is warm, dry  Neuro: AAOx3, Speech is intact  Appropriate to questioning  Tone is normal    Psych: Normal mood and affect  Diagnostic Studies: Reviewed, no new imaging      Laboratory: Reviewed  Results from last 7 days   Lab Units 11/07/22  0552   HEMOGLOBIN g/dL 11 3*   HEMATOCRIT % 36 2   WBC Thousand/uL 17 03*     Results from last 7 days   Lab Units 11/07/22  0552   BUN mg/dL 20   POTASSIUM mmol/L 3 7   CHLORIDE mmol/L 109*   CREATININE mg/dL 0 85            Patient Active Problem List   Diagnosis   • Fall   • Type 2 diabetes mellitus ([facility] [address] )   • CKD (chronic kidney disease) stage 3, GFR 30-59 ml/min ([facility])   • HLD (hyperlipidemia)   • OA (osteoarthritis)   • Chronic systolic heart failure ([facility])   • Lymphadenopathy   • Elevated liver enzymes   • Ambulatory dysfunction   • Suspected Mild cognitive impairment   • Hypertension   • Intertrochanteric fracture of right femur ([facility])   • Displaced fracture of middle phalanx of left ring finger, initial encounter for closed fracture   • CLL (chronic lymphocytic leukemia) ([facility])   • Acute pain   • Orthostasis   • H/O dizziness   • Deep tissue injury   • Encephalopathy   • Acute postoperative pain of right knee         Medications  Current Facility-Administered Medications   Medication Dose Route Frequency Provider Last Rate   • acetaminophen  325 mg Oral Q6H PRN Claudell Erb, MD     • acetaminophen  650 mg Oral TID AC Yanely Gamez MD     • atorvastatin  20 mg Oral Daily With Agustina New MD     • bisacodyl  10 mg Rectal Daily PRN Claudell Erb, MD     • Diclofenac Sodium  2 g Topical TID Farideh Licona MD     • docusate sodium  100 mg Oral BID Claudell Erb, MD     • enoxaparin  40 mg Subcutaneous Q24H Keyana Daugherty MD     • gabapentin  100 mg Oral HS Farideh Licona MD     • hydrocortisone   Topical 4x Daily PRN Claudell Erb, MD     • insulin detemir  20 Units Subcutaneous HS BRINDA Kramer     • insulin lispro  1-5 Units Subcutaneous HS Claudell Erb, MD     • insulin lispro  1-6 Units Subcutaneous TID AC Claudell Erb, MD     • insulin lispro  5 Units Subcutaneous TID With Meals BRINDA Joyce     • lidocaine  2 patch Topical Daily Claudell Erb, MD     • losartan  50 mg Oral Daily BRINDA Joyce     • meclizine  12 5 mg Oral Q8H PRN Janna Perea MD     • meclizine  12 5 mg Oral Q12H PRN Chidi Christy MD     • melatonin  6 mg Oral QPM Chidi Christy MD     • methocarbamol  250 mg Oral Q6H PRN Chidi Christy MD     • metoprolol tartrate  25 mg Oral Q12H [address] BRINDA Kramer     • naloxone  0 04 mg Intravenous Q1MIN PRN Janna Perea MD     • polyethylene glycol  17 g Oral Daily PRN Janna Perea MD     • QUEtiapine  12 5 mg Oral QPM Chidi Christy MD            ** Please Note: Fluency Direct voice to text software may have been used in the creation of this document   **

## 2022-11-07 NOTE — PROGRESS NOTES
11/07/22 0900   Pain Assessment   Pain Assessment Tool 0-10   Pain Score 3   Pain Location/Orientation Orientation: Right;Location: Knee   Effect of Pain on Daily Activities limits amount of time on her feet   Hospital Pain Intervention(s) Rest   Restrictions/Precautions   Precautions Bed/chair alarms; Fall Risk;Cognitive;Aspiration;Supervision on toilet/commode   LUE Weight Bearing Per Order NWB  (hand)   RLE Weight Bearing Per Order WBAT   Braces or Orthoses Other (Comment)  (right knee brace for stability with walking activities)   Cognition   Overall Cognitive Status Impaired   Memory Decreased short term memory   Subjective   Subjective no complaints   Roll Left and Right   Type of Assistance Needed Incidental touching   Roll Left and Right CARE Score 4   Sit to Lying   Type of Assistance Needed Physical assistance;Verbal cues   Physical Assistance Level 25% or less   Sit to Lying CARE Score 3   Lying to Sitting on Side of Bed   Type of Assistance Needed Physical assistance;Verbal cues   Physical Assistance Level 25% or less   Lying to Sitting on Side of Bed CARE Score 3   Sit to Stand   Type of Assistance Needed Physical assistance;Verbal cues; Adaptive equipment   Comment inconsistent fwd wt shift, can perform at [facility] with Ctra  [address]ar[address]   Sit to Stand CARE Score -   Bed-Chair Transfer   Type of Assistance Needed Physical assistance;Verbal cues; Adaptive equipment   Physical Assistance Level 25% or less   Comment SPT with PFRW, A fo balance due to getting her feet tangled up occassionally   Chair/Bed-to-Chair Transfer CARE Score 3   Transfer Bed/Chair/Wheelchair   Findings worked on transfers chair to chair, papprochaing both directions, much better device management and balance when turning toward her right   Car Transfer   Type of Assistance Needed Physical assistance;Verbal cues   Physical Assistance Level 25% or less   Comment A for STS with cueing for hand placement   Car Transfer CARE Score 3   Walk 10 Feet   Type of Assistance Needed Physical assistance   Physical Assistance Level 25% or less   Comment MiN A with PFRW   Walk 10 Feet CARE Score 3   Walk 50 Feet with Two Turns   Type of Assistance Needed Physical assistance;Verbal cues; Adaptive equipment   Physical Assistance Level 26%-50%   Comment MiN A with PFRW, added A for balance when turning and pt getting her feet narrow   Walk 50 Feet with Two Turns CARE Score 3   Walk 150 Feet   Comment fatigued at 75ft   Reason if not Attempted Safety concerns   Walk 150 Feet CARE Score 88   Walking 10 Feet on Uneven Surfaces   Reason if not Attempted Safety concerns   Walking 10 Feet on Uneven Surfaces CARE Score 88   Ambulation   Does the patient walk? 2  Yes   Primary Mode of Locomotion Prior to Admission Walk   Distance Walked (feet) 75 ft   Assist Device Platform;Roller Walker   Gait Pattern Inconsistant Mandi; Antalgic;Decreased R stance; Improper weight shift   Limitations Noted In Device Management; Safety   Findings no buckling of right knee noted today   Wheel 50 Feet with Two Turns   Type of Assistance Needed Supervision   Wheel 50 Feet with Two Turns CARE Score 4   Wheel 150 Feet   Type of Assistance Needed Supervision   Wheel 150 Feet CARE Score 4   Wheelchair mobility   Does the patient use a wheelchair? 1  Yes   Type of Wheelchair Used 1  Manual   Curb or Single Stair   Style negotiated Curb   Type of Assistance Needed Physical assistance;Verbal cues   Physical Assistance Level 51%-75%   Comment with PFRW   1 Step (Curb) CARE Score 2   4 Steps   Reason if not Attempted Safety concerns   4 Steps CARE Score 88   12 Steps   Reason if not Attempted Activity not applicable   12 Steps CARE Score 9   Picking Up Object   Type of Assistance Needed Physical assistance;Verbal cues; Adaptive equipment   Physical Assistance Level 26%-50%   Comment with reacher   Picking Up Object CARE Score 3   Therapeutic Interventions   Flexibility seated manual B HS and calf stretches   Other Practiced repeated short distance walks across gym chair to chair witH Ctrzuri Donald 34 to work on turns and balance/device management   Equipment Use   NuStep lvl 1 x 10 min short break at 5 min for 30 sec   Assessment   Treatment Assessment Pt continues to participate well  No bucking of knee with use of knee brace and walking household distances only  Focuse don working on turns, approaching chair safely with PFRW, pt has tendency to scissor fete or get narrow WOOD and then slight retropulsion withturn to approach chair turing to her left  Much safer when turning to her right  Cueing still needed to maintain fwd wt shift with STS for balance  Plan is to continue with subacute rehab  PT Barriers   Physical Impairment Impaired balance;Decreased strength;Decreased endurance;Decreased cognition;Decreased safety awareness;Orthopedic restrictions   Plan   Progress Slow progress, cognitive deficits   Recommendation   PT Discharge Recommendation Post acute rehabilitation services   PT Therapy Minutes   PT Time In 0900   PT Time Out 1030   PT Total Time (minutes) 90   PT Mode of treatment - Individual (minutes) 90   PT Mode of treatment - Concurrent (minutes) 0   PT Mode of treatment - Group (minutes) 0   PT Mode of treatment - Co-treat (minutes) 0   PT Mode of Treatment - Total time(minutes) 90 minutes   PT Cumulative Minutes 1875   Therapy Time missed   Time missed?  No

## 2022-11-08 ENCOUNTER — PATIENT OUTREACH (OUTPATIENT)
Dept: CASE MANAGEMENT | Facility: OTHER | Age: 82
End: 2022-11-08

## 2022-11-08 ENCOUNTER — TELEPHONE (OUTPATIENT)
Dept: HEMATOLOGY ONCOLOGY | Facility: CLINIC | Age: 82
End: 2022-11-08

## 2022-11-08 VITALS
OXYGEN SATURATION: 98 % | SYSTOLIC BLOOD PRESSURE: 158 MMHG | HEIGHT: 66 IN | WEIGHT: 147.71 LBS | HEART RATE: 65 BPM | BODY MASS INDEX: 23.74 KG/M2 | DIASTOLIC BLOOD PRESSURE: 69 MMHG | TEMPERATURE: 97.6 F | RESPIRATION RATE: 18 BRPM

## 2022-11-08 LAB
GLUCOSE SERPL-MCNC: 117 MG/DL (ref 65–140)
GLUCOSE SERPL-MCNC: 129 MG/DL (ref 65–140)

## 2022-11-08 RX ORDER — LOSARTAN POTASSIUM 50 MG/1
100 TABLET ORAL DAILY
Status: DISCONTINUED | OUTPATIENT
Start: 2022-11-09 | End: 2022-11-08 | Stop reason: HOSPADM

## 2022-11-08 RX ORDER — ENOXAPARIN SODIUM 100 MG/ML
40 INJECTION SUBCUTANEOUS
Refills: 0
Start: 2022-11-09 | End: 2022-11-21

## 2022-11-08 RX ORDER — LIDOCAINE 50 MG/G
2 PATCH TOPICAL DAILY
Refills: 0
Start: 2022-11-09

## 2022-11-08 RX ORDER — INSULIN LISPRO 100 [IU]/ML
5 INJECTION, SOLUTION INTRAVENOUS; SUBCUTANEOUS
Refills: 0
Start: 2022-11-08

## 2022-11-08 RX ORDER — METHOCARBAMOL 500 MG/1
250 TABLET, FILM COATED ORAL EVERY 6 HOURS PRN
Refills: 0
Start: 2022-11-08

## 2022-11-08 RX ORDER — QUETIAPINE FUMARATE 25 MG/1
12.5 TABLET, FILM COATED ORAL EVERY EVENING
Refills: 0
Start: 2022-11-08

## 2022-11-08 RX ORDER — LANOLIN ALCOHOL/MO/W.PET/CERES
6 CREAM (GRAM) TOPICAL EVERY EVENING
Refills: 0
Start: 2022-11-08

## 2022-11-08 RX ORDER — LOSARTAN POTASSIUM 100 MG/1
100 TABLET ORAL DAILY
Refills: 0
Start: 2022-11-09

## 2022-11-08 RX ADMIN — INSULIN LISPRO 5 UNITS: 100 INJECTION, SOLUTION INTRAVENOUS; SUBCUTANEOUS at 08:16

## 2022-11-08 RX ADMIN — INSULIN LISPRO 5 UNITS: 100 INJECTION, SOLUTION INTRAVENOUS; SUBCUTANEOUS at 12:20

## 2022-11-08 RX ADMIN — ACETAMINOPHEN 650 MG: 325 TABLET ORAL at 12:20

## 2022-11-08 RX ADMIN — ACETAMINOPHEN 650 MG: 325 TABLET ORAL at 06:18

## 2022-11-08 RX ADMIN — ENOXAPARIN SODIUM 40 MG: 40 INJECTION SUBCUTANEOUS at 08:15

## 2022-11-08 RX ADMIN — METOPROLOL TARTRATE 25 MG: 25 TABLET, FILM COATED ORAL at 08:15

## 2022-11-08 RX ADMIN — LOSARTAN POTASSIUM 50 MG: 50 TABLET, FILM COATED ORAL at 08:14

## 2022-11-08 RX ADMIN — DICLOFENAC SODIUM 2 G: 10 GEL TOPICAL at 08:19

## 2022-11-08 RX ADMIN — DOCUSATE SODIUM 100 MG: 100 CAPSULE, LIQUID FILLED ORAL at 08:14

## 2022-11-08 NOTE — TEAM CONFERENCE
Acute RehabilitationTeam Conference Note  Date: 11/8/2022   Time: 11:00 AM       Patient Name:  Yared Dunne       Medical Record Number: [id number]   YOB: 1940  Sex: Female          Room/Bed:  /[facility] 459-01  Payor Info:  Payor: Eli Wade / Plan: MEDICARE A AND B / Product Type: Medicare A & B Fee for Service /      Admitting Diagnosis: Closed fracture of greater trochanter of right femur ([facility] ) [S72 111A]   Admit Date/Time:  10/17/2022  2:18 PM  Admission Comments: No comment available     Primary Diagnosis:  Intertrochanteric fracture of right femur ([place] [address] )  Principal Problem: Intertrochanteric fracture of right femur [facility])    Patient Active Problem List    Diagnosis Date Noted   • Acute postoperative pain of right knee 11/02/2022   • Encephalopathy 10/24/2022   • Deep tissue injury 10/21/2022   • Orthostasis 10/19/2022   • H/O dizziness 10/19/2022   • Acute pain 10/17/2022   • CLL (chronic lymphocytic leukemia) ([place] [address] ) 10/13/2022   • Intertrochanteric fracture of right femur ([place] [address] ) 10/11/2022   • Displaced fracture of middle phalanx of left ring finger, initial encounter for closed fracture 10/11/2022   • Hypertension 08/08/2021   • Ambulatory dysfunction 08/05/2021   • Suspected Mild cognitive impairment 08/05/2021   • Lymphadenopathy 11/06/2020   • Elevated liver enzymes 11/06/2020   • Fall 11/05/2020   • Type 2 diabetes mellitus ([facility] ) 11/05/2020   • CKD (chronic kidney disease) stage 3, GFR 30-59 ml/min ([place] [address] ) 11/05/2020   • HLD (hyperlipidemia) 11/05/2020   • OA (osteoarthritis) 11/05/2020   • Chronic systolic heart failure ([facility] ) 11/05/2020       Physical Therapy:    Weight Bearing Status: Weight Bearing as Tolerated (WBAT R LE, NWB L hand)  Transfers: Incidental Touching  Bed Mobility: Supervision  Amulation Distance (ft): 75 feet  Ambulation: Incidental Touching, Minimal Assistance  Assistive Device for Ambulation: Roller Walker (left platform)  Roller Walker Attachments:  With platform on left  Wheelchair Mobility Distance: 150 ft  Wheelchair Mobility: Supervision  Number of Stairs: 1  Assistive Device for Stairs: [address]: Moderate Assistance  Ramp: Minimal Assistance  Assistive Device for Ramp: Roller Walker  Roller Walker Attachment: With platform on left  Discharge Recommendations: [facility] with[de-identified] 24 Hour Assisteance, 24 Hour Supervision    10/18/22  Pt is a 79year old female s/p Surgical fixation of right intertrochanteric femur fracture with long intramedullary nail due a fall at home  Barriers to functional progress and overall safety continues to be pain with dec standing tolerance and balance, dec compliance with NWB precaution on L hand, dec cognition with hallucination, gait dysfunction, impaired strength and ortho BPs so pt is not able to initiate and complete bed/chair/toilet/car transfers, household distance amb task without 1-2 person assist  Due to aforementioned impairments pt still not safe to initiate stair training so still at high risk for falls and inc caregiver burden  Pt will benefit from continued skilled therapy intervention to improve overall functions and facilitate a safe d/c pending medical stability  10-31-22  Pt making functional gains past few days but has been limited and inconsistent due to blood pressure issues/ pain/ extreme fatigue, and hallucinations last week  Pt currently only walking 30 feet with needing chair follow for safety (ambulation currently is non functional)  Pt leans very heavy on L elbow with poor WB on RLE  Pain and weakness limits pts safety and distances  Pt needs assistance for all mobility aspects and has very limited help at home  Concerned on pt reaching goals in allotted time  Pt also presents with much decrease initiation and cognition so questionable if pt would be safe alone at [facility] level as well       11-7-22   Pt making functional progress this past week, improving transfers and ambulation distance  Pt still remains fall risk with right knee buckling intermittently, narrow WOOD and scissoring with turns especially to her left  Pt now using knee brace for right knee for ambulation, walking up to 75ft with PFRW  Pt still requires cueing for reminders for safety with transfers, CGA to MiN A for mobility for balance  Continued skilled therapy recommended to maximize her safety and function  Occupational Therapy:  Eating: Independent  Grooming: Supervision  Bathing: Moderate Assistance  Bathing: Moderate Assistance  Upper Body Dressing: Supervision  Lower Body Dressing: Minimal Assistance  Toileting: Minimal Assistance  Toilet Transfer: Minimal Assistance  Cognition: Exceptions to WNL  Cognition: Decreased Memory, Decreased Executive Functions, Decreased Attention, Decreased Safety  Orientation: Person, Place, Time, Situation  Discharge Recommendations: Home with:  [address] with[de-identified] 24 Hour Supervision, 24 Hour Assistance       Pt is functioning at overall Min-Mod A for ADLs and Min A for stand pivot xfer w/ PFRW  Limited by RLE pain, impaired balance, dec endurance, L hand NWB status, impaired STM, dec attention to task, impaired insight, impaired safety awareness, limited social support, unsafe home set-up, and ADL dysfunction  Scored 11/30 on MoCA  version 8 1 indicating moderate cognitive impairment  [facility] w/ 1STE, first-floor set-up  Local family able to A w/ light IADL management and transportation, however unable to provide A daily and without A pt remains high fall and readmission risk  Plan for d/c to sub-acute rehab 11/9  Speech Therapy:           No notes on file    Nursing Notes:  Appetite: Good  Diet Type: Diabetic                      Diet Patient/Family Education Complete: Yes    Type of Wound (LDA):  Wound                    Type of Wound Patient/Family Education: Yes  Bladder: Continent     Bladder Patient/Family Education: Yes  Bowel: Continent     Bowel Patient/Family Education: Yes  Pain Location/Orientation: Orientation: Right, Location: Hip  Pain Score: 3  Pain 2  Pain Score 2: 6  Pain Location/Orientation 2: Orientation: Lower, Location: Back                    Hospital Pain Intervention(s): Rest  Pain Patient/Family Education: Yes  Medication Management/Safety  Injectable: Lovenox (insulin)  Safe Administration: Yes (by staff)  Medication Patient/Family Education Complete: No (on going)    S/p ORIF with IM nailing of the Rt femur - Pain control and therapy per primary service, PMR adjusted pain meds d/t sedation, Renal dosed Lovenox for DVT prophylaxis, Follow-up with Ortho as scheduled  Left 4th phalanx fracture - splint and NWB, Keeps removing splint  HTN - Home: Hyzaar 50/12 5mg qd/Lopressor 25mg q12 hours/Lasix 40mg qd (for heart failure), Here: Lopressor 25mg BID/losartan 50mg daily, improving  Anemia - Secondary to trauma, Stable  DM type 2 - Home: Levemir 20U at bedtime/Humalog 10U with meals, Here: Levemir 20U at bedtime/Humalog 5U tid, BS improving  Chronic systolic/diastolic heart failure - Pt has been off diuretics, ECHO with an EF of 45%  Grade 2 diastolic dysfunction, euvolemic  CLL - Recent diagnosis, Outpt follow-up with Hematology/Oncology, Baseline WBCs 14-16K, Had mild bump likely reactive secondary to surgery/UTI, Stable  Suspected UTI - Had positive UA  Culture with >100,000 Klebsiella, s/p Bactrim, Urinary retention improved with tx of UTI  Delirium - Intermittent confusion and hallucinations  Pt requires alarms for safety  This week we will continue to monitor vital signs and lab results  Pt will continue to work on increase balance and strength, maintain skin integrity by turning/reoposition, and offload pressure  We will continue to increase safety awareness and keep pt free form falls  We will continue to monitor for constipation and medicate per bowel protocol  We will continue to encourage independence with ADL's        Case Management:     Discharge Planning  Living Arrangements: Lives Alone  Support Systems: Friends/neighbors, Family members  Assistance Needed: unknown  Type of Current Residence: Other (Comment) (Two story home)  Current Home Care Services: No  10/19/2022  Met with the patient 10/18/2022 to complete CM open  Patient lives alone in a 2 story home, she reports she has a first floor set up and does not utilize 2nd floor much  Patient reports prior to admission she was independent with ADL/IADLs, she drove to get groceries, to doctor's appointments etc  Patient reports having r/w at home  Patient  Reports no hx of HHC or STR but does report she has used OP PT in this past but does not recall where that was  Patient reports she has very supportive neighbors that assist her when she needs it  [facility], PCP is Dr Domingo Acevedo  The patient was educated on the rehabilitation process including therapy program, the interdisciplinary team, and weekly team meeting schedule  Estimated length of stay of 10-14 days was reviewed with the patient as well as expectations of discussions of discharge planning  The role of the  was reviewed including providing care coordination, discharge planning and discharge facilitation  IMM was reviewed with the patient and a copy was provided for their reference  The patient verbalized understanding of the information provided and denied any further questions at this time  CM will continue to follow and assist the patient throughout their rehabilitation stay  10/31- Pt participating in therapy and making progress  Pt has been hallucinating and is alert x3  Team recommends subacute transition due to lack of caregiver support  Pt has a nephew that assists as needed but will not be able to provide 24/7 supervision for pt  Cm to follow up with nephew in [place] as per other team members, he is interested in placement in [place] area   Cm to assist with subacute transition  11/7- Pt making progress in therapies, however team recommending subacute transition due to lack of caregiver support  Cm sent referrals to [facility] and they are accepting  Pt to d/c 11/9  Is the patient actively participating in therapies? yes  List any modifications to the treatment plan: None    Barriers Interventions   Proximal weakness in RLE Topicals, bracing   Incontinence Condom Catheter   High BP Med management   Left Heel DTI Improving   Delirium Med management   Is the patient making expected progress toward goals? yes  List any update or changes to goals: None    Medical Goals: Patient will be medically stable [facility] completion of [facility]hab program and Patient will be able to manage medical conditions and comorbid conditions with medications and follow up upon completion of rehab program    Weekly Team Goals:   Rehab Team Goals  ADL Team Goal: Patient will be independent with ADLs with least restrictive device upon completion of rehab program  Transfer Team Goal: Patient will be independent with transfers with least restrictive device upon completion of rehab program  Locomotion Team Goal: Patient will be independent with locomotion with least restrictive device upon completion of rehab program    Discussion: Pt presents w/ above barriers  Pt currently functioning at moderate/minimum assistance for ADL/IADLs  Pt functioning at minimum assistance w/ walker for ambulation  Due to lack of caregiver support in home, team recommending subacute transition to SNF  Pt dcing to [facility] at [address] [place] [address] via w/c Chasidy Ch  Family and pt notified  Anticipated Discharge Date:  11/8/22  [facility] Team Members Present: The following team members are supervising care for this patient and were present during this Weekly Team Conference      Physician: Dr Raiza Rainey MD  : BATSHEVA Leone  Registered Nurse: Wade Callejas RN  Physical Therapist: Anne Perales Brit Woodward, DPT  Occupational Therapist: Armaan Munoz MS, OTR/L

## 2022-11-08 NOTE — NURSING NOTE
Pt stable for discharge          Report called to nurse at [address]   Pt transported by w/c [place] [facility]

## 2022-11-08 NOTE — PROGRESS NOTES
11/08/22 0830   Pain Assessment   Pain Assessment Tool 0-10   Pain Score 5   Pain Location/Orientation Orientation: Right;Location: Leg   Restrictions/Precautions   Precautions Bed/chair alarms;Cognitive; Fall Risk;Pain;Supervision on toilet/commode;Visual deficit   LUE Weight Bearing Per Order NWB   RLE Weight Bearing Per Order WBAT   Braces or Orthoses Splint  (R knee brace w/ mobility; L 4th digit)   Lifestyle   Autonomy "those are pretty wrinkled"   Oral Hygiene   Type of Assistance Needed Supervision   Physical Assistance Level No physical assistance   Comment seated; difficulty maintaining LUE NWBing during standing oral care   Oral Hygiene CARE Score 4   Shower/Bathe Self   Type of Assistance Needed Physical assistance   Physical Assistance Level 26%-50%   Comment sponge bathing routine seated in w/c at sink bathing all parts with up to min assist in stance when bathing suad/rear     Shower/Bathe Self CARE Score 3   Upper Body Dressing   Type of Assistance Needed Set-up / clean-up   Physical Assistance Level No physical assistance   Comment to don shirt   Upper Body Dressing CARE Score 5   Lower Body Dressing   Type of Assistance Needed Physical assistance   Physical Assistance Level 26%-50%   Comment assist to don brief; seated to thread pants; stands with min assist to complete CM   Lower Body Dressing CARE Score 3   Putting On/Taking Off Footwear   Type of Assistance Needed Set-up / clean-up   Physical Assistance Level No physical assistance   Comment to don socks and sneakers   Putting On/Taking Off Footwear CARE Score 5   Sit to Stand   Type of Assistance Needed Physical assistance   Physical Assistance Level 25% or less   Comment with PFRW   Sit to Stand CARE Score 3   Bed-Chair Transfer   Type of Assistance Needed Physical assistance   Physical Assistance Level 25% or less   Comment with PFRW   Chair/Bed-to-Chair Transfer CARE Score 3   Toileting Hygiene   Type of Assistance Needed Physical assistance Physical Assistance Level 26%-50%   Comment in stance for hygiene/CM   [address] Score 3   Toilet Transfer   Type of Assistance Needed Physical assistance   Physical Assistance Level 25% or less   Comment PFRW to [facility] over toilet   Toilet Transfer CARE Score 3   Exercise Tools   Other Exercise Tool 1 RUE strengthening completed using 2# FW, 3x10 for shoulder press, chest press and elbow flex/ext  strengthening completed to inc overall strength and endurance for ADLs/transfers  Cognition   Overall Cognitive Status Impaired   Arousal/Participation Alert; Cooperative   Attention Attends with cues to redirect   Orientation Level Oriented to person;Oriented to place;Oriented to situation   Memory Decreased short term memory   Following Commands Follows one step commands with increased time or repetition   Activity Tolerance   Activity Tolerance Patient tolerated treatment well   Assessment   Treatment Assessment pt engages in 90 minute skilled OT Session focusing on D/C ADL routine, func transfers with PFRW and light RUE strengthening  see above for full func details  pt demo's ability to bathe and dress all parts, requires up to min assist when in stance for balance and VCs for adhering to LUE NWBing thru hand  pt remains significantly limited by impaired func cog, impaired safety, pain in RLE/lower back, impaired standing balance/tolerance, warranting recommendation of continued skilled care in subacute setting prior to d/c home in order to inc IND with self care tasks, func transfers and IADLs as pt with limited family/social support  pt scheduled for d/c today to SNF  Prognosis Good   Problem List Decreased strength;Decreased endurance; Impaired balance;Decreased mobility; Decreased coordination;Decreased cognition; Impaired judgement;Decreased safety awareness;Orthopedic restrictions;Pain; Impaired vision   Barriers to Discharge Inaccessible home environment;Decreased caregiver support   Plan Treatment/Interventions ADL retraining;Functional transfer training; Therapeutic exercise; Endurance training;Cognitive reorientation;Patient/family training;Equipment eval/education; Compensatory technique education   OT Therapy Minutes   OT Time In 0830   OT Time Out 1000   OT Total Time (minutes) 90   OT Mode of treatment - Individual (minutes) 90   OT Mode of treatment - Concurrent (minutes) 0   OT Mode of treatment - Group (minutes) 0   OT Mode of treatment - Co-treat (minutes) 0   OT Mode of Treatment - Total time(minutes) 90 minutes   OT Cumulative Minutes 1695   Therapy Time missed   Time missed?  No

## 2022-11-08 NOTE — WOUND OSTOMY CARE
Progress Note - Wound   Mishel Baumann 79 y o  female MRN: [id number]  Unit/Bed#: -47 Encounter: [id number]        Assessment:   Patient is seen for wound care follow-up  Patient admitted with intertrochanteric fracture of the right femur  History of - CLL, CHF, HLD, HTN, DM, CKD, osteoporosis, and TIA  Patient seen Lizette Fell in chair  Moderate assist  Incontinent x 2 per nursing  Per primary RN, there no other wounds or skin alterations are present  Patient to be discharged to SNF     Findings:  1  HA Left Heel DTI: Reabsorbed and Resolved  Area intact with no skin loss  Light purple and hyperpigmented in color  Area blanches  Nicole-wound intact and blanchable  No drainage present  Continue with allevyn Foam Dressings for prevention  Right heel is intact and blanches  No induration, fluctuance, odor, warmth/temperature differences, redness, or purulence noted to the above noted wounds and skin areas assessed  New dressings applied per orders listed below  Patient tolerated well- no s/s of non-verbal pain or discomfort observed during the encounter  Bedside nurse aware of plan of care  See flow sheets for more detailed assessment findings  Orders listed below and wound care will sign off, call or tiger text with questions  Plan:   1-Cleanse sacro-buttocks with soap and water  Apply Preventative Hydraguard BID and PRN      2-Turn/reposition q2h or when medically stable for pressure re-distribution on skin   3-Elevate heels to offload pressure  4-Moisturize skin daily with skin nourishing cream  5-Ehob cushion in chair when out of bed  6-B/L Preventative Heel Allevyn Foam Dressings  Samuel with P for Prevention  Change Q3 days   Peel back, inspect skin Q-shift, and reapply        WOUNDS:  Wound 10/21/22 Pressure Injury Heel Left (Active)   Wound Image   11/08/22 1100   Wound Description Intact 11/08/22 1100   Pressure Injury Stage DTPI 11/07/22 1307   Nicole-wound Assessment Hyperpigmented 11/08/22 1100 Wound Length (cm) 0 cm 11/08/22 1100   Wound Width (cm) 0 cm 11/08/22 1100   Wound Depth (cm) 0 cm 11/08/22 1100   Wound Surface Area (cm^2) 0 cm^2 11/08/22 1100   Wound Volume (cm^3) 0 cm^3 11/08/22 1100   Calculated Wound Volume (cm^3) 0 cm^3 11/08/22 1100   Tunneling 0 cm 11/08/22 1100   Tunneling in depth located at 0 11/08/22 1100   Undermining 0 11/08/22 1100   Undermining is depth extending from 0 11/08/22 1100   Wound Site Closure ALESSIO 11/08/22 1100   Drainage Amount None 11/08/22 1100   Non-staged Wound Description Not applicable 07/83/33 6578   Treatments Cleansed;Site care 11/08/22 1100   Dressing Moisture barrier 11/08/22 1100   Wound packed?  No 11/08/22 1100   Packing- # removed 0 11/08/22 1100   Packing- # inserted 0 11/08/22 1100   Dressing Changed New 11/08/22 1100   Patient Tolerance Tolerated well 11/08/22 1100   Dressing Status Intact 11/08/22 1100                Alexandra Aguila RN, [facility]

## 2022-11-08 NOTE — PROGRESS NOTES
ADT Alert received the patient discharged today 11/8/22 to [facility]  I have removed myself off of the care team, added the CM to the care team who will follow the patient through the bundle episode, sent the care manager a inbasket notifying them of the bundle episode, updated the BPCI form, and updated the care coordination note

## 2022-11-08 NOTE — PROGRESS NOTES
Internal Medicine Progress Note  Patient: Mishel Baumann  Age/sex: 79 y o  female  Medical Record #: [id number]      ASSESSMENT/PLAN: (Interval History)  Mishel Baumann is seen and examined and management for following issues:    S/p ORIF with IM nailing of the Rt femur  · Pain control and therapy per primary service  · PMR adjusted pain meds d/t sedation  · Renal dosed Lovenox for DVT prophylaxis  · Follow-up with Ortho as scheduled     Left 4th phalanx fracture  · Splint and NWB   · Keeps removing splint     HTN  · Home: Hyzaar 50/12 5mg qd/Lopressor 25mg q12 hours/Lasix 40mg qd (for heart failure)  · Here: Lopressor 25mg BID/losartan 100mg daily  · Trending upward  · Increase losartan to 100mg daily 11/8     Anemia  · Secondary to trauma  · Stable      DM type 2  · Home: Levemir 20U at bedtime/Humalog 10U with meals  · Here: Levemir 20U at bedtime/Humalog 5U tid  · BS stable      Chronic systolic/diastolic heart failure  · Pt has been off diuretics  · ECHO with an EF of 45%  Grade 2 diastolic dysfunction  · euvolemic     CLL  · Recent diagnosis  · Outpt follow-up with Hematology/Oncology  · Baseline WBCs 14-16K  · Had mild bump likely reactive secondary to surgery/UTI  · Stable      Suspected UTI  · resolved     Delirium  · Intermittent confusion and hallucinations  DC planning: to SNF OK from medicine standpoint    The above assessment and plan was reviewed and updated as determined by my evaluation of the patient on 11/8/2022      Labs:   Results from last 7 days   Lab Units 11/07/22  0552   WBC Thousand/uL 17 03*   HEMOGLOBIN g/dL 11 3*   HEMATOCRIT % 36 2   PLATELETS Thousands/uL 192     Results from last 7 days   Lab Units 11/07/22  0552   SODIUM mmol/L 142   POTASSIUM mmol/L 3 7   CHLORIDE mmol/L 109*   CO2 mmol/L 31   BUN mg/dL 20   CREATININE mg/dL 0 85   CALCIUM mg/dL 9 5             Results from last 7 days   Lab Units 11/08/22  0622 11/07/22 2052 11/07/22  1627   POC GLUCOSE mg/dl 129 311* 130       Review of Scheduled Meds:  Current Facility-Administered Medications   Medication Dose Route Frequency Provider Last Rate   • acetaminophen  325 mg Oral Q6H PRN Darya Portillo MD     • acetaminophen  650 mg Oral TID VALERIE Portillo MD     • atorvastatin  20 mg Oral Daily With Amanda Aranda MD     • bisacodyl  10 mg Rectal Daily PRN Darya Portillo MD     • Diclofenac Sodium  2 g Topical TID Williams Crowe MD     • docusate sodium  100 mg Oral BID Darya Portillo MD     • enoxaparin  40 mg Subcutaneous Q24H Carlin Cannon MD     • hydrocortisone   Topical 4x Daily PRN Darya Portillo MD     • insulin detemir  20 Units Subcutaneous HS BRINDA Kramer     • insulin lispro  1-5 Units Subcutaneous HS Darya Portillo MD     • insulin lispro  1-6 Units Subcutaneous TID VALERIE Portillo MD     • insulin lispro  5 Units Subcutaneous TID With Meals BRINDA Virk     • lidocaine  2 patch Topical Daily Darya Portillo MD     • [START ON 11/9/2022] losartan  100 mg Oral Daily BRINDA Virk     • meclizine  12 5 mg Oral Q8H PRMANDY Portillo MD     • meclizine  12 5 mg Oral Q12H PRN Williams Crowe MD     • melatonin  6 mg Oral QPM Williams Crowe MD     • methocarbamol  250 mg Oral Q6H PRN Williams Crowe MD     • metoprolol tartrate  25 mg Oral Q12H [facility] BRINDA Kramer     • naloxone  0 04 mg Intravenous Q1MIN PRN Darya Portillo MD     • polyethylene glycol  17 g Oral Daily PRN Darya Portillo MD     • QUEtiapine  12 5 mg Oral QPM Williams Crowe MD         Subjective/ HPI: Patient seen and examined  Patients overnight issues or events were reviewed with nursing or staff during rounds or morning huddle session  New or overnight issues include the following:     Pt seen in therapy  No issues overnight    ROS:   A 10 point ROS was performed; negative except as noted above       *Labs /Radiology studies reviewed  *Medications reviewed and reconciled as needed  *Please refer to order section for additional ordered labs studies  *Case discussed with primary attending during morning huddle case rounds    Physical Examination:  Vitals:   Vitals:    11/07/22 1430 11/07/22 2122 11/08/22 0600 11/08/22 0713   BP: 167/70 165/80  158/69   BP Location: Left arm Right arm  Left arm   Pulse: 61 75  65   Resp: 18 18  18   Temp: 98 5 °F (36 9 °C) 98 6 °F (37 °C)  97 6 °F (36 4 °C)   TempSrc: Oral Oral  Oral   SpO2: 94% 93%  98%   Weight:   67 kg (147 lb 11 3 oz)    Height:         GEN: No apparent distress, interactive; frail  NEURO: Alert and oriented x3; anxious regarding dc to SNF  HEENT: Pupils are equal and reactive, EOMI, mucous membranes are moist, face symmetrical  CV: S1 S2 regular, no MRG, no peripheral edema noted  RESP: Lungs are clear bilaterally, no wheezes, rales or rhonchi noted, on room air, respirations easy and non labored  GI: Flat, soft non tender, non distended; +BS x4  : Voiding without difficulty; incontinent overnight  MUSC: Moves all extremities; except RLE WBAT; splint to 4th finger on left hand NWB  SKIN: pink, warm and dry, poor turgor, no rashes, lesions; incision without any evidence of infection      The above physical exam was reviewed and updated as determined by my evaluation of the patient on 11/8/2022  Invasive Devices  Report    Drain  Duration           External Urinary Catheter 5 days                   VTE Pharmacologic Prophylaxis: Enoxaparin  Code Status: Level 1 - Full Code  Current Length of Stay: 22 day(s)      Total time spent:  30 minutes  with more than 50% spent counseling/coordinating care  Counseling includes discussion with patient re: progress  and discussion with patient of his/her current medical state/information  Coordination of patient's care was performed in conjunction with primary service   Time invested included review of patient's labs, vitals, and management of their comorbidities with continued monitoring  In addition, this patient was discussed with medical team including physician and advanced extenders  The care of the patient was extensively discussed and appropriate treatment plan was formulated unique for this patient  Medical decision making for the day was made by supervising physician unless otherwise noted in their attestation statement  ** Please Note:  voice to text software may have been used in the creation of this document   Although proof errors in transcription or interpretation are a potential of such software**

## 2022-11-08 NOTE — OCCUPATIONAL THERAPY NOTE
Occupational Therapy Discharge Summary    Pt has made progress during course of OT functioning at overall Min-Mod A for ADLs, Min A for fxnl mobility w/ PFRW  The following assessment were completed during pt's rehabilitation stay MoCA version 8 1 w/ pt scoring 11/30 indicating a moderate cognitive impairment  Unfortunately pt unable to achieve Independent goals and w/ limited social support  From OT standpoint, pt is okay to d/c sub-acute rehab, will cont to benefit from skilled services to maximize fxnl indep and dec caregiver burden  D/C KARTHIKEYANOT      Tigist Starr MS, OTR/L

## 2022-11-08 NOTE — DISCHARGE SUMMARY
Discharge Summary - Tapan Feliz 79 y o  female MRN: [id number]  Unit/Bed#: [facility] 114-98 Encounter: [id number]    Admission Date: 10/17/2022     Discharge Date: 11/8/2022    Etiologic/Rehabilitation Diagnosis: Impairment of mobility, safety and Activities of Daily Living (ADLs) due to Orthopedic Disorders:  08 11  Unilateral Hip Fracture    HPI: Ty keating 79 y  o  female with CLL, CHF, HLD, HTN, DM2, CKD 3, history of cognitive impairment, osteoporosis, dizziness who presented to the [facility] 10/11/22 with fall in her driveway while using her rollator walker  Teddy Shelling revealed a right intertrochanteric femur fracture with extension towards the femoral neck   Imaging also revealed a left displaced 4th phalanx  Patient seen by Orthopedics and deemed an operative candidate   Patient taken to the OR on 10/12/22 by Dr Yuan Roberts for a ORIF with IM nailing   Deemed WBAT RLE   Placed on Lovenox for DVT ppx  Endocrinology consulted for bone health and diabetes, and course c/b acute pain and anemia  Admitted to the [facility] on 10/17  Procedures Performed During [facility] Admission: None    Acute Rehabilitation Center Course: Patient participated in a comprehensive interdisciplinary inpatient rehabilitation program which included involvment of MD, therapies (PT, OT, and/or SLP), RN, CM, SW, dietary, and psychology services  She will continue to require further rehabilitation and considered safe to be discharged to SNF to continue her recovery process  Please see below for patient's day to day management of rehabilitation needs  Please refer to Internal Medicine notes during [facility] stay for day to day management of patient's medical co-morbidities  * Intertrochanteric fracture of right femur [facility])  Assessment & Plan  Traumatic fall  Sustained a right intertrochanteric fracture  10/15 Vitamin D level 30 3   Can restart supplementation  Taken to OR by Dr Yuan Roberts on 10/12/22 for ORIF and IM nailing  WBAT RLE  DVT ppx with SQ Lovenox for 4 weeks - last dose 11/9  Monitor 3 incisions - well healed  Compressive knee brace only for comfort  Can transition to lower profile brace if available  Continue acute rehabilitation program  POD 14 = 10/26/22 - appreciate ortho seeing patient and removing staples today 10/27/22  Xray completed 10/22 and stable  Follow-up with Dr Frankey Smalling as outpatient in 4 weeks (~11/23)      Acute postoperative pain of right knee  Assessment & Plan  Improving at discharge  R knee quite tender  No effusion, erythema, warmth, but tender to palpation at patella and medial/lateral joint lines  Pain radiates up thigh  11/2 - R knee XR hardware looks fine, minimal degenerative changes  Diclofenac, supportive knee brace for comfort, ice  Encephalopathy  Assessment & Plan  Markedly improved  Suspect some sundowning  MOCA 11/30  Recurrent 10/28/22 with delirium  - Moving below meds to earlier in the evening  Continue melatonin to 6 mg  Seroquel 12 5 mg QHS        Deep tissue injury  Assessment & Plan  Left heel DTI - improving  Offload and elevate  Appreciate wound care recommendations as below  Skin Care Plan:  1-Cleanse sacro-buttocks with soap and water  Apply Preventative Hydraguard BID and PRN  2-Turn/reposition q2h or when medically stable for pressure re-distribution on skin   3-Elevate heels to offload pressure  4-Moisturize skin daily with skin nourishing cream  5-Ehob cushion in chair when out of bed  6-B/L Heel Allevyn Foam Dressings  Samuel Left Heel with T for Treatment and Samuel Right Heel with P for Prevention  Change every other day or Prn  Peel back, inspect skin Q-shift, and reapply  H/O dizziness  Assessment & Plan  Dizziness related to motion  Found to have orthostasis  Chronic issue per patient  Made meclizine PRN for polypharmacy  Monitor closely       Orthostasis  Assessment & Plan  Continue compression stockings/Abd binder during all therapies  Recurrent 10/28/22: Gentle IVF given for orthostasis x 1L   - Orthostasis improved     Acute pain  Assessment & Plan  Nociceptive/neuropathic post op pain in RLE managed on multimodal regimen:   · Patient is intolerant of oxycodone and Norco   Patient excessively drowsy on oxycodone and with mild hallucinations on Norco   · Schedule Tylenol 650 mg t i d  and 325 PRN  · Scheduled gabapentin 100 mg bid  · Topical Lidoderm patches, aqua K pad, Diclofenac gel specifically for knee  Ice for knee  · Robaxin 250 mg q 6 hours p r n  For muscle spasm    CLL (chronic lymphocytic leukemia) ([facility])  Assessment & Plan  Chronic leukocytosis (17K) - stable  Trauma discussed with heme-onc  Ok to f/u as outpatient  Monitor   Follow-up with heme onc as outpatient     Displaced fracture of middle phalanx of left ring finger, initial encounter for closed fracture  Assessment & Plan  Traumatic fracture  4th left finger   Treated non-operatively by Orthopedics  Continue supportive splint - she is not always compliant despite cuing  ICE for swelling  NWB L hand    Hypertension  Assessment & Plan  At home: Lopressor 25 mg Q 12 hours, Hyzaar (Losartan/HCTZ) 50mg/12 5mg daily  Here: Lopressor 25 mg Q12hr, Losartan 100mg daily  Monitor BP closely  Adjust medications accordingly      Chronic systolic heart failure (HCC)  Assessment & Plan  Grade 2 DD  At home: Lasix 40 mg daily, BB  Here:  HOLD Lasix  Encourage fluids by mouth  Continue BB, ARB    Restart Lasix when able with K Dur supplement  IM consulted to assist with management       HLD (hyperlipidemia)  Assessment & Plan  Restarted on home Lipitor 20 mg QPM    CKD (chronic kidney disease) stage 3, GFR 30-59 ml/min ([facility])  Assessment & Plan  Stable  Avoid nephrotoxic agents   Monitor 2x/week    Type 2 diabetes mellitus (HCC)  Assessment & Plan  Lab Results   Component Value Date    HGBA1C 7 6 (H) 10/12/2022     At home: Levemir 20 units QHS, Humalog 10 units with meals  Here: Levemir 20 units, Lispro 5 units TID with meals, continue sliding scale insulin  In acute care required an insulin drip transiently  Accuchecks  Diabetic Diet  Education  IM followed and assisted with management during stay  UTI (urinary tract infection)-resolved as of 10/31/2022  Assessment & Plan  Resolved  + UA  Urine culture: >100K Klebsiella  Symptomatic with urinary frequency, new encephalopathy and hallucinations, dysuria  · Completed treatment with Bactrim which was sensitive      #Bowel: Last BM 11/7  On docusate BID  #Bladder: Voiding and better continence  Timed voids  Premorbidly had urinary mixed incontinence  #Skin/Pressure Injury Prevention: Turn Q2hr in bed, with weight shifts [id number] in wheelchair  #DVT Prophylaxis: Lovenox, SCDs  #GI Prophylaxis: PO diet  #Code Status: Full Code  #FEN: Diabetic diet, with glucerna at lunch  Discharge Physical Examination:  /69 (BP Location: Left arm)   Pulse 65   Temp 97 6 °F (36 4 °C) (Oral)   Resp 18   Ht 5' 6" (1 676 m)   Wt 67 kg (147 lb 11 3 oz)   SpO2 98%   BMI 23 84 kg/m²     Gen: No acute distress, Well-nourished, well-appearing  HEENT: Moist mucus membranes, Normocephalic/Atraumatic  Cardiovascular: Regular rate, rhythm, S1/S2  Distal pulses palpable  Heme/Extr: No edema  Pulmonary: Non-labored breathing  Lungs CTAB  : No hernandez  GI: Soft, non-tender, non-distended  BS+  MSK: PROM is WFL in all extremities  No effusions or deformities  Bulk is symmetric  See below for MMT scores  L 4th finger in splint  Integumentary: Skin is warm, dry  Well healed R sided incision  Improving, L heel DTI (not open)  Neuro: AAOx3, Sensation intact to coarse touch throughout  Speech is intact  Appropriate to questioning   Tone is normal    MMT:   Strength:   Right  Left  Site  Right  Left  Site    5 5  S Ab: Shoulder Abductors  3-  5  HF: Hip Flexors    5 5  EF: Elbow Flexors  NT  NT KF: Knee Flexors    5  5  EE: Elbow Extensors 4-  5  KE: Knee Extensors    5  5  WE: Wrist Extensors  5  5  DR: Dorsi Flexors    5  5  FF: Finger Flexors  5  5  PF: Plantar Flexors    5  5  HI: Hand Intrinsics  4  5  EHL: Extensor Hallucis Longus   Psych: Normal mood and affect  Significant Findings, Care, Treatment and Services Provided: Acute comprehensive interdisciplinary inpatient rehabilitation including PT, OT, SLP, RN, CM, SW, dietary, psychology, etc     Complications: Sundowning, polypharmacy, orthostasis  See above for management  Functional Status Upon Admission to [facility]:  Physical Therapy: Mod-Max A with transfers, Max A for toilet transfers, Total assist for   Occupational Therapy: Mod A - Max A     Functional Status Upon Discharge from [facility]:   PT: CGA-Mary with ambulation 79' with RW    OT: Progressing to Set-up to CGA with ADLs    Discharge Diagnosis: Impairment of mobility, safety and Activities of Daily Living (ADLs) due to Orthopedic Disorders:  08 11  Unilateral Hip Fracture    Discharge Medications:   See after visit summary for reconciled discharge medications provided to patient and family  Condition at Discharge: good     Discharge instructions/Information to patient and family:   See after visit summary for information provided to patient and family  Provisions for Follow-Up Care:  See after visit summary for information related to follow-up care and any pertinent home health orders  Future Appointments   Date Time Provider Leydi Capps   11/9/2022  9:40 AM Berto Carroll MD AILRE[facility] ON[facility]   11/21/2022  9:40 AM Orly Espinosa MD ALIRE[facility] ON[facility]       Disposition: Short-term rehab at [facility]  Needs to be independent prior to discharge home  Planned Readmission: No    Discharge Statement   I spent 45 minutes discharging the patient  This time was spent on the day of discharge  I had direct contact with the patient on the day of discharge   Greater than 50% of the total time was spent examining patient, answering all patient questions, arranging and discussing plan of care with patient as well as directly providing post-discharge instructions  Additional time then spent on discharge activities  Discharge Medications:  See after visit summary for reconciled discharge medications provided to patient and family        Facility Administered Medications Prior to Discharge:    Current Facility-Administered Medications   Medication Dose Route Frequency Provider Last Rate   • acetaminophen  325 mg Oral Q6H PRN Eric Lovell MD     • acetaminophen  650 mg Oral TID AC Eric Lovell MD     • atorvastatin  20 mg Oral Daily With Jose Dahl MD     • bisacodyl  10 mg Rectal Daily PRN Eric Lovell MD     • Diclofenac Sodium  2 g Topical TID Beatris Riddle MD     • docusate sodium  100 mg Oral BID Eric Lovell MD     • enoxaparin  40 mg Subcutaneous Q24H Carlin Cannon MD     • hydrocortisone   Topical 4x Daily PRN Eric Lovell MD     • insulin detemir  20 Units Subcutaneous HS BRINDA Kramer     • insulin lispro  1-5 Units Subcutaneous HS Eric Lovell MD     • insulin lispro  1-6 Units Subcutaneous TID AC Eric Lovell MD     • insulin lispro  5 Units Subcutaneous TID With Meals BRINDA Parra     • lidocaine  2 patch Topical Daily Eric Lovell MD     • losartan  50 mg Oral Daily BRINDA Parra     • meclizine  12 5 mg Oral Q8H PRN Eric Lovell MD     • meclizine  12 5 mg Oral Q12H PRN Beatris Riddle MD     • melatonin  6 mg Oral QPM Beatris Riddle MD     • methocarbamol  250 mg Oral Q6H PRN Beatris Riddle MD     • metoprolol tartrate  25 mg Oral Q12H [address] BRINDA Kramer     • naloxone  0 04 mg Intravenous Q1MIN PRN Eric Lovell MD     • polyethylene glycol  17 g Oral Daily PRN Eric Lovell MD     • QUEtiapine  12 5 mg Oral QPM Beatris Riddle MD

## 2022-11-08 NOTE — DISCHARGE INSTRUCTIONS
DISCHARGE INSTRUCTIONS: Live Rodriguez [age]    Bring these instructions with you to your [facility] Provider so they can order and follow-up any additional lab work or imaging recommended at time of discharge  You have been determined to have some cognitive impairments  - It is YOUR CAREGIVER'S RESPONSIBILITY to ensure appropriate follow-up which includes:  - APPOINTMENTS are scheduled and safe transportation is arranged  - LABS and IMAGING are completed  - MEDICATION MANAGEMENT at home is carried out appropriately     You remain a fall and injury risk which could be severe  - Your risk of fall has decreased however since admission to acute rehab  Caregiver training has been completed with our staff  - Appropriate supervision +/- assistance as instructed during your rehab course is recommended to decrease risk of fall and injury  - Continue skilled therapy as discussed after discharge to further decrease this risk    If you (or your health care proxy) have any questions or concerns regarding your acute rehabilitation stay including issues with medications, rehabilitation, and follow-up plan, please call:          [address] [place] [address] in [facility] at [phone] or [phone]  Should you develop fevers, chills, new weakness, changes in sensation, difficulty speaking, facial weakness, confusion, shortness of breath, chest pain, or other concerning symptoms please call 911 and/or obtain transportation to nearest ER immediately  Should you develop worsening pain, swelling, or drainage notify your surgeon right away or obtain transportation to nearest ER for evaluation  LAB WORK recommended after discharge: Follow-up lab work at discretion of your outpatient physicians to be determined at time of your future appointments      IMAGING to follow-up:  Follow-up imaging at discretion of your outpatient physicians to be determined at time of your appointments  IMAGING, ADDITIONAL FINDINGS and ISSUES to follow-up:  High WBC Count: Please follow-up with hematology/oncology as scheduled to work out treatment plan, suspected to be chronic lymphocytic leukemia  Hip Fracture/ 4th L finger fracture: Follow-up with Orthopedics  They will reach out to you to arrange your appointment  SKIN CARE INSTRUCTIONS to follow:  Monitor incision(s) for increased redness, swelling, pain/tenderness, discharge/pus and promptly notify your surgeon should these develop  - Should you develop significant pain, swelling, or drainage obtain transportation to nearest emergency room for immediate evaluation if unable to reach your surgeon promptly   - Should you develop uncontrolled pain, fever, chills, sweats, changes in strength, sensation, or color of this area obtain transportation to nearest emergency room for immediate evaluation  Monitor skin for increased redness or breadown and promptly notify your physician should these develop    If instructed while in [facility] - be sure you stand +/- walk every 1-2 hours and if advised use appropriate supervision/assistance to optimally offload your buttock/sacral region  While seated or lying in bed shift positions and from side to side often  Can use barrier type cream such as Hydragaurd 2 times per day and as needed  Turn patient (yourself) fully every 2 hours while in bed  WEIGHTBEARING/ACTIVITY PRECAUTIONS to follow:  Weightbearing as tolerated in RLE  Maintain NONWEIGHTBEARING in hand (ok for forearm/elbow) in LUE  Please wear splint to L 4th finger  Driving restrictions: You are recommended against driving until cleared by an outpatient physician  Alcohol restrictions: You are recommended to not drink alcohol at this time unless cleared by an outpatient physician  Drinking alcohol in your current functional condition can increase your risk of injury which could be severe    Drinking alcohol given your current health problems can lead to increased medical complications which could be severe  Combining alcohol with your current medications can increase your risk of injury which could be severe  Smoking restrictions: You are recommended to not smoke nicotine  Smoking increases your risk of heart attack, stroke, emphysema/COPD, and lung cancer  MEDICATIONS:  Please see a full list of your medications outlined in the After Visit Summary that is attached to these Discharge Instructions  Please note changes may have been made to your medications please refer to your discharge paperwork for your current medications and take this list with you to all your doctors appointments for your doctors to review  Please do not resume a home medication unless the medication reconciliation sheet indicates to do so, please do not assume that a medication that you were given a prescription for is the same as a medication you have at home based on both medications having the same name as dosages and frequency may have changed  Unless specifically noted in your medication list provided to you in your discharge paper work do not resume prior vitamins, minerals, or supplements you may have been taking prior to your hospitalization unless instructed by an outpatient physician in the future  Discuss with your primary care at next visit if applicable  Blood thinners prescribed to optimize long and short term health and decrease risk of complications but with risks related to bleeding and other complications  Lovenox (Enoxaparin) Instructions: You have been prescribed Lovenox (Enoxaparin)  This medication is used to prevent clots which can cause severe disability and even death    This medications was started prior to your acute rehabilitation course as recommended by your orthopedic surgeons  It was continued during your acute rehabilitation course   You have one more day of this medication left - last dose 11/9  This is a strong anticoagulant (blood thinner)  It is being recommended for use by you (or your family) to decrease the risk of clotting which can be severely disabling and even life-threatening  Even when provided as recommended it can cause severe disabling and even life-threatening bleeding  Too much or too little of this blood thinner further increases your risk of this medication causing serious and even life-threatening complications such as severe bleeding, clots, strokes, and death  With that said, at this time based on best available evidence and consensus agreement, your physicians recommend you take Lovenox (Enoxaparin) medication based on your overall risks and benefits in your specific medical situation  If you (or your family/caregiver) notice black stools, bloody stools, vomit blood, develop new weakness, slurred speech, confusion or have any other concerning symptoms call 911 or obtain transportation to nearest emergency room immediately  Sedating Medications with increased risk of complications: You do not tolerate opioids well - you have hallucinations on them  You also did not tolerate gabapentin well for similar reasons  Methocarbamol (Robaxin) pain/muscle spasm medication has been used to help your acute pain and spasms  You tolerated this medication adequately during your recent hospital stay  - Do not take with alcohol (or marijuana/cannabis) while on this medication as this can cause increased confusion, breathing problems, falls, and severe injury  - Follow-up with your primary care physician within 1-2 weeks as well as relevant specialty physician for additional management of your medical conditions, potential refills, or adjustments of this medication            MEDICAL MANAGEMENT AT HOME specific to you:    Diabetes Management:  Please check your blood sugars 4 times daily before meals and at bedtime and record them to provide to your doctors, contact your family doctor as soon as possible for blood sugars higher than 220 or lower than 100  Follow-up with PCP/family doctor regularly to ensure blood sugars remain adequately controlled  Monitor for signs or symptoms of low blood sugar (hypoglycemia)- such as sweating, trembling, feeling hungry, worried, more tired (More severe signs of hypoglycemia could be trouble walking, confusion, passing out)  - If present obtain blood sugar    If blood sugar less than 70 take quick source of sugar such as:  - at least half cup of juice   - 4-5 saltine crackers  - 1 tablespoon of sugar or honey  - 3-4 glucose tablets  (Avoid foods that you have history of allergy)    Recheck blood sugar within 30 minutes  If still low repeat providing additional quick sources of sugar  If still not improving or symptoms worsening/not improving seek medical attention right away or obtain medical transport/call 911  Hypertension Management:  Only take the medications prescribed for you at time of discharge - overly high or low blood pressure increases your risk for health complications    Follow-up with PCP/family doctor regularly to ensure blood pressure remains adequately controlled  Please check your blood pressure prior to taking your blood pressure medications and keep a log that you will bring with you to your follow-up doctors' appointments  >Please contact your family doctor or cardiologist immediately for a blood pressure below 100/50 and do not take your blood pressure medications until speaking with them  >Please contact your family doctor or cardiologist as soon as possible for blood pressure greater than 160/100  Acetaminophen (Tylenol) Dosing Warning: You may have up to 3000 mg of acetaminophen (Tylenol) from all sources spread out over a 24 hours period  Do not have more than that, as this can increase your risk of liver injury which can be serious       Please note a summary of your hospital stay with relevant information for your doctors will try to be sent to them  Please confirm with your doctors at your follow up visits that they have received this summary and have them contact [address] [place] [address] if they have not received them along with any other medical records they may require       Erick Mortensen Phone Number:  [phone]

## 2022-11-08 NOTE — CASE MANAGEMENT
Cm received correspondence from [facility] via [employer] that they have bed available today  Pt and pt's nephew are in agreement  Cm made team members aware  Cm set up transport for 1pm via [facility]       Report # [phone]

## 2022-11-08 NOTE — TELEPHONE ENCOUNTER
As the patient has been discharged from the [facility] to [address] this afternoon, and has an Oncology appointment with Dr Cheryl Jung tomorrow at 3274, I contacted Anali Miguel to discuss transport to the appointment  The staff member who answered indicated that the schedulers had left for the day, but I was able to leave a VM for [facility] regarding this appointment tomorrow  I asked that she contact me tomorrow to let me know if transport could be arranged for this appointment  I provided my direct number as a call back  Margo from [address] called back on 11/9 at 0942  She indicated that they would not be able to transport [place] for today's appointment, but will be able to provide transport for the appointment on 11/21 with Dr Teri Pierson in the [facility] office at [address]  [facility] is familiar with the office location and I provided the [facility] number to her  Dr Jayesh Galindo and Dr Colleen Watson teams have been updated

## 2022-11-08 NOTE — PLAN OF CARE
Problem: Prexisting or High Potential for Compromised Skin Integrity  Goal: Skin integrity is maintained or improved  Description: INTERVENTIONS:  - Identify patients at risk for skin breakdown  - Assess and monitor skin integrity  - Assess and monitor nutrition and hydration status  - Monitor labs   - Assess for incontinence   - Turn and reposition patient  - Assist with mobility/ambulation  - Relieve pressure over bony prominences  - Avoid friction and shearing  - Provide appropriate hygiene as needed including keeping skin clean and dry  - Evaluate need for skin moisturizer/barrier cream  - Collaborate with interdisciplinary team   - Patient/family teaching  - Consider wound care consult   Outcome: Adequate for Discharge     Problem: PAIN - ADULT  Goal: Verbalizes/displays adequate comfort level or baseline comfort level  Description: Interventions:  - Encourage patient to monitor pain and request assistance  - Assess pain using appropriate pain scale  - Administer analgesics based on type and severity of pain and evaluate response  - Implement non-pharmacological measures as appropriate and evaluate response  - Consider cultural and social influences on pain and pain management  - Notify physician/advanced practitioner if interventions unsuccessful or patient reports new pain  Outcome: Adequate for Discharge     Problem: INFECTION - ADULT  Goal: Absence or prevention of progression during hospitalization  Description: INTERVENTIONS:  - Assess and monitor for signs and symptoms of infection  - Monitor lab/diagnostic results  - Monitor all insertion sites, i e  indwelling lines, tubes, and drains  - Monitor endotracheal if appropriate and nasal secretions for changes in amount and color  - [place] appropriate cooling/warming therapies per order  - Administer medications as ordered  - Instruct and encourage patient and family to use good hand hygiene technique  - Identify and instruct in appropriate isolation precautions for identified infection/condition  Outcome: Adequate for Discharge  Goal: Absence of fever/infection during neutropenic period  Description: INTERVENTIONS:  - Monitor WBC    Outcome: Adequate for Discharge     Problem: SAFETY ADULT  Goal: Patient will remain free of falls  Description: INTERVENTIONS:  - Educate patient/family on patient safety including physical limitations  - Instruct patient to call for assistance with activity   - Consult OT/PT to assist with strengthening/mobility   - Keep Call bell within reach  - Keep bed low and locked with side rails adjusted as appropriate  - Keep care items and personal belongings within reach  - Initiate and maintain comfort rounds  - Make Fall Risk Sign visible to staff  - Offer Toileting every  Hours, in advance of need  - Initiate/Maintainalarm  - Obtain necessary fall risk management equipment:  - Apply yellow socks and bracelet for high fall risk patients  - Consider moving patient to room near nurses station  Outcome: Adequate for Discharge  Goal: Maintain or return to baseline ADL function  Description: INTERVENTIONS:  -  Assess patient's ability to carry out ADLs; assess patient's baseline for ADL function and identify physical deficits which impact ability to perform ADLs (bathing, care of mouth/teeth, toileting, grooming, dressing, etc )  - Assess/evaluate cause of self-care deficits   - Assess range of motion  - Assess patient's mobility; develop plan if impaired  - Assess patient's need for assistive devices and provide as appropriate  - Encourage maximum independence but intervene and supervise when necessary  - Involve family in performance of ADLs  - Assess for home care needs following discharge   - Consider OT consult to assist with ADL evaluation and planning for discharge  - Provide patient education as appropriate  Outcome: Adequate for Discharge  Goal: Maintains/Returns to pre admission functional level  Description: INTERVENTIONS:  - Perform BMAT or MOVE assessment daily    - Set and communicate daily mobility goal to care team and patient/family/caregiver     - Collaborate with rehabilitation services on mobility goals if consulted  - Perform Range   - Out of bed for toileting  - Record patient progress and toleration of activity level   Outcome: Adequate for Discharge     Problem: DISCHARGE PLANNING  Goal: Discharge to home or other facility with appropriate resources  Description: INTERVENTIONS:  - Identify barriers to discharge w/patient and caregiver  - Arrange for needed discharge resources and transportation as appropriate  - Identify discharge learning needs (meds, wound care, etc )  - Arrange for interpretive services to assist at discharge as needed  - Refer to Case Management Department for coordinating discharge planning if the patient needs post-hospital services based on physician/advanced practitioner order or complex needs related to functional status, cognitive ability, or social support system  Outcome: Adequate for Discharge     Problem: Potential for Falls  Goal: Patient will remain free of falls  Description: INTERVENTIONS:  - Educate patient/family on patient safety including physical limitations  - Instruct patient to call for assistance with activity   - Consult OT/PT to assist with strengthening/mobility   - Keep Call bell within reach  - Keep bed low and locked with side rails adjusted as appropriate  - Keep care items and personal belongings within reach  - Initiate and maintain comfort rounds  - Make Fall Risk Sign visible to staff  - Offer Toileting everyHours, in advance of need  - Initiate/Maintainalarm  - Obtain necessary fall risk management equipment:  - Apply yellow socks and bracelet for high fall risk patients  - Consider moving patient to room near nurses station  Outcome: Adequate for Discharge     Problem: Nutrition/Hydration-ADULT  Goal: Nutrient/Hydration intake appropriate for improving, restoring or maintaining nutritional needs  Description: Monitor and assess patient's nutrition/hydration status for malnutrition  Collaborate with interdisciplinary team and initiate plan and interventions as ordered  Monitor patient's weight and dietary intake as ordered or per policy  Utilize nutrition screening tool and intervene as necessary  Determine patient's food preferences and provide high-protein, high-caloric foods as appropriate       INTERVENTIONS:  - Monitor oral intake, urinary output, labs, and treatment plans  - Assess nutrition and hydration status and recommend course of action  - Evaluate amount of meals eaten  - Assist patient with eating if necessary   - Allow adequate time for meals  - Recommend/ encourage appropriate diets, oral nutritional supplements, and vitamin/mineral supplements  - Order, calculate, and assess calorie counts as needed  - Recommend, monitor, and adjust tube feedings and TPN/PPN based on assessed needs  - Assess need for intravenous fluids  - Provide specific nutrition/hydration education as appropriate  - Include patient/family/caregiver in decisions related to nutrition  Outcome: Adequate for Discharge     Problem: MOBILITY - ADULT  Goal: Maintain or return to baseline ADL function  Description: INTERVENTIONS:  -  Assess patient's ability to carry out ADLs; assess patient's baseline for ADL function and identify physical deficits which impact ability to perform ADLs (bathing, care of mouth/teeth, toileting, grooming, dressing, etc )  - Assess/evaluate cause of self-care deficits   - Assess range of motion  - Assess patient's mobility; develop plan if impaired  - Assess patient's need for assistive devices and provide as appropriate  - Encourage maximum independence but intervene and supervise when necessary  - Involve family in performance of ADLs  - Assess for home care needs following discharge   - Consider OT consult to assist with ADL evaluation and planning for discharge  - Provide patient education as appropriate  Outcome: Adequate for Discharge  Goal: Maintains/Returns to pre admission functional level  Description: INTERVENTIONS:  - Perform BMAT or MOVE assessment daily    - Set and communicate daily mobility goal to care team and patient/family/caregiver     - Collaborate with rehabilitation services on mobility goals if   - Out of bed for toileting  - Record patient progress and toleration of activity level   Outcome: Adequate for Discharge

## 2022-11-09 ENCOUNTER — NURSING HOME VISIT (OUTPATIENT)
Dept: WOUND CARE | Facility: HOSPITAL | Age: 82
End: 2022-11-09

## 2022-11-09 ENCOUNTER — NURSING HOME VISIT (OUTPATIENT)
Dept: GERIATRICS | Facility: OTHER | Age: 82
End: 2022-11-09

## 2022-11-09 DIAGNOSIS — R26.2 AMBULATORY DYSFUNCTION: ICD-10-CM

## 2022-11-09 DIAGNOSIS — E11.9 TYPE 2 DIABETES MELLITUS WITHOUT COMPLICATION, UNSPECIFIED WHETHER LONG TERM INSULIN USE (HCC): ICD-10-CM

## 2022-11-09 DIAGNOSIS — S72.144S CLOSED NONDISPLACED INTERTROCHANTERIC FRACTURE OF RIGHT FEMUR, SEQUELA: ICD-10-CM

## 2022-11-09 DIAGNOSIS — I50.22 CHRONIC SYSTOLIC HEART FAILURE (HCC): ICD-10-CM

## 2022-11-09 DIAGNOSIS — N18.30 STAGE 3 CHRONIC KIDNEY DISEASE, UNSPECIFIED WHETHER STAGE 3A OR 3B CKD (HCC): ICD-10-CM

## 2022-11-09 DIAGNOSIS — R68.89 SUSPECTED DEEP TISSUE INJURY OF UNKNOWN DEPTH: Primary | ICD-10-CM
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
## 2022-11-09 DIAGNOSIS — I10 HYPERTENSION, UNSPECIFIED TYPE: ICD-10-CM

## 2022-11-09 NOTE — PROGRESS NOTES
11/09/22 1048   Hello, [Guardian’s Name / Patient’s Chaz Aragon, this is [Caller Chaz Aragon from [facility], and our clinical care team wanted to check on you / your child after your recent visit to the hospital  It will only take 3-5 minutes  Is this a good time? Discharge Call Type/ Specific Diagnosis: General Call   Call Complete   Discharge phone call complete?  Does not meet criteria  (D/C to SNF)

## 2022-11-09 NOTE — PROGRESS NOTES
[address] MANAGEMENT   AND [facility]       Patient ID: Pau Kathleen is a 79 y o  female Date of Birth 1940     Location of Service: [address] [place] [address]    Performed wound round with: Wound team     Chief Complaint :  Left heel    Wound Instructions:  Wound:  Left heel  Discontinue previous wound order  Cleanse the wound bed with NSS   Apply non-sting skin prep to periwound area and wound bed  Frequency : daily and prn for soiling  Offload all wounds  Turn and reposition frequently, maximum of every two hours  Instruct / Assist with weight shifting every 15 - 20 minutes when in chair  Increase protein intake  Monitor for any sign of infection or worsening, inform PCP or patient's primary physician in your facility  Allergies  Amlodipine, Ceftin [cefuroxime], Ciprofloxacin, Citalopram, Dye [iodinated diagnostic agents], Glucophage [metformin], Januvia [sitagliptin], Neomycin-polymyxin-dexameth, Ondansetron, Prilosec [omeprazole], and Vibramycin [doxycycline]      Assessment & Plan:  1  Suspected deep tissue injury of unknown depth  Assessment & Plan:  Left heel  - purple, nonblanchable  - local wound care with skin prep  - continued offload  - followup next week      2  Ambulatory dysfunction  Assessment & Plan: On short-term rehab             Subjective:   11/9/2022This is a 79 y o , female referred to our service for wound/ skin alterations on left heel  Patient have a complex medical history including but not limited to Benign adenomatous polyp of large intestine, CHF (congestive heart failure) ([facility] [address] ), Diabetes mellitus ([facility] [address] ), Hyperlipemia, Hypertension, Osteoarthritis, and TIA (transient ischemic attack)    Patient was referred by Senior Care Team  Patient was seen in collaboration with the facility wound team      Received patient in bed, seems comfortable  Denies pain  As per report, patient was admitted with deep tissue injury on the left heel    Current treatment is skin prep   No significant issues related to the wound  Review of Systems   Constitutional: Negative  HENT: Negative  Eyes: Negative  Respiratory: Negative  Cardiovascular: Negative for chest pain and leg swelling  Gastrointestinal: Negative  Endocrine: Negative  Genitourinary: Negative  Musculoskeletal: Positive for gait problem  Skin: Positive for wound  See HPI   Neurological: Negative for dizziness and headaches  Psychiatric/Behavioral: Negative for behavioral problems  Objective:    Physical Exam  Constitutional:       Appearance: Normal appearance  HENT:      Head: Normocephalic and atraumatic  Nose: Nose normal       Mouth/Throat:      Pharynx: Oropharynx is clear  Eyes:      Conjunctiva/sclera: Conjunctivae normal    Cardiovascular:      Rate and Rhythm: Normal rate  Pulmonary:      Effort: Pulmonary effort is normal    Abdominal:      Tenderness: There is no abdominal tenderness  There is no guarding  Genitourinary:     Comments: incontinent  Musculoskeletal:         General: No tenderness  Cervical back: Normal range of motion  Right lower leg: No edema  Left lower leg: No edema  Comments: LROM   Skin:     Findings: Lesion present  Comments: Left heel - wound size is 0 9 x 1 cm , purple, non blachable, with no obvious sign of infection   Neurological:      Mental Status: She is alert  Gait: Gait abnormal    Psychiatric:         Mood and Affect: Mood normal          Behavior: Behavior normal               Procedures           Patient's care was coordinated with nursing facility staff  Recent vitals, labs and updated medications were reviewed on EMR or chart system of facility   Past Medical, surgical, social, medication and allergy history and patient's previous records were reviewed and updated as appropriate: Most up-to date information is available in the facility EMR where the patient is currently admitted  Patient Active Problem List   Diagnosis   • Fall   • Type 2 diabetes mellitus ([facility] [address] )   • CKD (chronic kidney disease) stage 3, GFR 30-59 ml/min ([facility])   • HLD (hyperlipidemia)   • OA (osteoarthritis)   • Chronic systolic heart failure ([facility])   • Lymphadenopathy   • Elevated liver enzymes   • Ambulatory dysfunction   • Suspected Mild cognitive impairment   • Hypertension   • Intertrochanteric fracture of right femur ([facility])   • Displaced fracture of middle phalanx of left ring finger, initial encounter for closed fracture   • CLL (chronic lymphocytic leukemia) ([facility])   • Acute pain   • Orthostasis   • H/O dizziness   • Deep tissue injury   • Encephalopathy   • Acute postoperative pain of right knee   • Suspected deep tissue injury of unknown depth     Past Medical History:   Diagnosis Date   • Benign adenomatous polyp of large intestine    • CHF (congestive heart failure) ([facility])    • Diabetes mellitus ([facility] )    • Hyperlipemia    • Hypertension    • Osteoarthritis    • TIA (transient ischemic attack)      Past Surgical History:   Procedure Laterality Date   • APPENDECTOMY     • CARPAL TUNNEL RELEASE Right    • HYSTERECTOMY W/ SALPINGO-OOPHERECTOMY     • FL OPEN RX FEMUR FX+INTRAMED CARIDAD Right 10/12/2022    Procedure: INSERTION NAIL IM FEMUR ANTEGRADE (TROCHANTERIC); Surgeon: Estelle Donald DO;  Location: [facility];  Service: Orthopedics     Social History     Socioeconomic History   • Marital status:       Spouse name: None   • Number of children: None   • Years of education: None   • Highest education level: None   Occupational History   • None   Tobacco Use   • Smoking status: Never Smoker   • Smokeless tobacco: Never Used   Vaping Use   • Vaping Use: Never used   Substance and Sexual Activity   • Alcohol use: Not Currently     Comment: very seldom   • Drug use: Never   • Sexual activity: None   Other Topics Concern   • None   Social History Narrative   • None     Social Determinants of Health Financial Resource Strain: Not on file   Food Insecurity: No Food Insecurity   • Worried About [facility] in the Last Year: Never true   • Ran Out of Food in the Last Year: Never true   Transportation Needs: No Transportation Needs   • Lack of Transportation (Medical): No   • Lack of Transportation (Non-Medical):  No   Physical Activity: Not on file   Stress: Not on file   Social Connections: Not on file   Intimate Partner Violence: Not on file   Housing Stability: Unknown   • Unable to Pay for Housing in the Last Year: No   • Number of Places Lived in the Last Year: Not on file   • Unstable Housing in the Last Year: Not on file        Current Outpatient Medications:   •  acetaminophen (TYLENOL) 325 mg tablet, Take 2 tablets (650 mg total) by mouth every 4 (four) hours as needed for mild pain, Disp: , Rfl: 0  •  atorvastatin (LIPITOR) 20 mg tablet, Take 20 mg by mouth daily, Disp: , Rfl:   •  calcium carbonate (OS-JUAN RAMON) 600 MG tablet, Take 600 mg by mouth 2 (two) times a day with meals, Disp: , Rfl:   •  cholecalciferol (VITAMIN D3) 1,000 units tablet, Take 1,000 Units by mouth daily, Disp: , Rfl:   •  Diclofenac Sodium (VOLTAREN) 1 %, Apply 2 g topically 3 (three) times a day To R knee, Disp: , Rfl: 0  •  docusate sodium (COLACE) 100 mg capsule, Take 1 capsule (100 mg total) by mouth 2 (two) times a day, Disp: , Rfl: 0  •  enoxaparin (LOVENOX) 40 mg/0 4 mL, Inject 0 4 mL (40 mg total) under the skin every 24 hours for 1 dose Do not start before November 9, 2022 , Disp: , Rfl: 0  •  glucose blood test strip, 1 each by Other route daily as needed Use as instructed, Disp: , Rfl:   •  glucose monitoring kit (FREESTYLE) monitoring kit, 1 each by Does not apply route as needed, Disp: , Rfl:   •  insulin detemir (LEVEMIR) 100 units/mL subcutaneous injection, Inject 20 Units under the skin daily at bedtime, Disp: 10 mL, Rfl: 0  •  insulin lispro (HumaLOG) 100 units/mL injection, Inject 5 Units under the skin 3 (three) times a day with meals, Disp: , Rfl: 0  •  lidocaine (LIDODERM) 5 %, Apply 2 patches topically daily Remove & Discard patch within 12 hours or as directed by MD To right leg Do not start before November 9, 2022 , Disp: , Rfl: 0  •  losartan (COZAAR) 100 MG tablet, Take 1 tablet (100 mg total) by mouth daily Do not start before November 9, 2022 , Disp: , Rfl: 0  •  meclizine (ANTIVERT) 12 5 MG tablet, Take 1 tablet (12 5 mg total) by mouth every 8 (eight) hours as needed for dizziness, Disp: 30 tablet, Rfl: 0  •  melatonin 3 mg, Take 2 tablets (6 mg total) by mouth every evening At 8pm, Disp: , Rfl: 0  •  methocarbamol (ROBAXIN) 500 mg tablet, Take 0 5 tablets (250 mg total) by mouth every 6 (six) hours as needed for muscle spasms, Disp: , Rfl: 0  •  metoprolol tartrate (LOPRESSOR) 25 mg tablet, Take 1 tablet (25 mg total) by mouth every 12 (twelve) hours, Disp: , Rfl: 0  •  QUEtiapine (SEROquel) 25 mg tablet, Take 0 5 tablets (12 5 mg total) by mouth every evening, Disp: , Rfl: 0  •  vitamin B-12 (VITAMIN B-12) 1,000 mcg tablet, Take by mouth daily, Disp: , Rfl:   No current facility-administered medications for this visit  Family History   Problem Relation Age of Onset   • Heart disease Mother    • Heart disease Father    • Hypertension Father    • Stomach cancer Sister    • Ovarian cancer Sister    • Hypertension Sister               Coordination of Care: Wound team aware of the treatment plan  Facility nurse will provide wound treatment and monitor the wound for any changes  Patient / Staff education : Patient / Staff was given education on sign of infection and pressure ulcer prevention  Patient/ Staff verbalized understanding     Follow up :  Next week    Voice-recognition software may have been used in the preparation of this document  Occasional wrong word or "sound-alike" substitutions may have occurred due to the inherent limitations of voice recognition software   Interpretation should be guided by context        BRINDA Hastings

## 2022-11-09 NOTE — PROGRESS NOTES
[address]  History and Physical    NAME: Katerine Goodson  AGE: 79 y o  SEX: female [id number]    DATE OF ENCOUNTER: 11/9/2022    Code status:  CPR    Assessment and Plan   S/p fall  Rt femur fx  S/p Rt ORIF & IM nailing on 10/12/22  Incision sites have healed  Pt on her last dose of lovenox today  Cont vitamin D supplements  WBAT RLE  10/22 xray stable  Compressive knee brace only for comfort  Follow-up with Dr Alicia Stanton as outpatient in 4 weeks (~11/23)     Lt ring finger closed displaced fx  S/p fall 10/11/2022  Cont splint  NWB Lt hand  Prn ice  Prn tylenol  Cont care per ortho    DMII  Last Hga1c 7 6 on 10/12/2022  Sugar 256  Cont levemir 20 units SQ daily  Cont humalog 5 units TID with meals  ADA diet    Hyperlipidemia  Cont atorvastatin 20 mg 1 tab po daily    HTN  BP controlled  145/66  Cont losartan 100 mg 1 tab po daily  Cont metoprolol tartrate 25 mg 1 tab po BID    CKD3  Cr 0 85  Stable  Avoid NSAIDS/nephrotoxins/IV contrast    Chronic systolic HF  Pt euvolemic  Weight 146 stable  Last echo EF 45% grade 2 pagan dysfxn  Cont metoprolol 25 mg 1 tab po BID  Cont losartan 100 mg 1 tab po daily    CLL  Wbc 17  Pt afebrile  Pt's baseline  F/u outpatient with hem-onc     Education  IM followed and assisted with management during stay       All medications and routine orders were reviewed and updated as needed  Plan discussed with: pt who is A&Ox3    Chief Complaint     Seen for admission at [address] [place] [address]    History of Present Illness   78 y/o F with PMH Chronic systolic CHF, CKD3, DMII, CLL, Hyperlipidemia, HTN, OA, TIA, Hypothyroidism, Melanoma, and Migraines  Pt was admitted to [facility] from 10/11/2022-10/17/2022  She then went to [facility] from 10/17-11/8/2022 after falling in her driveway  She sustained a Rt intertrochanteric femur fx and underwent an ORIF on 10/12/2022 by Hugh Stephenson   She is WBAT WARD and on lovenox for post po DVT prophylaxis  She then came to [place] for subacute rehab  Pt seen and examined today  She is sitting with speech therapy and answering questions  Her old wounds have healed  She has good b/l pedal pulses  Pt full code  HISTORY:  Past Medical History:   Diagnosis Date   • Benign adenomatous polyp of large intestine    • CHF (congestive heart failure) (HCC)    • Diabetes mellitus ([facility] [address] )    • Hyperlipemia    • Hypertension    • Osteoarthritis    • TIA (transient ischemic attack)      [facility]  10/12/2022  Pr open rx femur fx+intramed alida (Right)   Date Unknown  Appendectomy  Date Unknown  Carpal tunnel release (Right)  Date Unknown  Hysterectomy w/ salpingo-oopherectomy    Family History   Problem Relation Age of Onset   • Heart disease Mother    • Heart disease Father    • Hypertension Father    • Stomach cancer Sister    • Ovarian cancer Sister    • Hypertension Sister      Social History     Socioeconomic History   • Marital status:      Spouse name: Not on file   • Number of children: Not on file   • Years of education: Not on file   • Highest education level: Not on file   Occupational History   • Not on file   Tobacco Use   • Smoking status: Never Smoker   • Smokeless tobacco: Never Used   Vaping Use   • Vaping Use: Never used   Substance and Sexual Activity   • Alcohol use: Not Currently     Comment: very seldom   • Drug use: Never   • Sexual activity: Not on file   Other Topics Concern   • Not on file   Social History Narrative   • Not on file     Social Determinants of Health     Financial Resource Strain: Not on file   Food Insecurity: No Food Insecurity   • Worried About Running Out of Food in the Last Year: Never true   • Ran Out of Food in the Last Year: Never true   Transportation Needs: No Transportation Needs   • Lack of Transportation (Medical): No   • Lack of Transportation (Non-Medical):  No   Physical Activity: Not on file   Stress: Not on file   Social Connections: Not on file   Intimate Partner Violence: Not on file   Housing Stability: Unknown   • Unable to Pay for Housing in the Last Year: No   • Number of Places Lived in the Last Year: Not on file   • Unstable Housing in the Last Year: Not on file       Allergies:   Allergies   Allergen Reactions   • Amlodipine    • Ceftin [Cefuroxime]    • Ciprofloxacin    • Citalopram    • Dye [Iodinated Diagnostic Agents]    • Glucophage [Metformin]    • Januvia [Sitagliptin]    • Neomycin-Polymyxin-Dexameth    • Ondansetron    • Prilosec [Omeprazole]    • Vibramycin [Doxycycline]        Atorvastatin Calcium Oral Tablet 20 MG (Atorvastatin Calcium)     Vitamin D3 Oral Tablet 25 MCG (1000 UT) (Cholecalciferol)     Calcium Carbonate Oral Tablet 600 MG (Calcium Carbonate)     Diclofenac Sodium External Gel 1 % (Diclofenac Sodium (Topical))     Enoxaparin Sodium Injection Solution Prefilled Syringe 40 MG/0 4ML (Enoxaparin Sodium)     Docusate Sodium Oral Tablet 100 MG (Docusate Sodium)     Insulin Detemir Subcutaneous Solution Pen-injector 100 UNIT/ML (Insulin Detemir)     Insulin Lispro (1 Unit Dial) Subcutaneous Solution Pen-injector 100 UNIT/ML (Insulin Lispro)     Losartan Potassium Oral Tablet 100 MG (Losartan Potassium)     Melatonin Oral Tablet 3 MG (Melatonin)     Metoprolol Tartrate Oral Tablet 25 MG (Metoprolol Tartrate)     QUEtiapine Fumarate Oral Tablet 25 MG (Quetiapine Fumarate)     Vitamin B-12 Oral Tablet 1000 MCG (Cyanocobalamin)     Glucose Oral Gel 15 GM/32ML (Dextrose (Diabetic Use))     Glucagon Emergency Injection Kit 1 MG (Glucagon (rDNA))     Methocarbamol Oral Tablet 500 MG (Methocarbamol)     Lidocaine External Patch 4 % (Lidocaine)     Meclizine HCl Oral Tablet 12 5 MG (Meclizine HCl)     Lidocaine External Patch 4 % (Lidocaine)     Acetaminophen Oral Tablet 325 MG (Acetaminophen)     Review of Systems     Review of Systems    Medications and orders     All medications reviewed and updated in Nursing Home EMR  Objective     Vitals:   147 0 Lbs 11/8/2022    Blood Pressure: 145 /  66 mmHg    Temperature: 98 2 °F    Pulse: 63 bpm    Respirations: 18 Breaths/min    Blood Sugar: 256 0 mg/dL    O2 Saturation: 95 0 %    Height: 66 0 Inches      Physical Exam  Constitutional:       General: She is not in acute distress  Appearance: She is not ill-appearing  HENT:      Head: Normocephalic and atraumatic  Cardiovascular:      Rate and Rhythm: Normal rate and regular rhythm  Pulses:           Dorsalis pedis pulses are 2+ on the right side and 2+ on the left side  Pulmonary:      Effort: No respiratory distress  Breath sounds: Normal breath sounds  No wheezing  Abdominal:      General: There is no distension  Palpations: Abdomen is soft  Tenderness: There is no abdominal tenderness  Feet:      Right foot:      Protective Sensation: 10 sites tested  10 sites sensed  Skin integrity: Skin integrity normal       Left foot:      Protective Sensation: 10 sites tested  10 sites sensed  Skin integrity: Skin integrity normal       Comments: Onychomycosis on all nails on b/l feet  Skin:     Comments: Rt hip area healing well & Rt knee too; pt with good b/l pulse   Neurological:      Mental Status: She is alert and oriented to person, place, and time  Comments: CN II-XII intact; pt able to sit up in bed by herself   Psychiatric:         Mood and Affect: Mood normal          Behavior: Behavior normal          Thought Content: Thought content normal          Judgment: Judgment normal        Pertinent Laboratory/Diagnostic Studies: The following labs/studies were reviewed please see chart or hospital paperwork for details      Sunil Christina MD  Senior Care Physician

## 2022-11-09 NOTE — ASSESSMENT & PLAN NOTE
Left heel  - purple, nonblanchable  - local wound care with skin prep  - continued offload  - followup next week

## 2022-11-10 ENCOUNTER — PATIENT OUTREACH (OUTPATIENT)
Dept: CASE MANAGEMENT | Facility: HOSPITAL | Age: 82
End: 2022-11-10

## 2022-11-10 LAB
ANISOCYTOSIS BLD QL SMEAR: PRESENT
BASOPHILS # BLD MANUAL: 0 THOUSAND/UL (ref 0–0.1)
BASOPHILS NFR MAR MANUAL: 0 % (ref 0–1)
BLASTS NFR BLD MANUAL: 11 %
EOSINOPHIL # BLD MANUAL: 0.34 THOUSAND/UL (ref 0–0.4)
EOSINOPHIL NFR BLD MANUAL: 2 % (ref 0–6)
ERYTHROCYTE [DISTWIDTH] IN BLOOD BY AUTOMATED COUNT: 14.9 % (ref 11.6–15.1)
HCT VFR BLD AUTO: 36.2 % (ref 34.8–46.1)
HGB BLD-MCNC: 11.3 G/DL (ref 11.5–15.4)
LYMPHOCYTES # BLD AUTO: 21 % (ref 14–44)
LYMPHOCYTES # BLD AUTO: 3.58 THOUSAND/UL (ref 0.6–4.47)
MACROCYTES BLD QL AUTO: PRESENT
MCH RBC QN AUTO: 32.8 PG (ref 26.8–34.3)
MCHC RBC AUTO-ENTMCNC: 31.2 G/DL (ref 31.4–37.4)
MCV RBC AUTO: 105 FL (ref 82–98)
MONOCYTES # BLD AUTO: 0.51 THOUSAND/UL (ref 0–1.22)
MONOCYTES NFR BLD: 3 % (ref 4–12)
NEUTROPHILS # BLD MANUAL: 4.6 THOUSAND/UL (ref 1.85–7.62)
NEUTS SEG NFR BLD AUTO: 27 % (ref 43–75)
PATHOLOGY REVIEW: YES
PLATELET # BLD AUTO: 192 THOUSANDS/UL (ref 149–390)
PLATELET BLD QL SMEAR: ADEQUATE
PMV BLD AUTO: 11.4 FL (ref 8.9–12.7)
POLYCHROMASIA BLD QL SMEAR: PRESENT
RBC # BLD AUTO: 3.45 MILLION/UL (ref 3.81–5.12)
SCAN RESULT: NORMAL
VARIANT LYMPHS # BLD AUTO: 36 %
WBC # BLD AUTO: 17.03 THOUSAND/UL (ref 4.31–10.16)

## 2022-11-11 ENCOUNTER — NURSING HOME VISIT (OUTPATIENT)
Dept: GERIATRICS | Facility: OTHER | Age: 82
End: 2022-11-11

## 2022-11-11 VITALS
BODY MASS INDEX: 23.73 KG/M2 | HEART RATE: 83 BPM | TEMPERATURE: 97.2 F | WEIGHT: 147 LBS | OXYGEN SATURATION: 97 % | DIASTOLIC BLOOD PRESSURE: 76 MMHG | RESPIRATION RATE: 18 BRPM | SYSTOLIC BLOOD PRESSURE: 128 MMHG

## 2022-11-11 DIAGNOSIS — E11.9 TYPE 2 DIABETES MELLITUS WITHOUT COMPLICATION, UNSPECIFIED WHETHER LONG TERM INSULIN USE (HCC): Primary | ICD-10-CM
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
## 2022-11-11 DIAGNOSIS — S62.625A DISPLACED FRACTURE OF MIDDLE PHALANX OF LEFT RING FINGER, INITIAL ENCOUNTER FOR CLOSED FRACTURE: ICD-10-CM

## 2022-11-11 DIAGNOSIS — S72.144S CLOSED NONDISPLACED INTERTROCHANTERIC FRACTURE OF RIGHT FEMUR, SEQUELA: ICD-10-CM

## 2022-11-11 DIAGNOSIS — I50.22 CHRONIC SYSTOLIC HEART FAILURE (HCC): ICD-10-CM

## 2022-11-11 DIAGNOSIS — I10 HYPERTENSION, UNSPECIFIED TYPE: ICD-10-CM

## 2022-11-11 NOTE — ASSESSMENT & PLAN NOTE
Lab Results   Component Value Date    HGBA1C 7 6 (H) 10/12/2022   · history of diabetes  · Morning blood sugar 137  · Continue accu-cheks  · Continue Levemir 24 units daily  · Continue Humalog 8 units TID with meal  · Encourage diabetic diet

## 2022-11-11 NOTE — PROGRESS NOTES
[facility]  [address]  [phone]  [place]  Code 31 (STR)        NAME: Pau Kathleen  AGE: 79 y o  SEX: female CODE STATUS: CPR    DATE OF ENCOUNTER: 11/11/2022    Assessment and Plan     1  Type 2 diabetes mellitus without complication, unspecified whether long term insulin use (HCC)  Assessment & Plan:    Lab Results   Component Value Date    HGBA1C 7 6 (H) 10/12/2022   · history of diabetes  · Morning blood sugar 137  · Continue accu-cheks  · Continue Levemir 24 units daily  · Continue Humalog 8 units TID with meal  · Encourage diabetic diet      2  Chronic systolic heart failure (HCC)  Assessment & Plan:  Wt Readings from Last 3 Encounters:   11/08/22 67 kg (147 lb 11 3 oz)   10/11/22 65 8 kg (145 lb)   08/05/21 63 5 kg (140 lb)   · History of CHF  · Weights have been stable  · Continue Metoprolol 25 mg BID  · Continue Losartan 100 mg daily  · Diuretics currently on hold            3  Hypertension, unspecified type  Assessment & Plan:  · History of HTN  · BP's have been stable , 128/76  · Continue Metoprolol 25 mg BID  · Continue Losartan 100 mg daily  ·  continue to monitor BP's      4  Fall, sequela  Assessment & Plan:  · Recent hospitalization for fall  · Right femur fracture, ORIF with IM nailing  · Left displaced 4th phalanx  · PT/OT for strength training  · Maintain safety/fall precautions  · Assist with ADL's as needed      5  Closed nondisplaced intertrochanteric fracture of right femur, sequela  Assessment & Plan:  · Patient admitted to the hospital 10/11/2022 for mechanical fall  She was in her driveway using her walker  · Patient had right intertrochanteric fracture  · 10/12/2022 patient had ORIF, IM nailing  · Incision healing well  · Patient completed course of subQ Lovenox for DVT prophylaxis  · WBAT to right lower extremity  · PT/OT for strength training  · Inpatient follow-up with orthopedics in 4 weeks      6   Displaced fracture of middle phalanx of left ring finger, initial encounter for closed fracture  Assessment & Plan:  · Recent on 10/11/2022  · Patient had left displaced 4th phalanx fracture  · Followed by Orthopedics  · Continue to wear splint  · NWB left hand  · Continue Tylenol for pain  · Continue ice for swelling         All medications and routine orders were reviewed and updated as needed  Chief Complaint     STR follow up visit  Patient's care was coordinated with nursing facility staff  Recent vitals, labs, and updated medications were review on [facility] system in facility  Past Medical and Surgica History      Past Medical History:   Diagnosis Date   • Benign adenomatous polyp of large intestine    • CHF (congestive heart failure) ([facility] [address] )    • Diabetes mellitus ([facility] [address] )    • Hyperlipemia    • Hypertension    • Osteoarthritis    • TIA (transient ischemic attack)      Past Surgical History:   Procedure Laterality Date   • APPENDECTOMY     • CARPAL TUNNEL RELEASE Right    • HYSTERECTOMY W/ SALPINGO-OOPHERECTOMY     • VT OPEN RX FEMUR FX+INTRAMED CARIDAD Right 10/12/2022    Procedure: INSERTION NAIL IM FEMUR ANTEGRADE (TROCHANTERIC); Surgeon: Kaity Monae DO;  [facility]: [facility];  Service: Orthopedics     Allergies   Allergen Reactions   • Amlodipine    • Ceftin [Cefuroxime]    • Ciprofloxacin    • Citalopram    • Dye [Iodinated Diagnostic Agents]    • Glucophage [Metformin]    • Januvia [Sitagliptin]    • Neomycin-Polymyxin-Dexameth    • Ondansetron    • Prilosec [Omeprazole]    • Vibramycin [Doxycycline]           History of Present Illness     HPI  Lexii Hearn is a 79year old female, she is a STR patient of [facility] since 11/8/22  Past Medical Hx including but not limited to DM, HTN, CHF, CKD, HLD, CLL  She was seen in collaboration with nursing for medical mgmt and STR follow up  Hospital course  Patient was admitted to the hospital 10/11/2022 following mechanical fall    Patient was walking in her driveway with a walker  Patient had right femur fracture and left displaced 4th phalanx fracture  Patient underwent ORIF and IM nailing 10/12/2022  She received Lovenox for DVT prophylaxis, last dose 11/09/2022  Patient was transferred to [facility] 10/17/2022  [facility] [address] [facility]  WBAT to right lower extremity, nonweightbearing to left hand  Patient will follow up with Orthopedics in 4 weeks  Rehab course  Seen and examined at bedside today  Patient is a reliable historian and is AAOx4  Patient is sitting in her wheelchair at beside, she states she is tired and did not sleep well last night, melatonin ordered  She denies hip pain but says that her finger is still sore  She has been actively participating in therapy  She states that since last night she has had a sore throat, and has been sneezing, her  COVID swab was negative  Patient did not want throat lozenges  Denies CP/SOB/N/V/D  Denies lightheadedness, dizziness, headaches, vision changes  Patient states they are eating well and staying hydrated  Denies any bowel or bladder issues  Per review of SNF records, Patient is eating 3 meals per day, consuming  %  Last documented BM 11/11/22  No concerns from nursing at this time  The patient's allergies, past medical, surgical, social and family history were reviewed and unchanged  Review of Systems     Review of Systems   Constitutional: Negative  Negative for activity change and appetite change  HENT: Positive for sneezing and sore throat  Negative for congestion  Eyes: Negative  Respiratory: Negative  Negative for cough and shortness of breath  Cardiovascular: Negative for chest pain, palpitations and leg swelling  Gastrointestinal: Negative for abdominal distention, abdominal pain, constipation, diarrhea, nausea and vomiting  Endocrine: Negative  Genitourinary: Negative  Negative for difficulty urinating and dysuria     Musculoskeletal: Positive for gait problem  Skin: Positive for wound  Right ORIF   Allergic/Immunologic: Negative  Neurological: Positive for weakness  Negative for dizziness, light-headedness, numbness and headaches  Hematological: Negative  Psychiatric/Behavioral: Positive for sleep disturbance  Objective     Vitals:   Vitals:    11/11/22 1150   BP: 128/76   Pulse: 83   Resp: 18   Temp: (!) 97 2 °F (36 2 °C)   SpO2: 97%         Physical Exam  Vitals and nursing note reviewed  Constitutional:       Appearance: Normal appearance  She is normal weight  HENT:      Head: Normocephalic and atraumatic  Nose: No congestion or rhinorrhea  Mouth/Throat:      Mouth: Mucous membranes are moist       Pharynx: Posterior oropharyngeal erythema present  Eyes:      Conjunctiva/sclera: Conjunctivae normal    Cardiovascular:      Rate and Rhythm: Normal rate and regular rhythm  Pulses: Normal pulses  Heart sounds: Normal heart sounds  Pulmonary:      Effort: Pulmonary effort is normal  No respiratory distress  Breath sounds: No wheezing  Abdominal:      General: Bowel sounds are normal  There is no distension  Palpations: Abdomen is soft  Tenderness: There is no abdominal tenderness  Musculoskeletal:      Right lower leg: No edema  Left lower leg: No edema  Comments: Right hip ORIF surgical incision, left 4th finger splint   Skin:     Capillary Refill: Capillary refill takes more than 3 seconds  Neurological:      Mental Status: She is alert and oriented to person, place, and time  Motor: Weakness present  Gait: Gait abnormal    Psychiatric:         Mood and Affect: Mood normal          Behavior: Behavior normal          Pertinent Laboratory/Diagnostic Studies:   Reviewed in facility chart-stable      Current Medications   Medications reviewed and updated see facility [facility] for details        Current Outpatient Medications:   •  acetaminophen (TYLENOL) 325 mg tablet, Take 2 tablets (650 mg total) by mouth every 4 (four) hours as needed for mild pain, Disp: , Rfl: 0  •  atorvastatin (LIPITOR) 20 mg tablet, Take 20 mg by mouth daily, Disp: , Rfl:   •  calcium carbonate (OS-JUAN RAMON) 600 MG tablet, Take 600 mg by mouth 2 (two) times a day with meals, Disp: , Rfl:   •  cholecalciferol (VITAMIN D3) 1,000 units tablet, Take 1,000 Units by mouth daily, Disp: , Rfl:   •  Diclofenac Sodium (VOLTAREN) 1 %, Apply 2 g topically 3 (three) times a day To R knee, Disp: , Rfl: 0  •  docusate sodium (COLACE) 100 mg capsule, Take 1 capsule (100 mg total) by mouth 2 (two) times a day, Disp: , Rfl: 0  •  enoxaparin (LOVENOX) 40 mg/0 4 mL, Inject 0 4 mL (40 mg total) under the skin every 24 hours for 1 dose Do not start before November 9, 2022 , Disp: , Rfl: 0  •  glucose blood test strip, 1 each by Other route daily as needed Use as instructed, Disp: , Rfl:   •  glucose monitoring kit (FREESTYLE) monitoring kit, 1 each by Does not apply route as needed, Disp: , Rfl:   •  insulin detemir (LEVEMIR) 100 units/mL subcutaneous injection, Inject 20 Units under the skin daily at bedtime, Disp: 10 mL, Rfl: 0  •  insulin lispro (HumaLOG) 100 units/mL injection, Inject 5 Units under the skin 3 (three) times a day with meals, Disp: , Rfl: 0  •  lidocaine (LIDODERM) 5 %, Apply 2 patches topically daily Remove & Discard patch within 12 hours or as directed by MD To right leg Do not start before November 9, 2022 , Disp: , Rfl: 0  •  losartan (COZAAR) 100 MG tablet, Take 1 tablet (100 mg total) by mouth daily Do not start before November 9, 2022 , Disp: , Rfl: 0  •  meclizine (ANTIVERT) 12 5 MG tablet, Take 1 tablet (12 5 mg total) by mouth every 8 (eight) hours as needed for dizziness, Disp: 30 tablet, Rfl: 0  •  melatonin 3 mg, Take 2 tablets (6 mg total) by mouth every evening At 8pm, Disp: , Rfl: 0  •  methocarbamol (ROBAXIN) 500 mg tablet, Take 0 5 tablets (250 mg total) by mouth every 6 (six) hours as needed for muscle spasms, Disp: , Rfl: 0  •  metoprolol tartrate (LOPRESSOR) 25 mg tablet, Take 1 tablet (25 mg total) by mouth every 12 (twelve) hours, Disp: , Rfl: 0  •  QUEtiapine (SEROquel) 25 mg tablet, Take 0 5 tablets (12 5 mg total) by mouth every evening, Disp: , Rfl: 0  •  vitamin B-12 (VITAMIN B-12) 1,000 mcg tablet, Take by mouth daily, Disp: , Rfl:      Plan discussed with Dr John Brown noted agreement with assessment and plan  Please note:  Voice-recognition software may have been used in the preparation of this document  Occasional wrong word or "sound-alike" substitutions may have occurred due to the inherent limitations of voice recognition software  Interpretation should be guided by context           [address], CRNP  11/11/2022  4:39 PM

## 2022-11-11 NOTE — ASSESSMENT & PLAN NOTE
· Recent hospitalization for fall  · Right femur fracture, ORIF with IM nailing  · Left displaced 4th phalanx  · PT/OT for strength training  · Maintain safety/fall precautions  · Assist with ADL's as needed

## 2022-11-11 NOTE — ASSESSMENT & PLAN NOTE
· History of HTN  · BP's have been stable , 128/76  · Continue Metoprolol 25 mg BID  · Continue Losartan 100 mg daily  ·  continue to monitor BP's

## 2022-11-11 NOTE — ASSESSMENT & PLAN NOTE
Wt Readings from Last 3 Encounters:   11/08/22 67 kg (147 lb 11 3 oz)   10/11/22 65 8 kg (145 lb)   08/05/21 63 5 kg (140 lb)   · History of CHF  · Weights have been stable  · Continue Metoprolol 25 mg BID  · Continue Losartan 100 mg daily  · Diuretics currently on hold

## 2022-11-11 NOTE — ASSESSMENT & PLAN NOTE
· Patient admitted to the hospital 10/11/2022 for mechanical fall  She was in her driveway using her walker    · Patient had right intertrochanteric fracture  · 10/12/2022 patient had ORIF, IM nailing  · Incision healing well  · Patient completed course of subQ Lovenox for DVT prophylaxis  · WBAT to right lower extremity  · PT/OT for strength training  · Inpatient follow-up with orthopedics in 4 weeks

## 2022-11-11 NOTE — ASSESSMENT & PLAN NOTE
· Recent on 10/11/2022  · Patient had left displaced 4th phalanx fracture  · Followed by Orthopedics  · Continue to wear splint  · NWB left hand  · Continue Tylenol for pain  · Continue ice for swelling

## 2022-11-15 ENCOUNTER — APPOINTMENT (OUTPATIENT)
Dept: RADIOLOGY | Facility: AMBULARY SURGERY CENTER | Age: 82
End: 2022-11-15
Attending: STUDENT IN AN ORGANIZED HEALTH CARE EDUCATION/TRAINING PROGRAM

## 2022-11-15 ENCOUNTER — OFFICE VISIT (OUTPATIENT)
Dept: OBGYN CLINIC | Facility: CLINIC | Age: 82
End: 2022-11-15

## 2022-11-15 VITALS
HEIGHT: 66 IN | BODY MASS INDEX: 23.63 KG/M2 | SYSTOLIC BLOOD PRESSURE: 125 MMHG | DIASTOLIC BLOOD PRESSURE: 80 MMHG | HEART RATE: 80 BPM | WEIGHT: 147 LBS

## 2022-11-15 DIAGNOSIS — M84.351A STRESS FRACTURE, RIGHT FEMUR, INITIAL ENCOUNTER FOR FRACTURE: Primary | ICD-10-CM

## 2022-11-15 DIAGNOSIS — M84.351A STRESS FRACTURE, RIGHT FEMUR, INITIAL ENCOUNTER FOR FRACTURE: ICD-10-CM

## 2022-11-15 NOTE — PROGRESS NOTES
Orthopaedic Surgery - Office Note  Nicci Faustin (26 y o  female)   : 1940   MRN: [id number]  Encounter Date: 11/15/2022    Chief Complaint   Patient presents with   • Right Hip - Post-op     Past Surgical History:   Procedure Laterality Date   • APPENDECTOMY     • CARPAL TUNNEL RELEASE Right    • HYSTERECTOMY W/ SALPINGO-OOPHERECTOMY     • MI OPEN RX FEMUR FX+INTRAMED CARIDAD Right 10/12/2022    Procedure: INSERTION NAIL IM FEMUR ANTEGRADE (TROCHANTERIC); Surgeon: Liddie Kocher, DO;  Location: [facility];  Service: Orthopedics     Assessment / Plan  4 weeks s/p ORIF right pertrochanteric femur fracture with long intramedullary nail    · X-rays reviewed with patient today which show stable fracture with interval healing  · Continue working with physical therapy WBAT  · Continue take Tylenol as needed for pain relief  · Continue gentle strengthening of the right lower extremity  · Follow up 4 weeks with x-rays      History of Present Illness  Nicci Faustin is a 79 y o  female who presents 4 weeks s/p ORIF right pertrochanteric femur fracture with long intramedullary nail  Patient presents in a wheel chair today  Patient is currently staying at a rehab facility in which performed her 2 week evaluation and removal of staples  Patient reports she is receiving physical therapy at [facility]  She has increased pain with WB activities  Patient reports increased right ankle pain as well  Review of Systems  Pertinent items are noted in HPI  All other systems were reviewed and are negative  Physical Exam  /80   Pulse 80   Ht 5' 6" (1 676 m)   Wt 66 7 kg (147 lb)   BMI 23 73 kg/m²   Cons: Appears well  No apparent distress  Psych: Alert  Oriented x3  Mood and affect normal   Eyes: PERRLA, EOMI  Resp: Normal effort  No audible wheezing or stridor  CV: Palpable pulse  No discernable arrhythmia  Lymph:  No palpable cervical, axillary, or inguinal lymphadenopathy  Skin: Warm    No palpable masses  No visible lesions  Neuro: Normal muscle tone  Normal and symmetric DTR's  Right hip exam:  Skin is intact incisions healing well  Staples removed  Patient is sitting comfortable with the hip flexed at 90 degrees  No pain with gentle hip ROM  Patient has no pain with heel strike  Mild tenderness to palpation suad incisionally  Patient's sensation is intact to light touch in superficial peroneal, deep peroneal, sural, saphenous, plantar nerve distributions  Tibialis anterior, extensor hallus longus, gastrocnemius muscles intact  Limb is well perfused    Studies Reviewed  4 views of the right hip and pelvis reviwed by myself and my interpretation is a follows fracture remain is stable alignment with interval healing  Hardware in acceptable alignment and position     Procedures      Medical, Surgical, Family, and Social History  The patient's medical history, family history, and social history, were reviewed and updated as appropriate      Past Medical History:   Diagnosis Date   • Benign adenomatous polyp of large intestine    • CHF (congestive heart failure) ([facility] [address] )    • Diabetes mellitus ([facility] )    • Hyperlipemia    • Hypertension    • Osteoarthritis    • TIA (transient ischemic attack)            Family History   Problem Relation Age of Onset   • Heart disease Mother    • Heart disease Father    • Hypertension Father    • Stomach cancer Sister    • Ovarian cancer Sister    • Hypertension Sister        Social History     Occupational History   • Not on file   Tobacco Use   • Smoking status: Never Smoker   • Smokeless tobacco: Never Used   Vaping Use   • Vaping Use: Never used   Substance and Sexual Activity   • Alcohol use: Not Currently     Comment: very seldom   • Drug use: Never   • Sexual activity: Not on file       Allergies   Allergen Reactions   • Amlodipine    • Ceftin [Cefuroxime]    • Ciprofloxacin    • Citalopram    • Dye [Iodinated Diagnostic Agents]    • Glucophage [Metformin] • Januvia [Sitagliptin]    • Neomycin-Polymyxin-Dexameth    • Ondansetron    • Prilosec [Omeprazole]    • Vibramycin [Doxycycline]          Current Outpatient Medications:   •  acetaminophen (TYLENOL) 325 mg tablet, Take 2 tablets (650 mg total) by mouth every 4 (four) hours as needed for mild pain, Disp: , Rfl: 0  •  atorvastatin (LIPITOR) 20 mg tablet, Take 20 mg by mouth daily, Disp: , Rfl:   •  calcium carbonate (OS-JUAN RAMON) 600 MG tablet, Take 600 mg by mouth 2 (two) times a day with meals, Disp: , Rfl:   •  cholecalciferol (VITAMIN D3) 1,000 units tablet, Take 1,000 Units by mouth daily, Disp: , Rfl:   •  Diclofenac Sodium (VOLTAREN) 1 %, Apply 2 g topically 3 (three) times a day To R knee, Disp: , Rfl: 0  •  docusate sodium (COLACE) 100 mg capsule, Take 1 capsule (100 mg total) by mouth 2 (two) times a day, Disp: , Rfl: 0  •  glucose blood test strip, 1 each by Other route daily as needed Use as instructed, Disp: , Rfl:   •  glucose monitoring kit (FREESTYLE) monitoring kit, 1 each by Does not apply route as needed, Disp: , Rfl:   •  insulin detemir (LEVEMIR) 100 units/mL subcutaneous injection, Inject 20 Units under the skin daily at bedtime, Disp: 10 mL, Rfl: 0  •  insulin lispro (HumaLOG) 100 units/mL injection, Inject 5 Units under the skin 3 (three) times a day with meals, Disp: , Rfl: 0  •  lidocaine (LIDODERM) 5 %, Apply 2 patches topically daily Remove & Discard patch within 12 hours or as directed by MD To right leg Do not start before November 9, 2022 , Disp: , Rfl: 0  •  losartan (COZAAR) 100 MG tablet, Take 1 tablet (100 mg total) by mouth daily Do not start before November 9, 2022 , Disp: , Rfl: 0  •  meclizine (ANTIVERT) 12 5 MG tablet, Take 1 tablet (12 5 mg total) by mouth every 8 (eight) hours as needed for dizziness, Disp: 30 tablet, Rfl: 0  •  melatonin 3 mg, Take 2 tablets (6 mg total) by mouth every evening At 8pm, Disp: , Rfl: 0  •  methocarbamol (ROBAXIN) 500 mg tablet, Take 0 5 tablets (250 mg total) by mouth every 6 (six) hours as needed for muscle spasms, Disp: , Rfl: 0  •  metoprolol tartrate (LOPRESSOR) 25 mg tablet, Take 1 tablet (25 mg total) by mouth every 12 (twelve) hours, Disp: , Rfl: 0  •  QUEtiapine (SEROquel) 25 mg tablet, Take 0 5 tablets (12 5 mg total) by mouth every evening, Disp: , Rfl: 0  •  vitamin B-12 (VITAMIN B-12) 1,000 mcg tablet, Take by mouth daily, Disp: , Rfl:   •  enoxaparin (LOVENOX) 40 mg/0 4 mL, Inject 0 4 mL (40 mg total) under the skin every 24 hours for 1 dose Do not start before November 9, 2022 , Disp: , Rfl: 0      Lisette    Scribe Attestation    I,:  Lisette am acting as a scribe while in the presence of the attending physician :       I,:  Radha Clark, DO personally performed the services described in this documentation    as scribed in my presence :

## 2022-11-16 ENCOUNTER — NURSING HOME VISIT (OUTPATIENT)
Dept: WOUND CARE | Facility: HOSPITAL | Age: 82
End: 2022-11-16

## 2022-11-16 DIAGNOSIS — R68.89 SUSPECTED DEEP TISSUE INJURY OF UNKNOWN DEPTH: Primary | ICD-10-CM

## 2022-11-16 NOTE — ASSESSMENT & PLAN NOTE
Left heel  - wound improved, 100% eschar, seems superficial, with no obvious sign of infection  - local wound care - change to moisturizing lotion  - continued offload  - followup next week

## 2022-11-16 NOTE — PROGRESS NOTES
[address] [facility]NAGEMENT   [facility]       Patient ID: Doraine Meigs is a 79 y o  female Date of Birth 1940     Location of Service: [address] [place] [address]    Performed wound round with: Wound team     Chief Complaint :  Left heel    Wound Instructions:  Wound:  Left heel  Discontinue previous wound order  Cleanse the wound bed with NSS   Apply moisturizing lotion to wound bed  Frequency : daily and prn for soiling  Offload all wounds  Turn and reposition frequently, maximum of every two hours  Instruct / Assist with weight shifting every 15 - 20 minutes when in chair  Increase protein intake  Monitor for any sign of infection or worsening, inform PCP or patient's primary physician in your facility  Allergies  Amlodipine, Ceftin [cefuroxime], Ciprofloxacin, Citalopram, Dye [iodinated diagnostic agents], Glucophage [metformin], Januvia [sitagliptin], Neomycin-polymyxin-dexameth, Ondansetron, Prilosec [omeprazole], and Vibramycin [doxycycline]      Assessment & Plan:  1  Suspected deep tissue injury of unknown depth  Assessment & Plan:  Left heel  - wound improved, 100% eschar, seems superficial, with no obvious sign of infection  - local wound care - change to moisturizing lotion  - continued offload  - followup next week             Subjective:   11/9/2022This is a 79 y o , female referred to our service for wound/ skin alterations on left heel  Patient have a complex medical history including but not limited to Benign adenomatous polyp of large intestine, CHF (congestive heart failure) ([facility] ), Diabetes mellitus ([facility] ), Hyperlipemia, Hypertension, Osteoarthritis, and TIA (transient ischemic attack)    Patient was referred by Senior Care Team  Patient was seen in collaboration with the facility wound team      Received patient in bed, seems comfortable  Denies pain  As per report, patient was admitted with deep tissue injury on the left heel  Current treatment is skin prep    No significant issues related to the wound  11/16/2022 Follow up for wound on the left heel  Received patient, not in distress  Facility staff did not report any significant issues related to the wound  Denies pain  On heel boots  Review of Systems   Constitutional: Negative  HENT: Negative  Eyes: Negative  Respiratory: Negative  Cardiovascular: Negative for chest pain and leg swelling  Gastrointestinal: Negative  Endocrine: Negative  Genitourinary: Negative  Musculoskeletal: Positive for gait problem  Skin: Positive for wound  See HPI   Neurological: Negative for dizziness and headaches  Psychiatric/Behavioral: Negative for behavioral problems  Objective:    Physical Exam  Constitutional:       Appearance: Normal appearance  HENT:      Head: Normocephalic and atraumatic  Nose: Nose normal       Mouth/Throat:      Pharynx: Oropharynx is clear  Eyes:      Conjunctiva/sclera: Conjunctivae normal    Pulmonary:      Effort: Pulmonary effort is normal    Abdominal:      Tenderness: There is no abdominal tenderness  There is no guarding  Genitourinary:     Comments: incontinent  Musculoskeletal:         General: No tenderness  Cervical back: Normal range of motion  Right lower leg: No edema  Left lower leg: No edema  Comments: LROM   Skin:     Findings: Lesion present  Comments: Left heel - wound size is 0 9 x 1 5 cm, 100% eschar with no obvious sign of infection   Neurological:      Mental Status: She is alert  Gait: Gait abnormal    Psychiatric:         Mood and Affect: Mood normal          Behavior: Behavior normal               Procedures           Patient's care was coordinated with nursing facility staff  Recent vitals, labs and updated medications were reviewed on EMR or chart system of facility   Past Medical, surgical, social, medication and allergy history and patient's previous records were reviewed and updated as appropriate: Most up-to date information is available in the facility EMR where the patient is currently admitted  Patient Active Problem List   Diagnosis   • Fall   • Type 2 diabetes mellitus ([facility] )   • CKD (chronic kidney disease) stage 3, GFR 30-59 ml/min ([facility])   • HLD (hyperlipidemia)   • OA (osteoarthritis)   • Chronic systolic heart failure ([facility])   • Lymphadenopathy   • Elevated liver enzymes   • Ambulatory dysfunction   • Suspected Mild cognitive impairment   • Hypertension   • Intertrochanteric fracture of right femur ([facility])   • Displaced fracture of middle phalanx of left ring finger, initial encounter for closed fracture   • CLL (chronic lymphocytic leukemia) ([facility])   • Acute pain   • Orthostasis   • H/O dizziness   • Deep tissue injury   • Encephalopathy   • Acute postoperative pain of right knee   • Suspected deep tissue injury of unknown depth     Past Medical History:   Diagnosis Date   • Benign adenomatous polyp of large intestine    • CHF (congestive heart failure) ([facility])    • Diabetes mellitus ([facility] )    • Hyperlipemia    • Hypertension    • Osteoarthritis    • TIA (transient ischemic attack)      Past Surgical History:   Procedure Laterality Date   • APPENDECTOMY     • CARPAL TUNNEL RELEASE Right    • HYSTERECTOMY W/ SALPINGO-OOPHERECTOMY     • MO OPEN RX FEMUR FX+INTRAMED CARIDAD Right 10/12/2022    Procedure: INSERTION NAIL IM FEMUR ANTEGRADE (TROCHANTERIC); Surgeon: Sugar Fernando DO;  Location: [facility];  Service: Orthopedics     Social History     Socioeconomic History   • Marital status:       Spouse name: None   • Number of children: None   • Years of education: None   • Highest education level: None   Occupational History   • None   Tobacco Use   • Smoking status: Never   • Smokeless tobacco: Never   Vaping Use   • Vaping Use: Never used   Substance and Sexual Activity   • Alcohol use: Not Currently     Comment: very seldom   • Drug use: Never   • Sexual activity: None   Other Topics Concern   • None   Social History Narrative   • None     Social Determinants of Health     Financial Resource Strain: Not on file   Food Insecurity: No Food Insecurity   • Worried About Running Out of Food in the Last Year: Never true   • Ran Out of Food in the Last Year: Never true   Transportation Needs: No Transportation Needs   • Lack of Transportation (Medical): No   • Lack of Transportation (Non-Medical):  No   Physical Activity: Not on file   Stress: Not on file   Social Connections: Not on file   Intimate Partner Violence: Not on file   Housing Stability: Unknown   • Unable to Pay for Housing in the Last Year: No   • Number of Places Lived in the Last Year: Not on file   • Unstable Housing in the Last Year: Not on file        Current Outpatient Medications:   •  acetaminophen (TYLENOL) 325 mg tablet, Take 2 tablets (650 mg total) by mouth every 4 (four) hours as needed for mild pain, Disp: , Rfl: 0  •  atorvastatin (LIPITOR) 20 mg tablet, Take 20 mg by mouth daily, Disp: , Rfl:   •  calcium carbonate (OS-JUAN RAMON) 600 MG tablet, Take 600 mg by mouth 2 (two) times a day with meals, Disp: , Rfl:   •  cholecalciferol (VITAMIN D3) 1,000 units tablet, Take 1,000 Units by mouth daily, Disp: , Rfl:   •  Diclofenac Sodium (VOLTAREN) 1 %, Apply 2 g topically 3 (three) times a day To R knee, Disp: , Rfl: 0  •  docusate sodium (COLACE) 100 mg capsule, Take 1 capsule (100 mg total) by mouth 2 (two) times a day, Disp: , Rfl: 0  •  enoxaparin (LOVENOX) 40 mg/0 4 mL, Inject 0 4 mL (40 mg total) under the skin every 24 hours for 1 dose Do not start before November 9, 2022 , Disp: , Rfl: 0  •  glucose blood test strip, 1 each by Other route daily as needed Use as instructed, Disp: , Rfl:   •  glucose monitoring kit (FREESTYLE) monitoring kit, 1 each by Does not apply route as needed, Disp: , Rfl:   •  insulin detemir (LEVEMIR) 100 units/mL subcutaneous injection, Inject 20 Units under the skin daily at bedtime, Disp: 10 mL, Rfl: 0  •  insulin lispro (HumaLOG) 100 units/mL injection, Inject 5 Units under the skin 3 (three) times a day with meals, Disp: , Rfl: 0  •  lidocaine (LIDODERM) 5 %, Apply 2 patches topically daily Remove & Discard patch within 12 hours or as directed by MD To right leg Do not start before November 9, 2022 , Disp: , Rfl: 0  •  losartan (COZAAR) 100 MG tablet, Take 1 tablet (100 mg total) by mouth daily Do not start before November 9, 2022 , Disp: , Rfl: 0  •  meclizine (ANTIVERT) 12 5 MG tablet, Take 1 tablet (12 5 mg total) by mouth every 8 (eight) hours as needed for dizziness, Disp: 30 tablet, Rfl: 0  •  melatonin 3 mg, Take 2 tablets (6 mg total) by mouth every evening At 8pm, Disp: , Rfl: 0  •  methocarbamol (ROBAXIN) 500 mg tablet, Take 0 5 tablets (250 mg total) by mouth every 6 (six) hours as needed for muscle spasms, Disp: , Rfl: 0  •  metoprolol tartrate (LOPRESSOR) 25 mg tablet, Take 1 tablet (25 mg total) by mouth every 12 (twelve) hours, Disp: , Rfl: 0  •  QUEtiapine (SEROquel) 25 mg tablet, Take 0 5 tablets (12 5 mg total) by mouth every evening, Disp: , Rfl: 0  •  vitamin B-12 (VITAMIN B-12) 1,000 mcg tablet, Take by mouth daily, Disp: , Rfl:   Family History   Problem Relation Age of Onset   • Heart disease Mother    • Heart disease Father    • Hypertension Father    • Stomach cancer Sister    • Ovarian cancer Sister    • Hypertension Sister               Coordination of Care: Wound team aware of the treatment plan  Facility nurse will provide wound treatment and monitor the wound for any changes  Patient / Staff education : Patient / Staff was given education on sign of infection and pressure ulcer prevention  Patient/ Staff verbalized understanding     Follow up :  Next week    Voice-recognition software may have been used in the preparation of this document  Occasional wrong word or "sound-alike" substitutions may have occurred due to the inherent limitations of voice recognition software  Interpretation should be guided by context        BRINDA Garza

## 2022-11-17 ENCOUNTER — PATIENT OUTREACH (OUTPATIENT)
Dept: CASE MANAGEMENT | Facility: HOSPITAL | Age: 82
End: 2022-11-17

## 2022-11-18 ENCOUNTER — TELEPHONE (OUTPATIENT)
Dept: OTHER | Facility: OTHER | Age: 82
End: 2022-11-18

## 2022-11-18 ENCOUNTER — TELEPHONE (OUTPATIENT)
Dept: OBGYN CLINIC | Facility: HOSPITAL | Age: 82
End: 2022-11-18

## 2022-11-18 NOTE — TELEPHONE ENCOUNTER
Caller: Brian Colorado with [facility]    Doctor: Clemente Rachel    Reason for call: Patient was in the office on 11/15 and had x-rays done  She told the nursing home that she fell of the exam table during x-rays and she is now complaining of hip and pelvic pain  [facility] needs confirmation of this and more information       Call back#: [phone] Herlinda Barros)

## 2022-11-19 NOTE — TELEPHONE ENCOUNTER
Patient nephew is concern that is aunt is confused and would like his aunt to have a urine specimen done to see if his aunt has a UTI infection

## 2022-11-20 ENCOUNTER — TELEPHONE (OUTPATIENT)
Dept: OTHER | Facility: OTHER | Age: 82
End: 2022-11-20

## 2022-11-21 ENCOUNTER — CONSULT (OUTPATIENT)
Dept: HEMATOLOGY ONCOLOGY | Facility: CLINIC | Age: 82
End: 2022-11-21

## 2022-11-21 VITALS
HEIGHT: 66 IN | HEART RATE: 61 BPM | TEMPERATURE: 97 F | BODY MASS INDEX: 23.73 KG/M2 | RESPIRATION RATE: 17 BRPM | DIASTOLIC BLOOD PRESSURE: 70 MMHG | SYSTOLIC BLOOD PRESSURE: 120 MMHG | OXYGEN SATURATION: 97 %

## 2022-11-21 DIAGNOSIS — C91.10 CLL (CHRONIC LYMPHOCYTIC LEUKEMIA) (HCC): Primary | ICD-10-CM

## 2022-11-21 DIAGNOSIS — M81.0 AGE-RELATED OSTEOPOROSIS WITHOUT CURRENT PATHOLOGICAL FRACTURE: ICD-10-CM

## 2022-11-21 PROBLEM — R52 ACUTE PAIN: Status: RESOLVED | Noted: 2022-10-17 | Resolved: 2022-11-21

## 2022-11-21 RX ORDER — MECLOFENAMATE SODIUM 50 MG/1
12.5 CAPSULE ORAL EVERY 6 HOURS
COMMUNITY

## 2022-11-21 NOTE — PROGRESS NOTES
Oncology Consult Note  Melinda Martinez 79 y o  female MRN: [id number]  Unit/Bed#:  Encounter: [id number]      Presenting Complaint:  Chronic lymphocytic leukemia/small lymphocytic lymphoma    History of Presenting Illness: Melinda Martinez is seen for initial consultation 11/21/2022 at the referral of No ref   provider found   79-year-old  female with history of osteoporosis, type 2 diabetes mellitus, TIA, hypertension, congestive heart failure, inter trochanteric fracture of the right femur on 10/2022 status post ORIF, the patient was found to have leukocytosis, lymphocytosis had been progressing for many years with mild anemia    WBC 4 31 - 10 16 Thousand/uL 17 03 High   17 92 High   19 25 High   21 27 High   22 41 High   24 20 High   19 42 High     RBC 3 81 - 5 12 Million/uL 3 45 Low   3 31 Low   3 08 Low   2 94 Low   2 79 Low   2 61 Low   3 11 Low     Hemoglobin 11 5 - 15 4 g/dL 11 3 Low   10 8 Low   10 1 Low   9 7 Low   9 3 Low   8 7 Low   10 3 Low     Hematocrit 34 8 - 46 1 % 36 2  35 3  32 8 Low   31 3 Low   29 8 Low   27 3 Low   32 4 Low     MCV 82 - 98 fL 105 High   107 High   107 High   107 High   107 High   105 High   104 High     MCH 26 8 - 34 3 pg 32 8  32 6  32 8  33 0  33 3  33 3  33 1    MCHC 31 4 - 37 4 g/dL 31 2 Low   30 6 Low   30 8 Low   31 0 Low   31 2 Low   31 9  31 8    RDW 11 6 - 15 1 % 14 9  15 0  15 4 High   15 2 High   14 6  13 9  13 9    MPV 8 9 - 12 7 fL 11 4  11 0  11 0  10 9  11 5  11 7  11 7    Platelets 976 - 837 Thousands/uL 192  347           Differential showed lymphocytosis, flow cytometry on 10/2022 confirmed chronic lymphocytic leukemia positive for CD 5, CD 23, CD 20, fish panel for CLL and I GVH mutation is pending    She denies any constitutional symptoms nausea vomiting dysuria hematuria melena hematochezia    CT scan of the pelvis showed mild retroperitoneal lymphadenopathy measuring up to 2 cm    Review of Systems - As stated in the HPI otherwise the fourteen point review of systems was negative  Past Medical History:   Diagnosis Date   • Benign adenomatous polyp of large intestine    • CHF (congestive heart failure) ([facility] )    • Diabetes mellitus ([facility] )    • Hyperlipemia    • Hypertension    • Osteoarthritis    • TIA (transient ischemic attack)        Social History     Socioeconomic History   • Marital status:      Spouse name: None   • Number of children: None   • Years of education: None   • Highest education level: None   Occupational History   • None   Tobacco Use   • Smoking status: Never   • Smokeless tobacco: Never   Vaping Use   • Vaping Use: Never used   Substance and Sexual Activity   • Alcohol use: Not Currently     Comment: very seldom   • Drug use: Never   • Sexual activity: None   Other Topics Concern   • None   Social History Narrative   • None     Social Determinants of Health     Financial Resource Strain: Not on file   Food Insecurity: No Food Insecurity   • Worried About Running Out of Food in the Last Year: Never true   • Ran Out of Food in the Last Year: Never true   Transportation Needs: No Transportation Needs   • Lack of Transportation (Medical): No   • Lack of Transportation (Non-Medical):  No   Physical Activity: Not on file   Stress: Not on file   Social Connections: Not on file   Intimate Partner Violence: Not on file   Housing Stability: Unknown   • Unable to Pay for Housing in the Last Year: No   • Number of Places Lived in the Last Year: Not on file   • Unstable Housing in the Last Year: Not on file       Family History   Problem Relation Age of Onset   • Heart disease Mother    • Heart disease Father    • Hypertension Father    • Stomach cancer Sister    • Ovarian cancer Sister    • Hypertension Sister        Allergies   Allergen Reactions   • Amlodipine    • Ceftin [Cefuroxime]    • Ciprofloxacin    • Citalopram    • Dye [Iodinated Diagnostic Agents]    • Glucophage [Metformin]    • Januvia [Sitagliptin]    • Neomycin-Polymyxin-Dexameth • Ondansetron    • Prilosec [Omeprazole]    • Vibramycin [Doxycycline]          Current Outpatient Medications:   •  acetaminophen (TYLENOL) 325 mg tablet, Take 2 tablets (650 mg total) by mouth every 4 (four) hours as needed for mild pain, Disp: , Rfl: 0  •  atorvastatin (LIPITOR) 20 mg tablet, Take 20 mg by mouth daily, Disp: , Rfl:   •  calcium carbonate (OS-JUAN RAMON) 600 MG tablet, Take 600 mg by mouth 2 (two) times a day with meals, Disp: , Rfl:   •  cholecalciferol (VITAMIN D3) 1,000 units tablet, Take 1,000 Units by mouth daily, Disp: , Rfl:   •  docusate sodium (COLACE) 100 mg capsule, Take 1 capsule (100 mg total) by mouth 2 (two) times a day, Disp: , Rfl: 0  •  enoxaparin (LOVENOX) 40 mg/0 4 mL, Inject 0 4 mL (40 mg total) under the skin every 24 hours for 1 dose Do not start before November 9, 2022 , Disp: , Rfl: 0  •  insulin detemir (LEVEMIR) 100 units/mL subcutaneous injection, Inject 20 Units under the skin daily at bedtime, Disp: 10 mL, Rfl: 0  •  insulin lispro (HumaLOG) 100 units/mL injection, Inject 5 Units under the skin 3 (three) times a day with meals, Disp: , Rfl: 0  •  lidocaine (LIDODERM) 5 %, Apply 2 patches topically daily Remove & Discard patch within 12 hours or as directed by MD To right leg Do not start before November 9, 2022 , Disp: , Rfl: 0  •  losartan (COZAAR) 100 MG tablet, Take 1 tablet (100 mg total) by mouth daily Do not start before November 9, 2022 , Disp: , Rfl: 0  •  meclofenamate (MECLOMEN) 50 MG capsule, Take 12 5 mg by mouth every 6 (six) hours, Disp: , Rfl:   •  melatonin 3 mg, Take 2 tablets (6 mg total) by mouth every evening At 8pm, Disp: , Rfl: 0  •  metoprolol tartrate (LOPRESSOR) 25 mg tablet, Take 1 tablet (25 mg total) by mouth every 12 (twelve) hours, Disp: , Rfl: 0  •  QUEtiapine (SEROquel) 25 mg tablet, Take 0 5 tablets (12 5 mg total) by mouth every evening, Disp: , Rfl: 0  •  vitamin B-12 (VITAMIN B-12) 1,000 mcg tablet, Take by mouth daily, Disp: , Rfl:   • Diclofenac Sodium (VOLTAREN) 1 %, Apply 2 g topically 3 (three) times a day To R knee, Disp: , Rfl: 0  •  glucose blood test strip, 1 each by Other route daily as needed Use as instructed, Disp: , Rfl:   •  glucose monitoring kit (FREESTYLE) monitoring kit, 1 each by Does not apply route as needed, Disp: , Rfl:   •  meclizine (ANTIVERT) 12 5 MG tablet, Take 1 tablet (12 5 mg total) by mouth every 8 (eight) hours as needed for dizziness, Disp: 30 tablet, Rfl: 0  •  methocarbamol (ROBAXIN) 500 mg tablet, Take 0 5 tablets (250 mg total) by mouth every 6 (six) hours as needed for muscle spasms, Disp: , Rfl: 0      /70 (BP Location: Left arm, Patient Position: Sitting, Cuff Size: Adult)   Pulse 61   Temp (!) 97 °F (36 1 °C)   Resp 17   Ht 5' 6" (1 676 m)   SpO2 97%   BMI 23 73 kg/m²       General Appearance:    Alert, oriented        Eyes:    PERRL   Ears:    Normal external ear canals, both ears   Nose:   Nares normal, septum midline   Throat:   Mucosa moist  Pharynx without injection  Neck:   Supple       Lungs:     Clear to auscultation bilaterally   Chest Wall:    No tenderness or deformity    Heart:    Regular rate and rhythm       Abdomen:     Soft, non-tender, bowel sounds +, no organomegaly           Extremities:   Extremities no cyanosis or edema       Skin:   no rash or icterus  Lymph nodes:   Cervical, supraclavicular, and axillary nodes normal   Neurologic:   CNII-XII intact, normal strength, sensation and reflexes     Throughout               No results found for this or any previous visit (from the past 48 hour(s))  XR hip/pelv 2-3 vws right if performed    Result Date: 11/19/2022  Narrative: RIGHT HIP INDICATION:   M84 351A: Stress fracture, right femur, initial encounter for fracture  COMPARISON:  Right hip x-ray dated October 27, 2022   VIEWS:  XR HIP/PELV 2-3 VWS RIGHT W PELVIS IF PERFORMED FINDINGS: A peritrochanteric fracture of the right femur is secured in stable alignment by means of a long intramedullary nail, 2 distal locking screws and a helical blade  There is stable mild impaction at the basicervical portion of the fracture  There is no new fracture or dislocation  Mild osteoarthritic degenerative changes of the right hip joint are present  No lytic or blastic osseous lesion  There are extensive spondylotic degenerative changes of the lumbar spine  Impression: Stable alignment of a fracture the proximal right femur, status post ORIF  The surgical hardware appears intact  Workstation performed: [id number]     XR femur 2 vw right    Result Date: 10/27/2022  Narrative: RIGHT FEMUR INDICATION:   post op  COMPARISON:  None VIEWS:  XR FEMUR 2 VW RIGHT FINDINGS: A peritrochanteric fracture of the right femur is secured in near anatomic alignment by means of a long intramedullary nail, 2 distal locking screws and a helical blade  There may be mild impaction at the basicervical portion of the fracture  There is no new fracture or dislocation  No significant degenerative changes  No lytic or blastic osseous lesion  Soft tissues are unremarkable  Impression: Near-anatomic alignment of a fracture the proximal right femur following ORIF  No change since 10/22/2022  Workstation performed: [id number]     XR femur 2 vw right    Result Date: 10/25/2022  Narrative: RIGHT FEMUR INDICATION:   increased pain  COMPARISON:  Right femur x-rays 10/11/2022 VIEWS:  XR FEMUR 2 VW RIGHT FINDINGS: Status post right hip ORIF for an intertrochanteric fracture  Placement of a femoral neck nail and long intramedullary alida  There are 2 distal locking screws  Alignment is satisfactory  Slightly displaced fracture at the lesser trochanter identified,  stable  No new osseous abnormality  No significant degenerative changes  No lytic or blastic osseous lesion  Overlying skin staples laterally  There are atherosclerotic vascular calcifications  Impression: Status post right hip ORIF    Satisfactory alignment  Workstation performed: [id number]     XR knee 4+ vw right injury    Result Date: 11/3/2022  Narrative: RIGHT KNEE INDICATION:   Fall with femur fx, has been having R knee pain severe throughout hospital stay and rehab  COMPARISON:  10/22/2022 VIEWS:  XR KNEE 4+ VW RIGHT INJURY FINDINGS: There is no acute fracture or dislocation  Hardware is partially visualized within the distal femur  There is no joint effusion  No significant degenerative changes  No lytic or blastic osseous lesion  There are atherosclerotic calcifications  Soft tissues are otherwise unremarkable  Impression: Partially visualized hardware within the distal femur  No acute fractures within the knee   Workstation performed: [id number]     ECOG :1      Assessment and plan:    79-year-old  female with history of diabetes mellitus type 2, osteoporosis, osteoarthritis, congestive heart failure, TIA, hypoglycemic encephalopathy, was found to have progressive leukocytosis, lymphocytosis for many years, flow cytometry confirmed chronic lymphocytic leukemia, CT scan of the pelvis when she was admitted to the hospital with fracture of the right hip in October 2022 showed mild lymphadenopathy measuring up to 2 cm    Status post ORIF    She has no thrombocytopenia, hemoglobin in the range of 11 3    Will follow-up in 3 months with CT scan of the chest abdomen pelvis, CBC, CMP, LDH and await for fish panel for CLL/I GVH mutation    Most likely osteoporosis will need DEXA scan

## 2022-11-21 NOTE — TELEPHONE ENCOUNTER
I spoke to Nigel Lawler who was the radiology technician that performed her x-rays that day  He confirmed with me that the patient did not fall off of the x-ray table and did not have any type of fall while he was bringing her to from, or performing the x-ray  Can you please contact the nursing facility and reinforce what Bassam Stuart has reported to us

## 2022-11-23 ENCOUNTER — PATIENT OUTREACH (OUTPATIENT)
Dept: CASE MANAGEMENT | Facility: HOSPITAL | Age: 82
End: 2022-11-23

## 2022-11-28 ENCOUNTER — PATIENT OUTREACH (OUTPATIENT)
Dept: CASE MANAGEMENT | Facility: OTHER | Age: 82
End: 2022-11-28

## 2022-11-28 NOTE — PROGRESS NOTES
In basket message received with patient handoff  Chart reviewed  Patient was admitted to [address] [place] [address] on 10/11 after a fall where she sustained a right femur fracture  She has a history of diabetes, heart failure, CKD,HLD,HTN, fracture of left ring finger and chronic lymphocytic leukemia  She discharged on 10/17 to [facility]  She discharged 11/8 to [address] [place] [address]  She discharged home on 11/23  I called patient and there was no answer or voicemail

## 2022-11-29 ENCOUNTER — PATIENT OUTREACH (OUTPATIENT)
Dept: CASE MANAGEMENT | Facility: OTHER | Age: 82
End: 2022-11-29

## 2022-11-29 NOTE — PROGRESS NOTES
I spoke with patient who reports she has minimal pain  She has home care with [facility] and physical therapy  She reports being afraid to sleep in her bed since her    She stated he had cancer and she is afraid "he gave it to me"  I asked if she recalled seeing the Hem/Onc physician   She reports she has seen doctors but does not recall their names  At that visit the physician confirmed patient has chronic lymphocytic leukemia  He will follow up in 3 months and ordered CT scan of chest abdomen and pelvis  Patient has a nephew that takes her to appointments and very supportive neighbors  I advised her she has an orthopedic visit scheduled   She reports she was not aware  I gave her my contact information which she repeated to me  I advised her to have her nephew call me and I will go over appointments with him

## 2022-12-15 ENCOUNTER — PATIENT OUTREACH (OUTPATIENT)
Dept: CASE MANAGEMENT | Facility: OTHER | Age: 82
End: 2022-12-15

## 2022-12-15 NOTE — PROGRESS NOTES
Chart reviewed  Patient was scheduled to see her orthopedic surgeon on 12/13 but there is no note from the office

## 2023-01-03 ENCOUNTER — PATIENT OUTREACH (OUTPATIENT)
Dept: CASE MANAGEMENT | Facility: OTHER | Age: 83
End: 2023-01-03

## 2023-01-16 ENCOUNTER — PATIENT OUTREACH (OUTPATIENT)
Dept: CASE MANAGEMENT | Facility: OTHER | Age: 83
End: 2023-01-16

## 2023-01-17 ENCOUNTER — TELEPHONE (OUTPATIENT)
Dept: LAB | Facility: HOSPITAL | Age: 83
End: 2023-01-17

## 2023-01-18 ENCOUNTER — TRANSCRIBE ORDERS (OUTPATIENT)
Dept: LAB | Facility: CLINIC | Age: 83
End: 2023-01-18

## 2023-01-18 DIAGNOSIS — I10 ESSENTIAL HYPERTENSION, MALIGNANT: ICD-10-CM

## 2023-01-18 DIAGNOSIS — M19.90 SENILE ARTHRITIS: ICD-10-CM

## 2023-01-18 DIAGNOSIS — E11.649 DIABETIC HYPOGLYCEMIA (HCC): Primary | ICD-10-CM

## 2023-01-18 DIAGNOSIS — E78.2 MIXED HYPERLIPIDEMIA: ICD-10-CM

## 2023-01-20 NOTE — PROGRESS NOTES
11/08/22 1100   Pain Assessment   Pain Assessment Tool 0-10   Pain Score 4   Pain Location/Orientation Orientation: Right;Location: Hip   Restrictions/Precautions   Precautions Bed/chair alarms;Cognitive; Fall Risk;Supervision on toilet/commode;Visual deficit; Pain   LUE Weight Bearing Per Order NWB   RLE Weight Bearing Per Order WBAT   Braces or Orthoses   (R knee brace w/ mobility, L prevalon boot at rest)   Subjective   Subjective pt reports 'I am leaving today "   Sit to Stand   Type of Assistance Needed Physical assistance   Physical Assistance Level 25% or less   Sit to Stand CARE Score 3   Bed-Chair Transfer   Type of Assistance Needed Physical assistance   Physical Assistance Level 25% or less   Comment PFRW   Chair/Bed-to-Chair Transfer CARE Score 3   Transfer Bed/Chair/Wheelchair   Adaptive Equipment Roller Walker;Platform   Walk 10 Feet   Type of Assistance Needed Physical assistance   Physical Assistance Level 25% or less   Comment PFRW   Walk 10 Feet CARE Score 3   Walk 50 Feet with Two Turns   Type of Assistance Needed Physical assistance   Physical Assistance Level 25% or less   Comment PFRW   Walk 50 Feet with Two Turns CARE Score 3   Walk 150 Feet   Reason if not Attempted Safety concerns   Walk 150 Feet CARE Score 88   Walking 10 Feet on Uneven Surfaces   Reason if not Attempted Safety concerns   Walking 10 Feet on Uneven Surfaces CARE Score 88   Ambulation   Does the patient walk? 2  Yes   Primary Mode of Locomotion Prior to Admission Walk   Distance Walked (feet) 50 ft   Assist Device Roller Walker;Platform   Equipment Use   NuStep lvl 2 for 10 min   Assessment   Treatment Assessment pt focus on STS /SPT with RW and SPT w/o AD ,pt advance a little more WB on leg   Pt also cont to need mobiloity , strengthening and endurance to meet DC goals   Pt will cont at SNF today being DC   Barriers to Discharge Inaccessible home environment;Decreased caregiver support   Plan   Progress Progressing toward goals   PT Therapy Minutes   PT Time In 1100   PT Time Out 1130   PT Total Time (minutes) 30   PT Mode of treatment - Individual (minutes) 30   PT Mode of treatment - Concurrent (minutes) 0   PT Mode of treatment - Group (minutes) 0   PT Mode of treatment - Co-treat (minutes) 0   PT Mode of Treatment - Total time(minutes) 30 minutes   PT Cumulative Minutes 1905   Therapy Time missed   Time missed?  No [Negative] : Genitourinary

## 2023-01-25 ENCOUNTER — APPOINTMENT (OUTPATIENT)
Dept: LAB | Facility: HOSPITAL | Age: 83
End: 2023-01-25

## 2023-01-25 DIAGNOSIS — I10 ESSENTIAL HYPERTENSION, MALIGNANT: ICD-10-CM

## 2023-01-25 DIAGNOSIS — M19.90 SENILE ARTHRITIS: ICD-10-CM

## 2023-01-25 DIAGNOSIS — C91.10 CLL (CHRONIC LYMPHOCYTIC LEUKEMIA) (HCC): ICD-10-CM

## 2023-01-25 DIAGNOSIS — E11.649 DIABETIC HYPOGLYCEMIA (HCC): ICD-10-CM

## 2023-01-25 DIAGNOSIS — E78.2 MIXED HYPERLIPIDEMIA: ICD-10-CM

## 2023-01-25 DIAGNOSIS — M81.0 AGE-RELATED OSTEOPOROSIS WITHOUT CURRENT PATHOLOGICAL FRACTURE: ICD-10-CM

## 2023-01-25 LAB
ALBUMIN SERPL BCP-MCNC: 4 G/DL (ref 3.5–5)
ALP SERPL-CCNC: 109 U/L (ref 46–116)
ALT SERPL W P-5'-P-CCNC: 37 U/L (ref 12–78)
ANION GAP SERPL CALCULATED.3IONS-SCNC: 5 MMOL/L (ref 4–13)
ANISOCYTOSIS BLD QL SMEAR: PRESENT
AST SERPL W P-5'-P-CCNC: 18 U/L (ref 5–45)
BASOPHILS # BLD MANUAL: 0 THOUSAND/UL (ref 0–0.1)
BASOPHILS NFR MAR MANUAL: 0 % (ref 0–1)
BILIRUB SERPL-MCNC: 0.39 MG/DL (ref 0.2–1)
BUN SERPL-MCNC: 20 MG/DL (ref 5–25)
CALCIUM SERPL-MCNC: 9.7 MG/DL (ref 8.3–10.1)
CHLORIDE SERPL-SCNC: 107 MMOL/L (ref 96–108)
CHOLEST SERPL-MCNC: 154 MG/DL
CO2 SERPL-SCNC: 28 MMOL/L (ref 21–32)
CREAT SERPL-MCNC: 0.83 MG/DL (ref 0.6–1.3)
EOSINOPHIL # BLD MANUAL: 0.26 THOUSAND/UL (ref 0–0.4)
EOSINOPHIL NFR BLD MANUAL: 1 % (ref 0–6)
ERYTHROCYTE [DISTWIDTH] IN BLOOD BY AUTOMATED COUNT: 15.6 % (ref 11.6–15.1)
EST. AVERAGE GLUCOSE BLD GHB EST-MCNC: 200 MG/DL
GFR SERPL CREATININE-BSD FRML MDRD: 65 ML/MIN/1.73SQ M
GLUCOSE SERPL-MCNC: 108 MG/DL (ref 65–140)
HBA1C MFR BLD: 8.6 %
HCT VFR BLD AUTO: 43.9 % (ref 34.8–46.1)
HDLC SERPL-MCNC: 72 MG/DL
HGB BLD-MCNC: 14.1 G/DL (ref 11.5–15.4)
LDH SERPL-CCNC: 276 U/L (ref 81–234)
LDLC SERPL CALC-MCNC: 52 MG/DL (ref 0–100)
LG PLATELETS BLD QL SMEAR: PRESENT
LYMPHOCYTES # BLD AUTO: 17.98 THOUSAND/UL (ref 0.6–4.47)
LYMPHOCYTES # BLD AUTO: 70 % (ref 14–44)
MCH RBC QN AUTO: 32.7 PG (ref 26.8–34.3)
MCHC RBC AUTO-ENTMCNC: 32.1 G/DL (ref 31.4–37.4)
MCV RBC AUTO: 102 FL (ref 82–98)
MONOCYTES # BLD AUTO: 0.51 THOUSAND/UL (ref 0–1.22)
MONOCYTES NFR BLD: 2 % (ref 4–12)
NEUTROPHILS # BLD MANUAL: 4.37 THOUSAND/UL (ref 1.85–7.62)
NEUTS SEG NFR BLD AUTO: 17 % (ref 43–75)
NONHDLC SERPL-MCNC: 82 MG/DL
PLATELET # BLD AUTO: 209 THOUSANDS/UL (ref 149–390)
PLATELET BLD QL SMEAR: ADEQUATE
PMV BLD AUTO: 12.2 FL (ref 8.9–12.7)
POLYCHROMASIA BLD QL SMEAR: PRESENT
POTASSIUM SERPL-SCNC: 4 MMOL/L (ref 3.5–5.3)
PROT SERPL-MCNC: 8.6 G/DL (ref 6.4–8.4)
RBC # BLD AUTO: 4.31 MILLION/UL (ref 3.81–5.12)
SMUDGE CELLS BLD QL SMEAR: PRESENT
SODIUM SERPL-SCNC: 140 MMOL/L (ref 135–147)
TRIGL SERPL-MCNC: 148 MG/DL
VARIANT LYMPHS # BLD AUTO: 10 %
WBC # BLD AUTO: 25.69 THOUSAND/UL (ref 4.31–10.16)

## 2023-01-26 LAB
LDH SERPL-CCNC: 232 IU/L (ref 119–226)
LDH1 CFR SERPL ELPH: 25 % (ref 17–32)
LDH2 CFR SERPL ELPH: 32 % (ref 25–40)
LDH3 CFR SERPL ELPH: 22 % (ref 17–27)
LDH4 CFR SERPL ELPH: 10 % (ref 5–13)
LDH5 CFR SERPL ELPH: 11 % (ref 4–20)

## 2023-02-27 ENCOUNTER — TELEPHONE (OUTPATIENT)
Dept: HEMATOLOGY ONCOLOGY | Facility: CLINIC | Age: 83
End: 2023-02-27

## 2023-02-27 NOTE — TELEPHONE ENCOUNTER
Called patient to reschedule appt    Patient instructed to call Lenda Goldmann , her nephew to reschedule her appt at [phone] or [phone]

## 2023-03-10 ENCOUNTER — HOSPITAL ENCOUNTER (EMERGENCY)
Facility: HOSPITAL | Age: 83
Discharge: HOME | End: 2023-03-10
Attending: EMERGENCY MEDICINE

## 2023-03-10 VITALS
TEMPERATURE: 97.1 F | HEART RATE: 68 BPM | DIASTOLIC BLOOD PRESSURE: 73 MMHG | OXYGEN SATURATION: 98 % | RESPIRATION RATE: 14 BRPM | SYSTOLIC BLOOD PRESSURE: 169 MMHG

## 2023-03-10 DIAGNOSIS — N39.0 UTI (URINARY TRACT INFECTION): Primary | ICD-10-CM

## 2023-03-10 LAB
BACTERIA UR QL AUTO: ABNORMAL /HPF
BILIRUB UR QL STRIP: NEGATIVE
CLARITY UR: CLEAR
COLOR UR: YELLOW
GLUCOSE UR STRIP-MCNC: ABNORMAL MG/DL
HGB UR QL STRIP.AUTO: NEGATIVE
KETONES UR STRIP-MCNC: NEGATIVE MG/DL
LEUKOCYTE ESTERASE UR QL STRIP: ABNORMAL
NITRITE UR QL STRIP: NEGATIVE
NON-SQ EPI CELLS URNS QL MICRO: ABNORMAL /HPF
PH UR STRIP.AUTO: 5 [PH]
PROT UR STRIP-MCNC: ABNORMAL MG/DL
RBC #/AREA URNS AUTO: ABNORMAL /HPF
SP GR UR STRIP.AUTO: 1.03 (ref 1–1.03)
UROBILINOGEN UR STRIP-ACNC: <2 MG/DL
WBC #/AREA URNS AUTO: ABNORMAL /HPF

## 2023-03-10 RX ORDER — SULFAMETHOXAZOLE AND TRIMETHOPRIM 800; 160 MG/1; MG/1
1 TABLET ORAL 2 TIMES DAILY
Qty: 10 TABLET | Refills: 0 | Status: SHIPPED | OUTPATIENT
Start: 2023-03-10 | End: 2023-03-15

## 2023-03-10 RX ORDER — SULFAMETHOXAZOLE AND TRIMETHOPRIM 800; 160 MG/1; MG/1
1 TABLET ORAL ONCE
Status: COMPLETED | OUTPATIENT
Start: 2023-03-10 | End: 2023-03-10

## 2023-03-10 RX ADMIN — SULFAMETHOXAZOLE AND TRIMETHOPRIM 1 TABLET: 800; 160 TABLET ORAL at 09:28

## 2023-03-10 NOTE — ED NOTES
Pt ambulated to bathroom with Rolator walker, able to provide urine sample         Marlo Nash RN  03/10/23 6856

## 2023-03-10 NOTE — ED PROVIDER NOTES
History  Chief Complaint   Patient presents with   • Possible UTI     Pt presents to the ED with c/o uti symptoms for several days  Reports burning upon urination and increased frequency  41-year-old female presents with several days of dysuria, polyuria, and reporting that she is seeing people that are not there  She and her nephew at the bedside reports that when she has urinary tract infection she has these visual hallucinations and she believes she has another UTI today  She denies any fever, nausea, vomiting, hematuria, abdominal pain, constipation or diarrhea, cough, shortness of breath, chest pain, or other symptoms  Prior to Admission Medications   Prescriptions Last Dose Informant Patient Reported? Taking? Diclofenac Sodium (VOLTAREN) 1 %   No No   Sig: Apply 2 g topically 3 (three) times a day To R knee   QUEtiapine (SEROquel) 25 mg tablet   No No   Sig: Take 0 5 tablets (12 5 mg total) by mouth every evening   acetaminophen (TYLENOL) 325 mg tablet   No No   Sig: Take 2 tablets (650 mg total) by mouth every 4 (four) hours as needed for mild pain   atorvastatin (LIPITOR) 20 mg tablet   Yes No   Sig: Take 20 mg by mouth daily   calcium carbonate (OS-JUAN RAMON) 600 MG tablet   Yes No   Sig: Take 600 mg by mouth 2 (two) times a day with meals   cholecalciferol (VITAMIN D3) 1,000 units tablet   Yes No   Sig: Take 1,000 Units by mouth daily   docusate sodium (COLACE) 100 mg capsule   No No   Sig: Take 1 capsule (100 mg total) by mouth 2 (two) times a day   enoxaparin (LOVENOX) 40 mg/0 4 mL   No No   Sig: Inject 0 4 mL (40 mg total) under the skin every 24 hours for 1 dose Do not start before 2022     glucose blood test strip   Yes No   Si each by Other route daily as needed Use as instructed   glucose monitoring kit (FREESTYLE) monitoring kit   Yes No   Si each by Does not apply route as needed   insulin detemir (LEVEMIR) 100 units/mL subcutaneous injection   No No   Sig: Inject 20 Units under the skin daily at bedtime   insulin lispro (HumaLOG) 100 units/mL injection   No No   Sig: Inject 5 Units under the skin 3 (three) times a day with meals   lidocaine (LIDODERM) 5 %   No No   Sig: Apply 2 patches topically daily Remove & Discard patch within 12 hours or as directed by MD To right leg Do not start before November 9, 2022  losartan (COZAAR) 100 MG tablet   No No   Sig: Take 1 tablet (100 mg total) by mouth daily Do not start before November 9, 2022  meclizine (ANTIVERT) 12 5 MG tablet   No No   Sig: Take 1 tablet (12 5 mg total) by mouth every 8 (eight) hours as needed for dizziness   meclofenamate (MECLOMEN) 50 MG capsule   Yes No   Sig: Take 12 5 mg by mouth every 6 (six) hours   melatonin 3 mg   No No   Sig: Take 2 tablets (6 mg total) by mouth every evening At 8pm   methocarbamol (ROBAXIN) 500 mg tablet   No No   Sig: Take 0 5 tablets (250 mg total) by mouth every 6 (six) hours as needed for muscle spasms   metoprolol tartrate (LOPRESSOR) 25 mg tablet   No No   Sig: Take 1 tablet (25 mg total) by mouth every 12 (twelve) hours   vitamin B-12 (VITAMIN B-12) 1,000 mcg tablet   Yes No   Sig: Take by mouth daily      Facility-Administered Medications: None       Past Medical History:   Diagnosis Date   • Benign adenomatous polyp of large intestine    • CHF (congestive heart failure) (HCC)    • Diabetes mellitus ([facility] [address] )    • Hyperlipemia    • Hypertension    • Osteoarthritis    • TIA (transient ischemic attack)        Past Surgical History:   Procedure Laterality Date   • APPENDECTOMY     • CARPAL TUNNEL RELEASE Right    • HYSTERECTOMY W/ SALPINGO-OOPHERECTOMY     • SC OPTX FEM SHFT FX W/INSJ IMED IMPLT W/WO SCREW Right 10/12/2022    Procedure: INSERTION NAIL IM FEMUR ANTEGRADE (TROCHANTERIC);   Surgeon: Blaine Ruiz DO;  Location: [facility];  Service: Orthopedics       Family History   Problem Relation Age of Onset   • Heart disease Mother    • Heart disease Father    • Hypertension Father    • Stomach cancer Sister    • Ovarian cancer Sister    • Hypertension Sister      I have reviewed and agree with the history as documented  E-Cigarette/Vaping   • E-Cigarette Use Never User      E-Cigarette/Vaping Substances     Social History     Tobacco Use   • Smoking status: Never   • Smokeless tobacco: Never   Vaping Use   • Vaping Use: Never used   Substance Use Topics   • Alcohol use: Not Currently     Comment: very seldom   • Drug use: Never       Review of Systems   Constitutional: Negative for fever  HENT: Negative for congestion  Eyes: Negative for visual disturbance  Respiratory: Negative for cough and shortness of breath  Cardiovascular: Negative for chest pain  Gastrointestinal: Negative for abdominal pain, constipation, diarrhea, nausea and vomiting  Endocrine: Positive for polyuria  Genitourinary: Positive for dysuria  Negative for hematuria  Musculoskeletal: Negative for myalgias  Neurological: Negative for dizziness and headaches  Physical Exam  Physical Exam  Vitals and nursing note reviewed  Constitutional:       General: She is not in acute distress  Appearance: Normal appearance  She is well-developed  HENT:      Head: Normocephalic and atraumatic  Eyes:      Conjunctiva/sclera: Conjunctivae normal    Cardiovascular:      Rate and Rhythm: Normal rate and regular rhythm  Heart sounds: No murmur heard  Pulmonary:      Effort: Pulmonary effort is normal  No respiratory distress  Breath sounds: Normal breath sounds  Abdominal:      General: There is no distension  Palpations: Abdomen is soft  Tenderness: There is no abdominal tenderness  There is no right CVA tenderness or left CVA tenderness  Musculoskeletal:         General: No swelling  Cervical back: Neck supple  Skin:     General: Skin is warm and dry  Capillary Refill: Capillary refill takes less than 2 seconds     Neurological:      Mental Status: She is alert    Psychiatric:         Mood and Affect: Mood normal          Vital Signs  ED Triage Vitals [03/10/23 0811]   Temperature Pulse Respirations Blood Pressure SpO2   (!) 97 1 °F (36 2 °C) 68 14 169/73 98 %      Temp Source Heart Rate Source Patient Position - Orthostatic VS BP Location FiO2 (%)   Oral Monitor -- Right arm --      Pain Score       --           Vitals:    03/10/23 0811   BP: 169/73   Pulse: 68         Visual Acuity      ED Medications  Medications - No data to display    Diagnostic Studies  Results Reviewed     Procedure Component Value Units Date/Time    UA w Reflex to Microscopic w Reflex to Culture [[id number]]  (Abnormal) Collected: 03/10/23 0843    Lab Status: Final result Specimen: Urine, Clean Catch Updated: 03/10/23 0901     Color, UA Yellow     Clarity, UA Clear     Specific Gravity, UA 1 027     pH, UA 5 0     Leukocytes, UA Small     Nitrite, UA Negative     Protein, UA 50 (1+) mg/dl      Glucose, UA Trace mg/dl      Ketones, UA Negative mg/dl      Urobilinogen, UA <2 0 mg/dl      Bilirubin, UA Negative     Occult Blood, UA Negative    Urine Microscopic [[id number]] Collected: 03/10/23 0843    Lab Status: In process Specimen: Urine, Clean Catch Updated: 03/10/23 0901                 No orders to display              Procedures  Procedures         ED Course                               SBIRT 20yo+    Flowsheet Row Most Recent Value   SBIRT (23 yo +)    In order to provide better care to our patients, we are screening all of our patients for alcohol and drug use  Would it be okay to ask you these screening questions? Yes Filed at: 03/10/2023 1527   Initial Alcohol Screen: US AUDIT-C     1  How often do you have a drink containing alcohol? 0 Filed at: 03/10/2023 0828   2  How many drinks containing alcohol do you have on a typical day you are drinking? 0 Filed at: 03/10/2023 0828   3a  Male UNDER 65: How often do you have five or more drinks on one occasion?  0 Filed at: 03/10/2023 5225 3b  FEMALE Any Age, or MALE 65+: How often do you have 4 or more drinks on one occassion? 0 Filed at: 03/10/2023 3886   Audit-C Score 0 Filed at: 03/10/2023 0760   MENG: How many times in the past year have you    Used an illegal drug or used a prescription medication for non-medical reasons? Never Filed at: 03/10/2023 6139                    Medical Decision Making  The patient presents with symptoms consistent with urinary tract infection, reports that she frequently gets urinary tract infections and that these are similar to her previous ones  The patient's urine for is only mildly abnormal, and therefore the patient will be started on antibiotics, but if the cultures are negative she has been instructed to stop taking the antibiotics  The patient will also be referred to gynecology for evaluation for other potential causes of her dysuria and polyuria including atrophic vaginitis  Return indications and follow-up instructions given  Given her extensive list of allergies the patient will be started on Bactrim, with instructions to follow-up with primary care to ensure symptom resolution  Disposition  Final diagnoses:   None     ED Disposition     None      Follow-up Information    None         Patient's Medications   Discharge Prescriptions    No medications on file       No discharge procedures on file      PDMP Review       Value Time User    PDMP Reviewed  Yes 11/8/2022  9:36 AM Lavonne Patel MD          ED Provider  Electronically Signed by

## 2023-04-24 ENCOUNTER — TELEPHONE (OUTPATIENT)
Dept: HEMATOLOGY ONCOLOGY | Facility: CLINIC | Age: 83
End: 2023-04-24

## 2023-04-24 ENCOUNTER — APPOINTMENT (OUTPATIENT)
Dept: LAB | Facility: CLINIC | Age: 83
End: 2023-04-24

## 2023-04-24 ENCOUNTER — OFFICE VISIT (OUTPATIENT)
Dept: HEMATOLOGY ONCOLOGY | Facility: CLINIC | Age: 83
End: 2023-04-24

## 2023-04-24 VITALS
BODY MASS INDEX: 24.11 KG/M2 | TEMPERATURE: 98 F | HEIGHT: 66 IN | RESPIRATION RATE: 16 BRPM | DIASTOLIC BLOOD PRESSURE: 80 MMHG | WEIGHT: 150 LBS | HEART RATE: 71 BPM | SYSTOLIC BLOOD PRESSURE: 152 MMHG | OXYGEN SATURATION: 99 %

## 2023-04-24 DIAGNOSIS — C91.10 CLL (CHRONIC LYMPHOCYTIC LEUKEMIA) (HCC): Primary | ICD-10-CM

## 2023-04-24 DIAGNOSIS — E11.22 TYPE 2 DIABETES MELLITUS WITH STAGE 3 CHRONIC KIDNEY DISEASE, UNSPECIFIED WHETHER LONG TERM INSULIN USE, UNSPECIFIED WHETHER STAGE 3A OR 3B CKD (HCC): ICD-10-CM

## 2023-04-24 DIAGNOSIS — C91.10 CLL (CHRONIC LYMPHOCYTIC LEUKEMIA) (HCC): ICD-10-CM

## 2023-04-24 DIAGNOSIS — N18.30 TYPE 2 DIABETES MELLITUS WITH STAGE 3 CHRONIC KIDNEY DISEASE, UNSPECIFIED WHETHER LONG TERM INSULIN USE, UNSPECIFIED WHETHER STAGE 3A OR 3B CKD (HCC): ICD-10-CM

## 2023-04-24 LAB
ALBUMIN SERPL BCP-MCNC: 4.6 G/DL (ref 3.5–5)
ALP SERPL-CCNC: 87 U/L (ref 34–104)
ALT SERPL W P-5'-P-CCNC: 22 U/L (ref 7–52)
ANION GAP SERPL CALCULATED.3IONS-SCNC: 6 MMOL/L (ref 4–13)
AST SERPL W P-5'-P-CCNC: 18 U/L (ref 13–39)
BASOPHILS # BLD MANUAL: 0 THOUSAND/UL (ref 0–0.1)
BASOPHILS NFR MAR MANUAL: 0 % (ref 0–1)
BILIRUB SERPL-MCNC: 0.52 MG/DL (ref 0.2–1)
BUN SERPL-MCNC: 34 MG/DL (ref 5–25)
CALCIUM SERPL-MCNC: 9.6 MG/DL (ref 8.4–10.2)
CHLORIDE SERPL-SCNC: 104 MMOL/L (ref 96–108)
CO2 SERPL-SCNC: 30 MMOL/L (ref 21–32)
CREAT SERPL-MCNC: 1.11 MG/DL (ref 0.6–1.3)
EOSINOPHIL # BLD MANUAL: 0 THOUSAND/UL (ref 0–0.4)
EOSINOPHIL NFR BLD MANUAL: 0 % (ref 0–6)
ERYTHROCYTE [DISTWIDTH] IN BLOOD BY AUTOMATED COUNT: 14 % (ref 11.6–15.1)
GFR SERPL CREATININE-BSD FRML MDRD: 46 ML/MIN/1.73SQ M
GLUCOSE SERPL-MCNC: 122 MG/DL (ref 65–140)
HCT VFR BLD AUTO: 45 % (ref 34.8–46.1)
HGB BLD-MCNC: 14.2 G/DL (ref 11.5–15.4)
IGA SERPL-MCNC: 277 MG/DL (ref 70–400)
IGG SERPL-MCNC: 1030 MG/DL (ref 700–1600)
IGM SERPL-MCNC: 60 MG/DL (ref 40–230)
LDH SERPL-CCNC: 193 U/L (ref 140–271)
LG PLATELETS BLD QL SMEAR: PRESENT
LYMPHOCYTES # BLD AUTO: 14.35 THOUSAND/UL (ref 0.6–4.47)
LYMPHOCYTES # BLD AUTO: 61 % (ref 14–44)
MCH RBC QN AUTO: 32.9 PG (ref 26.8–34.3)
MCHC RBC AUTO-ENTMCNC: 31.6 G/DL (ref 31.4–37.4)
MCV RBC AUTO: 104 FL (ref 82–98)
MONOCYTES # BLD AUTO: 1.18 THOUSAND/UL (ref 0–1.22)
MONOCYTES NFR BLD: 5 % (ref 4–12)
NEUTROPHILS # BLD MANUAL: 6.12 THOUSAND/UL (ref 1.85–7.62)
NEUTS SEG NFR BLD AUTO: 26 % (ref 43–75)
PLATELET # BLD AUTO: 204 THOUSANDS/UL (ref 149–390)
PLATELET BLD QL SMEAR: ADEQUATE
PMV BLD AUTO: 11.8 FL (ref 8.9–12.7)
POTASSIUM SERPL-SCNC: 4 MMOL/L (ref 3.5–5.3)
PROT SERPL-MCNC: 7.5 G/DL (ref 6.4–8.4)
RBC # BLD AUTO: 4.32 MILLION/UL (ref 3.81–5.12)
RBC MORPH BLD: NORMAL
SODIUM SERPL-SCNC: 140 MMOL/L (ref 135–147)
VARIANT LYMPHS # BLD AUTO: 8 %
WBC # BLD AUTO: 23.53 THOUSAND/UL (ref 4.31–10.16)

## 2023-04-24 NOTE — PROGRESS NOTES
Hematology/Oncology Outpatient Follow- up Note  Constantine Jarquin 79 y o  female MRN: @ Encounter: [id number]        Date:  4/24/2023      Assessment / Plan:      CLL diagnosed on flow cytometry 11/2022   CT scan of the pelvis when she was admitted to the hospital with fracture of the right hip in October 2022 showed mild lymphadenopathy measuring up to 2 cm  In 2021, WBC 89775 with 50% lymphocytes  10/2022, WBC 50112-96,000    CT C/A/P requested at 11/2022 f/u  This was not done as patient does not recall details of that consultation  Labs not drawn prior to this office visit      Patient asymptomatic  Reviewed with patient and her nephew, Debra Skinner what CLL is  Discussed indications for treatment  Due to his work schedule, 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, he reports follow-up visits will need be limited  Patient/ nephew agreeable to home lab draws and virtual f/u  Reviewed she will need to likely be assessed in person if she were to have progression, treatment indicated  We discussed the potential use of BTKi in the future if she were to need treatment        Most likely osteoporosis - f/u with Dr Anand Hawkins re: possible Dexa scan  On 11/9/22 Flow cytometry made note of IGVH and CLL prognostic report pending  No in chart  These records are requested  4/24/23- patient went for labs post f/u visit  Hemoglobin 14 2, , white blood cell count 23 5, platelets 262, 29% segs, 61% lymphocytes, 8% atypical lymphocytes,  CMP unremarkable, , normal quantitative immunoglobulins  igVH mutation pending  Will keep planned f/u for 6 months Virtual visit  HPI:  Brandt Greenberg is an 78 y/o female seen initially 11/21/22 regarding Chronic lymphocytic leukemia/small lymphocytic lymphoma  history of osteoporosis, type 2 diabetes mellitus, TIA, hypertension, congestive heart failure, CKD  Admitted 10/22/22 s/p fall  Right femur fracture noted    Status post ORIF of right femur fracture with long IM nail on 10/12/2022  The patient was found to have leukocytosis, lymphocytosis had been progressing for many years with mild anemia  November 2020 hemoglobin 12 2, , white blood cell count 10 4, 67% neutrophils, 26% lymphocytes, platelets 568    August 2021 hemoglobin 11 8, , white blood cell count 14 21, 40% neutrophils, 52% lymphocytes, platelets 827    October 11, 2022 hemoglobin 14 4, , white blood cell count 22 3, 27% segs, 69% lymphocytes, 3% monocytes, platelets 827    Flow cytometry on 11/7/2022 confirmed chronic lymphocytic leukemia positive for CD 5, CD 23, CD 20, fish panel for CLL and I GVH mutation is pending     She denies any constitutional symptoms nausea vomiting dysuria hematuria melena hematochezia     CT scan of the pelvis 10/12/22 showed mild retroperitoneal lymphadenopathy measuring up to 2 cm    At her 11/21/22 consult visit, CT C/A/P ordered - not performed  1/25/23 hemoglobin 14 1, , white blood cell count 25 69, 17% segs, 70% lymphocytes, 2% monocytes, 10% atypical lymphocytes, platelets 851    Interval History:    Patient here with her nephew Ratna Cruz  She no longer drives due to impaired vision  She does not recall the initial consult visit when she was transported from the nursing home  States she was unaware of her CLL diagnosis  Reports good appetite, no early satiety  No fevers, chills, sweats  No recent infections  She lives independently in a 1 level home  Review of Systems   Constitutional: Negative for appetite change, chills, diaphoresis, fatigue, fever and unexpected weight change  HENT:   Negative for mouth sores, nosebleeds, sore throat, tinnitus and voice change  Eyes: Positive for eye problems  Respiratory: Negative for chest tightness, cough, shortness of breath and wheezing  Cardiovascular: Negative for chest pain, leg swelling and palpitations     Gastrointestinal: "Negative for abdominal distention, abdominal pain, blood in stool, constipation, diarrhea, nausea, rectal pain and vomiting  Endocrine: Negative for hot flashes  Genitourinary: Negative  Musculoskeletal: Positive for gait problem  Negative for myalgias  Skin: Negative for itching and rash  Neurological: Positive for gait problem  Negative for dizziness, headaches, light-headedness and numbness  Hematological: Negative for adenopathy  Psychiatric/Behavioral: Negative for confusion and sleep disturbance  The patient is not nervous/anxious  Test Results:        Labs:   Lab Results   Component Value Date    HGB 14 1 01/25/2023    HCT 43 9 01/25/2023     (H) 01/25/2023     01/25/2023    WBC 25 69 (H) 01/25/2023    NRBC 0 08/13/2021    BANDSPCT 1 08/05/2021    ATYLMPCT 10 (H) 01/25/2023    BLASTSPCT 11 (H) 11/07/2022     Lab Results   Component Value Date    K 4 0 01/25/2023     01/25/2023    CO2 28 01/25/2023    BUN 20 01/25/2023    CREATININE 0 83 01/25/2023    GLUF 83 11/07/2022    CALCIUM 9 7 01/25/2023    AST 18 01/25/2023    ALT 37 01/25/2023    ALKPHOS 109 01/25/2023    EGFR 65 01/25/2023           Imaging: No results found  Allergies: Allergies   Allergen Reactions   • Amlodipine    • Ceftin [Cefuroxime]    • Ciprofloxacin    • Citalopram    • Dye [Iodinated Contrast Media]    • Glucophage [Metformin]    • Januvia [Sitagliptin]    • Neomycin-Polymyxin-Dexameth    • Ondansetron    • Prilosec [Omeprazole]    • Vibramycin [Doxycycline]      Current Medications: Reviewed  PMH/FH/SH:  Reviewed      Physical Exam:    There is no height or weight on file to calculate BSA      Ht Readings from Last 3 Encounters:   11/21/22 5' 6\" (1 676 m)   11/15/22 5' 6\" (1 676 m)   10/22/22 5' 6\" (1 676 m)        Wt Readings from Last 3 Encounters:   11/15/22 66 7 kg (147 lb)   11/11/22 66 7 kg (147 lb)   11/08/22 67 kg (147 lb 11 3 oz)        Temp Readings from Last 3 Encounters: " 03/10/23 (!) 97 1 °F (36 2 °C) (Oral)   22 (!) 97 °F (36 1 °C)   22 (!) 97 2 °F (36 2 °C)        BP Readings from Last 3 Encounters:   03/10/23 169/73   22 120/70   11/15/22 125/80             Physical Exam  Vitals reviewed  Constitutional:       General: She is not in acute distress  Appearance: She is well-developed  She is not diaphoretic  HENT:      Head: Normocephalic and atraumatic  Eyes:      Conjunctiva/sclera: Conjunctivae normal    Neck:      Trachea: No tracheal deviation  Cardiovascular:      Rate and Rhythm: Normal rate and regular rhythm  Heart sounds: No murmur heard  No friction rub  No gallop  Pulmonary:      Effort: Pulmonary effort is normal  No respiratory distress  Breath sounds: Normal breath sounds  No wheezing or rales  Chest:      Chest wall: No tenderness  Abdominal:      General: There is no distension  Palpations: Abdomen is soft  Tenderness: There is no abdominal tenderness  Musculoskeletal:      Cervical back: Normal range of motion and neck supple  Lymphadenopathy:      Cervical: No cervical adenopathy  Upper Body:      Right upper body: No supraclavicular or axillary adenopathy  Left upper body: No supraclavicular or axillary adenopathy  Skin:     General: Skin is warm and dry  Coloration: Skin is not pale  Findings: No erythema  Neurological:      Mental Status: She is alert and oriented to person, place, and time  Psychiatric:         Behavior: Behavior normal          Thought Content: Thought content normal          Judgment: Judgment normal          ECO      Emergency Contacts:    Extended Emergency Contact Information  Primary Emergency Contact: [employer]  Mobile Phone: [phone]  Relation: Nephew   needed?  No  Secondary Emergency Contact: [address] [place] [address] Road Phone: [phone]  Relation: Neighbor

## 2023-04-24 NOTE — TELEPHONE ENCOUNTER
Appointment Confirmation   Who are you speaking with? Patient   If it is not the patient, are they listed on an active communication consent form? N/A   Which provider is the appointment scheduled with? Jayesh Crooks PA-C   When is the appointment scheduled? Please list date and time       04/24/2023 @1:30PM    At which location is the appointment scheduled to take place? [place]   Did caller verbalize understanding of appointment details?  Yes

## 2023-04-25 ENCOUNTER — TELEPHONE (OUTPATIENT)
Dept: HEMATOLOGY ONCOLOGY | Facility: CLINIC | Age: 83
End: 2023-04-25

## 2023-04-25 ENCOUNTER — TELEPHONE (OUTPATIENT)
Dept: LAB | Facility: HOSPITAL | Age: 83
End: 2023-04-25

## 2023-04-25 NOTE — TELEPHONE ENCOUNTER
----- Message from George Huggins PA-C sent at 4/25/2023  1:44 PM EDT -----  Patient homebound    Needs home lab draw 1 week prior to her virtual f/u in 6 months

## 2023-04-25 NOTE — TELEPHONE ENCOUNTER
Spoke with Danilo Lopez from mobile lab department  She took patient information and stated they will contact patient to schedule labs

## 2023-05-01 ENCOUNTER — TELEPHONE (OUTPATIENT)
Dept: LAB | Facility: HOSPITAL | Age: 83
End: 2023-05-01

## 2023-05-02 LAB — SCAN RESULT: NORMAL

## 2023-08-21 ENCOUNTER — APPOINTMENT (OUTPATIENT)
Dept: LAB | Facility: HOSPITAL | Age: 83
End: 2023-08-21
Payer: MEDICARE

## 2023-08-21 DIAGNOSIS — M19.90 SENILE ARTHRITIS: ICD-10-CM

## 2023-08-21 DIAGNOSIS — E78.49 FAMILIAL COMBINED HYPERLIPIDEMIA: ICD-10-CM

## 2023-08-21 DIAGNOSIS — E11.618 ARTHRITIS DUE TO DIABETES (HCC): ICD-10-CM

## 2023-08-21 DIAGNOSIS — I10 HYPERTENSION, ESSENTIAL: ICD-10-CM

## 2023-08-21 DIAGNOSIS — C95.10 CHRONIC LEUKEMIA, NOT HAVING ACHIEVED REMISSION (HCC): ICD-10-CM

## 2023-08-21 LAB
25(OH)D3 SERPL-MCNC: 30.5 NG/ML (ref 30–100)
ALBUMIN SERPL BCP-MCNC: 4.5 G/DL (ref 3.5–5)
ALP SERPL-CCNC: 73 U/L (ref 34–104)
ALT SERPL W P-5'-P-CCNC: 41 U/L (ref 7–52)
ANION GAP SERPL CALCULATED.3IONS-SCNC: 10 MMOL/L
AST SERPL W P-5'-P-CCNC: 28 U/L (ref 13–39)
BASOPHILS # BLD MANUAL: 0 THOUSAND/UL (ref 0–0.1)
BASOPHILS NFR MAR MANUAL: 0 % (ref 0–1)
BILIRUB SERPL-MCNC: 0.43 MG/DL (ref 0.2–1)
BUN SERPL-MCNC: 26 MG/DL (ref 5–25)
CALCIUM SERPL-MCNC: 9.7 MG/DL (ref 8.4–10.2)
CHLORIDE SERPL-SCNC: 101 MMOL/L (ref 96–108)
CHOLEST SERPL-MCNC: 176 MG/DL
CO2 SERPL-SCNC: 28 MMOL/L (ref 21–32)
CREAT SERPL-MCNC: 1.12 MG/DL (ref 0.6–1.3)
EOSINOPHIL # BLD MANUAL: 0.34 THOUSAND/UL (ref 0–0.4)
EOSINOPHIL NFR BLD MANUAL: 1 % (ref 0–6)
ERYTHROCYTE [DISTWIDTH] IN BLOOD BY AUTOMATED COUNT: 13.8 % (ref 11.6–15.1)
EST. AVERAGE GLUCOSE BLD GHB EST-MCNC: 229 MG/DL
GFR SERPL CREATININE-BSD FRML MDRD: 45 ML/MIN/1.73SQ M
GLUCOSE SERPL-MCNC: 183 MG/DL (ref 65–140)
HBA1C MFR BLD: 9.6 %
HCT VFR BLD AUTO: 44.6 % (ref 34.8–46.1)
HDLC SERPL-MCNC: 59 MG/DL
HGB BLD-MCNC: 14.2 G/DL (ref 11.5–15.4)
LDLC SERPL CALC-MCNC: 78 MG/DL (ref 0–100)
LG PLATELETS BLD QL SMEAR: PRESENT
LYMPHOCYTES # BLD AUTO: 28.65 THOUSAND/UL (ref 0.6–4.47)
LYMPHOCYTES # BLD AUTO: 84 % (ref 14–44)
MACROCYTES BLD QL AUTO: PRESENT
MCH RBC QN AUTO: 33.6 PG (ref 26.8–34.3)
MCHC RBC AUTO-ENTMCNC: 31.8 G/DL (ref 31.4–37.4)
MCV RBC AUTO: 106 FL (ref 82–98)
MONOCYTES # BLD AUTO: 0.67 THOUSAND/UL (ref 0–1.22)
MONOCYTES NFR BLD: 2 % (ref 4–12)
NEUTROPHILS # BLD MANUAL: 4.05 THOUSAND/UL (ref 1.85–7.62)
NEUTS BAND NFR BLD MANUAL: 1 % (ref 0–8)
NEUTS SEG NFR BLD AUTO: 11 % (ref 43–75)
NONHDLC SERPL-MCNC: 117 MG/DL
PLATELET # BLD AUTO: 212 THOUSANDS/UL (ref 149–390)
PLATELET BLD QL SMEAR: ADEQUATE
PMV BLD AUTO: 12.1 FL (ref 8.9–12.7)
POTASSIUM SERPL-SCNC: 4.2 MMOL/L (ref 3.5–5.3)
PROT SERPL-MCNC: 7.7 G/DL (ref 6.4–8.4)
RBC # BLD AUTO: 4.22 MILLION/UL (ref 3.81–5.12)
RBC MORPH BLD: PRESENT
SODIUM SERPL-SCNC: 139 MMOL/L (ref 135–147)
TRIGL SERPL-MCNC: 194 MG/DL
TSH SERPL DL<=0.05 MIU/L-ACNC: 3.8 UIU/ML (ref 0.45–4.5)
VARIANT LYMPHS # BLD AUTO: 1 %
VIT B12 SERPL-MCNC: 3396 PG/ML (ref 180–914)
WBC # BLD AUTO: 33.71 THOUSAND/UL (ref 4.31–10.16)

## 2023-08-21 PROCEDURE — 85027 COMPLETE CBC AUTOMATED: CPT

## 2023-08-21 PROCEDURE — 80061 LIPID PANEL: CPT

## 2023-08-21 PROCEDURE — 82607 VITAMIN B-12: CPT

## 2023-08-21 PROCEDURE — 82306 VITAMIN D 25 HYDROXY: CPT

## 2023-08-21 PROCEDURE — 85007 BL SMEAR W/DIFF WBC COUNT: CPT

## 2023-08-21 PROCEDURE — 80053 COMPREHEN METABOLIC PANEL: CPT

## 2023-08-21 PROCEDURE — 84443 ASSAY THYROID STIM HORMONE: CPT

## 2023-08-21 PROCEDURE — 83036 HEMOGLOBIN GLYCOSYLATED A1C: CPT

## 2023-08-21 PROCEDURE — 36415 COLL VENOUS BLD VENIPUNCTURE: CPT

## 2023-08-30 ENCOUNTER — APPOINTMENT (OUTPATIENT)
Dept: LAB | Facility: HOSPITAL | Age: 83
End: 2023-08-30
Payer: MEDICARE

## 2023-08-30 ENCOUNTER — TELEPHONE (OUTPATIENT)
Dept: HEMATOLOGY ONCOLOGY | Facility: CLINIC | Age: 83
End: 2023-08-30

## 2023-08-30 DIAGNOSIS — C91.10 CLL (CHRONIC LYMPHOCYTIC LEUKEMIA) (HCC): ICD-10-CM

## 2023-08-30 LAB
ALBUMIN SERPL BCP-MCNC: 4.4 G/DL (ref 3.5–5)
ALP SERPL-CCNC: 74 U/L (ref 34–104)
ALT SERPL W P-5'-P-CCNC: 23 U/L (ref 7–52)
ANION GAP SERPL CALCULATED.3IONS-SCNC: 10 MMOL/L
AST SERPL W P-5'-P-CCNC: 18 U/L (ref 13–39)
BASOPHILS # BLD MANUAL: 0 THOUSAND/UL (ref 0–0.1)
BASOPHILS NFR MAR MANUAL: 0 % (ref 0–1)
BILIRUB SERPL-MCNC: 0.53 MG/DL (ref 0.2–1)
BUN SERPL-MCNC: 24 MG/DL (ref 5–25)
CALCIUM SERPL-MCNC: 9.6 MG/DL (ref 8.4–10.2)
CHLORIDE SERPL-SCNC: 102 MMOL/L (ref 96–108)
CO2 SERPL-SCNC: 26 MMOL/L (ref 21–32)
CREAT SERPL-MCNC: 1.03 MG/DL (ref 0.6–1.3)
EOSINOPHIL # BLD MANUAL: 0.33 THOUSAND/UL (ref 0–0.4)
EOSINOPHIL NFR BLD MANUAL: 1 % (ref 0–6)
ERYTHROCYTE [DISTWIDTH] IN BLOOD BY AUTOMATED COUNT: 14.1 % (ref 11.6–15.1)
GFR SERPL CREATININE-BSD FRML MDRD: 50 ML/MIN/1.73SQ M
GLUCOSE P FAST SERPL-MCNC: 197 MG/DL (ref 65–99)
HCT VFR BLD AUTO: 44 % (ref 34.8–46.1)
HGB BLD-MCNC: 13.9 G/DL (ref 11.5–15.4)
LDH SERPL-CCNC: 274 U/L (ref 140–271)
LG PLATELETS BLD QL SMEAR: PRESENT
LYMPHOCYTES # BLD AUTO: 28.13 THOUSAND/UL (ref 0.6–4.47)
LYMPHOCYTES # BLD AUTO: 84 % (ref 14–44)
MACROCYTES BLD QL AUTO: PRESENT
MCH RBC QN AUTO: 33.4 PG (ref 26.8–34.3)
MCHC RBC AUTO-ENTMCNC: 31.6 G/DL (ref 31.4–37.4)
MCV RBC AUTO: 106 FL (ref 82–98)
MONOCYTES # BLD AUTO: 0.98 THOUSAND/UL (ref 0–1.22)
MONOCYTES NFR BLD: 3 % (ref 4–12)
NEUTROPHILS # BLD MANUAL: 3.27 THOUSAND/UL (ref 1.85–7.62)
NEUTS SEG NFR BLD AUTO: 10 % (ref 43–75)
PLATELET # BLD AUTO: 203 THOUSANDS/UL (ref 149–390)
PLATELET BLD QL SMEAR: ADEQUATE
PMV BLD AUTO: 12.1 FL (ref 8.9–12.7)
POTASSIUM SERPL-SCNC: 4 MMOL/L (ref 3.5–5.3)
PROT SERPL-MCNC: 7.1 G/DL (ref 6.4–8.4)
RBC # BLD AUTO: 4.16 MILLION/UL (ref 3.81–5.12)
RBC MORPH BLD: PRESENT
SODIUM SERPL-SCNC: 138 MMOL/L (ref 135–147)
VARIANT LYMPHS # BLD AUTO: 2 %
WBC # BLD AUTO: 32.71 THOUSAND/UL (ref 4.31–10.16)

## 2023-08-30 PROCEDURE — 85027 COMPLETE CBC AUTOMATED: CPT

## 2023-08-30 PROCEDURE — 85007 BL SMEAR W/DIFF WBC COUNT: CPT

## 2023-08-30 PROCEDURE — 80053 COMPREHEN METABOLIC PANEL: CPT

## 2023-08-30 PROCEDURE — 36415 COLL VENOUS BLD VENIPUNCTURE: CPT

## 2023-08-30 PROCEDURE — 83615 LACTATE (LD) (LDH) ENZYME: CPT

## 2023-08-30 NOTE — TELEPHONE ENCOUNTER
Critical WBC of 33.71 on 8/30. Patient had a WBC of 33.71 on 8/21. Patient currently not on any treatment. Patient next scheduled for an appt on 10/30. No new orders at this time.

## 2023-09-26 ENCOUNTER — TELEPHONE (OUTPATIENT)
Dept: HEMATOLOGY ONCOLOGY | Facility: CLINIC | Age: 83
End: 2023-09-26

## 2023-09-26 NOTE — TELEPHONE ENCOUNTER
I am reaching out regarding your appointment with Amanda Wilkinson on 10/27/23. There has been a change to the providers schedule, and we will need to reschedule your appointment. Phone was busy.  Called three times No

## 2023-09-27 ENCOUNTER — TELEPHONE (OUTPATIENT)
Dept: HEMATOLOGY ONCOLOGY | Facility: CLINIC | Age: 83
End: 2023-09-27

## 2023-09-27 NOTE — TELEPHONE ENCOUNTER
I am reaching out regarding your appointment with Mic Douglas on 10/27/23     There has been a change to the providers schedule, and we will need to reschedule your appointment.     Patient will have her nephew call us to reschedule.

## 2023-09-29 ENCOUNTER — TELEPHONE (OUTPATIENT)
Dept: HEMATOLOGY ONCOLOGY | Facility: CLINIC | Age: 83
End: 2023-09-29

## 2023-09-29 NOTE — TELEPHONE ENCOUNTER
Appointment Change  Cancel, Reschedule, Change to Virtual      Who are you speaking with? Patient   If it is not the patient, is the caller listed on the communication consent form? N/A   Which provider is the appointment scheduled with? Jose Clarke PA-C   When was the original appointment scheduled? Please list date and time 10/30/23 8:30AM   At which location is the appointment scheduled to take place? [facility] [address]   Was the appointment rescheduled? Was the appointment changed from an in person visit to a virtual visit? If so, please list the details of the change. Attempted to contact patient 3 times unsuccessfully. Appointment cancelled, letter sent to address on file. What is the reason for the appointment change? Provider        Was STAR transport scheduled? No   Does STAR transport need to be scheduled for the new visit (if applicable) No   Does the patient need an infusion appointment rescheduled? No   Does the patient have an upcoming infusion appointment scheduled? If so, when? No   Is the patient undergoing chemotherapy? No   For appointments cancelled with less than 24 hours:  Was the no-show policy reviewed?  No

## 2023-10-30 ENCOUNTER — TELEPHONE (OUTPATIENT)
Dept: HEMATOLOGY ONCOLOGY | Facility: CLINIC | Age: 83
End: 2023-10-30

## 2023-10-30 DIAGNOSIS — C91.10 CLL (CHRONIC LYMPHOCYTIC LEUKEMIA) (HCC): Primary | ICD-10-CM

## 2023-10-30 NOTE — TELEPHONE ENCOUNTER
Lab Inquiry   Who are you speaking with? Child     If it is not the patient, are they listed on an active communication consent form? Yes   Name of ordering provider Jose Clarke PA-C   What is being requested? Lab orders need to be entered   Lab draw location [facility]   What is the best call back number?  [phone]

## 2023-10-30 NOTE — TELEPHONE ENCOUNTER
Appointment Change  Cancel, Reschedule, Change to Virtual      Who are you speaking with? Child   If it is not the patient, is the caller listed on the communication consent form? Yes   Which provider is the appointment scheduled with? Homar Lopez PA-C   When was the original appointment scheduled? Please list date and time 10/30   At which location is the appointment scheduled to take place? [place]   Was the appointment rescheduled? Was the appointment changed from an in person visit to a virtual visit? If so, please list the details of the change. 1/26 8am   What is the reason for the appointment change? Provider away       Was STAR transport scheduled? No   Does STAR transport need to be scheduled for the new visit (if applicable) No   Does the patient need an infusion appointment rescheduled? No   Does the patient have an upcoming infusion appointment scheduled? If so, when? No   Is the patient undergoing chemotherapy? No   For appointments cancelled with less than 24 hours:  Was the no-show policy reviewed?  Yes

## 2023-11-29 ENCOUNTER — TELEPHONE (OUTPATIENT)
Dept: HEMATOLOGY ONCOLOGY | Facility: CLINIC | Age: 83
End: 2023-11-29

## 2023-11-29 ENCOUNTER — APPOINTMENT (OUTPATIENT)
Dept: LAB | Facility: HOSPITAL | Age: 83
End: 2023-11-29
Attending: INTERNAL MEDICINE
Payer: MEDICARE

## 2023-11-29 DIAGNOSIS — E78.2 MIXED HYPERLIPIDEMIA: ICD-10-CM

## 2023-11-29 DIAGNOSIS — C95.10 CHRONIC LEUKEMIA, NOT HAVING ACHIEVED REMISSION (HCC): ICD-10-CM

## 2023-11-29 DIAGNOSIS — C91.10 CLL (CHRONIC LYMPHOCYTIC LEUKEMIA) (HCC): ICD-10-CM

## 2023-11-29 DIAGNOSIS — E11.49 DIABETIC NEUROPATHY WITH NEUROLOGIC COMPLICATION (HCC): ICD-10-CM

## 2023-11-29 DIAGNOSIS — I10 ESSENTIAL HYPERTENSION, MALIGNANT: ICD-10-CM

## 2023-11-29 DIAGNOSIS — E11.40 DIABETIC NEUROPATHY WITH NEUROLOGIC COMPLICATION (HCC): ICD-10-CM

## 2023-11-29 LAB
ALBUMIN SERPL BCP-MCNC: 4.6 G/DL (ref 3.5–5)
ALP SERPL-CCNC: 89 U/L (ref 34–104)
ALT SERPL W P-5'-P-CCNC: 36 U/L (ref 7–52)
ANION GAP SERPL CALCULATED.3IONS-SCNC: 11 MMOL/L
AST SERPL W P-5'-P-CCNC: 20 U/L (ref 13–39)
BASOPHILS # BLD MANUAL: 0 THOUSAND/UL (ref 0–0.1)
BASOPHILS NFR MAR MANUAL: 0 % (ref 0–1)
BILIRUB SERPL-MCNC: 0.48 MG/DL (ref 0.2–1)
BUN SERPL-MCNC: 31 MG/DL (ref 5–25)
CALCIUM SERPL-MCNC: 10.1 MG/DL (ref 8.4–10.2)
CHLORIDE SERPL-SCNC: 101 MMOL/L (ref 96–108)
CHOLEST SERPL-MCNC: 156 MG/DL
CO2 SERPL-SCNC: 27 MMOL/L (ref 21–32)
CREAT SERPL-MCNC: 1.15 MG/DL (ref 0.6–1.3)
EOSINOPHIL # BLD MANUAL: 0 THOUSAND/UL (ref 0–0.4)
EOSINOPHIL NFR BLD MANUAL: 0 % (ref 0–6)
ERYTHROCYTE [DISTWIDTH] IN BLOOD BY AUTOMATED COUNT: 13.6 % (ref 11.6–15.1)
EST. AVERAGE GLUCOSE BLD GHB EST-MCNC: 226 MG/DL
GFR SERPL CREATININE-BSD FRML MDRD: 43 ML/MIN/1.73SQ M
GLUCOSE P FAST SERPL-MCNC: 164 MG/DL (ref 65–99)
HBA1C MFR BLD: 9.5 %
HCT VFR BLD AUTO: 45.2 % (ref 34.8–46.1)
HDLC SERPL-MCNC: 66 MG/DL
HGB BLD-MCNC: 14.8 G/DL (ref 11.5–15.4)
IGA SERPL-MCNC: 292 MG/DL (ref 66–433)
IGG SERPL-MCNC: 932 MG/DL (ref 635–1741)
IGM SERPL-MCNC: 34 MG/DL (ref 45–281)
LDH SERPL-CCNC: 196 U/L (ref 140–271)
LDLC SERPL CALC-MCNC: 63 MG/DL (ref 0–100)
LYMPHOCYTES # BLD AUTO: 28.15 THOUSAND/UL (ref 0.6–4.47)
LYMPHOCYTES # BLD AUTO: 70 % (ref 14–44)
MCH RBC QN AUTO: 33.6 PG (ref 26.8–34.3)
MCHC RBC AUTO-ENTMCNC: 32.7 G/DL (ref 31.4–37.4)
MCV RBC AUTO: 103 FL (ref 82–98)
MONOCYTES # BLD AUTO: 2.28 THOUSAND/UL (ref 0–1.22)
MONOCYTES NFR BLD: 6 % (ref 4–12)
NEUTROPHILS # BLD MANUAL: 7.61 THOUSAND/UL (ref 1.85–7.62)
NEUTS SEG NFR BLD AUTO: 20 % (ref 43–75)
NONHDLC SERPL-MCNC: 90 MG/DL
PLATELET # BLD AUTO: 217 THOUSANDS/UL (ref 149–390)
PLATELET BLD QL SMEAR: ADEQUATE
PMV BLD AUTO: 12.2 FL (ref 8.9–12.7)
POTASSIUM SERPL-SCNC: 3.6 MMOL/L (ref 3.5–5.3)
PROT SERPL-MCNC: 7.7 G/DL (ref 6.4–8.4)
RBC # BLD AUTO: 4.41 MILLION/UL (ref 3.81–5.12)
RBC MORPH BLD: NORMAL
SODIUM SERPL-SCNC: 139 MMOL/L (ref 135–147)
TRIGL SERPL-MCNC: 137 MG/DL
VARIANT LYMPHS # BLD AUTO: 4 %
WBC # BLD AUTO: 38.04 THOUSAND/UL (ref 4.31–10.16)

## 2023-11-29 PROCEDURE — 36415 COLL VENOUS BLD VENIPUNCTURE: CPT

## 2023-11-29 PROCEDURE — 85027 COMPLETE CBC AUTOMATED: CPT

## 2023-11-29 PROCEDURE — 82784 ASSAY IGA/IGD/IGG/IGM EACH: CPT

## 2023-11-29 PROCEDURE — 85007 BL SMEAR W/DIFF WBC COUNT: CPT

## 2023-11-29 PROCEDURE — 80053 COMPREHEN METABOLIC PANEL: CPT

## 2023-11-29 PROCEDURE — 83036 HEMOGLOBIN GLYCOSYLATED A1C: CPT

## 2023-11-29 PROCEDURE — 80061 LIPID PANEL: CPT

## 2023-11-29 PROCEDURE — 83615 LACTATE (LD) (LDH) ENZYME: CPT

## 2023-11-29 PROCEDURE — 88373 M/PHMTRC ALYS ISHQUANT/SEMIQ: CPT

## 2023-11-29 PROCEDURE — 88367 INSITU HYBRIDIZATION AUTO: CPT

## 2023-11-29 NOTE — TELEPHONE ENCOUNTER
Lab calling with critical results    WBC - 38.04    4/24/03 visit - CLL on observation.       F/u  1/26

## 2023-12-06 LAB — SCAN RESULT: NORMAL

## 2023-12-12 LAB — MISCELLANEOUS LAB TEST RESULT: NORMAL

## 2023-12-14 ENCOUNTER — TELEPHONE (OUTPATIENT)
Dept: HEMATOLOGY ONCOLOGY | Facility: CLINIC | Age: 83
End: 2023-12-14

## 2023-12-14 LAB — SCAN RESULT: NORMAL

## 2023-12-14 NOTE — TELEPHONE ENCOUNTER
Patient Call    Who are you speaking with? Mike Campbell     If it is not the patient, are they listed on an active communication consent form? N/A   What is the reason for this call? Venancio Quinn calling to speak with Dr. Rosibel Saunders nurse in regards to labs that were done for patient. Venancio Quinn would like a call back to discuss if the results were received. Does this require a call back? Yes   If a call back is required, please list best call back number [phone]   If a call back is required, advise that a message will be forwarded to their care team and someone will return their call as soon as possible. Did you relay this information to the patient?  Yes

## 2023-12-14 NOTE — TELEPHONE ENCOUNTER
Appointment Change  Cancel, Reschedule, Change to Virtual      Who are you speaking with? Patient   If it is not the patient, is the caller listed on the communication consent form? Yes   Which provider is the appointment scheduled with? Mic Douglas PA-C   When was the original appointment scheduled? Please list date and time 1/26/24   At which location is the appointment scheduled to take place? [facility]   Was the appointment rescheduled? Was the appointment changed from an in person visit to a virtual visit? If so, please list the details of the change. no   What is the reason for the appointment change? Provider/ unable contact patient LETTER SENT       Was STAR transport scheduled? No   Does STAR transport need to be scheduled for the new visit (if applicable) No   Does the patient need an infusion appointment rescheduled? No   Does the patient have an upcoming infusion appointment scheduled? If so, when? No   Is the patient undergoing chemotherapy? No   For appointments cancelled with less than 24 hours:  Was the no-show policy reviewed?  No

## 2023-12-14 NOTE — TELEPHONE ENCOUNTER
[facility] states they were notified by the lab that CLL profile result was edited. He will be faxing me a copy of the updated report so I can scan this in the chart. Last report was from 12/5.   Report was edited after 12/5

## 2024-01-19 ENCOUNTER — APPOINTMENT (OUTPATIENT)
Dept: LAB | Facility: CLINIC | Age: 84
End: 2024-01-19
Payer: MEDICARE

## 2024-01-19 DIAGNOSIS — C91.10 CLL (CHRONIC LYMPHOCYTIC LEUKEMIA) (HCC): Primary | ICD-10-CM

## 2024-01-19 DIAGNOSIS — C91.10 CLL (CHRONIC LYMPHOCYTIC LEUKEMIA) (HCC): ICD-10-CM

## 2024-01-19 LAB
ALBUMIN SERPL BCP-MCNC: 4.6 G/DL (ref 3.5–5)
ALP SERPL-CCNC: 89 U/L (ref 34–104)
ALT SERPL W P-5'-P-CCNC: 24 U/L (ref 7–52)
ANION GAP SERPL CALCULATED.3IONS-SCNC: 8 MMOL/L
AST SERPL W P-5'-P-CCNC: 17 U/L (ref 13–39)
BASOPHILS # BLD MANUAL: 0 THOUSAND/UL (ref 0–0.1)
BASOPHILS NFR MAR MANUAL: 0 % (ref 0–1)
BILIRUB SERPL-MCNC: 0.53 MG/DL (ref 0.2–1)
BUN SERPL-MCNC: 21 MG/DL (ref 5–25)
CALCIUM SERPL-MCNC: 10.2 MG/DL (ref 8.4–10.2)
CHLORIDE SERPL-SCNC: 103 MMOL/L (ref 96–108)
CO2 SERPL-SCNC: 31 MMOL/L (ref 21–32)
CREAT SERPL-MCNC: 1.06 MG/DL (ref 0.6–1.3)
EOSINOPHIL # BLD MANUAL: 0 THOUSAND/UL (ref 0–0.4)
EOSINOPHIL NFR BLD MANUAL: 0 % (ref 0–6)
ERYTHROCYTE [DISTWIDTH] IN BLOOD BY AUTOMATED COUNT: 13.9 % (ref 11.6–15.1)
GFR SERPL CREATININE-BSD FRML MDRD: 48 ML/MIN/1.73SQ M
GLUCOSE P FAST SERPL-MCNC: 63 MG/DL (ref 65–99)
HCT VFR BLD AUTO: 45.9 % (ref 34.8–46.1)
HGB BLD-MCNC: 14.7 G/DL (ref 11.5–15.4)
IGA SERPL-MCNC: 286 MG/DL (ref 66–433)
IGG SERPL-MCNC: 983 MG/DL (ref 635–1741)
IGM SERPL-MCNC: 37 MG/DL (ref 45–281)
LDH SERPL-CCNC: 195 U/L (ref 140–271)
LYMPHOCYTES # BLD AUTO: 46.64 THOUSAND/UL (ref 0.6–4.47)
LYMPHOCYTES # BLD AUTO: 93 % (ref 14–44)
MCH RBC QN AUTO: 33.9 PG (ref 26.8–34.3)
MCHC RBC AUTO-ENTMCNC: 32 G/DL (ref 31.4–37.4)
MCV RBC AUTO: 106 FL (ref 82–98)
MONOCYTES # BLD AUTO: 0 THOUSAND/UL (ref 0–1.22)
MONOCYTES NFR BLD: 0 % (ref 4–12)
NEUTROPHILS # BLD MANUAL: 3.51 THOUSAND/UL (ref 1.85–7.62)
NEUTS SEG NFR BLD AUTO: 7 % (ref 43–75)
PLATELET # BLD AUTO: 207 THOUSANDS/UL (ref 149–390)
PLATELET BLD QL SMEAR: ADEQUATE
PMV BLD AUTO: 11.6 FL (ref 8.9–12.7)
POTASSIUM SERPL-SCNC: 4.2 MMOL/L (ref 3.5–5.3)
PROT SERPL-MCNC: 7.6 G/DL (ref 6.4–8.4)
RBC # BLD AUTO: 4.33 MILLION/UL (ref 3.81–5.12)
RBC MORPH BLD: NORMAL
SODIUM SERPL-SCNC: 142 MMOL/L (ref 135–147)
WBC # BLD AUTO: 50.15 THOUSAND/UL (ref 4.31–10.16)

## 2024-01-19 PROCEDURE — 82784 ASSAY IGA/IGD/IGG/IGM EACH: CPT

## 2024-01-19 PROCEDURE — 85027 COMPLETE CBC AUTOMATED: CPT

## 2024-01-19 PROCEDURE — 80053 COMPREHEN METABOLIC PANEL: CPT

## 2024-01-19 PROCEDURE — 88373 M/PHMTRC ALYS ISHQUANT/SEMIQ: CPT

## 2024-01-19 PROCEDURE — 83615 LACTATE (LD) (LDH) ENZYME: CPT

## 2024-01-19 PROCEDURE — 36415 COLL VENOUS BLD VENIPUNCTURE: CPT

## 2024-01-19 PROCEDURE — 85007 BL SMEAR W/DIFF WBC COUNT: CPT

## 2024-01-19 PROCEDURE — 88367 INSITU HYBRIDIZATION AUTO: CPT

## 2024-01-24 LAB — SCAN RESULT: NORMAL

## 2024-01-29 ENCOUNTER — OFFICE VISIT (OUTPATIENT)
Dept: HEMATOLOGY ONCOLOGY | Facility: CLINIC | Age: 84
End: 2024-01-29
Payer: MEDICARE

## 2024-01-29 VITALS
HEART RATE: 66 BPM | HEIGHT: 66 IN | SYSTOLIC BLOOD PRESSURE: 138 MMHG | OXYGEN SATURATION: 99 % | TEMPERATURE: 97.7 F | DIASTOLIC BLOOD PRESSURE: 92 MMHG | WEIGHT: 156 LBS | BODY MASS INDEX: 25.07 KG/M2 | RESPIRATION RATE: 17 BRPM

## 2024-01-29 DIAGNOSIS — C91.10 CLL (CHRONIC LYMPHOCYTIC LEUKEMIA) (HCC): Primary | ICD-10-CM

## 2024-01-29 PROCEDURE — 99213 OFFICE O/P EST LOW 20 MIN: CPT | Performed by: INTERNAL MEDICINE

## 2024-01-29 NOTE — PROGRESS NOTES
Hematology Outpatient Follow - Up Note  Gia Vaughan 83 y.o. female MRN: @ Encounter: [id number]        Date:  1/29/2024        Assessment/ Plan:    Chronic lymphocytic leukemia diagnosed on 11/2022 after she had a fall with fracture of the hip    Status post ORIF to the right hip, CAT scan showed mild adenopathy in the pelvis area measuring up to 2 cm, flow cytometry confirmed chronic lymphocytic leukemia, positive for trisomy 12, I GVH mutation is negative consistent with moderate risk of progression    WBC currently of 50,000, no evidence of anemia or thrombocytopenia    No constitutional symptoms at this time we will watch and observe and repeat CBC and differential in 3 to 4 months        Labs and imaging studies are reviewed by ordering provider once results are available. If there are findings that need immediate attention, you will be contacted when results available.   Discussing results and the implication on your healthcare is best discussed in person at your follow-up visit.       HPI:    Gia Vaughan is an 82 y/o female seen initially 11/21/22 regarding Chronic lymphocytic leukemia/small lymphocytic lymphoma.     history of osteoporosis, type 2 diabetes mellitus, TIA, hypertension, congestive heart failure, CKD.     Admitted 10/22/22 s/p fall.  Right femur fracture noted.  Status post ORIF of right femur fracture with long IM nail on 10/12/2022.     The patient was found to have leukocytosis, lymphocytosis had been progressing for many years with mild anemia.     November 2020 hemoglobin 12.2, , white blood cell count 10.4, 67% neutrophils, 26% lymphocytes, platelets 234     August 2021 hemoglobin 11.8, , white blood cell count 14.21, 40% neutrophils, 52% lymphocytes, platelets 186     October 11, 2022 hemoglobin 14.4, , white blood cell count 22.3, 27% segs, 69% lymphocytes, 3% monocytes, platelets 237     Flow cytometry on 11/7/2022 confirmed chronic lymphocytic leukemia  "positive for CD 5, CD 23, CD 20    FISH panel showed trisomy 12, I GVH mutation is negative     She denies any constitutional symptoms nausea vomiting dysuria hematuria melena hematochezia     CT scan of the pelvis 10/12/22 showed mild retroperitoneal lymphadenopathy measuring up to 2 cm  Interval History:        Previous Treatment:         Test Results:    Imaging: No results found.    Labs:   Lab Results   Component Value Date    WBC 50.15 (HH) 01/19/2024    HGB 14.7 01/19/2024    HCT 45.9 01/19/2024     (H) 01/19/2024     01/19/2024     Lab Results   Component Value Date    K 4.2 01/19/2024     01/19/2024    CO2 31 01/19/2024    BUN 21 01/19/2024    CREATININE 1.06 01/19/2024    GLUF 63 (L) 01/19/2024    CALCIUM 10.2 01/19/2024    AST 17 01/19/2024    ALT 24 01/19/2024    ALKPHOS 89 01/19/2024    EGFR 48 01/19/2024       No results found for: \"IRON\", \"TIBC\", \"FERRITIN\"    Lab Results   Component Value Date    OVACCRUQ60 3,396 (H) 08/21/2023         ROS: Review of Systems   Constitutional:  Negative for appetite change, chills, diaphoresis, fatigue and unexpected weight change.   HENT:   Negative for mouth sores, nosebleeds, sore throat, trouble swallowing and voice change.    Eyes:  Negative for eye problems and icterus.   Respiratory:  Negative for chest tightness, cough, hemoptysis and wheezing.    Cardiovascular:  Negative for chest pain, leg swelling and palpitations.   Gastrointestinal:  Negative for abdominal distention, abdominal pain, blood in stool, constipation, diarrhea, nausea and vomiting.   Endocrine: Negative for hot flashes.   Genitourinary:  Negative for bladder incontinence, difficulty urinating, dyspareunia, dysuria and frequency.    Musculoskeletal:  Positive for arthralgias and back pain. Negative for gait problem, neck pain and neck stiffness.   Skin:  Negative for itching and rash.   Neurological:  Negative for dizziness, gait problem, headaches, numbness, seizures and " speech difficulty.   Hematological:  Negative for adenopathy. Does not bruise/bleed easily.   Psychiatric/Behavioral:  Negative for decreased concentration, depression, sleep disturbance and suicidal ideas. The patient is not nervous/anxious.           Current Medications: Reviewed  Allergies: Reviewed  PMH/FH/SH:  Reviewed      Physical Exam:    Body surface area is 1.8 meters squared.    Wt Readings from Last 3 Encounters:   24 70.8 kg (156 lb)   23 68 kg (150 lb)   11/15/22 66.7 kg (147 lb)        Temp Readings from Last 3 Encounters:   24 97.7 °F (36.5 °C) (Temporal)   23 98 °F (36.7 °C) (Temporal)   03/10/23 (!) 97.1 °F (36.2 °C) (Oral)        BP Readings from Last 3 Encounters:   24 138/92   23 152/80   03/10/23 169/73         Pulse Readings from Last 3 Encounters:   24 66   23 71   03/10/23 68        Physical Exam  Constitutional:       Appearance: She is well-developed.   HENT:      Head: Normocephalic.   Eyes:      Pupils: Pupils are equal, round, and reactive to light.   Cardiovascular:      Rate and Rhythm: Normal rate and regular rhythm.      Heart sounds: Murmur heard.   Pulmonary:      Effort: Pulmonary effort is normal.   Chest:   Breasts:     Breasts are symmetrical.      Right: No inverted nipple, mass, nipple discharge, skin change or tenderness.      Left: No inverted nipple, mass, nipple discharge, skin change or tenderness.   Abdominal:      General: There is no distension.      Palpations: Abdomen is soft. There is no hepatomegaly or splenomegaly.      Tenderness: There is no abdominal tenderness. There is no guarding or rebound.   Musculoskeletal:         General: No deformity. Normal range of motion.      Cervical back: Normal range of motion.      Right lower leg: Edema present.      Left lower leg: Edema present.         ECO    Goals and Barriers:  Current Goal: Minimize effects of disease.   Barriers: None.      Patient's Capacity to Self  Care:  Patient is able to self care.    Code Status: [unfilled]

## 2024-04-01 ENCOUNTER — HOSPITAL ENCOUNTER (INPATIENT)
Facility: HOSPITAL | Age: 84
LOS: 2 days | Discharge: HOME HEALTH | DRG: 884 | End: 2024-04-04
Attending: EMERGENCY MEDICINE | Admitting: INTERNAL MEDICINE
Payer: MEDICARE

## 2024-04-01 ENCOUNTER — APPOINTMENT (EMERGENCY)
Dept: CT IMAGING | Facility: HOSPITAL | Age: 84
DRG: 884 | End: 2024-04-01
Payer: MEDICARE

## 2024-04-01 DIAGNOSIS — N18.31 TYPE 2 DIABETES MELLITUS WITH STAGE 3A CHRONIC KIDNEY DISEASE, WITH LONG-TERM CURRENT USE OF INSULIN (HCC): ICD-10-CM

## 2024-04-01 DIAGNOSIS — C91.10 CLL (CHRONIC LYMPHOCYTIC LEUKEMIA) (HCC): ICD-10-CM

## 2024-04-01 DIAGNOSIS — M54.2 NECK PAIN: ICD-10-CM

## 2024-04-01 DIAGNOSIS — I16.0 HYPERTENSIVE URGENCY: Primary | ICD-10-CM

## 2024-04-01 DIAGNOSIS — Z79.4 TYPE 2 DIABETES MELLITUS WITH STAGE 3A CHRONIC KIDNEY DISEASE, WITH LONG-TERM CURRENT USE OF INSULIN (HCC): ICD-10-CM

## 2024-04-01 DIAGNOSIS — R51.9 HEADACHE: ICD-10-CM

## 2024-04-01 DIAGNOSIS — E11.22 TYPE 2 DIABETES MELLITUS WITH STAGE 3A CHRONIC KIDNEY DISEASE, WITH LONG-TERM CURRENT USE OF INSULIN (HCC): ICD-10-CM

## 2024-04-01 DIAGNOSIS — R42 POSITIONAL LIGHTHEADEDNESS: ICD-10-CM

## 2024-04-01 DIAGNOSIS — I50.22 CHRONIC SYSTOLIC HEART FAILURE (HCC): ICD-10-CM

## 2024-04-01 DIAGNOSIS — G93.40 ENCEPHALOPATHY: ICD-10-CM

## 2024-04-01 DIAGNOSIS — G31.84 MILD COGNITIVE IMPAIRMENT: ICD-10-CM

## 2024-04-01 DIAGNOSIS — R42 DIZZINESS: ICD-10-CM

## 2024-04-01 LAB
ALBUMIN SERPL BCP-MCNC: 4.8 G/DL (ref 3.5–5)
ALP SERPL-CCNC: 97 U/L (ref 34–104)
ALT SERPL W P-5'-P-CCNC: 33 U/L (ref 7–52)
ANION GAP SERPL CALCULATED.3IONS-SCNC: 8 MMOL/L (ref 4–13)
ANISOCYTOSIS BLD QL SMEAR: PRESENT
AST SERPL W P-5'-P-CCNC: 22 U/L (ref 13–39)
BASOPHILS # BLD MANUAL: 0 THOUSAND/UL (ref 0–0.1)
BASOPHILS NFR MAR MANUAL: 0 % (ref 0–1)
BILIRUB SERPL-MCNC: 0.48 MG/DL (ref 0.2–1)
BILIRUB UR QL STRIP: NEGATIVE
BUN SERPL-MCNC: 23 MG/DL (ref 5–25)
CALCIUM SERPL-MCNC: 10.3 MG/DL (ref 8.4–10.2)
CHLORIDE SERPL-SCNC: 101 MMOL/L (ref 96–108)
CLARITY UR: CLEAR
CO2 SERPL-SCNC: 30 MMOL/L (ref 21–32)
COLOR UR: COLORLESS
CREAT SERPL-MCNC: 0.98 MG/DL (ref 0.6–1.3)
EOSINOPHIL # BLD MANUAL: 0 THOUSAND/UL (ref 0–0.4)
EOSINOPHIL NFR BLD MANUAL: 0 % (ref 0–6)
ERYTHROCYTE [DISTWIDTH] IN BLOOD BY AUTOMATED COUNT: 14 % (ref 11.6–15.1)
GFR SERPL CREATININE-BSD FRML MDRD: 53 ML/MIN/1.73SQ M
GLUCOSE SERPL-MCNC: 144 MG/DL (ref 65–140)
GLUCOSE UR STRIP-MCNC: ABNORMAL MG/DL
HCT VFR BLD AUTO: 47.2 % (ref 34.8–46.1)
HGB BLD-MCNC: 15 G/DL (ref 11.5–15.4)
HGB UR QL STRIP.AUTO: NEGATIVE
KETONES UR STRIP-MCNC: NEGATIVE MG/DL
LEUKOCYTE ESTERASE UR QL STRIP: NEGATIVE
LG PLATELETS BLD QL SMEAR: PRESENT
LYMPHOCYTES # BLD AUTO: 29.19 THOUSAND/UL (ref 0.6–4.47)
LYMPHOCYTES # BLD AUTO: 75 % (ref 14–44)
MCH RBC QN AUTO: 33.7 PG (ref 26.8–34.3)
MCHC RBC AUTO-ENTMCNC: 31.8 G/DL (ref 31.4–37.4)
MCV RBC AUTO: 106 FL (ref 82–98)
MONOCYTES # BLD AUTO: 0 THOUSAND/UL (ref 0–1.22)
MONOCYTES NFR BLD: 0 % (ref 4–12)
NEUTROPHILS # BLD MANUAL: 8.72 THOUSAND/UL (ref 1.85–7.62)
NEUTS SEG NFR BLD AUTO: 23 % (ref 43–75)
NITRITE UR QL STRIP: NEGATIVE
PH UR STRIP.AUTO: 7.5 [PH]
PLATELET # BLD AUTO: 193 THOUSANDS/UL (ref 149–390)
PLATELET BLD QL SMEAR: ADEQUATE
PMV BLD AUTO: 11.7 FL (ref 8.9–12.7)
POTASSIUM SERPL-SCNC: 3.9 MMOL/L (ref 3.5–5.3)
PROT SERPL-MCNC: 8.2 G/DL (ref 6.4–8.4)
PROT UR STRIP-MCNC: NEGATIVE MG/DL
RBC # BLD AUTO: 4.45 MILLION/UL (ref 3.81–5.12)
RBC MORPH BLD: PRESENT
SODIUM SERPL-SCNC: 139 MMOL/L (ref 135–147)
SP GR UR STRIP.AUTO: 1 (ref 1–1.03)
UROBILINOGEN UR STRIP-ACNC: <2 MG/DL
VARIANT LYMPHS # BLD AUTO: 2 %
WBC # BLD AUTO: 37.91 THOUSAND/UL (ref 4.31–10.16)

## 2024-04-01 PROCEDURE — 96374 THER/PROPH/DIAG INJ IV PUSH: CPT

## 2024-04-01 PROCEDURE — 83036 HEMOGLOBIN GLYCOSYLATED A1C: CPT | Performed by: NURSE PRACTITIONER

## 2024-04-01 PROCEDURE — 36415 COLL VENOUS BLD VENIPUNCTURE: CPT

## 2024-04-01 PROCEDURE — 99285 EMERGENCY DEPT VISIT HI MDM: CPT | Performed by: EMERGENCY MEDICINE

## 2024-04-01 PROCEDURE — 85027 COMPLETE CBC AUTOMATED: CPT

## 2024-04-01 PROCEDURE — 85007 BL SMEAR W/DIFF WBC COUNT: CPT

## 2024-04-01 PROCEDURE — 96375 TX/PRO/DX INJ NEW DRUG ADDON: CPT

## 2024-04-01 PROCEDURE — 80053 COMPREHEN METABOLIC PANEL: CPT

## 2024-04-01 PROCEDURE — 70450 CT HEAD/BRAIN W/O DYE: CPT

## 2024-04-01 PROCEDURE — 99285 EMERGENCY DEPT VISIT HI MDM: CPT

## 2024-04-01 RX ORDER — HYDRALAZINE HYDROCHLORIDE 20 MG/ML
10 INJECTION INTRAMUSCULAR; INTRAVENOUS ONCE
Status: COMPLETED | OUTPATIENT
Start: 2024-04-01 | End: 2024-04-01

## 2024-04-01 RX ORDER — ACETAMINOPHEN 325 MG/1
975 TABLET ORAL ONCE
Status: COMPLETED | OUTPATIENT
Start: 2024-04-01 | End: 2024-04-01

## 2024-04-01 RX ORDER — DIPHENHYDRAMINE HYDROCHLORIDE 50 MG/ML
50 INJECTION INTRAMUSCULAR; INTRAVENOUS ONCE
Status: COMPLETED | OUTPATIENT
Start: 2024-04-01 | End: 2024-04-01

## 2024-04-01 RX ADMIN — DIPHENHYDRAMINE HYDROCHLORIDE 50 MG: 50 INJECTION, SOLUTION INTRAMUSCULAR; INTRAVENOUS at 23:25

## 2024-04-01 RX ADMIN — ACETAMINOPHEN 975 MG: 325 TABLET, FILM COATED ORAL at 21:27

## 2024-04-01 RX ADMIN — HYDROCORTISONE SODIUM SUCCINATE 200 MG: 100 INJECTION, POWDER, FOR SOLUTION INTRAMUSCULAR; INTRAVENOUS at 20:09

## 2024-04-01 RX ADMIN — HYDRALAZINE HYDROCHLORIDE 10 MG: 20 INJECTION, SOLUTION INTRAMUSCULAR; INTRAVENOUS at 20:09

## 2024-04-01 NOTE — Clinical Note
accompanied Gia Vaughan to the emergency department on 4/1/2024.    Return date if applicable: 04/03/2024        If you have any questions or concerns, please don't hesitate to call.      Heavenly Shelton MD

## 2024-04-02 ENCOUNTER — APPOINTMENT (OUTPATIENT)
Dept: MRI IMAGING | Facility: HOSPITAL | Age: 84
DRG: 884 | End: 2024-04-02
Payer: MEDICARE

## 2024-04-02 ENCOUNTER — APPOINTMENT (EMERGENCY)
Dept: CT IMAGING | Facility: HOSPITAL | Age: 84
DRG: 884 | End: 2024-04-02
Payer: MEDICARE

## 2024-04-02 PROBLEM — I10 HYPERTENSION: Status: RESOLVED | Noted: 2021-08-08 | Resolved: 2024-04-02

## 2024-04-02 PROBLEM — R42 DIZZINESS: Status: ACTIVE | Noted: 2024-04-02

## 2024-04-02 PROBLEM — I16.0 HYPERTENSIVE URGENCY: Status: ACTIVE | Noted: 2024-04-02

## 2024-04-02 LAB
ANION GAP SERPL CALCULATED.3IONS-SCNC: 11 MMOL/L (ref 4–13)
ANISOCYTOSIS BLD QL SMEAR: PRESENT
BASOPHILS # BLD MANUAL: 0 THOUSAND/UL (ref 0–0.1)
BASOPHILS NFR MAR MANUAL: 0 % (ref 0–1)
BUN SERPL-MCNC: 22 MG/DL (ref 5–25)
CALCIUM SERPL-MCNC: 9.3 MG/DL (ref 8.4–10.2)
CHLORIDE SERPL-SCNC: 102 MMOL/L (ref 96–108)
CHOLEST SERPL-MCNC: 156 MG/DL
CO2 SERPL-SCNC: 24 MMOL/L (ref 21–32)
CREAT SERPL-MCNC: 0.95 MG/DL (ref 0.6–1.3)
EOSINOPHIL # BLD MANUAL: 0 THOUSAND/UL (ref 0–0.4)
EOSINOPHIL NFR BLD MANUAL: 0 % (ref 0–6)
ERYTHROCYTE [DISTWIDTH] IN BLOOD BY AUTOMATED COUNT: 14 % (ref 11.6–15.1)
EST. AVERAGE GLUCOSE BLD GHB EST-MCNC: 223 MG/DL
GFR SERPL CREATININE-BSD FRML MDRD: 55 ML/MIN/1.73SQ M
GLUCOSE SERPL-MCNC: 211 MG/DL (ref 65–140)
GLUCOSE SERPL-MCNC: 278 MG/DL (ref 65–140)
GLUCOSE SERPL-MCNC: 304 MG/DL (ref 65–140)
GLUCOSE SERPL-MCNC: 304 MG/DL (ref 65–140)
GLUCOSE SERPL-MCNC: 308 MG/DL (ref 65–140)
GLUCOSE SERPL-MCNC: 436 MG/DL (ref 65–140)
HBA1C MFR BLD: 9.4 %
HCT VFR BLD AUTO: 47.1 % (ref 34.8–46.1)
HDLC SERPL-MCNC: 66 MG/DL
HGB BLD-MCNC: 15 G/DL (ref 11.5–15.4)
LDLC SERPL CALC-MCNC: 77 MG/DL (ref 0–100)
LYMPHOCYTES # BLD AUTO: 16.56 THOUSAND/UL (ref 0.6–4.47)
LYMPHOCYTES # BLD AUTO: 63 % (ref 14–44)
MAGNESIUM SERPL-MCNC: 2 MG/DL (ref 1.9–2.7)
MCH RBC QN AUTO: 33.4 PG (ref 26.8–34.3)
MCHC RBC AUTO-ENTMCNC: 31.8 G/DL (ref 31.4–37.4)
MCV RBC AUTO: 105 FL (ref 82–98)
MONOCYTES # BLD AUTO: 1.84 THOUSAND/UL (ref 0–1.22)
MONOCYTES NFR BLD: 7 % (ref 4–12)
NEUTROPHILS # BLD MANUAL: 7.89 THOUSAND/UL (ref 1.85–7.62)
NEUTS SEG NFR BLD AUTO: 30 % (ref 43–75)
PLATELET # BLD AUTO: 195 THOUSANDS/UL (ref 149–390)
PLATELET BLD QL SMEAR: ADEQUATE
PMV BLD AUTO: 11.7 FL (ref 8.9–12.7)
POTASSIUM SERPL-SCNC: 4.1 MMOL/L (ref 3.5–5.3)
RBC # BLD AUTO: 4.49 MILLION/UL (ref 3.81–5.12)
RBC MORPH BLD: PRESENT
SODIUM SERPL-SCNC: 137 MMOL/L (ref 135–147)
TRIGL SERPL-MCNC: 66 MG/DL
WBC # BLD AUTO: 26.29 THOUSAND/UL (ref 4.31–10.16)

## 2024-04-02 PROCEDURE — 82948 REAGENT STRIP/BLOOD GLUCOSE: CPT

## 2024-04-02 PROCEDURE — 70498 CT ANGIOGRAPHY NECK: CPT

## 2024-04-02 PROCEDURE — 80048 BASIC METABOLIC PNL TOTAL CA: CPT | Performed by: NURSE PRACTITIONER

## 2024-04-02 PROCEDURE — 83735 ASSAY OF MAGNESIUM: CPT | Performed by: NURSE PRACTITIONER

## 2024-04-02 PROCEDURE — 99223 1ST HOSP IP/OBS HIGH 75: CPT | Performed by: PSYCHIATRY & NEUROLOGY

## 2024-04-02 PROCEDURE — 99222 1ST HOSP IP/OBS MODERATE 55: CPT | Performed by: NURSE PRACTITIONER

## 2024-04-02 PROCEDURE — 85027 COMPLETE CBC AUTOMATED: CPT | Performed by: NURSE PRACTITIONER

## 2024-04-02 PROCEDURE — 80061 LIPID PANEL: CPT | Performed by: NURSE PRACTITIONER

## 2024-04-02 PROCEDURE — 97167 OT EVAL HIGH COMPLEX 60 MIN: CPT

## 2024-04-02 PROCEDURE — 93005 ELECTROCARDIOGRAM TRACING: CPT

## 2024-04-02 PROCEDURE — 70496 CT ANGIOGRAPHY HEAD: CPT

## 2024-04-02 PROCEDURE — 85007 BL SMEAR W/DIFF WBC COUNT: CPT | Performed by: NURSE PRACTITIONER

## 2024-04-02 PROCEDURE — 36415 COLL VENOUS BLD VENIPUNCTURE: CPT | Performed by: NURSE PRACTITIONER

## 2024-04-02 RX ORDER — INSULIN GLARGINE 100 [IU]/ML
15 INJECTION, SOLUTION SUBCUTANEOUS
Status: DISCONTINUED | OUTPATIENT
Start: 2024-04-02 | End: 2024-04-04 | Stop reason: HOSPADM

## 2024-04-02 RX ORDER — ASPIRIN 81 MG/1
81 TABLET, CHEWABLE ORAL DAILY
Status: DISCONTINUED | OUTPATIENT
Start: 2024-04-03 | End: 2024-04-02

## 2024-04-02 RX ORDER — HEPARIN SODIUM 5000 [USP'U]/ML
5000 INJECTION, SOLUTION INTRAVENOUS; SUBCUTANEOUS EVERY 8 HOURS SCHEDULED
Status: DISCONTINUED | OUTPATIENT
Start: 2024-04-02 | End: 2024-04-04 | Stop reason: HOSPADM

## 2024-04-02 RX ORDER — ATORVASTATIN CALCIUM 40 MG/1
40 TABLET, FILM COATED ORAL EVERY EVENING
Status: DISCONTINUED | OUTPATIENT
Start: 2024-04-02 | End: 2024-04-04 | Stop reason: HOSPADM

## 2024-04-02 RX ORDER — SODIUM CHLORIDE, SODIUM LACTATE, POTASSIUM CHLORIDE, CALCIUM CHLORIDE 600; 310; 30; 20 MG/100ML; MG/100ML; MG/100ML; MG/100ML
50 INJECTION, SOLUTION INTRAVENOUS CONTINUOUS
Status: DISPENSED | OUTPATIENT
Start: 2024-04-02 | End: 2024-04-02

## 2024-04-02 RX ORDER — ACETAMINOPHEN 325 MG/1
650 TABLET ORAL ONCE
Status: COMPLETED | OUTPATIENT
Start: 2024-04-02 | End: 2024-04-02

## 2024-04-02 RX ORDER — LOSARTAN POTASSIUM 50 MG/1
50 TABLET ORAL DAILY
COMMUNITY
Start: 2024-01-26

## 2024-04-02 RX ORDER — INSULIN LISPRO 100 [IU]/ML
5 INJECTION, SOLUTION INTRAVENOUS; SUBCUTANEOUS
Status: DISCONTINUED | OUTPATIENT
Start: 2024-04-02 | End: 2024-04-04 | Stop reason: HOSPADM

## 2024-04-02 RX ORDER — LOSARTAN POTASSIUM 50 MG/1
50 TABLET ORAL DAILY
Status: DISCONTINUED | OUTPATIENT
Start: 2024-04-02 | End: 2024-04-04 | Stop reason: HOSPADM

## 2024-04-02 RX ORDER — ASPIRIN 81 MG/1
324 TABLET, CHEWABLE ORAL ONCE
Status: DISCONTINUED | OUTPATIENT
Start: 2024-04-02 | End: 2024-04-02

## 2024-04-02 RX ORDER — INSULIN LISPRO 100 [IU]/ML
5 INJECTION, SOLUTION INTRAVENOUS; SUBCUTANEOUS
Status: DISCONTINUED | OUTPATIENT
Start: 2024-04-03 | End: 2024-04-04 | Stop reason: HOSPADM

## 2024-04-02 RX ORDER — ACETAMINOPHEN 325 MG/1
650 TABLET ORAL EVERY 6 HOURS PRN
Status: DISCONTINUED | OUTPATIENT
Start: 2024-04-02 | End: 2024-04-04 | Stop reason: HOSPADM

## 2024-04-02 RX ORDER — INSULIN LISPRO 100 [IU]/ML
1-5 INJECTION, SOLUTION INTRAVENOUS; SUBCUTANEOUS
Status: DISCONTINUED | OUTPATIENT
Start: 2024-04-02 | End: 2024-04-04 | Stop reason: HOSPADM

## 2024-04-02 RX ORDER — QUETIAPINE FUMARATE 25 MG/1
12.5 TABLET, FILM COATED ORAL EVERY EVENING
Status: DISCONTINUED | OUTPATIENT
Start: 2024-04-02 | End: 2024-04-03

## 2024-04-02 RX ORDER — POTASSIUM CHLORIDE 1500 MG/1
1 TABLET, EXTENDED RELEASE ORAL 2 TIMES DAILY
COMMUNITY
Start: 2023-12-29

## 2024-04-02 RX ADMIN — ATORVASTATIN CALCIUM 40 MG: 40 TABLET, FILM COATED ORAL at 17:08

## 2024-04-02 RX ADMIN — INSULIN LISPRO 5 UNITS: 100 INJECTION, SOLUTION INTRAVENOUS; SUBCUTANEOUS at 17:12

## 2024-04-02 RX ADMIN — IOHEXOL 85 ML: 350 INJECTION, SOLUTION INTRAVENOUS at 00:30

## 2024-04-02 RX ADMIN — INSULIN DETEMIR 20 UNITS: 100 INJECTION, SOLUTION SUBCUTANEOUS at 02:51

## 2024-04-02 RX ADMIN — INSULIN LISPRO 3 UNITS: 100 INJECTION, SOLUTION INTRAVENOUS; SUBCUTANEOUS at 08:57

## 2024-04-02 RX ADMIN — METOPROLOL TARTRATE 25 MG: 25 TABLET, FILM COATED ORAL at 21:33

## 2024-04-02 RX ADMIN — HEPARIN SODIUM 5000 UNITS: 5000 INJECTION INTRAVENOUS; SUBCUTANEOUS at 14:07

## 2024-04-02 RX ADMIN — SODIUM CHLORIDE, SODIUM LACTATE, POTASSIUM CHLORIDE, AND CALCIUM CHLORIDE 50 ML/HR: .6; .31; .03; .02 INJECTION, SOLUTION INTRAVENOUS at 04:30

## 2024-04-02 RX ADMIN — ACETAMINOPHEN 650 MG: 325 TABLET, FILM COATED ORAL at 11:49

## 2024-04-02 RX ADMIN — LOSARTAN POTASSIUM 50 MG: 50 TABLET, FILM COATED ORAL at 17:08

## 2024-04-02 RX ADMIN — INSULIN LISPRO 1 UNITS: 100 INJECTION, SOLUTION INTRAVENOUS; SUBCUTANEOUS at 21:33

## 2024-04-02 RX ADMIN — SODIUM CHLORIDE 500 ML: 0.9 INJECTION, SOLUTION INTRAVENOUS at 02:25

## 2024-04-02 RX ADMIN — HEPARIN SODIUM 5000 UNITS: 5000 INJECTION INTRAVENOUS; SUBCUTANEOUS at 21:32

## 2024-04-02 RX ADMIN — INSULIN GLARGINE 15 UNITS: 100 INJECTION, SOLUTION SUBCUTANEOUS at 21:32

## 2024-04-02 RX ADMIN — INSULIN HUMAN 5 UNITS: 100 INJECTION, SOLUTION PARENTERAL at 17:06

## 2024-04-02 RX ADMIN — ACETAMINOPHEN 650 MG: 325 TABLET, FILM COATED ORAL at 02:50

## 2024-04-02 RX ADMIN — QUETIAPINE FUMARATE 12.5 MG: 25 TABLET ORAL at 17:08

## 2024-04-02 RX ADMIN — HEPARIN SODIUM 5000 UNITS: 5000 INJECTION INTRAVENOUS; SUBCUTANEOUS at 06:01

## 2024-04-02 NOTE — CONSULTS
Neurology Consult Note    Name: Gia Vaughan   Age & Sex: 83 y.o. female   MRN: [id number]  Unit/Bed#: ED-32   Encounter: [id number]  Length of Stay: 0    Assessment plan:    Ms.Carrie NEGAR Vaughan is a 83 y.o. R handed female with vascular risk factors who presented with dizziness, stereotypical but intractable headache on 4/2/24.  Patient reports she gets headaches that are similar in character to this 1 (throbbing, pressure around her head) every other day), associated with neck pain.  No associated neurologic symptoms reported.  No positional or postural component to this particular headache.  However, she does report low blood glucose in the 50s during this headache.  In the ED, patient was found to be hypertensive (194/91).  CBC showed leukocytosis with WBC 37 (chronic, history of CLL). CTH/CTA negative.  Was recommended to load w/ ASA, but patient denied. Neurologic exam this morning is entirely nonfocal.    IMP: Exacerbation of stereotypical headaches in setting of hypertensive emergency. Isolated headache w/out any neuro deficits and ongoing >12 hrs at this point not concerning for a TIA.     Plan:  - Discontinue stroke pathway, disccussed w/ Dr. Malik  - Migraine cocktail: IV Mag Sulfate 2g, Reglan x1, IVF  - acetaminophen 650mg PRN  - D/c ASA  - No need for MRI   - Normotension  - Will need close f/u with headache team outpatient for long term management of frequent headaches  - Tx of hypertension per IM  - Recommend outpatient follow-up with geriatrics     Recommendations for outpatient neurological follow up have yet to be determined.   Pending for discharge: MRI     Subjective:    Reason for Consult / Principal Problem: TIA    HPI: Gia Vaughan is a 83 y.o. right handed female with pertinent PMH ofCHF, DM, HLD, HTN, TIA, CLL who presents with dizziness, posterior headache, neck pain that began around 1600 yesterday.  Pt reports that at the time she was hypoglycemic with BG in 50s.   Patient tells me  that she gets frequent migraines, usually once every other day. These migraines feel like a tight band around her head or throbbing sensation. No positional or postural component. No antecedent head/neck trauma.    Denies smoking, alcohol use, recreational drug use.    Pt was not on any antithrombotics PTA.     In the ED, her syx had resolved.   She was afebrile, /91-->193/84. CMP unremarkable, CBC showed leukocytosis with WBC 37 (chronic). CTH/CTA negative. Overnight team tried to load with ASA 325mg but patient refused as it burns her stomach.     This AM, pt reports a moderate headache, but is has improved since yesterday.  Otherwise, denies difficulty with vision, speaking, dysphagia, abdominal pain, fever, chills, focal sensorimotor deficits.      Inpatient consult to Neurology  Consult performed by: Darrick Buenrostro MD  Consult ordered by: BRINDA Orozco        Historical Information   Past Medical History:   Diagnosis Date    Benign adenomatous polyp of large intestine     CHF (congestive heart failure) (HCC)     Diabetes mellitus (HCC)     Hyperlipemia     Hypertension     Osteoarthritis     TIA (transient ischemic attack)      Past Surgical History:   Procedure Laterality Date    APPENDECTOMY      CARPAL TUNNEL RELEASE Right     HYSTERECTOMY W/ SALPINGO-OOPHERECTOMY      MD OPTX FEM SHFT FX W/INSJ IMED IMPLT W/WO SCREW Right 10/12/2022    Procedure: INSERTION NAIL IM FEMUR ANTEGRADE (TROCHANTERIC);  Surgeon: Eric Isaacs DO;  Location: [facility];  Service: Orthopedics     Social History   Social History     Substance and Sexual Activity   Alcohol Use Not Currently    Comment: very seldom     Social History     Substance and Sexual Activity   Drug Use Never     E-Cigarette/Vaping    E-Cigarette Use Never User      E-Cigarette/Vaping Substances     Social History     Tobacco Use   Smoking Status Never   Smokeless Tobacco Never     Family History:   Family History   Problem Relation Age of Onset     Heart disease Mother     Heart disease Father     Hypertension Father     Stomach cancer Sister     Ovarian cancer Sister     Hypertension Sister      Meds/Allergies   all current active meds have been reviewed, current meds:   Current Facility-Administered Medications   Medication Dose Route Frequency    acetaminophen (TYLENOL) tablet 650 mg  650 mg Oral Q6H PRN    aspirin chewable tablet 324 mg  324 mg Oral Once    [START ON 4/3/2024] aspirin chewable tablet 81 mg  81 mg Oral Daily    atorvastatin (LIPITOR) tablet 40 mg  40 mg Oral QPM    heparin (porcine) subcutaneous injection 5,000 Units  5,000 Units Subcutaneous Q8H TESSA    insulin lispro (HumALOG/ADMELOG) 100 units/mL subcutaneous injection 1-5 Units  1-5 Units Subcutaneous TID AC    insulin lispro (HumALOG/ADMELOG) 100 units/mL subcutaneous injection 1-5 Units  1-5 Units Subcutaneous HS    lactated ringers infusion  50 mL/hr Intravenous Continuous   , and PTA meds:   Prior to Admission Medications   Prescriptions Last Dose Informant Patient Reported? Taking?   Diclofenac Sodium (VOLTAREN) 1 %  Self No No   Sig: Apply 2 g topically 3 (three) times a day To R knee   QUEtiapine (SEROquel) 25 mg tablet  Self No No   Sig: Take 0.5 tablets (12.5 mg total) by mouth every evening   acetaminophen (TYLENOL) 325 mg tablet  Self No No   Sig: Take 2 tablets (650 mg total) by mouth every 4 (four) hours as needed for mild pain   atorvastatin (LIPITOR) 20 mg tablet  Self Yes No   Sig: Take 20 mg by mouth daily   calcium carbonate (OS-JUAN RAMON) 600 MG tablet  Self Yes No   Sig: Take 600 mg by mouth 2 (two) times a day with meals   cholecalciferol (VITAMIN D3) 1,000 units tablet  Self Yes No   Sig: Take 1,000 Units by mouth daily   docusate sodium (COLACE) 100 mg capsule  Self No No   Sig: Take 1 capsule (100 mg total) by mouth 2 (two) times a day   enoxaparin (LOVENOX) 40 mg/0.4 mL   No No   Sig: Inject 0.4 mL (40 mg total) under the skin every 24 hours for 1 dose Do not start  before 2022.   glucose blood test strip  Self Yes No   Si each by Other route daily as needed Use as instructed   glucose monitoring kit (FREESTYLE) monitoring kit  Self Yes No   Si each by Does not apply route as needed   insulin detemir (LEVEMIR) 100 units/mL subcutaneous injection  Self No No   Sig: Inject 20 Units under the skin daily at bedtime   insulin lispro (HumaLOG) 100 units/mL injection  Self No No   Sig: Inject 5 Units under the skin 3 (three) times a day with meals   lidocaine (LIDODERM) 5 %  Self No No   Sig: Apply 2 patches topically daily Remove & Discard patch within 12 hours or as directed by MD To right leg Do not start before 2022.   losartan (COZAAR) 100 MG tablet  Self No No   Sig: Take 1 tablet (100 mg total) by mouth daily Do not start before 2022.   meclizine (ANTIVERT) 12.5 MG tablet  Self No No   Sig: Take 1 tablet (12.5 mg total) by mouth every 8 (eight) hours as needed for dizziness   meclofenamate (MECLOMEN) 50 MG capsule   Yes No   Sig: Take 12.5 mg by mouth every 6 (six) hours   melatonin 3 mg  Self No No   Sig: Take 2 tablets (6 mg total) by mouth every evening At 8pm   methocarbamol (ROBAXIN) 500 mg tablet  Self No No   Sig: Take 0.5 tablets (250 mg total) by mouth every 6 (six) hours as needed for muscle spasms   metoprolol tartrate (LOPRESSOR) 25 mg tablet  Self No No   Sig: Take 1 tablet (25 mg total) by mouth every 12 (twelve) hours   vitamin B-12 (VITAMIN B-12) 1,000 mcg tablet  Self Yes No   Sig: Take by mouth daily      Facility-Administered Medications: None     Allergies   Allergen Reactions    Amlodipine     Ceftin [Cefuroxime]     Ciprofloxacin     Citalopram     Dye [Iodinated Contrast Media]     Glucophage [Metformin]     Januvia [Sitagliptin]     Neomycin-Polymyxin-Dexameth     Ondansetron     Prilosec [Omeprazole]     Vibramycin [Doxycycline]        Review of previous medical records was completed.       Review of Systems  see HPI.     Objective:     Patient ID: Gia Vaughan is a 83 y.o. female.    Vitals:   Vitals:    24 0400 24 0500 24 0600 24 0700   BP: 162/73 167/74 147/65 148/67   BP Location: Left arm Left arm Left arm Left arm   Pulse: 83 86 93 93   Resp: 16 16 18 20   Temp:       TempSrc:       SpO2: 94% 96% 94% 92%      There is no height or weight on file to calculate BMI.     Intake/Output Summary (Last 24 hours) at 2024 07  Last data filed at 2024 0414  Gross per 24 hour   Intake 500 ml   Output --   Net 500 ml       Temperature:   Temp (24hrs), Av.7 °F (36.5 °C), Min:97.7 °F (36.5 °C), Max:97.7 °F (36.5 °C)    Temperature: 97.7 °F (36.5 °C)    Invasive Devices:   Invasive Devices       Peripheral Intravenous Line  Duration             Peripheral IV 24 Distal;Dorsal (posterior);Right Forearm <1 day    Peripheral IV 24 Right Antecubital <1 day                    Physical Exam   Vitals reviewed  General Examination: No distress, cooperative.   Pulm: Normal effort.    Neurologic Exam   Normal neck flexion and ROM.  Alert, oriented to self, place. Speech fluent without errors. Normal naming and repetition. Follows cross-body commands.  Pupils equal, briskly reactive. VFF. EOMI. No nystagmus. Face symmetric. No dysarthria. Uvula midline. Tongue midline.  Normal tone and bulk throughout.  5/5 strength t/o.   No drift or orbiting. No abnormal movements.    Symmetric LT sensation t/o. No extinction to DSS.  Intact FNF b/l.  No truncal ataxia        Labs: I have personally reviewed pertinent reports.    Results from last 7 days   Lab Units 24   WBC Thousand/uL 26.29* 37.91*   HEMOGLOBIN g/dL 15.0 15.0   HEMATOCRIT % 47.1* 47.2*   PLATELETS Thousands/uL 195 193   MONO PCT % 7 0*   EOS PCT % 0 0      Results from last 7 days   Lab Units 24   POTASSIUM mmol/L 4.1 3.9   CHLORIDE mmol/L 102 101   CO2 mmol/L 24 30   BUN mg/dL 22 23    CREATININE mg/dL 0.95 0.98   CALCIUM mg/dL 9.3 10.3*   ALK PHOS U/L  --  97   ALT U/L  --  33   AST U/L  --  22     Results from last 7 days   Lab Units 04/02/24  0414   MAGNESIUM mg/dL 2.0                        Imaging and diagnostic studies:    Radiology Results: I have personally reviewed pertinent reports.   and I have personally reviewed pertinent films in PACS  CTA head and neck with and without contrast   Final Result by Jyoti Crowe MD (04/02 0113)      1.  No acute intracranial hemorrhage, mass effect or extra-axial collection.   2.  No hemodynamically significant stenosis, dissection or occlusion of the carotid or vertebral arteries.   3.  No intracranial aneurysm.  No hemodynamically significant stenosis or occlusion of the major vessels of the [place].                  Workstation performed: [id number]         CT head without contrast   Final Result by Leo Chan MD (04/01 2103)      No acute intracranial abnormality.      Mild chronic small vessel ischemic changes               Workstation performed: [id number]         MRI brain wo contrast    (Results Pending)       Other Diagnostic Testing: I have personally reviewed pertinent reports.      Active medications:  Current Facility-Administered Medications   Medication Dose Route Frequency    acetaminophen (TYLENOL) tablet 650 mg  650 mg Oral Q6H PRN    aspirin chewable tablet 324 mg  324 mg Oral Once    [START ON 4/3/2024] aspirin chewable tablet 81 mg  81 mg Oral Daily    atorvastatin (LIPITOR) tablet 40 mg  40 mg Oral QPM    heparin (porcine) subcutaneous injection 5,000 Units  5,000 Units Subcutaneous Q8H TESSA    insulin lispro (HumALOG/ADMELOG) 100 units/mL subcutaneous injection 1-5 Units  1-5 Units Subcutaneous TID AC    insulin lispro (HumALOG/ADMELOG) 100 units/mL subcutaneous injection 1-5 Units  1-5 Units Subcutaneous HS    lactated ringers infusion  50 mL/hr Intravenous Continuous       Prior to Admission medications     Medication Sig Start Date End Date Taking? Authorizing Provider   acetaminophen (TYLENOL) 325 mg tablet Take 2 tablets (650 mg total) by mouth every 4 (four) hours as needed for mild pain 10/13/22   Pasha Putnam PA-C   atorvastatin (LIPITOR) 20 mg tablet Take 20 mg by mouth daily    Historical Provider, MD   calcium carbonate (OS-JUAN RAMON) 600 MG tablet Take 600 mg by mouth 2 (two) times a day with meals    Historical Provider, MD   cholecalciferol (VITAMIN D3) 1,000 units tablet Take 1,000 Units by mouth daily    Historical Provider, MD   Diclofenac Sodium (VOLTAREN) 1 % Apply 2 g topically 3 (three) times a day To R knee 11/8/22   Ashley Depadua, MD   docusate sodium (COLACE) 100 mg capsule Take 1 capsule (100 mg total) by mouth 2 (two) times a day 10/13/22   Pasha Putnam PA-C   enoxaparin (LOVENOX) 40 mg/0.4 mL Inject 0.4 mL (40 mg total) under the skin every 24 hours for 1 dose Do not start before November 9, 2022. 11/9/22 11/21/22  Ashley Depadua, MD   glucose blood test strip 1 each by Other route daily as needed Use as instructed    Historical Provider, MD   glucose monitoring kit (FREESTYLE) monitoring kit 1 each by Does not apply route as needed    Historical Provider, MD   insulin detemir (LEVEMIR) 100 units/mL subcutaneous injection Inject 20 Units under the skin daily at bedtime 11/8/22   Ashley Depadua, MD   insulin lispro (HumaLOG) 100 units/mL injection Inject 5 Units under the skin 3 (three) times a day with meals 11/8/22   Ashley Depadua, MD   lidocaine (LIDODERM) 5 % Apply 2 patches topically daily Remove & Discard patch within 12 hours or as directed by MD To right leg Do not start before November 9, 2022. 11/9/22   Ashley Depadua, MD   losartan (COZAAR) 100 MG tablet Take 1 tablet (100 mg total) by mouth daily Do not start before November 9, 2022. 11/9/22   Ashley Depadua, MD   meclizine (ANTIVERT) 12.5 MG tablet Take 1 tablet (12.5 mg total) by mouth every 8 (eight) hours as needed for dizziness  10/17/22   Pasha Putnam PA-C   meclofenamate (MECLOMEN) 50 MG capsule Take 12.5 mg by mouth every 6 (six) hours    Historical Provider, MD   melatonin 3 mg Take 2 tablets (6 mg total) by mouth every evening At 8pm 11/8/22   Ashley Depadua, MD   methocarbamol (ROBAXIN) 500 mg tablet Take 0.5 tablets (250 mg total) by mouth every 6 (six) hours as needed for muscle spasms 11/8/22   Ashley Depadua, MD   metoprolol tartrate (LOPRESSOR) 25 mg tablet Take 1 tablet (25 mg total) by mouth every 12 (twelve) hours 11/8/22   Ashley Depadua, MD   QUEtiapine (SEROquel) 25 mg tablet Take 0.5 tablets (12.5 mg total) by mouth every evening 11/8/22   Ashley Depadua, MD   vitamin B-12 (VITAMIN B-12) 1,000 mcg tablet Take by mouth daily    Historical Provider, MD         Code status and advanced directives:  Code Status: Level 1 - Full Code      VTE Pharmacologic Prophylaxis: Heparin  VTE Mechanical Prophylaxis: sequential compression device    ==  NAY Kerr  PGY-3, Neurology

## 2024-04-02 NOTE — ASSESSMENT & PLAN NOTE
Per ED, noted to have dizziness and severe headache yesterday around 1600.  Patient does not recall events and believes she was at a concert at this time.  Consider TIA vs hypertensive urgency  CT: no acute findings.  Chronic small vessel disease  CTA: negative  Stroke Pathway.  Frequent vital signs.  Neuro checks.  Telemetry.  Check lipid panel and A1c.  Aspirin load and statin.  Obtain MRI brain.  Check TTE.  Consult Neurology.  PT/OT consultation.

## 2024-04-02 NOTE — OCCUPATIONAL THERAPY NOTE
"    Occupational Therapy Evaluation     Patient Name: Gia Vaughan  Today's Date: 4/2/2024  Problem List  Principal Problem:    Hypertensive urgency  Active Problems:    Type 2 diabetes mellitus with stage 3 chronic kidney disease, unspecified whether long term insulin use, unspecified whether stage 3a or 3b CKD (HCC)    Chronic systolic heart failure (HCC)    Suspected Mild cognitive impairment    CLL (chronic lymphocytic leukemia) (HCC)    Dizziness    Past Medical History  Past Medical History:   Diagnosis Date    Benign adenomatous polyp of large intestine     CHF (congestive heart failure) (HCC)     Diabetes mellitus (HCC)     Hyperlipemia     Hypertension     Osteoarthritis     TIA (transient ischemic attack)      Past Surgical History  Past Surgical History:   Procedure Laterality Date    APPENDECTOMY      CARPAL TUNNEL RELEASE Right     HYSTERECTOMY W/ SALPINGO-OOPHERECTOMY      OR OPTX FEM SHFT FX W/INSJ IMED IMPLT W/WO SCREW Right 10/12/2022    Procedure: INSERTION NAIL IM FEMUR ANTEGRADE (TROCHANTERIC);  Surgeon: Eric Isaacs DO;  Location: [facility];  Service: Orthopedics         04/02/24 1534   OT Last Visit   OT Visit Date 04/02/24   Note Type   Note type Evaluation   Pain Assessment   Pain Assessment Tool FLACC   Pain Location/Orientation Orientation: Right;Other (Comment)  (\"little toes\")   Pain Radiating Towards N/A   Pain Onset/Description Frequency: Intermittent;Descriptor: Discomfort   Effect of Pain on Daily Activities limits comfort   Patient's Stated Pain Goal No pain   Hospital Pain Intervention(s) Repositioned;Ambulation/increased activity;Emotional support   Multiple Pain Sites No   Pain Rating: FLACC (Rest) - Face 0   Pain Rating: FLACC (Rest) - Legs 0   Pain Rating: FLACC (Rest) - Activity 0   Pain Rating: FLACC (Rest) - Cry 0   Pain Rating: FLACC (Rest) - Consolability 0   Score: FLACC (Rest) 0   Pain Rating: FLACC (Activity) - Face 0   Pain Rating: FLACC (Activity) - Legs 0   Pain " "Rating: FLACC (Activity) - Activity 0   Pain Rating: FLACC (Activity) - Cry 0   Pain Rating: FLACC (Activity) - Consolability 0   Score: FLACC (Activity) 0   Restrictions/Precautions   Weight Bearing Precautions Per Order No   Other Precautions Cognitive;Chair Alarm;Bed Alarm;Impulsive;Multiple lines;Pain;Fall Risk   Home Living   Type of Home House   Home Layout Two level;Performs ADLs on one level;Able to live on main level with bedroom/bathroom;Access  (1 FEI; maintains FFSU. Reports putting gait up so she doesnt access the 2nd level)   Bathroom Shower/Tub   (pt sponge bathes at baseline)   Bathroom Toilet Raised   Bathroom Equipment Grab bars in shower   Home Equipment Walker;Cane;Grab bars   Additional Comments Pt ambulates w/ RW at baseline   Prior Function   Level of [place] Independent with ADLs;Independent with functional mobility;Needs assistance with IADLS   Lives With (S)  Alone   Receives Help From Family;Neighbor  (reports having \"new neighbors\" that \"always\" check-in)   IADLs Independent with meal prep;Independent with medication management;Family/Friend/Other provides transportation  (Nephew provides transportation, assists w/ bills and online orders groceries to the house. Pt reports being independent w/ medication \"calendar\" and meal prep.)   Falls in the last 6 months 0  (Pt denies)   Vocational Retired   Comments Pt is a questionable, poor historian at time of IE. Pt reports being independent w/ bathing and dressing. Ambulates w/ RW at baseline. Uses chair w/ wheels to get around kitchen. Reports sleeping on the sofa because \"people come in to check on me since I have had cancer\"   Lifestyle   Autonomy Pt lives ALONE, maintains a FFSU. Independent w/ ADLs, A for IADLs and RUSS for functional mobility w/ use of RW. (-) falls. (-) driving.   Reciprocal Relationships Supportive nephew   Service to Others Retired   General   Additional Pertinent History 83 y.o. right handed female with pertinent " "PMH ofCHF, DM, HLD, HTN, TIA, CLL who presents with dizziness, posterior headache, neck pain. CTH (-), refusing MRI.   Family/Caregiver Present No   Subjective   Subjective \"I do not need you to touch me!\"   ADL   Eating Assistance 6  Modified independent   Eating Deficit Beverage management   Grooming Assistance 5  Supervision/Setup   UB Bathing Assistance 4  Minimal Assistance   LB Bathing Assistance 4  Minimal Assistance   UB Dressing Assistance 4  Minimal Assistance   LB Dressing Assistance 4  Minimal Assistance   Toileting Assistance  4  Minimal Assistance   Additional Comments Unable to formally assess bathing, dressing, grooming or toileting at time of eval. The above levels of assistance are anticipated based on functional performance deficits with use of clinical judgement. Pt is limited by impaired cognition   Bed Mobility   Supine to Sit 5  Supervision   Additional items Assist x 1;HOB elevated;Bedrails;Increased time required;Verbal cues   Sit to Supine   (Pt OOB to recliner chair at the end of the session)   Additional Comments Pt maintains static sitting balance at the EOB w/ close supervision.   Transfers   Sit to Stand 4  Minimal assistance   Additional items Assist x 1;Increased time required;Verbal cues;Impulsive   Stand to Sit 4  Minimal assistance   Additional items Assist x 1;Increased time required;Verbal cues;Impulsive   Stand pivot 4  Minimal assistance   Additional items Assist x 1;Increased time required;Verbal cues;Impulsive  (w/ RW)   Additional Comments Additional STS completed from recliner chair w/ MINAx 1 person. Pt hesitant to have external support from therapist. EXTENSIVE verbal, tactile and visual cues for optimal hand placement and body mechanics for safe transfers. Pt is impulsive w/ poor insight.   Functional Mobility   Functional Mobility 4  Minimal assistance   Additional Comments Ax1 to ambulate short household distance w/ use of RW. A for body mechanics and RW managment. " "  Additional items Rolling walker   Balance   Static Sitting Fair +   Dynamic Sitting Fair   Static Standing Poor +   Dynamic Standing Poor   Activity Tolerance   Activity Tolerance Patient limited by fatigue;Other (Comment)  (cognition)   Medical Staff Made Aware Spoke with PT   Nurse Made Aware Spoke with RN Mari COLEMAN Assessment   RUE Assessment WFL  (MMT 3+/5 grossly, observed during functional tasks)   LUE Assessment   LUE Assessment WFL  (MMT 3+/5 grossly, observed during functional tasks)   Hand Function   Gross Motor Coordination Functional   Fine Motor Coordination Functional   Hand Function Comments Pt is R handed   Sensation   Light Touch   (Pt reports intermittent tingling in bilateral toes, not current.)   Vision-Basic Assessment   Current Vision Wears glasses all the time  (not present in the hospital)   Visual History   (Pt reports impaired vision in L eye, getting shots to \"improve my vision but they stopped because I almost \")   Cognition   Overall Cognitive Status (S)  Impaired   Arousal/Participation Alert;Responsive;Cooperative   Attention Attends with cues to redirect   Orientation Level Oriented to person;Oriented to place;Disoriented to time;Disoriented to situation  (reports the correct month, recalls the year to be )   Memory Decreased recall of precautions;Decreased recall of recent events;Decreased short term memory   Following Commands Follows one step commands with increased time or repetition   Comments (S)  Pt ID via wristband, anme and . Pt demonstrates impaired insight, orientation, problem-solving, attention and safety awareness. Pt scored 11/30 on MoCA version 8.1 indicating moderate cognitive impairment 2022. Recommend f/u for cognitive evaluation to assist w/ safe discharge planning since patient LIVES ALONE.   Assessment   Limitation Decreased ADL status;Decreased UE strength;Decreased Safe judgement during ADL;Decreased cognition;Decreased endurance;Decreased " self-care trans;Decreased high-level ADLs   Prognosis Fair   Assessment Patient is a 83 y.o. female seen for OT evaluation at [facility] following admission on 4/1/2024  s/p Hypertensive urgency. Please see above for comprehensive list of comorbidities and significant PMHx impacting functional performance.  At baseline, Pt lives ALONE, maintains a FFSU. Independent w/ ADLs, A for IADLs and RUSS for functional mobility w/ use of RW. (-) falls. (-) driving. Upon initial evaluation, pt appears to be performing below baseline functional status. Pt requires JOÃO for UB ADLs, JOÃO for LB ADLs, supervision for bed mobility, JOÃO for transfers and JOÃO for functional mobility short distance  with RW. The AM-PAC & Barthel Index outcome tools were used to assist in determining pt safety w/self care /mobility and appropriate d/c recommendations, see above for score. Occupational performance is affected by the following deficits:  decreased muscular strength , decreased balance , decreased standing tolerance for self care tasks , decreased dynamic balance impacting functional reach, decreased activity tolerance , impaired memory , impaired judgement and problem solving , impaired safety awareness , (+) pain , impaired learning ability d/t current cognitive status , impaired global mental status, and direction following. Personal/Environmental factors impacting D/C include: decreased caregiver status , steps to enter/navigate the home, decreased social/family support within the home, Assistance needed for ADL/IADLs, Assistance needed for ADLs and functional mobility, High fall risk , decreased insight toward deficits , decreased recall of precautions , and baseline cognitive deficits. Supporting factors include: able to maintain FFSU, accessible home environment, and attitude towards recovery Patient would benefit from OT services within the acute care setting to maximize level of functional independence in the  following areas fall prevention , self-care transfers, bed mobility , functional mobility, formal cognitive evaluation, and ADLs.  From OT standpoint, recommendation at time of D/C would be level II (moderate resource intensity).   Goals   Patient Goals to go home   LTG Time Frame 10-14   Long Term Goal See below   Plan   Treatment Interventions ADL retraining;Functional transfer training;UE strengthening/ROM;Cognitive reorientation;Patient/family training;Equipment evaluation/education;Compensatory technique education;Energy conservation;Activityengagement   Goal Expiration Date 04/12/24   OT Treatment Day 0   OT Frequency 3-5x/wk   Discharge Recommendation   Rehab Resource Intensity Level, OT II (Moderate Resource Intensity)   Additional Comments  The patient's raw score on the AM-PAC Daily Activity Inpatient Short Form is 18. A raw score of less than 19 suggests the patient may benefit from discharge to post-acute rehabilitation services. Please refer to the recommendation of the Occupational Therapist for safe discharge planning.   AM-PAC Daily Activity Inpatient   Lower Body Dressing 3   Bathing 3   Toileting 3   Upper Body Dressing 3   Grooming 3   Eating 3   Daily Activity Raw Score 18   Daily Activity Standardized Score (Calc for Raw Score >=11) 38.66   -PAC Applied Cognition Inpatient   Following a Speech/Presentation 3   Understanding Ordinary Conversation 3   Taking Medications 1   Remembering Where Things Are Placed or Put Away 2   Remembering List of 4-5 Errands 2   Taking Care of Complicated Tasks 1   Applied Cognition Raw Score 12   Applied Cognition Standardized Score 28.82   Barthel Index   Feeding 5   Bathing 0   Grooming Score 0   Dressing Score 5   Bladder Score 10   Bowels Score 10   Toilet Use Score 5   Transfers (Bed/Chair) Score 10   Mobility (Level Surface) Score 0   Stairs Score 0   Barthel Index Score 45   Modified [place] Scale   Modified [place] Scale 4   End of Consult   Education  Provided Yes   Patient Position at End of Consult Bedside chair;Bed/Chair alarm activated;All needs within reach   Nurse Communication Nurse aware of consult     Goals established on initial evaluation in order to achieve pt's goal of going home.      Pt will complete UB ADLs Mod independent  for increased ADL independence within 10 days.     Pt will complete LB ADLs Mod independent  for increased ADL independence within 10 days.     Pt will complete toileting Mod independent  with use of DME for increased ADL independence within 10 days.     Pt will demonstrate proper body mechanics to complete self-care transfers and functional mobility with Mod independent  and use of LRAD for increased safety and functional independence within 10 days.     Pt will perform grooming tasks standing at the sink with Mod independent  in order to increase overall independence and return to PLOF.     Pt will demonstrate standing tolerance of 6 min with Mod independent  and use of LRAD for increased activity tolerance during ADL/IADL tasks within 10 days.     Pt will complete bed mobility Mod independent  for increased independence in repositioning, pressure offloading, and managing comfort.     Pt will demonstrate proper body mechanics and fall prevention strategies during 100% of tx sessions for increased safety awareness during ADL/IADLs    Pt will improve functional activity tolerance to 25 mins of sustained functional tasks to increase participation in basic self-care tasks and minimize assistance level.     Pt will participate in ongoing cognitive assessments to assist with safe D/C planning and supervision/assistance recommendations.     Patient will participate in 15mins of UE therex to increase overall stamina/activity tolerance for purposeful tasks.     Lucrecia Stevens MS OTR/L   [place] Licensure# [id number]

## 2024-04-02 NOTE — H&P
[facility]  H&P  Name: Gia Vaughan 83 y.o. female I MRN: [id number]  Unit/Bed#: ED-32 I Date of Admission: 4/1/2024   Date of Service: 4/2/2024 I Hospital Day: 0      Assessment/Plan   * Hypertensive urgency  Assessment & Plan  194/91 on arrival.  Given hydralazine in ED.  Most recent -150  For now, will allow for permissive hypertension    Dizziness  Assessment & Plan  Per ED, noted to have dizziness and severe headache yesterday around 1600.  Patient does not recall events and believes she was at a concert at this time.  Consider TIA vs hypertensive urgency  CT: no acute findings.  Chronic small vessel disease  CTA: negative  Stroke Pathway.  Frequent vital signs.  Neuro checks.  Telemetry.  Check lipid panel and A1c.  Aspirin load and statin.  Obtain MRI brain.  Check TTE.  Consult Neurology.  PT/OT consultation.    CLL (chronic lymphocytic leukemia) ([facility])  Assessment & Plan  Follows with hemonc  WBC stable 37,000.  Hgb, platelets normal.    Suspected Mild cognitive impairment  Assessment & Plan  Oriented to self, place, time but has poor short term memory.    Reorient, fall precautions    Chronic systolic heart failure (HCC)  Assessment & Plan  Wt Readings from Last 3 Encounters:   01/29/24 70.8 kg (156 lb)   04/24/23 68 kg (150 lb)   11/15/22 66.7 kg (147 lb)     Examines euvolemic   I&O, weights  Obtain med list       Type 2 diabetes mellitus with stage 3 chronic kidney disease, unspecified whether long term insulin use, unspecified whether stage 3a or 3b CKD ([facility])  Assessment & Plan  Lab Results   Component Value Date    HGBA1C 9.5 (H) 11/29/2023     VINITA Borrego  Nursing to review/update med list in AM.  Patient reports she was hypoglycemic last night with blood sugars in 50s?   Was given levemir 20 units in ED         VTE Pharmacologic Prophylaxis: VTE Score: 9 High Risk (Score >/= 5) - Pharmacological DVT Prophylaxis Ordered: heparin. Sequential Compression Devices  Ordered.  Code Status: Level 1 - Full Code   Discussion with family: update later today    Anticipated Length of Stay: Patient will be admitted on an observation basis with an anticipated length of stay of less than 2 midnights secondary to stroke work up.    Total Time Spent on Date of Encounter in care of patient:  mins. This time was spent on one or more of the following: performing physical exam; counseling and coordination of care; obtaining or reviewing history; documenting in the medical record; reviewing/ordering tests, medications or procedures; communicating with other healthcare professionals and discussing with patient's family/caregivers.    Chief Complaint: dizziness, headache    History of Present Illness:  Gia Vaughan is a 83 y.o. female with a PMH of CHF, DM, HLD, HTN, TIA, CLL who presents with dizziness, posterior headache, neck pain that began around 1600 yesterday.  HPI obtained  by ED reviewed.    Patient currently offers no complaints.  She cannot recall any of these symptoms and tells me she was at a concert yesterday.  She also tells me she was hypoglycemic with blood sugar in the 50s.  She believes she was hospitalized the last few days and had low blood sugars then as well.  This is clearly a different story than outlined by the ED provider.  Patient is alert to person, place, time but is a poor historian.      Review of Systems:  Review of Systems   Unable to perform ROS: Mental status change       Past Medical and Surgical History:   Past Medical History:   Diagnosis Date    Benign adenomatous polyp of large intestine     CHF (congestive heart failure) (HCC)     Diabetes mellitus (HCC)     Hyperlipemia     Hypertension     Osteoarthritis     TIA (transient ischemic attack)        Past Surgical History:   Procedure Laterality Date    APPENDECTOMY      CARPAL TUNNEL RELEASE Right     HYSTERECTOMY W/ SALPINGO-OOPHERECTOMY      NH OPTX FEM SHFT FX W/INSJ IMED IMPLT W/WO SCREW Right  10/12/2022    Procedure: INSERTION NAIL IM FEMUR ANTEGRADE (TROCHANTERIC);  Surgeon: Eric Isaacs DO;  Location: [facility];  Service: Orthopedics       Meds/Allergies:  Prior to Admission medications    Medication Sig Start Date End Date Taking? Authorizing Provider   acetaminophen (TYLENOL) 325 mg tablet Take 2 tablets (650 mg total) by mouth every 4 (four) hours as needed for mild pain 10/13/22   Pasha Putnam PA-C   atorvastatin (LIPITOR) 20 mg tablet Take 20 mg by mouth daily    Historical Provider, MD   calcium carbonate (OS-JUAN RAMON) 600 MG tablet Take 600 mg by mouth 2 (two) times a day with meals    Historical Provider, MD   cholecalciferol (VITAMIN D3) 1,000 units tablet Take 1,000 Units by mouth daily    Historical Provider, MD   Diclofenac Sodium (VOLTAREN) 1 % Apply 2 g topically 3 (three) times a day To R knee 11/8/22   Ashley Depadua, MD   docusate sodium (COLACE) 100 mg capsule Take 1 capsule (100 mg total) by mouth 2 (two) times a day 10/13/22   Pasha Putnam PA-C   enoxaparin (LOVENOX) 40 mg/0.4 mL Inject 0.4 mL (40 mg total) under the skin every 24 hours for 1 dose Do not start before November 9, 2022. 11/9/22 11/21/22  Ashley Depadua, MD   glucose blood test strip 1 each by Other route daily as needed Use as instructed    Historical Provider, MD   glucose monitoring kit (FREESTYLE) monitoring kit 1 each by Does not apply route as needed    Historical Provider, MD   insulin detemir (LEVEMIR) 100 units/mL subcutaneous injection Inject 20 Units under the skin daily at bedtime 11/8/22   Ashley Depadua, MD   insulin lispro (HumaLOG) 100 units/mL injection Inject 5 Units under the skin 3 (three) times a day with meals 11/8/22   Ashley Depadua, MD   lidocaine (LIDODERM) 5 % Apply 2 patches topically daily Remove & Discard patch within 12 hours or as directed by MD To right leg Do not start before November 9, 2022. 11/9/22   Ashley Depadua, MD   losartan (COZAAR) 100 MG tablet Take 1 tablet (100 mg total) by  mouth daily Do not start before November 9, 2022. 11/9/22   Ashley Depadua, MD   meclizine (ANTIVERT) 12.5 MG tablet Take 1 tablet (12.5 mg total) by mouth every 8 (eight) hours as needed for dizziness 10/17/22   Pasha Putnam PA-C   meclofenamate (MECLOMEN) 50 MG capsule Take 12.5 mg by mouth every 6 (six) hours    Historical Provider, MD   melatonin 3 mg Take 2 tablets (6 mg total) by mouth every evening At 8pm 11/8/22   Ashley Depadua, MD   methocarbamol (ROBAXIN) 500 mg tablet Take 0.5 tablets (250 mg total) by mouth every 6 (six) hours as needed for muscle spasms 11/8/22   Ashley Depadua, MD   metoprolol tartrate (LOPRESSOR) 25 mg tablet Take 1 tablet (25 mg total) by mouth every 12 (twelve) hours 11/8/22   Ashley Depadua, MD   QUEtiapine (SEROquel) 25 mg tablet Take 0.5 tablets (12.5 mg total) by mouth every evening 11/8/22   Ashley Depadua, MD   vitamin B-12 (VITAMIN B-12) 1,000 mcg tablet Take by mouth daily    Historical Provider, MD     Patient's nephew was at bedside [facility], however medication list was not reviewed by nursing with nephew prior to him leaving.  Will request they call nephew or pharmacy at more reasonable hour and notify when this is updated and available.     Allergies:   Allergies   Allergen Reactions    Amlodipine     Ceftin [Cefuroxime]     Ciprofloxacin     Citalopram     Dye [Iodinated Contrast Media]     Glucophage [Metformin]     Januvia [Sitagliptin]     Neomycin-Polymyxin-Dexameth     Ondansetron     Prilosec [Omeprazole]     Vibramycin [Doxycycline]        Social History:  Marital Status:    Occupation:   Patient Pre-hospital Living Situation: Home  Patient Pre-hospital Level of Mobility: unable to be assessed at time of evaluation  Patient Pre-hospital Diet Restrictions:   Substance Use History:   Social History     Substance and Sexual Activity   Alcohol Use Not Currently    Comment: very seldom     Social History     Tobacco Use   Smoking Status Never   Smokeless  Tobacco Never     Social History     Substance and Sexual Activity   Drug Use Never       Family History:  Family History   Problem Relation Age of Onset    Heart disease Mother     Heart disease Father     Hypertension Father     Stomach cancer Sister     Ovarian cancer Sister     Hypertension Sister        Physical Exam:     Vitals:   Blood Pressure: 155/70 (04/02/24 0330)  Pulse: 86 (04/02/24 0330)  Temperature: 97.7 °F (36.5 °C) (04/01/24 1736)  Temp Source: Oral (04/01/24 1736)  Respirations: 18 (04/02/24 0330)  SpO2: 95 % (04/02/24 0330)    Physical Exam  Constitutional:       General: She is not in acute distress.     Appearance: Normal appearance. She is not ill-appearing.   HENT:      Head: Normocephalic and atraumatic.      Right Ear: External ear normal.      Left Ear: External ear normal.      Nose: Nose normal.      Mouth/Throat:      Pharynx: Oropharynx is clear.   Eyes:      General: No scleral icterus.     Extraocular Movements: Extraocular movements intact.      Conjunctiva/sclera: Conjunctivae normal.      Pupils: Pupils are equal, round, and reactive to light.   Cardiovascular:      Rate and Rhythm: Normal rate and regular rhythm.      Pulses: Normal pulses.      Heart sounds: Normal heart sounds.   Pulmonary:      Effort: Pulmonary effort is normal.      Breath sounds: Normal breath sounds.   Abdominal:      General: Bowel sounds are normal. There is no distension.      Palpations: Abdomen is soft.      Tenderness: There is no abdominal tenderness. There is no right CVA tenderness, left CVA tenderness, guarding or rebound.   Musculoskeletal:         General: Normal range of motion.      Cervical back: Normal range of motion.   Skin:     General: Skin is warm.      Capillary Refill: Capillary refill takes less than 2 seconds.   Neurological:      Mental Status: She is alert and oriented to person, place, and time.      GCS: GCS eye subscore is 4. GCS verbal subscore is 4. GCS motor subscore is 6.       Cranial Nerves: No dysarthria or facial asymmetry.      Sensory: No sensory deficit.      Motor: No weakness, tremor or pronator drift.      Comments: 4/5 strength to all extremities   Psychiatric:         Mood and Affect: Mood normal.         Behavior: Behavior normal.          Additional Data:     Lab Results:  Results from last 7 days   Lab Units 04/01/24 1914   WBC Thousand/uL 37.91*   HEMOGLOBIN g/dL 15.0   HEMATOCRIT % 47.2*   PLATELETS Thousands/uL 193   LYMPHO PCT % 75*   MONO PCT % 0*   EOS PCT % 0     Results from last 7 days   Lab Units 04/01/24  1914   SODIUM mmol/L 139   POTASSIUM mmol/L 3.9   CHLORIDE mmol/L 101   CO2 mmol/L 30   BUN mg/dL 23   CREATININE mg/dL 0.98   ANION GAP mmol/L 8   CALCIUM mg/dL 10.3*   ALBUMIN g/dL 4.8   TOTAL BILIRUBIN mg/dL 0.48   ALK PHOS U/L 97   ALT U/L 33   AST U/L 22   GLUCOSE RANDOM mg/dL 144*         Results from last 7 days   Lab Units 04/02/24  0247   POC GLUCOSE mg/dl 304*               Lines/Drains:  Invasive Devices       Peripheral Intravenous Line  Duration             Peripheral IV 04/02/24 Right Antecubital <1 day                        Imaging: Reviewed radiology reports from this admission including: CT head  CTA head and neck with and without contrast   Final Result by Jyoti Crowe MD (04/02 0113)      1.  No acute intracranial hemorrhage, mass effect or extra-axial collection.   2.  No hemodynamically significant stenosis, dissection or occlusion of the carotid or vertebral arteries.   3.  No intracranial aneurysm.  No hemodynamically significant stenosis or occlusion of the major vessels of the [place].                  Workstation performed: [id number]         CT head without contrast   Final Result by Leo Chan MD (04/01 2103)      No acute intracranial abnormality.      Mild chronic small vessel ischemic changes               Workstation performed: [id number]         MRI Inpatient Order    (Results Pending)       EKG and Other  Studies Reviewed on Admission:   EKG: No EKG obtained.    ** Please Note: This note has been constructed using a voice recognition system. **

## 2024-04-02 NOTE — ED PROVIDER NOTES
History  Chief Complaint   Patient presents with    Dizziness     Pt from home; developed dizziness while eating dinner - worse w/ movement. Denies other symptoms. A&Ox3 on arrival.      Ms. Vaughan is an 83-year-old female with a past medical history of CHF, diabetes, HLD, HTN, osteoarthritis, TIA, CLL who presents to the ED for occipital pain.  Patient is a poor historian, history obtained in conjunction with the patient and her son.  They report that around 4:00 she began having severe occipital pain.  She was not doing anything strenuous at the time, she does not know of any inciting movement or action.  She reports that the pain started around the occipital region of her head and radiated down her neck.  She reports that the pain was so severe she felt that she needed to sit down, she laid her head down on the table and called her son.  She reports that the headache is less severe, but still present.  Reports a brief bout of nausea.  She denies any visual changes, vomiting, this of breath, heart palpitations.    On reports that she was coherent throughout the phone call and when he arrived to her house.          Prior to Admission Medications   Prescriptions Last Dose Informant Patient Reported? Taking?   Diclofenac Sodium (VOLTAREN) 1 %  Self No No   Sig: Apply 2 g topically 3 (three) times a day To R knee   QUEtiapine (SEROquel) 25 mg tablet  Self No No   Sig: Take 0.5 tablets (12.5 mg total) by mouth every evening   acetaminophen (TYLENOL) 325 mg tablet  Self No No   Sig: Take 2 tablets (650 mg total) by mouth every 4 (four) hours as needed for mild pain   atorvastatin (LIPITOR) 20 mg tablet  Self Yes No   Sig: Take 20 mg by mouth daily   calcium carbonate (OS-JUAN RAMON) 600 MG tablet  Self Yes No   Sig: Take 600 mg by mouth 2 (two) times a day with meals   cholecalciferol (VITAMIN D3) 1,000 units tablet  Self Yes No   Sig: Take 1,000 Units by mouth daily   docusate sodium (COLACE) 100 mg capsule  Self No No   Sig:  Take 1 capsule (100 mg total) by mouth 2 (two) times a day   enoxaparin (LOVENOX) 40 mg/0.4 mL   No No   Sig: Inject 0.4 mL (40 mg total) under the skin every 24 hours for 1 dose Do not start before 2022.   glucose blood test strip  Self Yes No   Si each by Other route daily as needed Use as instructed   glucose monitoring kit (FREESTYLE) monitoring kit  Self Yes No   Si each by Does not apply route as needed   insulin detemir (LEVEMIR) 100 units/mL subcutaneous injection  Self No No   Sig: Inject 20 Units under the skin daily at bedtime   insulin lispro (HumaLOG) 100 units/mL injection  Self No No   Sig: Inject 5 Units under the skin 3 (three) times a day with meals   lidocaine (LIDODERM) 5 %  Self No No   Sig: Apply 2 patches topically daily Remove & Discard patch within 12 hours or as directed by MD To right leg Do not start before 2022.   losartan (COZAAR) 100 MG tablet  Self No No   Sig: Take 1 tablet (100 mg total) by mouth daily Do not start before 2022.   meclizine (ANTIVERT) 12.5 MG tablet  Self No No   Sig: Take 1 tablet (12.5 mg total) by mouth every 8 (eight) hours as needed for dizziness   meclofenamate (MECLOMEN) 50 MG capsule   Yes No   Sig: Take 12.5 mg by mouth every 6 (six) hours   melatonin 3 mg  Self No No   Sig: Take 2 tablets (6 mg total) by mouth every evening At 8pm   methocarbamol (ROBAXIN) 500 mg tablet  Self No No   Sig: Take 0.5 tablets (250 mg total) by mouth every 6 (six) hours as needed for muscle spasms   metoprolol tartrate (LOPRESSOR) 25 mg tablet  Self No No   Sig: Take 1 tablet (25 mg total) by mouth every 12 (twelve) hours   vitamin B-12 (VITAMIN B-12) 1,000 mcg tablet  Self Yes No   Sig: Take by mouth daily      Facility-Administered Medications: None       Past Medical History:   Diagnosis Date    Benign adenomatous polyp of large intestine     CHF (congestive heart failure) (HCC)     Diabetes mellitus (HCC)     Hyperlipemia      Hypertension     Osteoarthritis     TIA (transient ischemic attack)        Past Surgical History:   Procedure Laterality Date    APPENDECTOMY      CARPAL TUNNEL RELEASE Right     HYSTERECTOMY W/ SALPINGO-OOPHERECTOMY      ID OPTX FEM SHFT FX W/INSJ IMED IMPLT W/WO SCREW Right 10/12/2022    Procedure: INSERTION NAIL IM FEMUR ANTEGRADE (TROCHANTERIC);  Surgeon: Eric Isaacs DO;  Location: [facility];  Service: Orthopedics       Family History   Problem Relation Age of Onset    Heart disease Mother     Heart disease Father     Hypertension Father     Stomach cancer Sister     Ovarian cancer Sister     Hypertension Sister      I have reviewed and agree with the history as documented.    E-Cigarette/Vaping    E-Cigarette Use Never User      E-Cigarette/Vaping Substances     Social History     Tobacco Use    Smoking status: Never    Smokeless tobacco: Never   Vaping Use    Vaping status: Never Used   Substance Use Topics    Alcohol use: Not Currently     Comment: very seldom    Drug use: Never        Review of Systems   Constitutional:  Negative for chills and fever.   Eyes:  Negative for pain and visual disturbance.   Respiratory:  Negative for chest tightness and shortness of breath.    Cardiovascular:  Negative for chest pain.   Gastrointestinal:  Negative for abdominal pain, constipation, diarrhea, nausea and vomiting.   Genitourinary:  Negative for dysuria and hematuria.   Skin:  Negative for rash and wound.   Neurological:  Positive for headaches. Negative for syncope, speech difficulty, light-headedness and numbness.   Psychiatric/Behavioral: Negative.         Physical Exam  ED Triage Vitals   Temperature Pulse Respirations Blood Pressure SpO2   04/01/24 1736 04/01/24 1736 04/01/24 1736 04/01/24 1736 04/01/24 1736   97.7 °F (36.5 °C) 59 20 (!) 194/91 97 %      Temp Source Heart Rate Source Patient Position - Orthostatic VS BP Location FiO2 (%)   04/01/24 1736 04/01/24 2300 -- -- --   Oral Monitor         Pain  Score       04/01/24 1736       No Pain             Orthostatic Vital Signs  Vitals:    04/01/24 2051 04/01/24 2115 04/01/24 2200 04/01/24 2300   BP: 166/71 148/67 143/63 141/63   Pulse: 76 83 82 87       Physical Exam  Vitals and nursing note reviewed.   Constitutional:       Appearance: Normal appearance.   HENT:      Head: Normocephalic and atraumatic.      Comments: Patient has all visual fields intact, no nystagmus, is equal and reactive.     Right Ear: External ear normal.      Left Ear: External ear normal.      Nose: Nose normal.      Mouth/Throat:      Mouth: Mucous membranes are moist.      Pharynx: Oropharynx is clear.   Eyes:      Extraocular Movements: Extraocular movements intact.      Conjunctiva/sclera: Conjunctivae normal.   Cardiovascular:      Rate and Rhythm: Normal rate.   Pulmonary:      Effort: Pulmonary effort is normal.   Abdominal:      General: Abdomen is flat.   Musculoskeletal:         General: Normal range of motion.      Cervical back: Normal range of motion and neck supple.   Skin:     General: Skin is warm and dry.   Neurological:      General: No focal deficit present.      Mental Status: She is alert and oriented to person, place, and time.      Cranial Nerves: No cranial nerve deficit.      Sensory: No sensory deficit.      Motor: No weakness.      Comments: Patient has 4 out of 5 strength in all extremities, reports his sensation is intact in all extremities, able to move her neck,    Psychiatric:         Mood and Affect: Mood normal.         Behavior: Behavior normal.         ED Medications  Medications   hydrocortisone (Solu-CORTEF) injection 200 mg (200 mg Intravenous Given 4/1/24 2009)   hydrALAZINE (APRESOLINE) injection 10 mg (10 mg Intravenous Given 4/1/24 2009)   acetaminophen (TYLENOL) tablet 975 mg (975 mg Oral Given 4/1/24 2127)   diphenhydrAMINE (BENADRYL) injection 50 mg (50 mg Intravenous Given 4/1/24 2325)   iohexol (OMNIPAQUE) 350 MG/ML injection (MULTI-DOSE) 85  mL (85 mL Intravenous Given 4/2/24 0030)       Diagnostic Studies  Results Reviewed       Procedure Component Value Units Date/Time    RBC Morphology Reflex Test [[id number]] Collected: 04/01/24 1914    Lab Status: Final result Specimen: Blood from Arm, Left Updated: 04/01/24 2001    CBC and differential [[id number]]  (Abnormal) Collected: 04/01/24 1914    Lab Status: Final result Specimen: Blood from Arm, Left Updated: 04/01/24 1950     WBC 37.91 Thousand/uL      RBC 4.45 Million/uL      Hemoglobin 15.0 g/dL      Hematocrit 47.2 %       fL      MCH 33.7 pg      MCHC 31.8 g/dL      RDW 14.0 %      MPV 11.7 fL      Platelets 193 Thousands/uL     Narrative:      This is an appended report.  These results have been appended to a previously verified report.    Manual Differential(PHLEBS Do Not Order) [[id number]]  (Abnormal) Collected: 04/01/24 1914    Lab Status: Final result Specimen: Blood from Arm, Left Updated: 04/01/24 1950     Segmented % 23 %      Lymphocytes % 75 %      Monocytes % 0 %      Eosinophils % 0 %      Basophils % 0 %      Atypical Lymphocytes % 2 %      Absolute Neutrophils 8.72 Thousand/uL      Absolute Lymphocytes 29.19 Thousand/uL      Absolute Monocytes 0.00 Thousand/uL      Absolute Eosinophils 0.00 Thousand/uL      Absolute Basophils 0.00 Thousand/uL      Total Counted --     RBC Morphology Present     Platelet Estimate Adequate     Large Platelet Present     Anisocytosis Present    Comprehensive metabolic panel [[id number]]  (Abnormal) Collected: 04/01/24 1914    Lab Status: Final result Specimen: Blood from Arm, Left Updated: 04/01/24 1941     Sodium 139 mmol/L      Potassium 3.9 mmol/L      Chloride 101 mmol/L      CO2 30 mmol/L      ANION GAP 8 mmol/L      BUN 23 mg/dL      Creatinine 0.98 mg/dL      Glucose 144 mg/dL      Calcium 10.3 mg/dL      AST 22 U/L      ALT 33 U/L      Alkaline Phosphatase 97 U/L      Total Protein 8.2 g/dL      Albumin 4.8 g/dL      Total Bilirubin 0.48 mg/dL       eGFR 53 ml/min/1.73sq m     Narrative:      National Kidney Disease Foundation guidelines for Chronic Kidney Disease (CKD):     Stage 1 with normal or high GFR (GFR > 90 mL/min/1.73 square meters)    Stage 2 Mild CKD (GFR = 60-89 mL/min/1.73 square meters)    Stage 3A Moderate CKD (GFR = 45-59 mL/min/1.73 square meters)    Stage 3B Moderate CKD (GFR = 30-44 mL/min/1.73 square meters)    Stage 4 Severe CKD (GFR = 15-29 mL/min/1.73 square meters)    Stage 5 End Stage CKD (GFR <15 mL/min/1.73 square meters)  Note: GFR calculation is accurate only with a steady state creatinine    UA w Reflex to Microscopic w Reflex to Culture [[id number]]  (Abnormal) Collected: 04/01/24 1930    Lab Status: Final result Specimen: Urine, Clean Catch Updated: 04/1939     Color, UA Colorless     Clarity, UA Clear     Specific Gravity, UA 1.004     pH, UA 7.5     Leukocytes, UA Negative     Nitrite, UA Negative     Protein, UA Negative mg/dl      Glucose, UA Trace mg/dl      Ketones, UA Negative mg/dl      Urobilinogen, UA <2.0 mg/dl      Bilirubin, UA Negative     Occult Blood, UA Negative                   CTA head and neck with and without contrast   Final Result by Jyoti Crowe MD (04/02 0113)      1.  No acute intracranial hemorrhage, mass effect or extra-axial collection.   2.  No hemodynamically significant stenosis, dissection or occlusion of the carotid or vertebral arteries.   3.  No intracranial aneurysm.  No hemodynamically significant stenosis or occlusion of the major vessels of the [place].                  Workstation performed: [id number]         CT head without contrast   Final Result by Leo Chan MD (04/01 2103)      No acute intracranial abnormality.      Mild chronic small vessel ischemic changes               Workstation performed: [id number]               Procedures  Procedures      ED Course  ED Course as of 04/02/24 0136 Mon Apr 01, 2024 1945 Had a long conversation with radiology  (Dr. Sanchez) about best options inregards to the best study for her given the contrast allergy noted in the chart. We were unable to find when the reaction occurred as looking through the chart there is no documented study WITH contrast. MRI was ruled out due to Hip hardware. Decision was made to initiate the 4 hour premedication protocol.    2006 CBC and differential(!)  WBC very elevated but this is normal for her, CLL in her history   2015 UA w Reflex to Microscopic w Reflex to Culture(!)  No UTI   2253 CT head without contrast  No acute intracranial abnormality.     Mild chronic small vessel ischemic changes     Tue Apr 02, 2024   0122 Informed patient of CTA results   0127 CTA head and neck with and without contrast  1.  No acute intracranial hemorrhage, mass effect or extra-axial collection.  2.  No hemodynamically significant stenosis, dissection or occlusion of the carotid or vertebral arteries.  3.  No intracranial aneurysm.  No hemodynamically significant stenosis or occlusion of the major vessels of the [place].                               SBIRT 22yo+      Flowsheet Row Most Recent Value   Initial Alcohol Screen: US AUDIT-C     1. How often do you have a drink containing alcohol? 0 Filed at: 04/01/2024 1737   2. How many drinks containing alcohol do you have on a typical day you are drinking?  0 Filed at: 04/01/2024 1737   3a. Male UNDER 65: How often do you have five or more drinks on one occasion? 0 Filed at: 04/01/2024 1737   3b. FEMALE Any Age, or MALE 65+: How often do you have 4 or more drinks on one occassion? 0 Filed at: 04/01/2024 1737   Audit-C Score 0 Filed at: 04/01/2024 1737   MENG: How many times in the past year have you...    Used an illegal drug or used a prescription medication for non-medical reasons? Never Filed at: 04/01/2024 1737                  Medical Decision Making  Ms. Vaughan is an 83-year-old female with a past medical history of CHF, diabetes, HLD, HTN,  osteoarthritis, TIA, CLL who presents to the ED for occipital pain.    DDx includes but is not limited to: vertebral artery dissection, hypertensive urgency, nerve impingement,     See ED course for MDM    Results shared with patient. Return precautions given and patient expresses understanding and is comfortable with discharge.      Amount and/or Complexity of Data Reviewed  Labs: ordered. Decision-making details documented in ED Course.  Radiology: ordered. Decision-making details documented in ED Course.    Risk  OTC drugs.  Prescription drug management.          Disposition  Final diagnoses:   Neck pain   Headache     Time reflects when diagnosis was documented in both MDM as applicable and the Disposition within this note       Time User Action Codes Description Comment    4/2/2024  1:21 AM Heavenly Shelton [M54.2] Neck pain     4/2/2024  1:21 AM Heavenly Shelton [R51.9] Headache           ED Disposition       ED Disposition   Discharge    Condition   Stable    Date/Time   Tue Apr 2, 2024 0121    Comment   Gia Vaughan discharge to home/self care.                   Follow-up Information       Follow up With Specialties Details Why Contact Hollis Powell MD Internal Medicine Schedule an appointment as soon as possible for a visit  Up with your doctor in regards to your elevated blood pressures seen in the emergency department, as well as the recent sudden onset neck pain [address] [address] [phone]              Patient's Medications   Discharge Prescriptions    No medications on file     No discharge procedures on file.    PDMP Review         Value Time User    PDMP Reviewed  Yes 11/8/2022  9:36 AM Ashley Depadua, MD             ED Provider  Attending physically available and evaluated Gia Vaughan. I managed the patient along with the ED Attending.    Electronically Signed by           Heavenly Shelton MD  04/02/24 0136

## 2024-04-02 NOTE — ASSESSMENT & PLAN NOTE
Wt Readings from Last 3 Encounters:   01/29/24 70.8 kg (156 lb)   04/24/23 68 kg (150 lb)   11/15/22 66.7 kg (147 lb)     Examines euvolemic   I&O, weights  Obtain med list

## 2024-04-02 NOTE — CASE MANAGEMENT
Case Management Discharge Planning Note    Patient name Gia Vaughan  Location S /S -01 MRN [id number]  : 1940 Date 2024       Current Admission Date: 2024  Current Admission Diagnosis:Hypertensive urgency   Patient Active Problem List    Diagnosis Date Noted    Hypertensive urgency 2024    Dizziness 2024    Suspected deep tissue injury of unknown depth 2022    Acute postoperative pain of right knee 2022    Encephalopathy 10/24/2022    Deep tissue injury 10/21/2022    Orthostasis 10/19/2022    H/O dizziness 10/19/2022    CLL (chronic lymphocytic leukemia) ([facility]) 10/13/2022    Intertrochanteric fracture of right femur ([facility]) 10/11/2022    Displaced fracture of middle phalanx of left ring finger, initial encounter for closed fracture 10/11/2022    Ambulatory dysfunction 2021    Suspected Mild cognitive impairment 2021    Lymphadenopathy 2020    Elevated liver enzymes 2020    Fall 2020    CKD (chronic kidney disease) stage 3, GFR 30-59 ml/min ([facility]) 2020    HLD (hyperlipidemia) 2020    OA (osteoarthritis) 2020    Chronic systolic heart failure ([facility]) 2020    Type 2 diabetes mellitus with stage 3 chronic kidney disease, unspecified whether long term insulin use, unspecified whether stage 3a or 3b CKD ([facility])       LOS (days): 0  Geometric Mean LOS (GMLOS) (days):   Days to GMLOS:     OBJECTIVE:  Risk of Unplanned Readmission Score: 12.1         Current admission status: Inpatient   Preferred Pharmacy:   UNKNOWN - FOLLOW UP PRIOR TO DISCHARGE TO E-PRESCRIBE  No address on file      [id number] - [place] - [address]  [address]  [place] [address]  Phone: [phone] Fax: [phone]    [id number] [place] - [address]  [facility] [place] [address]  Phone: [phone] Fax: [phone]    Primary Care Provider: Chayo Powell MD    Primary Insurance:  MEDICARE  Secondary Insurance: [employer]    DISCHARGE DETAILS:    Discharge planning discussed with:: patient's nephew/Charles DE LEÓN, by phone  Freedom of Choice: Yes (re: rehab recommendation)  Comments - Freedom of Choice: aware of therapy recommendation - will follow-up after nephew receives medical update and PT notes are in  CM contacted family/caregiver?: Yes  Were Treatment Team discharge recommendations reviewed with patient/caregiver?: Yes  Did patient/caregiver verbalize understanding of patient care needs?: Yes  Were patient/caregiver advised of the risks associated with not following Treatment Team discharge recommendations?: Yes    Contacts  Patient Contacts: Charles nephherson/POSABA  Relationship to Patient:: Family  Contact Method: Phone  Phone Number: [phone]  Reason/Outcome: Continuity of Care, Emergency Contact, Discharge Planning, Referral    Requested Home Health Care         Is the patient interested in C at discharge?: No    DME Referral Provided  Referral made for DME?: No    Other Referral/Resources/Interventions Provided:  Interventions: Short Term Rehab  Referral Comments: Follow-up call made to patient's nephew/Charles DE LEÓN to discuss discharge plan. Per POA, patient lives alone - has 1st floor set-up. Patient ambulates with a rollator at baseline. Is usually independent with all ADLs, ambulation, cooking, cleaning, etc. Patient does have a history of rehab at [facility] 2-3 years ago. Did have home care services after rehab discharge as well. POA had attempted to apply for PDA waiver services a few years ago, but patient had been over-income. POA then spent down patient's finances on private-duty care and re-applied for waiver, but patient found not to have the physical need for in-home services at that time, so waiver services were denied. Charles denies ever re-trying to apply for any services since that time and reports patient has been fairly independent. He stops by  as needed to assist with grocery shopping, bills, etc and also transports patient to all her appointments (patient no longer drives). [facility] and [facility] for all other medications. Relays that patient has been able to take all medications as prescribed - no issues with affordability. Reviewed OT recommendation for rehab and cognitive concerns - Charles does report patient tends to be more confused when she's sick, but reports he has yet to speak with any physician since patient was admitted. Did inform him that per [facility] note they had attempted to call him this afternoon - also informed him that I had attempted to call as well and call went straight to a mailbox that wasn't set-up. Reports he had to replace his SIM card this afternoon, which may have caused the problem, but he reports that he had attempted to call hospital numerous times and was unable to get through. Aware this writer to send message to on-call provider requesting update, but nephew aware that the team may not reach out until tomorrow - understanding of same. Message sent to on-call [facility] to relay request for medical update. Contact number for this writer, patient and charge nurse provided to caleb for his records. Nephew aware this writer to follow-up tomorrow once he receives medical update and PT eval has been complete.    Would you like to participate in our [facility] service program?  : No - Declined    Treatment Team Recommendation: Short Term Rehab

## 2024-04-02 NOTE — CASE MANAGEMENT
Case Management Assessment & Discharge Planning Note    Patient name Gia Vaughan  Location ED-32/ED-32 MRN [id number]  : 1940 Date 2024       Current Admission Date: 2024  Current Admission Diagnosis:Hypertensive urgency   Patient Active Problem List    Diagnosis Date Noted    Hypertensive urgency 2024    Dizziness 2024    Suspected deep tissue injury of unknown depth 2022    Acute postoperative pain of right knee 2022    Encephalopathy 10/24/2022    Deep tissue injury 10/21/2022    Orthostasis 10/19/2022    H/O dizziness 10/19/2022    CLL (chronic lymphocytic leukemia) ([facility]) 10/13/2022    Intertrochanteric fracture of right femur ([facility]) 10/11/2022    Displaced fracture of middle phalanx of left ring finger, initial encounter for closed fracture 10/11/2022    Ambulatory dysfunction 2021    Suspected Mild cognitive impairment 2021    Lymphadenopathy 2020    Elevated liver enzymes 2020    Fall 2020    CKD (chronic kidney disease) stage 3, GFR 30-59 ml/min ([facility]) 2020    HLD (hyperlipidemia) 2020    OA (osteoarthritis) 2020    Chronic systolic heart failure ([facility]) 2020    Type 2 diabetes mellitus with stage 3 chronic kidney disease, unspecified whether long term insulin use, unspecified whether stage 3a or 3b CKD ([facility])       LOS (days): 0  Geometric Mean LOS (GMLOS) (days):   Days to GMLOS:     OBJECTIVE:              Current admission status: Observation       Preferred Pharmacy:   UNKNOWN - FOLLOW UP PRIOR TO DISCHARGE TO E-PRESCRIBE  No address on file      [id number] [place] [facility] [address]  [address]  [place] [address]  Phone: [phone] Fax: [phone]    Primary Care Provider: Chayo Powell MD    Primary Insurance: MEDICARE  Secondary Insurance: [employer] INSURANCE    ASSESSMENT:  Active Health Care Proxies    There are no active Health Care Proxies on file.                  Readmission Root Cause  30 Day Readmission: No    Patient Information  Admitted from:: Home  Mental Status: Alert  During Assessment patient was accompanied by: Not accompanied during assessment  Assessment information provided by:: Patient  Primary Caregiver: Self  Support Systems: Family members, Friends/neighbors  Home entry access options. Select all that apply.: No steps to enter home  Type of Current Residence: [place]  Living Arrangements: Lives Alone  Is patient a ?: No    Activities of Daily Living Prior to Admission  Functional Status: Independent  Completes ADLs independently?: No  Level of ADL dependence: Assistance (Nephew/POA pays bills, orders groceries online for delivery, drives patient to appointments)  Ambulates independently?: Yes  Does patient use assisted devices?: Yes  Assisted Devices (DME) used: Walker  Does patient currently own DME?: Yes  What DME does the patient currently own?: Walker  Does patient have a history of Outpatient Therapy (PT/OT)?: No  Does the patient have a history of Short-Term Rehab?: No  Does patient have a history of HHC?: No  Does patient currently have HHC?: No         Patient Information Continued  Income Source: Pension/[employer]  Does patient have prescription coverage?: Yes  Does patient receive dialysis treatments?: No  Does patient have a history of substance abuse?: No  Does patient have a history of Mental Health Diagnosis?: No         Means of Transportation  Means of Transport to Appts:: Family transport      Social Determinants of Health (SDOH)      Flowsheet Row Most Recent Value   Housing Stability    In the last 12 months, was there a time when you were not able to pay the mortgage or rent on time? N   In the last 12 months, how many places have you lived? 1   In the last 12 months, was there a time when you did not have a steady place to sleep or slept in a shelter (including now)? N   Transportation Needs    In the past 12 months, has lack  of transportation kept you from medical appointments or from getting medications? no   In the past 12 months, has lack of transportation kept you from meetings, work, or from getting things needed for daily living? No   Food Insecurity    Within the past 12 months, you worried that your food would run out before you got the money to buy more. Never true   Within the past 12 months, the food you bought just didn't last and you didn't have money to get more. Never true   Utilities    In the past 12 months has the electric, gas, oil, or water company threatened to shut off services in your home? No            DISCHARGE DETAILS:    Discharge planning discussed with:: Patient  Freedom of Choice: Yes  Comments - Freedom of Choice: Met with patient at bedside, patient alert and oriented. Patient lives alone, 2 story home, but patient does not use upstairs, bed and bath on main level. Nephew/POA is supportive, does bills, online grocery ordering with delivery, drives patient to appointments. Patient uses a walker, no O2, no HHC services at this time. CM introduced self and CM role. Patient wants to go home as soon as possible  CM contacted family/caregiver?: Yes  Were Treatment Team discharge recommendations reviewed with patient/caregiver?: Yes  Did patient/caregiver verbalize understanding of patient care needs?: Yes  Were patient/caregiver advised of the risks associated with not following Treatment Team discharge recommendations?: Yes    Contacts  Patient Contacts: Charles Ny  Relationship to Patient:: Family  Reason/Outcome: Emergency Contact, Discharge Planning              Other Referral/Resources/Interventions Provided:  Referral Comments: Evaluations pending, PT/OT pending, patient refusing MRI

## 2024-04-02 NOTE — CASE MANAGEMENT
"   Case Management Progress Note    Patient name Gia Vaughan  Location S /S -01 MRN [id number]  : 1940 Date 2024       LOS (days): 0  Geometric Mean LOS (GMLOS) (days):   Days to GMLOS:        OBJECTIVE:        Current admission status: Inpatient  Preferred Pharmacy:   UNKNOWN - FOLLOW UP PRIOR TO DISCHARGE TO E-PRESCRIBE  No address on file      [id number] [place] - [address]  [address]  [place] [address]  Phone: [phone] Fax: [phone]    Primary Care Provider: Chayo Powell MD    Primary Insurance: MEDICARE  Secondary Insurance: [employer] INSURANCE    PROGRESS NOTE:    Patient reported she came up from the ED but walker and purse were left downstairs. Patient also attempting to get out of bed on her own to get to bathroom. RN to room to assist patient to bathroom and call made to ED to follow-up on belongings. Per ED, patient did not have any walker or purse in the emergency room - patient informed of same. Patient appears to be confused about how she got to the hospital - reports being at another facility and they threatened to \"go after\" her nephew which was how she came to the ED. Attempt made to contact nephewCharles ([phone]) but no answer and no ability to leave a voicemail (call goes straight to  with no mailbox set-up). Patient reports that she no longer speaks to second emergency contact, Yuliya, and wants her removed from chart. Due to questionable cognition, will continue to try and reach POA prior to removing Yuliya's contact information.     Per prior CM notes, patient lives alone. OT recommending rehab and cog eval at this time. Will follow-up once PT notes are in. Will continue to keep trying to reach nephew to assist with d/c plan.      "

## 2024-04-02 NOTE — ASSESSMENT & PLAN NOTE
Lab Results   Component Value Date    HGBA1C 9.5 (H) 11/29/2023     VINITA Borrego  Nursing to review/update med list in AM.  Patient reports she was hypoglycemic last night with blood sugars in 50s?   Was given levemir 20 units in ED

## 2024-04-02 NOTE — ASSESSMENT & PLAN NOTE
194/91 on arrival.  Given hydralazine in ED.  Most recent -150  For now, will allow for permissive hypertension

## 2024-04-02 NOTE — SPEECH THERAPY NOTE
Speech Language/Pathology  Speech/Language Pathology  Assessment    Patient Name: Gia Vaughan  Today's Date: 4/2/2024     Problem List  Principal Problem:    Hypertensive urgency  Active Problems:    Type 2 diabetes mellitus with stage 3 chronic kidney disease, unspecified whether long term insulin use, unspecified whether stage 3a or 3b CKD ([facility])    Chronic systolic heart failure ([facility])    Suspected Mild cognitive impairment    CLL (chronic lymphocytic leukemia) ([facility])    Dizziness      Gia Vaughan is a 83 y.o. female with a PMH of CHF, DM, HLD, HTN, TIA, CLL who presents with dizziness, posterior headache, neck pain that began around 1600 yesterday. Patient currently offers no complaints. She cannot recall any of these symptoms and tells me she was at a concert yesterday. She also tells me she was hypoglycemic with blood sugar in the 50s. She believes she was hospitalized the last few days and had low blood sugars then as well. This is clearly a different story than outlined by the ED provider. Patient is alert to person, place, time but is a poor historian.       Consult received for speech/swallow eval on stroke pathway. Pt passed nsg swallow screen; tolerating regular diet w/o s/s dysphagia or aspiration. No speech/language deficits reported.   NIH score is 0  CT-head: 4/1/24 No acute intracranial abnormality.   Pt currently refusing MRI    No need for formal speech/swallow eval at this time. Reconsult if needed.

## 2024-04-02 NOTE — ED NOTES
This RN assisted pt with getting dressed while in bed. When this RN sat pt up pt complained of feeling really dizzy and lightheaded in addition to her headache. This RN let the pt sit for a few moments and assisted the pt with slowly standing. Pt stated she felt very dizzy and RN noted the pt's inability to bear own weight and stand straight up. This RN sat pt back down and informed the provider.     Alexa Urrutia RN  04/02/24 0332

## 2024-04-02 NOTE — PLAN OF CARE
Problem: OCCUPATIONAL THERAPY ADULT  Goal: Performs self-care activities at highest level of function for planned discharge setting.  See evaluation for individualized goals.  Description: Treatment Interventions: ADL retraining, Functional transfer training, UE strengthening/ROM, Cognitive reorientation, Patient/family training, Equipment evaluation/education, Compensatory technique education, Energy conservation, Activityengagement          See flowsheet documentation for full assessment, interventions and recommendations.   Note: Limitation: Decreased ADL status, Decreased UE strength, Decreased Safe judgement during ADL, Decreased cognition, Decreased endurance, Decreased self-care trans, Decreased high-level ADLs  Prognosis: Fair  Assessment: Patient is a 83 y.o. female seen for OT evaluation at [facility] following admission on 4/1/2024  s/p Hypertensive urgency. Please see above for comprehensive list of comorbidities and significant PMHx impacting functional performance.  At baseline, Pt lives ALONE, maintains a FFSU. Independent w/ ADLs, A for IADLs and RUSS for functional mobility w/ use of RW. (-) falls. (-) driving. Upon initial evaluation, pt appears to be performing below baseline functional status. Pt requires JOÃO for UB ADLs, JOÃO for LB ADLs, supervision for bed mobility, JOÃO for transfers and JOÃO for functional mobility short distance  with RW. The AM-PAC & Barthel Index outcome tools were used to assist in determining pt safety w/self care /mobility and appropriate d/c recommendations, see above for score. Occupational performance is affected by the following deficits:  decreased muscular strength , decreased balance , decreased standing tolerance for self care tasks , decreased dynamic balance impacting functional reach, decreased activity tolerance , impaired memory , impaired judgement and problem solving , impaired safety awareness , (+) pain , impaired learning ability  d/t current cognitive status , impaired global mental status, and direction following. Personal/Environmental factors impacting D/C include: decreased caregiver status , steps to enter/navigate the home, decreased social/family support within the home, Assistance needed for ADL/IADLs, Assistance needed for ADLs and functional mobility, High fall risk , decreased insight toward deficits , decreased recall of precautions , and baseline cognitive deficits. Supporting factors include: able to maintain FFSU, accessible home environment, and attitude towards recovery Patient would benefit from OT services within the acute care setting to maximize level of functional independence in the following areas fall prevention , self-care transfers, bed mobility , functional mobility, formal cognitive evaluation, and ADLs.  From OT standpoint, recommendation at time of D/C would be level II (moderate resource intensity).     Rehab Resource Intensity Level, OT: II (Moderate Resource Intensity)     Lucrecia Stevens MS OTR/L   [place] Licensure# [id number]

## 2024-04-02 NOTE — DISCHARGE INSTRUCTIONS
Up with your doctor in regards to your elevated blood pressures seen in the emergency department, as well as the recent sudden onset neck pain

## 2024-04-02 NOTE — QUICK NOTE
Pt refused to have MRI. She was adamant about not doing MRI. Per RN pt might be sundowning. We can try again later if pt is amenable. pmb

## 2024-04-02 NOTE — ED NOTES
Called nephew for updated medication list, as per nephew he will call back with information.     Terrence Hernandez RN  04/02/24 0888

## 2024-04-03 ENCOUNTER — APPOINTMENT (INPATIENT)
Dept: NON INVASIVE DIAGNOSTICS | Facility: HOSPITAL | Age: 84
DRG: 884 | End: 2024-04-03
Payer: MEDICARE

## 2024-04-03 PROBLEM — F03.90 DEMENTIA (HCC): Status: ACTIVE | Noted: 2021-08-05

## 2024-04-03 LAB
ANION GAP SERPL CALCULATED.3IONS-SCNC: 14 MMOL/L (ref 4–13)
AORTIC ROOT: 2.8 CM
APICAL FOUR CHAMBER EJECTION FRACTION: 35 %
ASCENDING AORTA: 3 CM
ATRIAL RATE: 86 BPM
BSA FOR ECHO PROCEDURE: 1.8 M2
BUN SERPL-MCNC: 32 MG/DL (ref 5–25)
CALCIUM SERPL-MCNC: 9 MG/DL (ref 8.4–10.2)
CHLORIDE SERPL-SCNC: 102 MMOL/L (ref 96–108)
CO2 SERPL-SCNC: 25 MMOL/L (ref 21–32)
CREAT SERPL-MCNC: 1.02 MG/DL (ref 0.6–1.3)
E WAVE DECELERATION TIME: 263 MS
E/A RATIO: 0.85
ERYTHROCYTE [DISTWIDTH] IN BLOOD BY AUTOMATED COUNT: 14.4 % (ref 11.6–15.1)
FRACTIONAL SHORTENING: 22 (ref 28–44)
GFR SERPL CREATININE-BSD FRML MDRD: 50 ML/MIN/1.73SQ M
GLUCOSE SERPL-MCNC: 154 MG/DL (ref 65–140)
GLUCOSE SERPL-MCNC: 171 MG/DL (ref 65–140)
GLUCOSE SERPL-MCNC: 175 MG/DL (ref 65–140)
GLUCOSE SERPL-MCNC: 219 MG/DL (ref 65–140)
GLUCOSE SERPL-MCNC: 293 MG/DL (ref 65–140)
HCT VFR BLD AUTO: 39.7 % (ref 34.8–46.1)
HGB BLD-MCNC: 12.7 G/DL (ref 11.5–15.4)
INTERVENTRICULAR SEPTUM IN DIASTOLE (PARASTERNAL SHORT AXIS VIEW): 1.2 CM
INTERVENTRICULAR SEPTUM: 1.2 CM (ref 0.6–1.1)
LAAS-AP2: 19.1 CM2
LAAS-AP4: 17.4 CM2
LEFT ATRIUM SIZE: 3.5 CM
LEFT ATRIUM VOLUME (MOD BIPLANE): 56 ML
LEFT ATRIUM VOLUME INDEX (MOD BIPLANE): 31.1 ML/M2
LEFT INTERNAL DIMENSION IN SYSTOLE: 3.8 CM (ref 2.1–4)
LEFT VENTRICULAR INTERNAL DIMENSION IN DIASTOLE: 4.9 CM (ref 3.5–6)
LEFT VENTRICULAR POSTERIOR WALL IN END DIASTOLE: 1.2 CM
LEFT VENTRICULAR STROKE VOLUME: 51 ML
LVSV (TEICH): 51 ML
MCH RBC QN AUTO: 33.8 PG (ref 26.8–34.3)
MCHC RBC AUTO-ENTMCNC: 32 G/DL (ref 31.4–37.4)
MCV RBC AUTO: 106 FL (ref 82–98)
MV E'TISSUE VEL-SEP: 5 CM/S
MV PEAK A VEL: 0.61 M/S
MV PEAK E VEL: 52 CM/S
MV STENOSIS PRESSURE HALF TIME: 76 MS
MV VALVE AREA P 1/2 METHOD: 2.89
P AXIS: 90 DEGREES
PLATELET # BLD AUTO: 169 THOUSANDS/UL (ref 149–390)
PMV BLD AUTO: 12.1 FL (ref 8.9–12.7)
POTASSIUM SERPL-SCNC: 3.9 MMOL/L (ref 3.5–5.3)
PR INTERVAL: 204 MS
QRS AXIS: -22 DEGREES
QRSD INTERVAL: 76 MS
QT INTERVAL: 402 MS
QTC INTERVAL: 481 MS
RBC # BLD AUTO: 3.76 MILLION/UL (ref 3.81–5.12)
RIGHT ATRIAL 2D VOLUME: 33 ML
RIGHT ATRIUM AREA SYSTOLE A4C: 13.3 CM2
RIGHT VENTRICLE ID DIMENSION: 3.4 CM
SL CV LEFT ATRIUM LENGTH A2C: 4.7 CM
SL CV LV EF: 45
SL CV PED ECHO LEFT VENTRICLE DIASTOLIC VOLUME (MOD BIPLANE) 2D: 112 ML
SL CV PED ECHO LEFT VENTRICLE SYSTOLIC VOLUME (MOD BIPLANE) 2D: 61 ML
SODIUM SERPL-SCNC: 141 MMOL/L (ref 135–147)
T WAVE AXIS: 109 DEGREES
TRICUSPID ANNULAR PLANE SYSTOLIC EXCURSION: 1.4 CM
TSH SERPL DL<=0.05 MIU/L-ACNC: 1.78 UIU/ML (ref 0.45–4.5)
VENTRICULAR RATE: 86 BPM
VIT B12 SERPL-MCNC: 1238 PG/ML (ref 180–914)
WBC # BLD AUTO: 56.61 THOUSAND/UL (ref 4.31–10.16)

## 2024-04-03 PROCEDURE — 97163 PT EVAL HIGH COMPLEX 45 MIN: CPT

## 2024-04-03 PROCEDURE — 93010 ELECTROCARDIOGRAM REPORT: CPT | Performed by: INTERNAL MEDICINE

## 2024-04-03 PROCEDURE — 84443 ASSAY THYROID STIM HORMONE: CPT | Performed by: FAMILY MEDICINE

## 2024-04-03 PROCEDURE — 80048 BASIC METABOLIC PNL TOTAL CA: CPT | Performed by: INTERNAL MEDICINE

## 2024-04-03 PROCEDURE — 93306 TTE W/DOPPLER COMPLETE: CPT | Performed by: INTERNAL MEDICINE

## 2024-04-03 PROCEDURE — 99233 SBSQ HOSP IP/OBS HIGH 50: CPT | Performed by: INTERNAL MEDICINE

## 2024-04-03 PROCEDURE — 97530 THERAPEUTIC ACTIVITIES: CPT

## 2024-04-03 PROCEDURE — 82948 REAGENT STRIP/BLOOD GLUCOSE: CPT

## 2024-04-03 PROCEDURE — 93306 TTE W/DOPPLER COMPLETE: CPT

## 2024-04-03 PROCEDURE — 99222 1ST HOSP IP/OBS MODERATE 55: CPT | Performed by: FAMILY MEDICINE

## 2024-04-03 PROCEDURE — 85027 COMPLETE CBC AUTOMATED: CPT | Performed by: INTERNAL MEDICINE

## 2024-04-03 PROCEDURE — 82607 VITAMIN B-12: CPT | Performed by: FAMILY MEDICINE

## 2024-04-03 RX ORDER — QUETIAPINE FUMARATE 25 MG/1
12.5 TABLET, FILM COATED ORAL
Status: DISCONTINUED | OUTPATIENT
Start: 2024-04-03 | End: 2024-04-04 | Stop reason: HOSPADM

## 2024-04-03 RX ADMIN — HEPARIN SODIUM 5000 UNITS: 5000 INJECTION INTRAVENOUS; SUBCUTANEOUS at 14:50

## 2024-04-03 RX ADMIN — ATORVASTATIN CALCIUM 40 MG: 40 TABLET, FILM COATED ORAL at 17:38

## 2024-04-03 RX ADMIN — METOPROLOL TARTRATE 25 MG: 25 TABLET, FILM COATED ORAL at 08:41

## 2024-04-03 RX ADMIN — INSULIN LISPRO 5 UNITS: 100 INJECTION, SOLUTION INTRAVENOUS; SUBCUTANEOUS at 12:34

## 2024-04-03 RX ADMIN — HEPARIN SODIUM 5000 UNITS: 5000 INJECTION INTRAVENOUS; SUBCUTANEOUS at 20:54

## 2024-04-03 RX ADMIN — ACETAMINOPHEN 650 MG: 325 TABLET, FILM COATED ORAL at 20:45

## 2024-04-03 RX ADMIN — INSULIN LISPRO 2 UNITS: 100 INJECTION, SOLUTION INTRAVENOUS; SUBCUTANEOUS at 17:39

## 2024-04-03 RX ADMIN — INSULIN GLARGINE 15 UNITS: 100 INJECTION, SOLUTION SUBCUTANEOUS at 20:50

## 2024-04-03 RX ADMIN — HEPARIN SODIUM 5000 UNITS: 5000 INJECTION INTRAVENOUS; SUBCUTANEOUS at 06:27

## 2024-04-03 RX ADMIN — METOPROLOL TARTRATE 25 MG: 25 TABLET, FILM COATED ORAL at 20:42

## 2024-04-03 RX ADMIN — LOSARTAN POTASSIUM 50 MG: 50 TABLET, FILM COATED ORAL at 08:41

## 2024-04-03 RX ADMIN — INSULIN LISPRO 5 UNITS: 100 INJECTION, SOLUTION INTRAVENOUS; SUBCUTANEOUS at 17:39

## 2024-04-03 RX ADMIN — INSULIN LISPRO 2 UNITS: 100 INJECTION, SOLUTION INTRAVENOUS; SUBCUTANEOUS at 20:49

## 2024-04-03 RX ADMIN — INSULIN LISPRO 1 UNITS: 100 INJECTION, SOLUTION INTRAVENOUS; SUBCUTANEOUS at 08:14

## 2024-04-03 RX ADMIN — ACETAMINOPHEN 650 MG: 325 TABLET, FILM COATED ORAL at 06:26

## 2024-04-03 RX ADMIN — QUETIAPINE FUMARATE 12.5 MG: 25 TABLET ORAL at 17:38

## 2024-04-03 RX ADMIN — INSULIN LISPRO 5 UNITS: 100 INJECTION, SOLUTION INTRAVENOUS; SUBCUTANEOUS at 08:15

## 2024-04-03 RX ADMIN — INSULIN LISPRO 1 UNITS: 100 INJECTION, SOLUTION INTRAVENOUS; SUBCUTANEOUS at 12:34

## 2024-04-03 NOTE — ASSESSMENT & PLAN NOTE
Lab Results   Component Value Date    HGBA1C 9.4 (H) 04/01/2024       Recent Labs     04/03/24  1609 04/03/24  2048 04/04/24  0738 04/04/24  1123   POCGLU 219* 293* 215* 223*       Blood Sugar Average: Last 72 hrs:  (P) [id number]    Reviewed patient's CGM freestyle zhang.  Her lowest glucose recorded within past several days on 4/1 was in the mid to high 70s.  She does not have any episodes less than 60.  When asked if patient has an alarm system set up for hypoglycemia, she states she does not.  I recommended that she enable this on her device if it is possible.     Scheduled glargine 15 u qHS, 5u lispro TID AC  Management per primary

## 2024-04-03 NOTE — ASSESSMENT & PLAN NOTE
Wt Readings from Last 3 Encounters:   04/03/24 70.8 kg (156 lb)   01/29/24 70.8 kg (156 lb)   04/24/23 68 kg (150 lb)     At home, on metoprolol tartrate 25 mg BID and losartan 100 mg po qd  Med rec with pharmacy confirmed that patient takes 40 mg of Lasix p.o. daily, which is being held for dizziness    Plan:  Management per primary. On losartan and BB

## 2024-04-03 NOTE — CASE MANAGEMENT
Case Management Progress Note    Patient name Gia Vaughan  Location S /S -01 MRN [id number]  : 1940 Date 4/3/2024       LOS (days): 1  Geometric Mean LOS (GMLOS) (days): 2.9  Days to GMLOS:2        OBJECTIVE:        Current admission status: Inpatient  Preferred Pharmacy:   UNKNOWN - FOLLOW UP PRIOR TO DISCHARGE TO E-PRESCRIBE  No address on file      [id number] - [place] - [address]  [address]  [place] [address]  Phone: [phone] Fax: [phone]    [id number] - [place] - [address]  [address]  [place] [address]  Phone: [phone] Fax: [phone]    Primary Care Provider: Chayo Powell MD    Primary Insurance: MEDICARE  Secondary Insurance: [employer]URANCE    PROGRESS NOTE:    VM received from patient's nephew/Charles DE LEÓN, relaying he has yet to hear from provider and requesting someone contact. Relays that patient has been complaining about pain that he would like to discuss. Reports cell phone does not have good reception in his shop, so provided callback number: [phone]. Same added to chart and TT sent to SLIM MD to relay nephew be called.     Return call made to nephew to relay work number added to chart and message sent to attending to request call - aware that they will be reaching out. PT eval pending at this time. Will continue to follow.

## 2024-04-03 NOTE — ASSESSMENT & PLAN NOTE
Possibly related to uncontrolled HTN, as patient also had headache. Possible contribution from home metoprolol. Pulse in 60s.     CT head noncon and CTA H/N negative for ischemia, thrombus, stenosis.  CT head showed mild chronic small vessel ischemic changes.    Possibly with hypoglycemic episodes at home, but none recorded while inpatient. Patient claims her BG was in 50s at time of her headache that prompted her to come to ED.   Confirmed home dosing of losartan is 50 mg po qd, metoprolol 25 mg po BID    Lab Results   Component Value Date    [id number] 1,238 (H) 04/03/2024       Plan:  Check orthostatic VS  Home furosemide 40 mg daily on hold  Monitor volume status with I/O  Avoid anticholingerics  On home doses of losartan and metoprolol while inpatient - these may contribute to dizziness

## 2024-04-03 NOTE — CONSULTS
Consultation - Geriatric Medicine   Gia Vaughan 83 y.o. female MRN: [id number]  Unit/Bed#: S -01 Encounter: [id number]      Assessment/Plan     Dementia (HCC)  Assessment & Plan  New diagnosis this admission. Nephew also seems unaware of cognitive impairments, prompting consult to geriatrics this admission.  IADL: fully dependent on nephew  ADL: independent  Does not drive or walk outside to get mail  Ambulates w walker and rollator  Patient lives alone at home. Nephew checks on patient a day or two per week  Mini co/3 on word recall, 2/3 with prompting categories. 2/2 on drawing clock  Patient has visual impairment (wears glasses and only has prescription sunglasses while inpatient on her person)  Patient has no formally diagnosed hearing impairment  No reported incontinence  Has been on seroquel 12.5 mg qHS since hospitalization in  during which patient was noted to have hallucinations that were not described as malignant   Patient is poor historian  PCP was contacted by primary and he confirms patient has dementia/cognitive impairment  Lab Results   Component Value Date    [id number] 1.776 2024     Lab Results   Component Value Date    NBFQBGBM88 3,396 (H) 2023     Although patient is pleasantly confused, she does not appear delirious.     Plan:  - Change home seroquel from 12.5 mg qHS to qHS PRN   - Check TSH, B12  -Patient is high risk of delirium due to dementia with history of hallucinations and delirium in  hospitalization  -Initiate delirium precautions  -maintain normal sleep/wake cycle  -minimize overnight interruptions, group overnight vitals/labs/nursing checks as possible  -dim lights, close blinds and turn off tv to minimize stimulation and encourage sleep environment in evenings  -ensure that pain is well controlled. Can consider Tylenol 975mg Q8H scheduled if ongoing pain sx   -monitor for fecal and urinary retention which may precipitate delirium  -encourage early  "mobilization and ambulation  -provide frequent reorientation and redirection  -encourage family and friends at the bedside to help calm patient if anxious  -avoid medications which may precipitate or worsen delirium such as tramadol, benzodiazepine, anticholinergics, and antihistaminics  -encourage hydration and nutrition, assist with feeding if needed  -redirect unwanted behaviors as first line, avoid physical restraints.  Would recommend contacting Department of Aging to perform formal assessment of home safety    Dizziness  Assessment & Plan  Possibly related to uncontrolled HTN, as patient also had headache. Possible contribution from home metoprolol. Pulse in 60s.     CT head noncon and CTA H/N negative for ischemia, thrombus, stenosis.  CT head showed mild chronic small vessel ischemic changes.    Possibly with hypoglycemic episodes at home, but none recorded while inpatient. Patient claims her BG was in 50s at time of her headache that prompted her to come to ED.     Plan:  Check orthostatic VS  Monitor volume status with I/O  Check B12  Avoid anticholingerics  Home losartan reduced to 50% of home dose (50 mg from 100 mg)      CLL (chronic lymphocytic leukemia) (HCC)  Assessment & Plan  Dx 11/2022 after fall with femoral fx.    Patient is oblivious to her diagnosis and does not recall any visits to the oncologist.  Outpatient heme-onc is Dr. Rios.    Per his last note in Jan 2024:  \"Status post ORIF to the right hip, CAT scan showed mild adenopathy in the pelvis area measuring up to 2 cm, flow cytometry confirmed chronic lymphocytic leukemia, positive for trisomy 12, I GVH mutation is negative consistent with moderate risk of progression.\"    Baseline WBC seems to be 30-50k and fluctuant. Last outpt visit showed WBC 50k in Jan 2024.    Plan:  Daily CBC  Defer consultation of heme-onc to primary if deemed necessary    Chronic systolic heart failure (HCC)  Assessment & Plan  Wt Readings from Last 3 " Encounters:   04/03/24 70.8 kg (156 lb)   01/29/24 70.8 kg (156 lb)   04/24/23 68 kg (150 lb)     At home, on metoprolol tartrate 25 mg BID and losartan 100 mg po qd    Plan:  Management per primary. On losartan and BB  Check orthostatics x1 due to cognitive impairment and r/o orthostatic hypotension to minimize fall risk            Type 2 diabetes mellitus with stage 3 chronic kidney disease, unspecified whether long term insulin use, unspecified whether stage 3a or 3b CKD ([facility])  Assessment & Plan  Lab Results   Component Value Date    HGBA1C 9.4 (H) 04/01/2024       Recent Labs     04/02/24  2046 04/03/24  0710 04/03/24  1106 04/03/24  1609   POCGLU 211* 175* 171* 219*       Blood Sugar Average: Last 72 hrs:  (P) 262.25    Scheduled glargine 15 u qHS, 5u lispro TID AC  Management per primary     * Hypertensive urgency  Assessment & Plan  Currently on 50 mg losartan and metoprolol 15 mg q12h    Management per primary              History of Present Illness   Physician Requesting Consult: Ameya Green MD  Reason for Consult / Principal Problem: cognitive impairment (concern for)  Hx and PE limited by: cognitive impairment (poor historian)   Additional history obtained from: chart review, discussion w primary team who spoke directly w/pt's PCP       HPI: Gia Vaughan is a 83 y.o. female who presents with posterior headache and dizziness x12 hrs, found to have hypertensive urgency    Pmhx: CHF, DM2, HTN, TIA, CLL, cognitive impairment    We have been consulted over concern that patient appears confused (but not delirious) and lives at home alone. There is concern for cognitive impairment. Nephew is patient's PoA and checks on patient 1-2x per week but patient is otherwise home alone.   ADLs: independent  IADLs: Dependent on all (does not even walk outside to her mailbox to get mail). Does not drive.  Ambulation: Uses walker, rollator.  Eyesight: Poor. Uses glasses but in hospital only has her prescription  sunglasses available. Sees Dr. Lane from [facility].   No hearing aids    Patient states she takes lasix, potasium chloride, metoprolol, iron, B12, and calcium, but is unsure of the doses. Her pharmacy is [facility] in [place], [place].     At time of interview, patient denies fevers/chills, chest pain, shortness of breath, heart palpitations, nausea, vomiting, abdominal pain, weakness, or numbness.      Inpatient consult to Gerontology  Consult performed by: Anai Yousif MD  Consult ordered by: Ameya Green MD          Review of Systems   Constitutional:  Negative for chills and fever.   Respiratory:  Negative for cough and shortness of breath.    Cardiovascular:  Negative for chest pain and palpitations.   Gastrointestinal:  Negative for diarrhea, nausea and vomiting.   Skin:  Negative for rash.   Neurological:  Positive for dizziness. Negative for weakness and numbness.           Historical Information   Past Medical History:   Diagnosis Date    Benign adenomatous polyp of large intestine     CHF (congestive heart failure) (HCC)     Diabetes mellitus (HCC)     Hyperlipemia     Hypertension     Osteoarthritis     TIA (transient ischemic attack)      Past Surgical History:   Procedure Laterality Date    APPENDECTOMY      CARPAL TUNNEL RELEASE Right     HYSTERECTOMY W/ SALPINGO-OOPHERECTOMY      TX OPTX FEM SHFT FX W/INSJ IMED IMPLT W/WO SCREW Right 10/12/2022    Procedure: INSERTION NAIL IM FEMUR ANTEGRADE (TROCHANTERIC);  Surgeon: Eric Isaacs DO;  Location: [facility];  Service: Orthopedics     Social History   Social History     Substance and Sexual Activity   Alcohol Use Not Currently    Comment: very seldom     Social History     Substance and Sexual Activity   Drug Use Never     Social History     Tobacco Use   Smoking Status Never   Smokeless Tobacco Never     Family History: non-contributory    Meds/Allergies   all current active meds have been reviewed    Allergies   Allergen Reactions     Amlodipine     Ceftin [Cefuroxime]     Ciprofloxacin     Citalopram     Dye [Iodinated Contrast Media]     Glucophage [Metformin]     Januvia [Sitagliptin]     Neomycin-Polymyxin-Dexameth     Ondansetron     Prilosec [Omeprazole]     Vibramycin [Doxycycline]        Objective     Intake/Output Summary (Last 24 hours) at 4/3/2024 1853  Last data filed at 4/3/2024 0930  Gross per 24 hour   Intake 960 ml   Output --   Net 960 ml     Invasive Devices       Peripheral Intravenous Line  Duration             Peripheral IV 04/02/24 Left Forearm 1 day                    Physical Exam  Vitals reviewed.   Constitutional:       General: She is not in acute distress.     Appearance: She is not ill-appearing or diaphoretic.   HENT:      Head: Normocephalic and atraumatic.      Mouth/Throat:      Mouth: Mucous membranes are moist.      Pharynx: Oropharynx is clear.   Eyes:      General: No scleral icterus.  Cardiovascular:      Rate and Rhythm: Normal rate and regular rhythm.      Heart sounds: No murmur heard.     No friction rub. No gallop.   Pulmonary:      Effort: Pulmonary effort is normal. No respiratory distress.      Breath sounds: No stridor. No wheezing or rales.   Abdominal:      General: There is no distension.      Palpations: There is no mass.      Tenderness: There is no abdominal tenderness. There is no guarding.   Skin:     General: Skin is warm and dry.      Findings: No rash.   Neurological:      Mental Status: She is alert.      Comments: AO to self and place but not time   Psychiatric:         Mood and Affect: Mood normal.         Behavior: Behavior normal.      Comments: Commented about a picture on the wall that was not there (possibly TV?)         Lab Results:   I have personally reviewed pertinent lab results including the following:  - CBC, CMP, UA, TSH, A1C    I have personally reviewed the following imaging study reports in PACS:  -CTH noncon (chronic small vessel ischemic changes)  -CTA  H/N  -TTE      Therapies:   PT: ordered  OT: ordered    VTE Prophylaxis: Heparin    Code Status: Level 1 - Full Code  Advance Directive and Living Will:      Power of :    POLST:      Family and Social Support: Lives alone. Nephew checks on patient a few days per week  Living Arrangements: Lives Alone  Support Systems: Family members; Friends/neighbors  Type of Current Residence: [facility]  Discharge planning discussed with:: patient's nephew/Charles DE LEÓN, by phone  Freedom of Choice: Yes (re: rehab recommendation)      Goals of Care: Full code.    Thank you for allowing me to participate in your patients' care. Please do not hesitate to call with any additional questions.  MD Anai Estrella MD   PGY-3 Internal Medicine  [place] Resident

## 2024-04-03 NOTE — ASSESSMENT & PLAN NOTE
"New diagnosis this admission. Nephew also seems unaware of cognitive impairments, prompting consult to geriatrics this admission.  IADL: fully dependent on nephew  ADL: independent  Does not drive or walk outside to get mail  Ambulates w walker and rollator  Patient lives alone at home. Nephew checks on patient a day or two per week  Scored  on  MOCA  Mini co/3 on word recall, 2/3 with prompting categories. 2/2 on drawing clock  Patient has visual impairment (wears glasses and only has prescription sunglasses while inpatient on her person) - possibly contributing to her prior hx of hallucinations  Patient has no formally diagnosed hearing impairment  Patient denies any urinary or fecal incontinence and states she is able to make it to the bathroom in time and does not have accidents.  Patient states that she has \"relatives,\" who come visit her house on a nightly basis at 2300.  She states that they knock on the door, so she comes downstairs to unlock and open the door to let them in.  Patient also states that they stay the night and leave in the morning.  She states that these relatives are not her nephew, but she is unable to give specific names.  They are nonthreatening.  Question nighttime hallucinations.  In  patient was noted to have hallucinations while hospitalized, ? Delirium, treated w/12.5 mg seroquel. She does not take this regularly/no home scripts.   Patient is poor historian. Meds verified w pharmacy.   PCP was contacted by primary and he confirms patient has dementia/cognitive impairment.    Med rec w/[facility] (they deliver meds to pt's home):  Tylenol - uses heavily, for headache. Pharmacy is not sure how much patient actually takes, but pt frequently calls to refill/get tylenol specifically for her recurrent headaches  Kcl tabs 20 mEq po BID  Furosemide 40 mg po qd  Metoprolol tartrate 25 mg po BID  Atorvastatin 20 mg po qd    Does NOT use/fill seroquel, meclizine, " "methocarbamol, so these were removed from \"prior to admission\" medication list.     Lab Results   Component Value Date    CSN3EPZMCOGQ 1.776 04/03/2024     Lab Results   Component Value Date    [id number] 3,396 (H) 08/21/2023     No delirium noted during admission.      Plan:  - Discontinue seroquel as 12.5 mg qHS PRN on discharge  - Ordered OT cog eval to update MOCA as patient does not appear delirious and MOCA may be helpful to gauge if severity of dementia has increased; otherwise can perform outpatient several weeks after discharge from hospital  -Patient is high risk of delirium due to dementia with history of hallucinations and delirium in 2022 hospitalization  -Initiate delirium precautions  -maintain normal sleep/wake cycle  -minimize overnight interruptions, group overnight vitals/labs/nursing checks as possible  -dim lights, close blinds and turn off tv to minimize stimulation and encourage sleep environment in evenings  -ensure that pain is well controlled. Can consider Tylenol 975mg Q8H scheduled if ongoing pain sx   -monitor for fecal and urinary retention which may precipitate delirium  -encourage early mobilization and ambulation  -provide frequent reorientation and redirection  -encourage family and friends at the bedside to help calm patient if anxious  -avoid medications which may precipitate or worsen delirium such as tramadol, benzodiazepine, anticholinergics, and antihistaminics  -encourage hydration and nutrition, assist with feeding if needed  -redirect unwanted behaviors as first line, avoid physical restraints.  - Would recommend contacting Department of Aging to perform formal assessment of home safety  -Recommend geriatrics outpatient follow-up on discharge  "

## 2024-04-03 NOTE — ASSESSMENT & PLAN NOTE
"194/91 on arrival.  Given hydralazine in ED.  Most recent -150  On losartan and metoprolol    Blood pressure 158/72, pulse 64, temperature 98.3 °F (36.8 °C), resp. rate 16, height 5' 6\" (1.676 m), weight 70.8 kg (156 lb), SpO2 90%.    "

## 2024-04-03 NOTE — PLAN OF CARE
Problem: Prexisting or High Potential for Compromised Skin Integrity  Goal: Skin integrity is maintained or improved  Description: INTERVENTIONS:  - Identify patients at risk for skin breakdown  - Assess and monitor skin integrity  - Assess and monitor nutrition and hydration status  - Monitor labs   - Assess for incontinence   - Turn and reposition patient  - Assist with mobility/ambulation  - Relieve pressure over bony prominences  - Avoid friction and shearing  - Provide appropriate hygiene as needed including keeping skin clean and dry  - Evaluate need for skin moisturizer/barrier cream  - Collaborate with interdisciplinary team   - Patient/family teaching  - Consider wound care consult   4/2/2024 2357 by Carolyn Valero RN  Outcome: Progressing  4/2/2024 2357 by Carolyn Valero RN  Outcome: Progressing     Problem: Potential for Falls  Goal: Patient will remain free of falls  Description: INTERVENTIONS:  - Educate patient/family on patient safety including physical limitations  - Instruct patient to call for assistance with activity   - Consult OT/PT to assist with strengthening/mobility   - Keep Call bell within reach  - Keep bed low and locked with side rails adjusted as appropriate  - Keep care items and personal belongings within reach  - Initiate and maintain comfort rounds  - Make Fall Risk Sign visible to staff  - Offer Toileting every  Hours, in advance of need  - Initiate/Maintain alarm  - Obtain necessary fall risk management equipment:   - Apply yellow socks and bracelet for high fall risk patients  - Consider moving patient to room near nurses station  Outcome: Progressing

## 2024-04-03 NOTE — PLAN OF CARE
Problem: Prexisting or High Potential for Compromised Skin Integrity  Goal: Skin integrity is maintained or improved  Description: INTERVENTIONS:  - Identify patients at risk for skin breakdown  - Assess and monitor skin integrity  - Assess and monitor nutrition and hydration status  - Monitor labs   - Assess for incontinence   - Turn and reposition patient  - Assist with mobility/ambulation  - Relieve pressure over bony prominences  - Avoid friction and shearing  - Provide appropriate hygiene as needed including keeping skin clean and dry  - Evaluate need for skin moisturizer/barrier cream  - Collaborate with interdisciplinary team   - Patient/family teaching  - Consider wound care consult   Outcome: Progressing     Problem: Potential for Falls  Goal: Patient will remain free of falls  Description: INTERVENTIONS:  - Educate patient/family on patient safety including physical limitations  - Instruct patient to call for assistance with activity   - Consult OT/PT to assist with strengthening/mobility   - Keep Call bell within reach  - Keep bed low and locked with side rails adjusted as appropriate  - Keep care items and personal belongings within reach  - Initiate and maintain comfort rounds  - Make Fall Risk Sign visible to staff  - Offer Toileting every 2 Hours, in advance of need  - Initiate/Maintain alarm  - Obtain necessary fall risk management equipment:   - Apply yellow socks and bracelet for high fall risk patients  - Consider moving patient to room near nurses station  Outcome: Progressing

## 2024-04-03 NOTE — PHYSICAL THERAPY NOTE
PHYSICAL THERAPY EVALUATION/TREATMENT    NAME:  Gia Vaughan  AGE:   83 y.o.  Mrn:   [id number]  Length Of Stay: 1    ADMIT DX:  Neck pain [M54.2]  Dizziness [R42]  Hypertensive urgency [I16.0]  Positional lightheadedness [R42]  Headache [R51.9]    Past Medical History:   Diagnosis Date    Benign adenomatous polyp of large intestine     CHF (congestive heart failure) (HCC)     Diabetes mellitus (HCC)     Hyperlipemia     Hypertension     Osteoarthritis     TIA (transient ischemic attack)      Past Surgical History:   Procedure Laterality Date    APPENDECTOMY      CARPAL TUNNEL RELEASE Right     HYSTERECTOMY W/ SALPINGO-OOPHERECTOMY      NM OPTX FEM SHFT FX W/INSJ IMED IMPLT W/WO SCREW Right 10/12/2022    Procedure: INSERTION NAIL IM FEMUR ANTEGRADE (TROCHANTERIC);  Surgeon: Eric Isaacs DO;  Location: [facility];  Service: Orthopedics          04/03/24 1330   PT Last Visit   PT Visit Date 04/03/24   Note Type   Note type Evaluation   Pain Assessment   Pain Assessment Tool 0-10   Pain Score 7   Pain Location/Orientation Orientation: Right;Location: Hip;Location: Buttocks   Pain Onset/Description Onset: Ongoing   Effect of Pain on Daily Activities Limits comfort and mobility   Patient's Stated Pain Goal No pain   Hospital Pain Intervention(s) Repositioned;Ambulation/increased activity;Emotional support;Rest   Restrictions/Precautions   Other Precautions Cognitive;Fall Risk;Bed Alarm;Chair Alarm;Pain   Home Living   Type of Home House   Home Layout Two level;Able to live on main level with bedroom/bathroom;Performs ADLs on one level  (FFSU; one-step to enter)   Bathroom Shower/Tub Tub/shower unit  (Patient sponge bathes at baseline)   Bathroom Toilet Raised   Bathroom Equipment Shower chair;Grab bars in shower   Bathroom Accessibility Accessible   Home Equipment Cane  (Rollator)   Prior Function   Level of [place] Independent with ADLs;Independent with functional mobility;Needs assistance with IADLS    Lives With (S)  Alone   Receives Help From Family;Neighbor   IADLs Family/Friend/Other provides transportation;Independent with meal prep;Independent with medication management   Falls in the last 6 months 0  (Denies)   Comments Patient ambulates with her rollator inside at all times prior to admission   General   Additional Pertinent History Patient is admitted with dizziness, posterior headache, neck pain and subjectively reports hypoglycemic.   Family/Caregiver Present No   Cognition   Overall Cognitive Status Impaired   Arousal/Participation Cooperative   Attention Within functional limits   Orientation Level Oriented to person;Oriented to place;Oriented to time;Disoriented to situation   Memory Decreased recall of precautions;Decreased recall of recent events;Decreased short term memory   Following Commands Follows multistep commands with increased time or repetition   Comments At least 2 patient identifiers including name and date of birth   Subjective   Subjective Patient pleasant and cooperative with PT, agreeable to PT session   RLE Assessment   RLE Assessment WFL  (Grossly 3-3+/5)   LLE Assessment   LLE Assessment WFL  (Grossly 3-3+/5)   Vision-Basic Assessment   Current Vision Wears glasses all the time  (Glasses not present in the hospital)   Light Touch   RLE Light Touch Grossly intact   LLE Light Touch Grossly intact   Proprioception   RLE Proprioception Grossly intact   LLE Proprioception Grossly Intact   Bed Mobility   Supine to Sit 5  Supervision   Additional items Assist x 1;Verbal cues;Increased time required;HOB elevated;Bedrails   Transfers   Sit to Stand 4  Minimal assistance  (CGA)   Additional items Assist x 1;Verbal cues;Increased time required  (Cues for safe hand placement)   Stand to Sit 4  Minimal assistance  (CGA)   Additional items Assist x 1;Verbal cues;Increased time required  (Cues for safe hand placement)   Toilet transfer 4  Minimal assistance  (CGA)   Additional items Assist x  1;Verbal cues;Increased time required  (Use of grab bar in bathroom; patient is independent with hygiene after urination and supervision to wash her hands at the sink)   Ambulation/Elevation   Gait pattern Foward flexed;Decreased foot clearance;Short stride;Step through pattern;Decreased heel strike   Gait Assistance 4  Minimal assist  (CGA)   Additional items Assist x 1;Verbal cues;Tactile cues   Assistive Device Other (Comment)  (Patient's rollator)   Distance 10-12 feet to and from the bathroom   Balance   Static Sitting Good   Dynamic Sitting Fair   Static Standing Fair  (With patient's rollator)   Ambulatory   (Fair to occasional F- with patient's rollator)   Endurance Deficit   Endurance Deficit Yes   Endurance Deficit Description Limited standing, gait and overall activity endurance   Activity Tolerance   Activity Tolerance Patient limited by fatigue;Patient limited by pain   Assessment   Problem List Decreased strength;Decreased endurance;Impaired balance;Decreased mobility;Decreased safety awareness;Pain;Decreased cognition   Assessment Patient seen for Physical Therapy evaluation. Patient admitted with Hypertensive urgency.  Comorbidities affecting patient's physical performance include: DM 2, CKD 3, mild cognitive impairment, CLL, HLD, OA, history of right femur fracture, dizziness, encephalopathy.  Personal factors affecting patient at time of initial evaluation include: lives in two story house, ambulating with assistive device, stairs to enter home, inability to navigate community distances, inability to navigate level surfaces without external assistance, inability to perform dynamic tasks in community, decreased cognition, and limited insight into impairments. Prior to admission, patient was independent with functional mobility with rollator, independent with ADLS, requiring assist for IADLS, living alone in two story home with 1 steps to enter, ambulating household distance, and lives in a  multilevel house but has 1st floor setup.  Please find objective findings from Physical Therapy assessment regarding body systems outlined above with impairments and limitations including weakness, impaired balance, decreased endurance, gait deviations, pain, decreased activity tolerance, decreased functional mobility tolerance, decreased safety awareness, impaired judgement, fall risk, and decreased cognition.  The Barthel Index was used as a functional outcome tool presenting with a score of Barthel Index Score: 55 today indicating marked limitations of functional mobility and ADLS.  Patient's clinical presentation is currently unstable/unpredictable as seen in patient's presentation of vital sign response, changing level of pain, varying levels of cognitive performance, increased fall risk, new onset of impairment of functional mobility, decreased endurance, and new onset of weakness. Pt would benefit from continued Physical Therapy treatment to address deficits as defined above and maximize level of functional mobility. As demonstrated by objective findings, the assigned level of complexity for this evaluation is high.    The patient's -[facility] Basic Mobility Inpatient Short Form Raw Score is 17. A Raw score of greater than 16 suggests the patient may benefit from discharge to home. Please also refer to the recommendation of the Physical Therapist for safe discharge planning.   Goals   Patient Goals To go home   STG Expiration Date 04/13/24   Short Term Goal #1 Patient will: Increase bilateral LE strength 1/2 grade to facilitate independent mobility, Perform all bed mobility tasks independently to improve pt's independence w/ repositioning for decrease risk of skin breakdown, Perform all transfers independently consistently from various height surfaces in order to improve I w/ engagement w/ real-world environments/situations, Ambulate at least 100 ft. with patient's rollator independently w/o LOB to facilitate  return and engagement w/ previous living environment, Navigate 1 stairs w/ supervision without handrail to either improve independence w/ entering home and/or so patient can fully access living areas in home, Increase ambulatory and static and dynamic standing balance 1 grade to decrease risk for falls, and Tolerate at least 15 consecutive minutes of activity to demonstrate improved activity tolerance and endurance   Plan   Treatment/Interventions ADL retraining;Functional transfer training;LE strengthening/ROM;Elevations;Therapeutic exercise;Endurance training;Cognitive reorientation;Patient/family training;Equipment eval/education;Bed mobility;Gait training;Compensatory technique education;Spoke to case management   PT Frequency 3-5x/wk   Discharge Recommendation   Rehab Resource Intensity Level, PT III (Minimum Resource Intensity)   AM-PAC Basic Mobility Inpatient   Turning in Flat Bed Without Bedrails 3   Lying on Back to Sitting on Edge of Flat Bed Without Bedrails 3   Moving Bed to Chair 3   Standing Up From Chair Using Arms 3   Walk in Room 3   Climb 3-5 Stairs With Railing 2   Basic Mobility Inpatient Raw Score 17   Basic Mobility Standardized Score 39.67   [facility] Highest Level Of Mobility   -[facility] Goal 5: Stand one or more mins   -HL Achieved 6: Walk 10 steps or more   Barthel Index   Feeding 10   Bathing 0   Grooming Score 5   Dressing Score 5   Bladder Score 5   Bowels Score 10   Toilet Use Score 5   Transfers (Bed/Chair) Score 10   Mobility (Level Surface) Score 0   Stairs Score 5   Barthel Index Score 55   Additional Treatment Session   Start Time 1330   End Time 1344   Treatment Assessment Patient agreeable to additional ambulation with her rollator.  Patient transfers sit to stand with supervision and cues for safe hand placement.  Patient ambulates with her rollator 50 feet with change in direction with supervision.  Patient transfers stand to sit with supervision and returns to supine with  supervision.  A: patient with good tolerance to PT evaluation and treatment.  Patient is limited by generalized weakness and conditioning and impaired endurance, but also remarks that she does not walk long distances in her home but always uses her rollator when she needs to walk.  While admitted, patient will continue to benefit from skilled physical therapy services to increase her strength, safety, balance, endurance, safe gait and functional mobility with her rollator.  When medically stable for discharge, patient is appropriate for Level III Minimum Resource Intensity.   End of Consult   Patient Position at End of Consult Supine;All needs within reach;Bed/Chair alarm activated   Licensure   [place] License Number  Jennifer L MARTHA Pratt   Portions of the documentation may have been created using voice recognition software. Occasional wrong word or sound alike substitutions may have occurred due to the inherent limitation of the voice recognition software. Read the chart carefully and recognize, using context, where substitutions have occurred.

## 2024-04-03 NOTE — ASSESSMENT & PLAN NOTE
Wt Readings from Last 3 Encounters:   04/03/24 70.8 kg (156 lb)   01/29/24 70.8 kg (156 lb)   04/24/23 68 kg (150 lb)     Examines euvolemic   I&O, weights

## 2024-04-03 NOTE — PLAN OF CARE
Problem: PHYSICAL THERAPY ADULT  Goal: Performs mobility at highest level of function for planned discharge setting.  See evaluation for individualized goals.  Description: Treatment/Interventions: ADL retraining, Functional transfer training, LE strengthening/ROM, Elevations, Therapeutic exercise, Endurance training, Cognitive reorientation, Patient/family training, Equipment eval/education, Bed mobility, Gait training, Compensatory technique education, Spoke to case management          See flowsheet documentation for full assessment, interventions and recommendations.  Note:    Problem List: Decreased strength, Decreased endurance, Impaired balance, Decreased mobility, Decreased safety awareness, Pain, Decreased cognition  Assessment: Patient seen for Physical Therapy evaluation. Patient admitted with Hypertensive urgency.  Comorbidities affecting patient's physical performance include: DM 2, CKD 3, mild cognitive impairment, CLL, HLD, OA, history of right femur fracture, dizziness, encephalopathy.  Personal factors affecting patient at time of initial evaluation include: lives in two story house, ambulating with assistive device, stairs to enter home, inability to navigate community distances, inability to navigate level surfaces without external assistance, inability to perform dynamic tasks in community, decreased cognition, and limited insight into impairments. Prior to admission, patient was independent with functional mobility with rollator, independent with ADLS, requiring assist for IADLS, living alone in two story home with 1 steps to enter, ambulating household distance, and lives in a multilevel house but has 1st floor setup.  Please find objective findings from Physical Therapy assessment regarding body systems outlined above with impairments and limitations including weakness, impaired balance, decreased endurance, gait deviations, pain, decreased activity tolerance, decreased functional mobility  tolerance, decreased safety awareness, impaired judgement, fall risk, and decreased cognition.  The Barthel Index was used as a functional outcome tool presenting with a score of Barthel Index Score: 45 today indicating marked limitations of functional mobility and ADLS.  Patient's clinical presentation is currently unstable/unpredictable as seen in patient's presentation of vital sign response, changing level of pain, varying levels of cognitive performance, increased fall risk, new onset of impairment of functional mobility, decreased endurance, and new onset of weakness. Pt would benefit from continued Physical Therapy treatment to address deficits as defined above and maximize level of functional mobility. As demonstrated by objective findings, the assigned level of complexity for this evaluation is high.The patient's -[facility] Basic Mobility Inpatient Short Form Raw Score is 17. A Raw score of greater than 16 suggests the patient may benefit from discharge to home. Please also refer to the recommendation of the Physical Therapist for safe discharge planning.        Rehab Resource Intensity Level, PT: III (Minimum Resource Intensity)    See flowsheet documentation for full assessment.

## 2024-04-03 NOTE — ED ATTENDING ATTESTATION
"4/1/2024  I, Samantha Valentin MD, saw and evaluated the patient. I have discussed the patient with the resident/non-physician practitioner and agree with the resident's/non-physician practitioner's findings, Plan of Care, and MDM as documented in the resident's/non-physician practitioner's note, except where noted. All available labs and Radiology studies were reviewed.  I was present for key portions of any procedure(s) performed by the resident/non-physician practitioner and I was immediately available to provide assistance.       At this point I agree with the current assessment done in the Emergency Department.  I have conducted an independent evaluation of this patient a history and physical is as follows:    Patient is an 83-year-old female presents to the emergency department feeling poorly.  She reports feeling \"dizzy\" and additionally describes having neck and head pain.  She has generally felt well recently and earlier in the day noticed some mild discomfort in the back of her neck.  While making a sandwich she suddenly experienced intense pain at the base of her head/upper neck.  This was severe and radiated upward to the head as well as to the left side of her neck.  At this time she appreciates discomfort only on the left side of the neck.  She has a mild diffuse headache.  Upon experiencing the intense pain she describes feeling dizzy (clarified to be lightheaded without any motion/vertiginous component).  She also felt nauseous which prompted her to sit.  She contacted her nephew shortly afterwards and decision was made to bring her in for evaluation.  She has never had similar symptoms previously.    No recent illness or head injury.  At the time the pain came on intently she describes it as being worse if she tried to open her eyes.  She did not specifically appreciate visual disturbance including blurring, diplopia or loss of visual fields.  At this time she maintains eyes and open " positioning without any ocular discomfort, vision abnormality or photophobia.  She did not appreciate any abnormalities with regard to her arm or leg strength nor any paresthesias or numbness.  With intensity of discomfort she did not feel well enough to stand but denies having specifically felt off balance.    She does experience urinary frequency; this is chronic.  She has not had dysuria.  She denies having had any abdominal discomfort, problems with bowel movements or recent intake.    Past medical history includes hypertension, hyperlipidemia, diabetes (which is well-controlled and for which she wears a glucose monitor).  No recent changes in medications.    On exam she appears mildly uncomfortable.  Her speech is clear and responses appropriate.  Mucous membranes are moist.  Heart sounds regular.  Lungs clear to auscultation diffusely.  Blood pressure is mildly elevated.  She is not tachycardic, febrile or hypoxic.  Abdomen is soft and nontender.  Lower extremities nontender and nonedematous.  She does have tenderness in the bilateral occipital region as well as throughout the cervical midline.  Marked left paraspinal cervical tenderness present.  No right paraspinal cervical tenderness.  She is able to range the head and neck.  No meningismus.      No facial asymmetry/ deficit appreciated.  Pupils briskly reactive to light.  EOMI.  No nystagmus. Strong eye closure & symmetric brow raise. Ability to insufflate cheeks, protrude tongue midline & range this laterally. Symmetric/ intact sensation in the upper, mid & lower portions of the face.  5/5 strength on head turn, shoulder shrug, bicep& tricep movement against resistance b/l.  5/5 strength on b/l hand , pincer grasp, finger abduction, dorsi & volar flexion, hip flexion, dorsi & plantar flexion. Symmetric grossly intact sensation in the UE & LE.  Gait deferred.  No dysmetria.    Differential diagnosis includes but is not limited to vertebral artery  "dissection, vertebrobasilar insufficiency, ICH, hypertensive urgency (with lowering of blood pressure en route), cervical radiculopathy/occipital neuralgia, less likely migraine, meningitis, encephali, ACS, dysrhythmia.  Although patient describes her dizziness as a lightheadedness given acute onset and accompaniment of nausea BPPV and other causes of peripheral vertigo remain within differential.    ED Course     Headache had improved significantly from that experienced at home.  Initial labs drawn were unremarkable.  No dysrhythmia appreciated on EKG.  Desired CTA imaging to assess for any intracranial bleed or arterial dissection.  A contrast allergy is listed in the chart without notation of the adverse effect.  Prior CTs performed through [facility] have been without contrast.  Patient and nephew are not aware of any reaction she may have had from contrast.  She denies having ever experienced an allergic reaction with dyspnea, diffuse swelling or rash.    Decision made to obtain noncontrast CT to assess for ICH and perform CTA after prep.  She continued feeling improved with only mild head discomfort during ED stay.    No acute abnormality was identified on CTA.  She remained improved and was able to stand and take some steps in the room on Dr. Shelton's reevaluation.  Patient reported feeling a bit stiff though had been resting in the stretcher for several hours.    Following removal of IV and dressing with intent to at home she felt acutely worse upon moving to edge of bed and trying to stand.  She was unsteady, felt lightheaded and endorsed feeling like her head was going to \"blow off.\"  She denied appreciating an abnormal movement sensation.  Additional dose of acetaminophen ordered.  Symptoms improved a bit with rest.  She had not had anything to eat or drink recently.  Glucose which was normal on arrival had elevated to mid 300s which is much higher than typical.  She had not yet had her nighttime " insulin.  Uncertain that this is responsible.  Steroid prep could have led to hyperglycemia though unclear that this medication or her hyperglycemia are responsible for the intense symptoms.  Decision made to bring her in for observation.  Patient nephew in agreement.    Critical Care Time  Procedures

## 2024-04-03 NOTE — ASSESSMENT & PLAN NOTE
"Dx 11/2022 after fall with femoral fx.    Patient is oblivious to her diagnosis and does not recall any visits to the oncologist.  Outpatient heme-onc is Dr. Rios.    Per his last note in Jan 2024:  \"Status post ORIF to the right hip, CAT scan showed mild adenopathy in the pelvis area measuring up to 2 cm, flow cytometry confirmed chronic lymphocytic leukemia, positive for trisomy 12, I GVH mutation is negative consistent with moderate risk of progression.\"    Baseline WBC seems to be 30-50k and fluctuant. Last outpt visit showed WBC 50k in Jan 2024.    Plan:  Daily CBC  "

## 2024-04-03 NOTE — ASSESSMENT & PLAN NOTE
Currently on 50 mg losartan and metoprolol 25 mg q12h    Management per primary   VIRTUAL VISIT    DUE TO COVID-19 action plan, the patient's office visit was converted to a video visit      Patient: Brionna Elena Date of Service: 2020   : 1982 MRN: [id number]     SUBJECTIVE:     Chief Complaint   Patient presents with   • Video Visit     Unable to take vitals.   • Arm Pain     right arm is worst then left, pain in joints X 2 months       HISTORY OF PRESENT ILLNESS:  Brionna Elena is a 37 year old female who is at home currently and verbally consented to Video encounter. Visit completed in 18 minutes. She is c/o worsening right wrist pain radiating to right arm for 1 week. Also reports tingling in the right wrist. Denies any injury/f/n/v/chills/cp/sob.           PAST MEDICAL HISTORY:  History reviewed. No pertinent past medical history.    MEDICATIONS:  Current Outpatient Medications   Medication Sig   • levonorgestrel (MIRENA, 52 MG,) (52 MG) 20 MCG/DAY intrauterine device by Intrauterine route 1 time.   • methylPREDNISolone (MEDROL DOSEPAK) 4 MG tablet follow package directions     No current facility-administered medications for this visit.        ALLERGIES:  ALLERGIES:   Allergen Reactions   • Amoxicillin Other (See Comments)   • Amoxil Other (See Comments)       PAST SURGICAL HISTORY:  Past Surgical History:   Procedure Laterality Date   • Cholecystectomy         FAMILY HISTORY:  Family History   Problem Relation Age of Onset   • Patient is unaware of any medical problems Mother    • Patient is unaware of any medical problems Father        SOCIAL HISTORY:  Social History     Tobacco Use   Smoking Status Never Smoker   Smokeless Tobacco Never Used     Social History     Substance and Sexual Activity   Alcohol Use Never   • Frequency: Never       Review of Systems   Constitutional: Negative for fatigue.   HENT: Negative for ear pain, sore throat and trouble swallowing.    Eyes: Negative for pain and visual disturbance.   Respiratory: Negative for cough and shortness of breath.     Cardiovascular: Negative for chest pain and palpitations.   Gastrointestinal: Negative for abdominal pain, constipation and diarrhea.   Musculoskeletal: Positive for arthralgias.   Skin: Negative for rash.         OBJECTIVE:     There were no vitals taken for this visit.    Physical Exam  Vitals signs and nursing note reviewed.   Constitutional:       General: She is not in acute distress.     Appearance: Normal appearance.   HENT:      Head: Normocephalic and atraumatic.   Neck:      Musculoskeletal: Normal range of motion.   Musculoskeletal:      Comments: Right wrist- patient had tingling with flexion of wrist   Neurological:      Mental Status: She is alert and oriented to person, place, and time.   Psychiatric:         Mood and Affect: Mood normal.           DIAGNOSTIC STUDIES:   LAB RESULTS:    No visits with results within 1 Month(s) from this visit.   Latest known visit with results is:   Office Visit on 09/11/2019   Component Date Value Ref Range Status   • WBC 09/11/2019 8.0  4.2 - 11.0 K/mcL Final   • RBC 09/11/2019 4.16  4.00 - 5.20 mil/mcL Final   • HGB 09/11/2019 12.8  12.0 - 15.5 g/dL Final   • HCT 09/11/2019 41.3  36.0 - 46.5 % Final   • MCV 09/11/2019 99.3  78.0 - 100.0 fl Final   • MCH 09/11/2019 30.8  26.0 - 34.0 pg Final   • MCHC 09/11/2019 31.0* 32.0 - 36.5 g/dL Final   • RDW-CV 09/11/2019 13.1  11.0 - 15.0 % Final   • PLT 09/11/2019 266  140 - 450 K/mcL Final   • NRBC 09/11/2019 0  0 /100 WBC Final   • DIFF TYPE 09/11/2019 AUTOMATED DIFFERENTIAL   Final   • Neutrophil 09/11/2019 64  % Final   • LYMPH 09/11/2019 27  % Final   • MONO 09/11/2019 7  % Final   • EOSIN 09/11/2019 1  % Final   • BASO 09/11/2019 0  % Final   • Percent Immature Granuloctyes 09/11/2019 1  % Final   • Absolute Neutrophil 09/11/2019 5.1  1.8 - 7.7 K/mcL Final   • Absolute Lymph 09/11/2019 2.2  1.0 - 4.8 K/mcL Final   • Absolute Mono 09/11/2019 0.6  0.3 - 0.9 K/mcL Final   • Absolute Eos 09/11/2019 0.1  0.1 - 0.5 K/mcL  Final   • Absolute Baso 09/11/2019 0.0  0.0 - 0.3 K/mcL Final   • Absolute Immature Granulocytes 09/11/2019 0.0  0 - 0.2 K/mcl Final   • TSH 09/11/2019 0.908  0.350 - 5.000 mcUnits/mL Final   • ESR 09/11/2019 35* 0 - 20 mm/hr Final   • RHEUMATOID FACTOR 09/11/2019 <10  <15 Units/mL Final   • Cyclic Citrul Pep AB  09/11/2019 17  <20 Units Final   • LUKAS SCREEN WITH REFL 09/11/2019 NEGATIVE  NEGATIVE Final             ASSESSMENT AND PLAN:   This is a 37 year old year-old female who presents with     1. Carpal tunnel syndrome, bilateral        Problem List Items Addressed This Visit        Nervous    Carpal tunnel syndrome, bilateral - Primary     - night wrist splint daily  - Medrol pack  - ed on wrist exercises  - follow up if no improvement in 2 weeks               Return in about 2 weeks (around 7/16/2020).        Per [facility] guidelines for deferring regularly scheduled outpatient appointments during the COVID 19 Pandemic outbreak, the patient was deemed clinically appropriate to defer their physical exam at this time. The patient was advised of and acknowledged the risk of deferring these procedures and chose to proceed with the virtual encounter. The patient was advised to contact the clinic should they have any change in their clinical status prior to their rescheduled appointment.     Patient has provided verbal understanding of the co-insurance/co-pay liability for the virtual visit services.     Patient was also advised of current CDC guidelines to implement strategies to minimize the risk of exposure to COVID-19.     Medication use,effects and side effects discussed in detail with patient.  The patient indicated understanding of the diagnosis and agreed with the plan of care.  Refer to orders.  Return to clinic as clinically indicated as discussed with patient who verbalized understanding of & agreement with the plan.    Entered by Bryan Stoner MD

## 2024-04-03 NOTE — ASSESSMENT & PLAN NOTE
Per ED, noted to have dizziness and severe headache yesterday around 1600.  Patient does not recall events and believes she was at a concert at this time.  Consider TIA vs hypertensive urgency  CT: no acute findings.  Chronic small vessel disease  CTA: negative  Neurology consult appreciated- no stroke clinically

## 2024-04-03 NOTE — ASSESSMENT & PLAN NOTE
Oriented to self, place, time but has poor short term memory.    Likely has progression to dementia  Reorient, fall precautions

## 2024-04-03 NOTE — PROGRESS NOTES
"[facility]  Progress Note  Name: Gia Vaughan I  MRN: [id number]  Unit/Bed#: S -Mouna I Date of Admission: 4/1/2024   Date of Service: 4/3/2024 I Hospital Day: 1    Assessment/Plan   * Hypertensive urgency  Assessment & Plan  194/91 on arrival.  Given hydralazine in ED.  Most recent -150  On losartan and metoprolol    Blood pressure 158/72, pulse 64, temperature 98.3 °F (36.8 °C), resp. rate 16, height 5' 6\" (1.676 m), weight 70.8 kg (156 lb), SpO2 90%.      Suspected Mild cognitive impairment  Assessment & Plan  Oriented to self, place, time but has poor short term memory.    Likely has progression to dementia  Reorient, fall precautions    Dizziness  Assessment & Plan  Per ED, noted to have dizziness and severe headache yesterday around 1600.  Patient does not recall events and believes she was at a concert at this time.  Consider TIA vs hypertensive urgency  CT: no acute findings.  Chronic small vessel disease  CTA: negative  Neurology consult appreciated- no stroke clinically    CLL (chronic lymphocytic leukemia) (HCC)  Assessment & Plan  Follows with hemonc as outpatient  WBC stable 37,000.  Hgb, platelets normal.    Chronic systolic heart failure (HCC)  Assessment & Plan  Wt Readings from Last 3 Encounters:   04/03/24 70.8 kg (156 lb)   01/29/24 70.8 kg (156 lb)   04/24/23 68 kg (150 lb)     Examines euvolemic   I&O, weights      Type 2 diabetes mellitus with stage 3 chronic kidney disease, unspecified whether long term insulin use, unspecified whether stage 3a or 3b CKD (HCC)  Assessment & Plan  Lab Results   Component Value Date    HGBA1C 9.4 (H) 04/01/2024     Salima, VINITA  Nursing to review/update med list in AM.  Patient reports she was hypoglycemic last night with blood sugars in 50s?   Was given levemir 20 units in ED         Worsening leukocytosis is a concern but given history of CLL will monitor off antibiotics    TE Pharmacologic Prophylaxis: " "Heparin    Patient Centered Rounds: I have performed bedside rounds with nursing staff today.  Discussions with Specialists or Other Care Team Provider: Neurology, geriatrics  Education and Discussions with Family / Patient: patient    Total Time Spent on Date of Encounter in care of patient:  50 mins. This time was spent on one or more of the following: performing physical exam; counseling and coordination of care; obtaining or reviewing history; documenting in the medical record; reviewing/ordering tests, medications or procedures; communicating with other healthcare professionals and discussing with patient's family/caregivers.     Current Length of Stay: 1 day(s)  Current Patient Status: Inpatient     Mobility:   Basic Mobility Inpatient Raw Score: 17  JH-HLM Goal: 5: Stand one or more mins  JH-HLM Achieved: 6: Walk 10 steps or more  JH-HLM Goal NOT achieved. Continue with multidisciplinary rounding and encourage appropriate mobility to improve upon JH-HLM goals.    Certification Statement: The patient will continue to require additional inpatient hospital stay due to Hypertensive urgency  Discharge Plan / Estimated Discharge Date: 1-2 days    Code Status: Level 1 - Full Code  ______________________________________________________________________________    Subjective:   Patient seen and examined by me.  Patient seems to have significant confusion.  I spoke with the primary care physician and she does have dementia.  No nausea or vomiting.  No fever or chills.  Patient does not have any dysuria or diarrhea or severe cough    Objective:   Vitals: Blood pressure 158/72, pulse 64, temperature 98.3 °F (36.8 °C), resp. rate 16, height 5' 6\" (1.676 m), weight 70.8 kg (156 lb), SpO2 90%.    Physical Exam:   General appearance: alert, fatigued, appears stated age, and cooperative  Constitutional- looks a little weak  HEENT - atraumatic and normocephalic  Neck- supple  Skin - no fresh rash  Extremities no fresh focal " deformities  Cardiovascular- S1-S2 heard  Respiratory- bilateral air entry present, no crackles or rhonchi  Skin - no fresh rash  Abdomen - normal bowel sounds present, no rebound tenderness  CNS- No fresh focal deficits  Psych- no acute psychosis     Additional Data:   Labs:  Results from last 7 days   Lab Units 04/03/24 0439 04/02/24 0414 04/01/24 1914   WBC Thousand/uL 56.61* 26.29* 37.91*   HEMOGLOBIN g/dL 12.7 15.0 15.0   HEMATOCRIT % 39.7 47.1* 47.2*   MCV fL 106* 105* 106*   TOTAL NEUT ABS Thousand/uL  --  7.89* 8.72*   PLATELETS Thousands/uL 169 195 193     Results from last 7 days   Lab Units 04/03/24 0439 04/02/24 0414 04/01/24 1914   SODIUM mmol/L 141 137 139   POTASSIUM mmol/L 3.9 4.1 3.9   CHLORIDE mmol/L 102 102 101   CO2 mmol/L 25 24 30   ANION GAP mmol/L 14* 11 8   BUN mg/dL 32* 22 23   CREATININE mg/dL 1.02 0.95 0.98   CALCIUM mg/dL 9.0 9.3 10.3*   ALBUMIN g/dL  --   --  4.8   TOTAL BILIRUBIN mg/dL  --   --  0.48   ALK PHOS U/L  --   --  97   ALT U/L  --   --  33   AST U/L  --   --  22   EGFR ml/min/1.73sq m 50 55 53   GLUCOSE RANDOM mg/dL 154* 308* 144*     Results from last 7 days   Lab Units 04/02/24 0414   MAGNESIUM mg/dL 2.0                  Results from last 7 days   Lab Units 04/03/24  1106 04/03/24  0710 04/02/24  2046 04/02/24  1556 04/02/24  1120 04/02/24  0747 04/02/24  0247   POC GLUCOSE mg/dl 171* 175* 211* 436* 304* 278* 304*     Results from last 7 days   Lab Units 04/01/24 1914   HEMOGLOBIN A1C % 9.4*     Results from last 7 days   Lab Units 04/03/24 0439   TSH 3RD GENERATON uIU/mL 1.776     * I Have Reviewed All Lab Data Listed Above.    Cultures:               Imaging:  Imaging Reports Reviewed Today Include:   CTA head and neck with and without contrast    Result Date: 4/2/2024  Impression: 1.  No acute intracranial hemorrhage, mass effect or extra-axial collection. 2.  No hemodynamically significant stenosis, dissection or occlusion of the carotid or vertebral arteries.  3.  No intracranial aneurysm.  No hemodynamically significant stenosis or occlusion of the major vessels of the [place]. Workstation performed: [id number]     CT head without contrast    Result Date: 4/1/2024  Impression: No acute intracranial abnormality. Mild chronic small vessel ischemic changes Workstation performed: [id number]     Scheduled Meds:  Current Facility-Administered Medications   Medication Dose Route Frequency Provider Last Rate    acetaminophen  650 mg Oral Q6H PRN BRINDA Orozco      atorvastatin  40 mg Oral QPM BRINDA Orozco      heparin (porcine)  5,000 Units Subcutaneous Q8H TESSA BRINDA Orozco      insulin glargine  15 Units Subcutaneous HS Mika Galan MD      insulin lispro  1-5 Units Subcutaneous TID AC BRINDA Orozco      insulin lispro  1-5 Units Subcutaneous HS BRINDA Orozco      insulin lispro  5 Units Subcutaneous Daily With Breakfast Mika Galan MD      insulin lispro  5 Units Subcutaneous Daily With Lunch Mika Galan MD      insulin lispro  5 Units Subcutaneous Daily With Dinner Mika Galan MD      losartan  50 mg Oral Daily Mika Galan MD      metoprolol tartrate  25 mg Oral Q12H [facility] Mika Galan MD      QUEtiapine  12.5 mg Oral QPM MD Ameya Brennan Ma, MD  [facility] Internal Medicine      ** Please Note: This note has been constructed using a voice recognition system. **

## 2024-04-03 NOTE — ASSESSMENT & PLAN NOTE
Lab Results   Component Value Date    HGBA1C 9.4 (H) 04/01/2024     VINITA Borrego  Nursing to review/update med list in AM.  Patient reports she was hypoglycemic last night with blood sugars in 50s?   Was given levemir 20 units in ED

## 2024-04-04 VITALS
BODY MASS INDEX: 24.23 KG/M2 | WEIGHT: 150.8 LBS | HEIGHT: 66 IN | SYSTOLIC BLOOD PRESSURE: 169 MMHG | RESPIRATION RATE: 17 BRPM | HEART RATE: 64 BPM | TEMPERATURE: 98 F | DIASTOLIC BLOOD PRESSURE: 77 MMHG | OXYGEN SATURATION: 95 %

## 2024-04-04 PROBLEM — F03.B0 MODERATE DEMENTIA (HCC): Status: ACTIVE | Noted: 2021-08-05

## 2024-04-04 PROBLEM — R51.9 HEADACHE: Status: ACTIVE | Noted: 2024-04-04

## 2024-04-04 LAB
ALBUMIN SERPL BCP-MCNC: 3.9 G/DL (ref 3.5–5)
ALP SERPL-CCNC: 87 U/L (ref 34–104)
ALT SERPL W P-5'-P-CCNC: 26 U/L (ref 7–52)
ANION GAP SERPL CALCULATED.3IONS-SCNC: 7 MMOL/L (ref 4–13)
AST SERPL W P-5'-P-CCNC: 20 U/L (ref 13–39)
BILIRUB SERPL-MCNC: 0.52 MG/DL (ref 0.2–1)
BUN SERPL-MCNC: 30 MG/DL (ref 5–25)
CALCIUM SERPL-MCNC: 9.2 MG/DL (ref 8.4–10.2)
CHLORIDE SERPL-SCNC: 106 MMOL/L (ref 96–108)
CO2 SERPL-SCNC: 28 MMOL/L (ref 21–32)
CREAT SERPL-MCNC: 1.03 MG/DL (ref 0.6–1.3)
ERYTHROCYTE [DISTWIDTH] IN BLOOD BY AUTOMATED COUNT: 14.2 % (ref 11.6–15.1)
GFR SERPL CREATININE-BSD FRML MDRD: 50 ML/MIN/1.73SQ M
GLUCOSE SERPL-MCNC: 197 MG/DL (ref 65–140)
GLUCOSE SERPL-MCNC: 215 MG/DL (ref 65–140)
GLUCOSE SERPL-MCNC: 223 MG/DL (ref 65–140)
GLUCOSE SERPL-MCNC: 247 MG/DL (ref 65–140)
HCT VFR BLD AUTO: 43.2 % (ref 34.8–46.1)
HGB BLD-MCNC: 13.7 G/DL (ref 11.5–15.4)
MCH RBC QN AUTO: 33.7 PG (ref 26.8–34.3)
MCHC RBC AUTO-ENTMCNC: 31.7 G/DL (ref 31.4–37.4)
MCV RBC AUTO: 106 FL (ref 82–98)
PLATELET # BLD AUTO: 193 THOUSANDS/UL (ref 149–390)
PMV BLD AUTO: 11.9 FL (ref 8.9–12.7)
POTASSIUM SERPL-SCNC: 4 MMOL/L (ref 3.5–5.3)
PROT SERPL-MCNC: 6.6 G/DL (ref 6.4–8.4)
RBC # BLD AUTO: 4.06 MILLION/UL (ref 3.81–5.12)
SODIUM SERPL-SCNC: 141 MMOL/L (ref 135–147)
VIT B12 SERPL-MCNC: 1019 PG/ML (ref 180–914)
WBC # BLD AUTO: 55.05 THOUSAND/UL (ref 4.31–10.16)

## 2024-04-04 PROCEDURE — 99239 HOSP IP/OBS DSCHRG MGMT >30: CPT | Performed by: INTERNAL MEDICINE

## 2024-04-04 PROCEDURE — 80053 COMPREHEN METABOLIC PANEL: CPT | Performed by: INTERNAL MEDICINE

## 2024-04-04 PROCEDURE — 82948 REAGENT STRIP/BLOOD GLUCOSE: CPT

## 2024-04-04 PROCEDURE — 99232 SBSQ HOSP IP/OBS MODERATE 35: CPT | Performed by: FAMILY MEDICINE

## 2024-04-04 PROCEDURE — 97130 THER IVNTJ EA ADDL 15 MIN: CPT

## 2024-04-04 PROCEDURE — 97129 THER IVNTJ 1ST 15 MIN: CPT

## 2024-04-04 PROCEDURE — 85027 COMPLETE CBC AUTOMATED: CPT | Performed by: INTERNAL MEDICINE

## 2024-04-04 PROCEDURE — 97535 SELF CARE MNGMENT TRAINING: CPT

## 2024-04-04 PROCEDURE — 82607 VITAMIN B-12: CPT

## 2024-04-04 RX ADMIN — HEPARIN SODIUM 5000 UNITS: 5000 INJECTION INTRAVENOUS; SUBCUTANEOUS at 05:24

## 2024-04-04 RX ADMIN — METOPROLOL TARTRATE 25 MG: 25 TABLET, FILM COATED ORAL at 08:43

## 2024-04-04 RX ADMIN — INSULIN LISPRO 5 UNITS: 100 INJECTION, SOLUTION INTRAVENOUS; SUBCUTANEOUS at 11:54

## 2024-04-04 RX ADMIN — INSULIN LISPRO 5 UNITS: 100 INJECTION, SOLUTION INTRAVENOUS; SUBCUTANEOUS at 08:38

## 2024-04-04 RX ADMIN — INSULIN LISPRO 2 UNITS: 100 INJECTION, SOLUTION INTRAVENOUS; SUBCUTANEOUS at 08:37

## 2024-04-04 RX ADMIN — INSULIN LISPRO 5 UNITS: 100 INJECTION, SOLUTION INTRAVENOUS; SUBCUTANEOUS at 17:14

## 2024-04-04 RX ADMIN — ACETAMINOPHEN 650 MG: 325 TABLET, FILM COATED ORAL at 05:27

## 2024-04-04 RX ADMIN — INSULIN LISPRO 2 UNITS: 100 INJECTION, SOLUTION INTRAVENOUS; SUBCUTANEOUS at 17:15

## 2024-04-04 RX ADMIN — LOSARTAN POTASSIUM 50 MG: 50 TABLET, FILM COATED ORAL at 08:42

## 2024-04-04 RX ADMIN — ATORVASTATIN CALCIUM 40 MG: 40 TABLET, FILM COATED ORAL at 17:14

## 2024-04-04 RX ADMIN — ACETAMINOPHEN 650 MG: 325 TABLET, FILM COATED ORAL at 17:14

## 2024-04-04 RX ADMIN — INSULIN LISPRO 2 UNITS: 100 INJECTION, SOLUTION INTRAVENOUS; SUBCUTANEOUS at 11:53

## 2024-04-04 NOTE — CASE MANAGEMENT
Case Management Discharge Planning Note    Patient name Gia Vaughan  Location S /S -01 MRN [id number]  : 1940 Date 2024       Current Admission Date: 2024  Current Admission Diagnosis:Hypertensive urgency   Patient Active Problem List    Diagnosis Date Noted    Headache 2024    Hypertensive urgency 2024    Dizziness 2024    Suspected deep tissue injury of unknown depth 2022    Acute postoperative pain of right knee 2022    Encephalopathy 10/24/2022    Deep tissue injury 10/21/2022    Orthostasis 10/19/2022    H/O dizziness 10/19/2022    CLL (chronic lymphocytic leukemia) ([facility]) 10/13/2022    Intertrochanteric fracture of right femur ([facility]) 10/11/2022    Displaced fracture of middle phalanx of left ring finger, initial encounter for closed fracture 10/11/2022    Ambulatory dysfunction 2021    Moderate dementia ([facility]) 2021    Lymphadenopathy 2020    Elevated liver enzymes 2020    Fall 2020    CKD (chronic kidney disease) stage 3, GFR 30-59 ml/min ([facility]) 2020    HLD (hyperlipidemia) 2020    OA (osteoarthritis) 2020    Chronic systolic heart failure ([facility]) 2020    Type 2 diabetes mellitus with stage 3 chronic kidney disease, unspecified whether long term insulin use, unspecified whether stage 3a or 3b CKD ([facility])       LOS (days): 2  Geometric Mean LOS (GMLOS) (days): 2.9  Days to GMLOS:0.7     OBJECTIVE:  Risk of Unplanned Readmission Score: 15.7         Current admission status: Inpatient   Preferred Pharmacy:   UNKNOWN - FOLLOW UP PRIOR TO DISCHARGE TO E-PRESCRIBE  No address on file      [id number] - [place] - [address]  [address]  [place] [address]  Phone: [phone] Fax: [phone]    [id number] - [place] - [address]  [address] [place] [address]  Phone: [phone] Fax: [phone]    Primary Care Provider: Chayo Powell MD    Primary Insurance:  MEDICARE  Secondary Insurance: [employer]    DISCHARGE DETAILS:    Discharge planning discussed with:: patient's nephew/POA, Charles, by phone  Freedom of Choice: Yes (re: home care)  Comments - Freedom of Choice: aware that PT recommending home care and cognitive concerns from OT - in agreement with home care - options reviewed and he selected [facility] as preferred provider  CM contacted family/caregiver?: Yes  Were Treatment Team discharge recommendations reviewed with patient/caregiver?: Yes  Did patient/caregiver verbalize understanding of patient care needs?: Yes  Were patient/caregiver advised of the risks associated with not following Treatment Team discharge recommendations?: Yes    Contacts  Patient Contacts: caleb Soto/SARTHAK  Relationship to Patient:: Family  Contact Method: Phone  Phone Number: [phone]  Reason/Outcome: Continuity of Care, Emergency Contact, Discharge Planning, Referral    Requested Home Health Care         Is the patient interested in [facility] at discharge?: Yes  Home Health Discipline requested:: Nursing, Occupational Therapy, Physical Therapy  [facility] Reason (only applicable if external [facility] name selected): Scheduling access issues  Home Health Follow-Up Provider:: PCP  Home Health Services Needed:: Evaluate Functional Status and Safety, Diabetes Management, Gait/ADL Training, Heart Failure Management, Strengthening/Theraputic Exercises to Improve Function  Homebound Criteria Met:: Requires the Assistance of Another Person for Safe Ambulation or to Leave the Home, Uses an Assist Device (i.e. cane, walker, etc)  Supporting Clincal Findings:: Cognitive Deficit Requiring the Assistance of Others, Fatigues Easliy in Short Distances, Limited Endurance    DME Referral Provided  Referral made for DME?: No    Other Referral/Resources/Interventions Provided:  Interventions: HHC  Referral Comments: Per SARAH, patient  medically stable for d/c today and PT/OT recommending home with home care services at this time. Call made to patient's nephew/POA, Charles, to discuss same. Charles confirmed that he will be in to  patient after work today (5-6pm) and that patient will be staying at his house ([address] [place] [address]) for now. Reviewed cognitive concerns and recommended he re-try to apply for waiver services at this time. Nephew in agreement with referrals being sent for home care services and to [employer] [facility] so they can assist with waiver process. Referral emailed to [email or web address] and return call made to nephew to review home care options- nephew selected [facility] as preferred agency at this time. Same reserved in [facility] and [facility]/F2F forwarded to them for their records. Home care agency aware of service address (Charles's home) and nephew aware that [facility] to be in touch once referral processed. Nephew confirmed agreement with d/c for today and denies concerns about patient leaving hospital this afternoon.    Would you like to participate in our [facility] service program?  : No - Declined    Treatment Team Recommendation: Home with Home Health Care  Discharge Destination Plan:: Home with Home Health Care ([facility])  Transport at Discharge : Family                             IMM Given (Date):: 04/04/24

## 2024-04-04 NOTE — ASSESSMENT & PLAN NOTE
Wt Readings from Last 3 Encounters:   04/04/24 68.4 kg (150 lb 12.8 oz)   01/29/24 70.8 kg (156 lb)   04/24/23 68 kg (150 lb)     Examines euvolemic

## 2024-04-04 NOTE — ASSESSMENT & PLAN NOTE
Per ED, noted to have dizziness and severe headache yesterday around 1600.  Patient does not recall events and believes she was at a concert at this time.  Consider TIA vs hypertensive urgency  CT: no acute findings.  Chronic small vessel disease  CTA: negative  Neurology consult appreciated- no stroke clinically.

## 2024-04-04 NOTE — ASSESSMENT & PLAN NOTE
Oriented to self, place, time but has poor short term memory.    Likely has progression to dementia right now and this is expected to decline

## 2024-04-04 NOTE — ASSESSMENT & PLAN NOTE
194/91 on arrival.  Given hydralazine in ED.  Most recent -150  On losartan and metoprolol.  Blood pressure of around 140/150 would be acceptable for this patient

## 2024-04-04 NOTE — ASSESSMENT & PLAN NOTE
Follows with hemonc as outpatient  WBC high at around 55 with no signs of infection.  Hgb, platelets normal.

## 2024-04-04 NOTE — PROGRESS NOTES
"Progress Note - Geriatric Medicine   Gia Vaughan 83 y.o. female MRN: [id number]  Unit/Bed#: S -01 Encounter: [id number]      Assessment/Plan:  Moderate dementia (HCC)  Assessment & Plan  New diagnosis this admission. Nephew also seems unaware of cognitive impairments, prompting consult to geriatrics this admission.  IADL: fully dependent on nephew  ADL: independent  Does not drive or walk outside to get mail  Ambulates w walker and rollator  Patient lives alone at home. Nephew checks on patient a day or two per week  Scored  on  MOCA  Mini co/3 on word recall, 2/3 with prompting categories. 2/2 on drawing clock  Patient has visual impairment (wears glasses and only has prescription sunglasses while inpatient on her person) - possibly contributing to her prior hx of hallucinations  Patient has no formally diagnosed hearing impairment  Patient denies any urinary or fecal incontinence and states she is able to make it to the bathroom in time and does not have accidents.  Patient states that she has \"relatives,\" who come visit her house on a nightly basis at 2300.  She states that they knock on the door, so she comes downstairs to unlock and open the door to let them in.  Patient also states that they stay the night and leave in the morning.  She states that these relatives are not her nephew, but she is unable to give specific names.  They are nonthreatening.  Question nighttime hallucinations.  In  patient was noted to have hallucinations while hospitalized, ? Delirium, treated w/12.5 mg seroquel. She does not take this regularly/no home scripts.   Patient is poor historian. Meds verified w pharmacy.   PCP was contacted by primary and he confirms patient has dementia/cognitive impairment.    Med rec w/[facility] (they deliver meds to pt's home):  Tylenol - uses heavily, for headache. Pharmacy is not sure how much patient actually takes, but pt frequently calls to refill/get " "tylenol specifically for her recurrent headaches  Kcl tabs 20 mEq po BID  Furosemide 40 mg po qd  Metoprolol tartrate 25 mg po BID  Atorvastatin 20 mg po qd    Does NOT use/fill seroquel, meclizine, methocarbamol, so these were removed from \"prior to admission\" medication list.     Lab Results   Component Value Date    [id number] 1.776 04/03/2024     Lab Results   Component Value Date    [id number] 3,396 (H) 08/21/2023     No delirium noted during admission.      Plan:  - Discontinue seroquel as 12.5 mg qHS PRN on discharge  - Ordered OT cog eval to update MOCA as patient does not appear delirious and MOCA may be helpful to gauge if severity of dementia has increased; otherwise can perform outpatient several weeks after discharge from hospital  -Patient is high risk of delirium due to dementia with history of hallucinations and delirium in 2022 hospitalization  -Initiate delirium precautions  -maintain normal sleep/wake cycle  -minimize overnight interruptions, group overnight vitals/labs/nursing checks as possible  -dim lights, close blinds and turn off tv to minimize stimulation and encourage sleep environment in evenings  -ensure that pain is well controlled. Can consider Tylenol 975mg Q8H scheduled if ongoing pain sx   -monitor for fecal and urinary retention which may precipitate delirium  -encourage early mobilization and ambulation  -provide frequent reorientation and redirection  -encourage family and friends at the bedside to help calm patient if anxious  -avoid medications which may precipitate or worsen delirium such as tramadol, benzodiazepine, anticholinergics, and antihistaminics  -encourage hydration and nutrition, assist with feeding if needed  -redirect unwanted behaviors as first line, avoid physical restraints.  - Would recommend contacting Department of Aging to perform formal assessment of home safety  -Recommend geriatrics outpatient follow-up on discharge    Dizziness  Assessment & " Plan  Possibly related to uncontrolled HTN, as patient also had headache. Possible contribution from home metoprolol. Pulse in 60s.     CT head noncon and CTA H/N negative for ischemia, thrombus, stenosis.  CT head showed mild chronic small vessel ischemic changes.    Possibly with hypoglycemic episodes at home, but none recorded while inpatient. Patient claims her BG was in 50s at time of her headache that prompted her to come to ED.   Confirmed home dosing of losartan is 50 mg po qd, metoprolol 25 mg po BID    Lab Results   Component Value Date    [id number] 1,238 (H) 04/03/2024       Plan:  Check orthostatic VS  Home furosemide 40 mg daily on hold  Monitor volume status with I/O  Avoid anticholingerics  On home doses of losartan and metoprolol while inpatient - these may contribute to dizziness      Headache  Assessment & Plan  Appears to be chronic issue per patient and talking w pharmacy  Neurology was consulted on 4/2 for dizziness, but symptoms more consistent with headache that appears to be chronic in nature.  Neurology suspected hypertensive encephalopathy (exacerbating the headaches) and/or TME in setting of hypoglycemia, since resolved.  There was no suspicion for stroke clinically or further workup including MRI.  Neurology has recommended outpatient headache clinic follow-up and has signed off  Status post migraine cocktail of mag sulfate, Reglan, IV fluids.  They discontinued patient's 81 mg of aspirin    Patient has been reporting pain to be 7-8 out of 10 per nursing record, but patient has been receiving her every 6 hours as needed 650 mg of Tylenol twice in the last 24 hours is not maxing out on her PRNs    Plan:  Agree with as needed Tylenol as patient's pain is not significant enough to bother her at present to warrant scheduling  No nausea to warrant coverage with Zofran  Outpatient follow-up with headache clinic    CLL (chronic lymphocytic leukemia) (HCC)  Assessment & Plan  Dx 11/2022 after  "fall with femoral fx.    Patient is oblivious to her diagnosis and does not recall any visits to the oncologist.  Outpatient heme-onc is Dr. Rios.    Per his last note in Jan 2024:  \"Status post ORIF to the right hip, CAT scan showed mild adenopathy in the pelvis area measuring up to 2 cm, flow cytometry confirmed chronic lymphocytic leukemia, positive for trisomy 12, I GVH mutation is negative consistent with moderate risk of progression.\"    Baseline WBC seems to be 30-50k and fluctuant. Last outpt visit showed WBC 50k in Jan 2024.    Plan:  Daily CBC    Chronic systolic heart failure (HCC)  Assessment & Plan  Wt Readings from Last 3 Encounters:   04/03/24 70.8 kg (156 lb)   01/29/24 70.8 kg (156 lb)   04/24/23 68 kg (150 lb)     At home, on metoprolol tartrate 25 mg BID and losartan 100 mg po qd  Med rec with pharmacy confirmed that patient takes 40 mg of Lasix p.o. daily, which is being held for dizziness    Plan:  Management per primary. On losartan and BB            Type 2 diabetes mellitus with stage 3 chronic kidney disease, unspecified whether long term insulin use, unspecified whether stage 3a or 3b CKD ([facility])  Assessment & Plan  Lab Results   Component Value Date    HGBA1C 9.4 (H) 04/01/2024       Recent Labs     04/03/24  1609 04/03/24  2048 04/04/24  0738 04/04/24  1123   POCGLU 219* 293* 215* 223*       Blood Sugar Average: Last 72 hrs:  (P) [id number]    Reviewed patient's CGM freestyle zhang.  Her lowest glucose recorded within past several days on 4/1 was in the mid to high 70s.  She does not have any episodes less than 60.  When asked if patient has an alarm system set up for hypoglycemia, she states she does not.  I recommended that she enable this on her device if it is possible.     Scheduled glargine 15 u qHS, 5u lispro TID AC  Management per primary     * Hypertensive urgency  Assessment & Plan  Currently on 50 mg losartan and metoprolol 25 mg q12h    Management per " "primary        Subjective:     Patient has no complaints this morning.  Regarding headache, patient states she has a mild central/symmetrical frontal headache, which she attributes to not having her prescription glasses on.  Regarding vision, patient reports persistently blurry vision, but no visual field cuts, again attributed to her not wearing prescription glasses at the moment  Patient denies fevers/chills, chest pain, shortness of breath, heart palpitations, nausea, vomiting, abdominal pain, weakness, or numbness.      Objective:     Vitals: Blood pressure 169/70, pulse 64, temperature 98 °F (36.7 °C), resp. rate 16, height 5' 6\" (1.676 m), weight 68.4 kg (150 lb 12.8 oz), SpO2 95%.,Body mass index is 24.34 kg/m².      Intake/Output Summary (Last 24 hours) at 4/4/2024 1322  Last data filed at 4/4/2024 0720  Gross per 24 hour   Intake --   Output 419 ml   Net -419 ml     Current Medications: Reviewed    Physical Exam:   Physical Exam  Vitals reviewed.   Constitutional:       General: She is not in acute distress.     Appearance: She is not ill-appearing or diaphoretic.   Cardiovascular:      Rate and Rhythm: Normal rate and regular rhythm.      Heart sounds: No murmur heard.     No friction rub. No gallop.   Pulmonary:      Effort: Pulmonary effort is normal. No respiratory distress.      Breath sounds: No wheezing or rales.   Abdominal:      General: There is no distension.      Palpations: Abdomen is soft.      Tenderness: There is no abdominal tenderness.   Musculoskeletal:      Right lower leg: No edema.      Left lower leg: No edema.      Comments: Movies all extremities spontaneously    Skin:     General: Skin is warm and dry.   Neurological:      Mental Status: She is alert.      Comments: AO to self, situation   Psychiatric:         Mood and Affect: Mood normal.          Invasive Devices       Peripheral Intravenous Line  Duration             Peripheral IV 04/02/24 Left Forearm 2 days              "       Lab, Imaging and other studies: I have personally reviewed pertinent reports.       Anai Yousif MD   PGY-3 Internal Medicine  [place] Resident

## 2024-04-04 NOTE — ASSESSMENT & PLAN NOTE
Appears to be chronic issue per patient and talking w pharmacy  Neurology was consulted on 4/2 for dizziness, but symptoms more consistent with headache that appears to be chronic in nature.  Neurology suspected hypertensive encephalopathy (exacerbating the headaches) and/or TME in setting of hypoglycemia, since resolved.  There was no suspicion for stroke clinically or further workup including MRI.  Neurology has recommended outpatient headache clinic follow-up and has signed off  Status post migraine cocktail of mag sulfate, Reglan, IV fluids.  They discontinued patient's 81 mg of aspirin    Patient has been reporting pain to be 7-8 out of 10 per nursing record, but patient has been receiving her every 6 hours as needed 650 mg of Tylenol twice in the last 24 hours is not maxing out on her PRNs    Plan:  Agree with as needed Tylenol as patient's pain is not significant enough to bother her at present to warrant scheduling  No nausea to warrant coverage with Zofran  Outpatient follow-up with headache clinic

## 2024-04-04 NOTE — ASSESSMENT & PLAN NOTE
Lab Results   Component Value Date    HGBA1C 9.4 (H) 04/01/2024     Review of glucometer done including freestyle zhang and patient can continue her medications at discharge

## 2024-04-04 NOTE — DISCHARGE SUMMARY
[facility]  Discharge- Gia Vaughan 1940, 83 y.o. female MRN: [id number]  Unit/Bed#: S -[facility] Encounter: [id number]  Primary Care Provider: Chayo Powell MD   Date and time admitted to hospital: 4/1/2024  5:24 PM    * Hypertensive urgency  Assessment & Plan  194/91 on arrival.  Given hydralazine in ED.  Most recent -150  On losartan and metoprolol.  Blood pressure of around 140/150 would be acceptable for this patient      Moderate dementia (HCC)  Assessment & Plan  Oriented to self, place, time but has poor short term memory.    Likely has progression to dementia right now and this is expected to decline    Dizziness  Assessment & Plan  Per ED, noted to have dizziness and severe headache yesterday around 1600.  Patient does not recall events and believes she was at a concert at this time.  Consider TIA vs hypertensive urgency  CT: no acute findings.  Chronic small vessel disease  CTA: negative  Neurology consult appreciated- no stroke clinically.    CLL (chronic lymphocytic leukemia) (HCC)  Assessment & Plan  Follows with hemonc as outpatient  WBC high at around 55 with no signs of infection.  Hgb, platelets normal.    Chronic systolic heart failure (HCC)  Assessment & Plan  Wt Readings from Last 3 Encounters:   04/04/24 68.4 kg (150 lb 12.8 oz)   01/29/24 70.8 kg (156 lb)   04/24/23 68 kg (150 lb)     Examines euvolemic       Type 2 diabetes mellitus with stage 3 chronic kidney disease, unspecified whether long term insulin use, unspecified whether stage 3a or 3b CKD (HCC)  Assessment & Plan  Lab Results   Component Value Date    HGBA1C 9.4 (H) 04/01/2024     Review of glucometer done including freestyle zhang and patient can continue her medications at discharge      Admitting Provider:  Mika Galan MD  Discharge Provider:  Ameya Green MD  Admission Date: 4/1/2024       Discharge Date: 04/04/24   LOS: 2  Primary Care Physician at Discharge: Chayo Powell MD  [phone]    HOSPITAL COURSE:  Gia Vaughan is a 83 y.o. female who presented with worsening confusion.  Patient did have hypertensive urgency and also progression of her dementia.  Patient improved but will need home health services at discharge.  She has decided to stay with her nephew at his place at discharge.  Neurology ruled out a stroke.  Geriatrics input appreciated and she is advised to [facility] with [facility]      Mobility:   Basic Mobility Inpatient Raw Score: 17  JH-HLM Goal: 5: Stand one or more mins  JH-HLM Achieved: 6: Walk 10 steps or more  JH-HLM Goal NOT achieved. Continue with multidisciplinary rounding and encourage appropriate mobility to improve upon JH-HLM goals.    REASON FOR ADMISSION/ ADMISSION DIAGNOSES    DISCHARGE DIAGNOSES  * Hypertensive urgency  Assessment & Plan  194/91 on arrival.  Given hydralazine in ED.  Most recent -150  On losartan and metoprolol.  Blood pressure of around 140/150 would be acceptable for this patient      Moderate dementia (HCC)  Assessment & Plan  Oriented to self, place, time but has poor short term memory.    Likely has progression to dementia right now and this is expected to decline    Dizziness  Assessment & Plan  Per ED, noted to have dizziness and severe headache yesterday around 1600.  Patient does not recall events and believes she was at a concert at this time.  Consider TIA vs hypertensive urgency  CT: no acute findings.  Chronic small vessel disease  CTA: negative  Neurology consult appreciated- no stroke clinically.    CLL (chronic lymphocytic leukemia) (HCC)  Assessment & Plan  Follows with hemonc as outpatient  WBC high at around 55 with no signs of infection.  Hgb, platelets normal.    Chronic systolic heart failure (HCC)  Assessment & Plan  Wt Readings from Last 3 Encounters:   04/04/24 68.4 kg (150 lb 12.8 oz)   01/29/24 70.8 kg (156 lb)   04/24/23 68 kg (150 lb)     Examines euvolemic       Type 2  diabetes mellitus with stage 3 chronic kidney disease, unspecified whether long term insulin use, unspecified whether stage 3a or 3b CKD ([facility])  Assessment & Plan  Lab Results   Component Value Date    HGBA1C 9.4 (H) 04/01/2024     Review of glucometer done including freestyle zhang and patient can continue her medications at discharge      CONSULTING PROVIDERS   IP CONSULT TO NEUROLOGY  IP CONSULT TO CASE MANAGEMENT  IP CONSULT TO NUTRITION SERVICES  IP CONSULT TO GERONTOLOGY    PROCEDURES PERFORMED  * No surgery found *    RADIOLOGY RESULTS  CTA head and neck with and without contrast    Result Date: 4/2/2024  Impression: 1.  No acute intracranial hemorrhage, mass effect or extra-axial collection. 2.  No hemodynamically significant stenosis, dissection or occlusion of the carotid or vertebral arteries. 3.  No intracranial aneurysm.  No hemodynamically significant stenosis or occlusion of the major vessels of the [place]. Workstation performed: [id number]     CT head without contrast    Result Date: 4/1/2024  Impression: No acute intracranial abnormality. Mild chronic small vessel ischemic changes Workstation performed: [id number]       LABS  Results from last 7 days   Lab Units 04/04/24 0533 04/03/24 0439 04/02/24 0414 04/01/24 1914   WBC Thousand/uL 55.05* 56.61* 26.29* 37.91*   HEMOGLOBIN g/dL 13.7 12.7 15.0 15.0   HEMATOCRIT % 43.2 39.7 47.1* 47.2*   MCV fL 106* 106* 105* 106*   TOTAL NEUT ABS Thousand/uL  --   --  7.89* 8.72*   PLATELETS Thousands/uL 193 169 195 193     Results from last 7 days   Lab Units 04/04/24 0533 04/03/24 0439 04/02/24 0414 04/01/24 1914   SODIUM mmol/L 141 141 137 139   POTASSIUM mmol/L 4.0 3.9 4.1 3.9   CHLORIDE mmol/L 106 102 102 101   CO2 mmol/L 28 25 24 30   BUN mg/dL 30* 32* 22 23   CREATININE mg/dL 1.03 1.02 0.95 0.98   CALCIUM mg/dL 9.2 9.0 9.3 10.3*   ALBUMIN g/dL 3.9  --   --  4.8   TOTAL BILIRUBIN mg/dL 0.52  --   --  0.48   ALK PHOS U/L 87  --   --  97   ALT  "U/L 26  --   --  33   AST U/L 20  --   --  22   EGFR ml/min/1.73sq m 50 50 55 53   GLUCOSE RANDOM mg/dL 197* 154* 308* 144*                  Results from last 7 days   Lab Units 04/04/24  1123 04/04/24  0738 04/03/24 2048 04/03/24  1609 04/03/24  1106 04/03/24  0710 04/02/24 2046 04/02/24  1556 04/02/24  1120 04/02/24  0747   POC GLUCOSE mg/dl 223* 215* 293* 219* 171* 175* 211* 436* 304* 278*     Results from last 7 days   Lab Units 04/01/24  1914   HEMOGLOBIN A1C % 9.4*     Results from last 7 days   Lab Units 04/03/24  0439   TSH 3RD GENERATON uIU/mL 1.776         Results from last 7 days   Lab Units 04/02/24  0414   TRIGLYCERIDES mg/dL 66   CHOLESTEROL mg/dL 156   LDL CALC mg/dL 77   HDL mg/dL 66       Cultures:   Results from last 7 days   Lab Units 04/01/24  1930   COLOR UA  Colorless   CLARITY UA  Clear   SPEC GRAV UA  1.004   PH UA  7.5   LEUKOCYTES UA  Negative   NITRITE UA  Negative   GLUCOSE UA mg/dl Trace*   KETONES UA mg/dl Negative   BILIRUBIN UA  Negative   BLOOD UA  Negative                 PHYSICAL EXAM:  Vitals:   Blood Pressure: 169/77 (04/04/24 1535)  Pulse: 64 (04/04/24 0822)  Temperature: 98 °F (36.7 °C) (04/04/24 0822)  Temp Source: Oral (04/04/24 0734)  Respirations: 17 (04/04/24 1535)  Height: 5' 6\" (167.6 cm) (04/03/24 0930)  Weight - Scale: 68.4 kg (150 lb 12.8 oz) (04/04/24 0527)  SpO2: 95 % (04/04/24 0822)    General appearance: alert, fatigued, appears stated age, and cooperative  HEENT - atraumatic and normocephalic  Neck- supple  Skin - no fresh rash  Extremities no fresh focal deformities  Cardiovascular- S1-S2 heard  Respiratory- bilateral air entry present, no crackles or rhonchi  Skin - no fresh rash  Abdomen - normal bowel sounds present, no rebound tenderness  CNS- No fresh focal deficits but does have dementia  Psych- no acute psychosis     Planned Re-admission: No  Discharge Disposition: Home with home health    Test Results Pending at Discharge:   Pending Labs       Order " Current Status    Vitamin B12 In process        Incidental findings: None    Medications   Summary of Medication Adjustments made as a result of this hospitalization: Seroquel stopped  Medication Dosing Tapers - Please refer to Discharge Medication List for details on any medication dosing tapers (if applicable to patient).  Discharge Medication List: See after visit summary for reconciled discharge medications.     Diet restrictions: No restrictions       Diet Orders   (From admission, onward)                 Start     Ordered    04/02/24 0345  Diet Forrest/CHO Controlled; Consistent Carbohydrate Diet Level 2 (5 carb servings/75 grams CHO/meal)  Diet effective now        References:    Adult Nutrition Support Algorithm    RD Therapeutic Diet Order Protocol   Question Answer Comment   Diet Type Forrest/CHO Controlled    Forrest/CHO Controlled Consistent Carbohydrate Diet Level 2 (5 carb servings/75 grams CHO/meal)    RD to adjust diet per protocol? Yes        04/02/24 0351                  Activity restrictions: No strenuous activity  Discharge Condition: stable    Outpatient Follow-Up and Discharge Instructions  See after visit summary section titled Discharge Instructions for information provided to patient and family.      Code Status: Level 1 - Full Code  Discharge Statement   I spent 35 minutes discharging the patient. This time was spent on the day of discharge. Greater than 50% of total time was spent with the patient and / or family counseling and / or coordination of care.    Ameya Green MD  [facility] Internal Medicine    ** Please Note: This note has been constructed using a voice recognition system. **

## 2024-04-04 NOTE — PLAN OF CARE
Problem: OCCUPATIONAL THERAPY ADULT  Goal: Performs self-care activities at highest level of function for planned discharge setting.  See evaluation for individualized goals.  Description: Treatment Interventions: ADL retraining, Functional transfer training, UE strengthening/ROM, Cognitive reorientation, Patient/family training, Equipment evaluation/education, Compensatory technique education, Energy conservation, Activityengagement          See flowsheet documentation for full assessment, interventions and recommendations.   Outcome: Progressing  Note: Limitation: Decreased ADL status, Decreased UE strength, Decreased Safe judgement during ADL, Decreased cognition, Decreased endurance, Decreased self-care trans, Decreased high-level ADLs  Prognosis: Fair  Assessment: Pt seen for OT treatment on 4/4 s/p admit to [facility] w/ Hypertensive urgency. Pt agreeable to OT treatment session, upon arrival patient was found supine in bed. Patient participated in skilled OT interventions to address ADL tasks, functional transfers, and overall activity tolerance and participation. In comparison to previous session, Gia demonstrated improvements in independence w/ ADLs, only requiring S to complete LB dressing, toileting and grooming at sink as well as improved functional reach, dynamic standing balance and activity tolerace but is continuing to perform below baseline due to cognitive deficits, visual deficits and impulsivity. Personal factors that continue to impact DC include: advanced age, current habits and behavioral patterns, limited social support, difficulty completing ADLs, and difficulty completing IADLs. Patient to benefit from continued IPOT treatment to address deficits as defined above and maximize level of functional independence with ADLs and functional mobility. Goals remain appropriate. Continue per POC.     Rehab Resource Intensity Level, OT: (S) III (Minimum Resource Intensity) (Per ACLS results: it is  recommended that the person live with someone who does a daily check on the environment. The person may be alone for part of the day with a pre-established procedure for getting help from a neighbor.)     TRELL Solorio, OTR/L  [place] License [id number]  [place] License [id number]

## 2024-04-04 NOTE — OCCUPATIONAL THERAPY NOTE
Occupational Therapy Progress Note     Patient Name: Gia Vaughan  Today's Date: 4/4/2024  Problem List  Principal Problem:    Hypertensive urgency  Active Problems:    Type 2 diabetes mellitus with stage 3 chronic kidney disease, unspecified whether long term insulin use, unspecified whether stage 3a or 3b CKD (HCC)    Chronic systolic heart failure (HCC)    Moderate dementia (HCC)    CLL (chronic lymphocytic leukemia) (HCC)    Dizziness    Headache        04/04/24 1359   OT Last Visit   OT Visit Date 04/04/24   Note Type   Note Type Treatment   Pain Assessment   Pain Assessment Tool 0-10   Pain Score No Pain   Restrictions/Precautions   Weight Bearing Precautions Per Order No   Other Precautions Cognitive;Chair Alarm;Bed Alarm;Fall Risk;Impulsive   Lifestyle   Autonomy Pt lives ALONE, maintains a FFSU. Independent w/ ADLs, A for IADLs and RUSS for functional mobility w/ use of RW. (-) falls. (-) driving.   Reciprocal Relationships Supportive nephew   Service to Others Retired   ADL   Where Assessed Other (Comment)  (bathroom)   Eating Assistance 7  Independent   Eating Deficit Beverage management   Grooming Assistance 5  Supervision/Setup   Grooming Deficit Setup;Wash/dry hands;Supervision/safety;Increased time to complete;Standing with assistive device   Grooming Comments standing at sink in bathroom, Cues for RW positioning at sink   UB Dressing Assistance 5  Supervision/Setup   UB Dressing Deficit Setup;Thread RUE;Thread LUE;Pull around back   UB Dressing Comments sitting in recliner   LB Dressing Assistance 5  Supervision/Setup   LB Dressing Deficit Setup;Requires assistive device for steadying;Verbal cueing;Supervision/safety;Increased time to complete;Thread RLE into underwear;Thread LLE into underwear;Pull up over hips   LB Dressing Comments sitting on toilet, VC to problem solve orientation of underwear, CGA for steadying in stance   Toileting Assistance  5  Supervision/Setup   Toileting Deficit  "Setup;Verbal cueing;Supervison/safety;Grab bar use;Clothing management up;Clothing management down;Perineal hygiene   Toileting Comments suad care seated on toilet, CGA in stance to manage underwear up/down   Functional Standing Tolerance   Time 2-3 minutes   Activity grooming at sink   Comments CGA in stance   Bed Mobility   Supine to Sit 5  Supervision   Additional items Assist x 1;HOB elevated;Bedrails;Increased time required;Verbal cues   Additional Comments OOB to recliner at end of session   Transfers   Sit to Stand 5  Supervision   Additional items Assist x 1;Increased time required;Verbal cues   Stand to Sit 5  Supervision   Additional items Assist x 1;Increased time required;Verbal cues   Toilet transfer 5  Supervision   Additional items Assist x 1;Increased time required;Verbal cues;Standard toilet;Impulsive  (grab bar R; uncontrolled descent to surface)   Functional Mobility   Functional Mobility 5  Supervision  (close S/CGA)   Additional Comments Household distance to bathroom and back w/ Rollator and close S   Additional items   (personal rollator)   Toilet Transfers   Toilet Transfer From Rolling walker   Toilet Transfer Type To and from   Toilet Transfer to Standard toilet   Toilet Transfer Technique Ambulating   Toilet Transfers Supervision;Verbal cues   Toilet Transfers Comments grab bar R   Subjective   Subjective \"I was sweating up a storm in that bed\" \"I said what in the devil\" (when her neighbor accused her of sleeping w/ her )   Cognition   Overall Cognitive Status Impaired   Arousal/Participation Alert;Cooperative   Attention Attends with cues to redirect  (distractible and tangential)   Orientation Level Oriented to person;Oriented to place;Oriented to situation   Memory Decreased recall of recent events;Decreased recall of precautions   Following Commands Follows multistep commands with increased time or repetition   Comments (S)  Pleasant and agreeable. Frquently distractible, " "tangential, hyperverbal at times. Pt demonstrated use of glucose reader. When asked what to do if glucose was high pt states \"I would take my medicine\" however pt unable to name medication or describe dosing. Pt glucose reader was also alarming during session and pt required cues to attend to alarm and problem solve need to contact her nurse for management. Hx of MoCA 11/30. See ACLS below   Cognition Assessment Tools (S)  ACLS   Score (S)  4.4   Vision   Vision Comments Pt w/ prescription sunglasses only at this time.   Activity Tolerance   Activity Tolerance Patient tolerated treatment well   Medical Staff Made Aware Spoke to ALIYA Waters regarding glucose reading (250)   Assessment   Assessment Pt seen for OT treatment on 4/4 s/p admit to [facility] w/ Hypertensive urgency. Pt agreeable to OT treatment session, upon arrival patient was found supine in bed. Patient participated in skilled OT interventions to address ADL tasks, functional transfers, and overall activity tolerance and participation. In comparison to previous session, Gia demonstrated improvements in independence w/ ADLs, only requiring S to complete LB dressing, toileting and grooming at sink as well as improved functional reach, dynamic standing balance and activity tolerace but is continuing to perform below baseline due to cognitive deficits, visual deficits and impulsivity. Personal factors that continue to impact DC include: advanced age, current habits and behavioral patterns, limited social support, difficulty completing ADLs, and difficulty completing IADLs. Patient to benefit from continued IPOT treatment to address deficits as defined above and maximize level of functional independence with ADLs and functional mobility. Goals remain appropriate. Continue per POC.   Plan   Treatment Interventions ADL retraining;Functional transfer training;Endurance training;Cognitive reorientation;Patient/family training;Equipment " "evaluation/education;Compensatory technique education;Continued evaluation;Energy conservation;Activityengagement   Goal Expiration Date 04/12/24   OT Treatment Day 1   OT Frequency 3-5x/wk   Discharge Recommendation   Rehab Resource Intensity Level, OT (S)  III (Minimum Resource Intensity)  (Per ACLS results: it is recommended that the person live with someone who does a daily check on the environment. The person may be alone for part of the day with a pre-established procedure for getting help from a neighbor.)   Additional Comments  The patient's raw score on the -PAC Daily Activity Inpatient Short Form is 19. A raw score of greater than or equal to 19 suggests the patient may benefit from discharge to home. Please refer to the recommendation of the Occupational Therapist for safe discharge planning.   AM-PAC Daily Activity Inpatient   Lower Body Dressing 3   Bathing 3   Toileting 3   Upper Body Dressing 3   Grooming 3   Eating 4   Daily Activity Raw Score 19   Daily Activity Standardized Score (Calc for Raw Score >=11) 40.22   AM-PAC Applied Cognition Inpatient   Following a Speech/Presentation 3   Understanding Ordinary Conversation 4   Taking Medications 2   Remembering Where Things Are Placed or Put Away 3   Remembering List of 4-5 Errands 2   Taking Care of Complicated Tasks 1   Applied Cognition Raw Score 15   Applied Cognition Standardized Score 33.54   Catalino Cognitive Level   Catalino Cognitive Level Score (S)  4.4   Catal[place] Cognitive Score Recommendation (S)  Daily checks, maybe alone part of the day   Catalino Cognitive Level Comments (S)  Pt states: \"I keep losing the string\". Pt DID NOT have reading glasses present during session. Pt did have prescription sunglasses present however pt reports these did not help to see the laces. Due to baseline visual impairment, results of this assessment may be skewed due to pt's reported difficulty w/ seeing fine details of this assessment however questionable accuracy " as pt was able to make note of worn nature of leather lace where she was working. Pt was able to complete running stitch w/o difficulty. Pt was able to make x 3 consecutive whipstitch with increased time and multiple attempts as pt was unsure due to vision whether she made mistakes. Pt was able to spontaneously locate and correct twisted lace error and cross in back error w/ increased time. Pt was unable to complete cordovan stitch (created a whipstitch and immediately pulled through loop as if it were one step). Pt was provided w/ single demonstration w/ no improvement in performance upon second attempt. Pt declined additional demonstration. Pt was somewhat distractible (holding conversation throughout assessment) however had appropriate attention to lace  during stitching. Pt reports her difficulties were due to visual deficits however pt was able to attend to fine details of assessment, differentiate between sides of lace and notice worn sections, indicating grossly WFL vision.  As stated above, the scoring may be skewed due to reported visual deficits however pt demonstrated adequate vision and skills to perform task had max effort been applied.   4.4    Administered Catalino Cognitive Level Screen (ACLS).  Pt scored 4.4/6.0 indicating it is recommended that the person live with someone who does a daily check on the environment to remove any safety hazards and solve problems when minor changes in the home occur.  The person may be alone for part of the day with a pre-established procedure for getting help from a neighbor.    Behavior:  Knows day/date.  Follows routine sequence of activities.  Will learn schedule and follow daily routine.  Hazards are not anticipated.  Memorization is slow.  Will need long term repetitive training for all new tasks.  May become upset if routine is altered.  May seek assist if lost.  Do not expect to be aware of needs of others.  May need reminders to keep appointments.  Grooming:   Miller, brushes and styles hair.  Applies makeup.  May insist on familiar products.  Finds supplies in familiar locations.  May be unable to master use of new tools.  Hazards are not anticipated.  Dressing: Finds garments in familiar locations.  Initiates dressing at usual times.  Selects familiar combinations of outfits and may wear same outfit over and over.  Resists new combinations.  May fail to consider weather conditions, season or occasion when selecting clothing.  May argue with suggested corrections of errors.  Bathing:  Initiates bath/shower at customary times and follows typical routine.  May collect supplies from a familiar location.  May not vary rate.  May want to use same products.  May use excessive amounts of product.  May have difficulty opening small or unusual containers.  May leave towels on floor.  Protect from unseen hazards (wet floors, electrical appliances near water).  Walking/exercising:  Ambulates within fitness capacity in neighborhood.  Chooses to go by familiar routes.  May fail to attend to signs or activities outside visual field.  Needs new route identified by others and may learn after several weeks of practice.  May become anxious in high stimulus environments (malls, airports, casinos).  Eating: May notice and clean highly visible dropped food items.  May not be able to eat and converse at the same time.  May resist changes in diet and menu.  Watch handling of hot foods/liquids and heavy items.  Toileting:  Follows a routine of toileting in a familiar environment.  Needs to have public rest rooms pointed out.  May use too much paper.  Does not consider needs of others.  May spend excessive time in rest room.  Medications:  May follow routine rigidly and resist changes in prescriptions based on erroneous beliefs of effects.  Does not note adverse side effects.  Does not anticipate need to renew prescriptions.  May be able to open child proof containers.  Can use DAILY pill box  marked with day/time (filled by another person).  Use of adaptive equipment:  May be trained to use adaptive equipment that requires a sequence of familiar actions.  Does not anticipate hazards.  Once learned, may resist changes in equipment.  May abandon use of assistive device or use in unsafe manner.  Housekeeping:  Sweeps, polishes and puts away objects to match previous performance.  Fails to see dirt or dust in corners.  May use excessive amounts of , polish or water.  May resist changes in routine or products used.  May fail to differentiate between similar cleaning products.  Food preparation:  May follow a fixed diet and go hungry if usual food items are not available.  Does not check inventory and may run out of essentials frequently.  Does not consider nutritional needs.  Goes to familiar restaurants of grocery stores. Is able to prepare simple hot/cold dishes.  Spending money:  Manages routine purchases for immediate needs.  May count fiore very slowly.  May use credit cards or checks.  May need assistance for making monthly budget.  May not remember to account for taxes or tips. Can manage a daily allowance.  Shopping:  Goes to familiar stores for routine items.  Does not compare product prices or quality.  May not consider cost of item in relation to overall budget.  May overspend.  Laundry:  Initiates and completes laundry routine at usual time.  May sort items by color.  May follow schedule rigidly.  May forget to clean lint traps.  May forget to turn iron off.  Traveling:  Needs new routes and travel procedures identified by others.  May express interest in driving a car but fails to attend to all environmental cues to do so safely.  May not allow adequate time for travel.  Telephone:  Writes down messages and new numbers slowly.  May attempt to use an address book.  May make calls at odd hours without consideration of other’s schedule or cost of call.  May run up large telephone  marielena.  Driving: Should NOT operate a motor vehicle.   End of Consult   Patient Position at End of Consult Bedside chair;Bed/Chair alarm activated;All needs within reach   Nurse Communication Nurse aware of consult     Goals established on initial evaluation in order to achieve pt's goal of going home.       Pt will complete UB ADLs Mod independent  for increased ADL independence within 10 days.  (PROGRESSING)     Pt will complete LB ADLs Mod independent  for increased ADL independence within 10 days.  (PROGRESSING)     Pt will complete toileting Mod independent  with use of DME for increased ADL independence within 10 days.  (PROGRESSING)     Pt will demonstrate proper body mechanics to complete self-care transfers and functional mobility with Mod independent  and use of LRAD for increased safety and functional independence within 10 days.  (PROGRESSING)     Pt will perform grooming tasks standing at the sink with Mod independent  in order to increase overall independence and return to PLOF.  (PROGRESSING)     Pt will demonstrate standing tolerance of 6 min with Mod independent  and use of LRAD for increased activity tolerance during ADL/IADL tasks within 10 days.  (PROGRESSING)     Pt will complete bed mobility Mod independent  for increased independence in repositioning, pressure offloading, and managing comfort.  (PROGRESSING)     Pt will demonstrate proper body mechanics and fall prevention strategies during 100% of tx sessions for increased safety awareness during ADL/IADLs (PROGRESSING)     Pt will improve functional activity tolerance to 25 mins of sustained functional tasks to increase participation in basic self-care tasks and minimize assistance level.  (PROGRESSING)     Pt will participate in ongoing cognitive assessments to assist with safe D/C planning and supervision/assistance recommendations.  (PROGRESSING)     Patient will participate in 15mins of UE therex to increase overall stamina/activity  tolerance for purposeful tasks.     TRELL Solorio, OTR/L  [place] License [id number]  [place] License [id number]

## 2024-04-26 ENCOUNTER — TELEPHONE (OUTPATIENT)
Dept: NEUROLOGY | Facility: CLINIC | Age: 84
End: 2024-04-26

## 2024-04-26 NOTE — TELEPHONE ENCOUNTER
Hello,     Can you please advise if patient can be seen by an Resident, AP and or Attending  only?    Thank you for your time,     Jackie BAKER/ TONIE SHARPE / JENNIFER    OK- HOME- 4/4/2024    Will need close f/u with headache team outpatient for long term management of frequent headaches

## 2024-04-30 NOTE — TELEPHONE ENCOUNTER
Pt nephew called and expressed that pt is doing well. Pt motor skills are doing well and odes not feel the HFU is necessary at this time. I advised the nephew that this is recommended to follow up with neurology and that the referral is good for 1 yr.

## 2024-05-24 ENCOUNTER — APPOINTMENT (OUTPATIENT)
Dept: LAB | Facility: CLINIC | Age: 84
End: 2024-05-24
Payer: MEDICARE

## 2024-05-24 DIAGNOSIS — C91.10 CLL (CHRONIC LYMPHOCYTIC LEUKEMIA) (HCC): ICD-10-CM

## 2024-05-24 DIAGNOSIS — I50.22 CHRONIC SYSTOLIC HEART FAILURE (HCC): ICD-10-CM

## 2024-05-24 LAB
ALBUMIN SERPL BCP-MCNC: 4.4 G/DL (ref 3.5–5)
ALP SERPL-CCNC: 75 U/L (ref 34–104)
ALT SERPL W P-5'-P-CCNC: 28 U/L (ref 7–52)
ANION GAP SERPL CALCULATED.3IONS-SCNC: 8 MMOL/L (ref 4–13)
AST SERPL W P-5'-P-CCNC: 19 U/L (ref 13–39)
BASOPHILS # BLD MANUAL: 0 THOUSAND/UL (ref 0–0.1)
BASOPHILS NFR MAR MANUAL: 0 % (ref 0–1)
BILIRUB SERPL-MCNC: 0.69 MG/DL (ref 0.2–1)
BLASTS ABSOLUTE COUNT: 0.49 THOUSAND/UL (ref 0–0)
BLASTS NFR BLD MANUAL: 1 %
BUN SERPL-MCNC: 30 MG/DL (ref 5–25)
CALCIUM SERPL-MCNC: 9.5 MG/DL (ref 8.4–10.2)
CHLORIDE SERPL-SCNC: 105 MMOL/L (ref 96–108)
CO2 SERPL-SCNC: 25 MMOL/L (ref 21–32)
CREAT SERPL-MCNC: 1.08 MG/DL (ref 0.6–1.3)
EOSINOPHIL # BLD MANUAL: 0 THOUSAND/UL (ref 0–0.4)
EOSINOPHIL NFR BLD MANUAL: 0 % (ref 0–6)
ERYTHROCYTE [DISTWIDTH] IN BLOOD BY AUTOMATED COUNT: 15.1 % (ref 11.6–15.1)
GFR SERPL CREATININE-BSD FRML MDRD: 47 ML/MIN/1.73SQ M
GLUCOSE P FAST SERPL-MCNC: 238 MG/DL (ref 65–99)
HCT VFR BLD AUTO: 40.6 % (ref 34.8–46.1)
HGB BLD-MCNC: 12.6 G/DL (ref 11.5–15.4)
LYMPHOCYTES # BLD AUTO: 47.81 THOUSAND/UL (ref 0.6–4.47)
LYMPHOCYTES # BLD AUTO: 91 % (ref 14–44)
MCH RBC QN AUTO: 33.2 PG (ref 26.8–34.3)
MCHC RBC AUTO-ENTMCNC: 31 G/DL (ref 31.4–37.4)
MCV RBC AUTO: 107 FL (ref 82–98)
MONOCYTES # BLD AUTO: 0 THOUSAND/UL (ref 0–1.22)
MONOCYTES NFR BLD: 0 % (ref 4–12)
NEUTROPHILS # BLD MANUAL: 0.99 THOUSAND/UL (ref 1.85–7.62)
NEUTS SEG NFR BLD AUTO: 2 % (ref 43–75)
PLATELET # BLD AUTO: 216 THOUSANDS/UL (ref 149–390)
PLATELET BLD QL SMEAR: ADEQUATE
PMV BLD AUTO: 12.1 FL (ref 8.9–12.7)
POTASSIUM SERPL-SCNC: 4.3 MMOL/L (ref 3.5–5.3)
PROT SERPL-MCNC: 7 G/DL (ref 6.4–8.4)
RBC # BLD AUTO: 3.8 MILLION/UL (ref 3.81–5.12)
RBC MORPH BLD: NORMAL
SODIUM SERPL-SCNC: 138 MMOL/L (ref 135–147)
VARIANT LYMPHS # BLD AUTO: 6 %
WBC # BLD AUTO: 49.29 THOUSAND/UL (ref 4.31–10.16)

## 2024-05-24 PROCEDURE — 85027 COMPLETE CBC AUTOMATED: CPT

## 2024-05-24 PROCEDURE — 36415 COLL VENOUS BLD VENIPUNCTURE: CPT

## 2024-05-24 PROCEDURE — 80053 COMPREHEN METABOLIC PANEL: CPT

## 2024-05-24 PROCEDURE — 85007 BL SMEAR W/DIFF WBC COUNT: CPT

## 2024-05-31 ENCOUNTER — APPOINTMENT (OUTPATIENT)
Dept: LAB | Facility: CLINIC | Age: 84
End: 2024-05-31
Payer: MEDICARE

## 2024-05-31 ENCOUNTER — OFFICE VISIT (OUTPATIENT)
Dept: HEMATOLOGY ONCOLOGY | Facility: CLINIC | Age: 84
End: 2024-05-31
Payer: MEDICARE

## 2024-05-31 ENCOUNTER — TELEPHONE (OUTPATIENT)
Dept: HEMATOLOGY ONCOLOGY | Facility: CLINIC | Age: 84
End: 2024-05-31

## 2024-05-31 VITALS
RESPIRATION RATE: 15 BRPM | SYSTOLIC BLOOD PRESSURE: 122 MMHG | HEART RATE: 65 BPM | WEIGHT: 150 LBS | HEIGHT: 66 IN | TEMPERATURE: 97.4 F | OXYGEN SATURATION: 97 % | DIASTOLIC BLOOD PRESSURE: 76 MMHG | BODY MASS INDEX: 24.11 KG/M2

## 2024-05-31 DIAGNOSIS — C91.10 CLL (CHRONIC LYMPHOCYTIC LEUKEMIA) (HCC): Primary | ICD-10-CM

## 2024-05-31 DIAGNOSIS — C91.10 CLL (CHRONIC LYMPHOCYTIC LEUKEMIA) (HCC): ICD-10-CM

## 2024-05-31 LAB
BASOPHILS # BLD MANUAL: 0 THOUSAND/UL (ref 0–0.1)
BASOPHILS NFR MAR MANUAL: 0 % (ref 0–1)
EOSINOPHIL # BLD MANUAL: 0.44 THOUSAND/UL (ref 0–0.4)
EOSINOPHIL NFR BLD MANUAL: 1 % (ref 0–6)
ERYTHROCYTE [DISTWIDTH] IN BLOOD BY AUTOMATED COUNT: 15.4 % (ref 11.6–15.1)
HCT VFR BLD AUTO: 40.3 % (ref 34.8–46.1)
HGB BLD-MCNC: 12.7 G/DL (ref 11.5–15.4)
IGA SERPL-MCNC: 270 MG/DL (ref 66–433)
IGG SERPL-MCNC: 862 MG/DL (ref 635–1741)
IGM SERPL-MCNC: 28 MG/DL (ref 45–281)
LYMPHOCYTES # BLD AUTO: 37.61 THOUSAND/UL (ref 0.6–4.47)
LYMPHOCYTES # BLD AUTO: 86 % (ref 14–44)
MCH RBC QN AUTO: 34 PG (ref 26.8–34.3)
MCHC RBC AUTO-ENTMCNC: 31.5 G/DL (ref 31.4–37.4)
MCV RBC AUTO: 108 FL (ref 82–98)
MONOCYTES # BLD AUTO: 0 THOUSAND/UL (ref 0–1.22)
MONOCYTES NFR BLD: 0 % (ref 4–12)
NEUTROPHILS # BLD MANUAL: 5.68 THOUSAND/UL (ref 1.85–7.62)
NEUTS BAND NFR BLD MANUAL: 1 % (ref 0–8)
NEUTS SEG NFR BLD AUTO: 12 % (ref 43–75)
PLATELET # BLD AUTO: 196 THOUSANDS/UL (ref 149–390)
PLATELET BLD QL SMEAR: ADEQUATE
PMV BLD AUTO: 11.7 FL (ref 8.9–12.7)
RBC # BLD AUTO: 3.74 MILLION/UL (ref 3.81–5.12)
RBC MORPH BLD: NORMAL
WBC # BLD AUTO: 43.73 THOUSAND/UL (ref 4.31–10.16)

## 2024-05-31 PROCEDURE — 99214 OFFICE O/P EST MOD 30 MIN: CPT | Performed by: PHYSICIAN ASSISTANT

## 2024-05-31 PROCEDURE — 85027 COMPLETE CBC AUTOMATED: CPT

## 2024-05-31 PROCEDURE — G2211 COMPLEX E/M VISIT ADD ON: HCPCS | Performed by: PHYSICIAN ASSISTANT

## 2024-05-31 PROCEDURE — 88185 FLOWCYTOMETRY/TC ADD-ON: CPT

## 2024-05-31 PROCEDURE — 88184 FLOWCYTOMETRY/ TC 1 MARKER: CPT

## 2024-05-31 PROCEDURE — 36415 COLL VENOUS BLD VENIPUNCTURE: CPT

## 2024-05-31 PROCEDURE — 82784 ASSAY IGA/IGD/IGG/IGM EACH: CPT

## 2024-05-31 PROCEDURE — 85007 BL SMEAR W/DIFF WBC COUNT: CPT

## 2024-05-31 NOTE — TELEPHONE ENCOUNTER
Spoke with Brain, reviewed attached information from Christine Leija PA-C. He verbalized understanding.     AN CLERICAL: Please schedule patient for 4 month follow up with Dr. Rios Please call nephew with appointment.

## 2024-05-31 NOTE — TELEPHONE ENCOUNTER
----- Message from Christine Leija PA-C sent at 5/31/2024 12:16 PM EDT -----  Patient's CBCD came back stable.  No evidence of blasts.  Please let caleb Soto know.  Recommend f/u with Dr. Rios in 4 months.    Labs placed

## 2024-05-31 NOTE — PROGRESS NOTES
Hematology/Oncology Outpatient Follow- up Note  Gia Vaughan 83 y.o. female MRN: @ Encounter: [id number]        Date:  5/31/2024      Assessment / Plan:    Chronic lymphocytic leukemia diagnosed on 11/2022 after she had a fall with fracture of the hip     Status post ORIF to the right hip, CAT scan showed mild adenopathy in the pelvis area measuring up to 2 cm, flow cytometry confirmed chronic lymphocytic leukemia, positive for trisomy 12, I GVH mutation is negative consistent with moderate risk of progression.    5/24/24 hemoglobin 12.6, white blood cell count 49,000, platelet count 216, 2% segs, ANC 0.99, 91% lymphocytes, 1% blasts, 6% atypical lymphocytes     Patient is feeling very well.    Reviewed with patient and her nephew, Charles the blasts identified on differential.  Repeat stat CBC D requested today.  Flow cytometry requested.  We discussed possibility of bone marrow aspiration and biopsy.  Follow-up TBD             HPI:    Gia Vaughan is an 84 y/o female seen initially 11/21/22 regarding Chronic lymphocytic leukemia/small lymphocytic lymphoma.    History of osteoporosis, type 2 diabetes mellitus, TIA, hypertension, congestive heart failure, CKD.     Admitted 10/22/22 s/p fall.  Right femur fracture noted.  Status post ORIF of right femur fracture with long IM nail on 10/12/2022.     The patient was found to have leukocytosis, lymphocytosis had been progressing for many years with mild anemia.     November 2020 hemoglobin 12.2, , white blood cell count 10.4, 67% neutrophils, 26% lymphocytes, platelets 234     August 2021 hemoglobin 11.8, , white blood cell count 14.21, 40% neutrophils, 52% lymphocytes, platelets 186     October 11, 2022 hemoglobin 14.4, , white blood cell count 22.3, 27% segs, 69% lymphocytes, 3% monocytes, platelets 237     Flow cytometry on 11/7/2022 confirmed chronic lymphocytic leukemia positive for CD 5, CD 23, CD 20     FISH panel showed trisomy 12, I GVH  mutation is negative     She denies any constitutional symptoms nausea vomiting dysuria hematuria melena hematochezia     CT scan of the pelvis 10/12/22 showed mild retroperitoneal lymphadenopathy measuring up to 2 cm        Review of Systems   Constitutional:  Negative for appetite change, chills, diaphoresis, fatigue, fever and unexpected weight change.   HENT:   Negative for mouth sores, nosebleeds, sore throat, tinnitus and voice change.    Eyes:  Negative for eye problems.   Respiratory:  Negative for chest tightness, cough, shortness of breath and wheezing.    Cardiovascular:  Negative for chest pain, leg swelling and palpitations.   Gastrointestinal:  Negative for abdominal distention, abdominal pain, blood in stool, constipation, diarrhea, nausea, rectal pain and vomiting.   Endocrine: Negative for hot flashes.   Genitourinary: Negative.     Musculoskeletal:  Negative for gait problem and myalgias.   Skin:  Negative for itching and rash.   Neurological:  Negative for dizziness, gait problem, headaches, light-headedness and numbness.   Hematological:  Negative for adenopathy.   Psychiatric/Behavioral:  Negative for confusion and sleep disturbance. The patient is not nervous/anxious.         Test Results:        Labs:   Lab Results   Component Value Date    HGB 12.6 05/24/2024    HCT 40.6 05/24/2024     (H) 05/24/2024     05/24/2024    WBC 49.29 (H) 05/24/2024    NRBC 0 08/13/2021    BANDSPCT 1 08/21/2023    ATYLMPCT 6 (H) 05/24/2024    BLASTSPCT 1 (H) 05/24/2024     Lab Results   Component Value Date    K 4.3 05/24/2024     05/24/2024    CO2 25 05/24/2024    BUN 30 (H) 05/24/2024    CREATININE 1.08 05/24/2024    GLUF 238 (H) 05/24/2024    CALCIUM 9.5 05/24/2024    AST 19 05/24/2024    ALT 28 05/24/2024    ALKPHOS 75 05/24/2024    EGFR 47 05/24/2024           Imaging: No results found.          Allergies:   Allergies   Allergen Reactions    Amlodipine     Ceftin [Cefuroxime]      "Ciprofloxacin     Citalopram     Dye [Iodinated Contrast Media]     Glucophage [Metformin]     Januvia [Sitagliptin]     Neomycin-Polymyxin-Dexameth     Ondansetron     Prilosec [Omeprazole]     Vibramycin [Doxycycline]      Current Medications: Reviewed  PMH/FH/SH:  Reviewed      Physical Exam:    1.77 meters squared    Ht Readings from Last 3 Encounters:   05/31/24 5' 6\" (1.676 m)   04/03/24 5' 6\" (1.676 m)   01/29/24 5' 6\" (1.676 m)        Wt Readings from Last 3 Encounters:   05/31/24 68 kg (150 lb)   04/04/24 68.4 kg (150 lb 12.8 oz)   01/29/24 70.8 kg (156 lb)        Temp Readings from Last 3 Encounters:   05/31/24 (!) 97.4 °F (36.3 °C) (Temporal)   04/04/24 98 °F (36.7 °C)   01/29/24 97.7 °F (36.5 °C) (Temporal)        BP Readings from Last 3 Encounters:   05/31/24 122/76   04/04/24 169/77   01/29/24 138/92             Physical Exam  Vitals reviewed.   Constitutional:       General: She is not in acute distress.     Appearance: She is well-developed. She is not diaphoretic.   HENT:      Head: Normocephalic and atraumatic.   Eyes:      Conjunctiva/sclera: Conjunctivae normal.   Neck:      Trachea: No tracheal deviation.   Cardiovascular:      Rate and Rhythm: Normal rate and regular rhythm.      Heart sounds: No murmur heard.     No friction rub. No gallop.   Pulmonary:      Effort: Pulmonary effort is normal. No respiratory distress.      Breath sounds: Normal breath sounds. No wheezing or rales.   Chest:      Chest wall: No tenderness.   Abdominal:      General: There is no distension.      Palpations: Abdomen is soft.      Tenderness: There is no abdominal tenderness.   Musculoskeletal:      Cervical back: Normal range of motion and neck supple.   Lymphadenopathy:      Cervical: No cervical adenopathy.   Skin:     General: Skin is warm and dry.      Coloration: Skin is not pale.      Findings: No erythema.   Neurological:      Mental Status: She is alert. Mental status is at baseline.   Psychiatric:         " Behavior: Behavior normal.         Thought Content: Thought content normal.         ECO      Emergency Contacts:    Extended Emergency Contact Information  Primary Emergency Contact: Charles Ny  Address: [address] [place] [address]  Work Phone: [phone]  Mobile Phone: [phone]  Relation: Nephew   needed? No

## 2024-06-02 LAB — SCAN RESULT: NORMAL

## 2024-06-10 ENCOUNTER — TELEPHONE (OUTPATIENT)
Age: 84
End: 2024-06-10

## 2024-06-10 NOTE — TELEPHONE ENCOUNTER
Patient's nephew called asking for an apt for today because patient's pcp advised to call us and bring her in today for a rash that is spreading     Triaged call

## 2024-06-10 NOTE — TELEPHONE ENCOUNTER
Patient's nephew called looking for an appointment for new patient , states pruritic and painful  rash present for few weeks  started on hands keeps spreading to abdomen , neck , arms , breast line and back .   Denies fever or chills and recent exposure to chemicals or medications .  Was told by her pcp to call us to be seen today if possible If not , she can call her pcp back ; was also prescribed valacyclovir 1 gr without improvement .   Offered amb clinic on 07/18 , he refused , advised to return to her pcp and if something changes call us back .

## 2024-09-01 ENCOUNTER — HOSPITAL ENCOUNTER (INPATIENT)
Facility: HOSPITAL | Age: 84
LOS: 5 days | Discharge: NURSING, REHAB OR LONG-TERM CARE | DRG: 871 | End: 2024-09-06
Attending: EMERGENCY MEDICINE | Admitting: INTERNAL MEDICINE
Payer: MEDICARE

## 2024-09-01 ENCOUNTER — APPOINTMENT (EMERGENCY)
Dept: RADIOLOGY | Facility: HOSPITAL | Age: 84
DRG: 871 | End: 2024-09-01
Payer: MEDICARE

## 2024-09-01 DIAGNOSIS — N39.0 UTI (URINARY TRACT INFECTION): ICD-10-CM

## 2024-09-01 DIAGNOSIS — J18.9 PNEUMONIA: ICD-10-CM

## 2024-09-01 DIAGNOSIS — R09.02 HYPOXIA: Primary | ICD-10-CM

## 2024-09-01 DIAGNOSIS — A41.9 SEPSIS (HCC): ICD-10-CM

## 2024-09-01 PROBLEM — U07.1 COVID: Status: ACTIVE | Noted: 2024-09-01

## 2024-09-01 LAB
ALBUMIN SERPL BCG-MCNC: 3.3 G/DL (ref 3.5–5)
ALP SERPL-CCNC: 109 U/L (ref 34–104)
ALT SERPL W P-5'-P-CCNC: 41 U/L (ref 7–52)
ANION GAP SERPL CALCULATED.3IONS-SCNC: 11 MMOL/L (ref 4–13)
ANISOCYTOSIS BLD QL SMEAR: PRESENT
APTT PPP: 25 SECONDS (ref 23–34)
AST SERPL W P-5'-P-CCNC: 41 U/L (ref 13–39)
BACTERIA UR QL AUTO: ABNORMAL /HPF
BASE EX.OXY STD BLDV CALC-SCNC: 75.5 % (ref 60–80)
BASE EXCESS BLDV CALC-SCNC: -1.3 MMOL/L
BASOPHILS # BLD MANUAL: 0 THOUSAND/UL (ref 0–0.1)
BASOPHILS NFR MAR MANUAL: 0 % (ref 0–1)
BILIRUB SERPL-MCNC: 0.83 MG/DL (ref 0.2–1)
BILIRUB UR QL STRIP: NEGATIVE
BUN SERPL-MCNC: 24 MG/DL (ref 5–25)
CALCIUM ALBUM COR SERPL-MCNC: 9.3 MG/DL (ref 8.3–10.1)
CALCIUM SERPL-MCNC: 8.7 MG/DL (ref 8.4–10.2)
CHLORIDE SERPL-SCNC: 101 MMOL/L (ref 96–108)
CLARITY UR: ABNORMAL
CO2 SERPL-SCNC: 21 MMOL/L (ref 21–32)
COLOR UR: YELLOW
CREAT SERPL-MCNC: 0.99 MG/DL (ref 0.6–1.3)
EOSINOPHIL # BLD MANUAL: 0 THOUSAND/UL (ref 0–0.4)
EOSINOPHIL NFR BLD MANUAL: 0 % (ref 0–6)
ERYTHROCYTE [DISTWIDTH] IN BLOOD BY AUTOMATED COUNT: 15.8 % (ref 11.6–15.1)
FLUAV RNA RESP QL NAA+PROBE: NEGATIVE
FLUBV RNA RESP QL NAA+PROBE: NEGATIVE
GFR SERPL CREATININE-BSD FRML MDRD: 52 ML/MIN/1.73SQ M
GLUCOSE SERPL-MCNC: 131 MG/DL (ref 65–140)
GLUCOSE SERPL-MCNC: 155 MG/DL (ref 65–140)
GLUCOSE UR STRIP-MCNC: ABNORMAL MG/DL
HCO3 BLDV-SCNC: 22.7 MMOL/L (ref 24–30)
HCT VFR BLD AUTO: 38.4 % (ref 34.8–46.1)
HGB BLD-MCNC: 12 G/DL (ref 11.5–15.4)
HGB UR QL STRIP.AUTO: ABNORMAL
INR PPP: 1.08 (ref 0.85–1.19)
KETONES UR STRIP-MCNC: ABNORMAL MG/DL
LACTATE SERPL-SCNC: 1.8 MMOL/L (ref 0.5–2)
LEUKOCYTE ESTERASE UR QL STRIP: ABNORMAL
LG PLATELETS BLD QL SMEAR: PRESENT
LYMPHOCYTES # BLD AUTO: 21.51 THOUSAND/UL (ref 0.6–4.47)
LYMPHOCYTES # BLD AUTO: 68 % (ref 14–44)
MCH RBC QN AUTO: 33.1 PG (ref 26.8–34.3)
MCHC RBC AUTO-ENTMCNC: 31.3 G/DL (ref 31.4–37.4)
MCV RBC AUTO: 106 FL (ref 82–98)
MONOCYTES # BLD AUTO: 0.61 THOUSAND/UL (ref 0–1.22)
MONOCYTES NFR BLD: 2 % (ref 4–12)
NEUTROPHILS # BLD MANUAL: 8.6 THOUSAND/UL (ref 1.85–7.62)
NEUTS SEG NFR BLD AUTO: 28 % (ref 43–75)
NITRITE UR QL STRIP: POSITIVE
NON-SQ EPI CELLS URNS QL MICRO: ABNORMAL /HPF
O2 CT BLDV-SCNC: 14.7 ML/DL
PCO2 BLDV: 36 MM HG (ref 42–50)
PH BLDV: 7.42 [PH] (ref 7.3–7.4)
PH UR STRIP.AUTO: 5.5 [PH]
PLATELET # BLD AUTO: 189 THOUSANDS/UL (ref 149–390)
PLATELET BLD QL SMEAR: ADEQUATE
PMV BLD AUTO: 14 FL (ref 8.9–12.7)
PO2 BLDV: 41.8 MM HG (ref 35–45)
POTASSIUM SERPL-SCNC: 4.7 MMOL/L (ref 3.5–5.3)
PROCALCITONIN SERPL-MCNC: 0.46 NG/ML
PROT SERPL-MCNC: 6.7 G/DL (ref 6.4–8.4)
PROT UR STRIP-MCNC: ABNORMAL MG/DL
PROTHROMBIN TIME: 14.7 SECONDS (ref 12.3–15)
RBC # BLD AUTO: 3.62 MILLION/UL (ref 3.81–5.12)
RBC #/AREA URNS AUTO: ABNORMAL /HPF
RBC MORPH BLD: PRESENT
RSV RNA RESP QL NAA+PROBE: NEGATIVE
SARS-COV-2 RNA RESP QL NAA+PROBE: POSITIVE
SODIUM SERPL-SCNC: 133 MMOL/L (ref 135–147)
SP GR UR STRIP.AUTO: 1.02 (ref 1–1.03)
UROBILINOGEN UR STRIP-ACNC: <2 MG/DL
VARIANT LYMPHS # BLD AUTO: 2 %
WBC # BLD AUTO: 30.73 THOUSAND/UL (ref 4.31–10.16)
WBC #/AREA URNS AUTO: ABNORMAL /HPF
WBC CLUMPS # UR AUTO: PRESENT /UL

## 2024-09-01 PROCEDURE — 87040 BLOOD CULTURE FOR BACTERIA: CPT

## 2024-09-01 PROCEDURE — 84145 PROCALCITONIN (PCT): CPT

## 2024-09-01 PROCEDURE — 83735 ASSAY OF MAGNESIUM: CPT | Performed by: EMERGENCY MEDICINE

## 2024-09-01 PROCEDURE — 87086 URINE CULTURE/COLONY COUNT: CPT

## 2024-09-01 PROCEDURE — 80053 COMPREHEN METABOLIC PANEL: CPT

## 2024-09-01 PROCEDURE — 85730 THROMBOPLASTIN TIME PARTIAL: CPT

## 2024-09-01 PROCEDURE — 82948 REAGENT STRIP/BLOOD GLUCOSE: CPT

## 2024-09-01 PROCEDURE — 85027 COMPLETE CBC AUTOMATED: CPT

## 2024-09-01 PROCEDURE — 36415 COLL VENOUS BLD VENIPUNCTURE: CPT

## 2024-09-01 PROCEDURE — 83605 ASSAY OF LACTIC ACID: CPT

## 2024-09-01 PROCEDURE — 0241U HB NFCT DS VIR RESP RNA 4 TRGT: CPT

## 2024-09-01 PROCEDURE — 87186 SC STD MICRODIL/AGAR DIL: CPT

## 2024-09-01 PROCEDURE — 71045 X-RAY EXAM CHEST 1 VIEW: CPT

## 2024-09-01 PROCEDURE — 85007 BL SMEAR W/DIFF WBC COUNT: CPT

## 2024-09-01 PROCEDURE — 87077 CULTURE AEROBIC IDENTIFY: CPT

## 2024-09-01 PROCEDURE — 82805 BLOOD GASES W/O2 SATURATION: CPT

## 2024-09-01 PROCEDURE — 99285 EMERGENCY DEPT VISIT HI MDM: CPT | Performed by: EMERGENCY MEDICINE

## 2024-09-01 PROCEDURE — 99285 EMERGENCY DEPT VISIT HI MDM: CPT

## 2024-09-01 PROCEDURE — 96367 TX/PROPH/DG ADDL SEQ IV INF: CPT

## 2024-09-01 PROCEDURE — 85610 PROTHROMBIN TIME: CPT

## 2024-09-01 PROCEDURE — 96365 THER/PROPH/DIAG IV INF INIT: CPT

## 2024-09-01 PROCEDURE — 81001 URINALYSIS AUTO W/SCOPE: CPT

## 2024-09-01 PROCEDURE — 83036 HEMOGLOBIN GLYCOSYLATED A1C: CPT

## 2024-09-01 RX ORDER — ACETAMINOPHEN 10 MG/ML
1000 INJECTION, SOLUTION INTRAVENOUS ONCE
Status: COMPLETED | OUTPATIENT
Start: 2024-09-01 | End: 2024-09-01

## 2024-09-01 RX ORDER — ALBUTEROL SULFATE 2.5 MG/3ML
1 SOLUTION RESPIRATORY (INHALATION) ONCE
Status: COMPLETED | OUTPATIENT
Start: 2024-09-01 | End: 2024-09-01

## 2024-09-01 RX ADMIN — VANCOMYCIN HYDROCHLORIDE 1750 MG: 5 INJECTION, POWDER, LYOPHILIZED, FOR SOLUTION INTRAVENOUS at 23:03

## 2024-09-01 RX ADMIN — ACETAMINOPHEN 1000 MG: 10 INJECTION INTRAVENOUS at 23:03

## 2024-09-01 RX ADMIN — PIPERACILLIN SODIUM AND TAZOBACTAM SODIUM 4.5 G: 36; 4.5 INJECTION, POWDER, LYOPHILIZED, FOR SOLUTION INTRAVENOUS at 22:29

## 2024-09-01 NOTE — Clinical Note
Case was discussed with SARAH and the patient's admission status was agreed to be Admission Status: inpatient status to the service of Dr. ARNIE Overton

## 2024-09-02 PROBLEM — R19.7 DIARRHEA: Status: ACTIVE | Noted: 2024-09-02

## 2024-09-02 LAB
ANION GAP SERPL CALCULATED.3IONS-SCNC: 8 MMOL/L (ref 4–13)
BASOPHILS # BLD AUTO: 0.13 THOUSANDS/ÂΜL (ref 0–0.1)
BASOPHILS NFR BLD AUTO: 1 % (ref 0–1)
BNP SERPL-MCNC: 797 PG/ML (ref 0–100)
BUN SERPL-MCNC: 22 MG/DL (ref 5–25)
CALCIUM SERPL-MCNC: 8.7 MG/DL (ref 8.4–10.2)
CHLORIDE SERPL-SCNC: 106 MMOL/L (ref 96–108)
CK SERPL-CCNC: 39 U/L (ref 26–192)
CO2 SERPL-SCNC: 22 MMOL/L (ref 21–32)
CREAT SERPL-MCNC: 0.97 MG/DL (ref 0.6–1.3)
CRP SERPL QL: 203.8 MG/L
D DIMER PPP FEU-MCNC: 2.03 UG/ML FEU
EOSINOPHIL # BLD AUTO: 0.05 THOUSAND/ÂΜL (ref 0–0.61)
EOSINOPHIL NFR BLD AUTO: 0 % (ref 0–6)
ERYTHROCYTE [DISTWIDTH] IN BLOOD BY AUTOMATED COUNT: 15.8 % (ref 11.6–15.1)
EST. AVERAGE GLUCOSE BLD GHB EST-MCNC: 226 MG/DL
GFR SERPL CREATININE-BSD FRML MDRD: 53 ML/MIN/1.73SQ M
GLUCOSE SERPL-MCNC: 124 MG/DL (ref 65–140)
GLUCOSE SERPL-MCNC: 148 MG/DL (ref 65–140)
GLUCOSE SERPL-MCNC: 153 MG/DL (ref 65–140)
GLUCOSE SERPL-MCNC: 205 MG/DL (ref 65–140)
GLUCOSE SERPL-MCNC: 323 MG/DL (ref 65–140)
GLUCOSE SERPL-MCNC: 335 MG/DL (ref 65–140)
HBA1C MFR BLD: 9.5 %
HCT VFR BLD AUTO: 37.6 % (ref 34.8–46.1)
HGB BLD-MCNC: 11.8 G/DL (ref 11.5–15.4)
IMM GRANULOCYTES # BLD AUTO: 0.18 THOUSAND/UL (ref 0–0.2)
IMM GRANULOCYTES NFR BLD AUTO: 1 % (ref 0–2)
L PNEUMO1 AG UR QL IA.RAPID: NEGATIVE
LYMPHOCYTES # BLD AUTO: 15.41 THOUSANDS/ÂΜL (ref 0.6–4.47)
LYMPHOCYTES NFR BLD AUTO: 59 % (ref 14–44)
MAGNESIUM SERPL-MCNC: 2.1 MG/DL (ref 1.9–2.7)
MCH RBC QN AUTO: 33.1 PG (ref 26.8–34.3)
MCHC RBC AUTO-ENTMCNC: 31.4 G/DL (ref 31.4–37.4)
MCV RBC AUTO: 106 FL (ref 82–98)
MONOCYTES # BLD AUTO: 0.64 THOUSAND/ÂΜL (ref 0.17–1.22)
MONOCYTES NFR BLD AUTO: 3 % (ref 4–12)
NEUTROPHILS # BLD AUTO: 9.19 THOUSANDS/ÂΜL (ref 1.85–7.62)
NEUTS SEG NFR BLD AUTO: 36 % (ref 43–75)
NRBC BLD AUTO-RTO: 0 /100 WBCS
PLATELET # BLD AUTO: 215 THOUSANDS/UL (ref 149–390)
PMV BLD AUTO: 13.4 FL (ref 8.9–12.7)
POTASSIUM SERPL-SCNC: 4 MMOL/L (ref 3.5–5.3)
PROCALCITONIN SERPL-MCNC: 0.52 NG/ML
RBC # BLD AUTO: 3.56 MILLION/UL (ref 3.81–5.12)
S PNEUM AG UR QL: NEGATIVE
SODIUM SERPL-SCNC: 136 MMOL/L (ref 135–147)
WBC # BLD AUTO: 25.6 THOUSAND/UL (ref 4.31–10.16)

## 2024-09-02 PROCEDURE — 85025 COMPLETE CBC W/AUTO DIFF WBC: CPT

## 2024-09-02 PROCEDURE — 82948 REAGENT STRIP/BLOOD GLUCOSE: CPT

## 2024-09-02 PROCEDURE — 94760 N-INVAS EAR/PLS OXIMETRY 1: CPT

## 2024-09-02 PROCEDURE — 85379 FIBRIN DEGRADATION QUANT: CPT

## 2024-09-02 PROCEDURE — 80048 BASIC METABOLIC PNL TOTAL CA: CPT

## 2024-09-02 PROCEDURE — 86140 C-REACTIVE PROTEIN: CPT

## 2024-09-02 PROCEDURE — 83880 ASSAY OF NATRIURETIC PEPTIDE: CPT

## 2024-09-02 PROCEDURE — 87449 NOS EACH ORGANISM AG IA: CPT

## 2024-09-02 PROCEDURE — 84145 PROCALCITONIN (PCT): CPT

## 2024-09-02 PROCEDURE — 87081 CULTURE SCREEN ONLY: CPT

## 2024-09-02 PROCEDURE — 82550 ASSAY OF CK (CPK): CPT

## 2024-09-02 PROCEDURE — 99223 1ST HOSP IP/OBS HIGH 75: CPT | Performed by: HOSPITALIST

## 2024-09-02 RX ORDER — ATORVASTATIN CALCIUM 20 MG/1
20 TABLET, FILM COATED ORAL DAILY
Status: DISCONTINUED | OUTPATIENT
Start: 2024-09-02 | End: 2024-09-06 | Stop reason: HOSPADM

## 2024-09-02 RX ORDER — METOPROLOL TARTRATE 25 MG/1
25 TABLET, FILM COATED ORAL EVERY 12 HOURS SCHEDULED
Status: DISCONTINUED | OUTPATIENT
Start: 2024-09-02 | End: 2024-09-06 | Stop reason: HOSPADM

## 2024-09-02 RX ORDER — FUROSEMIDE 10 MG/ML
20 INJECTION INTRAMUSCULAR; INTRAVENOUS ONCE
Status: DISCONTINUED | OUTPATIENT
Start: 2024-09-02 | End: 2024-09-02

## 2024-09-02 RX ORDER — LOSARTAN POTASSIUM 50 MG/1
50 TABLET ORAL DAILY
Status: DISCONTINUED | OUTPATIENT
Start: 2024-09-02 | End: 2024-09-06 | Stop reason: HOSPADM

## 2024-09-02 RX ORDER — DEXAMETHASONE SODIUM PHOSPHATE 4 MG/ML
6 INJECTION, SOLUTION INTRA-ARTICULAR; INTRALESIONAL; INTRAMUSCULAR; INTRAVENOUS; SOFT TISSUE EVERY 24 HOURS
Status: DISCONTINUED | OUTPATIENT
Start: 2024-09-02 | End: 2024-09-06 | Stop reason: HOSPADM

## 2024-09-02 RX ORDER — HEPARIN SODIUM 5000 [USP'U]/ML
5000 INJECTION, SOLUTION INTRAVENOUS; SUBCUTANEOUS EVERY 8 HOURS SCHEDULED
Status: DISCONTINUED | OUTPATIENT
Start: 2024-09-02 | End: 2024-09-06 | Stop reason: HOSPADM

## 2024-09-02 RX ORDER — INSULIN LISPRO 100 [IU]/ML
2-12 INJECTION, SOLUTION INTRAVENOUS; SUBCUTANEOUS
Status: DISCONTINUED | OUTPATIENT
Start: 2024-09-02 | End: 2024-09-02

## 2024-09-02 RX ORDER — INSULIN LISPRO 100 [IU]/ML
5 INJECTION, SOLUTION INTRAVENOUS; SUBCUTANEOUS
Status: DISCONTINUED | OUTPATIENT
Start: 2024-09-02 | End: 2024-09-02

## 2024-09-02 RX ORDER — BENZONATATE 100 MG/1
100 CAPSULE ORAL 3 TIMES DAILY PRN
Status: DISCONTINUED | OUTPATIENT
Start: 2024-09-02 | End: 2024-09-06 | Stop reason: HOSPADM

## 2024-09-02 RX ADMIN — HEPARIN SODIUM 5000 UNITS: 5000 INJECTION INTRAVENOUS; SUBCUTANEOUS at 01:48

## 2024-09-02 RX ADMIN — METOPROLOL TARTRATE 25 MG: 25 TABLET, FILM COATED ORAL at 21:15

## 2024-09-02 RX ADMIN — INSULIN HUMAN 8 UNITS: 100 INJECTION, SUSPENSION SUBCUTANEOUS at 11:36

## 2024-09-02 RX ADMIN — DEXAMETHASONE SODIUM PHOSPHATE 6 MG: 4 INJECTION INTRA-ARTICULAR; INTRALESIONAL; INTRAMUSCULAR; INTRAVENOUS; SOFT TISSUE at 01:51

## 2024-09-02 RX ADMIN — Medication 1000 UNITS: at 08:14

## 2024-09-02 RX ADMIN — Medication 1000 MCG: at 08:13

## 2024-09-02 RX ADMIN — INSULIN HUMAN 8 UNITS: 100 INJECTION, SUSPENSION SUBCUTANEOUS at 17:17

## 2024-09-02 RX ADMIN — METOPROLOL TARTRATE 25 MG: 25 TABLET, FILM COATED ORAL at 08:14

## 2024-09-02 RX ADMIN — HEPARIN SODIUM 5000 UNITS: 5000 INJECTION INTRAVENOUS; SUBCUTANEOUS at 15:06

## 2024-09-02 RX ADMIN — PIPERACILLIN SODIUM AND TAZOBACTAM SODIUM 4.5 G: 36; 4.5 INJECTION, POWDER, LYOPHILIZED, FOR SOLUTION INTRAVENOUS at 21:08

## 2024-09-02 RX ADMIN — INSULIN DETEMIR 18 UNITS: 100 INJECTION, SOLUTION SUBCUTANEOUS at 01:52

## 2024-09-02 RX ADMIN — REMDESIVIR 200 MG: 100 INJECTION, POWDER, LYOPHILIZED, FOR SOLUTION INTRAVENOUS at 01:49

## 2024-09-02 RX ADMIN — LOSARTAN POTASSIUM 50 MG: 50 TABLET, FILM COATED ORAL at 08:14

## 2024-09-02 RX ADMIN — INSULIN DETEMIR 18 UNITS: 100 INJECTION, SOLUTION SUBCUTANEOUS at 21:42

## 2024-09-02 RX ADMIN — BENZONATATE 100 MG: 100 CAPSULE ORAL at 21:16

## 2024-09-02 RX ADMIN — PIPERACILLIN SODIUM AND TAZOBACTAM SODIUM 4.5 G: 36; 4.5 INJECTION, POWDER, LYOPHILIZED, FOR SOLUTION INTRAVENOUS at 03:12

## 2024-09-02 RX ADMIN — ATORVASTATIN CALCIUM 20 MG: 20 TABLET, FILM COATED ORAL at 08:14

## 2024-09-02 RX ADMIN — HEPARIN SODIUM 5000 UNITS: 5000 INJECTION INTRAVENOUS; SUBCUTANEOUS at 21:08

## 2024-09-02 NOTE — ASSESSMENT & PLAN NOTE
Reports ongoing diarrhea x 1 week no abdominal pain on exam  Denies any new dietary changes, med changes, or recent sick contacts.  Suspect in the setting of COVID?  Will rule out C. difficile and other etiologies  Monitor intake/output

## 2024-09-02 NOTE — ASSESSMENT & PLAN NOTE
Lab Results   Component Value Date    EGFR 52 09/01/2024    EGFR 47 05/24/2024    EGFR 50 04/04/2024    CREATININE 0.99 09/01/2024    CREATININE 1.08 05/24/2024    CREATININE 1.03 04/04/2024   Currently at baseline continue to monitor with daily BMP

## 2024-09-02 NOTE — ASSESSMENT & PLAN NOTE
UA with moderate leukocytes, positive nitrites.  Urine micro with innumerable bacteria and WBCs  Follow-up on urine cultures; GNR, but sensitivities pending   Continue IV Zosyn

## 2024-09-02 NOTE — ASSESSMENT & PLAN NOTE
Wt Readings from Last 3 Encounters:   05/31/24 68 kg (150 lb)   04/04/24 68.4 kg (150 lb 12.8 oz)   01/29/24 70.8 kg (156 lb)   Appears euvolemic on examination  EF of 45% with reduced systolic function, G2 DD  Monitor daily weights.  Intake and output  Cardiac diabetic diet ordered.

## 2024-09-02 NOTE — ASSESSMENT & PLAN NOTE
Diagnosed COVID-positive last week at nursing facility found to have low O2 saturations in the 70s.  In EMS patient was started on BiPAP upon arrival patient was transitioned to nasal cannula with improvement in symptoms and is currently at her baseline mentation.  She reports difficulty breathing otherwise feels normal.  CXR with left-sided infiltrate.  COVID-+  Treat on mild COVID pathway  Check troponin, CRP, BNP, CK, D-dimer  Start on dexamethasone 6 mg IV daily x 10 days  Treat with IV remdesivir 200 mg today, 100 mg x 4 days.  Continue I-S and wean oxygen as tolerated

## 2024-09-02 NOTE — ASSESSMENT & PLAN NOTE
Patient meeting sepsis criteria as evidenced by leukocytosis, tachycardia, and tachypnea with source being UTI vs pneumonia vs COVID.  Leukocytosis appears chronic and improved since prior CBC likely from patient's underlying CLL.  Currently hemodynamically stable.  PCT elevated, lactate WNL  In ED patient started on IV Zosyn secondary to antibiotic allergies  Continue to trend CBC and monitor fever curve.

## 2024-09-02 NOTE — ASSESSMENT & PLAN NOTE
Lab Results   Component Value Date    HGBA1C 9.4 (H) 04/01/2024       Recent Labs     09/01/24 2102   POCGLU 131       Blood Sugar Average: Last 72 hrs:  (P) 131  Update hemoglobin A1c  Continue 20 units Levemir at bedtime  Continue Humalog 5 units with meals and sliding scale insulin  Avoid hypoglycemia

## 2024-09-02 NOTE — H&P
[facility]  H&P  Name: Gia Vaughan 83 y.o. female I MRN: [id number]  Unit/Bed#: S -01 I Date of Admission: 9/1/2024   Date of Service: 9/2/2024 I Hospital Day: 1      Assessment & Plan   * Sepsis (HCC)  Assessment & Plan  Patient meeting sepsis criteria as evidenced by leukocytosis, tachycardia, and tachypnea with source being UTI vs pneumonia vs COVID.  Leukocytosis appears chronic and improved since prior CBC likely from patient's underlying CLL.  Currently hemodynamically stable.  PCT elevated, lactate WNL  In ED patient started on IV Zosyn secondary to antibiotic allergies  Continue to trend CBC and monitor fever curve.    COVID  Assessment & Plan  Diagnosed COVID-positive last week at nursing facility found to have low O2 saturations in the 70s.  In EMS patient was started on BiPAP upon arrival patient was transitioned to nasal cannula with improvement in symptoms and is currently at her baseline mentation.  She reports difficulty breathing otherwise feels normal.  CXR with left-sided infiltrate.  COVID-+  Treat on mild COVID pathway  Plan  Check troponin, CRP, BNP, CK, D-dimer  Start on dexamethasone 6 mg IV daily x 10 days  Treat with IV remdesivir 200 mg today, 100 mg x 4 days.  Continue I-S and wean oxygen as tolerated    Community acquired pneumonia  Assessment & Plan  Patient with new oxygen requirements now on 3 L nasal cannula. Did require Bipap enroute to hospital. Pt reports improvement in symptoms  CXR showing left-sided infiltrates  In ED started on IV Zosyn    Plan  Obtain urine antigens and sputum cultures  Continue with IV Zosyn  Follow-up on blood cultures  I-S ordered  Wean O2 as tolerated    UTI (urinary tract infection)  Assessment & Plan  UA with moderate leukocytes, positive nitrites.  Urine micro with innumerable bacteria and WBCs  Follow-up on urine cultures  Continue IV Zosyn    Diarrhea  Assessment & Plan  Reports ongoing diarrhea x 1 week no  abdominal pain on exam  Denies any new dietary changes, med changes, or recent sick contacts.  Suspect in the setting of COVID?  Will rule out C. difficile and other etiologies  Monitor intake/output    CLL (chronic lymphocytic leukemia) ([facility])  Assessment & Plan  Follows with heme-onc as outpatient.  WBC appears improved since prior  Continue to follow with heme    Moderate dementia ([facility])  Assessment & Plan  Mentation appears to be at baseline  Continue to regulate sleep/wake cycles  Supportive care    Chronic systolic heart failure ([facility])  Assessment & Plan  Wt Readings from Last 3 Encounters:   05/31/24 68 kg (150 lb)   04/04/24 68.4 kg (150 lb 12.8 oz)   01/29/24 70.8 kg (156 lb)   Appears euvolemic on examination  EF of 45% with reduced systolic function, G2 DD  Monitor daily weights.  Intake and output  Cardiac diabetic diet ordered.    CKD (chronic kidney disease) stage 3, GFR 30-59 ml/min ([facility])  Assessment & Plan  Lab Results   Component Value Date    EGFR 52 09/01/2024    EGFR 47 05/24/2024    EGFR 50 04/04/2024    CREATININE 0.99 09/01/2024    CREATININE 1.08 05/24/2024    CREATININE 1.03 04/04/2024   Currently at baseline continue to monitor with daily BMP    Type 2 diabetes mellitus with stage 3 chronic kidney disease, unspecified whether long term insulin use, unspecified whether stage 3a or 3b CKD ([facility])  Assessment & Plan  Lab Results   Component Value Date    HGBA1C 9.4 (H) 04/01/2024       Recent Labs     09/01/24  2102   POCGLU 131         Blood Sugar Average: Last 72 hrs:  (P) 131  Update hemoglobin A1c  Continue 20 units Levemir at bedtime  Continue Humalog 5 units with meals and sliding scale insulin  Avoid hypoglycemia         VTE Pharmacologic Prophylaxis:   Moderate Risk (Score 3-4) - Pharmacological DVT Prophylaxis Ordered: heparin.  Code Status: Level 1 - Full Code   Discussion with family: Patient declined call to .  Patient declined update to POA nephew at this time.  Does not  want us to bother him this early in the morning.    Anticipated Length of Stay: Patient will be admitted on an inpatient basis with an anticipated length of stay of greater than 2 midnights secondary to sepsis.    Total Time Spent on Date of Encounter in care of patient: 75 mins. This time was spent on one or more of the following: performing physical exam; counseling and coordination of care; obtaining or reviewing history; documenting in the medical record; reviewing/ordering tests, medications or procedures; communicating with other healthcare professionals and discussing with patient's family/caregivers.    Chief Complaint: Shortness of breath    History of Present Illness:  Gia Vaughan is a 83 y.o. female with a PMH of CLL, type 2 diabetes, CKD stage III, CHF, dementia, orthostasis, headaches who presents with shortness of breath.    Patient presents from [facility] has history of dementia and recent COVID diagnosis.  She presents with respiratory distress at nursing facility she was found to be hypoxic with O2 saturations in the 70s.  In  EMS started patient on BiPAP and she was immediately transitioned to 2 L nasal cannula maintaining saturations well on arrival to the emergency department.  In the ED patient had chest x-ray obtained showing left-sided infiltrate and UA demonstrating urinary tract infection.  She was started on IV Zosyn secondary to medication allergies.  On exam patient reports improvement in her breathing from prior.  She otherwise feels okay does complain of watery diarrhea for the past week with multiple episodes per day.  Denies any new dietary discretions.  She denies any recent sick contacts.  Denies any shortness of breath at present, chest pain, lightheadedness, dizziness, abdominal pain, nausea, vomiting.    Review of Systems:  Review of Systems   Constitutional:  Positive for activity change. Negative for chills and fever.   HENT:  Negative for ear pain and sore throat.    Eyes:   Negative for pain and visual disturbance.   Respiratory:  Positive for shortness of breath. Negative for cough.    Cardiovascular:  Negative for chest pain and palpitations.   Gastrointestinal:  Negative for abdominal pain and vomiting.   Genitourinary:  Negative for dysuria and hematuria.   Musculoskeletal:  Negative for arthralgias and back pain.   Skin:  Negative for color change and rash.   Neurological:  Negative for seizures and syncope.   All other systems reviewed and are negative.      Past Medical and Surgical History:   Past Medical History:   Diagnosis Date    Benign adenomatous polyp of large intestine     CHF (congestive heart failure) (HCC)     Diabetes mellitus (HCC)     Hyperlipemia     Hypertension     Osteoarthritis     TIA (transient ischemic attack)        Past Surgical History:   Procedure Laterality Date    APPENDECTOMY      CARPAL TUNNEL RELEASE Right     HYSTERECTOMY W/ SALPINGO-OOPHERECTOMY      VT OPTX FEM SHFT FX W/INSJ IMED IMPLT W/WO SCREW Right 10/12/2022    Procedure: INSERTION NAIL IM FEMUR ANTEGRADE (TROCHANTERIC);  Surgeon: Eric Isaacs DO;  Location: [facility];  Service: Orthopedics       Meds/Allergies:  Prior to Admission medications    Medication Sig Start Date End Date Taking? Authorizing Provider   acetaminophen (TYLENOL) 325 mg tablet Take 2 tablets (650 mg total) by mouth every 4 (four) hours as needed for mild pain 10/13/22   Pasha Putnam PA-C   atorvastatin (LIPITOR) 20 mg tablet Take 20 mg by mouth daily    Historical Provider, MD   calcium carbonate (OS-JUAN RAMON) 600 MG tablet Take 600 mg by mouth 2 (two) times a day with meals    Historical Provider, MD   cholecalciferol (VITAMIN D3) 1,000 units tablet Take 1,000 Units by mouth daily    Historical Provider, MD   Continuous Blood Gluc Sensor (FreeStyle Дмитрий 2 Sensor) MISC apply 1 SENSOR to back OF UPPER ARM REMOVE AND REPLACE every 14 d...  (REFER TO PRESCRIPTION NOTES). 1/24/24   Historical Provider, MD   Diclofenac  Sodium (VOLTAREN) 1 % Apply 2 g topically 3 (three) times a day To R knee 11/8/22   Ashley Depadua, MD   docusate sodium (COLACE) 100 mg capsule Take 1 capsule (100 mg total) by mouth 2 (two) times a day  Patient not taking: Reported on 5/31/2024 10/13/22   Pasha Putnam PA-C   glucose blood test strip 1 each by Other route daily as needed Use as instructed    Historical Provider, MD   glucose monitoring kit (FREESTYLE) monitoring kit 1 each by Does not apply route as needed    Historical Provider, MD   insulin detemir (LEVEMIR) 100 units/mL subcutaneous injection Inject 20 Units under the skin daily at bedtime 11/8/22   Ashley Depadua, MD   insulin lispro (HumaLOG) 100 units/mL injection Inject 5 Units under the skin 3 (three) times a day with meals 11/8/22   Ashley Depadua, MD   lidocaine (LIDODERM) 5 % Apply 2 patches topically daily Remove & Discard patch within 12 hours or as directed by MD To right leg Do not start before November 9, 2022.  Patient not taking: Reported on 5/31/2024 11/9/22   Ashley Depadua, MD   losartan (COZAAR) 50 mg tablet Take 50 mg by mouth daily 1/26/24   Historical Provider, MD   metoprolol tartrate (LOPRESSOR) 25 mg tablet Take 1 tablet (25 mg total) by mouth every 12 (twelve) hours 11/8/22   Ashley Depadua, MD   Potassium Chloride ER 20 MEQ TBCR Take 1 tablet by mouth 2 (two) times a day 12/29/23   Historical Provider, MD   vitamin B-12 (VITAMIN B-12) 1,000 mcg tablet Take by mouth daily    Historical Provider, MD     I have reviewed medications with most recent epic encounter, patient, and medical documentation provided by facility.     Allergies:   Allergies   Allergen Reactions    Amlodipine     Ceftin [Cefuroxime]     Ciprofloxacin     Citalopram     Dye [Iodinated Contrast Media]     Glucophage [Metformin]     Januvia [Sitagliptin]     Neomycin-Polymyxin-Dexameth     Ondansetron     Prilosec [Omeprazole]     Vibramycin [Doxycycline]        Social History:  Marital Status:     Occupation: Retired  Patient Pre-hospital Living Situation: Home  Patient Pre-hospital Level of Mobility: walks  Patient Pre-hospital Diet Restrictions: N/A  Substance Use History:   Social History     Substance and Sexual Activity   Alcohol Use Not Currently    Comment: very seldom     Social History     Tobacco Use   Smoking Status Never   Smokeless Tobacco Never     Social History     Substance and Sexual Activity   Drug Use Never       Family History:  Family History   Problem Relation Age of Onset    Heart disease Mother     Heart disease Father     Hypertension Father     Stomach cancer Sister     Ovarian cancer Sister     Hypertension Sister        Physical Exam:     Vitals:   Blood Pressure: 112/52 (09/02/24 0109)  Pulse: 85 (09/02/24 0109)  Temperature: 98.3 °F (36.8 °C) (09/02/24 0109)  Temp Source: Oral (09/01/24 2100)  Respirations: 18 (09/02/24 0109)  SpO2: 98 % (09/02/24 0109)    Physical Exam  Vitals and nursing note reviewed.   Constitutional:       General: She is not in acute distress.     Appearance: She is well-developed. She is ill-appearing.   HENT:      Head: Normocephalic and atraumatic.   Eyes:      Conjunctiva/sclera: Conjunctivae normal.   Cardiovascular:      Rate and Rhythm: Regular rhythm. Tachycardia present.      Heart sounds: No murmur heard.  Pulmonary:      Effort: Pulmonary effort is normal. No tachypnea, bradypnea, accessory muscle usage or respiratory distress.      Breath sounds: Normal breath sounds.      Comments: Diminished breath sounds  Abdominal:      General: Bowel sounds are normal.      Palpations: Abdomen is soft.      Tenderness: There is no abdominal tenderness. There is no right CVA tenderness, left CVA tenderness or guarding.   Musculoskeletal:         General: No swelling.      Cervical back: Neck supple.      Right lower leg: No edema.      Left lower leg: No edema.   Skin:     General: Skin is warm and dry.      Capillary Refill: Capillary refill takes less  than 2 seconds.   Neurological:      Mental Status: She is alert.   Psychiatric:         Mood and Affect: Mood normal.         Speech: Speech normal.         Behavior: Behavior is cooperative.      Comments: Oriented to person, place, time          Additional Data:     Lab Results:  Results from last 7 days   Lab Units 09/01/24  2122   WBC Thousand/uL 30.73*   HEMOGLOBIN g/dL 12.0   HEMATOCRIT % 38.4   PLATELETS Thousands/uL 189   LYMPHO PCT % 68*   MONO PCT % 2*   EOS PCT % 0     Results from last 7 days   Lab Units 09/01/24  2225   SODIUM mmol/L 133*   POTASSIUM mmol/L 4.7   CHLORIDE mmol/L 101   CO2 mmol/L 21   BUN mg/dL 24   CREATININE mg/dL 0.99   ANION GAP mmol/L 11   CALCIUM mg/dL 8.7   ALBUMIN g/dL 3.3*   TOTAL BILIRUBIN mg/dL 0.83   ALK PHOS U/L 109*   ALT U/L 41   AST U/L 41*   GLUCOSE RANDOM mg/dL 155*     Results from last 7 days   Lab Units 09/01/24  2225   INR  1.08     Results from last 7 days   Lab Units 09/01/24  2102   POC GLUCOSE mg/dl 131     Lab Results   Component Value Date    HGBA1C 9.4 (H) 04/01/2024    HGBA1C 9.5 (H) 11/29/2023    HGBA1C 9.6 (H) 08/21/2023     Results from last 7 days   Lab Units 09/01/24  2225   LACTIC ACID mmol/L 1.8   PROCALCITONIN ng/ml 0.46*       Lines/Drains:  Invasive Devices       Peripheral Intravenous Line  Duration             Peripheral IV 09/01/24 Left;Ventral (anterior) Forearm <1 day    Peripheral IV 09/01/24 Right Antecubital <1 day                        Imaging: Personally reviewed the following imaging: chest xray  XR chest 1 view portable   ED Interpretation by Davis Montgomery DO (09/01 2157)   Lt sided infiltrate in setting of hypoxia, sepsis          EKG and Other Studies Reviewed on Admission:   EKG: No EKG obtained.    ** Please Note: This note has been constructed using a voice recognition system. **

## 2024-09-02 NOTE — ASSESSMENT & PLAN NOTE
Follows with heme-onc as outpatient.  WBC appears improved since prior  Continue to follow with heme

## 2024-09-02 NOTE — ASSESSMENT & PLAN NOTE
Patient with new oxygen requirements now on 3 L nasal cannula. Did require Bipap enroute to hospital. Pt reports improvement in symptoms  CXR showing left-sided infiltrates  In ED started on IV Zosyn    Plan  Obtain urine antigens and sputum cultures  Continue with IV Zosyn  Follow-up on blood cultures  I-S ordered  Wean O2 as tolerated

## 2024-09-02 NOTE — PLAN OF CARE
Problem: PAIN - ADULT  Goal: Verbalizes/displays adequate comfort level or baseline comfort level  Description: Interventions:  - Encourage patient to monitor pain and request assistance  - Assess pain using appropriate pain scale  - Administer analgesics based on type and severity of pain and evaluate response  - Implement non-pharmacological measures as appropriate and evaluate response  - Consider cultural and social influences on pain and pain management  - Notify physician/advanced practitioner if interventions unsuccessful or patient reports new pain  Outcome: Progressing     Problem: INFECTION - ADULT  Goal: Absence or prevention of progression during hospitalization  Description: INTERVENTIONS:  - Assess and monitor for signs and symptoms of infection  - Monitor lab/diagnostic results  - Monitor all insertion sites, i.e. indwelling lines, tubes, and drains  - Monitor endotracheal if appropriate and nasal secretions for changes in amount and color  - [place] appropriate cooling/warming therapies per order  - Administer medications as ordered  - Instruct and encourage patient and family to use good hand hygiene technique  - Identify and instruct in appropriate isolation precautions for identified infection/condition  Outcome: Progressing  Goal: Absence of fever/infection during neutropenic period  Description: INTERVENTIONS:  - Monitor WBC    Outcome: Progressing     Problem: DISCHARGE PLANNING  Goal: Discharge to home or other facility with appropriate resources  Description: INTERVENTIONS:  - Identify barriers to discharge w/patient and caregiver  - Arrange for needed discharge resources and transportation as appropriate  - Identify discharge learning needs (meds, wound care, etc.)  - Arrange for interpretive services to assist at discharge as needed  - Refer to Case Management Department for coordinating discharge planning if the patient needs post-hospital services based on physician/advanced  practitioner order or complex needs related to functional status, cognitive ability, or social support system  Outcome: Progressing     Problem: Knowledge Deficit  Goal: Patient/family/caregiver demonstrates understanding of disease process, treatment plan, medications, and discharge instructions  Description: Complete learning assessment and assess knowledge base.  Interventions:  - Provide teaching at level of understanding  - Provide teaching via preferred learning methods  Outcome: Progressing

## 2024-09-02 NOTE — ASSESSMENT & PLAN NOTE
Reports ongoing diarrhea x 1 week no abdominal pain on exam  Denies any new dietary changes, med changes, or recent sick contacts.  Suspect in the setting of COVID   Monitor intake/output

## 2024-09-02 NOTE — QUICK NOTE
Lab resulted D-dimer elevated 2.03. Low concern for PE at this time given age adjusted D-dimer and Wells score. Will continue to treat Covid/PNA. If patient becomes acutely hypoxic would benefit from CTA PE study.

## 2024-09-02 NOTE — PLAN OF CARE
Problem: PAIN - ADULT  Goal: Verbalizes/displays adequate comfort level or baseline comfort level  Description: Interventions:  - Encourage patient to monitor pain and request assistance  - Assess pain using appropriate pain scale  - Administer analgesics based on type and severity of pain and evaluate response  - Implement non-pharmacological measures as appropriate and evaluate response  - Consider cultural and social influences on pain and pain management  - Notify physician/advanced practitioner if interventions unsuccessful or patient reports new pain  Outcome: Progressing     Problem: INFECTION - ADULT  Goal: Absence or prevention of progression during hospitalization  Description: INTERVENTIONS:  - Assess and monitor for signs and symptoms of infection  - Monitor lab/diagnostic results  - Monitor all insertion sites, i.e. indwelling lines, tubes, and drains  - Monitor endotracheal if appropriate and nasal secretions for changes in amount and color  - Sherrill appropriate cooling/warming therapies per order  - Administer medications as ordered  - Instruct and encourage patient and family to use good hand hygiene technique  - Identify and instruct in appropriate isolation precautions for identified infection/condition  Outcome: Progressing  Goal: Absence of fever/infection during neutropenic period  Description: INTERVENTIONS:  - Monitor WBC    Outcome: Progressing     Problem: SAFETY ADULT  Goal: Patient will remain free of falls  Description: INTERVENTIONS:  - Educate patient/family on patient safety including physical limitations  - Instruct patient to call for assistance with activity   - Consult OT/PT to assist with strengthening/mobility   - Keep Call bell within reach  - Keep bed low and locked with side rails adjusted as appropriate  - Keep care items and personal belongings within reach  - Initiate and maintain comfort rounds  - Make Fall Risk Sign visible to staff  - Offer Toileting every 2 Hours,  in advance of need  - Initiate/Maintain bed/chair alarm  - Obtain necessary fall risk management equipment:   - Apply yellow socks and bracelet for high fall risk patients  - Consider moving patient to room near nurses station  Outcome: Progressing  Goal: Maintain or return to baseline ADL function  Description: INTERVENTIONS:  -  Assess patient's ability to carry out ADLs; assess patient's baseline for ADL function and identify physical deficits which impact ability to perform ADLs (bathing, care of mouth/teeth, toileting, grooming, dressing, etc.)  - Assess/evaluate cause of self-care deficits   - Assess range of motion  - Assess patient's mobility; develop plan if impaired  - Assess patient's need for assistive devices and provide as appropriate  - Encourage maximum independence but intervene and supervise when necessary  - Involve family in performance of ADLs  - Assess for home care needs following discharge   - Consider OT consult to assist with ADL evaluation and planning for discharge  - Provide patient education as appropriate  Outcome: Progressing  Goal: Maintains/Returns to pre admission functional level  Description: INTERVENTIONS:  - Perform AM-PAC 6 Click Basic Mobility/ Daily Activity assessment daily.  - Set and communicate daily mobility goal to care team and patient/family/caregiver.   - Collaborate with rehabilitation services on mobility goals if consulted  - Perform Range of Motion 3 times a day.  - Reposition patient every 2 hours.  - Dangle patient 3 times a day  - Stand patient 3 times a day  - Ambulate patient 3 times a day  - Out of bed to chair 3 times a day   - Out of bed for meals 3 times a day  - Out of bed for toileting  - Record patient progress and toleration of activity level   Outcome: Progressing     Problem: DISCHARGE PLANNING  Goal: Discharge to home or other facility with appropriate resources  Description: INTERVENTIONS:  - Identify barriers to discharge w/patient and  caregiver  - Arrange for needed discharge resources and transportation as appropriate  - Identify discharge learning needs (meds, wound care, etc.)  - Arrange for interpretive services to assist at discharge as needed  - Refer to Case Management Department for coordinating discharge planning if the patient needs post-hospital services based on physician/advanced practitioner order or complex needs related to functional status, cognitive ability, or social support system  Outcome: Progressing     Problem: Knowledge Deficit  Goal: Patient/family/caregiver demonstrates understanding of disease process, treatment plan, medications, and discharge instructions  Description: Complete learning assessment and assess knowledge base.  Interventions:  - Provide teaching at level of understanding  - Provide teaching via preferred learning methods  Outcome: Progressing     Problem: Prexisting or High Potential for Compromised Skin Integrity  Goal: Skin integrity is maintained or improved  Description: INTERVENTIONS:  - Identify patients at risk for skin breakdown  - Assess and monitor skin integrity  - Assess and monitor nutrition and hydration status  - Monitor labs   - Assess for incontinence   - Turn and reposition patient  - Assist with mobility/ambulation  - Relieve pressure over bony prominences  - Avoid friction and shearing  - Provide appropriate hygiene as needed including keeping skin clean and dry  - Evaluate need for skin moisturizer/barrier cream  - Collaborate with interdisciplinary team   - Patient/family teaching  - Consider wound care consult   Outcome: Progressing

## 2024-09-02 NOTE — ED PROVIDER NOTES
History  Chief Complaint   Patient presents with    Respiratory Distress     Pt comes from nursing home. Covid + last week. O2 in the 70's     83-year-old female from [place] [address] with a history of dementia and recent COVID diagnosis presenting to the ED due to low pulse ox saturation on room air.  According to EMS and family, patient has been recently diagnosed with COVID and today while getting her vitals checked was found to have an SpO2 of 77 on room air.  Patient was put on oxygen but did not improve.  EMS was called and placed patient on BiPAP with a PEEP of 10.  Patient was transported to [facility].  Patient had normal mental status and was A&O x 4 while on transport.  Here in the ED, patient states that she has some difficulty breathing but otherwise feels normal.        Prior to Admission Medications   Prescriptions Last Dose Informant Patient Reported? Taking?   Continuous Blood Gluc Sensor (FreeStyle Дмитрий 2 Sensor) MISC  Self Yes No   Sig: apply 1 SENSOR to back OF UPPER ARM REMOVE AND REPLACE every 14 d...  (REFER TO PRESCRIPTION NOTES).   Diclofenac Sodium (VOLTAREN) 1 %  Self No No   Sig: Apply 2 g topically 3 (three) times a day To R knee   Potassium Chloride ER 20 MEQ TBCR  Self Yes No   Sig: Take 1 tablet by mouth 2 (two) times a day   acetaminophen (TYLENOL) 325 mg tablet  Self No No   Sig: Take 2 tablets (650 mg total) by mouth every 4 (four) hours as needed for mild pain   atorvastatin (LIPITOR) 20 mg tablet  Self Yes No   Sig: Take 20 mg by mouth daily   calcium carbonate (OS-JUAN RAMON) 600 MG tablet  Self Yes No   Sig: Take 600 mg by mouth 2 (two) times a day with meals   cholecalciferol (VITAMIN D3) 1,000 units tablet  Self Yes No   Sig: Take 1,000 Units by mouth daily   docusate sodium (COLACE) 100 mg capsule  Self No No   Sig: Take 1 capsule (100 mg total) by mouth 2 (two) times a day   Patient not taking: Reported on 2024   glucose blood test strip  Self Yes No   Si each by  Other route daily as needed Use as instructed   glucose monitoring kit (FREESTYLE) monitoring kit  Self Yes No   Si each by Does not apply route as needed   insulin detemir (LEVEMIR) 100 units/mL subcutaneous injection  Self No No   Sig: Inject 20 Units under the skin daily at bedtime   insulin lispro (HumaLOG) 100 units/mL injection  Self No No   Sig: Inject 5 Units under the skin 3 (three) times a day with meals   lidocaine (LIDODERM) 5 %  Self No No   Sig: Apply 2 patches topically daily Remove & Discard patch within 12 hours or as directed by MD To right leg Do not start before 2022.   Patient not taking: Reported on 2024   losartan (COZAAR) 50 mg tablet  Self Yes No   Sig: Take 50 mg by mouth daily   metoprolol tartrate (LOPRESSOR) 25 mg tablet  Self No No   Sig: Take 1 tablet (25 mg total) by mouth every 12 (twelve) hours   vitamin B-12 (VITAMIN B-12) 1,000 mcg tablet  Self Yes No   Sig: Take by mouth daily      Facility-Administered Medications: None       Past Medical History:   Diagnosis Date    Benign adenomatous polyp of large intestine     CHF (congestive heart failure) (HCC)     Diabetes mellitus (HCC)     Hyperlipemia     Hypertension     Osteoarthritis     TIA (transient ischemic attack)        Past Surgical History:   Procedure Laterality Date    APPENDECTOMY      CARPAL TUNNEL RELEASE Right     HYSTERECTOMY W/ SALPINGO-OOPHERECTOMY      TN OPTX FEM SHFT FX W/INSJ IMED IMPLT W/WO SCREW Right 10/12/2022    Procedure: INSERTION NAIL IM FEMUR ANTEGRADE (TROCHANTERIC);  Surgeon: Eric Isaacs DO;  Location: [facility];  Service: Orthopedics       Family History   Problem Relation Age of Onset    Heart disease Mother     Heart disease Father     Hypertension Father     Stomach cancer Sister     Ovarian cancer Sister     Hypertension Sister      I have reviewed and agree with the history as documented.    E-Cigarette/Vaping    E-Cigarette Use Never User      E-Cigarette/Vaping  Substances     Social History     Tobacco Use    Smoking status: Never    Smokeless tobacco: Never   Vaping Use    Vaping status: Never Used   Substance Use Topics    Alcohol use: Not Currently     Comment: very seldom    Drug use: Never        Review of Systems   Constitutional:  Negative for chills and fever.   HENT:  Negative for sinus pain.    Eyes:  Negative for visual disturbance.   Respiratory:  Positive for shortness of breath. Negative for chest tightness.    Cardiovascular:  Negative for chest pain and palpitations.   Gastrointestinal:  Negative for abdominal pain, diarrhea, nausea and vomiting.   Endocrine: Negative for polyuria.   Musculoskeletal:  Negative for gait problem and neck stiffness.   Skin:  Negative for color change.   Neurological:  Negative for dizziness and headaches.   Psychiatric/Behavioral:  Negative for agitation and confusion.        Physical Exam  ED Triage Vitals   Temperature Pulse Respirations Blood Pressure SpO2   09/01/24 2100 09/01/24 2100 09/01/24 2100 09/01/24 2100 09/01/24 2100   98.4 °F (36.9 °C) (!) 110 (!) 26 (!) 180/73 94 %      Temp Source Heart Rate Source Patient Position - Orthostatic VS BP Location FiO2 (%)   09/01/24 2100 09/01/24 2100 09/01/24 2145 09/01/24 2100 --   Oral Monitor Sitting Left arm       Pain Score       --                    Orthostatic Vital Signs  Vitals:    09/01/24 2133 09/01/24 2145 09/01/24 2230 09/01/24 2330   BP:  149/70 149/66 124/53   Pulse: (!) 109 (!) 107 (!) 109 104   Patient Position - Orthostatic VS:  Sitting Sitting Sitting       Physical Exam  Constitutional:       Appearance: She is ill-appearing.      Comments: Patient does not appear to be in respiratory distress.  Once BiPAP was removed, patient saturations were in the mid 90s.  Patient was placed on 2 L O2.   HENT:      Head: Normocephalic and atraumatic.      Right Ear: External ear normal.      Left Ear: External ear normal.      Nose: Nose normal.      Mouth/Throat:       Mouth: Mucous membranes are moist.      Pharynx: Oropharynx is clear.   Eyes:      Extraocular Movements: Extraocular movements intact.      Conjunctiva/sclera: Conjunctivae normal.      Pupils: Pupils are equal, round, and reactive to light.   Cardiovascular:      Rate and Rhythm: Normal rate and regular rhythm.      Pulses: Normal pulses.      Heart sounds: Normal heart sounds.   Pulmonary:      Effort: Pulmonary effort is normal.      Breath sounds: Normal breath sounds.   Abdominal:      General: Bowel sounds are normal.      Palpations: Abdomen is soft.   Musculoskeletal:         General: Normal range of motion.      Cervical back: Normal range of motion.   Skin:     General: Skin is warm.      Capillary Refill: Capillary refill takes less than 2 seconds.   Neurological:      General: No focal deficit present.      Mental Status: She is alert and oriented to person, place, and time.   Psychiatric:         Mood and Affect: Mood normal.         Behavior: Behavior normal.         ED Medications  Medications   vancomycin (VANCOCIN) 1,750 mg in sodium chloride 0.9 % 500 mL IVPB (1,750 mg Intravenous New Bag 9/1/24 2303)   albuterol (FOR EMS ONLY) (2.5 mg/3 mL) 0.083 % inhalation solution 2.5 mg (0 mg Does not apply Given to EMS 9/1/24 2108)   piperacillin-tazobactam (ZOSYN) IVPB 4.5 g (0 g Intravenous Stopped 9/1/24 2259)   acetaminophen (Ofirmev) injection 1,000 mg (0 mg Intravenous Stopped 9/1/24 2338)       Diagnostic Studies  Results Reviewed       Procedure Component Value Units Date/Time    Magnesium [[id number]]  (Normal) Collected: 09/01/24 2225    Lab Status: Final result Specimen: Blood from Arm, Left Updated: 09/02/24 0001     Magnesium 2.1 mg/dL     Lactic acid [[id number]]  (Normal) Collected: 09/01/24 2225    Lab Status: Final result Specimen: Blood from Arm, Left Updated: 09/01/24 2323     LACTIC ACID 1.8 mmol/L     Narrative:      Result may be elevated if tourniquet was used during collection.     Procalcitonin [[id number]]  (Abnormal) Collected: 09/01/24 2225    Lab Status: Final result Specimen: Blood from Arm, Left Updated: 09/01/24 2309     Procalcitonin 0.46 ng/ml     Urine Microscopic [[id number]]  (Abnormal) Collected: 09/01/24 2225    Lab Status: Final result Specimen: Urine, Clean Catch Updated: 09/01/24 2304     RBC, UA 1-2 /hpf      WBC, UA 30-50 /hpf      Epithelial Cells Occasional /hpf      Bacteria, UA Innumerable /hpf      WBC Clumps Present    Urine culture [[id number]] Collected: 09/01/24 2225    Lab Status: In process Specimen: Urine, Clean Catch Updated: 09/01/24 2303    RBC Morphology Reflex Test [[id number]] Collected: 09/01/24 2122    Lab Status: Final result Specimen: Blood from Arm, Right Updated: 09/01/24 2301    Comprehensive metabolic panel [[id number]]  (Abnormal) Collected: 09/01/24 2225    Lab Status: Final result Specimen: Blood from Arm, Left Updated: 09/01/24 2258     Sodium 133 mmol/L      Potassium 4.7 mmol/L      Chloride 101 mmol/L      CO2 21 mmol/L      ANION GAP 11 mmol/L      BUN 24 mg/dL      Creatinine 0.99 mg/dL      Glucose 155 mg/dL      Calcium 8.7 mg/dL      Corrected Calcium 9.3 mg/dL      AST 41 U/L      ALT 41 U/L      Alkaline Phosphatase 109 U/L      Total Protein 6.7 g/dL      Albumin 3.3 g/dL      Total Bilirubin 0.83 mg/dL      eGFR 52 ml/min/1.73sq m     Narrative:      National Kidney Disease Foundation guidelines for Chronic Kidney Disease (CKD):     Stage 1 with normal or high GFR (GFR > 90 mL/min/1.73 square meters)    Stage 2 Mild CKD (GFR = 60-89 mL/min/1.73 square meters)    Stage 3A Moderate CKD (GFR = 45-59 mL/min/1.73 square meters)    Stage 3B Moderate CKD (GFR = 30-44 mL/min/1.73 square meters)    Stage 4 Severe CKD (GFR = 15-29 mL/min/1.73 square meters)    Stage 5 End Stage CKD (GFR <15 mL/min/1.73 square meters)  Note: GFR calculation is accurate only with a steady state creatinine    Protime-INR [[id number]]  (Normal) Collected: 09/01/24 7955     Lab Status: Final result Specimen: Blood from Arm, Left Updated: 09/01/24 2254     Protime 14.7 seconds      INR 1.08    Narrative:      INR Therapeutic Range    Indication                                             INR Range      Atrial Fibrillation                                               2.0-3.0  Hypercoagulable State                                    2.0.2.3  Left Ventricular Asist Device                            2.0-3.0  Mechanical Heart Valve                                  -    Aortic(with afib, MI, embolism, HF, LA enlargement,    and/or coagulopathy)                                     2.0-3.0 (2.5-3.5)     Mitral                                                             2.5-3.5  Prosthetic/Bioprosthetic Heart Valve               2.0-3.0  Venous thromboembolism (VTE: VT, PE        2.0-3.0    APTT [[id number]]  (Normal) Collected: 09/01/24 2225    Lab Status: Final result Specimen: Blood from Arm, Left Updated: 09/01/24 2254     PTT 25 seconds     UA w Reflex to Microscopic w Reflex to Culture [[id number]]  (Abnormal) Collected: 09/01/24 2225    Lab Status: Final result Specimen: Urine, Clean Catch Updated: 09/01/24 2247     Color, UA Yellow     Clarity, UA Turbid     Specific Gravity, UA 1.024     pH, UA 5.5     Leukocytes, UA Moderate     Nitrite, UA Positive     Protein,  (2+) mg/dl      Glucose, UA 70 (7/100%) mg/dl      Ketones, UA 10 (1+) mg/dl      Urobilinogen, UA <2.0 mg/dl      Bilirubin, UA Negative     Occult Blood, UA Moderate    Blood gas, Venous [[id number]]  (Abnormal) Collected: 09/01/24 2225    Lab Status: Final result Specimen: Blood from Arm, Left Updated: 09/01/24 2243     pH, Yfn 7.418     pCO2, Yfn 36.0 mm Hg      pO2, Yfn 41.8 mm Hg      HCO3, Yfn 22.7 mmol/L      Base Excess, Yfn -1.3 mmol/L      O2 Content, Yfn 14.7 ml/dL      O2 HGB, VENOUS 75.5 %     COVID/FLU/RSV [[id number]]  (Abnormal) Collected: 09/01/24 2122    Lab Status: Final result Specimen: Nares from  Nose Updated: 09/01/24 2239     SARS-CoV-2 Positive     INFLUENZA A PCR Negative     INFLUENZA B PCR Negative     RSV PCR Negative    Narrative:      This test has been performed using the CoV-2/Flu/RSV plus assay on the [employer] platform. This test has been validated by the  and verified by the performing laboratory.     This test is designed to amplify and detect the following: nucleocapsid (N), envelope (E), and RNA-dependent RNA polymerase (RdRP) genes of the SARS-CoV-2 genome; matrix (M), basic polymerase (PB2), and acidic protein (PA) segments of the influenza A genome; matrix (M) and non-structural protein (NS) segments of the influenza B genome, and the nucleocapsid genes of RSV A and RSV B.     Positive results are indicative of the presence of Flu A, Flu B, RSV, and/or SARS-CoV-2 RNA. Positive results for SARS-CoV-2 or suspected novel influenza should be reported to state, local, or federal health departments according to local reporting requirements.      All results should be assessed in conjunction with clinical presentation and other laboratory markers for clinical management.     FOR PEDIATRIC PATIENTS - copy/paste COVID Guidelines URL to browser: [email or web address]       Blood culture #2 [[id number]] Collected: 09/01/24 2225    Lab Status: In process Specimen: Blood from Arm, Right Updated: 09/01/24 2238    Blood culture #1 [[id number]] Collected: 09/01/24 2225    Lab Status: In process Specimen: Blood from Arm, Left Updated: 09/01/24 2237    CBC and differential [[id number]]  (Abnormal) Collected: 09/01/24 2122    Lab Status: Final result Specimen: Blood from Arm, Right Updated: 09/01/24 2226     WBC 30.73 Thousand/uL      RBC 3.62 Million/uL      Hemoglobin 12.0 g/dL      Hematocrit 38.4 %       fL      MCH 33.1 pg      MCHC 31.3 g/dL      RDW 15.8 %      MPV 14.0 fL      Platelets 189 Thousands/uL     Narrative:       This is an appended report.  These results have been appended to a previously verified report.    Manual Differential(PHLEBS Do Not Order) [[id number]]  (Abnormal) Collected: 09/01/24 2122    Lab Status: Final result Specimen: Blood from Arm, Right Updated: 09/01/24 2226     Segmented % 28 %      Lymphocytes % 68 %      Monocytes % 2 %      Eosinophils % 0 %      Basophils % 0 %      Atypical Lymphocytes % 2 %      Absolute Neutrophils 8.60 Thousand/uL      Absolute Lymphocytes 21.51 Thousand/uL      Absolute Monocytes 0.61 Thousand/uL      Absolute Eosinophils 0.00 Thousand/uL      Absolute Basophils 0.00 Thousand/uL      Total Counted --     RBC Morphology Present     Platelet Estimate Adequate     Large Platelet Present     Anisocytosis Present    Fingerstick Glucose (POCT) [[id number]]  (Normal) Collected: 09/01/24 2102    Lab Status: Final result Specimen: Blood Updated: 09/01/24 2103     POC Glucose 131 mg/dl                    XR chest 1 view portable   ED Interpretation by Davis Montgomery DO (09/01 2157)   Lt sided infiltrate in setting of hypoxia, sepsis            Procedures  ECG 12 Lead Documentation Only    Date/Time: 9/1/2024 9:37 PM    Performed by: Axel Dhaliwal MD  Authorized by: Axel Dhaliwal MD    Indications / Diagnosis:  This of breath  Patient location:  ED  Previous ECG:     Previous ECG:  Unavailable  Interpretation:     Interpretation: abnormal    Rate:     ECG rate:  110    ECG rate assessment: tachycardic    Rhythm:     Rhythm: sinus rhythm    Ectopy:     Ectopy: none    QRS:     QRS axis:  Normal    QRS intervals:  Normal  Conduction:     Conduction: normal    ST segments:     ST segments:  Non-specific    Elevation:  V2 and V3    Depression:  V5 and V4  T waves:     T waves: normal          ED Course  ED Course as of 09/02/24 0008   Sun Sep 01, 2024   2132 POC Glucose: 131  Reassuring   2158 WBC(!): 30.73  Discharge at this time with no source at this time.  Will start broad-spectrum  antibiotics and get blood cultures and further septic workup.   2244 pCO2, Yfn(!): 36.0  Patient is not tachypneic at this time   2244 SARS-COV-2(!): Positive   2244 Absolute Neutrophils(!): 8.60   2245 Absolute Lymphocytes(!): 21.51   2250 Nitrite, UA(!): Positive   2250 Leukocytes, UA(!): Moderate  Likely UTI   2313 Procalcitonin(!): 0.46  Will continue antibiotics     2313 Bacteria, UA(!): Innumerable   2313 WBC, UA(!): 30-50   2330 LACTIC ACID: 1.8  reassuring   2341 Will admit for continued management.                          Initial Sepsis Screening       Row Name 09/01/24 2159                Is the patient's history suggestive of a new or worsening infection? Yes (Proceed)  -CD        Suspected source of infection pneumonia  -CD        Indicate SIRS criteria Leukocytosis (WBC > 50408 IJL) OR Leukopenia (WBC <4000 IJL) OR Bandemia (WBC >10% bands);Tachypnea > 20 resp per min;Tachycardia > 90 bpm  -CD        Are two or more of the above signs & symptoms of infection both present and new to the patient? Yes (Proceed)  -CD        Assess for evidence of organ dysfunction: Are any of the below criteria present within 6 hours of suspected infection and SIRS criteria that are NOT considered to be chronic conditions? --                  User Key  (r) = Recorded By, (t) = Taken By, (c) = Cosigned By      Initials Name Provider Type    CD Axel Dhaliwal MD Resident                    SBIRT 22yo+      Flowsheet Row Most Recent Value   Initial Alcohol Screen: US AUDIT-C     1. How often do you have a drink containing alcohol? 0 Filed at: 09/01/2024 2105   2. How many drinks containing alcohol do you have on a typical day you are drinking?  0 Filed at: 09/01/2024 2105   3b. FEMALE Any Age, or MALE 65+: How often do you have 4 or more drinks on one occassion? 0 Filed at: 09/01/2024 2105   Audit-C Score 0 Filed at: 09/01/2024 2105   MENG: How many times in the past year have you...    Used an illegal drug or used a  prescription medication for non-medical reasons? Never Filed at: 09/01/2024 2106                  Medical Decision Making  83-year-old female presenting to the ED in respiratory distress.    Patient was on BiPAP on arrival however BiPAP had major leak.  Removed BiPAP from patient and reassessed on room air.  Patient was in the mid 90s initially and then sats dropped to 88.  Patient placed on 2 L/min of nasal cannula oxygen.  Patient saturations came up to 94%.    Differential at this time includes COVID flu RSV, other viral illness, pneumonia.  Patient is at risk for infection and will continue to monitor..    CBC, CMP, chest x-ray, flu COVID RSV swab completed.    Please follow ED course for continued interpretation of results and management.    Patient admitted to [facility] for inpatient treatment and management.    Amount and/or Complexity of Data Reviewed  Labs: ordered. Decision-making details documented in ED Course.  Radiology: ordered and independent interpretation performed.    Risk  Prescription drug management.  Decision regarding hospitalization.          Disposition  Final diagnoses:   Hypoxia   Pneumonia   UTI (urinary tract infection)   Sepsis (HCC)     Time reflects when diagnosis was documented in both MDM as applicable and the Disposition within this note       Time User Action Codes Description Comment    9/1/2024 11:33 PM Axel Deras [R09.02] Hypoxia     9/1/2024 11:33 PM Axel Deras [J18.9] Pneumonia     9/1/2024 11:33 PM Axel Deras [N39.0] UTI (urinary tract infection)     9/1/2024 11:37 PM Axel Deras Add [A41.9] Sepsis (HCC)           ED Disposition       ED Disposition   Admit    Condition   Stable    Date/Time   Sun Sep 1, 2024 0073    Comment   Case was discussed with [facility] and the patient's admission status was agreed to be Admission Status: inpatient status to the service of Dr. Ferguson .               Follow-up Information    None         Patient's  Medications   Discharge Prescriptions    No medications on file     No discharge procedures on file.    PDMP Review         Value Time User    PDMP Reviewed  Yes 4/4/2024  4:03 PM Ameya Green MD             ED Provider  Attending physically available and evaluated Gia Vaughan. I managed the patient along with the ED Attending.    Electronically Signed by           Axel Dhaliwal MD  09/02/24 0008

## 2024-09-02 NOTE — ED ATTENDING ATTESTATION
9/1/2024  IDavis DO, saw and evaluated the patient. I have discussed the patient with the resident/non-physician practitioner and agree with the resident's/non-physician practitioner's findings, Plan of Care, and MDM as documented in the resident's/non-physician practitioner's note, except where noted. All available labs and Radiology studies were reviewed.  I was present for key portions of any procedure(s) performed by the resident/non-physician practitioner and I was immediately available to provide assistance.       At this point I agree with the current assessment done in the Emergency Department.  I have conducted an independent evaluation of this patient a history and physical is as follows:    ED Course  ED Course as of 09/02/24 0218   Sun Sep 01, 2024   2213 83yoF, pmhx per resident note, presenting to ER with abnormal VS via nursing facility. COVID + week prior. Concern for hypoxia in 80s. HPI/ROS limited 2/2 pt baseline status.     Tachycardia, hypoxia in 80s requiring 2L NC for mid 90s, VS otherwise stable. Lungs CTA. No WOB. Heart tachy and irreg irreg. Abd soft, nontender. No Wounds.    83yoF presenting with lt sided bacterial on viral PNA. CBC with signficant leukocytosis. As such, broad spec abx for high risk PNA ordered. Remainder of septic workup pending. Given hx dCHF, will limit fluids as this time as pressure supported and concern for fluid overload. Pt to be admitted to hospital medicine. Family present and aware.    2331 WBC, UA(!): 30-50  Active UTI also implicated. Appropriate coverage ordered.          Critical Care Time  Procedures

## 2024-09-02 NOTE — ASSESSMENT & PLAN NOTE
Patient with new oxygen requirements now on 3 L nasal cannula. Did require Bipap enroute to hospital. Pt reports improvement in symptoms  CXR showing left-sided infiltrates  In ED started on IV Zosyn  Procalcitonin elevated x2.     Plan  Obtain urine antigens and sputum cultures  Continue with IV Zosyn for now, but suspect   Follow-up on blood cultures  I-S ordered  Wean O2 as tolerated

## 2024-09-02 NOTE — ASSESSMENT & PLAN NOTE
Diagnosed COVID-positive last week at nursing facility found to have low O2 saturations in the 70s.  In EMS patient was started on BiPAP upon arrival patient was transitioned to nasal cannula with improvement in symptoms and is currently at her baseline mentation.  She reports difficulty breathing otherwise feels normal.  CXR with left-sided infiltrate.  COVID-+  Treat on mild COVID pathway  Plan  Check troponin, CRP, BNP, CK, D-dimer  Start on dexamethasone 6 mg IV daily x 10 days  Treat with IV remdesivir 200 mg today, 100 mg x 4 days.  Continue I-S and wean oxygen as tolerated

## 2024-09-03 PROBLEM — I50.23 ACUTE ON CHRONIC SYSTOLIC HEART FAILURE (HCC): Status: ACTIVE | Noted: 2020-11-05

## 2024-09-03 LAB
ANION GAP SERPL CALCULATED.3IONS-SCNC: 7 MMOL/L (ref 4–13)
ANISOCYTOSIS BLD QL SMEAR: PRESENT
BASOPHILS # BLD MANUAL: 0 THOUSAND/UL (ref 0–0.1)
BASOPHILS NFR MAR MANUAL: 0 % (ref 0–1)
BUN SERPL-MCNC: 32 MG/DL (ref 5–25)
CALCIUM SERPL-MCNC: 8.4 MG/DL (ref 8.4–10.2)
CHLORIDE SERPL-SCNC: 107 MMOL/L (ref 96–108)
CO2 SERPL-SCNC: 21 MMOL/L (ref 21–32)
CREAT SERPL-MCNC: 0.91 MG/DL (ref 0.6–1.3)
EOSINOPHIL # BLD MANUAL: 0 THOUSAND/UL (ref 0–0.4)
EOSINOPHIL NFR BLD MANUAL: 0 % (ref 0–6)
ERYTHROCYTE [DISTWIDTH] IN BLOOD BY AUTOMATED COUNT: 15.6 % (ref 11.6–15.1)
GFR SERPL CREATININE-BSD FRML MDRD: 58 ML/MIN/1.73SQ M
GLUCOSE SERPL-MCNC: 100 MG/DL (ref 65–140)
GLUCOSE SERPL-MCNC: 105 MG/DL (ref 65–140)
GLUCOSE SERPL-MCNC: 107 MG/DL (ref 65–140)
GLUCOSE SERPL-MCNC: 172 MG/DL (ref 65–140)
GLUCOSE SERPL-MCNC: 257 MG/DL (ref 65–140)
GLUCOSE SERPL-MCNC: 304 MG/DL (ref 65–140)
GLUCOSE SERPL-MCNC: 389 MG/DL (ref 65–140)
GLUCOSE SERPL-MCNC: 393 MG/DL (ref 65–140)
HCT VFR BLD AUTO: 34.5 % (ref 34.8–46.1)
HGB BLD-MCNC: 10.8 G/DL (ref 11.5–15.4)
LG PLATELETS BLD QL SMEAR: PRESENT
LYMPHOCYTES # BLD AUTO: 12 THOUSAND/UL (ref 0.6–4.47)
LYMPHOCYTES # BLD AUTO: 56 % (ref 14–44)
MCH RBC QN AUTO: 32.5 PG (ref 26.8–34.3)
MCHC RBC AUTO-ENTMCNC: 31.3 G/DL (ref 31.4–37.4)
MCV RBC AUTO: 104 FL (ref 82–98)
MONOCYTES # BLD AUTO: 0.21 THOUSAND/UL (ref 0–1.22)
MONOCYTES NFR BLD: 1 % (ref 4–12)
MRSA NOSE QL CULT: NORMAL
NEUTROPHILS # BLD MANUAL: 8.85 THOUSAND/UL (ref 1.85–7.62)
NEUTS SEG NFR BLD AUTO: 42 % (ref 43–75)
PLATELET # BLD AUTO: 240 THOUSANDS/UL (ref 149–390)
PLATELET BLD QL SMEAR: ADEQUATE
PMV BLD AUTO: 13.4 FL (ref 8.9–12.7)
POTASSIUM SERPL-SCNC: 4.4 MMOL/L (ref 3.5–5.3)
RBC # BLD AUTO: 3.32 MILLION/UL (ref 3.81–5.12)
RBC MORPH BLD: PRESENT
SODIUM SERPL-SCNC: 135 MMOL/L (ref 135–147)
VARIANT LYMPHS # BLD AUTO: 1 %
WBC # BLD AUTO: 21.06 THOUSAND/UL (ref 4.31–10.16)

## 2024-09-03 PROCEDURE — 99232 SBSQ HOSP IP/OBS MODERATE 35: CPT | Performed by: HOSPITALIST

## 2024-09-03 PROCEDURE — 85007 BL SMEAR W/DIFF WBC COUNT: CPT | Performed by: HOSPITALIST

## 2024-09-03 PROCEDURE — 97163 PT EVAL HIGH COMPLEX 45 MIN: CPT

## 2024-09-03 PROCEDURE — 85027 COMPLETE CBC AUTOMATED: CPT | Performed by: HOSPITALIST

## 2024-09-03 PROCEDURE — 80048 BASIC METABOLIC PNL TOTAL CA: CPT | Performed by: HOSPITALIST

## 2024-09-03 PROCEDURE — 82948 REAGENT STRIP/BLOOD GLUCOSE: CPT

## 2024-09-03 PROCEDURE — 97530 THERAPEUTIC ACTIVITIES: CPT

## 2024-09-03 RX ORDER — ACETAMINOPHEN 325 MG/1
650 TABLET ORAL EVERY 6 HOURS PRN
Status: DISCONTINUED | OUTPATIENT
Start: 2024-09-03 | End: 2024-09-06 | Stop reason: HOSPADM

## 2024-09-03 RX ADMIN — PIPERACILLIN SODIUM AND TAZOBACTAM SODIUM 4.5 G: 36; 4.5 INJECTION, POWDER, LYOPHILIZED, FOR SOLUTION INTRAVENOUS at 04:36

## 2024-09-03 RX ADMIN — BENZONATATE 100 MG: 100 CAPSULE ORAL at 05:06

## 2024-09-03 RX ADMIN — INSULIN HUMAN 8 UNITS: 100 INJECTION, SUSPENSION SUBCUTANEOUS at 11:15

## 2024-09-03 RX ADMIN — HEPARIN SODIUM 5000 UNITS: 5000 INJECTION INTRAVENOUS; SUBCUTANEOUS at 05:06

## 2024-09-03 RX ADMIN — Medication 1000 MCG: at 08:54

## 2024-09-03 RX ADMIN — METOPROLOL TARTRATE 25 MG: 25 TABLET, FILM COATED ORAL at 08:54

## 2024-09-03 RX ADMIN — ATORVASTATIN CALCIUM 20 MG: 20 TABLET, FILM COATED ORAL at 08:54

## 2024-09-03 RX ADMIN — PIPERACILLIN SODIUM AND TAZOBACTAM SODIUM 4.5 G: 36; 4.5 INJECTION, POWDER, LYOPHILIZED, FOR SOLUTION INTRAVENOUS at 13:53

## 2024-09-03 RX ADMIN — HEPARIN SODIUM 5000 UNITS: 5000 INJECTION INTRAVENOUS; SUBCUTANEOUS at 21:56

## 2024-09-03 RX ADMIN — DEXAMETHASONE SODIUM PHOSPHATE 6 MG: 4 INJECTION INTRA-ARTICULAR; INTRALESIONAL; INTRAMUSCULAR; INTRAVENOUS; SOFT TISSUE at 01:32

## 2024-09-03 RX ADMIN — METOPROLOL TARTRATE 25 MG: 25 TABLET, FILM COATED ORAL at 21:54

## 2024-09-03 RX ADMIN — ACETAMINOPHEN 650 MG: 325 TABLET ORAL at 21:54

## 2024-09-03 RX ADMIN — REMDESIVIR 100 MG: 100 INJECTION, POWDER, LYOPHILIZED, FOR SOLUTION INTRAVENOUS at 01:33

## 2024-09-03 RX ADMIN — Medication 1000 UNITS: at 08:54

## 2024-09-03 RX ADMIN — PIPERACILLIN SODIUM AND TAZOBACTAM SODIUM 4.5 G: 36; 4.5 INJECTION, POWDER, LYOPHILIZED, FOR SOLUTION INTRAVENOUS at 21:53

## 2024-09-03 RX ADMIN — LOSARTAN POTASSIUM 50 MG: 50 TABLET, FILM COATED ORAL at 08:54

## 2024-09-03 RX ADMIN — INSULIN HUMAN 8 UNITS: 100 INJECTION, SUSPENSION SUBCUTANEOUS at 16:33

## 2024-09-03 RX ADMIN — HEPARIN SODIUM 5000 UNITS: 5000 INJECTION INTRAVENOUS; SUBCUTANEOUS at 13:53

## 2024-09-03 NOTE — DISCHARGE INSTR - OTHER ORDERS
Skin care plans:  1-Wash Sacro Buttocks area with soap and water. Pat Dry. Apply Calazime to sacrum, buttocks and perineum TID and PRN.  2-Hydraguard to bilateral heel BID and PRN.  3-Elevate heels to offload pressure.  4-Ehob cushion when out of bed.  5-Turn/reposition q2h or when medically stable for pressure re-distribution on skin.  6-Moisturize skin daily with skin nourishing cream.

## 2024-09-03 NOTE — PLAN OF CARE
Problem: PHYSICAL THERAPY ADULT  Goal: Performs mobility at highest level of function for planned discharge setting.  See evaluation for individualized goals.  Description: Treatment/Interventions: Functional transfer training, LE strengthening/ROM, Therapeutic exercise, Endurance training, Cognitive reorientation, Patient/family training, Equipment eval/education, Bed mobility, Gait training, Spoke to case management, Spoke to nursing  Equipment Recommended: Walker       See flowsheet documentation for full assessment, interventions and recommendations.  Note: Prognosis: Fair  Problem List: Decreased strength, Pain, Decreased cognition, Impaired judgement, Decreased safety awareness, Decreased mobility, Impaired balance, Decreased endurance (limited upright posture; gait deviations)  Assessment: Pt is a 83 y.o. female seen for PT evaluation s/p admit to [facility] on 9/1/2024 w/ Sepsis (HCC), Toxic encephalopathy-CoVID , hx of leukocytosis, acute hypocis resp failure, CHF.  Order placed for PT.      Prior to admission: Pt was reportedly living at [address] and ambulating independently with a rollator walker.  Upon this admission EMR, patient was reportedly discharged to her nephew's home due to concerns regarding her cognition.  Upon evaluation: Pt needed no physical assist for bed mobility with elevated head of bed and rails, but min assist for transfers and ambulation for short distances with a walker support.      Pt's clinical presentation is currently unstable/unpredictable given the functional mobility deficits above, especially (but not limited to) weakness, gait deviations, and decreased functional mobility tolerance, and combined with medical complications including hypertension , hypotension, abnormal renal lab values, abnormal H&H, abnormal WBCs, and impulsivity during admission.  Pt IS NOT at her mobility baseline.  She is at risk for falls based on impaired balance, decreased safety  awareness, and decreased cognition.       During this admission, pt would benefit from continued skilled inpatient PT in the acute care setting in order to address deficits as defined above to maximize function and mobility.      Recommendations:     From a PT standpoint, recommend next several sessions focus on continued mobility training using a rolling walker to and from commode/recliner, with progression to further distances including bathrooms and walking within the room with supervision.  Barriers to Discharge: Decreased caregiver support, Inaccessible home environment     Rehab Resource Intensity Level, PT: II (Moderate Resource Intensity) (Unless pt makes mobility progress and/or facility can provide increased support for pt upon DC)    See flowsheet documentation for full assessment.

## 2024-09-03 NOTE — WOUND OSTOMY CARE
Consult Note - Wound   Gia Vaughan 83 y.o. female MRN: [id number]  Unit/Bed#: S -01 Encounter: [id number]        History and Present Illness:  Patient is a 82 yo female that was admitted to [facility]  for treatment of sepsis. Currently positive for Covid-19. Patient has a PMH of DM 2, HTN, CHF, dementia, CLL. Patient is alert and oriented times three and agreeable to assessment. Patient is independent for turning and repositioning, assist with care, independent with meals. Patient is incontinent of bowel and bladder. On assessment, patient is in bed with pillows in place for offloading.    Wound Care was consulted for MASD.     Assessment Findings:     POA B/L Sacro Buttocks and Perineum MASD - scattered irregular shaped areas of partial thickness skin loss. Wound beds are 100% pink blanchable tissue. No drainage appreciated. Nicole wound is dry and intact. Suspect etiology of moisture related to incontinence. Patient endorses multiple episodes of diarrhea.     No induration, fluctuance, odor, warmth/temperature differences, redness, or purulence noted to the above noted wounds and skin areas assessed. New dressings applied per orders listed below. Patient tolerated well- no s/s of non-verbal pain or discomfort observed during the encounter. Bedside nurse aware of plan of care. See flow sheets for more detailed assessment findings.      Orders listed below and wound care will continue to follow, call or Secure Chat with questions.       Skin care plans:  1-Wash Sacro Buttocks area with soap and water. Pat Dry. Apply Calazime to sacrum, buttocks and perineum TID and PRN.  2-Hydraguard to bilateral heel BID and PRN.  3-Elevate heels to offload pressure.  4-Ehob cushion when out of bed.  5-Turn/reposition q2h or when medically stable for pressure re-distribution on skin.  6-Moisturize skin daily with skin nourishing cream.      Wounds:  Wound 09/01/24 MASD Buttocks (Active)   Wound Image   09/03/24 1151   Wound  Description Fragile;Intact;Beefy red 09/03/24 1151   Nicole-wound Assessment Erythema;Fragile 09/03/24 1151   Wound Length (cm) 12 cm 09/03/24 1151   Wound Width (cm) 8 cm 09/03/24 1151   Wound Depth (cm) 0.1 cm 09/03/24 1151   Wound Surface Area (cm^2) 96 cm^2 09/03/24 1151   Wound Volume (cm^3) 9.6 cm^3 09/03/24 1151   Calculated Wound Volume (cm^3) 9.6 cm^3 09/03/24 1151   Drainage Amount None 09/03/24 1151   Non-staged Wound Description Partial thickness 09/03/24 1151   Treatments Cleansed;Site care 09/03/24 1151   Dressing Moisture barrier 09/03/24 1151   Patient Tolerance Tolerated well 09/03/24 1151               Alexandra Munoz RN, BSN, CCRN

## 2024-09-03 NOTE — CASE MANAGEMENT
Case Management Progress Note    Patient name Gia Vaughan  Location S /S -01 MRN [id number]  : 1940 Date 9/3/2024       LOS (days): 2  Geometric Mean LOS (GMLOS) (days):   Days to GMLOS:        OBJECTIVE:        Current admission status: Inpatient  Preferred Pharmacy:   UNKNOWN - FOLLOW UP PRIOR TO DISCHARGE TO E-PRESCRIBE  No address on file      [id number] - [place] - [address]  [address]  [place] [address]  Phone: [phone] Fax: [phone]    [id number] - [place] - [address]  [address]  [place] [address]  Phone: [phone] Fax: [phone]    Primary Care Provider: Chayo Powell MD    Primary Insurance: MEDICARE  Secondary Insurance: [employer]    PROGRESS NOTE:  LIANE was in contact with Alexsandra at [facility] who confirmed the Pt is a resident, is alert and oriented x 4, uses a rollator and is often able to dress and bath herself. Alexsandra reported the Pt is able to walk to the dining room independently with rollator.

## 2024-09-03 NOTE — PHYSICAL THERAPY NOTE
"PHYSICAL THERAPY EVALUATION  NAME:  Gia Vaughan  DATE: 09/03/24    AGE:   83 y.o.  Mrn:   [id number]  Principal problem: Principal Problem:    Sepsis (HCC)  Active Problems:    Type 2 diabetes mellitus with stage 3 chronic kidney disease, unspecified whether long term insulin use, unspecified whether stage 3a or 3b CKD (HCC)    CKD (chronic kidney disease) stage 3, GFR 30-59 ml/min (HCC)    Acute on chronic systolic heart failure (HCC)    Moderate dementia (HCC)    CLL (chronic lymphocytic leukemia) (HCC)    UTI (urinary tract infection)    Community acquired pneumonia    COVID    Diarrhea      Vitals:    09/02/24 2243 09/03/24 0600 09/03/24 0703 09/03/24 1044   BP: 127/57  124/66    BP Location:   Right arm    Pulse: 67  63    Resp: 17  17    Temp: 97.6 °F (36.4 °C)  (!) 97.4 °F (36.3 °C)    TempSrc:   Oral    SpO2: 94%  95% 91%   Weight:  69.5 kg (153 lb 3.5 oz)         Length Of Stay: 2  Performed at least 2 patient identifiers during session: Name and Epic photo  PHYSICAL THERAPY EVALUATION :    09/03/24 1400   PT Last Visit   PT Visit Date 09/03/24   Note Type   Note type Evaluation   Pain Assessment   Pain Assessment Tool 0-10   Pain Score No Pain   Restrictions/Precautions   Weight Bearing Precautions Per Order No   Other Precautions Bed Alarm;Chair Alarm;Contact/isolation;Airborne/isolation;Fall Risk;Cognitive;Multiple lines;Impulsive   Home Living   Type of Home Assisted living  ([facility])   Home Equipment Walker  (rolling walker or rollator walker)   Additional Comments Per recent admission ~April 2024, pt was living home alone in first floor set up on first floor, 1 FEI, used rollator. Was recently DC to Nephew's /POA home.   Prior Function   Level of [place]   (uncertain at present time, patient reports ambulating for short distances with a walker independently and only having to walk \"3 feet\" get to the dining noriega)   Lives With Facility staff   IADLs Family/Friend/Other provides " "transportation;Family/Friend/Other provides meals;Family/Friend/Other provides medication management   Falls in the last 6 months 0  (as per pt)   Comments Per case management post eval, pt was amb w/o physical A using walker in room and to/from dining noriega   General   Family/Caregiver Present No   Cognition   Overall Cognitive Status Impaired   Arousal/Participation Alert   Orientation Level Oriented to person;Disoriented to place;Disoriented to time;Disoriented to situation   Memory Decreased recall of precautions;Decreased recall of recent events;Decreased short term memory   Following Commands Follows one step commands inconsistently   Subjective   Subjective Patient pleasant and cooperative.  Agreeable to participate.  Denies lightheadedness with upright change of positioning nor shortness of breath.  Initially reports that she is continent of bowel and bladder, later reports that she frequently is incontinent of bowel for the last 4 years.   Strength RLE   R Hip Flexion 4-/5   R Knee Extension 4/5   R Ankle Dorsiflexion 4/5   Strength LLE   L Hip Flexion 4/5   L Knee Extension 4/5   L Ankle Dorsiflexion 4/5   Vision-Basic Assessment   Current Vision   (Pt reports wearing glassess but not presently here)   Light Touch   RLE Light Touch Not tested  (formally but able to feel \"tickilish\" at feet)   LLE Light Touch Not tested  (formally but able to feel \"tickilish\" at feet)   Bed Mobility   Supine to Sit 5  Supervision   Additional items Assist x 1;HOB elevated;Bedrails;Increased time required;Verbal cues;LE management  (R egress, using rail and increased time)   Sit to Supine Unable to assess   Transfers   Sit to Stand 4  Minimal assistance   Additional items Assist x 1;Increased time required;Impulsive;Verbal cues;Armrests   Stand to Sit 4  Minimal assistance   Additional items Increased time required;Verbal cues;Impulsive;Armrests   Ambulation/Elevation   Gait pattern Excessively slow;Step through pattern;Short " stride;Forward Flexion  (forward head and flexed trunk)   Gait Assistance 4  Minimal assist  (A for walker mgmt and balance)   Additional items Assist x 1;Verbal cues;Tactile cues   Assistive Device Rolling walker  (increased walker advancement)   Distance 3'   Stair Management Assistance Not tested   Balance   Static Sitting Good   Static Standing Fair -   Ambulatory Poor +   Endurance Deficit   Endurance Deficit Yes   Endurance Deficit Description limited amb distance and standing tolerance   Activity Tolerance   Activity Tolerance Patient limited by fatigue   Medical Staff Made Aware spoke to [place] from case management   Nurse Made Aware spoke to RN presession and RN/PCA post session     Assessment:   Pt is a 83 y.o. female seen for PT evaluation [facility] w/ Sepsis (HCC), Toxic encephalopathy-CoVID , hx of leukocytosis, acute hypocis resp failure, CHF.  Order placed for PT.      Prior to admission: Pt was reportedly living at [address] and ambulating independently with a rollator walker.  Upon this admission EMR, patient was reportedly discharged to her nephew's home due to concerns regarding her cognition.  Upon evaluation: Pt needed no physical assist for bed mobility with elevated head of bed and rails, but min assist for transfers and ambulation for short distances with a walker support.      Pt's clinical presentation is currently unstable/unpredictable given the functional mobility deficits above, especially (but not limited to) weakness, gait deviations, and decreased functional mobility tolerance, and combined with medical complications including hypertension , hypotension, abnormal renal lab values, abnormal H&H, abnormal WBCs, and impulsivity during admission.  Pt IS NOT at her mobility baseline.  She is at risk for falls based on impaired balance, decreased safety awareness, and decreased cognition.       During this admission, pt would benefit from continued  skilled inpatient PT in the acute care setting in order to address deficits as defined above to maximize function and mobility.      Recommendations:    From a PT standpoint, recommend next several sessions focus on continued mobility training using a rolling walker to and from commode/recliner, with progression to further distances including bathrooms and walking within the room with supervision.     Prognosis Fair   Problem List Decreased strength;Pain;Decreased cognition;Impaired judgement;Decreased safety awareness;Decreased mobility;Impaired balance;Decreased endurance  (limited upright posture; gait deviations)   Barriers to Discharge Decreased caregiver support;Inaccessible home environment   Goals   Patient Goals to get to the bathroom   STG Expiration Date 09/13/24   Short Term Goal #1 Goals: Pt will: Perform rolling  and supine<>sit bed mobility tasks with modified I to prepare for transfers and reposition in bed. Perform transfers with modified I to promote proper hand placement and approach. Perform ambulation with LRAD for at least 50' with  CLOSE S  to increase Indep in prior living environment and promote proper use of assistive device. Perform 5xSTS w/ UE support and improve baseline score to demonstrate less risk for falls.   PT Treatment Day 1   Plan   Treatment/Interventions Functional transfer training;LE strengthening/ROM;Therapeutic exercise;Endurance training;Cognitive reorientation;Patient/family training;Equipment eval/education;Bed mobility;Gait training;Spoke to case management;Spoke to nursing   PT Frequency 3-5x/wk   Discharge Recommendation   Rehab Resource Intensity Level, PT II (Moderate Resource Intensity)  (Unless pt makes mobility progress and/or facility can provide increased support for pt upon DC)   Equipment Recommended Walker   Additional Comments 2 Personal factors affecting pt at time of IE include: advanced age, past experience, behavioral pattern, inability to perform  IADLs, inability to ambulate household distances, inability to navigate community distances, limited insight into impairments, and ? Falls.  .   AM-PAC Basic Mobility Inpatient   Turning in Flat Bed Without Bedrails 4   Lying on Back to Sitting on Edge of Flat Bed Without Bedrails 3   Moving Bed to Chair 3   Standing Up From Chair Using Arms 3   Walk in Room 2   Climb 3-5 Stairs With Railing 1   Basic Mobility Inpatient Raw Score 16   Basic Mobility Standardized Score 38.32   [facility] Highest Level Of Mobility   [facility] Goal 5: Stand one or more mins   -[facility] Achieved 5: Stand (1 or more minutes)   Additional Treatment Session   Start Time 1425   End Time 1440   Treatment Assessment Pt displayed no uncorrected losses of balance when performing unsupported sitting @ commode. However concerned re: Pt's need for continued steadying support during short distance ambulation as well as walker abandonment when problem solving to get to recliner. Skilled PT recommended to progress pt toward treatment goals.   Equipment Use rolling walker   Additional Treatment Day 1   Exercises   Neuro re-ed Unsupported sitting tolerance @ recliner >7 min. Sit<>stand transfers x 2 reps w/intermittent min A, limited by pt's bowel incontinence. Standing tolerance w/ BUE support x 1 min. Amb w/ walker to target of recliner 3' w/min A for walker management and loss of balance. Skilled PT recommended to progress pt toward treatment goals.   End of Consult   Patient Position at End of Consult Bedside chair;All needs within reach;Bed/Chair alarm activated   (Please find full objective findings from PT assessment regarding body systems outlined above).     The patient's AM-PAC Basic Mobility Inpatient Short Form Raw Score is 16. A Raw score of less than or equal to 16 suggests the patient may benefit from discharge to post-acute rehabilitation services, which MAY NOT coincide with CURRENT above PT recommendations.     However please refer to  "therapist recommendation for discharge planning given other factors that may influence destination.     Adapted from Nabeel SINGH, Earlene J, Howard J, Renae J. Association of -PAC “6-Clicks” Basic Mobility and Daily Activity Scores With Discharge Destination. Physical Therapy, 2021;101:1-9. DOI: [email or web address]    Portions of the record may have been created with voice recognition software.  Occasional wrong word or \"sound a like\" substitutions may have occurred due to the inherent limitations of voice recognition software.  Read the chart carefully and recognize, using context, where substitutions have occurred    "

## 2024-09-03 NOTE — PROGRESS NOTES
[facility]  Progress Note  Name: Gia Vaughan I  MRN: [id number]  Unit/Bed#: S -01 I Date of Admission: 9/1/2024   Date of Service: 9/3/2024 I Hospital Day: 2    Assessment & Plan   * Sepsis ([facility])  Assessment & Plan  Patient meeting sepsis criteria as evidenced by leukocytosis, tachycardia, and tachypnea with source being UTI vs pneumonia vs COVID.  Leukocytosis appears chronic and improved since prior CBC likely from patient's underlying CLL.  Currently hemodynamically stable.  PCT elevated, lactate WNL  In ED patient started on IV Zosyn secondary to antibiotic allergies  Continue to trend CBC and monitor fever curve.    Community acquired pneumonia  Assessment & Plan  Patient with new oxygen requirements now on 3 L nasal cannula. Did require Bipap enroute to hospital. Pt reports improvement in symptoms  CXR showing left-sided infiltrates  In ED started on IV Zosyn    Plan  Obtain urine antigens and sputum cultures  Continue with IV Zosyn  Follow-up on blood cultures  I-S ordered  Wean O2 as tolerated    UTI (urinary tract infection)  Assessment & Plan  UA with moderate leukocytes, positive nitrites.  Urine micro with innumerable bacteria and WBCs  Follow-up on urine cultures  Continue IV Zosyn    Type 2 diabetes mellitus with stage 3 chronic kidney disease, unspecified whether long term insulin use, unspecified whether stage 3a or 3b CKD ([facility])  Assessment & Plan  Lab Results   Component Value Date    HGBA1C 9.4 (H) 04/01/2024       Recent Labs     09/01/24  2102   POCGLU 131         Blood Sugar Average: Last 72 hrs:  (P) 131  Update hemoglobin A1c  Continue 20 units Levemir at bedtime  Continue Humalog 5 units with meals and sliding scale insulin  Avoid hypoglycemia    CLL (chronic lymphocytic leukemia) ([facility])  Assessment & Plan  Follows with heme-onc as outpatient.  WBC appears improved since prior  Continue to follow with heme    Diarrhea  Assessment & Plan  Reports ongoing  diarrhea x 1 week no abdominal pain on exam  Denies any new dietary changes, med changes, or recent sick contacts.  Suspect in the setting of COVID?  Will rule out C. difficile and other etiologies  Monitor intake/output    COVID  Assessment & Plan  Diagnosed COVID-positive last week at nursing facility found to have low O2 saturations in the 70s.  In EMS patient was started on BiPAP upon arrival patient was transitioned to nasal cannula with improvement in symptoms and is currently at her baseline mentation.  She reports difficulty breathing otherwise feels normal.  CXR with left-sided infiltrate.  COVID-+  Treat on mild COVID pathway  Plan  Check troponin, CRP, BNP, CK, D-dimer  Start on dexamethasone 6 mg IV daily x 10 days  Treat with IV remdesivir 200 mg today, 100 mg x 4 days.  Continue I-S and wean oxygen as tolerated    Moderate dementia (HCC)  Assessment & Plan  Mentation appears to be at baseline  Continue to regulate sleep/wake cycles  Supportive care    CKD (chronic kidney disease) stage 3, GFR 30-59 ml/min ([facility])  Assessment & Plan  Lab Results   Component Value Date    EGFR 52 09/01/2024    EGFR 47 05/24/2024    EGFR 50 04/04/2024    CREATININE 0.99 09/01/2024    CREATININE 1.08 05/24/2024    CREATININE 1.03 04/04/2024   Currently at baseline continue to monitor with daily BMP    Acute on chronic systolic heart failure (HCC)  Assessment & Plan  Wt Readings from Last 3 Encounters:   05/31/24 68 kg (150 lb)   04/04/24 68.4 kg (150 lb 12.8 oz)   01/29/24 70.8 kg (156 lb)   Appears euvolemic on examination  EF of 45% with reduced systolic function, G2 DD  Monitor daily weights.  Intake and output  Cardiac diabetic diet ordered.               VTE Pharmacologic Prophylaxis: VTE Score: 11 High Risk (Score >/= 5) - Pharmacological DVT Prophylaxis Contraindicated. Sequential Compression Devices Ordered.    Mobility:   Basic Mobility Inpatient Raw Score: 13  -[facility] Goal: 4: Move to chair/commode  -[facility] Achieved:  4: Move to chair/commode  -[facility] Goal achieved. Continue to encourage appropriate mobility.    Patient Centered Rounds: I performed bedside rounds with nursing staff today.   Discussions with Specialists or Other Care Team Provider:     Education and Discussions with Family / Patient: Updated  (nephew) via phone.    Total Time Spent on Date of Encounter in care of patient: 30 mins. This time was spent on one or more of the following: performing physical exam; counseling and coordination of care; obtaining or reviewing history; documenting in the medical record; reviewing/ordering tests, medications or procedures; communicating with other healthcare professionals and discussing with patient's family/caregivers.    Current Length of Stay: 2 day(s)  Current Patient Status: Inpatient   Certification Statement: The patient will continue to require additional inpatient hospital stay due to covid pneumonia with acute hypoxic respiratory failure   Discharge Plan: Anticipate discharge in 48-72 hrs to discharge location to be determined pending rehab evaluations.    Code Status: Level 1 - Full Code    Subjective:   Patient feels okay today.  No acute complaints.  Denies shortness of breath.  Continues to have loose stool but apparently chronic issue per patient's nephew    Objective:     Vitals:   Temp (24hrs), Av.6 °F (36.4 °C), Min:97.4 °F (36.3 °C), Max:98 °F (36.7 °C)    Temp:  [97.4 °F (36.3 °C)-98 °F (36.7 °C)] 97.4 °F (36.3 °C)  HR:  [63-77] 63  Resp:  [17-18] 17  BP: ()/(45-67) 124/66  SpO2:  [94 %-97 %] 95 %  Body mass index is 24.73 kg/m².     Input and Output Summary (last 24 hours):     Intake/Output Summary (Last 24 hours) at 9/3/2024 1027  Last data filed at 9/3/2024 0811  Gross per 24 hour   Intake 180 ml   Output 200 ml   Net -20 ml       Physical Exam:   Physical Exam  Vitals and nursing note reviewed.   Constitutional:       Appearance: Normal appearance. She is not ill-appearing.    Cardiovascular:      Rate and Rhythm: Normal rate and regular rhythm.   Pulmonary:      Effort: Pulmonary effort is normal. No respiratory distress.      Breath sounds: No wheezing or rhonchi.   Abdominal:      General: There is no distension.      Tenderness: There is no abdominal tenderness. There is no guarding or rebound.   Neurological:      General: No focal deficit present.      Mental Status: She is alert. Mental status is at baseline.          Additional Data:     Labs:  Results from last 7 days   Lab Units 09/03/24 0559 09/02/24 0448   WBC Thousand/uL 21.06* 25.60*   HEMOGLOBIN g/dL 10.8* 11.8   HEMATOCRIT % 34.5* 37.6   PLATELETS Thousands/uL 240 215   SEGS PCT %  --  36*   LYMPHO PCT % 56* 59*   MONO PCT % 1* 3*   EOS PCT % 0 0     Results from last 7 days   Lab Units 09/03/24  0559 09/02/24 0448 09/01/24  2225   SODIUM mmol/L 135   < > 133*   POTASSIUM mmol/L 4.4   < > 4.7   CHLORIDE mmol/L 107   < > 101   CO2 mmol/L 21   < > 21   BUN mg/dL 32*   < > 24   CREATININE mg/dL 0.91   < > 0.99   ANION GAP mmol/L 7   < > 11   CALCIUM mg/dL 8.4   < > 8.7   ALBUMIN g/dL  --   --  3.3*   TOTAL BILIRUBIN mg/dL  --   --  0.83   ALK PHOS U/L  --   --  109*   ALT U/L  --   --  41   AST U/L  --   --  41*   GLUCOSE RANDOM mg/dL 107   < > 155*    < > = values in this interval not displayed.     Results from last 7 days   Lab Units 09/01/24  2225   INR  1.08     Results from last 7 days   Lab Units 09/03/24  0739 09/03/24  0705 09/02/24  2051 09/02/24  1614 09/02/24  1125 09/02/24  0757 09/02/24  0128 09/01/24  2102   POC GLUCOSE mg/dl 105 100 323* 335* 205* 148* 153* 131     Results from last 7 days   Lab Units 09/01/24  2122   HEMOGLOBIN A1C % 9.5*     Results from last 7 days   Lab Units 09/02/24 0448 09/01/24  2225   LACTIC ACID mmol/L  --  1.8   PROCALCITONIN ng/ml 0.52* 0.46*       Lines/Drains:  Invasive Devices       Peripheral Intravenous Line  Duration             Peripheral IV 09/02/24 Right;Ventral  (anterior) Forearm <1 day                          Imaging: No pertinent imaging reviewed.    Recent Cultures (last 7 days):   Results from last 7 days   Lab Units 09/02/24  0206 09/01/24  2225   BLOOD CULTURE   --  No Growth at 24 hrs.  No Growth at 24 hrs.   URINE CULTURE   --  >100,000 cfu/ml Gram Negative Sang Enteric Like*   LEGIONELLA URINARY ANTIGEN  Negative  --        Last 24 Hours Medication List:   Current Facility-Administered Medications   Medication Dose Route Frequency Provider Last Rate    atorvastatin  20 mg Oral Daily Harman Bales, YAMILEXNP      benzonatate  100 mg Oral TID PRN Harman Tucker-Amber, YAMILEXNP      cholecalciferol  1,000 Units Oral Daily Harman Bales, CRNP      vitamin B-12  1,000 mcg Oral Daily Harman Bales, YAMILEXNP      dexamethasone  6 mg Intravenous Q24H Harman Bales, YAMILEXNP      heparin (porcine)  5,000 Units Subcutaneous Q8H [facility] BRINDA Noel      insulin detemir  18 Units Subcutaneous HS Harman Bales, CRNP      insulin NPH  8 Units Subcutaneous TID AC Harman Bales, YAMILEXNP      losartan  50 mg Oral Daily Harman Bales, YAMILEXNP      metoprolol tartrate  25 mg Oral Q12H [facility] Harman Bales, YAMILEXNP      phenol  1 spray Mouth/Throat Q2H PRN BRINDA Orozco      piperacillin-tazobactam  4.5 g Intravenous Q8H YAMILEX NoelNP 4.5 g (09/03/24 0436)    remdesivir  100 mg Intravenous Q24H Harman Bales, BRINDA          Today, Patient Was Seen By: Samuel Hill MD    **Please Note: This note may have been constructed using a voice recognition system.**

## 2024-09-03 NOTE — PLAN OF CARE
Problem: PAIN - ADULT  Goal: Verbalizes/displays adequate comfort level or baseline comfort level  Description: Interventions:  - Encourage patient to monitor pain and request assistance  - Assess pain using appropriate pain scale  - Administer analgesics based on type and severity of pain and evaluate response  - Implement non-pharmacological measures as appropriate and evaluate response  - Consider cultural and social influences on pain and pain management  - Notify physician/advanced practitioner if interventions unsuccessful or patient reports new pain  Outcome: Progressing     Problem: INFECTION - ADULT  Goal: Absence or prevention of progression during hospitalization  Description: INTERVENTIONS:  - Assess and monitor for signs and symptoms of infection  - Monitor lab/diagnostic results  - Monitor all insertion sites, i.e. indwelling lines, tubes, and drains  - Monitor endotracheal if appropriate and nasal secretions for changes in amount and color  - [place] appropriate cooling/warming therapies per order  - Administer medications as ordered  - Instruct and encourage patient and family to use good hand hygiene technique  - Identify and instruct in appropriate isolation precautions for identified infection/condition  Outcome: Progressing  Goal: Absence of fever/infection during neutropenic period  Description: INTERVENTIONS:  - Monitor WBC    Outcome: Progressing     Problem: SAFETY ADULT  Goal: Patient will remain free of falls  Description: INTERVENTIONS:  - Educate patient/family on patient safety including physical limitations  - Instruct patient to call for assistance with activity   - Consult OT/PT to assist with strengthening/mobility   - Keep Call bell within reach  - Keep bed low and locked with side rails adjusted as appropriate  - Keep care items and personal belongings within reach  - Initiate and maintain comfort rounds  - Make Fall Risk Sign visible to staff  - Obtain necessary fall risk  management equipment  - Apply yellow socks and bracelet for high fall risk patients  - Consider moving patient to room near nurses station  Outcome: Progressing  Goal: Maintain or return to baseline ADL function  Description: INTERVENTIONS:  -  Assess patient's ability to carry out ADLs; assess patient's baseline for ADL function and identify physical deficits which impact ability to perform ADLs (bathing, care of mouth/teeth, toileting, grooming, dressing, etc.)  - Assess/evaluate cause of self-care deficits   - Assess range of motion  - Assess patient's mobility; develop plan if impaired  - Assess patient's need for assistive devices and provide as appropriate  - Encourage maximum independence but intervene and supervise when necessary  - Involve family in performance of ADLs  - Assess for home care needs following discharge   - Consider OT consult to assist with ADL evaluation and planning for discharge  - Provide patient education as appropriate  Outcome: Progressing  Goal: Maintains/Returns to pre admission functional level  Description: INTERVENTIONS:  - Perform AM-PAC 6 Click Basic Mobility/ Daily Activity assessment daily.  - Set and communicate daily mobility goal to care team and patient/family/caregiver.   - Collaborate with rehabilitation services on mobility goals if consulted  - Out of bed for toileting  - Record patient progress and toleration of activity level   Outcome: Progressing     Problem: DISCHARGE PLANNING  Goal: Discharge to home or other facility with appropriate resources  Description: INTERVENTIONS:  - Identify barriers to discharge w/patient and caregiver  - Arrange for needed discharge resources and transportation as appropriate  - Identify discharge learning needs (meds, wound care, etc.)  - Arrange for interpretive services to assist at discharge as needed  - Refer to Case Management Department for coordinating discharge planning if the patient needs post-hospital services based on  physician/advanced practitioner order or complex needs related to functional status, cognitive ability, or social support system  Outcome: Progressing     Problem: Knowledge Deficit  Goal: Patient/family/caregiver demonstrates understanding of disease process, treatment plan, medications, and discharge instructions  Description: Complete learning assessment and assess knowledge base.  Interventions:  - Provide teaching at level of understanding  - Provide teaching via preferred learning methods  Outcome: Progressing     Problem: Prexisting or High Potential for Compromised Skin Integrity  Goal: Skin integrity is maintained or improved  Description: INTERVENTIONS:  - Identify patients at risk for skin breakdown  - Assess and monitor skin integrity  - Assess and monitor nutrition and hydration status  - Monitor labs   - Assess for incontinence   - Turn and reposition patient  - Assist with mobility/ambulation  - Relieve pressure over bony prominences  - Avoid friction and shearing  - Provide appropriate hygiene as needed including keeping skin clean and dry  - Evaluate need for skin moisturizer/barrier cream  - Collaborate with interdisciplinary team   - Patient/family teaching  - Consider wound care consult   Outcome: Progressing

## 2024-09-04 LAB
ANION GAP SERPL CALCULATED.3IONS-SCNC: 9 MMOL/L (ref 4–13)
BACTERIA UR CULT: ABNORMAL
BACTERIA UR CULT: ABNORMAL
BUN SERPL-MCNC: 34 MG/DL (ref 5–25)
CALCIUM SERPL-MCNC: 8.3 MG/DL (ref 8.4–10.2)
CHLORIDE SERPL-SCNC: 103 MMOL/L (ref 96–108)
CO2 SERPL-SCNC: 21 MMOL/L (ref 21–32)
CREAT SERPL-MCNC: 1.05 MG/DL (ref 0.6–1.3)
ERYTHROCYTE [DISTWIDTH] IN BLOOD BY AUTOMATED COUNT: 15.9 % (ref 11.6–15.1)
GFR SERPL CREATININE-BSD FRML MDRD: 48 ML/MIN/1.73SQ M
GLUCOSE SERPL-MCNC: 127 MG/DL (ref 65–140)
GLUCOSE SERPL-MCNC: 168 MG/DL (ref 65–140)
GLUCOSE SERPL-MCNC: 229 MG/DL (ref 65–140)
GLUCOSE SERPL-MCNC: 290 MG/DL (ref 65–140)
GLUCOSE SERPL-MCNC: 295 MG/DL (ref 65–140)
HCT VFR BLD AUTO: 38.3 % (ref 34.8–46.1)
HGB BLD-MCNC: 12.1 G/DL (ref 11.5–15.4)
MCH RBC QN AUTO: 33.1 PG (ref 26.8–34.3)
MCHC RBC AUTO-ENTMCNC: 31.6 G/DL (ref 31.4–37.4)
MCV RBC AUTO: 105 FL (ref 82–98)
NRBC BLD AUTO-RTO: 0 /100 WBCS
PLATELET # BLD AUTO: 350 THOUSANDS/UL (ref 149–390)
PMV BLD AUTO: 12.5 FL (ref 8.9–12.7)
POTASSIUM SERPL-SCNC: 4.2 MMOL/L (ref 3.5–5.3)
RBC # BLD AUTO: 3.66 MILLION/UL (ref 3.81–5.12)
SODIUM SERPL-SCNC: 133 MMOL/L (ref 135–147)
WBC # BLD AUTO: 30.74 THOUSAND/UL (ref 4.31–10.16)

## 2024-09-04 PROCEDURE — 82948 REAGENT STRIP/BLOOD GLUCOSE: CPT

## 2024-09-04 PROCEDURE — 99232 SBSQ HOSP IP/OBS MODERATE 35: CPT | Performed by: HOSPITALIST

## 2024-09-04 PROCEDURE — 97535 SELF CARE MNGMENT TRAINING: CPT

## 2024-09-04 PROCEDURE — 97116 GAIT TRAINING THERAPY: CPT

## 2024-09-04 PROCEDURE — 85027 COMPLETE CBC AUTOMATED: CPT | Performed by: HOSPITALIST

## 2024-09-04 PROCEDURE — 97167 OT EVAL HIGH COMPLEX 60 MIN: CPT

## 2024-09-04 PROCEDURE — 80048 BASIC METABOLIC PNL TOTAL CA: CPT | Performed by: HOSPITALIST

## 2024-09-04 PROCEDURE — 97110 THERAPEUTIC EXERCISES: CPT

## 2024-09-04 RX ORDER — INSULIN LISPRO 100 [IU]/ML
8 INJECTION, SOLUTION INTRAVENOUS; SUBCUTANEOUS
Status: DISCONTINUED | OUTPATIENT
Start: 2024-09-04 | End: 2024-09-06 | Stop reason: HOSPADM

## 2024-09-04 RX ADMIN — PIPERACILLIN SODIUM AND TAZOBACTAM SODIUM 4.5 G: 36; 4.5 INJECTION, POWDER, LYOPHILIZED, FOR SOLUTION INTRAVENOUS at 04:48

## 2024-09-04 RX ADMIN — INSULIN DETEMIR 18 UNITS: 100 INJECTION, SOLUTION SUBCUTANEOUS at 00:05

## 2024-09-04 RX ADMIN — METOPROLOL TARTRATE 25 MG: 25 TABLET, FILM COATED ORAL at 08:57

## 2024-09-04 RX ADMIN — LOSARTAN POTASSIUM 50 MG: 50 TABLET, FILM COATED ORAL at 08:57

## 2024-09-04 RX ADMIN — PIPERACILLIN SODIUM AND TAZOBACTAM SODIUM 4.5 G: 36; 4.5 INJECTION, POWDER, LYOPHILIZED, FOR SOLUTION INTRAVENOUS at 22:14

## 2024-09-04 RX ADMIN — METOPROLOL TARTRATE 25 MG: 25 TABLET, FILM COATED ORAL at 22:14

## 2024-09-04 RX ADMIN — PIPERACILLIN SODIUM AND TAZOBACTAM SODIUM 4.5 G: 36; 4.5 INJECTION, POWDER, LYOPHILIZED, FOR SOLUTION INTRAVENOUS at 13:38

## 2024-09-04 RX ADMIN — INSULIN DETEMIR 18 UNITS: 100 INJECTION, SOLUTION SUBCUTANEOUS at 22:13

## 2024-09-04 RX ADMIN — Medication 1000 MCG: at 08:57

## 2024-09-04 RX ADMIN — HEPARIN SODIUM 5000 UNITS: 5000 INJECTION INTRAVENOUS; SUBCUTANEOUS at 13:38

## 2024-09-04 RX ADMIN — HEPARIN SODIUM 5000 UNITS: 5000 INJECTION INTRAVENOUS; SUBCUTANEOUS at 05:00

## 2024-09-04 RX ADMIN — INSULIN LISPRO 8 UNITS: 100 INJECTION, SOLUTION INTRAVENOUS; SUBCUTANEOUS at 09:10

## 2024-09-04 RX ADMIN — DEXAMETHASONE SODIUM PHOSPHATE 6 MG: 4 INJECTION INTRA-ARTICULAR; INTRALESIONAL; INTRAMUSCULAR; INTRAVENOUS; SOFT TISSUE at 02:38

## 2024-09-04 RX ADMIN — HEPARIN SODIUM 5000 UNITS: 5000 INJECTION INTRAVENOUS; SUBCUTANEOUS at 22:13

## 2024-09-04 RX ADMIN — INSULIN LISPRO 8 UNITS: 100 INJECTION, SOLUTION INTRAVENOUS; SUBCUTANEOUS at 17:41

## 2024-09-04 RX ADMIN — INSULIN LISPRO 8 UNITS: 100 INJECTION, SOLUTION INTRAVENOUS; SUBCUTANEOUS at 13:38

## 2024-09-04 RX ADMIN — Medication 1000 UNITS: at 08:57

## 2024-09-04 RX ADMIN — REMDESIVIR 100 MG: 100 INJECTION, POWDER, LYOPHILIZED, FOR SOLUTION INTRAVENOUS at 02:39

## 2024-09-04 RX ADMIN — ATORVASTATIN CALCIUM 20 MG: 20 TABLET, FILM COATED ORAL at 08:57

## 2024-09-04 NOTE — ASSESSMENT & PLAN NOTE
Patient with new oxygen requirements now on 3 L nasal cannula. Did require Bipap enroute to hospital. Pt reports improvement in symptoms  CXR showing left-sided infiltrates  In ED started on IV Zosyn  Procalcitonin elevated x2.     Plan  Continue with IV Zosyn for now  Follow-up on blood cultures  I-S ordered  Wean O2 as tolerated

## 2024-09-04 NOTE — ASSESSMENT & PLAN NOTE
Diagnosed COVID-positive last week at nursing facility found to have low O2 saturations in the 70s.  In EMS patient was started on BiPAP upon arrival patient was transitioned to nasal cannula with improvement in symptoms and is currently at her baseline mentation.  She reports difficulty breathing otherwise feels normal.  CXR with left-sided infiltrate.  COVID-+  Treat on mild COVID pathway  Plan  Check troponin, CRP, BNP, CK, D-dimer  Cont  on dexamethasone 6 mg IV daily x 10 days  Treat with IV remdesivir 200 mg today, 100 mg x 4 days.  Continue I-S and wean oxygen as tolerated

## 2024-09-04 NOTE — ASSESSMENT & PLAN NOTE
UA with moderate leukocytes, positive nitrites.  Urine micro with innumerable bacteria and WBCs  Follow-up on urine cultures; E Coli; awaiting sensitivities   Continue IV Zosyn

## 2024-09-04 NOTE — OCCUPATIONAL THERAPY NOTE
Occupational Therapy Evaluation + Treatment      Patient Name: Gia Vaughan  Today's Date: 9/4/2024  Problem List  Principal Problem:    Sepsis (HCC)  Active Problems:    Type 2 diabetes mellitus with stage 3 chronic kidney disease, unspecified whether long term insulin use, unspecified whether stage 3a or 3b CKD (HCC)    CKD (chronic kidney disease) stage 3, GFR 30-59 ml/min (HCC)    Chronic systolic heart failure (HCC)    Moderate dementia (HCC)    CLL (chronic lymphocytic leukemia) (HCC)    UTI (urinary tract infection)    Community acquired pneumonia    COVID    Diarrhea    Past Medical History  Past Medical History:   Diagnosis Date    Benign adenomatous polyp of large intestine     CHF (congestive heart failure) (HCC)     Diabetes mellitus (HCC)     Hyperlipemia     Hypertension     Osteoarthritis     TIA (transient ischemic attack)      Past Surgical History  Past Surgical History:   Procedure Laterality Date    APPENDECTOMY      CARPAL TUNNEL RELEASE Right     HYSTERECTOMY W/ SALPINGO-OOPHERECTOMY      WV OPTX FEM SHFT FX W/INSJ IMED IMPLT W/WO SCREW Right 10/12/2022    Procedure: INSERTION NAIL IM FEMUR ANTEGRADE (TROCHANTERIC);  Surgeon: Eric Isaacs DO;  Location: [facility];  Service: Orthopedics         09/04/24 1314   OT Last Visit   OT Visit Date 09/04/24   Note Type   Note type Evaluation  (+ treatment)   Pain Assessment   Pain Assessment Tool FLACC   Pain Location/Orientation Location: Back   Pain Onset/Description Onset: Gradual;Descriptor: Discomfort   Effect of Pain on Daily Activities limits comfort   Patient's Stated Pain Goal No pain   Hospital Pain Intervention(s) Repositioned;Ambulation/increased activity;Emotional support   Multiple Pain Sites No   Pain Rating: FLACC (Rest) - Face 0   Pain Rating: FLACC (Rest) - Legs 0   Pain Rating: FLACC (Rest) - Activity 0   Pain Rating: FLACC (Rest) - Cry 0   Pain Rating: FLACC (Rest) - Consolability 0   Score: FLACC (Rest) 0   Pain Rating:  FLACC (Activity) - Face 0   Pain Rating: FLACC (Activity) - Legs 0   Pain Rating: FLACC (Activity) - Activity 0   Pain Rating: FLACC (Activity) - Cry 0   Pain Rating: FLACC (Activity) - Consolability 0   Score: FLACC (Activity) 0   Restrictions/Precautions   Weight Bearing Precautions Per Order No   Other Precautions Contact/isolation;Airborne/isolation;Cognitive;Chair Alarm;Bed Alarm;Multiple lines;Fall Risk;Pain  (COVID-19 ; r/o c.diff)   Home Living   Type of Home Assisted living  ([place] [address])   Home Layout One level;Performs ADLs on one level;Able to live on main level with bedroom/bathroom;Stairs to enter with rails   Home Equipment Walker  (rollator walker)   Additional Comments Pt ambulates w/ rollator at baseline   Prior Function   Level of [place] Independent with functional mobility;Needs assistance with IADLS   Lives With Facility staff   Receives Help From Personal care attendant;Family   IADLs Family/Friend/Other provides transportation;Family/Friend/Other provides meals;Family/Friend/Other provides medication management   Falls in the last 6 months   (unknown, pt unable to quantify ~2* cognition)   Vocational Retired   Comments Pt is a very poor historian, limited insight into PLOF and homesetup. Pt admit from [place] [address] where she was independently ambulating with use of a rollator.   Lifestyle   Autonomy Pt resides at [place] [address]. (A) with ADLs/IADLs and RUSS for functional mobility w/ use of rollator walker. (-) driving   Reciprocal Relationships Supportive facility staff and family   Service to Others Retired   Intrinsic Gratification Watching T.V., spending time with friends   General   Additional Pertinent History Pt admit with shortness of breath. Dx: sepsis, COVID-19. Chest x-ray obtained showing left-sided infiltrate and UA demonstrating urinary tract infection PMHx: CLL, type 2 diabetes, CKD stage III, CHF, dementia, orthostasis, headaches   Family/Caregiver Present No   Subjective   Subjective  "\"I am upset with my dad - he wants to be cut out of everyones life\"   ADL   Eating Assistance 6  Modified independent   Eating Deficit Setup  (eating lunch upon arrival to the room, no noted deficits)   Grooming Assistance 5  Supervision/Setup   UB Bathing Assistance 4  Minimal Assistance   LB Bathing Assistance 4  Minimal Assistance   UB Dressing Assistance 3  Moderate Assistance   LB Dressing Assistance 4  Minimal Assistance   Toileting Assistance  3  Moderate Assistance   Additional Comments Unable to formally assess bathing, dressing, grooming or toileting at time of eval. The above levels of assistance are anticipated based on functional performance deficits with use of clinical judgement.   Bed Mobility   Additional Comments Unable to assess bed mobility; pt received OOB in recliner chair at the start/end of the session with all needs in reach   Transfers   Sit to Stand 4  Minimal assistance   Additional items Assist x 1;Armrests;Increased time required;Verbal cues   Stand to Sit 4  Minimal assistance   Additional items Assist x 1;Armrests;Increased time required;Verbal cues   Additional Comments VC for optimal hand placement and body mechanics for safe transfers. Use of RW for support   Functional Mobility   Functional Mobility 4  Minimal assistance   Additional Comments Ax1, functional household distance with use of RW. Fwd flexed posture at trunk, tactile and verbal cues for postural corrections. Pt w/ poor application. Intermittent (A) to navigate RW around unfamiliar environment   Additional items Rolling walker   Balance   Static Sitting Fair +   Dynamic Sitting Fair   Static Standing Poor +   Dynamic Standing Poor   Activity Tolerance   Activity Tolerance Patient limited by fatigue;Other (Comment)  (cognition)   Medical Staff Made Aware Spoke with CM, PT   Nurse Made Aware Spoke with RN pre/post   RUE Assessment   RUE Assessment WFL   RUE Strength   RUE Overall Strength Within Functional Limits - able " to perform ADL tasks with strength   LUE Assessment   LUE Assessment WFL   LUE Strength   LUE Overall Strength Within Functional Limits - able to perform ADL tasks with strength   Hand Function   Gross Motor Coordination Functional   Fine Motor Coordination Functional   Vision-Basic Assessment   Current Vision Wears glasses all the time  (not present in the hospital)   Cognition   Overall Cognitive Status Impaired   Arousal/Participation Alert;Responsive;Cooperative   Attention Attends with cues to redirect   Orientation Level Oriented to person;Disoriented to time;Oriented to place;Disoriented to situation   Memory Decreased recall of precautions;Decreased recall of recent events;Decreased short term memory;Decreased long term memory   Following Commands Follows one step commands inconsistently   Comments Pt ID via wristband, name and . Pt is easily distractable w/ poor insight into current limitiations and deficits. Perseverates on driving her car and talking about her dad. Able to be redirected w/ increased cueing. Will continue to assess cognition.   Assessment   Limitation Decreased ADL status;Decreased UE strength;Decreased Safe judgement during ADL;Decreased cognition;Decreased endurance;Decreased self-care trans;Decreased high-level ADLs  (insight, problem-solving, activity tolerace, postural aligment, balance)   Prognosis Fair   Assessment Patient is a 83 y.o. female seen for OT evaluation and treatment  following admission on 2024  s/p Sepsis (HCC). Please see above for comprehensive list of comorbidities and significant PMHx impacting functional performance. Upon initial evaluation, pt appears to be performing below baseline functional status. Pt requires JOÃO for UB ADLs, MODA for LB ADLs, JOÃO for transfers and JOÃO for functional mobility household distance with RW. The AM-PAC & Barthel Index outcome tools were used to assist in determining pt safety w/self care /mobility and appropriate d/c  "recommendations, see above for score. Occupational performance is affected by the following deficits:  decreased muscular strength , decreased balance , decreased standing tolerance for self care tasks , decreased dynamic balance impacting functional reach, poor postural control , postural alignment , decreased functional reach , decreased trunk control , decreased activity tolerance , impaired memory , attention to task, impaired judgement and problem solving , decreased emotional regulation and coping skills , impaired safety awareness , (+) pain , impaired learning ability d/t current cognitive status , impaired global mental status, and direction following. Personal/Environmental factors impacting D/C include: Assistance needed for ADL/IADLs, Assistance needed for ADLs and functional mobility, High fall risk , decreased insight toward deficits , decreased recall of precautions , health management, and baseline cognitive deficits. Supporting factors include: able to maintain FFSU, support system available, facility staff available for continued assistance, and attitude towards recovery . Patient would benefit from OT services within the acute care setting to maximize level of functional independence in the following areas fall prevention , self-care transfers, bed mobility , functional mobility, formal cognitive evaluation, and ADLs.  From OT standpoint, recommendation at time of D/C would be Level II (Moderate Resource Intensity) .   Goals   Patient Goals \"to drive home\"   LTG Time Frame 10-14   Long Term Goal See below   Plan   Treatment Interventions ADL retraining;Functional transfer training;Endurance training;Cognitive reorientation;Patient/family training;Equipment evaluation/education;Compensatory technique education;Activityengagement;Energy conservation  (cognitive assessments)   Goal Expiration Date 09/14/24   OT Treatment Day 1   OT Frequency 3-5x/wk   Discharge Recommendation   Rehab Resource " Intensity Level, OT II (Moderate Resource Intensity)   AM-PAC Daily Activity Inpatient   Lower Body Dressing 2   Bathing 2   Toileting 2   Upper Body Dressing 3   Grooming 3   Eating 4   Daily Activity Raw Score 16   Daily Activity Standardized Score (Calc for Raw Score >=11) 35.96   AM-PAC Applied Cognition Inpatient   Following a Speech/Presentation 2   Understanding Ordinary Conversation 3   Taking Medications 1   Remembering Where Things Are Placed or Put Away 2   Remembering List of 4-5 Errands 2   Taking Care of Complicated Tasks 1   Applied Cognition Raw Score 11   Applied Cognition Standardized Score 27.03   Barthel Index   Feeding 10   Bathing 0   Grooming Score 0   Dressing Score 5   Bladder Score 5   Bowels Score 5   Toilet Use Score 5   Transfers (Bed/Chair) Score 10   Mobility (Level Surface) Score 0   Stairs Score 0   Barthel Index Score 40   Additional Treatment Session   Start Time 1325   End Time 1328   Treatment Assessment Pt participated in tx session following IE for ADL training and to further challenge activity tolerance. Pt completed ambulating transfer to/from raised toilet (BSC over top) with JOÃO x 1 person. Verbal and tactile cues for surface proximity and use of RW as patient is impulsive to abandon RW when approaching targeted surfaces. Supervision for anterior pericare, (A) for posterior pericare and thoroughness w/ support of Ues on RW. JOÃO x 1 person for static standing balance at the sink to participate in hand and face washing task. Bilateral forearms resting on sink for improved stability, education on postural alignment for improved balance, pt with poor application. JOÃO x 1 to ambulate additional household distance w/ use of RW. VC to navigate unfamiliar environment. At the end of the session, pt remained seated in the recliner chair w/ alarm donned and all needs in reach. Pt will benefit from continued skilled OT to address functional performance deficits and optimize  independence in mobility and ADL tasks. Will continue to assess 3-5x/week.   End of Consult   Education Provided Yes   Patient Position at End of Consult Bedside chair;Bed/Chair alarm activated;All needs within reach   Nurse Communication Nurse aware of consult     GOALS:      -Patient will perform grooming tasks standing at sink with overall supervision in order to increase overall independence     -Patient will be supervision with UB dressing using AE and AD as needed in order to increase (I) with ADLs     -Patient will be supervision with UB bathing using AE and AD as needed in order to increase (I) with ADLs     -Patient will be supervision with LB dressing with use of AE and AD as needed in order to increase (I) with ADLs     -Patient will be supervision with LB bathing with use of AE and AD as needed in order to increase (I) with ADLs     -Patient will complete toileting w/ JOÃO w/ G hygiene/thoroughness in order to reduce caregiver burden     -Patient will demonstrate Mod I with bed mobility for ability to manage own comfort and initiate OOB tasks.      -Patient will perform functional transfers with supervision to/from all surfaces using DME as needed in order to increase (I) with functional tasks     -Patient will be supervision with functional mobility to/from bathroom for increased independence with toileting tasks     -Patient will tolerate therapeutic activities for greater than 30 min, in order to increase tolerance for functional activities.      -Patient will engage in ongoing cognitive assessment in order to assist with safe discharge planning/recommendations.     -Patient will independently integrate one pacing strategy into morning ADL's.     -Patient will demonstrate standing for 5-6 min in order to increase active participation in functional activities    The patient's raw score on the AM-PAC Daily Activity Inpatient Short Form is 16. A raw score of less than 19 suggests the patient may benefit  from discharge to post-acute rehabilitation services. Please refer to the recommendation of the Occupational Therapist for safe discharge planning.    Lucrecia Stevens MS OTR/L   [place] Licensure# [id number]

## 2024-09-04 NOTE — CASE MANAGEMENT
Case Management Discharge Planning Note    Patient name Gia Vaughan  Location S /S -01 MRN [id number]  : 1940 Date 2024       Current Admission Date: 2024  Current Admission Diagnosis:Sepsis (HCC)   Patient Active Problem List    Diagnosis Date Noted Date Diagnosed    Diarrhea 2024     Sepsis (HCC) 2024     Community acquired pneumonia 2024     COVID 2024     Headache 2024     Hypertensive urgency 2024     Dizziness 2024     Suspected deep tissue injury of unknown depth 2022     Acute postoperative pain of right knee 2022     Encephalopathy 10/24/2022     Deep tissue injury 10/21/2022     UTI (urinary tract infection) 10/21/2022     Orthostasis 10/19/2022     H/O dizziness 10/19/2022     CLL (chronic lymphocytic leukemia) ([facility]) 10/13/2022     Intertrochanteric fracture of right femur ([facility]) 10/11/2022     Displaced fracture of middle phalanx of left ring finger, initial encounter for closed fracture 10/11/2022     Ambulatory dysfunction 2021     Moderate dementia ([facility]) 2021     Lymphadenopathy 2020     Elevated liver enzymes 2020     Fall 2020     CKD (chronic kidney disease) stage 3, GFR 30-59 ml/min ([facility]) 2020     HLD (hyperlipidemia) 2020     OA (osteoarthritis) 2020     Chronic systolic heart failure ([facility]) 2020     Type 2 diabetes mellitus with stage 3 chronic kidney disease, unspecified whether long term insulin use, unspecified whether stage 3a or 3b CKD ([facility])        LOS (days): 3  Geometric Mean LOS (GMLOS) (days): 5.1  Days to GMLOS:2.6     OBJECTIVE:  Risk of Unplanned Readmission Score: 19.14         Current admission status: Inpatient   Preferred Pharmacy:   UNKNOWN - FOLLOW UP PRIOR TO DISCHARGE TO E-PRESCRIBE  No address on file      [id number] - [place] - [address]  [address]  [place] [address]  Phone: [phone] Fax:  [phone]    [id number] - [place] - [address]  [address] [place] [address]  Phone: [phone] Fax: [phone]    Primary Care Provider: Chayo Powell MD    Primary Insurance: MEDICARE  Secondary Insurance: [employer]    DISCHARGE DETAILS:    Discharge planning discussed with:: Patients nephew- Charles  Freedom of Choice: Yes  Comments - Freedom of Choice: Cm spoke with Charles to review Cm role. Charles in agreement with blanket referral to STR but would like therapy to work with her again to make final decision.  CM contacted family/caregiver?: Yes  Were Treatment Team discharge recommendations reviewed with patient/caregiver?: Yes  Did patient/caregiver verbalize understanding of patient care needs?: N/A- going to facility  Were patient/caregiver advised of the risks associated with not following Treatment Team discharge recommendations?: Yes    Contacts  Patient Contacts: caleb Soto/SARTHAK  Relationship to Patient:: Family  Contact Method: Phone  Phone Number: [phone]  Reason/Outcome: Discharge Planning    Requested Home Health Care         Is the patient interested in HHC at discharge?: No    DME Referral Provided  Referral made for DME?: No    Other Referral/Resources/Interventions Provided:  Referral Comments: [facility]- [place] referral in [place]

## 2024-09-04 NOTE — PROGRESS NOTES
[facility]  Progress Note  Name: Gia Vaughan I  MRN: [id number]  Unit/Bed#: S -01 I Date of Admission: 9/1/2024   Date of Service: 9/4/2024 I Hospital Day: 3    Assessment & Plan   * Sepsis ([facility])  Assessment & Plan  Patient meeting sepsis criteria as evidenced by leukocytosis, tachycardia, and tachypnea with source being UTI vs pneumonia vs COVID.  Leukocytosis appears chronic and improved since prior CBC likely from patient's underlying CLL.  Currently hemodynamically stable.  PCT elevated, lactate WNL  In ED patient started on IV Zosyn secondary to antibiotic allergies  Continue to trend CBC and monitor fever curve.    Community acquired pneumonia  Assessment & Plan  Patient with new oxygen requirements now on 3 L nasal cannula. Did require Bipap enroute to hospital. Pt reports improvement in symptoms  CXR showing left-sided infiltrates  In ED started on IV Zosyn  Procalcitonin elevated x2.     Plan  Continue with IV Zosyn for now  Follow-up on blood cultures  I-S ordered  Wean O2 as tolerated    UTI (urinary tract infection)  Assessment & Plan  UA with moderate leukocytes, positive nitrites.  Urine micro with innumerable bacteria and WBCs  Follow-up on urine cultures; E Coli; awaiting sensitivities   Continue IV Zosyn    Type 2 diabetes mellitus with stage 3 chronic kidney disease, unspecified whether long term insulin use, unspecified whether stage 3a or 3b CKD ([facility])  Assessment & Plan  Lab Results   Component Value Date    HGBA1C 9.5 (H) 09/01/2024       Recent Labs     09/03/24  1608 09/03/24  1706 09/03/24  2118 09/04/24  0739   POCGLU 389* 393* 304* 127         Blood Sugar Average: Last 72 hrs:  (P) [id number]  Update hemoglobin A1c  Continue 20 units Levemir at bedtime  Continue Humalog 5 units with meals and sliding scale insulin  Avoid hypoglycemia    CLL (chronic lymphocytic leukemia) ([facility])  Assessment & Plan  Follows with heme-onc as outpatient.  WBC appears  improved since prior  Continue to follow with heme    Diarrhea  Assessment & Plan  Reports ongoing diarrhea x 1 week no abdominal pain on exam  Denies any new dietary changes, med changes, or recent sick contacts.  Suspect in the setting of COVID   Monitor intake/output    COVID  Assessment & Plan  Diagnosed COVID-positive last week at nursing facility found to have low O2 saturations in the 70s.  In EMS patient was started on BiPAP upon arrival patient was transitioned to nasal cannula with improvement in symptoms and is currently at her baseline mentation.  She reports difficulty breathing otherwise feels normal.  CXR with left-sided infiltrate.  COVID-+  Treat on mild COVID pathway  Plan  Check troponin, CRP, BNP, CK, D-dimer  Cont  on dexamethasone 6 mg IV daily x 10 days  Treat with IV remdesivir 200 mg today, 100 mg x 4 days.  Continue I-S and wean oxygen as tolerated    Moderate dementia (HCC)  Assessment & Plan  Mentation appears to be at baseline  Continue to regulate sleep/wake cycles  Supportive care    CKD (chronic kidney disease) stage 3, GFR 30-59 ml/min (HCC)  Assessment & Plan  Lab Results   Component Value Date    EGFR 58 09/03/2024    EGFR 53 09/02/2024    EGFR 52 09/01/2024    CREATININE 0.91 09/03/2024    CREATININE 0.97 09/02/2024    CREATININE 0.99 09/01/2024   Currently at baseline continue to monitor with daily BMP    Chronic systolic heart failure (HCC)  Assessment & Plan  Wt Readings from Last 3 Encounters:   09/04/24 67.6 kg (149 lb 1.6 oz)   05/31/24 68 kg (150 lb)   04/04/24 68.4 kg (150 lb 12.8 oz)   Not in exacerbation currently   EF of 45% with reduced systolic function, G2 DD  Monitor daily weights.  Intake and output  Cardiac diabetic diet ordered.             VTE Pharmacologic Prophylaxis: VTE Score: 11 High Risk (Score >/= 5) - Pharmacological DVT Prophylaxis Contraindicated. Sequential Compression Devices Ordered.    Mobility:   Basic Mobility Inpatient Raw Score: 16  JH-HLM  Goal: 5: Stand one or more mins  -HL Achieved: 5: Stand (1 or more minutes)  -HLM Goal achieved. Continue to encourage appropriate mobility.    Patient Centered Rounds: I performed bedside rounds with nursing staff today.   Discussions with Specialists or Other Care Team Provider:     Education and Discussions with Family / Patient: Updated  (nephew) via phone.    Total Time Spent on Date of Encounter in care of patient: 30 mins. This time was spent on one or more of the following: performing physical exam; counseling and coordination of care; obtaining or reviewing history; documenting in the medical record; reviewing/ordering tests, medications or procedures; communicating with other healthcare professionals and discussing with patient's family/caregivers.    Current Length of Stay: 3 day(s)  Current Patient Status: Inpatient   Certification Statement: The patient will continue to require additional inpatient hospital stay due to COVID pneumonia and UTI  Discharge Plan: Anticipate discharge in 24-48 hrs to rehab facility.    Code Status: Level 1 - Full Code    Subjective:     Feels well. No new complaints.  Had pain on her low back and backside, which has resolved     Objective:     Vitals:   Temp (24hrs), Av.7 °F (36.5 °C), Min:97 °F (36.1 °C), Max:98.3 °F (36.8 °C)    Temp:  [97 °F (36.1 °C)-98.3 °F (36.8 °C)] 97 °F (36.1 °C)  HR:  [61-68] 63  Resp:  [16-19] 16  BP: (105-149)/(72-84) 131/72  SpO2:  [89 %-94 %] 94 %  Body mass index is 24.07 kg/m².     Input and Output Summary (last 24 hours):     Intake/Output Summary (Last 24 hours) at 2024 0854  Last data filed at 9/3/2024 1301  Gross per 24 hour   Intake 370 ml   Output --   Net 370 ml       Physical Exam:   Physical Exam  Vitals and nursing note reviewed.   Constitutional:       General: She is not in acute distress.     Appearance: She is not ill-appearing.   HENT:      Head: Normocephalic and atraumatic.   Cardiovascular:       Rate and Rhythm: Normal rate and regular rhythm.      Heart sounds: No murmur heard.  Pulmonary:      Effort: Pulmonary effort is normal. No respiratory distress.      Breath sounds: Normal breath sounds. No wheezing or rhonchi.   Abdominal:      General: Abdomen is flat.      Palpations: Abdomen is soft.   Musculoskeletal:         General: No swelling.   Neurological:      General: No focal deficit present.      Mental Status: Mental status is at baseline.          Additional Data:     Labs:  Results from last 7 days   Lab Units 09/03/24  0559 09/02/24  0448   WBC Thousand/uL 21.06* 25.60*   HEMOGLOBIN g/dL 10.8* 11.8   HEMATOCRIT % 34.5* 37.6   PLATELETS Thousands/uL 240 215   SEGS PCT %  --  36*   LYMPHO PCT % 56* 59*   MONO PCT % 1* 3*   EOS PCT % 0 0     Results from last 7 days   Lab Units 09/03/24  0559 09/02/24  0448 09/01/24  2225   SODIUM mmol/L 135   < > 133*   POTASSIUM mmol/L 4.4   < > 4.7   CHLORIDE mmol/L 107   < > 101   CO2 mmol/L 21   < > 21   BUN mg/dL 32*   < > 24   CREATININE mg/dL 0.91   < > 0.99   ANION GAP mmol/L 7   < > 11   CALCIUM mg/dL 8.4   < > 8.7   ALBUMIN g/dL  --   --  3.3*   TOTAL BILIRUBIN mg/dL  --   --  0.83   ALK PHOS U/L  --   --  109*   ALT U/L  --   --  41   AST U/L  --   --  41*   GLUCOSE RANDOM mg/dL 107   < > 155*    < > = values in this interval not displayed.     Results from last 7 days   Lab Units 09/01/24  2225   INR  1.08     Results from last 7 days   Lab Units 09/04/24  0739 09/03/24  2118 09/03/24  1706 09/03/24  1608 09/03/24  1042 09/03/24  0911 09/03/24  0739 09/03/24  0705 09/02/24  2051 09/02/24  1614 09/02/24  1125 09/02/24  0757   POC GLUCOSE mg/dl 127 304* 393* 389* 257* 172* 105 100 323* 335* 205* 148*     Results from last 7 days   Lab Units 09/01/24  2122   HEMOGLOBIN A1C % 9.5*     Results from last 7 days   Lab Units 09/02/24  0448 09/01/24  2225   LACTIC ACID mmol/L  --  1.8   PROCALCITONIN ng/ml 0.52* 0.46*       Lines/Drains:  Invasive Devices        Peripheral Intravenous Line  Duration             Peripheral IV 09/03/24 Dorsal (posterior);Left Forearm <1 day                    Imaging: No pertinent imaging reviewed.    Recent Cultures (last 7 days):   Results from last 7 days   Lab Units 09/02/24  0206 09/01/24  2225   BLOOD CULTURE   --  No Growth at 48 hrs.  No Growth at 48 hrs.   URINE CULTURE   --  >100,000 cfu/ml Escherichia coli*   LEGIONELLA URINARY ANTIGEN  Negative  --        Last 24 Hours Medication List:   Current Facility-Administered Medications   Medication Dose Route Frequency Provider Last Rate    acetaminophen  650 mg Oral Q6H PRN Oralia Monzon PA-C      atorvastatin  20 mg Oral Daily Harman Tucker-Amber, CRNP      benzonatate  100 mg Oral TID PRN Harman Tucker-Amber, YAMILEXNP      cholecalciferol  1,000 Units Oral Daily Harman Tucker-Mikium, CRNP      vitamin B-12  1,000 mcg Oral Daily Harman Tucker-Amber, CRNP      dexamethasone  6 mg Intravenous Q24H Harman Bales, YAMILEXNP      heparin (porcine)  5,000 Units Subcutaneous Q8H [facility] Harman Tucker-Amber, YAMILEXNP      insulin detemir  18 Units Subcutaneous HS Harman Bales, CRNP      insulin lispro  8 Units Subcutaneous TID With Meals Samuel Hill MD      losartan  50 mg Oral Daily Harman Tucker-Amber, CRNP      metoprolol tartrate  25 mg Oral Q12H [facility] Harman Bales, YAMILEXNP      phenol  1 spray Mouth/Throat Q2H PRN BRINDA Orozco      piperacillin-tazobactam  4.5 g Intravenous Q8H Harman Bales, YAMILEXNP 4.5 g (09/04/24 0448)    remdesivir  100 mg Intravenous Q24H Harman Bales, BRINDA          Today, Patient Was Seen By: Samuel Hill MD    **Please Note: This note may have been constructed using a voice recognition system.**

## 2024-09-04 NOTE — ASSESSMENT & PLAN NOTE
Lab Results   Component Value Date    EGFR 58 09/03/2024    EGFR 53 09/02/2024    EGFR 52 09/01/2024    CREATININE 0.91 09/03/2024    CREATININE 0.97 09/02/2024    CREATININE 0.99 09/01/2024   Currently at baseline continue to monitor with daily BMP

## 2024-09-04 NOTE — PLAN OF CARE
Problem: PHYSICAL THERAPY ADULT  Goal: Performs mobility at highest level of function for planned discharge setting.  See evaluation for individualized goals.  Description: Treatment/Interventions: Functional transfer training, LE strengthening/ROM, Therapeutic exercise, Endurance training, Cognitive reorientation, Patient/family training, Equipment eval/education, Bed mobility, Gait training, Spoke to case management, Spoke to nursing  Equipment Recommended: Walker       See flowsheet documentation for full assessment, interventions and recommendations.  Outcome: Progressing  Note: Prognosis: Fair  Problem List: Decreased strength, Pain, Decreased cognition, Impaired judgement, Decreased safety awareness, Decreased mobility, Impaired balance, Decreased endurance  Assessment: pt shows improvement in mobility status w/ increased ambulation tolerance. pt continues to require assistance w/ all phases of mobility including input for mobility technique and chair follow during ambulation. pt remains at risk for falling and continued inpatient PT is needed to reduce fall risk factors and improve levels of mobility and independence.  Barriers to Discharge: Decreased caregiver support, Inaccessible home environment     Rehab Resource Intensity Level, PT: II (Moderate Resource Intensity)    See flowsheet documentation for full assessment.

## 2024-09-04 NOTE — PLAN OF CARE
Problem: PAIN - ADULT  Goal: Verbalizes/displays adequate comfort level or baseline comfort level  Description: Interventions:  - Encourage patient to monitor pain and request assistance  - Assess pain using appropriate pain scale  - Administer analgesics based on type and severity of pain and evaluate response  - Implement non-pharmacological measures as appropriate and evaluate response  - Consider cultural and social influences on pain and pain management  - Notify physician/advanced practitioner if interventions unsuccessful or patient reports new pain  Outcome: Progressing     Problem: INFECTION - ADULT  Goal: Absence or prevention of progression during hospitalization  Description: INTERVENTIONS:  - Assess and monitor for signs and symptoms of infection  - Monitor lab/diagnostic results  - Monitor all insertion sites, i.e. indwelling lines, tubes, and drains  - Monitor endotracheal if appropriate and nasal secretions for changes in amount and color  - [place] appropriate cooling/warming therapies per order  - Administer medications as ordered  - Instruct and encourage patient and family to use good hand hygiene technique  - Identify and instruct in appropriate isolation precautions for identified infection/condition  Outcome: Progressing  Goal: Absence of fever/infection during neutropenic period  Description: INTERVENTIONS:  - Monitor WBC    Outcome: Progressing     Problem: SAFETY ADULT  Goal: Patient will remain free of falls  Description: INTERVENTIONS:  - Educate patient/family on patient safety including physical limitations  - Instruct patient to call for assistance with activity   - Consult OT/PT to assist with strengthening/mobility   - Keep Call bell within reach  - Keep bed low and locked with side rails adjusted as appropriate  - Keep care items and personal belongings within reach  - Initiate and maintain comfort rounds  - Make Fall Risk Sign visible to staff  - Offer Toileting every  Hours,  in advance of need  - Initiate/Maintain alarm  - Obtain necessary fall risk management equipment:   - Apply yellow socks and bracelet for high fall risk patients  - Consider moving patient to room near nurses station  Outcome: Progressing  Goal: Maintain or return to baseline ADL function  Description: INTERVENTIONS:  -  Assess patient's ability to carry out ADLs; assess patient's baseline for ADL function and identify physical deficits which impact ability to perform ADLs (bathing, care of mouth/teeth, toileting, grooming, dressing, etc.)  - Assess/evaluate cause of self-care deficits   - Assess range of motion  - Assess patient's mobility; develop plan if impaired  - Assess patient's need for assistive devices and provide as appropriate  - Encourage maximum independence but intervene and supervise when necessary  - Involve family in performance of ADLs  - Assess for home care needs following discharge   - Consider OT consult to assist with ADL evaluation and planning for discharge  - Provide patient education as appropriate  Outcome: Progressing  Goal: Maintains/Returns to pre admission functional level  Description: INTERVENTIONS:  - Perform AM-PAC 6 Click Basic Mobility/ Daily Activity assessment daily.  - Set and communicate daily mobility goal to care team and patient/family/caregiver.   - Collaborate with rehabilitation services on mobility goals if consulted  - Perform Range of Motion  times a day.  - Reposition patient every  hours.  - Dangle patient  times a day  - Stand patient  times a day  - Ambulate patient  times a day  - Out of bed to chair  times a day   - Out of bed for meals  times a day  - Out of bed for toileting  - Record patient progress and toleration of activity level   Outcome: Progressing     Problem: DISCHARGE PLANNING  Goal: Discharge to home or other facility with appropriate resources  Description: INTERVENTIONS:  - Identify barriers to discharge w/patient and caregiver  - Arrange for  needed discharge resources and transportation as appropriate  - Identify discharge learning needs (meds, wound care, etc.)  - Arrange for interpretive services to assist at discharge as needed  - Refer to Case Management Department for coordinating discharge planning if the patient needs post-hospital services based on physician/advanced practitioner order or complex needs related to functional status, cognitive ability, or social support system  Outcome: Progressing     Problem: Knowledge Deficit  Goal: Patient/family/caregiver demonstrates understanding of disease process, treatment plan, medications, and discharge instructions  Description: Complete learning assessment and assess knowledge base.  Interventions:  - Provide teaching at level of understanding  - Provide teaching via preferred learning methods  Outcome: Progressing     Problem: Prexisting or High Potential for Compromised Skin Integrity  Goal: Skin integrity is maintained or improved  Description: INTERVENTIONS:  - Identify patients at risk for skin breakdown  - Assess and monitor skin integrity  - Assess and monitor nutrition and hydration status  - Monitor labs   - Assess for incontinence   - Turn and reposition patient  - Assist with mobility/ambulation  - Relieve pressure over bony prominences  - Avoid friction and shearing  - Provide appropriate hygiene as needed including keeping skin clean and dry  - Evaluate need for skin moisturizer/barrier cream  - Collaborate with interdisciplinary team   - Patient/family teaching  - Consider wound care consult   Outcome: Progressing     Problem: Nutrition/Hydration-ADULT  Goal: Nutrient/Hydration intake appropriate for improving, restoring or maintaining nutritional needs  Description: Monitor and assess patient's nutrition/hydration status for malnutrition. Collaborate with interdisciplinary team and initiate plan and interventions as ordered.  Monitor patient's weight and dietary intake as ordered or  per policy. Utilize nutrition screening tool and intervene as necessary. Determine patient's food preferences and provide high-protein, high-caloric foods as appropriate.     INTERVENTIONS:  - Monitor oral intake, urinary output, labs, and treatment plans  - Assess nutrition and hydration status and recommend course of action  - Evaluate amount of meals eaten  - Assist patient with eating if necessary   - Allow adequate time for meals  - Recommend/ encourage appropriate diets, oral nutritional supplements, and vitamin/mineral supplements  - Order, calculate, and assess calorie counts as needed  - Recommend, monitor, and adjust tube feedings and TPN/PPN based on assessed needs  - Assess need for intravenous fluids  - Provide specific nutrition/hydration education as appropriate  - Include patient/family/caregiver in decisions related to nutrition  Outcome: Progressing

## 2024-09-04 NOTE — PHYSICAL THERAPY NOTE
PHYSICAL THERAPY TREATMENT NOTE    Patient Name: Gia Vaughan  Today's Date: 9/4/2024 09/04/24 1604   PT Last Visit   PT Visit Date 09/04/24   Pain Assessment   Pain Assessment Tool FLACC   Pain Location/Orientation Orientation: Bilateral;Location: Foot   Hospital Pain Intervention(s) Repositioned;Ambulation/increased activity   Pain Rating: FLACC (Rest) - Face 0   Pain Rating: FLACC (Rest) - Legs 0   Pain Rating: FLACC (Rest) - Activity 0   Pain Rating: FLACC (Rest) - Cry 1   Pain Rating: FLACC (Rest) - Consolability 1   Score: FLACC (Rest) 2   Pain Rating: FLACC (Activity) - Face 1   Pain Rating: FLACC (Activity) - Legs 1   Pain Rating: FLACC (Activity) - Activity 1   Pain Rating: FLACC (Activity) - Cry 1   Pain Rating: FLACC (Activity) - Consolability 1   Score: FLACC (Activity) 5   Restrictions/Precautions   Other Precautions Contact/isolation;Airborne/isolation;Cognitive;Chair Alarm;Bed Alarm;Fall Risk;Pain;Multiple lines   General   Chart Reviewed Yes   Additional Pertinent History room air resting pulse ox 95% and 72 BPM, active 91% and 85 BPM.   Family/Caregiver Present No   Cognition   Arousal/Participation Cooperative   Attention Attends with cues to redirect   Orientation Level Oriented to person;Disoriented to place;Disoriented to time;Other (Comment)  (pt was identified w/ full name, ID bracelet)   Following Commands Follows one step commands with increased time or repetition   Subjective   Subjective pt seen sitting out of bed. pt agreed to PT session. reports having bilateral feet pain. input was needed for task focus.   Transfers   Sit to Stand 4  Minimal assistance   Additional items Assist x 1;Increased time required;Verbal cues  (for hand placement)   Stand to Sit 4  Minimal assistance   Additional items Assist x 1;Increased time required;Verbal cues  (for body positioning, hand placement)    Ambulation/Elevation   Gait pattern Short stride;Excessively slow;Forward Flexion   Gait Assistance 4  Minimal assist  (w/ chair follow)   Additional items Assist x 1;Verbal cues;Tactile cues  (for walker positioning, full step length)   Assistive Device Rolling walker   Distance 8 feet x2, 10 feet w/ seated rest breaks  (additional ambulation not possible due to fatigue)   Balance   Static Sitting Fair +   Static Standing Poor +  (w/ roller walker)   Ambulatory Poor +  (w/ roller walker)   Activity Tolerance   Activity Tolerance Patient limited by fatigue;Patient limited by pain   Nurse Made Aware spoke to Miley Garcia CM   Equipment Use   Comments ankle pumps 20. heel slides and short arc quads 10 each. input was needed for pt to maintain technique and complete all reps.   Assessment   Problem List Decreased strength;Pain;Decreased cognition;Impaired judgement;Decreased safety awareness;Decreased mobility;Impaired balance;Decreased endurance   Assessment pt shows improvement in mobility status w/ increased ambulation tolerance. pt continues to require assistance w/ all phases of mobility including input for mobility technique and chair follow during ambulation. pt remains at risk for falling and continued inpatient PT is needed to reduce fall risk factors and improve levels of mobility and independence.   Goals   Patient Goals I want to see my family   STG Expiration Date 09/13/24   Short Term Goal #1 Goals: Pt will: Perform rolling  and supine<>sit bed mobility tasks with modified I to prepare for transfers and reposition in bed. Perform transfers with modified I to promote proper hand placement and approach. Perform ambulation with LRAD for at least 50' with  CLOSE S  to increase Indep in prior living environment and promote proper use of assistive device. Perform 5xSTS w/ UE support and improve baseline score to demonstrate less risk for falls.   PT Treatment Day 2   Plan   Treatment/Interventions  Functional transfer training;LE strengthening/ROM;Therapeutic exercise;Endurance training;Cognitive reorientation;Patient/family training;Equipment eval/education;Bed mobility;Gait training;Compensatory technique education   Progress Progressing toward goals   PT Frequency 3-5x/wk   Discharge Recommendation   Rehab Resource Intensity Level, PT II (Moderate Resource Intensity)   AM-PAC Basic Mobility Inpatient   Turning in Flat Bed Without Bedrails 3   Lying on Back to Sitting on Edge of Flat Bed Without Bedrails 3   Moving Bed to Chair 3   Standing Up From Chair Using Arms 3   Walk in Room 3   Climb 3-5 Stairs With Railing 1   Basic Mobility Inpatient Raw Score 16   Basic Mobility Standardized Score 38.32   [facility] Highest Level Of Mobility   -[facility] Goal 5: Stand one or more mins   -HL Achieved 7: Walk 25 feet or more   End of Consult   Patient Position at End of Consult Bedside chair;Bed/Chair alarm activated;All needs within reach     The patient's AM-PAC Basic Mobility Inpatient Short Form Raw Score is 16. A Raw score of less than or equal to 16 suggests the patient may benefit from discharge to post-acute rehabilitation services. Please also refer to the recommendation of the Physical Therapist for safe discharge planning.    Skilled inpatient PT recommended while in hospital to progress pt toward treatment goals.    Juvenal Rogers, PT

## 2024-09-04 NOTE — ASSESSMENT & PLAN NOTE
Lab Results   Component Value Date    HGBA1C 9.5 (H) 09/01/2024       Recent Labs     09/03/24  1608 09/03/24  1706 09/03/24  2118 09/04/24  0739   POCGLU 389* 393* 304* 127         Blood Sugar Average: Last 72 hrs:  (P) 224.5869131371692852  Update hemoglobin A1c  Continue 20 units Levemir at bedtime  Continue Humalog 5 units with meals and sliding scale insulin  Avoid hypoglycemia

## 2024-09-04 NOTE — CASE MANAGEMENT
Case Management Assessment    Patient name Gia Vaughan  Location S /S -01 MRN [id number]  : 1940 Date 2024       Current Admission Date: 2024  Current Admission Diagnosis:Sepsis ([facility])   Patient Active Problem List    Diagnosis Date Noted Date Diagnosed    Diarrhea 2024     Sepsis (HCC) 2024     Community acquired pneumonia 2024     COVID 2024     Headache 2024     Hypertensive urgency 2024     Dizziness 2024     Suspected deep tissue injury of unknown depth 2022     Acute postoperative pain of right knee 2022     Encephalopathy 10/24/2022     Deep tissue injury 10/21/2022     UTI (urinary tract infection) 10/21/2022     Orthostasis 10/19/2022     H/O dizziness 10/19/2022     CLL (chronic lymphocytic leukemia) ([facility]) 10/13/2022     Intertrochanteric fracture of right femur ([facility]) 10/11/2022     Displaced fracture of middle phalanx of left ring finger, initial encounter for closed fracture 10/11/2022     Ambulatory dysfunction 2021     Moderate dementia ([facility]) 2021     Lymphadenopathy 2020     Elevated liver enzymes 2020     Fall 2020     CKD (chronic kidney disease) stage 3, GFR 30-59 ml/min ([facility]) 2020     HLD (hyperlipidemia) 2020     OA (osteoarthritis) 2020     Chronic systolic heart failure ([facility]) 2020     Type 2 diabetes mellitus with stage 3 chronic kidney disease, unspecified whether long term insulin use, unspecified whether stage 3a or 3b CKD ([facility])        LOS (days): 3  Geometric Mean LOS (GMLOS) (days): 5.1  Days to GMLOS:2.6     OBJECTIVE:    Risk of Unplanned Readmission Score: 19.14         Current admission status: Inpatient       Preferred Pharmacy:   UNKNOWN - FOLLOW UP PRIOR TO DISCHARGE TO E-PRESCRIBE  No address on file      [id number] - [place] - [address]  [address]  [place] [address]  Phone: [phone] Fax:  [phone]    [id number] - [place] - [address]  [address] [place] [address]  Phone: [phone] Fax: [phone]    Primary Care Provider: Chayo Powell MD    Primary Insurance: MEDICARE  Secondary Insurance: [employer]    ASSESSMENT:  Active Health Care Proxies    There are no active Health Care Proxies on file.                      Patient Information  Admitted from:: Facility  Mental Status: Alert  During Assessment patient was accompanied by: Not accompanied during assessment  Assessment information provided by:: Other - please comment (Facility Staff)  Primary Caregiver: Self  Support Systems: Family members, Friends/neighbors  Type of Current Residence: Facility  Upon entering residence, is there a bedroom on the main floor (no further steps)?: Yes  Upon entering residence, is there a bathroom on the main floor (no further steps)?: Yes  Living Arrangements: Other (Comment) (facility)    Activities of Daily Living Prior to Admission  Functional Status: Assistance  Completes ADLs independently?: No  Level of ADL dependence: Assistance  Ambulates independently?: Yes  Does patient use assisted devices?: Yes  Assisted Devices (DME) used: Rollator  Does patient currently own DME?: No  Does patient have a history of Outpatient Therapy (PT/OT)?: No  Does the patient have a history of Short-Term Rehab?: No  Does patient have a history of HHC?: Yes  Does patient currently have HHC?: No         Patient Information Continued  Income Source: Pension/half-way  Does patient have prescription coverage?: Yes  Does patient receive dialysis treatments?: No  Does patient have a history of substance abuse?: No         Means of Transportation  [facility] Other (Comment[facility] ([facility]ility Transport)      Social Determinants of Health (SDOH)      Flowsheet Row Most Recent Value   Housing Stability    In the last 12 months, was there a time when you were not able to pay the  mortgage or rent on time? N   At any time in the past 12 months, were you homeless or living in a shelter (including now)? N   Food Insecurity    Within the past 12 months, you worried that your food would run out before you got the money to buy more. Never true   Within the past 12 months, the food you bought just didn't last and you didn't have money to get more. Never true   Utilities    In the past 12 months has the electric, gas, oil, or water company threatened to shut off services in your home? No

## 2024-09-04 NOTE — PLAN OF CARE
Problem: OCCUPATIONAL THERAPY ADULT  Goal: Performs self-care activities at highest level of function for planned discharge setting.  See evaluation for individualized goals.  Description: Treatment Interventions: ADL retraining, Functional transfer training, Endurance training, Cognitive reorientation, Patient/family training, Equipment evaluation/education, Compensatory technique education, Activityengagement, Energy conservation (cognitive assessments)          See flowsheet documentation for full assessment, interventions and recommendations.   Note: Limitation: Decreased ADL status, Decreased UE strength, Decreased Safe judgement during ADL, Decreased cognition, Decreased endurance, Decreased self-care trans, Decreased high-level ADLs (insight, problem-solving, activity tolerace, postural aligment, balance)  Prognosis: Fair  Assessment: Patient is a 83 y.o. female seen for OT evaluation and treatment  following admission on 9/1/2024  s/p Sepsis (HCC). Please see above for comprehensive list of comorbidities and significant PMHx impacting functional performance. Upon initial evaluation, pt appears to be performing below baseline functional status. Pt requires JOÃO for UB ADLs, MODA for LB ADLs, JOÃO for transfers and JOÃO for functional mobility household distance with RW. The AM-PAC & Barthel Index outcome tools were used to assist in determining pt safety w/self care /mobility and appropriate d/c recommendations, see above for score. Occupational performance is affected by the following deficits:  decreased muscular strength , decreased balance , decreased standing tolerance for self care tasks , decreased dynamic balance impacting functional reach, poor postural control , postural alignment , decreased functional reach , decreased trunk control , decreased activity tolerance , impaired memory , attention to task, impaired judgement and problem solving , decreased emotional regulation and coping skills ,  impaired safety awareness , (+) pain , impaired learning ability d/t current cognitive status , impaired global mental status, and direction following. Personal/Environmental factors impacting D/C include: Assistance needed for ADL/IADLs, Assistance needed for ADLs and functional mobility, High fall risk , decreased insight toward deficits , decreased recall of precautions , health management, and baseline cognitive deficits. Supporting factors include: able to maintain FFSU, support system available, facility staff available for continued assistance, and attitude towards recovery . Patient would benefit from OT services within the acute care setting to maximize level of functional independence in the following areas fall prevention , self-care transfers, bed mobility , functional mobility, formal cognitive evaluation, and ADLs.  From OT standpoint, recommendation at time of D/C would be Level II (Moderate Resource Intensity) .     Rehab Resource Intensity Level, OT: II (Moderate Resource Intensity)     Lucrecia Stevens MS OTR/L   [place] Licensure# [id number]

## 2024-09-05 LAB
ANION GAP SERPL CALCULATED.3IONS-SCNC: 6 MMOL/L (ref 4–13)
ANISOCYTOSIS BLD QL SMEAR: PRESENT
BASOPHILS # BLD MANUAL: 0 THOUSAND/UL (ref 0–0.1)
BASOPHILS NFR MAR MANUAL: 0 % (ref 0–1)
BUN SERPL-MCNC: 31 MG/DL (ref 5–25)
CALCIUM SERPL-MCNC: 7.9 MG/DL (ref 8.4–10.2)
CHLORIDE SERPL-SCNC: 110 MMOL/L (ref 96–108)
CO2 SERPL-SCNC: 22 MMOL/L (ref 21–32)
CREAT SERPL-MCNC: 1.03 MG/DL (ref 0.6–1.3)
EOSINOPHIL # BLD MANUAL: 0.35 THOUSAND/UL (ref 0–0.4)
EOSINOPHIL NFR BLD MANUAL: 1 % (ref 0–6)
ERYTHROCYTE [DISTWIDTH] IN BLOOD BY AUTOMATED COUNT: 16 % (ref 11.6–15.1)
GFR SERPL CREATININE-BSD FRML MDRD: 50 ML/MIN/1.73SQ M
GLUCOSE SERPL-MCNC: 167 MG/DL (ref 65–140)
GLUCOSE SERPL-MCNC: 172 MG/DL (ref 65–140)
GLUCOSE SERPL-MCNC: 202 MG/DL (ref 65–140)
GLUCOSE SERPL-MCNC: 323 MG/DL (ref 65–140)
GLUCOSE SERPL-MCNC: 333 MG/DL (ref 65–140)
HCT VFR BLD AUTO: 37.1 % (ref 34.8–46.1)
HGB BLD-MCNC: 11.2 G/DL (ref 11.5–15.4)
LYMPHOCYTES # BLD AUTO: 29.6 THOUSAND/UL (ref 0.6–4.47)
LYMPHOCYTES # BLD AUTO: 84 % (ref 14–44)
MCH RBC QN AUTO: 32.4 PG (ref 26.8–34.3)
MCHC RBC AUTO-ENTMCNC: 30.2 G/DL (ref 31.4–37.4)
MCV RBC AUTO: 107 FL (ref 82–98)
MONOCYTES # BLD AUTO: 0 THOUSAND/UL (ref 0–1.22)
MONOCYTES NFR BLD: 0 % (ref 4–12)
NEUTROPHILS # BLD MANUAL: 5.29 THOUSAND/UL (ref 1.85–7.62)
NEUTS SEG NFR BLD AUTO: 15 % (ref 43–75)
PLATELET # BLD AUTO: 311 THOUSANDS/UL (ref 149–390)
PLATELET BLD QL SMEAR: ADEQUATE
PMV BLD AUTO: 12.9 FL (ref 8.9–12.7)
POTASSIUM SERPL-SCNC: 4 MMOL/L (ref 3.5–5.3)
RBC # BLD AUTO: 3.46 MILLION/UL (ref 3.81–5.12)
RBC MORPH BLD: PRESENT
SODIUM SERPL-SCNC: 138 MMOL/L (ref 135–147)
WBC # BLD AUTO: 35.24 THOUSAND/UL (ref 4.31–10.16)

## 2024-09-05 PROCEDURE — 80048 BASIC METABOLIC PNL TOTAL CA: CPT | Performed by: HOSPITALIST

## 2024-09-05 PROCEDURE — 82948 REAGENT STRIP/BLOOD GLUCOSE: CPT

## 2024-09-05 PROCEDURE — 99232 SBSQ HOSP IP/OBS MODERATE 35: CPT | Performed by: HOSPITALIST

## 2024-09-05 PROCEDURE — 97116 GAIT TRAINING THERAPY: CPT

## 2024-09-05 PROCEDURE — 97110 THERAPEUTIC EXERCISES: CPT

## 2024-09-05 PROCEDURE — 85027 COMPLETE CBC AUTOMATED: CPT | Performed by: HOSPITALIST

## 2024-09-05 PROCEDURE — 85007 BL SMEAR W/DIFF WBC COUNT: CPT | Performed by: HOSPITALIST

## 2024-09-05 RX ADMIN — HEPARIN SODIUM 5000 UNITS: 5000 INJECTION INTRAVENOUS; SUBCUTANEOUS at 21:51

## 2024-09-05 RX ADMIN — Medication 1000 MCG: at 08:51

## 2024-09-05 RX ADMIN — ATORVASTATIN CALCIUM 20 MG: 20 TABLET, FILM COATED ORAL at 08:51

## 2024-09-05 RX ADMIN — INSULIN LISPRO 8 UNITS: 100 INJECTION, SOLUTION INTRAVENOUS; SUBCUTANEOUS at 13:30

## 2024-09-05 RX ADMIN — HEPARIN SODIUM 5000 UNITS: 5000 INJECTION INTRAVENOUS; SUBCUTANEOUS at 13:30

## 2024-09-05 RX ADMIN — Medication 1000 UNITS: at 08:51

## 2024-09-05 RX ADMIN — PIPERACILLIN SODIUM AND TAZOBACTAM SODIUM 4.5 G: 36; 4.5 INJECTION, POWDER, LYOPHILIZED, FOR SOLUTION INTRAVENOUS at 21:52

## 2024-09-05 RX ADMIN — METOPROLOL TARTRATE 25 MG: 25 TABLET, FILM COATED ORAL at 21:53

## 2024-09-05 RX ADMIN — PIPERACILLIN SODIUM AND TAZOBACTAM SODIUM 4.5 G: 36; 4.5 INJECTION, POWDER, LYOPHILIZED, FOR SOLUTION INTRAVENOUS at 05:58

## 2024-09-05 RX ADMIN — HEPARIN SODIUM 5000 UNITS: 5000 INJECTION INTRAVENOUS; SUBCUTANEOUS at 05:57

## 2024-09-05 RX ADMIN — DEXAMETHASONE SODIUM PHOSPHATE 6 MG: 4 INJECTION INTRA-ARTICULAR; INTRALESIONAL; INTRAMUSCULAR; INTRAVENOUS; SOFT TISSUE at 02:45

## 2024-09-05 RX ADMIN — INSULIN DETEMIR 18 UNITS: 100 INJECTION, SOLUTION SUBCUTANEOUS at 21:51

## 2024-09-05 RX ADMIN — INSULIN LISPRO 8 UNITS: 100 INJECTION, SOLUTION INTRAVENOUS; SUBCUTANEOUS at 16:12

## 2024-09-05 RX ADMIN — REMDESIVIR 100 MG: 100 INJECTION, POWDER, LYOPHILIZED, FOR SOLUTION INTRAVENOUS at 02:45

## 2024-09-05 RX ADMIN — INSULIN LISPRO 8 UNITS: 100 INJECTION, SOLUTION INTRAVENOUS; SUBCUTANEOUS at 08:55

## 2024-09-05 RX ADMIN — PIPERACILLIN SODIUM AND TAZOBACTAM SODIUM 4.5 G: 36; 4.5 INJECTION, POWDER, LYOPHILIZED, FOR SOLUTION INTRAVENOUS at 13:30

## 2024-09-05 NOTE — PHYSICAL THERAPY NOTE
PHYSICAL THERAPY TREATMENT NOTE    Patient Name: Gia Vaughan  Today's Date: 9/5/2024 09/05/24 1158   PT Last Visit   PT Visit Date 09/05/24   Pain Assessment   Pain Assessment Tool 0-10   Pain Score No Pain   Restrictions/Precautions   Other Precautions Contact/isolation;Airborne/isolation;Chair Alarm;Bed Alarm;Fall Risk;O2;Multiple lines   General   Chart Reviewed Yes   Additional Pertinent History 3L oxygen via nasal cannula. resting pulse ox 97% and 73 BPM, active 93% and 82 BPM.   Family/Caregiver Present No   Cognition   Arousal/Participation Cooperative   Attention Attends with cues to redirect   Orientation Level Oriented to person;Other (Comment)  (pt was identified w/ full name, birth date)   Following Commands Follows one step commands with increased time or repetition   Subjective   Subjective pt seen supine in bed. agreed to PT session. denied pair or dizziness. pt states feeling better. redirection was provided for appropriate level of participation.   Bed Mobility   Supine to Sit 4  Minimal assistance   Additional items Assist x 1;HOB elevated;Bedrails;Increased time required;Verbal cues;LE management  (for bedrail use, task focus/safety)   Additional Comments pt was noted to be incontinent of urine and stool in bed. pt was dependent for personal care.   Transfers   Sit to Stand 4  Minimal assistance   Additional items Assist x 1;Increased time required;Verbal cues  (for hand placement, LE positioning)   Stand to Sit 4  Minimal assistance   Additional items Assist x 1;Increased time required;Verbal cues  (for body positioning, hand placement, safety)   Toilet transfer 4  Minimal assistance   Additional items Assist x 1;Commode;Increased time required;Verbal cues  (for body positioning, arm rest use, task focus/safety)   Ambulation/Elevation   Gait pattern Short stride;Excessively slow;Forward Flexion   Gait  Assistance 4  Minimal assist   Additional items Assist x 1;Tactile cues;Verbal cues  (for walker positioning, task focus/safety)   Assistive Device Rolling walker   Distance 3 feet. pt toileted. 5 feet x2. seated rest break.  (additional ambulation not possible due to fatigue)   Stair Management Assistance Not tested   Balance   Static Sitting Fair +   Static Standing Poor +  (w/ roller walker)   Ambulatory Poor +  (w/ roller walker)   Activity Tolerance   Activity Tolerance Patient limited by fatigue   Nurse Made Aware spoke to Leo Garcia CM   Equipment Use   Comments ankle pumps 30. short arc quads and heel slides 20 each.   Assessment   Problem List Decreased strength;Decreased cognition;Impaired judgement;Decreased safety awareness;Decreased mobility;Impaired balance;Decreased endurance   Assessment pt is noted to have decline in mobility status from previous session w/ decreased ambulation tolerance. pt continues to require min assist w/ all phases of mobility. pt remains at risk for falling. continued inpatient PT is needed to reduce fall risk and maximize independence.   Goals   Patient Goals I want to go for a drive   STG Expiration Date 09/13/24   Short Term Goal #1 Goals: Pt will: Perform rolling  and supine<>sit bed mobility tasks with modified I to prepare for transfers and reposition in bed. Perform transfers with modified I to promote proper hand placement and approach. Perform ambulation with LRAD for at least 50' with  CLOSE S  to increase Indep in prior living environment and promote proper use of assistive device. Perform 5xSTS w/ UE support and improve baseline score to demonstrate less risk for falls.   PT Treatment Day 3   Plan   Treatment/Interventions Functional transfer training;LE strengthening/ROM;Therapeutic exercise;Endurance training;Cognitive reorientation;Patient/family training;Equipment eval/education;Bed mobility;Gait training;Compensatory technique education   Progress Slow  progress, decreased activity tolerance   PT Frequency 3-5x/wk   Discharge Recommendation   Rehab Resource Intensity Level, PT II (Moderate Resource Intensity)   AM-PAC Basic Mobility Inpatient   Turning in Flat Bed Without Bedrails 3   Lying on Back to Sitting on Edge of Flat Bed Without Bedrails 2   Moving Bed to Chair 3   Standing Up From Chair Using Arms 3   Walk in Room 3   Climb 3-5 Stairs With Railing 1   Basic Mobility Inpatient Raw Score 15   Basic Mobility Standardized Score 36.97   [facility] Level Of Mobility   -HL Goal 4: Move to chair/commode   -[facility] Achieved 6: Walk 10 steps or more   End of Consult   Patient Position at End of Consult Bedside chair;Bed/Chair alarm activated;All needs within reach     The patient's AM-PAC Basic Mobility Inpatient Short Form Raw Score is 15. A Raw score of less than or equal to 16 suggests the patient may benefit from discharge to post-acute rehabilitation services. Please also refer to the recommendation of the Physical Therapist for safe discharge planning.    Skilled inpatient PT recommended while in hospital to progress pt toward treatment goals.    Juvenal Rogers, PT

## 2024-09-05 NOTE — PLAN OF CARE
Problem: PAIN - ADULT  Goal: Verbalizes/displays adequate comfort level or baseline comfort level  Description: Interventions:  - Encourage patient to monitor pain and request assistance  - Assess pain using appropriate pain scale  - Administer analgesics based on type and severity of pain and evaluate response  - Implement non-pharmacological measures as appropriate and evaluate response  - Consider cultural and social influences on pain and pain management  - Notify physician/advanced practitioner if interventions unsuccessful or patient reports new pain  Outcome: Progressing     Problem: INFECTION - ADULT  Goal: Absence or prevention of progression during hospitalization  Description: INTERVENTIONS:  - Assess and monitor for signs and symptoms of infection  - Monitor lab/diagnostic results  - Monitor all insertion sites, i.e. indwelling lines, tubes, and drains  - Monitor endotracheal if appropriate and nasal secretions for changes in amount and color  - [place] appropriate cooling/warming therapies per order  - Administer medications as ordered  - Instruct and encourage patient and family to use good hand hygiene technique  - Identify and instruct in appropriate isolation precautions for identified infection/condition  Outcome: Progressing  Goal: Absence of fever/infection during neutropenic period  Description: INTERVENTIONS:  - Monitor WBC    Outcome: Progressing     Problem: SAFETY ADULT  Goal: Patient will remain free of falls  Description: INTERVENTIONS:  - Educate patient/family on patient safety including physical limitations  - Instruct patient to call for assistance with activity   - Consult OT/PT to assist with strengthening/mobility   - Keep Call bell within reach  - Keep bed low and locked with side rails adjusted as appropriate  - Keep care items and personal belongings within reach  - Initiate and maintain comfort rounds  - Make Fall Risk Sign visible to staff  - Offer Toileting every  Hours,  in advance of need  - Initiate/Maintain alarm  - Obtain necessary fall risk management equipment:   - Apply yellow socks and bracelet for high fall risk patients  - Consider moving patient to room near nurses station  Outcome: Progressing  Goal: Maintain or return to baseline ADL function  Description: INTERVENTIONS:  -  Assess patient's ability to carry out ADLs; assess patient's baseline for ADL function and identify physical deficits which impact ability to perform ADLs (bathing, care of mouth/teeth, toileting, grooming, dressing, etc.)  - Assess/evaluate cause of self-care deficits   - Assess range of motion  - Assess patient's mobility; develop plan if impaired  - Assess patient's need for assistive devices and provide as appropriate  - Encourage maximum independence but intervene and supervise when necessary  - Involve family in performance of ADLs  - Assess for home care needs following discharge   - Consider OT consult to assist with ADL evaluation and planning for discharge  - Provide patient education as appropriate  Outcome: Progressing  Goal: Maintains/Returns to pre admission functional level  Description: INTERVENTIONS:  - Perform AM-PAC 6 Click Basic Mobility/ Daily Activity assessment daily.  - Set and communicate daily mobility goal to care team and patient/family/caregiver.   - Collaborate with rehabilitation services on mobility goals if consulted  - Perform Range of Motion  times a day.  - Reposition patient every hours.  - Dangle patient  times a day  - Stand patient  times a day  - Ambulate patient  times a day  - Out of bed to chair  times a day   - Out of bed for meals  times a day  - Out of bed for toileting  - Record patient progress and toleration of activity level   Outcome: Progressing     Problem: DISCHARGE PLANNING  Goal: Discharge to home or other facility with appropriate resources  Description: INTERVENTIONS:  - Identify barriers to discharge w/patient and caregiver  - Arrange for  needed discharge resources and transportation as appropriate  - Identify discharge learning needs (meds, wound care, etc.)  - Arrange for interpretive services to assist at discharge as needed  - Refer to Case Management Department for coordinating discharge planning if the patient needs post-hospital services based on physician/advanced practitioner order or complex needs related to functional status, cognitive ability, or social support system  Outcome: Progressing     Problem: Knowledge Deficit  Goal: Patient/family/caregiver demonstrates understanding of disease process, treatment plan, medications, and discharge instructions  Description: Complete learning assessment and assess knowledge base.  Interventions:  - Provide teaching at level of understanding  - Provide teaching via preferred learning methods  Outcome: Progressing     Problem: Prexisting or High Potential for Compromised Skin Integrity  Goal: Skin integrity is maintained or improved  Description: INTERVENTIONS:  - Identify patients at risk for skin breakdown  - Assess and monitor skin integrity  - Assess and monitor nutrition and hydration status  - Monitor labs   - Assess for incontinence   - Turn and reposition patient  - Assist with mobility/ambulation  - Relieve pressure over bony prominences  - Avoid friction and shearing  - Provide appropriate hygiene as needed including keeping skin clean and dry  - Evaluate need for skin moisturizer/barrier cream  - Collaborate with interdisciplinary team   - Patient/family teaching  - Consider wound care consult   Outcome: Progressing     Problem: Nutrition/Hydration-ADULT  Goal: Nutrient/Hydration intake appropriate for improving, restoring or maintaining nutritional needs  Description: Monitor and assess patient's nutrition/hydration status for malnutrition. Collaborate with interdisciplinary team and initiate plan and interventions as ordered.  Monitor patient's weight and dietary intake as ordered or  per policy. Utilize nutrition screening tool and intervene as necessary. Determine patient's food preferences and provide high-protein, high-caloric foods as appropriate.     INTERVENTIONS:  - Monitor oral intake, urinary output, labs, and treatment plans  - Assess nutrition and hydration status and recommend course of action  - Evaluate amount of meals eaten  - Assist patient with eating if necessary   - Allow adequate time for meals  - Recommend/ encourage appropriate diets, oral nutritional supplements, and vitamin/mineral supplements  - Order, calculate, and assess calorie counts as needed  - Recommend, monitor, and adjust tube feedings and TPN/PPN based on assessed needs  - Assess need for intravenous fluids  - Provide specific nutrition/hydration education as appropriate  - Include patient/family/caregiver in decisions related to nutrition  Outcome: Progressing

## 2024-09-05 NOTE — PLAN OF CARE
Problem: PHYSICAL THERAPY ADULT  Goal: Performs mobility at highest level of function for planned discharge setting.  See evaluation for individualized goals.  Description: Treatment/Interventions: Functional transfer training, LE strengthening/ROM, Therapeutic exercise, Endurance training, Cognitive reorientation, Patient/family training, Equipment eval/education, Bed mobility, Gait training, Spoke to case management, Spoke to nursing  Equipment Recommended: Walker       See flowsheet documentation for full assessment, interventions and recommendations.  Outcome: Not Progressing  Note: Prognosis: Fair  Problem List: Decreased strength, Decreased cognition, Impaired judgement, Decreased safety awareness, Decreased mobility, Impaired balance, Decreased endurance  Assessment: pt is noted to have decline in mobility status from previous session w/ decreased ambulation tolerance. pt continues to require min assist w/ all phases of mobility. pt remains at risk for falling. continued inpatient PT is needed to reduce fall risk and maximize independence.  Barriers to Discharge: Decreased caregiver support, Inaccessible home environment     Rehab Resource Intensity Level, PT: II (Moderate Resource Intensity)    See flowsheet documentation for full assessment.

## 2024-09-06 VITALS
DIASTOLIC BLOOD PRESSURE: 68 MMHG | HEART RATE: 59 BPM | TEMPERATURE: 97.6 F | WEIGHT: 151.01 LBS | RESPIRATION RATE: 16 BRPM | BODY MASS INDEX: 24.37 KG/M2 | OXYGEN SATURATION: 91 % | SYSTOLIC BLOOD PRESSURE: 142 MMHG

## 2024-09-06 LAB
ANION GAP SERPL CALCULATED.3IONS-SCNC: 7 MMOL/L (ref 4–13)
BASOPHILS # BLD AUTO: 0.12 THOUSANDS/ÂΜL (ref 0–0.1)
BASOPHILS NFR BLD AUTO: 0 % (ref 0–1)
BUN SERPL-MCNC: 28 MG/DL (ref 5–25)
CALCIUM SERPL-MCNC: 8 MG/DL (ref 8.4–10.2)
CHLORIDE SERPL-SCNC: 107 MMOL/L (ref 96–108)
CO2 SERPL-SCNC: 23 MMOL/L (ref 21–32)
CREAT SERPL-MCNC: 0.97 MG/DL (ref 0.6–1.3)
EOSINOPHIL # BLD AUTO: 0.07 THOUSAND/ÂΜL (ref 0–0.61)
EOSINOPHIL NFR BLD AUTO: 0 % (ref 0–6)
ERYTHROCYTE [DISTWIDTH] IN BLOOD BY AUTOMATED COUNT: 16 % (ref 11.6–15.1)
GFR SERPL CREATININE-BSD FRML MDRD: 53 ML/MIN/1.73SQ M
GLUCOSE SERPL-MCNC: 152 MG/DL (ref 65–140)
GLUCOSE SERPL-MCNC: 198 MG/DL (ref 65–140)
GLUCOSE SERPL-MCNC: 229 MG/DL (ref 65–140)
HCT VFR BLD AUTO: 36.3 % (ref 34.8–46.1)
HGB BLD-MCNC: 11.5 G/DL (ref 11.5–15.4)
IMM GRANULOCYTES # BLD AUTO: 0.17 THOUSAND/UL (ref 0–0.2)
IMM GRANULOCYTES NFR BLD AUTO: 0 % (ref 0–2)
LYMPHOCYTES # BLD AUTO: 36.4 THOUSANDS/ÂΜL (ref 0.6–4.47)
LYMPHOCYTES NFR BLD AUTO: 80 % (ref 14–44)
MCH RBC QN AUTO: 33.3 PG (ref 26.8–34.3)
MCHC RBC AUTO-ENTMCNC: 31.7 G/DL (ref 31.4–37.4)
MCV RBC AUTO: 105 FL (ref 82–98)
MONOCYTES # BLD AUTO: 1.79 THOUSAND/ÂΜL (ref 0.17–1.22)
MONOCYTES NFR BLD AUTO: 4 % (ref 4–12)
NEUTROPHILS # BLD AUTO: 7.61 THOUSANDS/ÂΜL (ref 1.85–7.62)
NEUTS SEG NFR BLD AUTO: 16 % (ref 43–75)
NRBC BLD AUTO-RTO: 0 /100 WBCS
PLATELET # BLD AUTO: 395 THOUSANDS/UL (ref 149–390)
PMV BLD AUTO: 12.2 FL (ref 8.9–12.7)
POTASSIUM SERPL-SCNC: 4 MMOL/L (ref 3.5–5.3)
RBC # BLD AUTO: 3.45 MILLION/UL (ref 3.81–5.12)
SODIUM SERPL-SCNC: 137 MMOL/L (ref 135–147)
WBC # BLD AUTO: 46.16 THOUSAND/UL (ref 4.31–10.16)

## 2024-09-06 PROCEDURE — 85025 COMPLETE CBC W/AUTO DIFF WBC: CPT | Performed by: HOSPITALIST

## 2024-09-06 PROCEDURE — 82948 REAGENT STRIP/BLOOD GLUCOSE: CPT

## 2024-09-06 PROCEDURE — 80048 BASIC METABOLIC PNL TOTAL CA: CPT | Performed by: HOSPITALIST

## 2024-09-06 PROCEDURE — 99239 HOSP IP/OBS DSCHRG MGMT >30: CPT | Performed by: HOSPITALIST

## 2024-09-06 RX ADMIN — LOSARTAN POTASSIUM 50 MG: 50 TABLET, FILM COATED ORAL at 08:20

## 2024-09-06 RX ADMIN — HEPARIN SODIUM 5000 UNITS: 5000 INJECTION INTRAVENOUS; SUBCUTANEOUS at 06:06

## 2024-09-06 RX ADMIN — INSULIN LISPRO 8 UNITS: 100 INJECTION, SOLUTION INTRAVENOUS; SUBCUTANEOUS at 11:50

## 2024-09-06 RX ADMIN — DEXAMETHASONE SODIUM PHOSPHATE 6 MG: 4 INJECTION INTRA-ARTICULAR; INTRALESIONAL; INTRAMUSCULAR; INTRAVENOUS; SOFT TISSUE at 02:48

## 2024-09-06 RX ADMIN — METOPROLOL TARTRATE 25 MG: 25 TABLET, FILM COATED ORAL at 08:20

## 2024-09-06 RX ADMIN — REMDESIVIR 100 MG: 100 INJECTION, POWDER, LYOPHILIZED, FOR SOLUTION INTRAVENOUS at 02:48

## 2024-09-06 RX ADMIN — INSULIN LISPRO 8 UNITS: 100 INJECTION, SOLUTION INTRAVENOUS; SUBCUTANEOUS at 08:06

## 2024-09-06 RX ADMIN — ATORVASTATIN CALCIUM 20 MG: 20 TABLET, FILM COATED ORAL at 08:20

## 2024-09-06 RX ADMIN — PIPERACILLIN SODIUM AND TAZOBACTAM SODIUM 4.5 G: 36; 4.5 INJECTION, POWDER, LYOPHILIZED, FOR SOLUTION INTRAVENOUS at 06:06

## 2024-09-06 RX ADMIN — Medication 1000 UNITS: at 08:20

## 2024-09-06 RX ADMIN — Medication 1000 MCG: at 08:20

## 2024-09-06 NOTE — ASSESSMENT & PLAN NOTE
Lab Results   Component Value Date    EGFR 53 09/06/2024    EGFR 50 09/05/2024    EGFR 48 09/04/2024    CREATININE 0.97 09/06/2024    CREATININE 1.03 09/05/2024    CREATININE 1.05 09/04/2024   Currently at baseline continue to monitor with daily BMP

## 2024-09-06 NOTE — ASSESSMENT & PLAN NOTE
UA with moderate leukocytes, positive nitrites.  Urine micro with innumerable bacteria and WBCs  Follow-up on urine cultures; E Coli; pansensitive   Completed 3d course of abx for uncomplicated UTI

## 2024-09-06 NOTE — ASSESSMENT & PLAN NOTE
Diagnosed COVID-positive last week at nursing facility found to have low O2 saturations in the 70s.  In EMS patient was started on BiPAP upon arrival patient was transitioned to nasal cannula with improvement in symptoms and is currently at her baseline mentation.  She reports difficulty breathing otherwise feels normal.  CXR with left-sided infiltrate.  COVID-+  Treat on mild COVID pathway  Plan  S/ p 5d of dexamethasone 6 mg IV daily   Completed course of IV remdesivir   Weaned back to room air

## 2024-09-06 NOTE — PROGRESS NOTES
[facility]  Progress Note  Name: Gia Vaughan I  MRN: [id number]  Unit/Bed#: S -01 I Date of Admission: 9/1/2024   Date of Service: 9/6/2024 I Hospital Day: 5    Assessment & Plan   * Sepsis ([facility])  Assessment & Plan  Patient meeting sepsis criteria as evidenced by leukocytosis, tachycardia, and tachypnea with source being UTI vs pneumonia vs COVID.  Leukocytosis appears chronic and improved since prior CBC likely from patient's underlying CLL.  Currently hemodynamically stable.  PCT elevated, lactate WNL  In ED patient started on IV Zosyn secondary to antibiotic allergies  Continue to trend CBC and monitor fever curve.    Community acquired pneumonia  Assessment & Plan  Patient with new oxygen requirements now on 3 L nasal cannula. Did require Bipap enroute to hospital. Pt reports improvement in symptoms  CXR showing left-sided infiltrates  In ED started on IV Zosyn  Procalcitonin elevated x2.     Plan  Continue with IV Zosyn for now  Follow-up on blood cultures  I-S ordered  Wean O2 as tolerated    UTI (urinary tract infection)  Assessment & Plan  UA with moderate leukocytes, positive nitrites.  Urine micro with innumerable bacteria and WBCs  Follow-up on urine cultures; E Coli; awaiting sensitivities   Continue IV Zosyn    Type 2 diabetes mellitus with stage 3 chronic kidney disease, unspecified whether long term insulin use, unspecified whether stage 3a or 3b CKD ([facility])  Assessment & Plan  Lab Results   Component Value Date    HGBA1C 9.5 (H) 09/01/2024       Recent Labs     09/05/24  1040 09/05/24  1620 09/05/24  2111 09/06/24  0703   POCGLU 202* 323* 333* 152*         Blood Sugar Average: Last 72 hrs:  (P) 232.5625  Update hemoglobin A1c  Continue 20 units Levemir at bedtime  Continue Humalog 5 units with meals and sliding scale insulin  Avoid hypoglycemia    CLL (chronic lymphocytic leukemia) ([facility])  Assessment & Plan  Follows with heme-onc as outpatient.  WBC appears improved  since prior  Continue to follow with heme    Diarrhea  Assessment & Plan  Reports ongoing diarrhea x 1 week no abdominal pain on exam  Denies any new dietary changes, med changes, or recent sick contacts.  Suspect in the setting of COVID   Monitor intake/output    COVID  Assessment & Plan  Diagnosed COVID-positive last week at nursing facility found to have low O2 saturations in the 70s.  In EMS patient was started on BiPAP upon arrival patient was transitioned to nasal cannula with improvement in symptoms and is currently at her baseline mentation.  She reports difficulty breathing otherwise feels normal.  CXR with left-sided infiltrate.  COVID-+  Treat on mild COVID pathway  Plan  Check troponin, CRP, BNP, CK, D-dimer  Cont  on dexamethasone 6 mg IV daily x 10 days  Treat with IV remdesivir 200 mg today, 100 mg x 4 days.  Continue I-S and wean oxygen as tolerated    Moderate dementia (HCC)  Assessment & Plan  Mentation appears to be at baseline  Continue to regulate sleep/wake cycles  Supportive care    CKD (chronic kidney disease) stage 3, GFR 30-59 ml/min (HCC)  Assessment & Plan  Lab Results   Component Value Date    EGFR 53 09/06/2024    EGFR 50 09/05/2024    EGFR 48 09/04/2024    CREATININE 0.97 09/06/2024    CREATININE 1.03 09/05/2024    CREATININE 1.05 09/04/2024   Currently at baseline continue to monitor with daily BMP    Chronic systolic heart failure (HCC)  Assessment & Plan  Wt Readings from Last 3 Encounters:   09/06/24 68.5 kg (151 lb 0.2 oz)   05/31/24 68 kg (150 lb)   04/04/24 68.4 kg (150 lb 12.8 oz)   Not in exacerbation currently   EF of 45% with reduced systolic function, G2 DD  Monitor daily weights.  Intake and output  Cardiac diabetic diet ordered.           VTE Pharmacologic Prophylaxis: VTE Score: 11 High Risk (Score >/= 5) - Pharmacological DVT Prophylaxis Ordered: enoxaparin (Lovenox). Sequential Compression Devices Ordered.    Mobility:   Basic Mobility Inpatient Raw Score: 15  JH-M  Goal: 4: Move to chair/commode  JH-HLM Achieved: 4: Move to chair/commode  JH-HLM Goal achieved. Continue to encourage appropriate mobility.    Patient Centered Rounds: I performed bedside rounds with nursing staff today.   Discussions with Specialists or Other Care Team Provider:     Education and Discussions with Family / Patient: Attempted to update  (nephew) via phone. Left voicemail.     Total Time Spent on Date of Encounter in care of patient: 30 mins. This time was spent on one or more of the following: performing physical exam; counseling and coordination of care; obtaining or reviewing history; documenting in the medical record; reviewing/ordering tests, medications or procedures; communicating with other healthcare professionals and discussing with patient's family/caregivers.    Current Length of Stay: 5 day(s)  Current Patient Status: Inpatient   Certification Statement: The patient will continue to require additional inpatient hospital stay due to COVID pneumonia.  Discharge Plan: Anticipate discharge tomorrow to rehab facility.    Code Status: Level 1 - Full Code    Subjective:     Pt has no new complaint. Denies SOB or cough.     Objective:     Vitals reviewed     Physical Exam:   Physical Exam  Vitals and nursing note reviewed.   Constitutional:       Appearance: Normal appearance.   HENT:      Head: Normocephalic and atraumatic.   Cardiovascular:      Rate and Rhythm: Normal rate and regular rhythm.      Heart sounds: No murmur heard.  Pulmonary:      Effort: Pulmonary effort is normal. No respiratory distress.      Breath sounds: Normal breath sounds. No wheezing.   Abdominal:      General: Abdomen is flat.      Palpations: Abdomen is soft.   Neurological:      Mental Status: She is alert.           Additional Data:     Labs:  Reviewed   Results from last 7 days   Lab Units 09/06/24  0453 09/02/24  0448 09/01/24  2225   SODIUM mmol/L 137   < > 133*   POTASSIUM mmol/L 4.0   < > 4.7    CHLORIDE mmol/L 107   < > 101   CO2 mmol/L 23   < > 21   BUN mg/dL 28*   < > 24   CREATININE mg/dL 0.97   < > 0.99   ANION GAP mmol/L 7   < > 11   CALCIUM mg/dL 8.0*   < > 8.7   ALBUMIN g/dL  --   --  3.3*   TOTAL BILIRUBIN mg/dL  --   --  0.83   ALK PHOS U/L  --   --  109*   ALT U/L  --   --  41   AST U/L  --   --  41*   GLUCOSE RANDOM mg/dL 198*   < > 155*    < > = values in this interval not displayed.     Results from last 7 days   Lab Units 09/01/24  2225   INR  1.08     Results from last 7 days   Lab Units 09/06/24  0703 09/05/24  2111 09/05/24  1620 09/05/24  1040 09/05/24  0713 09/04/24  2213 09/04/24  1618 09/04/24  1036 09/04/24  0739 09/03/24  2118 09/03/24  1706 09/03/24  1608   POC GLUCOSE mg/dl 152* 333* 323* 202* 172* 295* 229* 168* 127 304* 393* 389*     Results from last 7 days   Lab Units 09/01/24  2122   HEMOGLOBIN A1C % 9.5*     Results from last 7 days   Lab Units 09/02/24  0448 09/01/24  2225   LACTIC ACID mmol/L  --  1.8   PROCALCITONIN ng/ml 0.52* 0.46*       Lines/Drains:  Invasive Devices       Peripheral Intravenous Line  Duration             Peripheral IV 09/03/24 Dorsal (posterior);Left Forearm 2 days                    Imaging: No pertinent imaging reviewed.    Recent Cultures (last 7 days):   Results from last 7 days   Lab Units 09/02/24  0206 09/01/24  2225   BLOOD CULTURE   --  No Growth After 4 Days.  No Growth After 4 Days.   URINE CULTURE   --  >100,000 cfu/ml Escherichia coli*  20,000-29,000 cfu/ml   LEGIONELLA URINARY ANTIGEN  Negative  --        Last 24 Hours Medication List:   Current Facility-Administered Medications   Medication Dose Route Frequency Provider Last Rate    acetaminophen  650 mg Oral Q6H AURA Monzon PA-C      atorvastatin  20 mg Oral Daily BRINDA Noel      benzonatate  100 mg Oral TID PRN BRINDA Noel      cholecalciferol  1,000 Units Oral Daily BRINDA Noel      vitamin B-12  1,000  mcg Oral Daily BRINDA Noel      dexamethasone  6 mg Intravenous Q24H BRINDA Noel      heparin (porcine)  5,000 Units Subcutaneous Q8H [facility] BRINDA Noel      insulin detemir  18 Units Subcutaneous HS BRINDA Noel      insulin lispro  8 Units Subcutaneous TID With Meals Samuel Hill MD      losartan  50 mg Oral Daily BRINDA Noel      metoprolol tartrate  25 mg Oral Q12H [facility] BRINDA Noel      phenol  1 spray Mouth/Throat Q2H PRN BRINDA Orozco      piperacillin-tazobactam  4.5 g Intravenous Q8H BRINDA Noel 4.5 g (09/06/24 0606)        Today, Patient Was Seen By: Samuel Hill MD    **Please Note: This note may have been constructed using a voice recognition system.**

## 2024-09-06 NOTE — ASSESSMENT & PLAN NOTE
Wt Readings from Last 3 Encounters:   09/06/24 68.5 kg (151 lb 0.2 oz)   05/31/24 68 kg (150 lb)   04/04/24 68.4 kg (150 lb 12.8 oz)   Not in exacerbation currently   EF of 45% with reduced systolic function, G2 DD  Monitor daily weights.  Intake and output  Cardiac diabetic diet ordered.

## 2024-09-06 NOTE — CASE MANAGEMENT
Case Management Discharge Planning Note    Patient name Gia Vaughan  Location S /S -01 MRN [id number]  : 1940 Date 2024       Current Admission Date: 2024  Current Admission Diagnosis:Sepsis (HCC)   Patient Active Problem List    Diagnosis Date Noted Date Diagnosed    Diarrhea 2024     Sepsis (HCC) 2024     Community acquired pneumonia 2024     COVID 2024     Headache 2024     Hypertensive urgency 2024     Dizziness 2024     Suspected deep tissue injury of unknown depth 2022     Acute postoperative pain of right knee 2022     Encephalopathy 10/24/2022     Deep tissue injury 10/21/2022     UTI (urinary tract infection) 10/21/2022     Orthostasis 10/19/2022     H/O dizziness 10/19/2022     CLL (chronic lymphocytic leukemia) ([facility]) 10/13/2022     Intertrochanteric fracture of right femur ([facility]) 10/11/2022     Displaced fracture of middle phalanx of left ring finger, initial encounter for closed fracture 10/11/2022     Ambulatory dysfunction 2021     Moderate dementia ([facility]) 2021     Lymphadenopathy 2020     Elevated liver enzymes 2020     Fall 2020     CKD (chronic kidney disease) stage 3, GFR 30-59 ml/min ([facility]) 2020     HLD (hyperlipidemia) 2020     OA (osteoarthritis) 2020     Chronic systolic heart failure ([facility]) 2020     Type 2 diabetes mellitus with stage 3 chronic kidney disease, unspecified whether long term insulin use, unspecified whether stage 3a or 3b CKD ([facility])        LOS (days): 5  Geometric Mean LOS (GMLOS) (days): 5.1  Days to GMLOS:0.6     OBJECTIVE:  Risk of Unplanned Readmission Score: 20.61         Current admission status: Inpatient   Preferred Pharmacy:   UNKNOWN - FOLLOW UP PRIOR TO DISCHARGE TO E-PRESCRIBE  No address on file      [id number] - [place] - [address]  [address]  [place] [address]  Phone: [phone] Fax:  [phone]    [id number] [place] - [address]  [address] [place] [address]  Phone: [phone] Fax: [phone]    Primary Care Provider: Chayo Powell MD    Primary Insurance: MEDICARE  Secondary Insurance: [employer]    DISCHARGE DETAILS:    Discharge planning discussed with:: Patient, Charles-Pavithra ellis-RN & Gill-Diane III     Comments - Freedom of Choice: CM was received a call from [facility] who reported the facility was willing to accept the Pt back upon d/c with an admission for today. CM was in contact with Pt's nephew Charles who reported being agreeable to the Pt's d/c back to [facility] upon d/c. CM notified all the above mentioned individuals of the Pt's d/c back to [facility] today. Roundtrip referral made for a wheelchair van.       Contacts  Patient Contacts: Charles  Relationship to Patient:: Family  Contact Method: Phone  Phone Number: [phone]  Reason/Outcome: Discharge Planning      Other Referral/Resources/Interventions Provided:  Interventions: Facility Return  Referral Comments: Pt for d/c back to [facility] today.         Treatment Team Recommendation: Short Term Rehab  Discharge Destination Plan:: Facility Return  Transport at Discharge : Wheelchair van  Dispatcher Contacted: Yes    IMM Given (Date):: 09/06/24  IMM Given to:: Family  Family notified:: CM reviewed the Pt's Medicare Rights with her nephew Charles via phone    IMM reviewed with patient's caregiver nephew Charles, patient's caregiver nephew Charles agrees with discharge determination.         Accepting Facility Name, City & State : [facility]  Receiving Facility/Agency Phone Number: [phone]  Facility/Agency Fax Number: [phone]

## 2024-09-06 NOTE — ASSESSMENT & PLAN NOTE
Reports ongoing diarrhea x 1 week ; per family, has been a chronic issues, potentially worsened with COVID  Denies any new dietary changes, med changes, or recent sick contacts.  Suspect in the setting of COVID

## 2024-09-06 NOTE — ASSESSMENT & PLAN NOTE
Patient with new oxygen requirements now on 3 L nasal cannula. Did require Bipap enroute to hospital. Pt reports improvement in symptoms  CXR showing left-sided infiltrates  Procalcitonin elevated x2.  Received IV zosyn for dual coverage of UTI and possible concurrent pneumonia; completed 5 days  No abx on discharge

## 2024-09-06 NOTE — CASE MANAGEMENT
Case Management Progress Note    Patient name Gia Vaughan  Location S /S -01 MRN [id number]  : 1940 Date 2024       LOS (days): 5  Geometric Mean LOS (GMLOS) (days): 5.1  Days to GMLOS:0.6        OBJECTIVE:        Current admission status: Inpatient  Preferred Pharmacy:   UNKNOWN - FOLLOW UP PRIOR TO DISCHARGE TO E-PRESCRIBE  No address on file      [id number] - [place] - [address]  [address] [facility]  [place] [address]  Phone: [phone] Fax: [phone]    [id number] [place] - [address]  [facility]  [place] [address]  Phone: [phone] Fax: [phone]    Primary Care Provider: Chayo Powell MD    Primary Insurance: MEDICARE  Secondary Insurance: [employer]    PROGRESS NOTE:  CM called and left a message for the Pt's nephew Charles [phone] & [phone], requesting a call back to discuss the Pt's readiness for d/c and to review the current accepting list of SNF for his first choice. CM will continue to follow.

## 2024-09-06 NOTE — ASSESSMENT & PLAN NOTE
Patient meeting sepsis criteria as evidenced by leukocytosis, tachycardia, and tachypnea with source being UTI vs pneumonia vs COVID.  Leukocytosis appears chronic and improved since prior CBC likely from patient's underlying CLL.  Continue to trend CBC and monitor fever curve.  Sepsis physiology resolved

## 2024-09-06 NOTE — ASSESSMENT & PLAN NOTE
Lab Results   Component Value Date    HGBA1C 9.5 (H) 09/01/2024       Recent Labs     09/05/24  1040 09/05/24  1620 09/05/24  2111 09/06/24  0703   POCGLU 202* 323* 333* 152*         Blood Sugar Average: Last 72 hrs:  (P) 232.5625  Update hemoglobin A1c  Continue 20 units Levemir at bedtime  Continue Humalog 5 units with meals and sliding scale insulin  Avoid hypoglycemia

## 2024-09-06 NOTE — DISCHARGE SUMMARY
[facility]  Discharge- Gia Vaughan 1940, 83 y.o. female MRN: [id number]  Unit/Bed#: S -01 Encounter: [id number]  Primary Care Provider: Chayo Powell MD   Date and time admitted to hospital: 9/1/2024  8:57 PM    * Sepsis ([facility])  Assessment & Plan  Patient meeting sepsis criteria as evidenced by leukocytosis, tachycardia, and tachypnea with source being UTI vs pneumonia vs COVID.  Leukocytosis appears chronic and improved since prior CBC likely from patient's underlying CLL.  Continue to trend CBC and monitor fever curve.  Sepsis physiology resolved     Community acquired pneumonia  Assessment & Plan  Patient with new oxygen requirements now on 3 L nasal cannula. Did require Bipap enroute to hospital. Pt reports improvement in symptoms  CXR showing left-sided infiltrates  Procalcitonin elevated x2.  Received IV zosyn for dual coverage of UTI and possible concurrent pneumonia; completed 5 days  No abx on discharge     UTI (urinary tract infection)  Assessment & Plan  UA with moderate leukocytes, positive nitrites.  Urine micro with innumerable bacteria and WBCs  Follow-up on urine cultures; E Coli; pansensitive   Completed 3d course of abx for uncomplicated UTI     Type 2 diabetes mellitus with stage 3 chronic kidney disease, unspecified whether long term insulin use, unspecified whether stage 3a or 3b CKD ([facility])  Assessment & Plan  Lab Results   Component Value Date    HGBA1C 9.5 (H) 09/01/2024       Recent Labs     09/05/24  1620 09/05/24  2111 09/06/24  0703 09/06/24  1100   POCGLU 323* 333* 152* 229*         Blood Sugar Average: Last 72 hrs:  (P) 232.5510524607443644  BGs above goal while on dexamethasone, but can resume previous dosing at home.   Continue home regimen     CLL (chronic lymphocytic leukemia) ([facility])  Assessment & Plan  Follows with heme-onc as outpatient.  WBC appears improved since prior  Continue to follow with heme    Diarrhea  Assessment & Plan  Reports  ongoing diarrhea x 1 week ; per family, has been a chronic issues, potentially worsened with COVID  Denies any new dietary changes, med changes, or recent sick contacts.  Suspect in the setting of COVID       COVID  Assessment & Plan  Diagnosed COVID-positive last week at nursing facility found to have low O2 saturations in the 70s.  In EMS patient was started on BiPAP upon arrival patient was transitioned to nasal cannula with improvement in symptoms and is currently at her baseline mentation.  She reports difficulty breathing otherwise feels normal.  CXR with left-sided infiltrate.  COVID-+  Treat on mild COVID pathway  Plan  S/ p 5d of dexamethasone 6 mg IV daily   Completed course of IV remdesivir   Weaned back to room air     Moderate dementia (HCC)  Assessment & Plan  Mentation appears to be at baseline  Continue to regulate sleep/wake cycles  Supportive care    CKD (chronic kidney disease) stage 3, GFR 30-59 ml/min ([facility])  Assessment & Plan  Lab Results   Component Value Date    EGFR 53 09/06/2024    EGFR 50 09/05/2024    EGFR 48 09/04/2024    CREATININE 0.97 09/06/2024    CREATININE 1.03 09/05/2024    CREATININE 1.05 09/04/2024   Currently at baseline continue to monitor with daily BMP    Chronic systolic heart failure (HCC)  Assessment & Plan  Wt Readings from Last 3 Encounters:   09/06/24 68.5 kg (151 lb 0.2 oz)   05/31/24 68 kg (150 lb)   04/04/24 68.4 kg (150 lb 12.8 oz)   Not in exacerbation currently   EF of 45% with reduced systolic function, G2 DD  Monitor daily weights.  Intake and output  Cardiac diabetic diet ordered.        Medical Problems       Resolved Problems  Date Reviewed: 9/2/2024   None       Discharging Physician / Practitioner: Samuel Hill MD  PCP: Chayo Powell MD  Admission Date:   Admission Orders (From admission, onward)       Ordered        09/01/24 2093  INPATIENT ADMISSION  Once                          Discharge Date: 09/06/24    Consultations During Hospital Stay:  PT/OT      Procedures Performed:   None     Significant Findings / Test Results:   COVID (+)  Ucx: E. Coli (pansensitive)  CXR: left mid and left lower lobe infiltrates    Incidental Findings:   None        Test Results Pending at Discharge (will require follow up):   None      Outpatient Tests Requested:  None     Complications:  none     Reason for Admission: COVID with hypoxia     Hospital Course:   Gia Vaughan is a 83 y.o. female patient history of dementia, type 2 diabetes, CKD and CLL who originally presented to the hospital on 9/1/2024 due to acute hypoxic respiratory failure, found to have COVID-pneumonia.  Met sepsis criteria on admission and was started on COVID pathway with remdesivir, dexamethasone and supportive care. CXR showed infiltrates and procalcitonin was elevated so antibiotics were started for this as well as UA suggestive of UTI. Patient completed 5 days of IV Zosyn. She was weaned from supplemental O2 and a gradual improvement in weakness, oral intake. Do not feel further abx warranted d/t her clinical improvement.  She was seen and evaluated by PT/OT and patient deemed appropriate for return to care at previous living facility.    Please see above list of diagnoses and related plan for additional information.     Condition at Discharge: fair    Discharge Day Visit / Exam:   Subjective: Feels well today.  No shortness of breath or cough.  Appetite is stable.  Vitals: Blood Pressure: 142/68 (09/06/24 0705)  Pulse: 59 (09/06/24 0705)  Temperature: 97.6 °F (36.4 °C) (09/06/24 0705)  Temp Source: Oral (09/05/24 0711)  Respirations: 16 (09/06/24 0705)  Weight - Scale: 68.5 kg (151 lb 0.2 oz) (09/06/24 0600)  SpO2: 91 % (09/06/24 0705)  Exam:   Physical Exam  Vitals reviewed.   Constitutional:       General: She is not in acute distress.     Appearance: Normal appearance. She is not toxic-appearing.   HENT:      Head: Normocephalic and atraumatic.   Cardiovascular:      Rate and Rhythm: Normal rate and  regular rhythm.      Heart sounds: No murmur heard.  Pulmonary:      Effort: Pulmonary effort is normal. No respiratory distress.      Breath sounds: Normal breath sounds. No wheezing or rhonchi.   Abdominal:      General: Abdomen is flat. There is no distension.      Tenderness: There is no abdominal tenderness.      Hernia: No hernia is present.   Neurological:      General: No focal deficit present.      Mental Status: She is alert. Mental status is at baseline.          Discharge instructions/Information to patient and family:   See after visit summary for information provided to patient and family.      Provisions for Follow-Up Care:  See after visit summary for information related to follow-up care and any pertinent home health orders.      Mobility at time of Discharge:   Basic Mobility Inpatient Raw Score: 17  JH-HLM Goal: 5: Stand one or more mins  JH-HLM Achieved: 2: Bed activities/Dependent transfer  HLM Goal NOT achieved. Continue to encourage mobility in post discharge setting.     Disposition:   Assisted Living Facility at [facility]    Planned Readmission: none      Discharge Statement:  I spent 35 minutes discharging the patient. This time was spent on the day of discharge. I had direct contact with the patient on the day of discharge. Greater than 50% of the total time was spent examining patient, answering all patient questions, arranging and discussing plan of care with patient as well as directly providing post-discharge instructions.  Additional time then spent on discharge activities.    Discharge Medications:  See after visit summary for reconciled discharge medications provided to patient and/or family.      **Please Note: This note may have been constructed using a voice recognition system**

## 2024-09-06 NOTE — ASSESSMENT & PLAN NOTE
Lab Results   Component Value Date    HGBA1C 9.5 (H) 09/01/2024       Recent Labs     09/05/24  1620 09/05/24  2111 09/06/24  0703 09/06/24  1100   POCGLU 323* 333* 152* 229*         Blood Sugar Average: Last 72 hrs:  (P) 232.2693185303765279  BGs above goal while on dexamethasone, but can resume previous dosing at home.   Continue home regimen

## 2024-09-07 LAB
BACTERIA BLD CULT: NORMAL
BACTERIA BLD CULT: NORMAL

## 2024-09-23 ENCOUNTER — TELEPHONE (OUTPATIENT)
Age: 84
End: 2024-09-23

## 2024-09-23 NOTE — TELEPHONE ENCOUNTER
[facility] called with a medication question from a script from Dr Pa on 8/15/24 while seeing the pt at [facility]. They will call [place] as the pt is not seen via [facility]. No further action needed.

## 2024-10-01 ENCOUNTER — TELEPHONE (OUTPATIENT)
Dept: HEMATOLOGY ONCOLOGY | Facility: CLINIC | Age: 84
End: 2024-10-01

## 2024-10-01 ENCOUNTER — TELEMEDICINE (OUTPATIENT)
Dept: HEMATOLOGY ONCOLOGY | Facility: CLINIC | Age: 84
End: 2024-10-01

## 2024-10-01 DIAGNOSIS — C91.10 CLL (CHRONIC LYMPHOCYTIC LEUKEMIA) (HCC): Primary | ICD-10-CM

## 2024-10-01 PROBLEM — J18.9 COMMUNITY ACQUIRED PNEUMONIA: Status: RESOLVED | Noted: 2024-09-01 | Resolved: 2024-10-01

## 2024-10-01 PROBLEM — N39.0 UTI (URINARY TRACT INFECTION): Status: RESOLVED | Noted: 2022-10-21 | Resolved: 2024-10-01

## 2024-10-01 PROCEDURE — NA001 NO CHARGE AUDIO ONLY: Performed by: PHYSICIAN ASSISTANT

## 2024-10-01 NOTE — PROGRESS NOTES
Video visit emptied.  Patient did not connect.    Called her POA, her nephew Charles.  Reports that she is doing well.  She is at [facility].  She was here prior to her September 2024 admission for this secondary to UTI versus pneumonia versus COVID.    Review of her CBCs since November 2020    This time, continue with observation.  Recheck CBCD in 6 months.

## 2024-10-01 NOTE — TELEPHONE ENCOUNTER
Spoke to patient's nephew who is POA and he did not schedule a f/u visit with Christine Leija at this time as patient is in a nursing home and has her own set of doctors there. He will call back to schedule if necessary.

## 2024-10-02 PROBLEM — A41.9 SEPSIS (HCC): Status: RESOLVED | Noted: 2024-09-01 | Resolved: 2024-10-02

## 2024-10-14 ENCOUNTER — APPOINTMENT (EMERGENCY)
Dept: CT IMAGING | Facility: HOSPITAL | Age: 84
DRG: 391 | End: 2024-10-14
Payer: MEDICARE

## 2024-10-14 ENCOUNTER — APPOINTMENT (EMERGENCY)
Dept: RADIOLOGY | Facility: HOSPITAL | Age: 84
DRG: 391 | End: 2024-10-14
Payer: MEDICARE

## 2024-10-14 ENCOUNTER — HOSPITAL ENCOUNTER (INPATIENT)
Facility: HOSPITAL | Age: 84
LOS: 2 days | Discharge: NURSING, REHAB OR LONG-TERM CARE | DRG: 391 | End: 2024-10-17
Attending: EMERGENCY MEDICINE | Admitting: STUDENT IN AN ORGANIZED HEALTH CARE EDUCATION/TRAINING PROGRAM
Payer: MEDICARE

## 2024-10-14 DIAGNOSIS — I16.0 HYPERTENSIVE URGENCY: ICD-10-CM

## 2024-10-14 DIAGNOSIS — K57.92 ACUTE DIVERTICULITIS: ICD-10-CM

## 2024-10-14 DIAGNOSIS — R42 DIZZINESS: ICD-10-CM

## 2024-10-14 DIAGNOSIS — N39.0 UTI (URINARY TRACT INFECTION): ICD-10-CM

## 2024-10-14 DIAGNOSIS — C91.10 CLL (CHRONIC LYMPHOCYTIC LEUKEMIA) (HCC): ICD-10-CM

## 2024-10-14 DIAGNOSIS — R41.82 AMS (ALTERED MENTAL STATUS): ICD-10-CM

## 2024-10-14 DIAGNOSIS — K57.92 DIVERTICULITIS: Primary | ICD-10-CM

## 2024-10-14 LAB
ALBUMIN SERPL BCG-MCNC: 3.7 G/DL (ref 3.5–5)
ALP SERPL-CCNC: 100 U/L (ref 34–104)
ALT SERPL W P-5'-P-CCNC: 16 U/L (ref 7–52)
AMMONIA PLAS-SCNC: 21 UMOL/L (ref 18–72)
ANION GAP SERPL CALCULATED.3IONS-SCNC: 7 MMOL/L (ref 4–13)
APTT PPP: 33 SECONDS (ref 23–34)
AST SERPL W P-5'-P-CCNC: 14 U/L (ref 13–39)
B-OH-BUTYR SERPL-MCNC: 0.07 MMOL/L (ref 0.02–0.27)
BASE EX.OXY STD BLDV CALC-SCNC: 65.7 % (ref 60–80)
BASE EXCESS BLDV CALC-SCNC: 0.3 MMOL/L
BASOPHILS # BLD MANUAL: 0 THOUSAND/UL (ref 0–0.1)
BASOPHILS NFR MAR MANUAL: 0 % (ref 0–1)
BILIRUB SERPL-MCNC: 0.83 MG/DL (ref 0.2–1)
BILIRUB UR QL STRIP: NEGATIVE
BUN SERPL-MCNC: 21 MG/DL (ref 5–25)
CALCIUM SERPL-MCNC: 9.3 MG/DL (ref 8.4–10.2)
CARDIAC TROPONIN I PNL SERPL HS: 35 NG/L
CHLORIDE SERPL-SCNC: 99 MMOL/L (ref 96–108)
CLARITY UR: CLEAR
CO2 SERPL-SCNC: 25 MMOL/L (ref 21–32)
COLOR UR: ABNORMAL
CREAT SERPL-MCNC: 1.12 MG/DL (ref 0.6–1.3)
EOSINOPHIL # BLD MANUAL: 0 THOUSAND/UL (ref 0–0.4)
EOSINOPHIL NFR BLD MANUAL: 0 % (ref 0–6)
ERYTHROCYTE [DISTWIDTH] IN BLOOD BY AUTOMATED COUNT: 16.3 % (ref 11.6–15.1)
GFR SERPL CREATININE-BSD FRML MDRD: 45 ML/MIN/1.73SQ M
GLUCOSE SERPL-MCNC: 328 MG/DL (ref 65–140)
GLUCOSE SERPL-MCNC: 395 MG/DL (ref 65–140)
GLUCOSE UR STRIP-MCNC: ABNORMAL MG/DL
HCO3 BLDV-SCNC: 25.4 MMOL/L (ref 24–30)
HCT VFR BLD AUTO: 40.6 % (ref 34.8–46.1)
HGB BLD-MCNC: 13.1 G/DL (ref 11.5–15.4)
HGB UR QL STRIP.AUTO: ABNORMAL
INR PPP: 1.01 (ref 0.85–1.19)
KETONES UR STRIP-MCNC: NEGATIVE MG/DL
LACTATE SERPL-SCNC: 1.2 MMOL/L (ref 0.5–2)
LEUKOCYTE ESTERASE UR QL STRIP: ABNORMAL
LG PLATELETS BLD QL SMEAR: PRESENT
LIPASE SERPL-CCNC: 7 U/L (ref 11–82)
LYMPHOCYTES # BLD AUTO: 34.54 THOUSAND/UL (ref 0.6–4.47)
LYMPHOCYTES # BLD AUTO: 82 % (ref 14–44)
MACROCYTES BLD QL AUTO: PRESENT
MCH RBC QN AUTO: 33.2 PG (ref 26.8–34.3)
MCHC RBC AUTO-ENTMCNC: 32.3 G/DL (ref 31.4–37.4)
MCV RBC AUTO: 103 FL (ref 82–98)
MONOCYTES # BLD AUTO: 0.39 THOUSAND/UL (ref 0–1.22)
MONOCYTES NFR BLD: 1 % (ref 4–12)
NEUTROPHILS # BLD MANUAL: 3.88 THOUSAND/UL (ref 1.85–7.62)
NEUTS SEG NFR BLD AUTO: 10 % (ref 43–75)
NITRITE UR QL STRIP: NEGATIVE
O2 CT BLDV-SCNC: 12.9 ML/DL
PCO2 BLDV: 42.8 MM HG (ref 42–50)
PH BLDV: 7.39 [PH] (ref 7.3–7.4)
PH UR STRIP.AUTO: 6 [PH]
PLATELET # BLD AUTO: 156 THOUSANDS/UL (ref 149–390)
PLATELET BLD QL SMEAR: ADEQUATE
PMV BLD AUTO: 13.2 FL (ref 8.9–12.7)
PO2 BLDV: 36.9 MM HG (ref 35–45)
POLYCHROMASIA BLD QL SMEAR: PRESENT
POTASSIUM SERPL-SCNC: 4.5 MMOL/L (ref 3.5–5.3)
PROT SERPL-MCNC: 7 G/DL (ref 6.4–8.4)
PROT UR STRIP-MCNC: ABNORMAL MG/DL
PROTHROMBIN TIME: 14 SECONDS (ref 12.3–15)
RBC # BLD AUTO: 3.95 MILLION/UL (ref 3.81–5.12)
RBC MORPH BLD: PRESENT
SODIUM SERPL-SCNC: 131 MMOL/L (ref 135–147)
SP GR UR STRIP.AUTO: 1.02 (ref 1–1.03)
TSH SERPL DL<=0.05 MIU/L-ACNC: 0.74 UIU/ML (ref 0.45–4.5)
UROBILINOGEN UR STRIP-ACNC: <2 MG/DL
VARIANT LYMPHS # BLD AUTO: 7 %
WBC # BLD AUTO: 38.81 THOUSAND/UL (ref 4.31–10.16)

## 2024-10-14 PROCEDURE — 84484 ASSAY OF TROPONIN QUANT: CPT | Performed by: EMERGENCY MEDICINE

## 2024-10-14 PROCEDURE — 93005 ELECTROCARDIOGRAM TRACING: CPT

## 2024-10-14 PROCEDURE — 83605 ASSAY OF LACTIC ACID: CPT | Performed by: EMERGENCY MEDICINE

## 2024-10-14 PROCEDURE — 80053 COMPREHEN METABOLIC PANEL: CPT | Performed by: EMERGENCY MEDICINE

## 2024-10-14 PROCEDURE — 99285 EMERGENCY DEPT VISIT HI MDM: CPT

## 2024-10-14 PROCEDURE — 81001 URINALYSIS AUTO W/SCOPE: CPT

## 2024-10-14 PROCEDURE — 70450 CT HEAD/BRAIN W/O DYE: CPT

## 2024-10-14 PROCEDURE — 96374 THER/PROPH/DIAG INJ IV PUSH: CPT

## 2024-10-14 PROCEDURE — 82948 REAGENT STRIP/BLOOD GLUCOSE: CPT

## 2024-10-14 PROCEDURE — 99285 EMERGENCY DEPT VISIT HI MDM: CPT | Performed by: EMERGENCY MEDICINE

## 2024-10-14 PROCEDURE — 85007 BL SMEAR W/DIFF WBC COUNT: CPT | Performed by: EMERGENCY MEDICINE

## 2024-10-14 PROCEDURE — 96361 HYDRATE IV INFUSION ADD-ON: CPT

## 2024-10-14 PROCEDURE — 85610 PROTHROMBIN TIME: CPT | Performed by: EMERGENCY MEDICINE

## 2024-10-14 PROCEDURE — 85027 COMPLETE CBC AUTOMATED: CPT | Performed by: EMERGENCY MEDICINE

## 2024-10-14 PROCEDURE — 87086 URINE CULTURE/COLONY COUNT: CPT

## 2024-10-14 PROCEDURE — 87040 BLOOD CULTURE FOR BACTERIA: CPT

## 2024-10-14 PROCEDURE — 82140 ASSAY OF AMMONIA: CPT

## 2024-10-14 PROCEDURE — 85730 THROMBOPLASTIN TIME PARTIAL: CPT | Performed by: EMERGENCY MEDICINE

## 2024-10-14 PROCEDURE — 84443 ASSAY THYROID STIM HORMONE: CPT | Performed by: EMERGENCY MEDICINE

## 2024-10-14 PROCEDURE — 36415 COLL VENOUS BLD VENIPUNCTURE: CPT

## 2024-10-14 PROCEDURE — 82010 KETONE BODYS QUAN: CPT

## 2024-10-14 PROCEDURE — 83690 ASSAY OF LIPASE: CPT | Performed by: EMERGENCY MEDICINE

## 2024-10-14 PROCEDURE — 71045 X-RAY EXAM CHEST 1 VIEW: CPT

## 2024-10-14 PROCEDURE — 82805 BLOOD GASES W/O2 SATURATION: CPT

## 2024-10-14 PROCEDURE — 74176 CT ABD & PELVIS W/O CONTRAST: CPT

## 2024-10-14 RX ORDER — SODIUM CHLORIDE 9 MG/ML
125 INJECTION, SOLUTION INTRAVENOUS CONTINUOUS
Status: DISCONTINUED | OUTPATIENT
Start: 2024-10-14 | End: 2024-10-15

## 2024-10-14 RX ADMIN — SODIUM CHLORIDE 125 ML/HR: 0.9 INJECTION, SOLUTION INTRAVENOUS at 23:56

## 2024-10-14 RX ADMIN — SODIUM CHLORIDE 250 ML: 0.9 INJECTION, SOLUTION INTRAVENOUS at 23:06

## 2024-10-14 RX ADMIN — INSULIN HUMAN 4 UNITS: 100 INJECTION, SOLUTION PARENTERAL at 23:56

## 2024-10-15 ENCOUNTER — APPOINTMENT (INPATIENT)
Dept: RADIOLOGY | Facility: HOSPITAL | Age: 84
DRG: 391 | End: 2024-10-15
Payer: MEDICARE

## 2024-10-15 PROBLEM — R82.90 ABNORMAL URINALYSIS: Status: ACTIVE | Noted: 2024-10-15

## 2024-10-15 PROBLEM — G93.40 ACUTE ENCEPHALOPATHY: Status: ACTIVE | Noted: 2024-10-15

## 2024-10-15 PROBLEM — G93.41 METABOLIC ENCEPHALOPATHY: Status: ACTIVE | Noted: 2024-10-15

## 2024-10-15 PROBLEM — K57.92 ACUTE DIVERTICULITIS: Status: ACTIVE | Noted: 2024-10-15

## 2024-10-15 LAB
2HR DELTA HS TROPONIN: -1 NG/L
ANION GAP SERPL CALCULATED.3IONS-SCNC: 5 MMOL/L (ref 4–13)
ATRIAL RATE: 60 BPM
BACTERIA UR QL AUTO: ABNORMAL /HPF
BUN SERPL-MCNC: 18 MG/DL (ref 5–25)
CALCIUM SERPL-MCNC: 8.9 MG/DL (ref 8.4–10.2)
CARDIAC TROPONIN I PNL SERPL HS: 34 NG/L
CHLORIDE SERPL-SCNC: 107 MMOL/L (ref 96–108)
CO2 SERPL-SCNC: 24 MMOL/L (ref 21–32)
CREAT SERPL-MCNC: 0.9 MG/DL (ref 0.6–1.3)
ERYTHROCYTE [DISTWIDTH] IN BLOOD BY AUTOMATED COUNT: 16.2 % (ref 11.6–15.1)
GFR SERPL CREATININE-BSD FRML MDRD: 58 ML/MIN/1.73SQ M
GLUCOSE P FAST SERPL-MCNC: 141 MG/DL (ref 65–99)
GLUCOSE SERPL-MCNC: 141 MG/DL (ref 65–140)
GLUCOSE SERPL-MCNC: 154 MG/DL (ref 65–140)
GLUCOSE SERPL-MCNC: 305 MG/DL (ref 65–140)
GLUCOSE SERPL-MCNC: 57 MG/DL (ref 65–140)
GLUCOSE SERPL-MCNC: 85 MG/DL (ref 65–140)
GLUCOSE SERPL-MCNC: 88 MG/DL (ref 65–140)
GLUCOSE SERPL-MCNC: 96 MG/DL (ref 65–140)
HCT VFR BLD AUTO: 35.2 % (ref 34.8–46.1)
HGB BLD-MCNC: 11.3 G/DL (ref 11.5–15.4)
HYALINE CASTS #/AREA URNS LPF: ABNORMAL /LPF
MAGNESIUM SERPL-MCNC: 1.9 MG/DL (ref 1.9–2.7)
MCH RBC QN AUTO: 32.8 PG (ref 26.8–34.3)
MCHC RBC AUTO-ENTMCNC: 32.1 G/DL (ref 31.4–37.4)
MCV RBC AUTO: 102 FL (ref 82–98)
NON-SQ EPI CELLS URNS QL MICRO: ABNORMAL /HPF
P AXIS: 74 DEGREES
PLATELET # BLD AUTO: 131 THOUSANDS/UL (ref 149–390)
PMV BLD AUTO: 12.2 FL (ref 8.9–12.7)
POTASSIUM SERPL-SCNC: 4.2 MMOL/L (ref 3.5–5.3)
PR INTERVAL: 214 MS
QRS AXIS: -16 DEGREES
QRSD INTERVAL: 80 MS
QT INTERVAL: 440 MS
QTC INTERVAL: 440 MS
RBC # BLD AUTO: 3.45 MILLION/UL (ref 3.81–5.12)
RBC #/AREA URNS AUTO: ABNORMAL /HPF
SODIUM SERPL-SCNC: 136 MMOL/L (ref 135–147)
T WAVE AXIS: 118 DEGREES
VENTRICULAR RATE: 60 BPM
WBC # BLD AUTO: 45.05 THOUSAND/UL (ref 4.31–10.16)
WBC #/AREA URNS AUTO: ABNORMAL /HPF

## 2024-10-15 PROCEDURE — 99222 1ST HOSP IP/OBS MODERATE 55: CPT | Performed by: PHYSICIAN ASSISTANT

## 2024-10-15 PROCEDURE — 80048 BASIC METABOLIC PNL TOTAL CA: CPT | Performed by: PHYSICIAN ASSISTANT

## 2024-10-15 PROCEDURE — 71045 X-RAY EXAM CHEST 1 VIEW: CPT

## 2024-10-15 PROCEDURE — 82948 REAGENT STRIP/BLOOD GLUCOSE: CPT

## 2024-10-15 PROCEDURE — 0241U HB NFCT DS VIR RESP RNA 4 TRGT: CPT | Performed by: NURSE PRACTITIONER

## 2024-10-15 PROCEDURE — 87081 CULTURE SCREEN ONLY: CPT | Performed by: PHYSICIAN ASSISTANT

## 2024-10-15 PROCEDURE — 93010 ELECTROCARDIOGRAM REPORT: CPT | Performed by: INTERNAL MEDICINE

## 2024-10-15 PROCEDURE — 99232 SBSQ HOSP IP/OBS MODERATE 35: CPT | Performed by: STUDENT IN AN ORGANIZED HEALTH CARE EDUCATION/TRAINING PROGRAM

## 2024-10-15 PROCEDURE — 83735 ASSAY OF MAGNESIUM: CPT | Performed by: PHYSICIAN ASSISTANT

## 2024-10-15 PROCEDURE — 36415 COLL VENOUS BLD VENIPUNCTURE: CPT | Performed by: PHYSICIAN ASSISTANT

## 2024-10-15 PROCEDURE — 85027 COMPLETE CBC AUTOMATED: CPT | Performed by: PHYSICIAN ASSISTANT

## 2024-10-15 RX ORDER — ATORVASTATIN CALCIUM 20 MG/1
20 TABLET, FILM COATED ORAL DAILY
Status: DISCONTINUED | OUTPATIENT
Start: 2024-10-15 | End: 2024-10-17 | Stop reason: HOSPADM

## 2024-10-15 RX ORDER — LOSARTAN POTASSIUM 50 MG/1
50 TABLET ORAL DAILY
Status: DISCONTINUED | OUTPATIENT
Start: 2024-10-15 | End: 2024-10-16

## 2024-10-15 RX ORDER — ACETAMINOPHEN 325 MG/1
650 TABLET ORAL EVERY 6 HOURS PRN
Status: DISCONTINUED | OUTPATIENT
Start: 2024-10-15 | End: 2024-10-17 | Stop reason: HOSPADM

## 2024-10-15 RX ORDER — INSULIN GLARGINE 100 [IU]/ML
20 INJECTION, SOLUTION SUBCUTANEOUS
Status: DISCONTINUED | OUTPATIENT
Start: 2024-10-15 | End: 2024-10-17 | Stop reason: HOSPADM

## 2024-10-15 RX ORDER — INSULIN LISPRO 100 [IU]/ML
1-5 INJECTION, SOLUTION INTRAVENOUS; SUBCUTANEOUS
Status: DISCONTINUED | OUTPATIENT
Start: 2024-10-15 | End: 2024-10-17 | Stop reason: HOSPADM

## 2024-10-15 RX ORDER — METOPROLOL TARTRATE 25 MG/1
25 TABLET, FILM COATED ORAL EVERY 12 HOURS SCHEDULED
Status: DISCONTINUED | OUTPATIENT
Start: 2024-10-15 | End: 2024-10-17 | Stop reason: HOSPADM

## 2024-10-15 RX ORDER — INSULIN LISPRO 100 [IU]/ML
5 INJECTION, SOLUTION INTRAVENOUS; SUBCUTANEOUS
Status: DISCONTINUED | OUTPATIENT
Start: 2024-10-15 | End: 2024-10-15

## 2024-10-15 RX ORDER — ENOXAPARIN SODIUM 100 MG/ML
40 INJECTION SUBCUTANEOUS DAILY
Status: DISCONTINUED | OUTPATIENT
Start: 2024-10-15 | End: 2024-10-17 | Stop reason: HOSPADM

## 2024-10-15 RX ORDER — SODIUM CHLORIDE 9 MG/ML
50 INJECTION, SOLUTION INTRAVENOUS CONTINUOUS
Status: DISCONTINUED | OUTPATIENT
Start: 2024-10-15 | End: 2024-10-15

## 2024-10-15 RX ORDER — DEXTROSE MONOHYDRATE 25 G/50ML
25 INJECTION, SOLUTION INTRAVENOUS ONCE
Status: COMPLETED | OUTPATIENT
Start: 2024-10-15 | End: 2024-10-15

## 2024-10-15 RX ADMIN — PIPERACILLIN SODIUM AND TAZOBACTAM SODIUM 4.5 G: 36; 4.5 INJECTION, POWDER, LYOPHILIZED, FOR SOLUTION INTRAVENOUS at 22:12

## 2024-10-15 RX ADMIN — INSULIN GLARGINE 20 UNITS: 100 INJECTION, SOLUTION SUBCUTANEOUS at 22:12

## 2024-10-15 RX ADMIN — ACETAMINOPHEN 650 MG: 325 TABLET ORAL at 18:23

## 2024-10-15 RX ADMIN — PIPERACILLIN SODIUM AND TAZOBACTAM SODIUM 4.5 G: 36; 4.5 INJECTION, POWDER, LYOPHILIZED, FOR SOLUTION INTRAVENOUS at 12:20

## 2024-10-15 RX ADMIN — DEXTROSE MONOHYDRATE 25 ML: 25 INJECTION, SOLUTION INTRAVENOUS at 08:08

## 2024-10-15 RX ADMIN — ATORVASTATIN CALCIUM 20 MG: 40 TABLET, FILM COATED ORAL at 08:16

## 2024-10-15 RX ADMIN — LOSARTAN POTASSIUM 50 MG: 50 TABLET, FILM COATED ORAL at 08:16

## 2024-10-15 RX ADMIN — PIPERACILLIN SODIUM AND TAZOBACTAM SODIUM 4.5 G: 36; 4.5 INJECTION, POWDER, LYOPHILIZED, FOR SOLUTION INTRAVENOUS at 00:49

## 2024-10-15 RX ADMIN — INSULIN LISPRO 3 UNITS: 100 INJECTION, SOLUTION INTRAVENOUS; SUBCUTANEOUS at 23:22

## 2024-10-15 RX ADMIN — SODIUM CHLORIDE 75 ML/HR: 0.9 INJECTION, SOLUTION INTRAVENOUS at 03:03

## 2024-10-15 RX ADMIN — ENOXAPARIN SODIUM 40 MG: 40 INJECTION SUBCUTANEOUS at 08:16

## 2024-10-15 RX ADMIN — METOPROLOL TARTRATE 25 MG: 25 TABLET, FILM COATED ORAL at 22:14

## 2024-10-15 RX ADMIN — PIPERACILLIN SODIUM AND TAZOBACTAM SODIUM 4.5 G: 36; 4.5 INJECTION, POWDER, LYOPHILIZED, FOR SOLUTION INTRAVENOUS at 04:38

## 2024-10-15 NOTE — H&P
"H&P - Hospitalist   Name: Gia Vaughan 83 y.o. female I MRN: [id number]  Unit/Bed#: ED-05 I Date of Admission: 10/14/2024   Date of Service: 10/15/2024 I Hospital Day: 0     Assessment & Plan  Acute diverticulitis  Presented with increased lethargy, AMS and reported abdominal pain.   CT A/P: \"acute diverticulitis of the sigmoid colon.\"  Received 1 dose of IV Zosyn in the ED which will be continued on admission given noted allergies to cephalosporin as well as cipro.   Clear liquid diet.   IV fluids.   Pain control.   Consider GI eval vs outpatient follow-up   Does not meet SIRS/ sepsis.   Acute encephalopathy  Presented with altered mental status, increased lethargy.   In the setting of infection.   Lab work otherwise unremarkable other than for hyperglycemia.   CTH without acute findings.   IV antibiotics as per above.   IV fluids.   Monitor neuro checks.   Fall precautions.   PT/OT eval.   Abnormal urinalysis  Urine with 30-50 WBC, 4-10 RBC but no bacteria seen and nitrite negative.   Follow-up on urine culture.   Patient receiving IV Zosyn for diverticulitis.   Type 2 diabetes mellitus with stage 3 chronic kidney disease, unspecified whether long term insulin use, unspecified whether stage 3a or 3b CKD ([facility])  Lab Results   Component Value Date    HGBA1C 9.5 (H) 09/01/2024       Recent Labs     10/14/24  2158   POCGLU 328*       Blood Sugar Average: Last 72 hrs:  (P) 328  Continue outpatient insulin regimen with Lantus 20 units HS. Hold Humalog 5 units TID with meals until diet is advanced.   SSI for coverage.   CKD (chronic kidney disease) stage 3, GFR 30-59 ml/min ([facility])  Lab Results   Component Value Date    EGFR 45 10/14/2024    EGFR 53 09/06/2024    EGFR 50 09/05/2024    CREATININE 1.12 10/14/2024    CREATININE 0.97 09/06/2024    CREATININE 1.03 09/05/2024   Cr at baseline.   Continue to monitor.   Chronic systolic heart failure (HCC)  Wt Readings from Last 3 Encounters:   10/14/24 63.9 kg (140 lb 14 oz) "   09/06/24 68.5 kg (151 lb 0.2 oz)   05/31/24 68 kg (150 lb)       Appears euvolemic.   EF 45%, G2DD on last echo 4/2024  Monitor volume status with IV fluids.       CLL (chronic lymphocytic leukemia) (HCC)  Follows with hematology as an outpatient. WBC stable.       VTE Pharmacologic Prophylaxis: VTE Score: 5 High Risk (Score >/= 5) - Pharmacological DVT Prophylaxis Ordered: enoxaparin (Lovenox). Sequential Compression Devices Ordered.  Code Status: Level 1 - Full Code   Discussion with family:  not at this time.     Anticipated Length of Stay: Patient will be admitted on an observation basis with an anticipated length of stay of less than 2 midnights secondary to diverticulitis.    History of Present Illness   Chief Complaint: altered mental status     Gia Vaughan is a 83 y.o. female with a PMH of CLL, type 2 DM, HTN, HLD, CHF, OA and dementia who presents with altered mental status. History was obtained from the patient's medical records given her altered mental status, history of dementia. Patient was reportedly sent from nursing facility due to increased lethargy and irritability. Patient is without complaints at this time and denies pain. She is oriented to person, place but disoriented to time and situation.      Review of Systems   Unable to perform ROS: Dementia       Historical Information   Past Medical History:   Diagnosis Date    Benign adenomatous polyp of large intestine     CHF (congestive heart failure) (HCC)     Diabetes mellitus (HCC)     Hyperlipemia     Hypertension     Osteoarthritis     TIA (transient ischemic attack)      Past Surgical History:   Procedure Laterality Date    APPENDECTOMY      CARPAL TUNNEL RELEASE Right     HYSTERECTOMY W/ SALPINGO-OOPHERECTOMY      MA OPTX FEM SHFT FX W/INSJ IMED IMPLT W/WO SCREW Right 10/12/2022    Procedure: INSERTION NAIL IM FEMUR ANTEGRADE (TROCHANTERIC);  Surgeon: Eric Isaacs DO;  Location: [facility];  Service: Orthopedics     Social History      Tobacco Use    Smoking status: Never    Smokeless tobacco: Never   Vaping Use    Vaping status: Never Used   Substance and Sexual Activity    Alcohol use: Not Currently     Comment: very seldom    Drug use: Never    Sexual activity: Not on file     E-Cigarette/Vaping    E-Cigarette Use Never User      E-Cigarette/Vaping Substances    Nicotine No     THC No     CBD No     Flavoring No     Other No     Unknown No        Social History:  Marital Status:    Occupation:   Patient Pre-hospital Living Situation: Skilled Nursing Facility: [facility]   Patient Pre-hospital Level of Mobility: unable to be assessed at time of evaluation  Patient Pre-hospital Diet Restrictions:     Meds/Allergies   I have reviewed home medications with a medical source (PCP, Pharmacy, other).  Prior to Admission medications    Medication Sig Start Date End Date Taking? Authorizing Provider   acetaminophen (TYLENOL) 325 mg tablet Take 2 tablets (650 mg total) by mouth every 4 (four) hours as needed for mild pain 10/13/22   Pasha Putnam PA-C   atorvastatin (LIPITOR) 20 mg tablet Take 20 mg by mouth daily    Historical Provider, MD   calcium carbonate (OS-JUAN RAMON) 600 MG tablet Take 600 mg by mouth 2 (two) times a day with meals    Historical Provider, MD   cholecalciferol (VITAMIN D3) 1,000 units tablet Take 1,000 Units by mouth daily    Historical Provider, MD   Continuous Blood Gluc Sensor (FreeStyle Дмитрий 2 Sensor) MISC apply 1 SENSOR to back OF UPPER ARM REMOVE AND REPLACE every 14 d...  (REFER TO PRESCRIPTION NOTES). 1/24/24   Historical Provider, MD   Diclofenac Sodium (VOLTAREN) 1 % Apply 2 g topically 3 (three) times a day To R knee 11/8/22   Ashley Depadua, MD   glucose blood test strip 1 each by Other route daily as needed Use as instructed    Historical Provider, MD   glucose monitoring kit (FREESTYLE) monitoring kit 1 each by Does not apply route as needed    Historical Provider, MD   insulin detemir (LEVEMIR) 100 units/mL  subcutaneous injection Inject 20 Units under the skin daily at bedtime 11/8/22   Ashley Depadua, MD   insulin lispro (HumaLOG) 100 units/mL injection Inject 5 Units under the skin 3 (three) times a day with meals 11/8/22   Ashley Depadua, MD   losartan (COZAAR) 50 mg tablet Take 50 mg by mouth daily 1/26/24   Historical Provider, MD   metoprolol tartrate (LOPRESSOR) 25 mg tablet Take 1 tablet (25 mg total) by mouth every 12 (twelve) hours 11/8/22   Ashley Depadua, MD   Potassium Chloride ER 20 MEQ TBCR Take 1 tablet by mouth 2 (two) times a day 12/29/23   Historical Provider, MD   vitamin B-12 (VITAMIN B-12) 1,000 mcg tablet Take by mouth daily    Historical Provider, MD     Allergies   Allergen Reactions    Amlodipine     Ceftin [Cefuroxime]     Ciprofloxacin     Citalopram     Dye [Iodinated Contrast Media]     Glucophage [Metformin]     Januvia [Sitagliptin]     Neomycin-Polymyxin-Dexameth     Ondansetron     Prilosec [Omeprazole]     Vibramycin [Doxycycline]        Objective :  Temp:  [98.1 °F (36.7 °C)] 98.1 °F (36.7 °C)  HR:  [57-66] 62  BP: (110-166)/(53-71) 137/62  Resp:  [16-20] 18  SpO2:  [90 %-97 %] 96 %  O2 Device: None (Room air)    Physical Exam  Vitals and nursing note reviewed.   Constitutional:       General: She is not in acute distress.     Appearance: She is not diaphoretic.   HENT:      Head: Normocephalic and atraumatic.      Mouth/Throat:      Dentition: Abnormal dentition.   Eyes:      Conjunctiva/sclera: Conjunctivae normal.   Cardiovascular:      Rate and Rhythm: Normal rate and regular rhythm.   Pulmonary:      Effort: Pulmonary effort is normal. No respiratory distress.      Breath sounds: Normal breath sounds.   Abdominal:      General: Bowel sounds are normal.      Palpations: Abdomen is soft.      Tenderness: There is no abdominal tenderness.   Musculoskeletal:      Right lower leg: No edema.      Left lower leg: No edema.   Skin:     General: Skin is warm and dry.      Coloration:  Skin is not pale.   Neurological:      Mental Status: She is disoriented.      Comments: Asleep upon entering room but awoke to hearing her name called although had difficulty remaining awake during the encounter. Patient followed some simple commands. Oriented to person, place; disoriented to situation and time. Tongue midline.           Lines/Drains:            Lab Results: I have reviewed the following results:  Results from last 7 days   Lab Units 10/14/24  2157   WBC Thousand/uL 38.81*   HEMOGLOBIN g/dL 13.1   HEMATOCRIT % 40.6   PLATELETS Thousands/uL 156   LYMPHO PCT % 82*   MONO PCT % 1*   EOS PCT % 0     Results from last 7 days   Lab Units 10/14/24  2157   SODIUM mmol/L 131*   POTASSIUM mmol/L 4.5   CHLORIDE mmol/L 99   CO2 mmol/L 25   BUN mg/dL 21   CREATININE mg/dL 1.12   ANION GAP mmol/L 7   CALCIUM mg/dL 9.3   ALBUMIN g/dL 3.7   TOTAL BILIRUBIN mg/dL 0.83   ALK PHOS U/L 100   ALT U/L 16   AST U/L 14   GLUCOSE RANDOM mg/dL 395*     Results from last 7 days   Lab Units 10/14/24  2157   INR  1.01     Results from last 7 days   Lab Units 10/14/24  2158   POC GLUCOSE mg/dl 328*     Lab Results   Component Value Date    HGBA1C 9.5 (H) 09/01/2024    HGBA1C 9.4 (H) 04/01/2024    HGBA1C 9.5 (H) 11/29/2023     Results from last 7 days   Lab Units 10/14/24  2305   LACTIC ACID mmol/L 1.2       Imaging Results Review: I reviewed radiology reports from this admission including: CT abdomen/pelvis and CT head.  Other Study Results Review: EKG was reviewed.       ** Please Note: This note has been constructed using a voice recognition system. **

## 2024-10-15 NOTE — ASSESSMENT & PLAN NOTE
Presented with altered mental status, increased lethargy 2/2 infection  CTH without acute findings.   IV antibiotics as per above.   Monitor neuro checks.   Fall precautions.   PT/OT eval.

## 2024-10-15 NOTE — ED PROVIDER NOTES
Time reflects when diagnosis was documented in both MDM as applicable and the Disposition within this note       Time User Action Codes Description Comment    10/15/2024 12:36 AM Gabriel, Tauqir Add [K57.92] Diverticulitis     10/15/2024 12:37 AM Gabriel, Tauqir Add [N39.0] UTI (urinary tract infection)     10/15/2024 12:37 AM Gabriel, Tauqir Add [R41.82] AMS (altered mental status)     10/15/2024 12:37 AM Gabriel, Tauqir Add [C91.10] CLL (chronic lymphocytic leukemia) (HCC)     10/15/2024 12:37 AM Gabriel, Tauqir Add [D72.829] Leukocytosis     10/15/2024  3:01 AM Gabriel, Tauqir Remove [D72.829] Leukocytosis           ED Disposition       ED Disposition   Admit    Condition   Stable    Date/Time   Tue Oct 15, 2024 12:36 AM    Comment   Case was discussed with BRINDA Orozco and the patient's admission status was agreed to be Admission Status: observation status to the service of Dr. Miles .               Assessment & Plan       Medical Decision Making  Patient is an 83-year-old female with history of CLL and dementia presenting to the ED for altered mental status beyond her baseline      ED course as below    Exam notable for abdominal tenderness worse on the left.    Differentials include bowel obstruction, pancreatitis, colitis, diverticulitis, pyelonephritis, nephrolithiasis, cystitis, urinary tract infection, other infectious process including pneumonia, acute intracranial process causing altered mental status, or other acute cardiopulmonary process.    CT abdomen pelvis wo contrast    Result Date: 10/14/2024  Impression: 1. Findings consistent with acute diverticulitis of the sigmoid colon. No evidence of bowel obstruction, perforation or abscess. 2. Numerous enlarged mesenteric lymph nodes, most prominent in the right lower quadrant, measuring up to 1.6 cm in short axis diameter, concerning for lymphoproliferative disorder Workstation performed: [id number]     CT head without contrast    Result Date:  10/14/2024  Impression: No acute intracranial abnormality. Workstation performed: [id number]     Patient's laboratory imaging evaluation significant for UTI and diverticulitis.  Given patient's allergies to multiple antibiotic therapies including cephalosporins and fluoroquinolones will initiate Zosyn here in the emergency department as she received it during prior admission 2 months ago and family and patient both say that she does not have an allergy to penicillins.     Plan to admit patient for IV antibiotic therapy and further management.  Patient's nephew Charles who is at bedside as well as patient herself are both aware of and agreeable to plan.    Patient made in stable condition to the care of Dr. Miles    Amount and/or Complexity of Data Reviewed  Labs: ordered. Decision-making details documented in ED Course.  Radiology: ordered and independent interpretation performed.    Risk  OTC drugs.  Prescription drug management.  Decision regarding hospitalization.        ED Course as of 10/15/24 0302   Mon Oct 14, 2024   2242 WBC(!): 38.81  Leukocytosis noted, since with her prior leukocytosis on labs most likely secondary to CLL   2243 GLUCOSE(!): 395   2243 Sodium(!): 131  Most likely secondary to hyperglycemia   2320 VBG within normal limits   2320 Lymphocytes %(!): 82  CBC differential shows predominantly a lymphocytosis consistent with patient's prior diagnosis of CLL.   2320 LIPASE(!): 7  Low suspicion for pancreatitis.   2320 Beta- Hydroxybutyrate: 0.07   Tue Oct 15, 2024   0008 CT abdomen indicative of acute diverticulitis   0008 hs TnI 0hr: 35  Will await 2-hour repeat   0022 Per patient and her nephew Charles at bedside, patient is not allergic to penicillins.   0024 Review of prior records shows that patient received Zosyn without incident during her prior admission 9/2024   0024 Urinalysis also suggestive of UTI.  Diverticulitis and UTI will both be covered by Zosyn.  Order placed.   0028 Patient and  family are both agreeable with admission.   [facility] contacted.        Medications   acetaminophen (TYLENOL) tablet 650 mg (has no administration in time range)   atorvastatin (LIPITOR) tablet 20 mg (has no administration in time range)   insulin glargine (LANTUS) subcutaneous injection 20 Units 0.2 mL (has no administration in time range)   losartan (COZAAR) tablet 50 mg (has no administration in time range)   metoprolol tartrate (LOPRESSOR) tablet 25 mg (25 mg Oral Not Given 10/15/24 0302)   sodium chloride 0.9 % infusion (has no administration in time range)   enoxaparin (LOVENOX) subcutaneous injection 40 mg (has no administration in time range)   insulin lispro (HumALOG/ADMELOG) 100 units/mL subcutaneous injection 1-5 Units (has no administration in time range)   insulin lispro (HumALOG/ADMELOG) 100 units/mL subcutaneous injection 1-5 Units (has no administration in time range)   piperacillin-tazobactam (ZOSYN) IVPB (EXTENDED INFUSION) 4.5 g (has no administration in time range)   sodium chloride 0.9 % bolus 250 mL (0 mL Intravenous Stopped 10/14/24 2356)   insulin regular (HumuLIN R,NovoLIN R) injection 4 Units (4 Units Intravenous Given 10/14/24 2356)   piperacillin-tazobactam (ZOSYN) IVPB 4.5 g (0 g Intravenous Stopped 10/15/24 0123)       ED Risk Strat Scores                           SBIRT 20yo+      Flowsheet Row Most Recent Value   Initial Alcohol Screen: US AUDIT-C     1. How often do you have a drink containing alcohol? 0 Filed at: 10/14/2024 2217   2. How many drinks containing alcohol do you have on a typical day you are drinking?  0 Filed at: 10/14/2024 2217   3a. Male UNDER 65: How often do you have five or more drinks on one occasion? 0 Filed at: 10/14/2024 2217   3b. FEMALE Any Age, or MALE 65+: How often do you have 4 or more drinks on one occassion? 0 Filed at: 10/14/2024 2217   Audit-C Score 0 Filed at: 10/14/2024 2217   MENG: How many times in the past year have you...    Used an illegal drug or  used a prescription medication for non-medical reasons? Never Filed at: 10/14/2024 2217                            History of Present Illness       Chief Complaint   Patient presents with    Altered Mental Status     Per EMS pt arrives from SNF with H/O dementia and reports of increased confusion and lethargy that started this am. Pt alert and only oriented to self upon arrival. Pt only C/O LBP.     Hyperglycemia - no symptoms     Per EMS PT bg 495 at facility        Past Medical History:   Diagnosis Date    Benign adenomatous polyp of large intestine     CHF (congestive heart failure) (HCC)     Diabetes mellitus (HCC)     Hyperlipemia     Hypertension     Osteoarthritis     TIA (transient ischemic attack)       Past Surgical History:   Procedure Laterality Date    APPENDECTOMY      CARPAL TUNNEL RELEASE Right     HYSTERECTOMY W/ SALPINGO-OOPHERECTOMY      MS OPTX FEM SHFT FX W/INSJ IMED IMPLT W/WO SCREW Right 10/12/2022    Procedure: INSERTION NAIL IM FEMUR ANTEGRADE (TROCHANTERIC);  Surgeon: Eric Isaacs DO;  Location: [facility];  Service: Orthopedics      Family History   Problem Relation Age of Onset    Heart disease Mother     Heart disease Father     Hypertension Father     Stomach cancer Sister     Ovarian cancer Sister     Hypertension Sister       Social History     Tobacco Use    Smoking status: Never    Smokeless tobacco: Never   Vaping Use    Vaping status: Never Used   Substance Use Topics    Alcohol use: Not Currently     Comment: very seldom    Drug use: Never      E-Cigarette/Vaping    E-Cigarette Use Never User       E-Cigarette/Vaping Substances    Nicotine No     THC No     CBD No     Flavoring No     Other No     Unknown No       I have reviewed and agree with the history as documented.     Patient is an 83-year-old female with history of CLL and dementia presenting to the ED from facility due to increased lethargy, irritability, and altered mental status beyond what is her baseline.  Patient  "is alert and oriented to place and person.  Does not know why she is here, believes that she is here because of a fall and that she lives at home.  She denies any chest pain, shortness of breath, or other complaints.    Additional history provided by her nephew Charles who was in the room reveals that patient is prone to UTIs and that she has been increasingly lethargic and irritable since this morning.  He says is not her baseline that she is normally a \"jovial\" person.    Further ROS limited by patient's dementia.      History limited by:  Dementia  Altered Mental Status  Presenting symptoms: confusion    Associated symptoms: agitation    Associated symptoms: no fever    Hyperglycemia - no symptoms  Associated symptoms: altered mental status and confusion    Associated symptoms: no chest pain, no fever and no shortness of breath        Review of Systems   Unable to perform ROS: Dementia   Constitutional:  Negative for chills and fever.   Respiratory:  Negative for shortness of breath.    Cardiovascular:  Negative for chest pain.   Psychiatric/Behavioral:  Positive for agitation and confusion.            Objective       ED Triage Vitals [10/14/24 2146]   Temperature Pulse Blood Pressure Respirations SpO2 Patient Position - Orthostatic VS   98.1 °F (36.7 °C) 61 153/65 18 96 % Lying      Temp Source Heart Rate Source BP Location FiO2 (%) Pain Score    Oral Monitor Right arm -- No Pain      Vitals      Date and Time Temp Pulse SpO2 Resp BP Pain Score FACES Pain Rating User   10/15/24 0302 -- 50 -- -- 130/65 -- --    10/15/24 0130 -- 62 96 % 18 137/62 No Pain --    10/15/24 0122 -- 62 96 % 16 112/55 No Pain --    10/15/24 0107 -- 66 -- -- 111/53 -- --    10/15/24 0052 -- 64 -- -- 110/56 -- --    10/15/24 0037 -- 63 -- -- 138/63 -- --    10/15/24 0022 -- 63 92 % -- 139/65 -- --    10/15/24 0007 -- 65 90 % -- 136/65 -- --    10/14/24 2352 -- 60 97 % 16 146/64 -- --    10/14/24 2330 -- 66 92 % -- 166/71 -- " --    10/14/24 2315 -- 57 96 % 20 124/56 -- --    10/14/24 2146 98.1 °F (36.7 °C) 61 96 % 18 153/65 No Pain --             Physical Exam  Vitals and nursing note reviewed.   Constitutional:       General: She is in acute distress (In acute distress secondary to confusion.  Says that she wants to go back home to her bed.).      Appearance: Normal appearance.   HENT:      Head: Normocephalic and atraumatic.   Eyes:      Extraocular Movements: Extraocular movements intact.      Conjunctiva/sclera: Conjunctivae normal.      Pupils: Pupils are equal, round, and reactive to light.   Cardiovascular:      Rate and Rhythm: Normal rate and regular rhythm.      Pulses: Normal pulses.      Heart sounds: Normal heart sounds. No murmur heard.  Pulmonary:      Effort: Pulmonary effort is normal. No respiratory distress.      Breath sounds: Normal breath sounds.   Abdominal:      General: There is no distension.      Palpations: Abdomen is soft.      Tenderness: There is abdominal tenderness. There is no guarding or rebound.      Comments: Generalized tenderness to palpation worse in left lower quadrant.  Without guarding or rebound.   Musculoskeletal:         General: No tenderness. Normal range of motion.      Cervical back: Normal range of motion.      Right lower leg: No edema.      Left lower leg: No edema.   Skin:     General: Skin is warm and dry.      Capillary Refill: Capillary refill takes less than 2 seconds.      Findings: No rash.   Neurological:      General: No focal deficit present.      Mental Status: She is disoriented and confused.      Comments: Alert and oriented to person and place but not to time or circumstance.  Does not know why she is here in the hospital   Psychiatric:         Behavior: Behavior is agitated.         Results Reviewed       Procedure Component Value Units Date/Time    Fingerstick Glucose (POCT) [[id number]]  (Abnormal) Collected: 10/15/24 0259    Lab Status: Final result Specimen:  Blood Updated: 10/15/24 0259     POC Glucose 154 mg/dl     MRSA culture [[id number]]     Lab Status: No result Specimen: Nares from Nose     HS Troponin I 2hr [[id number]]  (Normal) Collected: 10/14/24 2356    Lab Status: Final result Specimen: Blood from Arm, Right Updated: 10/15/24 0110     hs TnI 2hr 34 ng/L      Delta 2hr hsTnI -1 ng/L     Blood culture #2 [[id number]] Collected: 10/14/24 2330    Lab Status: In process Specimen: Blood from Arm, Right Updated: 10/15/24 0054    Blood culture #1 [[id number]] Collected: 10/14/24 2230    Lab Status: In process Specimen: Blood from Line, Venous Updated: 10/15/24 0054    Urine Microscopic [[id number]]  (Abnormal) Collected: 10/14/24 2346    Lab Status: Final result Specimen: Urine, Clean Catch Updated: 10/15/24 0015     RBC, UA 4-10 /hpf      WBC, UA 30-50 /hpf      Epithelial Cells Occasional /hpf      Bacteria, UA None Seen /hpf      Hyaline Casts, UA 0-3 /lpf     Urine culture [[id number]] Collected: 10/14/24 2346    Lab Status: In process Specimen: Urine, Clean Catch Updated: 10/15/24 0015    RBC Morphology Reflex Test [[id number]] Collected: 10/14/24 2157    Lab Status: Final result Specimen: Blood from Arm, Left Updated: 10/15/24 0001    UA w Reflex to Microscopic w Reflex to Culture [[id number]]  (Abnormal) Collected: 10/14/24 2346    Lab Status: Final result Specimen: Urine, Clean Catch Updated: 10/14/24 2359     Color, UA Light Yellow     Clarity, UA Clear     Specific Gravity, UA 1.022     pH, UA 6.0     Leukocytes, UA Moderate     Nitrite, UA Negative     Protein, UA 30 (1+) mg/dl      Glucose, UA >=1000 (1%) mg/dl      Ketones, UA Negative mg/dl      Urobilinogen, UA <2.0 mg/dl      Bilirubin, UA Negative     Occult Blood, UA Small    HS Troponin I 4hr [[id number]]     Lab Status: No result Specimen: Blood     TSH, 3rd generation with Free T4 reflex [[id number]]  (Normal) Collected: 10/14/24 2157    Lab Status: Final result Specimen: Blood from Arm, Left Updated:  10/14/24 2352     TSH 3RD GENERATON 0.739 uIU/mL     Lactic acid, plasma (w/reflex if result > 2.0) [[id number]]  (Normal) Collected: 10/14/24 2305    Lab Status: Final result Specimen: Blood from Arm, Right Updated: 10/14/24 2336     LACTIC ACID 1.2 mmol/L     Narrative:      Result may be elevated if tourniquet was used during collection.    Ammonia [[id number]]  (Normal) Collected: 10/14/24 2305    Lab Status: Final result Specimen: Blood from Arm, Right Updated: 10/14/24 2336     Ammonia 21 umol/L     Blood gas, venous [[id number]] Collected: 10/14/24 2305    Lab Status: Final result Specimen: Blood from Arm, Right Updated: 10/14/24 2313     pH, Yfn 7.392     pCO2, Yfn 42.8 mm Hg      pO2, Yfn 36.9 mm Hg      HCO3, Yfn 25.4 mmol/L      Base Excess, Yfn 0.3 mmol/L      O2 Content, Yfn 12.9 ml/dL      O2 HGB, VENOUS 65.7 %     CBC and differential [[id number]]  (Abnormal) Collected: 10/14/24 2157    Lab Status: Final result Specimen: Blood from Arm, Left Updated: 10/14/24 2305     WBC 38.81 Thousand/uL      RBC 3.95 Million/uL      Hemoglobin 13.1 g/dL      Hematocrit 40.6 %       fL      MCH 33.2 pg      MCHC 32.3 g/dL      RDW 16.3 %      MPV 13.2 fL      Platelets 156 Thousands/uL     Narrative:      This is an appended report.  These results have been appended to a previously verified report.    Manual Differential(PHLEBS Do Not Order) [[id number]]  (Abnormal) Collected: 10/14/24 2157    Lab Status: Final result Specimen: Blood from Arm, Left Updated: 10/14/24 2305     Segmented % 10 %      Lymphocytes % 82 %      Monocytes % 1 %      Eosinophils % 0 %      Basophils % 0 %      Atypical Lymphocytes % 7 %      Absolute Neutrophils 3.88 Thousand/uL      Absolute Lymphocytes 34.54 Thousand/uL      Absolute Monocytes 0.39 Thousand/uL      Absolute Eosinophils 0.00 Thousand/uL      Absolute Basophils 0.00 Thousand/uL      Total Counted --     RBC Morphology Present     Platelet Estimate Adequate     Large  Platelet Present     Macrocytes Present     Polychromasia Present    Beta Hydroxybutyrate [[id number]]  (Normal) Collected: 10/14/24 2157    Lab Status: Final result Specimen: Blood from Arm, Left Updated: 10/14/24 2256     Beta- Hydroxybutyrate 0.07 mmol/L     Lipase [[id number]]  (Abnormal) Collected: 10/14/24 2157    Lab Status: Final result Specimen: Blood from Arm, Left Updated: 10/14/24 2256     Lipase 7 u/L     HS Troponin 0hr (reflex protocol) [[id number]]  (Normal) Collected: 10/14/24 2157    Lab Status: Final result Specimen: Blood from Arm, Left Updated: 10/14/24 2236     hs TnI 0hr 35 ng/L     Comprehensive metabolic panel [[id number]]  (Abnormal) Collected: 10/14/24 2157    Lab Status: Final result Specimen: Blood from Arm, Left Updated: 10/14/24 2221     Sodium 131 mmol/L      Potassium 4.5 mmol/L      Chloride 99 mmol/L      CO2 25 mmol/L      ANION GAP 7 mmol/L      BUN 21 mg/dL      Creatinine 1.12 mg/dL      Glucose 395 mg/dL      Calcium 9.3 mg/dL      AST 14 U/L      ALT 16 U/L      Alkaline Phosphatase 100 U/L      Total Protein 7.0 g/dL      Albumin 3.7 g/dL      Total Bilirubin 0.83 mg/dL      eGFR 45 ml/min/1.73sq m     Narrative:      [employer] guidelines for Chronic Kidney Disease (CKD):     Stage 1 with normal or high GFR (GFR > 90 mL/min/1.73 square meters)    Stage 2 Mild CKD (GFR = 60-89 mL/min/1.73 square meters)    Stage 3A Moderate CKD (GFR = 45-59 mL/min/1.73 square meters)    Stage 3B Moderate CKD (GFR = 30-44 mL/min/1.73 square meters)    Stage 4 Severe CKD (GFR = 15-29 mL/min/1.73 square meters)    Stage 5 End Stage CKD (GFR <15 mL/min/1.73 square meters)  Note: GFR calculation is accurate only with a steady state creatinine    APTT [[id number]]  (Normal) Collected: 10/14/24 2157    Lab Status: Final result Specimen: Blood from Arm, Left Updated: 10/14/24 2217     PTT 33 seconds     Protime-INR [[id number]]  (Normal) Collected: 10/14/24 6144    Lab Status:  Final result Specimen: Blood from Arm, Left Updated: 10/14/24 2217     Protime 14.0 seconds      INR 1.01    Narrative:      INR Therapeutic Range    Indication                                             INR Range      Atrial Fibrillation                                               2.0-3.0  Hypercoagulable State                                    2.0.2.3  Left Ventricular Asist Device                            2.0-3.0  Mechanical Heart Valve                                  -    Aortic(with afib, MI, embolism, HF, LA enlargement,    and/or coagulopathy)                                     2.0-3.0 (2.5-3.5)     Mitral                                                             2.5-3.5  Prosthetic/Bioprosthetic Heart Valve               2.0-3.0  Venous thromboembolism (VTE: VT, PE        2.0-3.0    Fingerstick Glucose (POCT) [[id number]]  (Abnormal) Collected: 10/14/24 2158    Lab Status: Final result Specimen: Blood Updated: 10/14/24 2159     POC Glucose 328 mg/dl             XR chest 1 view portable   ED Interpretation by Carmen Rios MD (10/15 0302)   Wet read: No acute cardiopulmonary process appreciated.  Comparison study September 1, 2024      CT head without contrast   Final Interpretation by Josesito Chiu MD (10/14 2342)      No acute intracranial abnormality.                  Workstation performed: [id number]         CT abdomen pelvis wo contrast   Final Interpretation by Josesito Chiu MD (10/14 2346)         1. Findings consistent with acute diverticulitis of the sigmoid colon. No evidence of bowel obstruction, perforation or abscess.   2. Numerous enlarged mesenteric lymph nodes, most prominent in the right lower quadrant, measuring up to 1.6 cm in short axis diameter, concerning for lymphoproliferative disorder      Workstation performed: [id number]             ECG 12 Lead Documentation Only    Date/Time: 10/14/2024 10:56 PM    Performed by: Carmen Rios MD  Authorized by: Carmen  MD Gabriel    Indications / Diagnosis:  Altered mental status  ECG reviewed by me, the ED Provider: yes    Patient location:  ED  Previous ECG:     Previous ECG:  Compared to current    Comparison ECG info:  2024    Similarity:  No change    Comparison to cardiac monitor: Yes    Interpretation:     Interpretation: abnormal    Rate:     ECG rate:  60    ECG rate assessment: normal    Rhythm:     Rhythm: sinus rhythm and A-V block    Conduction:     Conduction: abnormal      Abnormal conduction: 1st degree    ST segments:     ST segments:  Normal  Comments:      No ST/T-segment changes indicative of ischemia      ED Medication and Procedure Management   Prior to Admission Medications   Prescriptions Last Dose Informant Patient Reported? Taking?   Continuous Blood Gluc Sensor (FreeStyle Дмитрий 2 Sensor) MISC  Self Yes No   Sig: apply 1 SENSOR to back OF UPPER ARM REMOVE AND REPLACE every 14 d...  (REFER TO PRESCRIPTION NOTES).   Diclofenac Sodium (VOLTAREN) 1 %  Self No No   Sig: Apply 2 g topically 3 (three) times a day To R knee   Potassium Chloride ER 20 MEQ TBCR  Self Yes No   Sig: Take 1 tablet by mouth 2 (two) times a day   acetaminophen (TYLENOL) 325 mg tablet  Self No No   Sig: Take 2 tablets (650 mg total) by mouth every 4 (four) hours as needed for mild pain   atorvastatin (LIPITOR) 20 mg tablet  Self Yes No   Sig: Take 20 mg by mouth daily   calcium carbonate (OS-JUAN RAMON) 600 MG tablet  Self Yes No   Sig: Take 600 mg by mouth 2 (two) times a day with meals   cholecalciferol (VITAMIN D3) 1,000 units tablet  Self Yes No   Sig: Take 1,000 Units by mouth daily   glucose blood test strip  Self Yes No   Si each by Other route daily as needed Use as instructed   glucose monitoring kit (FREESTYLE) monitoring kit  Self Yes No   Si each by Does not apply route as needed   insulin detemir (LEVEMIR) 100 units/mL subcutaneous injection  Self No No   Sig: Inject 20 Units under the skin daily at bedtime    insulin lispro (HumaLOG) 100 units/mL injection  Self No No   Sig: Inject 5 Units under the skin 3 (three) times a day with meals   losartan (COZAAR) 50 mg tablet  Self Yes No   Sig: Take 50 mg by mouth daily   metoprolol tartrate (LOPRESSOR) 25 mg tablet  Self No No   Sig: Take 1 tablet (25 mg total) by mouth every 12 (twelve) hours   vitamin B-12 (VITAMIN B-12) 1,000 mcg tablet  Self Yes No   Sig: Take by mouth daily      Facility-Administered Medications: None     Patient's Medications   Discharge Prescriptions    No medications on file     No discharge procedures on file.  ED SEPSIS DOCUMENTATION   Time reflects when diagnosis was documented in both MDM as applicable and the Disposition within this note       Time User Action Codes Description Comment    10/15/2024 12:36 AM Carmen Rios Add [K57.92] Diverticulitis     10/15/2024 12:37 AM Carmen Rios Add [N39.0] UTI (urinary tract infection)     10/15/2024 12:37 AM Carmen Rios Add [R41.82] AMS (altered mental status)     10/15/2024 12:37 AM Carmen Rios Add [C91.10] CLL (chronic lymphocytic leukemia) (HCC)     10/15/2024 12:37 AM Carmen Rios Add [D72.829] Leukocytosis     10/15/2024  3:01 AM Carmen Rios Remove [D72.829] Leukocytosis                  Carmen Rios MD  10/15/24 0216       Carmen Rios MD  10/15/24 030

## 2024-10-15 NOTE — ASSESSMENT & PLAN NOTE
"Presented with increased lethargy, AMS and reported abdominal pain.   CT A/P: \"acute diverticulitis of the sigmoid colon.\"  Received 1 dose of IV Zosyn in the ED which will be continued on admission given noted allergies to cephalosporin as well as cipro.   Clear liquid diet.   IV fluids.   Pain control.   Consider GI eval vs outpatient follow-up   Does not meet SIRS/ sepsis.   "

## 2024-10-15 NOTE — ASSESSMENT & PLAN NOTE
Urine with 30-50 WBC, 4-10 RBC but no bacteria seen and nitrite negative.   Follow-up on urine culture.   Patient receiving IV Zosyn for diverticulitis.

## 2024-10-15 NOTE — ASSESSMENT & PLAN NOTE
"Presented on 10/14/2024 with increased lethargy, AMS and reported abdominal pain.   CT A/P: \"acute diverticulitis of the sigmoid colon.\"  Received 1 dose of IV Zosyn in the ED which will be continued on admission given noted allergies to cephalosporin as well as cipro.  Continue Zosyn.  If patient continues to tolerate, anticipate transitioning to Augmentin orally on discharge  Tolerating clear liquid diet, will advance to full liquids and reduce IVF  Pain control.   Vital signs stable, so suspect clinical improvement, patient still does not meet SIRS/sepsis criteria.  She does have an elevated white count which is chronic given history of CLL and is at her baseline.  "

## 2024-10-15 NOTE — ED NOTES
This nurse rounded on pt. Pt noted to be incontinent of urine and pulled her IV out. Pt reoriented, cleaned, and changed. Pt provided with fresh gown and linens. Pt placed on bed pan as requested.      Marialuisa Gerard RN  10/15/24 7628

## 2024-10-15 NOTE — ED ATTENDING ATTESTATION
"10/14/2024  I, Torsten Pimentel MD, saw and evaluated the patient. I have discussed the patient with the resident/non-physician practitioner and agree with the resident's/non-physician practitioner's findings, Plan of Care, and MDM as documented in the resident's/non-physician practitioner's note, except where noted. All available labs and Radiology studies were reviewed.  I was present for key portions of any procedure(s) performed by the resident/non-physician practitioner and I was immediately available to provide assistance.       At this point I agree with the current assessment done in the Emergency Department.  I have conducted an independent evaluation of this patient a history and physical is as follows: Patient is a 83 year old female with increased confusion and \"lethargy\" since this AM and nursing home staff could not wake her up tonight. Had elevated blood sugar tonight as well. (+) abdominal pain. No vomiting. Has diarrhea often. Had urinary incontinence today as well. Has dementia and patient cannot provide complete accurate hx. Has had prior appy and hysterectomy and salpingo-oophorectomy. Was last seen in this ED on 9/1/24 for sepsis. Had CLL. [facility] website checked on this patient and no Rx found. NCAT. No scleral icterus. PERRL. Mucous membranes somewhat dry. Neck supple. Lungs clear. Heart regular. (+) diffuse abdominal tenderness with some distension. (+) bowel sounds. No edema. No rash noted. No jaundice. No gross focal deficits. (+) confusion. DDx including but not limited to: metabolic abnormality, intracranial etiology, cardiac etiology, hypercarbia, hypoxia, infectious etiology including UTI, thyroid disease, hyperammonemia, delirium, dementia, overmedication. DDx including but not limited to:  gastroenteritis, gastritis, PUD, GERD, gastroparesis, hepatitis, pancreatitis, colitis, enteritis, diverticulitis, food poisoning, mesenteric adenitis, epiploic appendagitis, mesenteric " panniculitis, mesenteric ischemia, IBD, IBS, ileus, bowel obstruction, volvulus, internal hernia, AAA, cholecystitis, biliary colic, choledocholithiasis, perforated viscus, tumor, splenic etiology, constipation, pelvic pathology, renal colic, pyelonephritis.  Will check EKG, labs, CXR and CTs.     ED Course         Critical Care Time  Procedures

## 2024-10-15 NOTE — ASSESSMENT & PLAN NOTE
Wt Readings from Last 3 Encounters:   10/15/24 63.5 kg (140 lb)   09/06/24 68.5 kg (151 lb 0.2 oz)   05/31/24 68 kg (150 lb)       Still appears euvolemic.   EF 45%, G2DD on last echo 4/2024  Monitor volume stat on reduced rate IVF

## 2024-10-15 NOTE — ASSESSMENT & PLAN NOTE
Presented with altered mental status, increased lethargy.   In the setting of infection.   Lab work otherwise unremarkable other than for hyperglycemia.   CTH without acute findings.   IV antibiotics as per above.   IV fluids.   Monitor neuro checks.   Fall precautions.   PT/OT eval.

## 2024-10-15 NOTE — PROGRESS NOTES
"Progress Note - Hospitalist   Name: Gia Vaughan 83 y.o. female I MRN: [id number]  Unit/Bed#: ED-05 I Date of Admission: 10/14/2024   Date of Service: 10/15/2024 I Hospital Day: 0    Assessment & Plan  Acute diverticulitis  Presented on 10/14/2024 with increased lethargy, AMS and reported abdominal pain.   CT A/P: \"acute diverticulitis of the sigmoid colon.\"  Received 1 dose of IV Zosyn in the ED which will be continued on admission given noted allergies to cephalosporin as well as cipro.  Continue Zosyn.  If patient continues to tolerate, anticipate transitioning to Augmentin orally on discharge  Tolerating clear liquid diet, will advance to full liquids and reduce IVF  Pain control.   Vital signs stable, so suspect clinical improvement, patient still does not meet SIRS/sepsis criteria.  She does have an elevated white count which is chronic given history of CLL and is at her baseline.  Acute encephalopathy  Presented with altered mental status, increased lethargy 2/2 infection  CTH without acute findings.   IV antibiotics as per above.   Monitor neuro checks.   Fall precautions.   PT/OT eval.   Abnormal urinalysis  Urine with 30-50 WBC, 4-10 RBC but no bacteria seen and nitrite negative.   Follow-up on urine culture.   Patient receiving IV Zosyn for diverticulitis.   Type 2 diabetes mellitus with stage 3 chronic kidney disease, unspecified whether long term insulin use, unspecified whether stage 3a or 3b CKD (Formerly [facility])  Lab Results   Component Value Date    HGBA1C 9.5 (H) 09/01/2024       Recent Labs     10/15/24  0259 10/15/24  0757 10/15/24  0914 10/15/24  1129   POCGLU 154* 57* 88 85       Blood Sugar Average: Last 72 hrs:  (P) 142.4  Continue outpatient insulin regimen with Lantus 20 units HS.   Continue to hold Humalog 5 units TID with meals until diet is advanced.   SSI for coverage.   CKD (chronic kidney disease) stage 3, GFR 30-59 ml/min (Formerly [facility])  Lab Results   Component Value Date    EGFR 58 10/15/2024    EGFR " 45 10/14/2024    EGFR 53 09/06/2024    CREATININE 0.90 10/15/2024    CREATININE 1.12 10/14/2024    CREATININE 0.97 09/06/2024   Cr at baseline.   Continue to monitor.   Chronic systolic heart failure (HCC)  Wt Readings from Last 3 Encounters:   10/15/24 63.5 kg (140 lb)   09/06/24 68.5 kg (151 lb 0.2 oz)   05/31/24 68 kg (150 lb)       Still appears euvolemic.   EF 45%, G2DD on last echo 4/2024  Monitor volume stat on reduced rate IVF      CLL (chronic lymphocytic leukemia) (HCC)  Follows with hematology as an outpatient. WBC stable.     VTE Pharmacologic Prophylaxis: VTE Score: 5 High Risk (Score >/= 5) - Pharmacological DVT Prophylaxis Ordered: enoxaparin (Lovenox). Sequential Compression Devices Ordered.    Mobility:   Basic Mobility Inpatient Raw Score: 24  JH-HLM Goal: 8: Walk 250 feet or more  JH-HLM Achieved: 8: Walk 250 feet ot more  JH-HLM Goal achieved. Continue to encourage appropriate mobility.    Patient Centered Rounds: I performed bedside rounds with nursing staff today.   Discussions with Specialists or Other Care Team Provider: None    Education and Discussions with Family / Patient: Updated  (nephew) via phone.  Attempted to call nephew listed in chart, but there was no answer.    Current Length of Stay: 0 day(s)  Current Patient Status: Observation   Certification Statement: The patient will continue to require additional inpatient hospital stay due to need for IV antibiotics and PT/OT evaluation  Discharge Plan: Anticipate discharge tomorrow to rehab facility.    Code Status: Level 1 - Full Code    Subjective   Unable to provide subjective given dementia, denies feeling unwell    Objective :  Temp:  [98.1 °F (36.7 °C)-98.4 °F (36.9 °C)] 98.4 °F (36.9 °C)  HR:  [50-68] 66  BP: (110-175)/(53-84) 150/63  Resp:  [16-20] 16  SpO2:  [90 %-100 %] 95 %  O2 Device: None (Room air)    Body mass index is 22.6 kg/m².     Input and Output Summary (last 24 hours):     Intake/Output Summary (Last  24 hours) at 10/15/2024 1325  Last data filed at 10/15/2024 0900  Gross per 24 hour   Intake 1335 ml   Output 350 ml   Net 985 ml       Physical Exam  Constitutional:       General: She is not in acute distress.     Appearance: She is not ill-appearing.   HENT:      Head: Normocephalic.      Nose: No congestion.      Mouth/Throat:      Mouth: Mucous membranes are moist.   Eyes:      General: No scleral icterus.     Pupils: Pupils are equal, round, and reactive to light.   Cardiovascular:      Rate and Rhythm: Bradycardia present.      Heart sounds: No murmur heard.  Pulmonary:      Effort: Pulmonary effort is normal. No respiratory distress.      Breath sounds: No wheezing or rales.   Abdominal:      General: There is no distension.      Palpations: Abdomen is soft. There is no mass.      Tenderness: There is no abdominal tenderness. There is no right CVA tenderness, left CVA tenderness, guarding or rebound.      Hernia: No hernia is present.   Musculoskeletal:      Cervical back: Normal range of motion.      Right lower leg: No edema.      Left lower leg: No edema.   Skin:     General: Skin is dry.   Neurological:      Mental Status: She is alert.       Lines/Drains:              Lab Results: I have reviewed the following results:   Results from last 7 days   Lab Units 10/15/24  0432 10/14/24  2157   WBC Thousand/uL 45.05* 38.81*   HEMOGLOBIN g/dL 11.3* 13.1   HEMATOCRIT % 35.2 40.6   PLATELETS Thousands/uL 131* 156   LYMPHO PCT %  --  82*   MONO PCT %  --  1*   EOS PCT %  --  0     Results from last 7 days   Lab Units 10/15/24  0432 10/14/24  2157   SODIUM mmol/L 136 131*   POTASSIUM mmol/L 4.2 4.5   CHLORIDE mmol/L 107 99   CO2 mmol/L 24 25   BUN mg/dL 18 21   CREATININE mg/dL 0.90 1.12   ANION GAP mmol/L 5 7   CALCIUM mg/dL 8.9 9.3   ALBUMIN g/dL  --  3.7   TOTAL BILIRUBIN mg/dL  --  0.83   ALK PHOS U/L  --  100   ALT U/L  --  16   AST U/L  --  14   GLUCOSE RANDOM mg/dL 141* 395*     Results from last 7 days    Lab Units 10/14/24  2157   INR  1.01     Results from last 7 days   Lab Units 10/15/24  1129 10/15/24  0914 10/15/24  0757 10/15/24  0259 10/14/24  2158   POC GLUCOSE mg/dl 85 88 57* 154* 328*         Results from last 7 days   Lab Units 10/14/24  2305   LACTIC ACID mmol/L 1.2       Recent Cultures (last 7 days):   Results from last 7 days   Lab Units 10/14/24  2330 10/14/24  2230   BLOOD CULTURE  Received in Microbiology Lab. Culture in Progress. Received in Microbiology Lab. Culture in Progress.             Last 24 Hours Medication List:     Current Facility-Administered Medications:     acetaminophen (TYLENOL) tablet 650 mg, Q6H PRN    atorvastatin (LIPITOR) tablet 20 mg, Daily    enoxaparin (LOVENOX) subcutaneous injection 40 mg, Daily    insulin glargine (LANTUS) subcutaneous injection 20 Units 0.2 mL, HS    insulin lispro (HumALOG/ADMELOG) 100 units/mL subcutaneous injection 1-5 Units, TID AC **AND** Fingerstick Glucose (POCT), TID AC    insulin lispro (HumALOG/ADMELOG) 100 units/mL subcutaneous injection 1-5 Units, HS    losartan (COZAAR) tablet 50 mg, Daily    metoprolol tartrate (LOPRESSOR) tablet 25 mg, Q12H TESSA    piperacillin-tazobactam (ZOSYN) IVPB (EXTENDED INFUSION) 4.5 g, Q8H    sodium chloride 0.9 % infusion, Continuous, Last Rate: 75 mL/hr (10/15/24 0303)    Administrative Statements   Today, Patient Was Seen By: Sherrie Sinclair MD      **Please Note: This note may have been constructed using a voice recognition system.**

## 2024-10-15 NOTE — ASSESSMENT & PLAN NOTE
Lab Results   Component Value Date    HGBA1C 9.5 (H) 09/01/2024       Recent Labs     10/15/24  0259 10/15/24  0757 10/15/24  0914 10/15/24  1129   POCGLU 154* 57* 88 85       Blood Sugar Average: Last 72 hrs:  (P) 142.4  Continue outpatient insulin regimen with Lantus 20 units HS.   Continue to hold Humalog 5 units TID with meals until diet is advanced.   SSI for coverage.

## 2024-10-15 NOTE — ASSESSMENT & PLAN NOTE
Lab Results   Component Value Date    EGFR 45 10/14/2024    EGFR 53 09/06/2024    EGFR 50 09/05/2024    CREATININE 1.12 10/14/2024    CREATININE 0.97 09/06/2024    CREATININE 1.03 09/05/2024   Cr at baseline.   Continue to monitor.

## 2024-10-15 NOTE — ASSESSMENT & PLAN NOTE
Lab Results   Component Value Date    HGBA1C 9.5 (H) 09/01/2024       Recent Labs     10/14/24  2158   POCGLU 328*       Blood Sugar Average: Last 72 hrs:  (P) 328  Continue outpatient insulin regimen with Lantus 20 units HS. Hold Humalog 5 units TID with meals until diet is advanced.   SSI for coverage.

## 2024-10-15 NOTE — ED NOTES
Pt placed cleaned and placed on bed and encouraged to urinate in attempt to obtain UA as directed by provider. Pt reports she can not go at this time.      Marialuisa Gerard RN  10/14/24 8260

## 2024-10-15 NOTE — ASSESSMENT & PLAN NOTE
Lab Results   Component Value Date    EGFR 58 10/15/2024    EGFR 45 10/14/2024    EGFR 53 09/06/2024    CREATININE 0.90 10/15/2024    CREATININE 1.12 10/14/2024    CREATININE 0.97 09/06/2024   Cr at baseline.   Continue to monitor.

## 2024-10-15 NOTE — ASSESSMENT & PLAN NOTE
Wt Readings from Last 3 Encounters:   10/14/24 63.9 kg (140 lb 14 oz)   09/06/24 68.5 kg (151 lb 0.2 oz)   05/31/24 68 kg (150 lb)       Appears euvolemic.   EF 45%, G2DD on last echo 4/2024  Monitor volume status with IV fluids.

## 2024-10-15 NOTE — PLAN OF CARE
Problem: Potential for Falls  Goal: Patient will remain free of falls  Description: INTERVENTIONS:  - Educate patient/family on patient safety including physical limitations  - Instruct patient to call for assistance with activity   - Consult OT/PT to assist with strengthening/mobility   - Keep Call bell within reach  - Keep bed low and locked with side rails adjusted as appropriate  - Keep care items and personal belongings within reach  - Initiate and maintain comfort rounds  - Make Fall Risk Sign visible to staff  - Offer Toileting every  Hours, in advance of need  - Initiate/Maintain alarm  - Obtain necessary fall risk management equipment:   - Apply yellow socks and bracelet for high fall risk patients  - Consider moving patient to room near nurses station  Outcome: Progressing     Problem: Prexisting or High Potential for Compromised Skin Integrity  Goal: Skin integrity is maintained or improved  Description: INTERVENTIONS:  - Identify patients at risk for skin breakdown  - Assess and monitor skin integrity  - Assess and monitor nutrition and hydration status  - Monitor labs   - Assess for incontinence   - Turn and reposition patient  - Assist with mobility/ambulation  - Relieve pressure over bony prominences  - Avoid friction and shearing  - Provide appropriate hygiene as needed including keeping skin clean and dry  - Evaluate need for skin moisturizer/barrier cream  - Collaborate with interdisciplinary team   - Patient/family teaching  - Consider wound care consult   Outcome: Progressing     Problem: PAIN - ADULT  Goal: Verbalizes/displays adequate comfort level or baseline comfort level  Description: Interventions:  - Encourage patient to monitor pain and request assistance  - Assess pain using appropriate pain scale  - Administer analgesics based on type and severity of pain and evaluate response  - Implement non-pharmacological measures as appropriate and evaluate response  - Consider cultural and  social influences on pain and pain management  - Notify physician/advanced practitioner if interventions unsuccessful or patient reports new pain  Outcome: Progressing     Problem: INFECTION - ADULT  Goal: Absence or prevention of progression during hospitalization  Description: INTERVENTIONS:  - Assess and monitor for signs and symptoms of infection  - Monitor lab/diagnostic results  - Monitor all insertion sites, i.e. indwelling lines, tubes, and drains  - Monitor endotracheal if appropriate and nasal secretions for changes in amount and color  - [place] appropriate cooling/warming therapies per order  - Administer medications as ordered  - Instruct and encourage patient and family to use good hand hygiene technique  - Identify and instruct in appropriate isolation precautions for identified infection/condition  Outcome: Progressing  Goal: Absence of fever/infection during neutropenic period  Description: INTERVENTIONS:  - Monitor WBC    Outcome: Progressing     Problem: SAFETY ADULT  Goal: Patient will remain free of falls  Description: INTERVENTIONS:  - Educate patient/family on patient safety including physical limitations  - Instruct patient to call for assistance with activity   - Consult OT/PT to assist with strengthening/mobility   - Keep Call bell within reach  - Keep bed low and locked with side rails adjusted as appropriate  - Keep care items and personal belongings within reach  - Initiate and maintain comfort rounds  - Make Fall Risk Sign visible to staff  - Offer Toileting every  Hours, in advance of need  - Initiate/Maintain alarm  - Obtain necessary fall risk management equipment:   - Apply yellow socks and bracelet for high fall risk patients  - Consider moving patient to room near nurses station  Outcome: Progressing  Goal: Maintain or return to baseline ADL function  Description: INTERVENTIONS:  -  Assess patient's ability to carry out ADLs; assess patient's baseline for ADL function and  identify physical deficits which impact ability to perform ADLs (bathing, care of mouth/teeth, toileting, grooming, dressing, etc.)  - Assess/evaluate cause of self-care deficits   - Assess range of motion  - Assess patient's mobility; develop plan if impaired  - Assess patient's need for assistive devices and provide as appropriate  - Encourage maximum independence but intervene and supervise when necessary  - Involve family in performance of ADLs  - Assess for home care needs following discharge   - Consider OT consult to assist with ADL evaluation and planning for discharge  - Provide patient education as appropriate  Outcome: Progressing  Goal: Maintains/Returns to pre admission functional level  Description: INTERVENTIONS:  - Perform AM-PAC 6 Click Basic Mobility/ Daily Activity assessment daily.  - Set and communicate daily mobility goal to care team and patient/family/caregiver.   - Collaborate with rehabilitation services on mobility goals if consulted  - Perform Range of Motion  times a day.  - Reposition patient every  hours.  - Dangle patient times a day  - Stand patient  times a day  - Ambulate patient  times a day  - Out of bed to chair  times a day   - Out of bed for meals  times a day  - Out of bed for toileting  - Record patient progress and toleration of activity level   Outcome: Progressing     Problem: DISCHARGE PLANNING  Goal: Discharge to home or other facility with appropriate resources  Description: INTERVENTIONS:  - Identify barriers to discharge w/patient and caregiver  - Arrange for needed discharge resources and transportation as appropriate  - Identify discharge learning needs (meds, wound care, etc.)  - Arrange for interpretive services to assist at discharge as needed  - Refer to Case Management Department for coordinating discharge planning if the patient needs post-hospital services based on physician/advanced practitioner order or complex needs related to functional status, cognitive  ability, or social support system  Outcome: Progressing     Problem: Knowledge Deficit  Goal: Patient/family/caregiver demonstrates understanding of disease process, treatment plan, medications, and discharge instructions  Description: Complete learning assessment and assess knowledge base.  Interventions:  - Provide teaching at level of understanding  - Provide teaching via preferred learning methods  Outcome: Progressing

## 2024-10-16 PROBLEM — G93.40 ACUTE ENCEPHALOPATHY: Status: RESOLVED | Noted: 2024-10-15 | Resolved: 2024-10-16

## 2024-10-16 LAB
ANION GAP SERPL CALCULATED.3IONS-SCNC: 8 MMOL/L (ref 4–13)
BACTERIA UR CULT: NORMAL
BASOPHILS # BLD MANUAL: 0 THOUSAND/UL (ref 0–0.1)
BASOPHILS NFR MAR MANUAL: 0 % (ref 0–1)
BUN SERPL-MCNC: 17 MG/DL (ref 5–25)
CALCIUM SERPL-MCNC: 8.6 MG/DL (ref 8.4–10.2)
CHLORIDE SERPL-SCNC: 109 MMOL/L (ref 96–108)
CO2 SERPL-SCNC: 25 MMOL/L (ref 21–32)
CREAT SERPL-MCNC: 0.92 MG/DL (ref 0.6–1.3)
EOSINOPHIL # BLD MANUAL: 0 THOUSAND/UL (ref 0–0.4)
EOSINOPHIL NFR BLD MANUAL: 0 % (ref 0–6)
ERYTHROCYTE [DISTWIDTH] IN BLOOD BY AUTOMATED COUNT: 15.9 % (ref 11.6–15.1)
FLUAV RNA RESP QL NAA+PROBE: NEGATIVE
FLUBV RNA RESP QL NAA+PROBE: NEGATIVE
GFR SERPL CREATININE-BSD FRML MDRD: 57 ML/MIN/1.73SQ M
GLUCOSE SERPL-MCNC: 104 MG/DL (ref 65–140)
GLUCOSE SERPL-MCNC: 126 MG/DL (ref 65–140)
GLUCOSE SERPL-MCNC: 148 MG/DL (ref 65–140)
GLUCOSE SERPL-MCNC: 163 MG/DL (ref 65–140)
GLUCOSE SERPL-MCNC: 164 MG/DL (ref 65–140)
GLUCOSE SERPL-MCNC: 166 MG/DL (ref 65–140)
HCT VFR BLD AUTO: 37.2 % (ref 34.8–46.1)
HGB BLD-MCNC: 11.8 G/DL (ref 11.5–15.4)
LYMPHOCYTES # BLD AUTO: 27.42 THOUSAND/UL (ref 0.6–4.47)
LYMPHOCYTES # BLD AUTO: 88 % (ref 14–44)
MCH RBC QN AUTO: 32.7 PG (ref 26.8–34.3)
MCHC RBC AUTO-ENTMCNC: 31.7 G/DL (ref 31.4–37.4)
MCV RBC AUTO: 103 FL (ref 82–98)
MONOCYTES # BLD AUTO: 0 THOUSAND/UL (ref 0–1.22)
MONOCYTES NFR BLD: 0 % (ref 4–12)
MRSA NOSE QL CULT: NORMAL
NEUTROPHILS # BLD MANUAL: 3.74 THOUSAND/UL (ref 1.85–7.62)
NEUTS SEG NFR BLD AUTO: 12 % (ref 43–75)
PLATELET # BLD AUTO: 145 THOUSANDS/UL (ref 149–390)
PLATELET BLD QL SMEAR: ABNORMAL
PMV BLD AUTO: 13.5 FL (ref 8.9–12.7)
POTASSIUM SERPL-SCNC: 3.5 MMOL/L (ref 3.5–5.3)
RBC # BLD AUTO: 3.61 MILLION/UL (ref 3.81–5.12)
RBC MORPH BLD: NORMAL
RSV RNA RESP QL NAA+PROBE: NEGATIVE
SARS-COV-2 RNA RESP QL NAA+PROBE: NEGATIVE
SMUDGE CELLS BLD QL SMEAR: PRESENT
SODIUM SERPL-SCNC: 142 MMOL/L (ref 135–147)
WBC # BLD AUTO: 31.16 THOUSAND/UL (ref 4.31–10.16)

## 2024-10-16 PROCEDURE — 80048 BASIC METABOLIC PNL TOTAL CA: CPT | Performed by: STUDENT IN AN ORGANIZED HEALTH CARE EDUCATION/TRAINING PROGRAM

## 2024-10-16 PROCEDURE — 85007 BL SMEAR W/DIFF WBC COUNT: CPT | Performed by: STUDENT IN AN ORGANIZED HEALTH CARE EDUCATION/TRAINING PROGRAM

## 2024-10-16 PROCEDURE — 85027 COMPLETE CBC AUTOMATED: CPT | Performed by: STUDENT IN AN ORGANIZED HEALTH CARE EDUCATION/TRAINING PROGRAM

## 2024-10-16 PROCEDURE — 82948 REAGENT STRIP/BLOOD GLUCOSE: CPT

## 2024-10-16 PROCEDURE — 99232 SBSQ HOSP IP/OBS MODERATE 35: CPT | Performed by: STUDENT IN AN ORGANIZED HEALTH CARE EDUCATION/TRAINING PROGRAM

## 2024-10-16 RX ORDER — LOSARTAN POTASSIUM 50 MG/1
50 TABLET ORAL ONCE
Status: COMPLETED | OUTPATIENT
Start: 2024-10-16 | End: 2024-10-16

## 2024-10-16 RX ORDER — LOSARTAN POTASSIUM 50 MG/1
100 TABLET ORAL DAILY
Status: DISCONTINUED | OUTPATIENT
Start: 2024-10-17 | End: 2024-10-17 | Stop reason: HOSPADM

## 2024-10-16 RX ADMIN — ATORVASTATIN CALCIUM 20 MG: 40 TABLET, FILM COATED ORAL at 09:46

## 2024-10-16 RX ADMIN — METOPROLOL TARTRATE 25 MG: 25 TABLET, FILM COATED ORAL at 09:46

## 2024-10-16 RX ADMIN — ENOXAPARIN SODIUM 40 MG: 40 INJECTION SUBCUTANEOUS at 09:46

## 2024-10-16 RX ADMIN — LOSARTAN POTASSIUM 50 MG: 50 TABLET, FILM COATED ORAL at 09:46

## 2024-10-16 RX ADMIN — AMOXICILLIN AND CLAVULANATE POTASSIUM 1 TABLET: 875; 125 TABLET, FILM COATED ORAL at 13:14

## 2024-10-16 RX ADMIN — METOPROLOL TARTRATE 25 MG: 25 TABLET, FILM COATED ORAL at 22:02

## 2024-10-16 RX ADMIN — LOSARTAN POTASSIUM 50 MG: 50 TABLET, FILM COATED ORAL at 14:11

## 2024-10-16 RX ADMIN — INSULIN LISPRO 1 UNITS: 100 INJECTION, SOLUTION INTRAVENOUS; SUBCUTANEOUS at 11:45

## 2024-10-16 RX ADMIN — INSULIN LISPRO 1 UNITS: 100 INJECTION, SOLUTION INTRAVENOUS; SUBCUTANEOUS at 17:30

## 2024-10-16 RX ADMIN — AMOXICILLIN AND CLAVULANATE POTASSIUM 1 TABLET: 875; 125 TABLET, FILM COATED ORAL at 22:02

## 2024-10-16 RX ADMIN — INSULIN GLARGINE 20 UNITS: 100 INJECTION, SOLUTION SUBCUTANEOUS at 22:02

## 2024-10-16 RX ADMIN — PIPERACILLIN SODIUM AND TAZOBACTAM SODIUM 4.5 G: 36; 4.5 INJECTION, POWDER, LYOPHILIZED, FOR SOLUTION INTRAVENOUS at 04:59

## 2024-10-16 RX ADMIN — ACETAMINOPHEN 650 MG: 325 TABLET ORAL at 14:53

## 2024-10-16 NOTE — ASSESSMENT & PLAN NOTE
"Presented on 10/14/2024 with increased lethargy, AMS and reported abdominal pain.   CT A/P: \"acute diverticulitis of the sigmoid colon.\"  Received 1 dose of IV Zosyn in the ED which will be continued on admission given noted allergies to cephalosporin as well as cipro.  Discontinue Zosyn and transition patient to Augmentin.  We will monitor for any signs of allergic reaction.  Patient tolerated Zosyn well.  Advance to low residue diet  Vital signs stable, so suspect clinical improvement, patient still does not meet SIRS/sepsis criteria.  She does have an elevated white count which is chronic given history of CLL and is at her baseline.  "

## 2024-10-16 NOTE — ASSESSMENT & PLAN NOTE
Presented with altered mental status, increased lethargy 2/2 infection  Today, 10/16/2024, appears to be back at her mental baseline  CTH without acute findings.   ABX  Fall precautions.   PT/OT eval pending

## 2024-10-16 NOTE — CASE MANAGEMENT
Case Management Assessment & Discharge Planning Note    Patient name Gia Vaughan  Location W /W -01 MRN [id number]  : 1940 Date 10/16/2024       Current Admission Date: 10/14/2024  Current Admission Diagnosis:Acute diverticulitis   Patient Active Problem List    Diagnosis Date Noted Date Diagnosed    Acute diverticulitis 10/15/2024     Acute encephalopathy 10/15/2024     Abnormal urinalysis 10/15/2024     Metabolic encephalopathy 10/15/2024     Diarrhea 2024     COVID 2024     Headache 2024     Hypertensive urgency 2024     Dizziness 2024     Suspected deep tissue injury of unknown depth 2022     Acute postoperative pain of right knee 2022     Encephalopathy 10/24/2022     Deep tissue injury 10/21/2022     Orthostasis 10/19/2022     H/O dizziness 10/19/2022     CLL (chronic lymphocytic leukemia) (Formerly [facility]) 10/13/2022     Intertrochanteric fracture of right femur (Formerly [facility]) 10/11/2022     Displaced fracture of middle phalanx of left ring finger, initial encounter for closed fracture 10/11/2022     Ambulatory dysfunction 2021     Moderate dementia (Formerly [facility]) 2021     Lymphadenopathy 2020     Elevated liver enzymes 2020     Fall 2020     CKD (chronic kidney disease) stage 3, GFR 30-59 ml/min (Formerly [facility]) 2020     HLD (hyperlipidemia) 2020     OA (osteoarthritis) 2020     Chronic systolic heart failure (Formerly [facility]) 2020     Type 2 diabetes mellitus with stage 3 chronic kidney disease, unspecified whether long term insulin use, unspecified whether stage 3a or 3b CKD (Formerly [facility])        LOS (days): 1  Geometric Mean LOS (GMLOS) (days): 2.5  Days to GMLOS:1.7     OBJECTIVE:    Risk of Unplanned Readmission Score: 16.16         Current admission status: Inpatient       Preferred Pharmacy:   UNKNOWN - FOLLOW UP PRIOR TO DISCHARGE TO E-PRESCRIBE  No address on file      [id number] - [place] - [address]  [address]  [place] [address]  Phone: [phone] Fax: [phone]    [facility]/pharmacy #[id number] - [place] - [address]  [facility]  [place] [address]  Phone: [phone] Fax: [phone]    Primary Care Provider: Chayo Powell MD    Primary Insurance: MEDICARE  Secondary Insurance: [place] [employer]    ASSESSMENT:  Active Health Care Proxies    There are no active Health Care Proxies on file.                 Readmission Root Cause  30 Day Readmission: No    Patient Information  Admitted from:: Facility  Mental Status: Confused  During Assessment patient was accompanied by: Not accompanied during assessment  Assessment information provided by:: Other - please comment (Diane III)  Support Systems: Family members, Other (Comment) (facility staff)  Home entry access options. Select all that apply.: No steps to enter home  Type of Current Residence: Facility  Upon entering residence, is there a bedroom on the main floor (no further steps)?: Yes  Upon entering residence, is there a bathroom on the main floor (no further steps)?: Yes  Living Arrangements: Lives in Facility  Is patient a ?: No    Activities of Daily Living Prior to Admission  Functional Status: Assistance  Completes ADLs independently?: No  Level of ADL dependence: Assistance  Ambulates independently?: Yes  Does patient use assisted devices?: Yes  Assisted Devices (DME) used: Rollator  Does patient currently own DME?: Yes  What DME does the patient currently own?: Rollator  Does patient have a history of Outpatient Therapy (PT/OT)?: No  Does the patient have a history of Short-Term Rehab?: Yes  Does patient have a history of HHC?: Yes  Does patient currently have HHC?: No         Patient Information Continued  Income Source: Pension/detention  Does patient have prescription coverage?: Yes  Does patient receive dialysis treatments?: No  Does patient have a history of substance abuse?: No         Means of Transportation  Means  of Transport to Appts:: Other (Comment) (Facility transport)      Social Determinants of Health (SDOH)      Flowsheet Row Most Recent Value   Housing Stability    In the last 12 months, was there a time when you were not able to pay the mortgage or rent on time? N   In the past 12 months, how many times have you moved where you were living? 0   At any time in the past 12 months, were you homeless or living in a shelter (including now)? N   Transportation Needs    In the past 12 months, has lack of transportation kept you from medical appointments or from getting medications? no   In the past 12 months, has lack of transportation kept you from meetings, work, or from getting things needed for daily living? No   Food Insecurity    Within the past 12 months, you worried that your food would run out before you got the money to buy more. Never true   Within the past 12 months, the food you bought just didn't last and you didn't have money to get more. Never true   Utilities    In the past 12 months has the electric, gas, oil, or water company threatened to shut off services in your home? No            DISCHARGE DETAILS:    Discharge planning discussed with:: Diane SLOAN  [place] of Choice: Yes  Comments - Freedom of Choice: CM spoke with nursing at [place], patient is assist with ADLs, uses a rollator walker, no O2, not currently in therapy. Facility would like to see how patient does with therapy. CM will follow  CM contacted family/caregiver?: Yes  Were Treatment Team discharge recommendations reviewed with patient/caregiver?: Yes  Did patient/caregiver verbalize understanding of patient care needs?: N/A- going to facility  Were patient/caregiver advised of the risks associated with not following Treatment Team discharge recommendations?: Yes    Contacts  Patient Contacts: Charles  Relationship to Patient:: Family  Contact Method: Phone  Phone Number: [phone]  Reason/Outcome: Discharge Planning              Other  Referral/Resources/Interventions Provided:  Referral Comments: Therapy evals pending

## 2024-10-16 NOTE — ASSESSMENT & PLAN NOTE
Lab Results   Component Value Date    EGFR 57 10/16/2024    EGFR 58 10/15/2024    EGFR 45 10/14/2024    CREATININE 0.92 10/16/2024    CREATININE 0.90 10/15/2024    CREATININE 1.12 10/14/2024   Cr at baseline.

## 2024-10-16 NOTE — QUICK NOTE
Hypoxia   84% on room air, now requiring 3 liters nasal cannula  EF 45% grade II diastolic dysfunction  Stat cxr  Aspiration precautions, dysphagia eval   Covid/flu/rsv

## 2024-10-16 NOTE — ASSESSMENT & PLAN NOTE
Urine with 30-50 WBC, 4-10 RBC but no bacteria seen and nitrite negative.   Follow-up on urine culture.   Will broaden antibiotics if needed to cover for any bugs in urine

## 2024-10-16 NOTE — ASSESSMENT & PLAN NOTE
Wt Readings from Last 3 Encounters:   10/16/24 63.2 kg (139 lb 5.3 oz)   09/06/24 68.5 kg (151 lb 0.2 oz)   05/31/24 68 kg (150 lb)     Still appears euvolemic.  IVF discontinued on 10/15/2024  EF 45%, G2DD on last echo 4/2024

## 2024-10-16 NOTE — PLAN OF CARE
Problem: Potential for Falls  Goal: Patient will remain free of falls  Description: INTERVENTIONS:  - Educate patient/family on patient safety including physical limitations  - Instruct patient to call for assistance with activity   - Consult OT/PT to assist with strengthening/mobility   - Keep Call bell within reach  - Keep bed low and locked with side rails adjusted as appropriate  - Keep care items and personal belongings within reach  - Initiate and maintain comfort rounds  - Make Fall Risk Sign visible to staff  - Offer Toileting every  Hours, in advance of need  - Initiate/Maintain alarm  - Obtain necessary fall risk management equipment:   - Apply yellow socks and bracelet for high fall risk patients  - Consider moving patient to room near nurses station  Outcome: Progressing     Problem: Prexisting or High Potential for Compromised Skin Integrity  Goal: Skin integrity is maintained or improved  Description: INTERVENTIONS:  - Identify patients at risk for skin breakdown  - Assess and monitor skin integrity  - Assess and monitor nutrition and hydration status  - Monitor labs   - Assess for incontinence   - Turn and reposition patient  - Assist with mobility/ambulation  - Relieve pressure over bony prominences  - Avoid friction and shearing  - Provide appropriate hygiene as needed including keeping skin clean and dry  - Evaluate need for skin moisturizer/barrier cream  - Collaborate with interdisciplinary team   - Patient/family teaching  - Consider wound care consult   Outcome: Progressing     Problem: PAIN - ADULT  Goal: Verbalizes/displays adequate comfort level or baseline comfort level  Description: Interventions:  - Encourage patient to monitor pain and request assistance  - Assess pain using appropriate pain scale  - Administer analgesics based on type and severity of pain and evaluate response  - Implement non-pharmacological measures as appropriate and evaluate response  - Consider cultural and  social influences on pain and pain management  - Notify physician/advanced practitioner if interventions unsuccessful or patient reports new pain  Outcome: Progressing     Problem: INFECTION - ADULT  Goal: Absence or prevention of progression during hospitalization  Description: INTERVENTIONS:  - Assess and monitor for signs and symptoms of infection  - Monitor lab/diagnostic results  - Monitor all insertion sites, i.e. indwelling lines, tubes, and drains  - Monitor endotracheal if appropriate and nasal secretions for changes in amount and color  - [place] appropriate cooling/warming therapies per order  - Administer medications as ordered  - Instruct and encourage patient and family to use good hand hygiene technique  - Identify and instruct in appropriate isolation precautions for identified infection/condition  Outcome: Progressing  Goal: Absence of fever/infection during neutropenic period  Description: INTERVENTIONS:  - Monitor WBC    Outcome: Progressing     Problem: SAFETY ADULT  Goal: Patient will remain free of falls  Description: INTERVENTIONS:  - Educate patient/family on patient safety including physical limitations  - Instruct patient to call for assistance with activity   - Consult OT/PT to assist with strengthening/mobility   - Keep Call bell within reach  - Keep bed low and locked with side rails adjusted as appropriate  - Keep care items and personal belongings within reach  - Initiate and maintain comfort rounds  - Make Fall Risk Sign visible to staff  - Offer Toileting every  Hours, in advance of need  - Initiate/Maintain alarm  - Obtain necessary fall risk management equipment:   - Apply yellow socks and bracelet for high fall risk patients  - Consider moving patient to room near nurses station  Outcome: Progressing  Goal: Maintain or return to baseline ADL function  Description: INTERVENTIONS:  -  Assess patient's ability to carry out ADLs; assess patient's baseline for ADL function and  identify physical deficits which impact ability to perform ADLs (bathing, care of mouth/teeth, toileting, grooming, dressing, etc.)  - Assess/evaluate cause of self-care deficits   - Assess range of motion  - Assess patient's mobility; develop plan if impaired  - Assess patient's need for assistive devices and provide as appropriate  - Encourage maximum independence but intervene and supervise when necessary  - Involve family in performance of ADLs  - Assess for home care needs following discharge   - Consider OT consult to assist with ADL evaluation and planning for discharge  - Provide patient education as appropriate  Outcome: Progressing  Goal: Maintains/Returns to pre admission functional level  Description: INTERVENTIONS:  - Perform AM-PAC 6 Click Basic Mobility/ Daily Activity assessment daily.  - Set and communicate daily mobility goal to care team and patient/family/caregiver.   - Collaborate with rehabilitation services on mobility goals if consulted  - Perform Range of Motion  times a day.  - Reposition patient every  hours.  - Dangle patient  times a day  - Stand patient  times a day  - Ambulate patient  times a day  - Out of bed to chair  times a day   - Out of bed for meals times a day  - Out of bed for toileting  - Record patient progress and toleration of activity level   Outcome: Progressing

## 2024-10-16 NOTE — ASSESSMENT & PLAN NOTE
-Patient on 50 Mg p.o. daily losartan and 25 Mg p.o. metoprolol daily as outpatient  -Still having spikes in BP, with SBP > 160  -Increase losartan to 100 mg p.o. daily  -Patient has documented allergy to CCB amlodipine

## 2024-10-16 NOTE — ASSESSMENT & PLAN NOTE
Lab Results   Component Value Date    HGBA1C 9.5 (H) 09/01/2024       Recent Labs     10/15/24  1518 10/15/24  2153 10/16/24  0719 10/16/24  1108   POCGLU 96 305* 104 164*       Blood Sugar Average: Last 72 hrs:  (P) 153.1323884468123651  Continue outpatient insulin regimen with Lantus 20 units HS and 5 with meals TID

## 2024-10-16 NOTE — PROGRESS NOTES
"Progress Note - Hospitalist   Name: Gia Vaughan 83 y.o. female I MRN: [id number]  Unit/Bed#: W -01 I Date of Admission: 10/14/2024   Date of Service: 10/16/2024 I Hospital Day: 1    Assessment & Plan  Acute diverticulitis  Presented on 10/14/2024 with increased lethargy, AMS and reported abdominal pain.   CT A/P: \"acute diverticulitis of the sigmoid colon.\"  Received 1 dose of IV Zosyn in the ED which will be continued on admission given noted allergies to cephalosporin as well as cipro.  Discontinue Zosyn and transition patient to Augmentin.  We will monitor for any signs of allergic reaction.  Patient tolerated Zosyn well.  Advance to low residue diet  Vital signs stable, so suspect clinical improvement, patient still does not meet SIRS/sepsis criteria.  She does have an elevated white count which is chronic given history of CLL and is at her baseline.  Acute encephalopathy (Resolved: 10/16/2024)    Metabolic encephalopathy  Presented with altered mental status, increased lethargy 2/2 infection  Today, 10/16/2024, appears to be back at her mental baseline  CTH without acute findings.   ABX  Fall precautions.   PT/OT eval pending  Abnormal urinalysis  Urine with 30-50 WBC, 4-10 RBC but no bacteria seen and nitrite negative.   Follow-up on urine culture.   Will broaden antibiotics if needed to cover for any bugs in urine  Type 2 diabetes mellitus with stage 3 chronic kidney disease, unspecified whether long term insulin use, unspecified whether stage 3a or 3b CKD (Former[facility])  Lab Results   Component Value Date    HGBA1C 9.5 (H) 09/01/2024       Recent Labs     10/15/24  1518 10/15/24  2153 10/16/24  0719 10/16/24  1108   POCGLU 96 305* 104 164*       Blood Sugar Average: Last 72 hrs:  (P) 153.4403493977581718  Continue outpatient insulin regimen with Lantus 20 units HS and 5 with meals TID  CKD (chronic kidney disease) stage 3, GFR 30-59 ml/min (Formerly [facility])  Lab Results   Component Value Date    EGFR 57 10/16/2024    " EGFR 58 10/15/2024    EGFR 45 10/14/2024    CREATININE 0.92 10/16/2024    CREATININE 0.90 10/15/2024    CREATININE 1.12 10/14/2024   Cr at baseline.     Chronic systolic heart failure (HCC)  Wt Readings from Last 3 Encounters:   10/16/24 63.2 kg (139 lb 5.3 oz)   09/06/24 68.5 kg (151 lb 0.2 oz)   05/31/24 68 kg (150 lb)     Still appears euvolemic.  IVF discontinued on 10/15/2024  EF 45%, G2DD on last echo 4/2024    CLL (chronic lymphocytic leukemia) (HCC)  Follows with hematology as an outpatient. WBC elevated but stable.   Primary hypertension  -Patient on 50 Mg p.o. daily losartan and 25 Mg p.o. metoprolol daily as outpatient  -Still having spikes in BP, with SBP > 160  -Increase losartan to 100 mg p.o. daily  -Patient has documented allergy to CCB amlodipine    VTE Pharmacologic Prophylaxis: VTE Score: 5 High Risk (Score >/= 5) - Pharmacological DVT Prophylaxis Ordered: enoxaparin (Lovenox). Sequential Compression Devices Ordered.    Mobility:   Basic Mobility Inpatient Raw Score: 24  JH-HLM Goal: 8: Walk 250 feet or more  JH-HLM Achieved: 8: Walk 250 feet ot more  JH-HLM Goal achieved. Continue to encourage appropriate mobility.    Patient Centered Rounds: I performed bedside rounds with nursing staff today.   Discussions with Specialists or Other Care Team Provider: None    Education and Discussions with Family / Patient:  Will attempt to call nephew.     Current Length of Stay: 1 day(s)  Current Patient Status: Inpatient   Certification Statement: The patient will continue to require additional inpatient hospital stay due to PT/OT evaluation  Discharge Plan: Anticipate discharge tomorrow to prior assisted or independent living facility.    Code Status: Level 1 - Full Code    Subjective   Patient denies pain.  She says she is breathing comfortably.  Detailed ROS is prohibited by patient's dementia.  Required supplemental oxygen overnight for desaturation to 84%.  Patient had IV fluids stopped, chest x-ray  obtained which was negative, and placed on supplemental oxygen.  Today she is breathing well on room air, maintaining saturations > 94%.     Objective :  Temp:  [98 °F (36.7 °C)-98.4 °F (36.9 °C)] 98.4 °F (36.9 °C)  HR:  [57-78] 70  BP: (111-170)/(48-74) 170/70  Resp:  [18] 18  SpO2:  [88 %-99 %] 99 %  O2 Device: None (Room air)    Body mass index is 22.49 kg/m².     Input and Output Summary (last 24 hours):   No intake or output data in the 24 hours ending 10/16/24 1328    Physical Exam  Constitutional:       Comments: More perky and alert today.   Cardiovascular:      Rate and Rhythm: Normal rate and regular rhythm.      Heart sounds: No murmur heard.  Pulmonary:      Effort: Pulmonary effort is normal. No respiratory distress.      Breath sounds: Rales present. No wheezing.      Comments: Left lower lobe crackles audible today  Abdominal:      Palpations: Abdomen is soft. There is no mass.      Tenderness: There is no guarding or rebound.      Hernia: No hernia is present.      Comments: No abdominal distention, tenderness to deep palpation in the suprapubic and right lower quadrant.   Neurological:      Mental Status: She is alert.      Comments: Oriented to person only           Lines/Drains:              Lab Results: I have reviewed the following results:   Results from last 7 days   Lab Units 10/16/24  0602   WBC Thousand/uL 31.16*   HEMOGLOBIN g/dL 11.8   HEMATOCRIT % 37.2   PLATELETS Thousands/uL 145*   LYMPHO PCT % 88*   MONO PCT % 0*   EOS PCT % 0     Results from last 7 days   Lab Units 10/16/24  0602 10/15/24  0432 10/14/24  2157   SODIUM mmol/L 142   < > 131*   POTASSIUM mmol/L 3.5   < > 4.5   CHLORIDE mmol/L 109*   < > 99   CO2 mmol/L 25   < > 25   BUN mg/dL 17   < > 21   CREATININE mg/dL 0.92   < > 1.12   ANION GAP mmol/L 8   < > 7   CALCIUM mg/dL 8.6   < > 9.3   ALBUMIN g/dL  --   --  3.7   TOTAL BILIRUBIN mg/dL  --   --  0.83   ALK PHOS U/L  --   --  100   ALT U/L  --   --  16   AST U/L  --   --   14   GLUCOSE RANDOM mg/dL 126   < > 395*    < > = values in this interval not displayed.     Results from last 7 days   Lab Units 10/14/24  2157   INR  1.01     Results from last 7 days   Lab Units 10/16/24  1108 10/16/24  0719 10/15/24  2153 10/15/24  1518 10/15/24  1129 10/15/24  0914 10/15/24  0757 10/15/24  0259 10/14/24  2158   POC GLUCOSE mg/dl 164* 104 305* 96 85 88 57* 154* 328*         Results from last 7 days   Lab Units 10/14/24  2305   LACTIC ACID mmol/L 1.2       Recent Cultures (last 7 days):   Results from last 7 days   Lab Units 10/14/24  2346 10/14/24  2330 10/14/24  2230   BLOOD CULTURE   --  No Growth at 24 hrs. No Growth at 24 hrs.   URINE CULTURE  >100,000 cfu/ml  --   --              Last 24 Hours Medication List:     Current Facility-Administered Medications:     acetaminophen (TYLENOL) tablet 650 mg, Q6H PRN    amoxicillin-clavulanate (AUGMENTIN) 875-125 mg per tablet 1 tablet, Q12H TESSA    atorvastatin (LIPITOR) tablet 20 mg, Daily    enoxaparin (LOVENOX) subcutaneous injection 40 mg, Daily    insulin glargine (LANTUS) subcutaneous injection 20 Units 0.2 mL, HS    insulin lispro (HumALOG/ADMELOG) 100 units/mL subcutaneous injection 1-5 Units, TID AC **AND** Fingerstick Glucose (POCT), TID AC    insulin lispro (HumALOG/ADMELOG) 100 units/mL subcutaneous injection 1-5 Units, HS    [START ON 10/17/2024] losartan (COZAAR) tablet 100 mg, Daily    losartan (COZAAR) tablet 50 mg, Once    metoprolol tartrate (LOPRESSOR) tablet 25 mg, Q12H TESSA    Administrative Statements   Today, Patient Was Seen By: Sherrie Sinclair MD      **Please Note: This note may have been constructed using a voice recognition system.**

## 2024-10-17 VITALS
WEIGHT: 139.55 LBS | RESPIRATION RATE: 18 BRPM | TEMPERATURE: 97.3 F | HEART RATE: 54 BPM | DIASTOLIC BLOOD PRESSURE: 73 MMHG | SYSTOLIC BLOOD PRESSURE: 149 MMHG | OXYGEN SATURATION: 96 % | HEIGHT: 66 IN | BODY MASS INDEX: 22.43 KG/M2

## 2024-10-17 LAB
GLUCOSE SERPL-MCNC: 152 MG/DL (ref 65–140)
GLUCOSE SERPL-MCNC: 80 MG/DL (ref 65–140)

## 2024-10-17 PROCEDURE — 97163 PT EVAL HIGH COMPLEX 45 MIN: CPT

## 2024-10-17 PROCEDURE — 82948 REAGENT STRIP/BLOOD GLUCOSE: CPT

## 2024-10-17 PROCEDURE — 97167 OT EVAL HIGH COMPLEX 60 MIN: CPT

## 2024-10-17 PROCEDURE — 99238 HOSP IP/OBS DSCHRG MGMT 30/<: CPT | Performed by: STUDENT IN AN ORGANIZED HEALTH CARE EDUCATION/TRAINING PROGRAM

## 2024-10-17 RX ORDER — LOSARTAN POTASSIUM 100 MG/1
100 TABLET ORAL DAILY
Status: SHIPPED
Start: 2024-10-17 | End: 2024-11-16

## 2024-10-17 RX ADMIN — INSULIN LISPRO 1 UNITS: 100 INJECTION, SOLUTION INTRAVENOUS; SUBCUTANEOUS at 11:17

## 2024-10-17 RX ADMIN — ACETAMINOPHEN 650 MG: 325 TABLET ORAL at 02:03

## 2024-10-17 RX ADMIN — AMOXICILLIN AND CLAVULANATE POTASSIUM 1 TABLET: 875; 125 TABLET, FILM COATED ORAL at 10:31

## 2024-10-17 RX ADMIN — LOSARTAN POTASSIUM 100 MG: 50 TABLET, FILM COATED ORAL at 10:30

## 2024-10-17 RX ADMIN — ENOXAPARIN SODIUM 40 MG: 40 INJECTION SUBCUTANEOUS at 10:31

## 2024-10-17 RX ADMIN — ATORVASTATIN CALCIUM 20 MG: 40 TABLET, FILM COATED ORAL at 10:30

## 2024-10-17 RX ADMIN — METOPROLOL TARTRATE 25 MG: 25 TABLET, FILM COATED ORAL at 10:30

## 2024-10-17 NOTE — OCCUPATIONAL THERAPY NOTE
"    Occupational Therapy Evaluation     Patient Name: Gia Vaughan  Today's Date: 10/17/2024  Problem List  Principal Problem:    Acute diverticulitis  Active Problems:    Type 2 diabetes mellitus with stage 3 chronic kidney disease, unspecified whether long term insulin use, unspecified whether stage 3a or 3b CKD (HCC)    CKD (chronic kidney disease) stage 3, GFR 30-59 ml/min (HCC)    Chronic systolic heart failure (HCC)    Primary hypertension    CLL (chronic lymphocytic leukemia) (HCC)    Abnormal urinalysis    Metabolic encephalopathy    Past Medical History  Past Medical History:   Diagnosis Date    Acute encephalopathy 10/15/2024    Benign adenomatous polyp of large intestine     CHF (congestive heart failure) (HCC)     Diabetes mellitus (HCC)     Hyperlipemia     Hypertension     Osteoarthritis     TIA (transient ischemic attack)      Past Surgical History  Past Surgical History:   Procedure Laterality Date    APPENDECTOMY      CARPAL TUNNEL RELEASE Right     HYSTERECTOMY W/ SALPINGO-OOPHERECTOMY      NM OPTX FEM SHFT FX W/INSJ IMED IMPLT W/WO SCREW Right 10/12/2022    Procedure: INSERTION NAIL IM FEMUR ANTEGRADE (TROCHANTERIC);  Surgeon: Eric Isaacs DO;  [facility];  Service: Orthopedics           10/17/24 0934   OT Last Visit   OT Visit Date 10/17/24   Note Type   Note type Evaluation   Pain Assessment   Pain Assessment Tool 0-10   Pain Score 8   Pain Location/Orientation Location: Back  (\"spine\")   Pain Onset/Description Onset: Ongoing;Frequency: Intermittent   Effect of Pain on Daily Activities comfort, activity tolerance   Hospital Pain Intervention(s) Ambulation/increased activity;Emotional support;Repositioned   Restrictions/Precautions   Weight Bearing Precautions Per Order No   Other Precautions Bed Alarm;Chair Alarm;Cognitive;Fall Risk;Pain   Home Living   Type of Home   ([facility])   Home Layout One level;Access;Performs ADLs on one level;Able to live on main level with " bedroom/bathroom   Home Equipment Walker  (rollator walker)   Prior Function   Level of [place] Independent with functional mobility  (A with ADLs per CM, unknown level of assistance)   Lives With Facility staff   Receives Help From Personal care attendant  (staff)   IADLs Family/Friend/Other provides meals;Family/Friend/Other provides transportation;Family/Friend/Other provides medication management   Falls in the last 6 months 0  (pt denies, none per EMR)   Vocational Retired   Comments pt is a questionable historian. Per CM, pt ambulates with rollator, unclear distances. Requires assistance c ADLs but unknown levle of assist   Lifestyle   Autonomy PTA, pt is (I) c ADLs, Mod (I) c rollator. Lives at [facility]. (-) drives (-) falls   Reciprocal Relationships staff, family   Service to Others retired   General   Additional Pertinent History Pt admitted d/t acute diverticulitis, acute encephalopathy, UTI, AMS. hx of moderate dementia and CLL.   Family/Caregiver Present No   Subjective   Subjective Pt pleasantly confused throughout session   ADL   Where Assessed Edge of bed  (vs recliner)   Eating Assistance 6  Modified independent   Grooming Assistance 5  Supervision/Setup   UB Bathing Assistance 5  Supervision/Setup   LB Bathing Assistance 4  Minimal Assistance   UB Dressing Assistance 4  Minimal Assistance   LB Dressing Assistance 4  Minimal Assistance   LB Dressing Deficit Requires assistive device for steadying;Setup;Increased time to complete;Supervision/safety;Verbal cueing  (thread BLE into underwear, pulls over hips c increased time, cueing, close supervision.)   Toileting Assistance  4  Minimal Assistance   Functional Assistance 4  Minimal Assistance   Additional Comments overall limited by standing balance and safety awareness.   Bed Mobility   Supine to Sit 5  Supervision   Additional items HOB elevated;Increased time required   Sit to Supine   (seated OOB in recliner at end of session)    Additional Comments initial  lightheadedness c positional changes, improves c sitting rest break.   Transfers   Sit to Stand 5  Supervision   Additional items Increased time required;Verbal cues   Stand to Sit 5  Supervision   Additional items Increased time required;Verbal cues   Additional Comments VC for hand placements during transfers. sit<>stand x 2 completed. Cues for RW management during approach to sitting surface   Functional Mobility   Functional Mobility 4  Minimal assistance   Additional Comments household distance, increased time required. 1 knee buckle noted with associated small LOB.   Additional items Rolling walker   Balance   Static Sitting Fair +   Dynamic Sitting Fair   Static Standing Fair -   Dynamic Standing Poor +   Activity Tolerance   Activity Tolerance Patient tolerated treatment well   Medical Staff Made Aware Care coorindation c PT Jaime. CM Edilia   Nurse Made Aware RN pre/post   RUE Assessment   RUE Assessment WFL  (MMT 4/5 based on functional assessment)   LUE Assessment   LUE Assessment WFL  (MMT 4/5 based on functional assessment)   Hand Function   Gross Motor Coordination Functional   Fine Motor Coordination Functional   Sensation   Light Touch No apparent deficits   Vision-Basic Assessment   Current Vision Wears glasses all the time   Cognition   Overall Cognitive Status WFL   Arousal/Participation Alert;Cooperative   Attention Attends with cues to redirect   Orientation Level Oriented to person;Disoriented to situation;Disoriented to time;Disoriented to place   Memory Decreased recall of precautions;Decreased short term memory;Decreased recall of recent events   Following Commands Follows one step commands with increased time or repetition   Comments Pleasantly confused throughout session,  poor insight to deficits and intermittent confusion with recall of PLOF and home set up. Dx of moderate dementia at baseline   Assessment   Limitation Decreased ADL status;Decreased UE  strength;Decreased cognition;Decreased Safe judgement during ADL;Decreased endurance;Decreased self-care trans;Decreased high-level ADLs  (trunk management , standing tolerance, funcitonal reach, safety awareness, insight , executive functioning, global mental status)   Prognosis Good   Assessment Patient is a 83 y.o. female seen for OT evaluation at [facility] following admission on 10/14/2024  s/p Acute diverticulitis. Please see above for comprehensive list of comorbidities and significant PMHx impacting functional performance.  Upon initial evaluation, pt appears to be performing below baseline functional status.   Occupational performance is affected by the following deficits: endurance ,  decreased muscular strength , decreased balance , decreased standing tolerance for self care tasks , decreased activity tolerance , impaired memory , attention to task, and impaired judgement and problem solving . Personal/Environmental factors impacting D/C include: Assistance needed for ADLs and functional mobility and baseline cognitive deficits. Supporting factors include: accessible home environment Patient would benefit from OT services within the acute care setting to maximize level of functional independence in the following areas self-care transfers, functional mobility, and ADLs.  From OT standpoint, recommendation at time of D/C would be Level 3: minimum resource intensity  pending appropriate level of assistance available upon D/C.   Goals   Patient Goals none stated d/t cognitive status   Plan   Treatment Interventions ADL retraining;Functional transfer training;UE strengthening/ROM;Endurance training;Cognitive reorientation;Equipment evaluation/education;Compensatory technique education;Continued evaluation;Fine motor coordination activities;Activityengagement;Neuromuscular reeducation;Patient/family training   Goal Expiration Date 10/27/24   OT Treatment Day 0   OT Frequency 2-3x/wk   Discharge  Recommendation   Rehab Resource Intensity Level, OT III (Minimum Resource Intensity)  (pending appropriate level of assistance available at [facility])   Additional Comments  The patient's raw score on the AM-PAC Daily Activity Inpatient Short Form is 19. A raw score of less than 19 suggests the patient may benefit from discharge to post-acute rehabilitation services. Please refer to the recommendation of the Occupational Therapist for safe discharge planning.   AM-PAC Daily Activity Inpatient   Lower Body Dressing 3   Bathing 3   Toileting 3   Upper Body Dressing 3   Grooming 3   Eating 4   Daily Activity Raw Score 19   Daily Activity Standardized Score (Calc for Raw Score >=11) 40.22   AM-PAC Applied Cognition Inpatient   Following a Speech/Presentation 2   Understanding Ordinary Conversation 3   Taking Medications 1   Remembering Where Things Are Placed or Put Away 2   Remembering List of 4-5 Errands 2   Taking Care of Complicated Tasks 1   Applied Cognition Raw Score 11   Applied Cognition Standardized Score 27.03   End of Consult   Education Provided Yes   Patient Position at End of Consult Bedside chair;All needs within reach;Bed/Chair alarm activated   Nurse Communication Nurse aware of consult   Goals established on initial evaluation in order to achieve goal of maximizing functional independence.      Pt will complete UB ADLs Mod independent   for increased ADL independence within 10 days.     Pt will complete LB ADLs Supervision  for increased ADL independence within 10 days.     Pt will complete toileting Supervision  with use of DME for increased ADL independence within 10 days.     Pt will demonstrate proper body mechanics to complete self-care transfers and functional mobility with Supervision and use of LRAD for increased safety and functional independence within 10 days.     Pt will demonstrate standing tolerance of 4 min for increased activity tolerance during ADL/IADL tasks within 10 days.     Pt will  demonstrate proper body mechanics and fall prevention strategies during 100% of tx sessions for increased safety awareness during ADL/IADLs      Pt will attend to treatment task or activity for 4 minutes without need for redirection to improve activity engagement within 10 days.     Pt will demonstrate OOB sitting tolerance of 2-4 hr/day for increased activity tolerance and engagement in leisure activities within 10 days.     Pt benefited from co-session of skilled OT and PT therapists in order to most appropriately address functional deficits d/t decreased activity tolerance.  OT/PT objectives were addressed separately; please see PT note for specific goal areas targeted.    Edilia Lehman, OT

## 2024-10-17 NOTE — PHYSICAL THERAPY NOTE
Physical Therapy Evaluation    Patient's Name: Gia Vaughan    Admitting Diagnosis  Diverticulitis [K57.92]  UTI (urinary tract infection) [N39.0]  CLL (chronic lymphocytic leukemia) ([facility]) [C91.10]  AMS (altered mental status) [R41.82]    Problem List  Patient Active Problem List   Diagnosis    Fall    Type 2 diabetes mellitus with stage 3 chronic kidney disease, unspecified whether long term insulin use, unspecified whether stage 3a or 3b CKD (HCC)    CKD (chronic kidney disease) stage 3, GFR 30-59 ml/min (HCC)    HLD (hyperlipidemia)    OA (osteoarthritis)    Chronic systolic heart failure (HCC)    Lymphadenopathy    Elevated liver enzymes    Ambulatory dysfunction    Moderate dementia (HCC)    Primary hypertension    Intertrochanteric fracture of right femur (HCC)    Displaced fracture of middle phalanx of left ring finger, initial encounter for closed fracture    CLL (chronic lymphocytic leukemia) ([facility])    Orthostasis    H/O dizziness    Deep tissue injury    Encephalopathy    Acute postoperative pain of right knee    Suspected deep tissue injury of unknown depth    Hypertensive urgency    Dizziness    Headache    COVID    Diarrhea    Acute diverticulitis    Abnormal urinalysis    Metabolic encephalopathy       Past Medical History  Past Medical History:   Diagnosis Date    Acute encephalopathy 10/15/2024    Benign adenomatous polyp of large intestine     CHF (congestive heart failure) (HCC)     Diabetes mellitus (HCC)     Hyperlipemia     Hypertension     Osteoarthritis     TIA (transient ischemic attack)        Past Surgical History  Past Surgical History:   Procedure Laterality Date    APPENDECTOMY      CARPAL TUNNEL RELEASE Right     HYSTERECTOMY W/ SALPINGO-OOPHERECTOMY      IL OPTX FEM SHFT FX W/INSJ IMED IMPLT W/WO SCREW Right 10/12/2022    Procedure: INSERTION NAIL IM FEMUR ANTEGRADE (TROCHANTERIC);  Surgeon: Eric Isaacs DO;  [facility];  Service: Orthopedics          10/17/24 9448    PT Last Visit   PT Visit Date 10/17/24   Note Type   Note type Evaluation   Pain Assessment   Pain Assessment Tool 0-10   Pain Score 8   Pain Location/Orientation Location: Back   Effect of Pain on Daily Activities limits comfort, limits mobility   Hospital Pain Intervention(s) Ambulation/increased activity;Repositioned   Restrictions/Precautions   Other Precautions Cognitive;Chair Alarm;Bed Alarm;Fall Risk   Home Living   Type of Home Other (Comment)  ([facility] [facility])   Home Layout One level   Home Equipment Walker   Prior Function   Lives With Facility staff   Receives Help From Personal care attendant   Falls in the last 6 months 0  (per EMR)   Comments at baseline per CM note, pt ambulates with rollator   General   Family/Caregiver Present No   Cognition   Orientation Level Oriented to person;Disoriented to place;Disoriented to situation;Disoriented to time   Following Commands Follows one step commands with increased time or repetition   Comments pt ID by wristband, name and    Subjective   Subjective pt agreeable to PT eval and OOB mobility   RLE Assessment   RLE Assessment X  (functionally assessed to 3+/5)   LLE Assessment   LLE Assessment X  (functionally assessed to 3+/5)   Bed Mobility   Supine to Sit 5  Supervision   Additional items HOB elevated;Increased time required   Additional Comments endorses light headedness/dizziness at EOB, improves with rest   Transfers   Sit to Stand 5  Supervision   Additional items Increased time required;Verbal cues   Stand to Sit 5  Supervision   Additional items Increased time required;Verbal cues   Additional Comments vc for safe hand placement, pt attempting to pull from RW   Ambulation/Elevation   Gait pattern Decreased foot clearance;Short stride;Forward Flexion;Decreased L stance   Gait Assistance 4  Minimal assist   Additional items Assist x 1;Verbal cues   Assistive Device Rolling walker   Distance 25'x1, 40'x1   Stair Management Assistance Not tested    Ambulation/Elevation Additional Comments soft buckling of R LE during ambulation, requires vc for increased proximity to RW and increased step length   Balance   Static Sitting Fair +   Dynamic Sitting Fair   Static Standing Fair -  (RW)   Dynamic Standing Poor +   Ambulatory Poor +  (RW)   Activity Tolerance   Activity Tolerance Treatment limited secondary to medical complications (Comment)  (cognition)   Medical Staff Made Aware care coordinated with OT 2/2 medical complexity and comorbidites, spoke with CM   Nurse Made Aware RN pre/post   Assessment   Prognosis Fair   Problem List Decreased strength;Decreased endurance;Impaired balance;Decreased mobility   Assessment Pt is a 83 y.o. female seen for PT evaluation s/p admit to [facility] on 10/14/2024. Pt was admitted with a primary dx of: acute diverticulitis, UTI, CLL, AMS.  PT now consulted for assessment of mobility and d/c needs. Pt with Up and OOB as tolerated orders.  Pts current comorbidities and personal factors effecting treatment include: DM II, CKD, OA, heart failure, CLL, TIA, moderate dementia. Pts current clinical presentation is Unstable/Unpredictable (high complexity) due to Ongoing medical management for primary dx, Decreased activity tolerance compared to baseline, Fall risk, Increased assistance needed from caregiver at current time, Cog status. Prior to admission, pt was independent with use of RW. Upon evaluation, pt currently is requiring Supervision for bed mobility; Supervision for transfers and Roxanna for ambulation 40 ft w/ RW. Pt presents at PT eval functioning below baseline and currently w/ overall mobility deficits 2* to: BLE weakness, impaired balance, gait deviations, decreased activity tolerance compared to baseline, decreased functional mobility tolerance compared to baseline, decreased safety awareness, impaired judgement, fall risk, decreased cognition. Pt currently at a fall risk 2* to impairments listed above.  Pt will  continue to benefit from skilled acute PT interventions to address stated impairments; to maximize functional mobility; for ongoing pt/ family training; and DME needs. At conclusion of PT session all needs in reach, RN notified of session findings/recommendations, pt returned back in recliner chair, and pt left with OT at conclusion of session with phone and call bell within reach. Pt denies any further questions at this time. Recommend Level III (Minimum Resource Intensity)  upon hospital D/C.   Goals   Patient Goals no direct therapy goals stated today   STG Expiration Date 10/31/24   Short Term Goal #1 In 14 days pt will be able to: 1. Demonstrate ability to perform all aspects of bed mobility independently to improve functional safety.  2. Perform functional transfers with RW independently to facilitate safe return to previous living environment.  3.  Ambulate 150 ft with RW independently with stable vitals to improve safety with household distances and reduce fall risk.  4. Improve LE strength grades by 1 to increase ease of functional mobility with transfers and gait. 5. Pt will demonstrate improved balance by one grade in order to decrease risk of falls.   PT Treatment Day 0   Plan   Treatment/Interventions Functional transfer training;LE strengthening/ROM;Elevations;Therapeutic exercise;Equipment eval/education;Patient/family training;Bed mobility;Gait training;Spoke to nursing;Spoke to case management;OT   PT Frequency 2-3x/wk   Discharge Recommendation   Rehab Resource Intensity Level, PT III (Minimum Resource Intensity)  (pending level of assistance facility can provide)   AM-PAC Basic Mobility Inpatient   Turning in Flat Bed Without Bedrails 3   Lying on Back to Sitting on Edge of Flat Bed Without Bedrails 3   Moving Bed to Chair 3   Standing Up From Chair Using Arms 3   Walk in Room 3   Climb 3-5 Stairs With Railing 1   Basic Mobility Inpatient Raw Score 16   Basic Mobility Standardized Score 38.32    Leon Brady Highest Level Of Mobility   -HL Goal 5: Stand one or more mins   JH-HLM Achieved 7: Walk 25 feet or more   End of Consult   Patient Position at End of Consult Bedside chair;All needs within reach  (with OT)   The patient's AM-PAC Basic Mobility Inpatient Short Form Raw Score is 16. A Raw score of less than or equal to 16 suggests the patient may benefit from discharge to post-acute rehabilitation services. Please also refer to the recommendation of the Physical Therapist for safe discharge planning.  Axel Rodriguez, PT

## 2024-10-17 NOTE — CASE MANAGEMENT
Case Management Discharge Planning Note    Patient name Gia Vaughan  Location W /W -01 MRN [id number]  : 1940 Date 10/17/2024       Current Admission Date: 10/14/2024  Current Admission Diagnosis:Acute diverticulitis   Patient Active Problem List    Diagnosis Date Noted Date Diagnosed    Acute diverticulitis 10/15/2024     Abnormal urinalysis 10/15/2024     Metabolic encephalopathy 10/15/2024     Diarrhea 2024     COVID 2024     Headache 2024     Hypertensive urgency 2024     Dizziness 2024     Suspected deep tissue injury of unknown depth 2022     Acute postoperative pain of right knee 2022     Encephalopathy 10/24/2022     Deep tissue injury 10/21/2022     Orthostasis 10/19/2022     H/O dizziness 10/19/2022     CLL (chronic lymphocytic leukemia) ([facility]) 10/13/2022     Intertrochanteric fracture of right femur ([facility]) 10/11/2022     Displaced fracture of middle phalanx of left ring finger, initial encounter for closed fracture 10/11/2022     Primary hypertension 2021     Ambulatory dysfunction 2021     Moderate dementia ([facility]) 2021     Lymphadenopathy 2020     Elevated liver enzymes 2020     Fall 2020     CKD (chronic kidney disease) stage 3, GFR 30-59 ml/min ([facility]) 2020     HLD (hyperlipidemia) 2020     OA (osteoarthritis) 2020     Chronic systolic heart failure ([facility]) 2020     Type 2 diabetes mellitus with stage 3 chronic kidney disease, unspecified whether long term insulin use, unspecified whether stage 3a or 3b CKD ([facility])        LOS (days): 2  Geometric Mean LOS (GMLOS) (days): 2.5  Days to GMLOS:0.7     OBJECTIVE:  Risk of Unplanned Readmission Score: 14.96         Current admission status: Inpatient   Preferred Pharmacy:   UNKNOWN - FOLLOW UP PRIOR TO DISCHARGE TO E-PRESCRIBE  No address on file      [id number] - [place] - [address]  [address]  [place]  [address]  Phone: [phone] Fax: [phone]    [id number] - [place] - [address]  [facility] [place] [address]  Phone: [phone] Fax: [phone]    Primary Care Provider: Chayo Powell MD    Primary Insurance: MEDICARE  Secondary Insurance: [employer]    DISCHARGE DETAILS:    Discharge planning discussed with:: Muriel Soto  Freedom of Choice: Yes  Comments - Freedom of Choice: Patient for return to [facility]. Confirmed with [facility] that patient ok to return. They request paper scripts for any new or changed meds. Attending notified.Joannaherson Charles updated on dc on phone, IMM reviewed, he is in agreement with discharge, would like [facility] transportation. [facility] ordered  CM contacted family/caregiver?: Yes  Were Treatment Team discharge recommendations reviewed with patient/caregiver?: Yes  Did patient/caregiver verbalize understanding of patient care needs?: N/A- going to facility  Were patient/caregiver advised of the risks associated with not following Treatment Team discharge recommendations?: Yes    Contacts  Patient Contacts: Charles  Relationship to Patient:: Family  Contact Method: Phone  Phone Number: [phone]  Reason/Outcome: Discharge Planning              Other Referral/Resources/Interventions Provided:  Interventions: Facility Return  Referral Comments: Patient returning to [facility]         Treatment Team Recommendation: Facility Return  Discharge Destination Plan:: Facility Return  Transport at Discharge : Wheelchair van                             IMM Given (Date):: 10/17/24  IMM Given to:: Family          Accepting Facility Name, City & State : [facility]  Receiving Facility/Agency Phone Number: [phone]  Facility/Agency Fax Number: [phone]

## 2024-10-17 NOTE — PLAN OF CARE
Problem: Potential for Falls  Goal: Patient will remain free of falls  Description: INTERVENTIONS:  - Educate patient/family on patient safety including physical limitations  - Instruct patient to call for assistance with activity   - Consult OT/PT to assist with strengthening/mobility   - Keep Call bell within reach  - Keep bed low and locked with side rails adjusted as appropriate  - Keep care items and personal belongings within reach  - Initiate and maintain comfort rounds  - Make Fall Risk Sign visible to staff  - Offer Toileting every  Hours, in advance of need  - Initiate/Maintain alarm  - Obtain necessary fall risk management equipment:   - Apply yellow socks and bracelet for high fall risk patients  - Consider moving patient to room near nurses station  Outcome: Progressing     Problem: PAIN - ADULT  Goal: Verbalizes/displays adequate comfort level or baseline comfort level  Description: Interventions:  - Encourage patient to monitor pain and request assistance  - Assess pain using appropriate pain scale  - Administer analgesics based on type and severity of pain and evaluate response  - Implement non-pharmacological measures as appropriate and evaluate response  - Consider cultural and social influences on pain and pain management  - Notify physician/advanced practitioner if interventions unsuccessful or patient reports new pain  Outcome: Progressing     Problem: INFECTION - ADULT  Goal: Absence or prevention of progression during hospitalization  Description: INTERVENTIONS:  - Assess and monitor for signs and symptoms of infection  - Monitor lab/diagnostic results  - Monitor all insertion sites, i.e. indwelling lines, tubes, and drains  - Monitor endotracheal if appropriate and nasal secretions for changes in amount and color  - [place] appropriate cooling/warming therapies per order  - Administer medications as ordered  - Instruct and encourage patient and family to use good hand hygiene technique  -  Identify and instruct in appropriate isolation precautions for identified infection/condition  Outcome: Progressing  Goal: Absence of fever/infection during neutropenic period  Description: INTERVENTIONS:  - Monitor WBC    Outcome: Progressing     Problem: SAFETY ADULT  Goal: Patient will remain free of falls  Description: INTERVENTIONS:  - Educate patient/family on patient safety including physical limitations  - Instruct patient to call for assistance with activity   - Consult OT/PT to assist with strengthening/mobility   - Keep Call bell within reach  - Keep bed low and locked with side rails adjusted as appropriate  - Keep care items and personal belongings within reach  - Initiate and maintain comfort rounds  - Make Fall Risk Sign visible to staff  - Offer Toileting every  Hours, in advance of need  - Initiate/Maintain alarm  - Obtain necessary fall risk management equipment:   - Apply yellow socks and bracelet for high fall risk patients  - Consider moving patient to room near nurses station  Outcome: Progressing  Goal: Maintain or return to baseline ADL function  Description: INTERVENTIONS:  -  Assess patient's ability to carry out ADLs; assess patient's baseline for ADL function and identify physical deficits which impact ability to perform ADLs (bathing, care of mouth/teeth, toileting, grooming, dressing, etc.)  - Assess/evaluate cause of self-care deficits   - Assess range of motion  - Assess patient's mobility; develop plan if impaired  - Assess patient's need for assistive devices and provide as appropriate  - Encourage maximum independence but intervene and supervise when necessary  - Involve family in performance of ADLs  - Assess for home care needs following discharge   - Consider OT consult to assist with ADL evaluation and planning for discharge  - Provide patient education as appropriate  Outcome: Progressing  Goal: Maintains/Returns to pre admission functional level  Description:  INTERVENTIONS:  - Perform AM-PAC 6 Click Basic Mobility/ Daily Activity assessment daily.  - Set and communicate daily mobility goal to care team and patient/family/caregiver.   - Collaborate with rehabilitation services on mobility goals if consulted  - Perform Range of Motion times a day.  - Reposition patient every  hours.  - Dangle patient times a day  - Stand patient  times a day  - Ambulate patient  times a day  - Out of bed to chair  times a day   - Out of bed for meals  times a day  - Out of bed for toileting  - Record patient progress and toleration of activity level   Outcome: Progressing

## 2024-10-17 NOTE — PLAN OF CARE
Problem: Potential for Falls  Goal: Patient will remain free of falls  Description: INTERVENTIONS:  - Educate patient/family on patient safety including physical limitations  - Instruct patient to call for assistance with activity   - Consult OT/PT to assist with strengthening/mobility   - Keep Call bell within reach  - Keep bed low and locked with side rails adjusted as appropriate  - Keep care items and personal belongings within reach  - Initiate and maintain comfort rounds  - Make Fall Risk Sign visible to staff  - Offer Toileting every  Hours, in advance of need  - Initiate/Maintain alarm  - Obtain necessary fall risk management equipment:   - Apply yellow socks and bracelet for high fall risk patients  - Consider moving patient to room near nurses station  Outcome: Progressing     Problem: Prexisting or High Potential for Compromised Skin Integrity  Goal: Skin integrity is maintained or improved  Description: INTERVENTIONS:  - Identify patients at risk for skin breakdown  - Assess and monitor skin integrity  - Assess and monitor nutrition and hydration status  - Monitor labs   - Assess for incontinence   - Turn and reposition patient  - Assist with mobility/ambulation  - Relieve pressure over bony prominences  - Avoid friction and shearing  - Provide appropriate hygiene as needed including keeping skin clean and dry  - Evaluate need for skin moisturizer/barrier cream  - Collaborate with interdisciplinary team   - Patient/family teaching  - Consider wound care consult   Outcome: Progressing     Problem: PAIN - ADULT  Goal: Verbalizes/displays adequate comfort level or baseline comfort level  Description: Interventions:  - Encourage patient to monitor pain and request assistance  - Assess pain using appropriate pain scale  - Administer analgesics based on type and severity of pain and evaluate response  - Implement non-pharmacological measures as appropriate and evaluate response  - Consider cultural and  social influences on pain and pain management  - Notify physician/advanced practitioner if interventions unsuccessful or patient reports new pain  Outcome: Progressing     Problem: INFECTION - ADULT  Goal: Absence or prevention of progression during hospitalization  Description: INTERVENTIONS:  - Assess and monitor for signs and symptoms of infection  - Monitor lab/diagnostic results  - Monitor all insertion sites, i.e. indwelling lines, tubes, and drains  - Monitor endotracheal if appropriate and nasal secretions for changes in amount and color  - [place] appropriate cooling/warming therapies per order  - Administer medications as ordered  - Instruct and encourage patient and family to use good hand hygiene technique  - Identify and instruct in appropriate isolation precautions for identified infection/condition  Outcome: Progressing  Goal: Absence of fever/infection during neutropenic period  Description: INTERVENTIONS:  - Monitor WBC    Outcome: Progressing     Problem: SAFETY ADULT  Goal: Patient will remain free of falls  Description: INTERVENTIONS:  - Educate patient/family on patient safety including physical limitations  - Instruct patient to call for assistance with activity   - Consult OT/PT to assist with strengthening/mobility   - Keep Call bell within reach  - Keep bed low and locked with side rails adjusted as appropriate  - Keep care items and personal belongings within reach  - Initiate and maintain comfort rounds  - Make Fall Risk Sign visible to staff  - Offer Toileting every  Hours, in advance of need  - Initiate/Maintain alarm  - Obtain necessary fall risk management equipment:   - Apply yellow socks and bracelet for high fall risk patients  - Consider moving patient to room near nurses station  Outcome: Progressing  Goal: Maintain or return to baseline ADL function  Description: INTERVENTIONS:  -  Assess patient's ability to carry out ADLs; assess patient's baseline for ADL function and  identify physical deficits which impact ability to perform ADLs (bathing, care of mouth/teeth, toileting, grooming, dressing, etc.)  - Assess/evaluate cause of self-care deficits   - Assess range of motion  - Assess patient's mobility; develop plan if impaired  - Assess patient's need for assistive devices and provide as appropriate  - Encourage maximum independence but intervene and supervise when necessary  - Involve family in performance of ADLs  - Assess for home care needs following discharge   - Consider OT consult to assist with ADL evaluation and planning for discharge  - Provide patient education as appropriate  Outcome: Progressing  Goal: Maintains/Returns to pre admission functional level  Description: INTERVENTIONS:  - Perform AM-PAC 6 Click Basic Mobility/ Daily Activity assessment daily.  - Set and communicate daily mobility goal to care team and patient/family/caregiver.   - Collaborate with rehabilitation services on mobility goals if consulted  - Perform Range of Motion  times a day.  - Reposition patient every  hours.  - Dangle patient  times a day  - Stand patient  times a day  - Ambulate patient  times a day  - Out of bed to chair  times a day   - Out of bed for meals times a day  - Out of bed for toileting  - Record patient progress and toleration of activity level   Outcome: Progressing     Problem: DISCHARGE PLANNING  Goal: Discharge to home or other facility with appropriate resources  Description: INTERVENTIONS:  - Identify barriers to discharge w/patient and caregiver  - Arrange for needed discharge resources and transportation as appropriate  - Identify discharge learning needs (meds, wound care, etc.)  - Arrange for interpretive services to assist at discharge as needed  - Refer to Case Management Department for coordinating discharge planning if the patient needs post-hospital services based on physician/advanced practitioner order or complex needs related to functional status, cognitive  ability, or social support system  Outcome: Progressing     Problem: Knowledge Deficit  Goal: Patient/family/caregiver demonstrates understanding of disease process, treatment plan, medications, and discharge instructions  Description: Complete learning assessment and assess knowledge base.  Interventions:  - Provide teaching at level of understanding  - Provide teaching via preferred learning methods  Outcome: Progressing

## 2024-10-17 NOTE — PLAN OF CARE
Problem: PHYSICAL THERAPY ADULT  Goal: Performs mobility at highest level of function for planned discharge setting.  See evaluation for individualized goals.  Description: Treatment/Interventions: Functional transfer training, LE strengthening/ROM, Elevations, Therapeutic exercise, Equipment eval/education, Patient/family training, Bed mobility, Gait training, Spoke to nursing, Spoke to case management, OT          See flowsheet documentation for full assessment, interventions and recommendations.  Note: Prognosis: Fair  Problem List: Decreased strength, Decreased endurance, Impaired balance, Decreased mobility  Assessment: Pt is a 83 y.o. female seen for PT evaluation s/p admit to [facility] on 10/14/2024. Pt was admitted with a primary dx of: acute diverticulitis, UTI, CLL, AMS.  PT now consulted for assessment of mobility and d/c needs. Pt with Up and OOB as tolerated orders.  Pts current comorbidities and personal factors effecting treatment include: DM II, CKD, OA, heart failure, CLL, TIA, moderate dementia. Pts current clinical presentation is Unstable/Unpredictable (high complexity) due to Ongoing medical management for primary dx, Decreased activity tolerance compared to baseline, Fall risk, Increased assistance needed from caregiver at current time, Cog status. Prior to admission, pt was independent with use of RW. Upon evaluation, pt currently is requiring Supervision for bed mobility; Supervision for transfers and Roxanna for ambulation 40 ft w/ RW. Pt presents at PT eval functioning below baseline and currently w/ overall mobility deficits 2* to: BLE weakness, impaired balance, gait deviations, decreased activity tolerance compared to baseline, decreased functional mobility tolerance compared to baseline, decreased safety awareness, impaired judgement, fall risk, decreased cognition. Pt currently at a fall risk 2* to impairments listed above.  Pt will continue to benefit from skilled acute PT  interventions to address stated impairments; to maximize functional mobility; for ongoing pt/ family training; and DME needs. At conclusion of PT session all needs in reach, RN notified of session findings/recommendations, pt returned back in recliner chair, and pt left with OT at conclusion of session with phone and call bell within reach. Pt denies any further questions at this time. Recommend Level III (Minimum Resource Intensity)  upon hospital D/C.        Rehab Resource Intensity Level, PT: III (Minimum Resource Intensity) (pending level of assistance facility can provide)    See flowsheet documentation for full assessment.

## 2024-10-17 NOTE — PLAN OF CARE
Problem: OCCUPATIONAL THERAPY ADULT  Goal: Performs self-care activities at highest level of function for planned discharge setting.  See evaluation for individualized goals.  Description: Treatment Interventions: ADL retraining, Functional transfer training, UE strengthening/ROM, Endurance training, Cognitive reorientation, Equipment evaluation/education, Compensatory technique education, Continued evaluation, Fine motor coordination activities, Activityengagement, Neuromuscular reeducation, Patient/family training          See flowsheet documentation for full assessment, interventions and recommendations.   Note: Limitation: Decreased ADL status, Decreased UE strength, Decreased cognition, Decreased Safe judgement during ADL, Decreased endurance, Decreased self-care trans, Decreased high-level ADLs (trunk management , standing tolerance, funcitonal reach, safety awareness, insight , executive functioning, global mental status)  Prognosis: Good  Assessment: Patient is a 83 y.o. female seen for OT evaluation at [facility] following admission on 10/14/2024  s/p Acute diverticulitis. Please see above for comprehensive list of comorbidities and significant PMHx impacting functional performance.  Upon initial evaluation, pt appears to be performing below baseline functional status.   Occupational performance is affected by the following deficits: endurance ,  decreased muscular strength , decreased balance , decreased standing tolerance for self care tasks , decreased activity tolerance , impaired memory , attention to task, and impaired judgement and problem solving . Personal/Environmental factors impacting D/C include: Assistance needed for ADLs and functional mobility and baseline cognitive deficits. Supporting factors include: accessible home environment Patient would benefit from OT services within the acute care setting to maximize level of functional independence in the following areas  self-care transfers, functional mobility, and ADLs.  From OT standpoint, recommendation at time of D/C would be Level 3: minimum resource intensity  pending appropriate level of assistance available upon D/C.     Rehab Resource Intensity Level, OT: III (Minimum Resource Intensity) (pending appropriate level of assistance available at [facility])        Edilia Lehman, OT

## 2024-10-17 NOTE — WOUND OSTOMY CARE
Consult Note - Wound   Gia Vaughan 83 y.o. female MRN: [id number]  Unit/Bed#: W -01 Encounter: [id number]        History and Present Illness:  Patient is a 84 yo female that was admitted to [facility]  for treatment of acute diverticulitis. Patient has a PMH of  CLL, type 2 DM, HTN, HLD, CHF, OA and dementia. Patient is alert and oriented times two and agreeable to assessment. Patient is assist of one for turning and repositioning, assist with care, set up for meals. Patient is occasionally incontinent of bowel and bladder. On assessment, patient is OOB with PT/OT ambulating with walker.    Wound Care was consulted for sacral redness.     Assessment Findings:   B/L heels are dry intact and lobito with no skin loss or wounds present. Recommend preventative Hydraguard Cream and proper offloading/ repositioning.      POA Bilateral Buttocks MASD - scattered irregular shaped areas of partial thickness skin loss. Wound bed is 100% pink blanchable tissue, no drainage noted. Nicole wound is fragile and intact. Possible fungal component. Suspect etiology of  moisture due to incontinence.     No induration, fluctuance, odor, warmth/temperature differences, redness, or purulence noted to the above noted wounds and skin areas assessed. New dressings applied per orders listed below. Patient tolerated well- no s/s of non-verbal pain or discomfort observed during the encounter. Bedside nurse aware of plan of care. See flow sheets for more detailed assessment findings.      Orders listed below and wound care will continue to follow, call or Secure Chat with questions.       Skin care plans:  1-Wash Sacro Buttocks area with soap and water. Pat Dry. Apply thin layer of Calazime/Protective Zinc Oxide paste to sacrum and buttocks TID and PRN.  2-Hydraguard/Silicone Cream to bilateral heels BID and PRN.  3-Elevate heels to offload pressure.  4-Ehob cushion when out of bed.  5-Turn/reposition q2h or when medically stable for pressure  re-distribution on skin.  6-Moisturize skin daily with skin nourishing cream.      Wounds:  Wound 09/01/24 MASD Buttocks (Active)   Wound Image   10/17/24 0945   Wound Description Pink;Fragile 10/17/24 0945   Nicole-wound Assessment Pink;Rash;Scaly 10/17/24 0945   Wound Length (cm) 8 cm 10/17/24 0945   Wound Width (cm) 4 cm 10/17/24 0945   Wound Depth (cm) 0.1 cm 10/17/24 0945   Wound Surface Area (cm^2) 32 cm^2 10/17/24 0945   Wound Volume (cm^3) 3.2 cm^3 10/17/24 0945   Calculated Wound Volume (cm^3) 3.2 cm^3 10/17/24 0945   Change in Wound Size % 66.67 10/17/24 0945   Drainage Amount None 10/17/24 0945   Non-staged Wound Description Partial thickness 10/17/24 0945   Treatments Cleansed;Site care 10/17/24 0945   Dressing Moisture barrier;Protective barrier 10/17/24 0945   Wound packed? No 10/17/24 0945   Patient Tolerance Tolerated well 10/17/24 0945               Alexandra Munoz RN, BSN, CCRN

## 2024-10-18 NOTE — DISCHARGE SUMMARY
"Discharge Summary - Hospitalist   Name: Gia Vaughan 83 y.o. female I MRN: [id number]  Unit/Bed#: W -01 I Date of Admission: 10/14/2024   Date of Service: 10/17/2024 I Hospital Day: 2       Reason for Admission: Acute metabolic encephalopathy    Hospital Course:   Gia Vaughan is a 83 y.o. female patient with PMH of CLL, type II DM, HTN, HLD, CHF, OA and dementia who originally presented to the hospital on 10/14/2024 due to increased lethargy and irritability.  Due to her dementia, patient was unable to provide any supporting history.  CT head in the ED was negative for acute pathology.  CT abdomen/pelvis without contrast was significant for acute diverticulitis of the sigmoid colon with neighboring mesenteric lymphadenopathy.  Given patient's documented allergies to multiple antibiotics, she was placed on Zosyn and admitted.  She tolerated the Zosyn well and was transitioned to Augmentin on hospital day 1.  She also tolerated this antibiotic and was safely discharged back to her memory care facility on hospital day 2 with an course of oral Augmentin.  ER workup was also significant for an abnormal UA with no bacteria but positive leukocytes.  Urine culture was ultimately contaminated, but most labs will also be covered by Augmentin.  On day of discharge, she had a benign abdominal exam.    Please see list of diagnoses below and related plan for additional information.   Assessment & Plan  Acute diverticulitis  CT A/P: \"acute diverticulitis of the sigmoid colon.\"  Received 1 dose of IV Zosyn in the ED  Discontinue Zosyn and transition patient to Augmentin.  We will monitor for any signs of allergic reaction.  Patient tolerated Zosyn well.  Advance to low residue diet  Patient still does not meet SIRS/sepsis criteria.  She does have an elevated white count which is chronic given history of CLL and is at her baseline.  Metabolic encephalopathy  Presented with altered mental status, increased lethargy 2/2 " infection  Today, 10/16/2024, appears to be back at her mental baseline  CTH without acute findings.   PT/OT eval recommended additional physical therapy outpatient  Abnormal urinalysis  Urine with 30-50 WBC, 4-10 RBC but no bacteria seen and nitrite negative.   Urine culture contaminated - no additional ABX indicated  Type 2 diabetes mellitus with stage 3 chronic kidney disease, unspecified whether long term insulin use, unspecified whether stage 3a or 3b CKD ([facility])  Lab Results   Component Value Date    HGBA1C 9.5 (H) 09/01/2024     Continue outpatient insulin regimen with Lantus 20 units HS and 5 with meals TID  CKD (chronic kidney disease) stage 3, GFR 30-59 ml/min ([facility])  Lab Results   Component Value Date    EGFR 57 10/16/2024    EGFR 58 10/15/2024    EGFR 45 10/14/2024    CREATININE 0.92 10/16/2024    CREATININE 0.90 10/15/2024    CREATININE 1.12 10/14/2024   Cr at baseline.     Chronic systolic heart failure ([facility])  Wt Readings from Last 3 Encounters:   10/17/24 63.3 kg (139 lb 8.8 oz)   09/06/24 68.5 kg (151 lb 0.2 oz)   05/31/24 68 kg (150 lb)     No evidence of acute exacerbation during hospitalization  EF 45%, G2DD on last echo 4/2024    CLL (chronic lymphocytic leukemia) ([facility])  Follows with hematology as an outpatient. WBC elevated but stable.   Primary hypertension  -Patient on 50 Mg p.o. daily losartan and 25 Mg p.o. metoprolol daily as outpatient  -Still having spikes in BP, with SBP > 160  -Increase losartan to 100 mg p.o. daily.  Continue increased dose upon discharge.     Medical Problems       Resolved Problems  Date Reviewed: 10/14/2024            Resolved    Acute encephalopathy 10/16/2024     Resolved by  Sherrie Sinclair MD        Discharging Physician / Practitioner: Sherrie Sinclair MD  PCP: Chayo Powell MD  Admission Date:   Admission Orders (From admission, onward)       Ordered        10/15/24 1347  INPATIENT ADMISSION  Once            10/15/24 0037  Place in Observation  Once                       "    Discharge Date: 10/17/24    Consultations During Hospital Stay:  Wound care nurse    Procedures Performed:   None    Significant Findings / Test Results:   Acute diverticulitis  Abnormal UA    Incidental Findings:   Leukocytosis-secondary to known CLL, unlikely from infection unless    Test Results Pending at Discharge (will require follow up):   None     Outpatient Tests Requested:  None    Complications:  None    Condition at Discharge: good    Discharge Day Visit / Exam:   Subjective: Patient sitting upright in the bedside chair.  She has no complaints today.  She notes that she is in the hospital and is looking forward to \"going home.\"  She has been eating well, denies abdominal pain.  She is urinating well, no dysuria, hematuria, frequency.    Vitals: Blood Pressure: 149/73 (10/17/24 1030)  Pulse: (!) 54 (10/17/24 1030)  Temperature: (!) 97.3 °F (36.3 °C) (10/17/24 0703)  Temp Source: Oral (10/15/24 0300)  Respirations: 18 (10/17/24 0703)  Height: 5' 6\" (167.6 cm) (10/15/24 0300)  Weight - Scale: 63.3 kg (139 lb 8.8 oz) (10/17/24 0600)  SpO2: 96 % (10/17/24 0703)    Physical Exam   Constitutional:       Comments: Sitting upright in bedside chair, chatting with PVA  Cardiovascular:      Rate and Rhythm: Normal rate and regular rhythm.      Heart sounds: No murmur heard.  Pulmonary:      Effort: Pulmonary effort is normal. No respiratory distress.      Breath sounds: No wheezing, rales or rhonchi.   Abdominal:      Palpations: Abdomen is soft. There is no mass.      Tenderness: There is no guarding or rebound.      Hernia: No hernia is present.      Comments: No tenderness at all.  Improved from yesterday.  Neurological:      Mental Status: She is alert.      Comments: Oriented to person only      Discussion with Family:  Attempted to call patient's nephew Charles twice, but he did not answer.     Discharge instructions/Information to patient and family:   See after visit summary for information provided to " patient and family.      Provisions for Follow-Up Care:  See after visit summary for information related to follow-up care and any pertinent home health orders.      Mobility at time of Discharge:   Basic Mobility Inpatient Raw Score: 16  JH-HLM Goal: 5: Stand one or more mins  JH-HLM Achieved: 7: Walk 25 feet or more  HLM Goal achieved. Continue to encourage appropriate mobility.     Disposition: memory care facility    Planned Readmission: No    Discharge Medications:  See after visit summary for reconciled discharge medications provided to patient and/or family.      Administrative Statements   Discharge Statement:  I have spent a total time of 29 minutes in caring for this patient on the day of the visit/encounter. .    **Please Note: This note may have been constructed using a voice recognition system**

## 2024-10-18 NOTE — ASSESSMENT & PLAN NOTE
"CT A/P: \"acute diverticulitis of the sigmoid colon.\"  Received 1 dose of IV Zosyn in the ED  Discontinue Zosyn and transition patient to Augmentin.  We will monitor for any signs of allergic reaction.  Patient tolerated Zosyn well.  Advance to low residue diet  Patient still does not meet SIRS/sepsis criteria.  She does have an elevated white count which is chronic given history of CLL and is at her baseline.  "

## 2024-10-18 NOTE — ASSESSMENT & PLAN NOTE
Wt Readings from Last 3 Encounters:   10/17/24 63.3 kg (139 lb 8.8 oz)   09/06/24 68.5 kg (151 lb 0.2 oz)   05/31/24 68 kg (150 lb)     No evidence of acute exacerbation during hospitalization  EF 45%, G2DD on last echo 4/2024

## 2024-10-18 NOTE — ASSESSMENT & PLAN NOTE
Urine with 30-50 WBC, 4-10 RBC but no bacteria seen and nitrite negative.   Urine culture contaminated - no additional ABX indicated

## 2024-10-18 NOTE — ASSESSMENT & PLAN NOTE
Presented with altered mental status, increased lethargy 2/2 infection  Today, 10/16/2024, appears to be back at her mental baseline  CTH without acute findings.   PT/OT henok recommended additional physical therapy outpatient

## 2024-10-18 NOTE — ASSESSMENT & PLAN NOTE
-Patient on 50 Mg p.o. daily losartan and 25 Mg p.o. metoprolol daily as outpatient  -Still having spikes in BP, with SBP > 160  -Increase losartan to 100 mg p.o. daily.  Continue increased dose upon discharge.

## 2024-10-18 NOTE — ASSESSMENT & PLAN NOTE
Lab Results   Component Value Date    HGBA1C 9.5 (H) 09/01/2024     Continue outpatient insulin regimen with Lantus 20 units HS and 5 with meals TID

## 2024-10-20 LAB
BACTERIA BLD CULT: NORMAL
BACTERIA BLD CULT: NORMAL

## 2024-11-05 DIAGNOSIS — H61.23 CERUMEN DEBRIS ON TYMPANIC MEMBRANE OF BOTH EARS: Primary | ICD-10-CM

## 2024-11-06 RX ORDER — CARBAMIDE PEROXIDE 65 MG/ML
SOLUTION/ DROPS TOPICAL
Qty: 15 ML | Refills: 1 | Status: SHIPPED | OUTPATIENT
Start: 2024-11-06

## 2024-11-14 ENCOUNTER — TELEPHONE (OUTPATIENT)
Age: 84
End: 2024-11-14

## 2024-11-14 NOTE — TELEPHONE ENCOUNTER
Melissa from [facility] called and she would like to know if the doctor could please fax over an order for a Conserving Devise for the pts Oxygen.  [phone]

## 2024-12-17 RX ORDER — HUMAN INSULIN 100 [IU]/ML
INJECTION, SUSPENSION SUBCUTANEOUS
OUTPATIENT
Start: 2024-12-17

## 2024-12-24 DIAGNOSIS — R05.3 CHRONIC COUGH: Primary | ICD-10-CM

## 2024-12-26 RX ORDER — GUAIFENESIN 600 MG/1
TABLET, EXTENDED RELEASE ORAL
Qty: 30 TABLET | Refills: 11 | Status: SHIPPED | OUTPATIENT
Start: 2024-12-26

## 2024-12-28 DIAGNOSIS — E87.6 HYPOKALEMIA: Primary | ICD-10-CM

## 2024-12-30 RX ORDER — POTASSIUM CHLORIDE 1500 MG/1
20 TABLET, EXTENDED RELEASE ORAL 2 TIMES DAILY
Qty: 1 TABLET | Refills: 0 | Status: SHIPPED | OUTPATIENT
Start: 2024-12-30

## 2024-12-31 DIAGNOSIS — J30.1 SEASONAL ALLERGIC RHINITIS DUE TO POLLEN: Primary | ICD-10-CM

## 2025-01-02 RX ORDER — FLUTICASONE PROPIONATE 50 MCG
SPRAY, SUSPENSION (ML) NASAL
Qty: 16 G | Refills: 1 | Status: SHIPPED | OUTPATIENT
Start: 2025-01-02

## 2025-01-24 DIAGNOSIS — R05.3 CHRONIC COUGH: Primary | ICD-10-CM

## 2025-01-24 RX ORDER — ISOPROPYL ALCOHOL 0.7 ML/ML
SWAB TOPICAL
Qty: 100 EACH | Refills: 3 | Status: SHIPPED | OUTPATIENT
Start: 2025-01-24

## 2025-01-30 ENCOUNTER — NURSING HOME VISIT (OUTPATIENT)
Dept: FAMILY MEDICINE CLINIC | Facility: CLINIC | Age: 85
End: 2025-01-30
Payer: MEDICARE

## 2025-01-30 DIAGNOSIS — G30.0 MODERATE EARLY ONSET ALZHEIMER'S DEMENTIA, UNSPECIFIED WHETHER BEHAVIORAL, PSYCHOTIC, OR MOOD DISTURBANCE OR ANXIETY (HCC): ICD-10-CM

## 2025-01-30 DIAGNOSIS — J01.00 ACUTE NON-RECURRENT MAXILLARY SINUSITIS: Primary | ICD-10-CM

## 2025-01-30 DIAGNOSIS — E78.2 MIXED HYPERLIPIDEMIA: ICD-10-CM

## 2025-01-30 DIAGNOSIS — J30.1 SEASONAL ALLERGIC RHINITIS DUE TO POLLEN: ICD-10-CM

## 2025-01-30 DIAGNOSIS — F02.B0 MODERATE EARLY ONSET ALZHEIMER'S DEMENTIA, UNSPECIFIED WHETHER BEHAVIORAL, PSYCHOTIC, OR MOOD DISTURBANCE OR ANXIETY (HCC): ICD-10-CM

## 2025-01-30 PROCEDURE — 99348 HOME/RES VST EST LOW MDM 30: CPT | Performed by: FAMILY MEDICINE

## 2025-02-07 RX ORDER — FLUTICASONE PROPIONATE 50 MCG
1 SPRAY, SUSPENSION (ML) NASAL DAILY
Start: 2025-02-07

## 2025-02-07 RX ORDER — AMOXICILLIN 500 MG/1
500 CAPSULE ORAL EVERY 8 HOURS SCHEDULED
Start: 2025-02-07 | End: 2025-02-17

## 2025-02-07 NOTE — ASSESSMENT & PLAN NOTE
Patient has mild decline in cognitive function and needs moderate observation and care. Patient is not able to care for self and cannot live on their own.

## 2025-02-07 NOTE — PROGRESS NOTES
Name: Gia Vaughan      : 1940      MRN: [id number]  Encounter Provider: Andrew Pa MD  Encounter Date: 2025   Encounter department: [facility]  :  Assessment & Plan  Acute non-recurrent maxillary sinusitis    Orders:  •  amoxicillin (AMOXIL) 500 mg capsule; Take 1 capsule (500 mg total) by mouth every 8 (eight) hours for 10 days  •  fluticasone (FLONASE) 50 mcg/act nasal spray; 1 spray into each nostril daily    Moderate early onset Alzheimer's dementia, unspecified whether behavioral, psychotic, or mood disturbance or anxiety (HCC)  Patient has mild decline in cognitive function and needs moderate observation and care. Patient is not able to care for self and cannot live on their own.        Mixed hyperlipidemia         Seasonal allergic rhinitis due to pollen                History of Present Illness   This is an 84-year-old female seen and examined in assisted living facility with cough and congestion and nasal discharge for the last 72 hours.  Patient had some low-grade fever but has been trying some over-the-counter preparations and is been having some cough.  Patient states she has been not feeling so good I would like to be checked out.  Green color to it she is having no nausea vomiting or dry or diarrhea.  She states she has been having some yellow nasal discharge that initially started as being clear.  She is getting some headache and some sinus pressure and she is having apparent classic sinusitis symptoms.  Allergies to any medications although she does have some mild dementia and I will recheck to make sure she has no allergies before treating her with any antibiotics.      Review of Systems   Constitutional:  Positive for fever. Negative for activity change, appetite change and fatigue.   HENT:  Positive for congestion, ear pain, postnasal drip, rhinorrhea, sinus pressure, sinus pain, sneezing and sore throat.    Eyes:  Negative for pain and redness.    Respiratory:  Positive for cough. Negative for apnea, chest tightness, shortness of breath and wheezing.    Cardiovascular:  Negative for chest pain, palpitations and leg swelling.   Gastrointestinal:  Negative for abdominal pain, constipation, diarrhea, nausea and vomiting.   Endocrine: Negative for cold intolerance and heat intolerance.   Genitourinary:  Negative for difficulty urinating, dysuria, frequency, hematuria and urgency.   Musculoskeletal:  Negative for arthralgias, back pain, gait problem and myalgias.   Skin:  Negative for rash.   Neurological:  Negative for dizziness, speech difficulty, weakness, numbness and headaches.   Hematological:  Does not bruise/bleed easily.   Psychiatric/Behavioral:  Negative for agitation, confusion and hallucinations.        Objective   LMP  (LMP Unknown)      Physical Exam  Constitutional:       Appearance: Normal appearance. She is not ill-appearing.   HENT:      Head: Normocephalic and atraumatic.      Right Ear: Tympanic membrane is bulging.      Left Ear: Tympanic membrane is bulging.      Nose: Nasal tenderness, congestion and rhinorrhea present.      Right Turbinates: Enlarged, swollen and pale.      Left Turbinates: Enlarged, swollen and pale.      Right Sinus: Maxillary sinus tenderness present.      Left Sinus: Maxillary sinus tenderness present.      Mouth/Throat:      Mouth: Mucous membranes are moist.      Pharynx: Posterior oropharyngeal erythema and postnasal drip present.   Eyes:      Extraocular Movements: Extraocular movements intact.      Conjunctiva/sclera: Conjunctivae normal.      Pupils: Pupils are equal, round, and reactive to light.   Cardiovascular:      Rate and Rhythm: Normal rate and regular rhythm.   Pulmonary:      Effort: Pulmonary effort is normal. No respiratory distress.      Breath sounds: Normal breath sounds. No wheezing.   Abdominal:      General: Bowel sounds are normal.      Palpations: Abdomen is soft.      Tenderness: There is  no abdominal tenderness.   Musculoskeletal:         General: No tenderness. Normal range of motion.      Cervical back: Normal range of motion and neck supple.      Right lower leg: No edema.      Left lower leg: No edema.   Skin:     General: Skin is warm and dry.   Neurological:      Mental Status: She is alert. Mental status is at baseline. She is disoriented.   Psychiatric:         Mood and Affect: Mood normal.         Behavior: Behavior normal.

## 2025-02-21 DIAGNOSIS — R05.3 CHRONIC COUGH: ICD-10-CM

## 2025-02-24 RX ORDER — ISOPROPYL ALCOHOL 0.75 G/1
SWAB TOPICAL
Qty: 100 EACH | Refills: 3 | Status: SHIPPED | OUTPATIENT
Start: 2025-02-24

## 2025-02-24 RX ORDER — FLUTICASONE PROPIONATE 50 MCG
SPRAY, SUSPENSION (ML) NASAL
Qty: 16 G | Refills: 5 | Status: SHIPPED | OUTPATIENT
Start: 2025-02-24

## 2025-02-26 DIAGNOSIS — R05.3 CHRONIC COUGH: ICD-10-CM

## 2025-02-26 NOTE — PROGRESS NOTES
Progress Note - Orthopedics   Suki Merchant 79 y o  female MRN: [id number]  Unit/Bed#: W -01      Subjective:    79 y  o female seen and examined at bedside  No complaints  Pain well controlled  PT/OT at bedside  Otherwise feeling well       Labs:  0   Lab Value Date/Time    HCT 37 4 10/13/2022 0549    HCT 39 1 10/12/2022 0820    HCT 44 6 10/11/2022 1900    HGB 11 7 10/13/2022 0549    HGB 13 0 10/12/2022 0820    HGB 14 4 10/11/2022 1900    INR 0 89 10/11/2022 1900    WBC 16 25 (H) 10/13/2022 0549    WBC 15 73 (H) 10/12/2022 0820    WBC 22 30 (H) 10/11/2022 1900       Meds:    Current Facility-Administered Medications:   •  acetaminophen (TYLENOL) tablet 975 mg, 975 mg, Oral, Q8H [address], Bushra Olivarez PA-C, 975 mg at 10/13/22 0545  •  carbamide peroxide (DEBROX) 6 5 % otic solution 5 drop, 5 drop, Both Ears, BID, Bushra Olivarez PA-C, 5 drop at 10/12/22 1816  •  docusate sodium (COLACE) capsule 100 mg, 100 mg, Oral, BID, Bushra Olivarez PA-C, 100 mg at 10/12/22 7054  •  enoxaparin (LOVENOX) subcutaneous injection 30 mg, 30 mg, Subcutaneous, Q12H, Bushra Olivarez PA-C, 30 mg at 10/12/22 2318  •  gabapentin (NEURONTIN) capsule 100 mg, 100 mg, Oral, HS, Bushra Olivarez PA-C, 100 mg at 10/12/22 2319  •  insulin glargine (LANTUS) subcutaneous injection 12 Units 0 12 mL, 12 Units, Subcutaneous, HS, Pasha Putnam PA-C  •  insulin lispro (HumaLOG) 100 units/mL subcutaneous injection 1-5 Units, 1-5 Units, Subcutaneous, TID AC **AND** Fingerstick Glucose (POCT), , , TID AC, Pasha Putnam PA-C  •  insulin lispro (HumaLOG) 100 units/mL subcutaneous injection 1-5 Units, 1-5 Units, Subcutaneous, HS, Pasha Putnam PA-C  •  insulin lispro (HumaLOG) 100 units/mL subcutaneous injection 4 Units, 4 Units, Subcutaneous, Daily With Breakfast, Pasha Putnam PA-C  •  insulin lispro (HumaLOG) 100 units/mL subcutaneous injection 4 Units, 4 Units, Subcutaneous, Daily With Lunch, Pasha Putnam PA-C  •  insulin lispro (HumaLOG) 100 units/mL subcutaneous injection 4 Units, 4 Units, Subcutaneous, Daily With Dinner, Pasha Putnam PA-C  •  lactated ringers bolus 1,000 mL, 1,000 mL, Intravenous, Once PRN **AND** lactated ringers bolus 1,000 mL, 1,000 mL, Intravenous, Once PRN, [place] PA-C  •  lidocaine (LIDODERM) 5 % patch 1 patch, 1 patch, Topical, Daily, [place] PA-C, 1 patch at 10/12/22 4805  •  naloxone (NARCAN) 0 04 mg/mL syringe 0 04 mg, 0 04 mg, Intravenous, Q1MIN PRN, [place] PA-C  •  oxyCODONE (ROXICODONE) IR tablet 2 5 mg, 2 5 mg, Oral, Q4H PRN, [place] PA-C  •  oxyCODONE (ROXICODONE) IR tablet 5 mg, 5 mg, Oral, Q4H PRN, [place] PA-C, 5 mg at 10/12/22 2320  •  pneumococcal 13-valent conjugate vaccine (PREVNAR-13) IM injection 0 5 mL, 0 5 mL, Intramuscular, Prior to discharge, [place] PA-C  •  senna-docusate sodium (SENOKOT S) 8 6-50 mg per tablet 1 tablet, 1 tablet, Oral, HS, [place] PA-C, 1 tablet at 10/12/22 2319  •  sodium chloride 0 9 % bolus 1,000 mL, 1,000 mL, Intravenous, Once PRN **AND** sodium chloride 0 9 % bolus 1,000 mL, 1,000 mL, Intravenous, Once PRN, [place] PA-C    Blood Culture:   No results found for: BLOODCX    Wound Culture:   No results found for: WOUNDCULT    Ins and Outs:  I/O last 24 hours: In: 1571 7 [P O :240; I V :1231 7; IV Piggyback:100]  Out: 1525 [Urine:1375; Blood:150]          Physical:  Vitals:    10/13/22 0840   BP: 130/54   Pulse: 88   Resp:    Temp: (!) 97 4 °F (36 3 °C)   SpO2: 94%     Musculoskeletal: right Lower Extremity  · Surgical dressings C/D/I, without strike through  · Mild suad incisional ttp  · Thigh and calf soft and compressible     · Sensation intact to saphenous, sural, tibial, superficial peroneal nerve, and deep peroneal  · Motor intact to +FHL/EHL, +ankle dorsi/plantar flexion  · 2+ DP pulse, symmetric bilaterally  · Digits warm and well perfused  · Capillary refill < 2 seconds    Assessment: 79 y o female POD 1 surgical fixation of right peritrochanteric femur fracture with long intramedullary nail  Patient doing well  Plan:  · WBAT to the right lower extremity  · Hgb 11 7  Greater than 2 gram drop which qualifies for diagnosis of acute blood loss anemia, will monitor and administer IVF/prbc as indicated   · PT/OT  · Pain control per primary team    · DVT ppx: lovenox 30 days post operatively  · Medical management per primary team    · Dispo: Ortho will follow   · Will need follow up with Dr Ayanna Fan post operatively       Connie Croft PA-C Previously Declined (within the last year)

## 2025-02-27 ENCOUNTER — NURSING HOME VISIT (OUTPATIENT)
Dept: FAMILY MEDICINE CLINIC | Facility: CLINIC | Age: 85
End: 2025-02-27
Payer: MEDICARE

## 2025-02-27 DIAGNOSIS — F02.B0 MODERATE EARLY ONSET ALZHEIMER'S DEMENTIA, UNSPECIFIED WHETHER BEHAVIORAL, PSYCHOTIC, OR MOOD DISTURBANCE OR ANXIETY (HCC): ICD-10-CM

## 2025-02-27 DIAGNOSIS — R51.9 ACUTE NONINTRACTABLE HEADACHE, UNSPECIFIED HEADACHE TYPE: ICD-10-CM

## 2025-02-27 DIAGNOSIS — J06.9 VIRAL URI: Primary | ICD-10-CM

## 2025-02-27 DIAGNOSIS — G30.0 MODERATE EARLY ONSET ALZHEIMER'S DEMENTIA, UNSPECIFIED WHETHER BEHAVIORAL, PSYCHOTIC, OR MOOD DISTURBANCE OR ANXIETY (HCC): ICD-10-CM

## 2025-02-27 PROCEDURE — 99348 HOME/RES VST EST LOW MDM 30: CPT | Performed by: FAMILY MEDICINE

## 2025-02-27 RX ORDER — GUAIFENESIN 600 MG/1
600 TABLET, EXTENDED RELEASE ORAL EVERY 12 HOURS SCHEDULED
Qty: 9 TABLET | Refills: 0 | Status: ON HOLD | OUTPATIENT
Start: 2025-02-27 | End: 2025-03-06

## 2025-02-27 NOTE — PROGRESS NOTES
Name: Gia Vaughan      : 1940      MRN: [id number]  Encounter Provider: Andrew Pa MD  Encounter Date: 2025   Encounter department: [facility]  :  Assessment & Plan  Viral URI    Orders:    dextromethorphan-guaifenesin (MUCINEX DM)  MG per 12 hr tablet; Take 1 tablet by mouth every 12 (twelve) hours    Moderate early onset Alzheimer's dementia, unspecified whether behavioral, psychotic, or mood disturbance or anxiety (HCC)  Patient has mild decline in cognitive function and needs moderate observation and care. Patient is not able to care for self and cannot live on their own.        Acute nonintractable headache, unspecified headache type  Tylenol prn and fluids.              History of Present Illness   84-year-old seen and examined at an assisted living facility today.  Patient with cough and congestion she has had for the last few days.  Patient is at the best historian due to her dementia but she sounds as though she has had cold at the least.  Patient with no nausea vomiting or diarrhea appetite is still normal and she is drinking fluids also.  Patient has no chest pain no shortness of breath but she does have some headache.  Patient states that she has been sick for the last couple days and she was seen recently a few weeks ago with a URI that was bacterial.  Upon exam today appears though this is a viral URI and will recommend over-the-counter Mucinex and lots of fluids and Tylenol.      Review of Systems   Constitutional:  Negative for activity change, appetite change, fatigue and fever.   HENT:  Positive for congestion, postnasal drip, sneezing, sore throat and voice change. Negative for ear pain, rhinorrhea, sinus pressure and sinus pain.    Eyes:  Negative for pain and redness.   Respiratory:  Positive for cough. Negative for apnea, chest tightness, shortness of breath and wheezing.    Cardiovascular:  Negative for chest pain, palpitations and leg  swelling.   Gastrointestinal:  Negative for abdominal pain, constipation, diarrhea, nausea and vomiting.   Endocrine: Negative for cold intolerance and heat intolerance.   Genitourinary:  Negative for difficulty urinating, dysuria, frequency, hematuria and urgency.   Musculoskeletal:  Negative for arthralgias, back pain, gait problem and myalgias.   Skin:  Negative for rash.   Neurological:  Positive for headaches. Negative for dizziness, speech difficulty, weakness and numbness.   Hematological:  Does not bruise/bleed easily.   Psychiatric/Behavioral:  Negative for agitation, confusion and hallucinations.        Objective   LMP  (LMP Unknown)      Physical Exam  Constitutional:       Appearance: Normal appearance. She is ill-appearing.   HENT:      Head: Normocephalic and atraumatic.      Right Ear: Tympanic membrane normal.      Left Ear: Tympanic membrane normal.      Nose: Congestion present.      Mouth/Throat:      Mouth: Mucous membranes are moist.   Eyes:      Extraocular Movements: Extraocular movements intact.      Conjunctiva/sclera: Conjunctivae normal.      Pupils: Pupils are equal, round, and reactive to light.   Cardiovascular:      Rate and Rhythm: Normal rate and regular rhythm.   Pulmonary:      Effort: Pulmonary effort is normal. No respiratory distress.      Breath sounds: Normal breath sounds. No wheezing.   Abdominal:      General: Bowel sounds are normal.      Palpations: Abdomen is soft.      Tenderness: There is no abdominal tenderness.   Musculoskeletal:         General: No tenderness. Normal range of motion.      Cervical back: Normal range of motion and neck supple.      Right lower leg: No edema.      Left lower leg: No edema.   Skin:     General: Skin is warm and dry.   Neurological:      Mental Status: She is alert and oriented to person, place, and time.   Psychiatric:         Mood and Affect: Mood normal.         Behavior: Behavior normal.         Thought Content: Thought content  normal.         Judgment: Judgment normal.

## 2025-03-03 ENCOUNTER — APPOINTMENT (EMERGENCY)
Dept: CT IMAGING | Facility: HOSPITAL | Age: 85
End: 2025-03-03
Payer: MEDICARE

## 2025-03-03 ENCOUNTER — APPOINTMENT (EMERGENCY)
Dept: RADIOLOGY | Facility: HOSPITAL | Age: 85
End: 2025-03-03
Payer: MEDICARE

## 2025-03-03 ENCOUNTER — HOSPITAL ENCOUNTER (INPATIENT)
Facility: HOSPITAL | Age: 85
LOS: 3 days | Discharge: NURSING, REHAB OR LONG-TERM CARE | End: 2025-03-06
Attending: EMERGENCY MEDICINE | Admitting: INTERNAL MEDICINE
Payer: MEDICARE

## 2025-03-03 DIAGNOSIS — C91.10 CLL (CHRONIC LYMPHOCYTIC LEUKEMIA) (HCC): ICD-10-CM

## 2025-03-03 DIAGNOSIS — E11.22 TYPE 2 DIABETES MELLITUS WITH STAGE 3A CHRONIC KIDNEY DISEASE, WITH LONG-TERM CURRENT USE OF INSULIN (HCC): ICD-10-CM

## 2025-03-03 DIAGNOSIS — G93.40 ACUTE ENCEPHALOPATHY: ICD-10-CM

## 2025-03-03 DIAGNOSIS — I50.23 ACUTE ON CHRONIC SYSTOLIC HEART FAILURE (HCC): ICD-10-CM

## 2025-03-03 DIAGNOSIS — N18.31 TYPE 2 DIABETES MELLITUS WITH STAGE 3A CHRONIC KIDNEY DISEASE, WITH LONG-TERM CURRENT USE OF INSULIN (HCC): ICD-10-CM

## 2025-03-03 DIAGNOSIS — I42.9 CARDIOMYOPATHY (HCC): ICD-10-CM

## 2025-03-03 DIAGNOSIS — R05.3 CHRONIC COUGH: ICD-10-CM

## 2025-03-03 DIAGNOSIS — R09.02 HYPOXIA: ICD-10-CM

## 2025-03-03 DIAGNOSIS — J90 PLEURAL EFFUSION: ICD-10-CM

## 2025-03-03 DIAGNOSIS — J96.01 ACUTE RESPIRATORY FAILURE WITH HYPOXIA (HCC): ICD-10-CM

## 2025-03-03 DIAGNOSIS — Z79.4 TYPE 2 DIABETES MELLITUS WITH STAGE 3A CHRONIC KIDNEY DISEASE, WITH LONG-TERM CURRENT USE OF INSULIN (HCC): ICD-10-CM

## 2025-03-03 DIAGNOSIS — J81.1 PULMONARY EDEMA: Primary | ICD-10-CM

## 2025-03-03 LAB
2HR DELTA HS TROPONIN: 1 NG/L
4HR DELTA HS TROPONIN: -2 NG/L
ALBUMIN SERPL BCG-MCNC: 3.9 G/DL (ref 3.5–5)
ALP SERPL-CCNC: 84 U/L (ref 34–104)
ALT SERPL W P-5'-P-CCNC: 10 U/L (ref 7–52)
ANION GAP SERPL CALCULATED.3IONS-SCNC: 9 MMOL/L (ref 4–13)
ANISOCYTOSIS BLD QL SMEAR: PRESENT
APTT PPP: 29 SECONDS (ref 23–34)
AST SERPL W P-5'-P-CCNC: 12 U/L (ref 13–39)
BACTERIA UR QL AUTO: NORMAL /HPF
BASOPHILS # BLD MANUAL: 0 THOUSAND/UL (ref 0–0.1)
BASOPHILS NFR MAR MANUAL: 0 % (ref 0–1)
BILIRUB SERPL-MCNC: 0.99 MG/DL (ref 0.2–1)
BILIRUB UR QL STRIP: NEGATIVE
BNP SERPL-MCNC: 1246 PG/ML (ref 0–100)
BUN SERPL-MCNC: 22 MG/DL (ref 5–25)
CALCIUM SERPL-MCNC: 9.2 MG/DL (ref 8.4–10.2)
CARDIAC TROPONIN I PNL SERPL HS: 10 NG/L (ref ?–50)
CARDIAC TROPONIN I PNL SERPL HS: 12 NG/L (ref ?–50)
CARDIAC TROPONIN I PNL SERPL HS: 13 NG/L (ref ?–50)
CHLORIDE SERPL-SCNC: 105 MMOL/L (ref 96–108)
CLARITY UR: CLEAR
CO2 SERPL-SCNC: 21 MMOL/L (ref 21–32)
COLOR UR: ABNORMAL
CREAT SERPL-MCNC: 0.99 MG/DL (ref 0.6–1.3)
EOSINOPHIL # BLD MANUAL: 0.29 THOUSAND/UL (ref 0–0.4)
EOSINOPHIL NFR BLD MANUAL: 1 % (ref 0–6)
ERYTHROCYTE [DISTWIDTH] IN BLOOD BY AUTOMATED COUNT: 18.9 % (ref 11.6–15.1)
FLUAV RNA RESP QL NAA+PROBE: NEGATIVE
FLUBV RNA RESP QL NAA+PROBE: NEGATIVE
GFR SERPL CREATININE-BSD FRML MDRD: 52 ML/MIN/1.73SQ M
GLUCOSE SERPL-MCNC: 257 MG/DL (ref 65–140)
GLUCOSE SERPL-MCNC: 324 MG/DL (ref 65–140)
GLUCOSE SERPL-MCNC: 352 MG/DL (ref 65–140)
GLUCOSE UR STRIP-MCNC: ABNORMAL MG/DL
HCT VFR BLD AUTO: 34.1 % (ref 34.8–46.1)
HGB BLD-MCNC: 10.5 G/DL (ref 11.5–15.4)
HGB UR QL STRIP.AUTO: NEGATIVE
INR PPP: 1.08 (ref 0.85–1.19)
KETONES UR STRIP-MCNC: NEGATIVE MG/DL
LACTATE SERPL-SCNC: 1.2 MMOL/L (ref 0.5–2)
LEUKOCYTE ESTERASE UR QL STRIP: ABNORMAL
LYMPHOCYTES # BLD AUTO: 25.4 THOUSAND/UL (ref 0.6–4.47)
LYMPHOCYTES # BLD AUTO: 85 % (ref 14–44)
MCH RBC QN AUTO: 34.5 PG (ref 26.8–34.3)
MCHC RBC AUTO-ENTMCNC: 30.8 G/DL (ref 31.4–37.4)
MCV RBC AUTO: 112 FL (ref 82–98)
MONOCYTES # BLD AUTO: 0.58 THOUSAND/UL (ref 0–1.22)
MONOCYTES NFR BLD: 2 % (ref 4–12)
NEUTROPHILS # BLD MANUAL: 2.92 THOUSAND/UL (ref 1.85–7.62)
NEUTS SEG NFR BLD AUTO: 10 % (ref 43–75)
NITRITE UR QL STRIP: NEGATIVE
NON-SQ EPI CELLS URNS QL MICRO: NORMAL /HPF
PH UR STRIP.AUTO: 5 [PH]
PLATELET # BLD AUTO: 190 THOUSANDS/UL (ref 149–390)
PLATELET BLD QL SMEAR: ADEQUATE
PMV BLD AUTO: 12.7 FL (ref 8.9–12.7)
POTASSIUM SERPL-SCNC: 4.6 MMOL/L (ref 3.5–5.3)
PROCALCITONIN SERPL-MCNC: 0.08 NG/ML
PROT SERPL-MCNC: 6.5 G/DL (ref 6.4–8.4)
PROT UR STRIP-MCNC: NEGATIVE MG/DL
PROTHROMBIN TIME: 14.8 SECONDS (ref 12.3–15)
RBC # BLD AUTO: 3.04 MILLION/UL (ref 3.81–5.12)
RBC #/AREA URNS AUTO: NORMAL /HPF
RBC MORPH BLD: PRESENT
RSV RNA RESP QL NAA+PROBE: NEGATIVE
SARS-COV-2 RNA RESP QL NAA+PROBE: NEGATIVE
SODIUM SERPL-SCNC: 135 MMOL/L (ref 135–147)
SP GR UR STRIP.AUTO: 1.02 (ref 1–1.03)
UROBILINOGEN UR STRIP-ACNC: <2 MG/DL
VARIANT LYMPHS # BLD AUTO: 2 %
WBC # BLD AUTO: 29.19 THOUSAND/UL (ref 4.31–10.16)
WBC #/AREA URNS AUTO: NORMAL /HPF

## 2025-03-03 PROCEDURE — 84484 ASSAY OF TROPONIN QUANT: CPT | Performed by: EMERGENCY MEDICINE

## 2025-03-03 PROCEDURE — 74176 CT ABD & PELVIS W/O CONTRAST: CPT

## 2025-03-03 PROCEDURE — 99222 1ST HOSP IP/OBS MODERATE 55: CPT | Performed by: PHYSICIAN ASSISTANT

## 2025-03-03 PROCEDURE — 0241U HB NFCT DS VIR RESP RNA 4 TRGT: CPT | Performed by: EMERGENCY MEDICINE

## 2025-03-03 PROCEDURE — 82948 REAGENT STRIP/BLOOD GLUCOSE: CPT

## 2025-03-03 PROCEDURE — 81001 URINALYSIS AUTO W/SCOPE: CPT | Performed by: EMERGENCY MEDICINE

## 2025-03-03 PROCEDURE — 70450 CT HEAD/BRAIN W/O DYE: CPT

## 2025-03-03 PROCEDURE — 87040 BLOOD CULTURE FOR BACTERIA: CPT | Performed by: EMERGENCY MEDICINE

## 2025-03-03 PROCEDURE — 85007 BL SMEAR W/DIFF WBC COUNT: CPT | Performed by: EMERGENCY MEDICINE

## 2025-03-03 PROCEDURE — 71250 CT THORAX DX C-: CPT

## 2025-03-03 PROCEDURE — 80053 COMPREHEN METABOLIC PANEL: CPT | Performed by: EMERGENCY MEDICINE

## 2025-03-03 PROCEDURE — 83605 ASSAY OF LACTIC ACID: CPT | Performed by: EMERGENCY MEDICINE

## 2025-03-03 PROCEDURE — 85027 COMPLETE CBC AUTOMATED: CPT | Performed by: EMERGENCY MEDICINE

## 2025-03-03 PROCEDURE — 96365 THER/PROPH/DIAG IV INF INIT: CPT

## 2025-03-03 PROCEDURE — 36415 COLL VENOUS BLD VENIPUNCTURE: CPT | Performed by: EMERGENCY MEDICINE

## 2025-03-03 PROCEDURE — 99285 EMERGENCY DEPT VISIT HI MDM: CPT

## 2025-03-03 PROCEDURE — 96361 HYDRATE IV INFUSION ADD-ON: CPT

## 2025-03-03 PROCEDURE — 99285 EMERGENCY DEPT VISIT HI MDM: CPT | Performed by: EMERGENCY MEDICINE

## 2025-03-03 PROCEDURE — 85730 THROMBOPLASTIN TIME PARTIAL: CPT | Performed by: EMERGENCY MEDICINE

## 2025-03-03 PROCEDURE — 85610 PROTHROMBIN TIME: CPT | Performed by: EMERGENCY MEDICINE

## 2025-03-03 PROCEDURE — 84145 PROCALCITONIN (PCT): CPT | Performed by: EMERGENCY MEDICINE

## 2025-03-03 PROCEDURE — 71045 X-RAY EXAM CHEST 1 VIEW: CPT

## 2025-03-03 PROCEDURE — 83880 ASSAY OF NATRIURETIC PEPTIDE: CPT | Performed by: EMERGENCY MEDICINE

## 2025-03-03 PROCEDURE — 93005 ELECTROCARDIOGRAM TRACING: CPT

## 2025-03-03 RX ORDER — HEPARIN SODIUM 5000 [USP'U]/ML
5000 INJECTION, SOLUTION INTRAVENOUS; SUBCUTANEOUS EVERY 8 HOURS SCHEDULED
Status: DISCONTINUED | OUTPATIENT
Start: 2025-03-03 | End: 2025-03-04

## 2025-03-03 RX ORDER — LOSARTAN POTASSIUM 50 MG/1
100 TABLET ORAL DAILY
Status: DISCONTINUED | OUTPATIENT
Start: 2025-03-04 | End: 2025-03-04

## 2025-03-03 RX ORDER — ATORVASTATIN CALCIUM 40 MG/1
20 TABLET, FILM COATED ORAL DAILY
Status: DISCONTINUED | OUTPATIENT
Start: 2025-03-04 | End: 2025-03-03

## 2025-03-03 RX ORDER — POTASSIUM CHLORIDE 1500 MG/1
20 TABLET, EXTENDED RELEASE ORAL 2 TIMES DAILY
Status: DISCONTINUED | OUTPATIENT
Start: 2025-03-03 | End: 2025-03-06 | Stop reason: HOSPADM

## 2025-03-03 RX ORDER — OLANZAPINE 10 MG/2ML
2.5 INJECTION, POWDER, FOR SOLUTION INTRAMUSCULAR
Status: DISCONTINUED | OUTPATIENT
Start: 2025-03-03 | End: 2025-03-06 | Stop reason: HOSPADM

## 2025-03-03 RX ORDER — INSULIN LISPRO 100 [IU]/ML
1-5 INJECTION, SOLUTION INTRAVENOUS; SUBCUTANEOUS
Status: DISCONTINUED | OUTPATIENT
Start: 2025-03-04 | End: 2025-03-06 | Stop reason: HOSPADM

## 2025-03-03 RX ORDER — INSULIN LISPRO 100 [IU]/ML
5 INJECTION, SOLUTION INTRAVENOUS; SUBCUTANEOUS
Status: DISCONTINUED | OUTPATIENT
Start: 2025-03-04 | End: 2025-03-05

## 2025-03-03 RX ORDER — METOPROLOL TARTRATE 25 MG/1
25 TABLET, FILM COATED ORAL EVERY 12 HOURS SCHEDULED
Status: DISCONTINUED | OUTPATIENT
Start: 2025-03-03 | End: 2025-03-04

## 2025-03-03 RX ORDER — INSULIN LISPRO 100 [IU]/ML
1-5 INJECTION, SOLUTION INTRAVENOUS; SUBCUTANEOUS
Status: DISCONTINUED | OUTPATIENT
Start: 2025-03-03 | End: 2025-03-06 | Stop reason: HOSPADM

## 2025-03-03 RX ORDER — INSULIN GLARGINE 100 [IU]/ML
20 INJECTION, SOLUTION SUBCUTANEOUS
Status: DISCONTINUED | OUTPATIENT
Start: 2025-03-03 | End: 2025-03-06 | Stop reason: HOSPADM

## 2025-03-03 RX ORDER — ATORVASTATIN CALCIUM 20 MG/1
20 TABLET, FILM COATED ORAL
Status: DISCONTINUED | OUTPATIENT
Start: 2025-03-04 | End: 2025-03-06 | Stop reason: HOSPADM

## 2025-03-03 RX ORDER — FUROSEMIDE 10 MG/ML
40 INJECTION INTRAMUSCULAR; INTRAVENOUS ONCE
Status: COMPLETED | OUTPATIENT
Start: 2025-03-03 | End: 2025-03-03

## 2025-03-03 RX ORDER — ACETAMINOPHEN 325 MG/1
650 TABLET ORAL EVERY 6 HOURS PRN
Status: DISCONTINUED | OUTPATIENT
Start: 2025-03-03 | End: 2025-03-06 | Stop reason: HOSPADM

## 2025-03-03 RX ORDER — FUROSEMIDE 10 MG/ML
40 INJECTION INTRAMUSCULAR; INTRAVENOUS 2 TIMES DAILY
Status: DISCONTINUED | OUTPATIENT
Start: 2025-03-04 | End: 2025-03-05

## 2025-03-03 RX ADMIN — INSULIN LISPRO 2 UNITS: 100 INJECTION, SOLUTION INTRAVENOUS; SUBCUTANEOUS at 22:44

## 2025-03-03 RX ADMIN — FUROSEMIDE 40 MG: 10 INJECTION, SOLUTION INTRAMUSCULAR; INTRAVENOUS at 20:37

## 2025-03-03 RX ADMIN — SODIUM CHLORIDE 250 ML: 0.9 INJECTION, SOLUTION INTRAVENOUS at 18:46

## 2025-03-03 RX ADMIN — INSULIN GLARGINE 15 UNITS: 100 INJECTION, SOLUTION SUBCUTANEOUS at 22:44

## 2025-03-03 RX ADMIN — CEFTRIAXONE SODIUM 1000 MG: 10 INJECTION, POWDER, FOR SOLUTION INTRAVENOUS at 19:22

## 2025-03-03 RX ADMIN — HEPARIN SODIUM 5000 UNITS: 5000 INJECTION INTRAVENOUS; SUBCUTANEOUS at 22:32

## 2025-03-03 NOTE — ED PROVIDER NOTES
Time reflects when diagnosis was documented in both MDM as applicable and the Disposition within this note       Time User Action Codes Description Comment    3/3/2025  7:34 PM Axel Ryan Add [J81.1] Pulmonary edema     3/3/2025  7:34 PM Axel Ryan Add [C91.10] CLL (chronic lymphocytic leukemia) (HCC)     3/3/2025  7:34 PM Axel Ryan Add [R09.02] Hypoxia     3/3/2025  7:34 PM Axel Ryan Add [G93.40] Acute encephalopathy     3/3/2025  7:35 PM Axel Ryan Add [J90] Pleural effusion           ED Disposition       ED Disposition   Admit    Condition   Stable    Date/Time   Mon Mar 3, 2025  7:45 PM    Comment   Case was discussed with SARAH and the patient's admission status was agreed to be Admission Status: inpatient status to the service of Dr. Bush .               Assessment & Plan       Medical Decision Making  Patient is an 84-year-old female, with a history significant for dementia, diabetes, CHF per my review of the medical record, who presents to the ED today, via EMS, for evaluation of fatigue/increasing lethargy over the last day.  EMS, present in room providing collateral history, states patient is normally AO x 4 but currently she is AAO x 2/4.  Patient also noted to be hyperglycemic.  On my evaluation, patient is drowsy and alert and oriented x 2/4 limiting subjective history.  Patient currently denies chest pain/dyspnea/abdominal pain subjectively.  No clear exacerbating or remitting factors.  On arrival patient also noted to be hypoxic on room air and placed on 4 L O2.  Patient is currently afebrile and hemodynamically stable.  Physical exam is notable for right side abdominal tenderness with guarding, slight rhonchi, clear heart auscultation, dry mucous membranes, ill appearance without acute distress.  This presentation is concerning for: Dehydration, NIKKI, electrolyte abnormality, anemia, ACS, ICH, metabolic disturbance.  Patient at risk for  pneumonia/UTI/viral syndrome/diverticulitis.  Will investigate with cardiac workup, viral testing, CT head, CT chest abdomen pelvis.  Will manage based upon workup.    Amount and/or Complexity of Data Reviewed  Labs: ordered. Decision-making details documented in ED Course.  Radiology: ordered and independent interpretation performed.    Risk  Prescription drug management.  Decision regarding hospitalization.        ED Course as of 03/03/25 1946   Mon Mar 03, 2025   1621 POC Glucose(!): 324  Elevated    1657 WBC(!): 29.19  High, improved form prior. Patient does not meet SIRS    1717 hs TnI 0hr: 12  WNL   1717 ANION GAP: 9  NWL    1717 GLUCOSE(!): 352  Elevated but no AG    1802 Prior documentation reviewed: Patient has had ceftriaxone previously   1807 LACTIC ACID: 1.2  WNL       Medications   ceftriaxone (ROCEPHIN) 1 g/50 mL in dextrose IVPB (1,000 mg Intravenous New Bag 3/3/25 1922)   furosemide (LASIX) injection 40 mg (has no administration in time range)   sodium chloride 0.9 % bolus 250 mL (250 mL Intravenous New Bag 3/3/25 1846)       ED Risk Strat Scores                                                History of Present Illness       Chief Complaint   Patient presents with    Altered Mental Status     Pt arrives via EMS from [place]. Staff reports pt was more lethargic. For EMS BG was 416.        Past Medical History:   Diagnosis Date    Acute encephalopathy 10/15/2024    Benign adenomatous polyp of large intestine     CHF (congestive heart failure) (HCC)     Diabetes mellitus (HCC)     Hyperlipemia     Hypertension     Osteoarthritis     TIA (transient ischemic attack)       Past Surgical History:   Procedure Laterality Date    APPENDECTOMY      CARPAL TUNNEL RELEASE Right     HYSTERECTOMY W/ SALPINGO-OOPHERECTOMY      MN OPTX FEM SHFT FX W/INSJ IMED IMPLT W/WO SCREW Right 10/12/2022    Procedure: INSERTION NAIL IM FEMUR ANTEGRADE (TROCHANTERIC);  Surgeon: Eric Isaacs DO;  Location: [facility];  Service:  Orthopedics      Family History   Problem Relation Age of Onset    Heart disease Mother     Heart disease Father     Hypertension Father     Stomach cancer Sister     Ovarian cancer Sister     Hypertension Sister       Social History     Tobacco Use    Smoking status: Never    Smokeless tobacco: Never   Vaping Use    Vaping status: Never Used   Substance Use Topics    Alcohol use: Not Currently     Comment: very seldom    Drug use: Never      E-Cigarette/Vaping    E-Cigarette Use Never User       E-Cigarette/Vaping Substances    Nicotine No     THC No     CBD No     Flavoring No     Other No     Unknown No       I have reviewed and agree with the history as documented.     Patient is an 84-year-old female, with a history significant for dementia, diabetes, CHF per my review of the medical record, who presents to the ED today, via EMS, for evaluation of fatigue/increasing lethargy over the last day.  EMS, present in room providing collateral history, states patient is normally AO x 4 but currently she is AAO x 2/4.  Patient also noted to be hyperglycemic.  On my evaluation, patient is drowsy and alert and oriented x 2/4 limiting subjective history.  Patient currently denies chest pain/dyspnea/abdominal pain subjectively.  No clear exacerbating or remitting factors.  On arrival patient also noted to be hypoxic on room air and placed on 4 L O2.         Review of Systems   Unable to perform ROS: Dementia   Constitutional:  Negative for fever.   Respiratory:  Negative for shortness of breath.    Cardiovascular:  Negative for chest pain.   Gastrointestinal:  Negative for abdominal pain.           Objective       ED Triage Vitals [03/03/25 1614]   Temperature Pulse Blood Pressure Respirations SpO2 Patient Position - Orthostatic VS   98.2 °F (36.8 °C) 75 145/76 16 90 % Sitting      Temp Source Heart Rate Source BP Location FiO2 (%) Pain Score    Oral -- Left arm -- --      Vitals      Date and Time Temp Pulse SpO2 Resp BP  Pain Score FACES Pain Rating User   03/03/25 1930 -- 67 97 % 16 156/68 -- -- KB   03/03/25 1700 -- 60 97 % 16 157/67 -- -- KB   03/03/25 1626 -- -- 95 % -- -- -- -- KB   03/03/25 1625 -- --  87 % -- -- -- -- KB   03/03/25 1614 98.2 °F (36.8 °C) 75 90 % 16 145/76 -- -- KB            Physical Exam  Vitals and nursing note reviewed.   Constitutional:       General: She is not in acute distress.     Appearance: She is ill-appearing. She is not toxic-appearing.      Comments: Drowsy mental status   HENT:      Head: Normocephalic and atraumatic.      Right Ear: External ear normal.      Left Ear: External ear normal.      Nose: Nose normal. No rhinorrhea.      Mouth/Throat:      Mouth: Mucous membranes are dry.      Pharynx: Oropharynx is clear. No oropharyngeal exudate or posterior oropharyngeal erythema.      Comments: Uvula midline. No oropharyngeal or submandibular mass/swelling  Eyes:      General: No scleral icterus.        Right eye: No discharge.         Left eye: No discharge.      Conjunctiva/sclera: Conjunctivae normal.      Pupils: Pupils are equal, round, and reactive to light.   Neck:      Comments: Patient is spontaneously rotating their neck to the left and right during the history and physical exam interaction without difficulty or apparent discomfort    Cardiovascular:      Rate and Rhythm: Normal rate and regular rhythm.      Pulses: Normal pulses.      Heart sounds: Normal heart sounds. No murmur heard.     No friction rub. No gallop.      Comments: 2+ Radial  Pulmonary:      Effort: Pulmonary effort is normal. No respiratory distress.      Breath sounds: No stridor. Rhonchi present. No wheezing or rales.   Abdominal:      General: Abdomen is flat. There is no distension.      Palpations: Abdomen is soft.      Tenderness: There is abdominal tenderness. There is guarding (Right sided). There is no right CVA tenderness, left CVA tenderness or rebound.   Musculoskeletal:         General: No tenderness (No  pain with calf squeeze) or deformity.      Cervical back: Neck supple. No tenderness. No muscular tenderness.      Right lower leg: No edema.      Left lower leg: No edema.   Lymphadenopathy:      Cervical: No cervical adenopathy.   Skin:     General: Skin is warm and dry.      Capillary Refill: Capillary refill takes 2 to 3 seconds.   Neurological:      Mental Status: She is alert.      Comments: AO x 2/4.  Patient is moving all four extremities spontaneously.  No facial droop.  Tongue midline.      Psychiatric:         Mood and Affect: Mood normal.         Behavior: Behavior normal.         Results Reviewed       Procedure Component Value Units Date/Time    B-Type Natriuretic Peptide(BNP) [[id number]]     Lab Status: No result Specimen: Blood     UA w Reflex to Microscopic w Reflex to Culture [[id number]]  (Abnormal) Collected: 03/03/25 1922    Lab Status: Final result Specimen: Urine, Straight Cath Updated: 03/03/25 1933     Color, UA Light Yellow     Clarity, UA Clear     Specific Gravity, UA 1.024     pH, UA 5.0     Leukocytes, UA Elevated glucose may cause decreased leukocyte values. See urine microscopic for UWBC result     Nitrite, UA Negative     Protein, UA Negative mg/dl      Glucose, UA >=1000 (1%) mg/dl      Ketones, UA Negative mg/dl      Urobilinogen, UA <2.0 mg/dl      Bilirubin, UA Negative     Occult Blood, UA Negative    Urine Microscopic [[id number]] Collected: 03/03/25 1922    Lab Status: In process Specimen: Urine, Straight Cath Updated: 03/03/25 1933    HS Troponin I 2hr [[id number]]  (Normal) Collected: 03/03/25 1840    Lab Status: Final result Specimen: Blood from Arm, Right Updated: 03/03/25 1916     hs TnI 2hr 13 ng/L      Delta 2hr hsTnI 1 ng/L     HS Troponin I 4hr [[id number]]     Lab Status: No result Specimen: Blood     Procalcitonin [[id number]]  (Normal) Collected: 03/03/25 1724    Lab Status: Final result Specimen: Blood from Arm, Left Updated: 03/03/25 1809     Procalcitonin 0.08 ng/ml      Lactic acid [[id number]]  (Normal) Collected: 03/03/25 1730    Lab Status: Final result Specimen: Blood from Arm, Right Updated: 03/03/25 1807     LACTIC ACID 1.2 mmol/L     Narrative:      Result may be elevated if tourniquet was used during collection.    RBC Morphology Reflex Test [[id number]] Collected: 03/03/25 1622    Lab Status: Final result Specimen: Blood from Arm, Right Updated: 03/03/25 1801    Protime-INR [[id number]]  (Normal) Collected: 03/03/25 1724    Lab Status: Final result Specimen: Blood from Arm, Left Updated: 03/03/25 1756     Protime 14.8 seconds      INR 1.08    Narrative:      INR Therapeutic Range    Indication                                             INR Range      Atrial Fibrillation                                               2.0-3.0  Hypercoagulable State                                    2.0.2.3  Left Ventricular Asist Device                            2.0-3.0  Mechanical Heart Valve                                  -    Aortic(with afib, MI, embolism, HF, LA enlargement,    and/or coagulopathy)                                     2.0-3.0 (2.5-3.5)     Mitral                                                             2.5-3.5  Prosthetic/Bioprosthetic Heart Valve               2.0-3.0  Venous thromboembolism (VTE: VT, PE        2.0-3.0    APTT [[id number]]  (Normal) Collected: 03/03/25 1724    Lab Status: Final result Specimen: Blood from Arm, Left Updated: 03/03/25 1756     PTT 29 seconds     Blood culture #2 [[id number]] Collected: 03/03/25 1724    Lab Status: In process Specimen: Blood from Arm, Left Updated: 03/03/25 1738    Blood culture #1 [[id number]] Collected: 03/03/25 1730    Lab Status: In process Specimen: Blood from Hand, Right Updated: 03/03/25 1738    COVID/FLU/RSV [[id number]]  (Normal) Collected: 03/03/25 1622    Lab Status: Final result Specimen: Nares from Nose Updated: 03/03/25 1727     SARS-CoV-2 Negative     INFLUENZA A PCR Negative     INFLUENZA B PCR Negative      RSV PCR Negative    Narrative:      This test has been performed using the CoV-2/Flu/RSV plus assay on the [employer] GeneXpert platform. This test has been validated by the  and verified by the performing laboratory.     This test is designed to amplify and detect the following: nucleocapsid (N), envelope (E), and RNA-dependent RNA polymerase (RdRP) genes of the SARS-CoV-2 genome; matrix (M), basic polymerase (PB2), and acidic protein (PA) segments of the influenza A genome; matrix (M) and non-structural protein (NS) segments of the influenza B genome, and the nucleocapsid genes of RSV A and RSV B.     Positive results are indicative of the presence of Flu A, Flu B, RSV, and/or SARS-CoV-2 RNA. Positive results for SARS-CoV-2 or suspected novel influenza should be reported to state, local, or federal health departments according to local reporting requirements.      All results should be assessed in conjunction with clinical presentation and other laboratory markers for clinical management.     FOR PEDIATRIC PATIENTS - copy/paste COVID Guidelines URL to browser: [email or web address]       CBC and differential [[id number]]  (Abnormal) Collected: 03/03/25 1622    Lab Status: Final result Specimen: Blood from Arm, Right Updated: 03/03/25 1726     WBC 29.19 Thousand/uL      RBC 3.04 Million/uL      Hemoglobin 10.5 g/dL      Hematocrit 34.1 %       fL      MCH 34.5 pg      MCHC 30.8 g/dL      RDW 18.9 %      MPV 12.7 fL      Platelets 190 Thousands/uL     Narrative:      This is an appended report.  These results have been appended to a previously verified report.    Manual Differential(PHLEBS Do Not Order) [[id number]]  (Abnormal) Collected: 03/03/25 1622    Lab Status: Final result Specimen: Blood from Arm, Right Updated: 03/03/25 1726     Segmented % 10 %      Lymphocytes % 85 %      Monocytes % 2 %      Eosinophils % 1 %      Basophils % 0 %       Atypical Lymphocytes % 2 %      Absolute Neutrophils 2.92 Thousand/uL      Absolute Lymphocytes 25.40 Thousand/uL      Absolute Monocytes 0.58 Thousand/uL      Absolute Eosinophils 0.29 Thousand/uL      Absolute Basophils 0.00 Thousand/uL      Total Counted --     RBC Morphology Present     Platelet Estimate Adequate     Anisocytosis Present    Comprehensive metabolic panel [[id number]]  (Abnormal) Collected: 03/03/25 1622    Lab Status: Final result Specimen: Blood from Arm, Right Updated: 03/03/25 1712     Sodium 135 mmol/L      Potassium 4.6 mmol/L      Chloride 105 mmol/L      CO2 21 mmol/L      ANION GAP 9 mmol/L      BUN 22 mg/dL      Creatinine 0.99 mg/dL      Glucose 352 mg/dL      Calcium 9.2 mg/dL      AST 12 U/L      ALT 10 U/L      Alkaline Phosphatase 84 U/L      Total Protein 6.5 g/dL      Albumin 3.9 g/dL      Total Bilirubin 0.99 mg/dL      eGFR 52 ml/min/1.73sq m     Narrative:      National Kid[employer] Foundation guidelines for Chronic Kidney Disease (CKD):     Stage 1 with normal or high GFR (GFR > 90 mL/min/1.73 square meters)    Stage 2 Mild CKD (GFR = 60-89 mL/min/1.73 square meters)    Stage 3A Moderate CKD (GFR = 45-59 mL/min/1.73 square meters)    Stage 3B Moderate CKD (GFR = 30-44 mL/min/1.73 square meters)    Stage 4 Severe CKD (GFR = 15-29 mL/min/1.73 square meters)    Stage 5 End Stage CKD (GFR <15 mL/min/1.73 square meters)  Note: GFR calculation is accurate only with a steady state creatinine    HS Troponin 0hr (reflex protocol) [[id number]]  (Normal) Collected: 03/03/25 1622    Lab Status: Final result Specimen: Blood from Arm, Right Updated: 03/03/25 1712     hs TnI 0hr 12 ng/L     Fingerstick Glucose (POCT) [[id number]]  (Abnormal) Collected: 03/03/25 1616    Lab Status: Final result Specimen: Blood Updated: 03/03/25 1617     POC Glucose 324 mg/dl             CT head without contrast   Final Interpretation by E. Alec Schoenberger, MD (03/03 1913)      No acute intracranial  abnormality. Chronic microangiopathy.                  Workstation performed: [id number]         CT chest abdomen pelvis wo contrast   Final Interpretation by E. Alec Schoenberger, MD (03/03 1928)      Pulmonary edema and small effusions   Progression of diffuse adenopathy in the chest abdomen and pelvis compatible with known CLL.         The study was marked in EPIC for immediate notification.         Workstation performed: [id number]         XR chest 1 view portable   ED Interpretation by Axel Ryan MD (03/03 3905)   Per my independent interpretation: Increasing opacities          Procedures    ED Medication and Procedure Management   Prior to Admission Medications   Prescriptions Last Dose Informant Patient Reported? Taking?   Alcohol Swabs (B-D SINGLE USE SWABS REGULAR) PADS   No No   Sig: USE AS DIRECTED   Continuous Blood Gluc Sensor (FreeStyle Дмитрий 2 Sensor) MISC  Self Yes No   Sig: apply 1 SENSOR to back OF UPPER ARM REMOVE AND REPLACE every 14 d...  (REFER TO PRESCRIPTION NOTES).   Diclofenac Sodium (VOLTAREN) 1 %  Self No No   Sig: Apply 2 g topically 3 (three) times a day To R knee   Potassium Chloride ER 20 MEQ TBCR  Self Yes No   Sig: Take 1 tablet by mouth 2 (two) times a day   acetaminophen (TYLENOL) 325 mg tablet  Self No No   Sig: Take 2 tablets (650 mg total) by mouth every 4 (four) hours as needed for mild pain   atorvastatin (LIPITOR) 20 mg tablet  Self Yes No   Sig: Take 20 mg by mouth daily   calcium carbonate (OS-JUAN RAMON) 600 MG tablet  Self Yes No   Sig: Take 600 mg by mouth 2 (two) times a day with meals   carbamide peroxide (FT Earwax Removal) 6.5 % otic solution   No No   Sig: INSERT FOUR DROPS INTO BOTH EARS AT BEDTIME FOR 2 WEEKS DX: WAX BUILD UP   cholecalciferol (VITAMIN D3) 1,000 units tablet  Self Yes No   Sig: Take 1,000 Units by mouth daily   dextromethorphan-guaifenesin (MUCINEX DM)  MG per 12 hr tablet   No No   Sig: Take 1 tablet by mouth every 12 (twelve) hours    fluticasone (FLONASE) 50 mcg/act nasal spray   No No   Sig: INSTILL ONE SPRAY INTO BOTH NOSTRILS TWICE DAILY AS NEEDED (DX: ALLERGIES)   fluticasone (FLONASE) 50 mcg/act nasal spray   No No   Si spray into each nostril daily   fluticasone (FLONASE) 50 mcg/act nasal spray   No No   Sig: INSTILL ONE SPRAY INTO BOTH NOSTRILS DAILY FOR 1 WEEK AND THEN AS NEEDED DX: ALLERGIES   glucose blood test strip  Self Yes No   Si each by Other route daily as needed Use as instructed   glucose monitoring kit (FREESTYLE) monitoring kit  Self Yes No   Si each by Does not apply route as needed   guaiFENesin (FT Mucus Relief 12HR) 600 mg 12 hr tablet   No No   Sig: Take 1 tablet (600 mg total) by mouth every 12 (twelve) hours   insulin detemir (LEVEMIR) 100 units/mL subcutaneous injection  Self No No   Sig: Inject 20 Units under the skin daily at bedtime   insulin lispro (HumaLOG) 100 units/mL injection  Self No No   Sig: Inject 5 Units under the skin 3 (three) times a day with meals   losartan (COZAAR) 100 MG tablet   No No   Sig: Take 1 tablet (100 mg total) by mouth daily   metoprolol tartrate (LOPRESSOR) 25 mg tablet  Self No No   Sig: Take 1 tablet (25 mg total) by mouth every 12 (twelve) hours   potassium chloride (Klor-Con M20) 20 mEq tablet   No No   Sig: Take 1 tablet (20 mEq total) by mouth 2 (two) times a day   vitamin B-12 (VITAMIN B-12) 1,000 mcg tablet  Self Yes No   Sig: Take by mouth daily      Facility-Administered Medications: None     Current Discharge Medication List        CONTINUE these medications which have NOT CHANGED    Details   acetaminophen (TYLENOL) 325 mg tablet Take 2 tablets (650 mg total) by mouth every 4 (four) hours as needed for mild pain  Refills: 0    Associated Diagnoses: Intertrochanteric fracture (HCC)      Alcohol Swabs (B-D SINGLE USE SWABS REGULAR) PADS USE AS DIRECTED  Qty: 100 each, Refills: 3    Associated Diagnoses: Chronic cough      atorvastatin (LIPITOR) 20 mg tablet  Take 20 mg by mouth daily      calcium carbonate (OS-JUAN RAMON) 600 MG tablet Take 600 mg by mouth 2 (two) times a day with meals      carbamide peroxide (FT Earwax Removal) 6.5 % otic solution INSERT FOUR DROPS INTO BOTH EARS AT BEDTIME FOR 2 WEEKS DX: WAX BUILD UP  Qty: 15 mL, Refills: 1    Associated Diagnoses: Cerumen debris on tympanic membrane of both ears      cholecalciferol (VITAMIN D3) 1,000 units tablet Take 1,000 Units by mouth daily      Continuous Blood Gluc Sensor (FreeStyle Дмитрий 2 Sensor) MISC apply 1 SENSOR to back OF UPPER ARM REMOVE AND REPLACE every 14 d...  (REFER TO PRESCRIPTION NOTES).      dextromethorphan-guaifenesin (MUCINEX DM)  MG per 12 hr tablet Take 1 tablet by mouth every 12 (twelve) hours    Associated Diagnoses: Viral URI      Diclofenac Sodium (VOLTAREN) 1 % Apply 2 g topically 3 (three) times a day To R knee  Refills: 0    Associated Diagnoses: Acute postoperative pain of right knee      !! fluticasone (FLONASE) 50 mcg/act nasal spray INSTILL ONE SPRAY INTO BOTH NOSTRILS TWICE DAILY AS NEEDED (DX: ALLERGIES)  Qty: 16 g, Refills: 1    Associated Diagnoses: Seasonal allergic rhinitis due to pollen      !! fluticasone (FLONASE) 50 mcg/act nasal spray 1 spray into each nostril daily    Associated Diagnoses: Acute non-recurrent maxillary sinusitis      !! fluticasone (FLONASE) 50 mcg/act nasal spray INSTILL ONE SPRAY INTO BOTH NOSTRILS DAILY FOR 1 WEEK AND THEN AS NEEDED DX: ALLERGIES  Qty: 16 g, Refills: 5    Associated Diagnoses: Chronic cough      glucose blood test strip 1 each by Other route daily as needed Use as instructed      glucose monitoring kit (FREESTYLE) monitoring kit 1 each by Does not apply route as needed      guaiFENesin (FT Mucus Relief 12HR) 600 mg 12 hr tablet Take 1 tablet (600 mg total) by mouth every 12 (twelve) hours  Qty: 9 tablet, Refills: 0    Associated Diagnoses: Chronic cough      insulin detemir (LEVEMIR) 100 units/mL subcutaneous injection Inject 20  Units under the skin daily at bedtime  Qty: 10 mL, Refills: 0    Associated Diagnoses: Type 2 diabetes mellitus with stage 3a chronic kidney disease, with long-term current use of insulin ([facility])      insulin lispro (HumaLOG) 100 units/mL injection Inject 5 Units under the skin 3 (three) times a day with meals  Refills: 0    Associated Diagnoses: Type 2 diabetes mellitus with stage 3a chronic kidney disease, with long-term current use of insulin ([facility])      losartan (COZAAR) 100 MG tablet Take 1 tablet (100 mg total) by mouth daily    Associated Diagnoses: Hypertensive urgency      metoprolol tartrate (LOPRESSOR) 25 mg tablet Take 1 tablet (25 mg total) by mouth every 12 (twelve) hours  Refills: 0    Associated Diagnoses: Hypertension; Chronic systolic heart failure (HCC)      potassium chloride (Klor-Con M20) 20 mEq tablet Take 1 tablet (20 mEq total) by mouth 2 (two) times a day  Qty: 1 tablet, Refills: 0    Associated Diagnoses: Hypokalemia      Potassium Chloride ER 20 MEQ TBCR Take 1 tablet by mouth 2 (two) times a day      vitamin B-12 (VITAMIN B-12) 1,000 mcg tablet Take by mouth daily       !! - Potential duplicate medications found. Please discuss with provider.        No discharge procedures on file.  ED SEPSIS DOCUMENTATION   Time reflects when diagnosis was documented in both MDM as applicable and the Disposition within this note       Time User Action Codes Description Comment    3/3/2025  7:34 PM Axel Ryan [J81.1] Pulmonary edema     3/3/2025  7:34 PM Axel Ryan [C91.10] CLL (chronic lymphocytic leukemia) ([facility])     3/3/2025  7:34 PM Axel Ryan [R09.02] Hypoxia     3/3/2025  7:34 PM Axel Ryan [G93.40] Acute encephalopathy     3/3/2025  7:35 PM Axel Ryan [J90] Pleural effusion                  Axel Ryan MD  03/03/25 1946

## 2025-03-04 LAB
ANION GAP SERPL CALCULATED.3IONS-SCNC: 8 MMOL/L (ref 4–13)
ANISOCYTOSIS BLD QL SMEAR: PRESENT
BASOPHILS # BLD MANUAL: 0.31 THOUSAND/UL (ref 0–0.1)
BASOPHILS NFR MAR MANUAL: 1 % (ref 0–1)
BUN SERPL-MCNC: 18 MG/DL (ref 5–25)
CALCIUM SERPL-MCNC: 9 MG/DL (ref 8.4–10.2)
CHLORIDE SERPL-SCNC: 108 MMOL/L (ref 96–108)
CO2 SERPL-SCNC: 26 MMOL/L (ref 21–32)
CREAT SERPL-MCNC: 0.9 MG/DL (ref 0.6–1.3)
EOSINOPHIL # BLD MANUAL: 0 THOUSAND/UL (ref 0–0.4)
EOSINOPHIL NFR BLD MANUAL: 0 % (ref 0–6)
ERYTHROCYTE [DISTWIDTH] IN BLOOD BY AUTOMATED COUNT: 18.6 % (ref 11.6–15.1)
GFR SERPL CREATININE-BSD FRML MDRD: 58 ML/MIN/1.73SQ M
GLUCOSE SERPL-MCNC: 110 MG/DL (ref 65–140)
GLUCOSE SERPL-MCNC: 122 MG/DL (ref 65–140)
GLUCOSE SERPL-MCNC: 281 MG/DL (ref 65–140)
GLUCOSE SERPL-MCNC: 76 MG/DL (ref 65–140)
HCT VFR BLD AUTO: 31.7 % (ref 34.8–46.1)
HGB BLD-MCNC: 10 G/DL (ref 11.5–15.4)
LG PLATELETS BLD QL SMEAR: PRESENT
LYMPHOCYTES # BLD AUTO: 26.55 THOUSAND/UL (ref 0.6–4.47)
LYMPHOCYTES # BLD AUTO: 83 % (ref 14–44)
MAGNESIUM SERPL-MCNC: 1.8 MG/DL (ref 1.9–2.7)
MCH RBC QN AUTO: 34.6 PG (ref 26.8–34.3)
MCHC RBC AUTO-ENTMCNC: 31.5 G/DL (ref 31.4–37.4)
MCV RBC AUTO: 110 FL (ref 82–98)
MICROCYTES BLD QL AUTO: PRESENT
MONOCYTES # BLD AUTO: 0 THOUSAND/UL (ref 0–1.22)
MONOCYTES NFR BLD: 0 % (ref 4–12)
NEUTROPHILS # BLD MANUAL: 3.66 THOUSAND/UL (ref 1.85–7.62)
NEUTS BAND NFR BLD MANUAL: 2 % (ref 0–8)
NEUTS SEG NFR BLD AUTO: 10 % (ref 43–75)
OVALOCYTES BLD QL SMEAR: PRESENT
PLATELET # BLD AUTO: 220 THOUSANDS/UL (ref 149–390)
PLATELET BLD QL SMEAR: ADEQUATE
PMV BLD AUTO: 12.1 FL (ref 8.9–12.7)
POIKILOCYTOSIS BLD QL SMEAR: PRESENT
POLYCHROMASIA BLD QL SMEAR: PRESENT
POTASSIUM SERPL-SCNC: 3.5 MMOL/L (ref 3.5–5.3)
RBC # BLD AUTO: 2.89 MILLION/UL (ref 3.81–5.12)
RBC MORPH BLD: PRESENT
SODIUM SERPL-SCNC: 142 MMOL/L (ref 135–147)
SPHEROCYTES BLD QL SMEAR: PRESENT
VARIANT LYMPHS # BLD AUTO: 4 %
WBC # BLD AUTO: 30.52 THOUSAND/UL (ref 4.31–10.16)

## 2025-03-04 PROCEDURE — 36415 COLL VENOUS BLD VENIPUNCTURE: CPT | Performed by: PHYSICIAN ASSISTANT

## 2025-03-04 PROCEDURE — 85007 BL SMEAR W/DIFF WBC COUNT: CPT | Performed by: PHYSICIAN ASSISTANT

## 2025-03-04 PROCEDURE — 97163 PT EVAL HIGH COMPLEX 45 MIN: CPT

## 2025-03-04 PROCEDURE — 99233 SBSQ HOSP IP/OBS HIGH 50: CPT | Performed by: INTERNAL MEDICINE

## 2025-03-04 PROCEDURE — 97167 OT EVAL HIGH COMPLEX 60 MIN: CPT

## 2025-03-04 PROCEDURE — 83735 ASSAY OF MAGNESIUM: CPT | Performed by: PHYSICIAN ASSISTANT

## 2025-03-04 PROCEDURE — 99223 1ST HOSP IP/OBS HIGH 75: CPT | Performed by: INTERNAL MEDICINE

## 2025-03-04 PROCEDURE — 82948 REAGENT STRIP/BLOOD GLUCOSE: CPT

## 2025-03-04 PROCEDURE — 80048 BASIC METABOLIC PNL TOTAL CA: CPT | Performed by: PHYSICIAN ASSISTANT

## 2025-03-04 PROCEDURE — 85027 COMPLETE CBC AUTOMATED: CPT | Performed by: PHYSICIAN ASSISTANT

## 2025-03-04 RX ORDER — LOSARTAN POTASSIUM 25 MG/1
25 TABLET ORAL DAILY
Status: DISCONTINUED | OUTPATIENT
Start: 2025-03-05 | End: 2025-03-06 | Stop reason: HOSPADM

## 2025-03-04 RX ORDER — OXYMETAZOLINE HYDROCHLORIDE 0.05 G/100ML
2 SPRAY NASAL EVERY 12 HOURS SCHEDULED
Status: DISPENSED | OUTPATIENT
Start: 2025-03-04 | End: 2025-03-06

## 2025-03-04 RX ORDER — LANOLIN ALCOHOL/MO/W.PET/CERES
400 CREAM (GRAM) TOPICAL 2 TIMES DAILY
Status: DISCONTINUED | OUTPATIENT
Start: 2025-03-04 | End: 2025-03-06 | Stop reason: HOSPADM

## 2025-03-04 RX ORDER — ENOXAPARIN SODIUM 100 MG/ML
40 INJECTION SUBCUTANEOUS EVERY 24 HOURS
Status: DISCONTINUED | OUTPATIENT
Start: 2025-03-04 | End: 2025-03-06 | Stop reason: HOSPADM

## 2025-03-04 RX ORDER — METOPROLOL SUCCINATE 25 MG/1
25 TABLET, EXTENDED RELEASE ORAL EVERY 12 HOURS
Status: DISCONTINUED | OUTPATIENT
Start: 2025-03-04 | End: 2025-03-06 | Stop reason: HOSPADM

## 2025-03-04 RX ADMIN — ATORVASTATIN CALCIUM 20 MG: 20 TABLET, FILM COATED ORAL at 18:06

## 2025-03-04 RX ADMIN — INSULIN GLARGINE 20 UNITS: 100 INJECTION, SOLUTION SUBCUTANEOUS at 22:07

## 2025-03-04 RX ADMIN — INSULIN LISPRO 5 UNITS: 100 INJECTION, SOLUTION INTRAVENOUS; SUBCUTANEOUS at 10:20

## 2025-03-04 RX ADMIN — ACETAMINOPHEN 650 MG: 325 TABLET, FILM COATED ORAL at 10:19

## 2025-03-04 RX ADMIN — LOSARTAN POTASSIUM 100 MG: 50 TABLET, FILM COATED ORAL at 10:18

## 2025-03-04 RX ADMIN — OXYMETAZOLINE HYDROCHLORIDE 2 SPRAY: 5 SPRAY NASAL at 22:07

## 2025-03-04 RX ADMIN — INSULIN LISPRO 3 UNITS: 100 INJECTION, SOLUTION INTRAVENOUS; SUBCUTANEOUS at 22:06

## 2025-03-04 RX ADMIN — HEPARIN SODIUM 5000 UNITS: 5000 INJECTION INTRAVENOUS; SUBCUTANEOUS at 06:10

## 2025-03-04 RX ADMIN — POTASSIUM CHLORIDE 20 MEQ: 1500 TABLET, EXTENDED RELEASE ORAL at 10:17

## 2025-03-04 RX ADMIN — ENOXAPARIN SODIUM 40 MG: 40 INJECTION SUBCUTANEOUS at 17:52

## 2025-03-04 RX ADMIN — METOPROLOL SUCCINATE 25 MG: 25 TABLET, EXTENDED RELEASE ORAL at 22:05

## 2025-03-04 RX ADMIN — POTASSIUM CHLORIDE 20 MEQ: 1500 TABLET, EXTENDED RELEASE ORAL at 17:52

## 2025-03-04 RX ADMIN — FUROSEMIDE 40 MG: 10 INJECTION, SOLUTION INTRAMUSCULAR; INTRAVENOUS at 17:52

## 2025-03-04 RX ADMIN — Medication 400 MG: at 17:52

## 2025-03-04 RX ADMIN — METOPROLOL TARTRATE 25 MG: 25 TABLET, FILM COATED ORAL at 10:17

## 2025-03-04 RX ADMIN — FUROSEMIDE 40 MG: 10 INJECTION, SOLUTION INTRAMUSCULAR; INTRAVENOUS at 10:21

## 2025-03-04 NOTE — ASSESSMENT & PLAN NOTE
Patient presents to the hospital with increased confusion, lethargy. Notably hypoxic 87% on room air, currently requiring 4L NC. Not on oxygen at home  2/2 CHF exacerbation a/e/b moderate pulmonary edema and bilateral pleural effusions on CT chest  IV diuresis as stated above   Continue to wean off oxygen to maintain oxygen saturation >90%

## 2025-03-04 NOTE — CONSULTS
Consultation - Cardiology Team One  Gia Vaughan 84 y.o. female MRN: [id number]  Unit/Bed#: ED-07 Encounter: [id number]    Inpatient consult to Cardiology  Consult performed by: BRINDA Sharpe  Consult ordered by: Oralia Monzon PA-C      Physician Requesting Consult: Kelly Alfaro MD  Reason for Consult / Principal Problem: CHF    Assessment & Plan  Acute on chronic systolic heart failure (HCC)  Wt Readings from Last 3 Encounters:   03/04/25 63.7 kg (140 lb 6.9 oz)   10/17/24 63.3 kg (139 lb 8.8 oz)   09/06/24 68.5 kg (151 lb 0.2 oz)   Has been off maintenance diuretics for over 2 years, previously on Lasix 40 mg PO daily but stopped 2/2 orthostasis  Will need standing dose of Lasix at discharge but likely lower than 40 mg  Responding well to Lasix 40 mg IV BID with 1700 mL urine output documented thus far  Mildly overloaded on exam  Unclear dry weight  Current stretcher scale weight: 140 lb   Continue IV Lasix as ordered  Strict I/Os, daily weights, am BMP  2 gm Na diet with 1800 mL FR  Would not repeat echocardiogram at this time  Switch metoprolol tartrate to metoprolol succinate for GDMT for cardiomyopathy  Acute respiratory failure with hypoxia (HCC)  In setting of acute CHF   98% on 3LNC at time of exam  Wean as able  Metabolic encephalopathy  2/2 hypoxia  Dementia at baseline  Primary hypertension  Mildly elevated, last /68  Home Rx: losartan 100 mg daily, metoprolol tartrate 25 mg BID  Type 2 diabetes mellitus with stage 3 chronic kidney disease, unspecified whether long term insulin use, unspecified whether stage 3a or 3b CKD (HCC)  Lab Results   Component Value Date    HGBA1C 9.5 (H) 09/01/2024   Per primary team  HLD (hyperlipidemia)  LDL 77 on lipid panel from 1 year ago  On atorvastatin 20 mg daily  Moderate dementia (HCC)  Supportive care  CLL (chronic lymphocytic leukemia) (HCC)  WBC count 30.52k, stable. Follows with heme/onc as an outpatient    History of Present Illness    HPI: Gia Vaughan is a 84 y.o. year old female with chronic HFrEF LVEF 45%, HTN, HLD, TIA, Type 2 DM, CLL, and dementia. No cardiology notes available in her chart.    She presented to the ED on 3/3/25 with increased lethargy and LE edema at the assisted living facility where she resides. She is an unreliable historian but currently denies any chest pain or dyspnea. She was noted to be hypoxic and in congestive heart failure with a BNP of 1246 and imaging showing a small left pleural effusion and moderate pulmonary edema. She was started on Lasix 40 mg IV BID with good response thus far. She was remotely on Lasix 40 mg PO daily but this appears to have been stopped in 2022 due to orthostasis. An echocardiogram was completed in 2024 showing LVEF 45% with grade II DD, with basal to mid inferior akinesis and mild TR. Cardiology consulted for further evaluation and management of her CHF exacerbation.    EKG reviewed personally: Sinus bradycardia with 1st degree AV block, possible prior anterior infarct    Telemetry reviewed personally: NSR and sinus bradycardia    Review of Systems   Unable to perform ROS: Dementia     Historical Information   Past Medical History:   Diagnosis Date    Acute encephalopathy 10/15/2024    Benign adenomatous polyp of large intestine     CHF (congestive heart failure) (HCC)     Diabetes mellitus (HCC)     Hyperlipemia     Hypertension     Osteoarthritis     TIA (transient ischemic attack)      Past Surgical History:   Procedure Laterality Date    APPENDECTOMY      CARPAL TUNNEL RELEASE Right     HYSTERECTOMY W/ SALPINGO-OOPHERECTOMY      WV OPTX FEM SHFT FX W/INSJ IMED IMPLT W/WO SCREW Right 10/12/2022    Procedure: INSERTION NAIL IM FEMUR ANTEGRADE (TROCHANTERIC);  Surgeon: Eric Isaacs DO;  Location: [facility];  Service: Orthopedics     Social History     Substance and Sexual Activity   Alcohol Use Not Currently    Comment: very seldom     Social History     Substance and Sexual  "Activity   Drug Use Never     Social History     Tobacco Use   Smoking Status Never   Smokeless Tobacco Never     Family History: Family history non-contributory    Meds/Allergies   all current active meds have been reviewed and current meds:   Current Facility-Administered Medications:     acetaminophen (TYLENOL) tablet 650 mg, Q6H PRN    atorvastatin (LIPITOR) tablet 20 mg, Daily With Dinner    furosemide (LASIX) injection 40 mg, BID    heparin (porcine) subcutaneous injection 5,000 Units, Q8H TESSA    insulin glargine (LANTUS) subcutaneous injection 20 Units 0.2 mL, HS    insulin lispro (HumALOG/ADMELOG) 100 units/mL subcutaneous injection 1-5 Units, TID AC **AND** Fingerstick Glucose (POCT), TID AC    insulin lispro (HumALOG/ADMELOG) 100 units/mL subcutaneous injection 1-5 Units, HS    insulin lispro (HumALOG/ADMELOG) 100 units/mL subcutaneous injection 5 Units, TID With Meals    losartan (COZAAR) tablet 100 mg, Daily    metoprolol tartrate (LOPRESSOR) tablet 25 mg, Q12H TESSA    OLANZapine (ZyPREXA) IM injection 2.5 mg, Q4H PRN Max 6/day    potassium chloride (Klor-Con M20) CR tablet 20 mEq, BID       Allergies   Allergen Reactions    Amlodipine     Ceftin [Cefuroxime]     Ciprofloxacin     Citalopram     Dye [Iodinated Contrast Media]     Glucophage [Metformin]     Januvia [Sitagliptin]     Neomycin-Polymyxin-Dexameth     Ondansetron     Prilosec [Omeprazole]     Vibramycin [Doxycycline]        Objective   Vitals: Blood pressure 147/68, pulse 69, temperature 97.9 °F (36.6 °C), temperature source Oral, resp. rate 22, height 5' 6\" (1.676 m), weight 63.7 kg (140 lb 6.9 oz), SpO2 94%., Body mass index is 22.67 kg/m².,     Systolic (24hrs), Av , Min:119 , Max:161     Diastolic (24hrs), Av, Min:60, Max:76        Intake/Output Summary (Last 24 hours) at 3/4/2025 0951  Last data filed at 3/4/2025 0551  Gross per 24 hour   Intake 300 ml   Output 1700 ml   Net -1400 ml     Wt Readings from Last 3 Encounters: "   03/04/25 63.7 kg (140 lb 6.9 oz)   10/17/24 63.3 kg (139 lb 8.8 oz)   09/06/24 68.5 kg (151 lb 0.2 oz)     Invasive Devices       Peripheral Intravenous Line  Duration             Peripheral IV 03/03/25 Dorsal (posterior);Left Forearm <1 day    Peripheral IV 03/03/25 Right;Ventral (anterior) Forearm <1 day              Drain  Duration             External Urinary Catheter <1 day                    Physical Exam  Vitals reviewed.   Constitutional:       General: She is not in acute distress.  Neck:      Vascular: No hepatojugular reflux or JVD.   Cardiovascular:      Rate and Rhythm: Normal rate and regular rhythm.      Heart sounds: Normal heart sounds. No murmur heard.     No friction rub. No gallop.   Pulmonary:      Effort: Pulmonary effort is normal. No respiratory distress.      Breath sounds: Rales (bibasilar) present.      Comments: 98% on 3LNC  Abdominal:      General: Bowel sounds are normal. There is no distension.      Palpations: Abdomen is soft.      Tenderness: There is no abdominal tenderness.   Musculoskeletal:         General: No tenderness. Normal range of motion.      Cervical back: Neck supple.      Right lower leg: Edema (trace) present.      Left lower leg: Edema (trace) present.   Skin:     General: Skin is warm and dry.      Capillary Refill: Capillary refill takes 2 to 3 seconds.      Coloration: Skin is pale.      Findings: No erythema.   Neurological:      Mental Status: She is alert.      Comments: Oriented to person and place   Psychiatric:         Mood and Affect: Mood normal.         LABORATORY RESULTS:      CBC with diff:   Results from last 7 days   Lab Units 03/04/25  0614 03/03/25  1622   WBC Thousand/uL 30.52* 29.19*   HEMOGLOBIN g/dL 10.0* 10.5*   HEMATOCRIT % 31.7* 34.1*   MCV fL 110* 112*   PLATELETS Thousands/uL 220 190   RBC Million/uL 2.89* 3.04*   MCH pg 34.6* 34.5*   MCHC g/dL 31.5 30.8*   RDW % 18.6* 18.9*   MPV fL 12.1 12.7       CMP:  Results from last 7 days   Lab  "Units 25  0614 25  1622   POTASSIUM mmol/L 3.5 4.6   CHLORIDE mmol/L 108 105   CO2 mmol/L 26 21   BUN mg/dL 18 22   CREATININE mg/dL 0.90 0.99   CALCIUM mg/dL 9.0 9.2   AST U/L  --  12*   ALT U/L  --  10   ALK PHOS U/L  --  84   EGFR ml/min/1.73sq m 58 52       BMP:  Results from last 7 days   Lab Units 25  0614 25  1622   POTASSIUM mmol/L 3.5 4.6   CHLORIDE mmol/L 108 105   CO2 mmol/L 26 21   BUN mg/dL 18 22   CREATININE mg/dL 0.90 0.99   CALCIUM mg/dL 9.0 9.2       Lab Results   Component Value Date    CREATININE 0.90 2025    CREATININE 0.99 2025    CREATININE 0.89 2024       Lab Results   Component Value Date    NTBNP 10,562 (H) 2020          Results from last 7 days   Lab Units 25  0614   MAGNESIUM mg/dL 1.8*                     Results from last 7 days   Lab Units 25  1724   INR  1.08     Lipid Profile:   No results found for: \"CHOL\"  Lab Results   Component Value Date    HDL 66 2024    HDL 66 2023    HDL 59 2023     Lab Results   Component Value Date    LDLCALC 77 2024    LDLCALC 63 2023    LDLCALC 78 2023     Lab Results   Component Value Date    TRIG 66 2024    TRIG 137 2023    TRIG 194 (H) 2023       Cardiac testing:   Results for orders placed during the hospital encounter of 05/10/21    Echo complete with contrast if indicated    Narrative  [address] [address]    Transthoracic Echocardiogram  2D, M-mode, Doppler, and Color Doppler    Study date:  10-May-2021    Patient: KAYCE KELLY  MR number: [id number]  Account number: [id number]  : 1940  Age: 80 years  Gender: Female  Status: Outpatient  Location: [facility]  Height: 66 in  Weight: 163 lb  BP: 143/ 62 mmHg    Indications: Mitral Valve    Diagnoses: I34.9 - Nonrheumatic mitral valve disorder, unspecified    Sonographer:  SHAKIRA Hernandez  Primary Physician:  Chayo PANIAGUA" MD Andre  Referring Physician:  Chayo Powell MD  Group:  [facility]  Interpreting Physician:  Elkin Guerra MD    SUMMARY    LEFT VENTRICLE:  Systolic function was mildly reduced. Ejection fraction was estimated to be 45 %.  There was mild diffuse hypokinesis.  Features were consistent with a pseudonormal left ventricular filling pattern, with concomitant abnormal relaxation and increased filling pressure (grade 2 diastolic dysfunction).  Doppler parameters were consistent with high ventricular filling pressure.    LEFT ATRIUM:  The atrium was dilated.    MITRAL VALVE:  There was mild annular calcification.  There was mild to moderate regurgitation.    TRICUSPID VALVE:  There was mild regurgitation.  Estimated peak PA pressure was 41 mmHg.  The findings suggest mild pulmonary hypertension.    HISTORY: PRIOR HISTORY: HLD, Heart Failure, HTN, DM II, CKD III, TIA    PROCEDURE: The study was performed in the [facility]. This was a routine study. The transthoracic approach was used. The study included complete 2D imaging, M-mode, complete spectral Doppler, and color Doppler. The heart rate was 70  bpm, at the start of the study. Images were obtained from the parasternal, apical, subcostal, and suprasternal notch acoustic windows. Image quality was adequate.    LEFT VENTRICLE: Size was normal. Systolic function was mildly reduced. Ejection fraction was estimated to be 45 %. There was mild diffuse hypokinesis. Wall thickness was normal. DOPPLER: The transmitral flow pattern was normal. Features  were consistent with a pseudonormal left ventricular filling pattern, with concomitant abnormal relaxation and increased filling pressure (grade 2 diastolic dysfunction). Doppler parameters were consistent with high ventricular filling  pressure.    RIGHT VENTRICLE: The size was normal. Systolic function was normal. Wall thickness was normal.    LEFT ATRIUM: The atrium was dilated.    RIGHT ATRIUM:  Size was normal.    MITRAL VALVE: There was mild annular calcification. Valve structure was normal. There was normal leaflet separation. DOPPLER: The transmitral velocity was within the normal range. There was no evidence for stenosis. There was mild to  moderate regurgitation.    AORTIC VALVE: The valve was trileaflet. Leaflets exhibited normal thickness and normal cuspal separation. DOPPLER: Transaortic velocity was within the normal range. There was no evidence for stenosis. There was no significant  regurgitation.    TRICUSPID VALVE: The valve structure was normal. There was normal leaflet separation. DOPPLER: The transtricuspid velocity was within the normal range. There was no evidence for stenosis. There was mild regurgitation. Estimated peak PA  pressure was 41 mmHg. The findings suggest mild pulmonary hypertension.    PULMONIC VALVE: Leaflets exhibited normal thickness, no calcification, and normal cuspal separation. DOPPLER: The transpulmonic velocity was within the normal range. There was trace regurgitation.    PERICARDIUM: There was no pericardial effusion. The pericardium was normal in appearance.    AORTA: The root exhibited normal size.    SYSTEMIC VEINS: IVC: The inferior vena cava was normal in size. Respirophasic changes were normal.    SYSTEM MEASUREMENT TABLES    2D  %FS: 19.64 %  Ao Diam: 2.51 cm  EDV(Teich): 147.23 ml  EF(Teich): 39.89 %  ESV(Teich): 88.5 ml  HR_2Ch_Q: 59.89 BPM  HR_4Ch_Q: 62.07 BPM  IVSd: 1.15 cm  LA Area: 20.25 cm2  LA Diam: 4.3 cm  LVCO_2Ch_Q: 3.23 L/min  LVCO_4Ch_Q: 4.7 L/min  LVCO_BiP_Q: 3.97 L/min  LVEF_2Ch_Q: 44 %  LVEF_4Ch_Q: 51.75 %  LVEF_BiP_Q: 45.68 %  LVIDd: 5.5 cm  LVIDs: 4.42 cm  LVLd_2Ch_Q: 8.35 cm  LVLd_4Ch_Q: 8.03 cm  LVLs_2Ch_Q: 7.25 cm  LVLs_4Ch_Q: 6.6 cm  LVPWd: 1.07 cm  LVSV_2Ch_Q: 53.98 ml  LVSV_4Ch_Q: 75.78 ml  LVSV_BiP_Q: 61.05 ml  LVVED_2Ch_Q: 122.68 ml  LVVED_4Ch_Q: 146.44 ml  LVVED_BiP_Q: 133.63 ml  LVVES_2Ch_Q: 68.69 ml  LVVES_4Ch_Q: 70.67  ml  LVVES_BiP_Q: 72.59 ml  RA Area: 15.23 cm2  RVIDd: 3.6 cm  RWT: 0.39  SV(Teich): 58.73 ml    CW  TR Vmax: 3.09 m/s  TR maxP.23 mmHg    MM  TAPSE: 2.24 cm    PW  E': 0.05 m/s  E/E': 15.72  MV A Adams: 0.7 m/s  MV Dec [place]: 6.02 m/s2  MV DecT: 133.39 ms  MV E Adams: 0.8 m/s  MV E/A Ratio: 1.14    [facility] Commission Accredited Echocardiography Laboratory    Prepared and electronically signed by    Elkin Guerra MD  Signed 11-May-2021 08:27:15    No results found for this or any previous visit.    No valid procedures specified.  No results found for this or any previous visit.      Imaging: Results Review Statement: I reviewed radiology reports from this admission including: chest xray.  XR chest 1 view portable  Result Date: 3/3/2025  Narrative: XR CHEST PORTABLE INDICATION: Hypoxia. COMPARISON: Chest CT 3/3/2025, CXR 10/15/2024. FINDINGS: Moderate pulmonary edema. Small left pleural effusion. Normal cardiomediastinal silhouette. Bones are unremarkable for age. Normal upper abdomen.     Impression: Moderate pulmonary edema with small left pleural effusion. Workstation performed: [id number]     CT chest abdomen pelvis wo contrast  Result Date: 3/3/2025  Narrative: CT CHEST, ABDOMEN AND PELVIS WITHOUT IV CONTRAST INDICATION: AMS, hypoxia, right sided abdominal pain with guarding. History of chronic lymphocytic leukemia. COMPARISON: CT abdomen/pelvis dated 10/14/2024. Chest CT dated 2020. TECHNIQUE: CT examination of the chest, abdomen and pelvis was performed without intravenous contrast. Multiplanar 2D reformatted images were created from the source data. This examination, like all CT scans performed in the [facility], was performed utilizing techniques to minimize radiation dose exposure, including the use of iterative reconstruction and automated exposure control. Radiation dose length product (DLP) for this visit: 649 mGy-cm Enteric Contrast: Not administered. FINDINGS: CHEST LUNGS:  Patchy glass opacity with interlobular septal thickening due to pulmonary edema. Mild basilar atelectasis..  No tracheal or endobronchial lesion. PLEURA: No small pleural effusions HEART/GREAT VESSELS: Heart is enlarged. Coronary artery calcifications. No thoracic aortic aneurysm. MEDIASTINUM AND FABIAN: Left hilar and mediastinal adenopathy measuring up to 1.8 cm. CHEST WALL AND LOWER NECK: New bilateral axillary and subpectoralis adenopathy, left greater than right. Largest node on the left measures 2.6 cm in short axis. Largest node on the right measures 1.6 cm in short axis. ABDOMEN LIVER/BILIARY TREE: Unremarkable. GALLBLADDER: No calcified gallstones. No pericholecystic inflammatory change. SPLEEN: Unremarkable. PANCREAS: Unremarkable. ADRENAL GLANDS: Unremarkable. KIDNEYS/URETERS: Unremarkable. No hydronephrosis. STOMACH AND BOWEL: Diverticulosis without diverticulitis. No obstruction. APPENDIX: No findings to suggest appendicitis. ABDOMINOPELVIC CAVITY: Increased mesenteric and retroperitoneal adenopathy. For example, right upper quadrant node measuring 2.4 cm in short axis on image 153 series 301 is increased from 1.9 cm. No free air or free fluid. VESSELS: Atherosclerosis without abdominal aortic aneurysm. PELVIS REPRODUCTIVE ORGANS: Surgically absent. URINARY BLADDER: Unremarkable. ABDOMINAL WALL/INGUINAL REGIONS: Unremarkable. BONES: No acute osseous abnormality. Stable fixation of right proximal femur. Scoliosis and spinal degenerative changes. Stable chondroid lesion in the proximal right humerus.     Impression: Pulmonary edema and small effusions Progression of diffuse adenopathy in the chest abdomen and pelvis compatible with known CLL. The study was marked in EPIC for immediate notification. Workstation performed: [id number]     CT head without contrast  Result Date: 3/3/2025  Narrative: CT BRAIN - WITHOUT CONTRAST INDICATION:   AMS, hypoxia, right sided abdominal pain with guarding. COMPARISON: CT  dated 10/14/2024. TECHNIQUE:  CT examination of the brain was performed.  Multiplanar 2D reformatted images were created from the source data. Radiation dose length product (DLP) for this visit:  966 mGy-cm .  This examination, like all CT scans performed in the [facility], was performed utilizing techniques to minimize radiation dose exposure, including the use of iterative reconstruction and automated exposure control. IMAGE QUALITY:  Diagnostic. FINDINGS: PARENCHYMA: Stable mild chronic microangiopathy VENTRICLES AND EXTRA-AXIAL SPACES: Volume loss. No hydrocephalus VISUALIZED ORBITS: Bilateral lens replacement. PARANASAL SINUSES: Normal visualized paranasal sinuses. CALVARIUM AND EXTRACRANIAL SOFT TISSUES: Normal.     Impression: No acute intracranial abnormality. Chronic microangiopathy. Workstation performed: [id number]     Counseling / Coordination of Care  Total floor / unit time spent today 45 minutes.  Greater than 50% of total time was spent with the patient and / or family counseling and / or coordination of care.  A description of the counseling / coordination of care: Review of history, current assessment, development of a plan.    Code Status: Level 1 - Full Code    ** Please Note: Dragon 360 Dictation voice to text software may have been used in the creation of this document. **

## 2025-03-04 NOTE — ASSESSMENT & PLAN NOTE
Patient with hx of dementia presents from assisted living facility due to increased confusion and lethargy   Noted to be in CHF exacerbation on admission with hypoxia which is likely etiology   Management for CHF exacerbation as stated below   Supportive care   Delirium precautions   Q4H neurochecks   PRN Zyprexa

## 2025-03-04 NOTE — PLAN OF CARE
Problem: PHYSICAL THERAPY ADULT  Goal: Performs mobility at highest level of function for planned discharge setting.  See evaluation for individualized goals.  Description: Treatment/Interventions: Functional transfer training, LE strengthening/ROM, Therapeutic exercise, Endurance training, Cognitive reorientation, Patient/family training, Equipment eval/education, Bed mobility, Gait training, Spoke to nursing, Spoke to case management     See flowsheet documentation for full assessment, interventions and recommendations.  Note: Prognosis: Fair  Problem List: Decreased strength, Decreased range of motion, Decreased endurance, Impaired balance, Decreased mobility, Decreased cognition, Impaired judgement, Decreased safety awareness, Decreased coordination, Impaired hearing, Decreased skin integrity, Pain  Assessment: Pt seen for PT evaluation for mobility assessment & discharge needs. Pt admitted 3/3/2025 w/ lethargy, hyperglycemia, dx Metabolic encephalopathy. During PT IE, pt requires JOÃO for rolling R<>L in bed, MODA for supine<>short sit EOB, JOÃO for STS transfers with RW, and MODA 1 for ambulation of 3ft fwd/back with RW. Pt displays above outlined functional impairments & limitations, and presents below her baseline level of functional mobility. The AM-PAC & Barthel Index outcome tools were used to assist in determining pt safety w/ mobility/self care & appropriate d/c recommendations, see above for scores. Pt is at risk of falls d/t multiple comorbidities, h/o falls, impaired balance, impaired cognition, impaired insight/safety awareness, use of ambulatory aid, varying levels of pain , advanced age, acuity of medical illness, ongoing medical treatment of primary dx, abnormal lab values, and polypharmacy. Pt's clinical presentation is currently unstable/unpredictable as seen in pt's presentation of changing level of pain, varying levels of cognitive performance, increased fall risk, new onset of impairment  of functional mobility, decreased endurance, and new onset of weakness. Pt will benefit from continued PT services in order to address impairments, decrease risk of falls, maximize independence w/ fnxl mobility, & ensure safety w/ mobility for transition to next level of care. Based on pt presentation & impairments, pt would most appropriately benefit from Level II (moderate PT intensity) resources upon d/c.      Rehab Resource Intensity Level, PT: II (Moderate Resource Intensity)    See flowsheet documentation for full assessment.

## 2025-03-04 NOTE — ASSESSMENT & PLAN NOTE
Lab Results   Component Value Date    HGBA1C 9.5 (H) 09/01/2024       Recent Labs     03/03/25  1616 03/03/25  2218 03/04/25  0830 03/04/25  1253   POCGLU 324* 257* 110 76       Blood Sugar Average: Last 72 hrs:  (P) 191.75    Poorly controlled with hyperglycemia   Home regimen: Lantus 20U qhs and humalog 5U TID AC   Continue on Lantus nightly at 20 units.  However discontinue 3 times daily mealtime insulin.  Continue on low-dose sliding scale insulin with Accu-Chek 4 times daily.

## 2025-03-04 NOTE — PHYSICAL THERAPY NOTE
"   PHYSICAL THERAPY EVALUATION  DATE: 03/04/25  TIME: 0840-0905    NAME:  Gia Vaughan  AGE:   84 y.o.  Mrn:   [id number]  Length Of Stay: 1    ADMIT DX:  AMS (altered mental status) [R41.82]    Past Medical History:   Diagnosis Date    Acute encephalopathy 10/15/2024    Benign adenomatous polyp of large intestine     CHF (congestive heart failure) (HCC)     Diabetes mellitus (HCC)     Hyperlipemia     Hypertension     Osteoarthritis     TIA (transient ischemic attack)      Past Surgical History:   Procedure Laterality Date    APPENDECTOMY      CARPAL TUNNEL RELEASE Right     HYSTERECTOMY W/ SALPINGO-OOPHERECTOMY      IL OPTX FEM SHFT FX W/INSJ IMED IMPLT W/WO SCREW Right 10/12/2022    Procedure: INSERTION NAIL IM FEMUR ANTEGRADE (TROCHANTERIC);  Surgeon: Eric Isaacs DO;  Location[facility];  Service: Orthopedics       Performed at least 2 patient identifiers during session: Name, Birthday, ID bracelet, and Epic photo     03/04/25 0840   PT Last Visit   PT Visit Date 03/04/25   Note Type   Note type Evaluation   Pain Assessment   Pain Assessment Tool FLACC   Pain Location/Orientation Orientation: Lower;Location: Back;Location: Buttocks  (\"tailbone\")   Pain Onset/Description Onset: Ongoing;Descriptor: Aching;Descriptor: Discomfort  (worse with upright sitting)   Effect of Pain on Daily Activities limits tolerance of sitting, limits ease of all mobility   Patient's Stated Pain Goal No pain   Hospital Pain Intervention(s) Repositioned;Ambulation/increased activity;Emotional support   Multiple Pain Sites No   Pain Rating: FLACC (Rest) - Face 0   Pain Rating: FLACC (Rest) - Legs 0   Pain Rating: FLACC (Rest) - Activity 1   Pain Rating: FLACC (Rest) - Cry 1   Pain Rating: FLACC (Rest) - Consolability 0   Score: FLACC (Rest) 2   Pain Rating: FLACC (Activity) - Face 1   Pain Rating: FLACC (Activity) - Legs 1   Pain Rating: FLACC (Activity) - Activity 1   Pain Rating: FLACC (Activity) - Cry 1   Pain Rating: FLACC " "(Activity) - Consolability 1   Score: FLACC (Activity) 5   Restrictions/Precautions   Weight Bearing Precautions Per Order No   Other Precautions Cognitive;Chair Alarm;Bed Alarm;Multiple lines;Telemetry;Fall Risk;Pain  (3L O2 via NC)   Home Living   Type of Home Assisted living  ([facility])   Home Layout One level   Home Equipment Other (Comment)  (rollator walker)   Prior Function   Level of [place] Independent with functional mobility;Needs assistance with ADLs;Needs assistance with IADLS   Lives With Facility staff   Receives Help From Personal care attendant   IADLs Family/Friend/Other provides transportation;Family/Friend/Other provides meals;Family/Friend/Other provides medication management   Falls in the last 6 months 0  (pt denies, questionable historian; later in session reports \"maybe I did fall because I'm having a lot of pain\")   Vocational Retired  (worked in sewing mill)   Comments Pt is a poor/questionable historian. Pt coming from [facility], has assistance with ADLs and IADLs, ambulates t/o facility with Rollator walker at RUSS level.   General   Additional Pertinent History Pt is an 84 yr old female admitted 3/3/25 with fatigue, lethargy, hyperglycemia. Dx: metabolic encephalopathy, acute on chronic heart failure, acute respiratory failure, poorly controlled hyperglycemia. PMH includes dementia, DM, CHF, HTN, OA, TIA, R femur IM nail 2022, CLL.   Family/Caregiver Present No   Cognition   Overall Cognitive Status Impaired   Arousal/Participation Cooperative   Attention Attends with cues to redirect   Orientation Level Oriented to person;Oriented to place;Disoriented to time;Disoriented to situation  (able to recall being in a hospital, cues for [facility])   Memory Decreased short term memory;Decreased recall of recent events;Decreased recall of precautions   Following Commands Follows one step commands with increased time or repetition   Subjective   Subjective \"Maybe I did fall " "since i'm having pain in my tailbone.\"   RUE Assessment   RUE Assessment WFL   LUE Assessment   LUE Assessment WFL   RLE Assessment   RLE Assessment X  (grossly 3-/5 to 3+/5 MMT throughout)   LLE Assessment   LLE Assessment X  (grossly 3-/5 to 3+/5 MMT throughout)   Coordination   Movements are Fluid and Coordinated 0   Sensation WFL   Bed Mobility   Rolling R 4  Minimal assistance   Additional items Assist x 1;Increased time required;Verbal cues  (trunk management)   Rolling L 4  Minimal assistance   Additional items Assist x 1;Increased time required;Verbal cues  (trunk management)   Supine to Sit 3  Moderate assistance   Additional items Assist x 1;Increased time required;Verbal cues;LE management  (trunk management)   Sit to Supine 3  Moderate assistance   Additional items Assist x 1;Increased time required;Verbal cues;LE management  (trunk management)   Additional Comments Pt initially with dizziness upon upright sitting EOB; resolves quickly; BP stable. Is able to maintain upright sitting with close S.   Transfers   Sit to Stand 4  Minimal assistance   Additional items Assist x 1;Increased time required;Verbal cues  (RW)   Stand to Sit 4  Minimal assistance   Additional items Assist x 1;Increased time required;Verbal cues  (RW)   Stand pivot 4  Minimal assistance   Additional items Assist x 1;Increased time required;Verbal cues  (RW)   Additional Comments Pt needing verbal and visual cues for hand placement for optimal mechanics and safety. Cues for surface proximity and RW positioning.   Ambulation/Elevation   Gait pattern Improper Weight shift;Poor UE support;Antalgic;Forward Flexion;Decreased foot clearance;Short stride;Excessively slow;Decreased hip extension;Decreased heel strike;Decreased toe off   Gait Assistance 3  Moderate assist   Additional items Assist x 1;Verbal cues;Tactile cues   Assistive Device Rolling walker   Distance 3ft fwd/back, limited due to pain and fatigue   Stair Management " Assistance Not tested   Balance   Static Sitting Fair   Dynamic Sitting Fair -   Static Standing Poor +  (w/ RW)   Dynamic Standing Poor  (w/ RW)   Ambulatory Poor  (w/ RW)   Endurance Deficit   Endurance Deficit Yes   Activity Tolerance   Activity Tolerance Patient limited by fatigue;Patient limited by pain;Other (Comment)  (impaired cognition)   Medical Staff Made Aware Spoke with CM, OT, RN   Assessment   Prognosis Fair   Problem List Decreased strength;Decreased range of motion;Decreased endurance;Impaired balance;Decreased mobility;Decreased cognition;Impaired judgement;Decreased safety awareness;Decreased coordination;Impaired hearing;Decreased skin integrity;Pain   Assessment Pt seen for PT evaluation for mobility assessment & discharge needs. Pt admitted 3/3/2025 w/ lethargy, hyperglycemia, dx Metabolic encephalopathy. During PT IE, pt requires JOÃO for rolling R<>L in bed, MODA for supine<>short sit EOB, JOÃO for STS transfers with RW, and MODA 1 for ambulation of 3ft fwd/back with RW. Pt displays above outlined functional impairments & limitations, and presents below her baseline level of functional mobility. The AM-PAC & Barthel Index outcome tools were used to assist in determining pt safety w/ mobility/self care & appropriate d/c recommendations, see above for scores. Pt is at risk of falls d/t multiple comorbidities, h/o falls, impaired balance, impaired cognition, impaired insight/safety awareness, use of ambulatory aid, varying levels of pain , advanced age, acuity of medical illness, ongoing medical treatment of primary dx, abnormal lab values, and polypharmacy. Pt's clinical presentation is currently unstable/unpredictable as seen in pt's presentation of changing level of pain, varying levels of cognitive performance, increased fall risk, new onset of impairment of functional mobility, decreased endurance, and new onset of weakness. Pt will benefit from continued PT services in order to address  "impairments, decrease risk of falls, maximize independence w/ fnxl mobility, & ensure safety w/ mobility for transition to next level of care. Based on pt presentation & impairments, pt would most appropriately benefit from Level II (moderate PT intensity) resources upon d/c.   Goals   Patient Goals \"to go to the bathroom\"   [facility] Expiration Date 03/18/25   Short Term Goal #1 Pt will: complete all bed mobility with S in order to promote increased OOB functional mobility and simulate home environment; complete all transfers with RW and S in order to increase safety with functional mobility; ambulate >50ft with RW and S in order to increase safety with in facility distance functional mobility; improve B LE strength to >/= 4/5 MMT t/o in order to increase safety with functional mobility and decrease risk of falls; demonstrate understanding and independence with LE strengthening HEP; improve ambulatory balance to >/= fair grade with RW in order to promote safety and increased independence with mobility; tolerate >3hrs OOB in upright position, in order to improve muscular endurance and respiratory status; improve AM-PAC score to >/= 18/24 in order to increase independence with mobility and decrease burden of care.   PT Treatment Day 0   Plan   Treatment/Interventions Functional transfer training;LE strengthening/ROM;Therapeutic exercise;Endurance training;Cognitive reorientation;Patient/family training;Equipment eval/education;Bed mobility;Gait training;Spoke to nursing;Spoke to case management   PT Frequency 3-5x/wk   Discharge Recommendation   Rehab Resource Intensity Level, PT II (Moderate Resource Intensity)   AM-PAC Basic Mobility Inpatient   Turning in Flat Bed Without Bedrails 3   Lying on Back to Sitting on Edge of Flat Bed Without Bedrails 2   Moving Bed to Chair 2   Standing Up From Chair Using Arms 3   Walk in Room 2   Climb 3-5 Stairs With Railing 1   Basic Mobility Inpatient Raw Score 13   Basic Mobility " Standardized Score 33.99   [facility] Highest Level Of Mobility   -[facility] Goal 4: Move to chair/commode   JH-HLM Achieved 5: Stand (1 or more minutes)   Modified Hodgeman Scale   Modified Lion Scale 4   Barthel Index   Feeding 5   Bathing 0   Grooming Score 0   Dressing Score 5   Bladder Score 5   Bowels Score 5   Toilet Use Score 5   Transfers (Bed/Chair) Score 5   Mobility (Level Surface) Score 0   Stairs Score 0   Barthel Index Score 30   End of Consult   Patient Position at End of Consult Supine;All needs within reach  (ED stretcher, with B bedrails elevated)     This session, pt required and most appropriately benefited from partial or full skilled PT/OT co-eval due to extensive physical assistance of SKILLED therapists, cognitive-communication impairments, cognitive-behavioral deficits, significant regression from baseline level of mobility, decreased activity tolerance, and unpredictable medical and/or functional status. PT and OT goals were addressed separately as seen in documentation.    Based on patient's [facility] Highest Level of Mobility scores today, patient currently has a goal of -[facility] Levels: 5: STAND (1 OR MORE MINUTES), to be completed with RN staffing each shift, in order to improve overall activity tolerance and mobility, combat hospital related deconditioning, and maximize outcomes for d/c from the acute care setting.     The patient's AM-PAC Basic Mobility Inpatient Short Form Raw Score is 13. A Raw score of less than or equal to 16 suggests the patient may benefit from discharge to post-acute rehabilitation services. Please also refer to the recommendation of the Physical Therapist for safe discharge planning.      Janett Maldonado PT, DPT   Available via [employer]  NPI # [id number]  [place] License - [id number]  3/4/2025

## 2025-03-04 NOTE — PROGRESS NOTES
Progress Note - Hospitalist   Name: Gia Vaughan 84 y.o. female I MRN: [id number]  Unit/Bed#: S -01 I Date of Admission: 3/3/2025   Date of Service: 3/4/2025 I Hospital Day: 1    Assessment & Plan  Metabolic encephalopathy  Patient with hx of dementia presents from assisted living facility due to increased confusion and lethargy.   Most likely secondary to hypoxia from CHF.  Supportive care   Delirium precautions   Q4H neurochecks   PRN Zyprexa   Acute on chronic systolic heart failure (HCC)  Wt Readings from Last 3 Encounters:   03/04/25 63.7 kg (140 lb 6.9 oz)   10/17/24 63.3 kg (139 lb 8.8 oz)   09/06/24 68.5 kg (151 lb 0.2 oz)     Presented to the hospital with hypoxia. Patient is confused, dementia, unable to say if short of breath.   BNP 1246, CT chest shows moderate pulmonary edema and small bilateral pleural effusions. Patient requiring 4L NC and with bilateral lower extremity edema on exam.   Not on diuretic at home. Per chart review, patient was previously on Lasix which was discontinued in the past due to dizziness.   04/03/24 TTE: EF 45%, grade 2 DD. akinetic: basal inferoseptal, basal inferior and mid inferior   IV Lasix 40mg BID   I/O, Daily weights   Sodium and fluid restricted diet   Cardiology consult appreciated         Acute respiratory failure with hypoxia (HCC)  Patient presents to the hospital with increased confusion, lethargy. Notably hypoxic 87% on room air, currently requiring 4L NC. Not on oxygen at home  2/2 CHF exacerbation a/e/b moderate pulmonary edema and bilateral pleural effusions on CT chest  Treat the patient with Lasix 40 mg IV twice daily.  Follow-up cardiology recommendations.  Patient is saturating 88% on 2 L of oxygen via nasal cannula and required to be increased to 3 L and is now saturating 91%  Type 2 diabetes mellitus with stage 3 chronic kidney disease, unspecified whether long term insulin use, unspecified whether stage 3a or 3b CKD ([facility])  Lab Results    Component Value Date    HGBA1C 9.5 (H) 09/01/2024       Recent Labs     03/03/25  1616 03/03/25  2218 03/04/25  0830 03/04/25  1253   POCGLU 324* 257* 110 76       Blood Sugar Average: Last 72 hrs:  (P) 191.75    Poorly controlled with hyperglycemia   Home regimen: Lantus 20U qhs and humalog 5U TID AC   Continue on Lantus nightly at 20 units.  However discontinue 3 times daily mealtime insulin.  Continue on low-dose sliding scale insulin with Accu-Chek 4 times daily.  CKD (chronic kidney disease) stage 3, GFR 30-59 ml/min ([facility])  Lab Results   Component Value Date    EGFR 58 03/04/2025    EGFR 52 03/03/2025    EGFR 63 11/07/2024    CREATININE 0.90 03/04/2025    CREATININE 0.99 03/03/2025    CREATININE 0.89 11/07/2024     Cr currently stable at baseline   Monitor with IV diuresis  HLD (hyperlipidemia)  Continue statin   Moderate dementia ([facility])  Hx of dementia. Resides at assisted living facility [place] [facility].   Currently more confused and lethargic compared to her baseline.   Management as stated above   Primary hypertension  Continue losartan, metoprolol   Receiving IV diuresis  CLL (chronic lymphocytic leukemia) ([facility])  WBC elevated but stable and at baseline   Continue outpatient hematology follow up         Mobility:     Basic Mobility Inpatient Raw Score: 13  -HLM Goal: 4: Move to chair/commode  -HLM Achieved: 5: Stand (1 or more minutes)  HLM Goal achieved. Continue to encourage appropriate mobility.    Pharmacologic VTE Prophylaxis: Yes Lovenox  Mechanical VTE Prophylaxis in Place: No   Patient Centered Rounds: I have performed bedside rounds with the Nursing staff today.   Current Length of Stay: 1 day(s)  Current Patient Status: Inpatient   Code Status: Level 1 - Full Code  Time Spent for Care:  35 minutes.  More than 50% of total time spent on counseling and coordination of care as described above.  Discussions with Specialists or Other Care Team Provider: Yes Cardiology  Education and Discussions with  Family / Patient: No,  could not reach nephew. Left a message.   Discharge Plan: 24-48 hrs.   Case Discussed with  regarding updating plan of care and disposition planning.   Certification Statement: The patient will continue to require additional inpatient hospital stay due to Acute on chronic systolic chf, acute metabolic encephalopathy, acute hypoxic respiratory failure.     Subjective:   I have seen and Examined the patient at the bedside. No CP or Sob. No fevers or chills, No nausea or vomiting. Overnight events reviewed with the RN. No Other complains.  Patient still require oxygen and is desaturating at 88% on 2 L of oxygen via nasal cannula.  Patient has some confusion and poor historian.    Review of System:   Most of the subjective and review of system was obtained from patient's nurse and is negative other than that mentioned in the HPI.  Objective:   Temp (24hrs), Av.8 °F (36.6 °C), Min:97.5 °F (36.4 °C), Max:97.9 °F (36.6 °C)    Temp:  [97.5 °F (36.4 °C)-97.9 °F (36.6 °C)] 97.9 °F (36.6 °C)  HR:  [60-79] 65  Resp:  [16-22] 18  BP: (109-161)/(46-68) 109/46  SpO2:  [88 %-98 %] 94 %  Body mass index is 22.67 kg/m².     Input and Output Summary (last 24 hours):     Intake/Output Summary (Last 24 hours) at 3/4/2025 1736  Last data filed at 3/4/2025 1300  Gross per 24 hour   Intake 300 ml   Output 1850 ml   Net -1550 ml     I/O         03/02 0701  03/03 0700 03/03 0701  03/04 0700 03/04 0701  03/05 0700    P.O.   0    IV Piggyback  300     Total Intake(mL/kg)  300 (4.7) 0 (0)    Urine (mL/kg/hr)  1700 150 (0.2)    Total Output  1700 150    Net  -1400 -150           Unmeasured Urine Occurrence  1 x             Physical Exam:   General : Alert, Awake but confused and oriented x 1-2, does not look to be in any acute distress though.  Still requiring 2 to 3 L of oxygen via nasal cannula.   Neck : Supple.   Eyes:  TAM, EOMI.   CVS : S1, S2, RRR.   R/S : Clear to auscultate anteriorly.  Decreased  breath sound the bases bilaterally.  Faint crackles heard at the bases.  Abd: Soft, NT, ND. Bs+ve  Extremity: Trace pedal edema noted.   Skin: No acute Rash noted.   CNS: No acute FND.     Additional Data:     Labs, Culture & Imaging Data Reviewed:    Results from last 7 days   Lab Units 03/04/25  0614   WBC Thousand/uL 30.52*   HEMOGLOBIN g/dL 10.0*   HEMATOCRIT % 31.7*   PLATELETS Thousands/uL 220     Results from last 7 days   Lab Units 03/04/25  0614 03/03/25  1622   POTASSIUM mmol/L 3.5 4.6   CHLORIDE mmol/L 108 105   CO2 mmol/L 26 21   BUN mg/dL 18 22   CREATININE mg/dL 0.90 0.99   CALCIUM mg/dL 9.0 9.2   ALK PHOS U/L  --  84   ALT U/L  --  10   AST U/L  --  12*     Results from last 7 days   Lab Units 03/03/25  1724   INR  1.08     Lab Results   Component Value Date    HGBA1C 9.5 (H) 09/01/2024      CT head without contrast   Final Result by E. Alec Schoenberger, MD (03/03 1913)      No acute intracranial abnormality. Chronic microangiopathy.                  Workstation performed: [id number]         CT chest abdomen pelvis wo contrast   Final Result by E. Alec Schoenberger, MD (03/03 1928)      Pulmonary edema and small effusions   Progression of diffuse adenopathy in the chest abdomen and pelvis compatible with known CLL.         The study was marked in EPIC for immediate notification.         Workstation performed: [id number]         XR chest 1 view portable   ED Interpretation by Axel Ryan MD (03/03 1745)   Per my independent interpretation: Increasing opacities      Final Result by Lindsey Flynn MD (03/03 2028)      Moderate pulmonary edema with small left pleural effusion.            Workstation performed: [id number]             Cultures:   Blood Culture:   Lab Results   Component Value Date    BLOODCX Received in Microbiology Lab. Culture in Progress. 03/03/2025    BLOODCX Received in Microbiology Lab. Culture in Progress. 03/03/2025     Urine Culture:   Lab Results   Component Value  "Date    URINECX >100,000 cfu/ml 10/14/2024    URINECX >100,000 cfu/ml Escherichia coli (A) 09/01/2024    URINECX 20,000-29,000 cfu/ml 09/01/2024     Sputum Culture: No components found for: \"SPUTUMCX\"  Wound Culture: No results found for: \"WOUNDCULT\"    Last 24 Hours Medication List:   Current Facility-Administered Medications   Medication Dose Route Frequency Provider Last Rate    acetaminophen  650 mg Oral Q6H PRN Oralia Monzon PA-C      atorvastatin  20 mg Oral Daily With Dinner Oralia Monzon PA-C      furosemide  40 mg Intravenous BID Oralia Monzon PA-C      heparin (porcine)  5,000 Units Subcutaneous Q8H [facility] Oralia Monzon PA-C      insulin glargine  20 Units Subcutaneous HS Oralia Monzon PA-C      insulin lispro  1-5 Units Subcutaneous TID AC Oralia Monzon PA-C      insulin lispro  1-5 Units Subcutaneous HS Oralia Monzon PA-C      insulin lispro  5 Units Subcutaneous TID With Meals Oralia Monzon PA-C      losartan  100 mg Oral Daily Oralia Monzon PA-C      metoprolol succinate  25 mg Oral Q12H BRINDA Sharpe      OLANZapine  2.5 mg Intramuscular Q4H PRN Max 6/day Oralia Monzon PA-C      oxymetazoline  2 spray Each Nare Q12H [facility] Kelly Alfaro MD      potassium chloride  20 mEq Oral BID Oralia Monzon PA-C           Patient is at moderate risk for morbidity and mortality due to above mentioned illness and comorbidities.         " Tylenol/Take over the counter pain medication

## 2025-03-04 NOTE — H&P
H&P - Hospitalist   Name: Gia Vaughan 84 y.o. female I MRN: [id number]  Unit/Bed#: ED-07 I Date of Admission: 3/3/2025   Date of Service: 3/4/2025 I Hospital Day: 1     Assessment & Plan  Metabolic encephalopathy  Patient with hx of dementia presents from assisted living facility due to increased confusion and lethargy   Noted to be in CHF exacerbation on admission with hypoxia which is likely etiology   Management for CHF exacerbation as stated below   Supportive care   Delirium precautions   Q4H neurochecks   PRN Zyprexa   Acute on chronic systolic heart failure (HCC)  Wt Readings from Last 3 Encounters:   10/17/24 63.3 kg (139 lb 8.8 oz)   09/06/24 68.5 kg (151 lb 0.2 oz)   05/31/24 68 kg (150 lb)     Presented to the hospital with hypoxia. Patient is confused, dementia, unable to say if short of breath.   BNP 1246, CT chest shows moderate pulmonary edema and small bilateral pleural effusions. Patient requiring 4L NC and with bilateral lower extremity edema on exam.   Not on diuretic at home. Per chart review, patient was previously on Lasix which was discontinued in the past due to dizziness.   04/03/24 TTE: EF 45%, grade 2 DD. akinetic: basal inferoseptal, basal inferior and mid inferior   IV Lasix 40mg BID   I/O, Daily weights   Sodium and fluid restricted diet   Cardiology consult appreciated         Acute respiratory failure with hypoxia (HCC)  Patient presents to the hospital with increased confusion, lethargy. Notably hypoxic 87% on room air, currently requiring 4L NC. Not on oxygen at home  2/2 CHF exacerbation a/e/b moderate pulmonary edema and bilateral pleural effusions on CT chest  IV diuresis as stated above   Continue to wean off oxygen to maintain oxygen saturation >90%  Type 2 diabetes mellitus with stage 3 chronic kidney disease, unspecified whether long term insulin use, unspecified whether stage 3a or 3b CKD (HCC)  Lab Results   Component Value Date    HGBA1C 9.5 (H) 09/01/2024       Recent  Labs     03/03/25  1616 03/03/25  2218   POCGLU 324* 257*       Blood Sugar Average: Last 72 hrs:  (P) 290.5    Poorly controlled with hyperglycemia   Home regimen: Lantus 20U qhs and humalog 5U TID AC   Continue home regimen due to hyperglycemia and adjust as necessary   Avoid hypoglycemia   CKD (chronic kidney disease) stage 3, GFR 30-59 ml/min ([facility])  Lab Results   Component Value Date    EGFR 52 03/03/2025    EGFR 63 11/07/2024    EGFR 70 11/06/2024    CREATININE 0.99 03/03/2025    CREATININE 0.89 11/07/2024    CREATININE 0.82 11/06/2024     Cr currently stable at baseline   Monitor with IV diuresis  HLD (hyperlipidemia)  Continue statin   Moderate dementia (HCC)  Hx of dementia. Resides at assisted living facility [place] [id number].   Currently more confused and lethargic compared to her baseline.   Management as stated above   Primary hypertension  Continue losartan, metoprolol   Receiving IV diuresis  CLL (chronic lymphocytic leukemia) ([facility])  WBC elevated but stable and at baseline   Continue outpatient hematology follow up       VTE Pharmacologic Prophylaxis: VTE Score: 5 High Risk (Score >/= 5) - Pharmacological DVT Prophylaxis Ordered: heparin. Sequential Compression Devices Ordered.  Code Status: Level 1 - Full Code   Discussion with family: Updated  (nephew) via phone.    Anticipated Length of Stay: Patient will be admitted on an inpatient basis with an anticipated length of stay of greater than 2 midnights secondary to acute metabolic encephalopathy and acute CHF exacerbation and acute respiratory failure with hypoxia.    History of Present Illness   Chief Complaint: Increased confusion and lethargy compared to baseline    Gia Vaughan is a 84 y.o. female with a PMH of dementia, CHF, type 2 diabetes, CLL, hyperlipidemia, CKD, hypertension who presents from [address] with increased confusion and lethargy compared to her baseline.  Patient unable to provide any history at this time.  She arouses  to voice.  Unable to answer orientation questions.  Was reportedly agitated and trying to hit the nurses earlier.  Spoke with patient's nephew, POA, over the phone.  He states that at baseline, patient's mentation does wax and wane however she is normally oriented x 2.  He states that she has a very good appetite at the nursing home.  I discussed with the nephew that she is being admitted for CHF exacerbation.  Patient's nephew sounded surprised by this as he states that he never knew that she had CHF.  Per chart review, patient was previously on Lasix in the past which was discontinued due to dizziness.  She is not currently on diuretic at home.    Review of Systems   Unable to perform ROS: Dementia        Historical Information   Past Medical History:   Diagnosis Date    Acute encephalopathy 10/15/2024    Benign adenomatous polyp of large intestine     CHF (congestive heart failure) (HCC)     Diabetes mellitus (HCC)     Hyperlipemia     Hypertension     Osteoarthritis     TIA (transient ischemic attack)      Past Surgical History:   Procedure Laterality Date    APPENDECTOMY      CARPAL TUNNEL RELEASE Right     HYSTERECTOMY W/ SALPINGO-OOPHERECTOMY      NV OPTX FEM SHFT FX W/INSJ IMED IMPLT W/WO SCREW Right 10/12/2022    Procedure: INSERTION NAIL IM FEMUR ANTEGRADE (TROCHANTERIC);  Surgeon: Eric Isaacs DO;  Location: [facility];  Service: Orthopedics     Social History     Tobacco Use    Smoking status: Never    Smokeless tobacco: Never   Vaping Use    Vaping status: Never Used   Substance and Sexual Activity    Alcohol use: Not Currently     Comment: very seldom    Drug use: Never    Sexual activity: Not on file     E-Cigarette/Vaping    E-Cigarette Use Never User      E-Cigarette/Vaping Substances    Nicotine No     THC No     CBD No     Flavoring No     Other No     Unknown No      Family History   Problem Relation Age of Onset    Heart disease Mother     Heart disease Father     Hypertension Father      Stomach cancer Sister     Ovarian cancer Sister     Hypertension Sister      Social History:  Marital Status:    Patient Pre-hospital Living Situation: Assisted Living  Patient Pre-hospital Level of Mobility: walks with walker  Patient Pre-hospital Diet Restrictions: diabetic    Meds/Allergies   I have reviewed home medications using recent Epic encounter.  Prior to Admission medications    Medication Sig Start Date End Date Taking? Authorizing Provider   acetaminophen (TYLENOL) 325 mg tablet Take 2 tablets (650 mg total) by mouth every 4 (four) hours as needed for mild pain 10/13/22   Pasha Putnam PA-C   Alcohol Swabs (B-D SINGLE USE SWABS REGULAR) PADS USE AS DIRECTED 2/24/25   Andrew Pa MD   atorvastatin (LIPITOR) 20 mg tablet Take 20 mg by mouth daily    Historical Provider, MD   calcium carbonate (OS-JUAN RAMON) 600 MG tablet Take 600 mg by mouth 2 (two) times a day with meals    Historical Provider, MD   carbamide peroxide (FT Earwax Removal) 6.5 % otic solution INSERT FOUR DROPS INTO BOTH EARS AT BEDTIME FOR 2 WEEKS DX: WAX BUILD UP 11/6/24   Andrew Pa MD   cholecalciferol (VITAMIN D3) 1,000 units tablet Take 1,000 Units by mouth daily    Historical Provider, MD   Continuous Blood Gluc Sensor (FreeStyle Дмитрий 2 Sensor) MISC apply 1 SENSOR to back OF UPPER ARM REMOVE AND REPLACE every 14 d...  (REFER TO PRESCRIPTION NOTES). 1/24/24   Historical Provider, MD   dextromethorphan-guaifenesin (MUCINEX DM)  MG per 12 hr tablet Take 1 tablet by mouth every 12 (twelve) hours 2/27/25   Andrew Pa MD   Diclofenac Sodium (VOLTAREN) 1 % Apply 2 g topically 3 (three) times a day To R knee 11/8/22   [place] Depadua, MD   fluticasone (FLONASE) 50 mcg/act nasal spray INSTILL ONE SPRAY INTO BOTH NOSTRILS TWICE DAILY AS NEEDED (DX: ALLERGIES) 1/2/25   Andrew Pa MD   fluticasone (FLONASE) 50 mcg/act nasal spray 1 spray into each nostril daily 2/7/25   Andrew Pa MD   fluticasone (FLONASE) 50  mcg/act nasal spray INSTILL ONE SPRAY INTO BOTH NOSTRILS DAILY FOR 1 WEEK AND THEN AS NEEDED DX: ALLERGIES 2/24/25   Andrew Pa MD   glucose blood test strip 1 each by Other route daily as needed Use as instructed    Historical Provider, MD   glucose monitoring kit (FREESTYLE) monitoring kit 1 each by Does not apply route as needed    Historical Provider, MD   guaiFENesin (FT Mucus Relief 12HR) 600 mg 12 hr tablet Take 1 tablet (600 mg total) by mouth every 12 (twelve) hours 2/27/25   Andrew Pa MD   insulin detemir (LEVEMIR) 100 units/mL subcutaneous injection Inject 20 Units under the skin daily at bedtime 11/8/22   [place] Depadua, MD   insulin lispro (HumaLOG) 100 units/mL injection Inject 5 Units under the skin 3 (three) times a day with meals 11/8/22   [place] Depadua, MD   losartan (COZAAR) 100 MG tablet Take 1 tablet (100 mg total) by mouth daily 10/17/24 11/16/24  Sherrie Sinclair MD   metoprolol tartrate (LOPRESSOR) 25 mg tablet Take 1 tablet (25 mg total) by mouth every 12 (twelve) hours 11/8/22   [place] Depadua, MD   potassium chloride (Klor-Con M20) 20 mEq tablet Take 1 tablet (20 mEq total) by mouth 2 (two) times a day 12/30/24   Andrew Pa MD   Potassium Chloride ER 20 MEQ TBCR Take 1 tablet by mouth 2 (two) times a day 12/29/23   Historical Provider, MD   vitamin B-12 (VITAMIN B-12) 1,000 mcg tablet Take by mouth daily    Historical Provider, MD     Allergies   Allergen Reactions    Amlodipine     Ceftin [Cefuroxime]     Ciprofloxacin     Citalopram     Dye [Iodinated Contrast Media]     Glucophage [Metformin]     Januvia [Sitagliptin]     Neomycin-Polymyxin-Dexameth     Ondansetron     Prilosec [Omeprazole]     Vibramycin [Doxycycline]        Objective :  Temp:  [98.2 °F (36.8 °C)] 98.2 °F (36.8 °C)  HR:  [60-75] 72  BP: (119-157)/(60-76) 119/60  Resp:  [16] 16  SpO2:  [87 %-97 %] 95 %  O2 Device: Nasal cannula  Nasal Cannula O2 Flow Rate (L/min):  [4 L/min] 4 L/min    Physical Exam  HENT:       Mouth/Throat:      Mouth: Mucous membranes are moist.   Eyes:      General: No scleral icterus.  Cardiovascular:      Rate and Rhythm: Normal rate and regular rhythm.      Heart sounds: Normal heart sounds.   Pulmonary:      Breath sounds: Rales present. No wheezing or rhonchi.   Abdominal:      General: Abdomen is flat. Bowel sounds are normal.      Palpations: Abdomen is soft.      Tenderness: There is no abdominal tenderness.   Musculoskeletal:      Right lower leg: Edema present.      Left lower leg: Edema present.   Skin:     General: Skin is warm and dry.   Neurological:      General: No focal deficit present.      Mental Status: She is easily aroused. She is lethargic and disoriented.      GCS: GCS eye subscore is 3. GCS verbal subscore is 4. GCS motor subscore is 6.      Comments: Generalized weakness   Psychiatric:         Mood and Affect: Mood normal.          Lines/Drains:            Lab Results: I have reviewed the following results:  Results from last 7 days   Lab Units 03/03/25  1622   WBC Thousand/uL 29.19*   HEMOGLOBIN g/dL 10.5*   HEMATOCRIT % 34.1*   PLATELETS Thousands/uL 190   LYMPHO PCT % 85*   MONO PCT % 2*   EOS PCT % 1     Results from last 7 days   Lab Units 03/03/25  1622   SODIUM mmol/L 135   POTASSIUM mmol/L 4.6   CHLORIDE mmol/L 105   CO2 mmol/L 21   BUN mg/dL 22   CREATININE mg/dL 0.99   ANION GAP mmol/L 9   CALCIUM mg/dL 9.2   ALBUMIN g/dL 3.9   TOTAL BILIRUBIN mg/dL 0.99   ALK PHOS U/L 84   ALT U/L 10   AST U/L 12*   GLUCOSE RANDOM mg/dL 352*     Results from last 7 days   Lab Units 03/03/25  1724   INR  1.08     Results from last 7 days   Lab Units 03/03/25  2218 03/03/25  1616   POC GLUCOSE mg/dl 257* 324*     Lab Results   Component Value Date    HGBA1C 9.5 (H) 09/01/2024    HGBA1C 9.4 (H) 04/01/2024    HGBA1C 9.5 (H) 11/29/2023     Results from last 7 days   Lab Units 03/03/25  1730 03/03/25  1724   LACTIC ACID mmol/L 1.2  --    PROCALCITONIN ng/ml  --  0.08       Imaging  Results Review: I reviewed radiology reports from this admission including: chest xray, CT chest, CT abdomen/pelvis, and CT head.  Other Study Results Review: EKG was reviewed.     Administrative Statements   I have spent a total time of 60 minutes in caring for this patient on the day of the visit/encounter including Diagnostic results, Prognosis, Risks and benefits of tx options, Instructions for management, Patient and family education, Importance of tx compliance, Risk factor reductions, Impressions, Counseling / Coordination of care, Documenting in the medical record, Reviewing/placing orders in the medical record (including tests, medications, and/or procedures), and Obtaining or reviewing history  .    ** Please Note: This note has been constructed using a voice recognition system. **

## 2025-03-04 NOTE — ASSESSMENT & PLAN NOTE
Patient presents to the hospital with increased confusion, lethargy. Notably hypoxic 87% on room air, currently requiring 4L NC. Not on oxygen at home  2/2 CHF exacerbation a/e/b moderate pulmonary edema and bilateral pleural effusions on CT chest  Treat the patient with Lasix 40 mg IV twice daily.  Follow-up cardiology recommendations.  Patient is saturating 88% on 2 L of oxygen via nasal cannula and required to be increased to 3 L and is now saturating 91%

## 2025-03-04 NOTE — PLAN OF CARE
Problem: OCCUPATIONAL THERAPY ADULT  Goal: Performs self-care activities at highest level of function for planned discharge setting.  See evaluation for individualized goals.  Description: Treatment Interventions: ADL retraining, Functional transfer training, UE strengthening/ROM, Endurance training, Cognitive reorientation, Equipment evaluation/education, Patient/family training, Compensatory technique education, Continued evaluation, Energy conservation, Activityengagement          See flowsheet documentation for full assessment, interventions and recommendations.   Note: Limitation: Decreased ADL status, Decreased Safe judgement during ADL, Decreased cognition, Decreased endurance, Decreased self-care trans, Decreased high-level ADLs, Decreased UE strength  Prognosis: Fair  Assessment: Pt is a 85 yo female who presented to [facility] from [facility] with increasing confusion and lethargy in which pt dx w/ metabolic encephalopathy and found in acute on chronic systolic heart failure. Pt  has a past medical history of Acute encephalopathy (10/15/2024), Benign adenomatous polyp of large intestine, CHF (congestive heart failure) ([facility]), Diabetes mellitus (HCC), Hyperlipemia, Hypertension, Osteoarthritis, and TIA (transient ischemic attack). Pt with active OT orders in which OT consulted to assess pt's functional status and occupational performance to determine safe d/c needs. Pt seen with PT to increase safety, decrease fall risk, and maximize functional/occupational performance 2* medical complexity which is a regression from pt's functional baseline. Pt lives with facility staff at [facility] and has assistance with ADLs/IADLs and uses rollator for functional mobility. (-) driving.   Currently, pt preforming bed mobility w/ Mod A, functional transfers w/ Min A x1 w/ RW, functional mobility w/ Mod A x1 w/ RW, UB ADLs w/ Mod A, and LB ADLs w/ Max A. Pt demonstrates the following  limitations/impairments which impact the pt's ability to engage in valued occupations: cognition, balance, endurance/activity tolerance, standing tolerance, functional reach, postural/trunk control, strength, safety awareness, and pain. From an OT standpoint, recommend discharge w/ Level 2 resources once medically stable. The patient's raw score on the AM-PAC Daily Activity Inpatient Short Form is 14. A raw score of less than 19 suggests the patient may benefit from discharge to post-acute rehabilitation services. Please refer to the recommendation of the Occupational Therapist for safe discharge planning.  Pt would benefit from skilled OT services 2-3x/wk to address acute care needs and underlying performance skills to promote safety, decrease fall risk, and enhance occupational performance to return to OF. Goals to be met within the next 10-14 days.     Rehab Resource Intensity Level, OT: II (Moderate Resource Intensity)

## 2025-03-04 NOTE — ASSESSMENT & PLAN NOTE
Wt Readings from Last 3 Encounters:   03/04/25 63.7 kg (140 lb 6.9 oz)   10/17/24 63.3 kg (139 lb 8.8 oz)   09/06/24 68.5 kg (151 lb 0.2 oz)   Has been off maintenance diuretics for over 2 years, previously on Lasix 40 mg PO daily but stopped 2/2 orthostasis  Will need standing dose of Lasix at discharge but likely lower than 40 mg  Responding well to Lasix 40 mg IV BID with 1700 mL urine output documented thus far  Mildly overloaded on exam  Unclear dry weight  Current stretcher scale weight: 140 lb   Continue IV Lasix as ordered  Strict I/Os, daily weights, am BMP  2 gm Na diet with 1800 mL FR  Would not repeat echocardiogram at this time  Switch metoprolol tartrate to metoprolol succinate for GDMT for cardiomyopathy

## 2025-03-04 NOTE — ASSESSMENT & PLAN NOTE
Patient with hx of dementia presents from assisted living facility due to increased confusion and lethargy.   Most likely secondary to hypoxia from CHF.  Supportive care   Delirium precautions   Q4H neurochecks   PRN Zyprexa

## 2025-03-04 NOTE — ASSESSMENT & PLAN NOTE
Lab Results   Component Value Date    EGFR 58 03/04/2025    EGFR 52 03/03/2025    EGFR 63 11/07/2024    CREATININE 0.90 03/04/2025    CREATININE 0.99 03/03/2025    CREATININE 0.89 11/07/2024     Cr currently stable at baseline   Monitor with IV diuresis

## 2025-03-04 NOTE — OCCUPATIONAL THERAPY NOTE
Occupational Therapy Evaluation     Patient Name: Gia Vaughan  Today's Date: 3/4/2025  Problem List  Principal Problem:    Metabolic encephalopathy  Active Problems:    Type 2 diabetes mellitus with stage 3 chronic kidney disease, unspecified whether long term insulin use, unspecified whether stage 3a or 3b CKD (HCC)    CKD (chronic kidney disease) stage 3, GFR 30-59 ml/min (HCC)    HLD (hyperlipidemia)    Acute on chronic systolic heart failure (HCC)    Moderate dementia (HCC)    Primary hypertension    CLL (chronic lymphocytic leukemia) (HCC)    Acute respiratory failure with hypoxia (HCC)    Past Medical History  Past Medical History:   Diagnosis Date    Acute encephalopathy 10/15/2024    Benign adenomatous polyp of large intestine     CHF (congestive heart failure) (HCC)     Diabetes mellitus (HCC)     Hyperlipemia     Hypertension     Osteoarthritis     TIA (transient ischemic attack)      Past Surgical History  Past Surgical History:   Procedure Laterality Date    APPENDECTOMY      CARPAL TUNNEL RELEASE Right     HYSTERECTOMY W/ SALPINGO-OOPHERECTOMY      MI OPTX FEM SHFT FX W/INSJ IMED IMPLT W/WO SCREW Right 10/12/2022    Procedure: INSERTION NAIL IM FEMUR ANTEGRADE (TROCHANTERIC);  Surgeon: Eric Isaacs DO;  Location: [facility];  Service: Orthopedics         03/04/25 0839   OT Last Visit   OT Visit Date 03/04/25   Note Type   Note type Evaluation   Additional Comments Pt seeen w/ PT to increase safety, decrease fall risk, and maximize functional/occupational performance 2* medical complexity which is a regression from pt's functional baseline.   Pain Assessment   Pain Assessment Tool 0-10   Pain Score 6   Pain Location/Orientation Orientation: Right;Location: Hip   Effect of Pain on Daily Activities Impacts ability to engage in valued occupations   Hospital Pain Intervention(s) Repositioned;Ambulation/increased activity;Emotional support   Restrictions/Precautions   Weight Bearing Precautions Per  "Order No   Other Precautions Multiple lines;Telemetry;O2;Fall Risk;Pain;Cognitive;Bed Alarm   Home Living   Type of Home Assisted living  ([facility])   Home Layout One level;Performs ADLs on one level;Able to live on main level with bedroom/bathroom;Access   Home Equipment Walker;Other (Comment)  (Rollator)   Additional Comments Upon arrival, pt presenting as poor/confused historian; per chart review, pt presenting from [facility] and uses rollator @ baseline for functional mobility   Prior Function   Level of [place] Needs assistance with ADLs;Needs assistance with IADLS;Independent with ADLs;Independent with functional mobility   Lives With Facility staff   Receives Help From Personal care attendant   IADLs Family/Friend/Other provides transportation;Family/Friend/Other provides meals;Family/Friend/Other provides medication management   Falls in the last 6 months   (Pt originally denying falls however throughout conversation, pt reporting she wonders if she recently fell 2* generalized weakness/pain reporting \"maybe I did fall!\")   Vocational Retired   Comments (-) driving   Lifestyle   Autonomy PTA, pt has assistance with ADLs/IADLs and uses rollator for functional mobility; (-) driving   Reciprocal Relationships Lives with facility staff   Service to Others 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   Intrinsic Gratification Enjoys being happy   Subjective   Subjective \"I like to be happy!!!!!\"   ADL   Where Assessed Edge of bed   Eating Assistance 4  Minimal Assistance   Grooming Assistance 4  Minimal Assistance   UB Bathing Assistance 3  Moderate Assistance   LB Bathing Assistance 2  Maximal Assistance   UB Dressing Assistance 3  Moderate Assistance   LB Dressing Assistance 2  Maximal Assistance   Toileting Assistance  2  Maximal Assistance   Functional Assistance 3  Moderate Assistance   Bed Mobility   Rolling R 4  Minimal assistance   Additional items Assist x 1;HOB " elevated;Bedrails;Increased time required;Verbal cues;LE management   Rolling L 4  Minimal assistance   Additional items Assist x 1;HOB elevated;Bedrails;Increased time required;Verbal cues;LE management   Supine to Sit 3  Moderate assistance   Additional items Assist x 1;HOB elevated;Bedrails;Increased time required;Verbal cues;LE management   Sit to Supine 3  Moderate assistance   Additional items Assist x 1;HOB elevated;Bedrails;Increased time required;Verbal cues;LE management   Additional Comments Pt performing rolling R <> L in supine for bedpan placement; @ end of session, pt left sitting upright in stretcher with all functional needs in reach with breakfast tray on tray table and RN present in room   Transfers   Sit to Stand 4  Minimal assistance   Additional items Assist x 1;Increased time required;Verbal cues   Stand to Sit 4  Minimal assistance   Additional items Assist x 1;Increased time required;Verbal cues   Additional Comments w/ RW for safety and support   Functional Mobility   Functional Mobility 3  Moderate assistance   Additional Comments Pt completed few forward/side steps around EOB with Mod A x1 w/ RW for safety and support with verbal cues for proper technique, RW management, and sequencing   Additional items Rolling walker   Balance   Static Sitting Fair   Dynamic Sitting Fair -   Static Standing Poor +   Dynamic Standing Poor   Ambulatory Poor   Activity Tolerance   Activity Tolerance Patient tolerated treatment well;Patient limited by fatigue;Patient limited by pain   Medical Staff Made Aware EVELIA Patterson   Nurse Made Aware RN cleared   RUE Assessment   RUE Assessment WFL   LUE Assessment   LUE Assessment WFL   Hand Function   Gross Motor Coordination Functional   Fine Motor Coordination Functional   Cognition   Overall Cognitive Status Impaired   Arousal/Participation Responsive;Arousable;Cooperative;Alert   Attention Attends with cues to redirect   Orientation Level Oriented to  person;Oriented to place;Disoriented to time;Disoriented to situation  (Able to report general place that she is in the hospital however cues provided for name of the hospital)   Memory Decreased recall of precautions;Decreased recall of recent events;Decreased short term memory   Following Commands Follows one step commands with increased time or repetition   Comments Pt pleasantly confused, cooperative throughout session; requires verbal cues for redirection/attention to task and proper technique/safety during functional activity; per chart review, pt w/ pmhx of demetnia and is oriented x2 @ baseline   Assessment   Limitation Decreased ADL status;Decreased Safe judgement during ADL;Decreased cognition;Decreased endurance;Decreased self-care trans;Decreased high-level ADLs;Decreased UE strength   Prognosis Fair   Assessment Pt is a 85 yo female who presented to [facility] from [facility] with increasing confusion and lethargy in which pt dx w/ metabolic encephalopathy and found in acute on chronic systolic heart failure. Pt  has a past medical history of Acute encephalopathy (10/15/2024), Benign adenomatous polyp of large intestine, CHF (congestive heart failure) ([facility]), Diabetes mellitus (HCC), Hyperlipemia, Hypertension, Osteoarthritis, and TIA (transient ischemic attack). Pt with active OT orders in which OT consulted to assess pt's functional status and occupational performance to determine safe d/c needs. Pt seen with PT to increase safety, decrease fall risk, and maximize functional/occupational performance 2* medical complexity which is a regression from pt's functional baseline. Pt lives with facility staff at [facility] and has assistance with ADLs/IADLs and uses rollator for functional mobility. (-) driving.   Currently, pt preforming bed mobility w/ Mod A, functional transfers w/ Min A x1 w/ RW, functional mobility w/ Mod A x1 w/ RW, UB ADLs w/ Mod A, and LB ADLs w/ Max A. Pt demonstrates  the following limitations/impairments which impact the pt's ability to engage in valued occupations: cognition, balance, endurance/activity tolerance, standing tolerance, functional reach, postural/trunk control, strength, safety awareness, and pain. From an OT standpoint, recommend discharge w/ Level 2 resources once medically stable. The patient's raw score on the AM-PAC Daily Activity Inpatient Short Form is 14. A raw score of less than 19 suggests the patient may benefit from discharge to post-acute rehabilitation services. Please refer to the recommendation of the Occupational Therapist for safe discharge planning.  Pt would benefit from skilled OT services 2-3x/wk to address acute care needs and underlying performance skills to promote safety, decrease fall risk, and enhance occupational performance to return to [facility]. Goals to be met within the next 10-14 days.   Goals   Patient Goals To go to the bathroom   [facility] Time Frame 10-14   Long Term Goal #1 See OT goals listed below   Plan   Treatment Interventions ADL retraining;Functional transfer training;UE strengthening/ROM;Endurance training;Cognitive reorientation;Equipment evaluation/education;Patient/family training;Compensatory technique education;Continued evaluation;Energy conservation;Activityengagement   Goal Expiration Date 03/18/25   OT Frequency 2-3x/wk   Discharge Recommendation   Rehab Resource Intensity Level, OT II (Moderate Resource Intensity)   AM-PAC Daily Activity Inpatient   Lower Body Dressing 2   Bathing 2   Toileting 2   Upper Body Dressing 2   Grooming 3   Eating 3   Daily Activity Raw Score 14   Daily Activity Standardized Score (Calc for Raw Score >=11) 33.39   AM-PAC Applied Cognition Inpatient   Following a Speech/Presentation 2   Understanding Ordinary Conversation 3   Taking Medications 2   Remembering Where Things Are Placed or Put Away 3   Remembering List of 4-5 Errands 3   Taking Care of Complicated Tasks 2   Applied Cognition  Raw Score 15   Applied Cognition Standardized Score 33.54   End of Consult   Education Provided Yes   Patient Position at End of Consult Supine;Bed/Chair alarm activated;All needs within reach   Nurse Communication Nurse aware of consult     OT GOALS:    Pt will improve functional mobility during ADL/IADL/leisure tasks with S using AE/DME prn.    Pt will improve activity tolerance/functional endurance during ADL/IADL/leisure tasks for at least 20 minutes to improve occupational performance and engagement in valued occupations using AE/DME prn.    Pt will engage in ongoing functional/formal cognitive assessments to assist with safe d/c planning and increase safety during functional tasks.    Pt will be A/Ox4 at least 50% of the time with verbal cueing and use of external environmental cues to improve attention and safety during functional tasks.    Pt will improve static/dynamic sitting balance for at least 15 minutes with Mod I during functional tasks to decrease fall risk and improve independence and engagement in ADL/IADL/leisure activities.    Pt will improve dynamic standing balance for at least 15 minutes with S during functional tasks to decrease fall risk and improve independence and engagement in ADL/IADL/leisure activities.    Pt will complete functional transfers on and off all surfaces used in daily routines with Mod I for safety to maximize functional/occupational performance.    Pt will complete all bed mobility tasks with Mod I to serve as a prerequisite for EOB/OOB ADL/IADL/leisure tasks, optimize positioning/comfort, and increase functional independence.    Pt will independently demonstrate good carryover of safety precautions and education/training during ADL/IADL/leisure tasks with energy conservation techniques s/p skilled instruction with less than 3 verbal cues.    Pt will complete UB ADL tasks with S using AE/DME prn to increase functional independence in ADL/IADL/leisure tasks.    Pt will  complete LB ADL tasks with Min A using AE/DME prn to increase functional independence in ADL/IADL/leisure tasks.     Pt will complete toileting tasks with Min A and good hygiene/thoroughness using AE/DME prn to increase functional independence.    Pt will independently identify and utilize 2-3 positive coping strategies to enhance overall wellbeing and engagement in valued occupations.    Francia Sky MS, OTR/L

## 2025-03-04 NOTE — ASSESSMENT & PLAN NOTE
Wt Readings from Last 3 Encounters:   03/04/25 63.7 kg (140 lb 6.9 oz)   10/17/24 63.3 kg (139 lb 8.8 oz)   09/06/24 68.5 kg (151 lb 0.2 oz)     Presented to the hospital with hypoxia. Patient is confused, dementia, unable to say if short of breath.   BNP 1246, CT chest shows moderate pulmonary edema and small bilateral pleural effusions. Patient requiring 4L NC and with bilateral lower extremity edema on exam.   Not on diuretic at home. Per chart review, patient was previously on Lasix which was discontinued in the past due to dizziness.   04/03/24 TTE: EF 45%, grade 2 DD. akinetic: basal inferoseptal, basal inferior and mid inferior   IV Lasix 40mg BID   I/O, Daily weights   Sodium and fluid restricted diet   Cardiology consult appreciated

## 2025-03-04 NOTE — ASSESSMENT & PLAN NOTE
Hx of dementia. Resides at assisted living facility [facility].   Currently more confused and lethargic compared to her baseline.   Management as stated above

## 2025-03-04 NOTE — ASSESSMENT & PLAN NOTE
Wt Readings from Last 3 Encounters:   10/17/24 63.3 kg (139 lb 8.8 oz)   09/06/24 68.5 kg (151 lb 0.2 oz)   05/31/24 68 kg (150 lb)     Presented to the hospital with hypoxia. Patient is confused, dementia, unable to say if short of breath.   BNP 1246, CT chest shows moderate pulmonary edema and small bilateral pleural effusions. Patient requiring 4L NC and with bilateral lower extremity edema on exam.   Not on diuretic at home. Per chart review, patient was previously on Lasix which was discontinued in the past due to dizziness.   04/03/24 TTE: EF 45%, grade 2 DD. akinetic: basal inferoseptal, basal inferior and mid inferior   IV Lasix 40mg BID   I/O, Daily weights   Sodium and fluid restricted diet   Cardiology consult appreciated

## 2025-03-05 PROBLEM — J96.01 ACUTE RESPIRATORY FAILURE WITH HYPOXIA (HCC): Status: RESOLVED | Noted: 2025-03-03 | Resolved: 2025-03-05

## 2025-03-05 LAB
ANION GAP SERPL CALCULATED.3IONS-SCNC: 7 MMOL/L (ref 4–13)
ATRIAL RATE: 56 BPM
BUN SERPL-MCNC: 24 MG/DL (ref 5–25)
CALCIUM SERPL-MCNC: 9.2 MG/DL (ref 8.4–10.2)
CHLORIDE SERPL-SCNC: 107 MMOL/L (ref 96–108)
CO2 SERPL-SCNC: 28 MMOL/L (ref 21–32)
CREAT SERPL-MCNC: 1.08 MG/DL (ref 0.6–1.3)
GFR SERPL CREATININE-BSD FRML MDRD: 46 ML/MIN/1.73SQ M
GLUCOSE SERPL-MCNC: 102 MG/DL (ref 65–140)
GLUCOSE SERPL-MCNC: 128 MG/DL (ref 65–140)
GLUCOSE SERPL-MCNC: 204 MG/DL (ref 65–140)
GLUCOSE SERPL-MCNC: 305 MG/DL (ref 65–140)
GLUCOSE SERPL-MCNC: 347 MG/DL (ref 65–140)
MAGNESIUM SERPL-MCNC: 2 MG/DL (ref 1.9–2.7)
P AXIS: 64 DEGREES
POTASSIUM SERPL-SCNC: 3.8 MMOL/L (ref 3.5–5.3)
PR INTERVAL: 204 MS
QRS AXIS: 45 DEGREES
QRSD INTERVAL: 88 MS
QT INTERVAL: 440 MS
QTC INTERVAL: 424 MS
SODIUM SERPL-SCNC: 142 MMOL/L (ref 135–147)
T WAVE AXIS: 76 DEGREES
VENTRICULAR RATE: 56 BPM

## 2025-03-05 PROCEDURE — 93010 ELECTROCARDIOGRAM REPORT: CPT | Performed by: INTERNAL MEDICINE

## 2025-03-05 PROCEDURE — 99232 SBSQ HOSP IP/OBS MODERATE 35: CPT | Performed by: INTERNAL MEDICINE

## 2025-03-05 PROCEDURE — 82948 REAGENT STRIP/BLOOD GLUCOSE: CPT

## 2025-03-05 PROCEDURE — 83735 ASSAY OF MAGNESIUM: CPT | Performed by: INTERNAL MEDICINE

## 2025-03-05 PROCEDURE — 80048 BASIC METABOLIC PNL TOTAL CA: CPT | Performed by: NURSE PRACTITIONER

## 2025-03-05 RX ORDER — INSULIN LISPRO 100 [IU]/ML
2 INJECTION, SOLUTION INTRAVENOUS; SUBCUTANEOUS
Status: DISCONTINUED | OUTPATIENT
Start: 2025-03-05 | End: 2025-03-06 | Stop reason: HOSPADM

## 2025-03-05 RX ORDER — FUROSEMIDE 20 MG/1
20 TABLET ORAL DAILY
Status: DISCONTINUED | OUTPATIENT
Start: 2025-03-06 | End: 2025-03-06 | Stop reason: HOSPADM

## 2025-03-05 RX ADMIN — INSULIN LISPRO 2 UNITS: 100 INJECTION, SOLUTION INTRAVENOUS; SUBCUTANEOUS at 17:20

## 2025-03-05 RX ADMIN — INSULIN LISPRO 1 UNITS: 100 INJECTION, SOLUTION INTRAVENOUS; SUBCUTANEOUS at 20:44

## 2025-03-05 RX ADMIN — LOSARTAN POTASSIUM 25 MG: 25 TABLET, FILM COATED ORAL at 08:04

## 2025-03-05 RX ADMIN — METOPROLOL SUCCINATE 25 MG: 25 TABLET, EXTENDED RELEASE ORAL at 08:04

## 2025-03-05 RX ADMIN — OXYMETAZOLINE HYDROCHLORIDE 2 SPRAY: 5 SPRAY NASAL at 08:18

## 2025-03-05 RX ADMIN — ATORVASTATIN CALCIUM 20 MG: 20 TABLET, FILM COATED ORAL at 17:20

## 2025-03-05 RX ADMIN — ENOXAPARIN SODIUM 40 MG: 40 INJECTION SUBCUTANEOUS at 17:23

## 2025-03-05 RX ADMIN — Medication 400 MG: at 17:20

## 2025-03-05 RX ADMIN — POTASSIUM CHLORIDE 20 MEQ: 1500 TABLET, EXTENDED RELEASE ORAL at 17:20

## 2025-03-05 RX ADMIN — OXYMETAZOLINE HYDROCHLORIDE 2 SPRAY: 5 SPRAY NASAL at 20:46

## 2025-03-05 RX ADMIN — INSULIN GLARGINE 20 UNITS: 100 INJECTION, SOLUTION SUBCUTANEOUS at 20:44

## 2025-03-05 RX ADMIN — METOPROLOL SUCCINATE 25 MG: 25 TABLET, EXTENDED RELEASE ORAL at 20:29

## 2025-03-05 RX ADMIN — Medication 400 MG: at 08:04

## 2025-03-05 RX ADMIN — FUROSEMIDE 40 MG: 10 INJECTION, SOLUTION INTRAMUSCULAR; INTRAVENOUS at 08:05

## 2025-03-05 RX ADMIN — INSULIN LISPRO 5 UNITS: 100 INJECTION, SOLUTION INTRAVENOUS; SUBCUTANEOUS at 13:57

## 2025-03-05 RX ADMIN — POTASSIUM CHLORIDE 20 MEQ: 1500 TABLET, EXTENDED RELEASE ORAL at 08:05

## 2025-03-05 RX ADMIN — INSULIN LISPRO 4 UNITS: 100 INJECTION, SOLUTION INTRAVENOUS; SUBCUTANEOUS at 13:56

## 2025-03-05 RX ADMIN — INSULIN LISPRO 3 UNITS: 100 INJECTION, SOLUTION INTRAVENOUS; SUBCUTANEOUS at 17:21

## 2025-03-05 NOTE — ASSESSMENT & PLAN NOTE
Lab Results   Component Value Date    EGFR 46 03/05/2025    EGFR 58 03/04/2025    EGFR 52 03/03/2025    CREATININE 1.08 03/05/2025    CREATININE 0.90 03/04/2025    CREATININE 0.99 03/03/2025   Stable

## 2025-03-05 NOTE — ASSESSMENT & PLAN NOTE
Wt Readings from Last 3 Encounters:   03/05/25 60.2 kg (132 lb 11.5 oz)   10/17/24 63.3 kg (139 lb 8.8 oz)   09/06/24 68.5 kg (151 lb 0.2 oz)   Has been off maintenance diuretics for over 2 years, previously on Lasix 40 mg PO daily but stopped 2/2 orthostasis  Responded well to a few doses of IV Lasix. Appears euvolemic today and has been weaned off supplemental O2.  Unclear dry weight but tells me her usual weight is around 140 lb. Current standing weight is 132 lb.  D/c IV Lasix  Start Lasix 20 mg PO daily from tomorrow since she got IV Lasix dose this morning  Continue to follow I/Os, daily weights, am BMP  Anticipate stable for d/c tomorrow. Outpatient follow up will be arranged.

## 2025-03-05 NOTE — ASSESSMENT & PLAN NOTE
Hx of dementia. Resides at assisted living facility [facility].   Currently more confused and lethargic compared to her baseline.   Management as stated above

## 2025-03-05 NOTE — ASSESSMENT & PLAN NOTE
Lab Results   Component Value Date    EGFR 46 03/05/2025    EGFR 58 03/04/2025    EGFR 52 03/03/2025    CREATININE 1.08 03/05/2025    CREATININE 0.90 03/04/2025    CREATININE 0.99 03/03/2025     Cr currently stable at baseline   Monitor with IV diuresis

## 2025-03-05 NOTE — ASSESSMENT & PLAN NOTE
Lab Results   Component Value Date    HGBA1C 9.5 (H) 09/01/2024       Recent Labs     03/04/25  1253 03/04/25 2037 03/05/25  0802 03/05/25  1330   POCGLU 76 281* 102 347*       Blood Sugar Average: Last 72 hrs:  (P) [id number]    Poorly controlled with hyperglycemia   Home regimen: Lantus 20U qhs and humalog 5U TID AC   Continue on Lantus nightly at 20 units.    Will resume mealtime insulin at 2 units 3 times daily with meals.    Continue on low-dose sliding scale insulin with Accu-Chek 4 times daily.

## 2025-03-05 NOTE — CASE MANAGEMENT
Case Management Assessment & Discharge Planning Note    Patient name Gia Vaughan  Location S /S -01 MRN [id number]  : 1940 Date 3/5/2025       Current Admission Date: 3/3/2025  Current Admission Diagnosis:Metabolic encephalopathy   Patient Active Problem List    Diagnosis Date Noted Date Diagnosed    Acute diverticulitis 10/15/2024     Abnormal urinalysis 10/15/2024     Metabolic encephalopathy 10/15/2024     Diarrhea 2024     COVID 2024     Headache 2024     Hypertensive urgency 2024     Dizziness 2024     Suspected deep tissue injury of unknown depth 2022     Acute postoperative pain of right knee 2022     Encephalopathy 10/24/2022     Deep tissue injury 10/21/2022     Orthostasis 10/19/2022     H/O dizziness 10/19/2022     CLL (chronic lymphocytic leukemia) ([facility]) 10/13/2022     Intertrochanteric fracture of right femur ([facility]) 10/11/2022     Displaced fracture of middle phalanx of left ring finger, initial encounter for closed fracture 10/11/2022     Primary hypertension 2021     Ambulatory dysfunction 2021     Moderate dementia ([facility]) 2021     Lymphadenopathy 2020     Elevated liver enzymes 2020     Fall 2020     CKD (chronic kidney disease) stage 3, GFR 30-59 ml/min ([facility]) 2020     HLD (hyperlipidemia) 2020     OA (osteoarthritis) 2020     Acute on chronic systolic heart failure ([facility]) 2020     Type 2 diabetes mellitus with stage 3 chronic kidney disease, unspecified whether long term insulin use, unspecified whether stage 3a or 3b CKD ([facility])        LOS (days): 2  Geometric Mean LOS (GMLOS) (days): 3.9  Days to GMLOS:2.1     OBJECTIVE:    Risk of Unplanned Readmission Score: 22.86         Current admission status: Inpatient       Preferred Pharmacy:   UNKNOWN - FOLLOW UP PRIOR TO DISCHARGE TO E-PRESCRIBE  No address on file      [id number] - [place] - [address]  [address]  [place] [address]  Phone: [phone] Fax: [phone]    [id number] - [place] - [address]  [address]  [place] [address]  Phone: [phone] Fax: [phone]    [facility] - [place], [place] - [address]  [address]  [address]  [place] [address]  Phone: [phone] Fax: [phone]    Primary Care Provider: No primary care provider on file.    Primary Insurance: MEDICARE  Secondary Insurance: [employer] INSURANCE    ASSESSMENT:  Active Health Care Proxies    There are no active Health Care Proxies on file.                      Patient Information  Admitted from:: Facility  Mental Status: Confused  During Assessment patient was accompanied by: Not accompanied during assessment  Assessment information provided by:: Other - please comment (Noreen ([facility] [address] staff))  Primary Caregiver: Other (Comment)  Caregiver's Name:: [facility] [address] staff  Support Systems: Family members, Home care staff  Type of Current Residence: Facility  Living Arrangements: Lives in Facility ([address])    Activities of Daily Living Prior to Admission  Functional Status: Independent  Completes ADLs independently?: No  Level of ADL dependence: Assistance  Ambulates independently?: Yes  Does patient use assisted devices?: Yes  Assisted Devices (DME) used: Walker  Does patient currently own DME?: Yes  What DME does the patient currently own?: Walker, Wheelchair  Does patient have a history of HHC?: Yes ([facility])                          Social Determinants of Health (SDOH)      Flowsheet Row Most Recent Value   Housing Stability    In the last 12 months, was there a time when you were not able to pay the mortgage or rent on time? N   At any time in the past 12 months, were you homeless or living in a shelter (including now)? N   Transportation Needs    In the past 12 months, has lack of transportation kept you from medical appointments or from getting  medications? no   In the past 12 months, has lack of transportation kept you from meetings, work, or from getting things needed for daily living? No   Food Insecurity    Within the past 12 months, you worried that your food would run out before you got the money to buy more. Never true   Within the past 12 months, the food you bought just didn't last and you didn't have money to get more. Never true   Utilities    In the past 12 months has the electric, gas, oil, or water company threatened to shut off services in your home? No            DISCHARGE DETAILS:    Discharge planning discussed with:: patient  Freedom of Choice: Yes  Comments - Freedom of Choice: CM contacted [facility], s/w Noreen, re: dcp - PT/OT rcmd for STR. Per Noreen - pt needs to attend STR prior to return to facility. CM s/w pt at bedside notified her of same and attempted to contact pt's nephew Charles re: same - left VM requesting return call. STR blanket referral sent via aidin - awaiting responses.  CM contacted family/caregiver?: Yes  Were Treatment Team discharge recommendations reviewed with patient/caregiver?: Yes  Did patient/caregiver verbalize understanding of patient care needs?: N/A- going to facility  Were patient/caregiver advised of the risks associated with not following Treatment Team discharge recommendations?: Yes    Contacts  Patient Contacts: Charles  Relationship to Patient:: Family  Contact Method: Phone  Phone Number: [phone]  Reason/Outcome: Discharge Planning, Referral, Continuity of Care, Emergency Contact    Requested Home Health Care         Is the patient interested in HHC at discharge?: No    DME Referral Provided  Referral made for DME?: No    Other Referral/Resources/Interventions Provided:  Interventions: Short Term Rehab  Referral Comments: PT/OT rcmd STR. STR blanket referral sent via aidin, awaiting responses.         Treatment Team Recommendation: Short Term Rehab  Discharge Destination Plan:: Short Term  Rehab  Transport at Discharge : Vin lopez

## 2025-03-05 NOTE — PROGRESS NOTES
Progress Note - Hospitalist   Name: Gia Vaughan 84 y.o. female I MRN: [id number]  Unit/Bed#: S -01 I Date of Admission: 3/3/2025   Date of Service: 3/5/2025 I Hospital Day: 2    Assessment & Plan  Metabolic encephalopathy  Patient with hx of dementia presents from assisted living facility due to increased confusion and lethargy.   Most likely secondary to hypoxia from CHF.  Supportive care   Delirium precautions   Q4H neurochecks   PRN Zyprexa   Acute on chronic systolic heart failure (HCC)  Wt Readings from Last 3 Encounters:   03/05/25 60.2 kg (132 lb 11.5 oz)   10/17/24 63.3 kg (139 lb 8.8 oz)   09/06/24 68.5 kg (151 lb 0.2 oz)     Presented to the hospital with hypoxia. Patient is confused, dementia, unable to say if short of breath.   BNP 1246, CT chest shows moderate pulmonary edema and small bilateral pleural effusions. Patient requiring 4L NC and with bilateral lower extremity edema on exam.   Not on diuretic at home. Per chart review, patient was previously on Lasix which was discontinued in the past due to dizziness.   04/03/24 TTE: EF 45%, grade 2 DD. akinetic: basal inferoseptal, basal inferior and mid inferior   IV Lasix 40mg BID,   Plan to switch to PO lasix 20 mg daily.   I/O, Daily weights   Sodium and fluid restricted diet       Type 2 diabetes mellitus with stage 3 chronic kidney disease, unspecified whether long term insulin use, unspecified whether stage 3a or 3b CKD ([facility])  Lab Results   Component Value Date    HGBA1C 9.5 (H) 09/01/2024       Recent Labs     03/04/25  1253 03/04/25  2037 03/05/25  0802 03/05/25  1330   POCGLU 76 281* 102 347*       Blood Sugar Average: Last 72 hrs:  (P) [id number]    Poorly controlled with hyperglycemia   Home regimen: Lantus 20U qhs and humalog 5U TID AC   Continue on Lantus nightly at 20 units.    Will resume mealtime insulin at 2 units 3 times daily with meals.    Continue on low-dose sliding scale insulin with Accu-Chek 4 times daily.  CKD  (chronic kidney disease) stage 3, GFR 30-59 ml/min ([facility])  Lab Results   Component Value Date    EGFR 46 03/05/2025    EGFR 58 03/04/2025    EGFR 52 03/03/2025    CREATININE 1.08 03/05/2025    CREATININE 0.90 03/04/2025    CREATININE 0.99 03/03/2025     Cr currently stable at baseline   Monitor with IV diuresis  HLD (hyperlipidemia)  Continue statin   Moderate dementia ([facility])  Hx of dementia. Resides at assisted living facility [facility].   Currently more confused and lethargic compared to her baseline.   Management as stated above   Primary hypertension  Continue losartan, metoprolol   Receiving IV diuresis -plan to switch to oral diuretic.    CLL (chronic lymphocytic leukemia) ([facility])  WBC elevated but stable and at baseline   Continue outpatient hematology follow up   Ambulatory dysfunction  PT OT evaluated patient recommended short-term rehab.   notified and involved.        Mobility:     Basic Mobility Inpatient Raw Score: 17  JH-HLM Goal: 5: Stand one or more mins  JH-HLM Achieved: 7: Walk 25 feet or more  HLM Goal achieved. Continue to encourage appropriate mobility.    Pharmacologic VTE Prophylaxis: Yes  enoxaparin  Mechanical VTE Prophylaxis in Place: No   Patient Centered Rounds: I have performed bedside rounds with the Nursing staff today.   Current Length of Stay: 2 day(s)  Current Patient Status: Inpatient   Code Status: Level 1 - Full Code  Time Spent for Care:  35 minutes.  More than 50% of total time spent on counseling and coordination of care as described above.  Discussions with Specialists or Other Care Team Provider: Yes cardiology.  Education and Discussions with Family / Patient: Yes, Updated family member.  Nephew Charles  Discharge Plan: 24-48 hrs.   Case Discussed with  regarding updating plan of care and disposition planning.   Certification Statement: The patient will continue to require additional inpatient hospital stay due to acute on chronic systolic congestive heart  failure, ambulatory dysfunction.    Subjective:   I have seen and Examined the patient at the bedside. No CP or Sob. No fevers or chills, No nausea or vomiting. Overnight events reviewed with the RN. No Other complains.  Patient's slightly weak and deconditioned.  But otherwise doing well.  Breathing is better.  Lung sounds are better.    Review of System:   Denies any CP or SOB  Denies any Cough or Cold  Denies any Fevers or chills.   Denies any focal tingling numbness or weakness in any extremities.   Denies any abdominal pain, Nausea or vomiting.     Objective:   Temp (24hrs), Av.1 °F (36.7 °C), Min:98.1 °F (36.7 °C), Max:98.1 °F (36.7 °C)    Temp:  [98.1 °F (36.7 °C)] 98.1 °F (36.7 °C)  HR:  [67-72] 67  BP: (136-156)/(61-71) 136/61  SpO2:  [90 %-94 %] 94 %  Body mass index is 21.42 kg/m².     Input and Output Summary (last 24 hours):     Intake/Output Summary (Last 24 hours) at 3/5/2025 1516  Last data filed at 3/5/2025 1052  Gross per 24 hour   Intake 120 ml   Output 850 ml   Net -730 ml     I/O         03/03 0701  03/04 0700 03/04 0701  03/05 0700 03/05 0701  03/06 0700    P.O.  120     IV Piggyback 300      Total Intake(mL/kg) 300 (4.7) 120 (2)     Urine (mL/kg/hr) 1700 600 (0.4) 400 (0.8)    Stool  0 0    Total Output 1700 600 400    Net -1400 -480 -400           Unmeasured Urine Occurrence 1 x 1 x     Unmeasured Stool Occurrence  1 x 1 x            Physical Exam:   General : Alert, Awake but confused and oriented x 1-2, does not look to be in any acute distress though.  Still requiring 2 L of oxygen via nasal cannula.   Neck : Supple.   Eyes:  TAM, EOMI.   CVS : S1, S2, RRR.   R/S : Clear to auscultate anteriorly.  Decreased breath sound the bases bilaterally.  Some Left Basilar crackles heard.   Abd: Soft, NT, ND. Bs+ve  Extremity: Trace pedal edema noted.   Skin: No acute Rash noted.   CNS: No acute FND.   Additional Data:     Labs, Culture & Imaging Data Reviewed:    Results from last 7 days   Lab  "Units 03/04/25  0614   WBC Thousand/uL 30.52*   HEMOGLOBIN g/dL 10.0*   HEMATOCRIT % 31.7*   PLATELETS Thousands/uL 220     Results from last 7 days   Lab Units 03/05/25  0526 03/04/25  0614 03/03/25  1622   POTASSIUM mmol/L 3.8   < > 4.6   CHLORIDE mmol/L 107   < > 105   CO2 mmol/L 28   < > 21   BUN mg/dL 24   < > 22   CREATININE mg/dL 1.08   < > 0.99   CALCIUM mg/dL 9.2   < > 9.2   ALK PHOS U/L  --   --  84   ALT U/L  --   --  10   AST U/L  --   --  12*    < > = values in this interval not displayed.     Results from last 7 days   Lab Units 03/03/25  1724   INR  1.08     Lab Results   Component Value Date    HGBA1C 9.5 (H) 09/01/2024      CT head without contrast   Final Result by E. Alec Schoenberger, MD (03/03 1913)      No acute intracranial abnormality. Chronic microangiopathy.                  Workstation performed: [id number]         CT chest abdomen pelvis wo contrast   Final Result by E. Alec Schoenberger, MD (03/03 1928)      Pulmonary edema and small effusions   Progression of diffuse adenopathy in the chest abdomen and pelvis compatible with known CLL.         The study was marked in EPIC for immediate notification.         Workstation performed: [id number]         XR chest 1 view portable   ED Interpretation by Axel Ryan MD (03/03 1745)   Per my independent interpretation: Increasing opacities      Final Result by Lindsey Flynn MD (03/03 2028)      Moderate pulmonary edema with small left pleural effusion.            Workstation performed: [id number]             Cultures:   Blood Culture:   Lab Results   Component Value Date    BLOODCX No Growth at 24 hrs. 03/03/2025    BLOODCX No Growth at 24 hrs. 03/03/2025     Urine Culture:   Lab Results   Component Value Date    URINECX >100,000 cfu/ml 10/14/2024    URINECX >100,000 cfu/ml Escherichia coli (A) 09/01/2024    URINECX 20,000-29,000 cfu/ml 09/01/2024     Sputum Culture: No components found for: \"SPUTUMCX\"  Wound Culture: No results " "found for: \"WOUNDCULT\"    Last 24 Hours Medication List:   Current Facility-Administered Medications   Medication Dose Route Frequency Provider Last Rate    acetaminophen  650 mg Oral Q6H PRN Oralia Monzon PA-C      atorvastatin  20 mg Oral Daily With Dinner Oralia Monzon PA-C      enoxaparin  40 mg Subcutaneous Q24H Kelly Alfaro MD      [START ON 3/6/2025] furosemide  20 mg Oral Daily BRINDA Sharpe      insulin glargine  20 Units Subcutaneous HS Oralia Monzon PA-C      insulin lispro  1-5 Units Subcutaneous TID AC Oralia Monzon PA-C      insulin lispro  1-5 Units Subcutaneous HS Oralia Monzon PA-C      insulin lispro  2 Units Subcutaneous TID With Meals Kelly Alfaro MD      insulin lispro  5 Units Subcutaneous TID With Meals Oralia Monzon PA-C      losartan  25 mg Oral Daily Kelly Alfaro MD      magnesium Oxide  400 mg Oral BID Kelly Alfaro MD      metoprolol succinate  25 mg Oral Q12H BRINDA Sharpe      OLANZapine  2.5 mg Intramuscular Q4H PRN Max 6/day Oralia Monzon PA-C      oxymetazoline  2 spray Each Nare Q12H [facility] Kelly Alfaro MD      potassium chloride  20 mEq Oral BID Oralia Monzon PA-C           Patient is at moderate risk for morbidity and mortality due to above mentioned illness and comorbidities.         "

## 2025-03-05 NOTE — ASSESSMENT & PLAN NOTE
Wt Readings from Last 3 Encounters:   03/05/25 60.2 kg (132 lb 11.5 oz)   10/17/24 63.3 kg (139 lb 8.8 oz)   09/06/24 68.5 kg (151 lb 0.2 oz)     Presented to the hospital with hypoxia. Patient is confused, dementia, unable to say if short of breath.   BNP 1246, CT chest shows moderate pulmonary edema and small bilateral pleural effusions. Patient requiring 4L NC and with bilateral lower extremity edema on exam.   Not on diuretic at home. Per chart review, patient was previously on Lasix which was discontinued in the past due to dizziness.   04/03/24 TTE: EF 45%, grade 2 DD. akinetic: basal inferoseptal, basal inferior and mid inferior   IV Lasix 40mg BID,   Plan to switch to PO lasix 20 mg daily.   I/O, Daily weights   Sodium and fluid restricted diet

## 2025-03-05 NOTE — PROGRESS NOTES
Progress Note - Cardiology   Name: Gia Vaughan 84 y.o. female I MRN: [id number]  Unit/Bed#: S -01 I Date of Admission: 3/3/2025   Date of Service: 3/5/2025 I Hospital Day: 2     Assessment & Plan  Acute on chronic systolic heart failure (HCC)  Wt Readings from Last 3 Encounters:   03/05/25 60.2 kg (132 lb 11.5 oz)   10/17/24 63.3 kg (139 lb 8.8 oz)   09/06/24 68.5 kg (151 lb 0.2 oz)   Has been off maintenance diuretics for over 2 years, previously on Lasix 40 mg PO daily but stopped 2/2 orthostasis  Responded well to a few doses of IV Lasix. Appears euvolemic today and has been weaned off supplemental O2.  Unclear dry weight but tells me her usual weight is around 140 lb. Current standing weight is 132 lb.  D/c IV Lasix  Start Lasix 20 mg PO daily from tomorrow since she got IV Lasix dose this morning  Continue to follow I/Os, daily weights, am BMP  Anticipate stable for d/c tomorrow. Outpatient follow up will be arranged.  Acute respiratory failure with hypoxia (HCC) (Resolved: 3/5/2025)  In setting of acute CHF   Weaned to room air today  Metabolic encephalopathy  2/2 hypoxia  Dementia at baseline  More alert today  Primary hypertension  Improved with diuresis, 24 hour average /62  Continue losartan and metoprolol succinate  Type 2 diabetes mellitus with stage 3 chronic kidney disease, unspecified whether long term insulin use, unspecified whether stage 3a or 3b CKD ([facility])  Lab Results   Component Value Date    HGBA1C 9.5 (H) 09/01/2024   Per primary team  HLD (hyperlipidemia)  LDL 77 on lipid panel from 1 year ago  On atorvastatin 20 mg daily  Moderate dementia (HCC)  Supportive care  CLL (chronic lymphocytic leukemia) ([facility])  WBC count 30.52k, stable. Follows with heme/onc as an outpatient  CKD (chronic kidney disease) stage 3, GFR 30-59 ml/min ([facility])  Lab Results   Component Value Date    EGFR 46 03/05/2025    EGFR 58 03/04/2025    EGFR 52 03/03/2025    CREATININE 1.08 03/05/2025    CREATININE 0.90  "2025    CREATININE 0.99 2025   Stable     Subjective  Review of Systems   Constitutional: Negative for chills, malaise/fatigue and weight gain.   Cardiovascular:  Negative for chest pain, dyspnea on exertion, leg swelling, orthopnea, palpitations and syncope.   Respiratory:  Negative for cough, shortness of breath, sleep disturbances due to breathing and sputum production.    Endocrine: Negative.    Hematologic/Lymphatic: Negative.    Gastrointestinal:  Negative for bloating and nausea.   Genitourinary: Negative.    Neurological:  Negative for dizziness, light-headedness and weakness.   Psychiatric/Behavioral:  Negative for altered mental status.    All other systems reviewed and are negative.      Objective:   Vitals: Blood pressure 136/61, pulse 67, temperature 98.1 °F (36.7 °C), temperature source Oral, resp. rate 18, height 5' 6\" (1.676 m), weight 60.2 kg (132 lb 11.5 oz), SpO2 94%., Body mass index is 21.42 kg/m².,     Systolic (24hrs), Av , Min:109 , Max:158     Diastolic (24hrs), Av, Min:46, Max:71        Intake/Output Summary (Last 24 hours) at 3/5/2025 0945  Last data filed at 3/4/2025 2218  Gross per 24 hour   Intake 120 ml   Output 600 ml   Net -480 ml     Wt Readings from Last 3 Encounters:   25 60.2 kg (132 lb 11.5 oz)   10/17/24 63.3 kg (139 lb 8.8 oz)   24 68.5 kg (151 lb 0.2 oz)     Telemetry Review: N/a    Physical Exam  Vitals reviewed.   Constitutional:       General: She is not in acute distress.  Neck:      Vascular: No hepatojugular reflux or JVD.   Cardiovascular:      Rate and Rhythm: Normal rate and regular rhythm.      Heart sounds: Normal heart sounds. No murmur heard.     No friction rub. No gallop.   Pulmonary:      Effort: Pulmonary effort is normal. No respiratory distress.      Breath sounds: No rales.      Comments: Room air  Abdominal:      General: Bowel sounds are normal. There is no distension.      Palpations: Abdomen is soft.      Tenderness: " There is no abdominal tenderness.   Musculoskeletal:         General: No tenderness. Normal range of motion.      Cervical back: Neck supple.      Right lower leg: No edema.      Left lower leg: No edema.   Skin:     General: Skin is warm and dry.      Capillary Refill: Capillary refill takes 2 to 3 seconds.      Coloration: Skin is pale.      Findings: No erythema.   Neurological:      Mental Status: She is alert. Mental status is at baseline.      Comments: Oriented to person and place   Psychiatric:         Mood and Affect: Mood normal.         LABORATORY RESULTS      CBC with diff:   Results from last 7 days   Lab Units 03/04/25  0614 03/03/25  1622   WBC Thousand/uL 30.52* 29.19*   HEMOGLOBIN g/dL 10.0* 10.5*   HEMATOCRIT % 31.7* 34.1*   MCV fL 110* 112*   PLATELETS Thousands/uL 220 190   RBC Million/uL 2.89* 3.04*   MCH pg 34.6* 34.5*   MCHC g/dL 31.5 30.8*   RDW % 18.6* 18.9*   MPV fL 12.1 12.7       CMP:  Results from last 7 days   Lab Units 03/05/25  0526 03/04/25  0614 03/03/25  1622   POTASSIUM mmol/L 3.8 3.5 4.6   CHLORIDE mmol/L 107 108 105   CO2 mmol/L 28 26 21   BUN mg/dL 24 18 22   CREATININE mg/dL 1.08 0.90 0.99   CALCIUM mg/dL 9.2 9.0 9.2   AST U/L  --   --  12*   ALT U/L  --   --  10   ALK PHOS U/L  --   --  84   EGFR ml/min/1.73sq m 46 58 52     BMP:  Results from last 7 days   Lab Units 03/05/25  0526 03/04/25  0614 03/03/25  1622   POTASSIUM mmol/L 3.8 3.5 4.6   CHLORIDE mmol/L 107 108 105   CO2 mmol/L 28 26 21   BUN mg/dL 24 18 22   CREATININE mg/dL 1.08 0.90 0.99   CALCIUM mg/dL 9.2 9.0 9.2     Lab Results   Component Value Date    CREATININE 1.08 03/05/2025    CREATININE 0.90 03/04/2025    CREATININE 0.99 03/03/2025     Lab Results   Component Value Date    NTBNP 10,562 (H) 11/05/2020     Results from last 7 days   Lab Units 03/05/25  0526 03/04/25  0614   MAGNESIUM mg/dL 2.0 1.8*          Results from last 7 days   Lab Units 03/03/25  1724   INR  1.08       Lipid Profile:   No results found  "for: \"CHOL\"  Lab Results   Component Value Date    HDL 66 04/02/2024    HDL 66 11/29/2023    HDL 59 08/21/2023     Lab Results   Component Value Date    LDLCALC 77 04/02/2024    LDLCALC 63 11/29/2023    LDLCALC 78 08/21/2023     Lab Results   Component Value Date    TRIG 66 04/02/2024    TRIG 137 11/29/2023    TRIG 194 (H) 08/21/2023       Meds/Allergies   all current active meds have been reviewed and current meds:   Current Facility-Administered Medications:     acetaminophen (TYLENOL) tablet 650 mg, Q6H PRN    atorvastatin (LIPITOR) tablet 20 mg, Daily With Dinner    enoxaparin (LOVENOX) subcutaneous injection 40 mg, Q24H    [START ON 3/6/2025] furosemide (LASIX) tablet 20 mg, Daily    insulin glargine (LANTUS) subcutaneous injection 20 Units 0.2 mL, HS    insulin lispro (HumALOG/ADMELOG) 100 units/mL subcutaneous injection 1-5 Units, TID AC **AND** Fingerstick Glucose (POCT), TID AC    insulin lispro (HumALOG/ADMELOG) 100 units/mL subcutaneous injection 1-5 Units, HS    insulin lispro (HumALOG/ADMELOG) 100 units/mL subcutaneous injection 5 Units, TID With Meals    losartan (COZAAR) tablet 25 mg, Daily    magnesium Oxide (MAG-OX) tablet 400 mg, BID    metoprolol succinate (TOPROL-XL) 24 hr tablet 25 mg, Q12H    OLANZapine (ZyPREXA) IM injection 2.5 mg, Q4H PRN Max 6/day    oxymetazoline (AFRIN) 0.05 % nasal spray 2 spray, Q12H [place]SA    potassium chloride (Klor-Con M20) CR tablet 20 mEq, BID    Medications Prior to Admission:     acetaminophen (TYLENOL) 325 mg tablet    Alcohol Swabs (B-D SINGLE USE SWABS REGULAR) PADS    atorvastatin (LIPITOR) 20 mg tablet    calcium carbonate (OS-JUAN RAMON) 600 MG tablet    carbamide peroxide (FT Earwax Removal) 6.5 % otic solution    cholecalciferol (VITAMIN D3) 1,000 units tablet    Continuous Blood Gluc Sensor (FreeStyle Дмитрий 2 Sensor) MISC    dextromethorphan-guaifenesin (MUCINEX DM)  MG per 12 hr tablet    Diclofenac Sodium (VOLTAREN) 1 %    fluticasone (FLONASE) 50 mcg/act " nasal spray    fluticasone (FLONASE) 50 mcg/act nasal spray    fluticasone (FLONASE) 50 mcg/act nasal spray    glucose blood test strip    glucose monitoring kit (FREESTYLE) monitoring kit    guaiFENesin (FT Mucus Relief 12HR) 600 mg 12 hr tablet    insulin detemir (LEVEMIR) 100 units/mL subcutaneous injection    insulin lispro (HumaLOG) 100 units/mL injection    losartan (COZAAR) 100 MG tablet    metoprolol tartrate (LOPRESSOR) 25 mg tablet    potassium chloride (Klor-Con M20) 20 mEq tablet    Potassium Chloride ER 20 MEQ TBCR    vitamin B-12 (VITAMIN B-12) 1,000 mcg tablet    Counseling / Coordination of Care  Total floor / unit time spent today 20 minutes.  Greater than 50% of total time was spent with the patient and / or family counseling and / or coordination of care.      ** Please Note: Dragon 360 Dictation voice to text software may have been used in the creation of this document. **

## 2025-03-06 VITALS
BODY MASS INDEX: 21.36 KG/M2 | WEIGHT: 132.94 LBS | HEART RATE: 65 BPM | OXYGEN SATURATION: 92 % | SYSTOLIC BLOOD PRESSURE: 125 MMHG | RESPIRATION RATE: 18 BRPM | TEMPERATURE: 98.1 F | DIASTOLIC BLOOD PRESSURE: 48 MMHG | HEIGHT: 66 IN

## 2025-03-06 PROBLEM — I42.9 CARDIOMYOPATHY (HCC): Status: ACTIVE | Noted: 2025-03-06

## 2025-03-06 LAB
ANION GAP SERPL CALCULATED.3IONS-SCNC: 6 MMOL/L (ref 4–13)
BUN SERPL-MCNC: 23 MG/DL (ref 5–25)
CALCIUM SERPL-MCNC: 9.2 MG/DL (ref 8.4–10.2)
CHLORIDE SERPL-SCNC: 108 MMOL/L (ref 96–108)
CO2 SERPL-SCNC: 29 MMOL/L (ref 21–32)
CREAT SERPL-MCNC: 1.01 MG/DL (ref 0.6–1.3)
GFR SERPL CREATININE-BSD FRML MDRD: 50 ML/MIN/1.73SQ M
GLUCOSE SERPL-MCNC: 229 MG/DL (ref 65–140)
GLUCOSE SERPL-MCNC: 76 MG/DL (ref 65–140)
GLUCOSE SERPL-MCNC: 83 MG/DL (ref 65–140)
POTASSIUM SERPL-SCNC: 3.8 MMOL/L (ref 3.5–5.3)
SODIUM SERPL-SCNC: 143 MMOL/L (ref 135–147)

## 2025-03-06 PROCEDURE — 99239 HOSP IP/OBS DSCHRG MGMT >30: CPT | Performed by: INTERNAL MEDICINE

## 2025-03-06 PROCEDURE — 80048 BASIC METABOLIC PNL TOTAL CA: CPT | Performed by: NURSE PRACTITIONER

## 2025-03-06 PROCEDURE — 82948 REAGENT STRIP/BLOOD GLUCOSE: CPT

## 2025-03-06 PROCEDURE — 99232 SBSQ HOSP IP/OBS MODERATE 35: CPT | Performed by: INTERNAL MEDICINE

## 2025-03-06 RX ORDER — LANOLIN ALCOHOL/MO/W.PET/CERES
400 CREAM (GRAM) TOPICAL DAILY
Qty: 14 TABLET | Refills: 0
Start: 2025-03-06 | End: 2025-03-20

## 2025-03-06 RX ORDER — FUROSEMIDE 20 MG/1
20 TABLET ORAL DAILY
Qty: 30 TABLET | Refills: 0
Start: 2025-03-07

## 2025-03-06 RX ORDER — METOPROLOL SUCCINATE 25 MG/1
25 TABLET, EXTENDED RELEASE ORAL EVERY 12 HOURS
Qty: 30 TABLET | Refills: 0
Start: 2025-03-06

## 2025-03-06 RX ORDER — LOSARTAN POTASSIUM 25 MG/1
25 TABLET ORAL DAILY
Qty: 30 TABLET | Refills: 0
Start: 2025-03-07

## 2025-03-06 RX ORDER — GUAIFENESIN 600 MG/1
600 TABLET, EXTENDED RELEASE ORAL 2 TIMES DAILY PRN
Qty: 30 TABLET | Refills: 0
Start: 2025-03-06

## 2025-03-06 RX ADMIN — Medication 400 MG: at 09:07

## 2025-03-06 RX ADMIN — FUROSEMIDE 20 MG: 20 TABLET ORAL at 09:07

## 2025-03-06 RX ADMIN — INSULIN LISPRO 2 UNITS: 100 INJECTION, SOLUTION INTRAVENOUS; SUBCUTANEOUS at 12:26

## 2025-03-06 RX ADMIN — INSULIN LISPRO 2 UNITS: 100 INJECTION, SOLUTION INTRAVENOUS; SUBCUTANEOUS at 12:25

## 2025-03-06 RX ADMIN — POTASSIUM CHLORIDE 20 MEQ: 1500 TABLET, EXTENDED RELEASE ORAL at 09:07

## 2025-03-06 RX ADMIN — LOSARTAN POTASSIUM 25 MG: 25 TABLET, FILM COATED ORAL at 09:07

## 2025-03-06 RX ADMIN — METOPROLOL SUCCINATE 25 MG: 25 TABLET, EXTENDED RELEASE ORAL at 09:07

## 2025-03-06 NOTE — PROGRESS NOTES
Progress Note - Cardiology   Name: Gia Vaughan 84 y.o. female I MRN: [id number]  Unit/Bed#: S -01 I Date of Admission: 3/3/2025   Date of Service: 3/6/2025 I Hospital Day: 3     Assessment & Plan  Acute on chronic systolic heart failure (HCC)  Wt Readings from Last 3 Encounters:   03/06/25 60.3 kg (132 lb 15 oz)   10/17/24 63.3 kg (139 lb 8.8 oz)   09/06/24 68.5 kg (151 lb 0.2 oz)   Has been off maintenance diuretics for over 2 years, previously on Lasix 40 mg PO daily but stopped 2/2 orthostasis  Responded well to a few doses of IV Lasix.   Unclear dry weight but tells me her usual weight is around 140 lb. Current standing weight is 132 lb.  Lasix 20 mg PO daily at discharge  Daily weights at nursing facility  Close outpatient follow up arranged, stable for d/c planning today from cardiology standpoint  Cardiomyopathy (HCC)  Mildly reduced LVEF dating back to at least 2020, LVEF 45% in 2024  GDMT- switched to Toprol XL 25 mg BID this admission. 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 Follows with heme/onc as an outpatient  CKD (chronic kidney disease) stage 3, GFR 30-59 ml/min ([facility])  Lab Results   Component Value Date    EGFR 50 03/06/2025    EGFR 46 03/05/2025    EGFR 58 03/04/2025    CREATININE 1.01 03/06/2025    CREATININE 1.08 03/05/2025    CREATININE 0.90 03/04/2025   Stable with  "diuresis    Subjective  Review of Systems   Constitutional: Negative for chills, malaise/fatigue and weight gain.   Cardiovascular:  Negative for chest pain, dyspnea on exertion, leg swelling, orthopnea, palpitations and syncope.   Respiratory:  Negative for cough, shortness of breath, sleep disturbances due to breathing and sputum production.    Endocrine: Negative.    Hematologic/Lymphatic: Negative.    Gastrointestinal:  Negative for bloating and nausea.   Genitourinary: Negative.    Neurological:  Negative for dizziness, light-headedness and weakness.   Psychiatric/Behavioral:  Negative for altered mental status.    All other systems reviewed and are negative.      Objective:   Vitals: Blood pressure (!) 125/48, pulse 65, temperature 98.1 °F (36.7 °C), resp. rate 18, height 5' 6\" (1.676 m), weight 60.3 kg (132 lb 15 oz), SpO2 92%., Body mass index is 21.46 kg/m².,     Systolic (24hrs), Av , Min:125 , Max:138     Diastolic (24hrs), Av, Min:47, Max:58      Intake/Output Summary (Last 24 hours) at 3/6/2025 0900  Last data filed at 3/6/2025 0135  Gross per 24 hour   Intake 225 ml   Output 725 ml   Net -500 ml     Wt Readings from Last 3 Encounters:   25 60.3 kg (132 lb 15 oz)   10/17/24 63.3 kg (139 lb 8.8 oz)   24 68.5 kg (151 lb 0.2 oz)     Telemetry Review: N/a    Physical Exam  Vitals reviewed.   Constitutional:       General: She is not in acute distress.  Neck:      Vascular: No hepatojugular reflux or JVD.   Cardiovascular:      Rate and Rhythm: Normal rate and regular rhythm.      Heart sounds: Normal heart sounds. No murmur heard.     No friction rub. No gallop.   Pulmonary:      Effort: Pulmonary effort is normal. No respiratory distress.      Breath sounds: No rales.      Comments: Room air  Abdominal:      General: Bowel sounds are normal. There is no distension.      Palpations: Abdomen is soft.      Tenderness: There is no abdominal tenderness.   Musculoskeletal:         General: " "No tenderness. Normal range of motion.      Cervical back: Neck supple.      Right lower leg: No edema.      Left lower leg: No edema.   Skin:     General: Skin is warm and dry.      Capillary Refill: Capillary refill takes 2 to 3 seconds.      Findings: No erythema.   Neurological:      Mental Status: She is alert and oriented to person, place, and time.   Psychiatric:         Mood and Affect: Mood normal.         LABORATORY RESULTS      CBC with diff:   Results from last 7 days   Lab Units 03/04/25  0614 03/03/25  1622   WBC Thousand/uL 30.52* 29.19*   HEMOGLOBIN g/dL 10.0* 10.5*   HEMATOCRIT % 31.7* 34.1*   MCV fL 110* 112*   PLATELETS Thousands/uL 220 190   RBC Million/uL 2.89* 3.04*   MCH pg 34.6* 34.5*   MCHC g/dL 31.5 30.8*   RDW % 18.6* 18.9*   MPV fL 12.1 12.7     CMP:  Results from last 7 days   Lab Units 03/06/25  0507 03/05/25  0526 03/04/25  0614 03/03/25  1622   POTASSIUM mmol/L 3.8 3.8 3.5 4.6   CHLORIDE mmol/L 108 107 108 105   CO2 mmol/L 29 28 26 21   BUN mg/dL 23 24 18 22   CREATININE mg/dL 1.01 1.08 0.90 0.99   CALCIUM mg/dL 9.2 9.2 9.0 9.2   AST U/L  --   --   --  12*   ALT U/L  --   --   --  10   ALK PHOS U/L  --   --   --  84   EGFR ml/min/1.73sq m 50 46 58 52     BMP:  Results from last 7 days   Lab Units 03/06/25  0507 03/05/25  0526 03/04/25  0614 03/03/25  1622   POTASSIUM mmol/L 3.8 3.8 3.5 4.6   CHLORIDE mmol/L 108 107 108 105   CO2 mmol/L 29 28 26 21   BUN mg/dL 23 24 18 22   CREATININE mg/dL 1.01 1.08 0.90 0.99   CALCIUM mg/dL 9.2 9.2 9.0 9.2     Lab Results   Component Value Date    CREATININE 1.01 03/06/2025    CREATININE 1.08 03/05/2025    CREATININE 0.90 03/04/2025     Lab Results   Component Value Date    [facility]        Results from last 7 days   Lab Units 03/05/25  0526 03/04/25  0614   MAGNESIUM mg/dL 2.0 1.8*         Results from last 7 days   Lab Units 03/03/25  1724   INR  1.08       Lipid Profile:   No results found for: \"CHOL\"  Lab Results   Component " Value Date    HDL 66 04/02/2024    HDL 66 11/29/2023    HDL 59 08/21/2023     Lab Results   Component Value Date    LDLCALC 77 04/02/2024    LDLCALC 63 11/29/2023    LDLCALC 78 08/21/2023     Lab Results   Component Value Date    TRIG 66 04/02/2024    TRIG 137 11/29/2023    TRIG 194 (H) 08/21/2023       Meds/Allergies   all current active meds have been reviewed and current meds:   Current Facility-Administered Medications:     acetaminophen (TYLENOL) tablet 650 mg, Q6H PRN    atorvastatin (LIPITOR) tablet 20 mg, Daily With Dinner    enoxaparin (LOVENOX) subcutaneous injection 40 mg, Q24H    furosemide (LASIX) tablet 20 mg, Daily    insulin glargine (LANTUS) subcutaneous injection 20 Units 0.2 mL, HS    insulin lispro (HumALOG/ADMELOG) 100 units/mL subcutaneous injection 1-5 Units, TID AC **AND** Fingerstick Glucose (POCT), TID AC    insulin lispro (HumALOG/ADMELOG) 100 units/mL subcutaneous injection 1-5 Units, HS    insulin lispro (HumALOG/ADMELOG) 100 units/mL subcutaneous injection 2 Units, TID With Meals    losartan (COZAAR) tablet 25 mg, Daily    magnesium Oxide (MAG-OX) tablet 400 mg, BID    metoprolol succinate (TOPROL-XL) 24 hr tablet 25 mg, Q12H    OLANZapine (ZyPREXA) IM injection 2.5 mg, Q4H PRN Max 6/day    potassium chloride (Klor-Con M20) CR tablet 20 mEq, BID    Medications Prior to Admission:     acetaminophen (TYLENOL) 325 mg tablet    Alcohol Swabs (B-D SINGLE USE SWABS REGULAR) PADS    atorvastatin (LIPITOR) 20 mg tablet    calcium carbonate (OS-JUAN RAMON) 600 MG tablet    carbamide peroxide (FT Earwax Removal) 6.5 % otic solution    cholecalciferol (VITAMIN D3) 1,000 units tablet    Continuous Blood Gluc Sensor (FreeStyle Дмитрий 2 Sensor) MISC    dextromethorphan-guaifenesin (MUCINEX DM)  MG per 12 hr tablet    Diclofenac Sodium (VOLTAREN) 1 %    fluticasone (FLONASE) 50 mcg/act nasal spray    fluticasone (FLONASE) 50 mcg/act nasal spray    fluticasone (FLONASE) 50 mcg/act nasal spray    glucose  blood test strip    glucose monitoring kit (FREESTYLE) monitoring kit    guaiFENesin (FT Mucus Relief 12HR) 600 mg 12 hr tablet    insulin detemir (LEVEMIR) 100 units/mL subcutaneous injection    insulin lispro (HumaLOG) 100 units/mL injection    losartan (COZAAR) 100 MG tablet    metoprolol tartrate (LOPRESSOR) 25 mg tablet    potassium chloride (Klor-Con M20) 20 mEq tablet    Potassium Chloride ER 20 MEQ TBCR    vitamin B-12 (VITAMIN B-12) 1,000 mcg tablet    Counseling / Coordination of Care  Total floor / unit time spent today 20 minutes.  Greater than 50% of total time was spent with the patient and / or family counseling and / or coordination of care.      ** Please Note: Dragon 360 Dictation voice to text software may have been used in the creation of this document. **

## 2025-03-06 NOTE — ASSESSMENT & PLAN NOTE
Lab Results   Component Value Date    EGFR 50 03/06/2025    EGFR 46 03/05/2025    EGFR 58 03/04/2025    CREATININE 1.01 03/06/2025    CREATININE 1.08 03/05/2025    CREATININE 0.90 03/04/2025   Stable with diuresis

## 2025-03-06 NOTE — DISCHARGE SUMMARY
Discharge Summary - [facility] Internal Medicine    Patient Information: Gia Vaughan 84 y.o. female MRN: [id number]  Unit/Bed#: S -01 Encounter: [id number]    Discharging Physician / Practitioner: Kelly Alfaro MD  PCP: No primary care provider on file.  Admission Date: 3/3/2025  Discharge Date: 03/06/25    Reason for Admission: Altered Mental Status (Pt arrives via EMS from [facility]. Staff reports pt was more lethargic. For EMS BG was 416. )      Discharge Diagnoses:     Primary Discharge Diagnosis:     Principal Problem:    Metabolic encephalopathy  Active Problems:    Acute on chronic systolic heart failure (HCC)    Type 2 diabetes mellitus with stage 3 chronic kidney disease, unspecified whether long term insulin use, unspecified whether stage 3a or 3b CKD (HCC)    CLL (chronic lymphocytic leukemia) (HCC)    CKD (chronic kidney disease) stage 3, GFR 30-59 ml/min (HCC)    HLD (hyperlipidemia)    Ambulatory dysfunction    Moderate dementia (HCC)    Primary hypertension    Cardiomyopathy (HCC)  Resolved Problems:    Acute respiratory failure with hypoxia (HCC)      Consultations During Hospital Stay:  IP CONSULT TO CARDIOLOGY    Procedures Performed:     Significant Findings:   Refer to hospital course and above listed diagnosis related plan for details    Imaging while in hospital:  CXR  CT of the head  CT of the chest abdomen pelvis without contrast  Incidental Findings:   CT Chest /A/P: New bilateral axillary and subpectoralis adenopathy, left greater than right. Largest node on the left measures 2.6 cm in short axis. Largest node on the right measures 1.6 cm in short axis.  Progression of diffuse adenopathy in the chest abdomen and pelvis compatible with known CLL.   Notified patient's nephew Charles Ny.    Test Results Pending at Discharge (will require follow up):   As per After Visit Summary     Outpatient Tests Requested:  Complications:  Refer to hospital course and above listed diagnosis  "related plan, if any    Hospital Course:     Gia Vaughan is a 84 y.o. female patient with PMHx of dementia, CHF not on any diuretic, type 2 diabetes, CLL, hyperlipidemia, CKD stage III, hypertension  who originally presented to the hospital on 3/3/2025 due to chief complaints of increased confusion and lethargy.  Patient is a resident at [facility] and was slightly confused and lethargic at her baseline and brought to the ED for further evaluation patient was initially found to be slightly hypoxic and admitted for acute hypoxic respiratory failure and her chest x-ray suggestive of CHF.  Patient was mainly suspected to have hypoxic respiratory failure secondary to CHF which was possible the cause of her altered mental status.  With diuresis patient's oxygenation improved, patient breathing improved as well.  Patient was back to her baseline mental status    Patient was evaluated by cardiology who recommended that the patient would benefit from being on a low-dose diuretic 20 mg daily, her antihypertensive/heart failure medication were adjusted.   Metoprolol twice a day was changed to metoprolol XL 25 mg daily.  Losartan dose was decreased to 25 mg daily.    Communicated with patient's nephew routinely and eventually patient was discharged to short-term rehab as recommended by PT and OT and nephew in agreement with this plan of care.    Please see above list of diagnoses and related plan for additional information.       Condition at Discharge: fair     Discharge Day Visit / Exam:     Subjective:  I have seen and examined the patient at bedside. Overnight events reviewed with the RN.     Vitals: Blood Pressure: (!) 125/48 (03/06/25 0818)  Pulse: 65 (03/06/25 0818)  Temperature: 98.1 °F (36.7 °C) (03/06/25 0818)  Temp Source: Oral (03/05/25 0737)  Respirations: 18 (03/06/25 0818)  Height: 5' 6\" (167.6 cm) (03/04/25 0030)  Weight - Scale: 60.3 kg (132 lb 15 oz) (03/06/25 0600)  SpO2: 92 % (03/06/25 0818)  Exam:   Physical " "Exam    General : Alert, Awake but confused and oriented x 1-2, does not look to be in any acute distress though.     Neck : Supple.   Eyes:  TAM, EOMI.   CVS : S1, S2, RRR.   R/S : Clear to auscultate anteriorly.  Decreased breath sound the bases bilaterally.  Some Left Basilar crackles heard.   Abd: Soft, NT, ND. Bs+ve  Extremity: Trace pedal edema noted.   Skin: No acute Rash noted.   CNS: No acute FND.     Discharge instructions/Information to patient and family:(Discharge Medications and Follow up):   See after visit summary for information provided to patient and family.      Provisions for Follow-Up Care:  See after visit summary for information related to follow-up care and any pertinent home health orders.      Disposition: Skilled nursing facility at [facility]    Planned Readmission:  No     Discharge Statement:  I spent 35 minutes discharging the patient. This time was spent on the day of discharge. I had direct contact with the patient on the day of discharge. Greater than 50% of the total time was spent examining patient, answering all patient questions, arranging and discussing plan of care with patient as well as directly providing post-discharge instructions.  Additional time then spent on discharge activities.    Discharge Medications:  See after visit summary for reconciled discharge medications provided to patient and family.      ** Please Note: \"This note has been constructed using a voice recognition system.Therefore there may be syntax, spelling, and/or grammatical errors. Please call if you have any questions. \"**        "

## 2025-03-06 NOTE — ASSESSMENT & PLAN NOTE
Mildly reduced LVEF dating back to at least 2020, LVEF 45% in 2024  GDMT- switched to Toprol XL 25 mg BID this admission. Continue losartan

## 2025-03-06 NOTE — CASE MANAGEMENT
Case Management Discharge Planning Note    Patient name Gia Vaughan  Location S /S -01 MRN [id number]  : 1940 Date 3/6/2025       Current Admission Date: 3/3/2025  Current Admission Diagnosis:Metabolic encephalopathy   Patient Active Problem List    Diagnosis Date Noted Date Diagnosed    Cardiomyopathy (HCC) 2025     Acute diverticulitis 10/15/2024     Abnormal urinalysis 10/15/2024     Metabolic encephalopathy 10/15/2024     Diarrhea 2024     COVID 2024     Headache 2024     Hypertensive urgency 2024     Dizziness 2024     Suspected deep tissue injury of unknown depth 2022     Acute postoperative pain of right knee 2022     Encephalopathy 10/24/2022     Deep tissue injury 10/21/2022     Orthostasis 10/19/2022     H/O dizziness 10/19/2022     CLL (chronic lymphocytic leukemia) ([facility]) 10/13/2022     Intertrochanteric fracture of right femur ([facility]) 10/11/2022     Displaced fracture of middle phalanx of left ring finger, initial encounter for closed fracture 10/11/2022     Primary hypertension 2021     Ambulatory dysfunction 2021     Moderate dementia (HCC) 2021     Lymphadenopathy 2020     Elevated liver enzymes 2020     Fall 2020     CKD (chronic kidney disease) stage 3, GFR 30-59 ml/min ([facility]) 2020     HLD (hyperlipidemia) 2020     OA (osteoarthritis) 2020     Acute on chronic systolic heart failure (HCC) 2020     Type 2 diabetes mellitus with stage 3 chronic kidney disease, unspecified whether long term insulin use, unspecified whether stage 3a or 3b CKD ([facility])        LOS (days): 3  Geometric Mean LOS (GMLOS) (days): 3.9  Days to GMLOS:1.3     OBJECTIVE:  Risk of Unplanned Readmission Score: 23.11         Current admission status: Inpatient   Preferred Pharmacy:   UNKNOWN - FOLLOW UP PRIOR TO DISCHARGE TO E-PRESCRIBE  No address on file      [id number] - [place] - [address] [place] [address]  ROAD  [address]  [place] [address]  Phone: [phone] Fax: [phone]    [id number] [place] - [address]  [address] [place] [address]  Phone: [phone] Fax: [phone]    [facility] - [place], [place] - [address]  [address]  [address]  [place] [address]  Phone: [phone] Fax: [phone]    Primary Care Provider: No primary care provider on file.    Primary Insurance: MEDICARE  Secondary Insurance: [employer]    DISCHARGE DETAILS:    Discharge planning discussed with:: patient's nephew Charles  Freedom of Choice: Yes  Comments - Freedom of Choice: CM received return call from Charles re: pt's dcp. Discussed STR options/pt choice list with Charles via phone. Nephew relayed will likely choose [facility] as is closest to his home but will discuss with his sisters and f/u with this CM re: final STR choice.  CM contacted family/caregiver?: Yes  Were Treatment Team discharge recommendations reviewed with patient/caregiver?: Yes  Did patient/caregiver verbalize understanding of patient care needs?: N/A- going to facility  Were patient/caregiver advised of the risks associated with not following Treatment Team discharge recommendations?: Yes    Contacts  Patient Contacts: Charles (nephew)  Relationship to Patient:: Family  Contact Method: Phone  Phone Number: [phone]  Reason/Outcome: Discharge Planning, Referral, Continuity of Care, Emergency Contact                        Treatment Team Recommendation: Short Term Rehab  Discharge Destination Plan:: Short Term Rehab  Transport at Discharge : Wheelchair van                          IMM reviewed with patient's caregiver, patient's caregiver agrees with discharge determination.  IMM Given (Date):: 03/06/25  IMM Given to:: Family  Family notified:: Charles (nephherson) via phone

## 2025-03-06 NOTE — ASSESSMENT & PLAN NOTE
Wt Readings from Last 3 Encounters:   03/06/25 60.3 kg (132 lb 15 oz)   10/17/24 63.3 kg (139 lb 8.8 oz)   09/06/24 68.5 kg (151 lb 0.2 oz)   Has been off maintenance diuretics for over 2 years, previously on Lasix 40 mg PO daily but stopped 2/2 orthostasis  Responded well to a few doses of IV Lasix.   Unclear dry weight but tells me her usual weight is around 140 lb. Current standing weight is 132 lb.  Lasix 20 mg PO daily at discharge  Daily weights at nursing facility  Close outpatient follow up arranged, stable for d/c planning today from cardiology standpoint   - - -

## 2025-03-06 NOTE — CASE MANAGEMENT
Case Management Discharge Planning Note    Patient name Gia Vaughan  Location S /S -01 MRN [id number]  : 1940 Date 3/6/2025       Current Admission Date: 3/3/2025  Current Admission Diagnosis:Metabolic encephalopathy   Patient Active Problem List    Diagnosis Date Noted Date Diagnosed    Cardiomyopathy (HCC) 2025     Acute diverticulitis 10/15/2024     Abnormal urinalysis 10/15/2024     Metabolic encephalopathy 10/15/2024     Diarrhea 2024     COVID 2024     Headache 2024     Hypertensive urgency 2024     Dizziness 2024     Suspected deep tissue injury of unknown depth 2022     Acute postoperative pain of right knee 2022     Encephalopathy 10/24/2022     Deep tissue injury 10/21/2022     Orthostasis 10/19/2022     H/O dizziness 10/19/2022     CLL (chronic lymphocytic leukemia) ([facility]) 10/13/2022     Intertrochanteric fracture of right femur ([facility]) 10/11/2022     Displaced fracture of middle phalanx of left ring finger, initial encounter for closed fracture 10/11/2022     Primary hypertension 2021     Ambulatory dysfunction 2021     Moderate dementia (HCC) 2021     Lymphadenopathy 2020     Elevated liver enzymes 2020     Fall 2020     CKD (chronic kidney disease) stage 3, GFR 30-59 ml/min ([facility]) 2020     HLD (hyperlipidemia) 2020     OA (osteoarthritis) 2020     Acute on chronic systolic heart failure (HCC) 2020     Type 2 diabetes mellitus with stage 3 chronic kidney disease, unspecified whether long term insulin use, unspecified whether stage 3a or 3b CKD ([facility])        LOS (days): 3  Geometric Mean LOS (GMLOS) (days): 3.9  Days to GMLOS:1.2     OBJECTIVE:  Risk of Unplanned Readmission Score: 23.17         Current admission status: Inpatient   Preferred Pharmacy:   UNKNOWN - FOLLOW UP PRIOR TO DISCHARGE TO E-PRESCRIBE  No address on file      [id number] - [place] - [address] [place] [address]  ROAD  [address]  [place] [address]  Phone: [phone] Fax: [phone]    [id number] [place] - [address]  [address]  [place] [address]  Phone: [phone] Fax: [phone]    [facility] - [place], [place] - [address]  [address]  [address]  [place] [address]  Phone: [phone] Fax: [phone]    Primary Care Provider: No primary care provider on file.    Primary Insurance: MEDICARE  Secondary Insurance: [employer]    DISCHARGE DETAILS:    Discharge planning discussed with:: patient's nephew Charles  Freedom of Choice: Yes  Comments - Freedom of Choice: CM f/u with patient's nephew Charles re: STR choice. Charles relayed choice to move forward with [facility] as is closest to his home. [facility] reserved in aidin and facility confirmed able to accept pt today. Transport referral placed to [facility], [facility] 1530 [facility]. All parties notified of same.  CM contacted family/caregiver?: Yes  Were Treatment Team discharge recommendations reviewed with patient/caregiver?: Yes  Did patient/caregiver verbalize understanding of patient care needs?: N/A- going to facility  Were patient/caregiver advised of the risks associated with not following Treatment Team discharge recommendations?: Yes    Contacts  Patient Contacts: Charles (nephew)  Relationship to Patient:: Family  Contact Method: Phone  Phone Number: [phone]  Reason/Outcome: Discharge Planning, Referral, Continuity of Care, Emergency Contact              Other Referral/Resources/Interventions Provided:  Interventions: Short Term Rehab, Transportation  Referral Comments: [facility] reserved in aidin. [facility] for [facility]30 p/u time via [facility] to [facility] today. All parties notified of same.         Treatment Team Recommendation: Short Term Rehab  Discharge Destination Plan::  Short Term Rehab  Transport at Discharge : Wheelchair van        Transported by (Company and Unit #): [facility]  ETA of Transport (Date): 03/06/25  ETA of Transport (Time): 1530                            Accepting Facility Name, City & State : [facility]  Receiving Facility/Agency Phone Number: [phone] and ask for 2nd floor charge nurse  Facility/Agency Fax Number: [phone]

## 2025-03-06 NOTE — CASE MANAGEMENT
Case Management Discharge Planning Note    Patient name Gia Vaughan  Location S /S -01 MRN [id number]  : 1940 Date 3/6/2025       Current Admission Date: 3/3/2025  Current Admission Diagnosis:Metabolic encephalopathy   Patient Active Problem List    Diagnosis Date Noted Date Diagnosed    Cardiomyopathy (HCC) 2025     Acute diverticulitis 10/15/2024     Abnormal urinalysis 10/15/2024     Metabolic encephalopathy 10/15/2024     Diarrhea 2024     COVID 2024     Headache 2024     Hypertensive urgency 2024     Dizziness 2024     Suspected deep tissue injury of unknown depth 2022     Acute postoperative pain of right knee 2022     Encephalopathy 10/24/2022     Deep tissue injury 10/21/2022     Orthostasis 10/19/2022     H/O dizziness 10/19/2022     CLL (chronic lymphocytic leukemia) ([facility]) 10/13/2022     Intertrochanteric fracture of right femur ([facility]) 10/11/2022     Displaced fracture of middle phalanx of left ring finger, initial encounter for closed fracture 10/11/2022     Primary hypertension 2021     Ambulatory dysfunction 2021     Moderate dementia (HCC) 2021     Lymphadenopathy 2020     Elevated liver enzymes 2020     Fall 2020     CKD (chronic kidney disease) stage 3, GFR 30-59 ml/min ([facility]) 2020     HLD (hyperlipidemia) 2020     OA (osteoarthritis) 2020     Acute on chronic systolic heart failure (HCC) 2020     Type 2 diabetes mellitus with stage 3 chronic kidney disease, unspecified whether long term insulin use, unspecified whether stage 3a or 3b CKD ([facility])        LOS (days): 3  Geometric Mean LOS (GMLOS) (days): 3.9  Days to GMLOS:1.3     OBJECTIVE:  Risk of Unplanned Readmission Score: 23.11         Current admission status: Inpatient   Preferred Pharmacy:   UNKNOWN - FOLLOW UP PRIOR TO DISCHARGE TO E-PRESCRIBE  No address on file      [id number] - [place] - [address] [place] [address]  ROAD  [address] [facility]  [place] [address]  Phone: [phone] Fax: [phone]    [id number] - [place] - [address]  [facility] [address]  [place] [address]  Phone: [phone] Fax: [phone]    [facility] - [place], [place] - [address]  [address]  [address]  [place] [address]  Phone: [phone] Fax: [phone]    Primary Care Provider: No primary care provider on file.    Primary Insurance: MEDICARE  Secondary Insurance: [facility]    DISCHARGE DETAILS:    CM received return VM from pt's nephew Charles confirming received  VM providing dcp update. CM attempted to call Charles this AM to discuss STR options/pt choice list - left VM requesting return call.

## 2025-03-06 NOTE — PLAN OF CARE
Problem: Prexisting or High Potential for Compromised Skin Integrity  Goal: Skin integrity is maintained or improved  Description: INTERVENTIONS:  - Identify patients at risk for skin breakdown  - Assess and monitor skin integrity  - Assess and monitor nutrition and hydration status  - Monitor labs   - Assess for incontinence   - Turn and reposition patient  - Assist with mobility/ambulation  - Relieve pressure over bony prominences  - Avoid friction and shearing  - Provide appropriate hygiene as needed including keeping skin clean and dry  - Evaluate need for skin moisturizer/barrier cream  - Collaborate with interdisciplinary team   - Patient/family teaching  - Consider wound care consult   Outcome: Completed     Problem: CARDIOVASCULAR - ADULT  Goal: Maintains optimal cardiac output and hemodynamic stability  Description: INTERVENTIONS:  - Monitor I/O, vital signs and rhythm  - Monitor for S/S and trends of decreased cardiac output  - Administer and titrate ordered vasoactive medications to optimize hemodynamic stability  - Assess quality of pulses, skin color and temperature  - Assess for signs of decreased coronary artery perfusion  - Instruct patient to report change in severity of symptoms  Outcome: Completed  Goal: Absence of cardiac dysrhythmias or at baseline rhythm  Description: INTERVENTIONS:  - Continuous cardiac monitoring, vital signs, obtain 12 lead EKG if ordered  - Administer antiarrhythmic and heart rate control medications as ordered  - Monitor electrolytes and administer replacement therapy as ordered  Outcome: Completed

## 2025-03-08 LAB
BACTERIA BLD CULT: NORMAL
BACTERIA BLD CULT: NORMAL

## 2025-03-26 NOTE — PROGRESS NOTES
Advanced Heart Failure / Pulmonary Hypertension Outpatient Visit    Gia Vaughan 84 y.o. female   MRN: [id number]  Encounter: [id number]    Assessment:  Patient Active Problem List    Diagnosis Date Noted    Cardiomyopathy (HCC) 03/06/2025    Acute diverticulitis 10/15/2024    Abnormal urinalysis 10/15/2024    Metabolic encephalopathy 10/15/2024    Diarrhea 09/02/2024    COVID 09/01/2024    Headache 04/04/2024    Hypertensive urgency 04/02/2024    Dizziness 04/02/2024    Suspected deep tissue injury of unknown depth 11/09/2022    Acute postoperative pain of right knee 11/02/2022    Encephalopathy 10/24/2022    Deep tissue injury 10/21/2022    Orthostasis 10/19/2022    H/O dizziness 10/19/2022    CLL (chronic lymphocytic leukemia) (HCC) 10/13/2022    Intertrochanteric fracture of right femur (HCC) 10/11/2022    Displaced fracture of middle phalanx of left ring finger, initial encounter for closed fracture 10/11/2022    Primary hypertension 08/08/2021    Ambulatory dysfunction 08/05/2021    Moderate dementia (HCC) 08/05/2021    Lymphadenopathy 11/06/2020    Elevated liver enzymes 11/06/2020    Fall 11/05/2020    Stage 3b chronic kidney disease (HCC) 11/05/2020    HLD (hyperlipidemia) 11/05/2020    OA (osteoarthritis) 11/05/2020    Acute on chronic systolic heart failure (HCC) 11/05/2020    Type 2 diabetes mellitus with stage 3 chronic kidney disease, unspecified whether long term insulin use, unspecified whether stage 3a or 3b CKD (HCC)        Today's Plan:  Continue Lasix 20 mg QD--euvolemic on exam  Continue Toprol XL, Losartan.  BP's stable.  Creatinine stable  Repeat blood work prior to next appt in 1 month  Instructed to continue daily weights and report rapid weight gain, SOB, LE edema to HF clinic  Establish with General or HF  Cardiologist.  RTO in 1 month with AP.      Plan:  Chronic HFmrEF, LVEF 45%, Grade II DD  Presented with confusion, lethargy to [facility]  CXR concerning for fluid overload, BNP>  1200  IV diuresed prior to transition to PO at DC    Weights: 132 on DC--> 138 lbs today  BP: 110/58, HR 60's  Volume status: Euvolemic on exam    Pharmacotherapies / Neurohormonal Blockade:  --Beta Blocker: Toprol XL 25 BID  --ARNi / ACEi / ARB: Losartan 25 QD  --Aldosterone Antagonist:  --SGLT2 Inhibitor: NO--H/O UTI's   --Diuretic: Lasix 20 mg QD    Dementia  Dc'd to STR   DM2  HLD  CKD III  Remained stable prior admission  HTN  Admission for HTN urgency 4/1/24, 's  BP's 120-150's  Rx: Toprol XL, Losartan, Lasix    HPI:   3/3/25 Hospital admit x 4 days: Gia Vaughan is a 84 y.o. female patient with PMHx of dementia, CHF not on any diuretic, type 2 diabetes, CLL, hyperlipidemia, CKD stage III, hypertension  who originally presented to the hospital on 3/3/2025 due to chief complaints of increased confusion and lethargy.  Patient is a resident at [facility] and was slightly confused and lethargic at her baseline and brought to the ED for further evaluation patient was initially found to be slightly hypoxic and admitted for acute hypoxic respiratory failure and her chest x-ray suggestive of CHF.  Patient was mainly suspected to have hypoxic respiratory failure secondary to CHF which was possible the cause of her altered mental status. Patient was evaluated by cardiology who recommended that the patient would benefit from being on a low-dose diuretic 20 mg daily, her antihypertensive/heart failure medication were adjusted.   Metoprolol twice a day was changed to metoprolol XL 25 mg daily. Losartan dose was decreased to 25 mg daily.    4/2/25: post hospital f/u: Pt presents with nephew.  Lives at [address]. [facility].  Has a history of dementia- does not always respond appropriately during exam. No records of weights, medications provided by [place].  [email or web address]. tried to call facility-- no answer.  Pt denies SOB, CP, leg swelling.  She states sometimes the meals are too salty.  Has a good appetite and drinks a good amount  "throughout the day.  States she is feeling a \"little dry\" today.  Nephew reports frequent UTI's when discussing Jardiance.  Denies SOB, CP, no leg swelling.         Past Medical History:   Diagnosis Date    Acute encephalopathy 10/15/2024    Benign adenomatous polyp of large intestine     CHF (congestive heart failure) (HCC)     Diabetes mellitus (HCC)     Hyperlipemia     Hypertension     Osteoarthritis     TIA (transient ischemic attack)        Review of Systems   Constitutional:  Negative for activity change.   HENT: Negative.     Eyes: Negative.    Respiratory:  Negative for shortness of breath.    Cardiovascular:  Negative for chest pain, palpitations and leg swelling.   Endocrine: Negative.    Genitourinary: Negative.    Musculoskeletal: Negative.    Skin: Negative.    Allergic/Immunologic: Negative.    Neurological: Negative.    Hematological: Negative.    Psychiatric/Behavioral: Negative.         Allergies   Allergen Reactions    Amlodipine     Ceftin [Cefuroxime]     Ciprofloxacin     Citalopram     Dye [Iodinated Contrast Media]     Glucophage [Metformin]     Januvia [Sitagliptin]     Neomycin-Polymyxin-Dexameth     Ondansetron     Prilosec [Omeprazole]     Vibramycin [Doxycycline]          Current Outpatient Medications:     acetaminophen (TYLENOL) 325 mg tablet, Take 2 tablets (650 mg total) by mouth every 4 (four) hours as needed for mild pain, Disp: , Rfl: 0    Alcohol Swabs (B-D SINGLE USE SWABS REGULAR) PADS, USE AS DIRECTED, Disp: 100 each, Rfl: 3    atorvastatin (LIPITOR) 20 mg tablet, Take 20 mg by mouth daily, Disp: , Rfl:     Calcium Carb-Cholecalciferol (calcium carbonate-vitamin D) 500 mg-5 mcg tablet, TAKE ONE TABLET BY MOUTH TWICE DAILY DX: SUPPLEMENT, Disp: 60 tablet, Rfl: 11    calcium carbonate (OS-JUAN RAMON) 600 MG tablet, Take 600 mg by mouth 2 (two) times a day with meals, Disp: , Rfl:     carbamide peroxide (FT Earwax Removal) 6.5 % otic solution, INSERT FOUR DROPS INTO BOTH EARS AT BEDTIME " FOR 2 WEEKS DX: WAX BUILD UP, Disp: 15 mL, Rfl: 1    cholecalciferol (VITAMIN D3) 1,000 units tablet, Take 1,000 Units by mouth daily, Disp: , Rfl:     Continuous Blood Gluc Sensor (FreeStyle Дмитрий 2 Sensor) MISC, apply 1 SENSOR to back OF UPPER ARM REMOVE AND REPLACE every 14 d...  (REFER TO PRESCRIPTION NOTES)., Disp: , Rfl:     dextromethorphan-guaifenesin (MUCINEX DM)  MG per 12 hr tablet, Take 1 tablet by mouth every 12 (twelve) hours, Disp: , Rfl:     Diclofenac Sodium (VOLTAREN) 1 %, Apply 2 g topically 3 (three) times a day To R knee, Disp: , Rfl: 0    ferrous sulfate (FeroSul) 325 (65 Fe) mg tablet, TAKE ONE TABLET BY MOUTH TWICE DAILY DX: SUPPLEMENT, Disp: 60 tablet, Rfl: 11    fluticasone (FLONASE) 50 mcg/act nasal spray, INSTILL ONE SPRAY INTO BOTH NOSTRILS DAILY FOR 1 WEEK AND THEN AS NEEDED DX: ALLERGIES, Disp: 16 g, Rfl: 5    furosemide (LASIX) 20 mg tablet, Take 1 tablet (20 mg total) by mouth daily, Disp: 30 tablet, Rfl: 0    glucose blood test strip, 1 each by Other route daily as needed Use as instructed, Disp: , Rfl:     glucose monitoring kit (FREESTYLE) monitoring kit, 1 each by Does not apply route as needed, Disp: , Rfl:     guaiFENesin (FT Mucus Relief 12HR) 600 mg 12 hr tablet, Take 1 tablet (600 mg total) by mouth 2 (two) times a day as needed for cough or congestion, Disp: 30 tablet, Rfl: 0    insulin detemir (LEVEMIR) 100 units/mL subcutaneous injection, Inject 20 Units under the skin daily at bedtime, Disp: 10 mL, Rfl: 0    Insulin Glargine Solostar (Lantus SoloStar) 100 UNIT/ML SOPN, Inject 0.25 mL (25 Units total) under the skin daily at bedtime, Disp: 15 mL, Rfl: 5    insulin lispro (HumaLOG) 100 units/mL injection, Inject 5 Units under the skin 3 (three) times a day with meals, Disp: , Rfl: 0    losartan (COZAAR) 25 mg tablet, Take 1 tablet (25 mg total) by mouth daily, Disp: 30 tablet, Rfl: 0    magnesium Oxide (MAG-OX) 400 mg TABS, Take 1 tablet (400 mg total) by mouth daily  for 14 days, Disp: 14 tablet, Rfl: 0    metoprolol succinate (TOPROL-XL) 25 mg 24 hr tablet, Take 1 tablet (25 mg total) by mouth every 12 (twelve) hours, Disp: 30 tablet, Rfl: 0    potassium chloride (Klor-Con M20) 20 mEq tablet, Take 1 tablet (20 mEq total) by mouth 2 (two) times a day, Disp: 1 tablet, Rfl: 0    vitamin B-12 (VITAMIN B-12) 1,000 mcg tablet, Take by mouth daily, Disp: , Rfl:     Social History     Socioeconomic History    Marital status:      Spouse name: Not on file    Number of children: Not on file    Years of education: Not on file    Highest education level: Not on file   Occupational History    Not on file   Tobacco Use    Smoking status: Never    Smokeless tobacco: Never   Vaping Use    Vaping status: Never Used   Substance and Sexual Activity    Alcohol use: Not Currently     Comment: very seldom    Drug use: Never    Sexual activity: Not on file   Other Topics Concern    Not on file   Social History Narrative    Not on file     Social Drivers of Health     Financial Resource Strain: Patient Unable To Answer (11/5/2024)    Received from [facility]    Overall Financial Resource Strain (CARDIA)     Difficulty of Paying Living Expenses: Patient unable to answer   Food Insecurity: No Food Insecurity (3/5/2025)    Hunger Vital Sign     Worried About Running Out of Food in the Last Year: Never true     Ran Out of Food in the Last Year: Never true   Transportation Needs: No Transportation Needs (3/5/2025)    PRAPARE - Transportation     Lack of Transportation (Medical): No     Lack of Transportation (Non-Medical): No   Physical Activity: Not on file   Stress: Not on file   Social Connections: Not on file   Intimate Partner Violence: Patient Unable To Answer (11/5/2024)    [facility]    Humiliation, Afraid, Rape, and Kick questionnaire     Fear of Current or Ex-Partner: Patient unable to answer     Emotionally  "Abused: Patient unable to answer     Physically Abused: Patient unable to answer     Sexually Abused: Patient unable to answer   Housing Stability: Low Risk  (3/5/2025)    Housing Stability Vital Sign     Unable to Pay for Housing in the Last Year: No     Number of Times Moved in the Last Year: 0     Homeless in the Last Year: No     Family History   Problem Relation Age of Onset    Heart disease Mother     Heart disease Father     Hypertension Father     Stomach cancer Sister     Ovarian cancer Sister     Hypertension Sister        Vitals:   Blood pressure 110/58, pulse 61, height 5' 6\" (1.676 m), weight 62.8 kg (138 lb 8 oz), SpO2 94%.    Wt Readings from Last 10 Encounters:   04/02/25 62.8 kg (138 lb 8 oz)   03/06/25 60.3 kg (132 lb 15 oz)   10/17/24 63.3 kg (139 lb 8.8 oz)   09/06/24 68.5 kg (151 lb 0.2 oz)   05/31/24 68 kg (150 lb)   04/04/24 68.4 kg (150 lb 12.8 oz)   01/29/24 70.8 kg (156 lb)   04/24/23 68 kg (150 lb)   11/15/22 66.7 kg (147 lb)   11/11/22 66.7 kg (147 lb)     Vitals:    04/02/25 0941   BP: 110/58   BP Location: Left arm   Patient Position: Sitting   Cuff Size: Standard   Pulse: 61   SpO2: 94%   Weight: 62.8 kg (138 lb 8 oz)   Height: 5' 6\" (1.676 m)       Physical Exam  Constitutional:       Appearance: Normal appearance. She is normal weight.   Neck:      Vascular: No JVD.   Cardiovascular:      Rate and Rhythm: Normal rate and regular rhythm.      Heart sounds: Normal heart sounds, S1 normal and S2 normal.   Pulmonary:      Effort: No respiratory distress.      Breath sounds: Normal air entry. Decreased breath sounds present.   Musculoskeletal:      Right lower leg: No edema.      Left lower leg: No edema.   Skin:     General: Skin is warm and dry.   Neurological:      Mental Status: She is disoriented and confused.   Psychiatric:         Behavior: Behavior is cooperative.         Cognition and Memory: Cognition is impaired. Memory is impaired.      Comments: H/O dementia       Labs & " Results:  Lab Results   Component Value Date    WBC 30.52 (H) 03/04/2025    HGB 10.0 (L) 03/04/2025    HCT 31.7 (L) 03/04/2025     (H) 03/04/2025     03/04/2025     Lab Results   Component Value Date    SODIUM 143 03/06/2025    K 3.8 03/06/2025     03/06/2025    CO2 29 03/06/2025    BUN 23 03/06/2025    CREATININE 1.01 03/06/2025    GLUC 76 03/06/2025    CALCIUM 9.2 03/06/2025     Lab Results   Component Value Date    INR 1.08 03/03/2025    INR 1.01 10/14/2024    INR 1.08 09/01/2024    PROTIME 14.8 03/03/2025    PROTIME 14.0 10/14/2024    PROTIME 14.7 09/01/2024     Lab Results   Component Value Date    BNP 1,246 (H) 03/03/2025        BRINDA Simpson

## 2025-03-27 ENCOUNTER — NURSING HOME VISIT (OUTPATIENT)
Dept: FAMILY MEDICINE CLINIC | Facility: CLINIC | Age: 85
End: 2025-03-27
Payer: MEDICARE

## 2025-03-27 DIAGNOSIS — E11.22 TYPE 2 DIABETES MELLITUS WITH STAGE 3 CHRONIC KIDNEY DISEASE, UNSPECIFIED WHETHER LONG TERM INSULIN USE, UNSPECIFIED WHETHER STAGE 3A OR 3B CKD (HCC): ICD-10-CM

## 2025-03-27 DIAGNOSIS — N18.32 STAGE 3B CHRONIC KIDNEY DISEASE (HCC): ICD-10-CM

## 2025-03-27 DIAGNOSIS — R26.2 AMBULATORY DYSFUNCTION: Primary | ICD-10-CM

## 2025-03-27 DIAGNOSIS — N18.30 TYPE 2 DIABETES MELLITUS WITH STAGE 3 CHRONIC KIDNEY DISEASE, UNSPECIFIED WHETHER LONG TERM INSULIN USE, UNSPECIFIED WHETHER STAGE 3A OR 3B CKD (HCC): ICD-10-CM

## 2025-03-27 DIAGNOSIS — Z00.00 MEDICARE ANNUAL WELLNESS VISIT, SUBSEQUENT: ICD-10-CM

## 2025-03-27 DIAGNOSIS — I10 PRIMARY HYPERTENSION: ICD-10-CM

## 2025-03-27 DIAGNOSIS — C91.10 CLL (CHRONIC LYMPHOCYTIC LEUKEMIA) (HCC): ICD-10-CM

## 2025-03-27 DIAGNOSIS — G30.0 MODERATE EARLY ONSET ALZHEIMER'S DEMENTIA, UNSPECIFIED WHETHER BEHAVIORAL, PSYCHOTIC, OR MOOD DISTURBANCE OR ANXIETY (HCC): ICD-10-CM

## 2025-03-27 DIAGNOSIS — F02.B0 MODERATE EARLY ONSET ALZHEIMER'S DEMENTIA, UNSPECIFIED WHETHER BEHAVIORAL, PSYCHOTIC, OR MOOD DISTURBANCE OR ANXIETY (HCC): ICD-10-CM

## 2025-03-27 PROCEDURE — G0439 PPPS, SUBSEQ VISIT: HCPCS | Performed by: FAMILY MEDICINE

## 2025-03-27 PROCEDURE — 99349 HOME/RES VST EST MOD MDM 40: CPT | Performed by: FAMILY MEDICINE

## 2025-03-28 DIAGNOSIS — D50.9 IRON DEFICIENCY ANEMIA, UNSPECIFIED IRON DEFICIENCY ANEMIA TYPE: Primary | ICD-10-CM

## 2025-03-28 RX ORDER — FERROUS SULFATE 325(65) MG
TABLET ORAL
Qty: 60 TABLET | Refills: 11 | Status: SHIPPED | OUTPATIENT
Start: 2025-03-28

## 2025-03-29 DIAGNOSIS — S72.144S CLOSED NONDISPLACED INTERTROCHANTERIC FRACTURE OF RIGHT FEMUR, SEQUELA: Primary | ICD-10-CM

## 2025-03-31 DIAGNOSIS — E11.9 TYPE 2 DIABETES MELLITUS WITHOUT COMPLICATION, WITHOUT LONG-TERM CURRENT USE OF INSULIN (HCC): Primary | ICD-10-CM

## 2025-03-31 RX ORDER — INSULIN GLARGINE 100 [IU]/ML
25 INJECTION, SOLUTION SUBCUTANEOUS
Qty: 15 ML | Refills: 5 | Status: SHIPPED | OUTPATIENT
Start: 2025-03-31

## 2025-04-02 ENCOUNTER — OFFICE VISIT (OUTPATIENT)
Dept: CARDIOLOGY CLINIC | Facility: CLINIC | Age: 85
End: 2025-04-02
Payer: MEDICARE

## 2025-04-02 VITALS
OXYGEN SATURATION: 94 % | WEIGHT: 138.5 LBS | BODY MASS INDEX: 22.26 KG/M2 | SYSTOLIC BLOOD PRESSURE: 110 MMHG | HEART RATE: 61 BPM | DIASTOLIC BLOOD PRESSURE: 58 MMHG | HEIGHT: 66 IN

## 2025-04-02 DIAGNOSIS — I50.23 ACUTE ON CHRONIC SYSTOLIC HEART FAILURE (HCC): ICD-10-CM

## 2025-04-02 DIAGNOSIS — I50.21 ACUTE HEART FAILURE WITH MILDLY REDUCED EJECTION FRACTION (HFMREF, 41-49%) (HCC): Primary | ICD-10-CM

## 2025-04-02 DIAGNOSIS — N18.32 STAGE 3B CHRONIC KIDNEY DISEASE (HCC): ICD-10-CM

## 2025-04-02 PROCEDURE — 99214 OFFICE O/P EST MOD 30 MIN: CPT

## 2025-04-02 NOTE — PATIENT INSTRUCTIONS
Please call our office with rapid weight gain, SOB, LE edema    Repeat BMP in 1 month    Please weigh yourself every day (after emptying your bladder) and keep a detailed log of weights.     Contact Cardiology at [phone] if you gain 3+ lbs overnight or 5+ lbs in 5-7 days.    Limit daily sodium/salt intake to 2000 mg daily to prevent fluid retention.  Avoid canned foods, fast food/Chinese food, and processed meats (hot dogs, lunch meat, and sausage etc.). Caution with condiments.  Limit fluid intake to 2000 mL or 2 liters (about 60-65 ounces) daily.  Avoid electrolyte replacement drinks (such as Gatorade, Pedialyte, Propel, Liquid IV, etc.).  Bring complete list of medications and log of daily weights to your follow-up appointment.

## 2025-04-07 NOTE — PROGRESS NOTES
Name: Gia Vaughan      : 1940      MRN: [id number]  Encounter Provider: Andrew Pa MD  Encounter Date: 3/27/2025   Encounter department: [facility]    :  Assessment & Plan  Ambulatory dysfunction  Needs PT/OT       Moderate early onset Alzheimer's dementia, unspecified whether behavioral, psychotic, or mood disturbance or anxiety (HCC)  Patient has mild decline in cognitive function and needs moderate observation and care. Patient is not able to care for self and cannot live on their own.        Stage 3b chronic kidney disease (HCC)  Lab Results   Component Value Date    EGFR 50 2025    EGFR 46 2025    EGFR 58 2025    CREATININE 1.01 2025    CREATININE 1.08 2025    CREATININE 0.90 2025            Primary hypertension  Patient is stable with current anti-hypertensive medicine and continue to follow a low sodium diet and take current medication. All questions about this condition were answered today.        Type 2 diabetes mellitus with stage 3 chronic kidney disease, unspecified whether long term insulin use, unspecified whether stage 3a or 3b CKD (HCC)  Patient is stable with current meds and discussed a low carb diet. Pt  recommended to see eye doctor and foot doctor for routine screening as per protocol. Recheck A1C  and Cr in 3 months. Patient questions answered today about this condtion.   Lab Results   Component Value Date    HGBA1C 9.5 (H) 2024          CLL (chronic lymphocytic leukemia) (HCC)  Patient is stable  and will continue present plan of care and reassess at next routine visit. All questions about this problem from patient were answered today.        Medicare annual wellness visit, subsequent                  Preventive health issues were discussed with patient, and age appropriate screening tests were ordered as noted in patient's After Visit Summary. Personalized health advice and appropriate referrals for  health education or preventive services given if needed, as noted in patient's After Visit Summary.    History of Present Illness         Patient Care Team:  Christine Leija PA-C (Hematology and Oncology)    Review of Systems  Annual Wellness Visit Questionnaire   Last Medicare Wellness visit information reviewed, patient interviewed and updates made to the record today.      Health Risk Assessment:   Patient rates overall health as fair. Patient feels that their physical health rating is slightly worse. Patient is satisfied with their life. Eyesight was rated as same. Hearing was rated as same. Patient feels that their emotional and mental health rating is same. Patients states they are never, rarely angry. Patient states they are sometimes unusually tired/fatigued. Pain experienced in the last 7 days has been some. Patient's pain rating has been 5/10. Patient states that she has experienced no weight loss or gain in last 6 months.     Depression Screening:   PHQ-2 Score: 1      Fall Risk Screening:   In the past year, patient has experienced: history of falling in past year    Number of falls: 2 or more  Injured during fall?: No    Feels unsteady when standing or walking?: Yes    Worried about falling?: Yes      Urinary Incontinence Screening:   Patient has leaked urine accidently in the last six months.     Home Safety:  Patient has trouble with stairs inside or outside of their home. Patient has working smoke alarms Home safety hazards include: none.     Nutrition:   Current diet is Diabetic and Low Cholesterol.     Medications:   Patient is not currently taking any over-the-counter supplements. Patient is not able to manage medications.     Activities of Daily Living (ADLs)/Instrumental Activities of Daily Living (IADLs):   Walk and transfer into and out of bed and chair?: No  Dress and groom yourself?: Yes    Bathe or shower yourself?: No    Feed yourself? Yes  Do your laundry/housekeeping?:  No  Manage your money, pay your bills and track your expenses?: No  Make your own meals?: No    Do your own shopping?: No    Previous Hospitalizations:   Any hospitalizations or ED visits within the last 12 months?: Yes    How many hospitalizations have you had in the last year?: 1-2    Advance Care Planning:   Living will: Yes    Durable POA for healthcare: Yes    Advanced directive: Yes    Advanced directive counseling given: Yes    ACP document given: Yes    End of Life Decisions reviewed with patient: Yes    Provider agrees with end of life decisions: Yes      Cognitive Screening:   Provider or family/friend/caregiver concerned regarding cognition?: No    PREVENTIVE SCREENINGS      Cardiovascular Screening:    General: Screening Not Indicated and History Lipid Disorder      Diabetes Screening:     General: Screening Not Indicated and History Diabetes      Colorectal Cancer Screening:     General: Screening Not Indicated      Cervical Cancer Screening:    General: Screening Not Indicated      Lung Cancer Screening:     General: Screening Not Indicated    Screening, Brief Intervention, and Referral to Treatment (SBIRT)     Screening  Typical number of drinks in a day: 0  Typical number of drinks in a week: 0  Interpretation: Low risk drinking behavior.    Social Drivers of Health     Financial Resource Strain: Patient Unable To Answer (11/5/2024)    Received from [facility]    Overall Financial Resource Strain (CARDIA)     Difficulty of Paying Living Expenses: Patient unable to answer   Food Insecurity: No Food Insecurity (4/6/2025)    Hunger Vital Sign     Worried About Running Out of Food in the Last Year: Never true     Ran Out of Food in the Last Year: Never true   Transportation Needs: No Transportation Needs (4/6/2025)    PRAPARE - Transportation     Lack of Transportation (Medical): No     Lack of Transportation (Non-Medical): No   Housing Stability: Low Risk  (4/6/2025)    Housing  Stability Vital Sign     Unable to Pay for Housing in the Last Year: No     Number of Times Moved in the Last Year: 0     Homeless in the Last Year: No   Utilities: Not At Risk (4/6/2025)    [facility] Utilities     Threatened with loss of utilities: No     No results found.    Objective   LMP  (LMP Unknown)       Physical Exam

## 2025-04-07 NOTE — PATIENT INSTRUCTIONS
Medicare Preventive Visit Patient Instructions  Thank you for completing your Welcome to Medicare Visit or Medicare Annual Wellness Visit today. Your next wellness visit will be due in one year (4/7/2026).  The screening/preventive services that you may require over the next 5-10 years are detailed below. Some tests may not apply to you based off risk factors and/or age. Screening tests ordered at today's visit but not completed yet may show as past due. Also, please note that scanned in results may not display below.  Preventive Screenings:  Service Recommendations Previous Testing/Comments   Colorectal Cancer Screening  * Colonoscopy    * Fecal Occult Blood Test (FOBT)/Fecal Immunochemical Test (FIT)  * Fecal DNA/Cologuard Test  * Flexible Sigmoidoscopy Age: 45-75 years old   Colonoscopy: every 10 years (may be performed more frequently if at higher risk)  OR  FOBT/FIT: every 1 year  OR  Cologuard: every 3 years  OR  Sigmoidoscopy: every 5 years  Screening may be recommended earlier than age 45 if at higher risk for colorectal cancer. Also, an individualized decision between you and your healthcare provider will decide whether screening between the ages of 76-85 would be appropriate. Colonoscopy: Not on file  FOBT/FIT: Not on file  Cologuard: Not on file  Sigmoidoscopy: Not on file    Screening Not Indicated     Breast Cancer Screening Age: 40+ years old  Frequency: every 1-2 years  Not required if history of left and right mastectomy Mammogram: 03/21/2017        Cervical Cancer Screening Between the ages of 21-29, pap smear recommended once every 3 years.   Between the ages of 30-65, can perform pap smear with HPV co-testing every 5 years.   Recommendations may differ for women with a history of total hysterectomy, cervical cancer, or abnormal pap smears in past. Pap Smear: Not on file    Screening Not Indicated   Hepatitis C Screening Once for adults born between 1945 and 1965  More frequently in patients at  high risk for Hepatitis C Hep C Antibody: Not on file        Diabetes Screening 1-2 times per year if you're at risk for diabetes or have pre-diabetes Fasting glucose: 141 mg/dL (10/15/2024)  A1C: 9.5 % (9/1/2024)  Screening Not Indicated  History Diabetes   Cholesterol Screening Once every 5 years if you don't have a lipid disorder. May order more often based on risk factors. Lipid panel: 04/02/2024    Screening Not Indicated  History Lipid Disorder     Other Preventive Screenings Covered by Medicare:  Abdominal Aortic Aneurysm (AAA) Screening: covered once if your at risk. You're considered to be at risk if you have a family history of AAA.  Lung Cancer Screening: covers low dose CT scan once per year if you meet all of the following conditions: (1) Age 55-77; (2) No signs or symptoms of lung cancer; (3) Current smoker or have quit smoking within the last 15 years; (4) You have a tobacco smoking history of at least 20 pack years (packs per day multiplied by number of years you smoked); (5) You get a written order from a healthcare provider.  Glaucoma Screening: covered annually if you're considered high risk: (1) You have diabetes OR (2) Family history of glaucoma OR (3)  aged 50 and older OR (4)  American aged 65 and older  Osteoporosis Screening: covered every 2 years if you meet one of the following conditions: (1) You're estrogen deficient and at risk for osteoporosis based off medical history and other findings; (2) Have a vertebral abnormality; (3) On glucocorticoid therapy for more than 3 months; (4) Have primary hyperparathyroidism; (5) On osteoporosis medications and need to assess response to drug therapy.   Last bone density test (DXA Scan): 04/21/2023.  HIV Screening: covered annually if you're between the age of 15-65. Also covered annually if you are younger than 15 and older than 65 with risk factors for HIV infection. For pregnant patients, it is covered up to 3 times per  pregnancy.    Immunizations:  Immunization Recommendations   Influenza Vaccine Annual influenza vaccination during flu season is recommended for all persons aged >= 6 months who do not have contraindications   Pneumococcal Vaccine   * Pneumococcal conjugate vaccine = PCV13 (Prevnar 13), PCV15 (Vaxneuvance), PCV20 (Prevnar 20)  * Pneumococcal polysaccharide vaccine = PPSV23 (Pneumovax) Adults 19-63 yo with certain risk factors or if 65+ yo  If never received any pneumonia vaccine: recommend Prevnar 20 (PCV20)  Give PCV20 if previously received 1 dose of PCV13 or PPSV23   Hepatitis B Vaccine 3 dose series if at intermediate or high risk (ex: diabetes, end stage renal disease, liver disease)   Respiratory syncytial virus (RSV) Vaccine - COVERED BY MEDICARE PART D  * RSVPreF3 (Arexvy) CDC recommends that adults 60 years of age and older may receive a single dose of RSV vaccine using shared clinical decision-making (SCDM)   Tetanus (Td) Vaccine - COST NOT COVERED BY MEDICARE PART B Following completion of primary series, a booster dose should be given every 10 years to maintain immunity against tetanus. Td may also be given as tetanus wound prophylaxis.   Tdap Vaccine - COST NOT COVERED BY MEDICARE PART B Recommended at least once for all adults. For pregnant patients, recommended with each pregnancy.   Shingles Vaccine (Shingrix) - COST NOT COVERED BY MEDICARE PART B  2 shot series recommended in those 19 years and older who have or will have weakened immune systems or those 50 years and older     Health Maintenance Due:  There are no preventive care reminders to display for this patient.  Immunizations Due:      Topic Date Due   • COVID-19 Vaccine (3 - Pfizer risk series) 06/24/2021   • Pneumococcal Vaccine: 65+ Years (2 of 2 - PPSV23) 12/12/2022   • Influenza Vaccine (1) 09/01/2024     Advance Directives   What are advance directives?  Advance directives are legal documents that state your wishes and plans for medical  care. These plans are made ahead of time in case you lose your ability to make decisions for yourself. Advance directives can apply to any medical decision, such as the treatments you want, and if you want to donate organs.   What are the types of advance directives?  There are many types of advance directives, and each state has rules about how to use them. You may choose a combination of any of the following:  Living will:  This is a written record of the treatment you want. You can also choose which treatments you do not want, which to limit, and which to stop at a certain time. This includes surgery, medicine, IV fluid, and tube feedings.   Durable power of  for healthcare (DPAHC):  This is a written record that states who you want to make healthcare choices for you when you are unable to make them for yourself. This person, called a proxy, is usually a family member or a friend. You may choose more than 1 proxy.  Do not resuscitate (DNR) order:  A DNR order is used in case your heart stops beating or you stop breathing. It is a request not to have certain forms of treatment, such as CPR. A DNR order may be included in other types of advance directives.  Medical directive:  This covers the care that you want if you are in a coma, near death, or unable to make decisions for yourself. You can list the treatments you want for each condition. Treatment may include pain medicine, surgery, blood transfusions, dialysis, IV or tube feedings, and a ventilator (breathing machine).  Values history:  This document has questions about your views, beliefs, and how you feel and think about life. This information can help others choose the care that you would choose.  Why are advance directives important?  An advance directive helps you control your care. Although spoken wishes may be used, it is better to have your wishes written down. Spoken wishes can be misunderstood, or not followed. Treatments may be given even if  you do not want them. An advance directive may make it easier for your family to make difficult choices about your care.   Fall Prevention    Fall prevention  includes ways to make your home and other areas safer. It also includes ways you can move more carefully to prevent a fall. Health conditions that cause changes in your blood pressure, vision, or muscle strength and coordination may increase your risk for falls. Medicines may also increase your risk for falls if they make you dizzy, weak, or sleepy.   Fall prevention tips:   Stand or sit up slowly.    Use assistive devices as directed.    Wear shoes that fit well and have soles that .    Wear a personal alarm.    Stay active.    Manage your medical conditions.    Home Safety Tips:  Add items to prevent falls in the bathroom.    Keep paths clear.    Install bright lights in your home.    Keep items you use often on shelves within reach.    Paint or place reflective tape on the edges of your stairs.    Urinary Incontinence   Urinary incontinence (UI)  is when you lose control of your bladder. UI develops because your bladder cannot store or empty urine properly. The 3 most common types of UI are stress incontinence, urge incontinence, or both.  Medicines:   May be given to help strengthen your bladder control. Report any side effects of medication to your healthcare provider.  Do pelvic muscle exercises often:  Your pelvic muscles help you stop urinating. Squeeze these muscles tight for 5 seconds, then relax for 5 seconds. Gradually work up to squeezing for 10 seconds. Do 3 sets of 15 repetitions a day, or as directed. This will help strengthen your pelvic muscles and improve bladder control.  Train your bladder:  Go to the bathroom at set times, such as every 2 hours, even if you do not feel the urge to go. You can also try to hold your urine when you feel the urge to go. For example, hold your urine for 5 minutes when you feel the urge to go. As that  becomes easier, hold your urine for 10 minutes.   Self-care:   Keep a UI record.  Write down how often you leak urine and how much you leak. Make a note of what you were doing when you leaked urine.  Drink liquids as directed. You may need to limit the amount of liquid you drink to help control your urine leakage. Do not drink any liquid right before you go to bed. Limit or do not have drinks that contain caffeine or alcohol.   Prevent constipation.  Eat a variety of high-fiber foods. Good examples are high-fiber cereals, beans, vegetables, and whole-grain breads. Walking is the best way to trigger your intestines to have a bowel movement.  Exercise regularly and maintain a healthy weight.  Weight loss and exercise will decrease pressure on your bladder and help you control your leakage.   Use a catheter as directed  to help empty your bladder. A catheter is a tiny, plastic tube that is put into your bladder to drain your urine.   Go to behavior therapy as directed.  Behavior therapy may be used to help you learn to control your urge to urinate.     © Copyright [employer] 2018 Information is for End User's use only and may not be sold, redistributed or otherwise used for commercial purposes. All illustrations and images included in CareNotes® are the copyrighted property of [employer] or [employer]

## 2025-04-07 NOTE — ASSESSMENT & PLAN NOTE
Lab Results   Component Value Date    EGFR 50 03/06/2025    EGFR 46 03/05/2025    EGFR 58 03/04/2025    CREATININE 1.01 03/06/2025    CREATININE 1.08 03/05/2025    CREATININE 0.90 03/04/2025

## 2025-04-07 NOTE — ASSESSMENT & PLAN NOTE
Lab Results   Component Value Date    HGBA1C 9.5 (H) 09/01/2024             Gabapentin Counseling: I discussed with the patient the risks of gabapentin including but not limited to dizziness, somnolence, fatigue and ataxia.

## 2025-04-07 NOTE — ASSESSMENT & PLAN NOTE
Patient is stable with current meds and discussed a low carb diet. Pt  recommended to see eye doctor and foot doctor for routine screening as per protocol. Recheck A1C  and Cr in 3 months. Patient questions answered today about this condtion.   Lab Results   Component Value Date    HGBA1C 9.5 (H) 09/01/2024

## 2025-04-07 NOTE — PROGRESS NOTES
name: Gia Vaughan      : 1940      MRN: [id number]  Encounter Provider: Andrew Pa MD  Encounter Date: 3/27/2025   Encounter department: [facility]  :  Assessment & Plan  Ambulatory dysfunction         Moderate early onset Alzheimer's dementia, unspecified whether behavioral, psychotic, or mood disturbance or anxiety (HCC)         Stage 3b chronic kidney disease (HCC)  Lab Results   Component Value Date    EGFR 50 2025    EGFR 46 2025    EGFR 58 2025    CREATININE 1.01 2025    CREATININE 1.08 2025    CREATININE 0.90 2025            Primary hypertension         Type 2 diabetes mellitus with stage 3 chronic kidney disease, unspecified whether long term insulin use, unspecified whether stage 3a or 3b CKD (HCC)    Lab Results   Component Value Date    HGBA1C 9.5 (H) 2024            CLL (chronic lymphocytic leukemia) ([facility]ly [facility])         Medicare annual wellness visit, subsequent                History of Present Illness   This is an 84-year-old female seen and examined today in assisted living facility.  Patient is just back from the skilled nursing facility for being hospitalized for her CLL.  Patient is open other multimedical problems includes type 2 diabetes chronic kidney disease stage IIIa hypertension mild dementia hyperlipidemia gait dysfunction and she is stable.  Patient was hospitalized after having a fall she can doing came to the nursing home for rehabilitation and is now back and this is living facility and will need to continue with physical therapy.  I have reviewed her medications and renewed them for her.  Patient is tolerating her diet well and is having no problems toileting herself.  She also had her Medicare wellness done today and I will complete that for her.  Patient is in good spirits.      Review of Systems   Constitutional:  Negative for activity change, appetite change, fatigue and fever.   HENT:  Negative for  congestion, ear pain, postnasal drip, rhinorrhea, sinus pressure, sinus pain, sneezing and sore throat.    Eyes:  Negative for pain and redness.   Respiratory:  Negative for apnea, cough, chest tightness, shortness of breath and wheezing.    Cardiovascular:  Negative for chest pain, palpitations and leg swelling.   Gastrointestinal:  Negative for abdominal pain, constipation, diarrhea, nausea and vomiting.   Endocrine: Negative for cold intolerance and heat intolerance.   Genitourinary:  Negative for difficulty urinating, dysuria, frequency, hematuria and urgency.   Musculoskeletal:  Positive for gait problem. Negative for arthralgias, back pain and myalgias.   Skin:  Negative for rash.   Neurological:  Positive for weakness. Negative for dizziness, speech difficulty, numbness and headaches.   Hematological:  Does not bruise/bleed easily.   Psychiatric/Behavioral:  Positive for confusion. Negative for agitation and hallucinations.        Objective   LMP  (LMP Unknown)      Physical Exam  Constitutional:       Appearance: Normal appearance. She is not ill-appearing.   HENT:      Head: Normocephalic and atraumatic.      Right Ear: Tympanic membrane normal.      Left Ear: Tympanic membrane normal.      Nose: Nose normal.      Mouth/Throat:      Mouth: Mucous membranes are moist.   Eyes:      Extraocular Movements: Extraocular movements intact.      Conjunctiva/sclera: Conjunctivae normal.      Pupils: Pupils are equal, round, and reactive to light.   Cardiovascular:      Rate and Rhythm: Normal rate and regular rhythm.   Pulmonary:      Effort: Pulmonary effort is normal. No respiratory distress.      Breath sounds: Normal breath sounds. No wheezing.   Abdominal:      General: Bowel sounds are normal.      Palpations: Abdomen is soft.      Tenderness: There is no abdominal tenderness.   Musculoskeletal:         General: No tenderness. Normal range of motion.      Cervical back: Normal range of motion and neck supple.       Right lower leg: No edema.      Left lower leg: No edema.   Skin:     General: Skin is warm and dry.   Neurological:      Mental Status: She is alert and oriented to person, place, and time.      Motor: Weakness present.      Gait: Gait abnormal.   Psychiatric:         Mood and Affect: Mood normal.         Behavior: Behavior normal.

## 2025-04-24 ENCOUNTER — NURSING HOME VISIT (OUTPATIENT)
Dept: FAMILY MEDICINE CLINIC | Facility: CLINIC | Age: 85
End: 2025-04-24
Payer: MEDICARE

## 2025-04-24 DIAGNOSIS — R41.0 CONFUSION: ICD-10-CM

## 2025-04-24 DIAGNOSIS — R44.3 HALLUCINATIONS: Primary | ICD-10-CM

## 2025-04-24 PROCEDURE — 99348 HOME/RES VST EST LOW MDM 30: CPT | Performed by: FAMILY MEDICINE

## 2025-04-30 NOTE — PROGRESS NOTES
Name: Gia Vaughan      : 1940      MRN: [id number]  Encounter Provider: Andrew Pa MD  Encounter Date: 2025   Encounter department: [facility]  :  Assessment & Plan  Hallucinations    Orders:  •  UA w Reflex to Microscopic w Reflex to Culture; Future    Confusion    Orders:  •  UA w Reflex to Microscopic w Reflex to Culture; Future           History of Present Illness   84-year-old female with multimedical problems who has been having some issues with some more confusion and some visual hallucinations recently.  Patient is not agitated does not have any fear or acting out but she is seeing things that are not there.  She denies any urinary symptoms but we will see about getting a urinalysis and a culture to look for any kind of urinary infection that may be precipitating this event.  If this is negative we may want to consider some mood stabilizers to help with her hallucinations.  Will reassess her at my next visit at the facility.      Review of Systems   Constitutional:  Negative for activity change, appetite change, fatigue and fever.   HENT:  Negative for congestion, ear pain, postnasal drip, rhinorrhea, sinus pressure, sinus pain, sneezing and sore throat.    Eyes:  Negative for pain and redness.   Respiratory:  Negative for apnea, cough, chest tightness, shortness of breath and wheezing.    Cardiovascular:  Negative for chest pain, palpitations and leg swelling.   Gastrointestinal:  Negative for abdominal pain, constipation, diarrhea, nausea and vomiting.   Endocrine: Negative for cold intolerance and heat intolerance.   Genitourinary:  Negative for difficulty urinating, dysuria, frequency, hematuria and urgency.   Musculoskeletal:  Negative for arthralgias, back pain, gait problem and myalgias.   Skin:  Negative for rash.   Neurological:  Negative for dizziness, speech difficulty, weakness, numbness and headaches.   Hematological:  Does not bruise/bleed  easily.   Psychiatric/Behavioral:  Positive for confusion and hallucinations. Negative for agitation, behavioral problems, self-injury, sleep disturbance and suicidal ideas. The patient is not nervous/anxious and is not hyperactive.        Objective   LMP  (LMP Unknown)      Physical Exam  Constitutional:       Appearance: Normal appearance. She is not ill-appearing.   HENT:      Head: Normocephalic and atraumatic.      Right Ear: Tympanic membrane normal.      Left Ear: Tympanic membrane normal.      Nose: Nose normal.      Mouth/Throat:      Mouth: Mucous membranes are moist.   Eyes:      Extraocular Movements: Extraocular movements intact.      Conjunctiva/sclera: Conjunctivae normal.      Pupils: Pupils are equal, round, and reactive to light.   Cardiovascular:      Rate and Rhythm: Normal rate and regular rhythm.   Pulmonary:      Effort: Pulmonary effort is normal. No respiratory distress.      Breath sounds: Normal breath sounds. No wheezing.   Abdominal:      General: Bowel sounds are normal.      Palpations: Abdomen is soft.      Tenderness: There is no abdominal tenderness.   Musculoskeletal:         General: No tenderness. Normal range of motion.      Cervical back: Normal range of motion and neck supple.      Right lower leg: No edema.      Left lower leg: No edema.   Skin:     General: Skin is warm and dry.   Neurological:      Mental Status: She is alert. She is disoriented.      Gait: Gait abnormal.

## 2025-05-01 DIAGNOSIS — I50.21 ACUTE HEART FAILURE WITH MILDLY REDUCED EJECTION FRACTION (HFMREF, 41-49%) (HCC): Primary | ICD-10-CM

## 2025-05-01 DIAGNOSIS — I50.23 ACUTE ON CHRONIC SYSTOLIC HEART FAILURE (HCC): ICD-10-CM

## 2025-05-02 RX ORDER — METOPROLOL TARTRATE 25 MG/1
TABLET, FILM COATED ORAL
Qty: 60 TABLET | Refills: 5 | Status: SHIPPED | OUTPATIENT
Start: 2025-05-02

## 2025-05-05 DIAGNOSIS — R05.3 CHRONIC COUGH: ICD-10-CM

## 2025-05-06 RX ORDER — ISOPROPYL ALCOHOL 0.75 G/1
SWAB TOPICAL
Qty: 100 EACH | Refills: 3 | Status: SHIPPED | OUTPATIENT
Start: 2025-05-06

## 2025-05-07 DIAGNOSIS — E11.9 TYPE 2 DIABETES MELLITUS WITHOUT COMPLICATION, WITHOUT LONG-TERM CURRENT USE OF INSULIN (HCC): Primary | ICD-10-CM

## 2025-05-08 RX ORDER — HUMAN INSULIN 100 [IU]/ML
INJECTION, SUSPENSION SUBCUTANEOUS
Qty: 15 ML | Refills: 2 | Status: SHIPPED | OUTPATIENT
Start: 2025-05-08 | End: 2025-05-17

## 2025-05-09 DIAGNOSIS — D50.9 IRON DEFICIENCY ANEMIA, UNSPECIFIED IRON DEFICIENCY ANEMIA TYPE: ICD-10-CM

## 2025-05-09 RX ORDER — FERROUS SULFATE 325(65) MG
TABLET ORAL
Qty: 60 TABLET | Refills: 11 | Status: SHIPPED | OUTPATIENT
Start: 2025-05-09

## 2025-05-11 ENCOUNTER — APPOINTMENT (EMERGENCY)
Dept: RADIOLOGY | Facility: HOSPITAL | Age: 85
DRG: 291 | End: 2025-05-11
Payer: MEDICARE

## 2025-05-11 ENCOUNTER — HOSPITAL ENCOUNTER (INPATIENT)
Facility: HOSPITAL | Age: 85
LOS: 6 days | Discharge: NURSING, REHAB OR LONG-TERM CARE | DRG: 291 | End: 2025-05-17
Attending: EMERGENCY MEDICINE | Admitting: INTERNAL MEDICINE
Payer: MEDICARE

## 2025-05-11 ENCOUNTER — APPOINTMENT (EMERGENCY)
Dept: CT IMAGING | Facility: HOSPITAL | Age: 85
DRG: 291 | End: 2025-05-11
Payer: MEDICARE

## 2025-05-11 DIAGNOSIS — R09.02 HYPOXIA: ICD-10-CM

## 2025-05-11 DIAGNOSIS — S42.472A: ICD-10-CM

## 2025-05-11 DIAGNOSIS — C91.10 CLL (CHRONIC LYMPHOCYTIC LEUKEMIA) (HCC): ICD-10-CM

## 2025-05-11 DIAGNOSIS — S72.143A INTERTROCHANTERIC FRACTURE (HCC): ICD-10-CM

## 2025-05-11 DIAGNOSIS — I50.9 CONGESTIVE HEART FAILURE (CHF) (HCC): Primary | ICD-10-CM

## 2025-05-11 DIAGNOSIS — I50.23 ACUTE ON CHRONIC SYSTOLIC HEART FAILURE (HCC): ICD-10-CM

## 2025-05-11 DIAGNOSIS — Z79.4 TYPE 2 DIABETES MELLITUS WITH STAGE 3A CHRONIC KIDNEY DISEASE, WITH LONG-TERM CURRENT USE OF INSULIN (HCC): ICD-10-CM

## 2025-05-11 DIAGNOSIS — S42.402A ELBOW FRACTURE, LEFT, CLOSED, INITIAL ENCOUNTER: ICD-10-CM

## 2025-05-11 DIAGNOSIS — E11.9 TYPE 2 DIABETES MELLITUS WITHOUT COMPLICATION, WITHOUT LONG-TERM CURRENT USE OF INSULIN (HCC): ICD-10-CM

## 2025-05-11 DIAGNOSIS — J81.1 PULMONARY EDEMA: ICD-10-CM

## 2025-05-11 DIAGNOSIS — N18.31 TYPE 2 DIABETES MELLITUS WITH STAGE 3A CHRONIC KIDNEY DISEASE, WITH LONG-TERM CURRENT USE OF INSULIN (HCC): ICD-10-CM

## 2025-05-11 DIAGNOSIS — E11.22 TYPE 2 DIABETES MELLITUS WITH STAGE 3A CHRONIC KIDNEY DISEASE, WITH LONG-TERM CURRENT USE OF INSULIN (HCC): ICD-10-CM

## 2025-05-11 PROBLEM — J96.00 ACUTE RESPIRATORY FAILURE, UNSP W HYPOXIA OR HYPERCAPNIA (HCC): Status: ACTIVE | Noted: 2025-05-11

## 2025-05-11 LAB
2HR DELTA HS TROPONIN: 8 NG/L
4HR DELTA HS TROPONIN: 16 NG/L
ALBUMIN SERPL BCG-MCNC: 3.9 G/DL (ref 3.5–5)
ALP SERPL-CCNC: 84 U/L (ref 34–104)
ALT SERPL W P-5'-P-CCNC: 23 U/L (ref 7–52)
ANION GAP SERPL CALCULATED.3IONS-SCNC: 10 MMOL/L (ref 4–13)
ANISOCYTOSIS BLD QL SMEAR: PRESENT
APTT PPP: 29 SECONDS (ref 23–34)
AST SERPL W P-5'-P-CCNC: 21 U/L (ref 13–39)
ATRIAL RATE: 208 BPM
BACTERIA UR QL AUTO: NORMAL /HPF
BASE EX.OXY STD BLDV CALC-SCNC: 94.6 % (ref 60–80)
BASE EXCESS BLDV CALC-SCNC: -3 MMOL/L
BASOPHILS # BLD MANUAL: 0 THOUSAND/UL (ref 0–0.1)
BASOPHILS NFR MAR MANUAL: 0 % (ref 0–1)
BILIRUB SERPL-MCNC: 2.48 MG/DL (ref 0.2–1)
BILIRUB UR QL STRIP: NEGATIVE
BNP SERPL-MCNC: 2982 PG/ML (ref 0–100)
BUN SERPL-MCNC: 22 MG/DL (ref 5–25)
CALCIUM SERPL-MCNC: 9.3 MG/DL (ref 8.4–10.2)
CARDIAC TROPONIN I PNL SERPL HS: 24 NG/L (ref ?–50)
CARDIAC TROPONIN I PNL SERPL HS: 32 NG/L (ref ?–50)
CARDIAC TROPONIN I PNL SERPL HS: 40 NG/L (ref ?–50)
CHLORIDE SERPL-SCNC: 107 MMOL/L (ref 96–108)
CK SERPL-CCNC: 85 U/L (ref 26–192)
CLARITY UR: CLEAR
CO2 SERPL-SCNC: 22 MMOL/L (ref 21–32)
COLOR UR: ABNORMAL
CREAT SERPL-MCNC: 1 MG/DL (ref 0.6–1.3)
EOSINOPHIL # BLD MANUAL: 0 THOUSAND/UL (ref 0–0.4)
EOSINOPHIL NFR BLD MANUAL: 0 % (ref 0–6)
ERYTHROCYTE [DISTWIDTH] IN BLOOD BY AUTOMATED COUNT: 17.6 % (ref 11.6–15.1)
FLUAV AG UPPER RESP QL IA.RAPID: NEGATIVE
FLUBV AG UPPER RESP QL IA.RAPID: NEGATIVE
GFR SERPL CREATININE-BSD FRML MDRD: 51 ML/MIN/1.73SQ M
GIANT PLATELETS BLD QL SMEAR: PRESENT
GLUCOSE SERPL-MCNC: 120 MG/DL (ref 65–140)
GLUCOSE SERPL-MCNC: 128 MG/DL (ref 65–140)
GLUCOSE SERPL-MCNC: 138 MG/DL (ref 65–140)
GLUCOSE SERPL-MCNC: 255 MG/DL (ref 65–140)
GLUCOSE UR STRIP-MCNC: ABNORMAL MG/DL
HCO3 BLDV-SCNC: 21.1 MMOL/L (ref 24–30)
HCT VFR BLD AUTO: 32.4 % (ref 34.8–46.1)
HGB BLD-MCNC: 10.2 G/DL (ref 11.5–15.4)
HGB UR QL STRIP.AUTO: NEGATIVE
INR PPP: 1.22 (ref 0.85–1.19)
KETONES UR STRIP-MCNC: NEGATIVE MG/DL
LACTATE SERPL-SCNC: 1.4 MMOL/L (ref 0.5–2)
LACTATE SERPL-SCNC: 2.1 MMOL/L (ref 0.5–2)
LEUKOCYTE ESTERASE UR QL STRIP: NEGATIVE
LIPASE SERPL-CCNC: <6 U/L (ref 11–82)
LYMPHOCYTES # BLD AUTO: 48.41 THOUSAND/UL (ref 0.6–4.47)
LYMPHOCYTES # BLD AUTO: 85 % (ref 14–44)
MCH RBC QN AUTO: 35.7 PG (ref 26.8–34.3)
MCHC RBC AUTO-ENTMCNC: 31.5 G/DL (ref 31.4–37.4)
MCV RBC AUTO: 113 FL (ref 82–98)
MONOCYTES # BLD AUTO: 0.56 THOUSAND/UL (ref 0–1.22)
MONOCYTES NFR BLD: 1 % (ref 4–12)
NEUTROPHILS # BLD MANUAL: 6.68 THOUSAND/UL (ref 1.85–7.62)
NEUTS SEG NFR BLD AUTO: 12 % (ref 43–75)
NITRITE UR QL STRIP: NEGATIVE
NON-SQ EPI CELLS URNS QL MICRO: NORMAL /HPF
O2 CT BLDV-SCNC: 13.8 ML/DL
PCO2 BLDV: 34.2 MM HG (ref 42–50)
PH BLDV: 7.41 [PH] (ref 7.3–7.4)
PH UR STRIP.AUTO: 6 [PH]
PLATELET # BLD AUTO: 177 THOUSANDS/UL (ref 149–390)
PLATELET BLD QL SMEAR: ADEQUATE
PMV BLD AUTO: 12.9 FL (ref 8.9–12.7)
PO2 BLDV: 96 MM HG (ref 35–45)
POLYCHROMASIA BLD QL SMEAR: PRESENT
POTASSIUM SERPL-SCNC: 5 MMOL/L (ref 3.5–5.3)
PROCALCITONIN SERPL-MCNC: 0.22 NG/ML
PROT SERPL-MCNC: 6.3 G/DL (ref 6.4–8.4)
PROT UR STRIP-MCNC: ABNORMAL MG/DL
PROTHROMBIN TIME: 16.2 SECONDS (ref 12.3–15)
QRS AXIS: -22 DEGREES
QRSD INTERVAL: 90 MS
QT INTERVAL: 408 MS
QTC INTERVAL: 473 MS
RBC # BLD AUTO: 2.86 MILLION/UL (ref 3.81–5.12)
RBC #/AREA URNS AUTO: NORMAL /HPF
RBC MORPH BLD: PRESENT
SARS-COV+SARS-COV-2 AG RESP QL IA.RAPID: NEGATIVE
SMUDGE CELLS BLD QL SMEAR: PRESENT
SODIUM SERPL-SCNC: 139 MMOL/L (ref 135–147)
SP GR UR STRIP.AUTO: 1.02 (ref 1–1.03)
T WAVE AXIS: 100 DEGREES
UROBILINOGEN UR STRIP-ACNC: <2 MG/DL
VARIANT LYMPHS # BLD AUTO: 2 %
VENTRICULAR RATE: 81 BPM
WBC # BLD AUTO: 55.64 THOUSAND/UL (ref 4.31–10.16)
WBC #/AREA URNS AUTO: NORMAL /HPF

## 2025-05-11 PROCEDURE — 93010 ELECTROCARDIOGRAM REPORT: CPT | Performed by: STUDENT IN AN ORGANIZED HEALTH CARE EDUCATION/TRAINING PROGRAM

## 2025-05-11 PROCEDURE — 73564 X-RAY EXAM KNEE 4 OR MORE: CPT

## 2025-05-11 PROCEDURE — 85610 PROTHROMBIN TIME: CPT | Performed by: EMERGENCY MEDICINE

## 2025-05-11 PROCEDURE — 82948 REAGENT STRIP/BLOOD GLUCOSE: CPT

## 2025-05-11 PROCEDURE — 93005 ELECTROCARDIOGRAM TRACING: CPT

## 2025-05-11 PROCEDURE — 83605 ASSAY OF LACTIC ACID: CPT

## 2025-05-11 PROCEDURE — 87804 INFLUENZA ASSAY W/OPTIC: CPT

## 2025-05-11 PROCEDURE — 85730 THROMBOPLASTIN TIME PARTIAL: CPT | Performed by: EMERGENCY MEDICINE

## 2025-05-11 PROCEDURE — 96374 THER/PROPH/DIAG INJ IV PUSH: CPT

## 2025-05-11 PROCEDURE — 73502 X-RAY EXAM HIP UNI 2-3 VIEWS: CPT

## 2025-05-11 PROCEDURE — 83690 ASSAY OF LIPASE: CPT

## 2025-05-11 PROCEDURE — 84484 ASSAY OF TROPONIN QUANT: CPT | Performed by: EMERGENCY MEDICINE

## 2025-05-11 PROCEDURE — 85027 COMPLETE CBC AUTOMATED: CPT

## 2025-05-11 PROCEDURE — 72125 CT NECK SPINE W/O DYE: CPT

## 2025-05-11 PROCEDURE — 99291 CRITICAL CARE FIRST HOUR: CPT | Performed by: EMERGENCY MEDICINE

## 2025-05-11 PROCEDURE — 85007 BL SMEAR W/DIFF WBC COUNT: CPT

## 2025-05-11 PROCEDURE — 87811 SARS-COV-2 COVID19 W/OPTIC: CPT

## 2025-05-11 PROCEDURE — 82805 BLOOD GASES W/O2 SATURATION: CPT

## 2025-05-11 PROCEDURE — 36415 COLL VENOUS BLD VENIPUNCTURE: CPT | Performed by: EMERGENCY MEDICINE

## 2025-05-11 PROCEDURE — 80053 COMPREHEN METABOLIC PANEL: CPT

## 2025-05-11 PROCEDURE — 81001 URINALYSIS AUTO W/SCOPE: CPT

## 2025-05-11 PROCEDURE — 87040 BLOOD CULTURE FOR BACTERIA: CPT | Performed by: EMERGENCY MEDICINE

## 2025-05-11 PROCEDURE — 70450 CT HEAD/BRAIN W/O DYE: CPT

## 2025-05-11 PROCEDURE — 83880 ASSAY OF NATRIURETIC PEPTIDE: CPT | Performed by: EMERGENCY MEDICINE

## 2025-05-11 PROCEDURE — 83036 HEMOGLOBIN GLYCOSYLATED A1C: CPT

## 2025-05-11 PROCEDURE — 99285 EMERGENCY DEPT VISIT HI MDM: CPT

## 2025-05-11 PROCEDURE — 84145 PROCALCITONIN (PCT): CPT | Performed by: EMERGENCY MEDICINE

## 2025-05-11 PROCEDURE — 82550 ASSAY OF CK (CPK): CPT | Performed by: EMERGENCY MEDICINE

## 2025-05-11 PROCEDURE — 74176 CT ABD & PELVIS W/O CONTRAST: CPT

## 2025-05-11 PROCEDURE — 99223 1ST HOSP IP/OBS HIGH 75: CPT | Performed by: HOSPITALIST

## 2025-05-11 PROCEDURE — 71045 X-RAY EXAM CHEST 1 VIEW: CPT

## 2025-05-11 PROCEDURE — 87081 CULTURE SCREEN ONLY: CPT

## 2025-05-11 PROCEDURE — 71250 CT THORAX DX C-: CPT

## 2025-05-11 RX ORDER — FUROSEMIDE 10 MG/ML
40 INJECTION INTRAMUSCULAR; INTRAVENOUS 2 TIMES DAILY
Status: DISCONTINUED | OUTPATIENT
Start: 2025-05-11 | End: 2025-05-16

## 2025-05-11 RX ORDER — GUAIFENESIN/DEXTROMETHORPHAN 100-10MG/5
5 SYRUP ORAL 2 TIMES DAILY
Status: DISCONTINUED | OUTPATIENT
Start: 2025-05-11 | End: 2025-05-17 | Stop reason: HOSPADM

## 2025-05-11 RX ORDER — MAGNESIUM HYDROXIDE/ALUMINUM HYDROXICE/SIMETHICONE 120; 1200; 1200 MG/30ML; MG/30ML; MG/30ML
30 SUSPENSION ORAL EVERY 6 HOURS PRN
Status: DISCONTINUED | OUTPATIENT
Start: 2025-05-11 | End: 2025-05-17 | Stop reason: HOSPADM

## 2025-05-11 RX ORDER — INSULIN LISPRO 100 [IU]/ML
5 INJECTION, SOLUTION INTRAVENOUS; SUBCUTANEOUS
Status: DISCONTINUED | OUTPATIENT
Start: 2025-05-11 | End: 2025-05-17 | Stop reason: HOSPADM

## 2025-05-11 RX ORDER — ACETAMINOPHEN 325 MG/1
650 TABLET ORAL EVERY 6 HOURS PRN
Status: DISCONTINUED | OUTPATIENT
Start: 2025-05-11 | End: 2025-05-12

## 2025-05-11 RX ORDER — FLUTICASONE PROPIONATE 50 MCG
1 SPRAY, SUSPENSION (ML) NASAL DAILY PRN
Status: DISCONTINUED | OUTPATIENT
Start: 2025-05-11 | End: 2025-05-17 | Stop reason: HOSPADM

## 2025-05-11 RX ORDER — ENOXAPARIN SODIUM 100 MG/ML
40 INJECTION SUBCUTANEOUS DAILY
Status: DISCONTINUED | OUTPATIENT
Start: 2025-05-11 | End: 2025-05-12

## 2025-05-11 RX ORDER — LOSARTAN POTASSIUM 25 MG/1
25 TABLET ORAL DAILY
Status: DISCONTINUED | OUTPATIENT
Start: 2025-05-11 | End: 2025-05-17 | Stop reason: HOSPADM

## 2025-05-11 RX ORDER — CALCIUM CARBONATE 500 MG/1
500 TABLET, CHEWABLE ORAL 2 TIMES DAILY PRN
Status: DISCONTINUED | OUTPATIENT
Start: 2025-05-11 | End: 2025-05-17 | Stop reason: HOSPADM

## 2025-05-11 RX ORDER — INSULIN GLARGINE 100 [IU]/ML
20 INJECTION, SOLUTION SUBCUTANEOUS
Status: DISCONTINUED | OUTPATIENT
Start: 2025-05-11 | End: 2025-05-17 | Stop reason: HOSPADM

## 2025-05-11 RX ORDER — LANOLIN ALCOHOL/MO/W.PET/CERES
1 CREAM (GRAM) TOPICAL 2 TIMES DAILY WITH MEALS
Status: DISCONTINUED | OUTPATIENT
Start: 2025-05-11 | End: 2025-05-17 | Stop reason: HOSPADM

## 2025-05-11 RX ORDER — GUAIFENESIN 600 MG/1
600 TABLET, EXTENDED RELEASE ORAL 2 TIMES DAILY PRN
Status: DISCONTINUED | OUTPATIENT
Start: 2025-05-11 | End: 2025-05-17 | Stop reason: HOSPADM

## 2025-05-11 RX ORDER — INSULIN LISPRO 100 [IU]/ML
1-5 INJECTION, SOLUTION INTRAVENOUS; SUBCUTANEOUS
Status: DISCONTINUED | OUTPATIENT
Start: 2025-05-11 | End: 2025-05-17 | Stop reason: HOSPADM

## 2025-05-11 RX ORDER — FUROSEMIDE 10 MG/ML
20 INJECTION INTRAMUSCULAR; INTRAVENOUS ONCE
Status: COMPLETED | OUTPATIENT
Start: 2025-05-11 | End: 2025-05-11

## 2025-05-11 RX ORDER — FERROUS SULFATE 325(65) MG
325 TABLET ORAL 2 TIMES DAILY WITH MEALS
Status: DISCONTINUED | OUTPATIENT
Start: 2025-05-11 | End: 2025-05-17 | Stop reason: HOSPADM

## 2025-05-11 RX ORDER — ATORVASTATIN CALCIUM 20 MG/1
20 TABLET, FILM COATED ORAL DAILY
Status: DISCONTINUED | OUTPATIENT
Start: 2025-05-11 | End: 2025-05-17 | Stop reason: HOSPADM

## 2025-05-11 RX ORDER — DOCUSATE SODIUM 100 MG/1
100 CAPSULE, LIQUID FILLED ORAL 2 TIMES DAILY PRN
Status: DISCONTINUED | OUTPATIENT
Start: 2025-05-11 | End: 2025-05-17 | Stop reason: HOSPADM

## 2025-05-11 RX ORDER — METOPROLOL SUCCINATE 25 MG/1
25 TABLET, EXTENDED RELEASE ORAL EVERY 12 HOURS SCHEDULED
Status: DISCONTINUED | OUTPATIENT
Start: 2025-05-11 | End: 2025-05-17 | Stop reason: HOSPADM

## 2025-05-11 RX ADMIN — METOPROLOL SUCCINATE 25 MG: 25 TABLET, EXTENDED RELEASE ORAL at 17:57

## 2025-05-11 RX ADMIN — INSULIN LISPRO 2 UNITS: 100 INJECTION, SOLUTION INTRAVENOUS; SUBCUTANEOUS at 22:26

## 2025-05-11 RX ADMIN — INSULIN GLARGINE 20 UNITS: 100 INJECTION, SOLUTION SUBCUTANEOUS at 22:26

## 2025-05-11 RX ADMIN — ATORVASTATIN CALCIUM 20 MG: 20 TABLET, FILM COATED ORAL at 17:56

## 2025-05-11 RX ADMIN — LOSARTAN POTASSIUM 25 MG: 25 TABLET, FILM COATED ORAL at 17:57

## 2025-05-11 RX ADMIN — ENOXAPARIN SODIUM 40 MG: 40 INJECTION SUBCUTANEOUS at 15:08

## 2025-05-11 RX ADMIN — FUROSEMIDE 20 MG: 10 INJECTION, SOLUTION INTRAVENOUS at 07:56

## 2025-05-11 RX ADMIN — FUROSEMIDE 40 MG: 10 INJECTION, SOLUTION INTRAMUSCULAR; INTRAVENOUS at 17:54

## 2025-05-11 RX ADMIN — DICLOFENAC SODIUM 2 G: 10 GEL TOPICAL at 22:36

## 2025-05-11 NOTE — ASSESSMENT & PLAN NOTE
Need GOC w/ nephew who is POA    PLAN:  GOC  If they do not want to pursue treatment will hold off on consult Heme/Onc  Last note over 1 year ago.

## 2025-05-11 NOTE — ED NOTES
Family at bedside updated as to patient condition and plan of care.     Xiomara Cordero RN  05/11/25 [id number]

## 2025-05-11 NOTE — ASSESSMENT & PLAN NOTE
Lab Results   Component Value Date    HGBA1C 9.5 (H) 09/01/2024       Recent Labs     05/11/25  0727   POCGLU 138       Blood Sugar Average: Last 72 hrs:  (P) 138  Recent Labs     05/11/25  0624 05/11/25  0727   POCGLU  --  138   GLUC 128  --        Hold home regimen while inpatient and resume on discharge   Diabetic diet  Insulin regimen  Humalog 5 units TID AC  Lantus 20 units HS  Glucose checks and Insulin correction ACHS  Goal -180 while admitted, adjusting insulin regimen as appropriate  Monitor for hypoglycemia and treat per protocol  Consider SGLT2 inhibitor in setting of HFpEF

## 2025-05-11 NOTE — ED NOTES
Pure wick placed after straight cath to monitor I/O  of incontinent patient     Xiomara Cordero RN  05/11/25 [id number]

## 2025-05-11 NOTE — ASSESSMENT & PLAN NOTE
Lab Results   Component Value Date    EGFR 51 05/11/2025    EGFR 50 03/06/2025    EGFR 46 03/05/2025    CREATININE 1.00 05/11/2025    CREATININE 1.01 03/06/2025    CREATININE 1.08 03/05/2025

## 2025-05-11 NOTE — ED ATTENDING ATTESTATION
5/11/2025  I, Torsten Pimentel MD, saw and evaluated the patient. I have discussed the patient with the resident/non-physician practitioner and agree with the resident's/non-physician practitioner's findings, Plan of Care, and MDM as documented in the resident's/non-physician practitioner's note, except where noted. All available labs and Radiology studies were reviewed.  I was present for key portions of any procedure(s) performed by the resident/non-physician practitioner and I was immediately available to provide assistance.       At this point I agree with the current assessment done in the Emergency Department.  I have conducted an independent evaluation of this patient a history and physical is as follows: Patient is a 84 year old female who reportedly fell out of bed today at nursing facility as per EMS. Patient has dementia and cannot provide accurate hx. Patient needed our urgent attention. Was last seen at  Cardiology in [place] on 4/2/25 for acute heart failure. [facility] website checked on this patient and no Rx found. NCAT. No scleral icterus. Mucous membranes somewhat moist. Bibasilar rales and diminished breath sounds bilaterally. Heart regular. Abdomen soft and nontender. Good bowel sounds. No edema. No rash noted. No deformities of extremities noted. No gross focal deficits. DDx including but not limited to: intracranial injury, concussion, cervical injury, intrathoracic injury, intraabdominal injury, extremity injury--fracture, dislocation, strain, sprain, contusion.  DDX including but not limited to: pneumonia, pleural effusion, CHF, SCAPE, doubt PE; PTX, ACS, MI, asthma exacerbation, COPD exacerbation, COVID 19, anemia, metabolic abnormality, renal failure. Will check EKG, labs, x-rays and CT results. Will need admission.     ED Course   Nasal cannula oxygen given with improvement in low pulse ox.       Critical Care Time  Procedures  Critical Care Time Statement: Upon my evaluation, this  patient had a high probability of imminent or life-threatening deterioration due to hypoxia, CHF, which required my direct attention, intervention, and personal management.  I spent a total of 35 minutes directly providing critical care services, including interpretation of complex medical databases, evaluating for the presence of life-threatening injuries or illnesses, management of organ system failure(s) , complex medical decision making (to support/prevent further life-threatening deterioration)., and supplemental oxygen and IV lasix . This time is exclusive of procedures, teaching, treating other patients, family meetings, and any prior time recorded by providers other than myself.

## 2025-05-11 NOTE — H&P
H&P - Hospitalist   Name: Gia Vaughan 84 y.o. female I MRN: [id number]  Unit/Bed#: ED-22 I Date of Admission: 5/11/2025   Date of Service: 5/11/2025 I Hospital Day: 0     Assessment & Plan  Acute on chronic systolic heart failure (HCC)  Lab Results   Component Value Date    LVEF 45 04/03/2024    BNP 2,982 (H) 05/11/2025    BNP 1,246 (H) 03/03/2025     05/10/25  62.8kg   Wt Readings from Last 10 Encounters:   04/02/25 62.8 kg (138 lb 8 oz)   03/06/25 60.3 kg (132 lb 15 oz)   10/17/24 63.3 kg (139 lb 8.8 oz)   09/06/24 68.5 kg (151 lb 0.2 oz)   05/31/24 68 kg (150 lb)   04/04/24 68.4 kg (150 lb 12.8 oz)   01/29/24 70.8 kg (156 lb)   04/24/23 68 kg (150 lb)   11/15/22 66.7 kg (147 lb)   11/11/22 66.7 kg (147 lb)     Results for orders placed during the hospital encounter of 04/01/24    Echo complete w/ contrast if indicated    Interpretation Summary    Left Ventricle: Left ventricular cavity size is upper normal. Wall thickness is mildly increased. The left ventricular ejection fraction is 45%. Systolic function is mildly reduced. Diastolic function is moderately abnormal, consistent with grade II (pseudonormal) relaxation.    The following segments are akinetic: basal inferoseptal, basal inferior and mid inferior.    IVS: There is abnormal septal motion.    Left Atrium: The atrium is mildly dilated.    Atrial Septum: The septum bows into the right atrium, suggesting increased left atrial pressure.    Aortic Valve: There is aortic valve sclerosis.    Mitral Valve: There is mild annular calcification.    Tricuspid Valve: There is mild regurgitation.    Presents with increased shortness of breath and lower extremity edema  NYHA Class III.,   Patient is currently volume overloaded.    Pharmacotherapies / Neurohormonal Blockade:  --Beta Blocker: Toprol XL 25 BID  --ARNi / ACEi / ARB: Losartan 25 QD  --Aldosterone Antagonist:  --SGLT2 Inhibitor: NO--H/O UTI's   --Diuretic: Lasix 20 mg QD    NOT on oxygen at  home  Plan:  Ween off O2.   ECHO: Repeat ECHO as last was over 1 yr old  Lasix 40mg IV BID  Sodium restriction 2g  Fluid restriction <2L/day  CMP, magnesium tomorrow a.m; Goal Mg > 2 and K > 4; Replete prn  HOB > 30°, Daily standing weights, Measure I/O  Monitor on Tele  Consult nutrition for dietary education  OUT PATINET  Sodium restriction <2g/day  Fluid restriction <2L / Day  Exercise 30mins  5 days /week   Alcohol: <2 drinks per day in men and <1 in women   Evaluate for ROBERTO if sxs present  GOALS OF CARE              CLL (chronic lymphocytic leukemia) (HCC)  Need GOC w/ nephew who is POA    PLAN:  GOC  If they do not want to pursue treatment will hold off on consult Heme/Onc  Last note over 1 year ago.   Type 2 diabetes mellitus with stage 3 chronic kidney disease, unspecified whether long term insulin use, unspecified whether stage 3a or 3b CKD (HCC)  Lab Results   Component Value Date    HGBA1C 9.5 (H) 09/01/2024       Recent Labs     05/11/25  0727   POCGLU 138       Blood Sugar Average: Last 72 hrs:  (P) 138  Recent Labs     05/11/25  0624 05/11/25  0727   POCGLU  --  138   GLUC 128  --        Hold home regimen while inpatient and resume on discharge   Diabetic diet  Insulin regimen  Humalog 5 units TID AC  Lantus 20 units HS  Glucose checks and Insulin correction ACHS  Goal -180 while admitted, adjusting insulin regimen as appropriate  Monitor for hypoglycemia and treat per protocol  Consider SGLT2 inhibitor in setting of HFpEF    Stage 3b chronic kidney disease (HCC)  Lab Results   Component Value Date    EGFR 51 05/11/2025    EGFR 50 03/06/2025    EGFR 46 03/05/2025    CREATININE 1.00 05/11/2025    CREATININE 1.01 03/06/2025    CREATININE 1.08 03/05/2025     Moderate dementia (HCC)  Urinary retention protocol  Delirium precautions    Ambulatory dysfunction  Fall precautions  PT OT Eval and treat  Primary hypertension  Present on admission    PLAN:  Continue home medications.     OA  (osteoarthritis)  Present on admission    PLAN:  Continue home medications.     HLD (hyperlipidemia)  Present on admission    PLAN:  Continue home medications.         VTE Pharmacologic Prophylaxis: VTE Score: 6 High Risk (Score >/= 5) - Pharmacological DVT Prophylaxis Ordered: enoxaparin (Lovenox). Sequential Compression Devices Ordered.  Code Status: Prior Full   Discussion with family: Updated  (niece) at bedside.    Anticipated Length of Stay: Patient will be admitted on an inpatient basis with an anticipated length of stay of greater than 2 midnights secondary to Acute on chronic HFpEF.    History of Present Illness   Chief Complaint: Fall   HPI given by ED as pt was   Gia Vaughan is a 84 y.o. female with a PMH of HFpEF on no O2, CKD, IDDM, CLL (bl WBC 30k), Dementia who presents from [place] after an unwitnessed fall unsure HS, not on AC. Apparently AAOx0. Pt was cleared by trauma and admitted to the [facility] for acute on chronic HFpEF management w/ IV diuretics.     Review of Systems   Unable to perform ROS: Acuity of condition       Historical Information   Past Medical History:   Diagnosis Date    Acute encephalopathy 10/15/2024    Benign adenomatous polyp of large intestine     CHF (congestive heart failure) (HCC)     Diabetes mellitus (HCC)     Hyperlipemia     Hypertension     Osteoarthritis     TIA (transient ischemic attack)      Past Surgical History:   Procedure Laterality Date    APPENDECTOMY      CARPAL TUNNEL RELEASE Right     HYSTERECTOMY W/ SALPINGO-OOPHERECTOMY      NV OPTX FEM SHFT FX W/INSJ IMED IMPLT W/WO SCREW Right 10/12/2022    Procedure: INSERTION NAIL IM FEMUR ANTEGRADE (TROCHANTERIC);  Surgeon: Eric Isaacs DO;  Location: [facility];  Service: Orthopedics     Social History     Tobacco Use    Smoking status: Never    Smokeless tobacco: Never   Vaping Use    Vaping status: Never Used   Substance and Sexual Activity    Alcohol use: Not Currently     Comment: very  seldom    Drug use: Never    Sexual activity: Not on file     E-Cigarette/Vaping    E-Cigarette Use Never User      E-Cigarette/Vaping Substances    Nicotine No     THC No     CBD No     Flavoring No     Other No     Unknown No      Family History   Problem Relation Age of Onset    Heart disease Mother     Heart disease Father     Hypertension Father     Stomach cancer Sister     Ovarian cancer Sister     Hypertension Sister      Social History:  Marital Status:    Occupation: Retired  Patient Pre-hospital Living Situation: Assisted Living  Patient Pre-hospital Level of Mobility: walks with walker  Patient Pre-hospital Diet Restrictions: N/A    Meds/Allergies   I have reviewed home medications using recent Epic encounter.  Prior to Admission medications    Medication Sig Start Date End Date Taking? Authorizing Provider   acetaminophen (TYLENOL) 325 mg tablet Take 2 tablets (650 mg total) by mouth every 4 (four) hours as needed for mild pain 10/13/22   Pasha Putnam PA-C   Alcohol Swabs (B-D SINGLE USE SWABS REGULAR) PADS USE AS DIRECTED 5/6/25   Andrew Pa MD   atorvastatin (LIPITOR) 20 mg tablet Take 20 mg by mouth daily    Historical Provider, MD   Calcium Carb-Cholecalciferol (calcium carbonate-vitamin D) 500 mg-5 mcg tablet TAKE ONE TABLET BY MOUTH TWICE DAILY DX: SUPPLEMENT 3/31/25   Andrew Pa MD   calcium carbonate (OS-JUAN RAMON) 600 MG tablet Take 600 mg by mouth 2 (two) times a day with meals    Historical Provider, MD   carbamide peroxide (FT Earwax Removal) 6.5 % otic solution INSERT FOUR DROPS INTO BOTH EARS AT BEDTIME FOR 2 WEEKS DX: WAX BUILD UP 11/6/24   Andrew Pa MD   cholecalciferol (VITAMIN D3) 1,000 units tablet Take 1,000 Units by mouth daily    Historical Provider, MD   Continuous Blood Gluc Sensor (FreeStyle Дмитрий 2 Sensor) MISC apply 1 SENSOR to back OF UPPER ARM REMOVE AND REPLACE every 14 d...  (REFER TO PRESCRIPTION NOTES). 1/24/24   Historical Provider, MD    dextromethorphan-guaifenesin (MUCINEX DM)  MG per 12 hr tablet Take 1 tablet by mouth every 12 (twelve) hours 2/27/25   Andrew Pa MD   Diclofenac Sodium (VOLTAREN) 1 % Apply 2 g topically 3 (three) times a day To R knee 11/8/22   Ashley Depadua, MD   FeroSul 325 (65 Fe) MG tablet TAKE ONE TABLET BY MOUTH TWICE DAILY DX: SUPPLEMENT 5/9/25   Andrew Pa MD   fluticasone (FLONASE) 50 mcg/act nasal spray INSTILL ONE SPRAY INTO BOTH NOSTRILS DAILY FOR 1 WEEK AND THEN AS NEEDED DX: ALLERGIES 2/24/25   Andrew Pa MD   furosemide (LASIX) 20 mg tablet Take 1 tablet (20 mg total) by mouth daily 3/7/25   Kelly Alfaro MD   glucose blood test strip 1 each by Other route daily as needed Use as instructed    Historical Provider, MD   glucose monitoring kit (FREESTYLE) monitoring kit 1 each by Does not apply route as needed    Historical Provider, MD   guaiFENesin (FT Mucus Relief 12HR) 600 mg 12 hr tablet Take 1 tablet (600 mg total) by mouth 2 (two) times a day as needed for cough or congestion 3/6/25   Kelly Alfaro MD   insulin detemir (LEVEMIR) 100 units/mL subcutaneous injection Inject 20 Units under the skin daily at bedtime 11/8/22   Ashley Depadua, MD   Insulin Glargine Solostar (Lantus SoloStar) 100 UNIT/ML SOPN Inject 0.25 mL (25 Units total) under the skin daily at bedtime 3/31/25   Andrew Pa MD   insulin isophane-insulin regular (NovoLIN 70/30 FlexPen) 100 units/mL injection pen INJECT 12 UNITS UNDER THE SKIN PRIOR TO MEALS (DX: DIABETES) 5/8/25   Andrew Pa MD   insulin lispro (HumaLOG) 100 units/mL injection Inject 5 Units under the skin 3 (three) times a day with meals 11/8/22   Ashley Depadua, MD   losartan (COZAAR) 25 mg tablet Take 1 tablet (25 mg total) by mouth daily 3/7/25   Kelly Alfaro MD   magnesium Oxide (MAG-OX) 400 mg TABS Take 1 tablet (400 mg total) by mouth daily for 14 days 3/6/25 4/2/25  Kelly Alfaro MD   metoprolol succinate  (TOPROL-XL) 25 mg 24 hr tablet Take 1 tablet (25 mg total) by mouth every 12 (twelve) hours 3/6/25   Kelly Alfaro MD   metoprolol tartrate (LOPRESSOR) 25 mg tablet TAKE ONE TABLET BY MOUTH TWICE DAILY DX: HIGH BLOOD PRESSURE 5/2/25   BRINDA Simpson   potassium chloride (Klor-Con M20) 20 mEq tablet Take 1 tablet (20 mEq total) by mouth 2 (two) times a day 12/30/24   Andrew Pa MD   vitamin B-12 (VITAMIN B-12) 1,000 mcg tablet Take by mouth daily    Historical Provider, MD     Allergies   Allergen Reactions    Amlodipine     Ceftin [Cefuroxime]     Ciprofloxacin     Citalopram     Dye [Iodinated Contrast Media]     Glucophage [Metformin]     Januvia [Sitagliptin]     Neomycin-Polymyxin-Dexameth     Ondansetron     Prilosec [Omeprazole]     Vibramycin [Doxycycline]        Objective :  Temp:  [97.8 °F (36.6 °C)] 97.8 °F (36.6 °C)  HR:  [60-74] 60  BP: (120-131)/(56-59) 129/59  Resp:  [20] 20  SpO2:  [80 %-93 %] 93 %  O2 Device: Nasal cannula  Nasal Cannula O2 Flow Rate (L/min):  [2 L/min-6 L/min] 6 L/min    Physical Exam  Vitals and nursing note reviewed.   Constitutional:       General: She is not in acute distress.     Appearance: She is well-developed.   HENT:      Head: Normocephalic and atraumatic.   Eyes:      General: No scleral icterus.     Conjunctiva/sclera: Conjunctivae normal.   Cardiovascular:      Rate and Rhythm: Normal rate and regular rhythm.      Pulses: Normal pulses.      Heart sounds: No murmur heard.  Pulmonary:      Effort: Pulmonary effort is normal. No respiratory distress.   Abdominal:      Palpations: Abdomen is soft.      Tenderness: There is no abdominal tenderness. There is no guarding or rebound.   Musculoskeletal:         General: No swelling.      Cervical back: Neck supple.      Right lower leg: Edema present.      Left lower leg: Edema present.   Skin:     General: Skin is warm and dry.      Capillary Refill: Capillary refill takes less than 2 seconds.    Neurological:      Mental Status: She is alert. She is disoriented.   Psychiatric:         Mood and Affect: Mood normal.          Lines/Drains:            Lab Results: I have reviewed the following results:  Results from last 7 days   Lab Units 05/11/25  0624   WBC Thousand/uL 55.64*   HEMOGLOBIN g/dL 10.2*   HEMATOCRIT % 32.4*   PLATELETS Thousands/uL 177   LYMPHO PCT % 85*   MONO PCT % 1*   EOS PCT % 0     Results from last 7 days   Lab Units 05/11/25  0624   SODIUM mmol/L 139   POTASSIUM mmol/L 5.0   CHLORIDE mmol/L 107   CO2 mmol/L 22   BUN mg/dL 22   CREATININE mg/dL 1.00   ANION GAP mmol/L 10   CALCIUM mg/dL 9.3   ALBUMIN g/dL 3.9   TOTAL BILIRUBIN mg/dL 2.48*   ALK PHOS U/L 84   ALT U/L 23   AST U/L 21   GLUCOSE RANDOM mg/dL 128     Results from last 7 days   Lab Units 05/11/25  0636   INR  1.22*     Results from last 7 days   Lab Units 05/11/25  0727   POC GLUCOSE mg/dl 138     Lab Results   Component Value Date    HGBA1C 9.5 (H) 09/01/2024    HGBA1C 9.4 (H) 04/01/2024    HGBA1C 9.5 (H) 11/29/2023     Results from last 7 days   Lab Units 05/11/25  0839 05/11/25  0636 05/11/25  0624   LACTIC ACID mmol/L 1.4  --  2.1*   PROCALCITONIN ng/ml  --  0.22  --        Imaging Results Review: I reviewed radiology reports from this admission including: CT chest, CT abdomen/pelvis, CT head, CT C-spine, and xray(s).  Other Study Results Review: EKG was reviewed.     Administrative Statements       ** Please Note: This note has been constructed using a voice recognition system. **

## 2025-05-11 NOTE — ED PROVIDER NOTES
Time reflects when diagnosis was documented in both MDM as applicable and the Disposition within this note       Time User Action Codes Description Comment    5/11/2025  7:57 AM ColbyEmery horn Add [W19.XXXA] Fall, initial encounter     5/11/2025  7:57 AM ColbyEmery horn Add [I50.9] Congestive heart failure (CHF) (HCC)     5/11/2025  7:57 AM ColbyEmery horn Add [J81.1] Pulmonary edema     5/11/2025  7:57 AM ColbyEmery horn Modify [W19.XXXA] Fall, initial encounter     5/11/2025  7:57 AM ColbymEery horn Modify [I50.9] Congestive heart failure (CHF) (HCC)     5/11/2025  8:07 AM Torsten Pimentel Add [R09.02] Hypoxia     5/11/2025  8:07 AM Torsten Pimentel Remove [W19.XXXA] Fall, initial encounter           ED Disposition       ED Disposition   Admit    Condition   Stable    Date/Time   Sun May 11, 2025  7:57 AM    Comment   Case was discussed with SARAH and the patient's admission status was agreed to be Admission Status: inpatient status to the service of Dr. Monroe.               Assessment & Plan       Medical Decision Making  Gia Vaughan is a 84 y.o. female with a past medical history of dementia, right hip fracture, acute encephalopathy, hyperlipidemia, hypertension, diabetes being evaluated for fall.    Differential diagnoses include but not limited to: Fracture, pneumonia, urinary tract infection, sepsis, COVID/flu, ACS, electrolyte abnormality, cardiac arrhythmia, CHF exacerbation    Initial workup to include: CBC, CMP, lipase, BMP, blood culture, lactate, CK, procalcitonin, COVID/flu, troponin, EKG, CT chest abdomen pelvis, C-spine, head.  X-ray chest, left knee, left hip    See ED Course for data/imaging interpretation and further MDM.    Disposition: Lab work significant for BNP of 2900 with evidence of congestive heart failure with pulmonary edema and pleural effusions on CT chest.  No additional traumatic findings on imaging. Patient given 20 mg of Lasix in the ED.  Discussed case with Sarah who did agree to admission  for further treatment of acute on chronic CHF exacerbation.      Amount and/or Complexity of Data Reviewed  Labs: ordered. Decision-making details documented in ED Course.  Radiology: ordered and independent interpretation performed. Decision-making details documented in ED Course.    Risk  Prescription drug management.  Decision regarding hospitalization.        ED Course as of 05/11/25 0818   Sun May 11, 2025   0650 CBC and differential(!)  Significant leukocytosis elevated from baseline   0650 Hemoglobin(!): 10.2  Mild anemia, similar to baseline   0701 XR hip/pelv 2-3 vws left if performed  No acute osseous abnormalities noted per my interpretation   0701 XR knee 4+ vw left injury  No acute osseous abnormalities noted per my interpretation   0717 LACTIC ACID(!): 2.1  Elevated   0718 CT head without contrast  IMPRESSION:     No acute intracranial abnormality.                 Workstation performed: [id number]     0719 XR chest 1 view portable  Diffuse opacities in right lower lobe, concern for possible pneumonia. Worse from previous per my interpretation   0719 hs TnI 0hr: 24  Elevated at baseline, plan to trend at 2 hours   0719 FLU/COVID Rapid Antigen (30 min. TAT) - Preferred screening test in ED  Negative COVID/flu   0719 BNP(!): 2,982  Significant elevation from baseline   0720 Total CK: 85  Within normal limits   0720 Procalcitonin: 0.22  Within normal limits   0726 CT spine cervical without contrast  IMPRESSION:     No cervical spine fracture or traumatic malalignment.                    Workstation performed: [id number]     0736 LIPASE(!): <6  WNL, low suspicion for acute pancreatitis at this time   0749 CT chest abdomen pelvis wo contrast  IMPRESSION:     1.  Evidence of congestive heart failure with pulmonary edema and pleural effusions.     2.  Widespread lymphadenopathy in the chest, abdomen and pelvis, unchanged from the CT dated 3/3/2025.     3.  No evidence of acute traumatic injury in the chest,  abdomen, pelvis or included portions of the skeleton.              Workstation performed: [id number]         Medications   furosemide (LASIX) injection 20 mg (20 mg Intravenous Given 5/11/25 0756)       ED Risk Strat Scores   HEART Risk Score      Flowsheet Row Most Recent Value   Heart Score Risk Calculator    History 0 Filed at: 05/11/2025 0812   ECG 0 Filed at: 05/11/2025 0812   Age 2 Filed at: 05/11/2025 0812   Risk Factors 1 Filed at: 05/11/2025 0812   Troponin 1 Filed at: 05/11/2025 0812   HEART Score 4 Filed at: 05/11/2025 0812          HEART Risk Score      Flowsheet Row Most Recent Value   Heart Score Risk Calculator    History 0 Filed at: 05/11/2025 0812   ECG 0 Filed at: 05/11/2025 0812   Age 2 Filed at: 05/11/2025 0812   Risk Factors 1 Filed at: 05/11/2025 0812   Troponin 1 Filed at: 05/11/2025 0812   HEART Score 4 Filed at: 05/11/2025 0812                      No data recorded                            History of Present Illness       Chief Complaint   Patient presents with    Fall     Pt presents to the ED s/p fall unwitnessed from [facility]. Staff reports increasing weakness over the last 2 days       Past Medical History:   Diagnosis Date    Acute encephalopathy 10/15/2024    Benign adenomatous polyp of large intestine     CHF (congestive heart failure) (HCC)     Diabetes mellitus (HCC)     Hyperlipemia     Hypertension     Osteoarthritis     TIA (transient ischemic attack)       Past Surgical History:   Procedure Laterality Date    APPENDECTOMY      CARPAL TUNNEL RELEASE Right     HYSTERECTOMY W/ SALPINGO-OOPHERECTOMY      VA OPTX FEM SHFT FX W/INSJ IMED IMPLT W/WO SCREW Right 10/12/2022    Procedure: INSERTION NAIL IM FEMUR ANTEGRADE (TROCHANTERIC);  Surgeon: Eric Isaacs DO;  Location: [facility];  Service: Orthopedics      Family History   Problem Relation Age of Onset    Heart disease Mother     Heart disease Father     Hypertension Father     Stomach cancer Sister     Ovarian cancer  Sister     Hypertension Sister       Social History     Tobacco Use    Smoking status: Never    Smokeless tobacco: Never   Vaping Use    Vaping status: Never Used   Substance Use Topics    Alcohol use: Not Currently     Comment: very seldom    Drug use: Never      E-Cigarette/Vaping    E-Cigarette Use Never User       E-Cigarette/Vaping Substances    Nicotine No     THC No     CBD No     Flavoring No     Other No     Unknown No       I have reviewed and agree with the history as documented.     Gia Vaughan is a 84 y.o. female with a past medical history of dementia, right hip fracture, acute encephalopathy, hyperlipidemia, hypertension, diabetes being evaluated for fall.  Patient is a resident at [facility] being evaluated in the ED for reported fall at facility.  She is alert and oriented x 0 at baseline and unable to cooperate with exam.  Spoke with staff at [facility] who do not report any known traumatic injuries or falls.  On initial exam patient tender to touch in left hip and knee.  Unknown head strike.  Negative thinners.  Tender to palpation over mid abdomen.                  Review of Systems   Reason unable to perform ROS: Dementia at baseline, unable to particpate in questioning.           Objective       ED Triage Vitals   Temperature Pulse Blood Pressure Respirations SpO2 Patient Position - Orthostatic VS   05/11/25 0559 05/11/25 0555 05/11/25 0555 05/11/25 0555 05/11/25 0555 05/11/25 0555   97.8 °F (36.6 °C) 74 120/56 20 (S) (!) 80 % Sitting      Temp Source Heart Rate Source BP Location FiO2 (%) Pain Score    05/11/25 0559 05/11/25 0555 05/11/25 0555 -- --    Oral Monitor Left arm        Vitals      Date and Time Temp Pulse SpO2 Resp BP Pain Score FACES Pain Rating User   05/11/25 0600 -- -- 92 % -- -- -- -- HVL   05/11/25 0559 97.8 °F (36.6 °C) -- -- -- -- -- -- HVL   05/11/25 0555 -- 74  80 % 20 120/56 -- -- HVL            Physical Exam  Constitutional:       Appearance: Normal appearance.    Eyes:      Extraocular Movements: Extraocular movements intact.      Conjunctiva/sclera: Conjunctivae normal.      Pupils: Pupils are equal, round, and reactive to light.   Cardiovascular:      Rate and Rhythm: Normal rate and regular rhythm.      Pulses: Normal pulses.      Heart sounds: Normal heart sounds. No murmur heard.     No friction rub. No gallop.   Pulmonary:      Effort: Pulmonary effort is normal. No respiratory distress.      Breath sounds: Decreased air movement present. No stridor. Decreased breath sounds present. No wheezing, rhonchi or rales.      Comments: Hypoxic to high 80s on initial exam, placed on nasal canula with improvement to 02 sat  Abdominal:      General: Abdomen is flat. Bowel sounds are normal. There is no distension.      Palpations: Abdomen is soft.      Tenderness: There is abdominal tenderness (Epigastric). There is no right CVA tenderness, left CVA tenderness, guarding or rebound.   Musculoskeletal:      Cervical back: Normal. No bony tenderness.      Thoracic back: Normal. No bony tenderness.      Lumbar back: Normal. No bony tenderness.      Comments: Tenderness to palpation over left hip and left knee.  No obvious deformities or overlying skin change   Skin:     General: Skin is warm and dry.      Capillary Refill: Capillary refill takes less than 2 seconds.   Neurological:      General: No focal deficit present.      Mental Status: She is alert and oriented to person, place, and time. Mental status is at baseline.         Results Reviewed       Procedure Component Value Units Date/Time    HS Troponin I 4hr [[id number]]     Lab Status: No result Specimen: Blood     RBC Morphology Reflex Test [[id number]] Collected: 05/11/25 0624    Lab Status: Final result Specimen: Blood from Arm, Right Updated: 05/11/25 0801    Comprehensive metabolic panel [[id number]]  (Abnormal) Collected: 05/11/25 0624    Lab Status: Final result Specimen: Blood from Arm, Right Updated: 05/11/25 0752      Sodium 139 mmol/L      Potassium 5.0 mmol/L      Chloride 107 mmol/L      CO2 22 mmol/L      ANION GAP 10 mmol/L      BUN 22 mg/dL      Creatinine 1.00 mg/dL      Glucose 128 mg/dL      Calcium 9.3 mg/dL      AST 21 U/L      ALT 23 U/L      Alkaline Phosphatase 84 U/L      Total Protein 6.3 g/dL      Albumin 3.9 g/dL      Total Bilirubin 2.48 mg/dL      eGFR 51 ml/min/1.73sq m     Narrative:      National Kid[employer] Foundation guidelines for Chronic Kidney Disease (CKD):     Stage 1 with normal or high GFR (GFR > 90 mL/min/1.73 square meters)    Stage 2 Mild CKD (GFR = 60-89 mL/min/1.73 square meters)    Stage 3A Moderate CKD (GFR = 45-59 mL/min/1.73 square meters)    Stage 3B Moderate CKD (GFR = 30-44 mL/min/1.73 square meters)    Stage 4 Severe CKD (GFR = 15-29 mL/min/1.73 square meters)    Stage 5 End Stage CKD (GFR <15 mL/min/1.73 square meters)  Note: GFR calculation is accurate only with a steady state creatinine    Lipase [[id number]]  (Abnormal) Collected: 05/11/25 0624    Lab Status: Final result Specimen: Blood from Arm, Right Updated: 05/11/25 0734     Lipase <6 u/L     CBC and differential [[id number]]  (Abnormal) Collected: 05/11/25 0624    Lab Status: Final result Specimen: Blood from Arm, Right Updated: 05/11/25 0732     WBC 55.64 Thousand/uL      RBC 2.86 Million/uL      Hemoglobin 10.2 g/dL      Hematocrit 32.4 %       fL      MCH 35.7 pg      MCHC 31.5 g/dL      RDW 17.6 %      MPV 12.9 fL      Platelets 177 Thousands/uL     Narrative:      This is an appended report.  These results have been appended to a previously verified report.    Manual Differential(PHLEBS Do Not Order) [[id number]]  (Abnormal) Collected: 05/11/25 0624    Lab Status: Final result Specimen: Blood from Arm, Right Updated: 05/11/25 0732     Segmented % 12 %      Lymphocytes % 85 %      Monocytes % 1 %      Eosinophils % 0 %      Basophils % 0 %      Atypical Lymphocytes % 2 %      Absolute Neutrophils 6.68 Thousand/uL       Absolute Lymphocytes 48.41 Thousand/uL      Absolute Monocytes 0.56 Thousand/uL      Absolute Eosinophils 0.00 Thousand/uL      Absolute Basophils 0.00 Thousand/uL      Total Counted --     Smudge Cells Present     RBC Morphology Present     Platelet Estimate Adequate     Giant PLTs Present     Anisocytosis Present     Polychromasia Present    Fingerstick Glucose (POCT) [[id number]]  (Normal) Collected: 05/11/25 0727    Lab Status: Final result Specimen: Blood Updated: 05/11/25 0728     POC Glucose 138 mg/dl     Procalcitonin [[id number]]  (Normal) Collected: 05/11/25 0636    Lab Status: Final result Specimen: Blood from Arm, Left Updated: 05/11/25 0716     Procalcitonin 0.22 ng/ml     FLU/COVID Rapid Antigen (30 min. TAT) - Preferred screening test in ED [[id number]]  (Normal) Collected: 05/11/25 0624    Lab Status: Final result Specimen: Nares from Nose Updated: 05/11/25 0714     SARS COV Rapid Antigen Negative     Influenza A Rapid Antigen Negative     Influenza B Rapid Antigen Negative    Narrative:      This test has been performed using the Quidel Aurelia 2 FLU+SARS Antigen test under the Emergency Use Authorization (EUA). This test has been validated by the  and verified by the performing laboratory. The Aurelia uses lateral flow immunofluorescent sandwich assay to detect SARS-COV, Influenza A and Influenza B Antigen.     The Quidel Aurelia 2 SARS Antigen test does not differentiate between SARS-CoV and SARS-CoV-2.     Negative results are presumptive and may be confirmed with a molecular assay, if necessary, for patient management. Negative results do not rule out SARS-CoV-2 or influenza infection and should not be used as the sole basis for treatment or patient management decisions. A negative test result may occur if the level of antigen in a sample is below the limit of detection of this test.     Positive results are indicative of the presence of viral antigens, but do not rule out bacterial  infection or co-infection with other viruses.     All test results should be used as an adjunct to clinical observations and other information available to the provider.    FOR PEDIATRIC PATIENTS - copy/paste COVID Guidelines URL to browser: [email or web address]    CK [[id number]]  (Normal) Collected: 05/11/25 0645    Lab Status: Final result Specimen: Blood from Arm, Left Updated: 05/11/25 0713     Total CK 85 U/L     HS Troponin I 2hr [[id number]]     Lab Status: No result Specimen: Blood     HS Troponin 0hr (reflex protocol) [[id number]]  (Normal) Collected: 05/11/25 0624    Lab Status: Final result Specimen: Blood from Arm, Right Updated: 05/11/25 0711     hs TnI 0hr 24 ng/L     B-Type Natriuretic Peptide(BNP) [[id number]]  (Abnormal) Collected: 05/11/25 0636    Lab Status: Final result Specimen: Blood from Arm, Left Updated: 05/11/25 0710     BNP 2,982 pg/mL     Lactic acid, plasma (w/reflex if result > 2.0) [[id number]]  (Abnormal) Collected: 05/11/25 0624    Lab Status: Final result Specimen: Blood from Arm, Right Updated: 05/11/25 0708     LACTIC ACID 2.1 mmol/L     Narrative:      Result may be elevated if tourniquet was used during collection.    Lactic acid 2 Hours [[id number]]     Lab Status: No result Specimen: Blood     Protime-INR [[id number]]  (Abnormal) Collected: 05/11/25 0636    Lab Status: Final result Specimen: Blood from Arm, Left Updated: 05/11/25 0704     Protime 16.2 seconds      INR 1.22    Narrative:      INR Therapeutic Range    Indication                                             INR Range      Atrial Fibrillation                                               2.0-3.0  Hypercoagulable State                                    2.0.2.3  Left Ventricular Asist Device                            2.0-3.0  Mechanical Heart Valve                                  -    Aortic(with afib, MI, embolism, HF, LA enlargement,    and/or coagulopathy)                                      2.0-3.0 (2.5-3.5)     Mitral                                                             2.5-3.5  Prosthetic/Bioprosthetic Heart Valve               2.0-3.0  Venous thromboembolism (VTE: VT, PE        2.0-3.0    APTT [[id number]]  (Normal) Collected: 05/11/25 0636    Lab Status: Final result Specimen: Blood from Arm, Left Updated: 05/11/25 0704     PTT 29 seconds     Blood culture #1 [[id number]] Collected: 05/11/25 0624    Lab Status: In process Specimen: Blood from Arm, Right Updated: 05/11/25 0644    Blood culture #2 [[id number]] Collected: 05/11/25 0636    Lab Status: In process Specimen: Blood from Arm, Left Updated: 05/11/25 0644    UA w Reflex to Microscopic w Reflex to Culture [[id number]]     Lab Status: No result Specimen: Urine             XR chest 1 view portable   ED Interpretation by Emery Bowman DO (05/11 0719)   Diffuse opacities in right lower lobe, concern for possible pneumonia. Worse from previous per my interpretation      XR hip/pelv 2-3 vws left if performed   ED Interpretation by Emery Bowman DO (05/11 0701)   No acute osseous abnormalities noted per my interpretation      XR knee 4+ vw left injury   ED Interpretation by Emery Bowman DO (05/11 0701)   No acute osseous abnormalities noted per my interpretation      CT head without contrast   Final Interpretation by Duy Alfonso MD (05/11 0709)      No acute intracranial abnormality.                  Workstation performed: [id number]         CT spine cervical without contrast   Final Interpretation by Duy Alfonso MD (05/11 0723)      No cervical spine fracture or traumatic malalignment.                     Workstation performed: [id number]         CT chest abdomen pelvis wo contrast   Final Interpretation by Duy Alfonso MD (05/11 0747)      1.  Evidence of congestive heart failure with pulmonary edema and pleural effusions.      2.  Widespread lymphadenopathy in the chest, abdomen and pelvis,  unchanged from the CT dated 3/3/2025.      3.  No evidence of acute traumatic injury in the chest, abdomen, pelvis or included portions of the skeleton.               Workstation performed: [id number]             ECG 12 Lead Documentation Only    Date/Time: 5/11/2025 8:17 AM    Performed by: Emery Bowman DO  Authorized by: Emery Bowman DO    ECG reviewed by me, the ED Provider: yes    Patient location:  ED  Previous ECG:     Previous ECG:  Compared to current    Similarity:  No change  Interpretation:     Interpretation: normal    Rate:     ECG rate:  81    ECG rate assessment: normal    Rhythm:     Rhythm: junctional    Ectopy:     Ectopy: none    QRS:     QRS axis:  Normal    QRS intervals:  Normal  Conduction:     Conduction: normal    ST segments:     ST segments:  Normal  T waves:     T waves: normal        ED Medication and Procedure Management   Prior to Admission Medications   Prescriptions Last Dose Informant Patient Reported? Taking?   Alcohol Swabs (B-D SINGLE USE SWABS REGULAR) PADS   No No   Sig: USE AS DIRECTED   Calcium Carb-Cholecalciferol (calcium carbonate-vitamin D) 500 mg-5 mcg tablet   No No   Sig: TAKE ONE TABLET BY MOUTH TWICE DAILY DX: SUPPLEMENT   Continuous Blood Gluc Sensor (FreeStyle Дмитрий 2 Sensor) MISC  Self Yes No   Sig: apply 1 SENSOR to back OF UPPER ARM REMOVE AND REPLACE every 14 d...  (REFER TO PRESCRIPTION NOTES).   Diclofenac Sodium (VOLTAREN) 1 %  Self No No   Sig: Apply 2 g topically 3 (three) times a day To R knee   FeroSul 325 (65 Fe) MG tablet   No No   Sig: TAKE ONE TABLET BY MOUTH TWICE DAILY DX: SUPPLEMENT   Insulin Glargine Solostar (Lantus SoloStar) 100 UNIT/ML SOPN   No No   Sig: Inject 0.25 mL (25 Units total) under the skin daily at bedtime   acetaminophen (TYLENOL) 325 mg tablet  Self No No   Sig: Take 2 tablets (650 mg total) by mouth every 4 (four) hours as needed for mild pain   atorvastatin (LIPITOR) 20 mg tablet  Self Yes No   Sig: Take 20 mg by mouth daily    calcium carbonate (OS-JUAN RAMON) 600 MG tablet  Self Yes No   Sig: Take 600 mg by mouth 2 (two) times a day with meals   carbamide peroxide (FT Earwax Removal) 6.5 % otic solution   No No   Sig: INSERT FOUR DROPS INTO BOTH EARS AT BEDTIME FOR 2 WEEKS DX: WAX BUILD UP   cholecalciferol (VITAMIN D3) 1,000 units tablet  Self Yes No   Sig: Take 1,000 Units by mouth daily   dextromethorphan-guaifenesin (MUCINEX DM)  MG per 12 hr tablet   No No   Sig: Take 1 tablet by mouth every 12 (twelve) hours   fluticasone (FLONASE) 50 mcg/act nasal spray   No No   Sig: INSTILL ONE SPRAY INTO BOTH NOSTRILS DAILY FOR 1 WEEK AND THEN AS NEEDED DX: ALLERGIES   furosemide (LASIX) 20 mg tablet   No No   Sig: Take 1 tablet (20 mg total) by mouth daily   glucose blood test strip  Self Yes No   Si each by Other route daily as needed Use as instructed   glucose monitoring kit (FREESTYLE) monitoring kit  Self Yes No   Si each by Does not apply route as needed   guaiFENesin (FT Mucus Relief 12HR) 600 mg 12 hr tablet   No No   Sig: Take 1 tablet (600 mg total) by mouth 2 (two) times a day as needed for cough or congestion   insulin detemir (LEVEMIR) 100 units/mL subcutaneous injection  Self No No   Sig: Inject 20 Units under the skin daily at bedtime   insulin isophane-insulin regular (NovoLIN 70/30 FlexPen) 100 units/mL injection pen   No No   Sig: INJECT 12 UNITS UNDER THE SKIN PRIOR TO MEALS (DX: DIABETES)   insulin lispro (HumaLOG) 100 units/mL injection  Self No No   Sig: Inject 5 Units under the skin 3 (three) times a day with meals   losartan (COZAAR) 25 mg tablet   No No   Sig: Take 1 tablet (25 mg total) by mouth daily   magnesium Oxide (MAG-OX) 400 mg TABS   No No   Sig: Take 1 tablet (400 mg total) by mouth daily for 14 days   metoprolol succinate (TOPROL-XL) 25 mg 24 hr tablet   No No   Sig: Take 1 tablet (25 mg total) by mouth every 12 (twelve) hours   metoprolol tartrate (LOPRESSOR) 25 mg tablet   No No   Sig: TAKE ONE  TABLET BY MOUTH TWICE DAILY DX: HIGH BLOOD PRESSURE   potassium chloride (Klor-Con M20) 20 mEq tablet   No No   Sig: Take 1 tablet (20 mEq total) by mouth 2 (two) times a day   vitamin B-12 (VITAMIN B-12) 1,000 mcg tablet  Self Yes No   Sig: Take by mouth daily      Facility-Administered Medications: None     Patient's Medications   Discharge Prescriptions    No medications on file     No discharge procedures on file.  ED SEPSIS DOCUMENTATION   Time reflects when diagnosis was documented in both MDM as applicable and the Disposition within this note       Time User Action Codes Description Comment    5/11/2025  7:57 AM Emery Bowman [W19.XXXA] Fall, initial encounter     5/11/2025  7:57 AM Emery Bowman [I50.9] Congestive heart failure (CHF) ([facility])     5/11/2025  7:57 AM Emery Bowman [J81.1] Pulmonary edema     5/11/2025  7:57 AM Emery Bowman Modify [W19.XXXA] Fall, initial encounter     5/11/2025  7:57 AM Emery Bowman [I50.9] Congestive heart failure (CHF) ([facility])     5/11/2025  8:07 AM Torsten Pimentel Add [R09.02] Hypoxia     5/11/2025  8:07 AM Torsten Pimentel Remove [W19.XXXA] Fall, initial encounter                  Emery Bowman DO  05/11/25 0819

## 2025-05-11 NOTE — PLAN OF CARE
Problem: SAFETY,RESTRAINT: NV/NON-SELF DESTRUCTIVE BEHAVIOR  Goal: Returns to optimal restraint-free functioning  Description: INTERVENTIONS:- Assess the patient's behavior and symptoms that indicate continued need for restraint- Identify and implement measures to help patient regain control- Assess readiness for release of restraint   Outcome: Progressing     Problem: SAFETY,RESTRAINT: NV/NON-SELF DESTRUCTIVE BEHAVIOR  Goal: Remains free of harm/injury (restraint for non violent/non self-detsructive behavior)  Description: INTERVENTIONS:- Instruct patient/family regarding restraint use - Assess and monitor physiologic and psychological status - Provide interventions and comfort measures to meet assessed patient needs - Identify and implement measures to help patient regain control- Assess readiness for release of restraint   Outcome: Progressing     Problem: Potential for Falls  Goal: Patient will remain free of falls  Description: INTERVENTIONS:- Educate patient/family on patient safety including physical limitations- Instruct patient to call for assistance with activity - Consult OT/PT to assist with strengthening/mobility - Keep Call bell within reach- Keep bed low and locked with side rails adjusted as appropriate- Keep care items and personal belongings within reach- Initiate and maintain comfort rounds- Make Fall Risk Sign visible to staff- Offer Toileting every 2 Hours, in advance of need- Initiate/Maintain bed alarm- Obtain necessary fall risk management equipment: walker, commode- Apply yellow socks and bracelet for high fall risk patients- Consider moving patient to room near nurses station  Outcome: Progressing     Problem: PAIN - ADULT  Goal: Verbalizes/displays adequate comfort level or baseline comfort level  Description: Interventions:- Encourage patient to monitor pain and request assistance- Assess pain using appropriate pain scale- Administer analgesics based on type and severity of pain and  evaluate response- Implement non-pharmacological measures as appropriate and evaluate response- Consider cultural and social influences on pain and pain management- Notify physician/advanced practitioner if interventions unsuccessful or patient reports new pain  Outcome: Progressing     Problem: DISCHARGE PLANNING  Goal: Discharge to home or other facility with appropriate resources  Description: INTERVENTIONS:- Identify barriers to discharge w/patient and caregiver- Arrange for needed discharge resources and transportation as appropriate- Identify discharge learning needs (meds, wound care, etc.)- Arrange for interpretive services to assist at discharge as needed- Refer to Case Management Department for coordinating discharge planning if the patient needs post-hospital services based on physician/advanced practitioner order or complex needs related to functional status, cognitive ability, or social support system  Outcome: Progressing     Problem: Knowledge Deficit  Goal: Patient/family/caregiver demonstrates understanding of disease process, treatment plan, medications, and discharge instructions  Description: Complete learning assessment and assess knowledge base.Interventions:- Provide teaching at level of understanding- Provide teaching via preferred learning methods  Outcome: Progressing     Problem: INFECTION - ADULT  Goal: Absence or prevention of progression during hospitalization  Description: INTERVENTIONS:- Assess and monitor for signs and symptoms of infection- Monitor lab/diagnostic results- Monitor all insertion sites, i.e. indwelling lines, tubes, and drains- Monitor endotracheal if appropriate and nasal secretions for changes in amount and color- [place] appropriate cooling/warming therapies per order- Administer medications as ordered- Instruct and encourage patient and family to use good hand hygiene technique- Identify and instruct in appropriate isolation precautions for identified  infection/condition  Outcome: Progressing     Problem: METABOLIC, FLUID AND ELECTROLYTES - ADULT  Goal: Glucose maintained within target range  Description: INTERVENTIONS:- Monitor Blood Glucose as ordered- Assess for signs and symptoms of hyperglycemia and hypoglycemia- Administer ordered medications to maintain glucose within target range- Assess nutritional intake and initiate nutrition service referral as needed  Outcome: Progressing

## 2025-05-11 NOTE — QUICK NOTE
Called nephew and first contact to give update and have GOC discussion   No answer went to  w/ no identifying information.

## 2025-05-11 NOTE — ASSESSMENT & PLAN NOTE
Lab Results   Component Value Date    LVEF 45 04/03/2024    BNP 2,982 (H) 05/11/2025    BNP 1,246 (H) 03/03/2025     05/10/25  62.8kg   Wt Readings from Last 10 Encounters:   04/02/25 62.8 kg (138 lb 8 oz)   03/06/25 60.3 kg (132 lb 15 oz)   10/17/24 63.3 kg (139 lb 8.8 oz)   09/06/24 68.5 kg (151 lb 0.2 oz)   05/31/24 68 kg (150 lb)   04/04/24 68.4 kg (150 lb 12.8 oz)   01/29/24 70.8 kg (156 lb)   04/24/23 68 kg (150 lb)   11/15/22 66.7 kg (147 lb)   11/11/22 66.7 kg (147 lb)     Results for orders placed during the hospital encounter of 04/01/24    Echo complete w/ contrast if indicated    Interpretation Summary    Left Ventricle: Left ventricular cavity size is upper normal. Wall thickness is mildly increased. The left ventricular ejection fraction is 45%. Systolic function is mildly reduced. Diastolic function is moderately abnormal, consistent with grade II (pseudonormal) relaxation.    The following segments are akinetic: basal inferoseptal, basal inferior and mid inferior.    IVS: There is abnormal septal motion.    Left Atrium: The atrium is mildly dilated.    Atrial Septum: The septum bows into the right atrium, suggesting increased left atrial pressure.    Aortic Valve: There is aortic valve sclerosis.    Mitral Valve: There is mild annular calcification.    Tricuspid Valve: There is mild regurgitation.    Presents with increased shortness of breath and lower extremity edema  NYHA Class III.,   Patient is currently volume overloaded.    Pharmacotherapies / Neurohormonal Blockade:  --Beta Blocker: Toprol XL 25 BID  --ARNi / ACEi / ARB: Losartan 25 QD  --Aldosterone Antagonist:  --SGLT2 Inhibitor: NO--H/O UTI's   --Diuretic: Lasix 20 mg QD    NOT on oxygen at home  Plan:  Ween off O2.   ECHO: Repeat ECHO as last was over 1 yr old  Lasix 40mg IV BID  Sodium restriction 2g  Fluid restriction <2L/day  CMP, magnesium tomorrow a.m; Goal Mg > 2 and K > 4; Replete prn  HOB > 30°, Daily standing weights, Measure  I/O  Monitor on Tele  Consult nutrition for dietary education  OUT PATINET  Sodium restriction <2g/day  Fluid restriction <2L / Day  Exercise 30mins  5 days /week   Alcohol: <2 drinks per day in men and <1 in women   Evaluate for ROBERTO if sxs present  GOALS OF CARE

## 2025-05-12 ENCOUNTER — APPOINTMENT (INPATIENT)
Dept: RADIOLOGY | Facility: HOSPITAL | Age: 85
DRG: 291 | End: 2025-05-12
Payer: MEDICARE

## 2025-05-12 PROBLEM — S42.401A ELBOW FRACTURE, RIGHT, CLOSED, INITIAL ENCOUNTER: Status: RESOLVED | Noted: 2025-05-12 | Resolved: 2025-05-12

## 2025-05-12 PROBLEM — S42.401A ELBOW FRACTURE, RIGHT, CLOSED, INITIAL ENCOUNTER: Status: ACTIVE | Noted: 2025-05-12

## 2025-05-12 PROBLEM — S42.402A ELBOW FRACTURE, LEFT, CLOSED, INITIAL ENCOUNTER: Status: ACTIVE | Noted: 2025-05-12

## 2025-05-12 LAB
ANION GAP SERPL CALCULATED.3IONS-SCNC: 8 MMOL/L (ref 4–13)
ANISOCYTOSIS BLD QL SMEAR: PRESENT
BASOPHILS # BLD MANUAL: 0 THOUSAND/UL (ref 0–0.1)
BASOPHILS NFR MAR MANUAL: 0 % (ref 0–1)
BLASTS ABSOLUTE COUNT: 2.99 THOUSAND/UL (ref 0–0)
BLASTS NFR BLD MANUAL: 4 %
BUN SERPL-MCNC: 33 MG/DL (ref 5–25)
CALCIUM SERPL-MCNC: 8.5 MG/DL (ref 8.4–10.2)
CHLORIDE SERPL-SCNC: 107 MMOL/L (ref 96–108)
CO2 SERPL-SCNC: 25 MMOL/L (ref 21–32)
CREAT SERPL-MCNC: 1.3 MG/DL (ref 0.6–1.3)
EOSINOPHIL # BLD MANUAL: 0 THOUSAND/UL (ref 0–0.4)
EOSINOPHIL NFR BLD MANUAL: 0 % (ref 0–6)
ERYTHROCYTE [DISTWIDTH] IN BLOOD BY AUTOMATED COUNT: 17.8 % (ref 11.6–15.1)
EST. AVERAGE GLUCOSE BLD GHB EST-MCNC: 169 MG/DL
GFR SERPL CREATININE-BSD FRML MDRD: 37 ML/MIN/1.73SQ M
GIANT PLATELETS BLD QL SMEAR: PRESENT
GLUCOSE SERPL-MCNC: 161 MG/DL (ref 65–140)
GLUCOSE SERPL-MCNC: 196 MG/DL (ref 65–140)
GLUCOSE SERPL-MCNC: 239 MG/DL (ref 65–140)
GLUCOSE SERPL-MCNC: 91 MG/DL (ref 65–140)
GLUCOSE SERPL-MCNC: 93 MG/DL (ref 65–140)
HBA1C MFR BLD: 7.5 %
HCT VFR BLD AUTO: 31 % (ref 34.8–46.1)
HGB BLD-MCNC: 9.6 G/DL (ref 11.5–15.4)
LYMPHOCYTES # BLD AUTO: 67.91 THOUSAND/UL (ref 0.6–4.47)
LYMPHOCYTES # BLD AUTO: 91 % (ref 14–44)
MAGNESIUM SERPL-MCNC: 1.9 MG/DL (ref 1.9–2.7)
MCH RBC QN AUTO: 36.1 PG (ref 26.8–34.3)
MCHC RBC AUTO-ENTMCNC: 31 G/DL (ref 31.4–37.4)
MCV RBC AUTO: 117 FL (ref 82–98)
MONOCYTES # BLD AUTO: 0 THOUSAND/UL (ref 0–1.22)
MONOCYTES NFR BLD: 0 % (ref 4–12)
NEUTROPHILS # BLD MANUAL: 3.73 THOUSAND/UL (ref 1.85–7.62)
NEUTS SEG NFR BLD AUTO: 5 % (ref 43–75)
OVALOCYTES BLD QL SMEAR: PRESENT
PHOSPHATE SERPL-MCNC: 4.9 MG/DL (ref 2.3–4.1)
PLATELET # BLD AUTO: 160 THOUSANDS/UL (ref 149–390)
PLATELET BLD QL SMEAR: ADEQUATE
PMV BLD AUTO: 12.9 FL (ref 8.9–12.7)
POIKILOCYTOSIS BLD QL SMEAR: PRESENT
POLYCHROMASIA BLD QL SMEAR: PRESENT
POTASSIUM SERPL-SCNC: 4.2 MMOL/L (ref 3.5–5.3)
RBC # BLD AUTO: 2.66 MILLION/UL (ref 3.81–5.12)
RBC MORPH BLD: PRESENT
SMUDGE CELLS BLD QL SMEAR: PRESENT
SODIUM SERPL-SCNC: 140 MMOL/L (ref 135–147)
TARGETS BLD QL SMEAR: PRESENT
WBC # BLD AUTO: 74.63 THOUSAND/UL (ref 4.31–10.16)

## 2025-05-12 PROCEDURE — 82948 REAGENT STRIP/BLOOD GLUCOSE: CPT

## 2025-05-12 PROCEDURE — 73080 X-RAY EXAM OF ELBOW: CPT

## 2025-05-12 PROCEDURE — 99233 SBSQ HOSP IP/OBS HIGH 50: CPT | Performed by: INTERNAL MEDICINE

## 2025-05-12 PROCEDURE — 85007 BL SMEAR W/DIFF WBC COUNT: CPT

## 2025-05-12 PROCEDURE — 83735 ASSAY OF MAGNESIUM: CPT

## 2025-05-12 PROCEDURE — 84100 ASSAY OF PHOSPHORUS: CPT

## 2025-05-12 PROCEDURE — 80048 BASIC METABOLIC PNL TOTAL CA: CPT

## 2025-05-12 PROCEDURE — 99222 1ST HOSP IP/OBS MODERATE 55: CPT | Performed by: ORTHOPAEDIC SURGERY

## 2025-05-12 PROCEDURE — 29105 APPLICATION LONG ARM SPLINT: CPT | Performed by: PHYSICIAN ASSISTANT

## 2025-05-12 PROCEDURE — 85027 COMPLETE CBC AUTOMATED: CPT

## 2025-05-12 RX ORDER — ENOXAPARIN SODIUM 100 MG/ML
30 INJECTION SUBCUTANEOUS DAILY
Status: DISCONTINUED | OUTPATIENT
Start: 2025-05-12 | End: 2025-05-17 | Stop reason: HOSPADM

## 2025-05-12 RX ORDER — ACETAMINOPHEN 325 MG/1
975 TABLET ORAL EVERY 8 HOURS SCHEDULED
Status: DISCONTINUED | OUTPATIENT
Start: 2025-05-12 | End: 2025-05-17 | Stop reason: HOSPADM

## 2025-05-12 RX ORDER — LIDOCAINE 50 MG/G
1 PATCH TOPICAL DAILY
Status: DISCONTINUED | OUTPATIENT
Start: 2025-05-12 | End: 2025-05-13

## 2025-05-12 RX ADMIN — Medication 1000 UNITS: at 08:14

## 2025-05-12 RX ADMIN — FERROUS SULFATE TAB 325 MG (65 MG ELEMENTAL FE) 325 MG: 325 (65 FE) TAB at 08:14

## 2025-05-12 RX ADMIN — INSULIN LISPRO 1 UNITS: 100 INJECTION, SOLUTION INTRAVENOUS; SUBCUTANEOUS at 21:42

## 2025-05-12 RX ADMIN — Medication 1 TABLET: at 17:09

## 2025-05-12 RX ADMIN — ACETAMINOPHEN 975 MG: 325 TABLET, FILM COATED ORAL at 13:07

## 2025-05-12 RX ADMIN — INSULIN LISPRO 2 UNITS: 100 INJECTION, SOLUTION INTRAVENOUS; SUBCUTANEOUS at 11:48

## 2025-05-12 RX ADMIN — DICLOFENAC SODIUM 2 G: 10 GEL TOPICAL at 17:12

## 2025-05-12 RX ADMIN — ATORVASTATIN CALCIUM 20 MG: 20 TABLET, FILM COATED ORAL at 08:14

## 2025-05-12 RX ADMIN — ACETAMINOPHEN 975 MG: 325 TABLET, FILM COATED ORAL at 21:41

## 2025-05-12 RX ADMIN — FERROUS SULFATE TAB 325 MG (65 MG ELEMENTAL FE) 325 MG: 325 (65 FE) TAB at 17:09

## 2025-05-12 RX ADMIN — INSULIN LISPRO 5 UNITS: 100 INJECTION, SOLUTION INTRAVENOUS; SUBCUTANEOUS at 17:12

## 2025-05-12 RX ADMIN — GUAIFENESIN AND DEXTROMETHORPHAN 5 ML: 100; 10 SYRUP ORAL at 21:41

## 2025-05-12 RX ADMIN — FUROSEMIDE 40 MG: 10 INJECTION, SOLUTION INTRAMUSCULAR; INTRAVENOUS at 17:09

## 2025-05-12 RX ADMIN — GUAIFENESIN AND DEXTROMETHORPHAN 5 ML: 100; 10 SYRUP ORAL at 08:14

## 2025-05-12 RX ADMIN — INSULIN GLARGINE 20 UNITS: 100 INJECTION, SOLUTION SUBCUTANEOUS at 21:41

## 2025-05-12 RX ADMIN — Medication 1 TABLET: at 08:14

## 2025-05-12 RX ADMIN — DICLOFENAC SODIUM 2 G: 10 GEL TOPICAL at 21:42

## 2025-05-12 RX ADMIN — FUROSEMIDE 40 MG: 10 INJECTION, SOLUTION INTRAMUSCULAR; INTRAVENOUS at 08:14

## 2025-05-12 RX ADMIN — METOPROLOL SUCCINATE 25 MG: 25 TABLET, EXTENDED RELEASE ORAL at 08:13

## 2025-05-12 RX ADMIN — INSULIN LISPRO 5 UNITS: 100 INJECTION, SOLUTION INTRAVENOUS; SUBCUTANEOUS at 11:49

## 2025-05-12 RX ADMIN — LIDOCAINE 5% 1 PATCH: 700 PATCH TOPICAL at 11:48

## 2025-05-12 RX ADMIN — INSULIN LISPRO 1 UNITS: 100 INJECTION, SOLUTION INTRAVENOUS; SUBCUTANEOUS at 17:12

## 2025-05-12 RX ADMIN — METOPROLOL SUCCINATE 25 MG: 25 TABLET, EXTENDED RELEASE ORAL at 21:41

## 2025-05-12 NOTE — ASSESSMENT & PLAN NOTE
Lab Results   Component Value Date    LVEF 45 04/03/2024    BNP 2,982 (H) 05/11/2025    BNP 1,246 (H) 03/03/2025     05/10/25  62.8kg   Wt Readings from Last 10 Encounters:   04/02/25 62.8 kg (138 lb 8 oz)   03/06/25 60.3 kg (132 lb 15 oz)   10/17/24 63.3 kg (139 lb 8.8 oz)   09/06/24 68.5 kg (151 lb 0.2 oz)   05/31/24 68 kg (150 lb)   04/04/24 68.4 kg (150 lb 12.8 oz)   01/29/24 70.8 kg (156 lb)   04/24/23 68 kg (150 lb)   11/15/22 66.7 kg (147 lb)   11/11/22 66.7 kg (147 lb)     Results for orders placed during the hospital encounter of 04/01/24    Echo complete w/ contrast if indicated    Interpretation Summary    Left Ventricle: Left ventricular cavity size is upper normal. Wall thickness is mildly increased. The left ventricular ejection fraction is 45%. Systolic function is mildly reduced. Diastolic function is moderately abnormal, consistent with grade II (pseudonormal) relaxation.    The following segments are akinetic: basal inferoseptal, basal inferior and mid inferior.    IVS: There is abnormal septal motion.    Left Atrium: The atrium is mildly dilated.    Atrial Septum: The septum bows into the right atrium, suggesting increased left atrial pressure.    Aortic Valve: There is aortic valve sclerosis.    Mitral Valve: There is mild annular calcification.    Tricuspid Valve: There is mild regurgitation.    Presents with increased shortness of breath and lower extremity edema  NYHA Class III.,   Patient is currently volume overloaded.    Pharmacotherapies / Neurohormonal Blockade:  --Beta Blocker: Toprol XL 25 BID  --ARNi / ACEi / ARB: Losartan 25 QD  --Aldosterone Antagonist:  --SGLT2 Inhibitor: NO--H/O UTI's   --Diuretic: Lasix 20 mg QD    NOT on oxygen at home  Plan:  Continue IV Lasix 40 mg twice daily  Follow-up echo  Currently on 4 L O2, wean as tolerated  Sodium restriction 2g  Fluid restriction <2L/day  Goal Mg > 2 and K > 4; Replete prn  HOB > 30°, Daily standing weights, Measure I/O

## 2025-05-12 NOTE — DISCHARGE INSTR - AVS FIRST PAGE
Dear Gia Vaughan,     It was our pleasure to care for you here at [facility].  It is our hope that we were always able to exceed the expected standards for your care during your stay.  You were hospitalized due to fall and hypoxia.  You were cared for on the south floor by Franky Nichols MD under the service of Kalpana Gordon MD with the [facility] who covers for your primary care physician (PCP), No primary care provider on file., while you were hospitalized.  If you have any questions or concerns related to this hospitalization, you may contact us at .  For follow up as well as any medication refills, we recommend that you follow up with your primary care physician.  A registered nurse will reach out to you by phone within a few days after your discharge to answer any additional questions that you may have after going home.  However, at this time we provide for you here, the most important instructions / recommendations at discharge:     Notable Medication Adjustments -   Please start taking Lasix 40 mg daily.  Please take Tylenol 975 mg 3 times a day as needed for pain.  Please hold losartan 25 mg.  Resume losartan 25 mg daily on 5/19/2025.  Please continue taking all other medication as prescribed.  Testing Required after Discharge -   Basic metabolic panel in 1 week  ** Please contact your PCP to request testing orders for any of the testing recommended here **  Important follow up information -   Please follow-up with cardiology outpatient.  Please follow-up with orthopedic surgery outpatient.  Please follow-up with hematology/oncology outpatient.  Please follow-up with your PCP within a week of discharge.  Other Instructions -   Please take all your medications regularly as directed  If symptoms return/persist contact your PCP if unable then visit to nearest ER  Please review this entire after visit summary as additional  general instructions including medication list, appointments, activity, diet, any pertinent wound care, and other additional recommendations from your care team that may be provided for you.      Sincerely,     Franky Nichols MD       Discharge Instructions - Orthopedics  Gia Vaughan 84 y.o. female MRN: [id number]  Unit/Bed#: S -01    Weight Bearing Status:                                           Non weight bearing to the left upper extremity in posterior slab splint and sling.    Pain:  Continue analgesics as directed    Dressing Instructions:   Please keep clean, dry and intact until follow up     Appt Instructions:   If you do not have your appointment, please call the clinic at [phone] with upper extremity surgeon. Suggest Dr. Thompson  Otherwise follow up as scheduled.    Contact the office sooner if you experience any increased numbness/tingling in the extremities.

## 2025-05-12 NOTE — PLAN OF CARE
Problem: Potential for Falls  Goal: Patient will remain free of falls  Description: INTERVENTIONS:- Educate patient/family on patient safety including physical limitations- Instruct patient to call for assistance with activity - Consult OT/PT to assist with strengthening/mobility - Keep Call bell within reach- Keep bed low and locked with side rails adjusted as appropriate- Keep care items and personal belongings within reach- Initiate and maintain comfort rounds- Make Fall Risk Sign visible to staff- Offer Initiate/Maintain bed alarm- Obtain necessary fall risk management equipment: Apply yellow socks and bracelet for high fall risk patients- Consider moving patient to room near nurses station  Outcome: Progressing     Problem: PAIN - ADULT  Goal: Verbalizes/displays adequate comfort level or baseline comfort level  Description: Interventions:- Encourage patient to monitor pain and request assistance- Assess pain using appropriate pain scale- Administer analgesics based on type and severity of pain and evaluate response- Implement non-pharmacological measures as appropriate and evaluate response- Consider cultural and social influences on pain and pain management- Notify physician/advanced practitioner if interventions unsuccessful or patient reports new pain  Outcome: Progressing     Problem: DISCHARGE PLANNING  Goal: Discharge to home or other facility with appropriate resources  Description: INTERVENTIONS:- Identify barriers to discharge w/patient and caregiver- Arrange for needed discharge resources and transportation as appropriate- Identify discharge learning needs (meds, wound care, etc.)- Arrange for interpretive services to assist at discharge as needed- Refer to Case Management Department for coordinating discharge planning if the patient needs post-hospital services based on physician/advanced practitioner order or complex needs related to functional status, cognitive ability, or social support  system  Outcome: Progressing     Problem: Knowledge Deficit  Goal: Patient/family/caregiver demonstrates understanding of disease process, treatment plan, medications, and discharge instructions  Description: Complete learning assessment and assess knowledge base.Interventions:- Provide teaching at level of understanding- Provide teaching via preferred learning methods  Outcome: Progressing

## 2025-05-12 NOTE — ASSESSMENT & PLAN NOTE
Lab Results   Component Value Date    EGFR 37 05/12/2025    EGFR 51 05/11/2025    EGFR 50 03/06/2025    CREATININE 1.30 05/12/2025    CREATININE 1.00 05/11/2025    CREATININE 1.01 03/06/2025     Creatinine increased by 0.3  Currently being diuresed with IV Lasix 40 mg twice daily    Plan  Monitor BMP

## 2025-05-12 NOTE — ASSESSMENT & PLAN NOTE
CT cervical spine, x-ray hip/pelvis and x-ray knee negative for fractures  Patient complaining of left elbow pain    Plan  Follow-up left elbow x-ray  PT OT Eval and treat  Fall precautions

## 2025-05-12 NOTE — PROGRESS NOTES
Progress Note - Hospitalist   Name: Gia Vaughan 84 y.o. female I MRN: [id number]  Unit/Bed#: S -01 I Date of Admission: 5/11/2025   Date of Service: 5/12/2025 I Hospital Day: 1    Assessment & Plan  Acute on chronic systolic heart failure (HCC)  Lab Results   Component Value Date    LVEF 45 04/03/2024    BNP 2,982 (H) 05/11/2025    BNP 1,246 (H) 03/03/2025     05/10/25  62.8kg   Wt Readings from Last 10 Encounters:   04/02/25 62.8 kg (138 lb 8 oz)   03/06/25 60.3 kg (132 lb 15 oz)   10/17/24 63.3 kg (139 lb 8.8 oz)   09/06/24 68.5 kg (151 lb 0.2 oz)   05/31/24 68 kg (150 lb)   04/04/24 68.4 kg (150 lb 12.8 oz)   01/29/24 70.8 kg (156 lb)   04/24/23 68 kg (150 lb)   11/15/22 66.7 kg (147 lb)   11/11/22 66.7 kg (147 lb)     Results for orders placed during the hospital encounter of 04/01/24    Echo complete w/ contrast if indicated    Interpretation Summary    Left Ventricle: Left ventricular cavity size is upper normal. Wall thickness is mildly increased. The left ventricular ejection fraction is 45%. Systolic function is mildly reduced. Diastolic function is moderately abnormal, consistent with grade II (pseudonormal) relaxation.    The following segments are akinetic: basal inferoseptal, basal inferior and mid inferior.    IVS: There is abnormal septal motion.    Left Atrium: The atrium is mildly dilated.    Atrial Septum: The septum bows into the right atrium, suggesting increased left atrial pressure.    Aortic Valve: There is aortic valve sclerosis.    Mitral Valve: There is mild annular calcification.    Tricuspid Valve: There is mild regurgitation.    Presents with increased shortness of breath and lower extremity edema  NYHA Class III.,   Patient is currently volume overloaded.    Pharmacotherapies / Neurohormonal Blockade:  --Beta Blocker: Toprol XL 25 BID  --ARNi / ACEi / ARB: Losartan 25 QD  --Aldosterone Antagonist:  --SGLT2 Inhibitor: NO--H/O UTI's   --Diuretic: Lasix 20 mg QD    NOT on oxygen  at home  Plan:  Continue IV Lasix 40 mg twice daily  Follow-up echo  Currently on 4 L O2, wean as tolerated  Sodium restriction 2g  Fluid restriction <2L/day  Goal Mg > 2 and K > 4; Replete prn  HOB > 30°, Daily standing weights, Measure I/O            NIKKI on stage 3b chronic kidney disease ([facility])  Lab Results   Component Value Date    EGFR 37 05/12/2025    EGFR 51 05/11/2025    EGFR 50 03/06/2025    CREATININE 1.30 05/12/2025    CREATININE 1.00 05/11/2025    CREATININE 1.01 03/06/2025     Creatinine increased by 0.3  Currently being diuresed with IV Lasix 40 mg twice daily    Plan  Monitor BMP  Fall  CT cervical spine, x-ray hip/pelvis and x-ray knee negative for fractures  Patient complaining of left elbow pain    Plan  Follow-up left elbow x-ray  PT OT Eval and treat  Fall precautions  Type 2 diabetes mellitus with stage 3 chronic kidney disease, unspecified whether long term insulin use, unspecified whether stage 3a or 3b CKD ([facility])  Lab Results   Component Value Date    HGBA1C 7.5 (H) 05/11/2025       Recent Labs     05/11/25  1556 05/11/25 2036 05/12/25  0708 05/12/25  1040   POCGLU 120 255* 93 239*       Blood Sugar Average: Last 72 hrs:  (P) 169  Recent Labs     05/11/25 2036 05/12/25  0615 05/12/25  0708 05/12/25  1040   POCGLU 255*  --  93 239*   GLUC  --  91  --   --        Hold home regimen while inpatient and resume on discharge   Diabetic diet  Insulin regimen  Humalog 5 units TID AC  Lantus 20 units HS  Glucose checks and Insulin correction ACHS  Goal -180 while admitted, adjusting insulin regimen as appropriate  Monitor for hypoglycemia and treat per protocol    CLL (chronic lymphocytic leukemia) (HCC)  Need GOC w/ nephew who is POA    PLAN:  GOC  If they do not want to pursue treatment will hold off on consult Heme/Onc  Last note over 1 year ago.   Moderate dementia (HCC)  Urinary retention protocol  Delirium precautions    Primary hypertension  Present on admission    PLAN:  Continue home  medications.     OA (osteoarthritis)  Present on admission    PLAN:  Continue home medications.     HLD (hyperlipidemia)  Present on admission    PLAN:  Continue home medications.       VTE Pharmacologic Prophylaxis: VTE Score: 6 High Risk (Score >/= 5) - Pharmacological DVT Prophylaxis Ordered: enoxaparin (Lovenox). Sequential Compression Devices Ordered.    Mobility:   Basic Mobility Inpatient Raw Score: 9  JH-HLM Goal: 3: Sit at edge of bed  JH-HLM Achieved: 3: Sit at edge of bed  JH-HLM Goal achieved. Continue to encourage appropriate mobility.    Patient Centered Rounds: I performed bedside rounds with nursing staff today.   Discussions with Specialists or Other Care Team Provider: None    Education and Discussions with Family / Patient:  Will update.     Current Length of Stay: 1 day(s)  Current Patient Status: Inpatient   Certification Statement: The patient will continue to require additional inpatient hospital stay due to CHF exacerbation  Discharge Plan: Anticipate discharge in 24-48 hrs to discharge location to be determined pending rehab evaluations.    Code Status: Level 1 - Full Code    Subjective   Patient examined at bedside this morning and is alert and oriented to self and knows that she is at hospital.  She endorses left elbow pain and denies shortness of breath.    Objective :  Temp:  [97.4 °F (36.3 °C)-98.7 °F (37.1 °C)] 98.7 °F (37.1 °C)  HR:  [67-82] 77  BP: (124-149)/(49-75) 149/71  Resp:  [15-18] 18  SpO2:  [89 %-91 %] 90 %  O2 Device: Nasal cannula  Nasal Cannula O2 Flow Rate (L/min):  [4 L/min-6 L/min] 5 L/min    Body mass index is 22.38 kg/m².     Input and Output Summary (last 24 hours):     Intake/Output Summary (Last 24 hours) at 5/12/2025 1343  Last data filed at 5/12/2025 0733  Gross per 24 hour   Intake 460 ml   Output 1270 ml   Net -810 ml       Physical Exam  HENT:      Head: Normocephalic and atraumatic.      Nose: No congestion.      Mouth/Throat:      Mouth: Mucous membranes  are moist.   Eyes:      Extraocular Movements: Extraocular movements intact.   Cardiovascular:      Rate and Rhythm: Normal rate and regular rhythm.      Pulses: Normal pulses.      Heart sounds: Normal heart sounds.   Pulmonary:      Effort: No respiratory distress.      Breath sounds: Examination of the left-middle field reveals rales. Examination of the right-lower field reveals rales. Examination of the left-lower field reveals rales. Rales present. No wheezing.   Abdominal:      General: There is no distension.      Palpations: Abdomen is soft.      Tenderness: There is no abdominal tenderness.   Musculoskeletal:         General: No swelling.      Left elbow: Swelling present. Decreased range of motion.      Cervical back: Normal range of motion.      Right lower leg: No edema.      Left lower leg: No edema.   Skin:     General: Skin is warm and dry.   Neurological:      Mental Status: She is alert. She is disoriented.   Psychiatric:         Mood and Affect: Mood normal.         Behavior: Behavior normal.         Lines/Drains:              Lab Results: I have reviewed the following results:   Results from last 7 days   Lab Units 05/12/25  0615   WBC Thousand/uL 74.63*   HEMOGLOBIN g/dL 9.6*   HEMATOCRIT % 31.0*   PLATELETS Thousands/uL 160   LYMPHO PCT % 91*   MONO PCT % 0*   EOS PCT % 0     Results from last 7 days   Lab Units 05/12/25  0615 05/11/25  0624   SODIUM mmol/L 140 139   POTASSIUM mmol/L 4.2 5.0   CHLORIDE mmol/L 107 107   CO2 mmol/L 25 22   BUN mg/dL 33* 22   CREATININE mg/dL 1.30 1.00   ANION GAP mmol/L 8 10   CALCIUM mg/dL 8.5 9.3   ALBUMIN g/dL  --  3.9   TOTAL BILIRUBIN mg/dL  --  2.48*   ALK PHOS U/L  --  84   ALT U/L  --  23   AST U/L  --  21   GLUCOSE RANDOM mg/dL 91 128     Results from last 7 days   Lab Units 05/11/25  0636   INR  1.22*     Results from last 7 days   Lab Units 05/12/25  1040 05/12/25  0708 05/11/25  2036 05/11/25  1556 05/11/25  0727   POC GLUCOSE mg/dl 239* 93 255* 120  138     Results from last 7 days   Lab Units 05/11/25  1639   HEMOGLOBIN A1C % 7.5*     Results from last 7 days   Lab Units 05/11/25  0839 05/11/25  0636 05/11/25  0624   LACTIC ACID mmol/L 1.4  --  2.1*   PROCALCITONIN ng/ml  --  0.22  --        Recent Cultures (last 7 days):   Results from last 7 days   Lab Units 05/11/25  0636 05/11/25  0624   BLOOD CULTURE  No Growth at 24 hrs. No Growth at 24 hrs.       Imaging Results Review: I reviewed radiology reports from this admission including: chest xray and CT chest.  Other Study Results Review: EKG was reviewed.     Last 24 Hours Medication List:     Current Facility-Administered Medications:     acetaminophen (TYLENOL) tablet 975 mg, Q8H TESSA    aluminum-magnesium hydroxide-simethicone (MAALOX) oral suspension 30 mL, Q6H PRN    atorvastatin (LIPITOR) tablet 20 mg, Daily    calcium carbonate (TUMS) chewable tablet 500 mg, BID PRN    calcium carbonate-vitamin D 500 mg-5 mcg tablet 1 tablet, BID With Meals    Cholecalciferol (VITAMIN D3) tablet 1,000 Units, Daily    dextromethorphan-guaiFENesin (ROBITUSSIN DM) oral syrup 5 mL, BID    Diclofenac Sodium (VOLTAREN) 1 % topical gel 2 g, TID    docusate sodium (COLACE) capsule 100 mg, BID PRN    enoxaparin (LOVENOX) subcutaneous injection 30 mg, Daily    ferrous sulfate tablet 325 mg, BID With Meals    fluticasone (FLONASE) 50 mcg/act nasal spray 1 spray, Daily PRN    furosemide (LASIX) injection 40 mg, BID    guaiFENesin (MUCINEX) 12 hr tablet 600 mg, BID PRN    insulin glargine (LANTUS) subcutaneous injection 20 Units 0.2 mL, HS    insulin lispro (HumALOG/ADMELOG) 100 units/mL subcutaneous injection 1-5 Units, 4x Daily (AC & HS) **AND** Fingerstick Glucose (POCT), 4x Daily AC and at bedtime    insulin lispro (HumALOG/ADMELOG) 100 units/mL subcutaneous injection 5 Units, TID With Meals    lidocaine (LIDODERM) 5 % patch 1 patch, Daily    [Held by provider] losartan (COZAAR) tablet 25 mg, Daily    metoprolol succinate  (TOPROL-XL) 24 hr tablet 25 mg, Q12H TESSA    Administrative Statements   Today, Patient Was Seen By: Franky Nichols MD      **Please Note: This note may have been constructed using a voice recognition system.**

## 2025-05-12 NOTE — CONSULTS
Consultation - Orthopedics   Name: Gia Vaughan 84 y.o. female I MRN: [id number]  Unit/Bed#: S -01 I Date of Admission: 5/11/2025   Date of Service: 5/12/2025 I Hospital Day: 1   Inpatient consult to Orthopedic Surgery  Consult performed by: Connie Sepulveda PA-C  Consult ordered by: Jose Grover MD        Physician Requesting Evaluation: Harleen Leary*   Reason for Evaluation / Principal Problem: left elbow fracture.     Assessment & Plan  Elbow fracture, left, closed, initial encounter  Patient s/p fall prior to admission.   No history able to be obtained from patient in light of mental acuity.   XRAYs with non displaced acute transcondylar distal humerus fracture.   Non weight bearing to the left upper extremity  Posterior slab splint applied at bedside.   Sling for comfort.   No immediate operative intervention at this time.   Pain control per primary team.   Follow up with upper extremity surgeon upon discharge.   Case reviewed and discussed with Dr. Solares.   Fall  With above noted injuries.   Moderate dementia (HCC)  Per primary team.   I have discussed the above management plan in detail with the primary service.   Ok for discharge from Orthopedics service perspective.  Orthopedics service signing off.  Please contact the SecureChat role for the Orthopedics service with any questions/concerns.    History of Present Illness   HPI: Gia Vaughan is a 84 y.o. year old female with dementia, CHD, DM, hyperlipidemia, osteoarthritis, who is currently admitted s/p fall and is being managed for acute CHF exacerbation. Orthopedics has been engaged for findings of a left elbow fracture.   At time of consultation, patient is actively talking out loud in the room to people who are not there. No history is obtainable from patient, and no family is present.   On exam she does endorse left elbow pain. Does not endorse pain in any other location, otherwise history is limited.      Review of Systems   Reason unable to perform ROS: mental acuity.    significant for findings described in the HPI.  Historical Information   Past Medical History:   Diagnosis Date    Acute encephalopathy 10/15/2024    Benign adenomatous polyp of large intestine     CHF (congestive heart failure) (HCC)     Diabetes mellitus (HCC)     Hyperlipemia     Hypertension     Osteoarthritis     TIA (transient ischemic attack)      Past Surgical History:   Procedure Laterality Date    APPENDECTOMY      CARPAL TUNNEL RELEASE Right     HYSTERECTOMY W/ SALPINGO-OOPHERECTOMY      OR OPTX FEM SHFT FX W/INSJ IMED IMPLT W/WO SCREW Right 10/12/2022    Procedure: INSERTION NAIL IM FEMUR ANTEGRADE (TROCHANTERIC);  Surgeon: Eric Isaacs DO;  Location: [facility];  Service: Orthopedics     Social History     Tobacco Use    Smoking status: Never    Smokeless tobacco: Never   Vaping Use    Vaping status: Never Used   Substance and Sexual Activity    Alcohol use: Not Currently     Comment: very seldom    Drug use: Never    Sexual activity: Not on file     E-Cigarette/Vaping    E-Cigarette Use Never User      E-Cigarette/Vaping Substances    Nicotine No     THC No     CBD No     Flavoring No     Other No     Unknown No      Family history non-contributory    Objective :  Temp:  [97.6 °F (36.4 °C)-98.7 °F (37.1 °C)] 98.3 °F (36.8 °C)  HR:  [44-77] 44  BP: (102-149)/(49-82) 102/82  Resp:  [15-18] 18  SpO2:  [89 %-96 %] 96 %  O2 Device: Nasal cannula  Nasal Cannula O2 Flow Rate (L/min):  [4 L/min-6 L/min] 5 L/min  Physical Exam  Vitals and nursing note reviewed. Exam conducted with a chaperone present (nursing at [facility]).   Constitutional:       Appearance: Normal appearance. She is normal weight.   HENT:      Head: Normocephalic and atraumatic.      Nose: Nose normal.      Mouth/Throat:      Mouth: Mucous membranes are moist.      Pharynx: Oropharynx is clear.   Eyes:      Conjunctiva/sclera: Conjunctivae normal.      Pupils: Pupils are  equal, round, and reactive to light.   Cardiovascular:      Rate and Rhythm: Normal rate.      Pulses: Normal pulses.   Pulmonary:      Effort: Pulmonary effort is normal.   Abdominal:      General: Abdomen is flat.   Musculoskeletal:      Comments: Musculoskeletal: bilateral upper extremity  Skin dry and intact.  Swelling and ecchymosis noted over the left elbow.   NO ttp over bilateral clavicles, shoulders, upper arms, right elbow, forearm, wrist or hand.   +ttp to the left elbow.   No pain to the left forearm, wrist or hand.   She is moving right arm spontaneously.   She is guarding the left elbow.  Motor intact to radial, ulnar, median, musculocutaneous, and axillary nerve distributions  SILT m/r/u.   Motor intact ain/pin/m/r/u  2+ radial and ulnar pulse, brisk capillary refill  Musculature is soft and compressible, no pain with passive stretch    Musculoskeletal: bilateral lower extremity  Skin dry and intact, no erythema or ecchymosis. No open wounds noted.  No ttp to bilateral hips, femurs, knees, lower legs, feet or ankles.   Moving bilateral lower extremities spontaneously.   SILT s/s/sp/dp/t.   +ankle dorsi/plantar flexion, EHL/FHL  2+ DP/PT pulse, brisk capillary refill  Musculature is soft and compressible, no pain with passive stretch  Leg lengths equal    Tertiary: all other noninjured extremities were palpated and ranged looking for secondary injuries. Patient had no pain with range of motion of her other major joints. No tenderness over all other joints/long bones as except already stated.     Skin:     General: Skin is warm and dry.      Capillary Refill: Capillary refill takes less than 2 seconds.   Neurological:      Mental Status: She is alert. She is disoriented.           Lab Results: I have reviewed the following results:   Recent Labs     05/11/25  0624 05/11/25  0636 05/12/25  0615   WBC 55.64*  --  74.63*   HGB 10.2*  --  9.6*   HCT 32.4*  --  31.0*     --  160   BUN 22  --  33*  "  CREATININE 1.00  --  1.30   PTT  --  29  --    INR  --  1.22*  --      Blood Culture:   Lab Results   Component Value Date    BLOODCX No Growth at 24 hrs. 05/11/2025     Wound Culture: No results found for: \"WOUNDCULT\"    Imaging Results Review: I personally reviewed the following image studies in PACS and associated radiology reports: XRAY left elbow, XRAY left knee, XRAY pelvis, CT c/a/p. My interpretation of the radiology images/reports is: acute transcondylar left elbow fracture, otherwise no acute osseous abnormality.  Other Study Results Review: No additional pertinent studies reviewed.    Procedure: posterior slab splint application left elbow for transcondylar elbow fracture  Patient was placed into a well padded posterior arm splint. Patient tolerated procedure well and was neurovascularly intact pre and post procedure.   "

## 2025-05-12 NOTE — ASSESSMENT & PLAN NOTE
Patient s/p fall prior to admission.   No history able to be obtained from patient in light of mental acuity.   XRAYs with non displaced acute transcondylar distal humerus fracture.   Non weight bearing to the left upper extremity  Posterior slab splint applied at bedside.   Sling for comfort.   No immediate operative intervention at this time.   Pain control per primary team.   Follow up with upper extremity surgeon upon discharge.   Case reviewed and discussed with Dr. Solares.

## 2025-05-12 NOTE — PLAN OF CARE
Problem: SAFETY,RESTRAINT: NV/NON-SELF DESTRUCTIVE BEHAVIOR  Goal: Remains free of harm/injury (restraint for non violent/non self-detsructive behavior)  Description: INTERVENTIONS:- Instruct patient/family regarding restraint use - Assess and monitor physiologic and psychological status - Provide interventions and comfort measures to meet assessed patient needs - Identify and implement measures to help patient regain control- Assess readiness for release of restraint   Outcome: Progressing  Goal: Returns to optimal restraint-free functioning  Description: INTERVENTIONS:- Assess the patient's behavior and symptoms that indicate continued need for restraint- Identify and implement measures to help patient regain control- Assess readiness for release of restraint   Outcome: Progressing     Problem: Potential for Falls  Goal: Patient will remain free of falls  Description: INTERVENTIONS:- Educate patient/family on patient safety including physical limitations- Instruct patient to call for assistance with activity - Consult OT/PT to assist with strengthening/mobility - Keep Call bell within reach- Keep bed low and locked with side rails adjusted as appropriate- Keep care items and personal belongings within reach- Initiate and maintain comfort rounds- Make Fall Risk Sign visible to staff- Apply yellow socks and bracelet for high fall risk patients- Consider moving patient to room near nurses station  Outcome: Progressing     Problem: PAIN - ADULT  Goal: Verbalizes/displays adequate comfort level or baseline comfort level  Description: Interventions:- Encourage patient to monitor pain and request assistance- Assess pain using appropriate pain scale- Administer analgesics based on type and severity of pain and evaluate response- Implement non-pharmacological measures as appropriate and evaluate response- Consider cultural and social influences on pain and pain management- Notify physician/advanced practitioner if  interventions unsuccessful or patient reports new pain  Outcome: Progressing     Problem: DISCHARGE PLANNING  Goal: Discharge to home or other facility with appropriate resources  Description: INTERVENTIONS:- Identify barriers to discharge w/patient and caregiver- Arrange for needed discharge resources and transportation as appropriate- Identify discharge learning needs (meds, wound care, etc.)- Arrange for interpretive services to assist at discharge as needed- Refer to Case Management Department for coordinating discharge planning if the patient needs post-hospital services based on physician/advanced practitioner order or complex needs related to functional status, cognitive ability, or social support system  Outcome: Progressing     Problem: Knowledge Deficit  Goal: Patient/family/caregiver demonstrates understanding of disease process, treatment plan, medications, and discharge instructions  Description: Complete learning assessment and assess knowledge base.Interventions:- Provide teaching at level of understanding- Provide teaching via preferred learning methods  Outcome: Progressing     Problem: METABOLIC, FLUID AND ELECTROLYTES - ADULT  Goal: Glucose maintained within target range  Description: INTERVENTIONS:- Monitor Blood Glucose as ordered- Assess for signs and symptoms of hyperglycemia and hypoglycemia- Administer ordered medications to maintain glucose within target range- Assess nutritional intake and initiate nutrition service referral as needed  Outcome: Progressing     Problem: INFECTION - ADULT  Goal: Absence or prevention of progression during hospitalization  Description: INTERVENTIONS:- Assess and monitor for signs and symptoms of infection- Monitor lab/diagnostic results- Monitor all insertion sites, i.e. indwelling lines, tubes, and drains- Monitor endotracheal if appropriate and nasal secretions for changes in amount and color- Emerson appropriate cooling/warming therapies per order-  Administer medications as ordered- Instruct and encourage patient and family to use good hand hygiene technique- Identify and instruct in appropriate isolation precautions for identified infection/condition  Outcome: Progressing     Problem: Prexisting or High Potential for Compromised Skin Integrity  Goal: Skin integrity is maintained or improved  Description: INTERVENTIONS:- Identify patients at risk for skin breakdown- Assess and monitor skin integrity- Assess and monitor nutrition and hydration status- Monitor labs - Assess for incontinence - Turn and reposition patient- Assist with mobility/ambulation- Relieve pressure over bony prominences- Avoid friction and shearing- Provide appropriate hygiene as needed including keeping skin clean and dry- Evaluate need for skin moisturizer/barrier cream- Collaborate with interdisciplinary team - Patient/family teaching- Consider wound care consult   Outcome: Progressing

## 2025-05-12 NOTE — ASSESSMENT & PLAN NOTE
Lab Results   Component Value Date    HGBA1C 7.5 (H) 05/11/2025       Recent Labs     05/11/25  1556 05/11/25 2036 05/12/25  0708 05/12/25  1040   POCGLU 120 255* 93 239*       Blood Sugar Average: Last 72 hrs:  (P) 169  Recent Labs     05/11/25 2036 05/12/25  0615 05/12/25  0708 05/12/25  1040   POCGLU 255*  --  93 239*   GLUC  --  91  --   --        Hold home regimen while inpatient and resume on discharge   Diabetic diet  Insulin regimen  Humalog 5 units TID AC  Lantus 20 units HS  Glucose checks and Insulin correction ACHS  Goal -180 while admitted, adjusting insulin regimen as appropriate  Monitor for hypoglycemia and treat per protocol

## 2025-05-13 ENCOUNTER — APPOINTMENT (INPATIENT)
Dept: NON INVASIVE DIAGNOSTICS | Facility: HOSPITAL | Age: 85
DRG: 291 | End: 2025-05-13
Payer: MEDICARE

## 2025-05-13 LAB
ANION GAP SERPL CALCULATED.3IONS-SCNC: 7 MMOL/L (ref 4–13)
ANISOCYTOSIS BLD QL SMEAR: PRESENT
BASOPHILS # BLD MANUAL: 0 THOUSAND/UL (ref 0–0.1)
BASOPHILS NFR MAR MANUAL: 0 % (ref 0–1)
BLASTS ABSOLUTE COUNT: 4.11 THOUSAND/UL (ref 0–0)
BLASTS NFR BLD MANUAL: 6 %
BUN SERPL-MCNC: 32 MG/DL (ref 5–25)
CALCIUM SERPL-MCNC: 8.3 MG/DL (ref 8.4–10.2)
CHLORIDE SERPL-SCNC: 106 MMOL/L (ref 96–108)
CO2 SERPL-SCNC: 27 MMOL/L (ref 21–32)
CREAT SERPL-MCNC: 1.22 MG/DL (ref 0.6–1.3)
EOSINOPHIL # BLD MANUAL: 0 THOUSAND/UL (ref 0–0.4)
EOSINOPHIL NFR BLD MANUAL: 0 % (ref 0–6)
ERYTHROCYTE [DISTWIDTH] IN BLOOD BY AUTOMATED COUNT: 17.6 % (ref 11.6–15.1)
GFR SERPL CREATININE-BSD FRML MDRD: 40 ML/MIN/1.73SQ M
GLUCOSE SERPL-MCNC: 174 MG/DL (ref 65–140)
GLUCOSE SERPL-MCNC: 193 MG/DL (ref 65–140)
GLUCOSE SERPL-MCNC: 282 MG/DL (ref 65–140)
GLUCOSE SERPL-MCNC: 76 MG/DL (ref 65–140)
GLUCOSE SERPL-MCNC: 85 MG/DL (ref 65–140)
HCT VFR BLD AUTO: 28.9 % (ref 34.8–46.1)
HGB BLD-MCNC: 9 G/DL (ref 11.5–15.4)
LYMPHOCYTES # BLD AUTO: 59.59 THOUSAND/UL (ref 0.6–4.47)
LYMPHOCYTES # BLD AUTO: 87 % (ref 14–44)
MACROCYTES BLD QL AUTO: PRESENT
MCH RBC QN AUTO: 35.6 PG (ref 26.8–34.3)
MCHC RBC AUTO-ENTMCNC: 31.1 G/DL (ref 31.4–37.4)
MCV RBC AUTO: 114 FL (ref 82–98)
MONOCYTES # BLD AUTO: 0 THOUSAND/UL (ref 0–1.22)
MONOCYTES NFR BLD: 0 % (ref 4–12)
MRSA NOSE QL CULT: NORMAL
NEUTROPHILS # BLD MANUAL: 4.79 THOUSAND/UL (ref 1.85–7.62)
NEUTS SEG NFR BLD AUTO: 7 % (ref 43–75)
PLATELET # BLD AUTO: 140 THOUSANDS/UL (ref 149–390)
PLATELET BLD QL SMEAR: ADEQUATE
PMV BLD AUTO: 12.8 FL (ref 8.9–12.7)
POIKILOCYTOSIS BLD QL SMEAR: PRESENT
POLYCHROMASIA BLD QL SMEAR: PRESENT
POTASSIUM SERPL-SCNC: 3.4 MMOL/L (ref 3.5–5.3)
RBC # BLD AUTO: 2.53 MILLION/UL (ref 3.81–5.12)
RBC MORPH BLD: PRESENT
SODIUM SERPL-SCNC: 140 MMOL/L (ref 135–147)
WBC # BLD AUTO: 68.49 THOUSAND/UL (ref 4.31–10.16)

## 2025-05-13 PROCEDURE — 85007 BL SMEAR W/DIFF WBC COUNT: CPT

## 2025-05-13 PROCEDURE — 99233 SBSQ HOSP IP/OBS HIGH 50: CPT | Performed by: INTERNAL MEDICINE

## 2025-05-13 PROCEDURE — 80048 BASIC METABOLIC PNL TOTAL CA: CPT

## 2025-05-13 PROCEDURE — 85027 COMPLETE CBC AUTOMATED: CPT

## 2025-05-13 PROCEDURE — 82948 REAGENT STRIP/BLOOD GLUCOSE: CPT

## 2025-05-13 RX ORDER — POTASSIUM CHLORIDE 1500 MG/1
40 TABLET, EXTENDED RELEASE ORAL ONCE
Status: COMPLETED | OUTPATIENT
Start: 2025-05-13 | End: 2025-05-13

## 2025-05-13 RX ADMIN — Medication 1 TABLET: at 17:10

## 2025-05-13 RX ADMIN — GUAIFENESIN AND DEXTROMETHORPHAN 5 ML: 100; 10 SYRUP ORAL at 21:43

## 2025-05-13 RX ADMIN — ACETAMINOPHEN 975 MG: 325 TABLET, FILM COATED ORAL at 13:22

## 2025-05-13 RX ADMIN — Medication 1000 UNITS: at 08:05

## 2025-05-13 RX ADMIN — GUAIFENESIN AND DEXTROMETHORPHAN 5 ML: 100; 10 SYRUP ORAL at 08:05

## 2025-05-13 RX ADMIN — INSULIN LISPRO 5 UNITS: 100 INJECTION, SOLUTION INTRAVENOUS; SUBCUTANEOUS at 17:10

## 2025-05-13 RX ADMIN — INSULIN LISPRO 3 UNITS: 100 INJECTION, SOLUTION INTRAVENOUS; SUBCUTANEOUS at 21:47

## 2025-05-13 RX ADMIN — FERROUS SULFATE TAB 325 MG (65 MG ELEMENTAL FE) 325 MG: 325 (65 FE) TAB at 08:05

## 2025-05-13 RX ADMIN — FERROUS SULFATE TAB 325 MG (65 MG ELEMENTAL FE) 325 MG: 325 (65 FE) TAB at 17:10

## 2025-05-13 RX ADMIN — INSULIN LISPRO 1 UNITS: 100 INJECTION, SOLUTION INTRAVENOUS; SUBCUTANEOUS at 17:09

## 2025-05-13 RX ADMIN — Medication 1 TABLET: at 08:05

## 2025-05-13 RX ADMIN — INSULIN LISPRO 1 UNITS: 100 INJECTION, SOLUTION INTRAVENOUS; SUBCUTANEOUS at 11:16

## 2025-05-13 RX ADMIN — ACETAMINOPHEN 975 MG: 325 TABLET, FILM COATED ORAL at 21:43

## 2025-05-13 RX ADMIN — INSULIN LISPRO 5 UNITS: 100 INJECTION, SOLUTION INTRAVENOUS; SUBCUTANEOUS at 11:16

## 2025-05-13 RX ADMIN — DICLOFENAC SODIUM 2 G: 10 GEL TOPICAL at 21:47

## 2025-05-13 RX ADMIN — FUROSEMIDE 40 MG: 10 INJECTION, SOLUTION INTRAMUSCULAR; INTRAVENOUS at 17:10

## 2025-05-13 RX ADMIN — ATORVASTATIN CALCIUM 20 MG: 20 TABLET, FILM COATED ORAL at 08:05

## 2025-05-13 RX ADMIN — POTASSIUM CHLORIDE 40 MEQ: 1500 TABLET, EXTENDED RELEASE ORAL at 09:16

## 2025-05-13 RX ADMIN — METOPROLOL SUCCINATE 25 MG: 25 TABLET, EXTENDED RELEASE ORAL at 21:44

## 2025-05-13 RX ADMIN — ACETAMINOPHEN 975 MG: 325 TABLET, FILM COATED ORAL at 05:23

## 2025-05-13 RX ADMIN — ENOXAPARIN SODIUM 30 MG: 30 INJECTION SUBCUTANEOUS at 08:05

## 2025-05-13 RX ADMIN — INSULIN GLARGINE 20 UNITS: 100 INJECTION, SOLUTION SUBCUTANEOUS at 21:43

## 2025-05-13 RX ADMIN — METOPROLOL SUCCINATE 25 MG: 25 TABLET, EXTENDED RELEASE ORAL at 08:06

## 2025-05-13 RX ADMIN — DICLOFENAC SODIUM 2 G: 10 GEL TOPICAL at 17:10

## 2025-05-13 RX ADMIN — FUROSEMIDE 40 MG: 10 INJECTION, SOLUTION INTRAMUSCULAR; INTRAVENOUS at 08:05

## 2025-05-13 NOTE — PLAN OF CARE
Problem: SAFETY,RESTRAINT: NV/NON-SELF DESTRUCTIVE BEHAVIOR  Goal: Remains free of harm/injury (restraint for non violent/non self-detsructive behavior)  Description: INTERVENTIONS:- Instruct patient/family regarding restraint use - Assess and monitor physiologic and psychological status - Provide interventions and comfort measures to meet assessed patient needs - Identify and implement measures to help patient regain control- Assess readiness for release of restraint   Outcome: Progressing  Goal: Returns to optimal restraint-free functioning  Description: INTERVENTIONS:- Assess the patient's behavior and symptoms that indicate continued need for restraint- Identify and implement measures to help patient regain control- Assess readiness for release of restraint   Outcome: Progressing     Problem: Potential for Falls  Goal: Patient will remain free of falls  Description: INTERVENTIONS:- Educate patient/family on patient safety including physical limitations- Instruct patient to call for assistance with activity - Consult OT/PT to assist with strengthening/mobility - Keep Call bell within reach- Keep bed low and locked with side rails adjusted as appropriate- Keep care items and personal belongings within reach- Initiate and maintain comfort rounds- Make Fall Risk Sign visible to staff- Apply yellow socks and bracelet for high fall risk patients- Consider moving patient to room near nurses station  Outcome: Progressing     Problem: PAIN - ADULT  Goal: Verbalizes/displays adequate comfort level or baseline comfort level  Description: Interventions:- Encourage patient to monitor pain and request assistance- Assess pain using appropriate pain scale- Administer analgesics based on type and severity of pain and evaluate response- Implement non-pharmacological measures as appropriate and evaluate response- Consider cultural and social influences on pain and pain management- Notify physician/advanced practitioner if  interventions unsuccessful or patient reports new pain  Outcome: Progressing     Problem: DISCHARGE PLANNING  Goal: Discharge to home or other facility with appropriate resources  Description: INTERVENTIONS:- Identify barriers to discharge w/patient and caregiver- Arrange for needed discharge resources and transportation as appropriate- Identify discharge learning needs (meds, wound care, etc.)- Arrange for interpretive services to assist at discharge as needed- Refer to Case Management Department for coordinating discharge planning if the patient needs post-hospital services based on physician/advanced practitioner order or complex needs related to functional status, cognitive ability, or social support system  Outcome: Progressing     Problem: Knowledge Deficit  Goal: Patient/family/caregiver demonstrates understanding of disease process, treatment plan, medications, and discharge instructions  Description: Complete learning assessment and assess knowledge base.Interventions:- Provide teaching at level of understanding- Provide teaching via preferred learning methods  Outcome: Progressing     Problem: METABOLIC, FLUID AND ELECTROLYTES - ADULT  Goal: Glucose maintained within target range  Description: INTERVENTIONS:- Monitor Blood Glucose as ordered- Assess for signs and symptoms of hyperglycemia and hypoglycemia- Administer ordered medications to maintain glucose within target range- Assess nutritional intake and initiate nutrition service referral as needed  Outcome: Progressing     Problem: INFECTION - ADULT  Goal: Absence or prevention of progression during hospitalization  Description: INTERVENTIONS:- Assess and monitor for signs and symptoms of infection- Monitor lab/diagnostic results- Monitor all insertion sites, i.e. indwelling lines, tubes, and drains- Monitor endotracheal if appropriate and nasal secretions for changes in amount and color- Mitchells appropriate cooling/warming therapies per order-  Administer medications as ordered- Instruct and encourage patient and family to use good hand hygiene technique- Identify and instruct in appropriate isolation precautions for identified infection/condition  Outcome: Progressing     Problem: Prexisting or High Potential for Compromised Skin Integrity  Goal: Skin integrity is maintained or improved  Description: INTERVENTIONS:- Identify patients at risk for skin breakdown- Assess and monitor skin integrity- Assess and monitor nutrition and hydration status- Monitor labs - Assess for incontinence - Turn and reposition patient- Assist with mobility/ambulation- Relieve pressure over bony prominences- Avoid friction and shearing- Provide appropriate hygiene as needed including keeping skin clean and dry- Evaluate need for skin moisturizer/barrier cream- Collaborate with interdisciplinary team - Patient/family teaching- Consider wound care consult   Outcome: Progressing

## 2025-05-13 NOTE — PLAN OF CARE
Problem: SAFETY,RESTRAINT: NV/NON-SELF DESTRUCTIVE BEHAVIOR  Goal: Remains free of harm/injury (restraint for non violent/non self-detsructive behavior)  Description: INTERVENTIONS:- Instruct patient/family regarding restraint use - Assess and monitor physiologic and psychological status - Provide interventions and comfort measures to meet assessed patient needs - Identify and implement measures to help patient regain control- Assess readiness for release of restraint   Outcome: Progressing  Goal: Returns to optimal restraint-free functioning  Description: INTERVENTIONS:- Assess the patient's behavior and symptoms that indicate continued need for restraint- Identify and implement measures to help patient regain control- Assess readiness for release of restraint   Outcome: Progressing     Problem: Potential for Falls  Goal: Patient will remain free of falls  Description: INTERVENTIONS:- Educate patient/family on patient safety including physical limitations- Instruct patient to call for assistance with activity - Consult OT/PT to assist with strengthening/mobility - Keep Call bell within reach- Keep bed low and locked with side rails adjusted as appropriate- Keep care items and personal belongings within reach- Initiate and maintain comfort rounds- Make Fall Risk Sign visible to staff- Offer Toileting every 2 Hours, in advance of need- Initiate/Maintain bed alarm- Obtain necessary fall risk management equipment: - Apply yellow socks and bracelet for high fall risk patients- Consider moving patient to room near nurses station  Outcome: Progressing     Problem: PAIN - ADULT  Goal: Verbalizes/displays adequate comfort level or baseline comfort level  Description: Interventions:- Encourage patient to monitor pain and request assistance- Assess pain using appropriate pain scale- Administer analgesics based on type and severity of pain and evaluate response- Implement non-pharmacological measures as appropriate and  evaluate response- Consider cultural and social influences on pain and pain management- Notify physician/advanced practitioner if interventions unsuccessful or patient reports new pain  Outcome: Progressing     Problem: DISCHARGE PLANNING  Goal: Discharge to home or other facility with appropriate resources  Description: INTERVENTIONS:- Identify barriers to discharge w/patient and caregiver- Arrange for needed discharge resources and transportation as appropriate- Identify discharge learning needs (meds, wound care, etc.)- Arrange for interpretive services to assist at discharge as needed- Refer to Case Management Department for coordinating discharge planning if the patient needs post-hospital services based on physician/advanced practitioner order or complex needs related to functional status, cognitive ability, or social support system  Outcome: Progressing     Problem: Knowledge Deficit  Goal: Patient/family/caregiver demonstrates understanding of disease process, treatment plan, medications, and discharge instructions  Description: Complete learning assessment and assess knowledge base.Interventions:- Provide teaching at level of understanding- Provide teaching via preferred learning methods  Outcome: Progressing     Problem: METABOLIC, FLUID AND ELECTROLYTES - ADULT  Goal: Glucose maintained within target range  Description: INTERVENTIONS:- Monitor Blood Glucose as ordered- Assess for signs and symptoms of hyperglycemia and hypoglycemia- Administer ordered medications to maintain glucose within target range- Assess nutritional intake and initiate nutrition service referral as needed  Outcome: Progressing     Problem: INFECTION - ADULT  Goal: Absence or prevention of progression during hospitalization  Description: INTERVENTIONS:- Assess and monitor for signs and symptoms of infection- Monitor lab/diagnostic results- Monitor all insertion sites, i.e. indwelling lines, tubes, and drains- Monitor endotracheal if  appropriate and nasal secretions for changes in amount and color- Dale appropriate cooling/warming therapies per order- Administer medications as ordered- Instruct and encourage patient and family to use good hand hygiene technique- Identify and instruct in appropriate isolation precautions for identified infection/condition  Outcome: Progressing     Problem: Prexisting or High Potential for Compromised Skin Integrity  Goal: Skin integrity is maintained or improved  Description: INTERVENTIONS:- Identify patients at risk for skin breakdown- Assess and monitor skin integrity- Assess and monitor nutrition and hydration status- Monitor labs - Assess for incontinence - Turn and reposition patient- Assist with mobility/ambulation- Relieve pressure over bony prominences- Avoid friction and shearing- Provide appropriate hygiene as needed including keeping skin clean and dry- Evaluate need for skin moisturizer/barrier cream- Collaborate with interdisciplinary team - Patient/family teaching- Consider wound care consult   Outcome: Progressing

## 2025-05-13 NOTE — PROGRESS NOTES
Progress Note - Hospitalist   Name: Gia Vaughan 84 y.o. female I MRN: [id number]  Unit/Bed#: S -01 I Date of Admission: 5/11/2025   Date of Service: 5/13/2025 I Hospital Day: 2    Assessment & Plan  Acute on chronic systolic heart failure (HCC)  Lab Results   Component Value Date    LVEF 45 04/03/2024    BNP 2,982 (H) 05/11/2025    BNP 1,246 (H) 03/03/2025     05/10/25  62.8kg   Wt Readings from Last 10 Encounters:   04/02/25 62.8 kg (138 lb 8 oz)   03/06/25 60.3 kg (132 lb 15 oz)   10/17/24 63.3 kg (139 lb 8.8 oz)   09/06/24 68.5 kg (151 lb 0.2 oz)   05/31/24 68 kg (150 lb)   04/04/24 68.4 kg (150 lb 12.8 oz)   01/29/24 70.8 kg (156 lb)   04/24/23 68 kg (150 lb)   11/15/22 66.7 kg (147 lb)   11/11/22 66.7 kg (147 lb)     Results for orders placed during the hospital encounter of 04/01/24    Echo complete w/ contrast if indicated    Interpretation Summary    Left Ventricle: Left ventricular cavity size is upper normal. Wall thickness is mildly increased. The left ventricular ejection fraction is 45%. Systolic function is mildly reduced. Diastolic function is moderately abnormal, consistent with grade II (pseudonormal) relaxation.    The following segments are akinetic: basal inferoseptal, basal inferior and mid inferior.    IVS: There is abnormal septal motion.    Left Atrium: The atrium is mildly dilated.    Atrial Septum: The septum bows into the right atrium, suggesting increased left atrial pressure.    Aortic Valve: There is aortic valve sclerosis.    Mitral Valve: There is mild annular calcification.    Tricuspid Valve: There is mild regurgitation.    Presents with increased shortness of breath and lower extremity edema  NYHA Class III.,   Patient is currently volume overloaded.    Pharmacotherapies / Neurohormonal Blockade:  --Beta Blocker: Toprol XL 25 BID  --ARNi / ACEi / ARB: Losartan 25 QD  --Aldosterone Antagonist:  --SGLT2 Inhibitor: NO--H/O UTI's   --Diuretic: Lasix 20 mg QD    NOT on oxygen  at home  Plan:  Continue IV Lasix 40 mg twice daily  Follow-up echo  Currently on 5 L O2, wean as tolerated  Sodium restriction 2g  Fluid restriction <2L/day  Goal Mg > 2 and K > 4; Replete prn  HOB > 30°, Daily standing weights, Measure I/O            Fall  CT cervical spine, x-ray hip/pelvis and x-ray knee negative for fractures  Patient complaining of left elbow pain  X-ray left elbow showing acute nondisplaced transcondylar fracture of distal humerus      Plan  Tylenol 975 mg scheduled 3 times daily  Oxy 2.5 every 4h as needed  PT OT Eval and treat  Fall precautions  Stage 3b chronic kidney disease ([facility])  Lab Results   Component Value Date    EGFR 40 05/13/2025    EGFR 37 05/12/2025    EGFR 51 05/11/2025    CREATININE 1.22 05/13/2025    CREATININE 1.30 05/12/2025    CREATININE 1.00 05/11/2025     Creatinine increased by 0.3  Currently being diuresed with IV Lasix 40 mg twice daily    Plan  Monitor BMP  Type 2 diabetes mellitus with stage 3 chronic kidney disease, unspecified whether long term insulin use, unspecified whether stage 3a or 3b CKD ([facility])  Lab Results   Component Value Date    HGBA1C 7.5 (H) 05/11/2025       Recent Labs     05/12/25  1614 05/12/25 2054 05/13/25  0640 05/13/25  1020   POCGLU 161* 196* 76 174*       Blood Sugar Average: Last 72 hrs:  (P) [id number]  Recent Labs     05/12/25 2054 05/13/25  0447 05/13/25  0640 05/13/25  1020   POCGLU 196*  --  76 174*   GLUC  --  85  --   --        Hold home regimen while inpatient and resume on discharge   Diabetic diet  Insulin regimen  Humalog 5 units TID AC  Lantus 20 units HS  Glucose checks and Insulin correction ACHS  Goal -180 while admitted, adjusting insulin regimen as appropriate  Monitor for hypoglycemia and treat per protocol    CLL (chronic lymphocytic leukemia) ([facility])  Need GOC w/ nephew who is POA    PLAN:  Oncology outpatient currently monitoring levels   Moderate dementia ([facility])  Urinary retention protocol  Delirium  precautions    Primary hypertension  Present on admission    PLAN:  Continue home medications.     OA (osteoarthritis)  Present on admission    PLAN:  Continue home medications.     HLD (hyperlipidemia)  Present on admission    PLAN:  Continue home medications.     VTE Pharmacologic Prophylaxis: VTE Score: 6 High Risk (Score >/= 5) - Pharmacological DVT Prophylaxis Ordered: enoxaparin (Lovenox). Sequential Compression Devices Ordered.    Mobility:   Basic Mobility Inpatient Raw Score: 9  JH-HLM Goal: 3: Sit at edge of bed  JH-HLM Achieved: 3: Sit at edge of bed  JH-HLM Goal achieved. Continue to encourage appropriate mobility.    Patient Centered Rounds: I performed bedside rounds with nursing staff today.   Discussions with Specialists or Other Care Team Provider: None    Education and Discussions with Family / Patient: Updated  (nephew) via phone.    Current Length of Stay: 2 day(s)  Current Patient Status: Inpatient   Certification Statement: The patient will continue to require additional inpatient hospital stay due to CHF exacerbation  Discharge Plan: Anticipate discharge in 24-48 hrs to discharge location to be determined pending rehab evaluations.    Code Status: Level 1 - Full Code    Subjective   No acute events overnight.  Patient examined at bedside and is alert and oriented to self and hospital.  She does not endorse shortness of breath but endorses left arm pain.    Objective :  Temp:  [98.1 °F (36.7 °C)-98.3 °F (36.8 °C)] 98.1 °F (36.7 °C)  HR:  [44-67] 58  BP: (102-142)/(48-82) 130/61  Resp:  [18] 18  SpO2:  [93 %-97 %] 97 %  O2 Device: Nasal cannula  Nasal Cannula O2 Flow Rate (L/min):  [5 L/min-6 L/min] 5 L/min    Body mass index is 22.45 kg/m².     Input and Output Summary (last 24 hours):     Intake/Output Summary (Last 24 hours) at 5/13/2025 1413  Last data filed at 5/13/2025 0900  Gross per 24 hour   Intake 480 ml   Output 600 ml   Net -120 ml       Physical Exam  HENT:      Head:  Normocephalic and atraumatic.      Nose: No congestion.      Mouth/Throat:      Mouth: Mucous membranes are moist.   Eyes:      Extraocular Movements: Extraocular movements intact.   Cardiovascular:      Rate and Rhythm: Normal rate and regular rhythm.      Pulses: Normal pulses.      Heart sounds: Normal heart sounds.   Pulmonary:      Effort: No respiratory distress.      Breath sounds: Examination of the right-middle field reveals rales. Examination of the left-middle field reveals rales. Examination of the right-lower field reveals rales. Examination of the left-lower field reveals rales. Rales present. No wheezing.   Abdominal:      General: There is no distension.      Palpations: Abdomen is soft.      Tenderness: There is no abdominal tenderness.   Musculoskeletal:         General: No swelling.      Left elbow: Swelling present. Decreased range of motion.      Cervical back: Normal range of motion.      Right lower leg: No edema.      Left lower leg: No edema.      Comments: Splint present left arm   Skin:     General: Skin is warm and dry.   Neurological:      Mental Status: She is alert. She is disoriented.   Psychiatric:         Mood and Affect: Mood normal.         Behavior: Behavior normal.         Lines/Drains:              Lab Results: I have reviewed the following results:   Results from last 7 days   Lab Units 05/13/25  0447   WBC Thousand/uL 68.49*   HEMOGLOBIN g/dL 9.0*   HEMATOCRIT % 28.9*   PLATELETS Thousands/uL 140*   LYMPHO PCT % 87*   MONO PCT % 0*   EOS PCT % 0     Results from last 7 days   Lab Units 05/13/25  0447 05/12/25  0615 05/11/25  0624   SODIUM mmol/L 140   < > 139   POTASSIUM mmol/L 3.4*   < > 5.0   CHLORIDE mmol/L 106   < > 107   CO2 mmol/L 27   < > 22   BUN mg/dL 32*   < > 22   CREATININE mg/dL 1.22   < > 1.00   ANION GAP mmol/L 7   < > 10   CALCIUM mg/dL 8.3*   < > 9.3   ALBUMIN g/dL  --   --  3.9   TOTAL BILIRUBIN mg/dL  --   --  2.48*   ALK PHOS U/L  --   --  84   ALT U/L  --    --  23   AST U/L  --   --  21   GLUCOSE RANDOM mg/dL 85   < > 128    < > = values in this interval not displayed.     Results from last 7 days   Lab Units 05/11/25  0636   INR  1.22*     Results from last 7 days   Lab Units 05/13/25  1020 05/13/25  0640 05/12/25  2054 05/12/25  1614 05/12/25  1040 05/12/25  0708 05/11/25  2036 05/11/25  1556 05/11/25  0727   POC GLUCOSE mg/dl 174* 76 196* 161* 239* 93 255* 120 138     Results from last 7 days   Lab Units 05/11/25  1639   HEMOGLOBIN A1C % 7.5*     Results from last 7 days   Lab Units 05/11/25  0839 05/11/25  0636 05/11/25  0624   LACTIC ACID mmol/L 1.4  --  2.1*   PROCALCITONIN ng/ml  --  0.22  --        Recent Cultures (last 7 days):   Results from last 7 days   Lab Units 05/11/25  0636 05/11/25  0624   BLOOD CULTURE  No Growth at 48 hrs. No Growth at 48 hrs.       Imaging Results Review: I reviewed radiology reports from this admission including: chest xray and CT chest.  Other Study Results Review: EKG was reviewed.     Last 24 Hours Medication List:     Current Facility-Administered Medications:     acetaminophen (TYLENOL) tablet 975 mg, Q8H TESSA    aluminum-magnesium hydroxide-simethicone (MAALOX) oral suspension 30 mL, Q6H PRN    atorvastatin (LIPITOR) tablet 20 mg, Daily    calcium carbonate (TUMS) chewable tablet 500 mg, BID PRN    calcium carbonate-vitamin D 500 mg-5 mcg tablet 1 tablet, BID With Meals    Cholecalciferol (VITAMIN D3) tablet 1,000 Units, Daily    dextromethorphan-guaiFENesin (ROBITUSSIN DM) oral syrup 5 mL, BID    Diclofenac Sodium (VOLTAREN) 1 % topical gel 2 g, TID    docusate sodium (COLACE) capsule 100 mg, BID PRN    enoxaparin (LOVENOX) subcutaneous injection 30 mg, Daily    ferrous sulfate tablet 325 mg, BID With Meals    fluticasone (FLONASE) 50 mcg/act nasal spray 1 spray, Daily PRN    furosemide (LASIX) injection 40 mg, BID    guaiFENesin (MUCINEX) 12 hr tablet 600 mg, BID PRN    insulin glargine (LANTUS) subcutaneous injection 20  Units 0.2 mL, HS    insulin lispro (HumALOG/ADMELOG) 100 units/mL subcutaneous injection 1-5 Units, 4x Daily (AC & HS) **AND** Fingerstick Glucose (POCT), 4x Daily AC and at bedtime    insulin lispro (HumALOG/ADMELOG) 100 units/mL subcutaneous injection 5 Units, TID With Meals    [Held by provider] losartan (COZAAR) tablet 25 mg, Daily    metoprolol succinate (TOPROL-XL) 24 hr tablet 25 mg, Q12H TESSA    oxyCODONE (ROXICODONE) split tablet 2.5 mg, Q4H PRN    Administrative Statements   Today, Patient Was Seen By: Franky Nichols MD      **Please Note: This note may have been constructed using a voice recognition system.**

## 2025-05-13 NOTE — ASSESSMENT & PLAN NOTE
CT cervical spine, x-ray hip/pelvis and x-ray knee negative for fractures  Patient complaining of left elbow pain  X-ray left elbow showing acute nondisplaced transcondylar fracture of distal humerus      Plan  Tylenol 975 mg scheduled 3 times daily  Oxy 2.5 every 4h as needed  PT OT Eval and treat  Fall precautions

## 2025-05-13 NOTE — ASSESSMENT & PLAN NOTE
Lab Results   Component Value Date    LVEF 45 04/03/2024    BNP 2,982 (H) 05/11/2025    BNP 1,246 (H) 03/03/2025     05/10/25  62.8kg   Wt Readings from Last 10 Encounters:   04/02/25 62.8 kg (138 lb 8 oz)   03/06/25 60.3 kg (132 lb 15 oz)   10/17/24 63.3 kg (139 lb 8.8 oz)   09/06/24 68.5 kg (151 lb 0.2 oz)   05/31/24 68 kg (150 lb)   04/04/24 68.4 kg (150 lb 12.8 oz)   01/29/24 70.8 kg (156 lb)   04/24/23 68 kg (150 lb)   11/15/22 66.7 kg (147 lb)   11/11/22 66.7 kg (147 lb)     Results for orders placed during the hospital encounter of 04/01/24    Echo complete w/ contrast if indicated    Interpretation Summary    Left Ventricle: Left ventricular cavity size is upper normal. Wall thickness is mildly increased. The left ventricular ejection fraction is 45%. Systolic function is mildly reduced. Diastolic function is moderately abnormal, consistent with grade II (pseudonormal) relaxation.    The following segments are akinetic: basal inferoseptal, basal inferior and mid inferior.    IVS: There is abnormal septal motion.    Left Atrium: The atrium is mildly dilated.    Atrial Septum: The septum bows into the right atrium, suggesting increased left atrial pressure.    Aortic Valve: There is aortic valve sclerosis.    Mitral Valve: There is mild annular calcification.    Tricuspid Valve: There is mild regurgitation.    Presents with increased shortness of breath and lower extremity edema  NYHA Class III.,   Patient is currently volume overloaded.    Pharmacotherapies / Neurohormonal Blockade:  --Beta Blocker: Toprol XL 25 BID  --ARNi / ACEi / ARB: Losartan 25 QD  --Aldosterone Antagonist:  --SGLT2 Inhibitor: NO--H/O UTI's   --Diuretic: Lasix 20 mg QD    NOT on oxygen at home  Plan:  Continue IV Lasix 40 mg twice daily  Follow-up echo  Currently on 5 L O2, wean as tolerated  Sodium restriction 2g  Fluid restriction <2L/day  Goal Mg > 2 and K > 4; Replete prn  HOB > 30°, Daily standing weights, Measure I/O

## 2025-05-13 NOTE — ASSESSMENT & PLAN NOTE
Lab Results   Component Value Date    EGFR 40 05/13/2025    EGFR 37 05/12/2025    EGFR 51 05/11/2025    CREATININE 1.22 05/13/2025    CREATININE 1.30 05/12/2025    CREATININE 1.00 05/11/2025     Creatinine increased by 0.3  Currently being diuresed with IV Lasix 40 mg twice daily    Plan  Monitor BMP

## 2025-05-13 NOTE — ASSESSMENT & PLAN NOTE
Lab Results   Component Value Date    HGBA1C 7.5 (H) 05/11/2025       Recent Labs     05/12/25  1614 05/12/25 2054 05/13/25  0640 05/13/25  1020   POCGLU 161* 196* 76 174*       Blood Sugar Average: Last 72 hrs:  (P) [id number]  Recent Labs     05/12/25 2054 05/13/25  0447 05/13/25  0640 05/13/25  1020   POCGLU 196*  --  76 174*   GLUC  --  85  --   --        Hold home regimen while inpatient and resume on discharge   Diabetic diet  Insulin regimen  Humalog 5 units TID AC  Lantus 20 units HS  Glucose checks and Insulin correction ACHS  Goal -180 while admitted, adjusting insulin regimen as appropriate  Monitor for hypoglycemia and treat per protocol

## 2025-05-14 ENCOUNTER — APPOINTMENT (INPATIENT)
Dept: RADIOLOGY | Facility: HOSPITAL | Age: 85
DRG: 291 | End: 2025-05-14
Payer: MEDICARE

## 2025-05-14 ENCOUNTER — APPOINTMENT (INPATIENT)
Dept: NON INVASIVE DIAGNOSTICS | Facility: HOSPITAL | Age: 85
DRG: 291 | End: 2025-05-14
Payer: MEDICARE

## 2025-05-14 LAB
ANION GAP SERPL CALCULATED.3IONS-SCNC: 8 MMOL/L (ref 4–13)
AORTIC ROOT: 2.4 CM
AV LVOT MEAN GRADIENT: 1 MMHG
AV LVOT PEAK GRADIENT: 2 MMHG
BSA FOR ECHO PROCEDURE: 1.71 M2
BUN SERPL-MCNC: 25 MG/DL (ref 5–25)
CALCIUM SERPL-MCNC: 8.6 MG/DL (ref 8.4–10.2)
CHLORIDE SERPL-SCNC: 106 MMOL/L (ref 96–108)
CO2 SERPL-SCNC: 28 MMOL/L (ref 21–32)
CREAT SERPL-MCNC: 1.1 MG/DL (ref 0.6–1.3)
DOP CALC LVOT PEAK VEL VTI: 13.74 CM
DOP CALC LVOT PEAK VEL: 0.67 M/S
E WAVE DECELERATION TIME: 137 MS
E/A RATIO: 2.33
ERYTHROCYTE [DISTWIDTH] IN BLOOD BY AUTOMATED COUNT: 17.6 % (ref 11.6–15.1)
FRACTIONAL SHORTENING: 13 (ref 28–44)
GFR SERPL CREATININE-BSD FRML MDRD: 45 ML/MIN/1.73SQ M
GLUCOSE SERPL-MCNC: 114 MG/DL (ref 65–140)
GLUCOSE SERPL-MCNC: 140 MG/DL (ref 65–140)
GLUCOSE SERPL-MCNC: 160 MG/DL (ref 65–140)
GLUCOSE SERPL-MCNC: 221 MG/DL (ref 65–140)
GLUCOSE SERPL-MCNC: 99 MG/DL (ref 65–140)
HCT VFR BLD AUTO: 30.4 % (ref 34.8–46.1)
HGB BLD-MCNC: 9.5 G/DL (ref 11.5–15.4)
INTERVENTRICULAR SEPTUM IN DIASTOLE (PARASTERNAL SHORT AXIS VIEW): 1 CM
INTERVENTRICULAR SEPTUM: 1 CM (ref 0.6–1.1)
LAAS-AP2: 28.7 CM2
LAAS-AP4: 24.1 CM2
LEFT ATRIUM SIZE: 3.1 CM
LEFT ATRIUM VOLUME (MOD BIPLANE): 96 ML
LEFT ATRIUM VOLUME INDEX (MOD BIPLANE): 56.1 ML/M2
LEFT INTERNAL DIMENSION IN SYSTOLE: 4.8 CM (ref 2.1–4)
LEFT VENTRICULAR INTERNAL DIMENSION IN DIASTOLE: 5.5 CM (ref 3.5–6)
LEFT VENTRICULAR POSTERIOR WALL IN END DIASTOLE: 1.1 CM
LEFT VENTRICULAR STROKE VOLUME: 37 ML
LV EF US.2D.A4C+ESTIMATED: 36 %
LVSV (TEICH): 37 ML
MAGNESIUM SERPL-MCNC: 1.8 MG/DL (ref 1.9–2.7)
MCH RBC QN AUTO: 35.2 PG (ref 26.8–34.3)
MCHC RBC AUTO-ENTMCNC: 31.3 G/DL (ref 31.4–37.4)
MCV RBC AUTO: 113 FL (ref 82–98)
MITRAL REGURGITATION PEAK VELOCITY: 5.91 M/S
MITRAL VALVE MEAN INFLOW VELOCITY: 4.52 M/S
MITRAL VALVE REGURGITANT PEAK GRADIENT: 140 MMHG
MV E'TISSUE VEL-SEP: 5 CM/S
MV PEAK A VEL: 0.46 M/S
MV PEAK E VEL: 107 CM/S
MV STENOSIS PRESSURE HALF TIME: 40 MS
MV VALVE AREA P 1/2 METHOD: 5.5
PISA MRMAX VEL: 0.3 M/S
PISA RADIUS: 0.4 CM
PLATELET # BLD AUTO: 151 THOUSANDS/UL (ref 149–390)
PMV BLD AUTO: 12.8 FL (ref 8.9–12.7)
POTASSIUM SERPL-SCNC: 3.5 MMOL/L (ref 3.5–5.3)
RA PRESSURE ESTIMATED: 8 MMHG
RBC # BLD AUTO: 2.7 MILLION/UL (ref 3.81–5.12)
RIGHT ATRIUM AREA SYSTOLE A4C: 17.1 CM2
RIGHT VENTRICLE ID DIMENSION: 4.5 CM
RV PSP: 60 MMHG
SL CV DOP CALC MV VTI RETROGRADE: 208.4 CM
SL CV LEFT ATRIUM LENGTH A2C: 5.4 CM
SL CV LV EF: 35
SL CV MV MEAN GRADIENT RETROGRADE: 90 MMHG
SL CV PED ECHO LEFT VENTRICLE DIASTOLIC VOLUME (MOD BIPLANE) 2D: 146 ML
SL CV PED ECHO LEFT VENTRICLE SYSTOLIC VOLUME (MOD BIPLANE) 2D: 110 ML
SODIUM SERPL-SCNC: 142 MMOL/L (ref 135–147)
TR MAX PG: 52 MMHG
TR PEAK VELOCITY: 3.6 M/S
TRICUSPID ANNULAR PLANE SYSTOLIC EXCURSION: 2 CM
TRICUSPID VALVE PEAK REGURGITATION VELOCITY: 3.6 M/S
WBC # BLD AUTO: 70.16 THOUSAND/UL (ref 4.31–10.16)

## 2025-05-14 PROCEDURE — 93306 TTE W/DOPPLER COMPLETE: CPT | Performed by: INTERNAL MEDICINE

## 2025-05-14 PROCEDURE — 93005 ELECTROCARDIOGRAM TRACING: CPT

## 2025-05-14 PROCEDURE — 73610 X-RAY EXAM OF ANKLE: CPT

## 2025-05-14 PROCEDURE — 99232 SBSQ HOSP IP/OBS MODERATE 35: CPT | Performed by: INTERNAL MEDICINE

## 2025-05-14 PROCEDURE — 97163 PT EVAL HIGH COMPLEX 45 MIN: CPT

## 2025-05-14 PROCEDURE — 85027 COMPLETE CBC AUTOMATED: CPT

## 2025-05-14 PROCEDURE — 97167 OT EVAL HIGH COMPLEX 60 MIN: CPT

## 2025-05-14 PROCEDURE — 82948 REAGENT STRIP/BLOOD GLUCOSE: CPT

## 2025-05-14 PROCEDURE — 72100 X-RAY EXAM L-S SPINE 2/3 VWS: CPT

## 2025-05-14 PROCEDURE — 83735 ASSAY OF MAGNESIUM: CPT

## 2025-05-14 PROCEDURE — 80048 BASIC METABOLIC PNL TOTAL CA: CPT

## 2025-05-14 PROCEDURE — 97535 SELF CARE MNGMENT TRAINING: CPT

## 2025-05-14 PROCEDURE — 97116 GAIT TRAINING THERAPY: CPT

## 2025-05-14 PROCEDURE — 93306 TTE W/DOPPLER COMPLETE: CPT

## 2025-05-14 RX ORDER — MAGNESIUM SULFATE HEPTAHYDRATE 40 MG/ML
2 INJECTION, SOLUTION INTRAVENOUS ONCE
Status: COMPLETED | OUTPATIENT
Start: 2025-05-14 | End: 2025-05-14

## 2025-05-14 RX ADMIN — INSULIN LISPRO 5 UNITS: 100 INJECTION, SOLUTION INTRAVENOUS; SUBCUTANEOUS at 17:38

## 2025-05-14 RX ADMIN — Medication 1000 UNITS: at 08:07

## 2025-05-14 RX ADMIN — ENOXAPARIN SODIUM 30 MG: 30 INJECTION SUBCUTANEOUS at 08:06

## 2025-05-14 RX ADMIN — DICLOFENAC SODIUM 2 G: 10 GEL TOPICAL at 21:09

## 2025-05-14 RX ADMIN — ACETAMINOPHEN 975 MG: 325 TABLET, FILM COATED ORAL at 13:10

## 2025-05-14 RX ADMIN — INSULIN GLARGINE 20 UNITS: 100 INJECTION, SOLUTION SUBCUTANEOUS at 21:09

## 2025-05-14 RX ADMIN — FUROSEMIDE 40 MG: 10 INJECTION, SOLUTION INTRAMUSCULAR; INTRAVENOUS at 08:06

## 2025-05-14 RX ADMIN — Medication 1 TABLET: at 07:42

## 2025-05-14 RX ADMIN — METOPROLOL SUCCINATE 25 MG: 25 TABLET, EXTENDED RELEASE ORAL at 21:10

## 2025-05-14 RX ADMIN — MAGNESIUM SULFATE HEPTAHYDRATE 2 G: 40 INJECTION, SOLUTION INTRAVENOUS at 07:37

## 2025-05-14 RX ADMIN — Medication 1 TABLET: at 17:41

## 2025-05-14 RX ADMIN — ACETAMINOPHEN 975 MG: 325 TABLET, FILM COATED ORAL at 21:09

## 2025-05-14 RX ADMIN — DICLOFENAC SODIUM 2 G: 10 GEL TOPICAL at 08:30

## 2025-05-14 RX ADMIN — INSULIN LISPRO 5 UNITS: 100 INJECTION, SOLUTION INTRAVENOUS; SUBCUTANEOUS at 11:35

## 2025-05-14 RX ADMIN — GUAIFENESIN AND DEXTROMETHORPHAN 5 ML: 100; 10 SYRUP ORAL at 08:07

## 2025-05-14 RX ADMIN — FERROUS SULFATE TAB 325 MG (65 MG ELEMENTAL FE) 325 MG: 325 (65 FE) TAB at 17:41

## 2025-05-14 RX ADMIN — FUROSEMIDE 40 MG: 10 INJECTION, SOLUTION INTRAMUSCULAR; INTRAVENOUS at 17:42

## 2025-05-14 RX ADMIN — GUAIFENESIN AND DEXTROMETHORPHAN 5 ML: 100; 10 SYRUP ORAL at 21:09

## 2025-05-14 RX ADMIN — METOPROLOL SUCCINATE 25 MG: 25 TABLET, EXTENDED RELEASE ORAL at 08:07

## 2025-05-14 RX ADMIN — FERROUS SULFATE TAB 325 MG (65 MG ELEMENTAL FE) 325 MG: 325 (65 FE) TAB at 07:41

## 2025-05-14 RX ADMIN — Medication 2.5 MG: at 10:30

## 2025-05-14 RX ADMIN — DICLOFENAC SODIUM 2 G: 10 GEL TOPICAL at 17:37

## 2025-05-14 RX ADMIN — INSULIN LISPRO 2 UNITS: 100 INJECTION, SOLUTION INTRAVENOUS; SUBCUTANEOUS at 21:11

## 2025-05-14 RX ADMIN — ACETAMINOPHEN 975 MG: 325 TABLET, FILM COATED ORAL at 05:27

## 2025-05-14 RX ADMIN — INSULIN LISPRO 1 UNITS: 100 INJECTION, SOLUTION INTRAVENOUS; SUBCUTANEOUS at 17:37

## 2025-05-14 RX ADMIN — INSULIN LISPRO 5 UNITS: 100 INJECTION, SOLUTION INTRAVENOUS; SUBCUTANEOUS at 07:53

## 2025-05-14 RX ADMIN — ATORVASTATIN CALCIUM 20 MG: 20 TABLET, FILM COATED ORAL at 08:07

## 2025-05-14 NOTE — ASSESSMENT & PLAN NOTE
Lab Results   Component Value Date    EGFR 45 05/14/2025    EGFR 40 05/13/2025    EGFR 37 05/12/2025    CREATININE 1.10 05/14/2025    CREATININE 1.22 05/13/2025    CREATININE 1.30 05/12/2025     Creatinine increased by 0.3  Currently being diuresed with IV Lasix 40 mg twice daily    Plan  Creatinine stable  Monitor BMP

## 2025-05-14 NOTE — ASSESSMENT & PLAN NOTE
Lab Results   Component Value Date    HGBA1C 7.5 (H) 05/11/2025       Recent Labs     05/13/25  1613 05/13/25 2118 05/14/25  0711 05/14/25  1112   POCGLU 193* 282* 99 140       Blood Sugar Average: Last 72 hrs:  (P) [id number]  Recent Labs     05/13/25 2118 05/14/25  0434 05/14/25  0711 05/14/25  1112   POCGLU 282*  --  99 140   GLUC  --  114  --   --        Hold home regimen while inpatient and resume on discharge   Diabetic diet  Insulin regimen  Humalog 5 units TID AC  Lantus 20 units HS  Glucose checks and Insulin correction ACHS  Goal -180 while admitted, adjusting insulin regimen as appropriate  Monitor for hypoglycemia and treat per protocol

## 2025-05-14 NOTE — PLAN OF CARE
Problem: SAFETY,RESTRAINT: NV/NON-SELF DESTRUCTIVE BEHAVIOR  Goal: Remains free of harm/injury (restraint for non violent/non self-detsructive behavior)  Description: INTERVENTIONS:- Instruct patient/family regarding restraint use - Assess and monitor physiologic and psychological status - Provide interventions and comfort measures to meet assessed patient needs - Identify and implement measures to help patient regain control- Assess readiness for release of restraint   Outcome: Progressing  Goal: Returns to optimal restraint-free functioning  Description: INTERVENTIONS:- Assess the patient's behavior and symptoms that indicate continued need for restraint- Identify and implement measures to help patient regain control- Assess readiness for release of restraint   Outcome: Progressing     Problem: Potential for Falls  Goal: Patient will remain free of falls  Description: INTERVENTIONS:- Educate patient/family on patient safety including physical limitations- Instruct patient to call for assistance with activity - Consult OT/PT to assist with strengthening/mobility - Keep Call bell within reach- Keep bed low and locked with side rails adjusted as appropriate- Keep care items and personal belongings within reach- Initiate and maintain comfort rounds- Make Fall Risk Sign visible to staff- Offer Toileting every 2 Hours, in advance of need- Initiate/Maintain bed alarm- Obtain necessary fall risk management equipment- Apply yellow socks and bracelet for high fall risk patients- Consider moving patient to room near nurses station  Outcome: Progressing     Problem: PAIN - ADULT  Goal: Verbalizes/displays adequate comfort level or baseline comfort level  Description: Interventions:- Encourage patient to monitor pain and request assistance- Assess pain using appropriate pain scale- Administer analgesics based on type and severity of pain and evaluate response- Implement non-pharmacological measures as appropriate and  evaluate response- Consider cultural and social influences on pain and pain management- Notify physician/advanced practitioner if interventions unsuccessful or patient reports new pain  Outcome: Progressing     Problem: DISCHARGE PLANNING  Goal: Discharge to home or other facility with appropriate resources  Description: INTERVENTIONS:- Identify barriers to discharge w/patient and caregiver- Arrange for needed discharge resources and transportation as appropriate- Identify discharge learning needs (meds, wound care, etc.)- Arrange for interpretive services to assist at discharge as needed- Refer to Case Management Department for coordinating discharge planning if the patient needs post-hospital services based on physician/advanced practitioner order or complex needs related to functional status, cognitive ability, or social support system  Outcome: Progressing     Problem: Knowledge Deficit  Goal: Patient/family/caregiver demonstrates understanding of disease process, treatment plan, medications, and discharge instructions  Description: Complete learning assessment and assess knowledge base.Interventions:- Provide teaching at level of understanding- Provide teaching via preferred learning methods  Outcome: Progressing     Problem: METABOLIC, FLUID AND ELECTROLYTES - ADULT  Goal: Glucose maintained within target range  Description: INTERVENTIONS:- Monitor Blood Glucose as ordered- Assess for signs and symptoms of hyperglycemia and hypoglycemia- Administer ordered medications to maintain glucose within target range- Assess nutritional intake and initiate nutrition service referral as needed  Outcome: Progressing     Problem: INFECTION - ADULT  Goal: Absence or prevention of progression during hospitalization  Description: INTERVENTIONS:- Assess and monitor for signs and symptoms of infection- Monitor lab/diagnostic results- Monitor all insertion sites, i.e. indwelling lines, tubes, and drains- Monitor endotracheal if  appropriate and nasal secretions for changes in amount and color- Morrison appropriate cooling/warming therapies per order- Administer medications as ordered- Instruct and encourage patient and family to use good hand hygiene technique- Identify and instruct in appropriate isolation precautions for identified infection/condition  Outcome: Progressing     Problem: Prexisting or High Potential for Compromised Skin Integrity  Goal: Skin integrity is maintained or improved  Description: INTERVENTIONS:- Identify patients at risk for skin breakdown- Assess and monitor skin integrity- Assess and monitor nutrition and hydration status- Monitor labs - Assess for incontinence - Turn and reposition patient- Assist with mobility/ambulation- Relieve pressure over bony prominences- Avoid friction and shearing- Provide appropriate hygiene as needed including keeping skin clean and dry- Evaluate need for skin moisturizer/barrier cream- Collaborate with interdisciplinary team - Patient/family teaching- Consider wound care consult   Outcome: Progressing

## 2025-05-14 NOTE — CASE MANAGEMENT
Case Management Assessment & Discharge Planning Note    Patient name Gia Vaughan  Location S /S -01 MRN [id number]  : 1940 Date 2025       Current Admission Date: 2025  Current Admission Diagnosis:Acute on chronic systolic heart failure (HCC)   Patient Active Problem List    Diagnosis Date Noted    Elbow fracture, left, closed, initial encounter 2025    ACUTE RESPIRATORY FAILURE, UNSP W HYPOXIA OR HYPERCAPNIA 2025    Cardiomyopathy (HCC) 2025    Acute diverticulitis 10/15/2024    Abnormal urinalysis 10/15/2024    Metabolic encephalopathy 10/15/2024    Diarrhea 2024    COVID 2024    Headache 2024    Hypertensive urgency 2024    Dizziness 2024    Suspected deep tissue injury of unknown depth 2022    Acute postoperative pain of right knee 2022    Encephalopathy 10/24/2022    Deep tissue injury 10/21/2022    Orthostasis 10/19/2022    H/O dizziness 10/19/2022    CLL (chronic lymphocytic leukemia) (HCC) 10/13/2022    Intertrochanteric fracture of right femur (HCC) 10/11/2022    Displaced fracture of middle phalanx of left ring finger, initial encounter for closed fracture 10/11/2022    Primary hypertension 2021    Moderate dementia (HCC) 2021    Lymphadenopathy 2020    Elevated liver enzymes 2020    Fall 2020    Stage 3b chronic kidney disease (HCC) 2020    HLD (hyperlipidemia) 2020    OA (osteoarthritis) 2020    Acute on chronic systolic heart failure (HCC) 2020    Type 2 diabetes mellitus with stage 3 chronic kidney disease, unspecified whether long term insulin use, unspecified whether stage 3a or 3b CKD (HCC)       LOS (days): 3  Geometric Mean LOS (GMLOS) (days): 3.9  Days to GMLOS:0.7     OBJECTIVE:    Risk of Unplanned Readmission Score: 27.36         Current admission status: Inpatient       Preferred Pharmacy:   UNKNOWN - FOLLOW UP PRIOR TO DISCHARGE TO  E-PRESCRIBE  No address on file      [id number] - [place] - [address]  [address]  [place] [address]  Phone: [phone] Fax: [phone]    [id number] [place] - [address]  [address]  [place] [address]  Phone: [phone] Fax: [phone]    [facility] - [place], [place] - [address]  [address]  [address]  [place] [address]  Phone: [phone] Fax: [phone]    Primary Care Provider: No primary care provider on file.    Primary Insurance: MEDICARE  Secondary Insurance: [employer]UR[employer]    ASSESSMENT:  Active Health Care Proxies    There are no active Health Care Proxies on file.                 Readmission Root Cause  30 Day Readmission: No    Patient Information  Admitted from:: Facility ([facility])  Mental Status: Confused (Dementia)  During Assessment patient was accompanied by: Not accompanied during assessment  Assessment information provided by:: Other - please comment (Charles Ny (Nephew/POA))  Primary Caregiver: Other (Comment)  Caregiver's Name:: [facility]  Caregiver's Relationship to Patient:: Other (Specify)  Caregiver's Telephone Number:: [phone]  Support Systems: Family members, Home care staff  County of Residence: [place]  What [facility] do you live in?: [place]  Home entry access options. Select all that apply.: No steps to enter home  Type of Current Residence: Facility  Living Arrangements: Lives in Facility  Is patient a ?: No    Activities of Daily Living Prior to Admission  Functional Status: Assistance  Completes ADLs independently?: No  Level of ADL dependence: Assistance  Ambulates independently?: No  Level of ambulatory dependence: Assistance  Does patient use assisted devices?: Yes  Assisted Devices (DME) used: Walker  Does patient currently own DME?: Yes  What DME does the patient currently own?: Walker, Wheelchair  Does patient have a history of Outpatient Therapy (PT/OT)?:  No  Does the patient have a history of Short-Term Rehab?: Yes  Does patient have a history of HHC?: [facility]  Does patient currently have HHC?: No         Patient Information Continued  Income Source: Pension/USP  Does patient have prescription coverage?: Yes  Can the patient afford their medications and any related supplies (such as glucometers or test strips)?: Yes  Does patient receive dialysis treatments?: No  Does patient have a history of substance abuse?: No  Does patient have a history of Mental Health Diagnosis?: No         Means of Transportation  Means of Transport to Appts:: Other (Comment) (Facility helps coordinate)          DISCHARGE DETAILS:    Discharge planning discussed with:: Charles Ny (Nephew)  [place] of Choice: Yes  Comments - [place] of Choice: Reviewed recommendation for STR  CM contacted family/caregiver?: Yes  Were Treatment Team discharge recommendations reviewed with patient/caregiver?: Yes  Did patient/caregiver verbalize understanding of patient care needs?: N/A- going to facility  Were patient/caregiver advised of the risks associated with not following Treatment Team discharge recommendations?: Yes    Contacts  Patient Contacts: Charles Ny (Nephew)  Relationship to Patient:: Family  Contact Method: Phone  Phone Number: [phone]  Reason/Outcome: Discharge Planning, Referral, Emergency Contact, Continuity of Care    Requested Home Health Care         Is the patient interested in HHC at discharge?: No    DME Referral Provided  Referral made for DME?: No    Other Referral/Resources/Interventions Provided:  Interventions: Short Term Rehab         Treatment Team Recommendation: Short Term Rehab                                               Upon review of chart patient has dementia at baseline.  CM contacted patient's POA her nephew.  CM name and role reviewed.  CM assessment completed and charted above.    CM discussed recommendation for STR at [place].  Charles is  agreeable with a blanket referral however noted [facility] where patient has been int he past is there preference as it is close for them to visit and keep an eye on her.    Charles advised he is going out of town on Friday and patient will need transportation arranged to get to rehab.  He will be available by phone for updates.    CM placed a blanket referral for STR in [facility].    CM department will continue to follow to assist with discharge coordination.

## 2025-05-14 NOTE — ASSESSMENT & PLAN NOTE
Lab Results   Component Value Date    LVEF 35 05/14/2025    BNP 2,982 (H) 05/11/2025    BNP 1,246 (H) 03/03/2025     05/10/25  62.8kg   Wt Readings from Last 10 Encounters:   04/02/25 62.8 kg (138 lb 8 oz)   03/06/25 60.3 kg (132 lb 15 oz)   10/17/24 63.3 kg (139 lb 8.8 oz)   09/06/24 68.5 kg (151 lb 0.2 oz)   05/31/24 68 kg (150 lb)   04/04/24 68.4 kg (150 lb 12.8 oz)   01/29/24 70.8 kg (156 lb)   04/24/23 68 kg (150 lb)   11/15/22 66.7 kg (147 lb)   11/11/22 66.7 kg (147 lb)     Results for orders placed during the hospital encounter of 04/01/24    Echo complete w/ contrast if indicated    Interpretation Summary    Left Ventricle: Left ventricular cavity size is upper normal. Wall thickness is mildly increased. The left ventricular ejection fraction is 45%. Systolic function is mildly reduced. Diastolic function is moderately abnormal, consistent with grade II (pseudonormal) relaxation.    The following segments are akinetic: basal inferoseptal, basal inferior and mid inferior.    IVS: There is abnormal septal motion.    Left Atrium: The atrium is mildly dilated.    Atrial Septum: The septum bows into the right atrium, suggesting increased left atrial pressure.    Aortic Valve: There is aortic valve sclerosis.    Mitral Valve: There is mild annular calcification.    Tricuspid Valve: There is mild regurgitation.    Presents with increased shortness of breath and lower extremity edema  NYHA Class III.,   Patient is currently volume overloaded.    Pharmacotherapies / Neurohormonal Blockade:  --Beta Blocker: Toprol XL 25 BID  --ARNi / ACEi / ARB: Losartan 25 QD  --Aldosterone Antagonist:  --SGLT2 Inhibitor: NO--H/O UTI's   --Diuretic: Lasix 20 mg QD    NOT on oxygen at home  Plan:  Continue IV Lasix 40 mg twice daily  Follow-up echo  Currently on 3 L O2, wean as tolerated  Sodium restriction 2g  Fluid restriction <2L/day  Goal Mg > 2 and K > 4; Replete prn  HOB > 30°, Daily standing weights, Measure I/O

## 2025-05-14 NOTE — CASE MANAGEMENT
Case Management Discharge Planning Note    Patient name Gia Vaughan  Location S /S -01 MRN [id number]  : 1940 Date 2025       Current Admission Date: 2025  Current Admission Diagnosis:Acute on chronic systolic heart failure (HCC)   Patient Active Problem List    Diagnosis Date Noted    Elbow fracture, left, closed, initial encounter 2025    ACUTE RESPIRATORY FAILURE, UNSP W HYPOXIA OR HYPERCAPNIA 2025    Cardiomyopathy (HCC) 2025    Acute diverticulitis 10/15/2024    Abnormal urinalysis 10/15/2024    Metabolic encephalopathy 10/15/2024    Diarrhea 2024    COVID 2024    Headache 2024    Hypertensive urgency 2024    Dizziness 2024    Suspected deep tissue injury of unknown depth 2022    Acute postoperative pain of right knee 2022    Encephalopathy 10/24/2022    Deep tissue injury 10/21/2022    Orthostasis 10/19/2022    H/O dizziness 10/19/2022    CLL (chronic lymphocytic leukemia) ([facility]) 10/13/2022    Intertrochanteric fracture of right femur ([facility]) 10/11/2022    Displaced fracture of middle phalanx of left ring finger, initial encounter for closed fracture 10/11/2022    Primary hypertension 2021    Moderate dementia (HCC) 2021    Lymphadenopathy 2020    Elevated liver enzymes 2020    Fall 2020    Stage 3b chronic kidney disease (HCC) 2020    HLD (hyperlipidemia) 2020    OA (osteoarthritis) 2020    Acute on chronic systolic heart failure (HCC) 2020    Type 2 diabetes mellitus with stage 3 chronic kidney disease, unspecified whether long term insulin use, unspecified whether stage 3a or 3b CKD (HCC)       LOS (days): 3  Geometric Mean LOS (GMLOS) (days): 3.9  Days to GMLOS:0.6     OBJECTIVE:  Risk of Unplanned Readmission Score: 27.42         Current admission status: Inpatient   Preferred Pharmacy:   UNKNOWN - FOLLOW UP PRIOR TO DISCHARGE TO E-PRESCRIBE  No address on  file      [id number] - [place] - [address]  [address]  [place] [address]  Phone: [phone] Fax: [phone]    [id number] - [place] - [address]  [address]  [place] [address]  Phone: [phone] Fax: [phone]    [facility] - [place], [place] - [address]  [address]  [address]  [place] [address]  Phone: [phone] Fax: [phone]    Primary Care Provider: No primary care provider on file.    Primary Insurance: MEDICARE  Secondary Insurance: [employer]URANCE    DISCHARGE DETAILS:                                          Other Referral/Resources/Interventions Provided:  Interventions: Short Term Rehab       CM followed up on STR referral made.  Family preference of [facility] is able to offer patient a private room.  CM reserved the bed with [facility] in [place].    CM department will continue to follow to assist with discharge coordination.

## 2025-05-14 NOTE — OCCUPATIONAL THERAPY NOTE
Occupational Therapy Evaluation + Treatment      Patient Name: Gia Vaughan  Today's Date: 5/14/2025  Problem List  Principal Problem:    Acute on chronic systolic heart failure (HCC)  Active Problems:    Fall    Type 2 diabetes mellitus with stage 3 chronic kidney disease, unspecified whether long term insulin use, unspecified whether stage 3a or 3b CKD (HCC)    Stage 3b chronic kidney disease (HCC)    HLD (hyperlipidemia)    OA (osteoarthritis)    Moderate dementia (HCC)    Primary hypertension    CLL (chronic lymphocytic leukemia) (HCC)    ACUTE RESPIRATORY FAILURE, UNSP W HYPOXIA OR HYPERCAPNIA    Elbow fracture, left, closed, initial encounter    Past Medical History  Past Medical History:   Diagnosis Date    Acute encephalopathy 10/15/2024    Benign adenomatous polyp of large intestine     CHF (congestive heart failure) (HCC)     Diabetes mellitus (HCC)     Hyperlipemia     Hypertension     Osteoarthritis     TIA (transient ischemic attack)      Past Surgical History  Past Surgical History:   Procedure Laterality Date    APPENDECTOMY      CARPAL TUNNEL RELEASE Right     HYSTERECTOMY W/ SALPINGO-OOPHERECTOMY      AK OPTX FEM SHFT FX W/INSJ IMED IMPLT W/WO SCREW Right 10/12/2022    Procedure: INSERTION NAIL IM FEMUR ANTEGRADE (TROCHANTERIC);  Surgeon: Eric Isaacs DO;  Location: [facility];  Service: Orthopedics         05/14/25 1025   OT Last Visit   OT Visit Date 05/14/25   Note Type   Note type Evaluation  (+ treatment)   Pain Assessment   Pain Assessment Tool 0-10   Pain Score 8   Pain Location/Orientation Orientation: Left;Location: Arm   Pain Radiating Towards elbow   Pain Onset/Description Frequency: Constant/Continuous;Descriptor: Sore;Descriptor: Discomfort   Effect of Pain on Daily Activities limits proper positioning, ease of ADLs, balance   Patient's Stated Pain Goal No pain   Hospital Pain Intervention(s) Repositioned;Ambulation/increased activity;Emotional support  (repositioned sling)  "  Multiple Pain Sites Yes   Pain 2   Mccray-Velasco FACES Pain Rating 2 6   Pain Location/Orientation 2 Orientation: Right;Orientation: Lower;Location: Back   Patient's Stated Pain Goal 2 No pain   Hospital Pain Intervention(s) 2 Repositioned;Ambulation/increased activity;Emotional support  (RN notified)   Restrictions/Precautions   Weight Bearing Precautions Per Order Yes   LUE Weight Bearing Per Order (S)  NWB  (found to have non displaced acute transcondylar distal humerus fracture)   Braces or Orthoses Sling;Splint  (LUE in posterior slab splint : sling for comfort, per ortho)   Other Precautions Cognitive;Chair Alarm;Bed Alarm;WBS;Multiple lines;Fall Risk;Pain   Home Living   Type of Home Assisted living  ([facility])   Home Layout One level;Access   Home Equipment Walker  (rollator)   Prior Function   Level of [place] Needs assistance with ADLs;Needs assistance with IADLS   Lives With Facility staff   Receives Help From Family;Personal care attendant   IADLs Family/Friend/Other provides transportation;Family/Friend/Other provides medication management;Family/Friend/Other provides meals   Falls in the last 6 months 1 to 4  (atleast x1 leading to current admission)   Vocational Retired   Comments Pt is a poor / questionable historian w/ underlying dementia dx. Per EMR, at baseline pt ambulates w/ rollator - has assist with ADLs/IADLs   Lifestyle   Autonomy Pt resides at [facility]. (A) for ADLs/IADLs. Use of rollator for fnxl mobility. (-) driving and (+) falls   Reciprocal Relationships Supportive family and facility staff   Service to Others retired   Intrinsic Gratification \"driving to the grocery store\"   General   Additional Pertinent History Admit s/p unwitnessed fall, unsure HS. XRAYs with non displaced acute transcondylar distal humerus fracture. Per ortho, pt is to be NWB to LUE in posterior slab splint : sling for comfort.  PMH of HFpEF on no O2, CKD, dementia   Family/Caregiver Present No " "  Subjective   Subjective \"I broke my arm!\"   ADL   Eating Assistance 5  Supervision/Setup   Grooming Assistance 4  Minimal Assistance   UB Bathing Assistance 3  Moderate Assistance   LB Bathing Assistance 2  Maximal Assistance   UB Dressing Assistance 3  Moderate Assistance   LB Dressing Assistance 2  Maximal Assistance   Toileting Assistance  3  Moderate Assistance   Additional Comments The above levels of assistance are anticipated based on functional performance deficits with use of clinical judgement.   Bed Mobility   Additional Comments Bed mobility NT: pt received OOB in recliner chair   Transfers   Sit to Stand 4  Minimal assistance   Additional items Increased time required;Verbal cues;Assist x 2  (second person assist to for adherance to NWB status + trunk stability)   Stand to Sit 4  Minimal assistance   Additional items Assist x 1;Increased time required;Verbal cues   Additional Comments STS performed from recliner chair with HHA. Instruction on WBS w/ assist to maintain during transitional movements   Functional Mobility   Additional Comments See tx session below for mobility   Balance   Static Sitting Fair +   Dynamic Sitting Fair   Static Standing Poor +   Dynamic Standing Poor   Activity Tolerance   Activity Tolerance Patient limited by fatigue;Patient limited by pain;Treatment limited secondary to medical complications (Comment);Other (Comment)  (impaired cognition)   Medical Staff Made Aware Spoke with PT   Nurse Made Aware Spoke with RN pre/post   RUE Assessment   RUE Assessment WFL   RUE Strength   RUE Overall Strength Within Functional Limits - able to perform ADL tasks with strength   LUE Assessment   LUE Assessment WFL   LUE Strength   LUE Overall Strength Within Functional Limits - able to perform ADL tasks with strength   Cognition   Overall Cognitive Status (S)  Impaired  (underlying dementia)   Arousal/Participation Responsive;Cooperative   Attention Attends with cues to redirect " "  Orientation Level Oriented to person;Disoriented to time  (Oriented to name, not  : grossly oriented to place \"hospital\", later states \"I broke my arm\")   Memory Decreased recall of precautions;Decreased short term memory;Decreased recall of recent events;Decreased long term memory   Following Commands Follows one step commands with increased time or repetition   Comments Pt ID via wristband, name and . Pleasently confused. Difficulty adhering to WBS, max verbal / tactile / visual cues provided throughout   Assessment   Limitation Decreased ADL status;Decreased Safe judgement during ADL;Decreased cognition;Decreased self-care trans;Decreased high-level ADLs  (pain, WBS)   Prognosis Fair   Assessment Patient is a 84 y.o. female seen for OT evaluation and treatment  following admission on 2025  s/p Acute on chronic systolic heart failure (HCC). Please see above for comprehensive list of comorbidities and significant PMHx impacting functional performance. Upon initial evaluation, pt appears to be performing below baseline functional status. Pt requires MODA for UB ADLs, MAXA for LB ADLs, JOÃO 1-2 ppl for transfers. The AM-PAC & Barthel Index outcome tools were used to assist in determining pt safety w/self care /mobility and appropriate d/c recommendations, see above for score. Occupational performance is affected by the following deficits:  decreased muscular strength , decreased balance , decreased standing tolerance for self care tasks , decreased dynamic balance impacting functional reach, poor postural control , decreased functional reach , decreased trunk control , decreased activity tolerance , impaired memory , attention to task, impaired judgement and problem solving , decreased emotional regulation and coping skills , impaired safety awareness , (+) pain , impaired learning ability d/t current cognitive status , impaired global mental status, and direction following. Personal/Environmental " factors impacting D/C include: (+) Hx of falls , Assistance needed for ADL/IADLs, Assistance needed for ADLs and functional mobility, High fall risk , decreased insight toward deficits , decreased recall of precautions , health management, and baseline cognitive deficits. Supporting factors include: able to maintain FFSU, support system available, facility staff available for continued assistance, and attitude towards recovery . Patient would benefit from OT services within the acute care setting to maximize level of functional independence in the following areas safety procedures , fall prevention , energy conservation , self-care transfers, bed mobility , functional mobility, and ADLs.  From OT standpoint, recommendation at time of D/C would be Level II (Moderate Resource Intensity) .   Goals   Patient Goals no direct rehab goals stated at this time: will continue to assess   LTG Time Frame 10-14   Long Term Goal See below   Plan   Treatment Interventions ADL retraining;Functional transfer training;UE strengthening/ROM;Cognitive reorientation;Patient/family training;Equipment evaluation/education;Compensatory technique education;Energy conservation;Activityengagement   Goal Expiration Date 05/24/25   OT Treatment Day 1   OT Frequency 3-5x/wk   Discharge Recommendation   Rehab Resource Intensity Level, OT II (Moderate Resource Intensity)   AM-PAC Daily Activity Inpatient   Lower Body Dressing 2   Bathing 2   Toileting 2   Upper Body Dressing 2   Grooming 3   Eating 3   Daily Activity Raw Score 14   Daily Activity Standardized Score (Calc for Raw Score >=11) 33.39   AM-PAC Applied Cognition Inpatient   Following a Speech/Presentation 1   Understanding Ordinary Conversation 3   Taking Medications 1   Remembering Where Things Are Placed or Put Away 2   Remembering List of 4-5 Errands 1   Taking Care of Complicated Tasks 1   Applied Cognition Raw Score 9   Applied Cognition Standardized Score 22.48   Barthel Index    Feeding 5   Bathing 0   Grooming Score 0   Dressing Score 5   Bladder Score 5   Bowels Score 10   Toilet Use Score 5   Transfers (Bed/Chair) Score 5   Mobility (Level Surface) Score 0   Stairs Score 0   Barthel Index Score 35   Additional Treatment Session   Start Time 1014   End Time 1025   Treatment Assessment Pt agreeable to tx following IE w/ intervention focused on fnxl mobility, ADLs and transfers. Visual demonstration provided on optimal reymundo-walker usage. Pt verbalizes understanding. STS performed x 2 with minimal assist from various self-care surfaces. Poor application / recall of WBS, requiring continued maximum verbal cueing. Able to perform anterior pericare sitting on the toilet w/ setup. Increased assist to complete posterior pericare in standing w/ MODA x 1 to steady. With use of reymundo-walker pt ambulated short household distance MODA-MAXA x 1, second person for safety. At the end of the session, pt remained sitting in the recliner hcair with alarm donned and all needs in reach. Compared to IE, pt displays improved activity tolerance and fnxl mobility however continues to limited by pain, impaired cognition, balance, postural alignment, standing tolerance, fnxl reach and dynamic balance. Pt will benefit from continued skilled OT to address functional performance deficits and optimize independence in mobility and ADL tasks. Will continue to assess 3-5x/week.   1014   End of Consult   Education Provided Yes   Patient Position at End of Consult Bedside chair;Bed/Chair alarm activated;All needs within reach   Nurse Communication Nurse aware of consult       GOALS:      -Patient will perform grooming tasks standing at sink with overall JOÃO in order to increase overall independence     -Patient will be JOÃO with UB dressing using AE and AD as needed in order to increase (I) with ADLs     -Patient will be JOÃO with UB bathing using AE and AD as needed in order to increase (I) with ADLs     -Patient will be  MODA with LB dressing with use of AE and AD as needed in order to increase (I) with ADLs     -Patient will be MODA with LB bathing with use of AE and AD as needed in order to increase (I) with ADLs    - Patient will perform UB/LB dressing with supervision using reymundo-techniques after skilled instruction with no minimal verbal cueing to ensure carryover    -Patient will complete toileting w/ JOÃO w/ G hygiene/thoroughness in order to reduce caregiver burden     -Patient will demonstrate JOÃO with bed mobility for ability to manage own comfort and initiate OOB tasks.      -Patient will perform functional transfers with JOÃO x 1 to/from all surfaces using DME as needed in order to increase (I) with functional tasks     -Patient will be JOÃO x 1 with functional mobility to/from bathroom for increased independence with toileting tasks     -Patient will tolerate therapeutic activities for greater than 30 min, in order to increase tolerance for functional activities.      -Patient will demonstrate standing for 3 min in order to increase active participation in functional activities    -Patient will independently identify and utilize 2-3 positive coping strategies to enhance overall wellbeing and engagement in valued occupations.    -Patient will verbalize and demonstrate understanding of WB status in 100% of tx sessions with moderate verbal cueing for increased safety and functional mobility.     The patient's raw score on the AM-PAC Daily Activity Inpatient Short Form is 14. A raw score of less than 19 suggests the patient may benefit from discharge to post-acute rehabilitation services. Please refer to the recommendation of the Occupational Therapist for safe discharge planning.    This session, pt required and most appropriately benefited from skilled OT/PT co-eval due to extensive physical assistance of SKILLED therapists,  significant regression from functional baseline, and decreased activity tolerance. OT and PT  goals were addressed separately as seen in documentation    Lucrecia Stevens MS OTR/L   [place] Licensure# [id number]

## 2025-05-14 NOTE — PHYSICAL THERAPY NOTE
"PHYSICAL THERAPY EVALUATION  NAME:  Gia Vaughan  DATE: 05/14/25    AGE:   84 y.o.  Mrn:   [id number]  Principal problem: Principal Problem:    Acute on chronic systolic heart failure (HCC)  Active Problems:    Fall    Type 2 diabetes mellitus with stage 3 chronic kidney disease, unspecified whether long term insulin use, unspecified whether stage 3a or 3b CKD (HCC)    Stage 3b chronic kidney disease (HCC)    HLD (hyperlipidemia)    OA (osteoarthritis)    Moderate dementia (HCC)    Primary hypertension    CLL (chronic lymphocytic leukemia) (HCC)    ACUTE RESPIRATORY FAILURE, UNSP W HYPOXIA OR HYPERCAPNIA    Elbow fracture, left, closed, initial encounter      Vitals:    05/14/25 0437 05/14/25 0600 05/14/25 0720 05/14/25 0800   BP:   150/66    Pulse:   67    Resp:       Temp:       TempSrc:       SpO2:    94%   Weight: 63.1 kg (139 lb 1.8 oz) 63.1 kg (139 lb 1.8 oz) 63 kg (139 lb)    Height:   5' 6\" (1.676 m)        Length Of Stay: 3  Performed at least 2 patient identifiers during session: Name and Epic photo  PHYSICAL THERAPY EVALUATION :    05/14/25 1014   PT Last Visit   PT Visit Date 05/14/25   Note Type   Note type Evaluation   Pain Assessment   Pain Assessment Tool 0-10   Pain Score 8   Pain Location/Orientation Orientation: Left;Location: Shoulder   Effect of Pain on Daily Activities limits proper position of elbow flexion in sling;  upright posue   Patient's Stated Pain Goal No pain   Hospital Pain Intervention(s) Repositioned;Emotional support;Other (Comment)  (OT repositioned in sling as pt able to tolerate)   Multiple Pain Sites Yes  (Pt reporting midsaggital Lumbar back pain, kaya after amb trial; Pt noted to have \"kyphotic prominence\" @ same area)   Restrictions/Precautions   Weight Bearing Precautions Per Order Yes   LUE Weight Bearing Per Order NWB   Braces or Orthoses Sling  (LUE)   Home Living   Type of Home Assisted living  ([facility])   Home Layout One level   Home Equipment Walker  (rollator \"all " "of the time\")   Prior Function   Level of [place] Needs assistance with ADLs;Needs assistance with IADLS   Lives With Facility staff   Receives Help From Family;Personal care attendant   IADLs Family/Friend/Other provides transportation;Family/Friend/Other provides meals;Family/Friend/Other provides medication management   Falls in the last 6 months   (at least one prior to this admission.  Patient unable to verbalize number of falls due to limited cognition)   Vocational Retired   General   Family/Caregiver Present No   Cognition   Overall Cognitive Status Impaired   Arousal/Participation Alert   Orientation Level Oriented to person;Oriented to place  (\"hospital\")   Memory Decreased recall of precautions;Decreased recall of recent events;Decreased short term memory   Following Commands Follows one step commands with increased time or repetition   Subjective   Subjective Patient pleasant and cooperative.  Limited Command following, and frequently attempting to use/WB through LUE   RUE Assessment   RUE Assessment WFL  (for transfers and WB for amb)   LUE Assessment   LUE Assessment X  (Formally tested due to fracture/sling)   RLE Assessment   RLE Assessment   (Some of MMT scores limited by limited command following of overpressure)   Strength RLE   R Hip Flexion 3/5   R Knee Extension 4-/5   R Ankle Dorsiflexion 4-/5  (Pt reporting increased pain with palpation at left lateral posterior proximal ankle (near tendons))   LLE Assessment   LLE Assessment   (Some of MMT scores limited by limited command following of overpressure)   Strength LLE   L Hip Flexion 3/5   L Knee Extension 4-/5   L Ankle Dorsiflexion 4/5   Vision-Basic Assessment   Current Vision   (Difficult to assess 2-minute command following)   Light Touch   RLE Light Touch Not tested  (Due to inconsistent command following)   LLE Light Touch Not tested   Bed Mobility   Supine to Sit   (NT as pt OOB in recliner upon entering room)   Transfers   Sit to " Stand 4  Minimal assistance   Additional items Increased time required;Verbal cues;Armrests;Assist x 2  (A of 2 initially to maintain NWB on LUE, but support also @ R side, instruction for hand placement, and maintaining NWB)   Stand to Sit 4  Minimal assistance   Additional items Assist x 1;Increased time required;Verbal cues;Armrests   Ambulation/Elevation   Gait pattern Excessively slow;Step to;R Knee Craly;L Knee Carly   Gait Assistance 4  Minimal assist   Additional items Assist x 2;Verbal cues;Tactile cues   Assistive Device   (HHA on R side, steadying A on L side)   Distance 1 step forward and backward, unable to amb furthre w/o more supportive device   Balance   Static Sitting Fair +   Static Standing Poor +   Endurance Deficit   Endurance Deficit Yes   Endurance Deficit Description limited standing tolerance, degradation of upright posture   Activity Tolerance   Activity Tolerance Patient limited by pain;Patient limited by fatigue   Medical Staff Made Aware care coordination w/ OT during eval and treatment, spoke ot Pasha from case mangement   Nurse Made Aware spoke to PCA/RN during and post activity     Assessment:   Pt is a 84 y.o. female who [facility] at [facility] on [facility] w/ primary impression of Acute on chronic systolic heart failure, however, patient also here status post fall and being treated for a nondisplaced transcondylar distal humeral fracture as well as hx of CLL.  Ortho recommending NWB LUE in a posterior splint, sling for comfort, follow-up in next 1 to 2 weeks.  Order placed for PT.      Prior to admission: Pt lived at [address] and reportedly uses a rolling walker for mobility.  Upon evaluation: Pt needed Ax2 initially performing transfers and pre-gait activities with handhold assist.  Patient frequently attempting to use LUE for weightbearing during transfers and ambulation.      Pt's clinical presentation is currently unstable/unpredictable given the  functional mobility deficits above, especially (but not limited to) weakness, gait deviations, pain, and orthopedic restrictions, and combined with medical complications including abnormal H&H, abnormal WBCs, low SpO2 values, new onset O2 use, and fear/retreat.  Pt IS NOT at her mobility baseline.      During this admission, pt would benefit from continued skilled inpatient PT in the acute care setting in order to address deficits as defined above to maximize function and mobility.      Recommendations:    From a PT standpoint, recommend next several sessions focus on continued mobility training using unilateral device such as hemiwalker versus other unilateral AD, LE strengthening to assist with transfers, upright posture promotion.      Prognosis Fair   Problem List Decreased strength;Decreased range of motion;Decreased endurance;Impaired balance;Decreased mobility;Decreased cognition;Pain;Orthopedic restrictions  (gait deviations)   Barriers to Discharge Decreased caregiver support;Inaccessible home environment   Goals   Patient Goals no rehab goals, but did state that she does not want to fall   STG Expiration Date 05/24/25   Short Term Goal #1 Goals: Pt will: Perform rolling  and supine<>sit bed mobility tasks with no more than min A to prepare for transfers and reposition in bed while maintaining NWB LUE. Perform transfers with no more than min A to decrease burden of care and promote proper hand placement and approach. Perform ambulation with LRAD (unilateral) for up to 30' with consistent min A of 1 to decrease burden of care and promote proper use of assistive device. Perform upright standing tolerance w/ at least 75% upright posture for 5 min to promote upright activities.   PT Treatment Day 1   Plan   Treatment/Interventions Functional transfer training;LE strengthening/ROM;Therapeutic exercise;Endurance training;Cognitive reorientation;Patient/family training;Equipment eval/education;Bed mobility;Gait  training;Spoke to nursing;Spoke to case management;Spoke to MD;OT   PT Frequency 3-5x/wk   Discharge Recommendation   Rehab Resource Intensity Level, PT II (Moderate Resource Intensity)   Equipment Recommended   (LRAD but UNILATERAL device, trials w/hemiwalker vs loftstrand crutch in future)   AM-PAC Basic Mobility Inpatient   Turning in Flat Bed Without Bedrails 2   Lying on Back to Sitting on Edge of Flat Bed Without Bedrails 2   Moving Bed to Chair 2   Standing Up From Chair Using Arms 2   Walk in Room 1   Climb 3-5 Stairs With Railing 1   Basic Mobility Inpatient Raw Score 10   Turning Head Towards Sound 3   Follow Simple Instructions 3   Low Function Basic Mobility Raw Score  16   Low Function Basic Mobility Standardized Score  25.72   [facility] Highest Level Of Mobility   -HL Goal 4: Move to chair/commode   -[facility] Achieved 6: Walk 10 steps or more  (during treatment session)   Additional Treatment Session   Start Time 1015   End Time 1035   Treatment Assessment Patient was able to ambulate further distances with use of unilateral device, but needed physical assist of 2 to complete the task, gradually more physical assistance during latter trials.  Continues to need assistance and verbal instruction for proper technique and maintaining NWB LUE during transfers.  Skilled PT recommended to progress patient towards treatment goals   Equipment Use Hemiwalker right side   Additional Treatment Day 1   Exercises   Neuro re-ed Additional treatment time: Transfers at least 2 trials sit to stand w/intermittent min A, mod A for stand -> sit plus verbal instruction for hand placement, completion of approach. Amb with hemiwalker with assist of 2 throughout either for physical A of for safety/balance, upright posture--however primary assist providing A 50% support, and later 75% support.  Time also spent between gait trials for  maximizing posture both unsupported and supported sitting while promoting NWB LUE   End of  "Consult   Patient Position at End of Consult All needs within reach;Bed/Chair alarm activated;Bedside chair   Pt requires PT /OT co-treat and co-eval due to required skilled interventions of at least 2 clinicians for care delivery, medical complexity w/ limited LUE WB, limited activity tolerance, and cognitive-behavioral impairments.   PT and OT goals addressed separately.   (Please find full objective findings from PT assessment regarding body systems outlined above).     The patient's -[facility] Basic Mobility Inpatient Short Form Raw Score is 10. A Raw score of less than or equal to 16 suggests the patient may benefit from discharge to post-acute rehabilitation services, which DOES coincide with CURRENT above PT recommendations.     However please refer to therapist recommendation for discharge planning given other factors that may influence destination.     Adapted from Nabeel M, Earlene J, Howard J, Renae J. [facility] “6-Clicks” Basic Mobility and Daily Activity Scores With Discharge Destination. Physical Therapy, 2021;101:1-9. DOI: [email or web address]    Portions of the record may have been created with voice recognition software.  Occasional wrong word or \"sound a like\" substitutions may have occurred due to the inherent limitations of voice recognition software.  Read the chart carefully and recognize, using context, where substitutions have occurred    "

## 2025-05-14 NOTE — PROGRESS NOTES
Progress Note - Hospitalist   Name: Gia Vaughan 84 y.o. female I MRN: [id number]  Unit/Bed#: S -01 I Date of Admission: 5/11/2025   Date of Service: 5/14/2025 I Hospital Day: 3    Assessment & Plan  Acute on chronic systolic heart failure (HCC)  Lab Results   Component Value Date    LVEF 35 05/14/2025    BNP 2,982 (H) 05/11/2025    BNP 1,246 (H) 03/03/2025     05/10/25  62.8kg   Wt Readings from Last 10 Encounters:   04/02/25 62.8 kg (138 lb 8 oz)   03/06/25 60.3 kg (132 lb 15 oz)   10/17/24 63.3 kg (139 lb 8.8 oz)   09/06/24 68.5 kg (151 lb 0.2 oz)   05/31/24 68 kg (150 lb)   04/04/24 68.4 kg (150 lb 12.8 oz)   01/29/24 70.8 kg (156 lb)   04/24/23 68 kg (150 lb)   11/15/22 66.7 kg (147 lb)   11/11/22 66.7 kg (147 lb)     Results for orders placed during the hospital encounter of 04/01/24    Echo complete w/ contrast if indicated    Interpretation Summary    Left Ventricle: Left ventricular cavity size is upper normal. Wall thickness is mildly increased. The left ventricular ejection fraction is 45%. Systolic function is mildly reduced. Diastolic function is moderately abnormal, consistent with grade II (pseudonormal) relaxation.    The following segments are akinetic: basal inferoseptal, basal inferior and mid inferior.    IVS: There is abnormal septal motion.    Left Atrium: The atrium is mildly dilated.    Atrial Septum: The septum bows into the right atrium, suggesting increased left atrial pressure.    Aortic Valve: There is aortic valve sclerosis.    Mitral Valve: There is mild annular calcification.    Tricuspid Valve: There is mild regurgitation.    Presents with increased shortness of breath and lower extremity edema  NYHA Class III.,   Patient is currently volume overloaded.    Pharmacotherapies / Neurohormonal Blockade:  --Beta Blocker: Toprol XL 25 BID  --ARNi / ACEi / ARB: Losartan 25 QD  --Aldosterone Antagonist:  --SGLT2 Inhibitor: NO--H/O UTI's   --Diuretic: Lasix 20 mg QD    NOT on oxygen  at home  Plan:  Continue IV Lasix 40 mg twice daily  Follow-up echo  Currently on 3 L O2, wean as tolerated  Sodium restriction 2g  Fluid restriction <2L/day  Goal Mg > 2 and K > 4; Replete prn  HOB > 30°, Daily standing weights, Measure I/O            Fall  CT cervical spine, x-ray hip/pelvis and x-ray knee negative for fractures  Patient complaining of left elbow pain  X-ray left elbow showing acute nondisplaced transcondylar fracture of distal humerus      Plan  Tylenol 975 mg scheduled 3 times daily  Oxy 2.5 every 4h as needed  PT OT Eval and treat  Fall precautions  Stage 3b chronic kidney disease ([facility])  Lab Results   Component Value Date    EGFR 45 05/14/2025    EGFR 40 05/13/2025    EGFR 37 05/12/2025    CREATININE 1.10 05/14/2025    CREATININE 1.22 05/13/2025    CREATININE 1.30 05/12/2025     Creatinine increased by 0.3  Currently being diuresed with IV Lasix 40 mg twice daily    Plan  Creatinine stable  Monitor BMP  Type 2 diabetes mellitus with stage 3 chronic kidney disease, unspecified whether long term insulin use, unspecified whether stage 3a or 3b CKD ([facility])  Lab Results   Component Value Date    HGBA1C 7.5 (H) 05/11/2025       Recent Labs     05/13/25  1613 05/13/25  2118 05/14/25  0711 05/14/25  1112   POCGLU 193* 282* 99 140       Blood Sugar Average: Last 72 hrs:  (P) [id number]  Recent Labs     05/13/25  2118 05/14/25  0434 05/14/25  0711 05/14/25  1112   POCGLU 282*  --  99 140   GLUC  --  114  --   --        Hold home regimen while inpatient and resume on discharge   Diabetic diet  Insulin regimen  Humalog 5 units TID AC  Lantus 20 units HS  Glucose checks and Insulin correction ACHS  Goal -180 while admitted, adjusting insulin regimen as appropriate  Monitor for hypoglycemia and treat per protocol    CLL (chronic lymphocytic leukemia) ([facility])  Last saw oncology in 2020 for    PLAN:  Oncology outpatient currently monitoring levels   Moderate dementia ([facility])  Urinary retention  protocol  Delirium precautions    Primary hypertension  Present on admission    PLAN:  Continue home medications.     OA (osteoarthritis)  Present on admission    PLAN:  Continue home medications.     HLD (hyperlipidemia)  Present on admission    PLAN:  Continue home medications.     VTE Pharmacologic Prophylaxis: VTE Score: 6 High Risk (Score >/= 5) - Pharmacological DVT Prophylaxis Ordered: enoxaparin (Lovenox). Sequential Compression Devices Ordered.    Mobility:   Basic Mobility Inpatient Raw Score: 11  -[facility] Goal: 4: Move to chair/commode  JH-HLM Achieved: 1: Laying in bed  -HLM Goal achieved. Continue to encourage appropriate mobility.    Patient Centered Rounds: I performed bedside rounds with nursing staff today.   Discussions with Specialists or Other Care Team Provider: None    Education and Discussions with Family / Patient: Will update.     Current Length of Stay: 3 day(s)  Current Patient Status: Inpatient   Certification Statement: The patient will continue to require additional inpatient hospital stay due to CHF exacerbation  Discharge Plan: Anticipate discharge in 24-48 hrs to discharge location to be determined pending rehab evaluations.    Code Status: Level 1 - Full Code    Subjective   No acute events overnight.  Patient examined at bedside and is AAO X2.  She reports no shortness of breath and states that left elbow pain is well-controlled.  She is saturating well on 3 to 4 L O2 .  EKG was obtained due to irregular rhythm, showed PVCs.    Objective :  Temp:  [97.9 °F (36.6 °C)-98.2 °F (36.8 °C)] 98.2 °F (36.8 °C)  HR:  [61-75] 67  BP: (142-157)/(59-91) 150/66  Resp:  [15] 15  SpO2:  [93 %-95 %] 94 %  O2 Device: Nasal cannula  Nasal Cannula O2 Flow Rate (L/min):  [4 L/min] 4 L/min    Body mass index is 22.44 kg/m².     Input and Output Summary (last 24 hours):     Intake/Output Summary (Last 24 hours) at 5/14/2025 1133  Last data filed at 5/14/2025 0919  Gross per 24 hour   Intake 240 ml    Output 1733 ml   Net -1493 ml       Physical Exam  HENT:      Head: Normocephalic and atraumatic.      Nose: No congestion.      Mouth/Throat:      Mouth: Mucous membranes are moist.     Eyes:      Extraocular Movements: Extraocular movements intact.       Cardiovascular:      Rate and Rhythm: Normal rate. Rhythm irregular.      Pulses: Normal pulses.      Heart sounds: Normal heart sounds.   Pulmonary:      Effort: No respiratory distress.      Breath sounds: Examination of the left-middle field reveals rales. Examination of the right-lower field reveals rales. Examination of the left-lower field reveals rales. Rales present. No wheezing.   Abdominal:      General: There is no distension.      Palpations: Abdomen is soft.      Tenderness: There is no abdominal tenderness.     Musculoskeletal:         General: No swelling.      Left elbow: Swelling present. Decreased range of motion.      Cervical back: Normal range of motion.      Right lower leg: No edema.      Left lower leg: No edema.      Comments: Splint present left arm     Skin:     General: Skin is warm and dry.     Neurological:      Mental Status: She is alert. She is disoriented.     Psychiatric:         Mood and Affect: Mood normal.         Behavior: Behavior normal.         Lines/Drains:              Lab Results: I have reviewed the following results:   Results from last 7 days   Lab Units 05/14/25  0434 05/13/25  0447   WBC Thousand/uL 70.16* 68.49*   HEMOGLOBIN g/dL 9.5* 9.0*   HEMATOCRIT % 30.4* 28.9*   PLATELETS Thousands/uL 151 140*   LYMPHO PCT %  --  87*   MONO PCT %  --  0*   EOS PCT %  --  0     Results from last 7 days   Lab Units 05/14/25  0434 05/12/25  0615 05/11/25  0624   SODIUM mmol/L 142   < > 139   POTASSIUM mmol/L 3.5   < > 5.0   CHLORIDE mmol/L 106   < > 107   CO2 mmol/L 28   < > 22   BUN mg/dL 25   < > 22   CREATININE mg/dL 1.10   < > 1.00   ANION GAP mmol/L 8   < > 10   CALCIUM mg/dL 8.6   < > 9.3   ALBUMIN g/dL  --   --  3.9    TOTAL BILIRUBIN mg/dL  --   --  2.48*   ALK PHOS U/L  --   --  84   ALT U/L  --   --  23   AST U/L  --   --  21   GLUCOSE RANDOM mg/dL 114   < > 128    < > = values in this interval not displayed.     Results from last 7 days   Lab Units 05/11/25  0636   INR  1.22*     Results from last 7 days   Lab Units 05/14/25  1112 05/14/25  0711 05/13/25  2118 05/13/25  1613 05/13/25  1020 05/13/25  0640 05/12/25  2054 05/12/25  1614 05/12/25  1040 05/12/25  0708 05/11/25  2036 05/11/25  1556   POC GLUCOSE mg/dl 140 99 282* 193* 174* 76 196* 161* 239* 93 255* 120     Results from last 7 days   Lab Units 05/11/25  1639   HEMOGLOBIN A1C % 7.5*     Results from last 7 days   Lab Units 05/11/25  0839 05/11/25  0636 05/11/25  0624   LACTIC ACID mmol/L 1.4  --  2.1*   PROCALCITONIN ng/ml  --  0.22  --        Recent Cultures (last 7 days):   Results from last 7 days   Lab Units 05/11/25  0636 05/11/25  0624   BLOOD CULTURE  No Growth at 48 hrs. No Growth at 48 hrs.       Imaging Results Review: I reviewed radiology reports from this admission including: chest xray and CT chest.  Other Study Results Review: EKG was reviewed.     Last 24 Hours Medication List:     Current Facility-Administered Medications:     acetaminophen (TYLENOL) tablet 975 mg, Q8H TESSA    aluminum-magnesium hydroxide-simethicone (MAALOX) oral suspension 30 mL, Q6H PRN    atorvastatin (LIPITOR) tablet 20 mg, Daily    calcium carbonate (TUMS) chewable tablet 500 mg, BID PRN    calcium carbonate-vitamin D 500 mg-5 mcg tablet 1 tablet, BID With Meals    Cholecalciferol (VITAMIN D3) tablet 1,000 Units, Daily    dextromethorphan-guaiFENesin (ROBITUSSIN DM) oral syrup 5 mL, BID    Diclofenac Sodium (VOLTAREN) 1 % topical gel 2 g, TID    docusate sodium (COLACE) capsule 100 mg, BID PRN    enoxaparin (LOVENOX) subcutaneous injection 30 mg, Daily    ferrous sulfate tablet 325 mg, BID With Meals    fluticasone (FLONASE) 50 mcg/act nasal spray 1 spray, Daily PRN     furosemide (LASIX) injection 40 mg, BID    guaiFENesin (MUCINEX) 12 hr tablet 600 mg, BID PRN    insulin glargine (LANTUS) subcutaneous injection 20 Units 0.2 mL, HS    insulin lispro (HumALOG/ADMELOG) 100 units/mL subcutaneous injection 1-5 Units, 4x Daily (AC & HS) **AND** Fingerstick Glucose (POCT), 4x Daily AC and at bedtime    insulin lispro (HumALOG/ADMELOG) 100 units/mL subcutaneous injection 5 Units, TID With Meals    [Held by provider] losartan (COZAAR) tablet 25 mg, Daily    metoprolol succinate (TOPROL-XL) 24 hr tablet 25 mg, Q12H TESSA    oxyCODONE (ROXICODONE) split tablet 2.5 mg, Q4H PRN    Administrative Statements   Today, Patient Was Seen By: Franky Nichols MD      **Please Note: This note may have been constructed using a voice recognition system.**

## 2025-05-14 NOTE — PLAN OF CARE
Problem: OCCUPATIONAL THERAPY ADULT  Goal: Performs self-care activities at highest level of function for planned discharge setting.  See evaluation for individualized goals.  Description: Treatment Interventions: ADL retraining, Functional transfer training, UE strengthening/ROM, Cognitive reorientation, Patient/family training, Equipment evaluation/education, Compensatory technique education, Energy conservation, Activityengagement          See flowsheet documentation for full assessment, interventions and recommendations.   Note: Limitation: Decreased ADL status, Decreased Safe judgement during ADL, Decreased cognition, Decreased self-care trans, Decreased high-level ADLs (pain, WBS)  Prognosis: Fair  Assessment: Patient is a 84 y.o. female seen for OT evaluation and treatment  following admission on 5/11/2025  s/p Acute on chronic systolic heart failure (HCC). Please see above for comprehensive list of comorbidities and significant PMHx impacting functional performance. Upon initial evaluation, pt appears to be performing below baseline functional status. Pt requires MODA for UB ADLs, MAXA for LB ADLs, JOÃO 1-2 ppl for transfers. The AM-PAC & Barthel Index outcome tools were used to assist in determining pt safety w/self care /mobility and appropriate d/c recommendations, see above for score. Occupational performance is affected by the following deficits:  decreased muscular strength , decreased balance , decreased standing tolerance for self care tasks , decreased dynamic balance impacting functional reach, poor postural control , decreased functional reach , decreased trunk control , decreased activity tolerance , impaired memory , attention to task, impaired judgement and problem solving , decreased emotional regulation and coping skills , impaired safety awareness , (+) pain , impaired learning ability d/t current cognitive status , impaired global mental status, and direction following.  Personal/Environmental factors impacting D/C include: (+) Hx of falls , Assistance needed for ADL/IADLs, Assistance needed for ADLs and functional mobility, High fall risk , decreased insight toward deficits , decreased recall of precautions , health management, and baseline cognitive deficits. Supporting factors include: able to maintain FFSU, support system available, facility staff available for continued assistance, and attitude towards recovery . Patient would benefit from OT services within the acute care setting to maximize level of functional independence in the following areas safety procedures , fall prevention , energy conservation , self-care transfers, bed mobility , functional mobility, and ADLs.  From OT standpoint, recommendation at time of D/C would be Level II (Moderate Resource Intensity) .     Rehab Resource Intensity Level, OT: II (Moderate Resource Intensity)     Lucrecia Stevens MS OTR/L   [place] Licensure# [id number]

## 2025-05-14 NOTE — PROGRESS NOTES
Patient:  KAYCE KELLY    MRN:  [id number]    Aidin Request ID:  [id number]    Level of care reserved:  Skilled Nursing Facility    Partner Reserved:  [facility], [place] [id number] [phone]    Clinical needs requested:    Geography searched:  15 miles around [id number]    Start of Service:    Request sent:  12:30pm EDT on 5/14/2025 by Pasha Maxwell    Partner reserved:  4:09pm EDT on 5/14/2025 by Pasha Maxwell    Choice list shared:  4:08pm EDT on 5/14/2025 by Pasha Maxwell

## 2025-05-14 NOTE — PLAN OF CARE
Problem: Potential for Falls  Goal: Patient will remain free of falls  Description: INTERVENTIONS:- Educate patient/family on patient safety including physical limitations- Instruct patient to call for assistance with activity - Consult OT/PT to assist with strengthening/mobility - Keep Call bell within reach- Keep bed low and locked with side rails adjusted as appropriate- Keep care items and personal belongings within reach- Initiate and maintain comfort rounds- Make Fall Risk Sign visible to staff- Offer Toileting every 2 Hours, in advance of need- Initiate/Maintain bed/chair alarm- Obtain necessary fall risk management equipment- Apply yellow socks and bracelet for high fall risk patients- Consider moving patient to room near nurses station  Outcome: Progressing     Problem: PAIN - ADULT  Goal: Verbalizes/displays adequate comfort level or baseline comfort level  Description: Interventions:- Encourage patient to monitor pain and request assistance- Assess pain using appropriate pain scale- Administer analgesics based on type and severity of pain and evaluate response- Implement non-pharmacological measures as appropriate and evaluate response- Consider cultural and social influences on pain and pain management- Notify physician/advanced practitioner if interventions unsuccessful or patient reports new pain  Outcome: Progressing     Problem: DISCHARGE PLANNING  Goal: Discharge to home or other facility with appropriate resources  Description: INTERVENTIONS:- Identify barriers to discharge w/patient and caregiver- Arrange for needed discharge resources and transportation as appropriate- Identify discharge learning needs (meds, wound care, etc.)- Arrange for interpretive services to assist at discharge as needed- Refer to Case Management Department for coordinating discharge planning if the patient needs post-hospital services based on physician/advanced practitioner order or complex needs related to functional  status, cognitive ability, or social support system  Outcome: Progressing     Problem: Knowledge Deficit  Goal: Patient/family/caregiver demonstrates understanding of disease process, treatment plan, medications, and discharge instructions  Description: Complete learning assessment and assess knowledge base.Interventions:- Provide teaching at level of understanding- Provide teaching via preferred learning methods  Outcome: Progressing     Problem: INFECTION - ADULT  Goal: Absence or prevention of progression during hospitalization  Description: INTERVENTIONS:- Assess and monitor for signs and symptoms of infection- Monitor lab/diagnostic results- Monitor all insertion sites, i.e. indwelling lines, tubes, and drains- Monitor endotracheal if appropriate and nasal secretions for changes in amount and color- [place] appropriate cooling/warming therapies per order- Administer medications as ordered- Instruct and encourage patient and family to use good hand hygiene technique- Identify and instruct in appropriate isolation precautions for identified infection/condition  Outcome: Progressing     Problem: METABOLIC, FLUID AND ELECTROLYTES - ADULT  Goal: Glucose maintained within target range  Description: INTERVENTIONS:- Monitor Blood Glucose as ordered- Assess for signs and symptoms of hyperglycemia and hypoglycemia- Administer ordered medications to maintain glucose within target range- Assess nutritional intake and initiate nutrition service referral as needed  Outcome: Progressing     Problem: Prexisting or High Potential for Compromised Skin Integrity  Goal: Skin integrity is maintained or improved  Description: INTERVENTIONS:- Identify patients at risk for skin breakdown- Assess and monitor skin integrity- Assess and monitor nutrition and hydration status- Monitor labs - Assess for incontinence - Turn and reposition patient- Assist with mobility/ambulation- Relieve pressure over bony prominences- Avoid friction and  shearing- Provide appropriate hygiene as needed including keeping skin clean and dry- Evaluate need for skin moisturizer/barrier cream- Collaborate with interdisciplinary team - Patient/family teaching- Consider wound care consult   Outcome: Progressing

## 2025-05-15 LAB
ANION GAP SERPL CALCULATED.3IONS-SCNC: 6 MMOL/L (ref 4–13)
ATRIAL RATE: 54 BPM
BUN SERPL-MCNC: 23 MG/DL (ref 5–25)
CALCIUM SERPL-MCNC: 8.6 MG/DL (ref 8.4–10.2)
CHLORIDE SERPL-SCNC: 102 MMOL/L (ref 96–108)
CO2 SERPL-SCNC: 32 MMOL/L (ref 21–32)
CREAT SERPL-MCNC: 1 MG/DL (ref 0.6–1.3)
ERYTHROCYTE [DISTWIDTH] IN BLOOD BY AUTOMATED COUNT: 17.8 % (ref 11.6–15.1)
GFR SERPL CREATININE-BSD FRML MDRD: 51 ML/MIN/1.73SQ M
GLUCOSE SERPL-MCNC: 174 MG/DL (ref 65–140)
GLUCOSE SERPL-MCNC: 211 MG/DL (ref 65–140)
GLUCOSE SERPL-MCNC: 212 MG/DL (ref 65–140)
GLUCOSE SERPL-MCNC: 69 MG/DL (ref 65–140)
GLUCOSE SERPL-MCNC: 94 MG/DL (ref 65–140)
HCT VFR BLD AUTO: 31.6 % (ref 34.8–46.1)
HGB BLD-MCNC: 10 G/DL (ref 11.5–15.4)
MAGNESIUM SERPL-MCNC: 2.1 MG/DL (ref 1.9–2.7)
MCH RBC QN AUTO: 36.2 PG (ref 26.8–34.3)
MCHC RBC AUTO-ENTMCNC: 31.6 G/DL (ref 31.4–37.4)
MCV RBC AUTO: 115 FL (ref 82–98)
P AXIS: 62 DEGREES
PLATELET # BLD AUTO: 153 THOUSANDS/UL (ref 149–390)
PMV BLD AUTO: 13.1 FL (ref 8.9–12.7)
POTASSIUM SERPL-SCNC: 3.4 MMOL/L (ref 3.5–5.3)
PR INTERVAL: 204 MS
QRS AXIS: -12 DEGREES
QRSD INTERVAL: 88 MS
QT INTERVAL: 450 MS
QTC INTERVAL: 427 MS
RBC # BLD AUTO: 2.76 MILLION/UL (ref 3.81–5.12)
SODIUM SERPL-SCNC: 140 MMOL/L (ref 135–147)
T WAVE AXIS: 110 DEGREES
VENTRICULAR RATE: 54 BPM
WBC # BLD AUTO: 69.39 THOUSAND/UL (ref 4.31–10.16)

## 2025-05-15 PROCEDURE — 99232 SBSQ HOSP IP/OBS MODERATE 35: CPT | Performed by: INTERNAL MEDICINE

## 2025-05-15 PROCEDURE — 82948 REAGENT STRIP/BLOOD GLUCOSE: CPT

## 2025-05-15 PROCEDURE — 85027 COMPLETE CBC AUTOMATED: CPT

## 2025-05-15 PROCEDURE — 80048 BASIC METABOLIC PNL TOTAL CA: CPT

## 2025-05-15 PROCEDURE — 93010 ELECTROCARDIOGRAM REPORT: CPT | Performed by: INTERNAL MEDICINE

## 2025-05-15 PROCEDURE — 83735 ASSAY OF MAGNESIUM: CPT

## 2025-05-15 RX ORDER — POTASSIUM CHLORIDE 1500 MG/1
40 TABLET, EXTENDED RELEASE ORAL ONCE
Status: COMPLETED | OUTPATIENT
Start: 2025-05-15 | End: 2025-05-15

## 2025-05-15 RX ORDER — POTASSIUM CHLORIDE 1500 MG/1
20 TABLET, EXTENDED RELEASE ORAL DAILY
Status: DISCONTINUED | OUTPATIENT
Start: 2025-05-16 | End: 2025-05-17 | Stop reason: HOSPADM

## 2025-05-15 RX ADMIN — ACETAMINOPHEN 975 MG: 325 TABLET, FILM COATED ORAL at 13:56

## 2025-05-15 RX ADMIN — INSULIN LISPRO 1 UNITS: 100 INJECTION, SOLUTION INTRAVENOUS; SUBCUTANEOUS at 22:31

## 2025-05-15 RX ADMIN — INSULIN LISPRO 2 UNITS: 100 INJECTION, SOLUTION INTRAVENOUS; SUBCUTANEOUS at 11:39

## 2025-05-15 RX ADMIN — METOPROLOL SUCCINATE 25 MG: 25 TABLET, EXTENDED RELEASE ORAL at 08:58

## 2025-05-15 RX ADMIN — POTASSIUM CHLORIDE 40 MEQ: 1500 TABLET, EXTENDED RELEASE ORAL at 06:25

## 2025-05-15 RX ADMIN — FUROSEMIDE 40 MG: 10 INJECTION, SOLUTION INTRAMUSCULAR; INTRAVENOUS at 17:08

## 2025-05-15 RX ADMIN — FERROUS SULFATE TAB 325 MG (65 MG ELEMENTAL FE) 325 MG: 325 (65 FE) TAB at 17:08

## 2025-05-15 RX ADMIN — POTASSIUM CHLORIDE 40 MEQ: 1500 TABLET, EXTENDED RELEASE ORAL at 11:40

## 2025-05-15 RX ADMIN — INSULIN GLARGINE 20 UNITS: 100 INJECTION, SOLUTION SUBCUTANEOUS at 22:30

## 2025-05-15 RX ADMIN — ATORVASTATIN CALCIUM 20 MG: 20 TABLET, FILM COATED ORAL at 08:58

## 2025-05-15 RX ADMIN — DICLOFENAC SODIUM 2 G: 10 GEL TOPICAL at 22:30

## 2025-05-15 RX ADMIN — FERROUS SULFATE TAB 325 MG (65 MG ELEMENTAL FE) 325 MG: 325 (65 FE) TAB at 07:28

## 2025-05-15 RX ADMIN — ACETAMINOPHEN 975 MG: 325 TABLET, FILM COATED ORAL at 22:27

## 2025-05-15 RX ADMIN — Medication 1000 UNITS: at 08:58

## 2025-05-15 RX ADMIN — METOPROLOL SUCCINATE 25 MG: 25 TABLET, EXTENDED RELEASE ORAL at 22:27

## 2025-05-15 RX ADMIN — FUROSEMIDE 40 MG: 10 INJECTION, SOLUTION INTRAMUSCULAR; INTRAVENOUS at 08:58

## 2025-05-15 RX ADMIN — GUAIFENESIN AND DEXTROMETHORPHAN 5 ML: 100; 10 SYRUP ORAL at 08:58

## 2025-05-15 RX ADMIN — INSULIN LISPRO 5 UNITS: 100 INJECTION, SOLUTION INTRAVENOUS; SUBCUTANEOUS at 17:09

## 2025-05-15 RX ADMIN — Medication 1 TABLET: at 07:28

## 2025-05-15 RX ADMIN — DICLOFENAC SODIUM 2 G: 10 GEL TOPICAL at 09:02

## 2025-05-15 RX ADMIN — Medication 2.5 MG: at 10:20

## 2025-05-15 RX ADMIN — ENOXAPARIN SODIUM 30 MG: 30 INJECTION SUBCUTANEOUS at 08:58

## 2025-05-15 RX ADMIN — ACETAMINOPHEN 975 MG: 325 TABLET, FILM COATED ORAL at 05:00

## 2025-05-15 RX ADMIN — INSULIN LISPRO 5 UNITS: 100 INJECTION, SOLUTION INTRAVENOUS; SUBCUTANEOUS at 11:39

## 2025-05-15 RX ADMIN — DICLOFENAC SODIUM 2 G: 10 GEL TOPICAL at 17:08

## 2025-05-15 RX ADMIN — INSULIN LISPRO 2 UNITS: 100 INJECTION, SOLUTION INTRAVENOUS; SUBCUTANEOUS at 17:08

## 2025-05-15 RX ADMIN — Medication 1 TABLET: at 17:08

## 2025-05-15 RX ADMIN — GUAIFENESIN AND DEXTROMETHORPHAN 5 ML: 100; 10 SYRUP ORAL at 22:27

## 2025-05-15 NOTE — ASSESSMENT & PLAN NOTE
Lab Results   Component Value Date    EGFR 51 05/15/2025    EGFR 45 05/14/2025    EGFR 40 05/13/2025    CREATININE 1.00 05/15/2025    CREATININE 1.10 05/14/2025    CREATININE 1.22 05/13/2025     Creatinine increased by 0.3  Currently being diuresed with IV Lasix 40 mg twice daily    Plan  Creatinine stable  Monitor BMP

## 2025-05-15 NOTE — PROGRESS NOTES
Progress Note - Internal Medicine   Name: Gia Vaughan 84 y.o. female I MRN: [id number]  Unit/Bed#: S -01 I Date of Admission: 5/11/2025   Date of Service: 5/15/2025 I Hospital Day: 4    Assessment & Plan  Acute on chronic systolic heart failure (HCC)  Lab Results   Component Value Date    LVEF 35 05/14/2025    BNP 2,982 (H) 05/11/2025    BNP 1,246 (H) 03/03/2025     05/10/25  62.8kg   Wt Readings from Last 10 Encounters:   04/02/25 62.8 kg (138 lb 8 oz)   03/06/25 60.3 kg (132 lb 15 oz)   10/17/24 63.3 kg (139 lb 8.8 oz)   09/06/24 68.5 kg (151 lb 0.2 oz)   05/31/24 68 kg (150 lb)   04/04/24 68.4 kg (150 lb 12.8 oz)   01/29/24 70.8 kg (156 lb)   04/24/23 68 kg (150 lb)   11/15/22 66.7 kg (147 lb)   11/11/22 66.7 kg (147 lb)     Results for orders placed during the hospital encounter of 04/01/24    Echo 5/14 EF 35%, systolic function severely reduced, diastolic function severely abnormal.  The following segments are akinetic: basal inferior, mid inferior and apical inferior.    The following segments are hypokinetic: basal anterior, basal anteroseptal, basal inferoseptal, basal inferolateral, basal anterolateral, mid anterior, mid anteroseptal, mid inferoseptal, mid inferolateral, mid anterolateral, apical anterior, apical septal, apical lateral and apex.    Right Ventricle: Right ventricular cavity size is normal. Systolic function is normal.    Left Atrium: The atrium is mildly dilated.    Aortic Valve: There is aortic valve sclerosis.    Mitral Valve: There is moderate to severe regurgitation with a posteriorly directed jet.    Tricuspid Valve: There is moderate regurgitation. The right ventricular systolic pressure is moderately elevated. The estimated right ventricular systolic pressure is 60.00 mmHg.     Presents with increased shortness of breath and lower extremity edema  NYHA Class III.,   Patient is currently volume overloaded.    Pharmacotherapies / Neurohormonal Blockade:  --Beta Blocker: Toprol  XL 25 BID  --ARNi / ACEi / ARB: Losartan 25 QD  --Aldosterone Antagonist:  --SGLT2 Inhibitor: NO--H/O UTI's   --Diuretic: Lasix 20 mg QD    NOT on oxygen at home  Plan:  Continue IV Lasix 40 mg twice daily, anticipating transition to oral lasix tomorrow   Currently on 2 L O2, wean as tolerated  Sodium restriction 2g  Fluid restriction <2L/day  Goal Mg > 2 and K > 4; Replete prn  HOB > 30°, Daily standing weights, Measure I/O            Fall  CT cervical spine, x-ray hip/pelvis and x-ray knee negative for fractures  Patient complaining of left elbow pain  X-ray left elbow showing acute nondisplaced transcondylar fracture of distal humerus      Plan  Tylenol 975 mg scheduled 3 times daily  Oxy 2.5 every 4h as needed  PT OT Eval and treat  Fall precautions  Stage 3b chronic kidney disease ([facility])  Lab Results   Component Value Date    EGFR 51 05/15/2025    EGFR 45 05/14/2025    EGFR 40 05/13/2025    CREATININE 1.00 05/15/2025    CREATININE 1.10 05/14/2025    CREATININE 1.22 05/13/2025     Creatinine increased by 0.3  Currently being diuresed with IV Lasix 40 mg twice daily    Plan  Creatinine stable  Monitor BMP  Type 2 diabetes mellitus with stage 3 chronic kidney disease, unspecified whether long term insulin use, unspecified whether stage 3a or 3b CKD ([facility])  Lab Results   Component Value Date    HGBA1C 7.5 (H) 05/11/2025       Recent Labs     05/14/25  1735 05/14/25  2041 05/15/25  0710 05/15/25  1119   POCGLU 160* 221* 69 211*       Blood Sugar Average: Last 72 hrs:  (P) [id number]  Recent Labs     05/14/25  2041 05/15/25  0458 05/15/25  0710 05/15/25  1119   POCGLU 221*  --  69 211*   GLUC  --  94  --   --        Hold home regimen while inpatient and resume on discharge   Diabetic diet  Insulin regimen  Humalog 5 units TID AC  Lantus 20 units HS  Glucose checks and Insulin correction ACHS  Goal -180 while admitted, adjusting insulin regimen as appropriate  Monitor for hypoglycemia and treat per  protocol    CLL (chronic lymphocytic leukemia) (HCC)  Last saw oncology in 2020 for    PLAN:  Oncology outpatient currently monitoring levels   Moderate dementia (HCC)  Urinary retention protocol  Delirium precautions    Primary hypertension  Present on admission    PLAN:  Continue home medications.     OA (osteoarthritis)  Present on admission    PLAN:  Continue home medications.     HLD (hyperlipidemia)  Present on admission    PLAN:  Continue home medications.     VTE Pharmacologic Prophylaxis: VTE Score: 6 High Risk (Score >/= 5) - Pharmacological DVT Prophylaxis Ordered: enoxaparin (Lovenox). Sequential Compression Devices Ordered.    Mobility:   Basic Mobility Inpatient Raw Score: 10  JH-HLM Goal: 4: Move to chair/commode  JH-HLM Achieved: 1: Laying in bed  JH-HLM Goal achieved. Continue to encourage appropriate mobility.    Patient Centered Rounds: I performed bedside rounds with nursing staff today.   Discussions with Specialists or Other Care Team Provider: None    Education and Discussions with Family / Patient: Will update.     Current Length of Stay: 4 day(s)  Current Patient Status: Inpatient   Certification Statement: The patient will continue to require additional inpatient hospital stay due to CHF exacerbation  Discharge Plan: Anticipate discharge in 24-48 hrs to discharge location to be determined pending rehab evaluations.    Code Status: Level 1 - Full Code    Subjective   No acute events overnight.  Patient examined at bedside and reports no shortness of breath.  She endorses left elbow pain.  She is saturating well on 2 L nasal cannula, will wean as tolerated.    Objective :  Temp:  [97.6 °F (36.4 °C)-99 °F (37.2 °C)] 98.4 °F (36.9 °C)  HR:  [50-72] 72  BP: (117-153)/(50-62) 142/60  Resp:  [15-18] 18  SpO2:  [92 %-97 %] 94 %  O2 Device: Nasal cannula  Nasal Cannula O2 Flow Rate (L/min):  [2 L/min-3 L/min] 2 L/min    Body mass index is 20.92 kg/m².     Input and Output Summary (last 24 hours):      Intake/Output Summary (Last 24 hours) at 5/15/2025 1429  Last data filed at 5/15/2025 1400  Gross per 24 hour   Intake 660 ml   Output 1287 ml   Net -627 ml       Physical Exam  HENT:      Head: Normocephalic and atraumatic.      Nose: No congestion.      Mouth/Throat:      Mouth: Mucous membranes are moist.     Eyes:      Extraocular Movements: Extraocular movements intact.       Cardiovascular:      Rate and Rhythm: Normal rate. Rhythm irregular.      Pulses: Normal pulses.      Heart sounds: Normal heart sounds.   Pulmonary:      Effort: No respiratory distress.      Breath sounds: Examination of the right-lower field reveals rales. Examination of the left-lower field reveals rales. Rales present. No wheezing.   Abdominal:      General: There is no distension.      Palpations: Abdomen is soft.      Tenderness: There is no abdominal tenderness.     Musculoskeletal:         General: No swelling.      Left elbow: Swelling present. Decreased range of motion.      Cervical back: Normal range of motion.      Right lower leg: No edema.      Left lower leg: No edema.      Comments: Splint present left arm     Skin:     General: Skin is warm and dry.     Neurological:      Mental Status: She is alert. She is disoriented.     Psychiatric:         Mood and Affect: Mood normal.         Behavior: Behavior normal.         Lines/Drains:              Lab Results: I have reviewed the following results:   Results from last 7 days   Lab Units 05/15/25  0458 05/14/25  0434 05/13/25  0447   WBC Thousand/uL 69.39*   < > 68.49*   HEMOGLOBIN g/dL 10.0*   < > 9.0*   HEMATOCRIT % 31.6*   < > 28.9*   PLATELETS Thousands/uL 153   < > 140*   LYMPHO PCT %  --   --  87*   MONO PCT %  --   --  0*   EOS PCT %  --   --  0    < > = values in this interval not displayed.     Results from last 7 days   Lab Units 05/15/25  0458 05/12/25  0615 05/11/25  0624   SODIUM mmol/L 140   < > 139   POTASSIUM mmol/L 3.4*   < > 5.0   CHLORIDE mmol/L 102   <  > 107   CO2 mmol/L 32   < > 22   BUN mg/dL 23   < > 22   CREATININE mg/dL 1.00   < > 1.00   ANION GAP mmol/L 6   < > 10   CALCIUM mg/dL 8.6   < > 9.3   ALBUMIN g/dL  --   --  3.9   TOTAL BILIRUBIN mg/dL  --   --  2.48*   ALK PHOS U/L  --   --  84   ALT U/L  --   --  23   AST U/L  --   --  21   GLUCOSE RANDOM mg/dL 94   < > 128    < > = values in this interval not displayed.     Results from last 7 days   Lab Units 05/11/25  0636   INR  1.22*     Results from last 7 days   Lab Units 05/15/25  1119 05/15/25  0710 05/14/25  2041 05/14/25  1735 05/14/25  1112 05/14/25  0711 05/13/25  2118 05/13/25  1613 05/13/25  1020 05/13/25  0640 05/12/25  2054 05/12/25  1614   POC GLUCOSE mg/dl 211* 69 221* 160* 140 99 282* 193* 174* 76 196* 161*     Results from last 7 days   Lab Units 05/11/25  1639   HEMOGLOBIN A1C % 7.5*     Results from last 7 days   Lab Units 05/11/25  0839 05/11/25  0636 05/11/25  0624   LACTIC ACID mmol/L 1.4  --  2.1*   PROCALCITONIN ng/ml  --  0.22  --        Recent Cultures (last 7 days):   Results from last 7 days   Lab Units 05/11/25  0636 05/11/25  0624   BLOOD CULTURE  No Growth After 4 Days. No Growth After 4 Days.       Imaging Results Review: I reviewed radiology reports from this admission including: chest xray and CT chest.  Other Study Results Review: EKG was reviewed.     Last 24 Hours Medication List:     Current Facility-Administered Medications:     acetaminophen (TYLENOL) tablet 975 mg, Q8H TESSA    aluminum-magnesium hydroxide-simethicone (MAALOX) oral suspension 30 mL, Q6H PRN    atorvastatin (LIPITOR) tablet 20 mg, Daily    calcium carbonate (TUMS) chewable tablet 500 mg, BID PRN    calcium carbonate-vitamin D 500 mg-5 mcg tablet 1 tablet, BID With Meals    Cholecalciferol (VITAMIN D3) tablet 1,000 Units, Daily    dextromethorphan-guaiFENesin (ROBITUSSIN DM) oral syrup 5 mL, BID    Diclofenac Sodium (VOLTAREN) 1 % topical gel 2 g, TID    docusate sodium (COLACE) capsule 100 mg, BID PRN     enoxaparin (LOVENOX) subcutaneous injection 30 mg, Daily    ferrous sulfate tablet 325 mg, BID With Meals    fluticasone (FLONASE) 50 mcg/act nasal spray 1 spray, Daily PRN    furosemide (LASIX) injection 40 mg, BID    guaiFENesin (MUCINEX) 12 hr tablet 600 mg, BID PRN    insulin glargine (LANTUS) subcutaneous injection 20 Units 0.2 mL, HS    insulin lispro (HumALOG/ADMELOG) 100 units/mL subcutaneous injection 1-5 Units, 4x Daily (AC & HS) **AND** Fingerstick Glucose (POCT), 4x Daily AC and at bedtime    insulin lispro (HumALOG/ADMELOG) 100 units/mL subcutaneous injection 5 Units, TID With Meals    [Held by provider] losartan (COZAAR) tablet 25 mg, Daily    metoprolol succinate (TOPROL-XL) 24 hr tablet 25 mg, Q12H TESSA    oxyCODONE (ROXICODONE) split tablet 2.5 mg, Q4H PRN    Administrative Statements   Today, Patient Was Seen By: Franky Nichols MD      **Please Note: This note may have been constructed using a voice recognition system.**

## 2025-05-15 NOTE — PLAN OF CARE
Problem: PHYSICAL THERAPY ADULT  Goal: Performs mobility at highest level of function for planned discharge setting.  See evaluation for individualized goals.  Description: Treatment/Interventions: Functional transfer training, LE strengthening/ROM, Therapeutic exercise, Endurance training, Cognitive reorientation, Patient/family training, Equipment eval/education, Bed mobility, Gait training, Spoke to nursing, Spoke to case management, Spoke to MD, OT  Equipment Recommended:  (LRAD but UNILATERAL device, trials w/hemiwalker vs loftstrand crutch in future)       See flowsheet documentation for full assessment, interventions and recommendations.  Note: Prognosis: Fair  Problem List: Decreased strength, Decreased range of motion, Decreased endurance, Impaired balance, Decreased mobility, Decreased cognition, Pain, Orthopedic restrictions (gait deviations)  Assessment: Pt is a 84 y.o. female who [facility] at [facility] on [facility]11/2025 w/ primary impression of Acute on chronic systolic heart failure, however, patient also here status post fall and being treated for a nondisplaced transcondylar distal humeral fracture as well as hx of CLL.  Ortho recommending NWB LUE in a posterior splint, sling for comfort, follow-up in next 1 to 2 weeks.  Order placed for PT.      Prior to admission: Pt lived at [address] and reportedly uses a rolling walker for mobility.  Upon evaluation: Pt needed Ax2 initially performing transfers and pre-gait activities with handhold assist.  Patient frequently attempting to use LUE for weightbearing during transfers and ambulation.      Pt's clinical presentation is currently unstable/unpredictable given the functional mobility deficits above, especially (but not limited to) weakness, gait deviations, pain, and orthopedic restrictions, and combined with medical complications including abnormal H&H, abnormal WBCs, low SpO2 values, new onset O2 use, and fear/retreat.  Pt IS NOT  at her mobility baseline.      During this admission, pt would benefit from continued skilled inpatient PT in the acute care setting in order to address deficits as defined above to maximize function and mobility.      Recommendations:     From a PT standpoint, recommend next several sessions focus on continued mobility training using unilateral device such as hemiwalker versus other unilateral AD, LE strengthening to assist with transfers, upright posture promotion.  Barriers to Discharge: Decreased caregiver support, Inaccessible home environment     Rehab Resource Intensity Level, PT: II (Moderate Resource Intensity)    See flowsheet documentation for full assessment.

## 2025-05-15 NOTE — ASSESSMENT & PLAN NOTE
Lab Results   Component Value Date    HGBA1C 7.5 (H) 05/11/2025       Recent Labs     05/14/25  1735 05/14/25 2041 05/15/25  0710 05/15/25  1119   POCGLU 160* 221* 69 211*       Blood Sugar Average: Last 72 hrs:  (P) [id number]  Recent Labs     05/14/25  2041 05/15/25  0458 05/15/25  0710 05/15/25  1119   POCGLU 221*  --  69 211*   GLUC  --  94  --   --        Hold home regimen while inpatient and resume on discharge   Diabetic diet  Insulin regimen  Humalog 5 units TID AC  Lantus 20 units HS  Glucose checks and Insulin correction ACHS  Goal -180 while admitted, adjusting insulin regimen as appropriate  Monitor for hypoglycemia and treat per protocol

## 2025-05-15 NOTE — ASSESSMENT & PLAN NOTE
Lab Results   Component Value Date    LVEF 35 05/14/2025    BNP 2,982 (H) 05/11/2025    BNP 1,246 (H) 03/03/2025     05/10/25  62.8kg   Wt Readings from Last 10 Encounters:   04/02/25 62.8 kg (138 lb 8 oz)   03/06/25 60.3 kg (132 lb 15 oz)   10/17/24 63.3 kg (139 lb 8.8 oz)   09/06/24 68.5 kg (151 lb 0.2 oz)   05/31/24 68 kg (150 lb)   04/04/24 68.4 kg (150 lb 12.8 oz)   01/29/24 70.8 kg (156 lb)   04/24/23 68 kg (150 lb)   11/15/22 66.7 kg (147 lb)   11/11/22 66.7 kg (147 lb)     Results for orders placed during the hospital encounter of 04/01/24    Echo 5/14 EF 35%, systolic function severely reduced, diastolic function severely abnormal.  The following segments are akinetic: basal inferior, mid inferior and apical inferior.    The following segments are hypokinetic: basal anterior, basal anteroseptal, basal inferoseptal, basal inferolateral, basal anterolateral, mid anterior, mid anteroseptal, mid inferoseptal, mid inferolateral, mid anterolateral, apical anterior, apical septal, apical lateral and apex.    Right Ventricle: Right ventricular cavity size is normal. Systolic function is normal.    Left Atrium: The atrium is mildly dilated.    Aortic Valve: There is aortic valve sclerosis.    Mitral Valve: There is moderate to severe regurgitation with a posteriorly directed jet.    Tricuspid Valve: There is moderate regurgitation. The right ventricular systolic pressure is moderately elevated. The estimated right ventricular systolic pressure is 60.00 mmHg.     Presents with increased shortness of breath and lower extremity edema  NYHA Class III.,   Patient is currently volume overloaded.    Pharmacotherapies / Neurohormonal Blockade:  --Beta Blocker: Toprol XL 25 BID  --ARNi / ACEi / ARB: Losartan 25 QD  --Aldosterone Antagonist:  --SGLT2 Inhibitor: NO--H/O UTI's   --Diuretic: Lasix 20 mg QD    NOT on oxygen at home  Plan:  Continue IV Lasix 40 mg twice daily, anticipating transition to oral lasix tomorrow    Currently on 2 L O2, wean as tolerated  Sodium restriction 2g  Fluid restriction <2L/day  Goal Mg > 2 and K > 4; Replete prn  HOB > 30°, Daily standing weights, Measure I/O

## 2025-05-15 NOTE — PLAN OF CARE
Problem: Potential for Falls  Goal: Patient will remain free of falls  Description: INTERVENTIONS:- Educate patient/family on patient safety including physical limitations- Instruct patient to call for assistance with activity - Consult OT/PT to assist with strengthening/mobility - Keep Call bell within reach- Keep bed low and locked with side rails adjusted as appropriate- Keep care items and personal belongings within reach- Initiate and maintain comfort rounds- Make Fall Risk Sign visible to staff- Offer Toileting every 2 Hours, in advance of need- Initiate/Maintain bed/chair alarm- Obtain necessary fall risk management equipment- Apply yellow socks and bracelet for high fall risk patients- Consider moving patient to room near nurses station  Outcome: Progressing     Problem: PAIN - ADULT  Goal: Verbalizes/displays adequate comfort level or baseline comfort level  Description: Interventions:- Encourage patient to monitor pain and request assistance- Assess pain using appropriate pain scale- Administer analgesics based on type and severity of pain and evaluate response- Implement non-pharmacological measures as appropriate and evaluate response- Consider cultural and social influences on pain and pain management- Notify physician/advanced practitioner if interventions unsuccessful or patient reports new pain  Outcome: Progressing     Problem: DISCHARGE PLANNING  Goal: Discharge to home or other facility with appropriate resources  Description: INTERVENTIONS:- Identify barriers to discharge w/patient and caregiver- Arrange for needed discharge resources and transportation as appropriate- Identify discharge learning needs (meds, wound care, etc.)- Arrange for interpretive services to assist at discharge as needed- Refer to Case Management Department for coordinating discharge planning if the patient needs post-hospital services based on physician/advanced practitioner order or complex needs related to functional  status, cognitive ability, or social support system  Outcome: Progressing     Problem: Knowledge Deficit  Goal: Patient/family/caregiver demonstrates understanding of disease process, treatment plan, medications, and discharge instructions  Description: Complete learning assessment and assess knowledge base.Interventions:- Provide teaching at level of understanding- Provide teaching via preferred learning methods  Outcome: Progressing     Problem: METABOLIC, FLUID AND ELECTROLYTES - ADULT  Goal: Glucose maintained within target range  Description: INTERVENTIONS:- Monitor Blood Glucose as ordered- Assess for signs and symptoms of hyperglycemia and hypoglycemia- Administer ordered medications to maintain glucose within target range- Assess nutritional intake and initiate nutrition service referral as needed  Outcome: Progressing     Problem: Prexisting or High Potential for Compromised Skin Integrity  Goal: Skin integrity is maintained or improved  Description: INTERVENTIONS:- Identify patients at risk for skin breakdown- Assess and monitor skin integrity- Assess and monitor nutrition and hydration status- Monitor labs - Assess for incontinence - Turn and reposition patient- Assist with mobility/ambulation- Relieve pressure over bony prominences- Avoid friction and shearing- Provide appropriate hygiene as needed including keeping skin clean and dry- Evaluate need for skin moisturizer/barrier cream- Collaborate with interdisciplinary team - Patient/family teaching- Consider wound care consult   Outcome: Progressing

## 2025-05-15 NOTE — PLAN OF CARE
Problem: SAFETY,RESTRAINT: NV/NON-SELF DESTRUCTIVE BEHAVIOR  Goal: Remains free of harm/injury (restraint for non violent/non self-detsructive behavior)  Description: INTERVENTIONS:- Instruct patient/family regarding restraint use - Assess and monitor physiologic and psychological status - Provide interventions and comfort measures to meet assessed patient needs - Identify and implement measures to help patient regain control- Assess readiness for release of restraint   Outcome: Progressing  Goal: Returns to optimal restraint-free functioning  Description: INTERVENTIONS:- Assess the patient's behavior and symptoms that indicate continued need for restraint- Identify and implement measures to help patient regain control- Assess readiness for release of restraint   Outcome: Progressing     Problem: Potential for Falls  Goal: Patient will remain free of falls  Description: INTERVENTIONS:- Educate patient/family on patient safety including physical limitations- Instruct patient to call for assistance with activity - Consult OT/PT to assist with strengthening/mobility - Keep Call bell within reach- Keep bed low and locked with side rails adjusted as appropriate- Keep care items and personal belongings within reach- Initiate and maintain comfort rounds- Make Fall Risk Sign visible to staff- Offer Toileting every *** Hours, in advance of need- Initiate/Maintain ***alarm- Obtain necessary fall risk management equipment: ***- Apply yellow socks and bracelet for high fall risk patients- Consider moving patient to room near nurses station  Outcome: Progressing     Problem: PAIN - ADULT  Goal: Verbalizes/displays adequate comfort level or baseline comfort level  Description: Interventions:- Encourage patient to monitor pain and request assistance- Assess pain using appropriate pain scale- Administer analgesics based on type and severity of pain and evaluate response- Implement non-pharmacological measures as appropriate and  evaluate response- Consider cultural and social influences on pain and pain management- Notify physician/advanced practitioner if interventions unsuccessful or patient reports new pain  Outcome: Progressing     Problem: DISCHARGE PLANNING  Goal: Discharge to home or other facility with appropriate resources  Description: INTERVENTIONS:- Identify barriers to discharge w/patient and caregiver- Arrange for needed discharge resources and transportation as appropriate- Identify discharge learning needs (meds, wound care, etc.)- Arrange for interpretive services to assist at discharge as needed- Refer to Case Management Department for coordinating discharge planning if the patient needs post-hospital services based on physician/advanced practitioner order or complex needs related to functional status, cognitive ability, or social support system  Outcome: Progressing     Problem: Knowledge Deficit  Goal: Patient/family/caregiver demonstrates understanding of disease process, treatment plan, medications, and discharge instructions  Description: Complete learning assessment and assess knowledge base.Interventions:- Provide teaching at level of understanding- Provide teaching via preferred learning methods  Outcome: Progressing     Problem: METABOLIC, FLUID AND ELECTROLYTES - ADULT  Goal: Glucose maintained within target range  Description: INTERVENTIONS:- Monitor Blood Glucose as ordered- Assess for signs and symptoms of hyperglycemia and hypoglycemia- Administer ordered medications to maintain glucose within target range- Assess nutritional intake and initiate nutrition service referral as needed  Outcome: Progressing     Problem: INFECTION - ADULT  Goal: Absence or prevention of progression during hospitalization  Description: INTERVENTIONS:- Assess and monitor for signs and symptoms of infection- Monitor lab/diagnostic results- Monitor all insertion sites, i.e. indwelling lines, tubes, and drains- Monitor endotracheal if  appropriate and nasal secretions for changes in amount and color- Hardy appropriate cooling/warming therapies per order- Administer medications as ordered- Instruct and encourage patient and family to use good hand hygiene technique- Identify and instruct in appropriate isolation precautions for identified infection/condition  Outcome: Progressing     Problem: Prexisting or High Potential for Compromised Skin Integrity  Goal: Skin integrity is maintained or improved  Description: INTERVENTIONS:- Identify patients at risk for skin breakdown- Assess and monitor skin integrity- Assess and monitor nutrition and hydration status- Monitor labs - Assess for incontinence - Turn and reposition patient- Assist with mobility/ambulation- Relieve pressure over bony prominences- Avoid friction and shearing- Provide appropriate hygiene as needed including keeping skin clean and dry- Evaluate need for skin moisturizer/barrier cream- Collaborate with interdisciplinary team - Patient/family teaching- Consider wound care consult   Outcome: Progressing

## 2025-05-15 NOTE — NURSING NOTE
Attempted standing scale weight with patient. She stated she is too weak and too tired at this time. Bed scale done.

## 2025-05-16 LAB
ANION GAP SERPL CALCULATED.3IONS-SCNC: 6 MMOL/L (ref 4–13)
BACTERIA BLD CULT: NORMAL
BACTERIA BLD CULT: NORMAL
BUN SERPL-MCNC: 32 MG/DL (ref 5–25)
CALCIUM SERPL-MCNC: 8.9 MG/DL (ref 8.4–10.2)
CHLORIDE SERPL-SCNC: 105 MMOL/L (ref 96–108)
CO2 SERPL-SCNC: 31 MMOL/L (ref 21–32)
CREAT SERPL-MCNC: 1.18 MG/DL (ref 0.6–1.3)
ERYTHROCYTE [DISTWIDTH] IN BLOOD BY AUTOMATED COUNT: 17.8 % (ref 11.6–15.1)
GFR SERPL CREATININE-BSD FRML MDRD: 42 ML/MIN/1.73SQ M
GLUCOSE SERPL-MCNC: 167 MG/DL (ref 65–140)
GLUCOSE SERPL-MCNC: 290 MG/DL (ref 65–140)
GLUCOSE SERPL-MCNC: 326 MG/DL (ref 65–140)
GLUCOSE SERPL-MCNC: 85 MG/DL (ref 65–140)
GLUCOSE SERPL-MCNC: 91 MG/DL (ref 65–140)
HCT VFR BLD AUTO: 32.8 % (ref 34.8–46.1)
HGB BLD-MCNC: 10.2 G/DL (ref 11.5–15.4)
MAGNESIUM SERPL-MCNC: 2.1 MG/DL (ref 1.9–2.7)
MCH RBC QN AUTO: 35.8 PG (ref 26.8–34.3)
MCHC RBC AUTO-ENTMCNC: 31.1 G/DL (ref 31.4–37.4)
MCV RBC AUTO: 115 FL (ref 82–98)
PLATELET # BLD AUTO: 149 THOUSANDS/UL (ref 149–390)
PMV BLD AUTO: 13 FL (ref 8.9–12.7)
POTASSIUM SERPL-SCNC: 3.7 MMOL/L (ref 3.5–5.3)
RBC # BLD AUTO: 2.85 MILLION/UL (ref 3.81–5.12)
SODIUM SERPL-SCNC: 142 MMOL/L (ref 135–147)
WBC # BLD AUTO: 66.67 THOUSAND/UL (ref 4.31–10.16)

## 2025-05-16 PROCEDURE — 97110 THERAPEUTIC EXERCISES: CPT

## 2025-05-16 PROCEDURE — 85027 COMPLETE CBC AUTOMATED: CPT

## 2025-05-16 PROCEDURE — 97116 GAIT TRAINING THERAPY: CPT

## 2025-05-16 PROCEDURE — 82948 REAGENT STRIP/BLOOD GLUCOSE: CPT

## 2025-05-16 PROCEDURE — 99232 SBSQ HOSP IP/OBS MODERATE 35: CPT | Performed by: INTERNAL MEDICINE

## 2025-05-16 PROCEDURE — 97535 SELF CARE MNGMENT TRAINING: CPT

## 2025-05-16 PROCEDURE — 83735 ASSAY OF MAGNESIUM: CPT

## 2025-05-16 PROCEDURE — 97530 THERAPEUTIC ACTIVITIES: CPT

## 2025-05-16 PROCEDURE — 80048 BASIC METABOLIC PNL TOTAL CA: CPT

## 2025-05-16 RX ORDER — FUROSEMIDE 40 MG/1
40 TABLET ORAL
Status: DISCONTINUED | OUTPATIENT
Start: 2025-05-16 | End: 2025-05-17 | Stop reason: HOSPADM

## 2025-05-16 RX ADMIN — INSULIN LISPRO 5 UNITS: 100 INJECTION, SOLUTION INTRAVENOUS; SUBCUTANEOUS at 12:18

## 2025-05-16 RX ADMIN — FUROSEMIDE 40 MG: 40 TABLET ORAL at 08:40

## 2025-05-16 RX ADMIN — DICLOFENAC SODIUM 2 G: 10 GEL TOPICAL at 16:50

## 2025-05-16 RX ADMIN — METOPROLOL SUCCINATE 25 MG: 25 TABLET, EXTENDED RELEASE ORAL at 21:14

## 2025-05-16 RX ADMIN — INSULIN GLARGINE 20 UNITS: 100 INJECTION, SOLUTION SUBCUTANEOUS at 21:15

## 2025-05-16 RX ADMIN — METOPROLOL SUCCINATE 25 MG: 25 TABLET, EXTENDED RELEASE ORAL at 08:40

## 2025-05-16 RX ADMIN — FERROUS SULFATE TAB 325 MG (65 MG ELEMENTAL FE) 325 MG: 325 (65 FE) TAB at 16:46

## 2025-05-16 RX ADMIN — FUROSEMIDE 40 MG: 40 TABLET ORAL at 16:46

## 2025-05-16 RX ADMIN — GUAIFENESIN AND DEXTROMETHORPHAN 5 ML: 100; 10 SYRUP ORAL at 08:40

## 2025-05-16 RX ADMIN — GUAIFENESIN AND DEXTROMETHORPHAN 5 ML: 100; 10 SYRUP ORAL at 21:14

## 2025-05-16 RX ADMIN — Medication 1000 UNITS: at 08:40

## 2025-05-16 RX ADMIN — Medication 2.5 MG: at 13:57

## 2025-05-16 RX ADMIN — DICLOFENAC SODIUM 2 G: 10 GEL TOPICAL at 21:15

## 2025-05-16 RX ADMIN — ATORVASTATIN CALCIUM 20 MG: 20 TABLET, FILM COATED ORAL at 08:40

## 2025-05-16 RX ADMIN — FERROUS SULFATE TAB 325 MG (65 MG ELEMENTAL FE) 325 MG: 325 (65 FE) TAB at 08:40

## 2025-05-16 RX ADMIN — ACETAMINOPHEN 975 MG: 325 TABLET, FILM COATED ORAL at 13:10

## 2025-05-16 RX ADMIN — INSULIN LISPRO 3 UNITS: 100 INJECTION, SOLUTION INTRAVENOUS; SUBCUTANEOUS at 21:15

## 2025-05-16 RX ADMIN — INSULIN LISPRO 5 UNITS: 100 INJECTION, SOLUTION INTRAVENOUS; SUBCUTANEOUS at 16:46

## 2025-05-16 RX ADMIN — POTASSIUM CHLORIDE 20 MEQ: 1500 TABLET, EXTENDED RELEASE ORAL at 08:40

## 2025-05-16 RX ADMIN — ENOXAPARIN SODIUM 30 MG: 30 INJECTION SUBCUTANEOUS at 08:40

## 2025-05-16 RX ADMIN — INSULIN LISPRO 3 UNITS: 100 INJECTION, SOLUTION INTRAVENOUS; SUBCUTANEOUS at 16:47

## 2025-05-16 RX ADMIN — INSULIN LISPRO 1 UNITS: 100 INJECTION, SOLUTION INTRAVENOUS; SUBCUTANEOUS at 12:17

## 2025-05-16 RX ADMIN — ACETAMINOPHEN 975 MG: 325 TABLET, FILM COATED ORAL at 21:14

## 2025-05-16 RX ADMIN — Medication 1 TABLET: at 08:40

## 2025-05-16 RX ADMIN — Medication 1 TABLET: at 16:46

## 2025-05-16 NOTE — PLAN OF CARE
Problem: OCCUPATIONAL THERAPY ADULT  Goal: Performs self-care activities at highest level of function for planned discharge setting.  See evaluation for individualized goals.  Description: Treatment Interventions: ADL retraining, Functional transfer training, UE strengthening/ROM, Cognitive reorientation, Patient/family training, Equipment evaluation/education, Compensatory technique education, Energy conservation, Activityengagement          See flowsheet documentation for full assessment, interventions and recommendations.   Outcome: Progressing  Note: Limitation: Decreased ADL status, Decreased Safe judgement during ADL, Decreased cognition, Decreased self-care trans, Decreased high-level ADLs (pain, WBS)  Prognosis: Fair  Assessment: Patient is a 84 y.o. female seen for OT treatment session following admission on 5/11/2025 s/p Acute on chronic systolic heart failure.Pt iws curently NWB L UE (reopts using L and R UE for tasks prior to admission) Pt presents today with +pain in L UE and L foot, decreased act luis, functional cog deficits, decreased balance, geberlized weakness and impaired sequencing affecting her Indep and safety with all functional tasks. Pt was able to complete bed mobility with ModA and use of elevated HOB, UB gown management completed with ModA, UB bathing perfomed with Roxanna, Roxanna for gooming tasks and Pt req Mod;/MaxA fo LB ADLs. Pt was able to complete self feeding at Indep level afte s/u. Pt is pleasent and cooperative t/o session making progress towards goals set at IE. VC req to maintain L UE NWB status during funtional task. Pt remains below baseline level of indep, recommemnd cont OT at hospital setting with d/c level II (Moderate resource intensity)     Rehab Resource Intensity Level, OT: II (Moderate Resource Intensity)

## 2025-05-16 NOTE — PLAN OF CARE
Problem: PHYSICAL THERAPY ADULT  Goal: Performs mobility at highest level of function for planned discharge setting.  See evaluation for individualized goals.  Description: Treatment/Interventions: Functional transfer training, LE strengthening/ROM, Therapeutic exercise, Endurance training, Cognitive reorientation, Patient/family training, Equipment eval/education, Bed mobility, Gait training, Spoke to nursing, Spoke to case management, Spoke to MD, OT  Equipment Recommended:  (LRAD but UNILATERAL device, trials w/hemiwalker vs loftstrand crutch in future)       See flowsheet documentation for full assessment, interventions and recommendations.  Outcome: Progressing  Note: Prognosis: Fair  Problem List: Decreased strength, Decreased range of motion, Decreased endurance, Impaired balance, Decreased mobility, Decreased cognition, Decreased safety awareness, Impaired judgement, Pain, Orthopedic restrictions (gait deviations)  Assessment: pt began tx session lying supine in the bed as pt was agreeable to participate in PT interventions. pt continues to be limited with bed mobility due to LUE precautions as pt required mod ax1 for completing a supine<>sit EOB transfer w/ RUE management. pt was able to sit EOB 5 minutes while being educated on her NWB precautions and sling being properly fitted prior to initiating functional transfers w/ reymundo walker. pt completed multiple functional transfers from different level height surfaces as pt required min ax1 for all STS but required mod ax1 for descending to standard toilet. pt pt continues to be limited with activity tolerance and ambulation distance as pt was limited to 15'x1 and 10'x2 w/ reymundo walker. pt mainly required min ax1 for safety and balance but had 1 LOB when ambulating to standard toilet that required max ax1 to correct. pt remains at an increased risk for falls and injuries w/ OOB activities due to generalized weakness, improper placement of reymundo walker, flexed  posture. pt did participate in TE activities while seated in the recliner with AROM, no increases in pain. Post tx pt in fabio recliner with call bell, chair alarm activated, legs elevated and all pt needs met. continue to recommend DC w/ level 2 moderate rehab resource intensity when medically cleared  Barriers to Discharge: Inaccessible home environment, Decreased caregiver support     Rehab Resource Intensity Level, PT: II (Moderate Resource Intensity)    See flowsheet documentation for full assessment.

## 2025-05-16 NOTE — ASSESSMENT & PLAN NOTE
Lab Results   Component Value Date    HGBA1C 7.5 (H) 05/11/2025       Recent Labs     05/15/25  1511 05/15/25  2125 05/16/25  0759 05/16/25  1111   POCGLU 212* 174* 85 167*       Blood Sugar Average: Last 72 hrs:  (P) [id number]  Recent Labs     05/15/25  2125 05/16/25  0452 05/16/25  0759 05/16/25  1111   POCGLU 174*  --  85 167*   GLUC  --  91  --   --        Hold home regimen while inpatient and resume on discharge   Diabetic diet  Insulin regimen  Humalog 5 units TID AC  Lantus 20 units HS  Glucose checks and Insulin correction ACHS  Goal -180 while admitted, adjusting insulin regimen as appropriate  Monitor for hypoglycemia and treat per protocol

## 2025-05-16 NOTE — ASSESSMENT & PLAN NOTE
Lab Results   Component Value Date    EGFR 42 05/16/2025    EGFR 51 05/15/2025    EGFR 45 05/14/2025    CREATININE 1.18 05/16/2025    CREATININE 1.00 05/15/2025    CREATININE 1.10 05/14/2025   Mild creatinine elevation with ongoing diuresis    Plan  Monitor BMP

## 2025-05-16 NOTE — OCCUPATIONAL THERAPY NOTE
Occupational Therapy Progress Note     Patient Name: Gia Vaughan  Today's Date: 5/16/2025  Problem List  Principal Problem:    Acute on chronic systolic heart failure (HCC)  Active Problems:    Fall    Type 2 diabetes mellitus with stage 3 chronic kidney disease, unspecified whether long term insulin use, unspecified whether stage 3a or 3b CKD (HCC)    Stage 3b chronic kidney disease (HCC)    HLD (hyperlipidemia)    OA (osteoarthritis)    Moderate dementia (HCC)    Primary hypertension    CLL (chronic lymphocytic leukemia) (HCC)    ACUTE RESPIRATORY FAILURE, UNSP W HYPOXIA OR HYPERCAPNIA    Elbow fracture, left, closed, initial encounter              05/16/25 1412   OT Last Visit   OT Visit Date 05/16/25   Note Type   Note Type Treatment   Pain Assessment   Pain Assessment Tool 0-10   Pain Score 3   Pain Location/Orientation Orientation: Left;Location: Arm   Pain Onset/Description Onset: Ongoing   Effect of Pain on Daily Activities limits proper position of elbow flexion in sling   Patient's Stated Pain Goal No pain   Hospital Pain Intervention(s) Repositioned   Multiple Pain Sites Yes   Pain 2   Pain Score 2 4   Pain Location/Orientation 2 Orientation: Left;Location: Foot   Patient's Stated Pain Goal 2 No pain   Hospital Pain Intervention(s) 2 Repositioned   Restrictions/Precautions   Weight Bearing Precautions Per Order Yes   LUE Weight Bearing Per Order (S)  NWB   Braces or Orthoses Sling;Splint  (LUE in posterior slab splint : sling for comfort, per ortho)   Other Precautions Cognitive;Chair Alarm;Bed Alarm;WBS;Fall Risk;Pain;Multiple lines   Lifestyle   Autonomy Pt resides at [facility]. (A) for ADLs/IADLs. Use of rollator for fnxl mobility. (-) driving and (+) falls   Reciprocal Relationships Supportive family and facility staff   Service to Others retired   ADL   Eating Assistance 5  Supervision/Setup   Eating Deficit Setup   Grooming Assistance 4  Minimal Assistance   Grooming Deficit Setup;Verbal  "cueing;Wash/dry hands;Wash/dry face   UB Bathing Assistance 4  Minimal Assistance   UB Bathing Deficit Setup;Increased time to complete;Verbal cueing;Supervision/safety   UB Dressing Assistance 3  Moderate Assistance   UB Dressing Deficit   (gown management)   LB Dressing Assistance 2  Maximal Assistance   Bed Mobility   Supine to Sit 3  Moderate assistance   Additional items Assist x 1;HOB elevated;Bedrails;Increased time required;Verbal cues;Other  (HHA for R UE)   Sit to Supine 3  Moderate assistance   Additional items Assist x 1;Verbal cues;LE management;Bedrails   Subjective   Subjective \"This is not coffee\"   Cognition   Overall Cognitive Status (S)  Impaired   Arousal/Participation Alert;Responsive;Cooperative   Attention Attends with cues to redirect   Orientation Level Oriented to person;Oriented to situation;Disoriented to place;Disoriented to time   Memory Decreased long term memory;Decreased short term memory;Decreased recall of recent events;Decreased recall of precautions   Following Commands Follows one step commands with increased time or repetition   Activity Tolerance   Activity Tolerance Patient limited by pain   Assessment   Assessment Patient is a 84 y.o. female seen for OT treatment session following admission on 5/11/2025 s/p Acute on chronic systolic heart failure. Reported Pt fell OOB at [place] [facility] resulting in L nondisplaced transcondylar distal humerus fracture.  Pt is currently NWB L UE (reports using L and R UE for tasks prior to admission) Pt presents today with +pain in L UE and L foot, decreased act luis, functional cog deficits, decreased balance, generalized weakness and impaired sequencing affecting her Indep and safety with all functional tasks. Pt was able to complete bed mobility with ModA and use of elevated HOB, UB gown management completed with ModA, UB bathing performed with oRxanna, Roxanna for grooming tasks and Pt req Mod;/MaxA fo LB ADLs. Pt was able to complete self feeding at " Indep level after s/u. Pt is pleasant and cooperative t/o session making progress towards goals set at IE. VC req to maintain L UE NWB status during functional task. Pt remains below baseline level of indep, recommend cont OT at hospital setting with d/c level II (Moderate resource intensity)   Plan   Treatment Interventions ADL retraining;Functional transfer training;UE strengthening/ROM;Cognitive reorientation;Patient/family training;Equipment evaluation/education;Compensatory technique education;Energy conservation;Activityengagement   Goal Expiration Date 04/24/25   OT Treatment Day 2   OT Frequency 3-5x/wk   Discharge Recommendation   Rehab Resource Intensity Level, OT II (Moderate Resource Intensity)   AM-PAC Daily Activity Inpatient   Lower Body Dressing 2   Bathing 2   Toileting 2   Upper Body Dressing 3   Grooming 3   Eating 3   Daily Activity Raw Score 15   Daily Activity Standardized Score (Calc for Raw Score >=11) 34.69   AM-PAC Applied Cognition Inpatient   Following a Speech/Presentation 1   Understanding Ordinary Conversation 3   Taking Medications 1   Remembering Where Things Are Placed or Put Away 2   Remembering List of 4-5 Errands 1   Taking Care of Complicated Tasks 1   Applied Cognition Raw Score 9   Applied Cognition Standardized Score 22.48   End of Consult   Education Provided Yes   Patient Position at End of Consult Supine;Bed/Chair alarm activated;All needs within reach   Nurse Communication Nurse aware of consult         GOALS:      -Patient will perform grooming tasks standing at sink with overall JOÃO in order to increase overall independence PROGRESSING     -Patient will be JOÃO with UB dressing using AE and AD as needed in order to increase (I) with ADLs PROGRESSING     -Patient will be JOÃO with UB bathing using AE and AD as needed in order to increase (I) with ADLs PROGRESSING     -Patient will be MODA with LB dressing with use of AE and AD as needed in order to increase (I) with  ADLs     -Patient will be MODA with LB bathing with use of AE and AD as needed in order to increase (I) with ADLs     - Patient will perform UB/LB dressing with supervision using reymundo-techniques after skilled instruction with no minimal verbal cueing to ensure carryover PROGRESSING     -Patient will complete toileting w/ JOÃO w/ G hygiene/thoroughness in order to reduce caregiver burden     -Patient will demonstrate JOÃO with bed mobility for ability to manage own comfort and initiate OOB tasks.  PROGRESSING     -Patient will perform functional transfers with JOÃO x 1 to/from all surfaces using DME as needed in order to increase (I) with functional tasks     -Patient will be JOÃO x 1 with functional mobility to/from bathroom for increased independence with toileting tasks     -Patient will tolerate therapeutic activities for greater than 30 min, in order to increase tolerance for functional activities.  PROGRESSING     -Patient will demonstrate standing for 3 min in order to increase active participation in functional activities    -Patient will independently identify and utilize 2-3 positive coping strategies to enhance overall wellbeing and engagement in valued occupations.     -Patient will verbalize and demonstrate understanding of WB status in 100% of tx sessions with moderate verbal cueing for increased safety and functional mobility.  PROGRESSING

## 2025-05-16 NOTE — PLAN OF CARE
Problem: SAFETY,RESTRAINT: NV/NON-SELF DESTRUCTIVE BEHAVIOR  Goal: Remains free of harm/injury (restraint for non violent/non self-detsructive behavior)  Description: INTERVENTIONS:- Instruct patient/family regarding restraint use - Assess and monitor physiologic and psychological status - Provide interventions and comfort measures to meet assessed patient needs - Identify and implement measures to help patient regain control- Assess readiness for release of restraint   Outcome: Progressing  Goal: Returns to optimal restraint-free functioning  Description: INTERVENTIONS:- Assess the patient's behavior and symptoms that indicate continued need for restraint- Identify and implement measures to help patient regain control- Assess readiness for release of restraint   Outcome: Progressing     Problem: Potential for Falls  Goal: Patient will remain free of falls  Description: INTERVENTIONS:- Educate patient/family on patient safety including physical limitations- Instruct patient to call for assistance with activity - Consult OT/PT to assist with strengthening/mobility - Keep Call bell within reach- Keep bed low and locked with side rails adjusted as appropriate- Keep care items and personal belongings within reach- Initiate and maintain comfort rounds- Make Fall Risk Sign visible to staff- Offer Toileting every 2 Hours, in advance of need- Initiate/Maintain bed alarm- Obtain necessary fall risk management equipment- Apply yellow socks and bracelet for high fall risk patients- Consider moving patient to room near nurses station  Outcome: Progressing     Problem: PAIN - ADULT  Goal: Verbalizes/displays adequate comfort level or baseline comfort level  Description: Interventions:- Encourage patient to monitor pain and request assistance- Assess pain using appropriate pain scale- Administer analgesics based on type and severity of pain and evaluate response- Implement non-pharmacological measures as appropriate and  evaluate response- Consider cultural and social influences on pain and pain management- Notify physician/advanced practitioner if interventions unsuccessful or patient reports new pain  Outcome: Progressing     Problem: DISCHARGE PLANNING  Goal: Discharge to home or other facility with appropriate resources  Description: INTERVENTIONS:- Identify barriers to discharge w/patient and caregiver- Arrange for needed discharge resources and transportation as appropriate- Identify discharge learning needs (meds, wound care, etc.)- Arrange for interpretive services to assist at discharge as needed- Refer to Case Management Department for coordinating discharge planning if the patient needs post-hospital services based on physician/advanced practitioner order or complex needs related to functional status, cognitive ability, or social support system  Outcome: Progressing     Problem: Knowledge Deficit  Goal: Patient/family/caregiver demonstrates understanding of disease process, treatment plan, medications, and discharge instructions  Description: Complete learning assessment and assess knowledge base.Interventions:- Provide teaching at level of understanding- Provide teaching via preferred learning methods  Outcome: Progressing     Problem: METABOLIC, FLUID AND ELECTROLYTES - ADULT  Goal: Glucose maintained within target range  Description: INTERVENTIONS:- Monitor Blood Glucose as ordered- Assess for signs and symptoms of hyperglycemia and hypoglycemia- Administer ordered medications to maintain glucose within target range- Assess nutritional intake and initiate nutrition service referral as needed  Outcome: Progressing     Problem: INFECTION - ADULT  Goal: Absence or prevention of progression during hospitalization  Description: INTERVENTIONS:- Assess and monitor for signs and symptoms of infection- Monitor lab/diagnostic results- Monitor all insertion sites, i.e. indwelling lines, tubes, and drains- Monitor endotracheal if  appropriate and nasal secretions for changes in amount and color- [place] appropriate cooling/warming therapies per order- Administer medications as ordered- Instruct and encourage patient and family to use good hand hygiene technique- Identify and instruct in appropriate isolation precautions for identified infection/condition  Outcome: Progressing     Problem: Prexisting or High Potential for Compromised Skin Integrity  Goal: Skin integrity is maintained or improved  Description: INTERVENTIONS:- Identify patients at risk for skin breakdown- Assess and monitor skin integrity- Assess and monitor nutrition and hydration status- Monitor labs - Assess for incontinence - Turn and reposition patient- Assist with mobility/ambulation- Relieve pressure over bony prominences- Avoid friction and shearing- Provide appropriate hygiene as needed including keeping skin clean and dry- Evaluate need for skin moisturizer/barrier cream- Collaborate with interdisciplinary team - Patient/family teaching- Consider wound care consult   Outcome: Progressing

## 2025-05-16 NOTE — ASSESSMENT & PLAN NOTE
CT cervical spine, x-ray hip/pelvis and x-ray knee negative for fractures  Patient complaining of left elbow pain  X-ray left elbow showing acute nondisplaced transcondylar fracture of distal humerus      Plan  Tylenol 975 mg scheduled 3 times daily  Oxy 2.5 every 4h as needed  Fall precautions  STR at [place]

## 2025-05-16 NOTE — ASSESSMENT & PLAN NOTE
Lab Results   Component Value Date    LVEF 35 05/14/2025    BNP 2,982 (H) 05/11/2025    BNP 1,246 (H) 03/03/2025     05/10/25  62.8kg   Wt Readings from Last 10 Encounters:   04/02/25 62.8 kg (138 lb 8 oz)   03/06/25 60.3 kg (132 lb 15 oz)   10/17/24 63.3 kg (139 lb 8.8 oz)   09/06/24 68.5 kg (151 lb 0.2 oz)   05/31/24 68 kg (150 lb)   04/04/24 68.4 kg (150 lb 12.8 oz)   01/29/24 70.8 kg (156 lb)   04/24/23 68 kg (150 lb)   11/15/22 66.7 kg (147 lb)   11/11/22 66.7 kg (147 lb)     Results for orders placed during the hospital encounter of 04/01/24    Echo 5/14 EF 35%, systolic function severely reduced, diastolic function severely abnormal.  The following segments are akinetic: basal inferior, mid inferior and apical inferior.    The following segments are hypokinetic: basal anterior, basal anteroseptal, basal inferoseptal, basal inferolateral, basal anterolateral, mid anterior, mid anteroseptal, mid inferoseptal, mid inferolateral, mid anterolateral, apical anterior, apical septal, apical lateral and apex.    Right Ventricle: Right ventricular cavity size is normal. Systolic function is normal.    Left Atrium: The atrium is mildly dilated.    Aortic Valve: There is aortic valve sclerosis.    Mitral Valve: There is moderate to severe regurgitation with a posteriorly directed jet.    Tricuspid Valve: There is moderate regurgitation. The right ventricular systolic pressure is moderately elevated. The estimated right ventricular systolic pressure is 60.00 mmHg.     Presents with increased shortness of breath and lower extremity edema  NYHA Class III.,   Patient is currently volume overloaded.    Pharmacotherapies / Neurohormonal Blockade:  --Beta Blocker: Toprol XL 25 BID  --ARNi / ACEi / ARB: Losartan 25 QD  --Aldosterone Antagonist:  --SGLT2 Inhibitor: NO--H/O UTI's   --Diuretic: Lasix 20 mg QD    NOT on oxygen at home  Plan:  Continue oral Lasix 40 mg twice daily  Consider restarting losartan on discharge    Currently on 2 L O2, wean as tolerated  Sodium restriction 2g  Fluid restriction <2L/day  Goal Mg > 2 and K > 4; Replete prn  HOB > 30°, Daily standing weights, Measure I/O

## 2025-05-16 NOTE — PHYSICAL THERAPY NOTE
PHYSICAL THERAPY NOTE          Patient Name: iGa Vaughan  Today's Date: 25 0914   PT Last Visit   PT Visit Date 25   Note Type   Note Type Treatment   Pain Assessment   Pain Assessment Tool 0-10   Pain Score 7   Pain Location/Orientation Other (Comment)  (Rectum)   Patient's Stated Pain Goal No pain   Hospital Pain Intervention(s) Repositioned;Ambulation/increased activity;Rest   Multiple Pain Sites Yes  (pre tx just Rectum pain but post tx L arm pain as well 5/10)   Pain 2   Pain Score 2 5   Pain Location/Orientation 2 Orientation: Left;Location: Arm   Patient's Stated Pain Goal 2 No pain   Hospital Pain Intervention(s) 2 Repositioned;Ambulation/increased activity;Elevated;Rest   Restrictions/Precautions   Weight Bearing Precautions Per Order Yes   LUE Weight Bearing Per Order NWB   Braces or Orthoses Sling;Splint  (LUE in posterior slab splint : sling for comfort, per ortho)   Other Precautions Cognitive;Chair Alarm;Bed Alarm;WBS;Fall Risk;Pain;Multiple lines   General   Chart Reviewed Yes   Response to Previous Treatment Patient with no complaints from previous session.   Family/Caregiver Present No   Cognition   Overall Cognitive Status (S)  Impaired   Arousal/Participation Alert;Responsive;Cooperative   Attention Attends with cues to redirect   Orientation Level Oriented to person;Oriented to situation;Disoriented to place;Disoriented to time   Memory Decreased long term memory;Decreased short term memory;Decreased recall of recent events;Decreased recall of precautions   Following Commands Follows one step commands with increased time or repetition   Comments pt ID by name and  on hospital wrist band   Subjective   Subjective pt was agreeable to participate in PT tx session. pt reports 7/10 Rectal pain pre tx session and 5/10 L arm pain post tx session   Bed Mobility   Supine to Sit 3   Moderate assistance   Additional items Assist x 1;HOB elevated;Bedrails;Increased time required;Verbal cues;Other  (RUE HHA)   Sit to Supine Unable to assess   Additional Comments pt was sitting in the recliner post tx w/ call bell,chair alarm activated, legs elevated and all pt needs met   Transfers   Sit to Stand 4  Minimal assistance   Additional items Assist x 1;Increased time required;Verbal cues  (w/ Zafar walker in RUE)   Stand to Sit 4  Minimal assistance   Additional items Assist x 1;Armrests;Increased time required;Verbal cues  (w/ zafar walker into the recliner)   Stand pivot 4  Minimal assistance   Additional items Assist x 1;Increased time required;Armrests;Verbal cues  (from EOB w/ zafar walker into the recliner)   Additional Comments pt completed multiple functional transfers with zafar walker with min ax1  (VC's required for proper hand and zafar walker placement)   Ambulation/Elevation   Gait pattern Decreased foot clearance;Excessively slow;Foward flexed;Step to;Short stride   Gait Assistance 4  Minimal assist   Additional items Assist x 1;Verbal cues;Other (Comment)  (VC's rfor zafar walker placement and advancement)   Assistive Device Zafar-walker   Distance 15'x1 10x2 w/ zafar walker   Stair Management Assistance Not tested   Balance   Static Sitting Fair +   Dynamic Sitting Fair   Static Standing Poor +   Dynamic Standing Poor   Ambulatory Poor +  (w/ zafar walker in RUE)   Endurance Deficit   Endurance Deficit Yes   Endurance Deficit Description limited ambulation distance and comfort   Activity Tolerance   Activity Tolerance Patient limited by pain   Nurse Made Aware Spoke to RN Janee   Exercises   Hip Abduction Sitting;15 reps;AROM;Bilateral   Hip Adduction Sitting;15 reps;AROM;Bilateral  (pillow squeezes)   Knee AROM Long Arc Quad Sitting;15 reps;AROM;Bilateral   Ankle Pumps Sitting;20 reps;AROM;Bilateral  (20 heel raises bilateral seated AROM)   Marching Sitting;15 reps;AROM;Bilateral   Assessment    Prognosis Fair   Problem List Decreased strength;Decreased range of motion;Decreased endurance;Impaired balance;Decreased mobility;Decreased cognition;Decreased safety awareness;Impaired judgement;Pain;Orthopedic restrictions  (gait deviations)   Assessment pt began tx session lying supine in the bed as pt was agreeable to participate in PT interventions. pt continues to be limited with bed mobility due to LUE precautions as pt required mod ax1 for completing a supine<>sit EOB transfer w/ RUE management. pt was able to sit EOB 5 minutes while being educated on her NWB precautions and sling being properly fitted prior to initiating functional transfers w/ reymundo walker. pt completed multiple functional transfers from different level height surfaces as pt required min ax1 for all STS but required mod ax1 for descending to standard toilet. pt pt continues to be limited with activity tolerance and ambulation distance as pt was limited to 15'x1 and 10'x2 w/ reymundo walker. pt mainly required min ax1 for safety and balance but had 1 LOB when ambulating to standard toilet that required max ax1 to correct. pt remains at an increased risk for falls and injuries w/ OOB activities due to generalized weakness, improper placement of reymundo walker, flexed posture. pt did participate in TE activities while seated in the recliner with AROM, no increases in pain. Post tx pt in fabio recliner with call bell, chair alarm activated, legs elevated and all pt needs met. continue to recommend DC w/ level 2 moderate rehab resource intensity when medically cleared   Barriers to Discharge Inaccessible home environment;Decreased caregiver support   Goals   Patient Goals none stated   STG Expiration Date 05/24/25   PT Treatment Day 2   Plan   Treatment/Interventions Functional transfer training;LE strengthening/ROM;Therapeutic exercise;Endurance training;Cognitive reorientation;Patient/family training;Equipment eval/education;Gait training;Bed  mobility;Spoke to nursing   Progress Slow progress, decreased activity tolerance   PT Frequency 3-5x/wk   Discharge Recommendation   Rehab Resource Intensity Level, PT II (Moderate Resource Intensity)   Equipment Recommended   (LRAD but UNILATERAL device, trials w/hemiwalker vs loftstrand crutch in future)   AM-PAC Basic Mobility Inpatient   Turning in Flat Bed Without Bedrails 2   Lying on Back to Sitting on Edge of Flat Bed Without Bedrails 2   Moving Bed to Chair 3   Standing Up From Chair Using Arms 3   Walk in Room 3   Climb 3-5 Stairs With Railing 1   Basic Mobility Inpatient Raw Score 14   Basic Mobility Standardized Score 35.55   [facility] Highest Level Of Mobility   -[facility] Goal 4: Move to chair/commode   -[facility] Achieved 6: Walk 10 steps or more   Education   Education Provided Mobility training;Other  (TE activities, functional transfers)   Patient Reinforcement needed   End of Consult   Patient Position at End of Consult Bedside chair;Bed/Chair alarm activated;All needs within reach   The patient's AM-PAC Basic Mobility Inpatient Short Form Raw Score is 14. A Raw score of less than or equal to 16 suggests the patient may benefit from discharge to post-acute rehabilitation services. Please also refer to the recommendation of the Physical Therapist for safe discharge planning.    Jose Luis Amaro

## 2025-05-16 NOTE — PROGRESS NOTES
Progress Note - Internal Medicine   Name: Gia Vaughan 84 y.o. female I MRN: [id number]  Unit/Bed#: S -01 I Date of Admission: 5/11/2025   Date of Service: 5/16/2025 I Hospital Day: 5    Assessment & Plan  Acute on chronic systolic heart failure (HCC)  Lab Results   Component Value Date    LVEF 35 05/14/2025    BNP 2,982 (H) 05/11/2025    BNP 1,246 (H) 03/03/2025     05/10/25  62.8kg   Wt Readings from Last 10 Encounters:   04/02/25 62.8 kg (138 lb 8 oz)   03/06/25 60.3 kg (132 lb 15 oz)   10/17/24 63.3 kg (139 lb 8.8 oz)   09/06/24 68.5 kg (151 lb 0.2 oz)   05/31/24 68 kg (150 lb)   04/04/24 68.4 kg (150 lb 12.8 oz)   01/29/24 70.8 kg (156 lb)   04/24/23 68 kg (150 lb)   11/15/22 66.7 kg (147 lb)   11/11/22 66.7 kg (147 lb)     Results for orders placed during the hospital encounter of 04/01/24    Echo 5/14 EF 35%, systolic function severely reduced, diastolic function severely abnormal.  The following segments are akinetic: basal inferior, mid inferior and apical inferior.    The following segments are hypokinetic: basal anterior, basal anteroseptal, basal inferoseptal, basal inferolateral, basal anterolateral, mid anterior, mid anteroseptal, mid inferoseptal, mid inferolateral, mid anterolateral, apical anterior, apical septal, apical lateral and apex.    Right Ventricle: Right ventricular cavity size is normal. Systolic function is normal.    Left Atrium: The atrium is mildly dilated.    Aortic Valve: There is aortic valve sclerosis.    Mitral Valve: There is moderate to severe regurgitation with a posteriorly directed jet.    Tricuspid Valve: There is moderate regurgitation. The right ventricular systolic pressure is moderately elevated. The estimated right ventricular systolic pressure is 60.00 mmHg.     Presents with increased shortness of breath and lower extremity edema  NYHA Class III.,   Patient is currently volume overloaded.    Pharmacotherapies / Neurohormonal Blockade:  --Beta Blocker: Toprol  XL 25 BID  --ARNi / ACEi / ARB: Losartan 25 QD  --Aldosterone Antagonist:  --SGLT2 Inhibitor: NO--H/O UTI's   --Diuretic: Lasix 20 mg QD    NOT on oxygen at home  Plan:  Continue oral Lasix 40 mg twice daily  Consider restarting losartan on discharge   Currently on 2 L O2, wean as tolerated  Sodium restriction 2g  Fluid restriction <2L/day  Goal Mg > 2 and K > 4; Replete prn  HOB > 30°, Daily standing weights, Measure I/O            Fall  CT cervical spine, x-ray hip/pelvis and x-ray knee negative for fractures  Patient complaining of left elbow pain  X-ray left elbow showing acute nondisplaced transcondylar fracture of distal humerus      Plan  Tylenol 975 mg scheduled 3 times daily  Oxy 2.5 every 4h as needed  Fall precautions  STR at [facility]   Stage 3b chronic kidney disease (Formerly [facility])  Lab Results   Component Value Date    EGFR 42 05/16/2025    EGFR 51 05/15/2025    EGFR 45 05/14/2025    CREATININE 1.18 05/16/2025    CREATININE 1.00 05/15/2025    CREATININE 1.10 05/14/2025   Mild creatinine elevation with ongoing diuresis    Plan  Monitor BMP  Type 2 diabetes mellitus with stage 3 chronic kidney disease, unspecified whether long term insulin use, unspecified whether stage 3a or 3b CKD (Formerly [facility])  Lab Results   Component Value Date    HGBA1C 7.5 (H) 05/11/2025       Recent Labs     05/15/25  1511 05/15/25  2125 05/16/25  0759 05/16/25  1111   POCGLU 212* 174* 85 167*       Blood Sugar Average: Last 72 hrs:  (P) 161.6573587464173251  Recent Labs     05/15/25  2125 05/16/25  0452 05/16/25  0759 05/16/25  1111   POCGLU 174*  --  85 167*   GLUC  --  91  --   --        Hold home regimen while inpatient and resume on discharge   Diabetic diet  Insulin regimen  Humalog 5 units TID AC  Lantus 20 units HS  Glucose checks and Insulin correction ACHS  Goal -180 while admitted, adjusting insulin regimen as appropriate  Monitor for hypoglycemia and treat per protocol    CLL (chronic lymphocytic leukemia) (Formerly [facility])  Last saw  oncology in 2020 for    PLAN:  Oncology outpatient currently monitoring levels   Moderate dementia (HCC)  Urinary retention protocol  Delirium precautions    Primary hypertension  Present on admission    PLAN:  Continue home medications.     OA (osteoarthritis)  Present on admission    PLAN:  Continue home medications.     HLD (hyperlipidemia)  Present on admission    PLAN:  Continue home medications.     VTE Pharmacologic Prophylaxis: VTE Score: 6 High Risk (Score >/= 5) - Pharmacological DVT Prophylaxis Ordered: enoxaparin (Lovenox). Sequential Compression Devices Ordered.    Mobility:   Basic Mobility Inpatient Raw Score: 14  -HLM Goal: 4: Move to chair/commode  JH-HLM Achieved: 6: Walk 10 steps or more  JH-HLM Goal achieved. Continue to encourage appropriate mobility.    Patient Centered Rounds: I performed bedside rounds with nursing staff today.   Discussions with Specialists or Other Care Team Provider: None    Education and Discussions with Family / Patient: Will update.     Current Length of Stay: 5 day(s)  Current Patient Status: Inpatient   Certification Statement: The patient will continue to require additional inpatient hospital stay due to CHF exacerbation  Discharge Plan: Anticipate discharge in 24-48 hrs to discharge location to be determined pending rehab evaluations.    Code Status: Level 1 - Full Code    Subjective   No acute events overnight.  Patient examined at bedside and reports no shortness of breath.  She is currently saturating well on 2 L NC.     Objective :  Temp:  [97.6 °F (36.4 °C)-98.5 °F (36.9 °C)] 97.6 °F (36.4 °C)  HR:  [61-70] 61  BP: (119-132)/(50-56) 129/56  Resp:  [16] 16  SpO2:  [94 %-96 %] 94 %  O2 Device: Nasal cannula  Nasal Cannula O2 Flow Rate (L/min):  [2 L/min] 2 L/min    Body mass index is 21.46 kg/m².     Input and Output Summary (last 24 hours):     Intake/Output Summary (Last 24 hours) at 5/16/2025 1325  Last data filed at 5/15/2025 2201  Gross per 24 hour    Intake --   Output 775 ml   Net -775 ml       Physical Exam  HENT:      Head: Normocephalic and atraumatic.      Nose: No congestion.      Mouth/Throat:      Mouth: Mucous membranes are moist.     Eyes:      Extraocular Movements: Extraocular movements intact.       Cardiovascular:      Rate and Rhythm: Normal rate and regular rhythm.      Pulses: Normal pulses.      Heart sounds: Normal heart sounds.   Pulmonary:      Effort: No respiratory distress.      Breath sounds: Examination of the right-lower field reveals rales. Examination of the left-lower field reveals rales. Rales present. No wheezing.   Abdominal:      General: There is no distension.      Palpations: Abdomen is soft.      Tenderness: There is no abdominal tenderness.     Musculoskeletal:         General: No swelling.      Left elbow: Swelling present. Decreased range of motion.      Cervical back: Normal range of motion.      Right lower leg: No edema.      Left lower leg: No edema.      Comments: Splint present left arm     Skin:     General: Skin is warm and dry.     Neurological:      Mental Status: She is alert. She is disoriented.     Psychiatric:         Mood and Affect: Mood normal.         Behavior: Behavior normal.         Lines/Drains:              Lab Results: I have reviewed the following results:   Results from last 7 days   Lab Units 05/16/25  0452 05/14/25  0434 05/13/25  0447   WBC Thousand/uL 66.67*   < > 68.49*   HEMOGLOBIN g/dL 10.2*   < > 9.0*   HEMATOCRIT % 32.8*   < > 28.9*   PLATELETS Thousands/uL 149   < > 140*   LYMPHO PCT %  --   --  87*   MONO PCT %  --   --  0*   EOS PCT %  --   --  0    < > = values in this interval not displayed.     Results from last 7 days   Lab Units 05/16/25  0452 05/12/25  0615 05/11/25  0624   SODIUM mmol/L 142   < > 139   POTASSIUM mmol/L 3.7   < > 5.0   CHLORIDE mmol/L 105   < > 107   CO2 mmol/L 31   < > 22   BUN mg/dL 32*   < > 22   CREATININE mg/dL 1.18   < > 1.00   ANION GAP mmol/L 6   < >  10   CALCIUM mg/dL 8.9   < > 9.3   ALBUMIN g/dL  --   --  3.9   TOTAL BILIRUBIN mg/dL  --   --  2.48*   ALK PHOS U/L  --   --  84   ALT U/L  --   --  23   AST U/L  --   --  21   GLUCOSE RANDOM mg/dL 91   < > 128    < > = values in this interval not displayed.     Results from last 7 days   Lab Units 05/11/25  0636   INR  1.22*     Results from last 7 days   Lab Units 05/16/25  1111 05/16/25  0759 05/15/25  2125 05/15/25  1511 05/15/25  1119 05/15/25  0710 05/14/25  2041 05/14/25  1735 05/14/25  1112 05/14/25  0711 05/13/25  2118 05/13/25  1613   POC GLUCOSE mg/dl 167* 85 174* 212* 211* 69 221* 160* 140 99 282* 193*     Results from last 7 days   Lab Units 05/11/25  1639   HEMOGLOBIN A1C % 7.5*     Results from last 7 days   Lab Units 05/11/25  0839 05/11/25  0636 05/11/25  0624   LACTIC ACID mmol/L 1.4  --  2.1*   PROCALCITONIN ng/ml  --  0.22  --        Recent Cultures (last 7 days):   Results from last 7 days   Lab Units 05/11/25  0636 05/11/25  0624   BLOOD CULTURE  No Growth After 5 Days. No Growth After 5 Days.       Imaging Results Review: I reviewed radiology reports from this admission including: chest xray and CT chest.  Other Study Results Review: EKG was reviewed.     Last 24 Hours Medication List:     Current Facility-Administered Medications:     acetaminophen (TYLENOL) tablet 975 mg, Q8H TESSA    aluminum-magnesium hydroxide-simethicone (MAALOX) oral suspension 30 mL, Q6H PRN    atorvastatin (LIPITOR) tablet 20 mg, Daily    calcium carbonate (TUMS) chewable tablet 500 mg, BID PRN    calcium carbonate-vitamin D 500 mg-5 mcg tablet 1 tablet, BID With Meals    Cholecalciferol (VITAMIN D3) tablet 1,000 Units, Daily    dextromethorphan-guaiFENesin (ROBITUSSIN DM) oral syrup 5 mL, BID    Diclofenac Sodium (VOLTAREN) 1 % topical gel 2 g, TID    docusate sodium (COLACE) capsule 100 mg, BID PRN    enoxaparin (LOVENOX) subcutaneous injection 30 mg, Daily    ferrous sulfate tablet 325 mg, BID With Meals     fluticasone (FLONASE) 50 mcg/act nasal spray 1 spray, Daily PRN    furosemide (LASIX) tablet 40 mg, BID (diuretic)    guaiFENesin (MUCINEX) 12 hr tablet 600 mg, BID PRN    insulin glargine (LANTUS) subcutaneous injection 20 Units 0.2 mL, HS    insulin lispro (HumALOG/ADMELOG) 100 units/mL subcutaneous injection 1-5 Units, 4x Daily (AC & HS) **AND** Fingerstick Glucose (POCT), 4x Daily AC and at bedtime    insulin lispro (HumALOG/ADMELOG) 100 units/mL subcutaneous injection 5 Units, TID With Meals    [Held by provider] losartan (COZAAR) tablet 25 mg, Daily    metoprolol succinate (TOPROL-XL) 24 hr tablet 25 mg, Q12H TESSA    oxyCODONE (ROXICODONE) split tablet 2.5 mg, Q4H PRN    potassium chloride (Klor-Con M20) CR tablet 20 mEq, Daily    Administrative Statements   Today, Patient Was Seen By: Franky Nichols MD      **Please Note: This note may have been constructed using a voice recognition system.**

## 2025-05-17 VITALS
RESPIRATION RATE: 16 BRPM | BODY MASS INDEX: 21.54 KG/M2 | HEIGHT: 66 IN | OXYGEN SATURATION: 92 % | HEART RATE: 51 BPM | DIASTOLIC BLOOD PRESSURE: 70 MMHG | SYSTOLIC BLOOD PRESSURE: 149 MMHG | TEMPERATURE: 98.3 F | WEIGHT: 134.04 LBS

## 2025-05-17 LAB
ANION GAP SERPL CALCULATED.3IONS-SCNC: 5 MMOL/L (ref 4–13)
BUN SERPL-MCNC: 34 MG/DL (ref 5–25)
CALCIUM SERPL-MCNC: 9.2 MG/DL (ref 8.4–10.2)
CHLORIDE SERPL-SCNC: 104 MMOL/L (ref 96–108)
CO2 SERPL-SCNC: 32 MMOL/L (ref 21–32)
CREAT SERPL-MCNC: 1.24 MG/DL (ref 0.6–1.3)
ERYTHROCYTE [DISTWIDTH] IN BLOOD BY AUTOMATED COUNT: 17.9 % (ref 11.6–15.1)
GFR SERPL CREATININE-BSD FRML MDRD: 39 ML/MIN/1.73SQ M
GLUCOSE SERPL-MCNC: 120 MG/DL (ref 65–140)
GLUCOSE SERPL-MCNC: 137 MG/DL (ref 65–140)
GLUCOSE SERPL-MCNC: 167 MG/DL (ref 65–140)
HCT VFR BLD AUTO: 33.1 % (ref 34.8–46.1)
HGB BLD-MCNC: 10.4 G/DL (ref 11.5–15.4)
MAGNESIUM SERPL-MCNC: 2.1 MG/DL (ref 1.9–2.7)
MCH RBC QN AUTO: 36.2 PG (ref 26.8–34.3)
MCHC RBC AUTO-ENTMCNC: 31.4 G/DL (ref 31.4–37.4)
MCV RBC AUTO: 115 FL (ref 82–98)
PLATELET # BLD AUTO: 167 THOUSANDS/UL (ref 149–390)
PMV BLD AUTO: 13 FL (ref 8.9–12.7)
POTASSIUM SERPL-SCNC: 3.8 MMOL/L (ref 3.5–5.3)
RBC # BLD AUTO: 2.87 MILLION/UL (ref 3.81–5.12)
SODIUM SERPL-SCNC: 141 MMOL/L (ref 135–147)
WBC # BLD AUTO: 63.37 THOUSAND/UL (ref 4.31–10.16)

## 2025-05-17 PROCEDURE — 99239 HOSP IP/OBS DSCHRG MGMT >30: CPT | Performed by: INTERNAL MEDICINE

## 2025-05-17 PROCEDURE — 82948 REAGENT STRIP/BLOOD GLUCOSE: CPT

## 2025-05-17 PROCEDURE — 85027 COMPLETE CBC AUTOMATED: CPT

## 2025-05-17 PROCEDURE — 83735 ASSAY OF MAGNESIUM: CPT

## 2025-05-17 PROCEDURE — 80048 BASIC METABOLIC PNL TOTAL CA: CPT

## 2025-05-17 RX ORDER — DIPHENHYDRAMINE HYDROCHLORIDE 50 MG/ML
12.5 INJECTION, SOLUTION INTRAMUSCULAR; INTRAVENOUS ONCE
Status: COMPLETED | OUTPATIENT
Start: 2025-05-17 | End: 2025-05-17

## 2025-05-17 RX ORDER — ACETAMINOPHEN 325 MG/1
975 TABLET ORAL 3 TIMES DAILY PRN
Start: 2025-05-17

## 2025-05-17 RX ORDER — FUROSEMIDE 20 MG/1
40 TABLET ORAL DAILY
Start: 2025-05-17

## 2025-05-17 RX ORDER — INSULIN GLARGINE 100 [IU]/ML
20 INJECTION, SOLUTION SUBCUTANEOUS
Start: 2025-05-17

## 2025-05-17 RX ADMIN — POTASSIUM CHLORIDE 20 MEQ: 1500 TABLET, EXTENDED RELEASE ORAL at 09:04

## 2025-05-17 RX ADMIN — Medication 1000 UNITS: at 09:03

## 2025-05-17 RX ADMIN — Medication 2.5 MG: at 04:02

## 2025-05-17 RX ADMIN — ATORVASTATIN CALCIUM 20 MG: 20 TABLET, FILM COATED ORAL at 09:04

## 2025-05-17 RX ADMIN — ENOXAPARIN SODIUM 30 MG: 30 INJECTION SUBCUTANEOUS at 09:03

## 2025-05-17 RX ADMIN — FUROSEMIDE 40 MG: 40 TABLET ORAL at 09:04

## 2025-05-17 RX ADMIN — Medication 1 TABLET: at 09:03

## 2025-05-17 RX ADMIN — INSULIN LISPRO 5 UNITS: 100 INJECTION, SOLUTION INTRAVENOUS; SUBCUTANEOUS at 09:15

## 2025-05-17 RX ADMIN — FERROUS SULFATE TAB 325 MG (65 MG ELEMENTAL FE) 325 MG: 325 (65 FE) TAB at 09:03

## 2025-05-17 RX ADMIN — METOPROLOL SUCCINATE 25 MG: 25 TABLET, EXTENDED RELEASE ORAL at 09:04

## 2025-05-17 RX ADMIN — GUAIFENESIN AND DEXTROMETHORPHAN 5 ML: 100; 10 SYRUP ORAL at 09:03

## 2025-05-17 RX ADMIN — ACETAMINOPHEN 975 MG: 325 TABLET, FILM COATED ORAL at 06:16

## 2025-05-17 RX ADMIN — DICLOFENAC SODIUM 2 G: 10 GEL TOPICAL at 09:16

## 2025-05-17 RX ADMIN — DIPHENHYDRAMINE HYDROCHLORIDE 12.5 MG: 50 INJECTION INTRAMUSCULAR; INTRAVENOUS at 04:02

## 2025-05-17 NOTE — ASSESSMENT & PLAN NOTE
Lab Results   Component Value Date    HGBA1C 7.5 (H) 05/11/2025       Recent Labs     05/16/25  1111 05/16/25  1640 05/16/25 2032 05/17/25  0725   POCGLU 167* 290* 326* 120       Blood Sugar Average: Last 72 hrs:  (P) 174.7120890663454756  Recent Labs     05/16/25  1640 05/16/25 2032 05/17/25  0428 05/17/25  0725   POCGLU 290* 326*  --  120   GLUC  --   --  137  --        Hold home regimen while inpatient and resume on discharge   Diabetic diet  Insulin regimen  Humalog 5 units TID AC  Lantus 20 units HS  Glucose checks and Insulin correction ACHS  Goal -180 while admitted, adjusting insulin regimen as appropriate  Monitor for hypoglycemia and treat per protocol

## 2025-05-17 NOTE — ASSESSMENT & PLAN NOTE
Lab Results   Component Value Date    HGBA1C 7.5 (H) 05/11/2025       Recent Labs     05/16/25  1640 05/16/25 2032 05/17/25  0725 05/17/25  1028   POCGLU 290* 326* 120 167*       Blood Sugar Average: Last 72 hrs:  (P) 174.9355190586409533  Recent Labs     05/16/25 2032 05/17/25  0428 05/17/25 0725 05/17/25  1028   POCGLU 326*  --  120 167*   GLUC  --  137  --   --        Continue home regimen

## 2025-05-17 NOTE — CASE MANAGEMENT
Case Management Discharge Planning Note    Patient name Gia Vaughan  Location S /S -01 MRN [id number]  : 1940 Date 2025       Current Admission Date: 2025  Current Admission Diagnosis:Acute on chronic systolic heart failure (HCC)   Patient Active Problem List    Diagnosis Date Noted    Elbow fracture, left, closed, initial encounter 2025    ACUTE RESPIRATORY FAILURE, UNSP W HYPOXIA OR HYPERCAPNIA 2025    Cardiomyopathy (HCC) 2025    Acute diverticulitis 10/15/2024    Abnormal urinalysis 10/15/2024    Metabolic encephalopathy 10/15/2024    Diarrhea 2024    COVID 2024    Headache 2024    Hypertensive urgency 2024    Dizziness 2024    Suspected deep tissue injury of unknown depth 2022    Acute postoperative pain of right knee 2022    Encephalopathy 10/24/2022    Deep tissue injury 10/21/2022    Orthostasis 10/19/2022    H/O dizziness 10/19/2022    CLL (chronic lymphocytic leukemia) ([facility]) 10/13/2022    Intertrochanteric fracture of right femur ([facility]) 10/11/2022    Displaced fracture of middle phalanx of left ring finger, initial encounter for closed fracture 10/11/2022    Primary hypertension 2021    Moderate dementia (HCC) 2021    Lymphadenopathy 2020    Elevated liver enzymes 2020    Fall 2020    Stage 3b chronic kidney disease (HCC) 2020    HLD (hyperlipidemia) 2020    OA (osteoarthritis) 2020    Acute on chronic systolic heart failure (HCC) 2020    Type 2 diabetes mellitus with stage 3 chronic kidney disease, unspecified whether long term insulin use, unspecified whether stage 3a or 3b CKD (HCC)       LOS (days): 6  Geometric Mean LOS (GMLOS) (days): 3.9  Days to GMLOS:-2.2     OBJECTIVE:  Risk of Unplanned Readmission Score: 29.34     Current admission status: Inpatient   Preferred Pharmacy:   UNKNOWN - FOLLOW UP PRIOR TO DISCHARGE TO E-PRESCRIBE  No address on  file      [id number] - [place] - [address]  [address]  [place] [address]  Phone: [phone] Fax: [phone]    [id number] - [place] - [address]  [address]  [place] [address]  Phone: [phone] Fax: [phone]    [facility] - [place], [place] - [address]  [address]  [address]  [place] [address]  Phone: [phone] Fax: [phone]    Primary Care Provider: No primary care provider on file.    Primary Insurance: MEDICARE  Secondary Insurance: [employer]    DISCHARGE DETAILS:    Discharge planning discussed with:: Patient, Patient's Nephew (Charles Ny)  Freedom of Choice: Yes  Comments - Freedom of Choice: Choice for STR @ [facility]  CM contacted family/caregiver?: Yes  Were Treatment Team discharge recommendations reviewed with patient/caregiver?: Yes  Did patient/caregiver verbalize understanding of patient care needs?: N/A- going to facility  Were patient/caregiver advised of the risks associated with not following Treatment Team discharge recommendations?: Yes    Contacts  Patient Contacts: Charles Ny (Nephew)  Relationship to Patient:: Family  Contact Method: Phone  Phone Number: [phone]  Reason/Outcome: Emergency Contact, Discharge Planning    Requested Home Health Care         Is the patient interested in HHC at discharge?: No    DME Referral Provided  Referral made for DME?: No    Would you like to participate in our [facility] service program?  : No - Declined    Treatment Team Recommendation: Short Term Rehab  Discharge Destination Plan:: Short Term Rehab  Transport at Discharge : BLS Ambulance  ETA of Transport (Date): 05/17/25  ETA of Transport (Time): 1300    IMM Given (Date):: 05/17/25  IMM Given to:: Family  IMM reviewed with patient's caregiver, patient's caregiver agrees with discharge determination. (Verbal agreement on the phone with Charles, patient's nephew, to d/c to STR  today.)    Accepting Facility Name, City & State : [facility]: [address]  [place] [address]  Receiving Facility/Agency Phone Number: [phone]  Facility/Agency Fax Number: [phone]

## 2025-05-17 NOTE — INCIDENTAL FINDINGS
The following findings require follow up:  Radiographic finding   Finding: XR elbow 3+ vw left: Acute nondisplaced transcondylar fracture of the distal humerus., The study was marked in EPIC for immediate notification., Computerized Assisted Algorithm (CAA) may have been used to analyze all applicable images., Workstation performed: [id number]    Follow up required: Yes    Please notify the following clinician to assist with the follow up:   Orthopedic surgery  Incidental finding results were discussed with the Patient and Patient's POA by Franky Nichols MD on 05/17/25.   They expressed understanding and all questions answered.

## 2025-05-17 NOTE — PLAN OF CARE
Problem: PAIN - ADULT  Goal: Verbalizes/displays adequate comfort level or baseline comfort level  Description: Interventions:- Encourage patient to monitor pain and request assistance- Assess pain using appropriate pain scale- Administer analgesics based on type and severity of pain and evaluate response- Implement non-pharmacological measures as appropriate and evaluate response- Consider cultural and social influences on pain and pain management- Notify physician/advanced practitioner if interventions unsuccessful or patient reports new pain  Outcome: Progressing     Problem: Potential for Falls  Goal: Patient will remain free of falls  Description: INTERVENTIONS:- Educate patient/family on patient safety including physical limitations- Instruct patient to call for assistance with activity - Consult OT/PT to assist with strengthening/mobility - Keep Call bell within reach- Keep bed low and locked with side rails adjusted as appropriate- Keep care items and personal belongings within reach- Initiate and maintain comfort rounds- Make Fall Risk Sign visible to staff- Offer Toileting every two hours, in advance of need- Initiate/Maintain bed alarm- Obtain necessary fall risk management equipment: - Apply yellow socks and bracelet for high fall risk patients- Consider moving patient to room near nurses station  Outcome: Progressing     Problem: Prexisting or High Potential for Compromised Skin Integrity  Goal: Skin integrity is maintained or improved  Description: INTERVENTIONS:- Identify patients at risk for skin breakdown- Assess and monitor skin integrity- Assess and monitor nutrition and hydration status- Monitor labs - Assess for incontinence - Turn and reposition patient- Assist with mobility/ambulation- Relieve pressure over bony prominences- Avoid friction and shearing- Provide appropriate hygiene as needed including keeping skin clean and dry- Evaluate need for skin moisturizer/barrier cream- Collaborate  with interdisciplinary team - Patient/family teaching- Consider wound care consult   Outcome: Progressing     Problem: Nutrition/Hydration-ADULT  Goal: Nutrient/Hydration intake appropriate for improving, restoring or maintaining nutritional needs  Description: Monitor and assess patient's nutrition/hydration status for malnutrition. Collaborate with interdisciplinary team and initiate plan and interventions as ordered.  Monitor patient's weight and dietary intake as ordered or per policy. Utilize nutrition screening tool and intervene as necessary. Determine patient's food preferences and provide high-protein, high-caloric foods as appropriate.     INTERVENTIONS:  - Monitor oral intake, urinary output, labs, and treatment plans  - Assess nutrition and hydration status and recommend course of action  - Evaluate amount of meals eaten  - Assist patient with eating if necessary   - Allow adequate time for meals  - Recommend/ encourage appropriate diets, oral nutritional supplements, and vitamin/mineral supplements  - Order, calculate, and assess calorie counts as needed  - Recommend, monitor, and adjust tube feedings and TPN/PPN based on assessed needs  - Assess need for intravenous fluids  - Provide specific nutrition/hydration education as appropriate  - Include patient/family/caregiver in decisions related to nutrition  Outcome: Progressing

## 2025-05-17 NOTE — ASSESSMENT & PLAN NOTE
CT cervical spine, x-ray hip/pelvis and x-ray knee negative for fractures  Patient complaining of left elbow pain  X-ray left elbow showing acute nondisplaced transcondylar fracture of distal humerus      Plan  Tylenol 975 mg 3 times daily as needed  STR at [facility]   Outpatient orthopedic surgery follow-up

## 2025-05-17 NOTE — ASSESSMENT & PLAN NOTE
Lab Results   Component Value Date    LVEF 35 05/14/2025    BNP 2,982 (H) 05/11/2025    BNP 1,246 (H) 03/03/2025     05/10/25  62.8kg   Wt Readings from Last 10 Encounters:   04/02/25 62.8 kg (138 lb 8 oz)   03/06/25 60.3 kg (132 lb 15 oz)   10/17/24 63.3 kg (139 lb 8.8 oz)   09/06/24 68.5 kg (151 lb 0.2 oz)   05/31/24 68 kg (150 lb)   04/04/24 68.4 kg (150 lb 12.8 oz)   01/29/24 70.8 kg (156 lb)   04/24/23 68 kg (150 lb)   11/15/22 66.7 kg (147 lb)   11/11/22 66.7 kg (147 lb)     Results for orders placed during the hospital encounter of 04/01/24    Echo 5/14 EF 35%, systolic function severely reduced, diastolic function severely abnormal.  The following segments are akinetic: basal inferior, mid inferior and apical inferior.    The following segments are hypokinetic: basal anterior, basal anteroseptal, basal inferoseptal, basal inferolateral, basal anterolateral, mid anterior, mid anteroseptal, mid inferoseptal, mid inferolateral, mid anterolateral, apical anterior, apical septal, apical lateral and apex.    Right Ventricle: Right ventricular cavity size is normal. Systolic function is normal.    Left Atrium: The atrium is mildly dilated.    Aortic Valve: There is aortic valve sclerosis.    Mitral Valve: There is moderate to severe regurgitation with a posteriorly directed jet.    Tricuspid Valve: There is moderate regurgitation. The right ventricular systolic pressure is moderately elevated. The estimated right ventricular systolic pressure is 60.00 mmHg.     Presents with increased shortness of breath and lower extremity edema  NYHA Class III.,   Patient is currently volume overloaded.    Pharmacotherapies / Neurohormonal Blockade:  --Beta Blocker: Toprol XL 25 BID  --ARNi / ACEi / ARB: Losartan 25 QD  --Aldosterone Antagonist:  --SGLT2 Inhibitor: NO--H/O UTI's   --Diuretic: Lasix 20 mg QD    NOT on oxygen at home  Plan:  Continue oral Lasix 40 mg twice daily  Consider restarting losartan on discharge    Currently on RA to 2 L O2, wean as tolerated  Sodium restriction 2g  Fluid restriction <2L/day  Goal Mg > 2 and K > 4; Replete prn  HOB > 30°, Daily standing weights, Measure I/O

## 2025-05-17 NOTE — PROGRESS NOTES
Progress Note - Hospitalist   Name: Gia Vaughan 84 y.o. female I MRN: [id number]  Unit/Bed#: S -01 I Date of Admission: 5/11/2025   Date of Service: 5/17/2025 I Hospital Day: 6    Assessment & Plan  Acute on chronic systolic heart failure (HCC)  Lab Results   Component Value Date    LVEF 35 05/14/2025    BNP 2,982 (H) 05/11/2025    BNP 1,246 (H) 03/03/2025     05/10/25  62.8kg   Wt Readings from Last 10 Encounters:   04/02/25 62.8 kg (138 lb 8 oz)   03/06/25 60.3 kg (132 lb 15 oz)   10/17/24 63.3 kg (139 lb 8.8 oz)   09/06/24 68.5 kg (151 lb 0.2 oz)   05/31/24 68 kg (150 lb)   04/04/24 68.4 kg (150 lb 12.8 oz)   01/29/24 70.8 kg (156 lb)   04/24/23 68 kg (150 lb)   11/15/22 66.7 kg (147 lb)   11/11/22 66.7 kg (147 lb)     Results for orders placed during the hospital encounter of 04/01/24    Echo 5/14 EF 35%, systolic function severely reduced, diastolic function severely abnormal.  The following segments are akinetic: basal inferior, mid inferior and apical inferior.    The following segments are hypokinetic: basal anterior, basal anteroseptal, basal inferoseptal, basal inferolateral, basal anterolateral, mid anterior, mid anteroseptal, mid inferoseptal, mid inferolateral, mid anterolateral, apical anterior, apical septal, apical lateral and apex.    Right Ventricle: Right ventricular cavity size is normal. Systolic function is normal.    Left Atrium: The atrium is mildly dilated.    Aortic Valve: There is aortic valve sclerosis.    Mitral Valve: There is moderate to severe regurgitation with a posteriorly directed jet.    Tricuspid Valve: There is moderate regurgitation. The right ventricular systolic pressure is moderately elevated. The estimated right ventricular systolic pressure is 60.00 mmHg.     Presents with increased shortness of breath and lower extremity edema  NYHA Class III.,   Patient is currently volume overloaded.    Pharmacotherapies / Neurohormonal Blockade:  --Beta Blocker: Toprol XL 25  BID  --ARNi / ACEi / ARB: Losartan 25 QD  --Aldosterone Antagonist:  --SGLT2 Inhibitor: NO--H/O UTI's   --Diuretic: Lasix 20 mg QD    NOT on oxygen at home  Plan:  Continue oral Lasix 40 mg twice daily  Consider restarting losartan on discharge   Currently on RA to 2 L O2, wean as tolerated  Sodium restriction 2g  Fluid restriction <2L/day  Goal Mg > 2 and K > 4; Replete prn  HOB > 30°, Daily standing weights, Measure I/O            Fall  CT cervical spine, x-ray hip/pelvis and x-ray knee negative for fractures  Patient complaining of left elbow pain  X-ray left elbow showing acute nondisplaced transcondylar fracture of distal humerus      Plan  Tylenol 975 mg scheduled 3 times daily  Oxy 2.5 every 4h as needed  Fall precautions  STR at [facility]   Stage 3b chronic kidney disease (formerly [facility])  Lab Results   Component Value Date    EGFR 39 05/17/2025    EGFR 42 05/16/2025    EGFR 51 05/15/2025    CREATININE 1.24 05/17/2025    CREATININE 1.18 05/16/2025    CREATININE 1.00 05/15/2025   Mild creatinine elevation with ongoing diuresis    Plan  Monitor BMP  Type 2 diabetes mellitus with stage 3 chronic kidney disease, unspecified whether long term insulin use, unspecified whether stage 3a or 3b CKD (formerly [facility])  Lab Results   Component Value Date    HGBA1C 7.5 (H) 05/11/2025       Recent Labs     05/16/25  1111 05/16/25  1640 05/16/25 2032 05/17/25  0725   POCGLU 167* 290* 326* 120       Blood Sugar Average: Last 72 hrs:  (P) [id number]  Recent Labs     05/16/25  1640 05/16/25 2032 05/17/25  0428 05/17/25  0725   POCGLU 290* 326*  --  120   GLUC  --   --  137  --        Hold home regimen while inpatient and resume on discharge   Diabetic diet  Insulin regimen  Humalog 5 units TID AC  Lantus 20 units HS  Glucose checks and Insulin correction ACHS  Goal -180 while admitted, adjusting insulin regimen as appropriate  Monitor for hypoglycemia and treat per protocol    CLL (chronic lymphocytic leukemia) (formerly [facility])  Last saw  oncology in 2020 for    PLAN:  Oncology outpatient currently monitoring levels   Moderate dementia (HCC)  Urinary retention protocol  Delirium precautions    Primary hypertension  Present on admission    PLAN:  Continue home medications.     OA (osteoarthritis)  Present on admission    PLAN:  Continue home medications.     HLD (hyperlipidemia)  Present on admission    PLAN:  Continue home medications.     VTE Pharmacologic Prophylaxis: VTE Score: 6 High Risk (Score >/= 5) - Pharmacological DVT Prophylaxis Ordered: enoxaparin (Lovenox). Sequential Compression Devices Ordered.    Mobility:   Basic Mobility Inpatient Raw Score: 14  JH-HLM Goal: 4: Move to chair/commode  JH-HLM Achieved: 4: Move to chair/commode  JH-HLM Goal achieved. Continue to encourage appropriate mobility.    Patient Centered Rounds: I performed bedside rounds with nursing staff today.   Discussions with Specialists or Other Care Team Provider: None    Education and Discussions with Family / Patient: Will update.     Current Length of Stay: 6 day(s)  Current Patient Status: Inpatient   Certification Statement: The patient will continue to require additional inpatient hospital stay due to CHF exacerbation  Discharge Plan: Anticipate discharge later today or tomorrow to rehab facility.    Code Status: Level 1 - Full Code    Subjective   Patient was given some cream for itching on her splinted arm.  She also got Benadryl due to unrelieved itching with cream.    Patient examined at bedside this morning and is very sleepy.  She states that she feels okay and has no shortness of breath.  She endorses some pain in her right lower extremity and states that her left elbow pain has controlled.    Objective :  Temp:  [98.3 °F (36.8 °C)-98.7 °F (37.1 °C)] 98.3 °F (36.8 °C)  HR:  [51-78] 51  BP: (116-149)/(47-70) 149/70  Resp:  [16-18] 16  SpO2:  [90 %-92 %] 92 %    Body mass index is 21.63 kg/m².     Input and Output Summary (last 24 hours):     Intake/Output  Summary (Last 24 hours) at 5/17/2025 0926  Last data filed at 5/17/2025 0551  Gross per 24 hour   Intake 0 ml   Output 500 ml   Net -500 ml       Physical Exam  HENT:      Head: Normocephalic and atraumatic.      Nose: No congestion.      Mouth/Throat:      Mouth: Mucous membranes are moist.     Eyes:      Extraocular Movements: Extraocular movements intact.       Cardiovascular:      Rate and Rhythm: Normal rate and regular rhythm.      Pulses: Normal pulses.      Heart sounds: Normal heart sounds.   Pulmonary:      Effort: No respiratory distress.      Breath sounds: Examination of the right-lower field reveals rales. Examination of the left-lower field reveals rales. Rales present. No wheezing.   Abdominal:      General: There is no distension.      Palpations: Abdomen is soft.      Tenderness: There is no abdominal tenderness.     Musculoskeletal:         General: No swelling.      Left elbow: Swelling present. Decreased range of motion.      Cervical back: Normal range of motion.      Right lower leg: No edema.      Left lower leg: No edema.      Comments: Splint present left arm     Skin:     General: Skin is warm and dry.     Neurological:      Mental Status: She is alert. She is disoriented.     Psychiatric:         Mood and Affect: Mood normal.         Behavior: Behavior normal.         Lines/Drains:              Lab Results: I have reviewed the following results:   Results from last 7 days   Lab Units 05/17/25  0428 05/14/25  0434 05/13/25  0447   WBC Thousand/uL 63.37*   < > 68.49*   HEMOGLOBIN g/dL 10.4*   < > 9.0*   HEMATOCRIT % 33.1*   < > 28.9*   PLATELETS Thousands/uL 167   < > 140*   LYMPHO PCT %  --   --  87*   MONO PCT %  --   --  0*   EOS PCT %  --   --  0    < > = values in this interval not displayed.     Results from last 7 days   Lab Units 05/17/25  0428 05/12/25  0615 05/11/25  0624   SODIUM mmol/L 141   < > 139   POTASSIUM mmol/L 3.8   < > 5.0   CHLORIDE mmol/L 104   < > 107   CO2 mmol/L 32    < > 22   BUN mg/dL 34*   < > 22   CREATININE mg/dL 1.24   < > 1.00   ANION GAP mmol/L 5   < > 10   CALCIUM mg/dL 9.2   < > 9.3   ALBUMIN g/dL  --   --  3.9   TOTAL BILIRUBIN mg/dL  --   --  2.48*   ALK PHOS U/L  --   --  84   ALT U/L  --   --  23   AST U/L  --   --  21   GLUCOSE RANDOM mg/dL 137   < > 128    < > = values in this interval not displayed.     Results from last 7 days   Lab Units 05/11/25  0636   INR  1.22*     Results from last 7 days   Lab Units 05/17/25  0725 05/16/25  2032 05/16/25  1640 05/16/25  1111 05/16/25  0759 05/15/25  2125 05/15/25  1511 05/15/25  1119 05/15/25  0710 05/14/25  2041 05/14/25  1735 05/14/25  1112   POC GLUCOSE mg/dl 120 326* 290* 167* 85 174* 212* 211* 69 221* 160* 140     Results from last 7 days   Lab Units 05/11/25  1639   HEMOGLOBIN A1C % 7.5*     Results from last 7 days   Lab Units 05/11/25  0839 05/11/25  0636 05/11/25  0624   LACTIC ACID mmol/L 1.4  --  2.1*   PROCALCITONIN ng/ml  --  0.22  --        Recent Cultures (last 7 days):   Results from last 7 days   Lab Units 05/11/25  0636 05/11/25  0624   BLOOD CULTURE  No Growth After 5 Days. No Growth After 5 Days.       Imaging Results Review: I reviewed radiology reports from this admission including: chest xray and CT chest.  Other Study Results Review: EKG was reviewed.     Last 24 Hours Medication List:     Current Facility-Administered Medications:     acetaminophen (TYLENOL) tablet 975 mg, Q8H TESSA    aluminum-magnesium hydroxide-simethicone (MAALOX) oral suspension 30 mL, Q6H PRN    atorvastatin (LIPITOR) tablet 20 mg, Daily    calcium carbonate (TUMS) chewable tablet 500 mg, BID PRN    calcium carbonate-vitamin D 500 mg-5 mcg tablet 1 tablet, BID With Meals    Cholecalciferol (VITAMIN D3) tablet 1,000 Units, Daily    dextromethorphan-guaiFENesin (ROBITUSSIN DM) oral syrup 5 mL, BID    Diclofenac Sodium (VOLTAREN) 1 % topical gel 2 g, TID    docusate sodium (COLACE) capsule 100 mg, BID PRN    enoxaparin  (LOVENOX) subcutaneous injection 30 mg, Daily    ferrous sulfate tablet 325 mg, BID With Meals    fluticasone (FLONASE) 50 mcg/act nasal spray 1 spray, Daily PRN    furosemide (LASIX) tablet 40 mg, BID (diuretic)    guaiFENesin (MUCINEX) 12 hr tablet 600 mg, BID PRN    insulin glargine (LANTUS) subcutaneous injection 20 Units 0.2 mL, HS    insulin lispro (HumALOG/ADMELOG) 100 units/mL subcutaneous injection 1-5 Units, 4x Daily (AC & HS) **AND** Fingerstick Glucose (POCT), 4x Daily AC and at bedtime    insulin lispro (HumALOG/ADMELOG) 100 units/mL subcutaneous injection 5 Units, TID With Meals    [Held by provider] losartan (COZAAR) tablet 25 mg, Daily    metoprolol succinate (TOPROL-XL) 24 hr tablet 25 mg, Q12H TESSA    oxyCODONE (ROXICODONE) split tablet 2.5 mg, Q4H PRN    potassium chloride (Klor-Con M20) CR tablet 20 mEq, Daily    Administrative Statements   Today, Patient Was Seen By: Franky Nichols MD      **Please Note: This note may have been constructed using a voice recognition system.**

## 2025-05-17 NOTE — ASSESSMENT & PLAN NOTE
Lab Results   Component Value Date    LVEF 35 05/14/2025    BNP 2,982 (H) 05/11/2025    BNP 1,246 (H) 03/03/2025     05/10/25  62.8kg   Wt Readings from Last 10 Encounters:   04/02/25 62.8 kg (138 lb 8 oz)   03/06/25 60.3 kg (132 lb 15 oz)   10/17/24 63.3 kg (139 lb 8.8 oz)   09/06/24 68.5 kg (151 lb 0.2 oz)   05/31/24 68 kg (150 lb)   04/04/24 68.4 kg (150 lb 12.8 oz)   01/29/24 70.8 kg (156 lb)   04/24/23 68 kg (150 lb)   11/15/22 66.7 kg (147 lb)   11/11/22 66.7 kg (147 lb)     Results for orders placed during the hospital encounter of 04/01/24    Echo 5/14 EF 35%, systolic function severely reduced, diastolic function severely abnormal.  The following segments are akinetic: basal inferior, mid inferior and apical inferior.    The following segments are hypokinetic: basal anterior, basal anteroseptal, basal inferoseptal, basal inferolateral, basal anterolateral, mid anterior, mid anteroseptal, mid inferoseptal, mid inferolateral, mid anterolateral, apical anterior, apical septal, apical lateral and apex.    Right Ventricle: Right ventricular cavity size is normal. Systolic function is normal.    Left Atrium: The atrium is mildly dilated.    Aortic Valve: There is aortic valve sclerosis.    Mitral Valve: There is moderate to severe regurgitation with a posteriorly directed jet.    Tricuspid Valve: There is moderate regurgitation. The right ventricular systolic pressure is moderately elevated. The estimated right ventricular systolic pressure is 60.00 mmHg.     Presents with increased shortness of breath and lower extremity edema  NYHA Class III.,   Patient is currently volume overloaded.    Pharmacotherapies / Neurohormonal Blockade:  --Beta Blocker: Toprol XL 25 BID  --ARNi / ACEi / ARB: Losartan 25 QD  --Aldosterone Antagonist:  --SGLT2 Inhibitor: NO--H/O UTI's   --Diuretic: Lasix 20 mg QD    NOT on oxygen at home  Plan:  Continue oral Lasix 40 mg daily  Restart losartan 25 mg on 5/19/2025   Currently on RA to 2  L O2, wean as tolerated  Sodium restriction 2g  Fluid restriction <2L/day  Goal Mg > 2 and K > 4; Replete prn  HOB > 30°, Daily standing weights, Measure I/O

## 2025-05-17 NOTE — ASSESSMENT & PLAN NOTE
Lab Results   Component Value Date    EGFR 39 05/17/2025    EGFR 42 05/16/2025    EGFR 51 05/15/2025    CREATININE 1.24 05/17/2025    CREATININE 1.18 05/16/2025    CREATININE 1.00 05/15/2025   Mild creatinine elevation with ongoing diuresis    Plan  Monitor BMP

## 2025-05-17 NOTE — DISCHARGE SUMMARY
Discharge Summary - Hospitalist   Name: Gia Vaughan 84 y.o. female I MRN: [id number]  Unit/Bed#: S -01 I Date of Admission: 5/11/2025   Date of Service: 5/17/2025 I Hospital Day: 6     Assessment & Plan  Acute on chronic systolic heart failure (HCC)  Lab Results   Component Value Date    LVEF 35 05/14/2025    BNP 2,982 (H) 05/11/2025    BNP 1,246 (H) 03/03/2025     05/10/25  62.8kg   Wt Readings from Last 10 Encounters:   04/02/25 62.8 kg (138 lb 8 oz)   03/06/25 60.3 kg (132 lb 15 oz)   10/17/24 63.3 kg (139 lb 8.8 oz)   09/06/24 68.5 kg (151 lb 0.2 oz)   05/31/24 68 kg (150 lb)   04/04/24 68.4 kg (150 lb 12.8 oz)   01/29/24 70.8 kg (156 lb)   04/24/23 68 kg (150 lb)   11/15/22 66.7 kg (147 lb)   11/11/22 66.7 kg (147 lb)     Results for orders placed during the hospital encounter of 04/01/24    Echo 5/14 EF 35%, systolic function severely reduced, diastolic function severely abnormal.  The following segments are akinetic: basal inferior, mid inferior and apical inferior.    The following segments are hypokinetic: basal anterior, basal anteroseptal, basal inferoseptal, basal inferolateral, basal anterolateral, mid anterior, mid anteroseptal, mid inferoseptal, mid inferolateral, mid anterolateral, apical anterior, apical septal, apical lateral and apex.    Right Ventricle: Right ventricular cavity size is normal. Systolic function is normal.    Left Atrium: The atrium is mildly dilated.    Aortic Valve: There is aortic valve sclerosis.    Mitral Valve: There is moderate to severe regurgitation with a posteriorly directed jet.    Tricuspid Valve: There is moderate regurgitation. The right ventricular systolic pressure is moderately elevated. The estimated right ventricular systolic pressure is 60.00 mmHg.     Presents with increased shortness of breath and lower extremity edema  NYHA Class III.,   Patient is currently volume overloaded.    Pharmacotherapies / Neurohormonal Blockade:  --Beta Blocker: Toprol  XL 25 BID  --ARNi / ACEi / ARB: Losartan 25 QD  --Aldosterone Antagonist:  --SGLT2 Inhibitor: NO--H/O UTI's   --Diuretic: Lasix 20 mg QD    NOT on oxygen at home  Plan:  Continue oral Lasix 40 mg daily  Restart losartan 25 mg on 5/19/2025   Currently on RA to 2 L O2, wean as tolerated  Sodium restriction 2g  Fluid restriction <2L/day  Goal Mg > 2 and K > 4; Replete prn  HOB > 30°, Daily standing weights, Measure I/O            Fall  CT cervical spine, x-ray hip/pelvis and x-ray knee negative for fractures  Patient complaining of left elbow pain  X-ray left elbow showing acute nondisplaced transcondylar fracture of distal humerus      Plan  Tylenol 975 mg 3 times daily as needed  STR at [facility]   Outpatient orthopedic surgery follow-up  Stage 3b chronic kidney disease (HCC)  Lab Results   Component Value Date    EGFR 39 05/17/2025    EGFR 42 05/16/2025    EGFR 51 05/15/2025    CREATININE 1.24 05/17/2025    CREATININE 1.18 05/16/2025    CREATININE 1.00 05/15/2025   Mild creatinine elevation with ongoing diuresis    Plan  Monitor BMP  Type 2 diabetes mellitus with stage 3 chronic kidney disease, unspecified whether long term insulin use, unspecified whether stage 3a or 3b CKD (Formerly [facility])  Lab Results   Component Value Date    HGBA1C 7.5 (H) 05/11/2025       Recent Labs     05/16/25  1640 05/16/25 2032 05/17/25  0725 05/17/25  1028   POCGLU 290* 326* 120 167*       Blood Sugar Average: Last 72 hrs:  (P) [id number]  Recent Labs     05/16/25 2032 05/17/25  0428 05/17/25  0725 05/17/25  1028   POCGLU 326*  --  120 167*   GLUC  --  137  --   --        Continue home regimen  CLL (chronic lymphocytic leukemia) (Formerly [facility])  Last saw oncology in 2020 for    PLAN:  Outpatient oncology follow-up  Moderate dementia (Formerly [facility])  Urinary retention protocol  Delirium precautions    Primary hypertension  Present on admission    PLAN:  Continue home medications.     OA (osteoarthritis)  Present on admission    PLAN:  Continue home  medications.     HLD (hyperlipidemia)  Present on admission    PLAN:  Continue home medications.      Medical Problems       Resolved Problems  Date Reviewed: 5/11/2025          Resolved    Elbow fracture, right, closed, initial encounter 5/12/2025     Resolved by  Connie Sepulveda PA-C        Discharging Physician / Practitioner: Franky Nichols MD  PCP: No primary care provider on file.  Admission Date:   Admission Orders (From admission, onward)       Ordered        05/11/25 0758  INPATIENT ADMISSION  Once                          Discharge Date: 05/17/25    Consultations During Hospital Stay:  Orthopedic surgery    Procedures Performed:   None    Significant Findings / Test Results:   XR spine lumbar 2 or 3 views injury   Final Result by Rosalio Chan MD (05/15 0521)      No acute osseous abnormality      Degenerative changes as described.         Computerized Assisted Algorithm (CAA) may have been used to analyze all applicable images.         Workstation performed: [id number]         XR ankle 3+ vw right   Final Result by Rosalio Chan MD (05/15 0516)      No acute osseous abnormality.         Computerized Assisted Algorithm (CAA) may have been used to analyze all applicable images.               Workstation performed: [id number]         XR elbow 3+ vw left   Final Result by Lis Nguyen MD (05/12 1431)      Acute nondisplaced transcondylar fracture of the distal humerus.      The study was marked in EPIC for immediate notification.         Computerized Assisted Algorithm (CAA) may have been used to analyze all applicable images.         Workstation performed: [id number]         XR chest 1 view portable   ED Interpretation by Emery Bowman DO (05/11 5788)   Diffuse opacities in right lower lobe, concern for possible pneumonia. Worse from previous per my interpretation      Final Result by Lindsey Flynn MD (05/11 1637)      Moderate pulmonary edema with moderate  pleural effusions and bibasilar atelectasis. Pneumonia not excluded in the appropriate clinical setting.            Workstation performed: [id number]         XR hip/pelv 2-3 vws left if performed   ED Interpretation by Emery Bowman DO (05/11 0701)   No acute osseous abnormalities noted per my interpretation      Final Result by Jg Alves MD (05/11 1308)      No acute osseous abnormality.         Computerized Assisted Algorithm (CAA) may have been used to analyze all applicable images.         Workstation performed: [id number]         XR knee 4+ vw left injury   ED Interpretation by Emery Bowman DO (05/11 0701)   No acute osseous abnormalities noted per my interpretation      Final Result by Jg Alves MD (05/11 1308)      No acute osseous abnormality.         Computerized Assisted Algorithm (CAA) may have been used to analyze all applicable images.         Workstation performed: [id number]         CT head without contrast   Final Result by Duy Alfonso MD (05/11 0709)      No acute intracranial abnormality.                  Workstation performed: [id number]         CT spine cervical without contrast   Final Result by Duy Alfonso MD (05/11 0723)      No cervical spine fracture or traumatic malalignment.                     Workstation performed: [id number]         CT chest abdomen pelvis wo contrast   Final Result by Duy Alfonso MD (05/11 0747)      1.  Evidence of congestive heart failure with pulmonary edema and pleural effusions.      2.  Widespread lymphadenopathy in the chest, abdomen and pelvis, unchanged from the CT dated 3/3/2025.      3.  No evidence of acute traumatic injury in the chest, abdomen, pelvis or included portions of the skeleton.               Workstation performed: [id number]              Incidental Findings:   XR elbow 3+ vw left: Acute nondisplaced transcondylar fracture of the distal humerus., The study was marked in EPIC for immediate notification., Computerized  Assisted Algorithm (CAA) may have been used to analyze all applicable images., Workstation performed: [id number]      Test Results Pending at Discharge (will require follow up):   None     Outpatient Tests Requested:  Patient metabolic panel    Complications: None    Reason for Admission: Fall and shortness of breath    Hospital Course:   Gia Vaughan is a 84 y.o. female patient who originally presented to the hospital on 5/11/2025 due to fall and shortness of breath. On arrival patient hypoxic to 80%. WBC 55.64, T. bili 2.48, Lactic 2.1 and 1.4, BNP 2982, Tropes 24, 32, 40.  Initial trauma workup was negative.  Patient was started on IV 40 mg twice daily Lasix.  Patient has left elbow tenderness for which an x-ray was obtained that showed acute nondisplaced transcondylar fracture of the distal humerus.  Orthopedic surgery recommended no surgical intervention and placed a splint with planned outpatient follow-up.  Repeat echocardiogram showed EF of 35%, severely reduced systolic function and severely abnormal diastolic function.  Multiple akinetic and hypokinetic segments with RVSP 60.  Throughout hospital course patient's oxygen requirements improved from 5 L to 1.5 L.  Patient was transition to oral 40 mg Lasix twice daily.  Creatinine throughout hospital remained close to baseline but was slightly elevated.  She was instructed to hold losartan and restarted on 5/19.  She was also instructed to obtain a repeat BMP in 1 week.  Physical therapy evaluated and recommended level 2 and patient was accepted at [facility].  She was instructed to follow-up with cardiology and orthopedic surgery.  At the time of discharge patient was medically stable.      Please see above list of diagnoses and related plan for additional information.     Condition at Discharge: stable    Discharge Day Visit / Exam:   * Please refer to separate progress note for these details *    Discussion with Family: Updated  (nephew) via  phone.    Discharge instructions/Information to patient and family:   See after visit summary for information provided to patient and family.      Provisions for Follow-Up Care:  See after visit summary for information related to follow-up care and any pertinent home health orders.      Mobility at time of Discharge:   Basic Mobility Inpatient Raw Score: 14  JH-HLM Goal: 4: Move to chair/commode  JH-HLM Achieved: 4: Move to chair/commode  HLM Goal achieved. Continue to encourage appropriate mobility.     Disposition:   Acute Rehab at [facility]    Planned Readmission: No    Discharge Medications:  See after visit summary for reconciled discharge medications provided to patient and/or family.      Administrative Statements   Discharge Statement:  I have spent a total time of 30 minutes in caring for this patient on the day of the visit/encounter.    **Please Note: This note may have been constructed using a voice recognition system**

## 2025-05-19 ENCOUNTER — TELEPHONE (OUTPATIENT)
Age: 85
End: 2025-05-19

## 2025-05-19 NOTE — TELEPHONE ENCOUNTER
Rubi from [facility] calling.    Gia needs a HFU. Discharge date of 5/17/25. Offered next available appointment in August, facility unable to provide transportation, Next available opening for facility and providers, that coordinated, is  9/11/25. Scheduled for that date.     Rubi root can only transport on Tuesdays and Thursdays.    If this appointment is not appropriate please contact Rubi to reschedule at [phone]. She is available M-F 8-430PM to call.

## 2025-05-20 ENCOUNTER — TELEPHONE (OUTPATIENT)
Dept: HEMATOLOGY ONCOLOGY | Facility: CLINIC | Age: 85
End: 2025-05-20

## 2025-05-22 ENCOUNTER — OFFICE VISIT (OUTPATIENT)
Dept: OBGYN CLINIC | Facility: CLINIC | Age: 85
End: 2025-05-22

## 2025-05-22 ENCOUNTER — HOSPITAL ENCOUNTER (OUTPATIENT)
Dept: RADIOLOGY | Facility: HOSPITAL | Age: 85
End: 2025-05-22
Attending: ORTHOPAEDIC SURGERY
Payer: MEDICARE

## 2025-05-22 ENCOUNTER — TELEPHONE (OUTPATIENT)
Dept: CARDIOLOGY CLINIC | Facility: CLINIC | Age: 85
End: 2025-05-22

## 2025-05-22 DIAGNOSIS — S42.472A CLOSED DISPLACED TRANSCONDYLAR FRACTURE OF LEFT HUMERUS, INITIAL ENCOUNTER: Primary | ICD-10-CM

## 2025-05-22 DIAGNOSIS — S42.472A CLOSED DISPLACED TRANSCONDYLAR FRACTURE OF LEFT HUMERUS, INITIAL ENCOUNTER: ICD-10-CM

## 2025-05-22 PROCEDURE — 73080 X-RAY EXAM OF ELBOW: CPT

## 2025-05-22 RX ORDER — OMEGA-3S/DHA/EPA/FISH OIL/D3 300MG-1000
400 CAPSULE ORAL DAILY
Qty: 30 TABLET | Refills: 0 | Status: SHIPPED | OUTPATIENT
Start: 2025-05-22 | End: 2025-06-21

## 2025-05-22 RX ORDER — PHENOL 1.4 %
600 AEROSOL, SPRAY (ML) MUCOUS MEMBRANE 2 TIMES DAILY WITH MEALS
Qty: 60 TABLET | Refills: 0 | Status: SHIPPED | OUTPATIENT
Start: 2025-05-22 | End: 2025-06-21

## 2025-05-22 NOTE — PROGRESS NOTES
[facility] [address] [address] [phone]       Gia Vaughan  [id number]  1940    ORTHOPAEDIC SURGERY OUTPATIENT NOTE  5/22/2025    :  Assessment & Plan  Closed displaced transcondylar fracture of left humerus, initial encounter  - Patient with redemonstrated transcondylar fracture noted on x-ray imaging today with no further displacement noted and interval callus formation.  -Secondary to patient's examination and imaging, we will proceed with conservative trial of hinged elbow brace and follow-up in 3 weeks for interval assessment of imaging studies, callus formation, and healing.    - Will assess at that time based off of the degree of healing or degree of displacement, to continue with nonoperative management or to pursue surgical fixation.  -Hinged elbow brace (90 degree flexion, 60 degrees extension) with nonweightbearing of the affected upper extremity.  -Supplementation with prescriptions of vitamin D and calcium.  - Formal physical therapy ordered in order to focus on range of motion exercises only.  -3 week follow up    Orders:    XR elbow 3+ vw left; Future    Durable Medical Equipment    Cholecalciferol (VITAMIN D3) 400 units tablet; Take 1 tablet (400 Units total) by mouth daily    Ambulatory referral to Physical Therapy; Future    calcium carbonate (OS-JUAN RAMON) 600 MG tablet; Take 1 tablet (600 mg total) by mouth in the morning and 1 tablet (600 mg total) in the evening. Take with meals.    Ambulatory Referral to Physical Therapy; Future           HISTORY:  84 y.o. female who presents for further evaluation of left elbow fracture.  Patient is unable to provide concise history of precipitating events and states that she does not recall what happened.  On review of electronic medical record, it does appear that the patient has a working diagnosis of moderate dementia had bouts of encephalopathy and  metabolic encephalopathy.  Patient does not appear to be acutely altered at this time and is accompanied by nursing staff from her nursing facility who states that she is at her baseline mental status.    Splint removed today in office with no appreciated skin breakdown or lesion.  No redness or erythema.  No reported fevers or chills.  Patient states that pain has been well-controlled while in the splint which was confirmed by associated staff.  Pain is notable with movement.    Past Medical History[1]    Past Surgical History[2]    Social History     Socioeconomic History    Marital status:      Spouse name: Not on file    Number of children: Not on file    Years of education: Not on file    Highest education level: Not on file   Occupational History    Not on file   Tobacco Use    Smoking status: Never    Smokeless tobacco: Never   Vaping Use    Vaping status: Never Used   Substance and Sexual Activity    Alcohol use: Not Currently     Comment: very seldom    Drug use: Never    Sexual activity: Not on file   Other Topics Concern    Not on file   Social History Narrative    Not on file     Social Drivers of Health     Financial Resource Strain: Patient Unable To Answer (11/5/2024)    Received from [facility]    Overall Financial Resource Strain (CARDIA)     Difficulty of Paying Living Expenses: Patient unable to answer   Food Insecurity: Patient Unable To Answer (5/12/2025)    Nursing - Inadequate Food Risk Classification     Worried About Running Out of Food in the Last Year: Never true     Ran Out of Food in the Last Year: Never true     Ran Out of Food in the Last Year: Patient unable to answer   Transportation Needs: Patient Unable To Answer (5/12/2025)    Nursing - Transportation Risk Classification     Lack of Transportation: Not on file     Lack of Transportation: Patient unable to answer   Physical Activity: Not on file   Stress: Not on file   Social Connections: Not on file    Intimate Partner Violence: Patient Unable To Answer (2025)    Nursing IPS     Feels Physically and Emotionally Safe: Not on file     Physically Hurt by Someone: Not on file     Humiliated or Emotionally Abused by Someone: Not on file     Physically Hurt by Someone: Patient unable to answer     Hurt or Threatened by Someone: Patient unable to answer   Housing Stability: Patient Unable To Answer (2025)    Nursing: Inadequate Housing Risk Classification     Has Housing: Not on file     Worried About Losing Housing: Not on file     Unable to Get Utilities: Not on file     Unable to Pay for Housing in the Last Year: Patient unable to answer     Has Housin       Family History[3]     Patient's Medications   New Prescriptions    CHOLECALCIFEROL (VITAMIN D3) 400 UNITS TABLET    Take 1 tablet (400 Units total) by mouth daily   Previous Medications    ACETAMINOPHEN (TYLENOL) 325 MG TABLET    Take 3 tablets (975 mg total) by mouth 3 (three) times a day as needed for mild pain, moderate pain or severe pain    ALCOHOL SWABS (B-D SINGLE USE SWABS REGULAR) PADS    USE AS DIRECTED    ATORVASTATIN (LIPITOR) 20 MG TABLET    Take 20 mg by mouth in the morning.    CALCIUM CARB-CHOLECALCIFEROL (CALCIUM CARBONATE-VITAMIN D) 500 MG-5 MCG TABLET    TAKE ONE TABLET BY MOUTH TWICE DAILY DX: SUPPLEMENT    CARBAMIDE PEROXIDE (FT EARWAX REMOVAL) 6.5 % OTIC SOLUTION    INSERT FOUR DROPS INTO BOTH EARS AT BEDTIME FOR 2 WEEKS DX: WAX BUILD UP    CHOLECALCIFEROL (VITAMIN D3) 1,000 UNITS TABLET    Take 1,000 Units by mouth in the morning.    CONTINUOUS BLOOD GLUC SENSOR (FREESTYLE BRETT 2 SENSOR) MISC        DEXTROMETHORPHAN-GUAIFENESIN (MUCINEX DM)  MG PER 12 HR TABLET    Take 1 tablet by mouth every 12 (twelve) hours    DICLOFENAC SODIUM (VOLTAREN) 1 %    Apply 2 g topically 3 (three) times a day To R knee    FEROSUL 325 (65 FE) MG TABLET    TAKE ONE TABLET BY MOUTH TWICE DAILY DX: SUPPLEMENT    FLUTICASONE (FLONASE) 50  MCG/ACT NASAL SPRAY    INSTILL ONE SPRAY INTO BOTH NOSTRILS DAILY FOR 1 WEEK AND THEN AS NEEDED DX: ALLERGIES    FUROSEMIDE (LASIX) 20 MG TABLET    Take 2 tablets (40 mg total) by mouth daily    GLUCOSE BLOOD TEST STRIP    Use 1 each daily as needed Use as instructed    GLUCOSE MONITORING KIT (FREESTYLE) MONITORING KIT    Use 1 each as needed    GUAIFENESIN (FT MUCUS RELIEF 12HR) 600 MG 12 HR TABLET    Take 1 tablet (600 mg total) by mouth 2 (two) times a day as needed for cough or congestion    INSULIN GLARGINE SOLOSTAR (LANTUS SOLOSTAR) 100 UNIT/ML SOPN    Inject 0.2 mL (20 Units total) under the skin daily at bedtime    INSULIN LISPRO (HUMALOG) 100 UNITS/ML INJECTION    Inject 5 Units under the skin 3 (three) times a day with meals    LOSARTAN (COZAAR) 25 MG TABLET    Take 1 tablet (25 mg total) by mouth daily    MAGNESIUM OXIDE (MAG-OX) 400 MG TABS    Take 1 tablet (400 mg total) by mouth daily for 14 days    METOPROLOL SUCCINATE (TOPROL-XL) 25 MG 24 HR TABLET    Take 1 tablet (25 mg total) by mouth every 12 (twelve) hours    POTASSIUM CHLORIDE (KLOR-CON M20) 20 MEQ TABLET    Take 1 tablet (20 mEq total) by mouth 2 (two) times a day    VITAMIN B-12 (VITAMIN B-12) 1,000 MCG TABLET    Take by mouth in the morning.   Modified Medications    Modified Medication Previous Medication    CALCIUM CARBONATE (OS-JUAN RAMON) 600 MG TABLET calcium carbonate (OS-JUAN RAMON) 600 MG tablet       Take 1 tablet (600 mg total) by mouth in the morning and 1 tablet (600 mg total) in the evening. Take with meals.    Take 600 mg by mouth in the morning and 600 mg in the evening. Take with meals.   Discontinued Medications    No medications on file       Allergies[4]     LMP  (LMP Unknown)      REVIEW OF SYSTEMS:  Constitutional: Negative.    HEENT: Negative.    Respiratory: Negative.    Skin: Negative.    Neurological: Negative.    Psychiatric/Behavioral: Negative.  Musculoskeletal: Negative except for that mentioned in the HPI.    Gen: No acute  distress, resting comfortably in bed  HEENT: Eyes clear, moist mucus membranes, hearing intact  Respiratory: No audible wheezing or stridor  Cardiovascular: Well Perfused peripherally, 2+ distal pulse  Abdomen: nondistended, no peritoneal signs     PHYSICAL EXAM:    LEFT ELBOW:    Appearance: Minimal swelling appreciated along the medial epicondyles.  No redness or erythema.    Flexion: 90 degrees  Extension: 30 degrees  Pronation: 80 degrees  Supination: 80 degrees    TTP Lateral Epicondyle: +  TTP Medial Epicondyle: +  TTP Olecranon: negative  TTP Radial Head: negative  TTP Biceps Tendon: negative    Strength:  Flexion: 5/5  Extension: 5/5  Pronation: 5/5  Supination: 5/5      Cubital tunnel Tinel's: negative    Radial/median/ulnar nerve intact    <2 sec cap refill    IMAGING: X-ray elbow 3+ views left conducted on 5/22/2025 with findings consistent with transcondylar fracture of the was previously demonstrated on imaging studies on 5/12/2025.  No further displacement between interval of these radiographic findings.  Minimal callus formation at this time.           [1]   Past Medical History:  Diagnosis Date    Acute encephalopathy 10/15/2024    Benign adenomatous polyp of large intestine     CHF (congestive heart failure) (HCC)     Diabetes mellitus (HCC)     Hyperlipemia     Hypertension     Osteoarthritis     TIA (transient ischemic attack)    [2]   Past Surgical History:  Procedure Laterality Date    APPENDECTOMY      CARPAL TUNNEL RELEASE Right     HYSTERECTOMY W/ SALPINGO-OOPHERECTOMY      IA OPTX FEM SHFT FX W/INSJ IMED IMPLT W/WO SCREW Right 10/12/2022    Procedure: INSERTION NAIL IM FEMUR ANTEGRADE (TROCHANTERIC);  Surgeon: Eric Isaacs DO;  Location: [facility];  Service: Orthopedics   [3]   Family History  Problem Relation Name Age of Onset    Heart disease Mother      Heart disease Father      Hypertension Father      Stomach cancer Sister      Ovarian cancer Sister      Hypertension Sister     [4]    Allergies  Allergen Reactions    Amlodipine     Ceftin [Cefuroxime]     Ciprofloxacin     Citalopram     Dye [Iodinated Contrast Media]     Glucophage [Metformin]     Januvia [Sitagliptin]     Neomycin-Polymyxin-Dexameth     Ondansetron     Prilosec [Omeprazole]     Vibramycin [Doxycycline]

## 2025-05-22 NOTE — ASSESSMENT & PLAN NOTE
- Patient with redemonstrated transcondylar fracture noted on x-ray imaging today with no further displacement noted and interval callus formation.  -Secondary to patient's examination and imaging, we will proceed with conservative trial of hinged elbow brace and follow-up in 3 weeks for interval assessment of imaging studies, callus formation, and healing.    - Will assess at that time based off of the degree of healing or degree of displacement, to continue with nonoperative management or to pursue surgical fixation.  -Hinged elbow brace (90 degree flexion, 60 degrees extension) with nonweightbearing of the affected upper extremity.  -Supplementation with prescriptions of vitamin D and calcium.  - Formal physical therapy ordered in order to focus on range of motion exercises only.  -3 week follow up    Orders:    XR elbow 3+ vw left; Future    Durable Medical Equipment    Cholecalciferol (VITAMIN D3) 400 units tablet; Take 1 tablet (400 Units total) by mouth daily    Ambulatory referral to Physical Therapy; Future    calcium carbonate (OS-JUAN RAMON) 600 MG tablet; Take 1 tablet (600 mg total) by mouth in the morning and 1 tablet (600 mg total) in the evening. Take with meals.    Ambulatory Referral to Physical Therapy; Future

## 2025-05-22 NOTE — TELEPHONE ENCOUNTER
Spoke to Charles (nephew) Said that PT is in rehab. Believes she is doing well. PT has f/u appointment on 05/30. With Lisa. Charles verbalized understanding. Charles asked about a virtual visit. I asked if he had Mychart because that needs to be enabled for virtual. States he does not. I asked Charles to keep the office updated on whether or not PT will have Mychart prior to appointment. Charles verbalized understanding.

## 2025-05-27 ENCOUNTER — TELEPHONE (OUTPATIENT)
Dept: SURGICAL ONCOLOGY | Facility: CLINIC | Age: 85
End: 2025-05-27

## 2025-05-27 NOTE — TELEPHONE ENCOUNTER
Patient did not have her virtual appointment with Christine Leija today.  Left message with patient's nephew, Charles, to call our office to reschedule her missed appointment.

## 2025-05-29 PROBLEM — G89.18 ACUTE POSTOPERATIVE PAIN OF RIGHT KNEE: Status: RESOLVED | Noted: 2022-11-02 | Resolved: 2025-05-29

## 2025-05-29 PROBLEM — I16.0 HYPERTENSIVE URGENCY: Status: RESOLVED | Noted: 2024-04-02 | Resolved: 2025-05-29

## 2025-05-29 PROBLEM — R42 DIZZINESS: Status: RESOLVED | Noted: 2024-04-02 | Resolved: 2025-05-29

## 2025-05-29 PROBLEM — G93.41 METABOLIC ENCEPHALOPATHY: Status: RESOLVED | Noted: 2024-10-15 | Resolved: 2025-05-29

## 2025-05-29 PROBLEM — J96.00 ACUTE RESPIRATORY FAILURE, UNSP W HYPOXIA OR HYPERCAPNIA (HCC): Status: RESOLVED | Noted: 2025-05-11 | Resolved: 2025-05-29

## 2025-05-29 PROBLEM — M25.561 ACUTE POSTOPERATIVE PAIN OF RIGHT KNEE: Status: RESOLVED | Noted: 2022-11-02 | Resolved: 2025-05-29

## 2025-05-29 PROBLEM — Z87.898 H/O DIZZINESS: Status: RESOLVED | Noted: 2022-10-19 | Resolved: 2025-05-29

## 2025-05-29 PROBLEM — I95.1 ORTHOSTASIS: Status: RESOLVED | Noted: 2022-10-19 | Resolved: 2025-05-29

## 2025-05-29 PROBLEM — I50.22 HEART FAILURE WITH MID-RANGE EJECTION FRACTION (HFMEF) (HCC): Chronic | Status: ACTIVE | Noted: 2020-11-05

## 2025-05-29 PROBLEM — U07.1 COVID: Status: RESOLVED | Noted: 2024-09-01 | Resolved: 2025-05-29
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
## 2025-05-29 NOTE — PROGRESS NOTES
"General Cardiology Hospital Follow-up Visit  Admission Dx: \"Acute on chronic systolic heart failure\"    Gia Vaughan 84 y.o. female   MRN: [id number]  Encounter: [id number]    Assessment:  Patient Active Problem List    Diagnosis Date Noted    Closed displaced transcondylar fracture of left humerus, initial encounter 05/22/2025    Elbow fracture, left, closed, initial encounter 05/12/2025    Acute diverticulitis 10/15/2024    Abnormal urinalysis 10/15/2024    Diarrhea 09/02/2024    Headache 04/04/2024    Suspected deep tissue injury of unknown depth 11/09/2022    Encephalopathy 10/24/2022    Deep tissue injury 10/21/2022    CLL (chronic lymphocytic leukemia) ([facility]) 10/13/2022    Intertrochanteric fracture of right femur ([facility]) 10/11/2022    Displaced fracture of middle phalanx of left ring finger, initial encounter for closed fracture 10/11/2022    Primary hypertension 08/08/2021    Moderate dementia (HCC) 08/05/2021    Lymphadenopathy 11/06/2020    Elevated liver enzymes 11/06/2020    Stage 3b chronic kidney disease ([facility]) 11/05/2020    HLD (hyperlipidemia) 11/05/2020    OA (osteoarthritis) 11/05/2020    Heart failure with mid-range ejection fraction (HFmEF) ([facility]) 11/05/2020    Type 2 diabetes mellitus with stage 3 chronic kidney disease, unspecified whether long term insulin use, unspecified whether stage 3a or 3b CKD ([facility])        Today's Plan:  Continue current medications. Please contact office if medication list in this note differs   Repeat BMP in 2-3 weeks; nonfasting okay.  Pending labs, can consider addition of MRA at next office visit.  Continue daily weights. Contact office with 3+ lbs gain overnight or 5+ lbs gain in 5-7 days.   RTC in 2-3 weeks; prefers [facility] office.     Plan:  Chronic HFrEF; LVEF 35%   Etiology: Unclear.  Weight of 134 lbs on 05/17 (day of discharge, bed weights) Today, weighs 133 lbs.    Most recent BMP from 05/17/2025: sodium 141; potassium 3.8; BUN 34; creatinine 1.24; eGFR " 39.    Guideline-Directed Medical Therapy:  --Beta Blocker: metoprolol succinate 25 mg BID.   --ARNi / ACEi / ARB: losartan 25 mg daily.  --Aldosterone Antagonist: No.  --SGLT2 Inhibitor: No, history of UTI.    Volume Management:  --Diuretic: Lasix 40 mg daily.  --K supplementation: potassium 20 mEq BID.    Sudden Cardiac Death Risk Reduction:  --LVEF >35%.    Hypertension / history of hypertensive urgency  Hyperlipidemia  Chronic lymphocytic leukemia  Diabetes mellitus, type II  Chronic kidney disease, stage III  Dementia    HPI:   Gia Vaughan is an 84-year-old woman with a PMH as above who presents to the office for hospital follow-up.     Admitted to [facility] from 05/11 to 05/17/2025 after presenting s/p fall. Also noted to be volume up on exam. Started on IV diuretics (IV Lasix 40 mg BID). Repeat TTE with LVEF 35%. Cardiology was not consulted during admission. Transitioned to PO Lasix on 05/16. Lost 4 lbs this admission (bed weights only).     05/30/2025: Patient presents with her nephew for hospital follow-up. Was discharged from acute rehab yesterday. Now back at facility in [place]. Feeling well today with no current cardiac complaints. Patient unsure if daily weights are being completed. Denies lightheadedness, chest pain, SOB at rest, LE swelling, PND, and orthopnea.    Past Medical History[1]    Review of Systems   Constitutional:  Negative for activity change, appetite change, chills and fatigue.   Respiratory:  Negative for chest tightness and shortness of breath.    Cardiovascular:  Negative for chest pain, palpitations and leg swelling.   Gastrointestinal:  Negative for abdominal distention and abdominal pain.   Neurological:  Negative for dizziness, syncope, weakness and light-headedness.   Psychiatric/Behavioral:  Negative for sleep disturbance. The patient is not nervous/anxious.        Allergies[2]    Current Medications[3]    Social History     Socioeconomic History    Marital status:       Spouse name: Not on file    Number of children: Not on file    Years of education: Not on file    Highest education level: Not on file   Occupational History    Not on file   Tobacco Use    Smoking status: Never    Smokeless tobacco: Never   Vaping Use    Vaping status: Never Used   Substance and Sexual Activity    Alcohol use: Not Currently     Comment: very seldom    Drug use: Never    Sexual activity: Not on file   Other Topics Concern    Not on file   Social History Narrative    Not on file     Social Drivers of Health     Financial Resource Strain: Patient Unable To Answer (11/5/2024)    Received from [facility]    Overall Financial Resource Strain (CARDIA)     Difficulty of Paying Living Expenses: Patient unable to answer   Food Insecurity: Patient Unable To Answer (5/12/2025)    Nursing - Inadequate Food Risk Classification     Worried About Running Out of Food in the Last Year: Never true     Ran Out of Food in the Last Year: Never true     Ran Out of Food in the Last Year: Patient unable to answer   Transportation Needs: Patient Unable To Answer (5/12/2025)    Nursing - Transportation Risk Classification     Lack of Transportation: Not on file     Lack of Transportation: Patient unable to answer   Physical Activity: Not on file   Stress: Not on file   Social Connections: Not on file   Intimate Partner Violence: Patient Unable To Answer (5/12/2025)    Nursing IPS     Feels Physically and Emotionally Safe: Not on file     Physically Hurt by Someone: Not on file     Humiliated or Emotionally Abused by Someone: Not on file     Physically Hurt by Someone: Patient unable to answer     Hurt or Threatened by Someone: Patient unable to answer   Housing Stability: Patient Unable To Answer (5/12/2025)    Nursing: Inadequate Housing Risk Classification     Has Housing: Not on file     Worried About Losing Housing: Not on file     Unable to Get Utilities: Not on file     Unable to Pay for  "Housing in the Last Year: Patient unable to answer     Has Housin     Family History[4]    Vitals:   Blood pressure 128/62, pulse 65, height 5' 6\" (1.676 m), weight 60.3 kg (133 lb), SpO2 99%.    Wt Readings from Last 10 Encounters:   25 60.3 kg (133 lb)   25 60.8 kg (134 lb 0.6 oz)   25 62.8 kg (138 lb 8 oz)   25 60.3 kg (132 lb 15 oz)   10/17/24 63.3 kg (139 lb 8.8 oz)   24 68.5 kg (151 lb 0.2 oz)   24 68 kg (150 lb)   24 68.4 kg (150 lb 12.8 oz)   24 70.8 kg (156 lb)   23 68 kg (150 lb)     Vitals:    25 0905   BP: 128/62   BP Location: Right arm   Patient Position: Sitting   Cuff Size: Standard   Pulse: 65   SpO2: 99%   Weight: 60.3 kg (133 lb)   Height: 5' 6\" (1.676 m)       Physical Exam  Vitals reviewed.   Constitutional:       General: She is awake. She is not in acute distress.     Appearance: Normal appearance. She is well-developed and normal weight. She is not toxic-appearing or diaphoretic.      Comments: Seated in wheelchair   HENT:      Head: Normocephalic.      Nose: Nose normal.   Neck:      Vascular: No JVD.     Cardiovascular:      Rate and Rhythm: Normal rate and regular rhythm.   Pulmonary:      Effort: Pulmonary effort is normal. No tachypnea, bradypnea or respiratory distress.      Breath sounds: Normal air entry. No decreased air movement. No rhonchi or rales.   Abdominal:      General: There is no distension.      Palpations: Abdomen is soft.     Musculoskeletal:      Cervical back: Neck supple.      Right lower leg: No edema.      Left lower leg: No edema.      Comments: Left arm in brace/stabilizer     Skin:     General: Skin is warm and dry.      Coloration: Skin is not jaundiced or pale.     Neurological:      Mental Status: She is alert.     Psychiatric:         Attention and Perception: Attention normal.         Mood and Affect: Mood and affect normal.         Speech: Speech normal.         Behavior: Behavior normal. " Behavior is cooperative.         Thought Content: Thought content normal.       Labs & Results:  Lab Results   Component Value Date    WBC 63.37 (H) 05/17/2025    HGB 10.4 (L) 05/17/2025    HCT 33.1 (L) 05/17/2025     (H) 05/17/2025     05/17/2025     Lab Results   Component Value Date    SODIUM 141 05/17/2025    K 3.8 05/17/2025     05/17/2025    CO2 32 05/17/2025    BUN 34 (H) 05/17/2025    CREATININE 1.24 05/17/2025    GLUC 137 05/17/2025    CALCIUM 9.2 05/17/2025     Lab Results   Component Value Date    INR 1.22 (H) 05/11/2025    INR 1.08 03/03/2025    INR 1.01 10/14/2024    PROTIME 16.2 (H) 05/11/2025    PROTIME 14.8 03/03/2025    PROTIME 14.0 10/14/2024     Lab Results   Component Value Date    BNP 2,982 (H) 05/11/2025      Lisa Kaur PA-C       [1]   Past Medical History:  Diagnosis Date    Acute encephalopathy 10/15/2024    Acute postoperative pain of right knee 11/02/2022    ACUTE RESPIRATORY FAILURE, UNSP W HYPOXIA OR HYPERCAPNIA 05/11/2025    Benign adenomatous polyp of large intestine     CHF (congestive heart failure) ([facility])     COVID 09/01/2024    Diabetes mellitus ([facility])     Fall 11/05/2020    Heart failure with mid-range ejection fraction (HFmEF) ([facility]) 11/05/2020    Hyperlipemia     Hypertension     Hypertensive urgency 04/02/2024    Metabolic encephalopathy 10/15/2024    Orthostasis 10/19/2022    Osteoarthritis     TIA (transient ischemic attack)    [2]   Allergies  Allergen Reactions    Amlodipine     Ceftin [Cefuroxime]     Ciprofloxacin     Citalopram     Dye [Iodinated Contrast Media]     Glucophage [Metformin]     Januvia [Sitagliptin]     Neomycin-Polymyxin-Dexameth     Ondansetron     Prilosec [Omeprazole]     Vibramycin [Doxycycline]    [3]   Current Outpatient Medications:     acetaminophen (TYLENOL) 325 mg tablet, Take 3 tablets (975 mg total) by mouth 3 (three) times a day as needed for mild pain, moderate pain or severe pain, Disp: , Rfl:     Alcohol Swabs (B-D  SINGLE USE SWABS REGULAR) PADS, USE AS DIRECTED, Disp: 100 each, Rfl: 3    atorvastatin (LIPITOR) 20 mg tablet, Take 20 mg by mouth in the morning., Disp: , Rfl:     Calcium Carb-Cholecalciferol (calcium carbonate-vitamin D) 500 mg-5 mcg tablet, TAKE ONE TABLET BY MOUTH TWICE DAILY DX: SUPPLEMENT, Disp: 60 tablet, Rfl: 11    calcium carbonate (OS-JUAN RAMON) 600 MG tablet, Take 1 tablet (600 mg total) by mouth in the morning and 1 tablet (600 mg total) in the evening. Take with meals., Disp: 60 tablet, Rfl: 0    carbamide peroxide (FT Earwax Removal) 6.5 % otic solution, INSERT FOUR DROPS INTO BOTH EARS AT BEDTIME FOR 2 WEEKS DX: WAX BUILD UP, Disp: 15 mL, Rfl: 1    cholecalciferol (VITAMIN D3) 1,000 units tablet, Take 1,000 Units by mouth in the morning., Disp: , Rfl:     Cholecalciferol (VITAMIN D3) 400 units tablet, Take 1 tablet (400 Units total) by mouth daily, Disp: 30 tablet, Rfl: 0    Continuous Blood Gluc Sensor (FreeStyle Дмитрий 2 Sensor) MISC, , Disp: , Rfl:     dextromethorphan-guaifenesin (MUCINEX DM)  MG per 12 hr tablet, Take 1 tablet by mouth every 12 (twelve) hours, Disp: , Rfl:     Diclofenac Sodium (VOLTAREN) 1 %, Apply 2 g topically 3 (three) times a day To R knee, Disp: , Rfl: 0    FeroSul 325 (65 Fe) MG tablet, TAKE ONE TABLET BY MOUTH TWICE DAILY DX: SUPPLEMENT, Disp: 60 tablet, Rfl: 11    fluticasone (FLONASE) 50 mcg/act nasal spray, INSTILL ONE SPRAY INTO BOTH NOSTRILS DAILY FOR 1 WEEK AND THEN AS NEEDED DX: ALLERGIES, Disp: 16 g, Rfl: 5    furosemide (LASIX) 20 mg tablet, Take 2 tablets (40 mg total) by mouth daily, Disp: , Rfl:     glucose blood test strip, Use 1 each daily as needed Use as instructed, Disp: , Rfl:     glucose monitoring kit (FREESTYLE) monitoring kit, Use 1 each as needed, Disp: , Rfl:     guaiFENesin (FT Mucus Relief 12HR) 600 mg 12 hr tablet, Take 1 tablet (600 mg total) by mouth 2 (two) times a day as needed for cough or congestion, Disp: 30 tablet, Rfl: 0    Insulin  Glargine Solostar (Lantus SoloStar) 100 UNIT/ML SOPN, Inject 0.2 mL (20 Units total) under the skin daily at bedtime, Disp: , Rfl:     insulin lispro (HumaLOG) 100 units/mL injection, Inject 5 Units under the skin 3 (three) times a day with meals, Disp: , Rfl: 0    metoprolol succinate (TOPROL-XL) 25 mg 24 hr tablet, Take 1 tablet (25 mg total) by mouth every 12 (twelve) hours, Disp: 30 tablet, Rfl: 0    potassium chloride (Klor-Con M20) 20 mEq tablet, Take 1 tablet (20 mEq total) by mouth 2 (two) times a day, Disp: 1 tablet, Rfl: 0    vitamin B-12 (VITAMIN B-12) 1,000 mcg tablet, Take by mouth in the morning., Disp: , Rfl:     losartan (COZAAR) 25 mg tablet, Take 1 tablet (25 mg total) by mouth daily, Disp: 30 tablet, Rfl: 0    magnesium Oxide (MAG-OX) 400 mg TABS, Take 1 tablet (400 mg total) by mouth daily for 14 days, Disp: 14 tablet, Rfl: 0  [4]   Family History  Problem Relation Name Age of Onset    Heart disease Mother      Heart disease Father      Hypertension Father      Stomach cancer Sister      Ovarian cancer Sister      Hypertension Sister

## 2025-05-30 ENCOUNTER — OFFICE VISIT (OUTPATIENT)
Dept: CARDIOLOGY CLINIC | Facility: CLINIC | Age: 85
End: 2025-05-30
Payer: MEDICARE

## 2025-05-30 VITALS
HEIGHT: 66 IN | SYSTOLIC BLOOD PRESSURE: 128 MMHG | WEIGHT: 133 LBS | DIASTOLIC BLOOD PRESSURE: 62 MMHG | OXYGEN SATURATION: 99 % | BODY MASS INDEX: 21.38 KG/M2 | HEART RATE: 65 BPM

## 2025-05-30 DIAGNOSIS — I50.22 HEART FAILURE WITH MID-RANGE EJECTION FRACTION (HFMEF) (HCC): Chronic | ICD-10-CM

## 2025-05-30 DIAGNOSIS — Z09 HOSPITAL DISCHARGE FOLLOW-UP: Primary | ICD-10-CM

## 2025-05-30 PROCEDURE — 99214 OFFICE O/P EST MOD 30 MIN: CPT | Performed by: PHYSICIAN ASSISTANT

## 2025-05-30 NOTE — PATIENT INSTRUCTIONS
Continue current medications.   Complete blood work before next visit. No need to fast.     Please weigh yourself every day (after emptying your bladder) and keep a detailed log of weights.   Contact Cardiology at [phone] if you gain 3+ lbs overnight or 5+ lbs in 5-7 days.  Limit daily sodium/salt intake to 2000 mg daily to prevent fluid retention.  Avoid canned foods, fast food/Chinese food, and processed meats (hot dogs, lunch meat, and sausage etc.). Caution with condiments.  Limit fluid intake to 2000 mL or 2 liters (about 60-65 ounces) daily.  Avoid electrolyte replacement drinks (such as Gatorade, Pedialyte, Propel, Liquid IV, etc.).  Bring complete list of medications and log of daily weights to your follow-up appointment.

## 2025-06-14 DIAGNOSIS — R05.3 CHRONIC COUGH: ICD-10-CM

## 2025-06-16 RX ORDER — GUAIFENESIN 600 MG/1
TABLET, EXTENDED RELEASE ORAL
Qty: 30 TABLET | Refills: 0 | Status: SHIPPED | OUTPATIENT
Start: 2025-06-16 | End: 2025-06-24

## 2025-06-19 ENCOUNTER — APPOINTMENT (EMERGENCY)
Dept: CT IMAGING | Facility: HOSPITAL | Age: 85
DRG: 291 | End: 2025-06-19
Payer: MEDICARE

## 2025-06-19 ENCOUNTER — HOSPITAL ENCOUNTER (INPATIENT)
Facility: HOSPITAL | Age: 85
LOS: 4 days | Discharge: NURSING, REHAB OR LONG-TERM CARE | DRG: 291 | End: 2025-06-24
Attending: EMERGENCY MEDICINE | Admitting: INTERNAL MEDICINE
Payer: MEDICARE

## 2025-06-19 ENCOUNTER — APPOINTMENT (EMERGENCY)
Dept: RADIOLOGY | Facility: HOSPITAL | Age: 85
DRG: 291 | End: 2025-06-19
Payer: MEDICARE

## 2025-06-19 DIAGNOSIS — F03.90 DEMENTIA (HCC): ICD-10-CM

## 2025-06-19 DIAGNOSIS — R55 SYNCOPE: Primary | ICD-10-CM

## 2025-06-19 DIAGNOSIS — I49.3 FREQUENT PVCS: ICD-10-CM

## 2025-06-19 DIAGNOSIS — M25.562 LEFT KNEE PAIN: ICD-10-CM

## 2025-06-19 LAB
2HR DELTA HS TROPONIN: -1 NG/L
ALBUMIN SERPL BCG-MCNC: 3.9 G/DL (ref 3.5–5)
ALP SERPL-CCNC: 106 U/L (ref 34–104)
ALT SERPL W P-5'-P-CCNC: 7 U/L (ref 7–52)
ANION GAP SERPL CALCULATED.3IONS-SCNC: 8 MMOL/L (ref 4–13)
AST SERPL W P-5'-P-CCNC: 11 U/L (ref 13–39)
BILIRUB SERPL-MCNC: 0.6 MG/DL (ref 0.2–1)
BUN SERPL-MCNC: 22 MG/DL (ref 5–25)
CALCIUM SERPL-MCNC: 9.4 MG/DL (ref 8.4–10.2)
CARDIAC TROPONIN I PNL SERPL HS: 12 NG/L (ref ?–50)
CARDIAC TROPONIN I PNL SERPL HS: 13 NG/L (ref ?–50)
CHLORIDE SERPL-SCNC: 106 MMOL/L (ref 96–108)
CK SERPL-CCNC: 35 U/L (ref 26–192)
CO2 SERPL-SCNC: 25 MMOL/L (ref 21–32)
CREAT SERPL-MCNC: 1 MG/DL (ref 0.6–1.3)
ERYTHROCYTE [DISTWIDTH] IN BLOOD BY AUTOMATED COUNT: 17.2 % (ref 11.6–15.1)
GFR SERPL CREATININE-BSD FRML MDRD: 51 ML/MIN/1.73SQ M
GLUCOSE SERPL-MCNC: 179 MG/DL (ref 65–140)
HCT VFR BLD AUTO: 32.3 % (ref 34.8–46.1)
HGB BLD-MCNC: 10.2 G/DL (ref 11.5–15.4)
MCH RBC QN AUTO: 36.7 PG (ref 26.8–34.3)
MCHC RBC AUTO-ENTMCNC: 31.6 G/DL (ref 31.4–37.4)
MCV RBC AUTO: 116 FL (ref 82–98)
PLATELET # BLD AUTO: 219 THOUSANDS/UL (ref 149–390)
PMV BLD AUTO: 12.7 FL (ref 8.9–12.7)
POTASSIUM SERPL-SCNC: 4.5 MMOL/L (ref 3.5–5.3)
PROT SERPL-MCNC: 6.5 G/DL (ref 6.4–8.4)
RBC # BLD AUTO: 2.78 MILLION/UL (ref 3.81–5.12)
SODIUM SERPL-SCNC: 139 MMOL/L (ref 135–147)
WBC # BLD AUTO: 37.52 THOUSAND/UL (ref 4.31–10.16)

## 2025-06-19 PROCEDURE — 82550 ASSAY OF CK (CPK): CPT

## 2025-06-19 PROCEDURE — 85007 BL SMEAR W/DIFF WBC COUNT: CPT

## 2025-06-19 PROCEDURE — 99284 EMERGENCY DEPT VISIT MOD MDM: CPT

## 2025-06-19 PROCEDURE — 80053 COMPREHEN METABOLIC PANEL: CPT

## 2025-06-19 PROCEDURE — 99285 EMERGENCY DEPT VISIT HI MDM: CPT | Performed by: EMERGENCY MEDICINE

## 2025-06-19 PROCEDURE — 73564 X-RAY EXAM KNEE 4 OR MORE: CPT

## 2025-06-19 PROCEDURE — 36415 COLL VENOUS BLD VENIPUNCTURE: CPT

## 2025-06-19 PROCEDURE — 85027 COMPLETE CBC AUTOMATED: CPT

## 2025-06-19 PROCEDURE — 70450 CT HEAD/BRAIN W/O DYE: CPT

## 2025-06-19 PROCEDURE — 99223 1ST HOSP IP/OBS HIGH 75: CPT | Performed by: INTERNAL MEDICINE

## 2025-06-19 PROCEDURE — 84484 ASSAY OF TROPONIN QUANT: CPT

## 2025-06-19 PROCEDURE — 93005 ELECTROCARDIOGRAM TRACING: CPT

## 2025-06-19 RX ORDER — HEPARIN SODIUM 5000 [USP'U]/ML
5000 INJECTION, SOLUTION INTRAVENOUS; SUBCUTANEOUS EVERY 8 HOURS SCHEDULED
Status: DISCONTINUED | OUTPATIENT
Start: 2025-06-19 | End: 2025-06-21

## 2025-06-19 RX ORDER — ACETAMINOPHEN 325 MG/1
650 TABLET ORAL EVERY 6 HOURS PRN
Status: DISCONTINUED | OUTPATIENT
Start: 2025-06-19 | End: 2025-06-24 | Stop reason: HOSPADM

## 2025-06-19 RX ADMIN — HEPARIN SODIUM 5000 UNITS: 5000 INJECTION INTRAVENOUS; SUBCUTANEOUS at 21:35
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
## 2025-06-20 ENCOUNTER — APPOINTMENT (INPATIENT)
Dept: VASCULAR ULTRASOUND | Facility: HOSPITAL | Age: 85
DRG: 291 | End: 2025-06-20
Payer: MEDICARE

## 2025-06-20 ENCOUNTER — APPOINTMENT (OUTPATIENT)
Dept: RADIOLOGY | Facility: HOSPITAL | Age: 85
DRG: 291 | End: 2025-06-20
Payer: MEDICARE

## 2025-06-20 PROBLEM — F03.90 DEMENTIA (HCC): Chronic | Status: ACTIVE | Noted: 2025-06-20

## 2025-06-20 PROBLEM — I42.9 CARDIOMYOPATHY (HCC): Chronic | Status: ACTIVE | Noted: 2025-06-20

## 2025-06-20 LAB
ANION GAP SERPL CALCULATED.3IONS-SCNC: 4 MMOL/L (ref 4–13)
ANISOCYTOSIS BLD QL SMEAR: PRESENT
ATRIAL RATE: 65 BPM
BASOPHILS # BLD MANUAL: 0 THOUSAND/UL (ref 0–0.1)
BASOPHILS NFR MAR MANUAL: 0 % (ref 0–1)
BLASTS ABSOLUTE COUNT: 0.74 THOUSAND/UL (ref 0–0)
BLASTS NFR BLD MANUAL: 2 %
BNP SERPL-MCNC: 1070 PG/ML (ref 0–100)
BUN SERPL-MCNC: 21 MG/DL (ref 5–25)
CALCIUM SERPL-MCNC: 8.9 MG/DL (ref 8.4–10.2)
CHLORIDE SERPL-SCNC: 107 MMOL/L (ref 96–108)
CO2 SERPL-SCNC: 27 MMOL/L (ref 21–32)
CREAT SERPL-MCNC: 0.96 MG/DL (ref 0.6–1.3)
EOSINOPHIL # BLD MANUAL: 0 THOUSAND/UL (ref 0–0.4)
EOSINOPHIL NFR BLD MANUAL: 0 % (ref 0–6)
ERYTHROCYTE [DISTWIDTH] IN BLOOD BY AUTOMATED COUNT: 17.2 % (ref 11.6–15.1)
GFR SERPL CREATININE-BSD FRML MDRD: 54 ML/MIN/1.73SQ M
GIANT PLATELETS BLD QL SMEAR: PRESENT
GLUCOSE SERPL-MCNC: 153 MG/DL (ref 65–140)
GLUCOSE SERPL-MCNC: 175 MG/DL (ref 65–140)
GLUCOSE SERPL-MCNC: 178 MG/DL (ref 65–140)
GLUCOSE SERPL-MCNC: 244 MG/DL (ref 65–140)
GLUCOSE SERPL-MCNC: 333 MG/DL (ref 65–140)
HCT VFR BLD AUTO: 30.8 % (ref 34.8–46.1)
HGB BLD-MCNC: 9.8 G/DL (ref 11.5–15.4)
LYMPHOCYTES # BLD AUTO: 34.58 THOUSAND/UL (ref 0.6–4.47)
LYMPHOCYTES # BLD AUTO: 93 % (ref 14–44)
MACROCYTES BLD QL AUTO: PRESENT
MAGNESIUM SERPL-MCNC: 2 MG/DL (ref 1.9–2.7)
MCH RBC QN AUTO: 36.8 PG (ref 26.8–34.3)
MCHC RBC AUTO-ENTMCNC: 31.8 G/DL (ref 31.4–37.4)
MCV RBC AUTO: 116 FL (ref 82–98)
METAMYELOCYTE ABSOLUTE CT: 0.37 THOUSAND/UL (ref 0–0.1)
METAMYELOCYTES NFR BLD MANUAL: 1 % (ref 0–1)
MONOCYTES # BLD AUTO: 0.37 THOUSAND/UL (ref 0–1.22)
MONOCYTES NFR BLD: 1 % (ref 4–12)
NEUTROPHILS # BLD MANUAL: 1.12 THOUSAND/UL (ref 1.85–7.62)
NEUTS SEG NFR BLD AUTO: 3 % (ref 43–75)
P AXIS: 77 DEGREES
PLATELET # BLD AUTO: 232 THOUSANDS/UL (ref 149–390)
PLATELET BLD QL SMEAR: ADEQUATE
PMV BLD AUTO: 12.2 FL (ref 8.9–12.7)
POIKILOCYTOSIS BLD QL SMEAR: PRESENT
POLYCHROMASIA BLD QL SMEAR: PRESENT
POTASSIUM SERPL-SCNC: 4 MMOL/L (ref 3.5–5.3)
PR INTERVAL: 200 MS
QRS AXIS: -13 DEGREES
QRSD INTERVAL: 84 MS
QT INTERVAL: 428 MS
QTC INTERVAL: 445 MS
RBC # BLD AUTO: 2.66 MILLION/UL (ref 3.81–5.12)
RBC MORPH BLD: PRESENT
SODIUM SERPL-SCNC: 138 MMOL/L (ref 135–147)
T WAVE AXIS: 102 DEGREES
VENTRICULAR RATE: 65 BPM
WBC # BLD AUTO: 37.18 THOUSAND/UL (ref 4.31–10.16)

## 2025-06-20 PROCEDURE — 71045 X-RAY EXAM CHEST 1 VIEW: CPT

## 2025-06-20 PROCEDURE — 36415 COLL VENOUS BLD VENIPUNCTURE: CPT | Performed by: INTERNAL MEDICINE

## 2025-06-20 PROCEDURE — 80048 BASIC METABOLIC PNL TOTAL CA: CPT | Performed by: INTERNAL MEDICINE

## 2025-06-20 PROCEDURE — 82948 REAGENT STRIP/BLOOD GLUCOSE: CPT

## 2025-06-20 PROCEDURE — 85060 BLOOD SMEAR INTERPRETATION: CPT | Performed by: STUDENT IN AN ORGANIZED HEALTH CARE EDUCATION/TRAINING PROGRAM

## 2025-06-20 PROCEDURE — 99232 SBSQ HOSP IP/OBS MODERATE 35: CPT

## 2025-06-20 PROCEDURE — 85027 COMPLETE CBC AUTOMATED: CPT | Performed by: INTERNAL MEDICINE

## 2025-06-20 PROCEDURE — 83735 ASSAY OF MAGNESIUM: CPT | Performed by: INTERNAL MEDICINE

## 2025-06-20 PROCEDURE — 83880 ASSAY OF NATRIURETIC PEPTIDE: CPT

## 2025-06-20 PROCEDURE — 85007 BL SMEAR W/DIFF WBC COUNT: CPT | Performed by: INTERNAL MEDICINE

## 2025-06-20 PROCEDURE — 93010 ELECTROCARDIOGRAM REPORT: CPT | Performed by: INTERNAL MEDICINE

## 2025-06-20 PROCEDURE — 99215 OFFICE O/P EST HI 40 MIN: CPT | Performed by: INTERNAL MEDICINE

## 2025-06-20 RX ORDER — INSULIN LISPRO 100 [IU]/ML
1-5 INJECTION, SOLUTION INTRAVENOUS; SUBCUTANEOUS
Status: DISCONTINUED | OUTPATIENT
Start: 2025-06-20 | End: 2025-06-24 | Stop reason: HOSPADM

## 2025-06-20 RX ORDER — ATORVASTATIN CALCIUM 20 MG/1
20 TABLET, FILM COATED ORAL DAILY
Status: DISCONTINUED | OUTPATIENT
Start: 2025-06-20 | End: 2025-06-24 | Stop reason: HOSPADM

## 2025-06-20 RX ORDER — LOSARTAN POTASSIUM 25 MG/1
25 TABLET ORAL DAILY
Status: DISCONTINUED | OUTPATIENT
Start: 2025-06-20 | End: 2025-06-24 | Stop reason: HOSPADM

## 2025-06-20 RX ORDER — FUROSEMIDE 40 MG/1
40 TABLET ORAL DAILY
Status: DISCONTINUED | OUTPATIENT
Start: 2025-06-20 | End: 2025-06-24 | Stop reason: HOSPADM

## 2025-06-20 RX ORDER — INSULIN GLARGINE 100 [IU]/ML
20 INJECTION, SOLUTION SUBCUTANEOUS
Status: DISCONTINUED | OUTPATIENT
Start: 2025-06-20 | End: 2025-06-24 | Stop reason: HOSPADM

## 2025-06-20 RX ORDER — INSULIN LISPRO 100 [IU]/ML
5 INJECTION, SOLUTION INTRAVENOUS; SUBCUTANEOUS
Status: DISCONTINUED | OUTPATIENT
Start: 2025-06-20 | End: 2025-06-24 | Stop reason: HOSPADM

## 2025-06-20 RX ORDER — METOPROLOL SUCCINATE 25 MG/1
25 TABLET, EXTENDED RELEASE ORAL EVERY 12 HOURS
Status: DISCONTINUED | OUTPATIENT
Start: 2025-06-20 | End: 2025-06-24 | Stop reason: HOSPADM

## 2025-06-20 RX ORDER — INSULIN LISPRO 100 [IU]/ML
1-5 INJECTION, SOLUTION INTRAVENOUS; SUBCUTANEOUS
Status: DISCONTINUED | OUTPATIENT
Start: 2025-06-20 | End: 2025-06-23

## 2025-06-20 RX ADMIN — INSULIN LISPRO 1 UNITS: 100 INJECTION, SOLUTION INTRAVENOUS; SUBCUTANEOUS at 16:57

## 2025-06-20 RX ADMIN — HEPARIN SODIUM 5000 UNITS: 5000 INJECTION INTRAVENOUS; SUBCUTANEOUS at 06:15

## 2025-06-20 RX ADMIN — HEPARIN SODIUM 5000 UNITS: 5000 INJECTION INTRAVENOUS; SUBCUTANEOUS at 13:19

## 2025-06-20 RX ADMIN — INSULIN LISPRO 5 UNITS: 100 INJECTION, SOLUTION INTRAVENOUS; SUBCUTANEOUS at 16:57

## 2025-06-20 RX ADMIN — INSULIN LISPRO 2 UNITS: 100 INJECTION, SOLUTION INTRAVENOUS; SUBCUTANEOUS at 08:57

## 2025-06-20 RX ADMIN — HEPARIN SODIUM 5000 UNITS: 5000 INJECTION INTRAVENOUS; SUBCUTANEOUS at 22:00

## 2025-06-20 RX ADMIN — ACETAMINOPHEN 650 MG: 325 TABLET ORAL at 16:56

## 2025-06-20 RX ADMIN — INSULIN GLARGINE 20 UNITS: 100 INJECTION, SOLUTION SUBCUTANEOUS at 21:59

## 2025-06-20 RX ADMIN — ATORVASTATIN CALCIUM 20 MG: 40 TABLET, FILM COATED ORAL at 08:42

## 2025-06-20 RX ADMIN — INSULIN LISPRO 5 UNITS: 100 INJECTION, SOLUTION INTRAVENOUS; SUBCUTANEOUS at 08:57

## 2025-06-20 RX ADMIN — METOPROLOL SUCCINATE 25 MG: 25 TABLET, EXTENDED RELEASE ORAL at 08:42

## 2025-06-20 RX ADMIN — INSULIN LISPRO 1 UNITS: 100 INJECTION, SOLUTION INTRAVENOUS; SUBCUTANEOUS at 22:01

## 2025-06-20 RX ADMIN — FUROSEMIDE 40 MG: 40 TABLET ORAL at 08:42

## 2025-06-20 RX ADMIN — LOSARTAN POTASSIUM 25 MG: 25 TABLET, FILM COATED ORAL at 08:42

## 2025-06-20 RX ADMIN — INSULIN LISPRO 5 UNITS: 100 INJECTION, SOLUTION INTRAVENOUS; SUBCUTANEOUS at 13:19

## 2025-06-20 RX ADMIN — METOPROLOL SUCCINATE 25 MG: 25 TABLET, EXTENDED RELEASE ORAL at 18:42

## 2025-06-20 RX ADMIN — INSULIN LISPRO 3 UNITS: 100 INJECTION, SOLUTION INTRAVENOUS; SUBCUTANEOUS at 13:19

## 2025-06-20 NOTE — ED NOTES
Patient attempting to get out of bed, bed alarm placed, patient redirected.     Cr Rand RN  06/20/25 [id number]

## 2025-06-20 NOTE — ASSESSMENT & PLAN NOTE
Lab Results   Component Value Date    HGBA1C 7.5 (H) 05/11/2025   Blood Sugar Average: Last 72 hrs:(P) 244  Continue Lantus 20 units nightly  Mealtime insulin 5 units 3 times daily  Sliding scale insulin

## 2025-06-20 NOTE — CONSULTS
Consultation - Cardiology Team One  Gia Vaughan 84 y.o. female MRN: [id number]  Unit/Bed#: ED-31 Encounter: [id number]    Inpatient consult to Cardiology  Consult performed by: Jameson Baker PA-C  Consult ordered by: Nitin Lantigua MD          Physician Requesting Consult: Dayo Langley DO  Reason for Consult / Principal Problem: Syncope    Assessment & Plan  Syncope  Presents with reported 10 minutes of unresponsiveness while sitting at the dining table at nursing facility.  No fall.  EKG revealed sinus rhythm with PVC  Telemetry reveals sinus rhythm with occasional PVCs   Orthostatics negative  CTh with no acute intracranial abnormality  Chronic systolic heart failure (HCC)  Echocardiogram 5/14/2025: EF 35% with RWMA, normal RV function, moderate-severe MR, moderate TR.  Outpatient diuretic regimen: 620 mg daily  Cardiomyopathy (HCC)  EF 35%  Etiology unclear  GDMT: Toprol XL and losartan  Primary hypertension  PTA: Toprol-XL 25 mg BID and losartan 25 mg daily  HLD (hyperlipidemia)  Lipid panel 4/2024 with , TG 66, HDL 66, LDL 77 on atorvastatin 20 mg daily  Type 2 diabetes mellitus with stage 3 chronic kidney disease, unspecified whether long term insulin use, unspecified whether stage 3a or 3b CKD (HCC)  Lab Results   Component Value Date    HGBA1C 7.5 (H) 05/11/2025   Management per primary team  Stage 3b chronic kidney disease (HCC)  Baseline creatinine 0.9-1.3  Creatinine currently at baseline at 0.96  CLL (chronic lymphocytic leukemia) (HCC)  Follows with oncology  Dementia ([facility])  Unable to obtain reliable history from patient      Plan/Recommendations:  Low suspicion of cardiac etiology given reported prolonged episode of syncope  Plan for outpatient ZIO monitor  Monitor on telemetry while in house  Continue current cardiac medications  ___________________________________________________________    CC: Syncope      History of Present Illness   HPI: Gia Vaughan is a 84 y.o. year old female who  has chronic HFrEF, hypertension, hyperlipidemia, type II DM, CKD stage III, CLL, dementia who follows with cardiologist Dr. Greenwood.  She presents to the emergency room at [facility] on 2025 after an unresponsive episode at her nursing facility.  Per their report patient was at the dinner table when she went limp and unresponsive.  She did not have a fall.  Patient was unresponsive for approximately 10 minutes.  On arrival to the ED patient's vital signs were stable with oxygen saturation 96% on room air.  EKG revealed sinus rhythm with PVC.  CTh revealed no acute intracranial abnormality.  Labs revealed stable CMP, negative troponin x 2, chronic leukocytosis.  Orthostatic vital signs were negative.  Cardiology has been consulted for further evaluation management of syncope.    Home medication regimen includes atorvastatin 20 mg daily, Lasix 20 mg daily, losartan 25 mg daily, Toprol-XL 25 mg BID.    Patient alert oriented x 2.  States she is here because of a problem with her right hip.    EKG reviewed personally: 2025-sinus rhythm at a rate of 65 bpm with PVC.  No significant change when compared to the EKG from 2025      Telemetry reviewed personally: Sinus rhythm in the 70s with brief atrial run and occasional PVCs        Review of Systems   Unable to perform ROS: Dementia     Historical Information   Past Medical History[1]  Past Surgical History[2]  Social History     Substance and Sexual Activity   Alcohol Use Not Currently    Comment: very seldom     Social History     Substance and Sexual Activity   Drug Use Never     Tobacco Use History[3]  Family History: Family History[4]    Meds/Allergies   all current active meds have been reviewed       Allergies[5]    Objective   Vitals: Blood pressure 121/58, pulse 78, temperature 97.6 °F (36.4 °C), temperature source Oral, resp. rate 18, SpO2 94%.,     There is no height or weight on file to calculate BMI.,     Systolic (24hrs), Av  , Min:121 , Max:159     Diastolic (24hrs), Av, Min:58, Max:105    Wt Readings from Last 3 Encounters:   25 60.3 kg (133 lb)   25 60.8 kg (134 lb 0.6 oz)   25 62.8 kg (138 lb 8 oz)      Lab Results   Component Value Date    CREATININE 0.96 2025    CREATININE 1.00 2025    CREATININE 1.24 2025       No intake or output data in the 24 hours ending 25 0849  Weight (last 2 days)       None          Invasive Devices       Peripheral Intravenous Line  Duration             Peripheral IV 25 Left;Dorsal (posterior) Forearm <1 day                      Physical Exam  Vitals and nursing note reviewed.   Constitutional:       General: She is not in acute distress.     Appearance: She is well-developed.      Comments: On RA in NAD   HENT:      Head: Normocephalic and atraumatic.   Neck:      Vascular: No JVD.     Cardiovascular:      Rate and Rhythm: Normal rate and regular rhythm.      Heart sounds: Normal heart sounds. No murmur heard.     No friction rub.   Pulmonary:      Effort: Pulmonary effort is normal. No respiratory distress.      Breath sounds: Normal breath sounds. No wheezing or rales.   Abdominal:      General: Bowel sounds are normal. There is no distension.      Palpations: Abdomen is soft.      Tenderness: There is no abdominal tenderness.     Musculoskeletal:         General: No tenderness. Normal range of motion.      Cervical back: Normal range of motion and neck supple.      Right lower leg: No edema.      Left lower leg: No edema.     Skin:     General: Skin is warm and dry.      Findings: No erythema.     Neurological:      Mental Status: She is alert.      Comments: A&O x 2 to person and place   Psychiatric:         Behavior: Behavior normal.           LABORATORY RESULTS:  Results from last 7 days   Lab Units 25  1902   CK TOTAL U/L 35     CBC with diff:   Results from last 7 days   Lab Units 25  0556 25  1806   WBC Thousand/uL 37.18*  "37.52*   HEMOGLOBIN g/dL 9.8* 10.2*   HEMATOCRIT % 30.8* 32.3*   MCV fL 116* 116*   PLATELETS Thousands/uL 232 219   RBC Million/uL 2.66* 2.78*   MCH pg 36.8* 36.7*   MCHC g/dL 31.8 31.6   RDW % 17.2* 17.2*   MPV fL 12.2 12.7       CMP:  Results from last 7 days   Lab Units 06/20/25  0556 06/19/25  1902   POTASSIUM mmol/L 4.0 4.5   CHLORIDE mmol/L 107 106   CO2 mmol/L 27 25   BUN mg/dL 21 22   CREATININE mg/dL 0.96 1.00   CALCIUM mg/dL 8.9 9.4   AST U/L  --  11*   ALT U/L  --  7   ALK PHOS U/L  --  106*   EGFR ml/min/1.73sq m 54 51       BMP:  Results from last 7 days   Lab Units 06/20/25  0556 06/19/25  1902   POTASSIUM mmol/L 4.0 4.5   CHLORIDE mmol/L 107 106   CO2 mmol/L 27 25   BUN mg/dL 21 22   CREATININE mg/dL 0.96 1.00   CALCIUM mg/dL 8.9 9.4          Lab Results   Component Value Date    NTBNP 10,562 (H) 11/05/2020            Results from last 7 days   Lab Units 06/20/25  0556   MAGNESIUM mg/dL 2.0                         Lipid Profile:   No results found for: \"CHOL\"  Lab Results   Component Value Date    HDL 66 04/02/2024    HDL 66 11/29/2023    HDL 59 08/21/2023     Lab Results   Component Value Date    LDLCALC 77 04/02/2024    LDLCALC 63 11/29/2023    LDLCALC 78 08/21/2023     Lab Results   Component Value Date    TRIG 66 04/02/2024    TRIG 137 11/29/2023    TRIG 194 (H) 08/21/2023       Imaging: Results Review Statement: I reviewed radiology reports from this admission including: CT head and xray(s).  XR knee 4+ views left injury  Result Date: 6/20/2025  Narrative: XR KNEE 4+ VW LEFT INJURY INDICATION: left knee pain. COMPARISON: Left knee x-ray dated 5/11/2025 FINDINGS: No acute fracture or dislocation. No joint effusion. No significant degenerative changes. No lytic or blastic osseous lesion. Atherosclerotic calcifications. Otherwise unremarkable soft tissues.     Impression: No acute osseous abnormality. Computerized Assisted Algorithm (CAA) may have been used to analyze all applicable images. " Workstation performed: [id number]     CT head without contrast  Result Date: 6/19/2025  Narrative: CT BRAIN - WITHOUT CONTRAST INDICATION:   Syncope, unconcious for ~10 minutes. COMPARISON: Head CT from May 11, 2025 TECHNIQUE:  CT examination of the brain was performed.  Multiplanar 2D reformatted images were created from the source data. Radiation dose length product (DLP) for this visit:  1003 mGy-cm. .  This examination, like all CT scans performed in the [facility], was performed utilizing techniques to minimize radiation dose exposure, including the use of iterative reconstruction and automated exposure control. IMAGE QUALITY:  Diagnostic. FINDINGS: PARENCHYMA: Mild generalized atrophy and chronic microangiopathic changes. No acute hemorrhage or cortical infarction. No edema or mass effect. Intracranial carotid atherosclerosis. VENTRICLES AND EXTRA-AXIAL SPACES:  Normal for the patient's age. VISUALIZED ORBITS: Bilateral lens replacement surgery.  No acute abnormality involving the visualized orbits. PARANASAL SINUSES: Normal visualized paranasal sinuses. CALVARIUM AND EXTRACRANIAL SOFT TISSUES: Normal.     Impression: No acute intracranial abnormality. Workstation performed: [id number]     XR elbow 3+ vw left  Result Date: 5/26/2025  Narrative: LEFT ELBOW INDICATION:   Displaced transcondylar fracture of left humerus, initial encounter for closed fracture. COMPARISON:  None. VIEWS:  XR ELBOW 3+ VW LEFT FINDINGS: Subacute appearing minimally displaced distal left humeral fracture. Trace elbow joint effusion. No significant degenerative changes. No lytic or blastic osseous lesion. Soft tissues are unremarkable.     Impression: Subacute appearing minimally displaced distal left humeral fracture. Electronically signed: 05/26/2025 09:59 AM Kole Evans MD        Counseling / Coordination of Care  Total floor / unit time spent today 45 minutes.  Greater than 50% of total time was spent with the  patient and / or family counseling and / or coordination of care.  A description of the counseling / coordination of care: Review of history, current assessment, development of a plan.      Code Status: Level 1 - Full Code    ** Please Note: Dragon 360 Dictation voice to text software may have been used in the creation of this document. **         [1]   Past Medical History:  Diagnosis Date    Acute encephalopathy 10/15/2024    Acute postoperative pain of right knee 11/02/2022    ACUTE RESPIRATORY FAILURE, UNSP W HYPOXIA OR HYPERCAPNIA 05/11/2025    Benign adenomatous polyp of large intestine     CHF (congestive heart failure) ([facility])     COVID 09/01/2024    Diabetes mellitus (HCC)     Fall 11/05/2020    Heart failure with mid-range ejection fraction (HFmEF) ([facility]) 11/05/2020    Hyperlipemia     Hypertension     Hypertensive urgency 04/02/2024    Metabolic encephalopathy 10/15/2024    Orthostasis 10/19/2022    Osteoarthritis     TIA (transient ischemic attack)    [2]   Past Surgical History:  Procedure Laterality Date    APPENDECTOMY      CARPAL TUNNEL RELEASE Right     HYSTERECTOMY W/ SALPINGO-OOPHERECTOMY      PA OPTX FEM SHFT FX W/INSJ IMED IMPLT W/WO SCREW Right 10/12/2022    Procedure: INSERTION NAIL IM FEMUR ANTEGRADE (TROCHANTERIC);  Surgeon: Eric Isaacs DO;  Location: [facility];  Service: Orthopedics   [3]   Social History  Tobacco Use   Smoking Status Never   Smokeless Tobacco Never   [4]   Family History  Problem Relation Name Age of Onset    Heart disease Mother      Heart disease Father      Hypertension Father      Stomach cancer Sister      Ovarian cancer Sister      Hypertension Sister     [5]   Allergies  Allergen Reactions    Amlodipine     Ceftin [Cefuroxime]     Ciprofloxacin     Citalopram     Dye [Iodinated Contrast Media]     Glucophage [Metformin]     Januvia [Sitagliptin]     Neomycin-Polymyxin-Dexameth     Ondansetron     Prilosec [Omeprazole]     Vibramycin [Doxycycline]

## 2025-06-20 NOTE — ASSESSMENT & PLAN NOTE
Lab Results   Component Value Date    EGFR 54 06/20/2025    EGFR 51 06/19/2025    EGFR 39 05/17/2025    CREATININE 0.96 06/20/2025    CREATININE 1.00 06/19/2025    CREATININE 1.24 05/17/2025   Creatinine currently at baseline  Continue to monitor

## 2025-06-20 NOTE — ASSESSMENT & PLAN NOTE
84-year-old female with history of systolic heart failure, type 2 diabetes, CLL who presented after syncopal episode  Reports was unconscious for approximately 10 minutes  On arrival asymptomatic  EKG revealed no acute abnormalities however did reveal PVCs on telemetry  Given cardiac history possibly cardiogenic in nature  Will place on telemetry  Check orthostatics  Consult cardiology May benefit from event monitor

## 2025-06-20 NOTE — ASSESSMENT & PLAN NOTE
"Lab Results   Component Value Date    HGBA1C 7.5 (H) 05/11/2025       No results for input(s): \"POCGLU\" in the last 72 hours.    Blood Sugar Average: Last 72 hrs:  Continue Lantus 20 units nightly  Mealtime insulin 5 units 3 times daily  Sliding scale insulin      "

## 2025-06-20 NOTE — ASSESSMENT & PLAN NOTE
Echocardiogram 5/14/2025: EF 35% with RWMA, normal RV function, moderate-severe MR, moderate TR.  Outpatient diuretic regimen: 620 mg daily

## 2025-06-20 NOTE — H&P
"H&P - Hospitalist   Name: Gia Vaughan 84 y.o. female I MRN: [id number]  Unit/Bed#: ED-31 I Date of Admission: 6/19/2025   Date of Service: 6/19/2025 I Hospital Day: 0     Assessment & Plan  Syncope  84-year-old female with history of systolic heart failure, type 2 diabetes, CLL who presented after syncopal episode  Reports was unconscious for approximately 10 minutes  On arrival asymptomatic  EKG revealed no acute abnormalities however did reveal PVCs on telemetry  Given cardiac history possibly cardiogenic in nature  Will place on telemetry  Check orthostatics  Consult cardiology May benefit from event monitor  Type 2 diabetes mellitus with stage 3 chronic kidney disease, unspecified whether long term insulin use, unspecified whether stage 3a or 3b CKD (HCC)  Lab Results   Component Value Date    HGBA1C 7.5 (H) 05/11/2025       No results for input(s): \"POCGLU\" in the last 72 hours.    Blood Sugar Average: Last 72 hrs:  Continue Lantus 20 units nightly  Mealtime insulin 5 units 3 times daily  Sliding scale insulin      Stage 3b chronic kidney disease (HCC)  Lab Results   Component Value Date    EGFR 51 06/19/2025    EGFR 39 05/17/2025    EGFR 42 05/16/2025    CREATININE 1.00 06/19/2025    CREATININE 1.24 05/17/2025    CREATININE 1.18 05/16/2025   Creatinine currently at baseline  Continue to monitor  HLD (hyperlipidemia)  Continue statin  Chronic systolic heart failure (HCC)  Wt Readings from Last 3 Encounters:   05/30/25 60.3 kg (133 lb)   05/17/25 60.8 kg (134 lb 0.6 oz)   04/02/25 62.8 kg (138 lb 8 oz)       History of CHF with a EF of 35%  Appears euvolemic currently  Continue home Lasix and beta-blocker      Primary hypertension  BP currently controlled  Continue losartan, Lasix, beta-blocker  CLL (chronic lymphocytic leukemia) (HCC)  History of CLL chronic leukocytosis  Outpatient follow-up with oncology      VTE Pharmacologic Prophylaxis:   Moderate Risk (Score 3-4) - Pharmacological DVT Prophylaxis " Ordered: heparin.  Code Status: full code  Discussion with family: Patient declined call to .     Anticipated Length of Stay: Patient will be admitted on an observation basis with an anticipated length of stay of less than 2 midnights secondary to syncope.    History of Present Illness   Chief Complaint: JASON Vaughan is a 84 y.o. female with past medical history significant of dementia, systolic heart failure, type 2 diabetes initially presented after loss of consciousness reports syncopal episode earlier today.  Symptoms have since resolved currently asymptomatic.  Denies any dizziness, chest pain, palpitation, fevers, chills, abdominal pain, nausea, vomiting, diarrhea or any other complaints  Review of Systems   Constitutional:  Negative for activity change, appetite change, chills, diaphoresis, fever and unexpected weight change.   HENT:  Negative for congestion, facial swelling and rhinorrhea.    Eyes:  Negative for photophobia and visual disturbance.   Respiratory:  Negative for cough, shortness of breath and wheezing.    Cardiovascular:  Negative for chest pain and palpitations.   Gastrointestinal:  Negative for abdominal pain, blood in stool, constipation, diarrhea, nausea and vomiting.   Genitourinary:  Negative for decreased urine volume, difficulty urinating, dysuria, flank pain, frequency, hematuria and urgency.   Musculoskeletal:  Negative for arthralgias, back pain, joint swelling and myalgias.   Neurological:  Positive for syncope. Negative for dizziness, facial asymmetry, light-headedness, numbness and headaches.   Psychiatric/Behavioral:  Negative for confusion and decreased concentration. The patient is not nervous/anxious.        Historical Information   Past Medical History[1]  Past Surgical History[2]  Social History[3]  E-Cigarette/Vaping    E-Cigarette Use Never User      E-Cigarette/Vaping Substances    Nicotine No     THC No     CBD No     Flavoring No     Other No      Unknown No      Family History[4]  Social History:  Marital Status:    O  Patient Pre-hospital Living Situation: Home  Patient Pre-hospital Level of Mobility: unable to be assessed at time of evaluation  Patient Pre-hospital Diet Restrictions: cardiac, dm    Meds/Allergies   I have reviewed home medications with patient personally.  Prior to Admission medications    Medication Sig Start Date End Date Taking? Authorizing Provider   atorvastatin (LIPITOR) 20 mg tablet Take 20 mg by mouth in the morning.   Yes Historical Provider, MD   calcium carbonate (OS-JUAN RAMON) 600 MG tablet Take 1 tablet (600 mg total) by mouth in the morning and 1 tablet (600 mg total) in the evening. Take with meals. 5/22/25 6/21/25 Yes Axel Cano MD   Diclofenac Sodium (VOLTAREN) 1 % Apply 2 g topically 3 (three) times a day To R knee 11/8/22  Yes Ashley Depadua, MD   furosemide (LASIX) 20 mg tablet Take 2 tablets (40 mg total) by mouth daily 5/17/25  Yes Franky Nichols MD   Insulin Glargine Solostar (Lantus SoloStar) 100 UNIT/ML SOPN Inject 0.2 mL (20 Units total) under the skin daily at bedtime 5/17/25  Yes Kalpana Gordon MD   losartan (COZAAR) 25 mg tablet Take 1 tablet (25 mg total) by mouth daily 3/7/25  Yes Kelly Alfaro MD   magnesium Oxide (MAG-OX) 400 mg TABS Take 1 tablet (400 mg total) by mouth daily for 14 days 3/6/25 6/19/25 Yes Kelly Alfaro MD   metoprolol succinate (TOPROL-XL) 25 mg 24 hr tablet Take 1 tablet (25 mg total) by mouth every 12 (twelve) hours 3/6/25  Yes Kelly Alfaro MD   potassium chloride (Klor-Con M20) 20 mEq tablet Take 1 tablet (20 mEq total) by mouth 2 (two) times a day 12/30/24  Yes Andrew Pa MD   acetaminophen (TYLENOL) 325 mg tablet Take 3 tablets (975 mg total) by mouth 3 (three) times a day as needed for mild pain, moderate pain or severe pain 5/17/25   Franky Nichols MD   Alcohol Swabs (B-D SINGLE USE SWABS REGULAR) PADS USE AS DIRECTED 5/6/25    Andrew Pa MD   Calcium Carb-Cholecalciferol (calcium carbonate-vitamin D) 500 mg-5 mcg tablet TAKE ONE TABLET BY MOUTH TWICE DAILY DX: SUPPLEMENT 3/31/25   Andrew Pa MD   carbamide peroxide (FT Earwax Removal) 6.5 % otic solution INSERT FOUR DROPS INTO BOTH EARS AT BEDTIME FOR 2 WEEKS DX: WAX BUILD UP 11/6/24   Andrew Pa MD   cholecalciferol (VITAMIN D3) 1,000 units tablet Take 1,000 Units by mouth in the morning.    Historical Provider, MD   Cholecalciferol (VITAMIN D3) 400 units tablet Take 1 tablet (400 Units total) by mouth daily  Patient not taking: Reported on 6/19/2025 5/22/25 6/21/25  Axel Cano MD   Continuous Blood Gluc Sensor (FreeStyle Дмитрий 2 Sensor) MISC  1/24/24   Historical Provider, MD   dextromethorphan-guaifenesin (MUCINEX DM)  MG per 12 hr tablet Take 1 tablet by mouth every 12 (twelve) hours 2/27/25   Andrew Pa MD   FeroSul 325 (65 Fe) MG tablet TAKE ONE TABLET BY MOUTH TWICE DAILY DX: SUPPLEMENT  Patient not taking: Reported on 6/19/2025 5/9/25   Andrew Pa MD   fluticasone (FLONASE) 50 mcg/act nasal spray INSTILL ONE SPRAY INTO BOTH NOSTRILS DAILY FOR 1 WEEK AND THEN AS NEEDED DX: ALLERGIES 2/24/25   Andrew Pa MD   FT Mucus Relief 12HR 600 MG 12 hr tablet TAKE ONE TABLET BY MOUTH EVERY EIGHT HOURS (DX: COUGH) 6/16/25   Andrew Pa MD   glucose blood test strip Use 1 each daily as needed Use as instructed    Historical Provider, MD   glucose monitoring kit (FREESTYLE) monitoring kit Use 1 each as needed    Historical Provider, MD   insulin lispro (HumaLOG) 100 units/mL injection Inject 5 Units under the skin 3 (three) times a day with meals 11/8/22   Ashley Depadua, MD   vitamin B-12 (VITAMIN B-12) 1,000 mcg tablet Take by mouth in the morning.  Patient not taking: Reported on 6/19/2025    Historical Provider, MD     Allergies   Allergen Reactions    Amlodipine     Ceftin [Cefuroxime]     Ciprofloxacin     Citalopram     Dye  [Iodinated Contrast Media]     Glucophage [Metformin]     Januvia [Sitagliptin]     Neomycin-Polymyxin-Dexameth     Ondansetron     Prilosec [Omeprazole]     Vibramycin [Doxycycline]        Objective :  Temp:  [97.6 °F (36.4 °C)] 97.6 °F (36.4 °C)  HR:  [73] 73  BP: (140)/(70) 140/70  Resp:  [18] 18  SpO2:  [96 %] 96 %  O2 Device: None (Room air)    Physical Exam  Constitutional:       General: She is not in acute distress.     Appearance: She is well-developed. She is not diaphoretic.   HENT:      Head: Normocephalic and atraumatic.      Nose: Nose normal.      Mouth/Throat:      Pharynx: No oropharyngeal exudate.     Eyes:      General: No scleral icterus.        Right eye: No discharge.         Left eye: No discharge.      Conjunctiva/sclera: Conjunctivae normal.     Neck:      Thyroid: No thyromegaly.      Vascular: No JVD.     Cardiovascular:      Rate and Rhythm: Normal rate and regular rhythm.      Heart sounds: Normal heart sounds. No murmur heard.     No friction rub. No gallop.   Pulmonary:      Effort: Pulmonary effort is normal. No respiratory distress.      Breath sounds: Normal breath sounds. No wheezing or rales.   Chest:      Chest wall: No tenderness.   Abdominal:      General: Bowel sounds are normal. There is no distension.      Palpations: Abdomen is soft.      Tenderness: There is no abdominal tenderness. There is no guarding or rebound.     Musculoskeletal:         General: No tenderness or deformity. Normal range of motion.      Cervical back: Normal range of motion and neck supple.     Skin:     General: Skin is warm and dry.      Findings: No erythema or rash.     Neurological:      Mental Status: She is alert. Mental status is at baseline.      Cranial Nerves: No cranial nerve deficit.      Sensory: No sensory deficit.      Motor: No abnormal muscle tone.      Coordination: Coordination normal.          Lines/Drains:            Lab Results: I have reviewed the following results:  Results  from last 7 days   Lab Units 06/19/25  1806   WBC Thousand/uL 37.52*   HEMOGLOBIN g/dL 10.2*   HEMATOCRIT % 32.3*   PLATELETS Thousands/uL 219   LYMPHO PCT % 89*   MONO PCT % 0*   EOS PCT % 1     Results from last 7 days   Lab Units 06/19/25  1902   SODIUM mmol/L 139   POTASSIUM mmol/L 4.5   CHLORIDE mmol/L 106   CO2 mmol/L 25   BUN mg/dL 22   CREATININE mg/dL 1.00   ANION GAP mmol/L 8   CALCIUM mg/dL 9.4   ALBUMIN g/dL 3.9   TOTAL BILIRUBIN mg/dL 0.60   ALK PHOS U/L 106*   ALT U/L 7   AST U/L 11*   GLUCOSE RANDOM mg/dL 179*             Lab Results   Component Value Date    HGBA1C 7.5 (H) 05/11/2025    HGBA1C 9.5 (H) 09/01/2024    HGBA1C 9.4 (H) 04/01/2024           Imaging Results Review: I personally reviewed the following image studies/reports in PACS and discussed pertinent findings with Radiology: chest xray. My interpretation of the radiology images/reports is:  .  Other Study Results Review: EKG was reviewed.     Administrative Statements   I have spent a total time of 76 minutes in caring for this patient on the day of the visit/encounter including Diagnostic results.    ** Please Note: This note has been constructed using a voice recognition system. **         [1]   Past Medical History:  Diagnosis Date    Acute encephalopathy 10/15/2024    Acute postoperative pain of right knee 11/02/2022    ACUTE RESPIRATORY FAILURE, UNSP W HYPOXIA OR HYPERCAPNIA 05/11/2025    Benign adenomatous polyp of large intestine     CHF (congestive heart failure) ([facility])     COVID 09/01/2024    Diabetes mellitus (HCC)     Fall 11/05/2020    Heart failure with mid-range ejection fraction (HFmEF) ([facility]) 11/05/2020    Hyperlipemia     Hypertension     Hypertensive urgency 04/02/2024    Metabolic encephalopathy 10/15/2024    Orthostasis 10/19/2022    Osteoarthritis     TIA (transient ischemic attack)    [2]   Past Surgical History:  Procedure Laterality Date    APPENDECTOMY      CARPAL TUNNEL RELEASE Right     HYSTERECTOMY W/  SALPINGO-OOPHERECTOMY      CO OPTX FEM SHFT FX W/INSJ IMED IMPLT W/WO SCREW Right 10/12/2022    Procedure: INSERTION NAIL IM FEMUR ANTEGRADE (TROCHANTERIC);  Surgeon: Eric Isaacs DO;  Location: [facility];  Service: Orthopedics   [3]   Social History  Tobacco Use    Smoking status: Never    Smokeless tobacco: Never   Vaping Use    Vaping status: Never Used   Substance and Sexual Activity    Alcohol use: Not Currently     Comment: very seldom    Drug use: Never   [4]   Family History  Problem Relation Name Age of Onset    Heart disease Mother      Heart disease Father      Hypertension Father      Stomach cancer Sister      Ovarian cancer Sister      Hypertension Sister

## 2025-06-20 NOTE — ASSESSMENT & PLAN NOTE
Wt Readings from Last 3 Encounters:   05/30/25 60.3 kg (133 lb)   05/17/25 60.8 kg (134 lb 0.6 oz)   04/02/25 62.8 kg (138 lb 8 oz)   Echo in  May 2025 with EF of 35%, severe diastolic dysfunction, akintetic basal, mid and apical inferior segments. Multiple hypokinetic segments  Home regimen: Lasix 40mg daily   Follows with [facility] cardiology in the outpatient setting   Appears euvolemic currently  Daily weights, I&O's  Continue home Lasix and beta-blocker

## 2025-06-20 NOTE — ASSESSMENT & PLAN NOTE
Wt Readings from Last 3 Encounters:   05/30/25 60.3 kg (133 lb)   05/17/25 60.8 kg (134 lb 0.6 oz)   04/02/25 62.8 kg (138 lb 8 oz)       History of CHF with a EF of 35%  Appears euvolemic currently  Continue home Lasix and beta-blocker

## 2025-06-20 NOTE — ED PROVIDER NOTES
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 Lantigua .               Assessment & Plan       Medical Decision Making  Patient seen and examined. Physical exam is notable for pain with passive movement of the left knee, normal neurologic exam. Remainder of exam is within normal limits.    Differential: Cardiac syncope, vasovagal syncope, neurogenic syncope    Appropriate labs and imaging ordered.     Labs notable for elevated white blood cell count, in setting of known CLL, decreased from baseline. Imaging shows no acute abnormalities. All results discussed with patient and family, they express understanding.     Patient and POA are agreeable to admission. Case discussed with SARAH who accepted the patient for Observation. All questions answered.      Amount and/or Complexity of Data Reviewed  Labs: ordered. Decision-making details documented in ED Course.  Radiology: ordered and independent interpretation performed.    Risk  Decision regarding hospitalization.        ED Course as of 06/19/25 2135   u Jun 19, 2025   1847 WBC(!): 37.52   2002 hs TnI 0hr: 13       Medications   heparin (porcine) subcutaneous injection 5,000 Units (has no administration in time range)   acetaminophen (TYLENOL) tablet 650 mg (has no administration in time range)       ED Risk Strat Scores                    No data  recorded                            History of Present Illness       Chief Complaint   Patient presents with    Medical Problem     Patient from [facility] was sitting at table when staff found her slumped over. Per EMS patient was slumped over additional 5 min after arrival. Gradually became responsive. Complaining of left leg pain. GCS of 14       Past Medical History[1]   Past Surgical History[2]   Family History[3]   Social History[4]   E-Cigarette/Vaping    E-Cigarette Use Never User       E-Cigarette/Vaping Substances    Nicotine No     THC No     CBD No     Flavoring No     Other No     Unknown No       I have reviewed and agree with the history as documented.     Gia Vaughan is a 84 y.o. female     They presented to the emergency department on June 19, 2025. Patient presents with:  Medical Problem: Patient from [facility] was sitting at table when staff found her slumped over. Per EMS patient was slumped over additional 5 min after arrival. Gradually became responsive. Complaining of left leg pain. GCS of 14  .    EMS states they were called to the scene for patient being unresponsive.  BLS crew arrived first followed by ALS care approximately 5 minutes later.  By the time the ALS arrived patient had been placed on stretcher.  At that time, she was unconscious and unable to be aroused.  Approximately 5 minutes after the ALS team arrived she began to wake up and it took her approximately 2 minutes to get back to baseline mentation.  Per facility staff at [facility] she was unconscious for approximately 10 minutes.  Since the patient woke up she has been complaining only of left knee pain, unclear if this is chronic.  Patient is currently oriented to person and place however she is unaware of the situation and not oriented to time, she has a history of dementia. Patient denies chest pain, shortness of breath, lightheadedness, dizziness, headaches, fever, chills, nausea, vomiting, hematuria, dysuria, diarrhea, or  any other complaint at this time.    I spoke with [facility] staff over the phone, the nurse that cares for the patient states she gave the patient her medications and then walked out of her room and then was told the patient was sleepy.  When she evaluated the patient patient was somnolent but would respond to her name and would arouse slightly.  Another staff member was concerned that she was having a stroke and called the ambulance.  Staff at [facility] notes that left knee pain is chronic.          History provided by:  EMS personnel and nursing home      Review of Systems   Constitutional:  Negative for chills, diaphoresis and fever.   HENT:  Negative for congestion, ear pain, postnasal drip, rhinorrhea and sore throat.    Eyes:  Negative for pain and visual disturbance.   Respiratory:  Negative for cough, chest tightness and shortness of breath.    Cardiovascular:  Negative for chest pain and palpitations.   Gastrointestinal:  Negative for abdominal pain, constipation, diarrhea, nausea and vomiting.   Genitourinary:  Negative for dysuria and hematuria.   Musculoskeletal:  Positive for arthralgias. Negative for back pain.   Skin:  Negative for color change and rash.   Neurological:  Positive for syncope. Negative for dizziness, seizures, weakness, light-headedness, numbness and headaches.   All other systems reviewed and are negative.          Objective       ED Triage Vitals   Temperature Pulse Blood Pressure Respirations SpO2 Patient Position - Orthostatic VS   06/19/25 1807 06/19/25 1751 06/19/25 1751 06/19/25 1751 06/19/25 1751 06/19/25 1751   97.6 °F (36.4 °C) 73 140/70 18 96 % Sitting      Temp Source Heart Rate Source BP Location FiO2 (%) Pain Score    06/19/25 1807 06/19/25 2030 06/19/25 1751 -- --    Oral Monitor Right arm        Vitals      Date and Time Temp Pulse SpO2 Resp BP Pain Score FACES Pain Rating User   06/19/25 2030 -- 72 95 % 18 150/83 -- -- MD   06/19/25 1807 97.6 °F (36.4 °C) -- -- -- -- -- --  BS   06/19/25 1751 -- 73 96 % 18 140/70 -- -- AF            Physical Exam  Constitutional:       General: She is not in acute distress.     Appearance: She is not diaphoretic.   HENT:      Head: Normocephalic and atraumatic.      Nose: No congestion or rhinorrhea.      Mouth/Throat:      Mouth: Mucous membranes are moist.      Pharynx: No oropharyngeal exudate.     Eyes:      General: No scleral icterus.      Cardiovascular:      Rate and Rhythm: Normal rate and regular rhythm.      Heart sounds: Normal heart sounds. No murmur heard.     No friction rub. No gallop.   Pulmonary:      Effort: No respiratory distress.      Breath sounds: Normal breath sounds. No wheezing, rhonchi or rales.   Abdominal:      General: Abdomen is flat. There is no distension.      Palpations: Abdomen is soft.      Tenderness: There is no abdominal tenderness. There is no guarding.     Musculoskeletal:      Right hip: Normal.      Left hip: Normal.      Right upper leg: Normal.      Left upper leg: Normal.      Right knee: Normal.      Left knee: Decreased range of motion. No tenderness.      Right lower leg: Normal.      Left lower leg: Normal.      Right ankle: Normal.      Left ankle: Normal.      Comments: Decreased range of motion of left knee secondary to pain   Lymphadenopathy:      Cervical: No cervical adenopathy.     Skin:     General: Skin is warm and dry.      Capillary Refill: Capillary refill takes less than 2 seconds.      Findings: No rash.     Neurological:      General: No focal deficit present.      Mental Status: She is alert and oriented to person, place, and time.      GCS: GCS eye subscore is 4. GCS verbal subscore is 4. GCS motor subscore is 6.      Cranial Nerves: Cranial nerves 2-12 are intact.      Sensory: Sensation is intact.      Motor: Motor function is intact.      Coordination: Coordination is intact.         Results Reviewed       Procedure Component Value Units Date/Time    HS Troponin I 2hr [[id number]]  Collected: 06/19/25 2117    Lab Status: In process Specimen: Blood from Arm, Left Updated: 06/19/25 2130    HS Troponin I 4hr [[id number]]     Lab Status: No result Specimen: Blood     HS Troponin 0hr (reflex protocol) [[id number]]  (Normal) Collected: 06/19/25 1902    Lab Status: Final result Specimen: Blood from Arm, Left Updated: 06/19/25 1948     hs TnI 0hr 13 ng/L     Comprehensive metabolic panel [[id number]]  (Abnormal) Collected: 06/19/25 1902    Lab Status: Final result Specimen: Blood from Arm, Left Updated: 06/19/25 1940     Sodium 139 mmol/L      Potassium 4.5 mmol/L      Chloride 106 mmol/L      CO2 25 mmol/L      ANION GAP 8 mmol/L      BUN 22 mg/dL      Creatinine 1.00 mg/dL      Glucose 179 mg/dL      Calcium 9.4 mg/dL      AST 11 U/L      ALT 7 U/L      Alkaline Phosphatase 106 U/L      Total Protein 6.5 g/dL      Albumin 3.9 g/dL      Total Bilirubin 0.60 mg/dL      eGFR 51 ml/min/1.73sq m     Narrative:      National Kid[employer] Disease Foundation guidelines for Chronic Kidney Disease (CKD):     Stage 1 with normal or high GFR (GFR > 90 mL/min/1.73 square meters)    Stage 2 Mild CKD (GFR = 60-89 mL/min/1.73 square meters)    Stage 3A Moderate CKD (GFR = 45-59 mL/min/1.73 square meters)    Stage 3B Moderate CKD (GFR = 30-44 mL/min/1.73 square meters)    Stage 4 Severe CKD (GFR = 15-29 mL/min/1.73 square meters)    Stage 5 End Stage CKD (GFR <15 mL/min/1.73 square meters)  Note: GFR calculation is accurate only with a steady state creatinine    CK [[id number]]  (Normal) Collected: 06/19/25 1902    Lab Status: Final result Specimen: Blood from Arm, Left Updated: 06/19/25 1940     Total CK 35 U/L     Manual Differential(PHLEBS Do Not Order) [[id number]]  (Abnormal) Collected: 06/19/25 1806    Lab Status: Final result Specimen: Blood from Arm, Left Updated: 06/19/25 1931     Segmented % 6 %      Lymphocytes % 89 %      Monocytes % 0 %      Eosinophils % 1 %      Basophils % 0 %      Atypical Lymphocytes % 4 %       Absolute Neutrophils 2.25 Thousand/uL      Absolute Lymphocytes 34.89 Thousand/uL      Absolute Monocytes 0.00 Thousand/uL      Absolute Eosinophils 0.38 Thousand/uL      Absolute Basophils 0.00 Thousand/uL      Total Counted --     RBC Morphology Present     Platelet Estimate Adequate     Pathology Review Yes     Differential Comment see note     Anisocytosis Present    CBC and differential [[id number]]  (Abnormal) Collected: 06/19/25 1806    Lab Status: Final result Specimen: Blood from Arm, Left Updated: 06/19/25 1930     WBC 37.52 Thousand/uL      RBC 2.78 Million/uL      Hemoglobin 10.2 g/dL      Hematocrit 32.3 %       fL      MCH 36.7 pg      MCHC 31.6 g/dL      RDW 17.2 %      MPV 12.7 fL      Platelets 219 Thousands/uL             XR knee 4+ views left injury   ED Interpretation by Fracisco Bustamante MD (06/19 1922)   No acute fracture or bony abnormality.  Interpreted by myself.      CT head without contrast   ED Interpretation by Fracisco Bustamante MD (06/19 1914)   No acute fracture or bony abnormality.  Interpreted by myself.      Final Interpretation by Pallav N Shah, MD (06/19 1928)      No acute intracranial abnormality.                  Workstation performed: [id number]             ECG 12 Lead Documentation Only    Date/Time: 6/19/2025 9:35 PM    Performed by: Fracisco Bustamante MD  Authorized by: Fracisco Bustamante MD    Indications / Diagnosis:  Syncope  Patient location:  ED  Previous ECG:     Previous ECG:  Compared to current    Similarity:  No change  Interpretation:     Interpretation: normal    Rate:     ECG rate:  65    ECG rate assessment: normal    Rhythm:     Rhythm: sinus rhythm    Ectopy:     Ectopy: PVCs      PVCs:  Frequent  QRS:     QRS axis:  Normal    QRS intervals:  Normal  Conduction:     Conduction: normal    ST segments:     ST segments:  Normal  T waves:     T waves: normal        ED Medication and Procedure Management   Prior to Admission Medications   Prescriptions Last Dose Informant  Patient Reported? Taking?   Alcohol Swabs (B-D SINGLE USE SWABS REGULAR) PADS   No No   Sig: USE AS DIRECTED   Calcium Carb-Cholecalciferol (calcium carbonate-vitamin D) 500 mg-5 mcg tablet   No No   Sig: TAKE ONE TABLET BY MOUTH TWICE DAILY DX: SUPPLEMENT   Cholecalciferol (VITAMIN D3) 400 units tablet Not Taking  No No   Sig: Take 1 tablet (400 Units total) by mouth daily   Patient not taking: Reported on 6/19/2025   Continuous Blood Gluc Sensor (FreeStyle Дмитрий 2 Sensor) MISC  Self Yes No   Diclofenac Sodium (VOLTAREN) 1 % 6/19/2025 Self No Yes   Sig: Apply 2 g topically 3 (three) times a day To R knee   FT Mucus Relief 12HR 600 MG 12 hr tablet   No No   Sig: TAKE ONE TABLET BY MOUTH EVERY EIGHT HOURS (DX: COUGH)   FeroSul 325 (65 Fe) MG tablet Not Taking  No No   Sig: TAKE ONE TABLET BY MOUTH TWICE DAILY DX: SUPPLEMENT   Patient not taking: Reported on 6/19/2025   Insulin Glargine Solostar (Lantus SoloStar) 100 UNIT/ML SOPN 6/18/2025  No Yes   Sig: Inject 0.2 mL (20 Units total) under the skin daily at bedtime   acetaminophen (TYLENOL) 325 mg tablet Unknown  No No   Sig: Take 3 tablets (975 mg total) by mouth 3 (three) times a day as needed for mild pain, moderate pain or severe pain   atorvastatin (LIPITOR) 20 mg tablet 6/19/2025 Self Yes Yes   Sig: Take 20 mg by mouth in the morning.   calcium carbonate (OS-JUAN RAMON) 600 MG tablet 6/19/2025  No Yes   Sig: Take 1 tablet (600 mg total) by mouth in the morning and 1 tablet (600 mg total) in the evening. Take with meals.   carbamide peroxide (FT Earwax Removal) 6.5 % otic solution   No No   Sig: INSERT FOUR DROPS INTO BOTH EARS AT BEDTIME FOR 2 WEEKS DX: WAX BUILD UP   cholecalciferol (VITAMIN D3) 1,000 units tablet  Self Yes No   Sig: Take 1,000 Units by mouth in the morning.   dextromethorphan-guaifenesin (MUCINEX DM)  MG per 12 hr tablet   No No   Sig: Take 1 tablet by mouth every 12 (twelve) hours   fluticasone (FLONASE) 50 mcg/act nasal spray Unknown  No No    Sig: INSTILL ONE SPRAY INTO BOTH NOSTRILS DAILY FOR 1 WEEK AND THEN AS NEEDED DX: ALLERGIES   furosemide (LASIX) 20 mg tablet 6/19/2025  No Yes   Sig: Take 2 tablets (40 mg total) by mouth daily   glucose blood test strip  Self Yes No   Sig: Use 1 each daily as needed Use as instructed   glucose monitoring kit (FREESTYLE) monitoring kit  Self Yes No   Sig: Use 1 each as needed   insulin lispro (HumaLOG) 100 units/mL injection  Self No No   Sig: Inject 5 Units under the skin 3 (three) times a day with meals   losartan (COZAAR) 25 mg tablet 6/19/2025  No Yes   Sig: Take 1 tablet (25 mg total) by mouth daily   magnesium Oxide (MAG-OX) 400 mg TABS 6/19/2025  No Yes   Sig: Take 1 tablet (400 mg total) by mouth daily for 14 days   metoprolol succinate (TOPROL-XL) 25 mg 24 hr tablet 6/19/2025  No Yes   Sig: Take 1 tablet (25 mg total) by mouth every 12 (twelve) hours   potassium chloride (Klor-Con M20) 20 mEq tablet 6/19/2025  No Yes   Sig: Take 1 tablet (20 mEq total) by mouth 2 (two) times a day   vitamin B-12 (VITAMIN B-12) 1,000 mcg tablet Not Taking Self Yes No   Sig: Take by mouth in the morning.   Patient not taking: Reported on 6/19/2025      Facility-Administered Medications: None     Patient's Medications   Discharge Prescriptions    No medications on file     No discharge procedures on file.  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     6/19/2025  8:21 PM Fracisco Bustamante [F03.90] Dementia (HCC)     6/19/2025  8:21 PM Fracisco Bustamante Add [M25.562] Left knee pain                    [1]   Past Medical History:  Diagnosis Date    Acute encephalopathy 10/15/2024    Acute postoperative pain of right knee 11/02/2022    ACUTE RESPIRATORY FAILURE, UNSP W HYPOXIA OR  HYPERCAPNIA 05/11/2025    Benign adenomatous polyp of large intestine     CHF (congestive heart failure) ([facility])     COVID 09/01/2024    Diabetes mellitus (HCC)     Fall 11/05/2020    Heart failure with mid-range ejection fraction (HFmEF) ([facility]) 11/05/2020    Hyperlipemia     Hypertension     Hypertensive urgency 04/02/2024    Metabolic encephalopathy 10/15/2024    Orthostasis 10/19/2022    Osteoarthritis     TIA (transient ischemic attack)    [2]   Past Surgical History:  Procedure Laterality Date    APPENDECTOMY      CARPAL TUNNEL RELEASE Right     HYSTERECTOMY W/ SALPINGO-OOPHERECTOMY      AL OPTX FEM SHFT FX W/INSJ IMED IMPLT W/WO SCREW Right 10/12/2022    Procedure: INSERTION NAIL IM FEMUR ANTEGRADE (TROCHANTERIC);  Surgeon: Eric Isaacs DO;  Location: [facility];  Service: Orthopedics   [3]   Family History  Problem Relation Name Age of Onset    Heart disease Mother      Heart disease Father      Hypertension Father      Stomach cancer Sister      Ovarian cancer Sister      Hypertension Sister     [4]   Social History  Tobacco Use    Smoking status: Never    Smokeless tobacco: Never   Vaping Use    Vaping status: Never Used   Substance Use Topics    Alcohol use: Not Currently     Comment: very seldom    Drug use: Never        Fracisco Bustamante MD  06/19/25 0288

## 2025-06-20 NOTE — ASSESSMENT & PLAN NOTE
BP currently controlled  Orthostatics + for diastolic hypotension   Continue losartan, Lasix, beta-blocker

## 2025-06-20 NOTE — PLAN OF CARE
Problem: NEUROSENSORY - ADULT  Goal: Achieves stable or improved neurological status  Description: INTERVENTIONS  - Monitor and report changes in neurological status  - Monitor vital signs such as temperature, blood pressure, glucose, and any other labs ordered   - Initiate measures to prevent increased intracranial pressure  - Monitor for seizure activity and implement precautions if appropriate      Outcome: Progressing  Goal: Remains free of injury related to seizures activity  Description: INTERVENTIONS  - Maintain airway, patient safety  and administer oxygen as ordered  - Monitor patient for seizure activity, document and report duration and description of seizure to physician/advanced practitioner  - If seizure occurs,  ensure patient safety during seizure  - Reorient patient post seizure  - Seizure pads on all 4 side rails  - Instruct patient/family to notify RN of any seizure activity including if an aura is experienced  - Instruct patient/family to call for assistance with activity based on nursing assessment  - Administer anti-seizure medications if ordered    Outcome: Progressing  Goal: Achieves maximal functionality and self care  Description: INTERVENTIONS  - Monitor swallowing and airway patency with patient fatigue and changes in neurological status  - Encourage and assist patient to increase activity and self care.   - Encourage visually impaired, hearing impaired and aphasic patients to use assistive/communication devices  Outcome: Progressing     Problem: CARDIOVASCULAR - ADULT  Goal: Maintains optimal cardiac output and hemodynamic stability  Description: INTERVENTIONS:  - Monitor I/O, vital signs and rhythm  - Monitor for S/S and trends of decreased cardiac output  - Administer and titrate ordered vasoactive medications to optimize hemodynamic stability  - Assess quality of pulses, skin color and temperature  - Assess for signs of decreased coronary artery perfusion  - Instruct patient to  report change in severity of symptoms  Outcome: Progressing  Goal: Absence of cardiac dysrhythmias or at baseline rhythm  Description: INTERVENTIONS:  - Continuous cardiac monitoring, vital signs, obtain 12 lead EKG if ordered  - Administer antiarrhythmic and heart rate control medications as ordered  - Monitor electrolytes and administer replacement therapy as ordered  Outcome: Progressing     Problem: RESPIRATORY - ADULT  Goal: Achieves optimal ventilation and oxygenation  Description: INTERVENTIONS:  - Assess for changes in respiratory status  - Assess for changes in mentation and behavior  - Position to facilitate oxygenation and minimize respiratory effort  - Oxygen administered by appropriate delivery if ordered  - Initiate smoking cessation education as indicated  - Encourage broncho-pulmonary hygiene including cough, deep breathe, Incentive Spirometry  - Assess the need for suctioning and aspirate as needed  - Assess and instruct to report SOB or any respiratory difficulty  - Respiratory Therapy support as indicated  Outcome: Progressing     Problem: METABOLIC, FLUID AND ELECTROLYTES - ADULT  Goal: Glucose maintained within target range  Description: INTERVENTIONS:  - Monitor Blood Glucose as ordered  - Assess for signs and symptoms of hyperglycemia and hypoglycemia  - Administer ordered medications to maintain glucose within target range  - Assess nutritional intake and initiate nutrition service referral as needed  Outcome: Progressing     Problem: PAIN - ADULT  Goal: Verbalizes/displays adequate comfort level or baseline comfort level  Description: Interventions:  - Encourage patient to monitor pain and request assistance  - Assess pain using appropriate pain scale  - Administer analgesics as ordered based on type and severity of pain and evaluate response  - Implement non-pharmacological measures as appropriate and evaluate response  - Consider cultural and social influences on pain and pain  management  - Notify physician/advanced practitioner if interventions unsuccessful or patient reports new pain  - Educate patient/family on pain management process including their role and importance of  reporting pain   - Provide non-pharmacologic/complimentary pain relief interventions  Outcome: Progressing     Problem: SAFETY ADULT  Goal: Patient will remain free of falls  Description: INTERVENTIONS:  - Educate patient/family on patient safety including physical limitations  - Instruct patient to call for assistance with activity   - Consider consulting OT/PT to assist with strengthening/mobility based on AM PAC & JH-HLM score  - Consult OT/PT to assist with strengthening/mobility   - Keep Call bell within reach  - Keep bed low and locked with side rails adjusted as appropriate  - Keep care items and personal belongings within reach  - Initiate and maintain comfort rounds  - Make Fall Risk Sign visible to staff  - Offer Toileting every 2 Hours, in advance of need  - Initiate/Maintain bed alarm  - Obtain necessary fall risk management equipment  - Apply yellow socks and bracelet for high fall risk patients  - Consider moving patient to room near nurses station  Outcome: Progressing

## 2025-06-20 NOTE — PROGRESS NOTES
Progress Note - Hospitalist   Name: Gia Vaughan 84 y.o. female I MRN: [id number]  Unit/Bed#: ED-31 I Date of Admission: 6/19/2025   Date of Service: 6/20/2025 I Hospital Day: 0    Assessment & Plan  Syncope  84-year-old female with history of systolic heart failure, type 2 diabetes, CLL who presented after syncopal episode while sitting at the dining facility  Reports was unconscious for approximately 10 minutes  EKG revealed no acute abnormalities however did reveal PVCs on telemetry  Given significant cardiac history possibly cardiogenic in nature  Orthostatics + for diastolic hypotension    Will place on telemetry  Consult cardiology, plan for outpatient Zio patch  Repeat limited echo  Acute respiratory failure with hypoxia (HCC)  Patient with O2 saturation of 89% overnight  Currently on 3 L NC satting at 95%   CXR with venous pulmonary congestion, pending official read  Follow up BNP   Appears euvolemic at this time   Attempt to wean O2 as tolerated   Type 2 diabetes mellitus with stage 3 chronic kidney disease, unspecified whether long term insulin use, unspecified whether stage 3a or 3b CKD (HCC)  Lab Results   Component Value Date    HGBA1C 7.5 (H) 05/11/2025   Blood Sugar Average: Last 72 hrs:(P) 244  Continue Lantus 20 units nightly  Mealtime insulin 5 units 3 times daily  Sliding scale insulin  Stage 3b chronic kidney disease (HCC)  Lab Results   Component Value Date    EGFR 54 06/20/2025    EGFR 51 06/19/2025    EGFR 39 05/17/2025    CREATININE 0.96 06/20/2025    CREATININE 1.00 06/19/2025    CREATININE 1.24 05/17/2025   Creatinine currently at baseline  Continue to monitor  HLD (hyperlipidemia)  Continue statin  Chronic systolic heart failure (HCC)  Wt Readings from Last 3 Encounters:   05/30/25 60.3 kg (133 lb)   05/17/25 60.8 kg (134 lb 0.6 oz)   04/02/25 62.8 kg (138 lb 8 oz)   Echo in  May 2025 with EF of 35%, severe diastolic dysfunction, akintetic basal, mid and apical inferior segments.  Multiple hypokinetic segments  Home regimen: Lasix 40mg daily   [facility]   Appears euvolemic currently  Daily weights, I&O's  Continue home Lasix and beta-blocker  Primary hypertension  BP currently controlled  Orthostatics + for diastolic hypotension   Continue losartan, Lasix, beta-blocker  CLL (chronic lymphocytic leukemia) (HCC)  History of CLL chronic leukocytosis  Outpatient follow-up with oncology  Dementia (HCC)  Currently at baseline, disoriented x 3     VTE Pharmacologic Prophylaxis: VTE Score: 6 High Risk (Score >/= 5) - Pharmacological DVT Prophylaxis Ordered: heparin. Sequential Compression Devices Ordered.    Mobility:      Not assessed at this time    Patient Centered Rounds: I performed bedside rounds with nursing staff today.   Discussions with Specialists or Other Care Team Provider: cardiology     Education and Discussions with Family / Patient: Attempted to update  (nephew) via phone. Left voicemail.     Current Length of Stay: 0 day(s)  Current Patient Status: Observation   Certification Statement: The patient will continue to require additional inpatient hospital stay due to syncope work up, echo   Discharge Plan: Anticipate discharge later today or tomorrow to prior assisted or independent living facility.    Code Status: Level 1 - Full Code    Subjective   Patient is a poor historian, denies any acute complaints, SOB, chest pain, palpitations. She notes she has to use the bathroom     Objective :  Temp:  [97.6 °F (36.4 °C)] 97.6 °F (36.4 °C)  HR:  [72-88] 87  BP: (121-159)/() 130/58  Resp:  [16-18] 16  SpO2:  [89 %-96 %] 93 %  O2 Device: Nasal cannula  Nasal Cannula O2 Flow Rate (L/min):  [2 L/min-3 L/min] 3 L/min    There is no height or weight on file to calculate BMI.     Input and Output Summary (last 24 hours):   No intake or output data in the 24 hours ending 06/20/25 0944    Physical Exam  Vitals and nursing note  reviewed.   Constitutional:       General: She is not in acute distress.     Appearance: Normal appearance. She is not ill-appearing.   HENT:      Mouth/Throat:      Mouth: Mucous membranes are moist.     Cardiovascular:      Rate and Rhythm: Normal rate and regular rhythm.      Heart sounds: No murmur heard.     Comments: No JVD  Pulmonary:      Effort: Pulmonary effort is normal.      Breath sounds: Normal breath sounds. No rales.      Comments: Saturating 95% on 3L NC  Abdominal:      General: There is no distension.      Palpations: Abdomen is soft.      Tenderness: There is no abdominal tenderness.     Musculoskeletal:      Right lower leg: No edema.      Left lower leg: No edema.     Skin:     General: Skin is warm and dry.      Coloration: Skin is pale.     Neurological:      Mental Status: She is alert. Mental status is at baseline. She is disoriented.           Lines/Drains:        Telemetry:  Telemetry Orders (From admission, onward)               24 Hour Telemetry Monitoring  Continuous x 24 Hours (Telem)        Expiring   Question:  Reason for 24 Hour Telemetry  Answer:  Syncope suspected to be cardiac in origin                     Telemetry Reviewed: Normal Sinus Rhythm and PVCs  Indication for Continued Telemetry Use: Syncope               Lab Results: I have reviewed the following results:   Results from last 7 days   Lab Units 06/20/25  0556 06/19/25  1806   WBC Thousand/uL 37.18* 37.52*   HEMOGLOBIN g/dL 9.8* 10.2*   HEMATOCRIT % 30.8* 32.3*   PLATELETS Thousands/uL 232 219   LYMPHO PCT %  --  89*   MONO PCT %  --  0*   EOS PCT %  --  1     Results from last 7 days   Lab Units 06/20/25  0556 06/19/25  1902   SODIUM mmol/L 138 139   POTASSIUM mmol/L 4.0 4.5   CHLORIDE mmol/L 107 106   CO2 mmol/L 27 25   BUN mg/dL 21 22   CREATININE mg/dL 0.96 1.00   ANION GAP mmol/L 4 8   CALCIUM mg/dL 8.9 9.4   ALBUMIN g/dL  --  3.9   TOTAL BILIRUBIN mg/dL  --  0.60   ALK PHOS U/L  --  106*   ALT U/L  --  7   AST  U/L  --  11*   GLUCOSE RANDOM mg/dL 178* 179*         Results from last 7 days   Lab Units 06/20/25  0720   POC GLUCOSE mg/dl 244*               Recent Cultures (last 7 days):               Last 24 Hours Medication List:     Current Facility-Administered Medications:     acetaminophen (TYLENOL) tablet 650 mg, Q6H PRN    atorvastatin (LIPITOR) tablet 20 mg, Daily    furosemide (LASIX) tablet 40 mg, Daily    heparin (porcine) subcutaneous injection 5,000 Units, Q8H TESAS    insulin glargine (LANTUS) subcutaneous injection 20 Units 0.2 mL, HS    insulin lispro (HumALOG/ADMELOG) 100 units/mL subcutaneous injection 1-5 Units, TID AC **AND** Fingerstick Glucose (POCT), TID AC    insulin lispro (HumALOG/ADMELOG) 100 units/mL subcutaneous injection 1-5 Units, HS    insulin lispro (HumALOG/ADMELOG) 100 units/mL subcutaneous injection 5 Units, TID With Meals    losartan (COZAAR) tablet 25 mg, Daily    metoprolol succinate (TOPROL-XL) 24 hr tablet 25 mg, Q12H    Administrative Statements   Today, Patient Was Seen By: Edna Anderson PA-C      **Please Note: This note may have been constructed using a voice recognition system.**

## 2025-06-20 NOTE — ASSESSMENT & PLAN NOTE
Lab Results   Component Value Date    EGFR 51 06/19/2025    EGFR 39 05/17/2025    EGFR 42 05/16/2025    CREATININE 1.00 06/19/2025    CREATININE 1.24 05/17/2025    CREATININE 1.18 05/16/2025   Creatinine currently at baseline  Continue to monitor

## 2025-06-20 NOTE — ASSESSMENT & PLAN NOTE
Presents with reported 10 minutes of unresponsiveness while sitting at the dining table at nursing facility.  No fall.  EKG revealed sinus rhythm with PVC  Telemetry reveals sinus rhythm with occasional PVCs   Orthostatics negative  CTh with no acute intracranial abnormality

## 2025-06-20 NOTE — ASSESSMENT & PLAN NOTE
Patient with O2 saturation of 89% overnight  Currently on 3 L NC satting at 95%   CXR with venous pulmonary congestion, pending official read  Follow up BNP   Appears euvolemic at this time   Attempt to wean O2 as tolerated

## 2025-06-20 NOTE — ASSESSMENT & PLAN NOTE
84-year-old female with history of systolic heart failure, type 2 diabetes, CLL who presented after syncopal episode while sitting at the dining facility  Reports was unconscious for approximately 10 minutes  EKG revealed no acute abnormalities however did reveal PVCs on telemetry  Given significant cardiac history possibly cardiogenic in nature  Orthostatics + for diastolic hypotension    Will place on telemetry  Consult cardiology, plan for outpatient Zio patch  Repeat limited echo

## 2025-06-21 LAB
ANION GAP SERPL CALCULATED.3IONS-SCNC: 5 MMOL/L (ref 4–13)
ANISOCYTOSIS BLD QL SMEAR: PRESENT
APTT PPP: 171 SECONDS (ref 23–34)
APTT PPP: 32 SECONDS (ref 23–34)
APTT PPP: 37 SECONDS (ref 23–34)
BASOPHILS # BLD MANUAL: 0 THOUSAND/UL (ref 0–0.1)
BASOPHILS NFR MAR MANUAL: 0 % (ref 0–1)
BUN SERPL-MCNC: 22 MG/DL (ref 5–25)
CALCIUM SERPL-MCNC: 8.8 MG/DL (ref 8.4–10.2)
CHLORIDE SERPL-SCNC: 108 MMOL/L (ref 96–108)
CO2 SERPL-SCNC: 25 MMOL/L (ref 21–32)
CREAT SERPL-MCNC: 0.98 MG/DL (ref 0.6–1.3)
D DIMER PPP FEU-MCNC: 1.19 UG/ML FEU
DIFFERENTIAL COMMENT: ABNORMAL
EOSINOPHIL # BLD MANUAL: 0.38 THOUSAND/UL (ref 0–0.4)
EOSINOPHIL NFR BLD MANUAL: 1 % (ref 0–6)
ERYTHROCYTE [DISTWIDTH] IN BLOOD BY AUTOMATED COUNT: 16.8 % (ref 11.6–15.1)
GFR SERPL CREATININE-BSD FRML MDRD: 52 ML/MIN/1.73SQ M
GLUCOSE SERPL-MCNC: 104 MG/DL (ref 65–140)
GLUCOSE SERPL-MCNC: 117 MG/DL (ref 65–140)
GLUCOSE SERPL-MCNC: 166 MG/DL (ref 65–140)
GLUCOSE SERPL-MCNC: 454 MG/DL (ref 65–140)
GLUCOSE SERPL-MCNC: 98 MG/DL (ref 65–140)
HCT VFR BLD AUTO: 31.6 % (ref 34.8–46.1)
HGB BLD-MCNC: 10.1 G/DL (ref 11.5–15.4)
INR PPP: 1 (ref 0.85–1.19)
LYMPHOCYTES # BLD AUTO: 34.89 THOUSAND/UL (ref 0.6–4.47)
LYMPHOCYTES # BLD AUTO: 9 % (ref 14–44)
MCH RBC QN AUTO: 36.5 PG (ref 26.8–34.3)
MCHC RBC AUTO-ENTMCNC: 32 G/DL (ref 31.4–37.4)
MCV RBC AUTO: 114 FL (ref 82–98)
MONOCYTES # BLD AUTO: 0 THOUSAND/UL (ref 0–1.22)
MONOCYTES NFR BLD: 0 % (ref 4–12)
NEUTROPHILS # BLD MANUAL: 2.25 THOUSAND/UL (ref 1.85–7.62)
NEUTS SEG NFR BLD AUTO: 6 % (ref 43–75)
PATHOLOGY REVIEW: YES
PLATELET # BLD AUTO: 226 THOUSANDS/UL (ref 149–390)
PLATELET BLD QL SMEAR: ADEQUATE
PMV BLD AUTO: 12.1 FL (ref 8.9–12.7)
POTASSIUM SERPL-SCNC: 3.8 MMOL/L (ref 3.5–5.3)
PROCALCITONIN SERPL-MCNC: 0.13 NG/ML
PROTHROMBIN TIME: 13.9 SECONDS (ref 12.3–15)
RBC # BLD AUTO: 2.77 MILLION/UL (ref 3.81–5.12)
RBC MORPH BLD: PRESENT
SODIUM SERPL-SCNC: 138 MMOL/L (ref 135–147)
TOTAL CELLS COUNTED SPEC: 100
VARIANT LYMPHS # BLD AUTO: 84 %
WBC # BLD AUTO: 35.97 THOUSAND/UL (ref 4.31–10.16)

## 2025-06-21 PROCEDURE — 99232 SBSQ HOSP IP/OBS MODERATE 35: CPT

## 2025-06-21 PROCEDURE — 82948 REAGENT STRIP/BLOOD GLUCOSE: CPT

## 2025-06-21 PROCEDURE — 85027 COMPLETE CBC AUTOMATED: CPT

## 2025-06-21 PROCEDURE — 85610 PROTHROMBIN TIME: CPT

## 2025-06-21 PROCEDURE — 84145 PROCALCITONIN (PCT): CPT

## 2025-06-21 PROCEDURE — 85730 THROMBOPLASTIN TIME PARTIAL: CPT | Performed by: INTERNAL MEDICINE

## 2025-06-21 PROCEDURE — 93005 ELECTROCARDIOGRAM TRACING: CPT

## 2025-06-21 PROCEDURE — 80048 BASIC METABOLIC PNL TOTAL CA: CPT

## 2025-06-21 PROCEDURE — 85730 THROMBOPLASTIN TIME PARTIAL: CPT

## 2025-06-21 PROCEDURE — 85379 FIBRIN DEGRADATION QUANT: CPT

## 2025-06-21 PROCEDURE — 99223 1ST HOSP IP/OBS HIGH 75: CPT | Performed by: PSYCHIATRY & NEUROLOGY

## 2025-06-21 RX ORDER — METHYLPREDNISOLONE 16 MG/1
32 TABLET ORAL ONCE
Status: DISCONTINUED | OUTPATIENT
Start: 2025-06-21 | End: 2025-06-21

## 2025-06-21 RX ORDER — HEPARIN SODIUM 1000 [USP'U]/ML
4800 INJECTION, SOLUTION INTRAVENOUS; SUBCUTANEOUS ONCE
Status: COMPLETED | OUTPATIENT
Start: 2025-06-21 | End: 2025-06-21

## 2025-06-21 RX ORDER — DIPHENHYDRAMINE HCL 25 MG
50 TABLET ORAL ONCE
Status: COMPLETED | OUTPATIENT
Start: 2025-06-22 | End: 2025-06-22

## 2025-06-21 RX ORDER — DIPHENHYDRAMINE HYDROCHLORIDE 50 MG/ML
50 INJECTION, SOLUTION INTRAMUSCULAR; INTRAVENOUS ONCE
Status: DISCONTINUED | OUTPATIENT
Start: 2025-06-21 | End: 2025-06-21

## 2025-06-21 RX ORDER — DIPHENHYDRAMINE HCL 25 MG
50 TABLET ORAL ONCE
Status: DISCONTINUED | OUTPATIENT
Start: 2025-06-21 | End: 2025-06-21

## 2025-06-21 RX ORDER — HEPARIN SODIUM 10000 [USP'U]/100ML
3-30 INJECTION, SOLUTION INTRAVENOUS
Status: DISCONTINUED | OUTPATIENT
Start: 2025-06-21 | End: 2025-06-22

## 2025-06-21 RX ORDER — DIPHENHYDRAMINE HCL 25 MG
50 TABLET ORAL
Status: DISCONTINUED | OUTPATIENT
Start: 2025-06-21 | End: 2025-06-21

## 2025-06-21 RX ORDER — METHYLPREDNISOLONE 16 MG/1
32 TABLET ORAL ONCE
Status: COMPLETED | OUTPATIENT
Start: 2025-06-22 | End: 2025-06-22

## 2025-06-21 RX ORDER — METHYLPREDNISOLONE SODIUM SUCCINATE 40 MG/ML
40 INJECTION, POWDER, LYOPHILIZED, FOR SOLUTION INTRAMUSCULAR; INTRAVENOUS EVERY 4 HOURS
Status: DISCONTINUED | OUTPATIENT
Start: 2025-06-21 | End: 2025-06-21

## 2025-06-21 RX ORDER — METHYLPREDNISOLONE 16 MG/1
32 TABLET ORAL
Status: DISCONTINUED | OUTPATIENT
Start: 2025-06-21 | End: 2025-06-21

## 2025-06-21 RX ORDER — FUROSEMIDE 10 MG/ML
50 INJECTION INTRAMUSCULAR; INTRAVENOUS 2 TIMES DAILY
Status: DISCONTINUED | OUTPATIENT
Start: 2025-06-21 | End: 2025-06-23

## 2025-06-21 RX ADMIN — HEPARIN SODIUM 5000 UNITS: 5000 INJECTION INTRAVENOUS; SUBCUTANEOUS at 05:11

## 2025-06-21 RX ADMIN — METHYLPREDNISOLONE SODIUM SUCCINATE 40 MG: 40 INJECTION, POWDER, FOR SOLUTION INTRAMUSCULAR; INTRAVENOUS at 14:02

## 2025-06-21 RX ADMIN — METOPROLOL SUCCINATE 25 MG: 25 TABLET, EXTENDED RELEASE ORAL at 09:30

## 2025-06-21 RX ADMIN — INSULIN GLARGINE 20 UNITS: 100 INJECTION, SOLUTION SUBCUTANEOUS at 23:08

## 2025-06-21 RX ADMIN — LOSARTAN POTASSIUM 25 MG: 25 TABLET, FILM COATED ORAL at 09:30

## 2025-06-21 RX ADMIN — FUROSEMIDE 40 MG: 40 TABLET ORAL at 09:30

## 2025-06-21 RX ADMIN — METOPROLOL SUCCINATE 25 MG: 25 TABLET, EXTENDED RELEASE ORAL at 20:10

## 2025-06-21 RX ADMIN — HEPARIN SODIUM 18 UNITS/KG/HR: 10000 INJECTION, SOLUTION INTRAVENOUS at 16:10

## 2025-06-21 RX ADMIN — FUROSEMIDE 50 MG: 10 INJECTION, SOLUTION INTRAVENOUS at 11:26

## 2025-06-21 RX ADMIN — HEPARIN SODIUM 4800 UNITS: 1000 INJECTION, SOLUTION INTRAVENOUS; SUBCUTANEOUS at 16:10

## 2025-06-21 RX ADMIN — INSULIN LISPRO 5 UNITS: 100 INJECTION, SOLUTION INTRAVENOUS; SUBCUTANEOUS at 23:08

## 2025-06-21 RX ADMIN — HEPARIN SODIUM 5000 UNITS: 5000 INJECTION INTRAVENOUS; SUBCUTANEOUS at 14:02

## 2025-06-21 RX ADMIN — INSULIN LISPRO 5 UNITS: 100 INJECTION, SOLUTION INTRAVENOUS; SUBCUTANEOUS at 16:13

## 2025-06-21 RX ADMIN — ACETAMINOPHEN 650 MG: 325 TABLET ORAL at 09:33

## 2025-06-21 RX ADMIN — ATORVASTATIN CALCIUM 20 MG: 40 TABLET, FILM COATED ORAL at 09:30

## 2025-06-21 RX ADMIN — INSULIN LISPRO 5 UNITS: 100 INJECTION, SOLUTION INTRAVENOUS; SUBCUTANEOUS at 11:27

## 2025-06-21 RX ADMIN — INSULIN LISPRO 1 UNITS: 100 INJECTION, SOLUTION INTRAVENOUS; SUBCUTANEOUS at 11:27

## 2025-06-21 RX ADMIN — INSULIN LISPRO 5 UNITS: 100 INJECTION, SOLUTION INTRAVENOUS; SUBCUTANEOUS at 09:30

## 2025-06-21 NOTE — ASSESSMENT & PLAN NOTE
Lab Results   Component Value Date    HGBA1C 7.5 (H) 05/11/2025   Blood Sugar Average: Last 72 hrs:(P) 204.4  Continue Lantus 20 units nightly  Mealtime insulin 5 units 3 times daily  Sliding scale insulin

## 2025-06-21 NOTE — ASSESSMENT & PLAN NOTE
Patient with O2 saturation of 89% overnight.  Nursing staff noting patient to desaturate to 75-80% when taking off her oxygen  Currently on 3 L NC satting at 95%   CXR with venous pulmonary congestion, pending official read  BNP elevated at 1070  Appears euvolemic at this time   Transition to IV diuresis with Lasix IV 50 mg twice daily  Rule out PE, follow-up D-dimer  Follow-up limited echo   Follow-up Pro-Forrest  Attempt to wean O2 as tolerated

## 2025-06-21 NOTE — ASSESSMENT & PLAN NOTE
84-year-old female with history of systolic heart failure, type 2 diabetes, CLL who presented after syncopal episode while sitting at the dining facility  I attempted to call Diane myself to obtain more information regarding this event but unfortunately have not been able to speak with anyone.  Reports was unconscious for approximately 10 minutes  Given significant cardiac history possibly cardiogenic in nature  Evaluations:  EKG revealed no acute abnormalities however did reveal PVCs on telemetry  Orthostatics + for diastolic hypotension    CT head negative for acute intracranial abnormality  Cardiology consulted, given duration of episode of unresponsiveness it is unlikely it is cardiogenic in nature  Continue to monitor on telemetry while inpatient  Plan for outpatient Zio patch  Will repeat limited echo  Neurology consulted, low suspicion that this is a new onset seizure  Follow-up vascular carotid ultrasound study  Follow-up EEG  Given new O2 requirement/hypoxia will rule out PE  Follow-up D-dimer, obtain CTA chest PE study if positive

## 2025-06-21 NOTE — PROGRESS NOTES
Progress Note - Hospitalist   Name: Gia Vaughan 84 y.o. female I MRN: [id number]  Unit/Bed#: S -01 I Date of Admission: 6/19/2025   Date of Service: 6/21/2025 I Hospital Day: 1    Assessment & Plan  Syncope  84-year-old female with history of systolic heart failure, type 2 diabetes, CLL who presented after syncopal episode while sitting at the dining facility  I attempted to call Phyllis myself to obtain more information regarding this event but unfortunately have not been able to speak with anyone.  Reports was unconscious for approximately 10 minutes  Given significant cardiac history possibly cardiogenic in nature  Evaluations:  EKG revealed no acute abnormalities however did reveal PVCs on telemetry  Orthostatics + for diastolic hypotension    CT head negative for acute intracranial abnormality  Cardiology consulted, given duration of episode of unresponsiveness it is unlikely it is cardiogenic in nature  Continue to monitor on telemetry while inpatient  Plan for outpatient Zio patch  Will repeat limited echo  Neurology consulted, low suspicion that this is a new onset seizure  Follow-up vascular carotid ultrasound study  Follow-up EEG  Given new O2 requirement/hypoxia will rule out PE  Follow-up D-dimer, obtain CTA chest PE study if positive  Acute respiratory failure with hypoxia (HCC)  Patient with O2 saturation of 89% overnight.  Nursing staff noting patient to desaturate to 75-80% when taking off her oxygen  Currently on 3 L NC satting at 95%   CXR with venous pulmonary congestion, pending official read  BNP elevated at 1070  Appears euvolemic at this time   Transition to IV diuresis with Lasix IV 50 mg twice daily  Rule out PE, follow-up D-dimer  Follow-up limited echo   Follow-up Pro-Forrest  Attempt to wean O2 as tolerated   Chronic systolic heart failure (HCC)  Wt Readings from Last 3 Encounters:   06/21/25 63.3 kg (139 lb 8 oz)   05/30/25 60.3 kg (133 lb)   05/17/25 60.8 kg (134 lb 0.6 oz)   Echo  in  May 2025 with EF of 35%, severe diastolic dysfunction, akintetic basal, mid and apical inferior segments. Multiple hypokinetic segments  Home regimen: Lasix 40mg daily   Follows with [facility] cardiology in the outpatient setting   Appears euvolemic currently though given new O2 requirements and pulmonary congestion seen on CXR will pursue IV diuresis  Inpatient regimen: IV Lasix 50 mg twice daily  Daily weights, I&O's  Stage 3b chronic kidney disease ([facility])  Creatinine currently at baseline of 0.9-1.2  Continue to monitor closely if pursuing CTA  Type 2 diabetes mellitus with stage 3 chronic kidney disease, unspecified whether long term insulin use, unspecified whether stage 3a or 3b CKD ([facility])  Lab Results   Component Value Date    HGBA1C 7.5 (H) 05/11/2025   Blood Sugar Average: Last 72 hrs:(P) 204.4  Continue Lantus 20 units nightly  Mealtime insulin 5 units 3 times daily  Sliding scale insulin  Primary hypertension  BP currently controlled  Orthostatics + for diastolic hypotension   Continue losartan, Lasix, beta-blocker  CLL (chronic lymphocytic leukemia) ([facility])  History of CLL chronic leukocytosis  Outpatient follow-up with oncology  Dementia ([facility])  Currently at baseline, disoriented x 3     VTE Pharmacologic Prophylaxis: VTE Score: 6 High Risk (Score >/= 5) - Pharmacological DVT Prophylaxis Ordered: heparin. Sequential Compression Devices Ordered.    Mobility:   Basic Mobility Inpatient Raw Score: 20  JH-HLM Goal: 6: Walk 10 steps or more  JH-HLM Achieved: 2: Bed activities/Dependent transfer  JH-HLM Goal NOT achieved. Continue with multidisciplinary rounding and encourage appropriate mobility to improve upon JH-HLM goals.    Patient Centered Rounds: I performed bedside rounds with nursing staff today.   Discussions with Specialists or Other Care Team Provider: Cardiology, neurology    Education and Discussions with Family / Patient: Updated  (nephew) via phone.    Current Length of Stay: 1  day(s)  Current Patient Status: Inpatient   Certification Statement: The patient will continue to require additional inpatient hospital stay due to acute hypoxic respiratory failure requiring supplemental oxygen, pending D-dimer, IV diuresis, EEG  Discharge Plan: Anticipate discharge in 48-72 hrs to prior assisted or independent living facility.    Code Status: Level 1 - Full Code    Subjective   No acute complaints, no overnight events    Objective :  Temp:  [98.8 °F (37.1 °C)-99 °F (37.2 °C)] 99 °F (37.2 °C)  HR:  [67-86] 86  BP: (120-147)/(46-65) 126/53  SpO2:  [86 %-99 %] 91 %  O2 Device: Nasal cannula  Nasal Cannula O2 Flow Rate (L/min):  [3 L/min] 3 L/min    Body mass index is 22.52 kg/m².     Input and Output Summary (last 24 hours):     Intake/Output Summary (Last 24 hours) at 6/21/2025 1029  Last data filed at 6/21/2025 0517  Gross per 24 hour   Intake 1320 ml   Output 600 ml   Net 720 ml       Physical Exam  Vitals and nursing note reviewed.   Constitutional:       General: She is not in acute distress.     Appearance: Normal appearance. She is not ill-appearing.   HENT:      Mouth/Throat:      Mouth: Mucous membranes are moist.     Cardiovascular:      Rate and Rhythm: Normal rate and regular rhythm.      Heart sounds: No murmur heard.     Comments: No JVD  Pulmonary:      Effort: Pulmonary effort is normal.      Comments: Saturating 95% on 3L NC.  Bibasilar rales  Abdominal:      General: There is no distension.      Palpations: Abdomen is soft.      Tenderness: There is no abdominal tenderness.     Musculoskeletal:      Right lower leg: No edema.      Left lower leg: No edema.     Skin:     General: Skin is warm and dry.      Coloration: Skin is pale.     Neurological:      Mental Status: She is alert. Mental status is at baseline. She is disoriented.           Lines/Drains:        Telemetry:  Telemetry Orders (From admission, onward)               24 Hour Telemetry Monitoring  Continuous x 24 Hours  (Telem)        Expiring   Question:  Reason for 24 Hour Telemetry  Answer:  Syncope suspected to be cardiac in origin                     Telemetry Reviewed: Normal Sinus Rhythm and PVCs  Indication for Continued Telemetry Use: Syncope               Lab Results: I have reviewed the following results:   Results from last 7 days   Lab Units 06/21/25  0512 06/20/25  0556   WBC Thousand/uL 35.97* 37.18*   HEMOGLOBIN g/dL 10.1* 9.8*   HEMATOCRIT % 31.6* 30.8*   PLATELETS Thousands/uL 226 232   LYMPHO PCT %  --  93*   MONO PCT %  --  1*   EOS PCT %  --  0     Results from last 7 days   Lab Units 06/21/25  0512 06/20/25  0556 06/19/25  1902   SODIUM mmol/L 138   < > 139   POTASSIUM mmol/L 3.8   < > 4.5   CHLORIDE mmol/L 108   < > 106   CO2 mmol/L 25   < > 25   BUN mg/dL 22   < > 22   CREATININE mg/dL 0.98   < > 1.00   ANION GAP mmol/L 5   < > 8   CALCIUM mg/dL 8.8   < > 9.4   ALBUMIN g/dL  --   --  3.9   TOTAL BILIRUBIN mg/dL  --   --  0.60   ALK PHOS U/L  --   --  106*   ALT U/L  --   --  7   AST U/L  --   --  11*   GLUCOSE RANDOM mg/dL 104   < > 179*    < > = values in this interval not displayed.         Results from last 7 days   Lab Units 06/21/25  0709 06/20/25  2107 06/20/25  1607 06/20/25  1315 06/20/25  0720   POC GLUCOSE mg/dl 117 153* 175* 333* 244*               Recent Cultures (last 7 days):               Last 24 Hours Medication List:     Current Facility-Administered Medications:     acetaminophen (TYLENOL) tablet 650 mg, Q6H PRN    atorvastatin (LIPITOR) tablet 20 mg, Daily    furosemide (LASIX) injection 50 mg, BID    [Held by provider] furosemide (LASIX) tablet 40 mg, Daily    heparin (porcine) subcutaneous injection 5,000 Units, Q8H TESSA    insulin glargine (LANTUS) subcutaneous injection 20 Units 0.2 mL, HS    insulin lispro (HumALOG/ADMELOG) 100 units/mL subcutaneous injection 1-5 Units, TID AC **AND** Fingerstick Glucose (POCT), TID AC    insulin lispro (HumALOG/ADMELOG) 100 units/mL subcutaneous  injection 1-5 Units, HS    insulin lispro (HumALOG/ADMELOG) 100 units/mL subcutaneous injection 5 Units, TID With Meals    losartan (COZAAR) tablet 25 mg, Daily    metoprolol succinate (TOPROL-XL) 24 hr tablet 25 mg, Q12H    Administrative Statements   Today, Patient Was Seen By: Edna Anderson PA-C      **Please Note: This note may have been constructed using a voice recognition system.**

## 2025-06-21 NOTE — ASSESSMENT & PLAN NOTE
84-year-old female with an episode of syncope while sitting at a table at her nursing home.  Patient has a past medical history of dementia, systolic heart failure, CLL, hyperlipidemia, and type 2 diabetes.    CTH - No acute intracranial abnormality  Orthostatic vitals - negative for orthostatic hypotension  TTE in May 2025 revealed ejection fraction of 35%    Assessment: Patient's syncopal episodes doesn't seem to be associated with any neurological focal deficits or any postictal period.  Low suspicion for seizure activity at this time but plan for routine EEG to rule out.    Plan:  - Routine EEG pending  - Recommend cardiology follow-up for Zio patch/loop recorder  - Repeat CTH if GCS drops 2pts in 1hr  - PT/OT evaluation  - Maintain glucose <180, SSI for coverage if indicated  - Continue monitoring on telemetry  - Rest of care per primary team

## 2025-06-21 NOTE — CONSULTS
Consultation - Neurology   Name: Gia Vaughan 84 y.o. female I MRN: [id number]  Unit/Bed#: S -01 I Date of Admission: 6/19/2025   Date of Service: 6/21/2025 I Hospital Day: 1   Inpatient consult to Neurology  Consult performed by: Jim Talbert  Consult ordered by: Edna Anderson PA-C        Physician Requesting Evaluation: Shivani Cabrera MD   Reason for Evaluation / Principal Problem: syncope    Assessment & Plan  Syncope  84-year-old female with an episode of syncope while sitting at a table at her nursing home.  Patient has a past medical history of dementia, systolic heart failure, CLL, hyperlipidemia, and type 2 diabetes.    CTH - No acute intracranial abnormality  Orthostatic vitals - negative for orthostatic hypotension  TTE in May 2025 revealed ejection fraction of 35%    Assessment: Patient's syncopal episodes doesn't seem to be associated with any neurological focal deficits or any postictal period.  Low suspicion for seizure activity at this time but plan for routine EEG to rule out.    Plan:  - Routine EEG pending  - Recommend cardiology follow-up for Zio patch/loop recorder  - Repeat CTH if GCS drops 2pts in 1hr  - PT/OT evaluation  - Maintain glucose <180, SSI for coverage if indicated  - Continue monitoring on telemetry  - Rest of care per primary team    Dementia (HCC)        Recommendations for outpatient neurological follow up have yet to be determined.    History of Present Illness   Gia Vaughan is a 84 y.o. female with a past medical history of dementia, systolic heart failure, CLL, hyperlipidemia, and type 2 diabetes who presents with a syncopal episode while sitting at a table.  The patient reportedly lives at a nursing home and was seen to have lost consciousness while sitting at a table, the patient reportedly went limp and unresponsive.  The patient was unresponsive for approximately 10 minutes.  The patient was alert by the time she arrived to the ED.  Her symptoms had reportedly  resolved.  CT head was completed in the ED and was normal..    Patient seen at bedside this a.m. The patient has dementia at baseline.  She is able to answer questions appropriately but appears to not recall the events that led to her hospitalization.  No focal deficits were observed.       Medical History Review: I have reviewed the patient's PMH, PSH, Social History, Family History, Meds, and Allergies   Historical Information   Past Medical History[1]  Past Surgical History[2]  Social History[3]  E-Cigarette/Vaping    E-Cigarette Use Never User      E-Cigarette/Vaping Substances    Nicotine No     THC No     CBD No     Flavoring No     Other No     Unknown No      Family history non-contributory  Social History[4]    Current Facility-Administered Medications:     acetaminophen (TYLENOL) tablet 650 mg, Q6H PRN    atorvastatin (LIPITOR) tablet 20 mg, Daily    [START ON 6/22/2025] diphenhydrAMINE (BENADRYL) tablet 50 mg, Once    furosemide (LASIX) injection 50 mg, BID    [Held by provider] furosemide (LASIX) tablet 40 mg, Daily    heparin (porcine) 25,000 units in 0.45% NaCl 250 mL infusion (premix), Titrated, Last Rate: 18 Units/kg/hr (06/21/25 1610)    insulin glargine (LANTUS) subcutaneous injection 20 Units 0.2 mL, HS    insulin lispro (HumALOG/ADMELOG) 100 units/mL subcutaneous injection 1-5 Units, TID AC **AND** Fingerstick Glucose (POCT), TID AC    insulin lispro (HumALOG/ADMELOG) 100 units/mL subcutaneous injection 1-5 Units, HS    insulin lispro (HumALOG/ADMELOG) 100 units/mL subcutaneous injection 5 Units, TID With Meals    losartan (COZAAR) tablet 25 mg, Daily    [START ON 6/22/2025] methylPREDNISolone (MEDROL) tablet 32 mg, Once    metoprolol succinate (TOPROL-XL) 24 hr tablet 25 mg, Q12H  Prior to Admission Medications   Prescriptions Last Dose Informant Patient Reported? Taking?   Alcohol Swabs (B-D SINGLE USE SWABS REGULAR) PADS   No No   Sig: USE AS DIRECTED   Calcium Carb-Cholecalciferol (calcium  carbonate-vitamin D) 500 mg-5 mcg tablet   No No   Sig: TAKE ONE TABLET BY MOUTH TWICE DAILY DX: SUPPLEMENT   Cholecalciferol (VITAMIN D3) 400 units tablet Not Taking  No No   Sig: Take 1 tablet (400 Units total) by mouth daily   Patient not taking: Reported on 6/19/2025   Continuous Blood Gluc Sensor (FreeStyle Дмитрий 2 Sensor) MISC  Self Yes No   Diclofenac Sodium (VOLTAREN) 1 % 6/19/2025 Self No Yes   Sig: Apply 2 g topically 3 (three) times a day To R knee   FT Mucus Relief 12HR 600 MG 12 hr tablet   No No   Sig: TAKE ONE TABLET BY MOUTH EVERY EIGHT HOURS (DX: COUGH)   FeroSul 325 (65 Fe) MG tablet Not Taking  No No   Sig: TAKE ONE TABLET BY MOUTH TWICE DAILY DX: SUPPLEMENT   Patient not taking: Reported on 6/19/2025   Insulin Glargine Solostar (Lantus SoloStar) 100 UNIT/ML SOPN 6/18/2025  No Yes   Sig: Inject 0.2 mL (20 Units total) under the skin daily at bedtime   acetaminophen (TYLENOL) 325 mg tablet Unknown  No No   Sig: Take 3 tablets (975 mg total) by mouth 3 (three) times a day as needed for mild pain, moderate pain or severe pain   atorvastatin (LIPITOR) 20 mg tablet 6/19/2025 Self Yes Yes   Sig: Take 20 mg by mouth in the morning.   calcium carbonate (OS-JUAN RAMON) 600 MG tablet 6/19/2025  No Yes   Sig: Take 1 tablet (600 mg total) by mouth in the morning and 1 tablet (600 mg total) in the evening. Take with meals.   carbamide peroxide (FT Earwax Removal) 6.5 % otic solution   No No   Sig: INSERT FOUR DROPS INTO BOTH EARS AT BEDTIME FOR 2 WEEKS DX: WAX BUILD UP   cholecalciferol (VITAMIN D3) 1,000 units tablet  Self Yes No   Sig: Take 1,000 Units by mouth in the morning.   dextromethorphan-guaifenesin (MUCINEX DM)  MG per 12 hr tablet   No No   Sig: Take 1 tablet by mouth every 12 (twelve) hours   fluticasone (FLONASE) 50 mcg/act nasal spray Unknown  No No   Sig: INSTILL ONE SPRAY INTO BOTH NOSTRILS DAILY FOR 1 WEEK AND THEN AS NEEDED DX: ALLERGIES   furosemide (LASIX) 20 mg tablet 6/19/2025  No Yes    Sig: Take 2 tablets (40 mg total) by mouth daily   glucose blood test strip  Self Yes No   Sig: Use 1 each daily as needed Use as instructed   glucose monitoring kit (FREESTYLE) monitoring kit  Self Yes No   Sig: Use 1 each as needed   insulin lispro (HumaLOG) 100 units/mL injection  Self No No   Sig: Inject 5 Units under the skin 3 (three) times a day with meals   losartan (COZAAR) 25 mg tablet 6/19/2025  No Yes   Sig: Take 1 tablet (25 mg total) by mouth daily   magnesium Oxide (MAG-OX) 400 mg TABS 6/19/2025  No Yes   Sig: Take 1 tablet (400 mg total) by mouth daily for 14 days   metoprolol succinate (TOPROL-XL) 25 mg 24 hr tablet 6/19/2025  No Yes   Sig: Take 1 tablet (25 mg total) by mouth every 12 (twelve) hours   potassium chloride (Klor-Con M20) 20 mEq tablet 6/19/2025  No Yes   Sig: Take 1 tablet (20 mEq total) by mouth 2 (two) times a day   vitamin B-12 (VITAMIN B-12) 1,000 mcg tablet Not Taking Self Yes No   Sig: Take by mouth in the morning.   Patient not taking: Reported on 6/19/2025      Facility-Administered Medications: None     Amlodipine, Ceftin [cefuroxime], Ciprofloxacin, Citalopram, Dye [iodinated contrast media], Glucophage [metformin], Januvia [sitagliptin], Neomycin-polymyxin-dexameth, Ondansetron, Prilosec [omeprazole], and Vibramycin [doxycycline]    Objective :  Temp:  [97.3 °F (36.3 °C)-99.5 °F (37.5 °C)] 97.3 °F (36.3 °C)  HR:  [62-86] 62  BP: (120-133)/(48-60) 127/48  SpO2:  [86 %-99 %] 99 %    Physical Exam  Vitals reviewed.     Eyes:      General: Lids are normal.      Extraocular Movements: Extraocular movements intact.      Pupils: Pupils are equal, round, and reactive to light.       Neurological Exam  Mental Status   Oriented only to person and place. Person and place, is not able to state her age or the current year.    Cranial Nerves  CN II: Visual acuity is normal. Visual fields full to confrontation.  CN III, IV, VI: Extraocular movements intact bilaterally. Normal lids and  orbits bilaterally. Pupils equal round and reactive to light bilaterally.  CN V: Facial sensation is normal.  CN VII: Full and symmetric facial movement.  CN VIII: Hearing is normal.  CN IX, X: Palate elevates symmetrically. Normal gag reflex.  CN XI: Shoulder shrug strength is normal.  CN XII: Tongue midline without atrophy or fasciculations.    Motor  Decreased muscle bulk throughout. Normal muscle tone. Strength is 5/5 in all four extremities except as noted.  Patient does appear to have some left  weakness which she states is from an accident in the past and not a new finding.    Sensory  Light touch is normal in upper and lower extremities.         Lab Results: I have reviewed the following results:I have personally reviewed pertinent reports.    Recent Labs     06/20/25  0556 06/21/25  0512   WBC 37.18* 35.97*   HGB 9.8* 10.1*   HCT 30.8* 31.6*    226   SODIUM 138 138   K 4.0 3.8    108   CO2 27 25   BUN 21 22   CREATININE 0.96 0.98   GLUC 178* 104   MG 2.0  --      Imaging Results Review: I personally reviewed the following image studies in PACS and associated radiology reports: CT head. My interpretation of the radiology images/reports is: No acute intracranial abnormality.  Other Study Results Review: No additional pertinent studies reviewed.    VTE Prophylaxis: VTE covered by:  heparin (porcine), Intravenous, 18 Units/kg/hr at 06/21/25 1610              [1]   Past Medical History:  Diagnosis Date    Acute encephalopathy 10/15/2024    Acute postoperative pain of right knee 11/02/2022    ACUTE RESPIRATORY FAILURE, UNSP W HYPOXIA OR HYPERCAPNIA 05/11/2025    Benign adenomatous polyp of large intestine     CHF (congestive heart failure) ([facility])     COVID 09/01/2024    Diabetes mellitus (HCC)     Fall 11/05/2020    Heart failure with mid-range ejection fraction (HFmEF) ([facility]) 11/05/2020    Hyperlipemia     Hypertension     Hypertensive urgency 04/02/2024    Metabolic encephalopathy 10/15/2024     Orthostasis 10/19/2022    Osteoarthritis     TIA (transient ischemic attack)    [2]   Past Surgical History:  Procedure Laterality Date    APPENDECTOMY      CARPAL TUNNEL RELEASE Right     HYSTERECTOMY W/ SALPINGO-OOPHERECTOMY      UT OPTX FEM SHFT FX W/INSJ IMED IMPLT W/WO SCREW Right 10/12/2022    Procedure: INSERTION NAIL IM FEMUR ANTEGRADE (TROCHANTERIC);  Surgeon: Eric Isaacs DO;  Location: [facility];  Service: Orthopedics   [3]   Social History  Tobacco Use    Smoking status: Never    Smokeless tobacco: Never   Vaping Use    Vaping status: Never Used   Substance and Sexual Activity    Alcohol use: Not Currently     Comment: very seldom    Drug use: Never   [4]   Social History  Tobacco Use    Smoking status: Never    Smokeless tobacco: Never   Vaping Use    Vaping status: Never Used   Substance and Sexual Activity    Alcohol use: Not Currently     Comment: very seldom    Drug use: Never

## 2025-06-21 NOTE — PLAN OF CARE
Problem: NEUROSENSORY - ADULT  Goal: Achieves stable or improved neurological status  Description: INTERVENTIONS  - Monitor and report changes in neurological status  - Monitor vital signs such as temperature, blood pressure, glucose, and any other labs ordered   - Initiate measures to prevent increased intracranial pressure  - Monitor for seizure activity and implement precautions if appropriate      6/21/2025 1050 by Paula Kaur RN  Outcome: Progressing  6/21/2025 1050 by Paula Kaur RN  Outcome: Progressing  Goal: Remains free of injury related to seizures activity  Description: INTERVENTIONS  - Maintain airway, patient safety  and administer oxygen as ordered  - Monitor patient for seizure activity, document and report duration and description of seizure to physician/advanced practitioner  - If seizure occurs,  ensure patient safety during seizure  - Reorient patient post seizure  - Seizure pads on all 4 side rails  - Instruct patient/family to notify RN of any seizure activity including if an aura is experienced  - Instruct patient/family to call for assistance with activity based on nursing assessment  - Administer anti-seizure medications if ordered    6/21/2025 1050 by Paula Kaur RN  Outcome: Progressing  6/21/2025 1050 by Paula Kaur RN  Outcome: Not Progressing  Goal: Achieves maximal functionality and self care  Description: INTERVENTIONS  - Monitor swallowing and airway patency with patient fatigue and changes in neurological status  - Encourage and assist patient to increase activity and self care.   - Encourage visually impaired, hearing impaired and aphasic patients to use assistive/communication devices  6/21/2025 1050 by Paula Kaur RN  Outcome: Progressing  6/21/2025 1050 by Paula Kaur RN  Outcome: Not Progressing     Problem: CARDIOVASCULAR - ADULT  Goal: Maintains optimal cardiac output and hemodynamic stability  Description: INTERVENTIONS:  - Monitor I/O, vital signs and  rhythm  - Monitor for S/S and trends of decreased cardiac output  - Administer and titrate ordered vasoactive medications to optimize hemodynamic stability  - Assess quality of pulses, skin color and temperature  - Assess for signs of decreased coronary artery perfusion  - Instruct patient to report change in severity of symptoms  6/21/2025 1050 by Paula Kaur RN  Outcome: Progressing  6/21/2025 1050 by Paula Kaur RN  Outcome: Not Progressing  Goal: Absence of cardiac dysrhythmias or at baseline rhythm  Description: INTERVENTIONS:  - Continuous cardiac monitoring, vital signs, obtain 12 lead EKG if ordered  - Administer antiarrhythmic and heart rate control medications as ordered  - Monitor electrolytes and administer replacement therapy as ordered  6/21/2025 1050 by Paula Kaur RN  Outcome: Progressing  6/21/2025 1050 by Paula Kaur RN  Outcome: Not Progressing     Problem: RESPIRATORY - ADULT  Goal: Achieves optimal ventilation and oxygenation  Description: INTERVENTIONS:  - Assess for changes in respiratory status  - Assess for changes in mentation and behavior  - Position to facilitate oxygenation and minimize respiratory effort  - Oxygen administered by appropriate delivery if ordered  - Initiate smoking cessation education as indicated  - Encourage broncho-pulmonary hygiene including cough, deep breathe, Incentive Spirometry  - Assess the need for suctioning and aspirate as needed  - Assess and instruct to report SOB or any respiratory difficulty  - Respiratory Therapy support as indicated  6/21/2025 1050 by Paula Kaur RN  Outcome: Progressing  6/21/2025 1050 by Paula Kaur RN  Outcome: Not Progressing     Problem: METABOLIC, FLUID AND ELECTROLYTES - ADULT  Goal: Glucose maintained within target range  Description: INTERVENTIONS:  - Monitor Blood Glucose as ordered  - Assess for signs and symptoms of hyperglycemia and hypoglycemia  - Administer ordered medications to maintain glucose within  target range  - Assess nutritional intake and initiate nutrition service referral as needed  6/21/2025 1050 by Paula Kaur RN  Outcome: Progressing  6/21/2025 1050 by Paula Kaur RN  Outcome: Not Progressing     Problem: PAIN - ADULT  Goal: Verbalizes/displays adequate comfort level or baseline comfort level  Description: Interventions:  - Encourage patient to monitor pain and request assistance  - Assess pain using appropriate pain scale  - Administer analgesics as ordered based on type and severity of pain and evaluate response  - Implement non-pharmacological measures as appropriate and evaluate response  - Consider cultural and social influences on pain and pain management  - Notify physician/advanced practitioner if interventions unsuccessful or patient reports new pain  - Educate patient/family on pain management process including their role and importance of  reporting pain   - Provide non-pharmacologic/complimentary pain relief interventions  6/21/2025 1050 by Paula Kaur RN  Outcome: Progressing  6/21/2025 1050 by Paula Kaur RN  Outcome: Not Progressing     Problem: SAFETY ADULT  Goal: Patient will remain free of falls  Description: INTERVENTIONS:  - Educate patient/family on patient safety including physical limitations  - Instruct patient to call for assistance with activity   - Consider consulting OT/PT to assist with strengthening/mobility based on AM PAC & JH-HLM score  - Consult OT/PT to assist with strengthening/mobility   - Keep Call bell within reach  - Keep bed low and locked with side rails adjusted as appropriate  - Keep care items and personal belongings within reach  - Initiate and maintain comfort rounds  - Make Fall Risk Sign visible to staff  - Offer Toileting every 2 Hours, in advance of need  - Initiate/Maintain alarm  - Obtain necessary fall risk management equipment:   - Apply yellow socks and bracelet for high fall risk patients  - Consider moving patient to room near nurses  station  6/21/2025 1050 by Paula Kaur, RN  Outcome: Progressing  6/21/2025 1050 by Paula Kaur, RN  Outcome: Not Progressing

## 2025-06-21 NOTE — ASSESSMENT & PLAN NOTE
Wt Readings from Last 3 Encounters:   06/21/25 63.3 kg (139 lb 8 oz)   05/30/25 60.3 kg (133 lb)   05/17/25 60.8 kg (134 lb 0.6 oz)   Echo in  May 2025 with EF of 35%, severe diastolic dysfunction, akintetic basal, mid and apical inferior segments. Multiple hypokinetic segments  Home regimen: Lasix 40mg daily   Follows with [facility] cardiology in the outpatient setting   Appears euvolemic currently though given new O2 requirements and pulmonary congestion seen on CXR will pursue IV diuresis  Inpatient regimen: IV Lasix 50 mg twice daily  Daily weights, I&O's

## 2025-06-22 ENCOUNTER — APPOINTMENT (INPATIENT)
Dept: VASCULAR ULTRASOUND | Facility: HOSPITAL | Age: 85
DRG: 291 | End: 2025-06-22
Payer: MEDICARE

## 2025-06-22 ENCOUNTER — APPOINTMENT (INPATIENT)
Dept: CT IMAGING | Facility: HOSPITAL | Age: 85
DRG: 291 | End: 2025-06-22
Payer: MEDICARE

## 2025-06-22 LAB
ANION GAP SERPL CALCULATED.3IONS-SCNC: 9 MMOL/L (ref 4–13)
APTT PPP: 90 SECONDS (ref 23–34)
ATRIAL RATE: 73 BPM
BUN SERPL-MCNC: 33 MG/DL (ref 5–25)
CALCIUM SERPL-MCNC: 8.9 MG/DL (ref 8.4–10.2)
CHLORIDE SERPL-SCNC: 104 MMOL/L (ref 96–108)
CO2 SERPL-SCNC: 23 MMOL/L (ref 21–32)
CREAT SERPL-MCNC: 1.07 MG/DL (ref 0.6–1.3)
ERYTHROCYTE [DISTWIDTH] IN BLOOD BY AUTOMATED COUNT: 16.7 % (ref 11.6–15.1)
GFR SERPL CREATININE-BSD FRML MDRD: 47 ML/MIN/1.73SQ M
GLUCOSE SERPL-MCNC: 333 MG/DL (ref 65–140)
GLUCOSE SERPL-MCNC: 348 MG/DL (ref 65–140)
GLUCOSE SERPL-MCNC: 349 MG/DL (ref 65–140)
GLUCOSE SERPL-MCNC: 400 MG/DL (ref 65–140)
GLUCOSE SERPL-MCNC: 424 MG/DL (ref 65–140)
GLUCOSE SERPL-MCNC: 489 MG/DL (ref 65–140)
GLUCOSE SERPL-MCNC: 498 MG/DL (ref 65–140)
GLUCOSE SERPL-MCNC: 517 MG/DL (ref 65–140)
GLUCOSE SERPL-MCNC: 540 MG/DL (ref 65–140)
GLUCOSE SERPL-MCNC: 565 MG/DL (ref 65–140)
GLUCOSE SERPL-MCNC: 584 MG/DL (ref 65–140)
HCT VFR BLD AUTO: 34.7 % (ref 34.8–46.1)
HGB BLD-MCNC: 11 G/DL (ref 11.5–15.4)
MAGNESIUM SERPL-MCNC: 2 MG/DL (ref 1.9–2.7)
MCH RBC QN AUTO: 36.2 PG (ref 26.8–34.3)
MCHC RBC AUTO-ENTMCNC: 31.7 G/DL (ref 31.4–37.4)
MCV RBC AUTO: 114 FL (ref 82–98)
P AXIS: -18 DEGREES
PLATELET # BLD AUTO: 240 THOUSANDS/UL (ref 149–390)
PMV BLD AUTO: 12 FL (ref 8.9–12.7)
POTASSIUM SERPL-SCNC: 4.3 MMOL/L (ref 3.5–5.3)
PR INTERVAL: 202 MS
QRS AXIS: 61 DEGREES
QRSD INTERVAL: 90 MS
QT INTERVAL: 420 MS
QTC INTERVAL: 463 MS
RBC # BLD AUTO: 3.04 MILLION/UL (ref 3.81–5.12)
SODIUM SERPL-SCNC: 136 MMOL/L (ref 135–147)
T WAVE AXIS: -37 DEGREES
VENTRICULAR RATE: 73 BPM
WBC # BLD AUTO: 30.33 THOUSAND/UL (ref 4.31–10.16)

## 2025-06-22 PROCEDURE — 93880 EXTRACRANIAL BILAT STUDY: CPT

## 2025-06-22 PROCEDURE — 83735 ASSAY OF MAGNESIUM: CPT | Performed by: INTERNAL MEDICINE

## 2025-06-22 PROCEDURE — 80048 BASIC METABOLIC PNL TOTAL CA: CPT

## 2025-06-22 PROCEDURE — 99232 SBSQ HOSP IP/OBS MODERATE 35: CPT

## 2025-06-22 PROCEDURE — 85027 COMPLETE CBC AUTOMATED: CPT

## 2025-06-22 PROCEDURE — 93010 ELECTROCARDIOGRAM REPORT: CPT | Performed by: INTERNAL MEDICINE

## 2025-06-22 PROCEDURE — 93005 ELECTROCARDIOGRAM TRACING: CPT

## 2025-06-22 PROCEDURE — 85730 THROMBOPLASTIN TIME PARTIAL: CPT | Performed by: HOSPITALIST

## 2025-06-22 PROCEDURE — 82948 REAGENT STRIP/BLOOD GLUCOSE: CPT

## 2025-06-22 PROCEDURE — 71275 CT ANGIOGRAPHY CHEST: CPT

## 2025-06-22 RX ORDER — HEPARIN SODIUM 5000 [USP'U]/ML
5000 INJECTION, SOLUTION INTRAVENOUS; SUBCUTANEOUS EVERY 8 HOURS SCHEDULED
Status: DISCONTINUED | OUTPATIENT
Start: 2025-06-22 | End: 2025-06-24 | Stop reason: HOSPADM

## 2025-06-22 RX ORDER — INSULIN LISPRO 100 [IU]/ML
4 INJECTION, SOLUTION INTRAVENOUS; SUBCUTANEOUS ONCE
Status: COMPLETED | OUTPATIENT
Start: 2025-06-22 | End: 2025-06-22

## 2025-06-22 RX ORDER — INSULIN LISPRO 100 [IU]/ML
10 INJECTION, SOLUTION INTRAVENOUS; SUBCUTANEOUS ONCE
Status: COMPLETED | OUTPATIENT
Start: 2025-06-22 | End: 2025-06-22

## 2025-06-22 RX ADMIN — IOHEXOL 70 ML: 350 INJECTION, SOLUTION INTRAVENOUS at 02:14

## 2025-06-22 RX ADMIN — METOPROLOL SUCCINATE 25 MG: 25 TABLET, EXTENDED RELEASE ORAL at 20:15

## 2025-06-22 RX ADMIN — INSULIN LISPRO 5 UNITS: 100 INJECTION, SOLUTION INTRAVENOUS; SUBCUTANEOUS at 17:05

## 2025-06-22 RX ADMIN — METOPROLOL SUCCINATE 25 MG: 25 TABLET, EXTENDED RELEASE ORAL at 09:00

## 2025-06-22 RX ADMIN — LOSARTAN POTASSIUM 25 MG: 25 TABLET, FILM COATED ORAL at 09:00

## 2025-06-22 RX ADMIN — INSULIN LISPRO 5 UNITS: 100 INJECTION, SOLUTION INTRAVENOUS; SUBCUTANEOUS at 12:14

## 2025-06-22 RX ADMIN — INSULIN LISPRO 5 UNITS: 100 INJECTION, SOLUTION INTRAVENOUS; SUBCUTANEOUS at 21:29

## 2025-06-22 RX ADMIN — METHYLPREDNISOLONE 32 MG: 16 TABLET ORAL at 00:30

## 2025-06-22 RX ADMIN — ATORVASTATIN CALCIUM 20 MG: 40 TABLET, FILM COATED ORAL at 09:00

## 2025-06-22 RX ADMIN — INSULIN LISPRO 10 UNITS: 100 INJECTION, SOLUTION INTRAVENOUS; SUBCUTANEOUS at 18:16

## 2025-06-22 RX ADMIN — FUROSEMIDE 50 MG: 10 INJECTION, SOLUTION INTRAVENOUS at 09:00

## 2025-06-22 RX ADMIN — INSULIN LISPRO 5 UNITS: 100 INJECTION, SOLUTION INTRAVENOUS; SUBCUTANEOUS at 09:01

## 2025-06-22 RX ADMIN — INSULIN LISPRO 3 UNITS: 100 INJECTION, SOLUTION INTRAVENOUS; SUBCUTANEOUS at 12:14

## 2025-06-22 RX ADMIN — INSULIN GLARGINE 20 UNITS: 100 INJECTION, SOLUTION SUBCUTANEOUS at 21:32

## 2025-06-22 RX ADMIN — HEPARIN SODIUM 5000 UNITS: 5000 INJECTION INTRAVENOUS; SUBCUTANEOUS at 13:12

## 2025-06-22 RX ADMIN — INSULIN LISPRO 3 UNITS: 100 INJECTION, SOLUTION INTRAVENOUS; SUBCUTANEOUS at 09:01

## 2025-06-22 RX ADMIN — HEPARIN SODIUM 5000 UNITS: 5000 INJECTION INTRAVENOUS; SUBCUTANEOUS at 21:32

## 2025-06-22 RX ADMIN — INSULIN LISPRO 4 UNITS: 100 INJECTION, SOLUTION INTRAVENOUS; SUBCUTANEOUS at 01:01

## 2025-06-22 RX ADMIN — DIPHENHYDRAMINE HYDROCHLORIDE 50 MG: 25 TABLET ORAL at 00:30

## 2025-06-22 RX ADMIN — SODIUM CHLORIDE 10 UNITS/HR: 9 INJECTION, SOLUTION INTRAVENOUS at 22:08

## 2025-06-22 NOTE — ASSESSMENT & PLAN NOTE
Lab Results   Component Value Date    HGBA1C 7.5 (H) 05/11/2025   Blood Sugar Average: Last 72 hrs:(P) [id number]  Continue Lantus 20 units nightly  Mealtime insulin 5 units 3 times daily  Sliding scale insulin

## 2025-06-22 NOTE — ASSESSMENT & PLAN NOTE
Creatinine currently at baseline of 0.9-1.2  Continue to monitor closely given recent exposure to contrast on

## 2025-06-22 NOTE — PROGRESS NOTES
Progress Note - Hospitalist   Name: Gia Vaughan 84 y.o. female I MRN: [id number]  Unit/Bed#: S -01 I Date of Admission: 6/19/2025   Date of Service: 6/22/2025 I Hospital Day: 2    Assessment & Plan  Syncope  84-year-old female with history of systolic heart failure, type 2 diabetes, CLL who presented after syncopal episode while sitting at the dining facility  I attempted to call [place] myself to obtain more information regarding this event but unfortunately have not been able to speak with anyone.  Reports was unconscious for approximately 10 minutes  Evaluations:  EKG revealed no acute abnormalities however did reveal PVCs on telemetry  Orthostatics + for diastolic hypotension    CT head negative for acute intracranial abnormality  Cardiology consulted, given duration of episode of unresponsiveness it is unlikely it is cardiogenic in nature  Continue to monitor on telemetry while inpatient  Plan for outpatient Zio patch  Follow-up limited echo  Neurology consulted, low suspicion that this is a new onset seizure  Follow-up vascular carotid ultrasound study  Follow-up EEG  Given new O2 requirement/hypoxia will rule out PE  CTA chest PE study negative for PE, discontinue heparin  Acute respiratory failure with hypoxia (HCC)  Patient with O2 saturation of 89% overnight.  Nursing staff noting patient to desaturate to 75-80% when taking off her oxygen  Currently on 2 L NC satting at 95%   CXR with venous pulmonary congestion, pending official read  BNP elevated at 1070  Appears euvolemic at this time   CT chest negative for PE  Pro-Forrest negative  Transition to IV diuresis with Lasix IV 50 mg twice daily  Follow-up limited echo   Attempt to wean O2 as tolerated   Chronic systolic heart failure (HCC)  Wt Readings from Last 3 Encounters:   06/22/25 64.1 kg (141 lb 4.8 oz)   05/30/25 60.3 kg (133 lb)   05/17/25 60.8 kg (134 lb 0.6 oz)   Echo in  May 2025 with EF of 35%, severe diastolic dysfunction, akintetic basal,  mid and apical inferior segments. Multiple hypokinetic segments  Home regimen: Lasix 40mg daily   Follows with [facility] cardiology in the outpatient setting   Appears euvolemic currently though given new O2 requirements and pulmonary congestion seen on CXR will pursue IV diuresis  Inpatient regimen: IV Lasix 50 mg twice daily  Daily weights, I&O's  Stage 3b chronic kidney disease ([facility])  Creatinine currently at baseline of 0.9-1.2  Continue to monitor closely given recent exposure to contrast on  Type 2 diabetes mellitus with stage 3 chronic kidney disease, unspecified whether long term insulin use, unspecified whether stage 3a or 3b CKD ([facility])  Lab Results   Component Value Date    HGBA1C 7.5 (H) 05/11/2025   Blood Sugar Average: Last 72 hrs:(P) [id number]  Continue Lantus 20 units nightly  Mealtime insulin 5 units 3 times daily  Sliding scale insulin  Primary hypertension  BP currently controlled  Orthostatics + for diastolic hypotension   Continue losartan, Lasix, beta-blocker  CLL (chronic lymphocytic leukemia) ([facility])  History of CLL chronic leukocytosis  Outpatient follow-up with oncology  Dementia ([facility])  Currently at baseline, disoriented x 3     VTE Pharmacologic Prophylaxis: VTE Score: 6 High Risk (Score >/= 5) - Pharmacological DVT Prophylaxis Ordered: heparin. Sequential Compression Devices Ordered.    Mobility:   Basic Mobility Inpatient Raw Score: 20  JH-HLM Goal: 6: Walk 10 steps or more  JH-HLM Achieved: 6: Walk 10 steps or more  JH-HLM Goal achieved. Continue to encourage appropriate mobility.    Patient Centered Rounds: I performed bedside rounds with nursing staff today.   Discussions with Specialists or Other Care Team Provider: neuro, CM, Rn    Education and Discussions with Family / Patient: Attempted to update  (nephew) via phone. Left voicemail.     Current Length of Stay: 2 day(s)  Current Patient Status: Inpatient   Certification Statement: The patient will continue to  require additional inpatient hospital stay due to pending EEG, carotid ultrasound, echo, PT/OT eval  Discharge Plan: Anticipate discharge tomorrow to discharge location to be determined pending rehab evaluations.    Code Status: Level 1 - Full Code    Subjective   Patient resting, no acute complaints. No overnight events    Objective :  Temp:  [97.3 °F (36.3 °C)-99.5 °F (37.5 °C)] 97.3 °F (36.3 °C)  HR:  [53-81] 63  BP: (108-133)/(44-66) 121/57  SpO2:  [90 %-99 %] 96 %  O2 Device: Nasal cannula  Nasal Cannula O2 Flow Rate (L/min):  [3 L/min] 3 L/min    Body mass index is 22.81 kg/m².     Input and Output Summary (last 24 hours):     Intake/Output Summary (Last 24 hours) at 6/22/2025 0911  Last data filed at 6/22/2025 0609  Gross per 24 hour   Intake 120 ml   Output --   Net 120 ml       Physical Exam  Vitals and nursing note reviewed.   Constitutional:       General: She is not in acute distress.     Appearance: Normal appearance. She is not ill-appearing.      Comments: Patient sleeping, responsive to verbal and tactile stimuli.    HENT:      Mouth/Throat:      Mouth: Mucous membranes are moist.     Cardiovascular:      Rate and Rhythm: Normal rate. Rhythm irregular.      Heart sounds: Normal heart sounds.   Pulmonary:      Effort: Pulmonary effort is normal.      Breath sounds: Normal breath sounds. No rales.      Comments: Saturating well on 2L NC  Abdominal:      General: There is no distension.      Palpations: Abdomen is soft.      Tenderness: There is no abdominal tenderness.     Musculoskeletal:      Right lower leg: No edema.      Left lower leg: No edema.     Skin:     General: Skin is warm and dry.      Coloration: Skin is pale.     Neurological:      Mental Status: Mental status is at baseline.      Comments: Patient sleeping but easily aroused, interactive and responds to commands         Lines/Drains:              Lab Results: I have reviewed the following results:   Results from last 7 days   Lab  Units 06/22/25  0608 06/21/25  0512 06/20/25  0556   WBC Thousand/uL 30.33*   < > 37.18*   HEMOGLOBIN g/dL 11.0*   < > 9.8*   HEMATOCRIT % 34.7*   < > 30.8*   PLATELETS Thousands/uL 240   < > 232   LYMPHO PCT %  --   --  93*   MONO PCT %  --   --  1*   EOS PCT %  --   --  0    < > = values in this interval not displayed.     Results from last 7 days   Lab Units 06/22/25  0608 06/20/25  0556 06/19/25  1902   SODIUM mmol/L 136   < > 139   POTASSIUM mmol/L 4.3   < > 4.5   CHLORIDE mmol/L 104   < > 106   CO2 mmol/L 23   < > 25   BUN mg/dL 33*   < > 22   CREATININE mg/dL 1.07   < > 1.00   ANION GAP mmol/L 9   < > 8   CALCIUM mg/dL 8.9   < > 9.4   ALBUMIN g/dL  --   --  3.9   TOTAL BILIRUBIN mg/dL  --   --  0.60   ALK PHOS U/L  --   --  106*   ALT U/L  --   --  7   AST U/L  --   --  11*   GLUCOSE RANDOM mg/dL 348*   < > 179*    < > = values in this interval not displayed.     Results from last 7 days   Lab Units 06/21/25  1547   INR  1.00     Results from last 7 days   Lab Units 06/22/25  0706 06/22/25  0141 06/22/25  0048 06/21/25  2209 06/21/25  1533 06/21/25  1054 06/21/25  0709 06/20/25  2107 06/20/25  1607 06/20/25  1315 06/20/25  0720   POC GLUCOSE mg/dl 333* 400* 424* 454* 98 166* 117 153* 175* 333* 244*         Results from last 7 days   Lab Units 06/21/25  1122   PROCALCITONIN ng/ml 0.13       Recent Cultures (last 7 days):               Last 24 Hours Medication List:     Current Facility-Administered Medications:     acetaminophen (TYLENOL) tablet 650 mg, Q6H PRN    atorvastatin (LIPITOR) tablet 20 mg, Daily    furosemide (LASIX) injection 50 mg, BID    [Held by provider] furosemide (LASIX) tablet 40 mg, Daily    heparin (porcine) 25,000 units in 0.45% NaCl 250 mL infusion (premix), Titrated, Last Rate: 15 Units/kg/hr (06/22/25 0648)    insulin glargine (LANTUS) subcutaneous injection 20 Units 0.2 mL, HS    insulin lispro (HumALOG/ADMELOG) 100 units/mL subcutaneous injection 1-5 Units, TID AC **AND** Fingerstick  Glucose (POCT), TID AC    insulin lispro (HumALOG/ADMELOG) 100 units/mL subcutaneous injection 1-5 Units, HS    insulin lispro (HumALOG/ADMELOG) 100 units/mL subcutaneous injection 5 Units, TID With Meals    losartan (COZAAR) tablet 25 mg, Daily    metoprolol succinate (TOPROL-XL) 24 hr tablet 25 mg, Q12H    Administrative Statements   Today, Patient Was Seen By: Edna Anderson PA-C      **Please Note: This note may have been constructed using a voice recognition system.**

## 2025-06-22 NOTE — ASSESSMENT & PLAN NOTE
84-year-old female with history of systolic heart failure, type 2 diabetes, CLL who presented after syncopal episode while sitting at the dining facility  I attempted to call [place] myself to obtain more information regarding this event but unfortunately have not been able to speak with anyone.  Reports was unconscious for approximately 10 minutes  Evaluations:  EKG revealed no acute abnormalities however did reveal PVCs on telemetry  Orthostatics + for diastolic hypotension    CT head negative for acute intracranial abnormality  Cardiology consulted, given duration of episode of unresponsiveness it is unlikely it is cardiogenic in nature  Continue to monitor on telemetry while inpatient  Plan for outpatient Zio patch  Follow-up limited echo  Neurology consulted, low suspicion that this is a new onset seizure  Follow-up vascular carotid ultrasound study  Follow-up EEG  Given new O2 requirement/hypoxia will rule out PE  CTA chest PE study negative for PE, discontinue heparin

## 2025-06-22 NOTE — ASSESSMENT & PLAN NOTE
Patient with O2 saturation of 89% overnight.  Nursing staff noting patient to desaturate to 75-80% when taking off her oxygen  Currently on 2 L NC satting at 95%   CXR with venous pulmonary congestion, pending official read  BNP elevated at 1070  Appears euvolemic at this time   CT chest negative for PE  Pro-Forrest negative  Transition to IV diuresis with Lasix IV 50 mg twice daily  Follow-up limited echo   Attempt to wean O2 as tolerated

## 2025-06-22 NOTE — CASE MANAGEMENT
Case Management Assessment & Discharge Planning Note    Patient name Gia Vaughan  Location S /S -01 MRN [id number]  : 1940 Date 2025       Current Admission Date: 2025  Current Admission Diagnosis:Syncope   Patient Active Problem List    Diagnosis Date Noted    Cardiomyopathy (HCC) 2025    Dementia (HCC) 2025    Syncope 2025    Closed displaced transcondylar fracture of left humerus, initial encounter 2025    Elbow fracture, left, closed, initial encounter 2025    Acute respiratory failure with hypoxia (HCC) 2025    Acute diverticulitis 10/15/2024    Abnormal urinalysis 10/15/2024    Diarrhea 2024    Headache 2024    Suspected deep tissue injury of unknown depth 2022    Encephalopathy 10/24/2022    Deep tissue injury 10/21/2022    CLL (chronic lymphocytic leukemia) (HCC) 10/13/2022    Intertrochanteric fracture of right femur (HCC) 10/11/2022    Displaced fracture of middle phalanx of left ring finger, initial encounter for closed fracture 10/11/2022    Primary hypertension 2021    Moderate dementia (HCC) 2021    Lymphadenopathy 2020    Elevated liver enzymes 2020    Stage 3b chronic kidney disease (HCC) 2020    HLD (hyperlipidemia) 2020    OA (osteoarthritis) 2020    Chronic systolic heart failure (HCC) 2020    Type 2 diabetes mellitus with stage 3 chronic kidney disease, unspecified whether long term insulin use, unspecified whether stage 3a or 3b CKD (HCC)       LOS (days): 2  Geometric Mean LOS (GMLOS) (days):   Days to GMLOS:     OBJECTIVE:    Risk of Unplanned Readmission Score: 32.16         Current admission status: Inpatient       Preferred Pharmacy:   UNKNOWN - FOLLOW UP PRIOR TO DISCHARGE TO E-PRESCRIBE  No address on file      [id number] - [place] - [address]  [address] [facility]  [place] [address]  Phone: [phone] Fax:  [phone]    [id number] - [place] - [address]  [address] [place] [address]  Phone: [phone] Fax: [phone]    [facility] - [place] - [address]  [address]  [address]  [place] [address]  Phone: [phone] Fax: [phone]    Primary Care Provider: No primary care provider on file.    Primary Insurance: MEDICARE  Secondary Insurance: [employer]URANCE    ASSESSMENT:  Active Health Care Proxies    There are no active Health Care Proxies on file.                 Readmission Root Cause  30 Day Readmission: No    Patient Information  Admitted from:: Facility ([facility])  Mental Status: Confused  During Assessment patient was accompanied by: Not accompanied during assessment  Assessment information provided by:: Other - please comment  Primary Caregiver: Other (Comment)  Caregiver's Name:: Diane SLOAN  Caregiver's Relationship to Patient:: Facility Staff  Caregiver's Telephone Number:: [phone]  Support Systems: Family members  County of Residence: [place]  [facility] do you live in?: [place]  Home entry access options. Select all that apply.: No steps to enter home  Type of Current Residence: Facility  Upon entering residence, is there a bedroom on the main floor (no further steps)?: Yes  Upon entering residence, is there a bathroom on the main floor (no further steps)?: Yes  Living Arrangements: Lives in Facility  Is patient a ?: No    Activities of Daily Living Prior to Admission  Functional Status: Assistance  Completes ADLs independently?: No  Level of ADL dependence: Assistance  Ambulates independently?: No  Level of ambulatory dependence: Assistance  Does patient use assisted devices?: Yes  Assisted Devices (DME) used: Walker  Does patient currently own DME?: Yes  What DME does the patient currently own?: Walker  Does patient have a history of Outpatient Therapy (PT/OT)?: No  Does the patient have a history of  Short-Term Rehab?: Yes ([facility])  Does patient have a history of HHC?: Yes ([facility])  Does patient currently have HHC?: No         Patient Information Continued  Income Source: Pension/snf  Does patient have prescription coverage?: Yes  Can the patient afford their medications and any related supplies (such as glucometers or test strips)?: Yes  Does patient receive dialysis treatments?: No  Does patient have a history of substance abuse?: No  Does patient have a history of Mental Health Diagnosis?: No         Means of Transportation  Means of Transport to Appts:: Other (Comment)          DISCHARGE DETAILS:    Discharge planning discussed with:: Jud[place]  Freedom of Choice: Yes       Requested Home Health Care         Is the patient interested in HHC at discharge?: No    DME Referral Provided  Referral made for DME?: No    Other Referral/Resources/Interventions Provided:  Interventions:  (TBD)         Treatment Team Recommendation:  (TBD)  Expected Discharge Disposition:  (TBD)

## 2025-06-22 NOTE — PLAN OF CARE
Problem: NEUROSENSORY - ADULT  Goal: Achieves stable or improved neurological status  Description: INTERVENTIONS  - Monitor and report changes in neurological status  - Monitor vital signs such as temperature, blood pressure, glucose, and any other labs ordered   - Initiate measures to prevent increased intracranial pressure  - Monitor for seizure activity and implement precautions if appropriate      Outcome: Progressing  Goal: Remains free of injury related to seizures activity  Description: INTERVENTIONS  - Maintain airway, patient safety  and administer oxygen as ordered  - Monitor patient for seizure activity, document and report duration and description of seizure to physician/advanced practitioner  - If seizure occurs,  ensure patient safety during seizure  - Reorient patient post seizure  - Seizure pads on all 4 side rails  - Instruct patient/family to notify RN of any seizure activity including if an aura is experienced  - Instruct patient/family to call for assistance with activity based on nursing assessment  - Administer anti-seizure medications if ordered    Outcome: Progressing  Goal: Achieves maximal functionality and self care  Description: INTERVENTIONS  - Monitor swallowing and airway patency with patient fatigue and changes in neurological status  - Encourage and assist patient to increase activity and self care.   - Encourage visually impaired, hearing impaired and aphasic patients to use assistive/communication devices  Outcome: Progressing     Problem: CARDIOVASCULAR - ADULT  Goal: Maintains optimal cardiac output and hemodynamic stability  Description: INTERVENTIONS:  - Monitor I/O, vital signs and rhythm  - Monitor for S/S and trends of decreased cardiac output  - Administer and titrate ordered vasoactive medications to optimize hemodynamic stability  - Assess quality of pulses, skin color and temperature  - Assess for signs of decreased coronary artery perfusion  - Instruct patient to  report change in severity of symptoms  Outcome: Progressing  Goal: Absence of cardiac dysrhythmias or at baseline rhythm  Description: INTERVENTIONS:  - Continuous cardiac monitoring, vital signs, obtain 12 lead EKG if ordered  - Administer antiarrhythmic and heart rate control medications as ordered  - Monitor electrolytes and administer replacement therapy as ordered  Outcome: Progressing     Problem: RESPIRATORY - ADULT  Goal: Achieves optimal ventilation and oxygenation  Description: INTERVENTIONS:  - Assess for changes in respiratory status  - Assess for changes in mentation and behavior  - Position to facilitate oxygenation and minimize respiratory effort  - Oxygen administered by appropriate delivery if ordered  - Initiate smoking cessation education as indicated  - Encourage broncho-pulmonary hygiene including cough, deep breathe, Incentive Spirometry  - Assess the need for suctioning and aspirate as needed  - Assess and instruct to report SOB or any respiratory difficulty  - Respiratory Therapy support as indicated  Outcome: Progressing     Problem: SAFETY ADULT  Goal: Patient will remain free of falls  Description: INTERVENTIONS:  - Educate patient/family on patient safety including physical limitations  - Instruct patient to call for assistance with activity   - Consider consulting OT/PT to assist with strengthening/mobility based on AM PAC & JH-HLM score  - Consult OT/PT to assist with strengthening/mobility   - Keep Call bell within reach  - Keep bed low and locked with side rails adjusted as appropriate  - Keep care items and personal belongings within reach  - Initiate and maintain comfort rounds  - Make Fall Risk Sign visible to staff  - Offer Toileting every 2 Hours, in advance of need  - Initiate/Maintain bed alarm  - Obtain necessary fall risk management equipment: bed alarm  - Apply yellow socks and bracelet for high fall risk patients  - Consider moving patient to room near nurses  station  Outcome: Progressing

## 2025-06-22 NOTE — ASSESSMENT & PLAN NOTE
BP currently controlled  Orthostatics + for diastolic hypotension   Continue losartan, Lasix, beta-blocker   Opt out

## 2025-06-22 NOTE — ASSESSMENT & PLAN NOTE
Wt Readings from Last 3 Encounters:   06/22/25 64.1 kg (141 lb 4.8 oz)   05/30/25 60.3 kg (133 lb)   05/17/25 60.8 kg (134 lb 0.6 oz)   Echo in  May 2025 with EF of 35%, severe diastolic dysfunction, akintetic basal, mid and apical inferior segments. Multiple hypokinetic segments  Home regimen: Lasix 40mg daily   Follows with [facility] cardiology in the outpatient setting   Appears euvolemic currently though given new O2 requirements and pulmonary congestion seen on CXR will pursue IV diuresis  Inpatient regimen: IV Lasix 50 mg twice daily  Daily weights, I&O's

## 2025-06-23 ENCOUNTER — APPOINTMENT (INPATIENT)
Dept: NEUROLOGY | Facility: HOSPITAL | Age: 85
DRG: 291 | End: 2025-06-23
Attending: NURSE PRACTITIONER
Payer: MEDICARE

## 2025-06-23 ENCOUNTER — APPOINTMENT (INPATIENT)
Dept: NON INVASIVE DIAGNOSTICS | Facility: HOSPITAL | Age: 85
DRG: 291 | End: 2025-06-23
Payer: MEDICARE

## 2025-06-23 DIAGNOSIS — R05.3 CHRONIC COUGH: ICD-10-CM

## 2025-06-23 LAB
ANION GAP SERPL CALCULATED.3IONS-SCNC: 6 MMOL/L (ref 4–13)
ATRIAL RATE: 64 BPM
BSA FOR ECHO PROCEDURE: 1.72 M2
BUN SERPL-MCNC: 47 MG/DL (ref 5–25)
CALCIUM SERPL-MCNC: 8.9 MG/DL (ref 8.4–10.2)
CHLORIDE SERPL-SCNC: 106 MMOL/L (ref 96–108)
CO2 SERPL-SCNC: 26 MMOL/L (ref 21–32)
CREAT SERPL-MCNC: 1.2 MG/DL (ref 0.6–1.3)
ERYTHROCYTE [DISTWIDTH] IN BLOOD BY AUTOMATED COUNT: 16.8 % (ref 11.6–15.1)
GFR SERPL CREATININE-BSD FRML MDRD: 41 ML/MIN/1.73SQ M
GLUCOSE SERPL-MCNC: 101 MG/DL (ref 65–140)
GLUCOSE SERPL-MCNC: 117 MG/DL (ref 65–140)
GLUCOSE SERPL-MCNC: 123 MG/DL (ref 65–140)
GLUCOSE SERPL-MCNC: 124 MG/DL (ref 65–140)
GLUCOSE SERPL-MCNC: 175 MG/DL (ref 65–140)
GLUCOSE SERPL-MCNC: 202 MG/DL (ref 65–140)
GLUCOSE SERPL-MCNC: 213 MG/DL (ref 65–140)
GLUCOSE SERPL-MCNC: 234 MG/DL (ref 65–140)
GLUCOSE SERPL-MCNC: 250 MG/DL (ref 65–140)
HCT VFR BLD AUTO: 30 % (ref 34.8–46.1)
HGB BLD-MCNC: 9.6 G/DL (ref 11.5–15.4)
LAAS-AP2: 30.2 CM2
LAAS-AP4: 24.1 CM2
LEFT ATRIUM VOLUME (MOD BIPLANE): 98 ML
LEFT ATRIUM VOLUME INDEX (MOD BIPLANE): 57 ML/M2
LV EF US.2D.A4C+ESTIMATED: 45 %
MCH RBC QN AUTO: 36.2 PG (ref 26.8–34.3)
MCHC RBC AUTO-ENTMCNC: 32 G/DL (ref 31.4–37.4)
MCV RBC AUTO: 113 FL (ref 82–98)
P AXIS: -18 DEGREES
PLATELET # BLD AUTO: 249 THOUSANDS/UL (ref 149–390)
PMV BLD AUTO: 12.1 FL (ref 8.9–12.7)
POTASSIUM SERPL-SCNC: 4.3 MMOL/L (ref 3.5–5.3)
PR INTERVAL: 208 MS
QRS AXIS: 58 DEGREES
QRSD INTERVAL: 86 MS
QT INTERVAL: 450 MS
QTC INTERVAL: 465 MS
RA PRESSURE ESTIMATED: 3 MMHG
RBC # BLD AUTO: 2.65 MILLION/UL (ref 3.81–5.12)
RIGHT ATRIUM AREA SYSTOLE A4C: 18.7 CM2
RIGHT VENTRICLE ID DIMENSION: 4.2 CM
RV PSP: 37 MMHG
SL CV LEFT ATRIUM LENGTH A2C: 5.8 CM
SL CV LV EF: 45
SODIUM SERPL-SCNC: 138 MMOL/L (ref 135–147)
T WAVE AXIS: -81 DEGREES
TR MAX PG: 34 MMHG
TR PEAK VELOCITY: 2.9 M/S
TRICUSPID VALVE PEAK REGURGITATION VELOCITY: 2.92 M/S
VENTRICULAR RATE: 64 BPM
WBC # BLD AUTO: 60.51 THOUSAND/UL (ref 4.31–10.16)

## 2025-06-23 PROCEDURE — 93308 TTE F-UP OR LMTD: CPT

## 2025-06-23 PROCEDURE — 93321 DOPPLER ECHO F-UP/LMTD STD: CPT | Performed by: INTERNAL MEDICINE

## 2025-06-23 PROCEDURE — 80048 BASIC METABOLIC PNL TOTAL CA: CPT

## 2025-06-23 PROCEDURE — 93010 ELECTROCARDIOGRAM REPORT: CPT | Performed by: STUDENT IN AN ORGANIZED HEALTH CARE EDUCATION/TRAINING PROGRAM

## 2025-06-23 PROCEDURE — 93325 DOPPLER ECHO COLOR FLOW MAPG: CPT

## 2025-06-23 PROCEDURE — 93308 TTE F-UP OR LMTD: CPT | Performed by: INTERNAL MEDICINE

## 2025-06-23 PROCEDURE — 93325 DOPPLER ECHO COLOR FLOW MAPG: CPT | Performed by: INTERNAL MEDICINE

## 2025-06-23 PROCEDURE — 93880 EXTRACRANIAL BILAT STUDY: CPT | Performed by: SURGERY

## 2025-06-23 PROCEDURE — 87081 CULTURE SCREEN ONLY: CPT | Performed by: INTERNAL MEDICINE

## 2025-06-23 PROCEDURE — 85027 COMPLETE CBC AUTOMATED: CPT

## 2025-06-23 PROCEDURE — 93321 DOPPLER ECHO F-UP/LMTD STD: CPT

## 2025-06-23 PROCEDURE — 87147 CULTURE TYPE IMMUNOLOGIC: CPT | Performed by: INTERNAL MEDICINE

## 2025-06-23 PROCEDURE — 95816 EEG AWAKE AND DROWSY: CPT

## 2025-06-23 PROCEDURE — 82948 REAGENT STRIP/BLOOD GLUCOSE: CPT

## 2025-06-23 PROCEDURE — 99232 SBSQ HOSP IP/OBS MODERATE 35: CPT

## 2025-06-23 RX ORDER — INSULIN LISPRO 100 [IU]/ML
1-5 INJECTION, SOLUTION INTRAVENOUS; SUBCUTANEOUS
Status: DISCONTINUED | OUTPATIENT
Start: 2025-06-23 | End: 2025-06-24 | Stop reason: HOSPADM

## 2025-06-23 RX ORDER — QUETIAPINE FUMARATE 25 MG/1
12.5 TABLET, FILM COATED ORAL ONCE
Status: COMPLETED | OUTPATIENT
Start: 2025-06-23 | End: 2025-06-23

## 2025-06-23 RX ADMIN — METOPROLOL SUCCINATE 25 MG: 25 TABLET, EXTENDED RELEASE ORAL at 08:15

## 2025-06-23 RX ADMIN — HEPARIN SODIUM 5000 UNITS: 5000 INJECTION INTRAVENOUS; SUBCUTANEOUS at 21:10

## 2025-06-23 RX ADMIN — HEPARIN SODIUM 5000 UNITS: 5000 INJECTION INTRAVENOUS; SUBCUTANEOUS at 05:33

## 2025-06-23 RX ADMIN — Medication 3 MG: at 22:19

## 2025-06-23 RX ADMIN — INSULIN GLARGINE 20 UNITS: 100 INJECTION, SOLUTION SUBCUTANEOUS at 21:04

## 2025-06-23 RX ADMIN — INSULIN LISPRO 2 UNITS: 100 INJECTION, SOLUTION INTRAVENOUS; SUBCUTANEOUS at 11:52

## 2025-06-23 RX ADMIN — QUETIAPINE FUMARATE 12.5 MG: 25 TABLET ORAL at 22:19

## 2025-06-23 RX ADMIN — INSULIN LISPRO 5 UNITS: 100 INJECTION, SOLUTION INTRAVENOUS; SUBCUTANEOUS at 11:52

## 2025-06-23 RX ADMIN — HEPARIN SODIUM 5000 UNITS: 5000 INJECTION INTRAVENOUS; SUBCUTANEOUS at 17:06

## 2025-06-23 RX ADMIN — FUROSEMIDE 50 MG: 10 INJECTION, SOLUTION INTRAVENOUS at 08:15

## 2025-06-23 RX ADMIN — METOPROLOL SUCCINATE 25 MG: 25 TABLET, EXTENDED RELEASE ORAL at 20:07

## 2025-06-23 RX ADMIN — INSULIN LISPRO 1 UNITS: 100 INJECTION, SOLUTION INTRAVENOUS; SUBCUTANEOUS at 17:06

## 2025-06-23 RX ADMIN — INSULIN LISPRO 5 UNITS: 100 INJECTION, SOLUTION INTRAVENOUS; SUBCUTANEOUS at 17:07

## 2025-06-23 RX ADMIN — INSULIN LISPRO 1 UNITS: 100 INJECTION, SOLUTION INTRAVENOUS; SUBCUTANEOUS at 21:04

## 2025-06-23 RX ADMIN — LOSARTAN POTASSIUM 25 MG: 25 TABLET, FILM COATED ORAL at 08:15

## 2025-06-23 RX ADMIN — INSULIN LISPRO 5 UNITS: 100 INJECTION, SOLUTION INTRAVENOUS; SUBCUTANEOUS at 09:59

## 2025-06-23 RX ADMIN — ATORVASTATIN CALCIUM 20 MG: 40 TABLET, FILM COATED ORAL at 08:14

## 2025-06-23 RX ADMIN — ACETAMINOPHEN 650 MG: 325 TABLET ORAL at 11:28

## 2025-06-23 NOTE — ASSESSMENT & PLAN NOTE
Lab Results   Component Value Date    HGBA1C 7.5 (H) 05/11/2025   Blood Sugar Average: Last 72 hrs:(P) [id number]  Continue Lantus 20 units nightly  Mealtime insulin 5 units 3 times daily  Sliding scale insulin  Patient with transient steroid-induced hyperglycemia yesterday, now improved back to baseline

## 2025-06-23 NOTE — QUICK NOTE
Advised RN regarding hyperglycemia.  Chart review and noted blood glucose has been greater than 400 since 6/21/2025, patient has been treated with extra doses of regular insulin, despite treatment blood glucose remain greater than 500.  Has a history of diabetes type 2 and hyperglycemia is likely secondary to current steroid use.  Insulin drip was initiated, continue monitoring blood glucose closely per protocol, RN was made aware, avoid hypoglycemia.

## 2025-06-23 NOTE — DISCHARGE INSTR - AVS FIRST PAGE
Gia Vaughan was admitted for an episode of unresponsiveness. She was evaluated by cardiology and neurology after extensive evaluation it is likely the patient did not have a cardiac event or epileptic event. She is back at her baseline and dose not need any medication changes.     Please follow up with cardiology as an outpatient for further cardiac monitoring with a Zio patch, referral provided   Please follow up with your PCP within one week of discharge for continuity of care.     Please seek medical attention if these events are to happen again, if the patient develops and acute changes in mental status or decrease in function/stroke like symptoms.

## 2025-06-23 NOTE — CASE MANAGEMENT
Case Management Discharge Planning Note    Patient name Gia Vaughan  Location S /S -01 MRN [id number]  : 1940 Date 2025       Current Admission Date: 2025  Current Admission Diagnosis:Syncope   Patient Active Problem List    Diagnosis Date Noted    Cardiomyopathy (HCC) 2025    Dementia (HCC) 2025    Syncope 2025    Closed displaced transcondylar fracture of left humerus, initial encounter 2025    Elbow fracture, left, closed, initial encounter 2025    Acute respiratory failure with hypoxia (HCC) 2025    Acute diverticulitis 10/15/2024    Abnormal urinalysis 10/15/2024    Diarrhea 2024    Headache 2024    Suspected deep tissue injury of unknown depth 2022    Encephalopathy 10/24/2022    Deep tissue injury 10/21/2022    CLL (chronic lymphocytic leukemia) (HCC) 10/13/2022    Intertrochanteric fracture of right femur (HCC) 10/11/2022    Displaced fracture of middle phalanx of left ring finger, initial encounter for closed fracture 10/11/2022    Primary hypertension 2021    Moderate dementia (HCC) 2021    Lymphadenopathy 2020    Elevated liver enzymes 2020    Stage 3b chronic kidney disease (HCC) 2020    HLD (hyperlipidemia) 2020    OA (osteoarthritis) 2020    Chronic systolic heart failure (HCC) 2020    Type 2 diabetes mellitus with stage 3 chronic kidney disease, unspecified whether long term insulin use, unspecified whether stage 3a or 3b CKD (HCC)       LOS (days): 3  Geometric Mean LOS (GMLOS) (days):   Days to GMLOS:     OBJECTIVE:  Risk of Unplanned Readmission Score: 29.51         Current admission status: Inpatient   Preferred Pharmacy:   UNKNOWN - FOLLOW UP PRIOR TO DISCHARGE TO E-PRESCRIBE  No address on file      [id number] [place] - [address]  [address] [facility]  [place] [address]  Phone: [phone] Fax: [phone]    [id number] - [place] - [address]  [address] [place] [place] [address]  Phone: [phone] Fax: [phone]    [facility] - [place], [place] - [address]  [address]  [address]  [place] [address]  Phone: [phone] Fax: [phone]    Primary Care Provider: Andrew Pa MD    Primary Insurance: MEDICARE  Secondary Insurance: [employer]    DISCHARGE DETAILS:    Discharge planning discussed with:: Charles ellis  Freedom of Choice: Yes  Comments - Freedom of Choice: Per SLIM - pt possible d/c today pending EEG today. CM notified [facility] admin, via phone of tentative d/c for today and confirmed facility able to accept pt back today if medically stable. CM contacted pt's nephew Charles via phone re: pt likely d/c for today. Nephew confirmed family to provide pt transport back to home when ready for d/c. IMM reviewed with pt's nephew via phone, nephew agrees with pt d/c for today if deemed medically stable.  CM contacted family/caregiver?: Yes             Contacts  Patient Contacts: Charles Ny (Nephew)  Relationship to Patient:: Family  Contact Method: Phone  Phone Number: [phone]  Reason/Outcome: Emergency Contact, Discharge Planning                                                                 IMM reviewed with patient's caregiver, patient's caregiver agrees with discharge determination.  IMM Given (Date):: 06/23/25  IMM Given to:: Family  Family notified:: Charles (nephew)       Accepting Facility Name, City & State : [address]  Receiving Facility/Agency Phone Number: [phone]

## 2025-06-23 NOTE — PLAN OF CARE
Problem: NEUROSENSORY - ADULT  Goal: Achieves stable or improved neurological status  Description: INTERVENTIONS  - Monitor and report changes in neurological status  - Monitor vital signs such as temperature, blood pressure, glucose, and any other labs ordered   - Initiate measures to prevent increased intracranial pressure  - Monitor for seizure activity and implement precautions if appropriate      Outcome: Progressing  Goal: Remains free of injury related to seizures activity  Description: INTERVENTIONS  - Maintain airway, patient safety  and administer oxygen as ordered  - Monitor patient for seizure activity, document and report duration and description of seizure to physician/advanced practitioner  - If seizure occurs,  ensure patient safety during seizure  - Reorient patient post seizure  - Seizure pads on all 4 side rails  - Instruct patient/family to notify RN of any seizure activity including if an aura is experienced  - Instruct patient/family to call for assistance with activity based on nursing assessment  - Administer anti-seizure medications if ordered    Outcome: Progressing  Goal: Achieves maximal functionality and self care  Description: INTERVENTIONS  - Monitor swallowing and airway patency with patient fatigue and changes in neurological status  - Encourage and assist patient to increase activity and self care.   - Encourage visually impaired, hearing impaired and aphasic patients to use assistive/communication devices  Outcome: Progressing     Problem: CARDIOVASCULAR - ADULT  Goal: Maintains optimal cardiac output and hemodynamic stability  Description: INTERVENTIONS:  - Monitor I/O, vital signs and rhythm  - Monitor for S/S and trends of decreased cardiac output  - Administer and titrate ordered vasoactive medications to optimize hemodynamic stability  - Assess quality of pulses, skin color and temperature  - Assess for signs of decreased coronary artery perfusion  - Instruct patient to  report change in severity of symptoms  Outcome: Progressing  Goal: Absence of cardiac dysrhythmias or at baseline rhythm  Description: INTERVENTIONS:  - Continuous cardiac monitoring, vital signs, obtain 12 lead EKG if ordered  - Administer antiarrhythmic and heart rate control medications as ordered  - Monitor electrolytes and administer replacement therapy as ordered  Outcome: Progressing     Problem: RESPIRATORY - ADULT  Goal: Achieves optimal ventilation and oxygenation  Description: INTERVENTIONS:  - Assess for changes in respiratory status  - Assess for changes in mentation and behavior  - Position to facilitate oxygenation and minimize respiratory effort  - Oxygen administered by appropriate delivery if ordered  - Initiate smoking cessation education as indicated  - Encourage broncho-pulmonary hygiene including cough, deep breathe, Incentive Spirometry  - Assess the need for suctioning and aspirate as needed  - Assess and instruct to report SOB or any respiratory difficulty  - Respiratory Therapy support as indicated  Outcome: Progressing     Problem: METABOLIC, FLUID AND ELECTROLYTES - ADULT  Goal: Glucose maintained within target range  Description: INTERVENTIONS:  - Monitor Blood Glucose as ordered  - Assess for signs and symptoms of hyperglycemia and hypoglycemia  - Administer ordered medications to maintain glucose within target range  - Assess nutritional intake and initiate nutrition service referral as needed  Outcome: Progressing     Problem: PAIN - ADULT  Goal: Verbalizes/displays adequate comfort level or baseline comfort level  Description: Interventions:  - Encourage patient to monitor pain and request assistance  - Assess pain using appropriate pain scale  - Administer analgesics as ordered based on type and severity of pain and evaluate response  - Implement non-pharmacological measures as appropriate and evaluate response  - Consider cultural and social influences on pain and pain  management  - Notify physician/advanced practitioner if interventions unsuccessful or patient reports new pain  - Educate patient/family on pain management process including their role and importance of  reporting pain   - Provide non-pharmacologic/complimentary pain relief interventions  Outcome: Progressing     Problem: SAFETY ADULT  Goal: Patient will remain free of falls  Description: INTERVENTIONS:  - Educate patient/family on patient safety including physical limitations  - Instruct patient to call for assistance with activity   - Consider consulting OT/PT to assist with strengthening/mobility based on AM PAC & JH-HLM score  - Consult OT/PT to assist with strengthening/mobility   - Keep Call bell within reach  - Keep bed low and locked with side rails adjusted as appropriate  - Keep care items and personal belongings within reach  - Initiate and maintain comfort rounds  - Make Fall Risk Sign visible to staff  - Offer Toileting every 2 Hours, in advance of need  - Initiate/Maintain alarm  - Obtain necessary fall risk management equipment  - Apply yellow socks and bracelet for high fall risk patients  - Consider moving patient to room near nurses station  Outcome: Progressing     Problem: Prexisting or High Potential for Compromised Skin Integrity  Goal: Skin integrity is maintained or improved  Description: INTERVENTIONS:  - Identify patients at risk for skin breakdown  - Assess and monitor skin integrity including under and around medical devices   - Assess and monitor nutrition and hydration status  - Monitor labs  - Assess for incontinence   - Turn and reposition patient  - Assist with mobility/ambulation  - Relieve pressure over gael prominences   - Avoid friction and shearing  - Provide appropriate hygiene as needed including keeping skin clean and dry  - Evaluate need for skin moisturizer/barrier cream  - Collaborate with interdisciplinary team  - Patient/family teaching  - Consider wound care  consult    Assess:  - Review Espinoza scale daily  - Clean and moisturize skin   - Inspect skin when repositioning, toileting, and assisting with ADLS  - Assess under medical devices   - Assess extremities for adequate circulation and sensation     Bed Management:  - Have minimal linens on bed & keep smooth, unwrinkled  - Change linens as needed when moist or perspiring  - Avoid sitting or lying in one position for more than 2 hours while in bed?Keep HOB at 30 degrees   - Toileting:  - Offer bedside commode  - Assess for incontinence   - Use incontinent care products after each incontinent episode    Activity:  - Mobilize patient 3 times a day  - Encourage activity and walks on unit  - Encourage or provide ROM exercises   - Turn and reposition patient every 2 Hours  - Use appropriate equipment to lift or move patient in bed  - Instruct/ Assist with weight shifting   - Consider limitation of chair time 2 hour intervals    Skin Care:  - Avoid use of baby powder, tape, friction and shearing, hot water or constrictive clothing  - Relieve pressure over bony prominences  - Do not massage red bony areas    Next Steps:  - Teach patient strategies to minimize risks  - Consider consults to  interdisciplinary teams  Outcome: Progressing

## 2025-06-23 NOTE — ASSESSMENT & PLAN NOTE
Creatinine currently at baseline of 0.9-1.2  Continue to monitor closely given recent exposure to contrast on 6/22

## 2025-06-23 NOTE — ASSESSMENT & PLAN NOTE
Wt Readings from Last 3 Encounters:   06/23/25 63.6 kg (140 lb 3.2 oz)   05/30/25 60.3 kg (133 lb)   05/17/25 60.8 kg (134 lb 0.6 oz)   Echo in  May 2025 with EF of 35%, severe diastolic dysfunction, akintetic basal, mid and apical inferior segments. Multiple hypokinetic segments  Home regimen: Lasix 40mg daily   Follows with [facility] cardiology in the outpatient setting   Appears euvolemic currently though given new O2 requirements and pulmonary congestion seen on CXR will pursue IV diuresis  Inpatient regimen: IV Lasix 50 mg twice daily, transition back to Lasix 40mg today  Daily weights, I&O's

## 2025-06-23 NOTE — PROGRESS NOTES
Progress Note - Hospitalist   Name: Gia Vaughan 84 y.o. female I MRN: [id number]  Unit/Bed#: S -01 I Date of Admission: 6/19/2025   Date of Service: 6/23/2025 I Hospital Day: 3    Assessment & Plan  Syncope  84-year-old female with history of systolic heart failure, type 2 diabetes, CLL who presented after syncopal episode while sitting at the dining facility  I attempted to call [facility] myself to obtain more information regarding this event but unfortunately have not been able to speak with anyone.  Reports was unconscious for approximately 10 minutes  Evaluations:  EKG revealed no acute abnormalities however did reveal PVCs on telemetry  Orthostatics + for diastolic hypotension    CT head negative for acute intracranial abnormality  Cardiology consulted, given duration of episode of unresponsiveness it is unlikely it is cardiogenic in nature  Continue to monitor on telemetry while inpatient  Plan for outpatient Zio patch  Follow-up limited echo  Neurology consulted, low suspicion that this is a new onset seizure  Vascular carotid ultrasound study significant stenosis of the ICAs  Follow-up EEG, no regardless of results we will opt not to use initiate AEDs given this is her first occurrence.  Patient should be okay to discharge after EEG is completed  Given new O2 requirement/hypoxia will rule out PE  CTA chest PE study negative for PE, discontinue heparin  Acute respiratory failure with hypoxia (HCC)  Patient with O2 saturation of 89% overnight.  Nursing staff noting patient to desaturate to 75-80% when taking off her oxygen  Currently on 1 L NC satting at 95%   CXR with venous pulmonary congestion, pending official read  BNP elevated at 1070  Appears euvolemic at this time   CT chest negative for PE  Pro-Forrest negative  Completed IV diuresis with Lasix 50 mg twice daily, will transition to home Lasix 40 mg daily  Follow-up limited echo   Attempt to wean O2 as tolerated   Chronic systolic heart failure  ([facility])  Wt Readings from Last 3 Encounters:   06/23/25 63.6 kg (140 lb 3.2 oz)   05/30/25 60.3 kg (133 lb)   05/17/25 60.8 kg (134 lb 0.6 oz)   Echo in  May 2025 with EF of 35%, severe diastolic dysfunction, akintetic basal, mid and apical inferior segments. Multiple hypokinetic segments  Home regimen: Lasix 40mg daily   Follows with [facility] cardiology in the outpatient setting   Appears euvolemic currently though given new O2 requirements and pulmonary congestion seen on CXR will pursue IV diuresis  Inpatient regimen: IV Lasix 50 mg twice daily, transition back to Lasix 40mg today  Daily weights, I&O's  Stage 3b chronic kidney disease ([facility])  Creatinine currently at baseline of 0.9-1.2  Continue to monitor closely given recent exposure to contrast on 6/22  Type 2 diabetes mellitus with stage 3 chronic kidney disease, unspecified whether long term insulin use, unspecified whether stage 3a or 3b CKD ([facility])  Lab Results   Component Value Date    HGBA1C 7.5 (H) 05/11/2025   Blood Sugar Average: Last 72 hrs:(P) [id number]  Continue Lantus 20 units nightly  Mealtime insulin 5 units 3 times daily  Sliding scale insulin  Patient with transient steroid-induced hyperglycemia yesterday, now improved back to baseline   Primary hypertension  BP currently controlled  Orthostatics + for diastolic hypotension   Continue losartan, Lasix, beta-blocker  CLL (chronic lymphocytic leukemia) ([facility])  History of CLL chronic leukocytosis  Outpatient follow-up with oncology  Dementia ([facility])  Currently at baseline, disoriented x 3     VTE Pharmacologic Prophylaxis: VTE Score: 6 High Risk (Score >/= 5) - Pharmacological DVT Prophylaxis Ordered: heparin. Sequential Compression Devices Ordered.    Mobility:   Basic Mobility Inpatient Raw Score: 20  JH-HLM Goal: 6: Walk 10 steps or more  JH-HLM Achieved: 4: Move to chair/commode  JH-HLM Goal NOT achieved. Continue with multidisciplinary rounding and encourage appropriate mobility to improve  upon [facility] goals.    Patient Centered Rounds: I performed bedside rounds with nursing staff today.   Discussions with Specialists or Other Care Team Provider: neurology, cardiology, CM RN    Education and Discussions with Family / Patient: Updated  (nephew) via phone.    Current Length of Stay: 3 day(s)  Current Patient Status: Inpatient   Certification Statement: The patient will continue to require additional inpatient hospital stay due to EEG  Discharge Plan: Anticipate discharge later today or tomorrow to prior assisted or independent living facility.    Code Status: Level 1 - Full Code    Subjective   Patient doing well, states he pinky hurts. Denies any other acute complaints     Objective :  Temp:  [97.8 °F (36.6 °C)-98.1 °F (36.7 °C)] 98.1 °F (36.7 °C)  HR:  [61-77] 77  BP: ()/(39-58) 127/57  Resp:  [18] 18  SpO2:  [90 %-99 %] 90 %  O2 Device: Nasal cannula  Nasal Cannula O2 Flow Rate (L/min):  [2 L/min] 2 L/min    Body mass index is 22.63 kg/m².     Input and Output Summary (last 24 hours):     Intake/Output Summary (Last 24 hours) at 6/23/2025 1031  Last data filed at 6/23/2025 0701  Gross per 24 hour   Intake 360 ml   Output 1300 ml   Net -940 ml       Physical Exam  Vitals and nursing note reviewed.   Constitutional:       General: She is not in acute distress.     Appearance: Normal appearance. She is not ill-appearing.   HENT:      Mouth/Throat:      Mouth: Mucous membranes are moist.     Cardiovascular:      Rate and Rhythm: Normal rate. Rhythm irregular.      Heart sounds: Normal heart sounds.   Pulmonary:      Effort: Pulmonary effort is normal.      Breath sounds: Normal breath sounds. No rales.      Comments: Saturating well on 2L NC  Abdominal:      General: There is no distension.      Palpations: Abdomen is soft.      Tenderness: There is no abdominal tenderness.     Musculoskeletal:      Right lower leg: No edema.      Left lower leg: No edema.     Skin:     General: Skin  is warm and dry.      Coloration: Skin is pale.     Neurological:      Mental Status: Mental status is at baseline. She is disoriented.           Lines/Drains:        Telemetry:  Telemetry Orders (From admission, onward)               24 Hour Telemetry Monitoring  Continuous x 24 Hours (Telem)        Expiring   Question:  Reason for 24 Hour Telemetry  Answer:  Syncope suspected to be cardiac in origin                     Telemetry Reviewed: Normal Sinus Rhythm and PVCs  Indication for Continued Telemetry Use: Syncope               Lab Results: I have reviewed the following results:   Results from last 7 days   Lab Units 06/23/25 0447 06/21/25  0512 06/20/25  0556   WBC Thousand/uL 60.51*   < > 37.18*   HEMOGLOBIN g/dL 9.6*   < > 9.8*   HEMATOCRIT % 30.0*   < > 30.8*   PLATELETS Thousands/uL 249   < > 232   LYMPHO PCT %  --   --  93*   MONO PCT %  --   --  1*   EOS PCT %  --   --  0    < > = values in this interval not displayed.     Results from last 7 days   Lab Units 06/23/25 0447 06/20/25  0556 06/19/25  1902   SODIUM mmol/L 138   < > 139   POTASSIUM mmol/L 4.3   < > 4.5   CHLORIDE mmol/L 106   < > 106   CO2 mmol/L 26   < > 25   BUN mg/dL 47*   < > 22   CREATININE mg/dL 1.20   < > 1.00   ANION GAP mmol/L 6   < > 8   CALCIUM mg/dL 8.9   < > 9.4   ALBUMIN g/dL  --   --  3.9   TOTAL BILIRUBIN mg/dL  --   --  0.60   ALK PHOS U/L  --   --  106*   ALT U/L  --   --  7   AST U/L  --   --  11*   GLUCOSE RANDOM mg/dL 124   < > 179*    < > = values in this interval not displayed.     Results from last 7 days   Lab Units 06/21/25  1547   INR  1.00     Results from last 7 days   Lab Units 06/23/25  0946 06/23/25  0709 06/23/25  0415 06/23/25  0206 06/23/25  0005 06/22/25  2201 06/22/25  2122 06/22/25  1811 06/22/25  1808 06/22/25  1522 06/22/25  1513 06/22/25  1131   POC GLUCOSE mg/dl 175* 123 117 101 250* 498* 540* 489* 584* 517* 565* 349*         Results from last 7 days   Lab Units 06/21/25  1122   PROCALCITONIN ng/ml  0.13       Recent Cultures (last 7 days):               Last 24 Hours Medication List:     Current Facility-Administered Medications:     acetaminophen (TYLENOL) tablet 650 mg, Q6H PRN    atorvastatin (LIPITOR) tablet 20 mg, Daily    furosemide (LASIX) injection 50 mg, BID    [Held by provider] furosemide (LASIX) tablet 40 mg, Daily    heparin (porcine) subcutaneous injection 5,000 Units, Q8H TESSA    insulin glargine (LANTUS) subcutaneous injection 20 Units 0.2 mL, HS    insulin lispro (HumALOG/ADMELOG) 100 units/mL subcutaneous injection 1-5 Units, HS    insulin lispro (HumALOG/ADMELOG) 100 units/mL subcutaneous injection 1-5 Units, TID AC **AND** Fingerstick Glucose (POCT), TID AC    insulin lispro (HumALOG/ADMELOG) 100 units/mL subcutaneous injection 5 Units, TID With Meals    losartan (COZAAR) tablet 25 mg, Daily    metoprolol succinate (TOPROL-XL) 24 hr tablet 25 mg, Q12H    Administrative Statements   Today, Patient Was Seen By: Edna Anderson PA-C      **Please Note: This note may have been constructed using a voice recognition system.**

## 2025-06-23 NOTE — ASSESSMENT & PLAN NOTE
Patient with O2 saturation of 89% overnight.  Nursing staff noting patient to desaturate to 75-80% when taking off her oxygen  Currently on 1 L NC satting at 95%   CXR with venous pulmonary congestion, pending official read  BNP elevated at 1070  Appears euvolemic at this time   CT chest negative for PE  Pro-Forrest negative  Completed IV diuresis with Lasix 50 mg twice daily, will transition to home Lasix 40 mg daily  Follow-up limited echo   Attempt to wean O2 as tolerated

## 2025-06-23 NOTE — ASSESSMENT & PLAN NOTE
84-year-old female with history of systolic heart failure, type 2 diabetes, CLL who presented after syncopal episode while sitting at the dining facility  I attempted to call [facility] myself to obtain more information regarding this event but unfortunately have not been able to speak with anyone.  Reports was unconscious for approximately 10 minutes  Evaluations:  EKG revealed no acute abnormalities however did reveal PVCs on telemetry  Orthostatics + for diastolic hypotension    CT head negative for acute intracranial abnormality  Cardiology consulted, given duration of episode of unresponsiveness it is unlikely it is cardiogenic in nature  Continue to monitor on telemetry while inpatient  Plan for outpatient Zio patch  Follow-up limited echo  Neurology consulted, low suspicion that this is a new onset seizure  Vascular carotid ultrasound study significant stenosis of the ICAs  Follow-up EEG, no regardless of results we will opt not to use initiate AEDs given this is her first occurrence.  Patient should be okay to discharge after EEG is completed  Given new O2 requirement/hypoxia will rule out PE  CTA chest PE study negative for PE, discontinue heparin

## 2025-06-24 VITALS
HEART RATE: 57 BPM | TEMPERATURE: 98.4 F | HEIGHT: 66 IN | DIASTOLIC BLOOD PRESSURE: 50 MMHG | BODY MASS INDEX: 21.66 KG/M2 | WEIGHT: 134.8 LBS | RESPIRATION RATE: 18 BRPM | OXYGEN SATURATION: 96 % | SYSTOLIC BLOOD PRESSURE: 105 MMHG

## 2025-06-24 LAB
GLUCOSE SERPL-MCNC: 77 MG/DL (ref 65–140)
GLUCOSE SERPL-MCNC: 79 MG/DL (ref 65–140)
MRSA NOSE QL CULT: ABNORMAL
MRSA NOSE QL CULT: ABNORMAL

## 2025-06-24 PROCEDURE — 95819 EEG AWAKE AND ASLEEP: CPT | Performed by: PSYCHIATRY & NEUROLOGY

## 2025-06-24 PROCEDURE — 99239 HOSP IP/OBS DSCHRG MGMT >30: CPT

## 2025-06-24 PROCEDURE — 82948 REAGENT STRIP/BLOOD GLUCOSE: CPT

## 2025-06-24 RX ORDER — GUAIFENESIN 600 MG/1
TABLET, EXTENDED RELEASE ORAL
Qty: 30 TABLET | Refills: 0 | OUTPATIENT
Start: 2025-06-24

## 2025-06-24 RX ADMIN — LOSARTAN POTASSIUM 25 MG: 25 TABLET, FILM COATED ORAL at 08:29

## 2025-06-24 RX ADMIN — METOPROLOL SUCCINATE 25 MG: 25 TABLET, EXTENDED RELEASE ORAL at 08:41

## 2025-06-24 RX ADMIN — FUROSEMIDE 40 MG: 40 TABLET ORAL at 08:29

## 2025-06-24 RX ADMIN — INSULIN LISPRO 5 UNITS: 100 INJECTION, SOLUTION INTRAVENOUS; SUBCUTANEOUS at 08:29

## 2025-06-24 RX ADMIN — ACETAMINOPHEN 650 MG: 325 TABLET ORAL at 08:41

## 2025-06-24 RX ADMIN — INSULIN LISPRO 5 UNITS: 100 INJECTION, SOLUTION INTRAVENOUS; SUBCUTANEOUS at 13:04

## 2025-06-24 RX ADMIN — ATORVASTATIN CALCIUM 20 MG: 40 TABLET, FILM COATED ORAL at 08:29

## 2025-06-24 RX ADMIN — HEPARIN SODIUM 5000 UNITS: 5000 INJECTION INTRAVENOUS; SUBCUTANEOUS at 05:46

## 2025-06-24 NOTE — PLAN OF CARE
Problem: NEUROSENSORY - ADULT  Goal: Achieves stable or improved neurological status  Description: INTERVENTIONS  - Monitor and report changes in neurological status  - Monitor vital signs such as temperature, blood pressure, glucose, and any other labs ordered   - Initiate measures to prevent increased intracranial pressure  - Monitor for seizure activity and implement precautions if appropriate      Outcome: Progressing  Goal: Remains free of injury related to seizures activity  Description: INTERVENTIONS  - Maintain airway, patient safety  and administer oxygen as ordered  - Monitor patient for seizure activity, document and report duration and description of seizure to physician/advanced practitioner  - If seizure occurs,  ensure patient safety during seizure  - Reorient patient post seizure  - Seizure pads on all 4 side rails  - Instruct patient/family to notify RN of any seizure activity including if an aura is experienced  - Instruct patient/family to call for assistance with activity based on nursing assessment  - Administer anti-seizure medications if ordered    Outcome: Progressing  Goal: Achieves maximal functionality and self care  Description: INTERVENTIONS  - Monitor swallowing and airway patency with patient fatigue and changes in neurological status  - Encourage and assist patient to increase activity and self care.   - Encourage visually impaired, hearing impaired and aphasic patients to use assistive/communication devices  Outcome: Progressing     Problem: CARDIOVASCULAR - ADULT  Goal: Maintains optimal cardiac output and hemodynamic stability  Description: INTERVENTIONS:  - Monitor I/O, vital signs and rhythm  - Monitor for S/S and trends of decreased cardiac output  - Administer and titrate ordered vasoactive medications to optimize hemodynamic stability  - Assess quality of pulses, skin color and temperature  - Assess for signs of decreased coronary artery perfusion  - Instruct patient to  report change in severity of symptoms  Outcome: Progressing  Goal: Absence of cardiac dysrhythmias or at baseline rhythm  Description: INTERVENTIONS:  - Continuous cardiac monitoring, vital signs, obtain 12 lead EKG if ordered  - Administer antiarrhythmic and heart rate control medications as ordered  - Monitor electrolytes and administer replacement therapy as ordered  Outcome: Progressing     Problem: RESPIRATORY - ADULT  Goal: Achieves optimal ventilation and oxygenation  Description: INTERVENTIONS:  - Assess for changes in respiratory status  - Assess for changes in mentation and behavior  - Position to facilitate oxygenation and minimize respiratory effort  - Oxygen administered by appropriate delivery if ordered  - Initiate smoking cessation education as indicated  - Encourage broncho-pulmonary hygiene including cough, deep breathe, Incentive Spirometry  - Assess the need for suctioning and aspirate as needed  - Assess and instruct to report SOB or any respiratory difficulty  - Respiratory Therapy support as indicated  Outcome: Progressing     Problem: METABOLIC, FLUID AND ELECTROLYTES - ADULT  Goal: Glucose maintained within target range  Description: INTERVENTIONS:  - Monitor Blood Glucose as ordered  - Assess for signs and symptoms of hyperglycemia and hypoglycemia  - Administer ordered medications to maintain glucose within target range  - Assess nutritional intake and initiate nutrition service referral as needed  Outcome: Progressing     Problem: PAIN - ADULT  Goal: Verbalizes/displays adequate comfort level or baseline comfort level  Description: Interventions:  - Encourage patient to monitor pain and request assistance  - Assess pain using appropriate pain scale  - Administer analgesics as ordered based on type and severity of pain and evaluate response  - Implement non-pharmacological measures as appropriate and evaluate response  - Consider cultural and social influences on pain and pain  management  - Notify physician/advanced practitioner if interventions unsuccessful or patient reports new pain  - Educate patient/family on pain management process including their role and importance of  reporting pain   - Provide non-pharmacologic/complimentary pain relief interventions  Outcome: Progressing     Problem: SAFETY ADULT  Goal: Patient will remain free of falls  Description: INTERVENTIONS:  - Educate patient/family on patient safety including physical limitations  - Instruct patient to call for assistance with activity   - Consider consulting OT/PT to assist with strengthening/mobility based on AM PAC & JH-HLM score  - Consult OT/PT to assist with strengthening/mobility   - Keep Call bell within reach  - Keep bed low and locked with side rails adjusted as appropriate  - Keep care items and personal belongings within reach  - Initiate and maintain comfort rounds  - Make Fall Risk Sign visible to staff  - Offer Toileting every 2 Hours, in advance of need  - Initiate/Maintain bed alarm  - Obtain necessary fall risk management equipment  - Apply yellow socks and bracelet for high fall risk patients  - Consider moving patient to room near nurses station  Outcome: Progressing     Problem: Prexisting or High Potential for Compromised Skin Integrity  Goal: Skin integrity is maintained or improved  Description: INTERVENTIONS:  - Identify patients at risk for skin breakdown  - Assess and monitor skin integrity including under and around medical devices   - Assess and monitor nutrition and hydration status  - Monitor labs  - Assess for incontinence   - Turn and reposition patient  - Assist with mobility/ambulation  - Relieve pressure over gael prominences   - Avoid friction and shearing  - Provide appropriate hygiene as needed including keeping skin clean and dry  - Evaluate need for skin moisturizer/barrier cream  - Collaborate with interdisciplinary team  - Patient/family teaching  - Consider wound care  consult    Assess:  - Review Espinoza scale daily  - Clean and moisturize skin every shift  - Inspect skin when repositioning, toileting, and assisting with ADLS  - Assess under medical devices such   - Assess extremities for adequate circulation and sensation     Bed Management:  - Have minimal linens on bed & keep smooth, unwrinkled  - Change linens as needed when moist or perspiring  - Avoid sitting or lying in one position for more than 2 hours while in bed?Keep HOB at 45degrees   - Toileting:  - Offer bedside commode  - Assess for incontinence every 2 hours  - Use incontinent care products after each incontinent episode     Activity:  - Mobilize patient 3 times a day  - Encourage activity and walks on unit  - Encourage or provide ROM exercises   - Turn and reposition patient every 2 Hours  - Use appropriate equipment to lift or move patient in bed  - Instruct/ Assist with weight shifting every 2 hours when out of bed in chair  - Consider limitation of chair time 2 hour intervals    Skin Care:  - Avoid use of baby powder, tape, friction and shearing, hot water or constrictive clothing  - Relieve pressure over bony prominences   - Do not massage red bony areas    Outcome: Progressing

## 2025-06-24 NOTE — ASSESSMENT & PLAN NOTE
84-year-old female with history of systolic heart failure, type 2 diabetes, CLL who presented after syncopal episode while sitting at the dining facility  I attempted to call [facility] myself to obtain more information regarding this event but unfortunately have not been able to speak with anyone.  Reports was unconscious for approximately 10 minutes  Evaluations:  EKG revealed no acute abnormalities however did reveal PVCs on telemetry  Orthostatics + for diastolic hypotension    CT head negative for acute intracranial abnormality  Cardiology consulted, given duration of episode of unresponsiveness it is unlikely it is cardiogenic in nature  Continue to monitor on telemetry while inpatient  Plan for outpatient Zio patch  Limited echo with improved EF of 45%, mild global hypokinesis as seen on prior  Neurology consulted, low suspicion that this is a new onset seizure  Vascular carotid ultrasound study significant stenosis of the ICAs  EEG diffuse nonspecific cerebral dysfunction, will opt not to use initiate AEDs given this is her first occurrence.  Given new O2 requirement/hypoxia will rule out PE  CTA chest PE study negative for PE, discontinue heparin  Continue as needed O2 at [facility]

## 2025-06-24 NOTE — DISCHARGE SUMMARY
Discharge Summary - Hospitalist   Name: Gia Vaughan 84 y.o. female I MRN: [id number]  Unit/Bed#: S -01 I Date of Admission: 6/19/2025   Date of Service: 6/24/2025 I Hospital Day: 4     Assessment & Plan  Syncope  84-year-old female with history of systolic heart failure, type 2 diabetes, CLL who presented after syncopal episode while sitting at the dining facility  I attempted to call [place] myself to obtain more information regarding this event but unfortunately have not been able to speak with anyone.  Reports was unconscious for approximately 10 minutes  Evaluations:  EKG revealed no acute abnormalities however did reveal PVCs on telemetry  Orthostatics + for diastolic hypotension    CT head negative for acute intracranial abnormality  Cardiology consulted, given duration of episode of unresponsiveness it is unlikely it is cardiogenic in nature  Continue to monitor on telemetry while inpatient  Plan for outpatient Zio patch  Limited echo with improved EF of 45%, mild global hypokinesis as seen on prior  Neurology consulted, low suspicion that this is a new onset seizure  Vascular carotid ultrasound study significant stenosis of the ICAs  EEG diffuse nonspecific cerebral dysfunction, will opt not to use initiate AEDs given this is her first occurrence.  Given new O2 requirement/hypoxia will rule out PE  CTA chest PE study negative for PE, discontinue heparin  Continue as needed O2 at [facility]  Acute respiratory failure with hypoxia (HCC)  Patient with O2 saturation of 89% overnight.  Nursing staff noting patient to desaturate to 75-80% when taking off her oxygen  Currently on 1 L NC satting at 95%   CXR with venous pulmonary congestion, pending official read  BNP elevated at 1070  Appears euvolemic at this time   CT chest negative for PE  Pro-Forrest negative  Limited echo with improved EF of 45%  Completed IV diuresis with Lasix 50 mg twice daily, will transition to home Lasix 40 mg daily  Attempt to wean O2 as  tolerated   Acute on chronic systolic heart failure (HCC)  Wt Readings from Last 3 Encounters:   06/24/25 61.1 kg (134 lb 12.8 oz)   05/30/25 60.3 kg (133 lb)   05/17/25 60.8 kg (134 lb 0.6 oz)   Echo in  May 2025 with EF of 35%, severe diastolic dysfunction, akintetic basal, mid and apical inferior segments. Multiple hypokinetic segments  Home regimen: Lasix 40mg daily   Follows with [facility] cardiology in the outpatient setting   Appears euvolemic currently though given new O2 requirements and pulmonary congestion seen on CXR will pursue IV diuresis  Inpatient regimen: IV Lasix 50 mg twice daily, transition back to Lasix 40mg today  Daily weights, I&O's  Continue as needed oxygen at home  Stage 3b chronic kidney disease ([facility])  Creatinine currently at baseline of 0.9-1.2  Continue to monitor closely given recent exposure to contrast on 6/22  Type 2 diabetes mellitus with stage 3 chronic kidney disease, unspecified whether long term insulin use, unspecified whether stage 3a or 3b CKD ([facility])  Lab Results   Component Value Date    HGBA1C 7.5 (H) 05/11/2025   Blood Sugar Average: Last 72 hrs:(P) 296.9362401122270364  Continue Lantus 20 units nightly  Mealtime insulin 5 units 3 times daily  Sliding scale insulin  Patient with transient steroid-induced hyperglycemia yesterday, now improved back to baseline   Primary hypertension  BP currently controlled  Orthostatics + for diastolic hypotension   Continue losartan, Lasix, beta-blocker  CLL (chronic lymphocytic leukemia) ([facility])  History of CLL chronic leukocytosis  Outpatient follow-up with oncology  Dementia ([facility])  Currently at baseline, disoriented x 3      Medical Problems       Resolved Problems  Date Reviewed: 5/11/2025   None       Discharging Physician / Practitioner: Edna Anderson PA-C  PCP: Andrew Pa MD  Admission Date:   Admission Orders (From admission, onward)       Ordered        06/20/25 1534  INPATIENT ADMISSION  Once            06/19/25 2022  Place  in Observation  Once                          Discharge Date: 06/24/25    Next Steps for Physician/AP Assuming Care:  Ensure Zio patch monitoring with cardiology    Consultations During Hospital Stay:  Cardiology, neurology    Procedures Performed:   EEG    Significant Findings / Test Results:   EEG:Theta and delta activities during wakefulness and a slow posterior dominant rhythm suggest mild diffuse nonspecific cerebral dysfunction.   Carotid ultrasound  There is <50% stenosis noted in the internal carotid artery.  Plaque is heterogenous and irregular.    Vertebral artery flow is antegrade.  There is no significant subclavian artery disease.    LEFT:    There is <50% stenosis noted in the internal carotid artery.  Plaque is heterogenous and irregular.    Vertebral artery flow is antegrade.  There is no significant subclavian artery disease.     CXR: Cardiomegaly.  Mild vascular congestion.  Small pleural effusions.  Increased right base atelectasis  CTA chest PE study: No acute pulmonary embolus.  Pulmonary edema small bilateral pleural effusions.  X-ray of the right knee no acute osseous abnormality  CT head without contrast: No acute intracranial normality  Echocardiogram    Left Ventricle: The left ventricular ejection fraction is 45%. Systolic   function is mildly reduced. There is mild global hypokinesis.     Left Atrium: The atrium is dilated.     Mitral Valve: There is mild annular calcification. There is mild   subvalvular calcification. There is moderate regurgitation.     Tricuspid Valve: There is mild regurgitation. The right ventricular   systolic pressure is mildly elevated. The estimated right ventricular   systolic pressure is 37.00 mmHg.     Pericardium: There is a trivial pericardial effusion circumferential to   the heart. The fluid exhibits no internal echoes. There is a left pleural   effusion.     Hospital Course:   Gia Vaughan is a 84 y.o. female patient who originally presented to the  [facility] on 6/[facility]25 due to unresponsive episodes seen at the dining [place] of her assisted living facility.  Per reports patient was unresponsive for 10 minutes.  Patient was admitted to the hospital for further syncope workup, monitor on telemetry with cardiology evaluation who felt given the duration of this syncopal episode it was not cardiogenic in nature.  Telemetry remained uneventful with NSR and frequent PVCs.  She was evaluated by neurology where an EEG was performed as well as a vascular carotid ultrasound study.  EEG was nonspecific and no evidence of seizure was seen.  Carotid ultrasound was unremarkable for significant carotid artery stenosis.  Echocardiogram was obtained given patient with notable acute on chronic heart failure requiring IV diuresis.  Echocardiogram with improved EF and unchanged structural abnormalities.  Patient was weaned off of her oxygen after IV diuresis and transition back to her baseline Lasix.  Patient also underwent CTA PE study which was negative for PE.  It is likely that patient was just difficult to arouse given her old age and dementia.  She should have continued cardiac monitoring in the outpatient setting with cardiology, referral provided.  Patient is medically stable for discharge at this time    Please see above list of diagnoses and related plan for additional information.     Discharge Day Visit / Exam:   Subjective: Patient doing well, denies any shortness of breath chest pain  Vitals: Blood Pressure: 113/58 (06/24/25 1520)  Pulse: 72 (06/24/25 1520)  Temperature: 98.4 °F (36.9 °C) (06/24/25 1520)  Temp Source: Oral (06/24/25 1520)  Respirations: 18 (06/24/25 1104)  Height: 5' 6\" (167.6 cm) (06/23/25 1450)  Weight - Scale: 61.1 kg (134 lb 12.8 oz) (06/24/25 0546)  SpO2: 93 % (06/24/25 1520)  Physical Exam  Vitals and nursing note reviewed.   Constitutional:       General: She is not in acute distress.     Appearance: Normal appearance. She is not ill-appearing. "   HENT:      Mouth/Throat:      Mouth: Mucous membranes are moist.     Cardiovascular:      Rate and Rhythm: Normal rate. Rhythm irregular.      Heart sounds: Normal heart sounds.   Pulmonary:      Effort: Pulmonary effort is normal.      Breath sounds: Normal breath sounds. No rales.      Comments: Saturating well on 1L NC  Abdominal:      General: There is no distension.      Palpations: Abdomen is soft.      Tenderness: There is no abdominal tenderness.     Musculoskeletal:      Right lower leg: No edema.      Left lower leg: No edema.     Skin:     General: Skin is warm and dry.     Neurological:      Mental Status: Mental status is at baseline. She is disoriented.          Discussion with Family: Updated  (nephew) via phone.    Discharge instructions/Information to patient and family:   See after visit summary for information provided to patient and family.      Provisions for Follow-Up Care:  See after visit summary for information related to follow-up care and any pertinent home health orders.      Mobility at time of Discharge:   Basic Mobility Inpatient Raw Score: 20  JH-HLM Goal: 6: Walk 10 steps or more  JH-HLM Achieved: 4: Move to chair/commode  HLM Goal NOT achieved. Continue to encourage mobility in post discharge setting.     Disposition:   Assisted Living Facility at [facility]    Planned Readmission: No    Administrative Statements   Discharge Statement:  I have spent a total time of 65 minutes in caring for this patient on the day of the visit/encounter. >30 minutes of time was spent on: Diagnostic results, Prognosis, Risks and benefits of tx options, Instructions for management, Patient and family education, Counseling / Coordination of care, Documenting in the medical record, Reviewing / ordering tests, medicine, procedures  , and Communicating with other healthcare professionals .    **Please Note: This note may have been constructed using a voice recognition system**

## 2025-06-24 NOTE — ASSESSMENT & PLAN NOTE
Wt Readings from Last 3 Encounters:   06/24/25 61.1 kg (134 lb 12.8 oz)   05/30/25 60.3 kg (133 lb)   05/17/25 60.8 kg (134 lb 0.6 oz)   Echo in  May 2025 with EF of 35%, severe diastolic dysfunction, akintetic basal, mid and apical inferior segments. Multiple hypokinetic segments  Home regimen: Lasix 40mg daily   Follows with [facility] cardiology in the outpatient setting   Appears euvolemic currently though given new O2 requirements and pulmonary congestion seen on CXR will pursue IV diuresis  Inpatient regimen: IV Lasix 50 mg twice daily, transition back to Lasix 40mg today  Daily weights, I&O's  Continue as needed oxygen at home

## 2025-06-24 NOTE — CASE MANAGEMENT
Case Management Discharge Planning Note    Patient name Gia Vaughan  Location S /S -01 MRN [id number]  : 1940 Date 2025       Current Admission Date: 2025  Current Admission Diagnosis:Syncope   Patient Active Problem List    Diagnosis Date Noted    Cardiomyopathy (HCC) 2025    Dementia (HCC) 2025    Syncope 2025    Closed displaced transcondylar fracture of left humerus, initial encounter 2025    Elbow fracture, left, closed, initial encounter 2025    Acute respiratory failure with hypoxia (HCC) 2025    Acute diverticulitis 10/15/2024    Abnormal urinalysis 10/15/2024    Diarrhea 2024    Headache 2024    Suspected deep tissue injury of unknown depth 2022    Encephalopathy 10/24/2022    Deep tissue injury 10/21/2022    CLL (chronic lymphocytic leukemia) (HCC) 10/13/2022    Intertrochanteric fracture of right femur (HCC) 10/11/2022    Displaced fracture of middle phalanx of left ring finger, initial encounter for closed fracture 10/11/2022    Primary hypertension 2021    Moderate dementia (HCC) 2021    Lymphadenopathy 2020    Elevated liver enzymes 2020    Stage 3b chronic kidney disease (HCC) 2020    HLD (hyperlipidemia) 2020    OA (osteoarthritis) 2020    Chronic systolic heart failure (HCC) 2020    Type 2 diabetes mellitus with stage 3 chronic kidney disease, unspecified whether long term insulin use, unspecified whether stage 3a or 3b CKD (HCC)       LOS (days): 4  Geometric Mean LOS (GMLOS) (days): 2.3  Days to GMLOS:-1.7     OBJECTIVE:  Risk of Unplanned Readmission Score: 33.85         Current admission status: Inpatient   Preferred Pharmacy:   UNKNOWN - FOLLOW UP PRIOR TO DISCHARGE TO E-PRESCRIBE  No address on file      [id number] - [place] - [address]  [address]  [place] [address]  Phone: [phone] Fax: [phone]    [id number] -  [place] - [address]  [address] [place] [address]  Phone: [phone] Fax: [phone]    [facility] - [place], [place] - [address]  [address]  [address]  [place] [address]  Phone: [phone] Fax: [phone]    Primary Care Provider: Andrew Pa MD    Primary Insurance: MEDICARE  Secondary Insurance: [employer]    DISCHARGE DETAILS:    Discharge planning discussed with:: pt's nephew Charles  Freedom of Choice: Yes  Comments - Freedom of Choice: Per [facility] - pt medically stable for d/c today pending home O2 need. CM f/u with pt's nephew Charles re: same. Per Charles - pt already has Home O2 she uses PRN provided thru [facility] at [place] [address].  Per Charles he will p/u portable tank from [place] [address] and be to [facility]  by approximately 1630 to bring pt home - nephew requesting all d/c paperwork to be completed and pt ready by this time as he needs to return to work after bringing pt home. CM notified SARAH and RN of same. CM provided update to [place] [address] via phone, s/w [facility] ([phone]), notifying of pt return to facility today.  CM contacted family/caregiver?: Yes             Contacts  Patient Contacts: Charles Ny (Nephew)  Relationship to Patient:: Family  Contact Method: Phone  Phone Number: [phone]  Reason/Outcome: Emergency Contact, Discharge Planning                                                                             Accepting Facility Name, City & State : [address]  Receiving Facility/Agency Phone Number: [facility] [phone] or if unable to reach her call [phone]

## 2025-06-24 NOTE — ASSESSMENT & PLAN NOTE
Lab Results   Component Value Date    HGBA1C 7.5 (H) 05/11/2025   Blood Sugar Average: Last 72 hrs:(P) [id number]  Continue Lantus 20 units nightly  Mealtime insulin 5 units 3 times daily  Sliding scale insulin  Patient with transient steroid-induced hyperglycemia yesterday, now improved back to baseline

## 2025-06-24 NOTE — PLAN OF CARE
Problem: NEUROSENSORY - ADULT  Goal: Achieves stable or improved neurological status  Description: INTERVENTIONS  - Monitor and report changes in neurological status  - Monitor vital signs such as temperature, blood pressure, glucose, and any other labs ordered   - Initiate measures to prevent increased intracranial pressure  - Monitor for seizure activity and implement precautions if appropriate      Outcome: Progressing  Goal: Remains free of injury related to seizures activity  Description: INTERVENTIONS  - Maintain airway, patient safety  and administer oxygen as ordered  - Monitor patient for seizure activity, document and report duration and description of seizure to physician/advanced practitioner  - If seizure occurs,  ensure patient safety during seizure  - Reorient patient post seizure  - Seizure pads on all 4 side rails  - Instruct patient/family to notify RN of any seizure activity including if an aura is experienced  - Instruct patient/family to call for assistance with activity based on nursing assessment  - Administer anti-seizure medications if ordered    Outcome: Progressing  Goal: Achieves maximal functionality and self care  Description: INTERVENTIONS  - Monitor swallowing and airway patency with patient fatigue and changes in neurological status  - Encourage and assist patient to increase activity and self care.   - Encourage visually impaired, hearing impaired and aphasic patients to use assistive/communication devices  Outcome: Progressing     Problem: CARDIOVASCULAR - ADULT  Goal: Maintains optimal cardiac output and hemodynamic stability  Description: INTERVENTIONS:  - Monitor I/O, vital signs and rhythm  - Monitor for S/S and trends of decreased cardiac output  - Administer and titrate ordered vasoactive medications to optimize hemodynamic stability  - Assess quality of pulses, skin color and temperature  - Assess for signs of decreased coronary artery perfusion  - Instruct patient to  report change in severity of symptoms  Outcome: Progressing  Goal: Absence of cardiac dysrhythmias or at baseline rhythm  Description: INTERVENTIONS:  - Continuous cardiac monitoring, vital signs, obtain 12 lead EKG if ordered  - Administer antiarrhythmic and heart rate control medications as ordered  - Monitor electrolytes and administer replacement therapy as ordered  Outcome: Progressing     Problem: RESPIRATORY - ADULT  Goal: Achieves optimal ventilation and oxygenation  Description: INTERVENTIONS:  - Assess for changes in respiratory status  - Assess for changes in mentation and behavior  - Position to facilitate oxygenation and minimize respiratory effort  - Oxygen administered by appropriate delivery if ordered  - Initiate smoking cessation education as indicated  - Encourage broncho-pulmonary hygiene including cough, deep breathe, Incentive Spirometry  - Assess the need for suctioning and aspirate as needed  - Assess and instruct to report SOB or any respiratory difficulty  - Respiratory Therapy support as indicated  Outcome: Progressing     Problem: METABOLIC, FLUID AND ELECTROLYTES - ADULT  Goal: Glucose maintained within target range  Description: INTERVENTIONS:  - Monitor Blood Glucose as ordered  - Assess for signs and symptoms of hyperglycemia and hypoglycemia  - Administer ordered medications to maintain glucose within target range  - Assess nutritional intake and initiate nutrition service referral as needed  Outcome: Progressing     Problem: PAIN - ADULT  Goal: Verbalizes/displays adequate comfort level or baseline comfort level  Description: Interventions:  - Encourage patient to monitor pain and request assistance  - Assess pain using appropriate pain scale  - Administer analgesics as ordered based on type and severity of pain and evaluate response  - Implement non-pharmacological measures as appropriate and evaluate response  - Consider cultural and social influences on pain and pain  management  - Notify physician/advanced practitioner if interventions unsuccessful or patient reports new pain  - Educate patient/family on pain management process including their role and importance of  reporting pain   - Provide non-pharmacologic/complimentary pain relief interventions  Outcome: Progressing     Problem: SAFETY ADULT  Goal: Patient will remain free of falls  Description: INTERVENTIONS:  - Educate patient/family on patient safety including physical limitations  - Instruct patient to call for assistance with activity   - Consider consulting OT/PT to assist with strengthening/mobility based on AM PAC & JH-HLM score  - Consult OT/PT to assist with strengthening/mobility   - Keep Call bell within reach  - Keep bed low and locked with side rails adjusted as appropriate  - Keep care items and personal belongings within reach  - Initiate and maintain comfort rounds  - Make Fall Risk Sign visible to staff  - Offer Toileting every 2 Hours, in advance of need  - Initiate/Maintain alarm  - Obtain necessary fall risk management equipment  - Apply yellow socks and bracelet for high fall risk patients  - Consider moving patient to room near nurses station  Outcome: Progressing     Problem: Prexisting or High Potential for Compromised Skin Integrity  Goal: Skin integrity is maintained or improved  Description: INTERVENTIONS:  - Identify patients at risk for skin breakdown  - Assess and monitor skin integrity including under and around medical devices   - Assess and monitor nutrition and hydration status  - Monitor labs  - Assess for incontinence   - Turn and reposition patient  - Assist with mobility/ambulation  - Relieve pressure over gael prominences   - Avoid friction and shearing  - Provide appropriate hygiene as needed including keeping skin clean and dry  - Evaluate need for skin moisturizer/barrier cream  - Collaborate with interdisciplinary team  - Patient/family teaching  - Consider wound care  consult    Assess:  - Review Espinoza scale daily  - Clean and moisturize skin  - Inspect skin when repositioning, toileting, and assisting with ADLS  - Assess extremities for adequate circulation and sensation     Bed Management:  - Have minimal linens on bed & keep smooth, unwrinkled  - Change linens as needed when moist or perspiring  - Avoid sitting or lying in one position for more than 2  hours while in bed?Keep HOB at 30 degrees   - Toileting:  - Offer bedside commode  - Assess for incontinence   - Use incontinent care products after each incontinent episode    Activity:  - Mobilize patient 3 times a day  - Encourage activity and walks on unit  - Encourage or provide ROM exercises   - Turn and reposition patient every 3 Hours  - Use appropriate equipment to lift or move patient in bed  - Instruct/ Assist with weight shifting every 3 when out of bed in chair  - Consider limitation of chair time 3 hour intervals    Skin Care:  - Avoid use of baby powder, tape, friction and shearing, hot water or constrictive clothing  - Relieve pressure over bony prominences   - Do not massage red bony areas    Next Steps:  - Teach patient strategies to minimize risks   - Consider consults to  interdisciplinary teams   Outcome: Progressing

## 2025-06-24 NOTE — ASSESSMENT & PLAN NOTE
Patient with O2 saturation of 89% overnight.  Nursing staff noting patient to desaturate to 75-80% when taking off her oxygen  Currently on 1 L NC satting at 95%   CXR with venous pulmonary congestion, pending official read  BNP elevated at 1070  Appears euvolemic at this time   CT chest negative for PE  Pro-Forrest negative  Limited echo with improved EF of 45%  Completed IV diuresis with Lasix 50 mg twice daily, will transition to home Lasix 40 mg daily  Attempt to wean O2 as tolerated

## 2025-06-25 ENCOUNTER — TRANSITIONAL CARE MANAGEMENT (OUTPATIENT)
Dept: FAMILY MEDICINE CLINIC | Facility: CLINIC | Age: 85
End: 2025-06-25

## 2025-06-25 DIAGNOSIS — R55 SYNCOPE, UNSPECIFIED SYNCOPE TYPE: Primary | ICD-10-CM

## 2025-06-25 DIAGNOSIS — I50.22 HEART FAILURE WITH MID-RANGE EJECTION FRACTION (HFMEF) (HCC): Chronic | ICD-10-CM

## 2025-06-30 DIAGNOSIS — E11.9 TYPE 2 DIABETES MELLITUS WITHOUT COMPLICATION, WITHOUT LONG-TERM CURRENT USE OF INSULIN (HCC): Primary | ICD-10-CM

## 2025-07-01 ENCOUNTER — APPOINTMENT (OUTPATIENT)
Dept: RADIOLOGY | Facility: HOSPITAL | Age: 85
End: 2025-07-01
Payer: MEDICARE

## 2025-07-01 ENCOUNTER — APPOINTMENT (EMERGENCY)
Dept: CT IMAGING | Facility: HOSPITAL | Age: 85
End: 2025-07-01
Payer: MEDICARE

## 2025-07-01 ENCOUNTER — HOSPITAL ENCOUNTER (INPATIENT)
Facility: HOSPITAL | Age: 85
LOS: 1 days | Discharge: NURSING, REHAB OR LONG-TERM CARE | End: 2025-07-03
Attending: EMERGENCY MEDICINE | Admitting: INTERNAL MEDICINE
Payer: MEDICARE

## 2025-07-01 ENCOUNTER — APPOINTMENT (OUTPATIENT)
Dept: CT IMAGING | Facility: HOSPITAL | Age: 85
End: 2025-07-01
Payer: MEDICARE

## 2025-07-01 ENCOUNTER — APPOINTMENT (EMERGENCY)
Dept: RADIOLOGY | Facility: HOSPITAL | Age: 85
End: 2025-07-01
Payer: MEDICARE

## 2025-07-01 DIAGNOSIS — R29.6 UNWITNESSED FALL: Primary | ICD-10-CM

## 2025-07-01 DIAGNOSIS — R94.31 ST SEGMENT CHANGES ON ELECTROCARDIOGRAM: ICD-10-CM

## 2025-07-01 DIAGNOSIS — S42.402A: ICD-10-CM

## 2025-07-01 LAB
2HR DELTA HS TROPONIN: 4 NG/L
4HR DELTA HS TROPONIN: 0 NG/L
ALBUMIN SERPL BCG-MCNC: 3.8 G/DL (ref 3.5–5)
ALBUMIN SERPL BCG-MCNC: 3.9 G/DL (ref 3.5–5)
ALP SERPL-CCNC: 116 U/L (ref 34–104)
ALP SERPL-CCNC: 116 U/L (ref 34–104)
ALT SERPL W P-5'-P-CCNC: 11 U/L (ref 7–52)
ALT SERPL W P-5'-P-CCNC: 11 U/L (ref 7–52)
ANION GAP SERPL CALCULATED.3IONS-SCNC: 7 MMOL/L (ref 4–13)
ANION GAP SERPL CALCULATED.3IONS-SCNC: 8 MMOL/L (ref 4–13)
ANISOCYTOSIS BLD QL SMEAR: PRESENT
APTT PPP: 27 SECONDS (ref 23–34)
AST SERPL W P-5'-P-CCNC: 14 U/L (ref 13–39)
AST SERPL W P-5'-P-CCNC: 23 U/L (ref 13–39)
ATRIAL RATE: 86 BPM
ATRIAL RATE: 95 BPM
BASOPHILS # BLD MANUAL: 0 THOUSAND/UL (ref 0–0.1)
BASOPHILS NFR MAR MANUAL: 0 % (ref 0–1)
BILIRUB SERPL-MCNC: 0.49 MG/DL (ref 0.2–1)
BILIRUB SERPL-MCNC: 0.54 MG/DL (ref 0.2–1)
BILIRUB UR QL STRIP: NEGATIVE
BLASTS ABSOLUTE COUNT: 1.96 THOUSAND/UL (ref 0–0)
BLASTS NFR BLD MANUAL: 2 %
BUN SERPL-MCNC: 31 MG/DL (ref 5–25)
BUN SERPL-MCNC: 32 MG/DL (ref 5–25)
CALCIUM SERPL-MCNC: 9.4 MG/DL (ref 8.4–10.2)
CALCIUM SERPL-MCNC: 9.4 MG/DL (ref 8.4–10.2)
CARDIAC TROPONIN I PNL SERPL HS: 11 NG/L (ref ?–50)
CARDIAC TROPONIN I PNL SERPL HS: 11 NG/L (ref ?–50)
CARDIAC TROPONIN I PNL SERPL HS: 15 NG/L (ref ?–50)
CHLORIDE SERPL-SCNC: 108 MMOL/L (ref 96–108)
CHLORIDE SERPL-SCNC: 109 MMOL/L (ref 96–108)
CK SERPL-CCNC: 44 U/L (ref 26–192)
CLARITY UR: CLEAR
CO2 SERPL-SCNC: 23 MMOL/L (ref 21–32)
CO2 SERPL-SCNC: 24 MMOL/L (ref 21–32)
COLOR UR: NORMAL
CREAT SERPL-MCNC: 1.31 MG/DL (ref 0.6–1.3)
CREAT SERPL-MCNC: 1.41 MG/DL (ref 0.6–1.3)
D DIMER PPP FEU-MCNC: 1.43 UG/ML FEU
EOSINOPHIL # BLD MANUAL: 0 THOUSAND/UL (ref 0–0.4)
EOSINOPHIL NFR BLD MANUAL: 0 % (ref 0–6)
ERYTHROCYTE [DISTWIDTH] IN BLOOD BY AUTOMATED COUNT: 16.6 % (ref 11.6–15.1)
GFR SERPL CREATININE-BSD FRML MDRD: 33 ML/MIN/1.73SQ M
GFR SERPL CREATININE-BSD FRML MDRD: 37 ML/MIN/1.73SQ M
GLUCOSE SERPL-MCNC: 139 MG/DL (ref 65–140)
GLUCOSE SERPL-MCNC: 142 MG/DL (ref 65–140)
GLUCOSE SERPL-MCNC: 211 MG/DL (ref 65–140)
GLUCOSE SERPL-MCNC: 297 MG/DL (ref 65–140)
GLUCOSE UR STRIP-MCNC: NEGATIVE MG/DL
HCT VFR BLD AUTO: 35.7 % (ref 34.8–46.1)
HGB BLD-MCNC: 11.4 G/DL (ref 11.5–15.4)
HGB UR QL STRIP.AUTO: NEGATIVE
INR PPP: 1.01 (ref 0.85–1.19)
KETONES UR STRIP-MCNC: NEGATIVE MG/DL
LACTATE SERPL-SCNC: 1.5 MMOL/L (ref 0.5–2)
LEUKOCYTE ESTERASE UR QL STRIP: NEGATIVE
LYMPHOCYTES # BLD AUTO: 91.04 THOUSAND/UL (ref 0.6–4.47)
LYMPHOCYTES # BLD AUTO: 93 % (ref 14–44)
MACROCYTES BLD QL AUTO: PRESENT
MCH RBC QN AUTO: 36.2 PG (ref 26.8–34.3)
MCHC RBC AUTO-ENTMCNC: 31.9 G/DL (ref 31.4–37.4)
MCV RBC AUTO: 113 FL (ref 82–98)
MONOCYTES # BLD AUTO: 0 THOUSAND/UL (ref 0–1.22)
MONOCYTES NFR BLD: 0 % (ref 4–12)
NEUTROPHILS # BLD MANUAL: 4.89 THOUSAND/UL (ref 1.85–7.62)
NEUTS SEG NFR BLD AUTO: 5 % (ref 43–75)
NITRITE UR QL STRIP: NEGATIVE
P AXIS: 76 DEGREES
P AXIS: 93 DEGREES
PH UR STRIP.AUTO: 5 [PH]
PLATELET # BLD AUTO: 282 THOUSANDS/UL (ref 149–390)
PLATELET BLD QL SMEAR: ADEQUATE
PMV BLD AUTO: 12.3 FL (ref 8.9–12.7)
POLYCHROMASIA BLD QL SMEAR: PRESENT
POTASSIUM SERPL-SCNC: 5 MMOL/L (ref 3.5–5.3)
POTASSIUM SERPL-SCNC: 6.5 MMOL/L (ref 3.5–5.3)
PR INTERVAL: 198 MS
PR INTERVAL: 216 MS
PROCALCITONIN SERPL-MCNC: 0.27 NG/ML
PROT SERPL-MCNC: 6.6 G/DL (ref 6.4–8.4)
PROT SERPL-MCNC: 6.6 G/DL (ref 6.4–8.4)
PROT UR STRIP-MCNC: NEGATIVE MG/DL
PROTHROMBIN TIME: 14 SECONDS (ref 12.3–15)
QRS AXIS: 12 DEGREES
QRS AXIS: 23 DEGREES
QRSD INTERVAL: 86 MS
QRSD INTERVAL: 86 MS
QT INTERVAL: 364 MS
QT INTERVAL: 386 MS
QTC INTERVAL: 457 MS
QTC INTERVAL: 461 MS
RBC # BLD AUTO: 3.15 MILLION/UL (ref 3.81–5.12)
RBC MORPH BLD: PRESENT
SODIUM SERPL-SCNC: 139 MMOL/L (ref 135–147)
SODIUM SERPL-SCNC: 140 MMOL/L (ref 135–147)
SP GR UR STRIP.AUTO: 1.02 (ref 1–1.03)
T WAVE AXIS: 113 DEGREES
T WAVE AXIS: 116 DEGREES
UROBILINOGEN UR STRIP-ACNC: <2 MG/DL
VENTRICULAR RATE: 86 BPM
VENTRICULAR RATE: 95 BPM
WBC # BLD AUTO: 97.89 THOUSAND/UL (ref 4.31–10.16)

## 2025-07-01 PROCEDURE — 85027 COMPLETE CBC AUTOMATED: CPT

## 2025-07-01 PROCEDURE — 82550 ASSAY OF CK (CPK): CPT

## 2025-07-01 PROCEDURE — 73070 X-RAY EXAM OF ELBOW: CPT

## 2025-07-01 PROCEDURE — NC001 PR NO CHARGE: Performed by: ORTHOPAEDIC SURGERY

## 2025-07-01 PROCEDURE — 96365 THER/PROPH/DIAG IV INF INIT: CPT

## 2025-07-01 PROCEDURE — 71045 X-RAY EXAM CHEST 1 VIEW: CPT

## 2025-07-01 PROCEDURE — 70450 CT HEAD/BRAIN W/O DYE: CPT

## 2025-07-01 PROCEDURE — 96375 TX/PRO/DX INJ NEW DRUG ADDON: CPT

## 2025-07-01 PROCEDURE — 72125 CT NECK SPINE W/O DYE: CPT

## 2025-07-01 PROCEDURE — 87081 CULTURE SCREEN ONLY: CPT | Performed by: INTERNAL MEDICINE

## 2025-07-01 PROCEDURE — 82948 REAGENT STRIP/BLOOD GLUCOSE: CPT

## 2025-07-01 PROCEDURE — 87040 BLOOD CULTURE FOR BACTERIA: CPT

## 2025-07-01 PROCEDURE — 73700 CT LOWER EXTREMITY W/O DYE: CPT

## 2025-07-01 PROCEDURE — 96361 HYDRATE IV INFUSION ADD-ON: CPT

## 2025-07-01 PROCEDURE — 93005 ELECTROCARDIOGRAM TRACING: CPT

## 2025-07-01 PROCEDURE — 81003 URINALYSIS AUTO W/O SCOPE: CPT

## 2025-07-01 PROCEDURE — 73502 X-RAY EXAM HIP UNI 2-3 VIEWS: CPT

## 2025-07-01 PROCEDURE — 85730 THROMBOPLASTIN TIME PARTIAL: CPT

## 2025-07-01 PROCEDURE — 36415 COLL VENOUS BLD VENIPUNCTURE: CPT

## 2025-07-01 PROCEDURE — 84484 ASSAY OF TROPONIN QUANT: CPT

## 2025-07-01 PROCEDURE — 73080 X-RAY EXAM OF ELBOW: CPT

## 2025-07-01 PROCEDURE — 71275 CT ANGIOGRAPHY CHEST: CPT

## 2025-07-01 PROCEDURE — 73090 X-RAY EXAM OF FOREARM: CPT

## 2025-07-01 PROCEDURE — 73564 X-RAY EXAM KNEE 4 OR MORE: CPT

## 2025-07-01 PROCEDURE — 99223 1ST HOSP IP/OBS HIGH 75: CPT | Performed by: STUDENT IN AN ORGANIZED HEALTH CARE EDUCATION/TRAINING PROGRAM

## 2025-07-01 PROCEDURE — 73552 X-RAY EXAM OF FEMUR 2/>: CPT

## 2025-07-01 PROCEDURE — 73060 X-RAY EXAM OF HUMERUS: CPT

## 2025-07-01 PROCEDURE — 74176 CT ABD & PELVIS W/O CONTRAST: CPT

## 2025-07-01 PROCEDURE — 83605 ASSAY OF LACTIC ACID: CPT

## 2025-07-01 PROCEDURE — 94644 CONT INHLJ TX 1ST HOUR: CPT

## 2025-07-01 PROCEDURE — 99285 EMERGENCY DEPT VISIT HI MDM: CPT

## 2025-07-01 PROCEDURE — 85610 PROTHROMBIN TIME: CPT

## 2025-07-01 PROCEDURE — 84145 PROCALCITONIN (PCT): CPT

## 2025-07-01 PROCEDURE — 80053 COMPREHEN METABOLIC PANEL: CPT

## 2025-07-01 PROCEDURE — 85379 FIBRIN DEGRADATION QUANT: CPT

## 2025-07-01 PROCEDURE — 85007 BL SMEAR W/DIFF WBC COUNT: CPT

## 2025-07-01 PROCEDURE — 93010 ELECTROCARDIOGRAM REPORT: CPT | Performed by: INTERNAL MEDICINE

## 2025-07-01 RX ORDER — HEPARIN SODIUM 5000 [USP'U]/ML
5000 INJECTION, SOLUTION INTRAVENOUS; SUBCUTANEOUS EVERY 8 HOURS SCHEDULED
Status: DISCONTINUED | OUTPATIENT
Start: 2025-07-01 | End: 2025-07-03 | Stop reason: HOSPADM

## 2025-07-01 RX ORDER — INSULIN LISPRO 100 [IU]/ML
5 INJECTION, SOLUTION INTRAVENOUS; SUBCUTANEOUS
Status: DISCONTINUED | OUTPATIENT
Start: 2025-07-01 | End: 2025-07-03 | Stop reason: HOSPADM

## 2025-07-01 RX ORDER — METOPROLOL SUCCINATE 25 MG/1
25 TABLET, EXTENDED RELEASE ORAL EVERY 12 HOURS
Status: DISCONTINUED | OUTPATIENT
Start: 2025-07-02 | End: 2025-07-03 | Stop reason: HOSPADM

## 2025-07-01 RX ORDER — LOSARTAN POTASSIUM 25 MG/1
25 TABLET ORAL DAILY
Status: DISCONTINUED | OUTPATIENT
Start: 2025-07-02 | End: 2025-07-03 | Stop reason: HOSPADM

## 2025-07-01 RX ORDER — ATORVASTATIN CALCIUM 20 MG/1
20 TABLET, FILM COATED ORAL
Status: DISCONTINUED | OUTPATIENT
Start: 2025-07-01 | End: 2025-07-03 | Stop reason: HOSPADM

## 2025-07-01 RX ORDER — FUROSEMIDE 10 MG/ML
50 INJECTION INTRAMUSCULAR; INTRAVENOUS ONCE
Status: COMPLETED | OUTPATIENT
Start: 2025-07-01 | End: 2025-07-01

## 2025-07-01 RX ORDER — ACETAMINOPHEN 10 MG/ML
1000 INJECTION, SOLUTION INTRAVENOUS ONCE
Status: COMPLETED | OUTPATIENT
Start: 2025-07-01 | End: 2025-07-01

## 2025-07-01 RX ORDER — ACETAMINOPHEN 325 MG/1
975 TABLET ORAL 3 TIMES DAILY PRN
Status: DISCONTINUED | OUTPATIENT
Start: 2025-07-01 | End: 2025-07-03 | Stop reason: HOSPADM

## 2025-07-01 RX ORDER — DIPHENHYDRAMINE HYDROCHLORIDE 50 MG/ML
50 INJECTION, SOLUTION INTRAMUSCULAR; INTRAVENOUS ONCE
Status: COMPLETED | OUTPATIENT
Start: 2025-07-01 | End: 2025-07-01

## 2025-07-01 RX ORDER — INSULIN LISPRO 100 [IU]/ML
1-5 INJECTION, SOLUTION INTRAVENOUS; SUBCUTANEOUS
Status: DISCONTINUED | OUTPATIENT
Start: 2025-07-01 | End: 2025-07-03 | Stop reason: HOSPADM

## 2025-07-01 RX ORDER — CALCIUM GLUCONATE 20 MG/ML
1 INJECTION, SOLUTION INTRAVENOUS ONCE
Status: DISCONTINUED | OUTPATIENT
Start: 2025-07-01 | End: 2025-07-01

## 2025-07-01 RX ORDER — ALBUTEROL SULFATE 0.83 MG/ML
10 SOLUTION RESPIRATORY (INHALATION) ONCE
Status: COMPLETED | OUTPATIENT
Start: 2025-07-01 | End: 2025-07-01

## 2025-07-01 RX ORDER — INSULIN GLARGINE 100 [IU]/ML
10 INJECTION, SOLUTION SUBCUTANEOUS
Status: DISCONTINUED | OUTPATIENT
Start: 2025-07-01 | End: 2025-07-03 | Stop reason: HOSPADM

## 2025-07-01 RX ORDER — FUROSEMIDE 10 MG/ML
40 INJECTION INTRAMUSCULAR; INTRAVENOUS
Status: DISCONTINUED | OUTPATIENT
Start: 2025-07-01 | End: 2025-07-02

## 2025-07-01 RX ORDER — LANOLIN ALCOHOL/MO/W.PET/CERES
400 CREAM (GRAM) TOPICAL DAILY
Status: DISCONTINUED | OUTPATIENT
Start: 2025-07-01 | End: 2025-07-03 | Stop reason: HOSPADM

## 2025-07-01 RX ORDER — METHYLPREDNISOLONE SODIUM SUCCINATE 125 MG/2ML
125 INJECTION, POWDER, LYOPHILIZED, FOR SOLUTION INTRAMUSCULAR; INTRAVENOUS ONCE
Status: COMPLETED | OUTPATIENT
Start: 2025-07-01 | End: 2025-07-01

## 2025-07-01 RX ADMIN — FUROSEMIDE 40 MG: 10 INJECTION, SOLUTION INTRAMUSCULAR; INTRAVENOUS at 16:43

## 2025-07-01 RX ADMIN — METHYLPREDNISOLONE SODIUM SUCCINATE 125 MG: 125 INJECTION, POWDER, FOR SOLUTION INTRAMUSCULAR; INTRAVENOUS at 04:56

## 2025-07-01 RX ADMIN — DIPHENHYDRAMINE HYDROCHLORIDE 50 MG: 50 INJECTION, SOLUTION INTRAMUSCULAR; INTRAVENOUS at 07:47

## 2025-07-01 RX ADMIN — INSULIN LISPRO 2 UNITS: 100 INJECTION, SOLUTION INTRAVENOUS; SUBCUTANEOUS at 16:40

## 2025-07-01 RX ADMIN — INSULIN GLARGINE 10 UNITS: 100 INJECTION, SOLUTION SUBCUTANEOUS at 22:57

## 2025-07-01 RX ADMIN — FUROSEMIDE 50 MG: 10 INJECTION, SOLUTION INTRAVENOUS at 11:00

## 2025-07-01 RX ADMIN — ALBUTEROL SULFATE 10 MG: 2.5 SOLUTION RESPIRATORY (INHALATION) at 05:00

## 2025-07-01 RX ADMIN — INSULIN LISPRO 5 UNITS: 100 INJECTION, SOLUTION INTRAVENOUS; SUBCUTANEOUS at 16:40

## 2025-07-01 RX ADMIN — HEPARIN SODIUM 5000 UNITS: 5000 INJECTION INTRAVENOUS; SUBCUTANEOUS at 22:57

## 2025-07-01 RX ADMIN — HEPARIN SODIUM 5000 UNITS: 5000 INJECTION INTRAVENOUS; SUBCUTANEOUS at 14:20

## 2025-07-01 RX ADMIN — ACETAMINOPHEN 1000 MG: 10 INJECTION INTRAVENOUS at 03:45

## 2025-07-01 RX ADMIN — IOHEXOL 85 ML: 350 INJECTION, SOLUTION INTRAVENOUS at 09:24

## 2025-07-01 RX ADMIN — SODIUM CHLORIDE 1000 ML: 0.9 INJECTION, SOLUTION INTRAVENOUS at 04:53

## 2025-07-01 NOTE — CONSULTS
Consultation - Orthopedics   Name: Gia Vaughan 84 y.o. female I MRN: [id number]  Unit/Bed#: ED-28 I Date of Admission: 7/1/2025   Date of Service: 7/1/2025 I Hospital Day: 0   Inpatient consult to Orthopedic Surgery  Consult performed by: Steve Hill MD  Consult ordered by: BRINDA Keys        Physician Requesting Evaluation: Karli Del Valle MD   Reason for Evaluation / Principal Problem: Unwitnessed fall    Assessment & Plan  Unwitnessed fall  No acute traumatic injury, admitted to medicine for syncopal workup.  Closed fracture of distal end of left humerus  84 year old Female with severe dementia and history of nondisplaced, extra-articular left distal humerus fracture sustained on 5/12/2025 treated non-operatively. She is presenting after an unwitnessed fall at her facility. Orthopedics consulted to evaluate left distal humerus fracture. On examination, no pain with elbow motion and non tender to palpation throughout left elbow. On Xray, interval callus formation at previous non-displaced extra-articular distal humerus fracture. No interval displacement. No additional fractures, no dislocation. She can continue to be treated non-operatively.  WBAT LUE   ROM L Elbow without restrictions  PT/OT for elbow motion   Outpatient follow-up with Dr. Thompson  Dispo: per primary team pending syncope workup     History of Present Illness   HPI: Gia Vaughan is a 84 y.o. year old female who presents after an unwitnessed fall at her nursing home. Past medical history of dementia, admitted to medicine for syncopal workup. She has a history of a left nondisplaced closed left distal humerus fracture sustained in May 2025 that was treated non-operatively at that time. Most recent orthopedic evaluation was on 5/22/2025 with Dr. Thompson. At that time, pt NWB with hinged elbow brace. Pt unable to provide history due to severe dementia. She is not complaining of any issues.    Review of Systems significant for findings  "described in the HPI.    Historical Information   Past Medical History[1]  Past Surgical History[2]  Social History[3]  E-Cigarette/Vaping    E-Cigarette Use Never User      E-Cigarette/Vaping Substances    Nicotine No     THC No     CBD No     Flavoring No     Other No     Unknown No      Objective :  Temp:  [97.6 °F (36.4 °C)] 97.6 °F (36.4 °C)  HR:  [68-85] 82  BP: (123-138)/(58-61) 138/61  Resp:  [18-20] 18  SpO2:  [92 %-98 %] 93 %  O2 Device: None (Room air)  Nasal Cannula O2 Flow Rate (L/min):  [3 L/min] 3 L/min    Physical ExamOrtho Exam   Musculoskeletal:   LUE:  No deformity   No erythema or ecchymosis  No TTP throughout left elbow and LUE  Elbow passive ROM from 20 to 90 degrees  50-50 passive supination-pronation   Active ROM could not be evaluated due to poor patient mentation   Unable to assess detailed motor or sensory exam due to patient mentation but patient observed retropulsing thumb, flexing thumb IP joint  Palpable radial pulse       Lab Results: I have reviewed the following results:   Recent Labs     07/01/25  0343 07/01/25  0445   WBC 97.89*  --    HGB 11.4*  --    HCT 35.7  --      --    BUN 32* 31*   CREATININE 1.41* 1.31*   PTT 27  --    INR 1.01  --      Blood Culture:   Lab Results   Component Value Date    BLOODCX Received in Microbiology Lab. Culture in Progress. 07/01/2025     Wound Culture: No results found for: \"WOUNDCULT\"    Imaging Results Review: I personally reviewed the following image studies/reports in PACS and discussed pertinent findings with Radiology. My interpretation of the radiology images/reports is:     XR L Elbow: Interval callus formation at previous non-displaced extra-articular distal humerus fracture. No interval displacement. No additional fractures, no dislocation.         [1]   Past Medical History:  Diagnosis Date    Acute encephalopathy 10/15/2024    Acute postoperative pain of right knee 11/02/2022    ACUTE RESPIRATORY FAILURE, UNSP W HYPOXIA OR " HYPERCAPNIA 05/11/2025    Benign adenomatous polyp of large intestine     CHF (congestive heart failure) ([facility])     COVID 09/01/2024    Diabetes mellitus (HCC)     Fall 11/05/2020    Heart failure with mid-range ejection fraction (HFmEF) ([facility]) 11/05/2020    Hyperlipemia     Hypertension     Hypertensive urgency 04/02/2024    Metabolic encephalopathy 10/15/2024    Orthostasis 10/19/2022    Osteoarthritis     TIA (transient ischemic attack)    [2]   Past Surgical History:  Procedure Laterality Date    APPENDECTOMY      CARPAL TUNNEL RELEASE Right     HYSTERECTOMY W/ SALPINGO-OOPHERECTOMY      OH OPTX FEM SHFT FX W/INSJ IMED IMPLT W/WO SCREW Right 10/12/2022    Procedure: INSERTION NAIL IM FEMUR ANTEGRADE (TROCHANTERIC);  Surgeon: Eric Isaacs DO;  Location: [facility];  Service: Orthopedics   [3]   Social History  Tobacco Use    Smoking status: Never    Smokeless tobacco: Never   Vaping Use    Vaping status: Never Used   Substance and Sexual Activity    Alcohol use: Not Currently     Comment: very seldom    Drug use: Never

## 2025-07-01 NOTE — ASSESSMENT & PLAN NOTE
Wt Readings from Last 3 Encounters:   07/01/25 60.7 kg (133 lb 13.1 oz)   06/24/25 61.1 kg (134 lb 12.8 oz)   05/30/25 60.3 kg (133 lb)     Present on admission history of cardiomyopathy with EF 35% previously which has now improved to 45% on recent echo of unclear etiology.  Home regimen includes Lasix 40 mg daily, losartan 25 mg daily, Toprol-XL 25 mg twice daily.  P.o. potassium supplement 20 meq twice daily    Chest x-ray personally reviewed noted without significant volume overload.  CTA PE study report reviewed noted with finding of mild interstitial edema.  Received dose of IV Lasix 50 mg in the emergency room.    Currently hemodynamically stable.  Does not appear to be overtly volume overloaded.   O2 saturation 94% on 3 L nasal cannula.  Patient uses home oxygen as needed as per [place].  Continue IV Lasix 40 mg twice daily  Resume home regimen.  Continue with low-salt, fluid restricted diet.  Monitor I&O.

## 2025-07-01 NOTE — PLAN OF CARE
Problem: Potential for Falls  Goal: Patient will remain free of falls  Description: INTERVENTIONS:  - Educate patient/family on patient safety including physical limitations  - Instruct patient to call for assistance with activity   - Consider consulting OT/PT to assist with strengthening/mobility based on AM PAC & JH-HLM score  - Consult OT/PT to assist with strengthening/mobility   - Keep Call bell within reach  - Keep bed low and locked with side rails adjusted as appropriate  - Keep care items and personal belongings within reach  - Initiate and maintain comfort rounds  - Make Fall Risk Sign visible to staff  - Offer Toileting every 2 Hours, in advance of need  - Initiate/Maintain bed/chair alarm  - Obtain necessary fall risk management equipment  - Apply yellow socks and bracelet for high fall risk patients  - Consider moving patient to room near nurses station  Outcome: Progressing     Problem: PAIN - ADULT  Goal: Verbalizes/displays adequate comfort level or baseline comfort level  Description: Interventions:  - Encourage patient to monitor pain and request assistance  - Assess pain using appropriate pain scale  - Administer analgesics as ordered based on type and severity of pain and evaluate response  - Implement non-pharmacological measures as appropriate and evaluate response  - Consider cultural and social influences on pain and pain management  - Notify physician/advanced practitioner if interventions unsuccessful or patient reports new pain  - Educate patient/family on pain management process including their role and importance of  reporting pain   - Provide non-pharmacologic/complimentary pain relief interventions  Outcome: Progressing     Problem: INFECTION - ADULT  Goal: Absence or prevention of progression during hospitalization  Description: INTERVENTIONS:  - Assess and monitor for signs and symptoms of infection  - Monitor lab/diagnostic results  - Monitor all insertion sites, i.e. indwelling  lines, tubes, and drains  - Monitor endotracheal if appropriate and nasal secretions for changes in amount and color  - [place] appropriate cooling/warming therapies per order  - Administer medications as ordered  - Instruct and encourage patient and family to use good hand hygiene technique  - Identify and instruct in appropriate isolation precautions for identified infection/condition  Outcome: Progressing  Goal: Absence of fever/infection during neutropenic period  Description: INTERVENTIONS:  - Monitor WBC  - Perform strict hand hygiene  - Limit to healthy visitors only  - No plants, dried, fresh or silk flowers with alcaraz in patient room  Outcome: Progressing     Problem: SAFETY ADULT  Goal: Patient will remain free of falls  Description: INTERVENTIONS:  - Educate patient/family on patient safety including physical limitations  - Instruct patient to call for assistance with activity   - Consider consulting OT/PT to assist with strengthening/mobility based on AM PAC & -HLM score  - Consult OT/PT to assist with strengthening/mobility   - Keep Call bell within reach  - Keep bed low and locked with side rails adjusted as appropriate  - Keep care items and personal belongings within reach  - Initiate and maintain comfort rounds  - Make Fall Risk Sign visible to staff  - Offer Toileting every 2 Hours, in advance of need  - Initiate/Maintain bed/chair alarm  - Obtain necessary fall risk management equipment:   - Apply yellow socks and bracelet for high fall risk patients  - Consider moving patient to room near nurses station  Outcome: Progressing  Goal: Maintain or return to baseline ADL function  Description: INTERVENTIONS:  -  Assess patient's ability to carry out ADLs; assess patient's baseline for ADL function and identify physical deficits which impact ability to perform ADLs (bathing, care of mouth/teeth, toileting, grooming, dressing, etc.)  - Assess/evaluate cause of self-care deficits   - Assess range of  motion  - Assess patient's mobility; develop plan if impaired  - Assess patient's need for assistive devices and provide as appropriate  - Encourage maximum independence but intervene and supervise when necessary  - Involve family in performance of ADLs  - Assess for home care needs following discharge   - Consider OT consult to assist with ADL evaluation and planning for discharge  - Provide patient education as appropriate  - Monitor functional capacity and physical performance, use of AM PAC & JH-HLM   - Monitor gait, balance and fatigue with ambulation    Outcome: Progressing  Goal: Maintains/Returns to pre admission functional level  Description: INTERVENTIONS:  - Perform AM-PAC 6 Click Basic Mobility/ Daily Activity assessment daily.  - Set and communicate daily mobility goal to care team and patient/family/caregiver.   - Collaborate with rehabilitation services on mobility goals if consulted  - Perform Range of Motion 3 times a day.  - Reposition patient every 2 hours.  - Dangle patient 3 times a day  - Stand patient 3 times a day  - Ambulate patient 3 times a day  - Out of bed to chair 3 times a day   - Out of bed for meals 3 times a day  - Out of bed for toileting  - Record patient progress and toleration of activity level   Outcome: Progressing     Problem: DISCHARGE PLANNING  Goal: Discharge to home or other facility with appropriate resources  Description: INTERVENTIONS:  - Identify barriers to discharge w/patient and caregiver  - Arrange for needed discharge resources and transportation as appropriate  - Identify discharge learning needs (meds, wound care, etc.)  - Arrange for interpretive services to assist at discharge as needed  - Refer to Case Management Department for coordinating discharge planning if the patient needs post-hospital services based on physician/advanced practitioner order or complex needs related to functional status, cognitive ability, or social support system  Outcome:  Progressing     Problem: Knowledge Deficit  Goal: Patient/family/caregiver demonstrates understanding of disease process, treatment plan, medications, and discharge instructions  Description: Complete learning assessment and assess knowledge base.  Interventions:  - Provide teaching at level of understanding  - Provide teaching via preferred learning methods  Outcome: Progressing

## 2025-07-01 NOTE — H&P
H&P - Hospitalist   Name: Gia Vaughan 84 y.o. female I MRN: [id number]  Unit/Bed#: ED-28 I Date of Admission: 7/1/2025   Date of Service: 7/1/2025 I Hospital Day: 0     Assessment & Plan  Unwitnessed fall  84-year-old female patient with history of syncope and fall and recent hospitalization at [facility] again presents with unwitnessed fall at [facility].   Unclear how long patient was down for.  Patient was found in the floor by EMS.  Patient is complaining of left shoulder and left arm pain and left hip and left leg pain.  Patient is poor historian.  Case was discussed with trauma team per ED and recommended admission under SLIM.    CBC reviewed noted with chronic elevated WBC  CMP reviewed with creatinine 1.31.  EKG personally reviewed and interpreted noted with normal sinus rhythm.  HS trop negative x 3  CK WNL  CT head, CT cervical spine, CT on pelvis report reviewed.  UA negative for UTI.  Multiple trauma x-ray pending.    Unwitnessed fall of due to unclear etiology vs syncope.   Patient is poor historian.  Maintain on fall precaution.  PT OT eval.  Continue telemetry monitoring.  Orthostatic vitals monitoring.   Patient was supposed to have zio patch starting 7/2/2025  Spoke with caleb Soto over the phone at length regarding patient may need to go to long-term care facility rather than group home.   Level 3 DNR/DNI as per caleb Soto.     Acute on chronic HFrEF (heart failure with reduced ejection fraction) ([facility])  Wt Readings from Last 3 Encounters:   07/01/25 60.7 kg (133 lb 13.1 oz)   06/24/25 61.1 kg (134 lb 12.8 oz)   05/30/25 60.3 kg (133 lb)     Present on admission history of cardiomyopathy with EF 35% previously which has now improved to 45% on recent echo of unclear etiology.  Home regimen includes Lasix 40 mg daily, losartan 25 mg daily, Toprol-XL 25 mg twice daily.  P.o. potassium supplement 20 meq twice daily    Chest x-ray personally reviewed noted without  significant volume overload.  CTA PE study report reviewed noted with finding of mild interstitial edema.  Received dose of IV Lasix 50 mg in the emergency room.    Currently hemodynamically stable.  Does not appear to be overtly volume overloaded.   O2 saturation 94% on 3 L nasal cannula.  Patient uses home oxygen as needed as per Charles.  Continue IV Lasix 40 mg twice daily  Resume home regimen.  Continue with low-salt, fluid restricted diet.  Monitor I&O.    Closed fracture of distal end of left humerus  History of minimally displaced left distal humeral fracture since 05/2025  Patient was seen by orthopedics on this encounter and currently recommended weightbearing as tolerated left upper extremity, range of motion of left elbow without restriction and recommended PT OT to evaluate for elbow motion.  Recommend outpatient follow-up with orthopedics.  Stage 3b chronic kidney disease (HCC)  Lab Results   Component Value Date    EGFR 37 07/01/2025    EGFR 33 07/01/2025    EGFR 41 06/23/2025    CREATININE 1.31 (H) 07/01/2025    CREATININE 1.41 (H) 07/01/2025    CREATININE 1.20 06/23/2025     Present on admission with history of CKD stage III.  Baseline creatinine ranges from 0.9-1.2  Presented with creatinine 1.41 currently 1.31.  Continue IV Lasix and monitor renal function.  Repeat BMP tomorrow Am.  HLD (hyperlipidemia)  Present admission with hyperlipidemia.  Resume home dose of atorvastatin 20 g at bedtime.  Primary hypertension  Present on admission history of hypertension.  Currently controlled  Continue home dose of losartan 20 mg daily, Toprol-XL 25 mg twice daily    CLL (chronic lymphocytic leukemia) (HCC)  History of CLL.  WBC noted chronically elevated  Currently 97,000 likely elevated from baseline due to fall.   UA negative for UTI.  Repeat CBC tomorrow am.  Outpatient follow-up with heme-onc.  Type 2 diabetes mellitus with stage 3 chronic kidney disease, unspecified whether long term insulin use,  unspecified whether stage 3a or 3b CKD (HCC)  Lab Results   Component Value Date    HGBA1C 7.5 (H) 05/11/2025       Recent Labs     07/01/25  1135   POCGLU 211*       Blood Sugar Average: Last 72 hrs:  (P) 211    Present on admission history of diabetes.  Most recent A1c 7.5.    Controlled.    Home regimen includes glargine 20U nightly, lispro 5 U 3 times daily with meals.  Resume insulin at lower dose to prevent hypoglycemia and adjust as needed based on Accu-Cheks and sliding-scale coverage.      VTE Pharmacologic Prophylaxis:   Moderate Risk (Score 3-4) - Pharmacological DVT Prophylaxis Ordered: heparin.  Code Status: Level 3 - DNAR and DNI   Discussion with family: Updated  (nephew) via phone.  Spoke with Charles over the phone at length.    Anticipated Length of Stay: Patient will be admitted on an observation basis with an anticipated length of stay of less than 2 midnights secondary to unwitnessed fall, mild CHF exacerbation.    History of Present Illness   Chief Complaint: Unwitnessed fall at [facility]    Gia Vaughan is a 84 y.o. female with a PMH of with past medical history of cardiomyopathy with EF of 35% previously now improved to 45% of unclear etiology, hypertension, chronic home O2 dependent uses as needed per nephew, hyperlipidemia, diabetes, CKD stage III, CLL, dementia, currently resides at [facility]. patient was recently hospitalized at [facility] due to syncopal episode and was seen by cardiology recommendation was outpatient Zio patch and was just discharged on 6/24/2025 who now presents on 7/1/2025 after unwitnessed fall at living facility, patient is poor historian.  History obtained from reviewing chart and discussion with ED staff.  As per ED patient was found by EMS laying on the floor outside of her bedroom unsure if it was a fall or syncopal event.  Unsure how long patient was on the floor for.  Patient was given IV Benadryl in ED for CT scan and  currently she is drowsy during my encounter.  Wakes up appropriately with verbal stimuli however poor historian and disoriented.  Does not offer any complaints during my encounter.  No other events reported..    Review of Systems   Unable to perform ROS: Dementia       Historical Information   Past Medical History[1]  Past Surgical History[2]  Social History[3]  E-Cigarette/Vaping    E-Cigarette Use Never User      E-Cigarette/Vaping Substances    Nicotine No     THC No     CBD No     Flavoring No     Other No     Unknown No      Family History[4]  Social History:  Marital Status:      Meds/Allergies   I have reviewed home medications with a medical source (PCP, Pharmacy, other).  Prior to Admission medications    Medication Sig Start Date End Date Taking? Authorizing Provider   acetaminophen (TYLENOL) 325 mg tablet Take 3 tablets (975 mg total) by mouth 3 (three) times a day as needed for mild pain, moderate pain or severe pain 5/17/25   Franky Nichols MD   Alcohol Swabs (B-D SINGLE USE SWABS REGULAR) PADS USE AS DIRECTED 5/6/25   Andrew Pa MD   atorvastatin (LIPITOR) 20 mg tablet Take 20 mg by mouth in the morning.    Historical Provider, MD   Calcium Carb-Cholecalciferol (calcium carbonate-vitamin D) 500 mg-5 mcg tablet TAKE ONE TABLET BY MOUTH TWICE DAILY DX: SUPPLEMENT 3/31/25   Andrew Pa MD   calcium carbonate (OS-JUAN RAMON) 600 MG tablet Take 1 tablet (600 mg total) by mouth in the morning and 1 tablet (600 mg total) in the evening. Take with meals. 5/22/25 6/21/25  Axel Cano MD   carbamide peroxide (FT Earwax Removal) 6.5 % otic solution INSERT FOUR DROPS INTO BOTH EARS AT BEDTIME FOR 2 WEEKS DX: WAX BUILD UP 11/6/24   Andrew Pa MD   cholecalciferol (VITAMIN D3) 1,000 units tablet Take 1,000 Units by mouth in the morning.    Historical Provider, MD   Continuous Blood Gluc Sensor (FreeStyle Дмитрий 2 Sensor) MISC  1/24/24   Historical Provider, MD   Diclofenac Sodium  (VOLTAREN) 1 % Apply 2 g topically 3 (three) times a day To R knee 11/8/22   Ashley Depadua, MD   fluticasone (FLONASE) 50 mcg/act nasal spray INSTILL ONE SPRAY INTO BOTH NOSTRILS DAILY FOR 1 WEEK AND THEN AS NEEDED DX: ALLERGIES 2/24/25   Andrew Pa MD   furosemide (LASIX) 20 mg tablet Take 2 tablets (40 mg total) by mouth daily 5/17/25   Franky Nichols MD   glucose blood test strip Use 1 each daily as needed Use as instructed    Historical Provider, MD   glucose monitoring kit (FREESTYLE) monitoring kit Use 1 each as needed    Historical Provider, MD   Insulin Glargine Solostar (Lantus SoloStar) 100 UNIT/ML SOPN Inject 0.2 mL (20 Units total) under the skin daily at bedtime 5/17/25   Kalpana Gordon MD   insulin lispro (HumaLOG) 100 units/mL injection Inject 5 Units under the skin 3 (three) times a day with meals 11/8/22   Ashley Depadua, MD   losartan (COZAAR) 25 mg tablet Take 1 tablet (25 mg total) by mouth daily 3/7/25   Kelly Alfaro MD   magnesium Oxide (MAG-OX) 400 mg TABS Take 1 tablet (400 mg total) by mouth daily for 14 days 3/6/25 6/19/25  Kelly Alfaro MD   metoprolol succinate (TOPROL-XL) 25 mg 24 hr tablet Take 1 tablet (25 mg total) by mouth every 12 (twelve) hours 3/6/25   Kelly Alfaro MD   potassium chloride (Klor-Con M20) 20 mEq tablet Take 1 tablet (20 mEq total) by mouth 2 (two) times a day 12/30/24   Andrew Pa MD     Allergies   Allergen Reactions    Amlodipine     Ceftin [Cefuroxime]     Ciprofloxacin     Citalopram     Dye [Iodinated Contrast Media]     Glucophage [Metformin]     Januvia [Sitagliptin]     Neomycin-Polymyxin-Dexameth     Ondansetron     Prilosec [Omeprazole]     Vibramycin [Doxycycline]        Objective :  Temp:  [97.6 °F (36.4 °C)] 97.6 °F (36.4 °C)  HR:  [68-85] 82  BP: (123-138)/(58-61) 138/61  Resp:  [18-20] 18  SpO2:  [92 %-98 %] 93 %  O2 Device: None (Room air)  Nasal Cannula O2 Flow Rate (L/min):  [3 L/min] 3 L/min    Physical  Exam  Constitutional:       General: She is not in acute distress.     Appearance: She is not toxic-appearing.      Comments: Atrial fibrillation, deconditioning, acutely nontoxic-appearing.     Cardiovascular:      Rate and Rhythm: Normal rate.      Pulses: Normal pulses.   Pulmonary:      Effort: Pulmonary effort is normal. No respiratory distress.      Breath sounds: No wheezing.      Comments: O2 saturation 93 to 94% on 3 L nasal cannula  Abdominal:      General: There is no distension.      Palpations: Abdomen is soft.      Tenderness: There is no abdominal tenderness.     Neurological:      Mental Status: She is disoriented.      Comments: Drowsy during encounter since received Benadryl in the emergency room.  Wakes up appropriately with verbal stimuli.  Disoriented and poor historian.   Psychiatric:         Mood and Affect: Mood normal.          Lines/Drains:            Lab Results: I have reviewed the following results:  Results from last 7 days   Lab Units 07/01/25  0343   WBC Thousand/uL 97.89*   HEMOGLOBIN g/dL 11.4*   HEMATOCRIT % 35.7   PLATELETS Thousands/uL 282   LYMPHO PCT % 93*   MONO PCT % 0*   EOS PCT % 0     Results from last 7 days   Lab Units 07/01/25  0445   SODIUM mmol/L 140   POTASSIUM mmol/L 5.0   CHLORIDE mmol/L 108   CO2 mmol/L 24   BUN mg/dL 31*   CREATININE mg/dL 1.31*   ANION GAP mmol/L 8   CALCIUM mg/dL 9.4   ALBUMIN g/dL 3.9   TOTAL BILIRUBIN mg/dL 0.49   ALK PHOS U/L 116*   ALT U/L 11   AST U/L 14   GLUCOSE RANDOM mg/dL 139     Results from last 7 days   Lab Units 07/01/25  0343   INR  1.01     Results from last 7 days   Lab Units 07/01/25  1135   POC GLUCOSE mg/dl 211*     Lab Results   Component Value Date    HGBA1C 7.5 (H) 05/11/2025    HGBA1C 9.5 (H) 09/01/2024    HGBA1C 9.4 (H) 04/01/2024     Results from last 7 days   Lab Units 07/01/25  0445 07/01/25  0343   LACTIC ACID mmol/L 1.5  --    PROCALCITONIN ng/ml  --  0.27*       Imaging Results Review: I reviewed radiology reports  from this admission including: CT chest and CT abdomen/pelvis.  Other Study Results Review: EKG was personally reviewed and my interpretation is: NSR..    Administrative Statements   I have spent a total time of   minutes in caring for this patient on the day of the visit/encounter including Diagnostic results, Prognosis, Risks and benefits of tx options, Patient and family education, Impressions, Counseling / Coordination of care, Documenting in the medical record, Reviewing/placing orders in the medical record (including tests, medications, and/or procedures), and Obtaining or reviewing history  .    ** Please Note: This note has been constructed using a voice recognition system. **         [1]   Past Medical History:  Diagnosis Date    Acute encephalopathy 10/15/2024    Acute postoperative pain of right knee 11/02/2022    ACUTE RESPIRATORY FAILURE, UNSP W HYPOXIA OR HYPERCAPNIA 05/11/2025    Benign adenomatous polyp of large intestine     CHF (congestive heart failure) ([facility])     COVID 09/01/2024    Diabetes mellitus ([facility])     Fall 11/05/2020    Heart failure with mid-range ejection fraction (HFmEF) ([facility]) 11/05/2020    Hyperlipemia     Hypertension     Hypertensive urgency 04/02/2024    Metabolic encephalopathy 10/15/2024    Orthostasis 10/19/2022    Osteoarthritis     TIA (transient ischemic attack)    [2]   Past Surgical History:  Procedure Laterality Date    APPENDECTOMY      CARPAL TUNNEL RELEASE Right     HYSTERECTOMY W/ SALPINGO-OOPHERECTOMY      WI OPTX FEM SHFT FX W/INSJ IMED IMPLT W/WO SCREW Right 10/12/2022    Procedure: INSERTION NAIL IM FEMUR ANTEGRADE (TROCHANTERIC);  Surgeon: Eric Isaacs DO;  Location: [facility];  Service: Orthopedics   [3]   Social History  Tobacco Use    Smoking status: Never    Smokeless tobacco: Never   Vaping Use    Vaping status: Never Used   Substance and Sexual Activity    Alcohol use: Not Currently     Comment: very seldom    Drug use: Never   [4]   Family  History  Problem Relation Name Age of Onset    Heart disease Mother      Heart disease Father      Hypertension Father      Stomach cancer Sister      Ovarian cancer Sister      Hypertension Sister

## 2025-07-01 NOTE — ASSESSMENT & PLAN NOTE
84 year old Female with severe dementia and history of nondisplaced, extra-articular left distal humerus fracture sustained on 5/12/2025 treated non-operatively. She is presenting after an unwitnessed fall at her facility. Orthopedics consulted to evaluate left distal humerus fracture. On examination, no pain with elbow motion and non tender to palpation throughout left elbow. On Xray, interval callus formation at previous non-displaced extra-articular distal humerus fracture. No interval displacement. No additional fractures, no dislocation. She can continue to be treated non-operatively.  WBAT LUE   ROM L Elbow without restrictions  PT/OT for elbow motion   Outpatient follow-up with Dr. Thompson  Dispo: per primary team pending syncope workup

## 2025-07-01 NOTE — ASSESSMENT & PLAN NOTE
History of CLL.  WBC noted chronically elevated  Currently 97,000 likely elevated from baseline due to fall.   UA negative for UTI.  Repeat CBC tomorrow am.  Outpatient follow-up with heme-onc.

## 2025-07-01 NOTE — ASSESSMENT & PLAN NOTE
Lab Results   Component Value Date    HGBA1C 7.5 (H) 05/11/2025       Recent Labs     07/01/25  1135   POCGLU 211*       Blood Sugar Average: Last 72 hrs:  (P) 211    Present on admission history of diabetes.  Most recent A1c 7.5.    Controlled.    Home regimen includes glargine 20U nightly, lispro 5 U 3 times daily with meals.  Resume insulin at lower dose to prevent hypoglycemia and adjust as needed based on Accu-Cheks and sliding-scale coverage.

## 2025-07-01 NOTE — ASSESSMENT & PLAN NOTE
84-year-old female patient with history of syncope and fall and recent hospitalization at [facility] again presents with unwitnessed fall at [facility].   Unclear how long patient was down for.  Patient was found in the floor by EMS.  Patient is complaining of left shoulder and left arm pain and left hip and left leg pain.  Patient is poor historian.  Case was discussed with trauma team per ED and recommended admission under SLIM.    CBC reviewed noted with chronic elevated WBC  CMP reviewed with creatinine 1.31.  EKG personally reviewed and interpreted noted with normal sinus rhythm.  HS trop negative x 3  CK WNL  CT head, CT cervical spine, CT on pelvis report reviewed.  UA negative for UTI.  Multiple trauma x-ray pending.    Unwitnessed fall of due to unclear etiology vs syncope.   Patient is poor historian.  Maintain on fall precaution.  PT OT eval.  Continue telemetry monitoring.  Orthostatic vitals monitoring.   Patient was supposed to have zio patch starting 7/2/2025  Spoke with caleb Soto over the phone at length regarding patient may need to go to long-term care facility rather than group home.   Level 3 DNR/DNI as per caleb Soto.

## 2025-07-01 NOTE — ASSESSMENT & PLAN NOTE
Lab Results   Component Value Date    EGFR 37 07/01/2025    EGFR 33 07/01/2025    EGFR 41 06/23/2025    CREATININE 1.31 (H) 07/01/2025    CREATININE 1.41 (H) 07/01/2025    CREATININE 1.20 06/23/2025     Present on admission with history of CKD stage III.  Baseline creatinine ranges from 0.9-1.2  Presented with creatinine 1.41 currently 1.31.  Continue IV Lasix and monitor renal function.  Repeat BMP tomorrow Am.

## 2025-07-01 NOTE — ASSESSMENT & PLAN NOTE
Present on admission history of hypertension.  Currently controlled  Continue home dose of losartan 20 mg daily, Toprol-XL 25 mg twice daily

## 2025-07-01 NOTE — ASSESSMENT & PLAN NOTE
History of minimally displaced left distal humeral fracture since 05/2025  Patient was seen by orthopedics on this encounter and currently recommended weightbearing as tolerated left upper extremity, range of motion of left elbow without restriction and recommended PT OT to evaluate for elbow motion.  Recommend outpatient follow-up with orthopedics.

## 2025-07-01 NOTE — ED PROVIDER NOTES
Time reflects when diagnosis was documented in both MDM as applicable and the Disposition within this note       Time User Action Codes Description Comment    7/1/2025  7:29 AM Edilia Bar Add [R29.6] Unwitnessed fall     7/1/2025  7:31 AM BelzoniEdilia butt Add [R94.31] ST segment changes on electrocardiogram     7/1/2025  7:41 AM Edilia Bar Add [S42.402A] Closed fracture of distal end of left humerus           ED Disposition       None          Assessment & Plan       Medical Decision Making  Patient seen, examined and noted to have unwitnessed fall, ST segment changes, closed fracture of distal end of left humerus.  Patient coming in to the ER from [facility] after an unwitnessed fall.  Has a history of falls and syncope.  Has dementia and is unable to provide a history.  Prior left distal humerus fracture.  Complaining of left upper extremity pain, left lower extremity pain and bilateral hip pain.  Concern for acute on chronic fracture.  Splint and sling applied.  Patient drops down into the 80s on room air.  Has no documented oxygen usage in her chart from EMS so due to ST depressions and intermittent hypoxia D-dimer was ordered as patient has a contrast allergy and will need prep before getting a PE scan.  Eventually was able to contact her nephew Charles who states that she does use as needed oxygen at home.  Patient will be ready for CT scan at 8:50 AM.  Pending scan at the time of signout to .     Differential diagnosis includes but is not limited to vasovagal syncope, cardiac arrhythmia, MI, ACS, PE, metabolic abnormality, intracranial process, infectious process, anemia    Imaging revealed:   Pending at the time of sign out    EKG: was interpreted by myself as above.    All labs reviewed and utilized in the medical decision making process.    Amount and/or Complexity of Data Reviewed  Labs: ordered. Decision-making details documented in ED Course.  Radiology: ordered and independent  interpretation performed.    Risk  Prescription drug management.        ED Course as of 07/01/25 0827   Tue Jul 01, 2025   0329 Patient coming in via EMS secondary to an unwitnessed fall patient was found on the ground outside her room.  Complaining of left shoulder/left arm pain and left hip/left leg pain.  Abdomen is tender when I push on it.  Patient is only satting at 92% on room air.  On reevaluation patient dropped into the low 80s on room air so was placed on 3 L nasal cannula and is now satting 95%.  She has a listed contrast allergy so we will proceed with a dry scan of her abdomen and pelvis, x-rays of her left upper and lower extremity and a CT scan of her head and neck and order a D-dimer.  Patient does have new ST depressions on EKG so cardiac workup will be initiated   0346 Attempted to called Diane III x2 without answer   0407 Potassium(!!): 6.5  Sample is mildly hemolyzed.  Will give calcium gluconate and redraw prior to giving insulin   0414 D-Dimer, Quant(!): 1.43  Patient has a contrast allergy will start the 4-hour emergency prep   0414 WBC(!): 97.89  History of CLL however typically white blood cell count is around 30   0417 hs TnI 0hr: 11   0502 Total CK: 44   0528 Potassium: 5.0   0528 Creatinine(!): 1.31   0529 Steroid given at 5 AM this means patient can go for PE scan at 9 AM.  Plan to give Benadryl at 8 AM   0533 LACTIC ACID: 1.5   0639 hs TnI 2hr: 15       Medications   acetaminophen (Ofirmev) injection 1,000 mg (0 mg Intravenous Stopped 7/1/25 0411)   methylPREDNISolone sodium succinate (Solu-MEDROL) injection 125 mg (125 mg Intravenous Given 7/1/25 0456)   sodium chloride 0.9 % bolus 1,000 mL (0 mL Intravenous Stopped 7/1/25 0651)   albuterol inhalation solution 10 mg (10 mg Nebulization Given 7/1/25 0500)   diphenhydrAMINE (BENADRYL) injection 50 mg (50 mg Intravenous Given 7/1/25 0747)       ED Risk Strat Scores   HEART Risk Score      Flowsheet Row Most Recent Value   Heart Score  Risk Calculator    History 1 Filed at: 07/01/2025 0731   ECG 2 Filed at: 07/01/2025 0731   Age 2 Filed at: 07/01/2025 0731   Risk Factors 1 Filed at: 07/01/2025 0731   Troponin 1 Filed at: 07/01/2025 0731   HEART Score 7 Filed at: 07/01/2025 0731          HEART Risk Score      Flowsheet Row Most Recent Value   Heart Score Risk Calculator    History 1 Filed at: 07/01/2025 0731   ECG 2 Filed at: 07/01/2025 0731   Age 2 Filed at: 07/01/2025 0731   Risk Factors 1 Filed at: 07/01/2025 0731   Troponin 1 Filed at: 07/01/2025 0731   HEART Score 7 Filed at: 07/01/2025 0731                      No data recorded        SBIRT 22yo+      Flowsheet Row Most Recent Value   Initial Alcohol Screen: US AUDIT-C     1. How often do you have a drink containing alcohol? 0 Filed at: 07/01/2025 0308   2. How many drinks containing alcohol do you have on a typical day you are drinking?  0 Filed at: 07/01/2025 0308   3b. FEMALE Any Age, or MALE 65+: How often do you have 4 or more drinks on one occassion? 0 Filed at: 07/01/2025 0308   Audit-C Score 0 Filed at: 07/01/2025 0308   MENG: How many times in the past year have you...    Used an illegal drug or used a prescription medication for non-medical reasons? Never Filed at: 07/01/2025 0308                            History of Present Illness       Chief Complaint   Patient presents with    Fall     Patient had  an unwitnessed fall. Hx dementia. At baseline per  EMS. Complaining of left  arm and  hip pain. No thinners.        Past Medical History[1]   Past Surgical History[2]   Family History[3]   Social History[4]   E-Cigarette/Vaping    E-Cigarette Use Never User       E-Cigarette/Vaping Substances    Nicotine No     THC No     CBD No     Flavoring No     Other No     Unknown No       I have reviewed and agree with the history as documented.     Gia Vaughan is a 84 y.o. female with a PMH of CLL, diabetes, dementia and CHF presenting to the ED on July 1, 2025 with unwitnessed fall.  Patient is unable to provide a history secondary to her dementia.  She is coming from [place] [id number] where per EMS she was found laying on the floor outside of her bedroom.  Unsure if it was a fall or syncopal event.  Unsure how long she was there for.  Did recently have a left distal humerus fracture.  She uses as needed oxygen at her facility.  Patient's only complaints at this time are left upper extremity pain, bilateral hip pain and left leg pain.            Review of Systems   Unable to perform ROS: Dementia           Objective       ED Triage Vitals   Temperature Pulse Blood Pressure Respirations SpO2 Patient Position - Orthostatic VS   07/01/25 0303 07/01/25 0303 07/01/25 0303 07/01/25 0303 07/01/25 0303 07/01/25 0303   97.6 °F (36.4 °C) 68 135/58 20 92 % Lying      Temp Source Heart Rate Source BP Location FiO2 (%) Pain Score    07/01/25 0303 07/01/25 0700 07/01/25 0303 -- --    Oral Monitor Right arm        Vitals      Date and Time Temp Pulse SpO2 Resp BP Pain Score FACES Pain Rating User   07/01/25 0700 -- 78 98 % 18 123/58 -- -- MO   07/01/25 0615 -- -- 96 % -- -- -- -- ND   07/01/25 0600 -- 85 -- 18 123/59 -- -- ND   07/01/25 0445 -- 78 92 % 18 127/58 -- -- ND   07/01/25 0330 -- -- 96 % -- -- -- -- ND   07/01/25 0303 97.6 °F (36.4 °C) 68 92 % 20 135/58 -- -- ND            Physical Exam  Constitutional:       General: She is not in acute distress.     Appearance: She is not ill-appearing or toxic-appearing.   HENT:      Head: Normocephalic.      Right Ear: External ear normal.      Left Ear: External ear normal.      Nose: Nose normal.     Eyes:      Pupils: Pupils are equal, round, and reactive to light.       Cardiovascular:      Rate and Rhythm: Normal rate and regular rhythm.      Pulses: Normal pulses.      Heart sounds: Normal heart sounds. No murmur heard.     No friction rub. No gallop.   Pulmonary:      Effort: Pulmonary effort is normal. No respiratory distress.      Breath sounds: Normal breath  sounds. No stridor. No wheezing, rhonchi or rales.   Chest:      Chest wall: No tenderness.   Abdominal:      General: Abdomen is flat. There is no distension.      Palpations: Abdomen is soft.      Tenderness: There is abdominal tenderness. There is no guarding or rebound.      Comments: Generalized abdominal tenderness     Musculoskeletal:         General: Tenderness present.      Cervical back: No rigidity or tenderness.      Right lower leg: No edema.      Left lower leg: No edema.      Comments: Tenderness to left upper extremity, hips and left lower extremity. Pain with ROM of left lower extremity and left elbow. Patient guarding left elbow     Skin:     General: Skin is warm.      Capillary Refill: Capillary refill takes less than 2 seconds.     Neurological:      Mental Status: Mental status is at baseline.         Results Reviewed       Procedure Component Value Units Date/Time    UA w Reflex to Microscopic w Reflex to Culture [[id number]] Collected: 07/01/25 0602    Lab Status: Final result Specimen: Urine, Straight Cath Updated: 07/01/25 0727     Color, UA Light Yellow     Clarity, UA Clear     Specific Gravity, UA 1.024     pH, UA 5.0     Leukocytes, UA Negative     Nitrite, UA Negative     Protein, UA Negative mg/dl      Glucose, UA Negative mg/dl      Ketones, UA Negative mg/dl      Urobilinogen, UA <2.0 mg/dl      Bilirubin, UA Negative     Occult Blood, UA Negative    HS Troponin I 2hr [[id number]]  (Normal) Collected: 07/01/25 0606    Lab Status: Final result Specimen: Blood from Arm, Right Updated: 07/01/25 0637     hs TnI 2hr 15 ng/L      Delta 2hr hsTnI 4 ng/L     Lactic acid [[id number]]  (Normal) Collected: 07/01/25 0445    Lab Status: Final result Specimen: Blood from Arm, Right Updated: 07/01/25 0531     LACTIC ACID 1.5 mmol/L     Narrative:      Result may be elevated if tourniquet was used during collection.    Comprehensive metabolic panel [[id number]]  (Abnormal) Collected: 07/01/25 0445     Lab Status: Final result Specimen: Blood from Arm, Right Updated: 07/01/25 0525     Sodium 140 mmol/L      Potassium 5.0 mmol/L      Chloride 108 mmol/L      CO2 24 mmol/L      ANION GAP 8 mmol/L      BUN 31 mg/dL      Creatinine 1.31 mg/dL      Glucose 139 mg/dL      Calcium 9.4 mg/dL      AST 14 U/L      ALT 11 U/L      Alkaline Phosphatase 116 U/L      Total Protein 6.6 g/dL      Albumin 3.9 g/dL      Total Bilirubin 0.49 mg/dL      eGFR 37 ml/min/1.73sq m     Narrative:      National Kidney Disease Foundation guidelines for Chronic Kidney Disease (CKD):     Stage 1 with normal or high GFR (GFR > 90 mL/min/1.73 square meters)    Stage 2 Mild CKD (GFR = 60-89 mL/min/1.73 square meters)    Stage 3A Moderate CKD (GFR = 45-59 mL/min/1.73 square meters)    Stage 3B Moderate CKD (GFR = 30-44 mL/min/1.73 square meters)    Stage 4 Severe CKD (GFR = 15-29 mL/min/1.73 square meters)    Stage 5 End Stage CKD (GFR <15 mL/min/1.73 square meters)  Note: GFR calculation is accurate only with a steady state creatinine    Blood culture #1 [[id number]] Collected: 07/01/25 0451    Lab Status: In process Specimen: Blood from Arm, Left Updated: 07/01/25 0505    Blood culture #2 [[id number]] Collected: 07/01/25 0445    Lab Status: In process Specimen: Blood from Arm, Right Updated: 07/01/25 0505    CK [[id number]]  (Normal) Collected: 07/01/25 0343    Lab Status: Final result Specimen: Blood from Arm, Right Updated: 07/01/25 0501     Total CK 44 U/L     RBC Morphology Reflex Test [[id number]] Collected: 07/01/25 0343    Lab Status: Final result Specimen: Blood from Arm, Right Updated: 07/01/25 0501    CBC and differential [[id number]]  (Abnormal) Collected: 07/01/25 0343    Lab Status: Final result Specimen: Blood from Arm, Right Updated: 07/01/25 0456     WBC 97.89 Thousand/uL      RBC 3.15 Million/uL      Hemoglobin 11.4 g/dL      Hematocrit 35.7 %       fL      MCH 36.2 pg      MCHC 31.9 g/dL      RDW 16.6 %      MPV 12.3 fL       Platelets 282 Thousands/uL     Narrative:      This is an appended report.  These results have been appended to a previously verified report.    Manual Differential(PHLEBS Do Not Order) [[id number]]  (Abnormal) Collected: 07/01/25 0343    Lab Status: Final result Specimen: Blood from Arm, Right Updated: 07/01/25 0456     Segmented % 5 %      Lymphocytes % 93 %      Monocytes % 0 %      Eosinophils % 0 %      Basophils % 0 %      Blasts % 2 %      Absolute Neutrophils 4.89 Thousand/uL      Absolute Lymphocytes 91.04 Thousand/uL      Absolute Monocytes 0.00 Thousand/uL      Absolute Eosinophils 0.00 Thousand/uL      Absolute Basophils 0.00 Thousand/uL      Absolute Blasts 1.96 Thousand/uL      Total Counted --     RBC Morphology Present     Platelet Estimate Adequate     Anisocytosis Present     Macrocytes Present     Polychromasia Present    Procalcitonin [[id number]]  (Abnormal) Collected: 07/01/25 0343    Lab Status: Final result Specimen: Blood from Arm, Right Updated: 07/01/25 0451     Procalcitonin 0.27 ng/ml     Protime-INR [[id number]]  (Normal) Collected: 07/01/25 0343    Lab Status: Final result Specimen: Blood from Arm, Right Updated: 07/01/25 0427     Protime 14.0 seconds      INR 1.01    Narrative:      INR Therapeutic Range    Indication                                             INR Range      Atrial Fibrillation                                               2.0-3.0  Hypercoagulable State                                    2.0.2.3  Left Ventricular Asist Device                            2.0-3.0  Mechanical Heart Valve                                  -    Aortic(with afib, MI, embolism, HF, LA enlargement,    and/or coagulopathy)                                     2.0-3.0 (2.5-3.5)     Mitral                                                             2.5-3.5  Prosthetic/Bioprosthetic Heart Valve               2.0-3.0  Venous thromboembolism (VTE: VT, PE        2.0-3.0    APTT [[id number]]  (Normal)  Collected: 07/01/25 0343    Lab Status: Final result Specimen: Blood from Arm, Right Updated: 07/01/25 0427     PTT 27 seconds     HS Troponin 0hr (reflex protocol) [[id number]]  (Normal) Collected: 07/01/25 0343    Lab Status: Final result Specimen: Blood from Arm, Right Updated: 07/01/25 0412     hs TnI 0hr 11 ng/L     HS Troponin I 4hr [[id number]]     Lab Status: No result Specimen: Blood     Comprehensive metabolic panel [[id number]]  (Abnormal) Collected: 07/01/25 0343    Lab Status: Final result Specimen: Blood from Arm, Right Updated: 07/01/25 0407     Sodium 139 mmol/L      Potassium 6.5 mmol/L      Chloride 109 mmol/L      CO2 23 mmol/L      ANION GAP 7 mmol/L      BUN 32 mg/dL      Creatinine 1.41 mg/dL      Glucose 142 mg/dL      Calcium 9.4 mg/dL      AST 23 U/L      ALT 11 U/L      Alkaline Phosphatase 116 U/L      Total Protein 6.6 g/dL      Albumin 3.8 g/dL      Total Bilirubin 0.54 mg/dL      eGFR 33 ml/min/1.73sq m     Narrative:      National Kidney Disease Foundation guidelines for Chronic Kidney Disease (CKD):     Stage 1 with normal or high GFR (GFR > 90 mL/min/1.73 square meters)    Stage 2 Mild CKD (GFR = 60-89 mL/min/1.73 square meters)    Stage 3A Moderate CKD (GFR = 45-59 mL/min/1.73 square meters)    Stage 3B Moderate CKD (GFR = 30-44 mL/min/1.73 square meters)    Stage 4 Severe CKD (GFR = 15-29 mL/min/1.73 square meters)    Stage 5 End Stage CKD (GFR <15 mL/min/1.73 square meters)  Note: GFR calculation is accurate only with a steady state creatinine    D-dimer, quantitative [[id number]]  (Abnormal) Collected: 07/01/25 0343    Lab Status: Final result Specimen: Blood from Arm, Right Updated: 07/01/25 0403     D-Dimer, Quant 1.43 ug/ml FEU     Narrative:      In the evaluation for possible pulmonary embolism, in the appropriate (Well's Score of 4 or less) patient, the age adjusted d-dimer cutoff for this patient can be calculated as:    Age x 0.01 (in ug/mL) for Age-adjusted D-dimer exclusion  threshold for a patient over 50 years.            XR femur 2 views LEFT   ED Interpretation by Edilia Bar PA-C (07/01 0525)   Image was independently interpreted by myself and showed no acute concerns or fractures at this time.       XR knee 4+ vw left injury   ED Interpretation by Edilia Bar PA-C (07/01 0526)   Image was independently interpreted by myself and showed no acute concerns or fractures at this time.       XR humerus LEFT   ED Interpretation by Edilia Bar PA-C (07/01 0523)   Prior distal left humeral fracture redemonstrated      XR forearm 2 views LEFT   ED Interpretation by Edilia Bar PA-C (07/01 0523)   Prior distal left humeral fracture redemonstrated      XR chest 1 view portable   ED Interpretation by Edilia Bar PA-C (07/01 0525)   Image was independently interpreted by myself and showed no acute concerns of cardiopulmonary disease at this time.       XR hip/pelv 2-3 vws right if performed   ED Interpretation by Edilia Bar PA-C (07/01 0526)   Image was independently interpreted by myself and showed no acute concerns or fractures at this time.       CT head without contrast   Final Interpretation by Carlos Harding DO (07/01 0526)      No calvarial fracture or acute intracranial process is seen.      Other findings as above.                  Resident: Charlie Mora I, the attending radiologist, have reviewed the images and agree with the final report above.      Workstation performed: [id number]         CT spine cervical without contrast   Final Interpretation by Carlos Harding DO (07/01 0532)      Degenerative changes as described but no evidence of acute cervical spine injury.      Similar bilateral supraclavicular lymph node enlargement measuring up to 2.3 cm, consistent with known CLL.      Partially visualized mosaic attenuation in the lung, possibly due to hypoventilation, edema, and/or infectious or inflammatory pneumonitis;  correlation with the patient's symptoms and laboratory values recommended.      Other findings as above.                  Resident: Charlie Mora I, the attending radiologist, have reviewed the images and agree with the final report above.      Workstation performed: [id number]         CT abdomen pelvis wo contrast   Final Interpretation by Carlos Harding DO (07/01 0542)      Within the confines of a noncontrast study, no discrete evidence of acute traumatic injury.      Cardiomegaly. Prominence of the interstitial markings with some scattered groundglass opacities, consider a component of fluid overload/CHF. Correlation with the patient's symptoms and laboratory values recommended.      Partially distended bladder. Mild circumferential bladder wall thickening, possibly exaggerated by underdistention. Superimposed cystitis considered in the appropriate clinical setting.      Similar widespread abdominal, retroperitoneal, and right inguinal lymphadenopathy, consistent with known CLL.      Bilateral renal atrophy, colonic diverticulosis without evidence of acute diverticulitis, and other findings as above.            Resident: Charlie Mora I, the attending radiologist, have reviewed the images and agree with the final report above.      Workstation performed: [id number]         CTA chest pe study    (Results Pending)       ECG 12 Lead Documentation Only    Date/Time: 7/1/2025 7:36 AM    Performed by: Edilia Bar PA-C  Authorized by: Edilia Bar PA-C    Indications / Diagnosis:  Unwitnessed fall/possible syncopal episode  ECG reviewed by me, the ED Provider: yes    Patient location:  ED  Previous ECG:     Comparison to cardiac monitor: Yes    Interpretation:     Interpretation: abnormal    Rate:     ECG rate:  86  ST segments:     ST segments:  Abnormal    Depression:  V6 and V5  Comments:      Patient has ST depressions in her precordial leads new from EKG done on 6/22/2025.  No ST  elevations.      ED Medication and Procedure Management   Prior to Admission Medications   Prescriptions Last Dose Informant Patient Reported? Taking?   Alcohol Swabs (B-D SINGLE USE SWABS REGULAR) PADS   No No   Sig: USE AS DIRECTED   Calcium Carb-Cholecalciferol (calcium carbonate-vitamin D) 500 mg-5 mcg tablet   No No   Sig: TAKE ONE TABLET BY MOUTH TWICE DAILY DX: SUPPLEMENT   Continuous Blood Gluc Sensor (FreeStyle Дмитрий 2 Sensor) MISC  Self Yes No   Diclofenac Sodium (VOLTAREN) 1 %  Self No No   Sig: Apply 2 g topically 3 (three) times a day To R knee   Insulin Glargine Solostar (Lantus SoloStar) 100 UNIT/ML SOPN   No No   Sig: Inject 0.2 mL (20 Units total) under the skin daily at bedtime   acetaminophen (TYLENOL) 325 mg tablet   No No   Sig: Take 3 tablets (975 mg total) by mouth 3 (three) times a day as needed for mild pain, moderate pain or severe pain   atorvastatin (LIPITOR) 20 mg tablet  Self Yes No   Sig: Take 20 mg by mouth in the morning.   calcium carbonate (OS-JUAN RAMON) 600 MG tablet   No No   Sig: Take 1 tablet (600 mg total) by mouth in the morning and 1 tablet (600 mg total) in the evening. Take with meals.   carbamide peroxide (FT Earwax Removal) 6.5 % otic solution   No No   Sig: INSERT FOUR DROPS INTO BOTH EARS AT BEDTIME FOR 2 WEEKS DX: WAX BUILD UP   cholecalciferol (VITAMIN D3) 1,000 units tablet  Self Yes No   Sig: Take 1,000 Units by mouth in the morning.   fluticasone (FLONASE) 50 mcg/act nasal spray   No No   Sig: INSTILL ONE SPRAY INTO BOTH NOSTRILS DAILY FOR 1 WEEK AND THEN AS NEEDED DX: ALLERGIES   furosemide (LASIX) 20 mg tablet   No No   Sig: Take 2 tablets (40 mg total) by mouth daily   glucose blood test strip  Self Yes No   Sig: Use 1 each daily as needed Use as instructed   glucose monitoring kit (FREESTYLE) monitoring kit  Self Yes No   Sig: Use 1 each as needed   insulin lispro (HumaLOG) 100 units/mL injection  Self No No   Sig: Inject 5 Units under the skin 3 (three) times a day  with meals   losartan (COZAAR) 25 mg tablet   No No   Sig: Take 1 tablet (25 mg total) by mouth daily   magnesium Oxide (MAG-OX) 400 mg TABS   No No   Sig: Take 1 tablet (400 mg total) by mouth daily for 14 days   metoprolol succinate (TOPROL-XL) 25 mg 24 hr tablet   No No   Sig: Take 1 tablet (25 mg total) by mouth every 12 (twelve) hours   potassium chloride (Klor-Con M20) 20 mEq tablet   No No   Sig: Take 1 tablet (20 mEq total) by mouth 2 (two) times a day      Facility-Administered Medications: None     Patient's Medications   Discharge Prescriptions    No medications on file     Outpatient Discharge Orders   Sling     ED SEPSIS DOCUMENTATION   Time reflects when diagnosis was documented in both MDM as applicable and the Disposition within this note       Time User Action Codes Description Comment    7/1/2025  7:29 AM Edilia Bar [R29.6] Unwitnessed fall     7/1/2025  7:31 AM Edilia Bar Add [R94.31] ST segment changes on electrocardiogram     7/1/2025  7:41 AM Edilia Bar [S42.402A] Closed fracture of distal end of left humerus                    [1]   Past Medical History:  Diagnosis Date    Acute encephalopathy 10/15/2024    Acute postoperative pain of right knee 11/02/2022    ACUTE RESPIRATORY FAILURE, UNSP W HYPOXIA OR HYPERCAPNIA 05/11/2025    Benign adenomatous polyp of large intestine     CHF (congestive heart failure) ([facility])     COVID 09/01/2024    Diabetes mellitus (HCC)     Fall 11/05/2020    Heart failure with mid-range ejection fraction (HFmEF) ([facility]) 11/05/2020    Hyperlipemia     Hypertension     Hypertensive urgency 04/02/2024    Metabolic encephalopathy 10/15/2024    Orthostasis 10/19/2022    Osteoarthritis     TIA (transient ischemic attack)    [2]   Past Surgical History:  Procedure Laterality Date    APPENDECTOMY      CARPAL TUNNEL RELEASE Right     HYSTERECTOMY W/ SALPINGO-OOPHERECTOMY      AZ OPTX FEM SHFT FX W/INSJ IMED IMPLT W/WO SCREW Right 10/12/2022    Procedure:  INSERTION NAIL IM FEMUR ANTEGRADE (TROCHANTERIC);  Surgeon: Eric Isaacs DO;  Location: [facility];  Service: Orthopedics   [3]   Family History  Problem Relation Name Age of Onset    Heart disease Mother      Heart disease Father      Hypertension Father      Stomach cancer Sister      Ovarian cancer Sister      Hypertension Sister     [4]   Social History  Tobacco Use    Smoking status: Never    Smokeless tobacco: Never   Vaping Use    Vaping status: Never Used   Substance Use Topics    Alcohol use: Not Currently     Comment: very seldom    Drug use: Never        Edilia Bar PA-C  07/01/25 0864

## 2025-07-02 LAB
ANION GAP SERPL CALCULATED.3IONS-SCNC: 8 MMOL/L (ref 4–13)
BUN SERPL-MCNC: 38 MG/DL (ref 5–25)
CALCIUM SERPL-MCNC: 8.8 MG/DL (ref 8.4–10.2)
CHLORIDE SERPL-SCNC: 106 MMOL/L (ref 96–108)
CO2 SERPL-SCNC: 25 MMOL/L (ref 21–32)
CREAT SERPL-MCNC: 1.19 MG/DL (ref 0.6–1.3)
ERYTHROCYTE [DISTWIDTH] IN BLOOD BY AUTOMATED COUNT: 16.4 % (ref 11.6–15.1)
GFR SERPL CREATININE-BSD FRML MDRD: 41 ML/MIN/1.73SQ M
GLUCOSE P FAST SERPL-MCNC: 249 MG/DL (ref 65–99)
GLUCOSE SERPL-MCNC: 164 MG/DL (ref 65–140)
GLUCOSE SERPL-MCNC: 192 MG/DL (ref 65–140)
GLUCOSE SERPL-MCNC: 249 MG/DL (ref 65–140)
GLUCOSE SERPL-MCNC: 260 MG/DL (ref 65–140)
GLUCOSE SERPL-MCNC: 82 MG/DL (ref 65–140)
HCT VFR BLD AUTO: 31.6 % (ref 34.8–46.1)
HGB BLD-MCNC: 9.8 G/DL (ref 11.5–15.4)
MCH RBC QN AUTO: 36 PG (ref 26.8–34.3)
MCHC RBC AUTO-ENTMCNC: 31 G/DL (ref 31.4–37.4)
MCV RBC AUTO: 116 FL (ref 82–98)
MRSA NOSE QL CULT: NORMAL
PLATELET # BLD AUTO: 217 THOUSANDS/UL (ref 149–390)
PMV BLD AUTO: 12.2 FL (ref 8.9–12.7)
POTASSIUM SERPL-SCNC: 4.3 MMOL/L (ref 3.5–5.3)
RBC # BLD AUTO: 2.72 MILLION/UL (ref 3.81–5.12)
SODIUM SERPL-SCNC: 139 MMOL/L (ref 135–147)
WBC # BLD AUTO: 35.24 THOUSAND/UL (ref 4.31–10.16)

## 2025-07-02 PROCEDURE — 97530 THERAPEUTIC ACTIVITIES: CPT

## 2025-07-02 PROCEDURE — 99232 SBSQ HOSP IP/OBS MODERATE 35: CPT

## 2025-07-02 PROCEDURE — 82948 REAGENT STRIP/BLOOD GLUCOSE: CPT

## 2025-07-02 PROCEDURE — 80048 BASIC METABOLIC PNL TOTAL CA: CPT | Performed by: STUDENT IN AN ORGANIZED HEALTH CARE EDUCATION/TRAINING PROGRAM

## 2025-07-02 PROCEDURE — 97163 PT EVAL HIGH COMPLEX 45 MIN: CPT

## 2025-07-02 PROCEDURE — 85027 COMPLETE CBC AUTOMATED: CPT | Performed by: STUDENT IN AN ORGANIZED HEALTH CARE EDUCATION/TRAINING PROGRAM

## 2025-07-02 RX ORDER — FUROSEMIDE 40 MG/1
40 TABLET ORAL DAILY
Status: DISCONTINUED | OUTPATIENT
Start: 2025-07-03 | End: 2025-07-03 | Stop reason: HOSPADM

## 2025-07-02 RX ADMIN — INSULIN LISPRO 1 UNITS: 100 INJECTION, SOLUTION INTRAVENOUS; SUBCUTANEOUS at 08:07

## 2025-07-02 RX ADMIN — INSULIN LISPRO 2 UNITS: 100 INJECTION, SOLUTION INTRAVENOUS; SUBCUTANEOUS at 11:51

## 2025-07-02 RX ADMIN — LOSARTAN POTASSIUM 25 MG: 25 TABLET, FILM COATED ORAL at 08:07

## 2025-07-02 RX ADMIN — METOPROLOL SUCCINATE 25 MG: 25 TABLET, EXTENDED RELEASE ORAL at 23:03

## 2025-07-02 RX ADMIN — ATORVASTATIN CALCIUM 20 MG: 20 TABLET, FILM COATED ORAL at 16:40

## 2025-07-02 RX ADMIN — HEPARIN SODIUM 5000 UNITS: 5000 INJECTION INTRAVENOUS; SUBCUTANEOUS at 14:42

## 2025-07-02 RX ADMIN — INSULIN LISPRO 5 UNITS: 100 INJECTION, SOLUTION INTRAVENOUS; SUBCUTANEOUS at 08:07

## 2025-07-02 RX ADMIN — Medication 400 MG: at 08:07

## 2025-07-02 RX ADMIN — METOPROLOL SUCCINATE 25 MG: 25 TABLET, EXTENDED RELEASE ORAL at 01:32

## 2025-07-02 RX ADMIN — INSULIN GLARGINE 10 UNITS: 100 INJECTION, SOLUTION SUBCUTANEOUS at 23:03

## 2025-07-02 RX ADMIN — INSULIN LISPRO 5 UNITS: 100 INJECTION, SOLUTION INTRAVENOUS; SUBCUTANEOUS at 11:51

## 2025-07-02 RX ADMIN — FUROSEMIDE 40 MG: 10 INJECTION, SOLUTION INTRAMUSCULAR; INTRAVENOUS at 08:07

## 2025-07-02 RX ADMIN — HEPARIN SODIUM 5000 UNITS: 5000 INJECTION INTRAVENOUS; SUBCUTANEOUS at 06:00

## 2025-07-02 RX ADMIN — HEPARIN SODIUM 5000 UNITS: 5000 INJECTION INTRAVENOUS; SUBCUTANEOUS at 23:02

## 2025-07-02 RX ADMIN — ACETAMINOPHEN 975 MG: 325 TABLET ORAL at 16:41

## 2025-07-02 NOTE — ASSESSMENT & PLAN NOTE
Lab Results   Component Value Date    EGFR 41 07/02/2025    EGFR 37 07/01/2025    EGFR 33 07/01/2025    CREATININE 1.19 07/02/2025    CREATININE 1.31 (H) 07/01/2025    CREATININE 1.41 (H) 07/01/2025   Baseline creatinine ranges from 0.9-1.2  Presented with creatinine 1.41 currently 1.31.  Improved with IV diuresis.

## 2025-07-02 NOTE — ASSESSMENT & PLAN NOTE
Wt Readings from Last 3 Encounters:   07/02/25 59 kg (130 lb 1.1 oz)   06/24/25 61.1 kg (134 lb 12.8 oz)   05/30/25 60.3 kg (133 lb)    history of cardiomyopathy with EF 35% previously which has now improved to 45% on recent echo of unclear etiology.  Home regimen includes Lasix 40 mg daily, losartan 25 mg daily, Toprol-XL 25 mg twice daily.  P.o. potassium supplement 20 meq twice daily    Chest x-ray personally reviewed noted without significant volume overload.  CTA PE study report reviewed noted with finding of mild interstitial edema.  Received dose of IV Lasix 50 mg in the emergency room.    Currently hemodynamically stable.  Does not appear to be overtly volume overloaded.   O2 saturation 94% on 3 L nasal cannula.  Patient uses home oxygen as needed as per Charles.  Resume home regimen.  Continue with low-salt, fluid restricted diet.  Monitor I&O.

## 2025-07-02 NOTE — ASSESSMENT & PLAN NOTE
History of CLL.  WBC noted chronically elevated  Currently back within baseline, elevated admission from fall  UA negative for UTI.  Outpatient follow-up with heme-onc.

## 2025-07-02 NOTE — PLAN OF CARE
Problem: PHYSICAL THERAPY ADULT  Goal: Performs mobility at highest level of function for planned discharge setting.  See evaluation for individualized goals.  Description: Treatment/Interventions: ADL retraining, Functional transfer training, LE strengthening/ROM, Therapeutic exercise, Endurance training, Cognitive reorientation, Patient/family training, Equipment eval/education, Bed mobility, Gait training, Compensatory technique education, Spoke to nursing, Spoke to case management          See flowsheet documentation for full assessment, interventions and recommendations.  Note:    Problem List: Decreased strength, Decreased endurance, Impaired balance, Decreased mobility, Decreased coordination, Decreased cognition, Impaired judgement, Decreased safety awareness, Pain, Orthopedic restrictions  Assessment: Patient seen for Physical Therapy evaluation. Patient admitted with Unwitnessed fall.  Comorbidities affecting patient's physical performance include:DM2, CKD3, CHF, CLL, OA, cardiomyopathy. Personal factors affecting patient at time of initial evaluation include: ambulating with assistive device, inability to navigate community distances, inability to navigate level surfaces without external assistance, inability to perform dynamic tasks in community, decreased cognition, positive fall history, and limited insight into impairments. Prior to admission, patient was requiring assist for functional mobility with rollator, requiring assist for ADLS, requiring assist for IADLS, ambulating household distance, and an assisted living resident.  Please find objective findings from Physical Therapy assessment regarding body systems outlined above with impairments and limitations including weakness, impaired balance, decreased endurance, impaired coordination, gait deviations, decreased activity tolerance, decreased functional mobility tolerance, decreased safety awareness, impaired judgement, fall risk, orthopedic  restrictions, and decreased cognition.  The Barthel Index was used as a functional outcome tool presenting with a score of Barthel Index Score: 35 today indicating marked limitations of functional mobility and ADLS.  Patient's clinical presentation is currently unstable/unpredictable as seen in patient's presentation of vital sign response, changing level of pain, varying levels of cognitive performance, increased fall risk, new onset of impairment of functional mobility, decreased endurance, and new onset of weakness. Pt would benefit from continued Physical Therapy treatment to address deficits as defined above and maximize level of functional mobility. As demonstrated by objective findings, the assigned level of complexity for this evaluation is high.The patient's -[facility] Basic Mobility Inpatient Short Form Raw Score is 15. A Raw score of less than or equal to 16 suggests the patient may benefit from discharge to post-acute rehabilitation services. Please also refer to the recommendation of the Physical Therapist for safe discharge planning.        Rehab Resource Intensity Level, PT: II (Moderate Resource Intensity) (vs Level III Minimum Resource Intensity pending level of assist facility can provide)    See flowsheet documentation for full assessment.

## 2025-07-02 NOTE — PLAN OF CARE
Problem: Potential for Falls  Goal: Patient will remain free of falls  Description: INTERVENTIONS:  - Educate patient/family on patient safety including physical limitations  - Instruct patient to call for assistance with activity   - Consider consulting OT/PT to assist with strengthening/mobility based on AM PAC & JH-HLM score  - Consult OT/PT to assist with strengthening/mobility   - Keep Call bell within reach  - Keep bed low and locked with side rails adjusted as appropriate  - Keep care items and personal belongings within reach  - Initiate and maintain comfort rounds  - Make Fall Risk Sign visible to staff  - Offer Toileting every 2 Hours, in advance of need  - Initiate/Maintain bed/chair alarm  - Obtain necessary fall risk management equipment  - Apply yellow socks and bracelet for high fall risk patients  - Consider moving patient to room near nurses station  Outcome: Progressing     Problem: PAIN - ADULT  Goal: Verbalizes/displays adequate comfort level or baseline comfort level  Description: Interventions:  - Encourage patient to monitor pain and request assistance  - Assess pain using appropriate pain scale  - Administer analgesics as ordered based on type and severity of pain and evaluate response  - Implement non-pharmacological measures as appropriate and evaluate response  - Consider cultural and social influences on pain and pain management  - Notify physician/advanced practitioner if interventions unsuccessful or patient reports new pain  - Educate patient/family on pain management process including their role and importance of  reporting pain   - Provide non-pharmacologic/complimentary pain relief interventions  Outcome: Progressing     Problem: INFECTION - ADULT  Goal: Absence or prevention of progression during hospitalization  Description: INTERVENTIONS:  - Assess and monitor for signs and symptoms of infection  - Monitor lab/diagnostic results  - Monitor all insertion sites, i.e. indwelling  lines, tubes, and drains  - Monitor endotracheal if appropriate and nasal secretions for changes in amount and color  - [place] appropriate cooling/warming therapies per order  - Administer medications as ordered  - Instruct and encourage patient and family to use good hand hygiene technique  - Identify and instruct in appropriate isolation precautions for identified infection/condition  Outcome: Progressing  Goal: Absence of fever/infection during neutropenic period  Description: INTERVENTIONS:  - Monitor WBC  - Perform strict hand hygiene  - Limit to healthy visitors only  - No plants, dried, fresh or silk flowers with alcaraz in patient room  Outcome: Progressing     Problem: SAFETY ADULT  Goal: Patient will remain free of falls  Description: INTERVENTIONS:  - Educate patient/family on patient safety including physical limitations  - Instruct patient to call for assistance with activity   - Consider consulting OT/PT to assist with strengthening/mobility based on AM PAC & JH-HLM score  - Consult OT/PT to assist with strengthening/mobility   - Keep Call bell within reach  - Keep bed low and locked with side rails adjusted as appropriate  - Keep care items and personal belongings within reach  - Initiate and maintain comfort rounds  - Make Fall Risk Sign visible to staff  - Offer Toileting every 2 Hours, in advance of need  - Initiate/Maintain bed/chair alarm  - Obtain necessary fall risk management equipment  - Apply yellow socks and bracelet for high fall risk patients  - Consider moving patient to room near nurses station  Outcome: Progressing  Goal: Maintain or return to baseline ADL function  Description: INTERVENTIONS:  -  Assess patient's ability to carry out ADLs; assess patient's baseline for ADL function and identify physical deficits which impact ability to perform ADLs (bathing, care of mouth/teeth, toileting, grooming, dressing, etc.)  - Assess/evaluate cause of self-care deficits   - Assess range of  motion  - Assess patient's mobility; develop plan if impaired  - Assess patient's need for assistive devices and provide as appropriate  - Encourage maximum independence but intervene and supervise when necessary  - Involve family in performance of ADLs  - Assess for home care needs following discharge   - Consider OT consult to assist with ADL evaluation and planning for discharge  - Provide patient education as appropriate  - Monitor functional capacity and physical performance, use of AM PAC & JH-HLM   - Monitor gait, balance and fatigue with ambulation    Outcome: Progressing  Goal: Maintains/Returns to pre admission functional level  Description: INTERVENTIONS:  - Perform AM-PAC 6 Click Basic Mobility/ Daily Activity assessment daily.  - Set and communicate daily mobility goal to care team and patient/family/caregiver.   - Collaborate with rehabilitation services on mobility goals if consulted  - Perform Range of Motion 3 times a day.  - Reposition patient every 2 hours.  - Dangle patient 3 times a day  - Stand patient 3 times a day  - Ambulate patient 3 times a day  - Out of bed to chair 3 times a day   - Out of bed for meals 3 times a day  - Out of bed for toileting  - Record patient progress and toleration of activity level   Outcome: Progressing     Problem: DISCHARGE PLANNING  Goal: Discharge to home or other facility with appropriate resources  Description: INTERVENTIONS:  - Identify barriers to discharge w/patient and caregiver  - Arrange for needed discharge resources and transportation as appropriate  - Identify discharge learning needs (meds, wound care, etc.)  - Arrange for interpretive services to assist at discharge as needed  - Refer to Case Management Department for coordinating discharge planning if the patient needs post-hospital services based on physician/advanced practitioner order or complex needs related to functional status, cognitive ability, or social support system  Outcome:  Progressing     Problem: Knowledge Deficit  Goal: Patient/family/caregiver demonstrates understanding of disease process, treatment plan, medications, and discharge instructions  Description: Complete learning assessment and assess knowledge base.  Interventions:  - Provide teaching at level of understanding  - Provide teaching via preferred learning methods  Outcome: Progressing     Problem: Prexisting or High Potential for Compromised Skin Integrity  Goal: Skin integrity is maintained or improved  Description: INTERVENTIONS:  - Identify patients at risk for skin breakdown  - Assess and monitor skin integrity including under and around medical devices   - Assess and monitor nutrition and hydration status  - Monitor labs  - Assess for incontinence   - Turn and reposition patient  - Assist with mobility/ambulation  - Relieve pressure over gael prominences   - Avoid friction and shearing  - Provide appropriate hygiene as needed including keeping skin clean and dry  - Evaluate need for skin moisturizer/barrier cream  - Collaborate with interdisciplinary team  - Patient/family teaching  - Consider wound care consult    Assess:  - Review Espinoza scale daily  - Clean and moisturize skin  - Inspect skin when repositioning, toileting, and assisting with ADLS  - Assess under medical devices   - Assess extremities for adequate circulation and sensation     Bed Management:  - Have minimal linens on bed & keep smooth, unwrinkled  - Change linens as needed when moist or perspiring  - Avoid sitting or lying in one position for more than 2 hours while in bed?Keep HOB at 30 degrees   - Toileting:  - Offer bedside commode  - Assess for incontinence   - Use incontinent care products after each incontinent episode    Activity:  - Mobilize patient 3 times a day  - Encourage activity and walks on unit  - Encourage or provide ROM exercises   - Turn and reposition patient every 2 Hours  - Use appropriate equipment to lift or move patient  in bed  - Instruct/ Assist with weight shifting when out of bed in chair  - Consider limitation of chair time 3 hour intervals    Skin Care:  - Avoid use of baby powder, tape, friction and shearing, hot water or constrictive clothing  - Relieve pressure over bony prominences   - Do not massage red bony areas    Next Steps:  - Teach patient strategies to minimize risks   - Consider consults to  interdisciplinary teams   Outcome: Progressing

## 2025-07-02 NOTE — PHYSICAL THERAPY NOTE
PHYSICAL THERAPY EVALUATION/TREATMENT    NAME:  Gia Vaughan  AGE:   84 y.o.  Mrn:   [id number]  Length Of Stay: 0    ADMIT DX:  ST segment changes on electrocardiogram [R94.31]  Closed fracture of distal end of left humerus [S42.402A]  Unwitnessed fall [R29.6]    Past Medical History[1]  Past Surgical History[2]     07/02/25 1420   PT Last Visit   PT Visit Date 07/02/25   Note Type   Note type Evaluation   Additional Comments BP once pt moved bed to chair;pt reports dizziness 115/49   Pain Assessment   Pain Assessment Tool 0-10   Pain Score No Pain   Restrictions/Precautions   LUE Weight Bearing Per Order (S)  WBAT - per Ortho and Hospitalist note ([facility] hospitalist wrote order for NWB LUE - this PT reached out via secure chat for hospitalist to change order to WBAT LUE)  (ROM L Elbow without restrictions; PT/OT for elbow motion)   Other Precautions Cognitive;Fall Risk;Bed Alarm;Chair Alarm;WBS;O2  (Gianluca on room air;2L O2 via NC)   Home Living   Type of Home Assisted living  ([place] [facility])   Home Layout One level;Able to live on main level with bedroom/bathroom;Performs ADLs on one level   Bathroom Accessibility Accessible   Home Equipment   (rollator)   Additional Comments Pt is a poor historian   Prior Function   Level of [place] Needs assistance with ADLs;Needs assistance with functional mobility;Needs assistance with IADLS   Lives With Facility staff   Receives Help From Personal care attendant   IADLs Family/Friend/Other provides meals;Family/Friend/Other provides medication management;Family/Friend/Other provides transportation   Falls in the last 6 months 1 to 4  (1, reason for admission;3 other falls per pt)   Comments Per EMR, pt amb with a rollator PTA. However, in May 2025 pt was NWB LUE and splinted, unable to use rollator at that time 2/2 WBS. Per ortho note, pt is now WBAT LUE 7/1/25.   General   Additional Pertinent History Pt is adm with an unwittnessed fall.   Family/Caregiver  Present No   Cognition   Overall Cognitive Status Impaired   Arousal/Participation Cooperative   Orientation Level Oriented to person;Disoriented to place;Disoriented to time;Disoriented to situation   Memory Decreased recall of precautions;Decreased recall of recent events;Decreased short term memory;Decreased recall of biographical information   Following Commands Follows one step commands with increased time or repetition   Comments At least 2 pt identifiers including name and    Subjective   Subjective Pt agreeable to work with PT   RLE Assessment   RLE Assessment WFL  (grossly 3- to 3/5)   LLE Assessment   LLE Assessment WFL  (grossly 3- to 3/5)   Light Touch   RLE Light Touch Grossly intact   LLE Light Touch Grossly intact   Bed Mobility   Supine to Sit 4  Minimal assistance   Additional items Assist x 1;Verbal cues;Increased time required;Bedrails;HOB elevated;LE management  (trunk management)   Transfers   Sit to Stand 3  Moderate assistance   Additional items Assist x 1;Verbal cues;Increased time required;Armrests  (cues for safe hand placement)   Stand to Sit 3  Moderate assistance   Additional items Assist x 1;Verbal cues;Increased time required;Armrests  (cues for safe hand placement)   Ambulation/Elevation   Gait pattern Foward flexed;Decreased foot clearance;Antalgic;Short stride;Step to;Decreased heel strike   Gait Assistance 4  Minimal assist  (to occasional mod A)   Additional items Assist x 1;Verbal cues;Tactile cues   Assistive Device Rolling walker   Distance 5-6 feet bed to recliner chair   Balance   Static Sitting Fair   Static Standing Fair -  (w RW)   Ambulatory Fair -  (w RW)   Endurance Deficit   Endurance Deficit Yes   Endurance Deficit Description Pt with limited overall activity, standing, and gait endurance.   Activity Tolerance   Activity Tolerance Patient limited by fatigue;Treatment limited secondary to medical complications (Comment)  (impaired cognition;generalized weakness and  deconditioning)   Medical Staff Made Aware CM   Nurse Made Aware RN   Assessment   Problem List Decreased strength;Decreased endurance;Impaired balance;Decreased mobility;Decreased coordination;Decreased cognition;Impaired judgement;Decreased safety awareness;Pain;Orthopedic restrictions   Assessment Patient seen for Physical Therapy evaluation. Patient admitted with Unwitnessed fall.  Comorbidities affecting patient's physical performance include:DM2, CKD3, CHF, CLL, OA, cardiomyopathy. Personal factors affecting patient at time of initial evaluation include: ambulating with assistive device, inability to navigate community distances, inability to navigate level surfaces without external assistance, inability to perform dynamic tasks in community, decreased cognition, positive fall history, and limited insight into impairments. Prior to admission, patient was requiring assist for functional mobility with rollator, requiring assist for ADLS, requiring assist for IADLS, ambulating household distance, and an assisted living resident.  Please find objective findings from Physical Therapy assessment regarding body systems outlined above with impairments and limitations including weakness, impaired balance, decreased endurance, impaired coordination, gait deviations, decreased activity tolerance, decreased functional mobility tolerance, decreased safety awareness, impaired judgement, fall risk, orthopedic restrictions, and decreased cognition.  The Barthel Index was used as a functional outcome tool presenting with a score of Barthel Index Score: 35 today indicating marked limitations of functional mobility and ADLS.  Patient's clinical presentation is currently unstable/unpredictable as seen in patient's presentation of vital sign response, changing level of pain, varying levels of cognitive performance, increased fall risk, new onset of impairment of functional mobility, decreased endurance, and new onset of weakness.  Pt would benefit from continued Physical Therapy treatment to address deficits as defined above and maximize level of functional mobility. As demonstrated by objective findings, the assigned level of complexity for this evaluation is high.    The patient's [facility] Basic Mobility Inpatient Short Form Raw Score is 15. A Raw score of less than or equal to 16 suggests the patient may benefit from discharge to post-acute rehabilitation services. Please also refer to the recommendation of the Physical Therapist for safe discharge planning.   Goals   Patient Goals Pt uanble to state 2/2 impaired cognition   STG Expiration Date 07/12/25   Short Term Goal #1 Patient will: Increase bilateral LE strength 1/2 grade to facilitate independent mobility, Perform all bed mobility tasks w/ supervision to improve pt's independence w/ repositioning for decrease risk of skin breakdown, Perform all transfers w/ minx1 consistently from various height surfaces in order to improve I w/ engagement w/ real-world environments/situations, Ambulate at least 75 ft. with roller walker w/ supervision w/o LOB to facilitate return and engagement w/ previous living environment, Increase all balance 1 grade to decrease risk for falls, Tolerate at least 30 consecutive minutes of activity to demonstrate improved activity tolerance and endurance, and Tolerate 3 hr OOB to faciliate upright tolerance   Plan   Treatment/Interventions ADL retraining;Functional transfer training;LE strengthening/ROM;Therapeutic exercise;Endurance training;Cognitive reorientation;Patient/family training;Equipment eval/education;Bed mobility;Gait training;Compensatory technique education;Spoke to nursing;Spoke to case management   PT Frequency 3-5x/wk   Discharge Recommendation   Rehab Resource Intensity Level, PT II (Moderate Resource Intensity)  (vs Level III Minimum Resource Intensity pending level of assist facility can provide)   AM-PAC Basic Mobility Inpatient   Turning in  "Flat Bed Without Bedrails 3   Lying on Back to Sitting on Edge of Flat Bed Without Bedrails 3   Moving Bed to Chair 3   Standing Up From Chair Using Arms 2   Walk in Room 3   Climb 3-5 Stairs With Railing 1   Basic Mobility Inpatient Raw Score 15   Basic Mobility Standardized Score 36.97   [facility] Highest Level Of Mobility   [facility] Goal 4: Move to chair/commode   -[facility] Achieved 6: Walk 10 steps or more   Barthel Index   Feeding 5   Bathing 0   Grooming Score 0   Dressing Score 5   Bladder Score 5   Bowels Score 5   Toilet Use Score 5   Transfers (Bed/Chair) Score 10   Mobility (Level Surface) Score 0   Stairs Score 0   Barthel Index Score 35   Additional Treatment Session   Start Time 1420   End Time 1435   Treatment Assessment Pt stating \"I have to go to the bathroom\" Pt trans sit to stand with cues for safe hand placement and mod A. Pt amb with a RW to the BR with min A x 15 feet. Pt trans to the toilet with mod A and use of grab bar. After urination and small BM, pt is min assist for hygiene. Pt trans sit to stand from the toilet with mod A and use of grab bar. Pt is min A to stand with the RW and wash hands at sink. Pt then amb 15 feet bathroom to chair with a RW and min A. A: PT seen for PT evaluation and treatment. Pt with F tolerance to PT eval and treat. While adm, pt will cont to benefit from skilled PT services to increase the pt's strength, balance, endurance, safe functional mobility and gait. When medically stable for dc pt is appropriate for Level II Moderate Resource Intensity.   End of Consult   Patient Position at End of Consult Bedside chair;Bed/Chair alarm activated;All needs within reach   Licensure   [place] License Number  Jennifer Pratt PT          [1]   Past Medical History:  Diagnosis Date    Acute encephalopathy 10/15/2024    Acute postoperative pain of right knee 11/02/2022    ACUTE RESPIRATORY FAILURE, UNSP W HYPOXIA OR HYPERCAPNIA 05/11/2025    Benign adenomatous polyp of large " intestine     CHF (congestive heart failure) ([facility])     COVID 09/01/2024    Diabetes mellitus ([facility])     Fall 11/05/2020    Heart failure with mid-range ejection fraction (HFmEF) ([facility]) 11/05/2020    Hyperlipemia     Hypertension     Hypertensive urgency 04/02/2024    Metabolic encephalopathy 10/15/2024    Orthostasis 10/19/2022    Osteoarthritis     TIA (transient ischemic attack)    [2]   Past Surgical History:  Procedure Laterality Date    APPENDECTOMY      CARPAL TUNNEL RELEASE Right     HYSTERECTOMY W/ SALPINGO-OOPHERECTOMY      ID OPTX FEM SHFT FX W/INSJ IMED IMPLT W/WO SCREW Right 10/12/2022    Procedure: INSERTION NAIL IM FEMUR ANTEGRADE (TROCHANTERIC);  Surgeon: Eric Isaacs DO;  Location: [facility];  Service: Orthopedics

## 2025-07-02 NOTE — ASSESSMENT & PLAN NOTE
Most recent A1c 7.5.  Home regimen includes glargine 20U nightly, lispro 5 U 3 times daily with meals.  Resume insulin at lower dose to prevent hypoglycemia and adjust as needed based on Accu-Cheks and sliding-scale coverage.

## 2025-07-02 NOTE — ASSESSMENT & PLAN NOTE
84-year-old female patient with history of syncope and fall and recent hospitalization at [facility] again presents with unwitnessed fall at [facility].   Unclear how long patient was down for.  Patient was found in the floor by EMS.  Patient is complaining of left shoulder and left arm pain and left hip and left leg pain.  Patient is poor historian.  Case was discussed with trauma team per ED and recommended admission under SLIM.  CK WNL  CT head, CT cervical spine, CT pelvis, CT RLE report reviewed. No traumatic injury noted. Chronic findings  XR of the left humerus with distal fracture, minimally displaced   XR of the R Hip with questionable new fracture at the subcapital region of femoral neck  CT of right hip without acute injury     Unwitnessed fall of due to unclear etiology vs syncope.   Patient was supposed to have zio patch starting 7/2/2025- will monitor on tele while in the hospital.  Maintain on fall precaution.  PT OT eval  Orthostatic vitals monitoring.

## 2025-07-02 NOTE — PROGRESS NOTES
Progress Note - Hospitalist   Name: Gia Vaughan 84 y.o. female I MRN: [id number]  Unit/Bed#: S -01 I Date of Admission: 7/1/2025   Date of Service: 7/2/2025 I Hospital Day: 0    Assessment & Plan  Unwitnessed fall  84-year-old female patient with history of syncope and fall and recent hospitalization at [facility] again presents with unwitnessed fall at [facility].   Unclear how long patient was down for.  Patient was found in the floor by EMS.  Patient is complaining of left shoulder and left arm pain and left hip and left leg pain.  Patient is poor historian.  Case was discussed with trauma team per ED and recommended admission under SLIM.  CK WNL  CT head, CT cervical spine, CT pelvis, CT RLE report reviewed. No traumatic injury noted. Chronic findings  XR of the left humerus with distal fracture, minimally displaced   XR of the R Hip with questionable new fracture at the subcapital region of femoral neck  CT of right hip without acute injury     Unwitnessed fall of due to unclear etiology vs syncope.   Patient was supposed to have zio patch starting 7/2/2025- will monitor on tele while in the hospital.  Maintain on fall precaution.  PT OT eval  Orthostatic vitals monitoring.   Closed fracture of distal end of left humerus  History of minimally displaced left distal humeral fracture since 05/2025  Patient was seen by orthopedics on this encounter and currently recommended weightbearing as tolerated left upper extremity, range of motion of left elbow without restriction and recommended PT OT to evaluate for elbow motion.  Recommend outpatient follow-up with orthopedics.  Acute on chronic HFrEF (heart failure with reduced ejection fraction) ([facility])  Wt Readings from Last 3 Encounters:   07/02/25 59 kg (130 lb 1.1 oz)   06/24/25 61.1 kg (134 lb 12.8 oz)   05/30/25 60.3 kg (133 lb)    history of cardiomyopathy with EF 35% previously which has now improved to 45% on recent echo of unclear  etiology.  Home regimen includes Lasix 40 mg daily, losartan 25 mg daily, Toprol-XL 25 mg twice daily.  P.o. potassium supplement 20 meq twice daily    Chest x-ray personally reviewed noted without significant volume overload.  CTA PE study report reviewed noted with finding of mild interstitial edema.  Received dose of IV Lasix 50 mg in the emergency room.    Currently hemodynamically stable.  Does not appear to be overtly volume overloaded.   O2 saturation 94% on 3 L nasal cannula.  Patient uses home oxygen as needed as per Charles.  Resume home regimen.  Continue with low-salt, fluid restricted diet.  Monitor I&O.    Stage 3b chronic kidney disease (HCC)  Lab Results   Component Value Date    EGFR 41 07/02/2025    EGFR 37 07/01/2025    EGFR 33 07/01/2025    CREATININE 1.19 07/02/2025    CREATININE 1.31 (H) 07/01/2025    CREATININE 1.41 (H) 07/01/2025   Baseline creatinine ranges from 0.9-1.2  Presented with creatinine 1.41 currently 1.31.  Improved with IV diuresis.  Primary hypertension  Present on admission history of hypertension.  Currently controlled  Continue home dose of losartan 20 mg daily, Toprol-XL 25 mg twice daily  CLL (chronic lymphocytic leukemia) ([facility])  History of CLL.  WBC noted chronically elevated  Currently back within baseline, elevated admission from fall  UA negative for UTI.  Outpatient follow-up with heme-onc.  Type 2 diabetes mellitus with stage 3 chronic kidney disease, unspecified whether long term insulin use, unspecified whether stage 3a or 3b CKD (HCC)  Most recent A1c 7.5.  Home regimen includes glargine 20U nightly, lispro 5 U 3 times daily with meals.  Resume insulin at lower dose to prevent hypoglycemia and adjust as needed based on Accu-Cheks and sliding-scale coverage.    VTE Pharmacologic Prophylaxis:   Heparin    Mobility:   Basic Mobility Inpatient Raw Score: 11  JH-HLM Goal: 4: Move to chair/commode  JH-HLM Achieved: 4: Move to chair/commode  JH-HLM Goal achieved. Continue  to encourage appropriate mobility.    Patient Centered Rounds: I performed bedside rounds with nursing staff today.   Discussions with Specialists or Other Care Team Provider: LIANE    Education and Discussions with Family / Patient: Attempted to update  (nephew) via phone. Left voicemail.     Current Length of Stay: 0 day(s)  Current Patient Status: Observation   Certification Statement: The patient will continue to require additional inpatient hospital stay due to PT/OT evaluation  Discharge Plan: Anticipate discharge in 24-48 hrs to discharge location to be determined pending rehab evaluations.    Code Status: Level 3 - DNAR and DNI    Subjective   Patient doing well, denies any acute complaints or pain.     Objective :  Temp:  [95.9 °F (35.5 °C)-98 °F (36.7 °C)] 95.9 °F (35.5 °C)  HR:  [58-82] 58  BP: (111-138)/(46-99) 120/99  Resp:  [18-20] 18  SpO2:  [93 %-99 %] 99 %  O2 Device: Nasal cannula  Nasal Cannula O2 Flow Rate (L/min):  [3 L/min-4 L/min] 3 L/min    Body mass index is 20.99 kg/m².     Input and Output Summary (last 24 hours):     Intake/Output Summary (Last 24 hours) at 7/2/2025 0906  Last data filed at 7/2/2025 0821  Gross per 24 hour   Intake 340 ml   Output 1957 ml   Net -1617 ml       Physical Exam  Vitals and nursing note reviewed.   Constitutional:       General: She is not in acute distress.     Appearance: Normal appearance. She is not ill-appearing.   HENT:      Mouth/Throat:      Mouth: Mucous membranes are moist.     Cardiovascular:      Rate and Rhythm: Normal rate. Rhythm irregular.      Heart sounds: Normal heart sounds. No murmur heard.  Pulmonary:      Effort: Pulmonary effort is normal.      Breath sounds: Normal breath sounds. No rales.   Abdominal:      General: There is no distension.      Palpations: Abdomen is soft.      Tenderness: There is no abdominal tenderness.     Musculoskeletal:         General: Normal range of motion.      Right lower leg: No edema.      Left  lower leg: No edema.      Comments: In a left sling, not appropriately using it      Skin:     General: Skin is warm and dry.      Findings: No bruising.     Neurological:      Mental Status: She is alert. Mental status is at baseline. She is disoriented.           Lines/Drains:        Telemetry:  Telemetry Orders (From admission, onward)               24 Hour Telemetry Monitoring  Continuous x 24 Hours (Telem)        Expiring   Question:  Reason for 24 Hour Telemetry  Answer:  Decompensated CHF- and any one of the following: continuous diuretic infusion or total diuretic dose >200 mg daily, associated electrolyte derangement (I.e. K < 3.0), inotropic drip (continuous infusion), hx of ventricular arrhythmia, or new EF < 35%                     Telemetry Reviewed: Normal Sinus Rhythm  Indication for Continued Telemetry Use: Syncope               Lab Results: I have reviewed the following results:   Results from last 7 days   Lab Units 07/02/25 0448 07/01/25  0343   WBC Thousand/uL 35.24* 97.89*   HEMOGLOBIN g/dL 9.8* 11.4*   HEMATOCRIT % 31.6* 35.7   PLATELETS Thousands/uL 217 282   LYMPHO PCT %  --  93*   MONO PCT %  --  0*   EOS PCT %  --  0     Results from last 7 days   Lab Units 07/02/25  0448 07/01/25  0445   SODIUM mmol/L 139 140   POTASSIUM mmol/L 4.3 5.0   CHLORIDE mmol/L 106 108   CO2 mmol/L 25 24   BUN mg/dL 38* 31*   CREATININE mg/dL 1.19 1.31*   ANION GAP mmol/L 8 8   CALCIUM mg/dL 8.8 9.4   ALBUMIN g/dL  --  3.9   TOTAL BILIRUBIN mg/dL  --  0.49   ALK PHOS U/L  --  116*   ALT U/L  --  11   AST U/L  --  14   GLUCOSE RANDOM mg/dL 249* 139     Results from last 7 days   Lab Units 07/01/25  0343   INR  1.01     Results from last 7 days   Lab Units 07/02/25  0703 07/01/25  1607 07/01/25  1135   POC GLUCOSE mg/dl 192* 297* 211*         Results from last 7 days   Lab Units 07/01/25  0445 07/01/25  0343   LACTIC ACID mmol/L 1.5  --    PROCALCITONIN ng/ml  --  0.27*       Recent Cultures (last 7 days):    Results from last 7 days   Lab Units 07/01/25  0451 07/01/25  0445   BLOOD CULTURE  Received in Microbiology Lab. Culture in Progress. Received in Microbiology Lab. Culture in Progress.             Last 24 Hours Medication List:     Current Facility-Administered Medications:     acetaminophen (TYLENOL) tablet 975 mg, TID PRN    atorvastatin (LIPITOR) tablet 20 mg, Daily With Dinner    furosemide (LASIX) injection 40 mg, BID (diuretic)    heparin (porcine) subcutaneous injection 5,000 Units, Q8H TESSA    insulin glargine (LANTUS) subcutaneous injection 10 Units 0.1 mL, HS    insulin lispro (HumALOG/ADMELOG) 100 units/mL subcutaneous injection 1-5 Units, TID AC **AND** Fingerstick Glucose (POCT), TID AC    insulin lispro (HumALOG/ADMELOG) 100 units/mL subcutaneous injection 5 Units, TID With Meals    losartan (COZAAR) tablet 25 mg, Daily    magnesium Oxide (MAG-OX) tablet 400 mg, Daily    metoprolol succinate (TOPROL-XL) 24 hr tablet 25 mg, Q12H    Administrative Statements   Today, Patient Was Seen By: Edna Anderson PA-C      **Please Note: This note may have been constructed using a voice recognition system.**

## 2025-07-03 VITALS
TEMPERATURE: 98 F | SYSTOLIC BLOOD PRESSURE: 141 MMHG | WEIGHT: 125.22 LBS | RESPIRATION RATE: 18 BRPM | OXYGEN SATURATION: 97 % | DIASTOLIC BLOOD PRESSURE: 69 MMHG | HEART RATE: 91 BPM | BODY MASS INDEX: 20.21 KG/M2

## 2025-07-03 LAB
ANION GAP SERPL CALCULATED.3IONS-SCNC: 6 MMOL/L (ref 4–13)
BUN SERPL-MCNC: 45 MG/DL (ref 5–25)
CALCIUM SERPL-MCNC: 8.5 MG/DL (ref 8.4–10.2)
CHLORIDE SERPL-SCNC: 105 MMOL/L (ref 96–108)
CO2 SERPL-SCNC: 26 MMOL/L (ref 21–32)
CREAT SERPL-MCNC: 1.11 MG/DL (ref 0.6–1.3)
ERYTHROCYTE [DISTWIDTH] IN BLOOD BY AUTOMATED COUNT: 16.3 % (ref 11.6–15.1)
GFR SERPL CREATININE-BSD FRML MDRD: 45 ML/MIN/1.73SQ M
GLUCOSE SERPL-MCNC: 106 MG/DL (ref 65–140)
GLUCOSE SERPL-MCNC: 121 MG/DL (ref 65–140)
GLUCOSE SERPL-MCNC: 241 MG/DL (ref 65–140)
HCT VFR BLD AUTO: 32.6 % (ref 34.8–46.1)
HGB BLD-MCNC: 10.5 G/DL (ref 11.5–15.4)
MCH RBC QN AUTO: 36 PG (ref 26.8–34.3)
MCHC RBC AUTO-ENTMCNC: 32.2 G/DL (ref 31.4–37.4)
MCV RBC AUTO: 112 FL (ref 82–98)
PLATELET # BLD AUTO: 230 THOUSANDS/UL (ref 149–390)
PMV BLD AUTO: 12 FL (ref 8.9–12.7)
POTASSIUM SERPL-SCNC: 4.2 MMOL/L (ref 3.5–5.3)
RBC # BLD AUTO: 2.92 MILLION/UL (ref 3.81–5.12)
SODIUM SERPL-SCNC: 137 MMOL/L (ref 135–147)
WBC # BLD AUTO: 58.92 THOUSAND/UL (ref 4.31–10.16)

## 2025-07-03 PROCEDURE — 85027 COMPLETE CBC AUTOMATED: CPT | Performed by: INTERNAL MEDICINE

## 2025-07-03 PROCEDURE — 97535 SELF CARE MNGMENT TRAINING: CPT

## 2025-07-03 PROCEDURE — 80048 BASIC METABOLIC PNL TOTAL CA: CPT | Performed by: INTERNAL MEDICINE

## 2025-07-03 PROCEDURE — 82948 REAGENT STRIP/BLOOD GLUCOSE: CPT

## 2025-07-03 PROCEDURE — 97167 OT EVAL HIGH COMPLEX 60 MIN: CPT

## 2025-07-03 PROCEDURE — 99239 HOSP IP/OBS DSCHRG MGMT >30: CPT

## 2025-07-03 RX ORDER — BLOOD-GLUCOSE METER
EACH MISCELLANEOUS
Qty: 100 EACH | Refills: 3 | Status: SHIPPED | OUTPATIENT
Start: 2025-07-03

## 2025-07-03 RX ADMIN — INSULIN LISPRO 5 UNITS: 100 INJECTION, SOLUTION INTRAVENOUS; SUBCUTANEOUS at 07:44

## 2025-07-03 RX ADMIN — LOSARTAN POTASSIUM 25 MG: 25 TABLET, FILM COATED ORAL at 10:07

## 2025-07-03 RX ADMIN — HEPARIN SODIUM 5000 UNITS: 5000 INJECTION INTRAVENOUS; SUBCUTANEOUS at 05:13

## 2025-07-03 RX ADMIN — FUROSEMIDE 40 MG: 40 TABLET ORAL at 10:07

## 2025-07-03 RX ADMIN — METOPROLOL SUCCINATE 25 MG: 25 TABLET, EXTENDED RELEASE ORAL at 12:00

## 2025-07-03 RX ADMIN — Medication 400 MG: at 10:07

## 2025-07-03 NOTE — CASE MANAGEMENT
Case Management Discharge Planning Note    Patient name Gia Vaughan  Location S /S -01 MRN [id number]  : 1940 Date 7/3/2025       Current Admission Date: 2025  Current Admission Diagnosis:Unwitnessed fall   Patient Active Problem List    Diagnosis Date Noted    Closed fracture of distal end of left humerus 2025    Cardiomyopathy (HCC) 2025    Dementia (HCC) 2025    Syncope 2025    Closed displaced transcondylar fracture of left humerus, initial encounter 2025    Elbow fracture, left, closed, initial encounter 2025    Acute respiratory failure with hypoxia ([facility]) 2025    Acute diverticulitis 10/15/2024    Abnormal urinalysis 10/15/2024    Diarrhea 2024    Headache 2024    Suspected deep tissue injury of unknown depth 2022    Encephalopathy 10/24/2022    Deep tissue injury 10/21/2022    CLL (chronic lymphocytic leukemia) ([facility]) 10/13/2022    Intertrochanteric fracture of right femur ([facility]) 10/11/2022    Displaced fracture of middle phalanx of left ring finger, initial encounter for closed fracture 10/11/2022    Primary hypertension 2021    Moderate dementia ([facility]) 2021    Lymphadenopathy 2020    Elevated liver enzymes 2020    Unwitnessed fall 2020    Stage 3b chronic kidney disease ([facility]) 2020    HLD (hyperlipidemia) 2020    OA (osteoarthritis) 2020    Acute on chronic HFrEF (heart failure with reduced ejection fraction) ([facility]) 2020    Type 2 diabetes mellitus with stage 3 chronic kidney disease, unspecified whether long term insulin use, unspecified whether stage 3a or 3b CKD ([facility])       LOS (days): 1  Geometric Mean LOS (GMLOS) (days):   Days to GMLOS:     OBJECTIVE:  Risk of Unplanned Readmission Score: 31.93         Current admission status: Inpatient   Preferred Pharmacy:   UNKNOWN - FOLLOW UP PRIOR TO DISCHARGE TO E-PRESCRIBE  No address on file      [id number] -  [place] - [address]  [address]  [place] [address]  Phone: [phone] Fax: [phone]    [id number] - [place] - [address]  [address] [place] [address]  Phone: [phone] Fax: [phone]    [facility] - [place], [place] - [address]  [address]  [address]  [place] [address]  Phone: [phone] Fax: [phone]    Primary Care Provider: No primary care provider on file.    Primary Insurance: MEDICARE  Secondary Insurance: [employer]URANCE    DISCHARGE DETAILS:    Discharge planning discussed with:: Lynne at [facility] and Charles Ny (Muriel)  [place] of Choice: Yes  Comments - Freedom of Choice: Reviewed DCP  CM contacted family/caregiver?: Yes  Were Treatment Team discharge recommendations reviewed with patient/caregiver?: Yes  Did patient/caregiver verbalize understanding of patient care needs?: N/A- going to facility  Were patient/caregiver advised of the risks associated with not following Treatment Team discharge recommendations?: Yes    Contacts  Patient Contacts: Charles Ny (Nephherson)  Relationship to Patient:: Family  Contact Method: Phone  Phone Number: [phone]  Reason/Outcome: Discharge Planning, Emergency Contact, Continuity of Care    Requested Home Health Care         Is the patient interested in HHC at discharge?: No    DME Referral Provided  Referral made for DME?: No    Other Referral/Resources/Interventions Provided:  Interventions: Facility Return, Transportation         Treatment Team Recommendation: Facility Return, Assisted Living  Expected Discharge Disposition: Assisted Living Facility  Additional Discharge Dispositions: Assisted Living Facility  Transport at Discharge : Family                                         CM was notified that patient is medically stable for DC today.     CM called [facility] and spoke with Lynne and reviewed how patient did with PT/OT.  Based on this discussion she is  agreeable with patient returning today.    CM followed up with a call to patient's nephew to discuss the DCP and transportation.  Nephew is agreeable with plan for return [facility] and will provide transportation.  He will be here around 12pm to pick her up.    CM called Lynne back and advised of confirmed DCP and transport time.  She asked for a signed medication list if there were any changes to patient's medications ans scripts for any new medications.  Additionally they requested orders for PT/OT eval and treat which CM shared with SLIM and primary nurse.    CM reviewed the availability of treatment team to discuss questions or concerns patient and/or family may have regarding  understanding medications and recognizing signs and symptoms once discharged.  CM also encouraged patient to follow up with all recommended appointments after discharge. Patient advised of importance for patient and family to participate in managing patient's medical well being.

## 2025-07-03 NOTE — ASSESSMENT & PLAN NOTE
Wt Readings from Last 3 Encounters:   07/03/25 56.8 kg (125 lb 3.5 oz)   06/24/25 61.1 kg (134 lb 12.8 oz)   05/30/25 60.3 kg (133 lb)    history of cardiomyopathy with EF 35% previously which has now improved to 45% on recent echo of unclear etiology.  Home regimen includes Lasix 40 mg daily, losartan 25 mg daily, Toprol-XL 25 mg twice daily.  P.o. potassium supplement 20 meq twice daily    Chest x-ray personally reviewed noted without significant volume overload.  CTA PE study report reviewed noted with finding of mild interstitial edema.  Received dose of IV Lasix 50 mg in the emergency room.    Currently hemodynamically stable.  Does not appear to be overtly volume overloaded.   O2 saturation 94% on 3 L nasal cannula.  Patient uses home oxygen as needed as per Charles.  Resume home regimen.  Continue with low-salt, fluid restricted diet.  Monitor I&O.  Chest x-ray demonstrated mild cardiomegaly. Clear lungs

## 2025-07-03 NOTE — PLAN OF CARE
Problem: Potential for Falls  Goal: Patient will remain free of falls  Description: INTERVENTIONS:  - Educate patient/family on patient safety including physical limitations  - Instruct patient to call for assistance with activity   - Consider consulting OT/PT to assist with strengthening/mobility based on AM PAC & JH-HLM score  - Consult OT/PT to assist with strengthening/mobility   - Keep Call bell within reach  - Keep bed low and locked with side rails adjusted as appropriate  - Keep care items and personal belongings within reach  - Initiate and maintain comfort rounds  - Make Fall Risk Sign visible to staff  - Offer Toileting every 2 Hours, in advance of need  - Initiate/Maintain bed/chair alarm  - Obtain necessary fall risk management equipment  - Apply yellow socks and bracelet for high fall risk patients  - Consider moving patient to room near nurses station  Outcome: Progressing     Problem: PAIN - ADULT  Goal: Verbalizes/displays adequate comfort level or baseline comfort level  Description: Interventions:  - Encourage patient to monitor pain and request assistance  - Assess pain using appropriate pain scale  - Administer analgesics as ordered based on type and severity of pain and evaluate response  - Implement non-pharmacological measures as appropriate and evaluate response  - Consider cultural and social influences on pain and pain management  - Notify physician/advanced practitioner if interventions unsuccessful or patient reports new pain  - Educate patient/family on pain management process including their role and importance of  reporting pain   - Provide non-pharmacologic/complimentary pain relief interventions  Outcome: Progressing     Problem: INFECTION - ADULT  Goal: Absence or prevention of progression during hospitalization  Description: INTERVENTIONS:  - Assess and monitor for signs and symptoms of infection  - Monitor lab/diagnostic results  - Monitor all insertion sites, i.e. indwelling  lines, tubes, and drains  - Monitor endotracheal if appropriate and nasal secretions for changes in amount and color  - [place] appropriate cooling/warming therapies per order  - Administer medications as ordered  - Instruct and encourage patient and family to use good hand hygiene technique  - Identify and instruct in appropriate isolation precautions for identified infection/condition  Outcome: Progressing  Goal: Absence of fever/infection during neutropenic period  Description: INTERVENTIONS:  - Monitor WBC  - Perform strict hand hygiene  - Limit to healthy visitors only  - No plants, dried, fresh or silk flowers with alcaraz in patient room  Outcome: Progressing     Problem: SAFETY ADULT  Goal: Patient will remain free of falls  Description: INTERVENTIONS:  - Educate patient/family on patient safety including physical limitations  - Instruct patient to call for assistance with activity   - Consider consulting OT/PT to assist with strengthening/mobility based on AM PAC & JH-HLM score  - Consult OT/PT to assist with strengthening/mobility   - Keep Call bell within reach  - Keep bed low and locked with side rails adjusted as appropriate  - Keep care items and personal belongings within reach  - Initiate and maintain comfort rounds  - Make Fall Risk Sign visible to staff  - Offer Toileting every 2 Hours, in advance of need  - Initiate/Maintain bed/chair alarm  - Obtain necessary fall risk management equipment  - Apply yellow socks and bracelet for high fall risk patients  - Consider moving patient to room near nurses station  Outcome: Progressing  Goal: Maintain or return to baseline ADL function  Description: INTERVENTIONS:  -  Assess patient's ability to carry out ADLs; assess patient's baseline for ADL function and identify physical deficits which impact ability to perform ADLs (bathing, care of mouth/teeth, toileting, grooming, dressing, etc.)  - Assess/evaluate cause of self-care deficits   - Assess range of  motion  - Assess patient's mobility; develop plan if impaired  - Assess patient's need for assistive devices and provide as appropriate  - Encourage maximum independence but intervene and supervise when necessary  - Involve family in performance of ADLs  - Assess for home care needs following discharge   - Consider OT consult to assist with ADL evaluation and planning for discharge  - Provide patient education as appropriate  - Monitor functional capacity and physical performance, use of AM PAC & JH-HLM   - Monitor gait, balance and fatigue with ambulation    Outcome: Progressing  Goal: Maintains/Returns to pre admission functional level  Description: INTERVENTIONS:  - Perform AM-PAC 6 Click Basic Mobility/ Daily Activity assessment daily.  - Set and communicate daily mobility goal to care team and patient/family/caregiver.   - Collaborate with rehabilitation services on mobility goals if consulted  - Perform Range of Motion 3 times a day.  - Reposition patient every 2 hours.  - Dangle patient 3 times a day  - Stand patient 3 times a day  - Ambulate patient 3 times a day  - Out of bed to chair 3 times a day   - Out of bed for meals 3 times a day  - Out of bed for toileting  - Record patient progress and toleration of activity level   Outcome: Progressing     Problem: DISCHARGE PLANNING  Goal: Discharge to home or other facility with appropriate resources  Description: INTERVENTIONS:  - Identify barriers to discharge w/patient and caregiver  - Arrange for needed discharge resources and transportation as appropriate  - Identify discharge learning needs (meds, wound care, etc.)  - Arrange for interpretive services to assist at discharge as needed  - Refer to Case Management Department for coordinating discharge planning if the patient needs post-hospital services based on physician/advanced practitioner order or complex needs related to functional status, cognitive ability, or social support system  Outcome:  Progressing     Problem: Knowledge Deficit  Goal: Patient/family/caregiver demonstrates understanding of disease process, treatment plan, medications, and discharge instructions  Description: Complete learning assessment and assess knowledge base.  Interventions:  - Provide teaching at level of understanding  - Provide teaching via preferred learning methods  Outcome: Progressing

## 2025-07-03 NOTE — ASSESSMENT & PLAN NOTE
84-year-old female patient with history of syncope and fall and recent hospitalization at [facility] again presents with unwitnessed fall at [facility].   Unclear how long patient was down for.  Patient was found in the floor by EMS.  Patient is complaining of left shoulder and left arm pain and left hip and left leg pain.  Patient is poor historian.  Case was discussed with trauma team per ED and recommended admission under SLIM.  CK WNL  CT head, CT cervical spine, CT pelvis, CT RLE report reviewed. No traumatic injury noted. Chronic findings  XR of the left humerus with distal fracture, minimally displaced   XR of the R Hip with questionable new fracture at the subcapital region of femoral neck  CT of right hip without acute injury     Unwitnessed fall of due to unclear etiology vs syncope.   Patient was supposed to have zio patch starting 7/2/2025- will monitor on tele while in the hospital.  Maintain on fall precaution.  PT OT eval  Orthostatic vitals monitoring.  Follow-up with cardiology outpatient for Zio patch

## 2025-07-03 NOTE — OCCUPATIONAL THERAPY NOTE
Occupational Therapy Evaluation/Treatment Note       07/03/25 0958   Note Type   Note type Evaluation   Pain Assessment   Pain Assessment Tool 0-10   Pain Score No Pain   Restrictions/Precautions   Weight Bearing Precautions Per Order Yes   LUE Weight Bearing Per Order WBAT  (Per ortho note, WBAT LUE ROM L elbow without restrictions, PT/OT for elbow motion)   Other Precautions Cognitive;Chair Alarm;Bed Alarm;Fall Risk;Pain   Home Living   Type of Home Assisted living  ([facility] [place] [facility])   Home Layout One level;Performs ADLs on one level   Additional Comments Patient is a very poor historian, unable to recall PLOF or home set up, very confused   Prior Function   Level of [place] Needs assistance with ADLs;Needs assistance with functional mobility;Needs assistance with IADLS   Lives With Facility staff   Receives Help From Personal care attendant   IADLs Family/Friend/Other provides transportation;Family/Friend/Other provides meals;Family/Friend/Other provides medication management   Falls in the last 6 months 1 to 4   Comments Per chart, patient is ambulatory with rollator, recently NWB LUE due to shoulder fracture and unable to use rollator, needs assist with ADLs and iADLs at baseline   General   Additional Pertinent History Patient presented to hospital s/p fall, dx with closed fracture L distal humerus, WBAT, ok for elbow ROM per ortho   ADL   Eating Assistance 4  Minimal Assistance   Grooming Assistance 4  Minimal Assistance   UB Bathing Assistance 3  Moderate Assistance   LB Bathing Assistance 3  Moderate Assistance   UB Dressing Assistance 3  Moderate Assistance   LB Dressing Assistance 3  Moderate Assistance   LB Dressing Deficit   (Don/doff bilateral socks while seated EOB)   Toileting Assistance  1  Total Assistance   Toileting Deficit   (Incontinent of urine)   Bed Mobility   Supine to Sit 4  Minimal assistance   Additional items Assist x 1;Increased time required;Verbal cues   Transfers   Sit to Stand 4   Minimal assistance   Additional items Assist x 1;Verbal cues   Stand to Sit 4  Minimal assistance   Additional items Assist x 1;Verbal cues   Additional Comments STS from chair, support of RW, cues for safety   Functional Mobility   Functional Mobility 4  Minimal assistance   Balance   Static Sitting Fair   Dynamic Sitting Fair -   Static Standing Fair -   Dynamic Standing Fair   Activity Tolerance   Activity Tolerance Other (Comment)  (Limited by cognition, limited by generalized weakness)   RUE Assessment   RUE Assessment WFL   LUE Assessment   LUE Assessment WFL  (Slightly decreased shoulder flexion, discomfort at end range of elbow flexion)   Cognition   Overall Cognitive Status Impaired   Arousal/Participation Alert;Cooperative  (Highly distractible)   Attention Attends with cues to redirect   Orientation Level Oriented to person;Disoriented to time;Disoriented to place;Disoriented to situation   Memory Decreased recall of recent events;Decreased recall of precautions;Decreased short term memory   Following Commands Follows one step commands with increased time or repetition   Comments Patient is pleasantly confused; poor short term recall, poor safety awareness, Easily distracted but redirectable with min verbal cues; poor insight into current functional limitations   Assessment   Limitation Decreased ADL status;Decreased UE strength;Decreased Safe judgement during ADL;Decreased cognition;Decreased endurance;Decreased self-care trans;Decreased high-level ADLs   Prognosis Good   Assessment Patient evaluated by Occupational Therapy.  Patient admitted with Unwitnessed fall.  The patients occupational profile, medical and therapy history includes a extensive additional review of physical, cognitive, or psychosocial history related to current functional performance.  Comorbidities affecting functional mobility and ADLS include: HTN, DM, TIA, HLD, OA, CHF, Covid, falls, dementia.  Prior to admission, patient was  requiring assist for functional mobility with rollator, requiring assist for ADLS, requiring assist for IADLS, and an assisted living resident.  The evaluation identifies the following performance deficits: weakness, impaired balance, decreased endurance, increased fall risk, new onset of impairment of functional mobility, decreased ADLS, decreased IADLS, decreased activity tolerance, decreased safety awareness, impaired judgement, decreased cognition, decreased strength, decreased standing and/or sitting tolerance, and decreased mobility, that result in activity limitations and/or participation restrictions. This evaluation requires clinical decision making of high complexity, because the patient presents with comorbidites that affect occupational performance and required significant modification of tasks or assistance with consideration of multiple treatment options.  The Barthel Index was used as a functional outcome tool presenting with a score of Barthel Index Score: 40, indicating marked limitations of functional mobility and ADLS.  The patient's raw score on the -PAC Daily Activity Inpatient Short Form is 13. A raw score of less than 19 suggests the patient may benefit from discharge to post-acute rehabilitation services. Please refer to the recommendation of the Occupational Therapist for safe discharge planning. Patient will benefit from skilled Occupational Therapy services to address above deficits and facilitate a safe return to prior level of function.   Goals   Patient Goals None stated due to impaired cognition   LTG Time Frame 7-10  (See goals below)   Plan   Treatment Interventions ADL retraining;Functional transfer training;UE strengthening/ROM;Endurance training;Patient/family training;Equipment evaluation/education;Compensatory technique education;Continued evaluation;Energy conservation;Activityengagement   Goal Expiration Date 07/13/25   OT Frequency 3-5x/wk   Discharge Recommendation   Rehab  Resource Intensity Level, OT II (Moderate Resource Intensity)   AM-PAC Daily Activity Inpatient   Lower Body Dressing 2   Bathing 2   Toileting 2   Upper Body Dressing 2   Grooming 2   Eating 3   Daily Activity Raw Score 13   Daily Activity Standardized Score (Calc for Raw Score >=11) 32.03   Barthel Index   Feeding 5   Bathing 0   Grooming Score 0   Dressing Score 5   Bladder Score 5   Bowels Score 10   Toilet Use Score 5   Transfers (Bed/Chair) Score 10   Mobility (Level Surface) Score 0   Stairs Score 0   Barthel Index Score 40   Additional Treatment Session   Start Time 0940   End Time 0958   Treatment Assessment Patient seen for additional OT treatment session following initial evaluation. Patient performed lower body dressing: don underwear, don scrub pants with mod assist. STS x 2 trials from chair with min assist, support of RW. Grooming: hair combing and face washing while seated. Patient confused throughout session, requires min-mod verbal cues for sustained attention to task and reorientation. Patient pleasant throughout. Limited by cognition, generalized weakness, and deconditioning. Will benefit from continued skilled inpatient OT services in order to maximize functional independence and prepare for safe discharge. At end of session patient seated in recliner chair with all needs met, chair alarm set.     Goals established on initial evaluation in order to achieve goal to decrease caregiver burden and increase functional independence     Pt will perform self feeding task Independent  for increased ADL independence.    Pt will perform grooming tasks standing at sink Supervision for increased ADL independence    Pt will perform UB ADLs Supervision  for increased ADL independence.     Pt will perform LB ADLs Min A  for increased ADL independence.     Pt will perform toileting Supervision for increased ADL independence    Patient will increase ambulatory standard toilet transfer to supervision with least  restrictive assistive device to increase performance and safety with ADLS and functional mobility    Pt will perform all functional transfers Supervision with LRAD for increased ADL and iADL independence    Pt will perform bed mobility Supervision to increase participation in ADLs, functional transfers, and decrease caregiver burden.     Patient will tolerate 15 minutes of UE ROM/strengthening to increase general activity tolerance and performance in ADLS/IADLS    Patient will improve functional activity tolerance to 15 minutes of sustained functional tasks to increase participation in basic self-care and decrease assistance level    Patient will be able to to verbalize understanding and perform energy conservation/proper body mechanics during ADLS and functional mobility at least 50% of the time with moderate cueing to decrease signs of fatigue and increase stamina to return to prior level of function    Patient will increase static/dynamic sitting balance to good to improve the ability to sit at edge of bed or on a chair for ADLS;  Patient will increase static/dynamic standing balance to fair+ to improve postural stability and decrease fall risk during standing ADLS and transfers.      Pt will score >/= 19/24 on AM-PAC Daily Activity Inpatient scale to promote safe independence with ADLs and functional mobility    Christine Rodgers, OTR/L

## 2025-07-03 NOTE — ASSESSMENT & PLAN NOTE
Lab Results   Component Value Date    EGFR 45 07/03/2025    EGFR 41 07/02/2025    EGFR 37 07/01/2025    CREATININE 1.11 07/03/2025    CREATININE 1.19 07/02/2025    CREATININE 1.31 (H) 07/01/2025   Baseline creatinine ranges from 0.9-1.2  Presented with creatinine 1.41 currently 1.31.  Improved with IV diuresis.

## 2025-07-03 NOTE — NURSING NOTE
Called for report to [facility] - no answer left message with call back number.  AVS sent with patient back to facility.

## 2025-07-03 NOTE — PLAN OF CARE
Problem: PAIN - ADULT  Goal: Verbalizes/displays adequate comfort level or baseline comfort level  Description: Interventions:  - Encourage patient to monitor pain and request assistance  - Assess pain using appropriate pain scale  - Administer analgesics as ordered based on type and severity of pain and evaluate response  - Implement non-pharmacological measures as appropriate and evaluate response  - Consider cultural and social influences on pain and pain management  - Notify physician/advanced practitioner if interventions unsuccessful or patient reports new pain  - Educate patient/family on pain management process including their role and importance of  reporting pain   - Provide non-pharmacologic/complimentary pain relief interventions  Outcome: Progressing     Problem: INFECTION - ADULT  Goal: Absence of fever/infection during neutropenic period  Description: INTERVENTIONS:  - Monitor WBC  - Perform strict hand hygiene  - Limit to healthy visitors only  - No plants, dried, fresh or silk flowers with alcaraz in patient room  Outcome: Progressing     Problem: SAFETY ADULT  Goal: Maintains/Returns to pre admission functional level  Description: INTERVENTIONS:  - Perform AM-PAC 6 Click Basic Mobility/ Daily Activity assessment daily.  - Set and communicate daily mobility goal to care team and patient/family/caregiver.   - Collaborate with rehabilitation services on mobility goals if consulted  - Out of bed for toileting  - Record patient progress and toleration of activity level   Outcome: Progressing

## 2025-07-03 NOTE — DISCHARGE SUMMARY
Discharge Summary - Hospitalist   Name: Gia Vaughan 84 y.o. female I MRN: [id number]  Unit/Bed#: S -01 I Date of Admission: 7/1/2025   Date of Service: 7/3/2025 I Hospital Day: 1     Assessment & Plan  Unwitnessed fall  84-year-old female patient with history of syncope and fall and recent hospitalization at [facility] again presents with unwitnessed fall at [facility].   Unclear how long patient was down for.  Patient was found in the floor by EMS.  Patient is complaining of left shoulder and left arm pain and left hip and left leg pain.  Patient is poor historian.  Case was discussed with trauma team per ED and recommended admission under SLIM.  CK WNL  CT head, CT cervical spine, CT pelvis, CT RLE report reviewed. No traumatic injury noted. Chronic findings  XR of the left humerus with distal fracture, minimally displaced   XR of the R Hip with questionable new fracture at the subcapital region of femoral neck  CT of right hip without acute injury     Unwitnessed fall of due to unclear etiology vs syncope.   Patient was supposed to have zio patch starting 7/2/2025- will monitor on tele while in the hospital.  Maintain on fall precaution.  PT OT eval  Orthostatic vitals monitoring.  Follow-up with cardiology outpatient for Zio patch  Closed fracture of distal end of left humerus  History of minimally displaced left distal humeral fracture since 05/2025  Patient was seen by orthopedics on this encounter and currently recommended weightbearing as tolerated left upper extremity, range of motion of left elbow without restriction and recommended PT OT to evaluate for elbow motion.  Recommend outpatient follow-up with orthopedics.  Acute on chronic HFrEF (heart failure with reduced ejection fraction) ([facility])  Wt Readings from Last 3 Encounters:   07/03/25 56.8 kg (125 lb 3.5 oz)   06/24/25 61.1 kg (134 lb 12.8 oz)   05/30/25 60.3 kg (133 lb)    history of cardiomyopathy with EF 35%  previously which has now improved to 45% on recent echo of unclear etiology.  Home regimen includes Lasix 40 mg daily, losartan 25 mg daily, Toprol-XL 25 mg twice daily.  P.o. potassium supplement 20 meq twice daily    Chest x-ray personally reviewed noted without significant volume overload.  CTA PE study report reviewed noted with finding of mild interstitial edema.  Received dose of IV Lasix 50 mg in the emergency room.    Currently hemodynamically stable.  Does not appear to be overtly volume overloaded.   O2 saturation 94% on 3 L nasal cannula.  Patient uses home oxygen as needed as per [place].  Resume home regimen.  Continue with low-salt, fluid restricted diet.  Monitor I&O.  Chest x-ray demonstrated mild cardiomegaly. Clear lungs     Stage 3b chronic kidney disease (HCC)  Lab Results   Component Value Date    EGFR 45 07/03/2025    EGFR 41 07/02/2025    EGFR 37 07/01/2025    CREATININE 1.11 07/03/2025    CREATININE 1.19 07/02/2025    CREATININE 1.31 (H) 07/01/2025   Baseline creatinine ranges from 0.9-1.2  Presented with creatinine 1.41 currently 1.31.  Improved with IV diuresis.  Primary hypertension  Present on admission history of hypertension.  Currently controlled  Continue home dose of losartan 20 mg daily, Toprol-XL 25 mg twice daily  CLL (chronic lymphocytic leukemia) (HCC)  History of CLL.  WBC noted chronically elevated  Currently back within baseline, elevated admission from fall  UA negative for UTI.  Outpatient follow-up with heme-onc.  Type 2 diabetes mellitus with stage 3 chronic kidney disease, unspecified whether long term insulin use, unspecified whether stage 3a or 3b CKD (HCC)  Most recent A1c 7.5.  Home regimen includes glargine 20U nightly, lispro 5 U 3 times daily with meals.  Resume insulin at lower dose to prevent hypoglycemia and adjust as needed based on Accu-Cheks and sliding-scale coverage.     Medical Problems       Resolved Problems  Date Reviewed: 7/3/2025   None        Discharging Physician / Practitioner: BRINDA Li  PCP: No primary care provider on file.  Admission Date:   Admission Orders (From admission, onward)       Ordered        07/02/25 1507  INPATIENT ADMISSION  Once            07/01/25 1055  Place in Observation  Once                          Discharge Date: 07/03/25    Next Steps for Physician/AP Assuming Care:  Follow-up with cardiology for Zio patch      Consultations During Hospital Stay:  Ortho surgery    Procedures Performed:   CT head without contrast  CT cervical spine  CT abdomen pelvis  X-ray left femur  X-ray left knee  X-ray left humerus  X-ray left forearm  Chest x-ray  X-ray right hip/pelvis  CTA chest PE study  CT right lower extremity without contrast    Significant Findings / Test Results:   CT head, CT cervical spine, CT pelvis, CT RLE report reviewed. No traumatic injury noted. Chronic findings  XR of the left humerus with distal fracture, minimally displaced   XR of the R Hip with questionable new fracture at the subcapital region of femoral neck  CT of right hip without acute injury         Hospital Course:   Gia Vaughan is a 84 y.o. female patient who originally presented to the hospital on 7/1/2025 due to unwitnessed fall at group home.  Imagings with results as noted above.  Orthopedic surgery consulted and patient will follow-up outpatient with Dr. Thompson.  Chest x-ray revealed mild cardiomegaly and CTA chest PE study revealed mild interstitial edema.  Patient received IV Lasix 50 mg in the emergency room with improvement.  PT evaluated patient.  WBAT to LUE per Ortho  Patient is being discharged back to prior SNF in stable condition and will follow up with cardiology for Zio patch and Ortho surgery.          Please see above list of diagnoses and related plan for additional information.     Discharge Day Visit / Exam:   Subjective: Seen and examined in bed.  Reports feeling okay.  Denies any acute event.      Vitals: Blood  Pressure: 118/61 (07/03/25 1039)  Pulse: 77 (07/03/25 1039)  Temperature: 98 °F (36.7 °C) (07/03/25 1039)  Temp Source: Oral (07/03/25 0715)  Respirations: 18 (07/03/25 0715)  Weight - Scale: 56.8 kg (125 lb 3.5 oz) (07/03/25 0547)  SpO2: 90 % (07/03/25 1039)  Physical Exam  Vitals and nursing note reviewed.   Constitutional:       General: She is not in acute distress.     Appearance: She is well-developed.   HENT:      Head: Normocephalic and atraumatic.     Eyes:      Conjunctiva/sclera: Conjunctivae normal.       Cardiovascular:      Rate and Rhythm: Rhythm irregular.      Heart sounds: No murmur heard.  Pulmonary:      Effort: Pulmonary effort is normal. No respiratory distress.      Breath sounds: Normal breath sounds.   Abdominal:      Palpations: Abdomen is soft.      Tenderness: There is no abdominal tenderness.     Musculoskeletal:      Cervical back: Neck supple.     Skin:     General: Skin is warm and dry.      Capillary Refill: Capillary refill takes less than 2 seconds.     Neurological:      Mental Status: She is alert. Mental status is at baseline.          Discussion with Family: Yes    Discharge instructions/Information to patient and family:   See after visit summary for information provided to patient and family.      Provisions for Follow-Up Care:  See after visit summary for information related to follow-up care and any pertinent home health orders.      Mobility at time of Discharge:   Basic Mobility Inpatient Raw Score: 15  JH-HLM Goal: 4: Move to chair/commode  JH-HLM Achieved: 4: Move to chair/commode  HLM Goal achieved. Continue to encourage appropriate mobility.     Disposition:   Other Skilled Nursing Facility at [place]    Planned Readmission: No    Administrative Statements   Discharge Statement:  I have spent a total time of  minutes in caring for this patient on the day of the visit/encounter. .    **Please Note: This note may have been constructed using a voice recognition system**

## 2025-07-06 LAB
BACTERIA BLD CULT: NORMAL
BACTERIA BLD CULT: NORMAL

## 2025-07-07 DIAGNOSIS — I10 PRIMARY HYPERTENSION: Primary | ICD-10-CM

## 2025-07-08 DIAGNOSIS — R05.3 CHRONIC COUGH: ICD-10-CM

## 2025-07-09 RX ORDER — LOSARTAN POTASSIUM 50 MG/1
TABLET ORAL
Qty: 90 TABLET | Refills: 1 | Status: SHIPPED | OUTPATIENT
Start: 2025-07-09

## 2025-07-10 RX ORDER — FLUTICASONE PROPIONATE 50 MCG
SPRAY, SUSPENSION (ML) NASAL
Qty: 16 G | Refills: 5 | Status: SHIPPED | OUTPATIENT
Start: 2025-07-10

## 2025-07-25 ENCOUNTER — HOSPITAL ENCOUNTER (INPATIENT)
Facility: HOSPITAL | Age: 85
LOS: 2 days | Discharge: NURSING, REHAB OR LONG-TERM CARE | DRG: 291 | End: 2025-07-27
Attending: EMERGENCY MEDICINE | Admitting: INTERNAL MEDICINE
Payer: MEDICARE

## 2025-07-25 ENCOUNTER — APPOINTMENT (EMERGENCY)
Dept: RADIOLOGY | Facility: HOSPITAL | Age: 85
DRG: 291 | End: 2025-07-25
Payer: MEDICARE

## 2025-07-25 DIAGNOSIS — I50.9 ACUTE EXACERBATION OF CHF (CONGESTIVE HEART FAILURE) (HCC): Primary | ICD-10-CM

## 2025-07-25 DIAGNOSIS — R09.02 HYPOXIA: ICD-10-CM

## 2025-07-25 DIAGNOSIS — C91.10 CLL (CHRONIC LYMPHOCYTIC LEUKEMIA) (HCC): ICD-10-CM

## 2025-07-25 DIAGNOSIS — I50.23 ACUTE ON CHRONIC SYSTOLIC HEART FAILURE (HCC): ICD-10-CM

## 2025-07-25 DIAGNOSIS — I50.23 ACUTE ON CHRONIC HFREF (HEART FAILURE WITH REDUCED EJECTION FRACTION) (HCC): ICD-10-CM

## 2025-07-25 LAB
2HR DELTA HS TROPONIN: 0 NG/L
4HR DELTA HS TROPONIN: 0 NG/L
ALBUMIN SERPL BCG-MCNC: 3.7 G/DL (ref 3.5–5)
ALP SERPL-CCNC: 108 U/L (ref 34–104)
ALT SERPL W P-5'-P-CCNC: 6 U/L (ref 7–52)
ANION GAP SERPL CALCULATED.3IONS-SCNC: 9 MMOL/L (ref 4–13)
ANISOCYTOSIS BLD QL SMEAR: PRESENT
AST SERPL W P-5'-P-CCNC: 10 U/L (ref 13–39)
ATRIAL RATE: 69 BPM
BASOPHILS # BLD MANUAL: 0 THOUSAND/UL (ref 0–0.1)
BASOPHILS NFR MAR MANUAL: 0 % (ref 0–1)
BILIRUB SERPL-MCNC: 0.65 MG/DL (ref 0.2–1)
BNP SERPL-MCNC: 1030 PG/ML (ref 0–100)
BUN SERPL-MCNC: 22 MG/DL (ref 5–25)
CALCIUM SERPL-MCNC: 9.5 MG/DL (ref 8.4–10.2)
CARDIAC TROPONIN I PNL SERPL HS: 11 NG/L (ref ?–50)
CHLORIDE SERPL-SCNC: 105 MMOL/L (ref 96–108)
CO2 SERPL-SCNC: 25 MMOL/L (ref 21–32)
CREAT SERPL-MCNC: 0.85 MG/DL (ref 0.6–1.3)
EOSINOPHIL # BLD MANUAL: 0 THOUSAND/UL (ref 0–0.4)
EOSINOPHIL NFR BLD MANUAL: 0 % (ref 0–6)
ERYTHROCYTE [DISTWIDTH] IN BLOOD BY AUTOMATED COUNT: 16.4 % (ref 11.6–15.1)
GFR SERPL CREATININE-BSD FRML MDRD: 62 ML/MIN/1.73SQ M
GIANT PLATELETS BLD QL SMEAR: PRESENT
GLUCOSE SERPL-MCNC: 123 MG/DL (ref 65–140)
GLUCOSE SERPL-MCNC: 147 MG/DL (ref 65–140)
GLUCOSE SERPL-MCNC: 204 MG/DL (ref 65–140)
GLUCOSE SERPL-MCNC: 214 MG/DL (ref 65–140)
HCT VFR BLD AUTO: 32 % (ref 34.8–46.1)
HGB BLD-MCNC: 9.9 G/DL (ref 11.5–15.4)
LG PLATELETS BLD QL SMEAR: PRESENT
LYMPHOCYTES # BLD AUTO: 17.82 THOUSAND/UL (ref 0.6–4.47)
LYMPHOCYTES # BLD AUTO: 75 % (ref 14–44)
MACROCYTES BLD QL AUTO: PRESENT
MAGNESIUM SERPL-MCNC: 2.1 MG/DL (ref 1.9–2.7)
MCH RBC QN AUTO: 35.1 PG (ref 26.8–34.3)
MCHC RBC AUTO-ENTMCNC: 30.9 G/DL (ref 31.4–37.4)
MCV RBC AUTO: 114 FL (ref 82–98)
MONOCYTES # BLD AUTO: 0.94 THOUSAND/UL (ref 0–1.22)
MONOCYTES NFR BLD: 4 % (ref 4–12)
NEUTROPHILS # BLD MANUAL: 4.69 THOUSAND/UL (ref 1.85–7.62)
NEUTS SEG NFR BLD AUTO: 20 % (ref 43–75)
P AXIS: 59 DEGREES
PLATELET # BLD AUTO: 187 THOUSANDS/UL (ref 149–390)
PLATELET # BLD AUTO: 243 THOUSANDS/UL (ref 149–390)
PLATELET BLD QL SMEAR: ADEQUATE
PMV BLD AUTO: 11.8 FL (ref 8.9–12.7)
PMV BLD AUTO: 12.4 FL (ref 8.9–12.7)
POIKILOCYTOSIS BLD QL SMEAR: PRESENT
POTASSIUM SERPL-SCNC: 4.3 MMOL/L (ref 3.5–5.3)
PR INTERVAL: 196 MS
PROT SERPL-MCNC: 6.4 G/DL (ref 6.4–8.4)
QRS AXIS: -13 DEGREES
QRSD INTERVAL: 80 MS
QT INTERVAL: 432 MS
QTC INTERVAL: 462 MS
RBC # BLD AUTO: 2.82 MILLION/UL (ref 3.81–5.12)
RBC MORPH BLD: PRESENT
SMUDGE CELLS BLD QL SMEAR: PRESENT
SODIUM SERPL-SCNC: 139 MMOL/L (ref 135–147)
SPHEROCYTES BLD QL SMEAR: PRESENT
T WAVE AXIS: 124 DEGREES
TSH SERPL DL<=0.05 MIU/L-ACNC: 1.88 UIU/ML (ref 0.45–4.5)
VARIANT LYMPHS # BLD AUTO: 1 %
VENTRICULAR RATE: 69 BPM
WBC # BLD AUTO: 23.45 THOUSAND/UL (ref 4.31–10.16)

## 2025-07-25 PROCEDURE — 80053 COMPREHEN METABOLIC PANEL: CPT

## 2025-07-25 PROCEDURE — 83735 ASSAY OF MAGNESIUM: CPT

## 2025-07-25 PROCEDURE — 36415 COLL VENOUS BLD VENIPUNCTURE: CPT

## 2025-07-25 PROCEDURE — 99222 1ST HOSP IP/OBS MODERATE 55: CPT | Performed by: STUDENT IN AN ORGANIZED HEALTH CARE EDUCATION/TRAINING PROGRAM

## 2025-07-25 PROCEDURE — 85027 COMPLETE CBC AUTOMATED: CPT

## 2025-07-25 PROCEDURE — 99223 1ST HOSP IP/OBS HIGH 75: CPT | Performed by: INTERNAL MEDICINE

## 2025-07-25 PROCEDURE — 93010 ELECTROCARDIOGRAM REPORT: CPT | Performed by: STUDENT IN AN ORGANIZED HEALTH CARE EDUCATION/TRAINING PROGRAM

## 2025-07-25 PROCEDURE — 99285 EMERGENCY DEPT VISIT HI MDM: CPT | Performed by: EMERGENCY MEDICINE

## 2025-07-25 PROCEDURE — 71045 X-RAY EXAM CHEST 1 VIEW: CPT

## 2025-07-25 PROCEDURE — 99284 EMERGENCY DEPT VISIT MOD MDM: CPT

## 2025-07-25 PROCEDURE — 82948 REAGENT STRIP/BLOOD GLUCOSE: CPT

## 2025-07-25 PROCEDURE — 93005 ELECTROCARDIOGRAM TRACING: CPT

## 2025-07-25 PROCEDURE — 85049 AUTOMATED PLATELET COUNT: CPT

## 2025-07-25 PROCEDURE — 84484 ASSAY OF TROPONIN QUANT: CPT

## 2025-07-25 PROCEDURE — 85007 BL SMEAR W/DIFF WBC COUNT: CPT

## 2025-07-25 PROCEDURE — 84443 ASSAY THYROID STIM HORMONE: CPT

## 2025-07-25 PROCEDURE — 83880 ASSAY OF NATRIURETIC PEPTIDE: CPT

## 2025-07-25 PROCEDURE — 96374 THER/PROPH/DIAG INJ IV PUSH: CPT

## 2025-07-25 RX ORDER — ACETAMINOPHEN 325 MG/1
975 TABLET ORAL 3 TIMES DAILY PRN
Status: DISCONTINUED | OUTPATIENT
Start: 2025-07-25 | End: 2025-07-27 | Stop reason: HOSPADM

## 2025-07-25 RX ORDER — ACETAMINOPHEN 325 MG/1
975 TABLET ORAL ONCE
Status: COMPLETED | OUTPATIENT
Start: 2025-07-25 | End: 2025-07-25

## 2025-07-25 RX ORDER — FUROSEMIDE 10 MG/ML
50 INJECTION INTRAMUSCULAR; INTRAVENOUS
Status: DISCONTINUED | OUTPATIENT
Start: 2025-07-25 | End: 2025-07-27

## 2025-07-25 RX ORDER — POTASSIUM CHLORIDE 1500 MG/1
20 TABLET, EXTENDED RELEASE ORAL 2 TIMES DAILY
Status: DISCONTINUED | OUTPATIENT
Start: 2025-07-25 | End: 2025-07-27 | Stop reason: HOSPADM

## 2025-07-25 RX ORDER — INSULIN GLARGINE 100 [IU]/ML
10 INJECTION, SOLUTION SUBCUTANEOUS
Status: DISCONTINUED | OUTPATIENT
Start: 2025-07-25 | End: 2025-07-25

## 2025-07-25 RX ORDER — ATORVASTATIN CALCIUM 20 MG/1
20 TABLET, FILM COATED ORAL
Status: DISCONTINUED | OUTPATIENT
Start: 2025-07-25 | End: 2025-07-27 | Stop reason: HOSPADM

## 2025-07-25 RX ORDER — INSULIN LISPRO 100 [IU]/ML
1-5 INJECTION, SOLUTION INTRAVENOUS; SUBCUTANEOUS
Status: DISCONTINUED | OUTPATIENT
Start: 2025-07-25 | End: 2025-07-26 | Stop reason: SDUPTHER

## 2025-07-25 RX ORDER — HEPARIN SODIUM 5000 [USP'U]/ML
5000 INJECTION, SOLUTION INTRAVENOUS; SUBCUTANEOUS EVERY 8 HOURS SCHEDULED
Status: DISCONTINUED | OUTPATIENT
Start: 2025-07-25 | End: 2025-07-27 | Stop reason: HOSPADM

## 2025-07-25 RX ORDER — FUROSEMIDE 10 MG/ML
50 INJECTION INTRAMUSCULAR; INTRAVENOUS ONCE
Status: COMPLETED | OUTPATIENT
Start: 2025-07-25 | End: 2025-07-25

## 2025-07-25 RX ORDER — FUROSEMIDE 40 MG/1
40 TABLET ORAL DAILY
Status: DISCONTINUED | OUTPATIENT
Start: 2025-07-25 | End: 2025-07-25

## 2025-07-25 RX ORDER — METOPROLOL SUCCINATE 25 MG/1
25 TABLET, EXTENDED RELEASE ORAL EVERY 12 HOURS SCHEDULED
Status: DISCONTINUED | OUTPATIENT
Start: 2025-07-25 | End: 2025-07-27 | Stop reason: HOSPADM

## 2025-07-25 RX ORDER — INSULIN GLARGINE 100 [IU]/ML
15 INJECTION, SOLUTION SUBCUTANEOUS
Status: DISCONTINUED | OUTPATIENT
Start: 2025-07-25 | End: 2025-07-27 | Stop reason: HOSPADM

## 2025-07-25 RX ORDER — INSULIN GLARGINE 100 [IU]/ML
20 INJECTION, SOLUTION SUBCUTANEOUS
Status: DISCONTINUED | OUTPATIENT
Start: 2025-07-25 | End: 2025-07-25

## 2025-07-25 RX ORDER — INSULIN LISPRO 100 [IU]/ML
5 INJECTION, SOLUTION INTRAVENOUS; SUBCUTANEOUS
Status: DISCONTINUED | OUTPATIENT
Start: 2025-07-25 | End: 2025-07-27 | Stop reason: HOSPADM

## 2025-07-25 RX ORDER — LOSARTAN POTASSIUM 25 MG/1
25 TABLET ORAL DAILY
Status: DISCONTINUED | OUTPATIENT
Start: 2025-07-25 | End: 2025-07-27 | Stop reason: HOSPADM

## 2025-07-25 RX ORDER — LANOLIN ALCOHOL/MO/W.PET/CERES
1 CREAM (GRAM) TOPICAL 2 TIMES DAILY WITH MEALS
Status: DISCONTINUED | OUTPATIENT
Start: 2025-07-25 | End: 2025-07-27 | Stop reason: HOSPADM

## 2025-07-25 RX ORDER — LANOLIN ALCOHOL/MO/W.PET/CERES
400 CREAM (GRAM) TOPICAL DAILY
Status: DISCONTINUED | OUTPATIENT
Start: 2025-07-25 | End: 2025-07-27 | Stop reason: HOSPADM

## 2025-07-25 RX ADMIN — INSULIN LISPRO 5 UNITS: 100 INJECTION, SOLUTION INTRAVENOUS; SUBCUTANEOUS at 13:27

## 2025-07-25 RX ADMIN — FUROSEMIDE 50 MG: 10 INJECTION, SOLUTION INTRAVENOUS at 21:43

## 2025-07-25 RX ADMIN — HEPARIN SODIUM 5000 UNITS: 5000 INJECTION INTRAVENOUS; SUBCUTANEOUS at 13:26

## 2025-07-25 RX ADMIN — HEPARIN SODIUM 5000 UNITS: 5000 INJECTION INTRAVENOUS; SUBCUTANEOUS at 21:43

## 2025-07-25 RX ADMIN — DICLOFENAC SODIUM 2 G: 10 GEL TOPICAL at 13:28

## 2025-07-25 RX ADMIN — LOSARTAN POTASSIUM 25 MG: 25 TABLET, FILM COATED ORAL at 13:22

## 2025-07-25 RX ADMIN — DICLOFENAC SODIUM 2 G: 10 GEL TOPICAL at 16:49

## 2025-07-25 RX ADMIN — METOPROLOL SUCCINATE 25 MG: 25 TABLET, EXTENDED RELEASE ORAL at 13:22

## 2025-07-25 RX ADMIN — FUROSEMIDE 50 MG: 10 INJECTION, SOLUTION INTRAVENOUS at 09:42

## 2025-07-25 RX ADMIN — POTASSIUM CHLORIDE 20 MEQ: 1500 TABLET, EXTENDED RELEASE ORAL at 21:43

## 2025-07-25 RX ADMIN — Medication 400 MG: at 13:22

## 2025-07-25 RX ADMIN — INSULIN GLARGINE 15 UNITS: 100 INJECTION, SOLUTION SUBCUTANEOUS at 21:43

## 2025-07-25 RX ADMIN — ATORVASTATIN CALCIUM 20 MG: 40 TABLET, FILM COATED ORAL at 16:48

## 2025-07-25 RX ADMIN — METOPROLOL SUCCINATE 25 MG: 25 TABLET, EXTENDED RELEASE ORAL at 21:43

## 2025-07-25 RX ADMIN — ACETAMINOPHEN 975 MG: 325 TABLET ORAL at 07:11

## 2025-07-25 RX ADMIN — Medication 1 TABLET: at 16:48

## 2025-07-26 LAB
ANION GAP SERPL CALCULATED.3IONS-SCNC: 8 MMOL/L (ref 4–13)
BUN SERPL-MCNC: 26 MG/DL (ref 5–25)
CALCIUM SERPL-MCNC: 9 MG/DL (ref 8.4–10.2)
CHLORIDE SERPL-SCNC: 104 MMOL/L (ref 96–108)
CO2 SERPL-SCNC: 28 MMOL/L (ref 21–32)
CREAT SERPL-MCNC: 1.04 MG/DL (ref 0.6–1.3)
GFR SERPL CREATININE-BSD FRML MDRD: 49 ML/MIN/1.73SQ M
GLUCOSE SERPL-MCNC: 190 MG/DL (ref 65–140)
GLUCOSE SERPL-MCNC: 266 MG/DL (ref 65–140)
GLUCOSE SERPL-MCNC: 334 MG/DL (ref 65–140)
GLUCOSE SERPL-MCNC: 89 MG/DL (ref 65–140)
GLUCOSE SERPL-MCNC: 94 MG/DL (ref 65–140)
MAGNESIUM SERPL-MCNC: 2.1 MG/DL (ref 1.9–2.7)
POTASSIUM SERPL-SCNC: 3.7 MMOL/L (ref 3.5–5.3)
SODIUM SERPL-SCNC: 140 MMOL/L (ref 135–147)

## 2025-07-26 PROCEDURE — 99232 SBSQ HOSP IP/OBS MODERATE 35: CPT

## 2025-07-26 PROCEDURE — 80048 BASIC METABOLIC PNL TOTAL CA: CPT

## 2025-07-26 PROCEDURE — 94761 N-INVAS EAR/PLS OXIMETRY MLT: CPT

## 2025-07-26 PROCEDURE — 83735 ASSAY OF MAGNESIUM: CPT

## 2025-07-26 PROCEDURE — 99232 SBSQ HOSP IP/OBS MODERATE 35: CPT | Performed by: INTERNAL MEDICINE

## 2025-07-26 PROCEDURE — 82948 REAGENT STRIP/BLOOD GLUCOSE: CPT

## 2025-07-26 RX ORDER — INSULIN LISPRO 100 [IU]/ML
1-5 INJECTION, SOLUTION INTRAVENOUS; SUBCUTANEOUS
Status: DISCONTINUED | OUTPATIENT
Start: 2025-07-26 | End: 2025-07-27 | Stop reason: HOSPADM

## 2025-07-26 RX ORDER — CALCIUM CARBONATE 500 MG/1
500 TABLET, CHEWABLE ORAL ONCE
Status: COMPLETED | OUTPATIENT
Start: 2025-07-26 | End: 2025-07-26

## 2025-07-26 RX ADMIN — HEPARIN SODIUM 5000 UNITS: 5000 INJECTION INTRAVENOUS; SUBCUTANEOUS at 21:52

## 2025-07-26 RX ADMIN — DICLOFENAC SODIUM 2 G: 10 GEL TOPICAL at 17:20

## 2025-07-26 RX ADMIN — CALCIUM CARBONATE (ANTACID) CHEW TAB 500 MG 500 MG: 500 CHEW TAB at 05:27

## 2025-07-26 RX ADMIN — POTASSIUM CHLORIDE 20 MEQ: 1500 TABLET, EXTENDED RELEASE ORAL at 17:19

## 2025-07-26 RX ADMIN — LOSARTAN POTASSIUM 25 MG: 25 TABLET, FILM COATED ORAL at 08:52

## 2025-07-26 RX ADMIN — ATORVASTATIN CALCIUM 20 MG: 40 TABLET, FILM COATED ORAL at 17:19

## 2025-07-26 RX ADMIN — METOPROLOL SUCCINATE 25 MG: 25 TABLET, EXTENDED RELEASE ORAL at 08:52

## 2025-07-26 RX ADMIN — INSULIN LISPRO 5 UNITS: 100 INJECTION, SOLUTION INTRAVENOUS; SUBCUTANEOUS at 12:24

## 2025-07-26 RX ADMIN — Medication 400 MG: at 08:52

## 2025-07-26 RX ADMIN — Medication 1 TABLET: at 08:52

## 2025-07-26 RX ADMIN — INSULIN LISPRO 5 UNITS: 100 INJECTION, SOLUTION INTRAVENOUS; SUBCUTANEOUS at 17:20

## 2025-07-26 RX ADMIN — DICLOFENAC SODIUM 2 G: 10 GEL TOPICAL at 08:52

## 2025-07-26 RX ADMIN — INSULIN LISPRO 2 UNITS: 100 INJECTION, SOLUTION INTRAVENOUS; SUBCUTANEOUS at 12:24

## 2025-07-26 RX ADMIN — INSULIN LISPRO 4 UNITS: 100 INJECTION, SOLUTION INTRAVENOUS; SUBCUTANEOUS at 17:20

## 2025-07-26 RX ADMIN — Medication 1 TABLET: at 17:19

## 2025-07-26 RX ADMIN — HEPARIN SODIUM 5000 UNITS: 5000 INJECTION INTRAVENOUS; SUBCUTANEOUS at 17:19

## 2025-07-26 RX ADMIN — HEPARIN SODIUM 5000 UNITS: 5000 INJECTION INTRAVENOUS; SUBCUTANEOUS at 05:26

## 2025-07-26 RX ADMIN — FUROSEMIDE 50 MG: 10 INJECTION, SOLUTION INTRAVENOUS at 17:19

## 2025-07-26 RX ADMIN — POTASSIUM CHLORIDE 20 MEQ: 1500 TABLET, EXTENDED RELEASE ORAL at 08:52

## 2025-07-26 RX ADMIN — FUROSEMIDE 50 MG: 10 INJECTION, SOLUTION INTRAVENOUS at 08:52

## 2025-07-26 RX ADMIN — METOPROLOL SUCCINATE 25 MG: 25 TABLET, EXTENDED RELEASE ORAL at 21:53

## 2025-07-26 RX ADMIN — TRIMETHOBENZAMIDE HYDROCHLORIDE 200 MG: 100 INJECTION INTRAMUSCULAR at 05:26

## 2025-07-26 RX ADMIN — INSULIN GLARGINE 15 UNITS: 100 INJECTION, SOLUTION SUBCUTANEOUS at 21:52

## 2025-07-27 VITALS
SYSTOLIC BLOOD PRESSURE: 124 MMHG | OXYGEN SATURATION: 97 % | HEART RATE: 66 BPM | WEIGHT: 124.56 LBS | TEMPERATURE: 97.6 F | RESPIRATION RATE: 18 BRPM | BODY MASS INDEX: 20.1 KG/M2 | DIASTOLIC BLOOD PRESSURE: 54 MMHG

## 2025-07-27 LAB
ANION GAP SERPL CALCULATED.3IONS-SCNC: 6 MMOL/L (ref 4–13)
ANISOCYTOSIS BLD QL SMEAR: PRESENT
BASOPHILS # BLD MANUAL: 0 THOUSAND/UL (ref 0–0.1)
BASOPHILS NFR MAR MANUAL: 0 % (ref 0–1)
BUN SERPL-MCNC: 38 MG/DL (ref 5–25)
CALCIUM SERPL-MCNC: 9.4 MG/DL (ref 8.4–10.2)
CHLORIDE SERPL-SCNC: 104 MMOL/L (ref 96–108)
CO2 SERPL-SCNC: 31 MMOL/L (ref 21–32)
CREAT SERPL-MCNC: 1.1 MG/DL (ref 0.6–1.3)
EOSINOPHIL # BLD MANUAL: 1.23 THOUSAND/UL (ref 0–0.4)
EOSINOPHIL NFR BLD MANUAL: 1 % (ref 0–6)
ERYTHROCYTE [DISTWIDTH] IN BLOOD BY AUTOMATED COUNT: 16.6 % (ref 11.6–15.1)
GFR SERPL CREATININE-BSD FRML MDRD: 45 ML/MIN/1.73SQ M
GLUCOSE SERPL-MCNC: 129 MG/DL (ref 65–140)
GLUCOSE SERPL-MCNC: 142 MG/DL (ref 65–140)
GLUCOSE SERPL-MCNC: 280 MG/DL (ref 65–140)
HCT VFR BLD AUTO: 34.2 % (ref 34.8–46.1)
HGB BLD-MCNC: 10.5 G/DL (ref 11.5–15.4)
LYMPHOCYTES # BLD AUTO: 116.59 THOUSAND/UL (ref 0.6–4.47)
LYMPHOCYTES # BLD AUTO: 95 % (ref 14–44)
MCH RBC QN AUTO: 34.8 PG (ref 26.8–34.3)
MCHC RBC AUTO-ENTMCNC: 30.7 G/DL (ref 31.4–37.4)
MCV RBC AUTO: 113 FL (ref 82–98)
MONOCYTES # BLD AUTO: 0 THOUSAND/UL (ref 0–1.22)
MONOCYTES NFR BLD: 0 % (ref 4–12)
NEUTROPHILS # BLD MANUAL: 4.91 THOUSAND/UL (ref 1.85–7.62)
NEUTS SEG NFR BLD AUTO: 4 % (ref 43–75)
PLATELET # BLD AUTO: 297 THOUSANDS/UL (ref 149–390)
PLATELET BLD QL SMEAR: ADEQUATE
PMV BLD AUTO: 12.3 FL (ref 8.9–12.7)
POTASSIUM SERPL-SCNC: 4.7 MMOL/L (ref 3.5–5.3)
RBC # BLD AUTO: 3.02 MILLION/UL (ref 3.81–5.12)
RBC MORPH BLD: PRESENT
SMUDGE CELLS BLD QL SMEAR: PRESENT
SODIUM SERPL-SCNC: 141 MMOL/L (ref 135–147)
WBC # BLD AUTO: 122.73 THOUSAND/UL (ref 4.31–10.16)

## 2025-07-27 PROCEDURE — 85027 COMPLETE CBC AUTOMATED: CPT

## 2025-07-27 PROCEDURE — 80048 BASIC METABOLIC PNL TOTAL CA: CPT

## 2025-07-27 PROCEDURE — 85007 BL SMEAR W/DIFF WBC COUNT: CPT

## 2025-07-27 PROCEDURE — 99239 HOSP IP/OBS DSCHRG MGMT >30: CPT

## 2025-07-27 PROCEDURE — 82948 REAGENT STRIP/BLOOD GLUCOSE: CPT

## 2025-07-27 RX ORDER — FUROSEMIDE 40 MG/1
40 TABLET ORAL 2 TIMES DAILY
Qty: 60 TABLET | Refills: 0 | Status: SHIPPED | OUTPATIENT
Start: 2025-07-27 | End: 2025-08-26

## 2025-07-27 RX ORDER — FUROSEMIDE 10 MG/ML
40 INJECTION INTRAMUSCULAR; INTRAVENOUS
Status: DISCONTINUED | OUTPATIENT
Start: 2025-07-27 | End: 2025-07-27

## 2025-07-27 RX ORDER — FUROSEMIDE 40 MG/1
40 TABLET ORAL
Status: DISCONTINUED | OUTPATIENT
Start: 2025-07-27 | End: 2025-07-27 | Stop reason: HOSPADM

## 2025-07-27 RX ORDER — FUROSEMIDE 20 MG/1
40 TABLET ORAL 2 TIMES DAILY
Qty: 120 TABLET | Refills: 0 | Status: CANCELLED | OUTPATIENT
Start: 2025-07-27

## 2025-07-27 RX ADMIN — FUROSEMIDE 40 MG: 40 TABLET ORAL at 09:10

## 2025-07-27 RX ADMIN — METOPROLOL SUCCINATE 25 MG: 25 TABLET, EXTENDED RELEASE ORAL at 09:10

## 2025-07-27 RX ADMIN — POTASSIUM CHLORIDE 20 MEQ: 1500 TABLET, EXTENDED RELEASE ORAL at 09:10

## 2025-07-27 RX ADMIN — Medication 400 MG: at 09:10

## 2025-07-27 RX ADMIN — INSULIN LISPRO 5 UNITS: 100 INJECTION, SOLUTION INTRAVENOUS; SUBCUTANEOUS at 09:12

## 2025-07-27 RX ADMIN — INSULIN LISPRO 5 UNITS: 100 INJECTION, SOLUTION INTRAVENOUS; SUBCUTANEOUS at 12:10

## 2025-07-27 RX ADMIN — Medication 1 TABLET: at 09:10

## 2025-07-27 RX ADMIN — INSULIN LISPRO 3 UNITS: 100 INJECTION, SOLUTION INTRAVENOUS; SUBCUTANEOUS at 12:10

## 2025-07-27 RX ADMIN — LOSARTAN POTASSIUM 25 MG: 25 TABLET, FILM COATED ORAL at 09:10

## 2025-07-27 RX ADMIN — DICLOFENAC SODIUM 2 G: 10 GEL TOPICAL at 09:12

## 2025-07-27 RX ADMIN — HEPARIN SODIUM 5000 UNITS: 5000 INJECTION INTRAVENOUS; SUBCUTANEOUS at 05:05

## 2025-07-28 ENCOUNTER — TELEPHONE (OUTPATIENT)
Dept: CARDIOLOGY CLINIC | Facility: CLINIC | Age: 85
End: 2025-07-28

## 2025-07-28 ENCOUNTER — TELEPHONE (OUTPATIENT)
Dept: HEMATOLOGY ONCOLOGY | Facility: CLINIC | Age: 85
End: 2025-07-28

## 2025-07-28 ENCOUNTER — PATIENT OUTREACH (OUTPATIENT)
Dept: CASE MANAGEMENT | Facility: OTHER | Age: 85
End: 2025-07-28

## 2025-07-28 DIAGNOSIS — C91.10 CLL (CHRONIC LYMPHOCYTIC LEUKEMIA) (HCC): Primary | ICD-10-CM

## 2025-07-28 LAB
DME PARACHUTE DELIVERY DATE ACTUAL: NORMAL
DME PARACHUTE DELIVERY DATE REQUESTED: NORMAL
DME PARACHUTE ITEM DESCRIPTION: NORMAL
DME PARACHUTE ORDER STATUS: NORMAL
DME PARACHUTE SUPPLIER NAME: NORMAL
DME PARACHUTE SUPPLIER PHONE: NORMAL

## 2025-07-29 ENCOUNTER — TELEPHONE (OUTPATIENT)
Dept: CARDIOLOGY CLINIC | Facility: CLINIC | Age: 85
End: 2025-07-29

## 2025-08-11 ENCOUNTER — TELEPHONE (OUTPATIENT)
Dept: CARDIOLOGY CLINIC | Facility: CLINIC | Age: 85
End: 2025-08-11

## (undated) DEVICE — SUT PDS II 0 CT-1 27 IN Z340H

## (undated) DEVICE — GLOVE INDICATOR PI UNDERGLOVE SZ 8 BLUE

## (undated) DEVICE — 4.2MM THREE-FLUTED DRILL BIT QC/NEEDLE POINT/145MM

## (undated) DEVICE — PAD CAST 4 IN COTTON NON STERILE

## (undated) DEVICE — 3M™ DURAPORE™ SURGICAL TAPE 1538-3, 3 INCH X 10 YARD (7,5CM X 9,1M), 4 ROLLS/BOX: Brand: 3M™ DURAPORE™

## (undated) DEVICE — 3.2MM GUIDE WIRE 400MM

## (undated) DEVICE — DRESSING MEPILEX AG BORDER 4 X 4 IN

## (undated) DEVICE — CHLORAPREP HI-LITE 26ML ORANGE

## (undated) DEVICE — 6617 IOBAN II PATIENT ISOLATION DRAPE 5/BX,4BX/CS: Brand: STERI-DRAPE™ IOBAN™ 2

## (undated) DEVICE — STERILE BETHLEHEM ORIF HIP PK: Brand: CARDINAL HEALTH

## (undated) DEVICE — GLOVE SRG BIOGEL 8

## (undated) DEVICE — SUT MONOCRYL 2-0 CT-1 27 IN Y339H

## (undated) DEVICE — 2.5MM REAMING ROD WITH BALL TIP/950MM-STERILE

## (undated) DEVICE — DRAPE C-ARMOUR

## (undated) DEVICE — ARTHROSCOPY FLOOR MAT

## (undated) DEVICE — DRESSING MEPILEX AG BORDER 4 X 8 IN